# Patient Record
Sex: FEMALE | Race: WHITE | NOT HISPANIC OR LATINO | Employment: OTHER | URBAN - METROPOLITAN AREA
[De-identification: names, ages, dates, MRNs, and addresses within clinical notes are randomized per-mention and may not be internally consistent; named-entity substitution may affect disease eponyms.]

---

## 2018-05-23 ENCOUNTER — OFFICE VISIT (OUTPATIENT)
Dept: FAMILY MEDICINE | Facility: CLINIC | Age: 78
End: 2018-05-23
Payer: MEDICARE

## 2018-05-23 VITALS
WEIGHT: 237 LBS | DIASTOLIC BLOOD PRESSURE: 68 MMHG | SYSTOLIC BLOOD PRESSURE: 132 MMHG | HEIGHT: 69 IN | BODY MASS INDEX: 35.1 KG/M2 | HEART RATE: 63 BPM

## 2018-05-23 DIAGNOSIS — I10 ESSENTIAL HYPERTENSION: Primary | ICD-10-CM

## 2018-05-23 DIAGNOSIS — E78.2 MIXED DYSLIPIDEMIA: ICD-10-CM

## 2018-05-23 DIAGNOSIS — Z95.810 CARDIOMYOPATHY WITH IMPLANTABLE CARDIOVERTER-DEFIBRILLATOR: ICD-10-CM

## 2018-05-23 DIAGNOSIS — I42.9 CARDIOMYOPATHY WITH IMPLANTABLE CARDIOVERTER-DEFIBRILLATOR: ICD-10-CM

## 2018-05-23 DIAGNOSIS — E11.9 TYPE 2 DIABETES MELLITUS WITHOUT COMPLICATION, WITHOUT LONG-TERM CURRENT USE OF INSULIN: ICD-10-CM

## 2018-05-23 DIAGNOSIS — I50.22 CHRONIC SYSTOLIC HEART FAILURE: ICD-10-CM

## 2018-05-23 PROCEDURE — 3078F DIAST BP <80 MM HG: CPT | Mod: ,,, | Performed by: INTERNAL MEDICINE

## 2018-05-23 PROCEDURE — 3075F SYST BP GE 130 - 139MM HG: CPT | Mod: ,,, | Performed by: INTERNAL MEDICINE

## 2018-05-23 PROCEDURE — 99204 OFFICE O/P NEW MOD 45 MIN: CPT | Mod: ,,, | Performed by: INTERNAL MEDICINE

## 2018-05-23 RX ORDER — FUROSEMIDE 20 MG/1
20 TABLET ORAL 2 TIMES DAILY PRN
Status: ON HOLD | COMMUNITY
End: 2020-04-02 | Stop reason: CLARIF

## 2018-05-23 RX ORDER — ATORVASTATIN CALCIUM 40 MG/1
40 TABLET, FILM COATED ORAL DAILY
COMMUNITY
End: 2018-11-05

## 2018-05-23 RX ORDER — ASPIRIN 81 MG/1
81 TABLET ORAL DAILY
COMMUNITY
End: 2019-12-10

## 2018-05-23 RX ORDER — CARVEDILOL 25 MG/1
25 TABLET ORAL 2 TIMES DAILY WITH MEALS
COMMUNITY
End: 2019-08-15 | Stop reason: SDUPTHER

## 2018-05-23 RX ORDER — GLIMEPIRIDE 1 MG/1
1 TABLET ORAL
COMMUNITY
End: 2019-05-06 | Stop reason: ALTCHOICE

## 2018-05-23 RX ORDER — WARFARIN SODIUM 5 MG/1
5 TABLET ORAL DAILY
COMMUNITY
End: 2021-11-01 | Stop reason: ALTCHOICE

## 2018-05-23 RX ORDER — GABAPENTIN 100 MG/1
100 CAPSULE ORAL 3 TIMES DAILY
COMMUNITY
End: 2018-07-05 | Stop reason: SDUPTHER

## 2018-05-23 RX ORDER — AMIODARONE HYDROCHLORIDE 200 MG/1
TABLET ORAL DAILY
COMMUNITY
End: 2018-09-20

## 2018-05-23 NOTE — PROGRESS NOTES
Subjective:       Patient ID: Jo Alegre is a 77 y.o. female.    Chief Complaint: Hypertension; Hyperlipidemia; Atrial Fibrillation; Congestive Heart Failure; and Diabetes    Hypertension   This is a chronic problem. The current episode started more than 1 year ago. The problem is controlled. Associated symptoms include shortness of breath. Pertinent negatives include no chest pain, headaches or palpitations. Risk factors for coronary artery disease include sedentary lifestyle, obesity, dyslipidemia and diabetes mellitus. Past treatments include beta blockers and diuretics. There is no history of PVD. There is no history of coarctation of the aorta, a hypertension causing med or pheochromocytoma.   Hyperlipidemia   This is a chronic problem. The current episode started more than 1 year ago. Exacerbating diseases include obesity. She has no history of nephrotic syndrome. Associated symptoms include shortness of breath. Pertinent negatives include no chest pain. The current treatment provides moderate improvement of lipids. Risk factors for coronary artery disease include diabetes mellitus, dyslipidemia and hypertension.   Atrial Fibrillation   Presents for follow-up visit. Symptoms include shortness of breath. Symptoms are negative for chest pain, dizziness, pacemaker problem (defibrillator), palpitations and weakness. Past medical history includes atrial fibrillation, CHF and hyperlipidemia.   Congestive Heart Failure   Presents for follow-up visit. Associated symptoms include fatigue and shortness of breath. Pertinent negatives include no chest pain, palpitations or unexpected weight change. The symptoms have been stable.   Diabetes   She presents for her follow-up diabetic visit. She has type 2 diabetes mellitus. Her disease course has been stable. Pertinent negatives for hypoglycemia include no confusion, dizziness, headaches, nervousness/anxiousness, pallor, seizures or tremors. Associated symptoms  include fatigue. Pertinent negatives for diabetes include no chest pain, no foot ulcerations, no weakness and no weight loss. Diabetic complications include nephropathy. Pertinent negatives for diabetic complications include no PVD. Current diabetic treatment includes oral agent (monotherapy). An ACE inhibitor/angiotensin II receptor blocker is being taken. She does not see a podiatrist.      Past Medical History:   Diagnosis Date    Allergy     Codeine, Lasix    Atrial fibrillation     Atrial fibrillation     Cataract     CHF (congestive heart failure)     Diabetes mellitus, type 2     Hyperlipidemia     Osteoporosis      Social History     Social History    Marital status:      Spouse name: N/A    Number of children: 4    Years of education: N/A     Occupational History          Social History Main Topics    Smoking status: Former Smoker    Smokeless tobacco: Never Used    Alcohol use No    Drug use: No    Sexual activity: No     Other Topics Concern    Not on file     Social History Narrative    - Drives and lives alone.     Past Surgical History:   Procedure Laterality Date    COLONOSCOPY      EYE SURGERY      FRACTURE SURGERY      TONSILLECTOMY       Family History   Problem Relation Age of Onset    Heart disease Mother     Cancer Father         Lung Cancer ??? Asbestos       Review of Systems   Constitutional: Positive for fatigue. Negative for activity change, chills, fever, unexpected weight change and weight loss.   HENT: Negative for congestion, postnasal drip and sinus pressure.    Eyes: Negative for pain, discharge and visual disturbance.   Respiratory: Positive for shortness of breath. Negative for cough and chest tightness.    Cardiovascular: Negative for chest pain, palpitations and leg swelling.        History of defibrillator, congestive cardiomyopathy hypertension and dyslipidemia   Gastrointestinal: Negative for abdominal distention, anal  "bleeding, constipation and diarrhea.   Genitourinary: Negative for difficulty urinating, dysuria, flank pain, frequency, menstrual problem, pelvic pain, vaginal bleeding and vaginal pain.   Musculoskeletal: Negative for arthralgias and joint swelling.   Skin: Negative for color change, pallor and rash.   Allergic/Immunologic: Negative for environmental allergies, food allergies and immunocompromised state.   Neurological: Negative for dizziness, tremors, seizures, syncope, weakness, light-headedness and headaches.   Hematological: Negative for adenopathy. Does not bruise/bleed easily.   Psychiatric/Behavioral: Negative for agitation, confusion and dysphoric mood. The patient is not nervous/anxious.        Objective:       Vitals:    05/23/18 0900 05/23/18 0947   BP: (!) 147/82 132/68   Pulse: 63    Weight: 107.5 kg (237 lb)    Height: 5' 9" (1.753 m)      Physical Exam   Constitutional: She appears well-developed. She is cooperative. No distress.   Simple obesity with a BMI of 35   HENT:   Head: Normocephalic and atraumatic.   Eyes: Conjunctivae, EOM and lids are normal. Lids are everted and swept, no foreign bodies found. Right pupil is round and reactive. Left pupil is round and reactive.   Neck: Trachea normal and normal range of motion. Neck supple.   Cardiovascular: Normal rate, regular rhythm, S1 normal, S2 normal and normal heart sounds.    Pulmonary/Chest: Breath sounds normal.   Abdominal: Soft. Bowel sounds are normal. There is no rigidity and no guarding.   Lymphadenopathy:     She has no cervical adenopathy.     She has no axillary adenopathy.   Neurological: She is alert.   Skin: Skin is warm and dry.   Psychiatric: She has a normal mood and affect.   Nursing note and vitals reviewed.      Assessment:       1. Essential hypertension    2. Type 2 diabetes mellitus without complication, without long-term current use of insulin    3. Mixed dyslipidemia    4. Chronic systolic heart failure    5. " Cardiomyopathy with implantable cardioverter-defibrillator         Plan:           Essential hypertension    Type 2 diabetes mellitus without complication, without long-term current use of insulin    Mixed dyslipidemia    Chronic systolic heart failure    Cardiomyopathy with implantable cardioverter-defibrillator    Advised Ms. Alegre to monitor Blood sugars at home and record them.  Exercise, watch diet and loose weight.  keep a close eye on feet and keep them clean. Annual eye examination. Annual influenza vaccine.  Monitor HgbA1c every 3 to 6 months. Monitor urine microalbumin every year.keep LDL less than 100. Monitor blood pressure and target blood pressure 120/70.      Patient has been advised to watch diet and exercise. Avoid fried and fatty food. Compliance to medications and follow up urged.      Watch salt and fluid intake. Check body weight periodically to address any possible worsening     Appropriate seasonal immunizations    Like to review old records.

## 2018-05-23 NOTE — PATIENT INSTRUCTIONS
Using a Blood Sugar Log    You have diabetes. This means your body has trouble regulating a sugar called glucose. To help manage your diabetes, youll need to check your blood sugar level as directed by your healthcare provider. Keeping a log of your blood sugar levels will help you track your blood sugar readings. Its a simple and easy way to see how well you are controlling your diabetes.  Checking your blood sugar level  You can check your blood sugar level with a blood glucose meter. Youll first prick the side of your finger with a tiny lancet to draw a tiny drop of blood onto the test strip. Some glucose meters let you use another place on your body to test. But these other places should not be used in some cases as they may be inaccurate. Follow the instructions for your glucose meter. And talk with your healthcare provider before doing the test on other places.  The strip goes into the meter first, then a drop of blood is placed on the tip of the strip. The meter then shows a reading that tells you the level of your blood sugar. Your readings should be in your target range as often as possible. This means not too high or too low. Staying in this range helps lower your risk for complications. Your healthcare provider will help you figure out the target range that is best for you.  Tracking your readings  Every time you check your blood sugar, use your log to keep track of your readings. Your meter will also probably have a memory feature that your healthcare provider can check at your next visit. You may be advised by your healthcare provider to check your blood sugar in the morning, at bedtime, and before and after meals. Be sure to write down all of your numbers. Also use your log to record things that might have affected your blood sugar. Some examples include being sick, certain medicines, being physically active, feeling stressed, or skipping meals.   Lessons learned from your readings  Tracking your  blood sugar readings helps you see patterns. These patterns tell you how your actions affect your blood sugar. For instance, you may have higher numbers after eating certain foods or lower numbers after exercise. They just help you understand how to stay in your target range more often, so that your diabetes remains in good control.  Sharing your log with your healthcare team  Bring your blood sugar log and glucose meter with you to all of your healthcare appointments. This can help your healthcare team make changes to your treatment plan, if needed. This may involve making changes in what you eat, what medicines you take, or how much you exercise.  To learn more  The resources below can help you learn more:  · American Diabetes Association 975-096-7493 www.diabetes.org  · [employer] 183-403-2220 www.lighthouse.org  · [employer] [place] 374-462-6076 www.nei.nih.gov  · [employer] 392-387-0716 www.hormone.org  Date Last Reviewed: 5/1/2016  © 1516-4889 [employer]. [address] [place] [address]. All rights reserved. This information is not intended as a substitute for professional medical care. Always follow your healthcare professional's instructions.        Diet: Diabetes  Food is an important tool that you can use to control diabetes and stay healthy. Eating well-balanced meals in the correct amounts will help you control your blood glucose levels and prevent low blood sugar reactions. It will also help you reduce the health risks of diabetes. There is no one specific diet that is right for everyone with diabetes. But there are general guidelines to follow. A registered dietitian (RD) will create a tailored diet approach thats just right for you. He or she will also help you plan healthy meals and snacks. If you have any questions, call your dietitian for advice.     Guidelines for success  Talk with your healthcare provider before starting a diabetes diet or  weight loss program. If you haven't talked with a dietitian yet, ask your provider for a referral. The following guidelines can help you succeed:  · Select foods from the 6 food groups below. Your dietitian will help you find food choices within each group. He or she will also show you serving sizes and how many servings you can have at each meal.  ¨ Grains, beans, and starchy vegetables  ¨ Vegetables  ¨ Fruit  ¨ Milk or yogurt  ¨ Meat, poultry, fish, or tofu  ¨ Healthy fats  · Check your blood sugar levels as directed by your provider. Take any medicine as prescribed by your provider.  · Learn to read food labels and pick the right portion sizes.  · Eat only the amount of food in your meal plan. Eat about the same amount of food at regular times each day. Dont skip meals. Eat meals 4 to 5 hours apart, with snacks in between.  · Limit alcohol. It raises blood sugar levels. Drink water or calorie-free diet drinks that use safe sweeteners.  · Eat less fat to help lower your risk of heart disease. Use nonfat or low-fat dairy products and lean meats. Avoid fried foods. Use cooking oils that are unsaturated, such as olive, canola, or peanut oil.  · Talk with your dietitian about safe sugar substitutes.  · Avoid added salt. It can contribute to high blood pressure, which can cause heart disease. People with diabetes already have a risk of high blood pressure and heart disease.  · Stay at a healthy weight. If you need to lose weight, cut down on your portion sizes. But dont skip meals. Exercise is an important part of any weight management program. Talk with your provider about an exercise program thats right for you.  · For more information about the best diet plan for you, talk with a registered dietitian (RD). To find an RD in your area, contact:  ¨ Academy of Nutrition and Dietetics www.eatright.org  ¨ The American Diabetes Association 002-617-3535 www.diabetes.org  Date Last Reviewed: 8/1/2016  © 9805-8369 [employer]. [address] [place] [address], [place] [address]. All rights reserved. This information is not intended as a substitute for professional medical care. Always follow your healthcare professional's instructions.

## 2018-05-30 PROBLEM — R94.30 EJECTION FRACTION < 50%: Status: ACTIVE | Noted: 2017-10-18

## 2018-05-30 PROBLEM — Z13.29 THYROID DISORDER SCREENING: Status: ACTIVE | Noted: 2017-10-17

## 2018-07-05 ENCOUNTER — OFFICE VISIT (OUTPATIENT)
Dept: FAMILY MEDICINE | Facility: CLINIC | Age: 78
End: 2018-07-05
Payer: MEDICARE

## 2018-07-05 VITALS
BODY MASS INDEX: 34.96 KG/M2 | DIASTOLIC BLOOD PRESSURE: 77 MMHG | SYSTOLIC BLOOD PRESSURE: 125 MMHG | HEIGHT: 69 IN | RESPIRATION RATE: 16 BRPM | HEART RATE: 66 BPM | WEIGHT: 236 LBS

## 2018-07-05 DIAGNOSIS — Z95.810 CARDIOMYOPATHY WITH IMPLANTABLE CARDIOVERTER-DEFIBRILLATOR: ICD-10-CM

## 2018-07-05 DIAGNOSIS — Z12.31 ENCOUNTER FOR SCREENING MAMMOGRAM FOR BREAST CANCER: ICD-10-CM

## 2018-07-05 DIAGNOSIS — I10 ESSENTIAL HYPERTENSION: Primary | ICD-10-CM

## 2018-07-05 DIAGNOSIS — Z78.0 ASYMPTOMATIC MENOPAUSE: ICD-10-CM

## 2018-07-05 DIAGNOSIS — E78.2 MIXED DYSLIPIDEMIA: ICD-10-CM

## 2018-07-05 DIAGNOSIS — G57.93 NEUROPATHY OF BOTH FEET: ICD-10-CM

## 2018-07-05 DIAGNOSIS — I50.22 CHRONIC SYSTOLIC HEART FAILURE: ICD-10-CM

## 2018-07-05 DIAGNOSIS — I42.9 CARDIOMYOPATHY WITH IMPLANTABLE CARDIOVERTER-DEFIBRILLATOR: ICD-10-CM

## 2018-07-05 PROCEDURE — 3074F SYST BP LT 130 MM HG: CPT | Mod: ,,, | Performed by: INTERNAL MEDICINE

## 2018-07-05 PROCEDURE — 3078F DIAST BP <80 MM HG: CPT | Mod: ,,, | Performed by: INTERNAL MEDICINE

## 2018-07-05 PROCEDURE — 99214 OFFICE O/P EST MOD 30 MIN: CPT | Mod: ,,, | Performed by: INTERNAL MEDICINE

## 2018-07-05 RX ORDER — GABAPENTIN 100 MG/1
100 CAPSULE ORAL 3 TIMES DAILY
Qty: 270 CAPSULE | Refills: 1 | Status: SHIPPED | OUTPATIENT
Start: 2018-07-05 | End: 2018-08-03

## 2018-07-05 NOTE — PROGRESS NOTES
Subjective:       Patient ID: Jo Alegre is a 77 y.o. female.    Chief Complaint: Hypertension; Hyperlipidemia; Numbness/neuropathy; and Cardiomyopathy    Ms. Jo Alegre is a 77-year-old  female  Who comes for follow-up.Her underlying medical issues include  Congestive cardiomyopathy with a defibrillator placement. She also has hypertension, hyperlipidemia and sugar diabetes. She follows Dr. Jones  Co-manages although co-morbidities Including Diabetes.    She continues to have numbness and paresthesias  In the lower extremities. This may be due to diabetes  Or other Reasons. She is running short of gabapentin. This was initiated by her pain management  Dr. Rucker.However, she chose not to follow-up with him.    Heart ambulatory status is fair. No significant shortness of breath on ordinary ambulation. No active chest pains. No cough or expectoration.      Hypertension   This is a chronic problem. The current episode started more than 1 year ago. The problem is controlled. Associated symptoms include malaise/fatigue, peripheral edema and shortness of breath. Pertinent negatives include no chest pain, headaches or palpitations. Risk factors for coronary artery disease include sedentary lifestyle, obesity and dyslipidemia. Past treatments include beta blockers, diuretics and angiotensin blockers. There is no history of pheochromocytoma.   Hyperlipidemia   This is a chronic problem. The current episode started more than 1 year ago. The problem is controlled. Exacerbating diseases include obesity. Associated symptoms include shortness of breath. Pertinent negatives include no chest pain. Current antihyperlipidemic treatment includes statins. The current treatment provides moderate improvement of lipids. Risk factors for coronary artery disease include a sedentary lifestyle, dyslipidemia and hypertension.       Past Medical History:   Diagnosis Date    Allergy     Codeine, Lasix    Atrial  fibrillation     Atrial fibrillation     Cataract     CHF (congestive heart failure)     Diabetes mellitus, type 2     Ejection fraction < 50% 10/18/2017    Approximately 35%  Based on prior  Echocardiogram.    Hyperlipidemia     Osteoporosis     Thyroid disorder screening 10/17/2017    TSH of 1.12 ordered by Dr. dee Jones     Social History     Social History    Marital status:      Spouse name: N/A    Number of children: 4    Years of education: N/A     Occupational History          Social History Main Topics    Smoking status: Former Smoker    Smokeless tobacco: Never Used    Alcohol use No    Drug use: No    Sexual activity: No     Other Topics Concern    Not on file     Social History Narrative    - Drives and lives alone.     Past Surgical History:   Procedure Laterality Date    COLONOSCOPY      EYE SURGERY      FRACTURE SURGERY      TONSILLECTOMY       Family History   Problem Relation Age of Onset    Heart disease Mother     Cancer Father         Lung Cancer ??? Asbestos       Review of Systems   Constitutional: Positive for fatigue and malaise/fatigue. Negative for activity change, chills, fever and unexpected weight change.   HENT: Negative for congestion, postnasal drip and sinus pressure.    Eyes: Negative for pain, discharge and visual disturbance.   Respiratory: Positive for shortness of breath. Negative for cough and chest tightness.    Cardiovascular: Negative for chest pain, palpitations and leg swelling.        History of defibrillator, congestive cardiomyopathy hypertension and dyslipidemia   Gastrointestinal: Negative for abdominal distention, anal bleeding, constipation and diarrhea.   Genitourinary: Negative for difficulty urinating, dysuria, flank pain, frequency, menstrual problem, pelvic pain, vaginal bleeding and vaginal pain.   Musculoskeletal: Negative for arthralgias and joint swelling.   Skin: Negative for color change, pallor and  "rash.   Allergic/Immunologic: Negative for environmental allergies, food allergies and immunocompromised state.   Neurological: Negative for dizziness, tremors, seizures, syncope, weakness, light-headedness and headaches.   Hematological: Negative for adenopathy. Does not bruise/bleed easily.   Psychiatric/Behavioral: Negative for agitation, confusion and dysphoric mood. The patient is not nervous/anxious.        Objective:       Vitals:    07/05/18 1202   BP: 125/77   Pulse: 66   Resp: 16   Weight: 107 kg (236 lb)   Height: 5' 9" (1.753 m)     Physical Exam   Constitutional: She appears well-developed. She is cooperative. No distress.   Simple obesity with a BMI of 34   HENT:   Head: Normocephalic and atraumatic.   Eyes: Conjunctivae, EOM and lids are normal. Lids are everted and swept, no foreign bodies found. Right pupil is round and reactive. Left pupil is round and reactive.   Neck: Trachea normal and normal range of motion. Neck supple.   Cardiovascular: Normal rate, regular rhythm, S1 normal and S2 normal.  Exam reveals distant heart sounds. Exam reveals no gallop and no friction rub.        Pulmonary/Chest: Breath sounds normal.   Abdominal: Soft. Bowel sounds are normal. There is no rigidity and no guarding.   Lymphadenopathy:     She has no cervical adenopathy.   Neurological: She is alert.   Skin: Skin is warm and dry.   Dusky skin both legs   Psychiatric: She has a normal mood and affect.   Nursing note and vitals reviewed.      Assessment:       1. Essential hypertension    2. Mixed dyslipidemia    3. Cardiomyopathy with implantable cardioverter-defibrillator    4. Neuropathy of both feet    5. Encounter for screening mammogram for breast cancer    6. Asymptomatic menopause    7. Chronic systolic heart failure         Plan:           Essential hypertension    Mixed dyslipidemia    Cardiomyopathy with implantable cardioverter-defibrillator    Neuropathy of both feet  -     gabapentin (NEURONTIN) 100 MG " capsule; Take 1 capsule (100 mg total) by mouth 3 (three) times daily.  Dispense: 270 capsule; Refill: 1    Encounter for screening mammogram for breast cancer  -     Mammo Digital Screening Bilat with CAD; Future; Expected date: 07/05/2018    Asymptomatic menopause  -     DXA Bone Density Spine And Hip    Chronic systolic heart failure    Patient has been advised to watch diet and exercise. Avoid fried and fatty food. Compliance to medications and follow up urged.    The patient is asked to make an attempt to improve diet and exercise patterns to aid in medical management of this problem.    Use of salt and fluid intake restriction has also been discussed.    She is also due for mammogram and bone density checkup for which orders will be given.    She is at a low dose of gabapentin and any escalated dose may cause further edema.    I've advised her to forward me any recent labs from her cardiologist Dr. Jones.    I'll see her back in about 4 months time to review her general medical conditions.    Seasonal immunizations have been discussed.    Falling injury precautions have been discussed.

## 2018-07-05 NOTE — PATIENT INSTRUCTIONS
Medications for Cardiomyopathy  Medicines can help you to both feel better and stay as healthy as you can. Take your medicines exactly as instructed. Never stop taking them or change dosage unless told to by your doctor, even if you feel better. Be honest with your healthcare provider if you are not taking medicines as prescribed. Often, a medicine plan can be tailored to your preferences. If you don't share this information with your doctor, you may end up being admitted to the hospital for a serious condition.     Use a pillbox to make keeping track of medications easier.     Common medicines  Your healthcare provider may prescribe one or more of the following:  · ACE inhibitors help blood flow more easily by relaxing blood vessels and lowering blood pressure. This lets the heart pump more blood without doing more work. This is especially useful if you also have diabetes because it protects the kidneys from further damage.  · Anticoagulants help prevent blood clots, which can occur when blood pools in the heart from impaired pumping. You may need routine blood tests while taking these medicines to see how well they are working, have dose adjustments, and to see if they may be interacting with foods or medicines. Your healthcare team will give you full instructions based on what medicine you are prescribed.  · Antiarrhythmics may be used to control a fast or irregular heartbeat.  · Beta-blockers slow the heart rate and lower blood pressure, which lessens the work the heart has to do. They may also help keep the heartbeat regular and enhance the pumping function of the heart.  · Calcium channel blockers dilate blood vessels, lowering blood pressure and slowing the heart rate. This can also decrease the work the heart has to perform.  · Diuretics help rid the body of excess fluid. Having less fluid to pump makes a hearts job easier. Getting rid of extra water can also help reduce swelling, bloating, and shortness  of breath. These drugs are among the most important in treatment of heart failure as they help control the balance of fluid with the heart's changing ability to pump. Never skip or miss these medicines unless your doctor says it's OK.  · Digitalis and digoxin help the heart pump with more strength. This helps the heart pump more blood with each beat. Digitalis may also keep the heartbeat regular.  Tips for taking your medicines  · Make taking your medicines part of your daily routine.  · Take your medicine at the same time or times each day. Make it a habit.  · Read and follow the directions on the prescription label.  · Dont run out of medicine. Order more medicine when you have a 1 to 2 week supply of pills left. Let your healthcare provider know if you are having trouble filling your prescriptions.  · Make sure you understand any interaction your medicines may have with foods, supplements, or new prescriptions. Talk with your pharmacist and other providers for information.  · Check with your healthcare provider on over-the-counter medicines. Some may have a lot of sodium, which can cause fluid retention and worsening symptoms. Common cough medicines can increase blood pressure, heart rate, and strain on your heart.  Coping with side effects  Some of the medications you take may cause side effects. Side effects may include nausea, dry cough, dizziness, muscle cramps, or changes in your heartbeat. If you have any of these or other symptoms that bother you after starting a medication, tell your doctor right away. Your doctor may be able to adjust your dosage or give you a different medication. Never stop taking your medication or change your dose on your own.  Date Last Reviewed: 1/1/2017  © 7130-7262 [employer]. [address] [place] [address], [place] [address]. All rights reserved. This information is not intended as a substitute for professional medical care. Always follow your healthcare professional's  instructions.        Living with Cardiomyopathy  Your doctor will outline a treatment plan to help you live better with cardiomyopathy. This will stop your condition from getting worse and possibly causing serious problems for your heart and lungs. Be sure to follow your healthcare provider's instructions. You can also make some lifestyle changes that will help your heart.     Weigh yourself daily and write down your results.   Follow your treatment plan  Be sure to visit your healthcare provider regularly. Mention any problems you are having with your treatment plan. Be honest if you are not doing something your provider has suggested. He or she may be able to make some changes to help your plan work better for you. Not following your provider's advice could result in a serious or life-threatening complication. You would need to stay in the hospital.  Balance activity and rest  Having cardiomyopathy may mean you get tired more quickly because your heart doesn't work as well as it should. But this shouldnt keep you from being active. In fact, being active may help you feel better. Talk with your healthcare provider about how much activity is right for you.  Take steps to help your heart  · Stop smoking. Smoking damages your heart muscle and blood vessels. It t also causes changes to your lungs that can make it more difficult to breathe and for your lungs to work. Smoking reduces the oxygen in your blood. Having less oxygen in your blood will cause your heart to work harder and beat faster. This can cause a heart attack if your heart can't handle this extra work. This kind of heart attack is known as an acute myocardial infarction (AMI).  · Lose any excess weight. The more extra pounds you have, the harder your heart has to work to pump blood through your body. Extra weight can also raise your risk for high blood pressure and diabetes. These diseases can further damage your blood vessels and heart.  · Don't drink  alcohol. Drinking alcohol may make your cardiomyopathy worse. Alcohol breaks down the heart tissue. This affects how well your heart pumps. This can be very serious in people with alcoholism.  · Eat less salt. Salt is the main source of sodium in our diet. Too much sodium can make the symptoms of cardiomyopathy worse. Salt causes your body to retain water. This extra fluid makes your heart work harder. Your healthcare provider may tell you to limit how much sodium you have to to less than 1,500 mg a day. Thats about half a teaspoon of salt.  Keep track of your weight  Rapid weight gain may mean that you are retaining fluid. This is one of the signs of heart failure. Keeping track of your weight helps you notice this weight gain early and prevent further damage to your heart. To keep track of your weight:  · Weigh yourself at the same time each day, after you urinate. Wear the same thing each time. Write down your weight each day.  · Dont stop weighing yourself. If you forget one day, weigh again the next morning.  · Call your healthcare provider if you gain more than 2 pounds in 1 day, more than 5 pounds in 1 week, or whatever weight gain you were told to report by your provider.  Call your healthcare provider  Contact your healthcare provider if you:   · Faint or have dizzy spells  · Notice new symptoms from your medicine  · Have a new onset of coughing. This is especially true if the sputum is frothy or foamy.  · Have trouble breathing, especially if it occurs while at rest or lying down. Or if you have trouble breathing during exercise or even while walking short distances.  · Get tired faster  · Begin urinating less often  · Find that your feet or ankles swell more than usual. Or if you have swelling in your legs or abdomen, or if the veins in your neck stick out more than usual. You may notice it is harder to get your shoes or pants because of swelling and bloating.  · Have tightness or pain in your chest,  arm, jaw, or back  Date Last Reviewed: 2/1/2017  © 2767-6244 [employer]. [address] [place] [address], [place] [address]. All rights reserved. This information is not intended as a substitute for professional medical care. Always follow your healthcare professional's instructions.

## 2018-08-03 ENCOUNTER — OFFICE VISIT (OUTPATIENT)
Dept: FAMILY MEDICINE | Facility: CLINIC | Age: 78
End: 2018-08-03
Payer: MEDICARE

## 2018-08-03 VITALS
HEIGHT: 69 IN | DIASTOLIC BLOOD PRESSURE: 74 MMHG | HEART RATE: 60 BPM | SYSTOLIC BLOOD PRESSURE: 138 MMHG | WEIGHT: 235 LBS | OXYGEN SATURATION: 98 % | BODY MASS INDEX: 34.8 KG/M2

## 2018-08-03 DIAGNOSIS — Z95.810 CARDIOMYOPATHY WITH IMPLANTABLE CARDIOVERTER-DEFIBRILLATOR: ICD-10-CM

## 2018-08-03 DIAGNOSIS — M54.2 NECK PAIN: Primary | ICD-10-CM

## 2018-08-03 DIAGNOSIS — R51.9 ACUTE INTRACTABLE HEADACHE, UNSPECIFIED HEADACHE TYPE: ICD-10-CM

## 2018-08-03 DIAGNOSIS — I42.9 CARDIOMYOPATHY WITH IMPLANTABLE CARDIOVERTER-DEFIBRILLATOR: ICD-10-CM

## 2018-08-03 DIAGNOSIS — I10 ESSENTIAL HYPERTENSION: ICD-10-CM

## 2018-08-03 PROCEDURE — 3075F SYST BP GE 130 - 139MM HG: CPT | Mod: ,,, | Performed by: NURSE PRACTITIONER

## 2018-08-03 PROCEDURE — 3078F DIAST BP <80 MM HG: CPT | Mod: ,,, | Performed by: NURSE PRACTITIONER

## 2018-08-03 PROCEDURE — 99213 OFFICE O/P EST LOW 20 MIN: CPT | Mod: ,,, | Performed by: NURSE PRACTITIONER

## 2018-08-03 RX ORDER — PREGABALIN 50 MG/1
50 CAPSULE ORAL 3 TIMES DAILY
COMMUNITY
End: 2018-09-20 | Stop reason: ALTCHOICE

## 2018-08-03 NOTE — PROGRESS NOTES
Subjective:       Patient ID: Jo Alegre is a 77 y.o. female.    Chief Complaint: Neck Pain    Patient presents with new onset headache and neck pain for 2 weeks.  Patient states headache is located at the right base of the skull that waxes and wanes.  Patient describes pain as a sharp burning pain when it comes.  Pain lasts 10-15 minutes and comes and goes throughout the day.  Patient has tried cold and warm compresses for relief which have been ineffective.  Patient was recently admitted to the hospital for low back pain.  Patient states she has no low back pain today.  Patient denies ear pain or sinus issues.  No fever, dizziness or vision changes.  No chest pain or shortness of breath.  Pain is a 7-8 on a scale of 0-10.      Review of Systems   Constitutional: Negative for activity change, appetite change and fever.   HENT: Negative for congestion, ear discharge, ear pain, sinus pain, sinus pressure, sore throat, trouble swallowing and voice change.    Eyes: Negative for photophobia, pain, discharge and visual disturbance.   Respiratory: Negative for cough, chest tightness and shortness of breath.    Cardiovascular: Negative for chest pain.        Hypertension, defibrillator    Genitourinary: Negative for difficulty urinating and dysuria.   Musculoskeletal: Positive for neck pain. Negative for arthralgias and gait problem.   Skin: Negative for rash.   Allergic/Immunologic: Negative for immunocompromised state.   Neurological: Positive for headaches. Negative for dizziness, facial asymmetry, speech difficulty, weakness and numbness.   Psychiatric/Behavioral: Negative for confusion, self-injury and suicidal ideas.       Past Medical History:   Diagnosis Date    Allergy     Codeine, Lasix    Atrial fibrillation     Atrial fibrillation     Cataract     CHF (congestive heart failure)     Diabetes mellitus, type 2     Ejection fraction < 50% 10/18/2017    Approximately 35%  Based on prior   "Echocardiogram.    Hyperlipidemia     Osteoporosis     Thyroid disorder screening 10/17/2017    TSH of 1.12 ordered by Dr. dee Jones      Past Surgical History:   Procedure Laterality Date    COLONOSCOPY      EYE SURGERY      FRACTURE SURGERY      TONSILLECTOMY         Family History   Problem Relation Age of Onset    Heart disease Mother     Cancer Father         Lung Cancer ??? Asbestos       Social History     Social History    Marital status:      Spouse name: N/A    Number of children: 4    Years of education: N/A     Occupational History          Social History Main Topics    Smoking status: Former Smoker    Smokeless tobacco: Never Used    Alcohol use No    Drug use: No    Sexual activity: No     Other Topics Concern    None     Social History Narrative    - Drives and lives alone.       Current Outpatient Prescriptions   Medication Sig Dispense Refill    amiodarone (PACERONE) 200 MG Tab Take by mouth once daily.      aspirin (ECOTRIN) 81 MG EC tablet Take 81 mg by mouth once daily.      atorvastatin (LIPITOR) 40 MG tablet Take 40 mg by mouth once daily.      carvedilol (COREG) 25 MG tablet Take 25 mg by mouth 2 (two) times daily with meals.      furosemide (LASIX) 20 MG tablet Take 20 mg by mouth 2 (two) times daily.      glimepiride (AMARYL) 1 MG tablet Take 1 mg by mouth before breakfast.      pregabalin (LYRICA) 50 MG capsule Take 50 mg by mouth 3 (three) times daily.      sacubitril-valsartan (ENTRESTO) 24-26 mg per tablet Take 1 tablet by mouth 2 (two) times daily.      warfarin (COUMADIN) 5 MG tablet Take 5 mg by mouth once daily.       No current facility-administered medications for this visit.        Review of patient's allergies indicates:   Allergen Reactions    Codeine      Objective:      Blood pressure 138/74, pulse 60, height 5' 9" (1.753 m), weight 106.6 kg (235 lb), SpO2 98 %. Body mass index is 34.7 kg/m².   Physical Exam "   Constitutional: She is oriented to person, place, and time. She appears well-developed. She is cooperative. No distress.   obese   HENT:   Head: Normocephalic and atraumatic.       Right Ear: Tympanic membrane and external ear normal.   Left Ear: Tympanic membrane and external ear normal.   Nose: Nose normal.   Mouth/Throat: Oropharynx is clear and moist.   Eyes: Conjunctivae, EOM and lids are normal. Pupils are equal, round, and reactive to light. Lids are everted and swept, no foreign bodies found. Right pupil is round and reactive. Left pupil is round and reactive.   Neck: Trachea normal and normal range of motion. Neck supple.   Cardiovascular: Normal rate, S1 normal and S2 normal.    Pulmonary/Chest: Effort normal and breath sounds normal. No respiratory distress.   Abdominal: There is no rigidity.   Musculoskeletal: Normal range of motion.   Lymphadenopathy:     She has no cervical adenopathy.        Right cervical: No superficial cervical adenopathy present.       Left cervical: No superficial cervical adenopathy present.     She has no axillary adenopathy.   Neurological: She is alert and oriented to person, place, and time. She displays abnormal reflex.   Reflex Scores:       Tricep reflexes are 0 on the right side and 0 on the left side.       Bicep reflexes are 1+ on the right side and 1+ on the left side.       Brachioradialis reflexes are 0 on the right side and 0 on the left side.       Patellar reflexes are 0 on the right side and 0 on the left side.  Skin: Skin is warm and dry. Capillary refill takes less than 2 seconds.   Psychiatric: She has a normal mood and affect. Her behavior is normal. Judgment and thought content normal.   Nursing note and vitals reviewed.          Assessment:       1. Neck pain    2. Acute intractable headache, unspecified headache type    3. Essential hypertension    4. Cardiomyopathy with implantable cardioverter-defibrillator        Plan:       Jo Palm was seen today  for neck pain.    Diagnoses and all orders for this visit:    Neck pain  -     X-Ray Cervical Spine AP And Lateral; Future  -     X-Ray Cervical Spine AP And Lateral    Acute intractable headache, unspecified headache type  -Tylenol as needed.    Essential hypertension  -Continue current medications.    Cardiomyopathy with implantable cardioverter-defibrillator  -Continue current medications.    Obtain x-ray today.  Gentle passive ROM exercises for stretching.  Emergency room visit needed if worsening severely at any point with headache severity increase, vision changes, shortness of breath, syncope, or other new symptoms.  Plan of care reviewed with Dr. Alberto.  Patient informed we will call to notify of x-ray result when received.

## 2018-08-03 NOTE — PATIENT INSTRUCTIONS
Nonsurgical Treatment of Cervical Spine Problems  Cervical spine problems can often be treated without surgery. Choices include rest, medicines, or injections. Your healthcare provider may also suggest physical therapy or certain exercises. These may all help to relieve your symptoms. These treatments are often successful. But if your symptoms dont subside, talk to your healthcare provider. He or she may tell you that surgery is your best choice.  Relieving your symptoms  Your healthcare provider may recommend:  · Medicines. These help to reduce pain and inflammation.  · Epidural steroid injections. These are injections into the spinal canal near the spinal cord. They may relieve severe pain and reduce inflammation.  · Restricting aggravating activities. This can help to give your cervical spine time to heal.  · A soft cervical collar. This can help to support your head while keeping your cervical spine aligned.  · Traction. This may help relieve pressure on the irritated nerves.  Restoring mobility and strength  Your healthcare provider may recommend that you work with a physical therapist. This can help you regain mobility and strength in your neck. Physical therapy may last for 4 to 8 weeks. It may include:  · Exercises to improve your necks range of motion and strength.  · Evaluation and correction of posture and body movements. This can correct problems that affect your cervical spine.  · Heat, massage, and traction to help relieve your symptoms.  Self-care  Youll take an active role in your own therapy. To protect your neck from further injury:  · Follow any exercise program given to you by your healthcare provider or physical therapist.  · Practice good posture while sitting, standing, or moving.  · Have your workspace evaluated. Rearrange it if needed.  · When lying down, support your neck. You can use a special cervical pillow or rolled-up towel.   Date Last Reviewed: 10/5/2015  © 9293-4635 [employer]. [address] [place] [address]. All rights reserved. This information is not intended as a substitute for professional medical care. Always follow your healthcare professional's instructions.        Neck Exercises: Passive Neck Rotation    To start, lie on your back, knees bent and feet flat on the floor. Keep your ears, shoulders, and hips aligned, but dont press your lower back to the floor. Rest your hands on your pelvis. Breathe deeply and relax.  · With your neck relaxed, place the palm of one hand on your forehead. Use your hand to turn your head to one side until you feel a stretch in the neck muscles. Do not push through pain.  · Hold for 5 seconds. Then turn to the other side.  · Repeat 5 times on each side.   Note: Keep your shoulders on the floor. Dont lift your chin as you turn your head.  Date Last Reviewed: 8/16/2015  © 8551-7810 [employer]. [address] [place] [address]. All rights reserved. This information is not intended as a substitute for professional medical care. Always follow your healthcare professional's instructions.

## 2018-08-06 ENCOUNTER — TELEPHONE (OUTPATIENT)
Dept: FAMILY MEDICINE | Facility: CLINIC | Age: 78
End: 2018-08-06

## 2018-08-06 NOTE — TELEPHONE ENCOUNTER
----- Message from KYRA Burns sent at 8/6/2018  1:10 PM CDT -----  X-ray of neck is normal for age.  No misalignment of spine.

## 2018-09-20 ENCOUNTER — OFFICE VISIT (OUTPATIENT)
Dept: FAMILY MEDICINE | Facility: CLINIC | Age: 78
End: 2018-09-20
Payer: MEDICARE

## 2018-09-20 VITALS
BODY MASS INDEX: 34.6 KG/M2 | OXYGEN SATURATION: 97 % | DIASTOLIC BLOOD PRESSURE: 88 MMHG | SYSTOLIC BLOOD PRESSURE: 132 MMHG | HEIGHT: 69 IN | WEIGHT: 233.63 LBS | HEART RATE: 70 BPM

## 2018-09-20 DIAGNOSIS — M81.0 AGE-RELATED OSTEOPOROSIS WITHOUT CURRENT PATHOLOGICAL FRACTURE: ICD-10-CM

## 2018-09-20 DIAGNOSIS — G57.93 NEUROPATHY OF BOTH FEET: Primary | ICD-10-CM

## 2018-09-20 DIAGNOSIS — M54.2 NECK PAIN: ICD-10-CM

## 2018-09-20 PROCEDURE — 3079F DIAST BP 80-89 MM HG: CPT | Mod: ,,, | Performed by: NURSE PRACTITIONER

## 2018-09-20 PROCEDURE — 1101F PT FALLS ASSESS-DOCD LE1/YR: CPT | Mod: ,,, | Performed by: NURSE PRACTITIONER

## 2018-09-20 PROCEDURE — 99213 OFFICE O/P EST LOW 20 MIN: CPT | Mod: ,,, | Performed by: NURSE PRACTITIONER

## 2018-09-20 PROCEDURE — 3075F SYST BP GE 130 - 139MM HG: CPT | Mod: ,,, | Performed by: NURSE PRACTITIONER

## 2018-09-20 RX ORDER — GABAPENTIN 100 MG/1
100 CAPSULE ORAL 3 TIMES DAILY
COMMUNITY
End: 2019-06-24 | Stop reason: SDUPTHER

## 2018-09-20 NOTE — PROGRESS NOTES
Subjective:       Patient ID: Jo Alegre is a 78 y.o. female.    Chief Complaint: Neck Pain (2 week f/u)    Patient presents today following up for neck pain and wants to discuss neuropathy in feet.  Patient's neck pain has resolved since her visit last month.  Patient voices concern about her neuropathy in her feet.  Patient has been taking lyrica 50 mg daily for her neuropathy.  She has been obtaining this in sample form from her daughter.  Patient has gabapentin that was prescribed to her for her neuropathy that she is not taking.  Patient is now out of lyrica and would like to know if she should start taking gabapentin now.      Neck Pain    This is a recurrent problem. The current episode started 1 to 4 weeks ago. The problem occurs daily. The problem has been resolved. The pain is associated with an unknown factor. The pain is present in the midline. The quality of the pain is described as aching. The pain is at a severity of 0/10. The patient is experiencing no pain. The symptoms are aggravated by position. Pertinent negatives include no chest pain, fever, headaches, photophobia, syncope, trouble swallowing or visual change. She has tried bed rest, neck support and heat for the symptoms. The treatment provided significant relief.     Review of Systems   Constitutional: Negative for activity change, appetite change and fever.        Obesity   HENT: Negative for congestion, sore throat, trouble swallowing and voice change.    Eyes: Negative for photophobia, pain, discharge and visual disturbance.   Respiratory: Negative for cough, chest tightness and shortness of breath.    Cardiovascular: Negative for chest pain, palpitations and syncope.        Hypertension, hyperlipidemia   Gastrointestinal: Negative for abdominal pain, nausea and vomiting.   Endocrine: Negative for cold intolerance and heat intolerance.        Type 2 diabetes   Genitourinary: Negative for difficulty urinating and dysuria.    Musculoskeletal: Positive for neck pain. Negative for arthralgias and gait problem.   Skin: Negative for rash.   Allergic/Immunologic: Negative for immunocompromised state.   Neurological: Negative for speech difficulty and headaches.        Neuropathy of both feet   Hematological:        Anticoagulant therapy   Psychiatric/Behavioral: Negative for confusion, self-injury and suicidal ideas.       Past Medical History:   Diagnosis Date    Allergy     Codeine, Lasix    Atrial fibrillation     Atrial fibrillation     Cataract     CHF (congestive heart failure)     Diabetes mellitus, type 2     Ejection fraction < 50% 10/18/2017    Approximately 35%  Based on prior  Echocardiogram.    Hyperlipidemia     Osteoporosis     Thyroid disorder screening 10/17/2017    TSH of 1.12 ordered by Dr. dee Jones      Past Surgical History:   Procedure Laterality Date    COLONOSCOPY      EYE SURGERY      FRACTURE SURGERY      TONSILLECTOMY         Family History   Problem Relation Age of Onset    Heart disease Mother     Cancer Father         Lung Cancer ??? Asbestos       Social History     Socioeconomic History    Marital status:      Spouse name: None    Number of children: 4    Years of education: None    Highest education level: None   Social Needs    Financial resource strain: None    Food insecurity - worry: None    Food insecurity - inability: None    Transportation needs - medical: None    Transportation needs - non-medical: None   Occupational History    Occupation:    Tobacco Use    Smoking status: Former Smoker    Smokeless tobacco: Never Used   Substance and Sexual Activity    Alcohol use: No    Drug use: No    Sexual activity: No   Other Topics Concern    None   Social History Narrative    - Drives and lives alone.       Current Outpatient Medications   Medication Sig Dispense Refill    aspirin (ECOTRIN) 81 MG EC tablet Take 81 mg by mouth once daily.       "atorvastatin (LIPITOR) 40 MG tablet Take 40 mg by mouth once daily.      carvedilol (COREG) 25 MG tablet Take 25 mg by mouth 2 (two) times daily with meals.      furosemide (LASIX) 20 MG tablet Take 20 mg by mouth 2 (two) times daily.      gabapentin (NEURONTIN) 100 MG capsule Take 100 mg by mouth 3 (three) times daily.      glimepiride (AMARYL) 1 MG tablet Take 1 mg by mouth before breakfast.      sacubitril-valsartan (ENTRESTO) 24-26 mg per tablet Take 1 tablet by mouth 2 (two) times daily.      warfarin (COUMADIN) 5 MG tablet Take 5 mg by mouth once daily.       No current facility-administered medications for this visit.        Review of patient's allergies indicates:   Allergen Reactions    Codeine      Objective:    HPI     Neck Pain      Additional comments: 2 week f/u          Last edited by Lali Clayton MA on 9/20/2018 10:09 AM. (History)      Blood pressure 132/88, pulse 70, height 5' 9" (1.753 m), weight 106 kg (233 lb 9.6 oz), SpO2 97 %. Body mass index is 34.5 kg/m².   Physical Exam   Constitutional: She is oriented to person, place, and time. She appears well-developed. She is cooperative. No distress.   obesity   HENT:   Head: Normocephalic and atraumatic.   Right Ear: Tympanic membrane normal.   Left Ear: Tympanic membrane normal.   Eyes: Conjunctivae, EOM and lids are normal. Pupils are equal, round, and reactive to light. Lids are everted and swept, no foreign bodies found. Right pupil is round and reactive. Left pupil is round and reactive.   Neck: Trachea normal and normal range of motion. Neck supple.   Cardiovascular: Normal rate, regular rhythm, S1 normal and S2 normal.   Pulses:       Dorsalis pedis pulses are 0 on the right side, and 0 on the left side.        Posterior tibial pulses are 0 on the right side, and 0 on the left side.   Pedal pulses not palpable upon exam   Pulmonary/Chest: Effort normal and breath sounds normal. She has no wheezes.   Abdominal: Soft. Bowel sounds are " normal. There is no rigidity and no guarding.   Musculoskeletal: Normal range of motion.        Right foot: There is normal range of motion and no deformity.        Left foot: There is normal range of motion and no deformity.   Feet:   Right Foot:   Protective Sensation: 10 sites tested. 6 sites sensed.   Skin Integrity: Positive for callus and dry skin. Negative for ulcer, blister, skin breakdown, erythema or warmth.   Left Foot:   Protective Sensation: 10 sites tested. 6 sites sensed.   Skin Integrity: Positive for callus and dry skin. Negative for ulcer, blister, skin breakdown, erythema or warmth.   Lymphadenopathy:     She has no cervical adenopathy.     She has no axillary adenopathy.   Neurological: She is alert and oriented to person, place, and time.   Skin: Skin is warm and dry. Capillary refill takes less than 2 seconds.   Psychiatric: She has a normal mood and affect. Her behavior is normal. Judgment and thought content normal.   Nursing note and vitals reviewed.          Assessment:       1. Neuropathy of both feet    2. Neck pain    3. Age-related osteoporosis without current pathological fracture        Plan:       Jo Palm was seen today for neck pain.    Diagnoses and all orders for this visit:    Neuropathy of both feet  -Gabapentin 100 mg TID.  Patient has prescription at home.  Follow up in 2 months with Dr. Alberto as scheduled to evaluate effectiveness.    Neck pain  -Resolved    Age-related osteoporosis without current pathological fracture  -Weight bearing exercises.  Discuss treatment options and prevention of progression via dietary changes discussed.

## 2018-09-20 NOTE — PATIENT INSTRUCTIONS
Your Diabetes Foot Care Program    Every day you depend on your feet to keep you moving. But when you have diabetes, your feet need special care. Even a small foot problem can become very serious. So dont take your feet for granted. By working with your diabetes healthcare team, you can learn how to protect your feet and keep them healthy.  Evaluating your feet  An evaluation helps your healthcare provider check the condition of your feet. The evaluation includes a review of your diabetes history and overall health. It may also include a foot exam, X-rays, or other tests. These can help show problems beneath the skin that you cant see or feel.  Medical history  You will be asked about your overall health and any history of foot problems. Youll also discuss your diabetes history, such as whether your blood sugar level has changed over time. It also includes questions about sensations of pain, tingling, pins and needles, or numbness. Your healthcare provider will also want to know if you have high blood pressure and heart disease, or if you smoke. Be sure to mention any medicines (including over-the-counter), supplements, or herbal remedies you take.  Foot exam  A foot exam checks the condition of different parts of your foot. First, your skin and nails are examined for any signs of infection. Blood flow is checked by feeling for the pulses in each foot. You may also have tests to study the nerves in the foot. These include using a small filament (wire) to see how sensitive your feet are. In certain cases, you will be asked to walk a short distance to check for bone, joint, and muscle problems.  Diagnostic tests  If needed, your healthcare provider will suggest certain tests to learn more about your feet. These include:  · Doppler tests to measure blood flow in the feet and lower leg.  · X-rays, which can show bone or joint problems.  · Other imaging tests, such as an MRI (magnetic resonance imaging), bone scan,  and CT (computed tomography) scan. These can help show bone infections.  · Other tests, such as vascular tests, which study the blood flow in your feet and legs. You may also have nerve studies to learn how sensitive your feet are.  Creating a foot care program  Based on the evaluation, your healthcare provider will create a foot care program for you. Your program may be as simple as starting a daily self-care routine and changing the types of shoes your wear. It may also involve treating minor foot problems, such as a corn or blister. In some cases, surgery will be needed to treat an infection or mechanical problems, such as hammer toes.  Preventing problems  When you have diabetes, its easier to prevent problems than to treat them later on. So see your healthcare team for regular checkups and foot care. Your healthcare team can also help you learn more about caring for your feet at home. For example, you may be told to avoid walking barefoot. Or you may be told that special footwear is needed to protect your feet.  Have regular checkups  Foot problems can develop quickly. So be sure to follow your healthcare teams schedule for regular checkups. During office visits, take off your shoes and socks as soon as you get in the exam room. Ask your healthcare provider to examine your feet for problems. This will make it easier to find and treat small skin irritations before they get worse. Regular checkups can also help keep track of the blood flow and feeling in your feet. If you have neuropathy (lack of feeling in your feet), you will need to have checkups more often.  Learn about self-care  The more you know about diabetes and your feet, the easier it will be to prevent problems. Members of your healthcare team can teach you how to inspect your feet and teach you to look for warning signs. They can also give you other foot care tips. During office visits, be sure to ask any questions you have.  Date Last Reviewed:  7/1/2016 © 2000-2017 [employer]. [address] [place] [address], [place] [address]. All rights reserved. This information is not intended as a substitute for professional medical care. Always follow your healthcare professional's instructions.        Diabetic Foot Care  Diabetes can lead to a number of foot complications. Fortunately, you can prevent most of these with a little extra foot care. If diabetes is not well controlled, it can cause damage to blood vessels and result in poor circulation to the foot. When the skin does not get enough blood flow, it becomes prone to pressure sores and ulcers, which heal slowly.  Diabetes can also damage nerves, interfering with the ability to feel pain and pressure. When you cant feel your foot normally, it is easy to injure your skin, bones, and joints without knowing it. For these reasons diabetes increases the risk of fungal infections, bunions, and ulcers. An ulcer is a sore or break in the skin. With ulcers, often the skin seems to have worn away. Deep ulcers can lead to bone infection.  Gangrene is the most serious foot complication of diabetes. It usually occurs on the tips of the toes as blackened areas of skin. The black area is dead tissue. In severe cases, gangrene spreads to involve the entire toe, other toes, and the entire foot. Foot or toe amputation may be required. Good foot care and blood sugar control can prevent this.  Home care  · Wear comfortable, well-fitting shoes.  · Wash your feet daily with warm water and mild soap.  · After drying, apply a moisturizing cream or lotion to the top and bottom of your feet. Don't put lotion between toes.  · Check your feet daily for skin breaks, blisters, swelling, or redness. Look between your toes as well. If you cannot see the bottoms of your feet, ask someone to look or use a mirror.  · Wear cotton socks and change them every day.  · Trim toenails carefully, and do not cut your cuticles.  · Strive to keep  your blood sugar under control with a combination of medicines, diet, and activity.  · If you smoke and have diabetes, it is very important that you stop. Smoking reduces blood flow to your foot.  · Schedule foot exams at least every year, or more often if you have foot problems.  · Put your feet up when sitting, wiggle toes, and move ankles to help improve blood flow.  Avoid activities that increase your risk of foot injury:  · Do not walk barefoot.  · Do not use heating pads or hot water bottles on your feet.  · Do not put your foot in a hot tub without first checking the temperature with your hand.  Follow-up care  Follow up with your healthcare provider, or as advised. Be sure to take off your shoes and socks before your appointment starts so your healthcare provider will be sure to check your feet. Report any cut, puncture, scrape, blister, or other injury to your foot. Also report if you have a bunion, hammertoes, ingrown toenail, or ulcer on your foot.  When to seek medical advice  Call your healthcare provider right away if any of these occur:  · Black skin color anywhere on the foot  · Open ulcer with pus draining from the wound  · Increasing foot or leg pain  · New areas of redness or swelling or tender areas of the foot  · Fever of 100.4°F (38°C) or greater  Date Last Reviewed: 5/25/2016  © 2003-6885 [employer]. [address] [place] [address], [place] [address]. All rights reserved. This information is not intended as a substitute for professional medical care. Always follow your healthcare professional's instructions.

## 2018-09-24 ENCOUNTER — OUTSIDE PLACE OF SERVICE (OUTPATIENT)
Dept: FAMILY MEDICINE | Facility: CLINIC | Age: 78
End: 2018-09-24
Payer: MEDICARE

## 2018-09-24 PROCEDURE — G0179 MD RECERTIFICATION HHA PT: HCPCS | Mod: ,,, | Performed by: INTERNAL MEDICINE

## 2018-11-05 ENCOUNTER — OFFICE VISIT (OUTPATIENT)
Dept: FAMILY MEDICINE | Facility: CLINIC | Age: 78
End: 2018-11-05
Payer: MEDICARE

## 2018-11-05 VITALS
HEART RATE: 69 BPM | HEIGHT: 69 IN | SYSTOLIC BLOOD PRESSURE: 162 MMHG | WEIGHT: 235 LBS | BODY MASS INDEX: 34.8 KG/M2 | DIASTOLIC BLOOD PRESSURE: 77 MMHG

## 2018-11-05 DIAGNOSIS — E78.2 MIXED DYSLIPIDEMIA: ICD-10-CM

## 2018-11-05 DIAGNOSIS — Z79.01 CHRONIC ANTICOAGULATION: ICD-10-CM

## 2018-11-05 DIAGNOSIS — N18.30 STAGE 3 CHRONIC KIDNEY DISEASE: ICD-10-CM

## 2018-11-05 DIAGNOSIS — I10 ESSENTIAL HYPERTENSION: Primary | ICD-10-CM

## 2018-11-05 DIAGNOSIS — E11.21 TYPE 2 DIABETES MELLITUS WITH DIABETIC NEPHROPATHY, WITHOUT LONG-TERM CURRENT USE OF INSULIN: ICD-10-CM

## 2018-11-05 DIAGNOSIS — I50.22 CHRONIC SYSTOLIC HEART FAILURE: ICD-10-CM

## 2018-11-05 DIAGNOSIS — Z95.810 CARDIOMYOPATHY WITH IMPLANTABLE CARDIOVERTER-DEFIBRILLATOR: ICD-10-CM

## 2018-11-05 DIAGNOSIS — I42.9 CARDIOMYOPATHY WITH IMPLANTABLE CARDIOVERTER-DEFIBRILLATOR: ICD-10-CM

## 2018-11-05 PROCEDURE — 99214 OFFICE O/P EST MOD 30 MIN: CPT | Mod: ,,, | Performed by: INTERNAL MEDICINE

## 2018-11-05 PROCEDURE — 3078F DIAST BP <80 MM HG: CPT | Mod: ,,, | Performed by: INTERNAL MEDICINE

## 2018-11-05 PROCEDURE — 3077F SYST BP >= 140 MM HG: CPT | Mod: ,,, | Performed by: INTERNAL MEDICINE

## 2018-11-05 PROCEDURE — 1101F PT FALLS ASSESS-DOCD LE1/YR: CPT | Mod: ,,, | Performed by: INTERNAL MEDICINE

## 2018-11-05 NOTE — PATIENT INSTRUCTIONS

## 2018-11-05 NOTE — LETTER
November 5, 2018        Mary Merlos, NP  [address] [facility] [address]             [address] [address]  Phone: [phone]  Fax: [phone]   Patient: Jo Alegre   MR Number: [id number]   YOB: 1940   Date of Visit: 11/5/2018     Dear Ms. Merlos,     I had the opportunity to see Ms. Micheline Alegre today.    I would request you to do a hemoglobin A1c and urine microalbumin together with any other labs that you may order when she follows up with you.. I would also like to get a copy of labs done by you in the last 6 months.    Kind regards,      Kraig Alberto MD            CC  No Recipients    Enclosure

## 2018-11-05 NOTE — PROGRESS NOTES
Subjective:       Patient ID: Jo Alegre is a 78 y.o. female.    Chief Complaint: Hyperlipidemia; Hypertension; Congestive Heart Failure; Diabetes; and Chronic Kidney Disease    Hyperlipidemia   This is a chronic problem. The current episode started more than 1 year ago. The problem is controlled. Exacerbating diseases include obesity. Associated symptoms include shortness of breath. Pertinent negatives include no chest pain. The current treatment provides moderate improvement of lipids. Risk factors for coronary artery disease include dyslipidemia, hypertension, post-menopausal, a sedentary lifestyle and obesity.   Hypertension   This is a chronic problem. The current episode started more than 1 year ago. The problem has been waxing and waning since onset. Associated symptoms include shortness of breath. Pertinent negatives include no chest pain, headaches or palpitations. Risk factors for coronary artery disease include sedentary lifestyle, obesity and dyslipidemia. Past treatments include beta blockers and angiotensin blockers (Entresto). There is no history of pheochromocytoma.   Diabetes   She presents for her follow-up diabetic visit. She has type 2 diabetes mellitus. Her disease course has been stable. Pertinent negatives for hypoglycemia include no confusion, dizziness, headaches, nervousness/anxiousness, pallor, seizures or tremors. Associated symptoms include fatigue. Pertinent negatives for diabetes include no chest pain, no foot ulcerations, no polydipsia, no polyphagia and no weakness. Symptoms are stable. Risk factors for coronary artery disease include hypertension, obesity, dyslipidemia, diabetes mellitus, sedentary lifestyle and post-menopausal. Current diabetic treatment includes oral agent (monotherapy).   Congestive Heart Failure   Presents for follow-up visit. Associated symptoms include fatigue and shortness of breath. Pertinent negatives include no abdominal pain, chest pain, chest  pressure, near-syncope, palpitations or unexpected weight change. The symptoms have been stable. Compliance with total regimen is %. Compliance with diet is %. Compliance with medications is %.   Patient also has chronic kidney disease stage III based upon labs reviewed in past. She is following Dr. Ortiz for the same. She had a slightly more than average normal potassium in past.    She follows Dr. Jones and his nurse practitioner Ms. Hernandez for heart failure and she seems to be stable at this point.    Past Medical History:   Diagnosis Date    Allergy     Codeine, Lasix    Atrial fibrillation     Atrial fibrillation     Cataract     CHF (congestive heart failure)     Diabetes mellitus, type 2     Ejection fraction < 50% 10/18/2017    Approximately 35%  Based on prior  Echocardiogram.    Hyperlipidemia     Osteoporosis     Thyroid disorder screening 10/17/2017    TSH of 1.12 ordered by Dr. dee Jones     Social History     Socioeconomic History    Marital status:      Spouse name: Not on file    Number of children: 4    Years of education: Not on file    Highest education level: Not on file   Social Needs    Financial resource strain: Not on file    Food insecurity - worry: Not on file    Food insecurity - inability: Not on file    Transportation needs - medical: Not on file    Transportation needs - non-medical: Not on file   Occupational History    Occupation:    Tobacco Use    Smoking status: Former Smoker    Smokeless tobacco: Never Used   Substance and Sexual Activity    Alcohol use: No    Drug use: No    Sexual activity: No   Other Topics Concern    Not on file   Social History Narrative    - Drives and lives alone.     Past Surgical History:   Procedure Laterality Date    [place]OMY  1997    [place]     COLONOSCOPY      EYE SURGERY      FRACTURE SURGERY      TONSILLECTOMY       Family History   Problem Relation Age of Onset  "   Heart disease Mother     Cancer Father         Lung Cancer ??? Asbestos       Review of Systems   Constitutional: Positive for fatigue. Negative for activity change, chills, fever and unexpected weight change.   HENT: Negative for congestion, postnasal drip and sinus pressure.    Eyes: Negative for pain, discharge and visual disturbance.   Respiratory: Positive for shortness of breath. Negative for cough and chest tightness.    Cardiovascular: Negative for chest pain, palpitations, leg swelling and near-syncope.        History of defibrillator, congestive cardiomyopathy hypertension and dyslipidemia   Gastrointestinal: Negative for abdominal distention, abdominal pain, anal bleeding, constipation and diarrhea.   Endocrine: Negative for polydipsia and polyphagia.   Genitourinary: Negative for difficulty urinating, dysuria, flank pain, frequency, menstrual problem, pelvic pain, vaginal bleeding and vaginal pain.   Musculoskeletal: Negative for arthralgias and joint swelling.   Skin: Negative for color change, pallor and rash.   Allergic/Immunologic: Negative for environmental allergies, food allergies and immunocompromised state.   Neurological: Negative for dizziness, tremors, seizures, syncope, weakness, light-headedness and headaches.   Hematological: Negative for adenopathy. Does not bruise/bleed easily.   Psychiatric/Behavioral: Negative for agitation, confusion and dysphoric mood. The patient is not nervous/anxious.          Objective:      Blood pressure (!) 162/77, pulse 69, height 5' 9" (1.753 m), weight 106.6 kg (235 lb). Body mass index is 34.7 kg/m².  Physical Exam   Constitutional: She appears well-developed. She is cooperative. No distress.   Simple obesity with a BMI of 34   HENT:   Head: Normocephalic and atraumatic.   Eyes: Conjunctivae, EOM and lids are normal. Lids are everted and swept, no foreign bodies found. Right pupil is round and reactive. Left pupil is round and reactive.   Neck: " Trachea normal and normal range of motion. Neck supple.   Cardiovascular: Normal rate, regular rhythm, S1 normal and S2 normal. Exam reveals distant heart sounds. Exam reveals no gallop and no friction rub.       Pulmonary/Chest: Breath sounds normal.   Abdominal: Soft. Bowel sounds are normal. There is no rigidity and no guarding.   Lymphadenopathy:     She has no cervical adenopathy.   Neurological: She is alert.   Skin: Skin is warm and dry.   Dusky skin both legs   Psychiatric: She has a normal mood and affect.   Nursing note and vitals reviewed.        Assessment:       1. Essential hypertension    2. Mixed dyslipidemia    3. Cardiomyopathy with implantable cardioverter-defibrillator    4. Chronic systolic heart failure    5. Type 2 diabetes mellitus with diabetic nephropathy, without long-term current use of insulin    6. Chronic anticoagulation    7. Stage 3 chronic kidney disease           No visits with results within 3 Month(s) from this visit.   Latest known visit with results is:   No results found for any previous visit.         Plan:           Essential hypertension    Mixed dyslipidemia    Cardiomyopathy with implantable cardioverter-defibrillator    Chronic systolic heart failure    Type 2 diabetes mellitus with diabetic nephropathy, without long-term current use of insulin  -     Hemoglobin A1c; Future; Expected date: 11/05/2018  -     Microalbumin/creatinine urine ratio; Future; Expected date: 11/05/2018    Chronic anticoagulation    Stage 3 chronic kidney disease      Patient has been advised to watch diet and exercise. Avoid fried and fatty food. Compliance to medications and follow up urged.    The patient is asked to make an attempt to improve diet and exercise patterns to aid in medical management of this problem.    Patient has been updated on immunizations.    Since she is on warfarin she showed try to prevent falling down or sleeping and clipping.    Check hemoglobin A1c and microalbumin  sometime in the next 6 months.    I will like to request labs from her cardiologist and nephrologist also.    Follow-up in 6 months.      Current Outpatient Medications:     aspirin (ECOTRIN) 81 MG EC tablet, Take 81 mg by mouth once daily., Disp: , Rfl:     carvedilol (COREG) 25 MG tablet, Take 25 mg by mouth 2 (two) times daily with meals., Disp: , Rfl:     furosemide (LASIX) 20 MG tablet, Take 20 mg by mouth 2 (two) times daily., Disp: , Rfl:     gabapentin (NEURONTIN) 100 MG capsule, Take 100 mg by mouth 3 (three) times daily., Disp: , Rfl:     glimepiride (AMARYL) 1 MG tablet, Take 1 mg by mouth before breakfast., Disp: , Rfl:     sacubitril-valsartan (ENTRESTO) 24-26 mg per tablet, Take 1 tablet by mouth once. , Disp: , Rfl:     warfarin (COUMADIN) 5 MG tablet, Take 5 mg by mouth once daily., Disp: , Rfl:

## 2018-12-12 ENCOUNTER — OFFICE VISIT (OUTPATIENT)
Dept: FAMILY MEDICINE | Facility: CLINIC | Age: 78
End: 2018-12-12
Payer: MEDICARE

## 2018-12-12 VITALS
WEIGHT: 235.81 LBS | HEIGHT: 69 IN | TEMPERATURE: 99 F | OXYGEN SATURATION: 97 % | HEART RATE: 66 BPM | DIASTOLIC BLOOD PRESSURE: 64 MMHG | SYSTOLIC BLOOD PRESSURE: 120 MMHG | BODY MASS INDEX: 34.93 KG/M2

## 2018-12-12 DIAGNOSIS — H65.03 BILATERAL ACUTE SEROUS OTITIS MEDIA, RECURRENCE NOT SPECIFIED: ICD-10-CM

## 2018-12-12 DIAGNOSIS — R05.9 COUGH: ICD-10-CM

## 2018-12-12 DIAGNOSIS — J01.00 ACUTE MAXILLARY SINUSITIS, RECURRENCE NOT SPECIFIED: Primary | ICD-10-CM

## 2018-12-12 PROCEDURE — 3074F SYST BP LT 130 MM HG: CPT | Mod: ,,, | Performed by: NURSE PRACTITIONER

## 2018-12-12 PROCEDURE — 3078F DIAST BP <80 MM HG: CPT | Mod: ,,, | Performed by: NURSE PRACTITIONER

## 2018-12-12 PROCEDURE — 99213 OFFICE O/P EST LOW 20 MIN: CPT | Mod: ,,, | Performed by: NURSE PRACTITIONER

## 2018-12-12 PROCEDURE — 1101F PT FALLS ASSESS-DOCD LE1/YR: CPT | Mod: ,,, | Performed by: NURSE PRACTITIONER

## 2018-12-12 RX ORDER — FLUTICASONE PROPIONATE 50 MCG
1 SPRAY, SUSPENSION (ML) NASAL 2 TIMES DAILY
Qty: 16 G | Refills: 1 | Status: SHIPPED | OUTPATIENT
Start: 2018-12-12 | End: 2019-01-09

## 2018-12-12 RX ORDER — BENZONATATE 100 MG/1
200 CAPSULE ORAL 3 TIMES DAILY PRN
Qty: 60 CAPSULE | Refills: 0 | Status: SHIPPED | OUTPATIENT
Start: 2018-12-12 | End: 2018-12-22

## 2018-12-12 RX ORDER — DOXYCYCLINE HYCLATE 100 MG
100 TABLET ORAL 2 TIMES DAILY
Qty: 20 TABLET | Refills: 0 | Status: SHIPPED | OUTPATIENT
Start: 2018-12-12 | End: 2019-01-09

## 2018-12-12 NOTE — PROGRESS NOTES
Subjective:       Patient ID: Jo Alegre is a 78 y.o. female.    Chief Complaint: Sinus Problem    Sinus Problem   This is a new problem. The current episode started in the past 7 days. The problem has been waxing and waning since onset. There has been no fever. Her pain is at a severity of 5/10. The pain is moderate. Associated symptoms include congestion, coughing, ear pain, headaches, sinus pressure, sneezing and a sore throat. Pertinent negatives include no hoarse voice, shortness of breath or swollen glands. Past treatments include acetaminophen and saline sprays. The treatment provided mild relief.   Sore Throat    This is a new problem. The current episode started in the past 7 days. The problem has been gradually worsening. Neither side of throat is experiencing more pain than the other. The pain is at a severity of 5/10. The pain is moderate. Associated symptoms include congestion, coughing, ear pain, headaches and a plugged ear sensation. Pertinent negatives include no abdominal pain, diarrhea, drooling, ear discharge, hoarse voice, shortness of breath, stridor, swollen glands, trouble swallowing or vomiting. She has had no exposure to strep or mono. She has tried NSAIDs and gargles for the symptoms. The treatment provided mild relief.     Review of Systems   Constitutional: Negative for activity change, appetite change and fever.   HENT: Positive for congestion, ear pain, postnasal drip, rhinorrhea, sinus pressure, sinus pain, sneezing and sore throat. Negative for drooling, ear discharge, hoarse voice, trouble swallowing and voice change.    Eyes: Negative for photophobia, pain, discharge and visual disturbance.   Respiratory: Positive for cough. Negative for chest tightness, shortness of breath and stridor.    Cardiovascular: Negative for chest pain and palpitations.   Gastrointestinal: Negative for abdominal pain, diarrhea, nausea and vomiting.   Endocrine: Negative for cold intolerance and heat  intolerance.   Genitourinary: Negative for difficulty urinating and dysuria.   Musculoskeletal: Negative for arthralgias and gait problem.   Skin: Negative for rash.   Allergic/Immunologic: Negative for immunocompromised state.   Neurological: Positive for headaches. Negative for speech difficulty.   Psychiatric/Behavioral: Negative for confusion, self-injury and suicidal ideas.       Past Medical History:   Diagnosis Date    Allergy     Codeine, Lasix    Atrial fibrillation     Atrial fibrillation     Cataract     CHF (congestive heart failure)     Diabetes mellitus, type 2     Ejection fraction < 50% 10/18/2017    Approximately 35%  Based on prior  Echocardiogram.    Hyperlipidemia     Osteoporosis     Thyroid disorder screening 10/17/2017    TSH of 1.12 ordered by Dr. dee Jones      Past Surgical History:   Procedure Laterality Date    [place]ECTOMY  1997    [place]     COLONOSCOPY      EYE SURGERY      FRACTURE SURGERY      TONSILLECTOMY         Family History   Problem Relation Age of Onset    Heart disease Mother     Cancer Father         Lung Cancer ??? Asbestos       Social History     Socioeconomic History    Marital status:      Spouse name: None    Number of children: 4    Years of education: None    Highest education level: None   Social Needs    Financial resource strain: None    Food insecurity - worry: None    Food insecurity - inability: None    Transportation needs - medical: None    Transportation needs - non-medical: None   Occupational History    Occupation:    Tobacco Use    Smoking status: Former Smoker    Smokeless tobacco: Never Used   Substance and Sexual Activity    Alcohol use: No    Drug use: No    Sexual activity: No   Other Topics Concern    None   Social History Narrative    - Drives and lives alone.       Current Outpatient Medications   Medication Sig Dispense Refill    aspirin (ECOTRIN) 81 MG EC tablet Take 81 mg by  "mouth once daily.      carvedilol (COREG) 25 MG tablet Take 25 mg by mouth 2 (two) times daily with meals.      furosemide (LASIX) 20 MG tablet Take 20 mg by mouth 2 (two) times daily as needed.       gabapentin (NEURONTIN) 100 MG capsule Take 100 mg by mouth 3 (three) times daily.      glimepiride (AMARYL) 1 MG tablet Take 1 mg by mouth before breakfast.      sacubitril-valsartan (ENTRESTO) 24-26 mg per tablet Take 1 tablet by mouth once.       warfarin (COUMADIN) 5 MG tablet Take 5 mg by mouth once daily.      benzonatate (TESSALON) 100 MG capsule Take 2 capsules (200 mg total) by mouth 3 (three) times daily as needed. 60 capsule 0    doxycycline (VIBRA-TABS) 100 MG tablet Take 1 tablet (100 mg total) by mouth 2 (two) times daily. 20 tablet 0    fluticasone (FLONASE) 50 mcg/actuation nasal spray 1 spray (50 mcg total) by Each Nare route 2 (two) times daily. 16 g 1     No current facility-administered medications for this visit.        Review of patient's allergies indicates:   Allergen Reactions    Codeine     Lasix [furosemide]      Objective:      Blood pressure 120/64, pulse 66, temperature 98.7 °F (37.1 °C), temperature source Oral, height 5' 9" (1.753 m), weight 107 kg (235 lb 12.8 oz), SpO2 97 %. Body mass index is 34.82 kg/m².   Physical Exam   Constitutional: She is oriented to person, place, and time. She appears well-developed and well-nourished. She is cooperative. No distress.   HENT:   Head: Normocephalic and atraumatic.   Right Ear: Ear canal normal. Tympanic membrane is bulging. A middle ear effusion is present.   Left Ear: Ear canal normal. Tympanic membrane is bulging. A middle ear effusion is present.   Nose: Mucosal edema present. Right sinus exhibits maxillary sinus tenderness. Left sinus exhibits maxillary sinus tenderness.   Mouth/Throat: Uvula is midline and mucous membranes are normal. Oropharyngeal exudate and posterior oropharyngeal erythema present.   Eyes: Conjunctivae, EOM " and lids are normal. Pupils are equal, round, and reactive to light. Lids are everted and swept, no foreign bodies found. Right pupil is round and reactive. Left pupil is round and reactive.   Neck: Trachea normal and normal range of motion. Neck supple.   Cardiovascular: Normal rate, regular rhythm, S1 normal and S2 normal.   Pulmonary/Chest: Effort normal and breath sounds normal. No respiratory distress. She has no wheezes.   Abdominal: Soft. Bowel sounds are normal. There is no rigidity and no guarding.   Musculoskeletal: Normal range of motion.   Lymphadenopathy:     She has cervical adenopathy.        Right cervical: Superficial cervical adenopathy present.        Left cervical: Superficial cervical adenopathy present.     She has no axillary adenopathy.   Neurological: She is alert and oriented to person, place, and time.   Skin: Skin is warm and dry. Capillary refill takes less than 2 seconds.   Psychiatric: She has a normal mood and affect. Her behavior is normal. Judgment and thought content normal.   Nursing note and vitals reviewed.          Assessment:       1. Acute maxillary sinusitis, recurrence not specified    2. Cough    3. Bilateral acute serous otitis media, recurrence not specified        Plan:       Jo Palm was seen today for sinus problem.    Diagnoses and all orders for this visit:    Acute maxillary sinusitis, recurrence not specified  -     fluticasone (FLONASE) 50 mcg/actuation nasal spray; 1 spray (50 mcg total) by Each Nare route 2 (two) times daily.  -     doxycycline (VIBRA-TABS) 100 MG tablet; Take 1 tablet (100 mg total) by mouth 2 (two) times daily.    Cough  -     benzonatate (TESSALON) 100 MG capsule; Take 2 capsules (200 mg total) by mouth 3 (three) times daily as needed.    Bilateral acute serous otitis media, recurrence not specified  -     fluticasone (FLONASE) 50 mcg/actuation nasal spray; 1 spray (50 mcg total) by Each Nare route 2 (two) times daily.

## 2018-12-12 NOTE — PATIENT INSTRUCTIONS
When to Use Antibiotics   Antibiotics are medicines used to treat infections caused by bacteria. They dont work for illnesses caused by viruses or an allergic reaction. In fact, taking antibiotics for reasons other than a bacterial infection can cause problems. For example, you may have side effects from the medicine. And if you really need an antibiotic, it may not work well.                                                                                                                                              When antibiotics wont help  Your healthcare provider wont usually prescribe antibiotics for the following conditions. You can help by not asking for them if you have:   · A cold. This type of illness is caused by a virus. It can cause a runny nose, stuffed-up nose, sneezing, coughing, headache, mild body aches, and low fever. A cold gets better on its own in a few days to a week.  · The flu (influenza). This is a respiratory illness caused by a virus. The flu usually goes away on its own in a week or so. It can cause fever, body aches, sore throat, and fatigue.  · Bronchitis. This is an infection in the lungs most often caused by a virus. You may have coughing, phlegm, body aches, and a low fever. A common type of bronchitis is known as a chest cold (acute bronchitis). This often happens after you have a respiratory infection like a common cold. Bronchitis can take weeks to go away, but antibiotics usually dont help.  · Most sore throats. Sore throats are most often caused by viruses. Your throat may feel scratchy or achy, and it may hurt to swallow. You may also have a low fever and body aches. A sore throat usually gets better in a few days.  · Most ear infections. An ear infection may be caused by a virus or bacteria. It causes pain in the ear. Antibiotics usually dont help, and the infection goes away on its own.  · Most sinus infections (sinusitis). This kind of infection causes sinus pain and  swelling, and a runny nose. In most cases, sinusitis goes away on its own, and antibiotics dont make recovery quicker.  · Allergic rhinitis. This is a set of symptoms caused by an allergic reaction. You may have sneezing, a runny nose, itchy or watery eyes, or a sore throat. Allergies are not treated with antibiotics.  · Low fever. A mild fever thats less than 100.4°F (38°C) most likely doesnt need treatment with antibiotics.   When antibiotics can help   Antibiotics can be used to treat:                                                     · Strep throat. This is a throat infectioncaused by a certain type of bacteria. Symptoms of strep throat include a sore throat, white patches on the tonsils, red spots on the roof of the mouth, fever, body aches, and nausea and vomiting.  · Urinary tract infection (UTI). This is a bacterial infection of the bladder and the tube that takes urine out of the body. It can cause burning pain and urine thats cloudy or tinted with blood. UTIs are very common. Antibiotics usually help treat these infections.  · Some ear infections. In some cases, a healthcare provider may prescribe antibiotics for an ear infection. You may need a test to show whats causing the ear infection.  · Some sinus infections. In some cases, yourhealthcare provider may give you antibiotics. He or she may first need to make sure your symptoms arent caused by a virus, fungus, allergies, or air pollutants such as smoke.   Your doctor may also recommend antibiotics if you have a condition that can affect your immune system, such as diabetes or cancer.   Self-care at home   If your infection cant be treated with antibiotics, you can take other steps to feel better. Try the remedies below. In general:   · Rest and sleep as much as needed.  · Drink water and other clear fluids.  · Dont smoke, and avoid smoke from other people.  · Use over-the-counter medicine such as acetaminophen to ease pain or fever, as  directed by your healthcare provider.   To treat sinus pain or nasal congestion:   · Put a warm, moist washcloth on your face where you feel sinus pain or pressure.  · Use a nasal spray with medicine or saline, as directed by your healthcare provider.  · Breathe in steam from a hot shower.  · Use a humidifier or cool mist vaporizer.   To quiet a cough:   · Use a humidifier or cool mist vaporizer.  · Breathe in steam from a hot shower.  · Use cough lozenges.   To sooth a sore throat:   · Suck on ice chips, popsicles, or lozenges.  · Use a sore throat spray.  · Use a humidifier or cool mist vaporizer.  · Gargle with saltwater.  · Drink warm liquids.   To ease ear pain:   · Hold a warm, moist washcloth on the ear for 10 minutes at a time.  Date Last Reviewed: 9/1/2016  © 9760-3256 [employer]. [address] [place] [address]. All rights reserved. This information is not intended as a substitute for professional medical care. Always follow your healthcare professional's instructions.        Anatomy of the Ear    The ear is a complex and delicate organ. It collects sound waves so you can hear the world around you. The ear also has a second function--it helps you keep your balance. Your ear can be divided into 3 parts. The outer ear and middle ear help collect and amplify sound. The inner ear converts sound waves to messages that are sent to the brain. The inner ear also senses the movement and position of your head and body so you can maintain your balance and see clearly, even when you change positions.  The mastoid bone surrounds the middle ear. The external ear collects sound waves. The ear canal carries sound waves to the eardrum. The eardrum vibrates from sound waves, setting the middle ear bones in motion. The middle ear bones (ossicles) vibrate, transmitting sound waves to the inner ear. When the ear is healthy, air pressure remains balanced in the middle ear. The eustachian tube helps  control air pressure in the middle ear. The semicircular canals help maintain balance. The vestibular nerve carries balance signals to the brain. The auditory nerve carries sound signals to the brain. The cochlea picks up sound waves and makes nerve signals.     Date Last Reviewed: 10/1/2016  © 5671-0553 [email or web address]. [address] [place] [address], [place] [address]. All rights reserved. This information is not intended as a substitute for professional medical care. Always follow your healthcare professional's instructions.

## 2019-01-03 ENCOUNTER — TELEPHONE (OUTPATIENT)
Dept: FAMILY MEDICINE | Facility: CLINIC | Age: 79
End: 2019-01-03

## 2019-01-08 ENCOUNTER — TELEPHONE (OUTPATIENT)
Dept: FAMILY MEDICINE | Facility: CLINIC | Age: 79
End: 2019-01-08

## 2019-01-08 ENCOUNTER — TELEPHONE (OUTPATIENT)
Dept: PAIN MEDICINE | Facility: CLINIC | Age: 79
End: 2019-01-08

## 2019-01-08 DIAGNOSIS — M54.12 CERVICAL RADICULOPATHY: Primary | ICD-10-CM

## 2019-01-08 NOTE — TELEPHONE ENCOUNTER
This pt is very upset she was not called back since 1/3/2019     She has seen Nery already  But wants other options  Not surg or drugs  Please advise ASAP

## 2019-01-08 NOTE — Clinical Note
Please get records from Dr. Gabriel also. See if any MRI was done. I have sent a   request to Dr. Navarro. Yohan

## 2019-01-08 NOTE — TELEPHONE ENCOUNTER
----- Message from Kraig Alberto MD sent at 1/8/2019  3:05 PM CST -----  Please get records from Dr. Gabriel also. See if any MRI was done. I have sent a   request to Dr. Navarro. Yohan

## 2019-01-08 NOTE — TELEPHONE ENCOUNTER
----- Message from Genny Reilly sent at 1/8/2019  3:41 PM CST -----  Contact: patient  Type: Needs Medical Advice    Who Called:  patient  Best Call Back Number: [phone]  Additional Information: patient states that she was referred to Dr. Almanzar by Dr. Cornell. She would like for someone from the office to call her because she has questions.

## 2019-01-08 NOTE — TELEPHONE ENCOUNTER
Pt has not had a mri yet but waiting to get it schudeled from [facility] notes not ready yet could take up to a week or more

## 2019-01-08 NOTE — PROGRESS NOTES
Patient left a message seeking a referral to chronic pain management for her neck pain. She has seen Dr. Jacques Gabriel and I do not have a copy of the referral.    Ref to Dr Almanzar

## 2019-01-08 NOTE — TELEPHONE ENCOUNTER
Returned patient's call, appt has been scheduled for 1/15/19.  Patient states she is having LBP not cervical.  Advised to call Dr Torres's office to change the dx.  Patient verbalized understanding.

## 2019-01-09 ENCOUNTER — OFFICE VISIT (OUTPATIENT)
Dept: FAMILY MEDICINE | Facility: CLINIC | Age: 79
End: 2019-01-09
Payer: MEDICARE

## 2019-01-09 VITALS
RESPIRATION RATE: 18 BRPM | WEIGHT: 233 LBS | DIASTOLIC BLOOD PRESSURE: 43 MMHG | HEART RATE: 75 BPM | BODY MASS INDEX: 34.51 KG/M2 | HEIGHT: 69 IN | SYSTOLIC BLOOD PRESSURE: 118 MMHG

## 2019-01-09 DIAGNOSIS — M54.41 RIGHT-SIDED LOW BACK PAIN WITH RIGHT-SIDED SCIATICA, UNSPECIFIED CHRONICITY: ICD-10-CM

## 2019-01-09 DIAGNOSIS — Z79.01 CHRONIC ANTICOAGULATION: ICD-10-CM

## 2019-01-09 DIAGNOSIS — M25.551 RIGHT HIP PAIN: ICD-10-CM

## 2019-01-09 DIAGNOSIS — R26.2 DIFFICULTY WALKING: ICD-10-CM

## 2019-01-09 DIAGNOSIS — M54.41 ACUTE RIGHT-SIDED LOW BACK PAIN WITH RIGHT-SIDED SCIATICA: Primary | ICD-10-CM

## 2019-01-09 DIAGNOSIS — M79.651 RIGHT THIGH PAIN: ICD-10-CM

## 2019-01-09 PROCEDURE — 1101F PR PT FALLS ASSESS DOC 0-1 FALLS W/OUT INJ PAST YR: ICD-10-PCS | Mod: ,,, | Performed by: INTERNAL MEDICINE

## 2019-01-09 PROCEDURE — 99215 OFFICE O/P EST HI 40 MIN: CPT | Mod: ,,, | Performed by: INTERNAL MEDICINE

## 2019-01-09 PROCEDURE — 1101F PT FALLS ASSESS-DOCD LE1/YR: CPT | Mod: ,,, | Performed by: INTERNAL MEDICINE

## 2019-01-09 PROCEDURE — 3074F PR MOST RECENT SYSTOLIC BLOOD PRESSURE < 130 MM HG: ICD-10-PCS | Mod: ,,, | Performed by: INTERNAL MEDICINE

## 2019-01-09 PROCEDURE — 3074F SYST BP LT 130 MM HG: CPT | Mod: ,,, | Performed by: INTERNAL MEDICINE

## 2019-01-09 PROCEDURE — 99215 PR OFFICE/OUTPT VISIT, EST, LEVL V, 40-54 MIN: ICD-10-PCS | Mod: ,,, | Performed by: INTERNAL MEDICINE

## 2019-01-09 PROCEDURE — 3078F DIAST BP <80 MM HG: CPT | Mod: ,,, | Performed by: INTERNAL MEDICINE

## 2019-01-09 PROCEDURE — 3078F PR MOST RECENT DIASTOLIC BLOOD PRESSURE < 80 MM HG: ICD-10-PCS | Mod: ,,, | Performed by: INTERNAL MEDICINE

## 2019-01-09 RX ORDER — TIZANIDINE HYDROCHLORIDE 2 MG/1
1 CAPSULE, GELATIN COATED ORAL NIGHTLY PRN
Qty: 20 CAPSULE | Refills: 1 | Status: SHIPPED | OUTPATIENT
Start: 2019-01-09 | End: 2019-01-19

## 2019-01-09 NOTE — LETTER
January 9, 2019        Sampson Jones MD  [address] [facility] [place] [address]             [facility] / Internal Medicine  [address] [place] [address] [place] [address]  Phone: [phone]  Fax: [phone]   Patient: Jo Alegre   MR Number: [id number]   YOB: 1940   Date of Visit: 1/9/2019     Dear Dr. Jones,     Please update me on this mutual patient who has developed a right low back pain suggestive of sciatica and lumbar radiculopathy. She has seen Dr. Jacques Gabriel office for neurosurgical consultation and has been recommended CT myelogram. She is supposed to have a pacemaker/cardiac device and not sure if it is MRI safe. She is also on anticoagulation and she may need to be bridged with Lovenox prior to the procedure. Apparently she also had a right hip/femur surgery with a hardware placement in past which complicates exact localization of her pain. I do not have prior records at this point.    I would also like to be updated on her cardiac issues.    Kind regards    Sincerely,      Kraig Alberto MD            CC  Jacques Mathew MD    [facility]

## 2019-01-09 NOTE — PATIENT INSTRUCTIONS
Back Care Tips    Caring for your back  These are things you can do to prevent a recurrence of acute back pain and to reduce symptoms from chronic back pain:  · Maintain a healthy weight. If you are overweight, losing weight will help most types of back pain.  · Exercise is an important part of recovery from most types of back pain. The muscles behind and in front of the spine support the back. This means strengthening both the back muscles and the abdominal muscles will provide better support for your spine.   · Swimming and brisk walking are good overall exercises to improve your fitness level.  · Practice safe lifting methods (below).  · Practice good posture when sitting, standing and walking. Avoid prolonged sitting. This puts more stress on the lower back than standing or walking.  · Wear quality shoes with sufficient arch support. Foot and ankle alignment can affect back symptoms. Women should avoid wearing high heels.  · Therapeutic massage can help relax the back muscles without stretching them.  · During the first 24 to 72 hours after an acute injury or flare-up of chronic back pain, apply an ice pack to the painful area for 20 minutes and then remove it for 20 minutes, over a period of 60 to 90 minutes, or several times a day. As a safety precaution, do not use a heating pad at bedtime. Sleeping on a heating pad can lead to skin burns or tissue damage.  · You can alternate ice and heat therapies.  Medications  Talk to your healthcare provider before using medicines, especially if you have other medical problems or are taking other medicines.  · You may use acetaminophen or ibuprofen to control pain, unless your healthcare provider prescribed other pain medicine. If you have chronic conditions like diabetes, liver or kidney disease, stomach ulcers, or gastrointestinal bleeding, or are taking blood thinners, talk with your healthcare provider before taking any medicines.  · Be careful if you are given  prescription pain medicines, narcotics, or medicine for muscle spasm. They can cause drowsiness, affect your coordination, reflexes, and judgment. Do not drive or operate heavy machinery while taking these types of medicines. Take prescription pain medicine only as prescribed by your healthcare provider.  Lumbar stretch  Here is a simple stretching exercise that will help relax muscle spasm and keep your back more limber. If exercise makes your back pain worse, dont do it.  · Lie on your back with your knees bent and both feet on the ground.  · Slowly raise your left knee to your chest as you flatten your lower back against the floor. Hold for 5 seconds.  · Relax and repeat the exercise with your right knee.  · Do 10 of these exercises for each leg.  Safe lifting method  · Dont bend over at the waist to lift an object off the floor.  Instead, bend your knees and hips in a squat.   · Keep your back and head upright  · Hold the object close to your body, directly in front of you.  · Straighten your legs to lift the object.   · Lower the object to the floor in the reverse fashion.  · If you must slide something across the floor, push it.  Posture tips  Sitting  Sit in chairs with straight backs or low-back support. Keep your knees lower than your hips, with your feet flat on the floor.  When driving, sit up straight. Adjust the seat forward so you are not leaning toward the steering wheel.  A small pillow or rolled towel behind your lower back may help if you are driving long distances.   Standing  When standing for long periods, shift most of your weight to one leg at a time. Alternate legs every few minutes.   Sleeping  The best way to sleep is on your side with your knees bent. Put a low pillow under your head to support your neck in a neutral spine position. Avoid thick pillows that bend your neck to one side. Put a pillow between your legs to further relax your lower back. If you sleep on your back, put pillows  under your knees to support your legs in a slightly flexed position. Use a firm mattress. If your mattress sags, replace it, or use a 1/2-inch plywood board under the mattress to add support.  Follow-up care  Follow up with your healthcare provider, or as advised.  If X-rays, a CT scan or an MRI scan were taken, they will be reviewed by a radiologist. You will be notified of any new findings that may affect your care.  Call 911  Seek emergency medical care if any of the following occur:  · Trouble breathing  · Confusion  · Very drowsy  · Fainting or loss of consciousness  · Rapid or very slow heart rate  · Loss of  bowel or bladder control  When to seek medical care  Call your healthcare provider if any of the following occur:  · Pain becomes worse or spreads to your arms or legs  · Weakness or numbness in one or both arms or legs  · Numbness in the groin area  Date Last Reviewed: 6/1/2016  © 6974-6979 [employer]. [address] [place] [address]. All rights reserved. This information is not intended as a substitute for professional medical care. Always follow your healthcare professional's instructions.        Back Basics: A Healthy Spine  A healthy spine supports the body while letting it move freely. It does this with the help of three natural curves. Strong, flexible muscles help, too. They support the spine by keeping its curves properly aligned. The disks that cushion the bones of your spine also play a role in back fitness.    Three natural curves  The spine is made of bones (vertebrae) and pads of soft tissue (disks). These parts are arranged in three curves: cervical, thoracic, and lumbar. When properly aligned, these curves keep your body balanced. They also support your body when you move. By distributing your weight throughout your spine, the curves make back injuries less likely.  Strong, flexible muscles  Strong, flexible back muscles help support the three curves of the spine.  They do so by holding the vertebrae and disks in proper alignment. Strong, flexible abdominal, hip, and leg muscles also reduce strain on the back.  The lumbar curve  The lumbar curve is the hardest-working part of the spine. It carries more weight and moves the most. Aligning this curve helps prevent damage to vertebrae, disks, and other parts of the spine.  Cushioning disks  Disks are the soft pads of tissue between the vertebrae. The disks absorb shock caused by movement. Each disk has a spongy center (nucleus) and a tougher outer ring (annulus). Movement within the nucleus allows the vertebrae to rock back and forth on the disks. This provides the flexibility needed to bend and move.       Date Last Reviewed: 10/18/2015  © 5759-5176 [employer]. [address] [place] [address], [place] [address]. All rights reserved. This information is not intended as a substitute for professional medical care. Always follow your healthcare professional's instructions.

## 2019-01-09 NOTE — PROGRESS NOTES
Subjective:       Patient ID: Jo Alegre is a 78 y.o. female.    Chief Complaint: Back Pain and Leg/Hip/thigh pain    Ms. Jo Alegre is a 78-year-old  female who comes for an urgent follow-up today and is accompanied with her daughter.    About a few weeks ago when she was bending to tie her shoes, she felt a new onset of back pain which seems to be radiating down the right thigh. Thereafter she has been having difficulty walking, bending and finding relief from pain.    She did manage to get an early appointment with neurosurgery office of Dr. Jacques Sandoval-she was seen by his assistant Ms. isaiah irving. Patient was ordered a myelogram for the back given the fact that she has a cardiac device implanted.    Patient has not yet set up an appointment for this investigation because of the logistics of being off anticoagulation for a period of time and consult back with her cardiologist.    She did send a communication from my office about having back pain and I assumed that she had chronic neck pain for which she was referred for chronic pain management. However she states that she has low back pain for which I have never seen her in past and I called her back to the office for reevaluation. I do not have any records from Dr. Gabriel office at this point.    Thus far she has not been diagnosed with any cancer. No fevers. No history of fall or injury.    History of cardiac issues including possiblity of heart failure have been noted. I do not have any records at this point. This will be requested. As to the type of pacemaker/cardiac device that she has,we are not sure. I'm not sure if it is MRI safe.    She also complains of right lateral thigh pain. This occurs regardless of her movements. She has a history of hardware placement secondary to fracture of either hip or femur in past. This was done several years back. In the interim she did not have any pain except when she started  bending.    Diabetes seems to be under control.        Past Medical History:   Diagnosis Date    Allergy     Codeine, Lasix    Atrial fibrillation     Atrial fibrillation     Cataract     CHF (congestive heart failure)     Diabetes mellitus, type 2     Ejection fraction < 50% 10/18/2017    Approximately 35%  Based on prior  Echocardiogram.    Hyperlipidemia     Osteoporosis     Thyroid disorder screening 10/17/2017    TSH of 1.12 ordered by Dr. dee Jones     Social History     Socioeconomic History    Marital status:      Spouse name: Not on file    Number of children: 4    Years of education: Not on file    Highest education level: Not on file   Social Needs    Financial resource strain: Not on file    Food insecurity - worry: Not on file    Food insecurity - inability: Not on file    Transportation needs - medical: Not on file    Transportation needs - non-medical: Not on file   Occupational History    Occupation:    Tobacco Use    Smoking status: Former Smoker    Smokeless tobacco: Never Used   Substance and Sexual Activity    Alcohol use: No    Drug use: No    Sexual activity: No   Other Topics Concern    Not on file   Social History Narrative    - Drives and lives alone.     Past Surgical History:   Procedure Laterality Date    CHO[place]  1997    [place]     COLONOSCOPY      EYE SURGERY      FRACTURE SURGERY      TONSILLECTOMY       Family History   Problem Relation Age of Onset    Heart disease Mother     Cancer Father         Lung Cancer ??? Asbestos       Review of Systems   Constitutional: Positive for fatigue. Negative for activity change, chills, fever and unexpected weight change.   HENT: Negative for congestion, postnasal drip and sinus pressure.    Eyes: Negative for pain, discharge and visual disturbance.   Respiratory: Positive for shortness of breath. Negative for cough and chest tightness.    Cardiovascular: Negative for chest  "pain, palpitations and leg swelling.        History of defibrillator, congestive cardiomyopathy hypertension and dyslipidemia   Gastrointestinal: Negative for abdominal distention, abdominal pain, anal bleeding, constipation and diarrhea.   Endocrine: Negative for polydipsia and polyphagia.        Diabetes mellitus on glimepiride.   Genitourinary: Negative for difficulty urinating, dysuria, flank pain, frequency and pelvic pain.   Musculoskeletal: Positive for back pain and gait problem. Negative for arthralgias and joint swelling.        Difficulty walking with low back pain radiating to the right thigh and also right lateral thigh pain.   Skin: Negative for color change, pallor and rash.   Allergic/Immunologic: Negative for environmental allergies, food allergies and immunocompromised state.   Neurological: Negative for dizziness, tremors, seizures, syncope, weakness, light-headedness and headaches.   Hematological: Negative for adenopathy. Does not bruise/bleed easily.        Patient is on chronic anticoagulation with warfarin.   Psychiatric/Behavioral: Negative for agitation, confusion and dysphoric mood. The patient is not nervous/anxious.          Objective:      Blood pressure (!) 118/43, pulse 75, resp. rate 18, height 5' 9" (1.753 m), weight 105.7 kg (233 lb). Body mass index is 34.41 kg/m².  Physical Exam   Constitutional: She appears well-developed. She appears distressed.   Patient is obese with a BMI of 34. She appears to be slightly distressed   HENT:   Mouth/Throat: No oropharyngeal exudate.   Eyes: EOM are normal. Right eye exhibits no discharge. Left eye exhibits no discharge. No scleral icterus.   Neck: No JVD present. No tracheal deviation present. No thyromegaly present.   Cardiovascular: Normal rate and regular rhythm.   Pulmonary/Chest: Effort normal and breath sounds normal. No stridor. No respiratory distress. She has no wheezes.   Abdominal: Soft. She exhibits no distension. There is no " tenderness.   Musculoskeletal: She exhibits tenderness. She exhibits no deformity.        Right hip: She exhibits decreased strength.        Lumbar back: She exhibits tenderness, pain and spasm. She exhibits no laceration.        Back:         Legs:  Lymphadenopathy:     She has no cervical adenopathy.   Neurological: She is alert.   Reflex Scores:       Patellar reflexes are 1+ on the right side and 1+ on the left side.       Achilles reflexes are 1+ on the right side and 1+ on the left side.  SLR +ve rt > Left   Skin: Skin is warm and dry.   Psychiatric: Her mood appears anxious.   Somewhat anhedonic and antalgic         Assessment:       1. Acute right-sided low back pain with right-sided sciatica    2. Right thigh pain    3. Right-sided low back pain with right-sided sciatica, unspecified chronicity    4. Right hip pain    5. Difficulty walking    6. Chronic anticoagulation           No visits with results within 3 Month(s) from this visit.   Latest known visit with results is:   No results found for any previous visit.     Patient seems to have a new onset of right low back pain radiating to the right thigh. History of hip/femur surgery and hardware placement complicates the issue.    She does have a pacemaker placed which complicates the performance of a straightforward MRI. She is scheduled for a CT myelogram..    She will have to be off anticoagulation for the same. I'm not sure about the type of cardiac device she has at this point and if it is MRI safe.    Jignesh to communicate with her cardiologist Dr. Jones as well as neurosurgeon Dr. Jacques Gabriel.      I've recommended her physical therapy but she is somewhat skeptical about making outpatient visits and requests a home health for the same. This will be agreed upon. She is using a Quad walker at this point.      Plan:           Acute right-sided low back pain with right-sided sciatica  -     X-Ray Lumbar Spine Ap And Lateral; Future; Expected date:  "01/09/2019  -     Ambulatory referral to Home Health  -     tiZANidine 2 mg Cap; Take 1 capsule (2 mg total) by mouth nightly as needed.  Dispense: 20 capsule; Refill: 1    Right thigh pain  -     X-Ray Lumbar Spine Ap And Lateral; Future; Expected date: 01/09/2019  -     Ambulatory referral to Home Health    Right-sided low back pain with right-sided sciatica, unspecified chronicity  -     X-Ray Lumbar Spine Ap And Lateral; Future; Expected date: 01/09/2019    Right hip pain  -     X-Ray Hip 2 or 3 views Right; Future; Expected date: 01/09/2019    Difficulty walking  -     Ambulatory referral to Home Health    Chronic anticoagulation      Check x-ray of lumbar spine and x-ray of hip region to rule out the possibility of any fracture or significant abnormality.    Home health for evaluation/physical therapy/occupational therapy/range of motion exercises/risk for fall evaluation. I'll check with Dr. Jones concerning her anticoagulation and the type of pacemaker and cardiac issues.    I'll check with Dr. Jacques Gabriel also.    I've expressed my limitations about managing her entire back pain and also "quickly curing her."    Follow-up arrangements will be based upon results of initial testing and interim interventions.            Current Outpatient Medications:     aspirin (ECOTRIN) 81 MG EC tablet, Take 81 mg by mouth once daily., Disp: , Rfl:     carvedilol (COREG) 25 MG tablet, Take 25 mg by mouth 2 (two) times daily with meals., Disp: , Rfl:     furosemide (LASIX) 20 MG tablet, Take 20 mg by mouth 2 (two) times daily as needed. , Disp: , Rfl:     gabapentin (NEURONTIN) 100 MG capsule, Take 100 mg by mouth 3 (three) times daily., Disp: , Rfl:     glimepiride (AMARYL) 1 MG tablet, Take 1 mg by mouth before breakfast., Disp: , Rfl:     sacubitril-valsartan (ENTRESTO) 24-26 mg per tablet, Take 1 tablet by mouth once. , Disp: , Rfl:     warfarin (COUMADIN) 5 MG tablet, Take 5 mg by mouth once daily., Disp: , Rfl: "     tiZANidine 2 mg Cap, Take 1 capsule (2 mg total) by mouth nightly as needed., Disp: 20 capsule, Rfl: 1

## 2019-01-10 ENCOUNTER — OUTSIDE PLACE OF SERVICE (OUTPATIENT)
Dept: FAMILY MEDICINE | Facility: CLINIC | Age: 79
End: 2019-01-10
Payer: MEDICARE

## 2019-01-10 PROCEDURE — G0180 PR HOME HEALTH MD CERTIFICATION: ICD-10-PCS | Mod: ,,, | Performed by: INTERNAL MEDICINE

## 2019-01-10 PROCEDURE — G0180 MD CERTIFICATION HHA PATIENT: HCPCS | Mod: ,,, | Performed by: INTERNAL MEDICINE

## 2019-03-11 ENCOUNTER — OUTSIDE PLACE OF SERVICE (OUTPATIENT)
Dept: FAMILY MEDICINE | Facility: CLINIC | Age: 79
End: 2019-03-11
Payer: MEDICARE

## 2019-03-11 PROCEDURE — G0179 PR HOME HEALTH MD RECERTIFICATION: ICD-10-PCS | Mod: ,,, | Performed by: INTERNAL MEDICINE

## 2019-03-11 PROCEDURE — G0179 MD RECERTIFICATION HHA PT: HCPCS | Mod: ,,, | Performed by: INTERNAL MEDICINE

## 2019-04-09 ENCOUNTER — TELEPHONE (OUTPATIENT)
Dept: FAMILY MEDICINE | Facility: CLINIC | Age: 79
End: 2019-04-09

## 2019-04-09 NOTE — TELEPHONE ENCOUNTER
Received call from [place] NP Steffanie Morgan. NP stated that patient O2 was 90%-92% at rest and after walking.  Placed call to patient to place an appt Pt stated she feel fine and she was making in appt to see Dr Kennedy and if she feel SOB she will call to make an appt with Dr. Alberto.

## 2019-05-06 ENCOUNTER — OFFICE VISIT (OUTPATIENT)
Dept: FAMILY MEDICINE | Facility: CLINIC | Age: 79
End: 2019-05-06
Payer: MEDICARE

## 2019-05-06 VITALS
HEART RATE: 73 BPM | WEIGHT: 235 LBS | SYSTOLIC BLOOD PRESSURE: 141 MMHG | DIASTOLIC BLOOD PRESSURE: 74 MMHG | BODY MASS INDEX: 34.8 KG/M2 | HEIGHT: 69 IN

## 2019-05-06 DIAGNOSIS — R73.9 ELEVATED BLOOD SUGAR: ICD-10-CM

## 2019-05-06 DIAGNOSIS — I48.0 PAROXYSMAL ATRIAL FIBRILLATION: ICD-10-CM

## 2019-05-06 DIAGNOSIS — Z79.01 CHRONIC ANTICOAGULATION: ICD-10-CM

## 2019-05-06 DIAGNOSIS — M80.80XS OTHER OSTEOPOROSIS WITH CURRENT PATHOLOGICAL FRACTURE, SEQUELA: Primary | ICD-10-CM

## 2019-05-06 DIAGNOSIS — I10 ESSENTIAL HYPERTENSION: ICD-10-CM

## 2019-05-06 PROBLEM — M80.00XA OSTEOPOROSIS WITH CURRENT PATHOLOGICAL FRACTURE: Status: ACTIVE | Noted: 2019-05-06

## 2019-05-06 PROCEDURE — 3078F PR MOST RECENT DIASTOLIC BLOOD PRESSURE < 80 MM HG: ICD-10-PCS | Mod: ,,, | Performed by: INTERNAL MEDICINE

## 2019-05-06 PROCEDURE — 99214 PR OFFICE/OUTPT VISIT, EST, LEVL IV, 30-39 MIN: ICD-10-PCS | Mod: ,,, | Performed by: INTERNAL MEDICINE

## 2019-05-06 PROCEDURE — 3078F DIAST BP <80 MM HG: CPT | Mod: ,,, | Performed by: INTERNAL MEDICINE

## 2019-05-06 PROCEDURE — 1101F PR PT FALLS ASSESS DOC 0-1 FALLS W/OUT INJ PAST YR: ICD-10-PCS | Mod: ,,, | Performed by: INTERNAL MEDICINE

## 2019-05-06 PROCEDURE — 3077F PR MOST RECENT SYSTOLIC BLOOD PRESSURE >= 140 MM HG: ICD-10-PCS | Mod: ,,, | Performed by: INTERNAL MEDICINE

## 2019-05-06 PROCEDURE — 3077F SYST BP >= 140 MM HG: CPT | Mod: ,,, | Performed by: INTERNAL MEDICINE

## 2019-05-06 PROCEDURE — 1101F PT FALLS ASSESS-DOCD LE1/YR: CPT | Mod: ,,, | Performed by: INTERNAL MEDICINE

## 2019-05-06 PROCEDURE — 99214 OFFICE O/P EST MOD 30 MIN: CPT | Mod: ,,, | Performed by: INTERNAL MEDICINE

## 2019-05-06 NOTE — PROGRESS NOTES
Subjective:       Patient ID: Jo Alegre is a 78 y.o. female.    Chief Complaint: Diabetes; Hypertension; Hyperlipidemia; and Atrial Fibrillation    Diabetes   She presents for her follow-up diabetic visit. She has type 2 diabetes mellitus. Her disease course has been stable. Pertinent negatives for hypoglycemia include no confusion, dizziness, headaches, mood changes, nervousness/anxiousness, pallor, seizures or tremors. Associated symptoms include fatigue. Pertinent negatives for diabetes include no chest pain, no polydipsia, no polyphagia, no polyuria, no visual change, no weakness and no weight loss. Symptoms are stable. Risk factors for coronary artery disease include sedentary lifestyle, post-menopausal, hypertension, obesity, dyslipidemia and diabetes mellitus. An ACE inhibitor/angiotensin II receptor blocker is being taken.   Hypertension   This is a chronic problem. The current episode started more than 1 year ago. The problem is controlled. Associated symptoms include malaise/fatigue. Pertinent negatives include no chest pain, headaches, palpitations or shortness of breath. Risk factors for coronary artery disease include sedentary lifestyle, obesity, dyslipidemia, diabetes mellitus and post-menopausal state. Past treatments include beta blockers, diuretics and angiotensin blockers (ENtresto).   Atrial Fibrillation   Presents for follow-up visit. Symptoms include hypertension. Symptoms are negative for bradycardia, chest pain, dizziness, palpitations, shortness of breath, syncope and weakness. The symptoms have been stable. Past medical history includes atrial fibrillation and hyperlipidemia. There are no medication compliance problems.   Hyperlipidemia   This is a chronic problem. The current episode started more than 1 year ago. The problem is controlled. Exacerbating diseases include diabetes and obesity. She has no history of liver disease. Pertinent negatives include no chest pain or shortness  of breath. Current antihyperlipidemic treatment includes statins. The current treatment provides moderate improvement of lipids. Risk factors for coronary artery disease include a sedentary lifestyle, post-menopausal, hypertension, obesity, dyslipidemia and diabetes mellitus.       Past Medical History:   Diagnosis Date    Allergy     Codeine, Lasix    Atrial fibrillation     Atrial fibrillation     Cataract     CHF (congestive heart failure)     Diabetes mellitus, type 2     Ejection fraction < 50% 10/18/2017    Approximately 35%  Based on prior  Echocardiogram.    Hyperlipidemia     Osteoporosis     Thyroid disorder screening 10/17/2017    TSH of 1.12 ordered by Dr. dee Jones     Social History     Socioeconomic History    Marital status:      Spouse name: Not on file    Number of children: 4    Years of education: Not on file    Highest education level: Not on file   Occupational History    Occupation:    Social Needs    Financial resource strain: Not on file    Food insecurity:     Worry: Not on file     Inability: Not on file    Transportation needs:     Medical: Not on file     Non-medical: Not on file   Tobacco Use    Smoking status: Former Smoker    Smokeless tobacco: Never Used   Substance and Sexual Activity    Alcohol use: No    Drug use: No    Sexual activity: Not Currently   Lifestyle    Physical activity:     Days per week: Not on file     Minutes per session: Not on file    Stress: Not on file   Relationships    Social connections:     Talks on phone: Not on file     Gets together: Not on file     Attends Religion service: Not on file     Active member of club or organization: Not on file     Attends meetings of clubs or organizations: Not on file     Relationship status: Not on file   Other Topics Concern    Not on file   Social History Narrative    - Drives and lives alone.     Past Surgical History:   Procedure Laterality Date     "CHOLE[place]ECTOMY  1997    [place]     COLONOSCOPY      EYE SURGERY      FRACTURE SURGERY      TONSILLECTOMY       Family History   Problem Relation Age of Onset    Heart disease Mother     Cancer Father         Lung Cancer ??? Asbestos       Review of Systems   Constitutional: Positive for fatigue and malaise/fatigue. Negative for activity change, chills, fever, unexpected weight change and weight loss.   HENT: Negative for congestion, postnasal drip and sinus pressure.    Eyes: Negative for pain, discharge and visual disturbance.   Respiratory: Negative for cough, chest tightness and shortness of breath.    Cardiovascular: Negative for chest pain, palpitations, leg swelling and syncope.        History of defibrillator, congestive cardiomyopathy hypertension and dyslipidemia   Gastrointestinal: Negative for abdominal distention, abdominal pain, anal bleeding, constipation and diarrhea.   Endocrine: Negative for polydipsia, polyphagia and polyuria.        Diabetes mellitus on glimepiride.   Genitourinary: Negative for difficulty urinating, dysuria, flank pain, frequency and pelvic pain.   Musculoskeletal: Positive for back pain and gait problem. Negative for arthralgias and joint swelling.        Difficulty walking with low back pain radiating to the right thigh and also right lateral thigh pain.   Skin: Negative for color change, pallor and rash.   Allergic/Immunologic: Negative for environmental allergies, food allergies and immunocompromised state.   Neurological: Negative for dizziness, tremors, seizures, syncope, weakness, light-headedness and headaches.   Hematological: Negative for adenopathy. Does not bruise/bleed easily.        Patient is on chronic anticoagulation with warfarin.   Psychiatric/Behavioral: Negative for agitation, confusion and dysphoric mood. The patient is not nervous/anxious.          Objective:      Blood pressure (!) 141/74, pulse 73, height 5' 9" (1.753 m), weight 106.6 kg (235 " lb). Body mass index is 34.7 kg/m².  Physical Exam   Constitutional: She appears well-developed. She appears distressed.   Patient is obese with a BMI of 34. She appears to be slightly distressed   HENT:   Mouth/Throat: No oropharyngeal exudate.   Eyes: EOM are normal. Right eye exhibits no discharge. Left eye exhibits no discharge. No scleral icterus.   Neck: No JVD present. No tracheal deviation present. No thyromegaly present.   Cardiovascular: Normal rate and regular rhythm.   Pulmonary/Chest: Effort normal and breath sounds normal. No stridor. No respiratory distress. She has no wheezes.   Abdominal: Soft. She exhibits no distension. There is no tenderness.   Musculoskeletal: She exhibits tenderness. She exhibits no deformity.        Lumbar back: She exhibits no laceration.   Lymphadenopathy:     She has no cervical adenopathy.   Neurological: She is alert.   Skin: Skin is warm and dry.   Psychiatric: Her mood appears anxious.   Somewhat anhedonic and antalgic         Assessment:       1. Other osteoporosis with current pathological fracture, sequela    2. Essential hypertension    3. Paroxysmal atrial fibrillation    4. Chronic anticoagulation    5. Elevated blood sugar           No visits with results within 3 Month(s) from this visit.   Latest known visit with results is:   No results found for any previous visit.   I have reviewed outside labs. Total cholesterol is 147, HDL cholesterol 50, triglycerides 120, LDL cholesterol 76 and cholesterol/HDL ratio is 2.9. Labs done on 02/08/2019. Vitamin D is 48. Hemoglobin A1c is 5.9. Blood glucose is 68. Creatinine is 1.16. EGFR is 45. Alkaline phosphatase is 139. TSH is 0.91. Magnesium level is 1.9. CBC is completely normal.      Plan:           Other osteoporosis with current pathological fracture, sequela  Comments:  P has osteoporosis on BMD. In past she had multiple fractures and she was given inj prolia at Dr. Thompson office. Evidently she had taken fosamax  in past no r  Orders:  -     denosumab (PROLIA) 60 mg/mL Syrg; Inject 1 mL (60 mg total) into the skin every 6 (six) months. inj will be given in office  Dispense: 2 mL; Refill: 1    Essential hypertension  Comments:  Patient's blood pressures are stable.  Orders:  -     Microalbumin/creatinine urine ratio; Future; Expected date: 05/06/2019    Paroxysmal atrial fibrillation  Comments:  Patient is currently off amiodarone. She is only on anticoagulation and beta blockers.    Chronic anticoagulation  Comments:  Patient is taking warfarin and this is being monitored by the cardiology.    Elevated blood sugar  -     Hemoglobin A1c; Future; Expected date: 05/06/2019      Issues blood pressures are doing okay. Her blood sugars are not significantly elevated to consider continuing with glimepiride at this point. She'll continue to watch her diet.    Patient did get injection prolia in past for osteoporosis with history of fractures. Evidently she had tried Fosamax in past but it did not seem to help prevent fractures. I requested patient's daughter who works for a doctor's office that she got the injections to forward be that information. I've given her a prescription for injection prolia but it is likely to encounter resistance from the formulary.    Continues on chronic anticoagulation with warfarin and this is being monitored by the cardiology. She has not had any unusual bleeding or fall or injuries.    She'll be cautioned against injuries.    Patient's underlying history of hypertensive heart disease and currently she is on carvedilol, aspirin and Entresto.          Current Outpatient Medications:     aspirin (ECOTRIN) 81 MG EC tablet, Take 81 mg by mouth once daily., Disp: , Rfl:     carvedilol (COREG) 25 MG tablet, Take 25 mg by mouth 2 (two) times daily with meals., Disp: , Rfl:     furosemide (LASIX) 20 MG tablet, Take 20 mg by mouth 2 (two) times daily as needed. , Disp: , Rfl:     gabapentin (NEURONTIN) 100  MG capsule, Take 100 mg by mouth 3 (three) times daily., Disp: , Rfl:     sacubitril-valsartan (ENTRESTO) 24-26 mg per tablet, Take 1 tablet by mouth once. , Disp: , Rfl:     warfarin (COUMADIN) 5 MG tablet, Take 5 mg by mouth once daily., Disp: , Rfl:     denosumab (PROLIA) 60 mg/mL Syrg, Inject 1 mL (60 mg total) into the skin every 6 (six) months. inj will be given in office, Disp: 2 mL, Rfl: 1

## 2019-05-06 NOTE — PATIENT INSTRUCTIONS
Taking Your Blood Pressure  Blood pressure is the force of blood against the artery wall as it moves from the heart through the blood vessels. You can take your own blood pressure reading using a digital monitor. Take your readings the same each time, using the same arm. Take readings as often as your healthcare provider instructs.  About blood pressure monitors  Blood pressure monitors are designed for certain ages and cases. You can find monitors for older adults, for pregnant women, and for children. Make sure the one you choose is the right one for your age and situation.  The American Heart Association recommends an automatic cuff monitor that fits on your upper arm (bicep). The cuff should fit your arm size. A cuff thats too large or too small will not give an accurate reading. Measure around your upper arm to find your size.  Monitors that attach to your finger or wrist are not as accurate as monitors for your upper arm.  Ask your healthcare provider for help in choosing a monitor. Bring your monitor to your next provider visit if you need help in using it the correct way.  The steps below are general instructions for using an automatic digital monitor.  Step 1. Relax    · Take your blood pressure at the same time every day, such as in the morning or evening, or at the time your healthcare provider recommends.  · Wait at least a half-hour after smoking, eating, or exercising. Don't drink coffee, tea, soda, or other caffeinated beverages before checking your blood pressure.  · Sit comfortably at a table with both feet on the floor. Do not cross your legs or feet. Place the monitor near you.  · Rest for a few minutes before you begin.  Step 2. Wrap the cuff    · Place your arm on the table, palm up. Your arm should be at the level of your heart. Wrap the cuff around your upper arm, just above your elbow. Its best done on bare skin, not over clothing. Most cuffs will indicate where the brachial artery (the  blood vessel in the middle of the arm at the inner side of the elbow) should line up with the cuff. Look in your monitor's instruction booklet for an illustration. You can also bring your cuff to your healthcare provider and have them show you how to correctly place the cuff.  Step 3. Inflate the cuff    · Push the button that starts the pump.  · The cuff will tighten, then loosen.  · The numbers will change. When they stop changing, your blood pressure reading will appear.  · Take 2 or 3 readings one minute apart.  Step 4. Write down the results of each reading    · Write down your blood pressure numbers for each reading. Note the date and time. Keep your results in one place, such as a notebook. Even if your monitor has a built-in memory, keep a hard copy of the readings.  · Remove the cuff from your arm. Turn off the machine.  · Bring your blood pressure records with your healthcare providers at each visit.  · If you start a new blood pressure medicine, note the day you started the new medicine. Also note the day if you change the dose of your medicine. This information goes on your blood pressure recording sheet. This will help your healthcare provider monitor how well the medicine changes are working.  · Ask your healthcare provider what numbers should prompt you to call him or her. Also ask what numbers should prompt you to get help right away.  Date Last Reviewed: 11/1/2016  © 2586-1123 [employer]. [address] [place] [address], [place] [address]. All rights reserved. This information is not intended as a substitute for professional medical care. Always follow your healthcare professional's instructions.

## 2019-05-29 ENCOUNTER — TELEPHONE (OUTPATIENT)
Dept: FAMILY MEDICINE | Facility: CLINIC | Age: 79
End: 2019-05-29

## 2019-05-29 NOTE — TELEPHONE ENCOUNTER
The following medication needs a prior authorization:     Medication Name: prolia 60mg     Dosage: 60mg     Frequency:every 6 months   Directions for use: 1 injection every 6 months    Diagnosis: osteoporsis with pathological fracture    Is the request for a reauthorization? yes    Is the patient currently stable on therapy? yes    Please list all therapeutic alternatives previously used with start/end dates and outcome:

## 2019-05-30 DIAGNOSIS — I10 ESSENTIAL HYPERTENSION: ICD-10-CM

## 2019-05-30 DIAGNOSIS — M80.80XS OTHER OSTEOPOROSIS WITH CURRENT PATHOLOGICAL FRACTURE, SEQUELA: ICD-10-CM

## 2019-05-30 DIAGNOSIS — M81.0 OSTEOPOROSIS, POSTMENOPAUSAL: Primary | ICD-10-CM

## 2019-06-06 LAB
BUN SERPL-MCNC: 19 MG/DL (ref 7–25)
BUN/CREAT SERPL: 17 (CALC) (ref 6–22)
CALCIUM SERPL-MCNC: 9.7 MG/DL (ref 8.6–10.4)
CHLORIDE SERPL-SCNC: 105 MMOL/L (ref 98–110)
CO2 SERPL-SCNC: 29 MMOL/L (ref 20–32)
CREAT SERPL-MCNC: 1.1 MG/DL (ref 0.6–0.93)
GFRSERPLBLD MDRD-ARVRAT: 48 ML/MIN/1.73M2
GLUCOSE SERPL-MCNC: 100 MG/DL (ref 65–99)
POTASSIUM SERPL-SCNC: 5.3 MMOL/L (ref 3.5–5.3)
SODIUM SERPL-SCNC: 143 MMOL/L (ref 135–146)

## 2019-06-10 ENCOUNTER — TELEPHONE (OUTPATIENT)
Dept: FAMILY MEDICINE | Facility: CLINIC | Age: 79
End: 2019-06-10

## 2019-06-10 NOTE — TELEPHONE ENCOUNTER
----- Message from Kraig Alberto MD sent at 6/8/2019  6:15 PM CDT -----  Results are somewhat abnormal. Please keep regular follow up.

## 2019-06-24 DIAGNOSIS — G57.93 NEUROPATHY OF BOTH FEET: ICD-10-CM

## 2019-06-24 RX ORDER — GABAPENTIN 100 MG/1
CAPSULE ORAL
Qty: 270 CAPSULE | Refills: 0 | Status: SHIPPED | OUTPATIENT
Start: 2019-06-24 | End: 2019-11-16 | Stop reason: SDUPTHER

## 2019-07-02 ENCOUNTER — TELEPHONE (OUTPATIENT)
Dept: FAMILY MEDICINE | Facility: CLINIC | Age: 79
End: 2019-07-02

## 2019-07-02 DIAGNOSIS — R73.9 ELEVATED BLOOD SUGAR: Primary | ICD-10-CM

## 2019-07-02 NOTE — TELEPHONE ENCOUNTER
Pt called and stated that since she stopped taking diabetic meds her BS has been 230,180,160,189 and she normally 118,123,109. Please advise.      I have called her prescription for Glimpiride 1 mg per day to [facility] pharmacy in [facility]. I'm a little disappointed that her blood sugars have gone up. I was hoping that she would watch her diet.

## 2019-07-04 RX ORDER — GLIMEPIRIDE 1 MG/1
1 TABLET ORAL
Qty: 90 TABLET | Refills: 1 | Status: SHIPPED | OUTPATIENT
Start: 2019-07-04

## 2019-08-08 LAB
ALBUMIN/CREAT UR: 45 MCG/MG CREAT
CREAT UR-MCNC: 62 MG/DL (ref 20–275)
HBA1C MFR BLD: 6.2 % OF TOTAL HGB
MICROALBUMIN UR-MCNC: 2.8 MG/DL

## 2019-08-12 ENCOUNTER — TELEPHONE (OUTPATIENT)
Dept: FAMILY MEDICINE | Facility: CLINIC | Age: 79
End: 2019-08-12

## 2019-08-12 NOTE — TELEPHONE ENCOUNTER
----- Message from Kraig Alberto MD sent at 8/9/2019  7:14 PM CDT -----  Results are somewhat abnormal. Please keep regular follow up.

## 2019-08-15 ENCOUNTER — OFFICE VISIT (OUTPATIENT)
Dept: FAMILY MEDICINE | Facility: CLINIC | Age: 79
End: 2019-08-15
Payer: MEDICARE

## 2019-08-15 VITALS
DIASTOLIC BLOOD PRESSURE: 55 MMHG | BODY MASS INDEX: 34.66 KG/M2 | SYSTOLIC BLOOD PRESSURE: 101 MMHG | HEIGHT: 69 IN | WEIGHT: 234 LBS | HEART RATE: 68 BPM

## 2019-08-15 DIAGNOSIS — M54.50 LOW BACK PAIN, NON-SPECIFIC: Primary | ICD-10-CM

## 2019-08-15 DIAGNOSIS — M81.0 OSTEOPOROSIS, POSTMENOPAUSAL: Chronic | ICD-10-CM

## 2019-08-15 DIAGNOSIS — I48.0 PAROXYSMAL ATRIAL FIBRILLATION: ICD-10-CM

## 2019-08-15 DIAGNOSIS — Z79.01 CHRONIC ANTICOAGULATION: ICD-10-CM

## 2019-08-15 DIAGNOSIS — R73.9 ELEVATED BLOOD SUGAR: ICD-10-CM

## 2019-08-15 DIAGNOSIS — Z12.39 SCREENING FOR BREAST CANCER: ICD-10-CM

## 2019-08-15 PROCEDURE — 99999 PR PBB SHADOW E&M-EST. PATIENT-LVL IV: CPT | Mod: PBBFAC,,, | Performed by: INTERNAL MEDICINE

## 2019-08-15 PROCEDURE — 99214 OFFICE O/P EST MOD 30 MIN: CPT | Mod: S$GLB,,, | Performed by: INTERNAL MEDICINE

## 2019-08-15 PROCEDURE — 99214 PR OFFICE/OUTPT VISIT, EST, LEVL IV, 30-39 MIN: ICD-10-PCS | Mod: S$GLB,,, | Performed by: INTERNAL MEDICINE

## 2019-08-15 PROCEDURE — 3074F SYST BP LT 130 MM HG: CPT | Mod: S$GLB,,, | Performed by: INTERNAL MEDICINE

## 2019-08-15 PROCEDURE — 3074F PR MOST RECENT SYSTOLIC BLOOD PRESSURE < 130 MM HG: ICD-10-PCS | Mod: S$GLB,,, | Performed by: INTERNAL MEDICINE

## 2019-08-15 PROCEDURE — 1101F PR PT FALLS ASSESS DOC 0-1 FALLS W/OUT INJ PAST YR: ICD-10-PCS | Mod: S$GLB,,, | Performed by: INTERNAL MEDICINE

## 2019-08-15 PROCEDURE — 1101F PT FALLS ASSESS-DOCD LE1/YR: CPT | Mod: S$GLB,,, | Performed by: INTERNAL MEDICINE

## 2019-08-15 PROCEDURE — 3078F DIAST BP <80 MM HG: CPT | Mod: S$GLB,,, | Performed by: INTERNAL MEDICINE

## 2019-08-15 PROCEDURE — 99999 PR PBB SHADOW E&M-EST. PATIENT-LVL IV: ICD-10-PCS | Mod: PBBFAC,,, | Performed by: INTERNAL MEDICINE

## 2019-08-15 PROCEDURE — 3078F PR MOST RECENT DIASTOLIC BLOOD PRESSURE < 80 MM HG: ICD-10-PCS | Mod: S$GLB,,, | Performed by: INTERNAL MEDICINE

## 2019-08-15 RX ORDER — CARVEDILOL 25 MG/1
12.5 TABLET ORAL 2 TIMES DAILY WITH MEALS
Qty: 1 TABLET | Refills: 1
Start: 2019-08-15 | End: 2020-09-08

## 2019-08-15 NOTE — PATIENT INSTRUCTIONS
Back Care Tips    Caring for your back  These are things you can do to prevent a recurrence of acute back pain and to reduce symptoms from chronic back pain:  · Maintain a healthy weight. If you are overweight, losing weight will help most types of back pain.  · Exercise is an important part of recovery from most types of back pain. The muscles behind and in front of the spine support the back. This means strengthening both the back muscles and the abdominal muscles will provide better support for your spine.   · Swimming and brisk walking are good overall exercises to improve your fitness level.  · Practice safe lifting methods (below).  · Practice good posture when sitting, standing and walking. Avoid prolonged sitting. This puts more stress on the lower back than standing or walking.  · Wear quality shoes with sufficient arch support. Foot and ankle alignment can affect back symptoms. Women should avoid wearing high heels.  · Therapeutic massage can help relax the back muscles without stretching them.  · During the first 24 to 72 hours after an acute injury or flare-up of chronic back pain, apply an ice pack to the painful area for 20 minutes and then remove it for 20 minutes, over a period of 60 to 90 minutes, or several times a day. As a safety precaution, do not use a heating pad at bedtime. Sleeping on a heating pad can lead to skin burns or tissue damage.  · You can alternate ice and heat therapies.  Medications  Talk to your healthcare provider before using medicines, especially if you have other medical problems or are taking other medicines.  · You may use acetaminophen or ibuprofen to control pain, unless your healthcare provider prescribed other pain medicine. If you have chronic conditions like diabetes, liver or kidney disease, stomach ulcers, or gastrointestinal bleeding, or are taking blood thinners, talk with your healthcare provider before taking any medicines.  · Be careful if you are given  prescription pain medicines, narcotics, or medicine for muscle spasm. They can cause drowsiness, affect your coordination, reflexes, and judgment. Do not drive or operate heavy machinery while taking these types of medicines. Take prescription pain medicine only as prescribed by your healthcare provider.  Lumbar stretch  Here is a simple stretching exercise that will help relax muscle spasm and keep your back more limber. If exercise makes your back pain worse, dont do it.  · Lie on your back with your knees bent and both feet on the ground.  · Slowly raise your left knee to your chest as you flatten your lower back against the floor. Hold for 5 seconds.  · Relax and repeat the exercise with your right knee.  · Do 10 of these exercises for each leg.  Safe lifting method  · Dont bend over at the waist to lift an object off the floor.  Instead, bend your knees and hips in a squat.   · Keep your back and head upright  · Hold the object close to your body, directly in front of you.  · Straighten your legs to lift the object.   · Lower the object to the floor in the reverse fashion.  · If you must slide something across the floor, push it.  Posture tips  Sitting  Sit in chairs with straight backs or low-back support. Keep your knees lower than your hips, with your feet flat on the floor.  When driving, sit up straight. Adjust the seat forward so you are not leaning toward the steering wheel.  A small pillow or rolled towel behind your lower back may help if you are driving long distances.   Standing  When standing for long periods, shift most of your weight to one leg at a time. Alternate legs every few minutes.   Sleeping  The best way to sleep is on your side with your knees bent. Put a low pillow under your head to support your neck in a neutral spine position. Avoid thick pillows that bend your neck to one side. Put a pillow between your legs to further relax your lower back. If you sleep on your back, put pillows  under your knees to support your legs in a slightly flexed position. Use a firm mattress. If your mattress sags, replace it, or use a 1/2-inch plywood board under the mattress to add support.  Follow-up care  Follow up with your healthcare provider, or as advised.  If X-rays, a CT scan or an MRI scan were taken, they will be reviewed by a radiologist. You will be notified of any new findings that may affect your care.  Call 911  Seek emergency medical care if any of the following occur:  · Trouble breathing  · Confusion  · Very drowsy  · Fainting or loss of consciousness  · Rapid or very slow heart rate  · Loss of  bowel or bladder control  When to seek medical care  Call your healthcare provider if any of the following occur:  · Pain becomes worse or spreads to your arms or legs  · Weakness or numbness in one or both arms or legs  · Numbness in the groin area  Date Last Reviewed: 6/1/2016  © 8499-1070 [employer]. [address] [place] [address]. All rights reserved. This information is not intended as a substitute for professional medical care. Always follow your healthcare professional's instructions.        Back Contusion     You have a contusion to your back. A contusion is also called a bruise. There is swelling and some bleeding under the skin. The skin may be purplish. You may have muscle aching and stiffness in the area of the bruise. There are no broken bones.  Contusions heal on their own, without further treatment. However, pain and skin discoloration may take weeks to months to go away.   Home care  · Rest. Avoid heavy lifting, strenuous exertion, or any activity that causes pain.  · Ice the area to reduce pain and swelling. Put ice cubes in a plastic bag or use a cold pack. (Wrap the cold source in a thin towel. Do not place it directly on your skin.) Ice the injured area for 20 minutes every 1-2 hours the first day. Continue with ice packs 3-4 times a day for the next two days,  then as needed for the relief of pain and swelling.  · Take any prescribed pain medication. If none was prescribed, take acetaminophen, ibuprofen, or naproxen to control pain.  Follow-up care  Follow up with your healthcare provider, or as directed. Call if you are not better in 1-2 weeks.  When to seek medical advice  Call your healthcare provider for any of the following:  · New or worsening pain  · Increased swelling around the bruise  · Pain spreads to one or both legs  · Weakness or numbness in one or both legs   · Loss of bowel or bladder control  · Numbness in the groin or genital area  · Fever of 100.4°F (38ºC) or higher, or as directed by your healthcare provider  Date Last Reviewed: 6/26/2015  © 7452-6727 [employer]. [address] [place] [address], [place] [address]. All rights reserved. This information is not intended as a substitute for professional medical care. Always follow your healthcare professional's instructions.        Exercises to Strengthen Your Lower Back  Strong lower back and abdominal muscles work together to support your spine. The exercises below will help strengthen the lower back. It is important that you begin exercising slowly and increase levels gradually.  Always begin any exercise program with stretching. If you feel pain while doing any of these exercises, stop and talk to your doctor about a more specific exercise program that better suits your condition.   Low back stretch  The point of stretching is to make you more flexible and increase your range of motion. Stretch only as much as you are able. Stretch slowly. Do not push your stretch to the limit. If at any point you feel pain while stretching, this is your (temporary) limit.  · Lie on your back with your knees bent and both feet on the ground.  · Slowly raise your left knee to your chest as you flatten your lower back against the floor. Hold for 5 seconds.  · Relax and repeat the exercise with your right  knee.  · Do 10 of these exercises for each leg.  · Repeat hugging both knees to your chest at the same time.  Building lower back strength  Start your exercise routine with 10 to 30 minutes a day, 1 to 3 times a day.  Initial exercises  Lying on your back:  1. Ankle pumps: Move your foot up and down, towards your head, and then away. Repeat 10 times with each foot.  2. Heel slides: Slowly bend your knee, drawing the heel of your foot towards you. Then slide your heel/foot from you, straightening your knee. Do not lift your foot off the floor (this is not a leg lift).  3. Abdominal contraction: Bend your knees and put your hands on your stomach. Tighten your stomach muscles. Hold for 5 seconds, then relax. Repeat 10 times.  4. Straight leg raise: Bend one leg at the knee and keep the other leg straight. Tighten your stomach muscles. Slowly lift your straight leg 6 to 12 inches off the floor and hold for up to 5 seconds. Repeat 10 times on each side.  Standin. Wall squats: Stand with your back against the wall. Move your feet about 12 inches away from the wall. Tighten your stomach muscles, and slowly bend your knees until they are at about a 45 degree angle. Do not go down too far. Hold about 5 seconds. Then slowly return to your starting position. Repeat 10 times.  2. Heel raises: Stand facing the wall. Slowly raise the heels of your feet up and down, while keeping your toes on the floor. If you have trouble balancing, you can touch the wall with your hands. Repeat 10 times.  More advanced exercises  When you feel comfortable enough, try these exercises.  1. Kneeling lumbar extension: Begin on your hands and knees. At the same time, raise and straighten your right arm and left leg until they are parallel to the ground. Hold for 2 seconds and come back slowly to a starting position. Repeat with left arm and right leg, alternating 10 times.  2. Prone lumbar extension: Lie face down, arms extended overhead, palms  on the floor. At the same time, raise your right arm and left leg as high as comfortably possible. Hold for 10 seconds and slowly return to start. Repeat with left arm and right leg, alternating 10 times. Gradually build up to 20 times. (Advanced: Repeat this exercise raising both arms and both legs a few inches off the floor at the same time. Hold for 5 seconds and release.)  3. Pelvic tilt: Lie on the floor on your back with your knees bent at 90 degrees. Your feet should be flat on the floor. Inhale, exhale, then slowly contract your abdominal muscles bringing your navel toward your spine. Let your pelvis rock back until your lower back is flat on the floor. Hold for 10 seconds while breathing smoothly.  4. Abdominal crunch: Perform a pelvic tilt (above) flattening your lower back against the floor. Holding the tension in your abdominal muscles, take another breath and raise your shoulder blades off the ground (this is not a full sit-up). Keep your head in line with your body (dont bend your neck forward). Hold for 2 seconds, then slowly lower.  Date Last Reviewed: 6/1/2016  © 6106-9205 [employer]. [address] [place] [address], [place] [address]. All rights reserved. This information is not intended as a substitute for professional medical care. Always follow your healthcare professional's instructions.

## 2019-08-15 NOTE — PROGRESS NOTES
Subjective:       Patient ID: Jo Alegre is a 79 y.o. female.    Chief Complaint: Hypertension (lab review ); Osteoporosis; Back Pain; and Diabetes    Hypertension   This is a chronic problem. The current episode started more than 1 year ago. The problem has been rapidly improving since onset. The problem is controlled. Associated symptoms include malaise/fatigue. Risk factors for coronary artery disease include sedentary lifestyle and obesity. Past treatments include diuretics. The current treatment provides significant improvement. Compliance problems include psychosocial issues.    Back Pain   This is a recurrent problem. The current episode started in the past 7 days. The problem occurs constantly. The problem is unchanged. The quality of the pain is described as aching and shooting. The pain is at a severity of 8/10. The pain is severe. Associated symptoms include bladder incontinence and bowel incontinence. Pertinent negatives include no perianal numbness. The treatment provided moderate relief.   Diabetes   She presents for her follow-up diabetic visit. She has type 2 diabetes mellitus. Her disease course has been fluctuating. Pertinent negatives for diabetes include no foot ulcerations, no polydipsia and no polyphagia.       Past Medical History:   Diagnosis Date    Allergy     Codeine, Lasix    Atrial fibrillation     Atrial fibrillation     Cataract     CHF (congestive heart failure)     Diabetes mellitus, type 2     Ejection fraction < 50% 10/18/2017    Approximately 35%  Based on prior  Echocardiogram.    Hyperlipidemia     Osteoporosis     Thyroid disorder screening 10/17/2017    TSH of 1.12 ordered by Dr. dee Jones     Social History     Socioeconomic History    Marital status:      Spouse name: Not on file    Number of children: 4    Years of education: Not on file    Highest education level: Not on file   Occupational History    Occupation:    Social Needs  "   Financial resource strain: Not on file    Food insecurity:     Worry: Not on file     Inability: Not on file    Transportation needs:     Medical: Not on file     Non-medical: Not on file   Tobacco Use    Smoking status: Former Smoker    Smokeless tobacco: Never Used   Substance and Sexual Activity    Alcohol use: No    Drug use: No    Sexual activity: Not Currently   Lifestyle    Physical activity:     Days per week: Not on file     Minutes per session: Not on file    Stress: Only a little   Relationships    Social connections:     Talks on phone: Not on file     Gets together: Not on file     Attends Mosque service: Not on file     Active member of club or organization: Not on file     Attends meetings of clubs or organizations: Not on file     Relationship status: Not on file   Other Topics Concern    Not on file   Social History Narrative    - Drives and lives alone.     Past Surgical History:   Procedure Laterality Date    CHOLECYSTECTOMY  1997    [place]     COLONOSCOPY      EYE SURGERY      FRACTURE SURGERY      TONSILLECTOMY       Family History   Problem Relation Age of Onset    Heart disease Mother     Cancer Father         Lung Cancer ??? Asbestos       Review of Systems   Constitutional: Positive for malaise/fatigue.   Gastrointestinal: Positive for bowel incontinence.   Endocrine: Negative for polydipsia and polyphagia.   Genitourinary: Positive for bladder incontinence.   Musculoskeletal: Positive for back pain.         Objective:      Blood pressure (!) 101/55, pulse 68, height 5' 9" (1.753 m), weight 106.1 kg (234 lb). Body mass index is 34.56 kg/m².  Physical Exam      Assessment:       1. Low back pain, non-specific    2. Paroxysmal atrial fibrillation    3. Chronic anticoagulation    4. Osteoporosis, postmenopausal    5. Elevated blood sugar    6. Screening for breast cancer           Orders Only on 08/07/2019   Component Date Value Ref Range Status    " Creatinine, Random Ur 08/07/2019 62  20 - 275 mg/dL Final    Microalb, Ur 08/07/2019 2.8  See Note: mg/dL Final    Microalb Creat Ratio 08/07/2019 45* <30 mcg/mg creat Final    Hemoglobin A1C 08/07/2019 6.2* <5.7 % of total Hgb Final   Orders Only on 06/05/2019   Component Date Value Ref Range Status    Glucose 06/05/2019 100* 65 - 99 mg/dL Final    BUN, Bld 06/05/2019 19  7 - 25 mg/dL Final    Creatinine 06/05/2019 1.10* 0.60 - 0.93 mg/dL Final    eGFR if non African American 06/05/2019 48* > OR = 60 mL/min/1.73m2 Final    eGFR if  06/05/2019 56* > OR = 60 mL/min/1.73m2 Final    BUN/Creatinine Ratio 06/05/2019 17  6 - 22 (calc) Final    Sodium 06/05/2019 143  135 - 146 mmol/L Final    Potassium 06/05/2019 5.3  3.5 - 5.3 mmol/L Final    Chloride 06/05/2019 105  98 - 110 mmol/L Final    CO2 06/05/2019 29  20 - 32 mmol/L Final    Calcium 06/05/2019 9.7  8.6 - 10.4 mg/dL Final         Plan:           Low back pain, non-specific  Comments:  Low back pain with underlying osteoporosis.  Check lumbar spine x-ray.  Take Tylenol for p.m..  Orders:  -     X-Ray Lumbar Spine Ap And Lateral; Future; Expected date: 08/15/2019    Paroxysmal atrial fibrillation  -     carvedilol (COREG) 25 MG tablet; Take 0.5 tablets (12.5 mg total) by mouth 2 (two) times daily with meals.  Dispense: 1 tablet; Refill: 1    Chronic anticoagulation  Comments:  Fall precautions in view of anticoagulation.    Osteoporosis, postmenopausal  Comments:  Patient is taking prolia    Elevated blood sugar    Screening for breast cancer  -     Mammo Digital Screening Bilat; Future; Expected date: 08/15/2019      Patient has acute pain for last weeks which is not getting better.  She does have an underlying osteoporosis.  She does have some or bladder disturbance.  I will get a lumbar spine x-ray to be sure that no dealing with osteoporotic compression fracture.    Patient's sugars are somewhat on the low side.  She is taking  glimepiride once a day and I have advised her to be sure that she takes no more than 1 tablets a day.  Continue to monitor her sugars.  In case of sugars continue to go low then I will discontinue this medication and consider metformin.  For some reason the patient is not on metformin.    Her blood pressure is slightly on the low side.  I will reduce carvedilol to half pill twice a day and she will discuss this with Dr. Jones also.    Five pieces of advised that I have given today to the patient are    1) take glimepiride 1 mg once a day and continue to monitor blood sugars.  2) take Tylenol for back pain and watch out for any falls or injuries.  3) cut down carvedilol into half pill twice a day because blood pressures are somewhat on the low side.  Discuss this with Dr. Jones also.  4) get mammogram done.  5) get lumbar spine x-ray whenever time and opportunity permits.    Happy birthday.    I would like to see her probably in 2 or 3 months time flu season will start from next month.  If her symptoms do not get better in 2 weeks or lumbar spine shows something not too good then we need to do something earlier.    Spent more than 25 minutes with patient which involved review of his medical conditions, labs, medications and with 50% of time face-to-face discussion about medical problems, management and any applicable changes.            Current Outpatient Medications:     aspirin (ECOTRIN) 81 MG EC tablet, Take 81 mg by mouth once daily., Disp: , Rfl:     carvedilol (COREG) 25 MG tablet, Take 0.5 tablets (12.5 mg total) by mouth 2 (two) times daily with meals., Disp: 1 tablet, Rfl: 1    denosumab (PROLIA) 60 mg/mL Syrg, Inject 1 mL (60 mg total) into the skin every 6 (six) months., Disp: 2 mL, Rfl: 1    furosemide (LASIX) 20 MG tablet, Take 20 mg by mouth 2 (two) times daily as needed. , Disp: , Rfl:     gabapentin (NEURONTIN) 100 MG capsule, TAKE ONE CAPSULE BY MOUTH THREE TIMES DAILY, Disp: 270 capsule, Rfl:  0    glimepiride (AMARYL) 1 MG tablet, Take 1 tablet (1 mg total) by mouth before breakfast., Disp: 90 tablet, Rfl: 1    sacubitril-valsartan (ENTRESTO) 24-26 mg per tablet, Take 1 tablet by mouth once. , Disp: , Rfl:     warfarin (COUMADIN) 5 MG tablet, Take 5 mg by mouth once daily., Disp: , Rfl:

## 2019-08-23 ENCOUNTER — OFFICE VISIT (OUTPATIENT)
Dept: FAMILY MEDICINE | Facility: CLINIC | Age: 79
End: 2019-08-23
Payer: MEDICARE

## 2019-08-23 ENCOUNTER — HOSPITAL ENCOUNTER (OUTPATIENT)
Dept: RADIOLOGY | Facility: HOSPITAL | Age: 79
Discharge: HOME | End: 2019-08-23
Attending: INTERNAL MEDICINE
Payer: MEDICARE

## 2019-08-23 VITALS
TEMPERATURE: 98 F | SYSTOLIC BLOOD PRESSURE: 128 MMHG | DIASTOLIC BLOOD PRESSURE: 68 MMHG | OXYGEN SATURATION: 96 % | HEIGHT: 69 IN | BODY MASS INDEX: 34.41 KG/M2 | HEART RATE: 72 BPM | WEIGHT: 232.31 LBS

## 2019-08-23 VITALS — HEIGHT: 69 IN | WEIGHT: 234 LBS | BODY MASS INDEX: 34.66 KG/M2

## 2019-08-23 DIAGNOSIS — M54.50 LOW BACK PAIN, NON-SPECIFIC: ICD-10-CM

## 2019-08-23 DIAGNOSIS — R06.2 WHEEZING: ICD-10-CM

## 2019-08-23 DIAGNOSIS — Z12.39 SCREENING FOR BREAST CANCER: ICD-10-CM

## 2019-08-23 DIAGNOSIS — J20.9 ACUTE BRONCHITIS, UNSPECIFIED ORGANISM: Primary | ICD-10-CM

## 2019-08-23 PROCEDURE — 99999 PR PBB SHADOW E&M-EST. PATIENT-LVL IV: ICD-10-PCS | Mod: PBBFAC,,, | Performed by: NURSE PRACTITIONER

## 2019-08-23 PROCEDURE — 77067 SCR MAMMO BI INCL CAD: CPT | Mod: TC,PO

## 2019-08-23 PROCEDURE — 3078F DIAST BP <80 MM HG: CPT | Mod: S$GLB,,, | Performed by: NURSE PRACTITIONER

## 2019-08-23 PROCEDURE — 3078F PR MOST RECENT DIASTOLIC BLOOD PRESSURE < 80 MM HG: ICD-10-PCS | Mod: S$GLB,,, | Performed by: NURSE PRACTITIONER

## 2019-08-23 PROCEDURE — 99213 OFFICE O/P EST LOW 20 MIN: CPT | Mod: 25,S$GLB,, | Performed by: NURSE PRACTITIONER

## 2019-08-23 PROCEDURE — 3074F PR MOST RECENT SYSTOLIC BLOOD PRESSURE < 130 MM HG: ICD-10-PCS | Mod: S$GLB,,, | Performed by: NURSE PRACTITIONER

## 2019-08-23 PROCEDURE — 94640 AIRWAY INHALATION TREATMENT: CPT | Mod: S$GLB,,, | Performed by: NURSE PRACTITIONER

## 2019-08-23 PROCEDURE — 99213 PR OFFICE/OUTPT VISIT, EST, LEVL III, 20-29 MIN: ICD-10-PCS | Mod: 25,S$GLB,, | Performed by: NURSE PRACTITIONER

## 2019-08-23 PROCEDURE — 1101F PR PT FALLS ASSESS DOC 0-1 FALLS W/OUT INJ PAST YR: ICD-10-PCS | Mod: S$GLB,,, | Performed by: NURSE PRACTITIONER

## 2019-08-23 PROCEDURE — 94640 PR INHAL RX, AIRWAY OBST/DX SPUTUM INDUCT: ICD-10-PCS | Mod: S$GLB,,, | Performed by: NURSE PRACTITIONER

## 2019-08-23 PROCEDURE — 72100 X-RAY EXAM L-S SPINE 2/3 VWS: CPT | Mod: TC,PO

## 2019-08-23 PROCEDURE — 99999 PR PBB SHADOW E&M-EST. PATIENT-LVL IV: CPT | Mod: PBBFAC,,, | Performed by: NURSE PRACTITIONER

## 2019-08-23 PROCEDURE — 1101F PT FALLS ASSESS-DOCD LE1/YR: CPT | Mod: S$GLB,,, | Performed by: NURSE PRACTITIONER

## 2019-08-23 PROCEDURE — 3074F SYST BP LT 130 MM HG: CPT | Mod: S$GLB,,, | Performed by: NURSE PRACTITIONER

## 2019-08-23 RX ORDER — DOXYCYCLINE 100 MG/1
100 CAPSULE ORAL EVERY 12 HOURS
Qty: 14 CAPSULE | Refills: 0 | Status: SHIPPED | OUTPATIENT
Start: 2019-08-23 | End: 2019-08-30

## 2019-08-23 RX ORDER — BENZONATATE 100 MG/1
100 CAPSULE ORAL 3 TIMES DAILY PRN
Qty: 30 CAPSULE | Refills: 0 | Status: SHIPPED | OUTPATIENT
Start: 2019-08-23 | End: 2019-09-02

## 2019-08-23 RX ORDER — ALBUTEROL SULFATE 90 UG/1
2 AEROSOL, METERED RESPIRATORY (INHALATION) EVERY 6 HOURS PRN
Qty: 18 G | Refills: 1 | Status: SHIPPED | OUTPATIENT
Start: 2019-08-23 | End: 2019-12-10

## 2019-08-23 RX ORDER — ALBUTEROL SULFATE 0.83 MG/ML
2.5 SOLUTION RESPIRATORY (INHALATION)
Status: COMPLETED | OUTPATIENT
Start: 2019-08-23 | End: 2019-08-23

## 2019-08-23 RX ADMIN — ALBUTEROL SULFATE 2.5 MG: 0.83 SOLUTION RESPIRATORY (INHALATION) at 02:08

## 2019-08-23 NOTE — PATIENT INSTRUCTIONS
What Is Acute Bronchitis?  Acute bronchitis is when the airways in your lungs (bronchial tubes) become red and swollen (inflamed). It is usually caused by a viral infection. But it can also occur because of a bacteria or allergen. Symptoms include a cough that produces yellow or greenish mucus and can last for days or sometimes weeks.  Inside healthy lungs    Air travels in and out of the lungs through the airways. The linings of these airways produce sticky mucus. This mucus traps particles that enter the lungs. Tiny structures called cilia then sweep the particles out of the airways.     Healthy airway: Airways are normally open. Air moves in and out easily.      Healthy cilia: Tiny, hairlike cilia sweep mucus and particles up and out of the airways.   Lungs with bronchitis  Bronchitis often occurs with a cold or the flu virus. The airways become inflamed (red and swollen). There is a deep hacking cough from the extra mucus. Other symptoms may include:  · Wheezing  · Chest discomfort  · Shortness of breath  · Mild fever  A second infection, this time due to bacteria, may then occur. And airways irritated by allergens or smoke are more likely to get infected.        Inflamed airway: Inflammation and extra mucus narrow the airway, causing shortness of breath.      Impaired cilia: Extra mucus impairs cilia, causing congestion and wheezing. Smoking makes the problem worse.   Making a diagnosis  A physical exam, health history, and certain tests help your healthcare provider make the diagnosis.  Health history  Your healthcare provider will ask you about your symptoms.  The exam  Your provider listens to your chest for signs of congestion. He or she may also check your ears, nose, and throat.  Possible tests  · A sputum test for bacteria. This requires a sample of mucus from your lungs.  · A nasal or throat swab. This tests to see if you have a bacterial infection.  · A chest X-ray. This is done if your healthcare  provider thinks you have pneumonia.  · Tests to check for an underlying condition. Other tests may be done to check for things such as allergies, asthma, or COPD (chronic obstructive pulmonary disease). You may need to see a specialist for more lung function testing.  Treating a cough  The main treatment for bronchitis is easing symptoms. Avoiding smoke, allergens, and other things that trigger coughing can often help. If the infection is bacterial, you may be given antibiotics. During the illness, it's important to get plenty of sleep. To ease symptoms:  · Dont smoke. Also avoid secondhand smoke.  · Use a humidifier. Or try breathing in steam from a hot shower. This may help loosen mucus.  · Drink a lot of water and juice. They can soothe the throat and may help thin mucus.  · Sit up or use extra pillows when in bed. This helps to lessen coughing and congestion.  · Ask your provider about using medicine. Ask about using cough medicine, pain and fever medicine, or a decongestant.  Antibiotics  Most cases of bronchitis are caused by cold or flu viruses. They dont need antibiotics to treat them, even if your mucus is thick and green or yellow. Antibiotics dont treat viral illness and antibiotics have not been shown to have any benefit in cases of acute bronchitis. Taking antibiotics when they are not needed increases your risk of getting an infection later that is antibiotic-resistant. Antibiotics can also cause severe cases of diarrhea that require other antibiotics to treat.  It is important that you accept your healthcare provider's opinion to not use antibiotics. Your provider will prescribe antibiotics if the infection is caused by bacteria. If they are prescribed:  · Take all of the medicine. Take the medicine until it is used up, even if symptoms have improved. If you dont, the bronchitis may come back.  · Take the medicines as directed. For instance, some medicines should be taken with food.  · Ask about  side effects. Ask your provider or pharmacist what side effects are common, and what to do about them.  Follow-up care  You should see your provider again in 2 to 3 weeks. By this time, symptoms should have improved. An infection that lasts longer may mean you have a more serious problem.  Prevention  · Avoid tobacco smoke. If you smoke, quit. Stay away from smoky places. Ask friends and family not to smoke around you, or in your home or car.  · Get checked for allergies.  · Ask your provider about getting a yearly flu shot. Also ask about pneumococcal or pneumonia shots.  · Wash your hands often. This helps reduce the chance of picking up viruses that cause colds and flu.  Call your healthcare provider if:  · Symptoms worsen, or you have new symptoms  · Breathing problems worsen or  become severe  · Symptoms dont get better within a week, or within 3 days of taking antibiotics   Date Last Reviewed: 2/1/2017  © 6690-8724 [employer]. [address] [place] [address]. All rights reserved. This information is not intended as a substitute for professional medical care. Always follow your healthcare professional's instructions.        Acute Bronchitis  Your healthcare provider has told you that you have acute bronchitis. Bronchitis is infection or inflammation of the bronchial tubes (airways in the lungs). Normally, air moves easily in and out of the airways. Bronchitis narrows the airways, making it harder for air to flow in and out of the lungs. This causes symptoms such as shortness of breath, coughing up yellow or green mucus, and wheezing. Bronchitis can be acute or chronic. Acute means the condition comes on quickly and goes away in a short time, usually within 3 to 10 days. Chronic means a condition lasts a long time and often comes back.    What causes acute bronchitis?  Acute bronchitis almost always starts as a viral respiratory infection, such as a cold or the flu. Certain factors make  it more likely for a cold or flu to turn into bronchitis. These include being very young, being elderly, having a heart or lung problem, or having a weak immune system. Cigarette smoking also makes bronchitis more likely.  When bronchitis develops, the airways become swollen. The airways may also become infected with bacteria. This is known as a secondary infection.  Diagnosing acute bronchitis  Your healthcare provider will examine you and ask about your symptoms and health history. You may also have a sputum culture to test the fluid in your lungs. Chest X-rays may be done to look for infection in the lungs.  Treating acute bronchitis  Bronchitis usually clears up as the cold or flu goes away. You can help feel better faster by doing the following:  · Take medicine as directed. You may be told to take ibuprofen or other over-the-counter medicines. These help relieve inflammation in your bronchial tubes. Your healthcare provider may prescribe an inhaler to help open up the bronchial tubes. Most of the time, acute bronchitis is caused by a viral infection. Antibiotics are usually not prescribed for viral infections.  · Drink plenty of fluids, such as water, juice, or warm soup. Fluids loosen mucus so that you can cough it up. This helps you breathe more easily. Fluids also prevent dehydration.  · Make sure you get plenty of rest.  · Do not smoke. Do not allow anyone else to smoke in your home.  Recovery and follow-up  Follow up with your doctor as you are told. You will likely feel better in a week or two. But a dry cough can linger beyond that time. Let your doctor know if you still have symptoms (other than a dry cough) after 2 weeks, or if youre prone to getting bronchial infections. Take steps to protect yourself from future infections. These steps include stopping smoking and avoiding tobacco smoke, washing your hands often, and getting a yearly flu shot.  When to call your healthcare provider  Call the  healthcare provider if you have any of the following:  · Fever of 100.4°F (38.0°C) or higher, or as advised  · Symptoms that get worse, or new symptoms  · Trouble breathing  · Symptoms that dont start to improve within a week, or within 3 days of taking antibiotics   Date Last Reviewed: 12/1/2016  © 5218-4051 [employer]. [address] [place] [address], [place] [address]. All rights reserved. This information is not intended as a substitute for professional medical care. Always follow your healthcare professional's instructions.      **Start mucinex chest congestion 1-2 tabs in the morning only to help thin and loosen mucous; drink water to help thin and loosen mucous in the chest

## 2019-08-23 NOTE — PROGRESS NOTES
SUBJECTIVE:      Patient ID: Jo Alegre is a 79 y.o. female.    Chief Complaint: Chest Congestion (x 3days with a productive cough afebrile )    Established patient of Dr. Alberto here for c/o productive cough with pale green sputum and feeling SOB x 3 days. She denies any wheezing, fever, chills, n/v/d or upper respiratory symptoms. She denies any hx of COPD or asthma- she does have CHF and is taking all of her medications as prescribed daily. She denies any edema or chest pain/tightness.     Cough   This is a new problem. The current episode started in the past 7 days (x 3 days). The problem has been rapidly worsening. The cough is productive of purulent sputum. Associated symptoms include a sore throat and shortness of breath. Pertinent negatives include no chest pain, chills, ear congestion, ear pain, fever, headaches, heartburn, hemoptysis, myalgias, nasal congestion, postnasal drip, rash, rhinorrhea, sweats, weight loss or wheezing. The symptoms are aggravated by lying down. She has tried OTC cough suppressant for the symptoms. The treatment provided no relief. There is no history of asthma, bronchiectasis, bronchitis, COPD, emphysema, environmental allergies or pneumonia. +CHF       Family History   Problem Relation Age of Onset    Heart disease Mother     Cancer Father         Lung Cancer ??? Asbestos    Breast cancer Paternal Aunt       Social History     Socioeconomic History    Marital status:      Spouse name: Not on file    Number of children: 4    Years of education: Not on file    Highest education level: Not on file   Occupational History    Occupation:    Social Needs    Financial resource strain: Not on file    Food insecurity:     Worry: Not on file     Inability: Not on file    Transportation needs:     Medical: Not on file     Non-medical: Not on file   Tobacco Use    Smoking status: Former Smoker    Smokeless tobacco: Never Used   Substance and Sexual  Activity    Alcohol use: No    Drug use: No    Sexual activity: Not Currently   Lifestyle    Physical activity:     Days per week: Not on file     Minutes per session: Not on file    Stress: Not at all   Relationships    Social connections:     Talks on phone: Not on file     Gets together: Not on file     Attends Muslim service: Not on file     Active member of club or organization: Not on file     Attends meetings of clubs or organizations: Not on file     Relationship status: Not on file   Other Topics Concern    Not on file   Social History Narrative    - Drives and lives alone.     Current Outpatient Medications   Medication Sig Dispense Refill    aspirin (ECOTRIN) 81 MG EC tablet Take 81 mg by mouth once daily.      carvedilol (COREG) 25 MG tablet Take 0.5 tablets (12.5 mg total) by mouth 2 (two) times daily with meals. 1 tablet 1    denosumab (PROLIA) 60 mg/mL Syrg Inject 1 mL (60 mg total) into the skin every 6 (six) months. 2 mL 1    furosemide (LASIX) 20 MG tablet Take 20 mg by mouth 2 (two) times daily as needed.       gabapentin (NEURONTIN) 100 MG capsule TAKE ONE CAPSULE BY MOUTH THREE TIMES DAILY 270 capsule 0    glimepiride (AMARYL) 1 MG tablet Take 1 tablet (1 mg total) by mouth before breakfast. 90 tablet 1    sacubitril-valsartan (ENTRESTO) 24-26 mg per tablet Take 1 tablet by mouth once.       warfarin (COUMADIN) 5 MG tablet Take 5 mg by mouth once daily.      albuterol (VENTOLIN HFA) 90 mcg/actuation inhaler Inhale 2 puffs into the lungs every 6 (six) hours as needed for Wheezing. Rescue 18 g 1    benzonatate (TESSALON) 100 MG capsule Take 1 capsule (100 mg total) by mouth 3 (three) times daily as needed for Cough. 30 capsule 0    doxycycline (VIBRAMYCIN) 100 MG Cap Take 1 capsule (100 mg total) by mouth every 12 (twelve) hours. for 7 days 14 capsule 0     No current facility-administered medications for this visit.      Review of patient's allergies indicates:  "  Allergen Reactions    Codeine     Lasix [furosemide]       Past Medical History:   Diagnosis Date    Allergy     Codeine, Lasix    Atrial fibrillation     Atrial fibrillation     Cataract     CHF (congestive heart failure)     Diabetes mellitus, type 2     Ejection fraction < 50% 10/18/2017    Approximately 35%  Based on prior  Echocardiogram.    Hyperlipidemia     Osteoporosis     Thyroid disorder screening 10/17/2017    TSH of 1.12 ordered by Dr. dee Jones     Past Surgical History:   Procedure Laterality Date    CHO[place]ECTOMY  1997    [facility]     COLONOSCOPY      EYE SURGERY      FRACTURE SURGERY      HYSTERECTOMY      TONSILLECTOMY         Review of Systems   Constitutional: Positive for fatigue. Negative for chills, diaphoresis, fever and weight loss.   HENT: Positive for sore throat. Negative for congestion, ear discharge, ear pain, postnasal drip, rhinorrhea, sinus pressure, sinus pain, sneezing, trouble swallowing and voice change.    Eyes: Negative for pain, discharge and visual disturbance.   Respiratory: Positive for cough and shortness of breath. Negative for hemoptysis and wheezing.    Cardiovascular: Negative for chest pain, palpitations and leg swelling.   Gastrointestinal: Negative for abdominal pain, diarrhea, heartburn, nausea and vomiting.   Genitourinary: Negative for dysuria and frequency.   Musculoskeletal: Negative for myalgias.   Skin: Negative for rash.   Allergic/Immunologic: Negative for environmental allergies.   Neurological: Negative for dizziness, weakness and headaches.   Hematological: Negative for adenopathy.      OBJECTIVE:      Vitals:    08/23/19 1430   BP: 128/68   BP Location: Left arm   Patient Position: Sitting   BP Method: X-Large (Manual)   Pulse: 72   Temp: 97.9 °F (36.6 °C)   TempSrc: Oral   SpO2: 96%   Weight: 105.4 kg (232 lb 4.8 oz)   Height: 5' 9" (1.753 m)     Physical Exam   Constitutional: She is oriented to person, place, and time. She " appears well-developed and well-nourished. No distress.   HENT:   Head: Normocephalic and atraumatic.   Right Ear: Tympanic membrane and ear canal normal.   Left Ear: Tympanic membrane and ear canal normal.   Nose: Nose normal. Right sinus exhibits no maxillary sinus tenderness and no frontal sinus tenderness. Left sinus exhibits no maxillary sinus tenderness and no frontal sinus tenderness.   Mouth/Throat: Uvula is midline, oropharynx is clear and moist and mucous membranes are normal. No oropharyngeal exudate or posterior oropharyngeal erythema.   Eyes: Pupils are equal, round, and reactive to light. Conjunctivae are normal.   Neck: Neck supple.   Cardiovascular: Normal rate, regular rhythm and normal heart sounds.   Pulmonary/Chest: Effort normal. No accessory muscle usage. No tachypnea. No respiratory distress. She has wheezes (inspiratory- faint ) in the right upper field and the left upper field. She has no rhonchi. She has no rales.   Rate 20 per min   Abdominal: Soft. Bowel sounds are normal. There is no tenderness.   Musculoskeletal: She exhibits no edema.   Lymphadenopathy:     She has no cervical adenopathy.        Right: No supraclavicular adenopathy present.        Left: No supraclavicular adenopathy present.   Neurological: She is alert and oriented to person, place, and time.   Skin: Skin is warm and dry. No rash noted. She is not diaphoretic.   Psychiatric: She has a normal mood and affect. Her behavior is normal.   Nursing note and vitals reviewed.     Assessment:       1. Acute bronchitis, unspecified organism    2. Wheezing        Plan:       Acute bronchitis, unspecified organism  -     albuterol nebulizer solution 2.5 mg  -     doxycycline (VIBRAMYCIN) 100 MG Cap; Take 1 capsule (100 mg total) by mouth every 12 (twelve) hours. for 7 days  Dispense: 14 capsule; Refill: 0  -     albuterol (VENTOLIN HFA) 90 mcg/actuation inhaler; Inhale 2 puffs into the lungs every 6 (six) hours as needed for  Wheezing. Rescue  Dispense: 18 g; Refill: 1  -     benzonatate (TESSALON) 100 MG capsule; Take 1 capsule (100 mg total) by mouth 3 (three) times daily as needed for Cough.  Dispense: 30 capsule; Refill: 0    Wheezing  -     albuterol nebulizer solution 2.5 mg  -     doxycycline (VIBRAMYCIN) 100 MG Cap; Take 1 capsule (100 mg total) by mouth every 12 (twelve) hours. for 7 days  Dispense: 14 capsule; Refill: 0  -     albuterol (VENTOLIN HFA) 90 mcg/actuation inhaler; Inhale 2 puffs into the lungs every 6 (six) hours as needed for Wheezing. Rescue  Dispense: 18 g; Refill: 1     *neb treatment given in clinic today; O2 sats 97%, wheezes resolved; advised medications as prescribed, rest and take mucinex otc; advised proceed to ER if new or worsening complaints over the weekend     Follow up if symptoms worsen or fail to improve.      8/23/2019 Alina Moreno, JOSE ARMANDO, FNP

## 2019-08-26 ENCOUNTER — TELEPHONE (OUTPATIENT)
Dept: FAMILY MEDICINE | Facility: CLINIC | Age: 79
End: 2019-08-26

## 2019-08-27 ENCOUNTER — TELEPHONE (OUTPATIENT)
Dept: FAMILY MEDICINE | Facility: CLINIC | Age: 79
End: 2019-08-27

## 2019-08-29 ENCOUNTER — TELEPHONE (OUTPATIENT)
Dept: FAMILY MEDICINE | Facility: CLINIC | Age: 79
End: 2019-08-29

## 2019-08-29 NOTE — PROGRESS NOTES
Notify patient that her lumbar spine x-ray does not show any new fractures.  Old arthritic changes are noted. There is some scoliosis in the lumbar spine also.  Let us know how she does with low back pain.    Did change in some medications which had recommended make her feel better.  Has she seen Dr. Sampson Jones?

## 2019-08-29 NOTE — TELEPHONE ENCOUNTER
----- Message from Kraig Alberto MD sent at 8/29/2019  2:48 PM CDT -----  Notify patient that her lumbar spine x-ray does not show any new fractures.  Old arthritic changes are noted. There is some scoliosis in the lumbar spine also.  Let us know how she does with low back pain.    Did change in some medications which had recommended make her feel better.  Has she seen Dr. Sampson Jones?

## 2019-10-14 ENCOUNTER — OFFICE VISIT (OUTPATIENT)
Dept: FAMILY MEDICINE | Facility: CLINIC | Age: 79
End: 2019-10-14
Payer: MEDICARE

## 2019-10-14 VITALS
WEIGHT: 237 LBS | HEART RATE: 68 BPM | DIASTOLIC BLOOD PRESSURE: 89 MMHG | HEIGHT: 69 IN | SYSTOLIC BLOOD PRESSURE: 139 MMHG | BODY MASS INDEX: 35.1 KG/M2

## 2019-10-14 DIAGNOSIS — S50.01XA CONTUSION OF RIGHT ELBOW, INITIAL ENCOUNTER: ICD-10-CM

## 2019-10-14 DIAGNOSIS — G44.309 POST-CONCUSSION HEADACHE: ICD-10-CM

## 2019-10-14 DIAGNOSIS — Z23 NEEDS FLU SHOT: ICD-10-CM

## 2019-10-14 DIAGNOSIS — R26.81 GAIT INSTABILITY: ICD-10-CM
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
## 2019-10-14 PROCEDURE — 99214 PR OFFICE/OUTPT VISIT, EST, LEVL IV, 30-39 MIN: ICD-10-PCS | Mod: 25,S$GLB,, | Performed by: INTERNAL MEDICINE

## 2019-10-14 PROCEDURE — 1101F PT FALLS ASSESS-DOCD LE1/YR: CPT | Mod: S$GLB,,, | Performed by: INTERNAL MEDICINE

## 2019-10-14 PROCEDURE — 3075F PR MOST RECENT SYSTOLIC BLOOD PRESS GE 130-139MM HG: ICD-10-PCS | Mod: S$GLB,,, | Performed by: INTERNAL MEDICINE

## 2019-10-14 PROCEDURE — G0008 FLU VACCINE - HIGH DOSE (65+) PRESERVATIVE FREE IM: ICD-10-PCS | Mod: S$GLB,,, | Performed by: INTERNAL MEDICINE

## 2019-10-14 PROCEDURE — 3075F SYST BP GE 130 - 139MM HG: CPT | Mod: S$GLB,,, | Performed by: INTERNAL MEDICINE

## 2019-10-14 PROCEDURE — 90662 FLU VACCINE - HIGH DOSE (65+) PRESERVATIVE FREE IM: ICD-10-PCS | Mod: S$GLB,,, | Performed by: INTERNAL MEDICINE

## 2019-10-14 PROCEDURE — 1101F PR PT FALLS ASSESS DOC 0-1 FALLS W/OUT INJ PAST YR: ICD-10-PCS | Mod: S$GLB,,, | Performed by: INTERNAL MEDICINE

## 2019-10-14 PROCEDURE — G0008 ADMIN INFLUENZA VIRUS VAC: HCPCS | Mod: S$GLB,,, | Performed by: INTERNAL MEDICINE

## 2019-10-14 PROCEDURE — 3079F DIAST BP 80-89 MM HG: CPT | Mod: S$GLB,,, | Performed by: INTERNAL MEDICINE

## 2019-10-14 PROCEDURE — 3079F PR MOST RECENT DIASTOLIC BLOOD PRESSURE 80-89 MM HG: ICD-10-PCS | Mod: S$GLB,,, | Performed by: INTERNAL MEDICINE

## 2019-10-14 PROCEDURE — 99214 OFFICE O/P EST MOD 30 MIN: CPT | Mod: 25,S$GLB,, | Performed by: INTERNAL MEDICINE

## 2019-10-14 PROCEDURE — 90662 IIV NO PRSV INCREASED AG IM: CPT | Mod: S$GLB,,, | Performed by: INTERNAL MEDICINE

## 2019-10-14 RX ORDER — ELECTROLYTES/DEXTROSE
1 SOLUTION, ORAL ORAL 2 TIMES DAILY
COMMUNITY

## 2019-10-14 RX ORDER — OMEPRAZOLE 40 MG/1
40 CAPSULE, DELAYED RELEASE ORAL DAILY
COMMUNITY
End: 2020-09-08

## 2019-10-14 RX ORDER — ACETAMINOPHEN 500 MG
5000 TABLET ORAL 2 TIMES DAILY
COMMUNITY

## 2019-10-14 NOTE — PROGRESS NOTES
Subjective:       Patient ID: Jo Alegre is a 79 y.o. female.    Chief Complaint: Fall (head pain  last wed); Headache; Elbow bruise; and Gait Problem    Miss Jo alegre is a 79-year-old  female who comes for same-day appointment and is accompanied with daughter.  Last Wednesday when she was trying to do ago she ate a flight of steps, she lost her balance and fell on the back, on her head and right elbow.    She did not have any loss of consciousness.  She denies any syncopal episode significant dizziness.  She denies any palpitations.  She was able to get up and walk around.    Thereafter she has started experiencing bruise the right elbow region.  She also has a bruise on or occiput.  She complains of a vertex headache since last Saturday or so.  This is more or less stable without any worsening.  She takes a couple of Tylenol at bedtime which seems to help.    Her gait is chronically antalgic but she chooses not to use a walker or a cane.  As per patient and also agreed by the daughter, she does not have any slurred speech or blurred vision.  She does not have any significant bowel or bladder disturbance more than the previous 1.    Medical issues of chronic anticoagulation have been noted.  She is also on gabapentin.  She also has diabetes but no hypoglycemic episodes thus far.  She has moderate simple obesity with a BMI of 35.  She has chronic osteoarthritis in multiple joints.    Fall   The accident occurred 3 to 5 days ago. The fall occurred while standing. She fell from a height of 1 to 2 ft. Impact surface: step wooden. The point of impact was the head and right elbow. The pain is present in the head. The pain is at a severity of 6/10. The pain is moderate. Associated symptoms include headaches. Pertinent negatives include no abdominal pain, fever, nausea or tingling.   Headache    This is a new problem. The current episode started in the past 7 days. The problem occurs constantly. The  problem has been unchanged. The pain is located in the vertex region. The pain does not radiate. The pain quality is not similar to prior headaches. Pertinent negatives include no abdominal pain, back pain, coughing, dizziness, eye pain, fever, muscle aches, nausea, seizures, sinus pressure, swollen glands, tingling or weakness.   Elbow Injury   This is a new problem. The current episode started in the past 7 days. The problem occurs constantly. The problem has been gradually improving. Associated symptoms include fatigue and headaches. Pertinent negatives include no abdominal pain, arthralgias, change in bowel habit, chest pain, chills, congestion, coughing, fever, joint swelling, nausea, rash, swollen glands or weakness.       Past Medical History:   Diagnosis Date    Allergy     Codeine, Lasix    Atrial fibrillation     Atrial fibrillation     Cataract     CHF (congestive heart failure)     Diabetes mellitus, type 2     Ejection fraction < 50% 10/18/2017    Approximately 35%  Based on prior  Echocardiogram.    Hyperlipidemia     Osteoporosis     Thyroid disorder screening 10/17/2017    TSH of 1.12 ordered by Dr. dee Jones     Social History     Socioeconomic History    Marital status:      Spouse name: Not on file    Number of children: 4    Years of education: Not on file    Highest education level: Not on file   Occupational History    Occupation:    Social Needs    Financial resource strain: Not on file    Food insecurity:     Worry: Not on file     Inability: Not on file    Transportation needs:     Medical: Not on file     Non-medical: Not on file   Tobacco Use    Smoking status: Former Smoker    Smokeless tobacco: Never Used   Substance and Sexual Activity    Alcohol use: No    Drug use: No    Sexual activity: Not Currently   Lifestyle    Physical activity:     Days per week: Not on file     Minutes per session: Not on file    Stress: Not at all   Relationships     Social connections:     Talks on phone: Not on file     Gets together: Not on file     Attends Hindu service: Not on file     Active member of club or organization: Not on file     Attends meetings of clubs or organizations: Not on file     Relationship status: Not on file   Other Topics Concern    Not on file   Social History Narrative    - Drives and lives alone.     Past Surgical History:   Procedure Laterality Date    CHOLECYSTECTOMY  1997    [facility]     COLONOSCOPY      EYE SURGERY      FRACTURE SURGERY      HYSTERECTOMY      TONSILLECTOMY       Family History   Problem Relation Age of Onset    Heart disease Mother     Cancer Father         Lung Cancer ??? Asbestos    Breast cancer Paternal Aunt        Review of Systems   Constitutional: Positive for activity change (Somewhat more cautious while walking) and fatigue. Negative for chills, fever and unexpected weight change.   HENT: Negative for congestion, postnasal drip and sinus pressure.    Eyes: Negative for pain, discharge and visual disturbance.   Respiratory: Negative for cough, chest tightness and shortness of breath.    Cardiovascular: Negative for chest pain, palpitations and leg swelling.        History of defibrillator, congestive cardiomyopathy hypertension and dyslipidemia   Gastrointestinal: Negative for abdominal distention, abdominal pain, anal bleeding, change in bowel habit, constipation, diarrhea and nausea.   Endocrine: Negative for polydipsia, polyphagia and polyuria.        Diabetes mellitus on glimepiride.   Genitourinary: Negative for difficulty urinating, dysuria, flank pain, frequency and pelvic pain.   Musculoskeletal: Positive for gait problem. Negative for arthralgias, back pain and joint swelling.        Difficulty walking with low back pain radiating to the right thigh and also right lateral thigh pain.   Skin: Negative for color change, pallor, rash and wound.   Allergic/Immunologic: Negative for  "environmental allergies, food allergies and immunocompromised state.   Neurological: Positive for headaches. Negative for dizziness, tingling, tremors, seizures, syncope, weakness and light-headedness.   Hematological: Negative for adenopathy. Bruises/bleeds easily.        Patient is on chronic anticoagulation with warfarin.  Recent bruises on the occiput as well as right elbow region following a fall.   Psychiatric/Behavioral: Negative for agitation, confusion and dysphoric mood. The patient is not nervous/anxious.          Objective:      Blood pressure 139/89, pulse 68, height 5' 9" (1.753 m), weight 107.5 kg (237 lb). Body mass index is 35 kg/m².  Physical Exam   Constitutional: She appears well-developed. No distress.   Patient is obese with a BMI of 35.    HENT:   Head: Normocephalic and atraumatic.       Mouth/Throat: No oropharyngeal exudate.   Eyes: EOM are normal. Right eye exhibits no discharge. Left eye exhibits no discharge. No scleral icterus.   Neck: Normal range of motion. Neck supple. No JVD present. No tracheal deviation present. No thyromegaly present.   Cardiovascular: Normal rate and regular rhythm.   Pulmonary/Chest: Effort normal and breath sounds normal. No stridor. No respiratory distress. She has no wheezes.   Abdominal: Soft. She exhibits no distension. There is no tenderness.   Musculoskeletal: She exhibits tenderness. She exhibits no deformity.        Lumbar back: She exhibits no laceration.        Arms:  Lymphadenopathy:     She has no cervical adenopathy.   Neurological: She is alert. She is not disoriented. She displays no atrophy and no tremor. No cranial nerve deficit or sensory deficit. She exhibits normal muscle tone.   Reflex Scores:       Tricep reflexes are 1+ on the right side and 0 on the left side.       Bicep reflexes are 0 on the right side and 0 on the left side.       Brachioradialis reflexes are 0 on the right side and 0 on the left side.       Patellar reflexes are 0 " "on the right side and 0 on the left side.       Achilles reflexes are 0 on the right side and 0 on the left side.  Except for left triceps no other reflexes were elicitable    Speech is fluent and goal oriented.  She could state [place] fluently.   Skin: Skin is warm and dry.   Psychiatric: Her mood appears anxious.   Somewhat anhedonic and antalgic         Assessment:       1. Fall, initial encounter    2. Post-concussion headache    3. Contusion of right elbow, initial encounter    4. Gait instability    5. Needs flu shot           Orders Only on 08/07/2019   Component Date Value Ref Range Status    Creatinine, Random Ur 08/07/2019 62  20 - 275 mg/dL Final    Microalb, Ur 08/07/2019 2.8  See Note: mg/dL Final    Microalb Creat Ratio 08/07/2019 45* <30 mcg/mg creat Final    Hemoglobin A1C 08/07/2019 6.2* <5.7 % of total Hgb Final         Plan:           Fall, initial encounter  Comments:  Patient's gait is slightly unsteady because of arthritis.  I have advised her to use a walker or a cane.    Post-concussion headache    Contusion of right elbow, initial encounter  Comments:  This occurred secondary to fall 5 days back.  Superficial bruises on the elbow.  No skin abrasion    Gait instability    Needs flu shot  -     Influenza - High Dose (65+) (PF) (IM)      Patient's neurological examination is essentially unremarkable except for difficult to elicit reflexes which are probably chronic.  Her speech fluency, orientation and gait is essentially unchanged she will be kept under close observation.    Fall precautions have been discussed again.  Especially given the fact that she is on warfarin which increase the risk of injuries.    Currently she is taking Tylenol for the headaches.  This does not seem to help her too much.  She does not want anything stronger.    I will see her back in about 1-2 weeks time again for follow-up and see how she is doing.  At this point I will avoid " the CT scan of head.  Should her headaches increased significantly in intensity with some slurred speech or episodes of nausea vomiting, then she will  notify us accordingly.      Go to the emergency room.  If headaches increase of persistent nausea and vomiting.    Follow-up in couple of weeks time.    Spent aleks 25 minutes with patient which involved review of pts medical conditions, labs, medications and with 50% of time face-to-face discussion about medical problems, management and any applicable changes.    Current Outpatient Medications:     albuterol (VENTOLIN HFA) 90 mcg/actuation inhaler, Inhale 2 puffs into the lungs every 6 (six) hours as needed for Wheezing. Rescue, Disp: 18 g, Rfl: 1    aspirin (ECOTRIN) 81 MG EC tablet, Take 81 mg by mouth once daily., Disp: , Rfl:     Ca-D3-mag ox-zinc--thom-bor (CALCIUM 600-D3 PLUS) 600 mg calcium- 800 unit-50 mg Tab, Take 1 tablet by mouth 2 (two) times daily., Disp: , Rfl:     carvedilol (COREG) 25 MG tablet, Take 0.5 tablets (12.5 mg total) by mouth 2 (two) times daily with meals., Disp: 1 tablet, Rfl: 1    cholecalciferol, vitamin D3, (VITAMIN D3) 5,000 unit Tab, Take 5,000 Units by mouth 2 (two) times daily., Disp: , Rfl:     denosumab (PROLIA) 60 mg/mL Syrg, Inject 1 mL (60 mg total) into the skin every 6 (six) months., Disp: 2 mL, Rfl: 1    furosemide (LASIX) 20 MG tablet, Take 20 mg by mouth 2 (two) times daily as needed. , Disp: , Rfl:     gabapentin (NEURONTIN) 100 MG capsule, TAKE ONE CAPSULE BY MOUTH THREE TIMES DAILY, Disp: 270 capsule, Rfl: 0    glimepiride (AMARYL) 1 MG tablet, Take 1 tablet (1 mg total) by mouth before breakfast., Disp: 90 tablet, Rfl: 1    omeprazole (PRILOSEC) 40 MG capsule, Take 40 mg by mouth once daily., Disp: , Rfl:     sacubitril-valsartan (ENTRESTO) 24-26 mg per tablet, Take 1 tablet by mouth once. , Disp: , Rfl:     warfarin (COUMADIN) 5 MG tablet, Take 5 mg by mouth once daily., Disp: , Rfl:

## 2019-10-14 NOTE — PATIENT INSTRUCTIONS
Preventing Falls: Staying Active    Staying active is one of the best things you can do to prevent falls. Keep in mind that doing too little can be as risky as doing too much. That's because not being active can make you weaker and more likely to fall. But how much can you do safely? Start easy. Slowly work up to doing more. Talk to your health care provider about safe ways for you to stay active.  Stay active, stay connected  Staying connected with other people can help lower your risk of falls. One way it does this is by helping to keep you from feeling isolated and depressed. Find a social activity you enjoy. Make it part of your weekly or daily routine:  · Join a club or visit a senior center.  · Go to Anabaptist services.  · Organize a potBrandBackerk or game of cards.  · Garden with your neighbor.  · Have a friend join you to go walking outdoors or in the mall.  How exercise helps  The list of benefits from exercise just keeps getting longer. And, as you age, you can keep reaping those rewards. Balance, flexibility, strength, and endurance all come from exercise. They all play a role in preventing falls. It's never too late to start exercising. Try organized activities. You can find these at senior centers, health clubs, or even at a Baptism, temple, or Yazdanism. This approach may work best if you've never exercised in the past or if you need company to get motivated. But you can exercise on your own if you prefer. Try an exercise video or walk in the park.  Date Last Reviewed: 6/13/2015  © 9178-1809 [employer]. [address] [place] [address], [place] [address]. All rights reserved. This information is not intended as a substitute for professional medical care. Always follow your healthcare professional's instructions.        Preventing Falls: Moving Safely Outside     When stepping off a curb with a walker, lower the walker onto the street first, then step off the curb.     Moving safely outside your home can be  a challenge. Take care when walking up and down stairs and curbs. And be sure to wear sturdy, comfortable shoes and pay attention to where you step. Here are more tips to keep you from falling.  Using curbs and stairs  Curbs, steps, or uneven pavement can trip you. Take care when near them:  · Check the height of a curb before stepping up or down. Be careful with uneven and cut-out sections of curbs.  · Don't rush when crossing the street. Watch for changes in pavement height.  · On stairs, grasp the handrail and take one step at a time. If you ever feel dizzy on stairs, sit down until you feel better.  Wearing shoes that keep you safe  When you shop for shoes, keep these things in mind:  · Choose shoes with rubber or nonskid soles. Athletic shoes are a good choice.  · Choose flats or shoes with low heels. Avoid high heels or platforms.  · All footwear should be sturdy and well-fitting. Don't wear flip-flops or backless shoes or slippers.  · Don't walk around in stocking feet. Shoes are your safest bet, even when indoors. If you like, keep 1 pair of shoes just for indoors.  Moving objects from place to place  Carrying objects can be hard, especially if you use a cane or walker. These tips can make it easier:  · Use a rolling cart to carry things like groceries.  · Wear clothes with large pockets for carrying small objects or wear a chelle pack.  · Divide large loads into smaller loads. That way, you can always keep 1 hand free for grasping railings.  · Don't carry objects that block your view. That's a sure way to trip.   Date Last Reviewed: 6/13/2015  © 8371-6515 [employer]. [address] [place] [address], [place] [address]. All rights reserved. This information is not intended as a substitute for professional medical care. Always follow your healthcare professional's instructions.        After a Concussion     Awaken to check alertness as often as the health care provider suggests.     If you or someone  close to you has had a mild concussion (a head injury), watch closely for signs of problems during the first 48 hours after the injury. Follow the doctors advice about recovering at home. Use the tips on this handout as a guide.  Call 911 or your emergency number if the person with the concussion will not fully wake up or has seizures or convulsions.   The first 48 hours  Dont take medicine unless approved by your healthcare provider. Try placing a cold, damp cloth on the head to help relieve a headache.  · Ask the doctor before using any medicines.  · Don't drink alcohol or take sedatives or medicines that make you sleepy.  · Don't return to sports or any activity that could cause you to hit your head until all symptoms are gone and you have been cleared by your doctor. A second head injury before fully recovering from the first one can lead to serious brain injury.  · Avoid doing activities that require a lot of concentration or a lot of attention. This will allow your brain to rest and heal more quickly.  · Return to regular physical and mental activity as directed and approved by your healthcare provider.  Tips about sleeping  For the first day or two, it may be best not to sleep for long periods of time without being checked for alertness. Follow the doctors instructions.  ? Wake every ____ hours for the next ____ hours. Ask questions to check for alertness.  ? OK to sleep through the night.  Note: A person should not be left alone after a concussion. If no adult can stay with the injured person, let the doctor know.  When to call the doctor  If you notice any of the following, call the doctor or healthcare provider:  · Vomiting (some vomiting is common, but tell the doctor about any vomiting)  · Clear or bloody drainage from the nose or ear  · Constant drowsiness or difficulty in waking up  · Confusion or memory loss  · Blurred vision or any vision changes  · Inability to walk or talk normally  · Increased  "weakness or problems with coordination  · Constant, unrelieved headache that becomes more severe  · Changes in behavior or personality  · High-pitched crying in infants   Date Last Reviewed: 8/17/2015  © 9130-3519 [employer]. [address] [place] [address], [place] [address]. All rights reserved. This information is not intended as a substitute for professional medical care. Always follow your healthcare professional's instructions.        Concussion    A concussion can be caused by a direct blow to the head, neck, face, or somewhere else on the body with the force being transmitted to the head. This may cause you to lose consciousness - be "knocked out" - but not always. Depending on the severity of the blow, it will take from a few hours up to a few days to get better. Sometimes symptoms may last a few months or longer. This is called post-concussion syndrome.  At first, you may have a headache, nausea, vomiting, or dizziness. You may also have problems concentrating or remembering things. This is normal.  Symptoms should get better as the hours and days go by. Symptoms that get worse could be a sign of a more serious injury. This might be a bruise or bleeding in the brain. Thats why its important to watch for the warning signs listed below.  Home care  If your injury is mild and there are no serious signs or symptoms, your healthcare provider may recommend that you be monitored at home. If there is evidence that the injury is more serious, you will be monitored in the hospital. Follow these tips to help care for yourself at home:  · After a concussion, your healthcare provider may recommend that a family member or friend monitor you for 12 to 24 hours. They may be told to wake you every few hours during sleep to check for the signs below.  · If your face or scalp swells, apply an ice pack for 20 minutes every 1 to 2 hours. Do this until the swelling starts to go down. You can make an ice pack by putting " ice cubes in a plastic bag and wrapping the bag in a towel.  · You may use acetaminophen to control pain, unless another pain medicine was prescribed. Do not use aspirin or ibuprofen after a head injury. If you have chronic liver or kidney disease, talk with your doctor before using these medicines. Also talk with your doctor if you ever had a stomach ulcer or gastrointestinal bleeding.  · For the next 24 hours:  ¨ Dont drink alcohol or take sedatives or medicines that make you sleepy.  ¨ Dont drive or operate machinery.  ¨ Avoid doing anything strenuous. Dont lift or strain.  · Dont return to sports or any activity that could cause you to hit your head until all symptoms are gone and you have been cleared by your doctor. A second head injury before fully recovering from the first one can lead to serious brain injury.  · Avoid doing activities that require a lot of concentration or a lot of attention. This will allow your brain to rest and heal quicker.  Follow-up care  Follow up with your doctor in 1 week, or as directed.  Note: A radiologist will review any X-rays or CT scans that were taken. You will be told of any new findings that may affect your care.  When to seek medical advice  Call your healthcare provider right away if any of these occur:  · Repeated vomiting  · Headache or dizziness that is severe or gets worse  · Loss of consciousness  · Unusual drowsiness, or unable to wake up as usual  · Weakness or decreased ability to walk or move any limb  · Confusion, agitation, or change in behavior or speech, or memory loss  · Blurred vision  · Convulsion (seizure)  · Swelling on the scalp or face that gets worse  · Changes in pupil size (the black part of the eye)  · Redness, warmth, or pus from the swollen area  · Fluid draining from or bleeding from the nose or ears     Date Last Reviewed: 8/14/2015  © 1298-8741 [employer]. [address] [place] [address], [place] [address]. All rights reserved.  This information is not intended as a substitute for professional medical care. Always follow your healthcare professional's instructions.

## 2019-10-28 ENCOUNTER — OFFICE VISIT (OUTPATIENT)
Dept: FAMILY MEDICINE | Facility: CLINIC | Age: 79
End: 2019-10-28
Payer: MEDICARE

## 2019-10-28 VITALS
DIASTOLIC BLOOD PRESSURE: 68 MMHG | BODY MASS INDEX: 34.8 KG/M2 | TEMPERATURE: 98 F | HEART RATE: 70 BPM | HEIGHT: 69 IN | SYSTOLIC BLOOD PRESSURE: 125 MMHG | WEIGHT: 235 LBS

## 2019-10-28 DIAGNOSIS — R26.81 GAIT INSTABILITY: ICD-10-CM

## 2019-10-28 DIAGNOSIS — S80.11XD CONTUSION OF RIGHT LOWER EXTREMITY, SUBSEQUENT ENCOUNTER: ICD-10-CM
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
## 2019-10-28 PROBLEM — S80.11XA CONTUSION OF RIGHT LEG: Status: ACTIVE | Noted: 2019-10-28

## 2019-10-28 PROCEDURE — 99213 PR OFFICE/OUTPT VISIT, EST, LEVL III, 20-29 MIN: ICD-10-PCS | Mod: S$GLB,,, | Performed by: INTERNAL MEDICINE

## 2019-10-28 PROCEDURE — 3078F DIAST BP <80 MM HG: CPT | Mod: S$GLB,,, | Performed by: INTERNAL MEDICINE

## 2019-10-28 PROCEDURE — 3078F PR MOST RECENT DIASTOLIC BLOOD PRESSURE < 80 MM HG: ICD-10-PCS | Mod: S$GLB,,, | Performed by: INTERNAL MEDICINE

## 2019-10-28 PROCEDURE — 3074F SYST BP LT 130 MM HG: CPT | Mod: S$GLB,,, | Performed by: INTERNAL MEDICINE

## 2019-10-28 PROCEDURE — 1101F PR PT FALLS ASSESS DOC 0-1 FALLS W/OUT INJ PAST YR: ICD-10-PCS | Mod: S$GLB,,, | Performed by: INTERNAL MEDICINE

## 2019-10-28 PROCEDURE — 1101F PT FALLS ASSESS-DOCD LE1/YR: CPT | Mod: S$GLB,,, | Performed by: INTERNAL MEDICINE

## 2019-10-28 PROCEDURE — 3074F PR MOST RECENT SYSTOLIC BLOOD PRESSURE < 130 MM HG: ICD-10-PCS | Mod: S$GLB,,, | Performed by: INTERNAL MEDICINE

## 2019-10-28 PROCEDURE — 99213 OFFICE O/P EST LOW 20 MIN: CPT | Mod: S$GLB,,, | Performed by: INTERNAL MEDICINE

## 2019-10-28 NOTE — PROGRESS NOTES
Subjective:       Patient ID: Jo Alegre is a 79 y.o. female.    Chief Complaint: Concussion (fup ) and Bruise rt leg    Miss Jo alegre is a 79-year-old  female who comes for follow-up.  Few weeks when I had seen her, she had fallen when climbing down the flight of steps.  She had hurt her back of the head and elbow.  She is doing better from that.  However she has started noticing a bruise on the right lower extremity which was not noticed on the initial follow-up when she came on the same day.    Please note that she is on chronic anticoagulation with warfarin as well as aspirin.  His    She does use a cane for ambulation and balance but feels uncomfortable views using the cane.  She agrees to go through a round of outpatient physical therapy for balance and strength to see if she would get off using the cane.  In the meantime I have told her to continue using the cane for balance and this could be life saving also.      Past Medical History:   Diagnosis Date    Allergy     Codeine, Lasix    Atrial fibrillation     Atrial fibrillation     Cataract     CHF (congestive heart failure)     Diabetes mellitus, type 2     Ejection fraction < 50% 10/18/2017    Approximately 35%  Based on prior  Echocardiogram.    Hyperlipidemia     Osteoporosis     Thyroid disorder screening 10/17/2017    TSH of 1.12 ordered by Dr. dee Jones     Social History     Socioeconomic History    Marital status:      Spouse name: Not on file    Number of children: 4    Years of education: Not on file    Highest education level: Not on file   Occupational History    Occupation:    Social Needs    Financial resource strain: Not on file    Food insecurity:     Worry: Not on file     Inability: Not on file    Transportation needs:     Medical: Not on file     Non-medical: Not on file   Tobacco Use    Smoking status: Former Smoker    Smokeless tobacco: Never Used   Substance and Sexual  Activity    Alcohol use: No    Drug use: No    Sexual activity: Not Currently   Lifestyle    Physical activity:     Days per week: Not on file     Minutes per session: Not on file    Stress: Not at all   Relationships    Social connections:     Talks on phone: Not on file     Gets together: Not on file     Attends [employer] service: Not on file     Active member of club or organization: Not on file     Attends meetings of clubs or organizations: Not on file     Relationship status: Not on file   Other Topics Concern    Not on file   Social History Narrative    - Drives and lives alone.     Past Surgical History:   Procedure Laterality Date    [place]     COLONOSCOPY      EYE SURGERY      FRACTURE SURGERY      HYSTERECTOMY      TONSILLECTOMY       Family History   Problem Relation Age of Onset    Heart disease Mother     Cancer Father         Lung Cancer ??? Asbestos    Breast cancer Paternal Aunt        Review of Systems   Constitutional: Positive for activity change (Somewhat more cautious while walking) and fatigue. Negative for chills, fever and unexpected weight change.   HENT: Negative for congestion, postnasal drip and sinus pressure.    Eyes: Negative for pain, discharge and visual disturbance.   Respiratory: Negative for cough, chest tightness and shortness of breath.    Cardiovascular: Negative for chest pain, palpitations and leg swelling.        History of defibrillator, congestive cardiomyopathy hypertension and dyslipidemia   Gastrointestinal: Negative for abdominal distention, abdominal pain, anal bleeding, constipation, diarrhea and nausea.   Endocrine: Negative for polydipsia, polyphagia and polyuria.        Diabetes mellitus on glimepiride.   Genitourinary: Negative for difficulty urinating, dysuria, flank pain, frequency and pelvic pain.   Musculoskeletal: Positive for gait problem. Negative for arthralgias, back pain and joint swelling.         "Difficulty walking with low back pain radiating to the right thigh and also right lateral thigh pain.   Skin: Negative for color change, pallor, rash and wound.   Allergic/Immunologic: Negative for environmental allergies, food allergies and immunocompromised state.   Neurological: Negative for dizziness, tremors, seizures, syncope, weakness, light-headedness and headaches.   Hematological: Negative for adenopathy. Bruises/bleeds easily.        Patient is on chronic anticoagulation with warfarin.  Recent bruises on the occiput as well as right elbow region following a fall.   Psychiatric/Behavioral: Negative for agitation, confusion and dysphoric mood. The patient is not nervous/anxious.          Objective:      Blood pressure 125/68, pulse 70, temperature 98 °F (36.7 °C), height 5' 9" (1.753 m), weight 106.6 kg (235 lb). Body mass index is 34.7 kg/m².  Physical Exam   Constitutional: She appears well-developed. No distress.   Patient is obese with a BMI of 34.    HENT:   Head: Normocephalic and atraumatic.   Mouth/Throat: No oropharyngeal exudate.   Eyes: EOM are normal. Right eye exhibits no discharge. Left eye exhibits no discharge. No scleral icterus.   Neck: Normal range of motion. Neck supple. No JVD present. No tracheal deviation present. No thyromegaly present.   Cardiovascular: Normal rate and regular rhythm.   Pulmonary/Chest: Effort normal and breath sounds normal. No stridor. No respiratory distress. She has no wheezes.   Abdominal: Soft. She exhibits no distension. There is no tenderness.   Musculoskeletal: She exhibits no tenderness or deformity.        Right knee: She exhibits ecchymosis. She exhibits normal range of motion.        Lumbar back: She exhibits no laceration.        Arms:       Legs:  Bruise in the right elbow region persist though it is slowly and steadily fading away.    Patient also has bruising in the right lower extremity.  This seems to be a subdermal  hematoma.   Lymphadenopathy: "     She has no cervical adenopathy.   Neurological: She is alert. She is not disoriented.   Reflex Scores:       Tricep reflexes are 0 on the left side.  Skin: Skin is warm and dry. No pallor.   Psychiatric: Her mood appears anxious.   Somewhat anhedonic and antalgic                 Assessment:       1. Fall, subsequent encounter    2. Contusion of right lower extremity, subsequent encounter    3. Gait instability           Orders Only on 08/07/2019   Component Date Value Ref Range Status    Creatinine, Random Ur 08/07/2019 62  20 - 275 mg/dL Final    Microalb, Ur 08/07/2019 2.8  See Note: mg/dL Final    Microalb Creat Ratio 08/07/2019 45* <30 mcg/mg creat Final    Hemoglobin A1C 08/07/2019 6.2* <5.7 % of total Hgb Final         Plan:           Fall, subsequent encounter    Contusion of right lower extremity, subsequent encounter    Gait instability     Patient has been advised to keep the right lower extremity elevated at warm compresses with massaging.  The bruise and hematoma will clear away.    She has also been recommended to use the cane as a  most of the time.  This will help her with the balance and prevent falling which could be very detrimental if it happens again.  She is on warfarin.    She has been offered outpatient physical therapy and she will think about it and get back to me.  Home based physical therapy may not be possible because technically she is not homebound.    Keep Nov appt      Current Outpatient Medications:     albuterol (VENTOLIN HFA) 90 mcg/actuation inhaler, Inhale 2 puffs into the lungs every 6 (six) hours as needed for Wheezing. Rescue, Disp: 18 g, Rfl: 1    aspirin (ECOTRIN) 81 MG EC tablet, Take 81 mg by mouth once daily., Disp: , Rfl:     Ca-D3-mag ox-zinc--thom-bor (CALCIUM 600-D3 PLUS) 600 mg calcium- 800 unit-50 mg Tab, Take 1 tablet by mouth 2 (two) times daily., Disp: , Rfl:     carvedilol (COREG) 25 MG tablet, Take 0.5 tablets (12.5 mg total) by mouth 2  (two) times daily with meals., Disp: 1 tablet, Rfl: 1    cholecalciferol, vitamin D3, (VITAMIN D3) 5,000 unit Tab, Take 5,000 Units by mouth 2 (two) times daily., Disp: , Rfl:     denosumab (PROLIA) 60 mg/mL Syrg, Inject 1 mL (60 mg total) into the skin every 6 (six) months., Disp: 2 mL, Rfl: 1    furosemide (LASIX) 20 MG tablet, Take 20 mg by mouth 2 (two) times daily as needed. , Disp: , Rfl:     gabapentin (NEURONTIN) 100 MG capsule, TAKE ONE CAPSULE BY MOUTH THREE TIMES DAILY, Disp: 270 capsule, Rfl: 0    glimepiride (AMARYL) 1 MG tablet, Take 1 tablet (1 mg total) by mouth before breakfast., Disp: 90 tablet, Rfl: 1    omeprazole (PRILOSEC) 40 MG capsule, Take 40 mg by mouth once daily., Disp: , Rfl:     sacubitril-valsartan (ENTRESTO) 24-26 mg per tablet, Take 1 tablet by mouth once. , Disp: , Rfl:     warfarin (COUMADIN) 5 MG tablet, Take 5 mg by mouth once daily., Disp: , Rfl:

## 2019-10-28 NOTE — PATIENT INSTRUCTIONS
Bruises (Contusions)    A contusion is a bruise. A bruise happens when a blow to your body doesn't break the skin but does break blood vessels beneath the skin. Blood leaking from the broken vessels causes redness and swelling. As it heals, your bruise is likely to turn colors like purple, green, and yellow. This is normal. The bruise should fade in 2 or 3 weeks.  Factors that make you more likely to bruise  Almost everyone bruises now and then. Certain people do bruise more easily than others. You're more prone to bruising as you get older. That's because blood vessels become more fragile with age. You're also more likely to bruise if you have a clotting disorder such as hemophilia or take medications that reduce clotting, including aspirin, coumadin, newer agents.  When to go to the emergency room (ER)  Bruises almost always heal on their own without special treatment. But for some people, a bad bruise can be serious. Seek medical care if you:  · Have a clotting disorder such as hemophilia.  · Have cirrhosis or other serious liver disease.  · Take blood-thinning medications such as warfarin (Coumadin).  What to expect in the ER  A doctor will examine your bruise and ask about any health conditions you have. In some cases, you may have a test to check how well your blood clots. Other treatment will depend on your needs.  Follow-up care  Sometimes a bruise gets worse instead of better. It may become larger and more swollen. This can occur when your body walls off a small pool of blood under the skin (hematoma). In very rare cases, your doctor may need to drain excess blood from the area.  Tip:  Apply an ice pack or bag of frozen peas to a bruise (keep a thin cloth between the cold source and your skin). This can help reduce redness and swelling.   Date Last Reviewed: 12/1/2016  © 7587-6019 [employer]. [address] [place] [address], [place] [address]. All rights reserved. This information is not intended as  a substitute for professional medical care. Always follow your healthcare professional's instructions.

## 2019-11-16 DIAGNOSIS — G57.93 NEUROPATHY OF BOTH FEET: ICD-10-CM

## 2019-11-17 RX ORDER — GABAPENTIN 100 MG/1
CAPSULE ORAL
Qty: 270 CAPSULE | Refills: 1 | Status: SHIPPED | OUTPATIENT
Start: 2019-11-17 | End: 2020-08-11 | Stop reason: SDUPTHER

## 2019-11-19 ENCOUNTER — OFFICE VISIT (OUTPATIENT)
Dept: FAMILY MEDICINE | Facility: CLINIC | Age: 79
End: 2019-11-19
Payer: MEDICARE

## 2019-11-19 VITALS
WEIGHT: 233 LBS | HEIGHT: 69 IN | SYSTOLIC BLOOD PRESSURE: 130 MMHG | BODY MASS INDEX: 34.51 KG/M2 | HEART RATE: 86 BPM | DIASTOLIC BLOOD PRESSURE: 80 MMHG

## 2019-11-19 DIAGNOSIS — I10 ESSENTIAL HYPERTENSION: ICD-10-CM

## 2019-11-19 DIAGNOSIS — I48.0 PAROXYSMAL ATRIAL FIBRILLATION: Primary | ICD-10-CM

## 2019-11-19 DIAGNOSIS — Z79.01 CHRONIC ANTICOAGULATION: ICD-10-CM

## 2019-11-19 DIAGNOSIS — E11.21 TYPE 2 DIABETES MELLITUS WITH DIABETIC NEPHROPATHY, WITHOUT LONG-TERM CURRENT USE OF INSULIN: ICD-10-CM

## 2019-11-19 PROBLEM — M25.551 RIGHT HIP PAIN: Status: RESOLVED | Noted: 2019-01-09 | Resolved: 2019-11-19

## 2019-11-19 PROBLEM — M79.651 RIGHT THIGH PAIN: Status: RESOLVED | Noted: 2019-01-09 | Resolved: 2019-11-19
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
## 2019-11-19 PROBLEM — S80.11XA CONTUSION OF RIGHT LEG: Status: RESOLVED | Noted: 2019-10-28 | Resolved: 2019-11-19

## 2019-11-19 PROBLEM — M54.41 ACUTE RIGHT-SIDED LOW BACK PAIN WITH RIGHT-SIDED SCIATICA: Status: RESOLVED | Noted: 2019-01-09 | Resolved: 2019-11-19

## 2019-11-19 PROBLEM — Z23 NEEDS FLU SHOT: Status: RESOLVED | Noted: 2019-10-14 | Resolved: 2019-11-19

## 2019-11-19 PROBLEM — G44.309 POST-CONCUSSION HEADACHE: Status: RESOLVED | Noted: 2019-10-14 | Resolved: 2019-11-19

## 2019-11-19 PROBLEM — S50.01XA CONTUSION OF RIGHT ELBOW: Status: RESOLVED | Noted: 2019-10-14 | Resolved: 2019-11-19

## 2019-11-19 PROCEDURE — 3075F PR MOST RECENT SYSTOLIC BLOOD PRESS GE 130-139MM HG: ICD-10-PCS | Mod: S$GLB,,, | Performed by: INTERNAL MEDICINE

## 2019-11-19 PROCEDURE — 1101F PT FALLS ASSESS-DOCD LE1/YR: CPT | Mod: S$GLB,,, | Performed by: INTERNAL MEDICINE

## 2019-11-19 PROCEDURE — 3079F PR MOST RECENT DIASTOLIC BLOOD PRESSURE 80-89 MM HG: ICD-10-PCS | Mod: S$GLB,,, | Performed by: INTERNAL MEDICINE

## 2019-11-19 PROCEDURE — 3079F DIAST BP 80-89 MM HG: CPT | Mod: S$GLB,,, | Performed by: INTERNAL MEDICINE

## 2019-11-19 PROCEDURE — 99214 OFFICE O/P EST MOD 30 MIN: CPT | Mod: S$GLB,,, | Performed by: INTERNAL MEDICINE

## 2019-11-19 PROCEDURE — 1101F PR PT FALLS ASSESS DOC 0-1 FALLS W/OUT INJ PAST YR: ICD-10-PCS | Mod: S$GLB,,, | Performed by: INTERNAL MEDICINE

## 2019-11-19 PROCEDURE — 99214 PR OFFICE/OUTPT VISIT, EST, LEVL IV, 30-39 MIN: ICD-10-PCS | Mod: S$GLB,,, | Performed by: INTERNAL MEDICINE

## 2019-11-19 PROCEDURE — 1126F PR PAIN SEVERITY QUANTIFIED, NO PAIN PRESENT: ICD-10-PCS | Mod: S$GLB,,, | Performed by: INTERNAL MEDICINE

## 2019-11-19 PROCEDURE — 1159F PR MEDICATION LIST DOCUMENTED IN MEDICAL RECORD: ICD-10-PCS | Mod: S$GLB,,, | Performed by: INTERNAL MEDICINE

## 2019-11-19 PROCEDURE — 1126F AMNT PAIN NOTED NONE PRSNT: CPT | Mod: S$GLB,,, | Performed by: INTERNAL MEDICINE

## 2019-11-19 PROCEDURE — 1159F MED LIST DOCD IN RCRD: CPT | Mod: S$GLB,,, | Performed by: INTERNAL MEDICINE

## 2019-11-19 PROCEDURE — 3075F SYST BP GE 130 - 139MM HG: CPT | Mod: S$GLB,,, | Performed by: INTERNAL MEDICINE

## 2019-11-19 NOTE — PROGRESS NOTES
Subjective:       Patient ID: Jo Alegre is a 79 y.o. female.    Chief Complaint: Hypertension (labs); Diabetes; and Congestive Heart Failure    Miss Jo alegre is a 79-year-old female who comes for follow-up.  Underlying medical issues of congestive heart failure, hypertension, type 2 diabetes mellitus have been noted. She is doing fairly well with about mention medical conditions.    Recently she had a fall and she had back pain and sciatica like symptoms.  The seem to be getting gradually better.  She states the symptoms are good as long as she watches her back.    Her blood sugars are okay.  At home her blood pressures are generally good.  In the office the tend to get a little higher.  Tomorrow she has an appointment with Dr. Jones for Cardiology.    Hypertension   This is a chronic problem. The current episode started more than 1 year ago. The problem has been rapidly improving since onset. The problem is controlled. Associated symptoms include malaise/fatigue. Pertinent negatives include no chest pain, headaches, palpitations or shortness of breath. Risk factors for coronary artery disease include sedentary lifestyle and obesity. Past treatments include diuretics. The current treatment provides significant improvement. Compliance problems include psychosocial issues.    Diabetes   She presents for her follow-up diabetic visit. She has type 2 diabetes mellitus. Her disease course has been fluctuating. Pertinent negatives for hypoglycemia include no confusion, dizziness, headaches, nervousness/anxiousness, pallor, seizures or tremors. Associated symptoms include fatigue. Pertinent negatives for diabetes include no chest pain, no foot ulcerations, no polydipsia, no polyphagia, no polyuria and no weakness.   Congestive Heart Failure   Presents for follow-up (Echocardiogram done in April 2019 and read by Dr. Jones had showed an ejection fraction of 35-40%.) visit. Associated symptoms include fatigue.  Pertinent negatives include no abdominal pain, chest pain, palpitations, shortness of breath or unexpected weight change. The symptoms have been stable. Compliance with total regimen is %. Compliance with diet is %. Compliance with exercise is %. Compliance with medications is %.       Past Medical History:   Diagnosis Date    Allergy     Codeine, Lasix    Atrial fibrillation     Atrial fibrillation     Cataract     CHF (congestive heart failure)     Diabetes mellitus, type 2     Ejection fraction < 50% 10/18/2017    Approximately 35%  Based on prior  Echocardiogram.    Hyperlipidemia     Osteoporosis     Thyroid disorder screening 10/17/2017    TSH of 1.12 ordered by Dr. dee Jones     Social History     Socioeconomic History    Marital status:      Spouse name: Not on file    Number of children: 4    Years of education: Not on file    Highest education level: Not on file   Occupational History    Occupation:    Social Needs    Financial resource strain: Not on file    Food insecurity:     Worry: Not on file     Inability: Not on file    Transportation needs:     Medical: Not on file     Non-medical: Not on file   Tobacco Use    Smoking status: Former Smoker    Smokeless tobacco: Never Used   Substance and Sexual Activity    Alcohol use: No    Drug use: No    Sexual activity: Not Currently   Lifestyle    Physical activity:     Days per week: Not on file     Minutes per session: Not on file    Stress: Not at all   Relationships    Social connections:     Talks on phone: Not on file     Gets together: Not on file     Attends [employer] service: Not on file     Active member of club or organization: Not on file     Attends meetings of clubs or organizations: Not on file     Relationship status: Not on file   Other Topics Concern    Not on file   Social History Narrative    - Drives and lives alone.     Past Surgical History:   Procedure  Laterality Date    [place]Y  1997    [place]     COLONOSCOPY      EYE SURGERY      FRACTURE SURGERY      HYSTERECTOMY      TONSILLECTOMY       Family History   Problem Relation Age of Onset    Heart disease Mother     Cancer Father         Lung Cancer ??? Asbestos    Breast cancer Paternal Aunt        Review of Systems   Constitutional: Positive for activity change (Somewhat more cautious while walking), fatigue and malaise/fatigue. Negative for chills, fever and unexpected weight change.   HENT: Negative for congestion, postnasal drip and sinus pressure.    Eyes: Negative for pain, discharge and visual disturbance.   Respiratory: Negative for cough, chest tightness and shortness of breath.    Cardiovascular: Negative for chest pain, palpitations and leg swelling.        History of defibrillator, congestive cardiomyopathy hypertension and dyslipidemia   Gastrointestinal: Negative for abdominal distention, abdominal pain, anal bleeding, constipation, diarrhea and nausea.   Endocrine: Negative for polydipsia, polyphagia and polyuria.        Diabetes mellitus on glimepiride.   Genitourinary: Negative for difficulty urinating, dysuria, flank pain, frequency and pelvic pain.   Musculoskeletal: Positive for gait problem. Negative for arthralgias, back pain and joint swelling.        Difficulty walking with low back pain radiating to the right thigh and also right lateral thigh pain.   Skin: Negative for color change, pallor, rash and wound.   Allergic/Immunologic: Negative for environmental allergies, food allergies and immunocompromised state.   Neurological: Negative for dizziness, tremors, seizures, syncope, weakness, light-headedness and headaches.   Hematological: Negative for adenopathy. Bruises/bleeds easily.        Patient is on chronic anticoagulation with warfarin.  Recent bruises on the occiput as well as right elbow region following a fall.   Psychiatric/Behavioral: Negative for agitation,  "confusion and dysphoric mood. The patient is not nervous/anxious.          Objective:      Blood pressure 130/80, pulse 86, height 5' 9" (1.753 m), weight 105.7 kg (233 lb). Body mass index is 34.41 kg/m².     In the office blood pressures tended to be high with a large cough and tender to be better with the thigh size cough.  Home blood pressure recording has been taken as final reading.  Physical Exam   Constitutional: She appears well-developed. No distress.   Patient is obese with a BMI of 34.    HENT:   Head: Normocephalic and atraumatic.   Mouth/Throat: No oropharyngeal exudate.   Eyes: EOM are normal. Right eye exhibits no discharge. Left eye exhibits no discharge. No scleral icterus.   Neck: Normal range of motion. Neck supple. No JVD present. No tracheal deviation present. No thyromegaly present.   Cardiovascular: Normal rate and regular rhythm.   Pulmonary/Chest: Effort normal and breath sounds normal. No stridor. No respiratory distress. She has no wheezes.   Abdominal: Soft. She exhibits no distension. There is no tenderness.   Musculoskeletal: She exhibits no tenderness or deformity.        Lumbar back: She exhibits no laceration.   Lymphadenopathy:     She has no cervical adenopathy.   Neurological: She is alert. She is not disoriented.   Reflex Scores:       Tricep reflexes are 0 on the left side.  Skin: Skin is warm and dry. No pallor.   Psychiatric: Her mood appears anxious.   Somewhat anhedonic and antalgic         Assessment:       1. Paroxysmal atrial fibrillation    2. Chronic anticoagulation    3. Essential hypertension    4. Type 2 diabetes mellitus with diabetic nephropathy, without long-term current use of insulin           No visits with results within 3 Month(s) from this visit.   Latest known visit with results is:   Orders Only on 08/07/2019   Component Date Value Ref Range Status    Creatinine, Random Ur 08/07/2019 62  20 - 275 mg/dL Final    Microalb, Ur 08/07/2019 2.8  See Note: " mg/dL Final    Microalb Creat Ratio 08/07/2019 45* <30 mcg/mg creat Final    Hemoglobin A1C 08/07/2019 6.2* <5.7 % of total Hgb Final         Plan:           Paroxysmal atrial fibrillation    Chronic anticoagulation    Essential hypertension    Type 2 diabetes mellitus with diabetic nephropathy, without long-term current use of insulin  -     Hemoglobin A1c; Future; Expected date: 02/18/2020      Patient's blood sugars are acceptable at this point.  She is taking half a dose of glimepiride.    Blood pressures in the office tend to be slightly on the higher side.  At home she states they are excellent.    Echocardiogram results have been reviewed.  Her ejection fraction was 35 40%.    Her back pain is doing better.  She tries to be careful whenever she is walking.    Patient is also on chronic anticoagulation.  Fall precautions and injury precautions need to continue.  Use cane whenever walking.  Use bathroom traverse and support whenever negotiating turns or pivoting    Follow-up in 3 months and check hemoglobin A1c at that point.  For would be any labs done by Dr. Jones.      Spent more than 25 minutes with patient which involved review of his medical conditions, labs, medications and with 50% of time face-to-face discussion about medical problems, management and any applicable changes.            Current Outpatient Medications:     albuterol (VENTOLIN HFA) 90 mcg/actuation inhaler, Inhale 2 puffs into the lungs every 6 (six) hours as needed for Wheezing. Rescue, Disp: 18 g, Rfl: 1    aspirin (ECOTRIN) 81 MG EC tablet, Take 81 mg by mouth once daily., Disp: , Rfl:     Ca-D3-mag ox-zinc--thom-bor (CALCIUM 600-D3 PLUS) 600 mg calcium- 800 unit-50 mg Tab, Take 1 tablet by mouth 2 (two) times daily., Disp: , Rfl:     carvedilol (COREG) 25 MG tablet, Take 0.5 tablets (12.5 mg total) by mouth 2 (two) times daily with meals., Disp: 1 tablet, Rfl: 1    cholecalciferol, vitamin D3, (VITAMIN D3) 5,000 unit Tab,  Take 5,000 Units by mouth 2 (two) times daily., Disp: , Rfl:     denosumab (PROLIA) 60 mg/mL Syrg, Inject 1 mL (60 mg total) into the skin every 6 (six) months., Disp: 2 mL, Rfl: 1    furosemide (LASIX) 20 MG tablet, Take 20 mg by mouth 2 (two) times daily as needed. , Disp: , Rfl:     gabapentin (NEURONTIN) 100 MG capsule, TAKE ONE CAPSULE BY MOUTH THREE TIMES DAILY, Disp: 270 capsule, Rfl: 1    glimepiride (AMARYL) 1 MG tablet, Take 1 tablet (1 mg total) by mouth before breakfast., Disp: 90 tablet, Rfl: 1    omeprazole (PRILOSEC) 40 MG capsule, Take 40 mg by mouth once daily., Disp: , Rfl:     sacubitril-valsartan (ENTRESTO) 24-26 mg per tablet, Take 1 tablet by mouth once. , Disp: , Rfl:     warfarin (COUMADIN) 5 MG tablet, Take 5 mg by mouth once daily., Disp: , Rfl:

## 2019-11-19 NOTE — PATIENT INSTRUCTIONS
"  Exercise to Manage Your Blood Sugar    Being physically active every day can help you manage your blood sugar. Thats because an active lifestyle can improve your bodys ability to use insulin. Daily activity can also help delay or prevent complications of diabetes. And its a great way to relieve stress. If you arent normally active, be sure to consult your healthcare provider before getting started.  How much activity do you need?  If daily activity is new to you, start slow and steady. Begin with 10 minutes of activity each day. Then work up to at least 150 minutes a week of physical activity. Don't let more than 2 days go by without being active. When sitting for long periods of time, get up for short sessions of light activity every 30 minutes  Just move!  You dont have to join a gym or own pricey sports equipment. Just get out and walk. Walking is an aerobic exercise that makes your heart and lungs work hard. It helps your heart and blood vessels. Walking needs only a sturdy pair of sneakers and your own two feet. The more you walk, the easier it gets:  · Schedule time every day to move your feet.  · Make it part of your daily routine.  · Walk with a friend or a group to keep it interesting and fun.  · Try taking several short walks during the day to meet your daily activity goal.  A pedometer makes every step count  A pedometer is a small device that keeps track of how many steps you take. You can clip it to your belt (or a strap on your arm or leg) and go about your daily routine. "Smartphones" now also have apps to record your walking. At the end of the day, the pedometer shows the total number of steps you took. Use a pedometer to set daily goals for yourself. For instance, if you walk 4,000 steps a day, try adding 200 more steps each day. Aim for a goal of 7,500. With every step, youre doing a little more to help your body use insulin.   Adding resistance exercise  Resistance exercise (also called " strength training), makes muscles stronger. It also helps muscles use insulin better. Ask your healthcare provider whether this type of exercise is right for you. If it is, your healthcare provider can help you work it in to your activity plan.  Staying safe  Being active may cause blood sugar to drop faster than usual. This is especially true if you take medicine to manage your blood sugar. But there are things you can do to help reduce the risk of accidental lows. Keep these tips in mind:  · Always carry identification when you exercise outside your home. Carry a cell phone to use in case of emergency.  · If you can, include friends and family in your activities.  · Wear a medical ID bracelet that says you have diabetes.  · Use the right safety equipment for the activity you do (such as a bicycle helmet when you ride a bicycle outdoors). Wear closed-toed shoes that fit your feet well.  · Drink plenty of water before and during activity.  · Keep a fast-acting sugar (such as glucose tablets) on hand in case of low blood sugar.  · Dress properly for the weather. Wear a hat if its aidan, or wait until evening if its too hot.  · Avoid being active for long periods in very hot or very cold weather.  · Skip activity if youre sick.     Notice how activity affects blood sugar  Physical activity is important when you have diabetes. But you need to keep an eye on your blood sugar level. Check often if you have been active for longer than usual, or if the activity was unplanned. Make it a habit to check your blood sugar before being active. And check again a few hours later. Use your log book to write down how activity affects your numbers. If you take insulin, you may be able to adjust your dose before a planned activity. This can help prevent lows. You may also need to take a small carbohydrate snack before the exercise. Talk to your healthcare provider to learn more.    Date Last Reviewed: 6/1/2016  © 5151-3613 [employer]. [address] [place] [address], [place] [address]. All rights reserved. This information is not intended as a substitute for professional medical care. Always follow your healthcare professional's instructions.

## 2019-11-27 ENCOUNTER — HOSPITAL ENCOUNTER (OUTPATIENT)
Dept: RADIOLOGY | Facility: HOSPITAL | Age: 79
Discharge: HOME | End: 2019-11-27
Attending: INTERNAL MEDICINE
Payer: MEDICARE

## 2019-11-27 DIAGNOSIS — R42 DIZZINESS AND GIDDINESS: ICD-10-CM

## 2019-11-27 DIAGNOSIS — R06.02 SHORTNESS OF BREATH: ICD-10-CM

## 2019-11-27 DIAGNOSIS — Z01.811 PRE-OPERATIVE RESPIRATORY EXAMINATION: ICD-10-CM

## 2019-11-27 DIAGNOSIS — Z01.810 PRE-OPERATIVE CARDIOVASCULAR EXAMINATION: Primary | ICD-10-CM

## 2019-11-27 DIAGNOSIS — Z01.810 PRE-OPERATIVE CARDIOVASCULAR EXAMINATION: ICD-10-CM

## 2019-11-27 PROCEDURE — 71046 X-RAY EXAM CHEST 2 VIEWS: CPT | Mod: TC

## 2019-12-13 PROBLEM — T82.111A COMPLICATIONS, MECHANICAL, PACEMAKER, CARDIAC: Status: ACTIVE | Noted: 2019-12-13

## 2019-12-19 ENCOUNTER — TELEPHONE (OUTPATIENT)
Dept: FAMILY MEDICINE | Facility: CLINIC | Age: 79
End: 2019-12-19

## 2019-12-19 DIAGNOSIS — R51.9 PERSISTENT HEADACHES: Primary | ICD-10-CM

## 2019-12-19 NOTE — TELEPHONE ENCOUNTER
Pt still having headaches 3 weeks now  Wants imaging orders    [facility]     Order for CT scan head sent to Radiology.  If not approved or authorized will need to see Neurology.  If urgent, she can go to the emergency room for evaluation.

## 2019-12-20 ENCOUNTER — HOSPITAL ENCOUNTER (OUTPATIENT)
Dept: RADIOLOGY | Facility: HOSPITAL | Age: 79
Discharge: HOME | End: 2019-12-20
Attending: INTERNAL MEDICINE
Payer: MEDICARE

## 2019-12-20 DIAGNOSIS — R51.9 PERSISTENT HEADACHES: ICD-10-CM

## 2019-12-20 PROCEDURE — 70450 CT HEAD/BRAIN W/O DYE: CPT | Mod: TC,PO

## 2019-12-20 NOTE — PROGRESS NOTES
Notify patient that her CT scan of head did not show any significant abnormalities.  The findings are consistent with her age.  Minor circulation problems. No tumor or stroke.  I tried to call the patient on Friday but nobody picked the phone.

## 2019-12-23 ENCOUNTER — TELEPHONE (OUTPATIENT)
Dept: FAMILY MEDICINE | Facility: CLINIC | Age: 79
End: 2019-12-23

## 2019-12-23 NOTE — TELEPHONE ENCOUNTER
----- Message from Kraig Alberto MD sent at 12/20/2019  1:22 PM CST -----  Notify patient that her CT scan of head did not show any significant abnormalities.  The findings are consistent with her age.  Minor circulation problems. No tumor or stroke.  I tried to call the patient on Friday but nobody picked the phone.

## 2020-01-16 ENCOUNTER — OFFICE VISIT (OUTPATIENT)
Dept: FAMILY MEDICINE | Facility: CLINIC | Age: 80
End: 2020-01-16
Payer: MEDICARE

## 2020-01-16 VITALS
SYSTOLIC BLOOD PRESSURE: 103 MMHG | HEIGHT: 69 IN | DIASTOLIC BLOOD PRESSURE: 59 MMHG | RESPIRATION RATE: 24 BRPM | WEIGHT: 233 LBS | OXYGEN SATURATION: 92 % | BODY MASS INDEX: 34.51 KG/M2 | HEART RATE: 61 BPM

## 2020-01-16 DIAGNOSIS — Z95.810 PRESENCE OF AUTOMATIC (IMPLANTABLE) CARDIAC DEFIBRILLATOR: ICD-10-CM

## 2020-01-16 DIAGNOSIS — I10 ESSENTIAL HYPERTENSION: ICD-10-CM

## 2020-01-16 DIAGNOSIS — I50.42 CHRONIC COMBINED SYSTOLIC AND DIASTOLIC CONGESTIVE HEART FAILURE: ICD-10-CM

## 2020-01-16 DIAGNOSIS — Z79.01 CHRONIC ANTICOAGULATION: ICD-10-CM

## 2020-01-16 DIAGNOSIS — R06.02 SHORTNESS OF BREATH: Primary | ICD-10-CM

## 2020-01-16 PROCEDURE — 3288F PR FALLS RISK ASSESSMENT DOCUMENTED: ICD-10-PCS | Mod: S$GLB,,, | Performed by: INTERNAL MEDICINE

## 2020-01-16 PROCEDURE — 1100F PR PT FALLS ASSESS DOC 2+ FALLS/FALL W/INJURY/YR: ICD-10-PCS | Mod: S$GLB,,, | Performed by: INTERNAL MEDICINE

## 2020-01-16 PROCEDURE — 3078F PR MOST RECENT DIASTOLIC BLOOD PRESSURE < 80 MM HG: ICD-10-PCS | Mod: S$GLB,,, | Performed by: INTERNAL MEDICINE

## 2020-01-16 PROCEDURE — 3074F SYST BP LT 130 MM HG: CPT | Mod: S$GLB,,, | Performed by: INTERNAL MEDICINE

## 2020-01-16 PROCEDURE — 1159F MED LIST DOCD IN RCRD: CPT | Mod: S$GLB,,, | Performed by: INTERNAL MEDICINE

## 2020-01-16 PROCEDURE — 3288F FALL RISK ASSESSMENT DOCD: CPT | Mod: S$GLB,,, | Performed by: INTERNAL MEDICINE

## 2020-01-16 PROCEDURE — 1159F PR MEDICATION LIST DOCUMENTED IN MEDICAL RECORD: ICD-10-PCS | Mod: S$GLB,,, | Performed by: INTERNAL MEDICINE

## 2020-01-16 PROCEDURE — 99214 PR OFFICE/OUTPT VISIT, EST, LEVL IV, 30-39 MIN: ICD-10-PCS | Mod: S$GLB,,, | Performed by: INTERNAL MEDICINE

## 2020-01-16 PROCEDURE — 3078F DIAST BP <80 MM HG: CPT | Mod: S$GLB,,, | Performed by: INTERNAL MEDICINE

## 2020-01-16 PROCEDURE — 99214 OFFICE O/P EST MOD 30 MIN: CPT | Mod: S$GLB,,, | Performed by: INTERNAL MEDICINE

## 2020-01-16 PROCEDURE — 1170F PR FUNCTIONAL STATUS ASSESSED: ICD-10-PCS | Mod: S$GLB,,, | Performed by: INTERNAL MEDICINE

## 2020-01-16 PROCEDURE — 1126F PR PAIN SEVERITY QUANTIFIED, NO PAIN PRESENT: ICD-10-PCS | Mod: S$GLB,,, | Performed by: INTERNAL MEDICINE

## 2020-01-16 PROCEDURE — 1170F FXNL STATUS ASSESSED: CPT | Mod: S$GLB,,, | Performed by: INTERNAL MEDICINE

## 2020-01-16 PROCEDURE — 3074F PR MOST RECENT SYSTOLIC BLOOD PRESSURE < 130 MM HG: ICD-10-PCS | Mod: S$GLB,,, | Performed by: INTERNAL MEDICINE

## 2020-01-16 PROCEDURE — 1126F AMNT PAIN NOTED NONE PRSNT: CPT | Mod: S$GLB,,, | Performed by: INTERNAL MEDICINE

## 2020-01-16 PROCEDURE — 1100F PTFALLS ASSESS-DOCD GE2>/YR: CPT | Mod: S$GLB,,, | Performed by: INTERNAL MEDICINE

## 2020-01-16 NOTE — LETTER
January 16, 2020        Sampson Jones MD  [address] [facility] [address] [facility] [place] [address]             [facility] Medicine  [address] [place] [address]  [place] [address]  Phone: [phone]  Fax: [phone]   Patient: Jo Alegre   MR Number: [id number]   YOB: 1940   Date of Visit: 1/16/2020     Dear Dr. Jones,     I had privilege of seeing Ms.Jo Alegre today and she had complained of new onset of shortness of breath for the last 4 5 days after she dismantled her [place] tree.    No chest pains.  No significant gain in weight or edema.  Pulmonary auscultation and cardiac auscultation were unremarkable.  I have ordered some basic labs including chest x-ray, CBC/CMP/BNP and D-dimer.  I have increased her dose of Lasix.  I have encouraged her to set up an appointment to early if possible.        Sincerely,      Kraig Alberto MD            CC  No Recipients    Enclosure

## 2020-01-16 NOTE — PROGRESS NOTES
Subjective:       Patient ID: Jo Alegre is a 79 y.o. female.    Chief Complaint: Shortness of Breath (x 4days ) and Congestive Heart Failure    Patient has been experiencing some shortness of breath for last 4 days.  Before that she was doing more or less okay and had a follow-up with the cardiologist and had unremarkable labs.  When she was trying to bring her [place] ornaments down, she proceed further.  She denies any expectoration or fever.  She denies any chest pains.  She denies any palpitations.    She is known to have coronary artery disease, chronic atrial fibrillation currently on stable rhythm.  She is also chronic anticoagulation.  She is also on amiodarone, carvedilol and entresto.    As per cardiology notes her echocardiogram had shown an ejection fraction of 35-40%.    Patient's medical history as follows    1.-cardiomyopathy unspecified  2.  Paroxysmal tachycardia/supraventricular tachycardia  3.  Heart failure chronic combined systolic and diastolic.  4.  Presence of cardiac and vascular implants and grafts and automatic implantable cardiac defibrillator.  5.  Long-term drug therapy and other therapies.  6.  Chronic edema of the legs fluctuating    Shortness of Breath   This is a new problem. The current episode started in the past 7 days. The problem occurs constantly. The problem has been unchanged. Pertinent negatives include no abdominal pain, chest pain, claudication, coryza, fever, headaches, hemoptysis, leg pain, leg swelling, rash, sore throat or sputum production. She has tried rest for the symptoms. The treatment provided no relief. Her past medical history is significant for allergies (Codeine and Lasix) and a heart failure. There is no history of asthma, pneumonia or a recent surgery.   Congestive Heart Failure   Presents for follow-up visit. Associated symptoms include edema, fatigue and shortness of breath. Pertinent negatives include no abdominal pain, chest pain, chest  pressure, claudication, muscle weakness, near-syncope, nocturia, palpitations, paroxysmal nocturnal dyspnea or unexpected weight change. The symptoms have been worsening.       Past Medical History:   Diagnosis Date    Allergy     Codeine, Lasix    Atrial fibrillation     Atrial fibrillation     Cataract     CHF (congestive heart failure)     Diabetes mellitus, type 2     Ejection fraction < 50% 10/18/2017    Approximately 35%  Based on prior  Echocardiogram.    Encounter for blood transfusion     Hyperlipidemia     Osteoporosis     Thyroid disorder screening 10/17/2017    TSH of 1.12 ordered by Dr. dee Jones     Social History     Socioeconomic History    Marital status:      Spouse name: Not on file    Number of children: 4    Years of education: Not on file    Highest education level: Not on file   Occupational History    Occupation:    Social Needs    Financial resource strain: Not on file    Food insecurity:     Worry: Not on file     Inability: Not on file    Transportation needs:     Medical: Not on file     Non-medical: Not on file   Tobacco Use    Smoking status: Former Smoker    Smokeless tobacco: Never Used    Tobacco comment: quit 2013   Substance and Sexual Activity    Alcohol use: No    Drug use: No    Sexual activity: Not Currently   Lifestyle    Physical activity:     Days per week: Not on file     Minutes per session: Not on file    Stress: Not at all   Relationships    Social connections:     Talks on phone: Not on file     Gets together: Not on file     Attends Congregational service: Not on file     Active member of club or organization: Not on file     Attends meetings of clubs or organizations: Not on file     Relationship status: Not on file   Other Topics Concern    Not on file   Social History Narrative    - Drives and lives alone.     Past Surgical History:   Procedure Laterality Date    CHO[place]      COLONOSCOPY      EYE SURGERY      bilateral cataracts    FRACTURE SURGERY  2014    right femur with neville    HEMORRHOID SURGERY      48 yrs ago    HYSTERECTOMY      REPLACEMENT OF IMPLANTABLE CARDIOVERTER-DEFIBRILLATOR (ICD) GENERATOR N/A 12/13/2019    Procedure: REPLACEMENT, PULSE GENERATOR, ICD-MEDTRONIC;  Surgeon: Sebastian Nowak III, MD;  Location[facility];  Service: Cardiology;  Laterality: N/A;    TONSILLECTOMY       Family History   Problem Relation Age of Onset    Heart disease Mother     Cancer Father         Lung Cancer ??? Asbestos    Breast cancer Paternal Aunt        Review of Systems   Constitutional: Positive for activity change (Somewhat more cautious while walking) and fatigue. Negative for chills, fever and unexpected weight change.   HENT: Negative for congestion, postnasal drip, sinus pressure and sore throat.    Eyes: Negative for pain, discharge and visual disturbance.   Respiratory: Positive for shortness of breath. Negative for cough, hemoptysis, sputum production and chest tightness.    Cardiovascular: Negative for chest pain, palpitations, claudication, leg swelling and near-syncope.        History of defibrillator, congestive cardiomyopathy hypertension and dyslipidemia   Gastrointestinal: Negative for abdominal distention, abdominal pain, anal bleeding, constipation, diarrhea and nausea.   Endocrine: Negative for polydipsia, polyphagia and polyuria.        Diabetes mellitus on glimepiride.   Genitourinary: Negative for difficulty urinating, dysuria, flank pain, frequency, nocturia and pelvic pain.   Musculoskeletal: Positive for gait problem. Negative for arthralgias, back pain, joint swelling and muscle weakness.        Difficulty walking with low back pain radiating to the right thigh and also right lateral thigh pain.   Skin: Negative for color change, pallor, rash and wound.   Allergic/Immunologic: Negative for environmental allergies, food allergies and immunocompromised  "state.   Neurological: Negative for tremors, seizures, syncope, light-headedness and headaches.   Hematological: Negative for adenopathy. Bruises/bleeds easily.        Patient is on chronic anticoagulation with warfarin.  Recent bruises on the occiput as well as right elbow region following a fall.   Psychiatric/Behavioral: Negative for agitation, confusion and dysphoric mood. The patient is not nervous/anxious.          Objective:      Blood pressure (!) 103/59, pulse 61, resp. rate (!) 24, height 5' 9" (1.753 m), weight 105.7 kg (233 lb), SpO2 (!) 92 %. Body mass index is 34.41 kg/m².  Physical Exam   Constitutional: She appears well-developed. No distress.   Patient is obese with a BMI of 34.    HENT:   Head: Normocephalic and atraumatic.   Mouth/Throat: No oropharyngeal exudate.   Eyes: EOM are normal. Right eye exhibits no discharge. Left eye exhibits no discharge. No scleral icterus.   Neck: Normal range of motion. Neck supple. No JVD present. No tracheal deviation present. No thyromegaly present.   Cardiovascular: Normal rate and regular rhythm. Exam reveals no gallop.   Pulmonary/Chest: Effort normal and breath sounds normal. No stridor. No respiratory distress. She has no wheezes.   Abdominal: Soft. She exhibits no distension. There is no tenderness.   Musculoskeletal: She exhibits edema. She exhibits no tenderness or deformity.        Lumbar back: She exhibits no laceration.   Lymphadenopathy:     She has no cervical adenopathy.   Neurological: She is alert. She is not disoriented.   Reflex Scores:       Tricep reflexes are 0 on the left side.  Skin: Skin is warm and dry. No pallor.   Psychiatric: Her behavior is normal. Thought content normal. Her mood appears anxious.   Somewhat anhedonic and antalgic         Assessment:       1. Shortness of breath    2. Chronic anticoagulation    3. Essential hypertension    4. Chronic combined systolic and diastolic congestive heart failure    5. Presence of automatic " (implantable) cardiac defibrillator           Admission on 12/13/2019, Discharged on 12/13/2019   Component Date Value Ref Range Status    PT 12/13/2019 12.7  11.8 - 14.7 sec Final    INR 12/13/2019 1.0   Final   Lab Visit on 11/27/2019   Component Date Value Ref Range Status    WBC 11/27/2019 7.19  3.90 - 12.70 K/uL Final    RBC 11/27/2019 4.58  4.00 - 5.40 M/uL Final    Hemoglobin 11/27/2019 14.5  12.0 - 16.0 g/dL Final    Hematocrit 11/27/2019 45.2  37.0 - 48.5 % Final    Mean Corpuscular Volume 11/27/2019 99* 82 - 98 fL Final    Mean Corpuscular Hemoglobin 11/27/2019 31.7* 27.0 - 31.0 pg Final    Mean Corpuscular Hemoglobin Conc 11/27/2019 32.1  32.0 - 36.0 g/dL Final    RDW 11/27/2019 13.2  11.5 - 14.5 % Final    Platelets 11/27/2019 226  150 - 350 K/uL Final    MPV 11/27/2019 9.9  9.2 - 12.9 fL Final    Immature Granulocytes 11/27/2019 0.3  0.0 - 0.5 % Final    Gran # (ANC) 11/27/2019 5.0  1.8 - 7.7 K/uL Final    Immature Grans (Abs) 11/27/2019 0.02  0.00 - 0.04 K/uL Final    Lymph # 11/27/2019 1.2  1.0 - 4.8 K/uL Final    Mono # 11/27/2019 0.7  0.3 - 1.0 K/uL Final    Eos # 11/27/2019 0.2  0.0 - 0.5 K/uL Final    Baso # 11/27/2019 0.05  0.00 - 0.20 K/uL Final    nRBC 11/27/2019 0  0 /100 WBC Final    Gran% 11/27/2019 70.1  38.0 - 73.0 % Final    Lymph% 11/27/2019 16.0* 18.0 - 48.0 % Final    Mono% 11/27/2019 9.6  4.0 - 15.0 % Final    Eosinophil% 11/27/2019 3.3  0.0 - 8.0 % Final    Basophil% 11/27/2019 0.7  0.0 - 1.9 % Final    Differential Method 11/27/2019 Automated   Final    Sodium 11/27/2019 140  136 - 145 mmol/L Final    Potassium 11/27/2019 4.1  3.5 - 5.1 mmol/L Final    Chloride 11/27/2019 105  95 - 110 mmol/L Final    CO2 11/27/2019 25  23 - 29 mmol/L Final    Glucose 11/27/2019 97  70 - 110 mg/dL Final    BUN, Bld 11/27/2019 16  8 - 23 mg/dL Final    Creatinine 11/27/2019 1.2  0.5 - 1.4 mg/dL Final    Calcium 11/27/2019 9.3  8.7 - 10.5 mg/dL Final    Total Protein  11/27/2019 7.0  6.0 - 8.4 g/dL Final    Albumin 11/27/2019 4.0  3.5 - 5.2 g/dL Final    Total Bilirubin 11/27/2019 0.9  0.1 - 1.0 mg/dL Final    Alkaline Phosphatase 11/27/2019 68  55 - 135 U/L Final    AST 11/27/2019 19  10 - 40 U/L Final    ALT 11/27/2019 19  10 - 44 U/L Final    Anion Gap 11/27/2019 10  8 - 16 mmol/L Final    eGFR if  11/27/2019 50.0* >60 mL/min/1.73 m^2 Final    eGFR if non African American 11/27/2019 43.4* >60 mL/min/1.73 m^2 Final    PT 11/27/2019 29.3* 10.6 - 14.8 sec Final    INR 11/27/2019 2.9   Final         Plan:           Shortness of breath  -     CBC auto differential; Future; Expected date: 01/16/2020  -     Comprehensive metabolic panel; Future; Expected date: 01/16/2020  -     D dimer, quantitative; Future; Expected date: 01/16/2020  -     Brain natriuretic peptide; Future; Expected date: 01/16/2020  -     X-Ray Chest PA And Lateral; Future; Expected date: 01/16/2020    Chronic anticoagulation    Essential hypertension    Chronic combined systolic and diastolic congestive heart failure    Presence of automatic (implantable) cardiac defibrillator     Patient has new onset of shortness of breath recently with superimposed known systolic and diastolic congestive heart failure has been reviewed.    No associated chest pains or clinical findings suggestive of significant fluid overload at this point.    She continues on anticoagulation with warfarin and patient informs me that her anticoagulation was in therapeutic range.    She is also on midodrine and reason needs to be acertained.    Initial labs including BMP, CBC have been ordered.  Rule out the possibility of some blood loss anemia.    Check D-dimer also.  Check chest x-ray.  Follow-up with Cardiology soon.  (Dr. Jones) I offered to put her in the hospital but she declines at this point.  Go to the emergency department if no better in the next 48 hr.    Increase Lasix 2 times a day.  Increase potassium  supplementation also.    Follow-up in 4-5 weeks and definitely earlier as needed.    Spent aleks 25 minutes with patient which involved review of pts medical conditions, labs, medications and with 50% of time face-to-face discussion about medical problems, management and any applicable changes.  CC Dr Sampson Jones MD [facility]      Current Outpatient Medications:     amiodarone (PACERONE) 200 MG Tab, Take by mouth once daily., Disp: , Rfl:     Ca-D3-mag ox-zinc--thom-bor (CALCIUM 600-D3 PLUS) 600 mg calcium- 800 unit-50 mg Tab, Take 1 tablet by mouth 2 (two) times daily., Disp: , Rfl:     carvedilol (COREG) 25 MG tablet, Take 0.5 tablets (12.5 mg total) by mouth 2 (two) times daily with meals., Disp: 1 tablet, Rfl: 1    cholecalciferol, vitamin D3, (VITAMIN D3) 5,000 unit Tab, Take 5,000 Units by mouth 2 (two) times daily., Disp: , Rfl:     denosumab (PROLIA) 60 mg/mL Syrg, Inject 1 mL (60 mg total) into the skin every 6 (six) months., Disp: 2 mL, Rfl: 1    furosemide (LASIX) 20 MG tablet, Take 20 mg by mouth 2 (two) times daily as needed. , Disp: , Rfl:     gabapentin (NEURONTIN) 100 MG capsule, TAKE ONE CAPSULE BY MOUTH THREE TIMES DAILY, Disp: 270 capsule, Rfl: 1    glimepiride (AMARYL) 1 MG tablet, Take 1 tablet (1 mg total) by mouth before breakfast., Disp: 90 tablet, Rfl: 1    midodrine (PROAMATINE) 2.5 MG Tab, Take 2.5 mg by mouth as needed., Disp: , Rfl:     omeprazole (PRILOSEC) 40 MG capsule, Take 40 mg by mouth once daily., Disp: , Rfl:     sacubitril-valsartan (ENTRESTO) 24-26 mg per tablet, Take 1 tablet by mouth once daily. , Disp: , Rfl:     warfarin (COUMADIN) 5 MG tablet, Take 5 mg by mouth once daily., Disp: , Rfl:   No current facility-administered medications for this visit.     Facility-Administered Medications Ordered in Other Visits:     0.9%  NaCl infusion, , Intravenous, Continuous, Sebastian Nowak III, MD, Last Rate: 75 mL/hr at 12/13/19 0728    diphenhydrAMINE injection 25 mg, 25  mg, Intravenous, Once, Sebastian Nowak III, MD    lorazepam injection 1 mg, 1 mg, Intravenous, Once, Sebastian Nowak III, MD

## 2020-01-17 ENCOUNTER — TELEPHONE (OUTPATIENT)
Dept: FAMILY MEDICINE | Facility: CLINIC | Age: 80
End: 2020-01-17

## 2020-01-17 ENCOUNTER — HOSPITAL ENCOUNTER (OUTPATIENT)
Dept: RADIOLOGY | Facility: HOSPITAL | Age: 80
Discharge: HOME | End: 2020-01-17
Attending: INTERNAL MEDICINE
Payer: MEDICARE

## 2020-01-17 DIAGNOSIS — R06.02 SHORTNESS OF BREATH: ICD-10-CM

## 2020-01-17 PROCEDURE — 71046 X-RAY EXAM CHEST 2 VIEWS: CPT | Mod: TC

## 2020-01-17 NOTE — TELEPHONE ENCOUNTER
Labs reviewed including D-dimer, CBC and CMP.  BNP slightly elevated.  D-dimer is normal.  Chest x-ray is unremarkable and does not show any evidence of heart failure.  Patient notified about these.  She has increased p.o. Lasix to twice a day and also potassium supplements.  I have encouraged her to see Dr. Jones also sometime next week if possible.    Forwarded results of labs and chest x-ray to Dr. Jones also.

## 2020-01-30 DIAGNOSIS — R42 DIZZINESS AND GIDDINESS: ICD-10-CM

## 2020-01-30 DIAGNOSIS — I42.9 CARDIOMYOPATHY, UNSPECIFIED TYPE: ICD-10-CM

## 2020-01-30 DIAGNOSIS — Z79.899 OTHER LONG TERM (CURRENT) DRUG THERAPY: ICD-10-CM

## 2020-01-30 DIAGNOSIS — I47.10 SUPRAVENTRICULAR TACHYCARDIA, PAROXYSMAL: ICD-10-CM

## 2020-01-30 DIAGNOSIS — R53.83 OTHER FATIGUE: ICD-10-CM

## 2020-01-30 DIAGNOSIS — I50.42 CHRONIC COMBINED SYSTOLIC AND DIASTOLIC CONGESTIVE HEART FAILURE: Primary | ICD-10-CM

## 2020-02-13 ENCOUNTER — HOSPITAL ENCOUNTER (OUTPATIENT)
Dept: PULMONOLOGY | Facility: HOSPITAL | Age: 80
Discharge: HOME | End: 2020-02-13
Attending: INTERNAL MEDICINE
Payer: MEDICARE

## 2020-02-13 DIAGNOSIS — R42 DIZZINESS AND GIDDINESS: ICD-10-CM

## 2020-02-13 DIAGNOSIS — I42.9 CARDIOMYOPATHY, UNSPECIFIED TYPE: ICD-10-CM

## 2020-02-13 DIAGNOSIS — I50.42 CHRONIC COMBINED SYSTOLIC AND DIASTOLIC CONGESTIVE HEART FAILURE: ICD-10-CM

## 2020-02-13 DIAGNOSIS — R53.83 OTHER FATIGUE: ICD-10-CM

## 2020-02-13 DIAGNOSIS — I47.10 SUPRAVENTRICULAR TACHYCARDIA, PAROXYSMAL: ICD-10-CM

## 2020-02-13 DIAGNOSIS — Z79.899 OTHER LONG TERM (CURRENT) DRUG THERAPY: ICD-10-CM

## 2020-02-13 PROCEDURE — 94727 GAS DIL/WSHOT DETER LNG VOL: CPT

## 2020-02-13 PROCEDURE — 94010 BREATHING CAPACITY TEST: CPT

## 2020-02-13 PROCEDURE — 94729 DIFFUSING CAPACITY: CPT

## 2020-02-20 ENCOUNTER — OFFICE VISIT (OUTPATIENT)
Dept: FAMILY MEDICINE | Facility: CLINIC | Age: 80
End: 2020-02-20
Payer: MEDICARE

## 2020-02-20 VITALS
WEIGHT: 232 LBS | HEART RATE: 60 BPM | BODY MASS INDEX: 34.36 KG/M2 | HEIGHT: 69 IN | DIASTOLIC BLOOD PRESSURE: 84 MMHG | SYSTOLIC BLOOD PRESSURE: 116 MMHG

## 2020-02-20 DIAGNOSIS — I50.42 CHRONIC COMBINED SYSTOLIC AND DIASTOLIC CONGESTIVE HEART FAILURE: ICD-10-CM

## 2020-02-20 DIAGNOSIS — Z95.810 PRESENCE OF AUTOMATIC (IMPLANTABLE) CARDIAC DEFIBRILLATOR: ICD-10-CM

## 2020-02-20 DIAGNOSIS — E78.2 MIXED DYSLIPIDEMIA: ICD-10-CM

## 2020-02-20 DIAGNOSIS — R26.9 GAIT DIFFICULTY: ICD-10-CM

## 2020-02-20 DIAGNOSIS — Z79.01 CHRONIC ANTICOAGULATION: ICD-10-CM

## 2020-02-20 DIAGNOSIS — E11.21 TYPE 2 DIABETES MELLITUS WITH DIABETIC NEPHROPATHY, WITHOUT LONG-TERM CURRENT USE OF INSULIN: ICD-10-CM

## 2020-02-20 DIAGNOSIS — I48.0 PAROXYSMAL ATRIAL FIBRILLATION: ICD-10-CM

## 2020-02-20 DIAGNOSIS — I10 ESSENTIAL HYPERTENSION: Primary | ICD-10-CM

## 2020-02-20 PROCEDURE — 3079F PR MOST RECENT DIASTOLIC BLOOD PRESSURE 80-89 MM HG: ICD-10-PCS | Mod: S$GLB,,, | Performed by: INTERNAL MEDICINE

## 2020-02-20 PROCEDURE — 1101F PR PT FALLS ASSESS DOC 0-1 FALLS W/OUT INJ PAST YR: ICD-10-PCS | Mod: S$GLB,,, | Performed by: INTERNAL MEDICINE

## 2020-02-20 PROCEDURE — 3079F DIAST BP 80-89 MM HG: CPT | Mod: S$GLB,,, | Performed by: INTERNAL MEDICINE

## 2020-02-20 PROCEDURE — 99214 PR OFFICE/OUTPT VISIT, EST, LEVL IV, 30-39 MIN: ICD-10-PCS | Mod: S$GLB,,, | Performed by: INTERNAL MEDICINE

## 2020-02-20 PROCEDURE — 3074F SYST BP LT 130 MM HG: CPT | Mod: S$GLB,,, | Performed by: INTERNAL MEDICINE

## 2020-02-20 PROCEDURE — 1170F FXNL STATUS ASSESSED: CPT | Mod: S$GLB,,, | Performed by: INTERNAL MEDICINE

## 2020-02-20 PROCEDURE — 3074F PR MOST RECENT SYSTOLIC BLOOD PRESSURE < 130 MM HG: ICD-10-PCS | Mod: S$GLB,,, | Performed by: INTERNAL MEDICINE

## 2020-02-20 PROCEDURE — 1126F PR PAIN SEVERITY QUANTIFIED, NO PAIN PRESENT: ICD-10-PCS | Mod: S$GLB,,, | Performed by: INTERNAL MEDICINE

## 2020-02-20 PROCEDURE — 1101F PT FALLS ASSESS-DOCD LE1/YR: CPT | Mod: S$GLB,,, | Performed by: INTERNAL MEDICINE

## 2020-02-20 PROCEDURE — 1170F PR FUNCTIONAL STATUS ASSESSED: ICD-10-PCS | Mod: S$GLB,,, | Performed by: INTERNAL MEDICINE

## 2020-02-20 PROCEDURE — 1159F MED LIST DOCD IN RCRD: CPT | Mod: S$GLB,,, | Performed by: INTERNAL MEDICINE

## 2020-02-20 PROCEDURE — 1159F PR MEDICATION LIST DOCUMENTED IN MEDICAL RECORD: ICD-10-PCS | Mod: S$GLB,,, | Performed by: INTERNAL MEDICINE

## 2020-02-20 PROCEDURE — 1126F AMNT PAIN NOTED NONE PRSNT: CPT | Mod: S$GLB,,, | Performed by: INTERNAL MEDICINE

## 2020-02-20 PROCEDURE — 99214 OFFICE O/P EST MOD 30 MIN: CPT | Mod: S$GLB,,, | Performed by: INTERNAL MEDICINE

## 2020-02-20 NOTE — PROGRESS NOTES
Subjective:       Patient ID: Jo Alegre is a 79 y.o. female.    Chief Complaint: Diabetes; Hypertension; Hyperlipidemia; Congestive Heart Failure; and Atrial Fibrillation    Miss Jo Palm is a pleasant 79-year-old  female who comes for follow-up.  Underlying medical issues of paroxysmal supraventricular tachycardia, chronic systolic and diastolic heart failure and underlying implantable cardiac defibrillator I have been noted. She has been doing fairly okay with her issues thus far.  Last time she had complained of shortness of breath.    She is known to have coronary artery disease, chronic atrial fibrillation currently on stable rhythm.  She is also chronic anticoagulation.  She is also on amiodarone, carvedilol and entresto.    As per cardiology notes her echocardiogram had shown an ejection fraction of 35-40%.    Patient's medical history as follows    1.-cardiomyopathy unspecified  2.  Paroxysmal tachycardia/supraventricular tachycardia  3.  Heart failure chronic combined systolic and diastolic.  4.  Presence of cardiac and vascular implants and grafts and automatic implantable cardiac defibrillator.  5.  Long-term drug therapy and other therapies.  6.  Chronic edema of the legs fluctuating    Diabetes   She presents for her follow-up diabetic visit. She has type 2 diabetes mellitus. Her disease course has been stable. Pertinent negatives for hypoglycemia include no confusion, dizziness, headaches, nervousness/anxiousness, pallor, seizures or tremors. Associated symptoms include fatigue. Pertinent negatives for diabetes include no chest pain, no foot ulcerations, no polydipsia, no polyphagia and no polyuria. Symptoms are stable. Risk factors for coronary artery disease include sedentary lifestyle, hypertension, dyslipidemia, diabetes mellitus and obesity. She is compliant with treatment most of the time. An ACE inhibitor/angiotensin II receptor blocker is being taken. She does not see a  podiatrist.Eye exam is current.   Hypertension   This is a chronic problem. The current episode started more than 1 year ago. The problem is controlled. Associated symptoms include malaise/fatigue, peripheral edema and shortness of breath. Pertinent negatives include no chest pain, headaches or palpitations. Risk factors for coronary artery disease include sedentary lifestyle, obesity, dyslipidemia and diabetes mellitus. Past treatments include angiotensin blockers and diuretics. The current treatment provides moderate improvement. Compliance problems include psychosocial issues.  Hypertensive end-organ damage includes heart failure. There is no history of pheochromocytoma or renovascular disease.   Hyperlipidemia   This is a chronic problem. The current episode started more than 1 year ago. The problem is controlled. Exacerbating diseases include obesity. She has no history of hypothyroidism. Associated symptoms include shortness of breath. Pertinent negatives include no chest pain or leg pain. The current treatment provides moderate improvement of lipids. Risk factors for coronary artery disease include post-menopausal, hypertension, obesity, dyslipidemia and diabetes mellitus.   Shortness of Breath   This is a new problem. The current episode started more than 1 month ago. The problem occurs constantly. The problem has been gradually improving. Pertinent negatives include no abdominal pain, chest pain, claudication, coryza, fever, headaches, hemoptysis, leg pain, leg swelling, rash, sore throat, sputum production or syncope. She has tried rest for the symptoms. The treatment provided no relief. Her past medical history is significant for allergies (Codeine and Lasix) and a heart failure. There is no history of asthma, pneumonia or a recent surgery.   Congestive Heart Failure   Presents for follow-up visit. Associated symptoms include edema, fatigue and shortness of breath. Pertinent negatives include no abdominal  pain, chest pain, chest pressure, claudication, muscle weakness, near-syncope, nocturia, palpitations, paroxysmal nocturnal dyspnea or unexpected weight change. The symptoms have been worsening.   Atrial Fibrillation   Presents for follow-up visit. Symptoms include hypertension and shortness of breath. Symptoms are negative for an AICD problem, bradycardia, chest pain, dizziness, hemodynamic instability, hypotension, palpitations, syncope and tachycardia. The symptoms have been stable. Past medical history includes atrial fibrillation, CHF and hyperlipidemia. Medication compliance problems include psychosocial issues.       Past Medical History:   Diagnosis Date    Allergy     Codeine, Lasix    Atrial fibrillation     Atrial fibrillation     Cataract     CHF (congestive heart failure)     Diabetes mellitus, type 2     Ejection fraction < 50% 10/18/2017    Approximately 35%  Based on prior  Echocardiogram.    Encounter for blood transfusion     Hyperlipidemia     Osteoporosis     Thyroid disorder screening 10/17/2017    TSH of 1.12 ordered by Dr. ede Jones     Social History     Socioeconomic History    Marital status:      Spouse name: Not on file    Number of children: 4    Years of education: Not on file    Highest education level: Not on file   Occupational History    Occupation:    Social Needs    Financial resource strain: Not on file    Food insecurity:     Worry: Not on file     Inability: Not on file    Transportation needs:     Medical: Not on file     Non-medical: Not on file   Tobacco Use    Smoking status: Former Smoker    Smokeless tobacco: Never Used    Tobacco comment: quit 2013   Substance and Sexual Activity    Alcohol use: No    Drug use: No    Sexual activity: Not Currently   Lifestyle    Physical activity:     Days per week: Not on file     Minutes per session: Not on file    Stress: Not at all   Relationships    Social connections:     Talks on  phone: Not on file     Gets together: Not on file     Attends Evangelical service: Not on file     Active member of club or organization: Not on file     Attends meetings of clubs or organizations: Not on file     Relationship status: Not on file   Other Topics Concern    Not on file   Social History Narrative    - Drives and lives alone.     Past Surgical History:   Procedure Laterality Date    CHOLECYSTECTOMY  1997    [place]     COLONOSCOPY      EYE SURGERY      bilateral cataracts    FRACTURE SURGERY  2014    right femur with neville    HEMORRHOID SURGERY      48 yrs ago    HYSTERECTOMY      REPLACEMENT OF IMPLANTABLE CARDIOVERTER-DEFIBRILLATOR (ICD) GENERATOR N/A 12/13/2019    Procedure: REPLACEMENT, PULSE GENERATOR, ICD-MEDTRONIC;  Surgeon: Sebastian Nowak III, MD;  Location: [facility];  Service: Cardiology;  Laterality: N/A;    TONSILLECTOMY       Family History   Problem Relation Age of Onset    Heart disease Mother     Cancer Father         Lung Cancer ??? Asbestos    Breast cancer Paternal Aunt        Review of Systems   Constitutional: Positive for activity change (Somewhat more cautious while walking), fatigue and malaise/fatigue. Negative for chills, fever and unexpected weight change.   HENT: Negative for congestion, postnasal drip, sinus pressure and sore throat.    Eyes: Negative for pain, discharge and visual disturbance.   Respiratory: Positive for shortness of breath. Negative for cough, hemoptysis, sputum production and chest tightness.    Cardiovascular: Negative for chest pain, palpitations, claudication, leg swelling, syncope and near-syncope.        History of defibrillator, congestive cardiomyopathy hypertension and dyslipidemia   Gastrointestinal: Negative for abdominal distention, abdominal pain, anal bleeding, constipation, diarrhea and nausea.   Endocrine: Negative for polydipsia, polyphagia and polyuria.        Diabetes mellitus on glimepiride.   Genitourinary: Negative  "for difficulty urinating, dysuria, flank pain, frequency, nocturia and pelvic pain.   Musculoskeletal: Positive for gait problem. Negative for arthralgias, back pain, joint swelling and muscle weakness.        Difficulty walking with low back pain radiating to the right thigh and also right lateral thigh pain.   Skin: Negative for color change, pallor, rash and wound.   Allergic/Immunologic: Negative for environmental allergies, food allergies and immunocompromised state.   Neurological: Negative for dizziness, tremors, seizures, syncope, light-headedness and headaches.   Hematological: Negative for adenopathy. Bruises/bleeds easily.        Patient is on chronic anticoagulation with warfarin.  Recent bruises on the occiput as well as right elbow region following a fall.   Psychiatric/Behavioral: Negative for agitation, confusion and dysphoric mood. The patient is not nervous/anxious.          Objective:      Blood pressure 116/84, pulse 60, height 5' 9" (1.753 m), weight 105.2 kg (232 lb). Body mass index is 34.26 kg/m².  Physical Exam   Constitutional: She appears well-developed. No distress.   Patient is obese with a BMI of 34.    HENT:   Head: Normocephalic and atraumatic.   Mouth/Throat: No oropharyngeal exudate.   Eyes: EOM are normal. Right eye exhibits no discharge. Left eye exhibits no discharge. No scleral icterus.   Neck: Normal range of motion. Neck supple. No JVD present. No tracheal deviation present. No thyromegaly present.   Cardiovascular: Normal rate and regular rhythm. Exam reveals no gallop.   Pulmonary/Chest: Effort normal and breath sounds normal. No stridor. No respiratory distress. She has no wheezes.   Abdominal: Soft. She exhibits no distension. There is no tenderness.   Musculoskeletal: She exhibits edema. She exhibits no tenderness or deformity.        Lumbar back: She exhibits no laceration.   Lymphadenopathy:     She has no cervical adenopathy.   Neurological: She is alert. She is not " disoriented.   Reflex Scores:       Tricep reflexes are 0 on the left side.  Skin: Skin is warm and dry. No pallor.   Psychiatric: Her behavior is normal. Thought content normal. Her mood appears anxious.   Somewhat anhedonic and antalgic         Assessment:       1. Essential hypertension    2. Chronic combined systolic and diastolic congestive heart failure    3. Presence of automatic (implantable) cardiac defibrillator    4. Paroxysmal atrial fibrillation    5. Chronic anticoagulation    6. Type 2 diabetes mellitus with diabetic nephropathy, without long-term current use of insulin    7. Mixed dyslipidemia    8. Gait difficulty           Lab Visit on 01/17/2020   Component Date Value Ref Range Status    WBC 01/17/2020 4.63  3.90 - 12.70 K/uL Final    RBC 01/17/2020 4.24  4.00 - 5.40 M/uL Final    Hemoglobin 01/17/2020 13.2  12.0 - 16.0 g/dL Final    Hematocrit 01/17/2020 42.3  37.0 - 48.5 % Final    Mean Corpuscular Volume 01/17/2020 100* 82 - 98 fL Final    Mean Corpuscular Hemoglobin 01/17/2020 31.1* 27.0 - 31.0 pg Final    Mean Corpuscular Hemoglobin Conc 01/17/2020 31.2* 32.0 - 36.0 g/dL Final    RDW 01/17/2020 13.2  11.5 - 14.5 % Final    Platelets 01/17/2020 212  150 - 350 K/uL Final    MPV 01/17/2020 10.4  9.2 - 12.9 fL Final    Immature Granulocytes 01/17/2020 0.2  0.0 - 0.5 % Final    Gran # (ANC) 01/17/2020 2.8  1.8 - 7.7 K/uL Final    Immature Grans (Abs) 01/17/2020 0.01  0.00 - 0.04 K/uL Final    Lymph # 01/17/2020 1.1  1.0 - 4.8 K/uL Final    Mono # 01/17/2020 0.5  0.3 - 1.0 K/uL Final    Eos # 01/17/2020 0.2  0.0 - 0.5 K/uL Final    Baso # 01/17/2020 0.04  0.00 - 0.20 K/uL Final    nRBC 01/17/2020 0  0 /100 WBC Final    Gran% 01/17/2020 59.3  38.0 - 73.0 % Final    Lymph% 01/17/2020 24.2  18.0 - 48.0 % Final    Mono% 01/17/2020 11.7  4.0 - 15.0 % Final    Eosinophil% 01/17/2020 3.7  0.0 - 8.0 % Final    Basophil% 01/17/2020 0.9  0.0 - 1.9 % Final    Differential Method  01/17/2020 Automated   Final    Sodium 01/17/2020 139  136 - 145 mmol/L Final    Potassium 01/17/2020 4.6  3.5 - 5.1 mmol/L Final    Chloride 01/17/2020 100  95 - 110 mmol/L Final    CO2 01/17/2020 31* 23 - 29 mmol/L Final    Glucose 01/17/2020 125* 70 - 110 mg/dL Final    BUN, Bld 01/17/2020 21  8 - 23 mg/dL Final    Creatinine 01/17/2020 1.4  0.5 - 1.4 mg/dL Final    Calcium 01/17/2020 9.2  8.7 - 10.5 mg/dL Final    Total Protein 01/17/2020 6.4  6.0 - 8.4 g/dL Final    Albumin 01/17/2020 3.4* 3.5 - 5.2 g/dL Final    Total Bilirubin 01/17/2020 0.7  0.1 - 1.0 mg/dL Final    Alkaline Phosphatase 01/17/2020 56  55 - 135 U/L Final    AST 01/17/2020 15  10 - 40 U/L Final    ALT 01/17/2020 14  10 - 44 U/L Final    Anion Gap 01/17/2020 8  8 - 16 mmol/L Final    eGFR if  01/17/2020 41.5* >60 mL/min/1.73 m^2 Final    eGFR if non African American 01/17/2020 36.0* >60 mL/min/1.73 m^2 Final    D-Dimer 01/17/2020 <0.27  <0.50 ug/mL FEU Final    BNP 01/17/2020 341* 0 - 99 pg/mL Final   Admission on 12/13/2019, Discharged on 12/13/2019   Component Date Value Ref Range Status    PT 12/13/2019 12.7  11.8 - 14.7 sec Final    INR 12/13/2019 1.0   Final   Lab Visit on 11/27/2019   Component Date Value Ref Range Status    WBC 11/27/2019 7.19  3.90 - 12.70 K/uL Final    RBC 11/27/2019 4.58  4.00 - 5.40 M/uL Final    Hemoglobin 11/27/2019 14.5  12.0 - 16.0 g/dL Final    Hematocrit 11/27/2019 45.2  37.0 - 48.5 % Final    Mean Corpuscular Volume 11/27/2019 99* 82 - 98 fL Final    Mean Corpuscular Hemoglobin 11/27/2019 31.7* 27.0 - 31.0 pg Final    Mean Corpuscular Hemoglobin Conc 11/27/2019 32.1  32.0 - 36.0 g/dL Final    RDW 11/27/2019 13.2  11.5 - 14.5 % Final    Platelets 11/27/2019 226  150 - 350 K/uL Final    MPV 11/27/2019 9.9  9.2 - 12.9 fL Final    Immature Granulocytes 11/27/2019 0.3  0.0 - 0.5 % Final    Gran # (ANC) 11/27/2019 5.0  1.8 - 7.7 K/uL Final    Immature Grans (Abs)  11/27/2019 0.02  0.00 - 0.04 K/uL Final    Lymph # 11/27/2019 1.2  1.0 - 4.8 K/uL Final    Mono # 11/27/2019 0.7  0.3 - 1.0 K/uL Final    Eos # 11/27/2019 0.2  0.0 - 0.5 K/uL Final    Baso # 11/27/2019 0.05  0.00 - 0.20 K/uL Final    nRBC 11/27/2019 0  0 /100 WBC Final    Gran% 11/27/2019 70.1  38.0 - 73.0 % Final    Lymph% 11/27/2019 16.0* 18.0 - 48.0 % Final    Mono% 11/27/2019 9.6  4.0 - 15.0 % Final    Eosinophil% 11/27/2019 3.3  0.0 - 8.0 % Final    Basophil% 11/27/2019 0.7  0.0 - 1.9 % Final    Differential Method 11/27/2019 Automated   Final    Sodium 11/27/2019 140  136 - 145 mmol/L Final    Potassium 11/27/2019 4.1  3.5 - 5.1 mmol/L Final    Chloride 11/27/2019 105  95 - 110 mmol/L Final    CO2 11/27/2019 25  23 - 29 mmol/L Final    Glucose 11/27/2019 97  70 - 110 mg/dL Final    BUN, Bld 11/27/2019 16  8 - 23 mg/dL Final    Creatinine 11/27/2019 1.2  0.5 - 1.4 mg/dL Final    Calcium 11/27/2019 9.3  8.7 - 10.5 mg/dL Final    Total Protein 11/27/2019 7.0  6.0 - 8.4 g/dL Final    Albumin 11/27/2019 4.0  3.5 - 5.2 g/dL Final    Total Bilirubin 11/27/2019 0.9  0.1 - 1.0 mg/dL Final    Alkaline Phosphatase 11/27/2019 68  55 - 135 U/L Final    AST 11/27/2019 19  10 - 40 U/L Final    ALT 11/27/2019 19  10 - 44 U/L Final    Anion Gap 11/27/2019 10  8 - 16 mmol/L Final    eGFR if  11/27/2019 50.0* >60 mL/min/1.73 m^2 Final    eGFR if non African American 11/27/2019 43.4* >60 mL/min/1.73 m^2 Final    PT 11/27/2019 29.3* 10.6 - 14.8 sec Final    INR 11/27/2019 2.9   Final         Plan:           Essential hypertension    Chronic combined systolic and diastolic congestive heart failure    Presence of automatic (implantable) cardiac defibrillator    Paroxysmal atrial fibrillation    Chronic anticoagulation    Type 2 diabetes mellitus with diabetic nephropathy, without long-term current use of insulin  -     Hemoglobin A1c; Future; Expected date: 02/20/2020    Mixed  dyslipidemia  -     Lipid panel; Future; Expected date: 02/20/2020    Gait difficulty  -     Ambulatory referral/consult to Neurology; Future; Expected date: 02/27/2020     Patient's multiple medical issues have been noted. Blood pressures are on the low end good normal side.  When we checked through the blood pressure machine her blood pressures recorded low but manual check confirmed that they are indeed normal.  In fact in past she used to have somewhat low blood pressures and Dr. Jones has kept a backup of midodrine to be taken as needed.    Last time she had seen been seen for shortness of breath.  She has had some test done a results of which are pending at this point.      Blood sugars are acceptable and her general condition is acceptable.  She is able to ambulate.  No increase in shortness of breath or no active chest pains.    I will check a hemoglobin A1c and lipid panel whenever she is due for the next test.    Otherwise follow-up in 3 months time.  Next visit will focus more on the diabetes.    Patient also complains of a new symptom of falling backward when she gets up in the morning.  I am not sure if it is related to orthostasis or is it a sign of early Parkinson's but I will get a neurology evaluation for the same.    Spent aleks 25 minutes with patient which involved review of pts medical conditions, labs, medications and with 50% of time face-to-face discussion about medical problems, management and any applicable changes.        Current Outpatient Medications:     amiodarone (PACERONE) 200 MG Tab, Take by mouth once daily., Disp: , Rfl:     Ca-D3-mag ox-zinc--thom-bor (CALCIUM 600-D3 PLUS) 600 mg calcium- 800 unit-50 mg Tab, Take 1 tablet by mouth 2 (two) times daily., Disp: , Rfl:     carvedilol (COREG) 25 MG tablet, Take 0.5 tablets (12.5 mg total) by mouth 2 (two) times daily with meals., Disp: 1 tablet, Rfl: 1    cholecalciferol, vitamin D3, (VITAMIN D3) 5,000 unit Tab, Take 5,000 Units  by mouth 2 (two) times daily., Disp: , Rfl:     denosumab (PROLIA) 60 mg/mL Syrg, Inject 1 mL (60 mg total) into the skin every 6 (six) months., Disp: 2 mL, Rfl: 1    furosemide (LASIX) 20 MG tablet, Take 20 mg by mouth 2 (two) times daily as needed. , Disp: , Rfl:     gabapentin (NEURONTIN) 100 MG capsule, TAKE ONE CAPSULE BY MOUTH THREE TIMES DAILY, Disp: 270 capsule, Rfl: 1    glimepiride (AMARYL) 1 MG tablet, Take 1 tablet (1 mg total) by mouth before breakfast., Disp: 90 tablet, Rfl: 1    midodrine (PROAMATINE) 2.5 MG Tab, Take 2.5 mg by mouth as needed., Disp: , Rfl:     omeprazole (PRILOSEC) 40 MG capsule, Take 40 mg by mouth once daily., Disp: , Rfl:     sacubitril-valsartan (ENTRESTO) 24-26 mg per tablet, Take 1 tablet by mouth once daily. , Disp: , Rfl:     warfarin (COUMADIN) 5 MG tablet, Take 5 mg by mouth once daily., Disp: , Rfl:   No current facility-administered medications for this visit.     Facility-Administered Medications Ordered in Other Visits:     0.9%  NaCl infusion, , Intravenous, Continuous, Sebastian Nowak III, MD, Last Rate: 75 mL/hr at 12/13/19 0728    diphenhydrAMINE injection 25 mg, 25 mg, Intravenous, Once, Sebastian Nowak III, MD    lorazepam injection 1 mg, 1 mg, Intravenous, Once, Sebastian Nowak III, MD

## 2020-02-20 NOTE — PATIENT INSTRUCTIONS
Diabetes: Activity Tips    Being more active can help you manage your diabetes. The tips on this sheet can help you get the most from your exercise. They can also help you stay safe.  Staying Active  Its important for adults to spend less time sitting and being inactive. This is especially true if you have type 2 diabetes. When you are sitting for long periods of time, get up for short sessions of light activity every 30 minutes.  You should aim for at least 150 minutes a week of exercise or physical activity. Dont let more than 2 days go by without being active.  Benefit from briskness  Brisk activity gets your heart beating faster. This can help you increase your fitness, lose extra weight, and manage your blood sugar level. Try brisk walking. Or, if you have foot or leg problems, you can try swimming or bike riding. You can break up your exercise into chunks throughout the day. Work up to at least 30 minutes of steady, brisk exercise on most days.  Warm up and cool down  Warming up and cooling down reduce your risk of injury. They also help limit muscle soreness. Do a mild version of your activity for 5 minutes before and after your routine. You can also learn stretches that will help keep your muscles loose. Your healthcare provider may show you good ways to warm up and stretch.  Do the talk-sing test  The talk-sing test is a simple way to tell how hard youre exercising. If you can talk while exercising, youre in a safe range. If youre out of breath, slow down. If you can carry a tune, its time to  the pace. Walk up a hill. Increase the resistance on your stationary bike. Or swim faster.  What about eating?  You may be told to plan your exercise for 1 to 2 hours after a meal. In most cases, you dont need to eat while being active. If you take insulin or medicine that can cause low blood sugar, test your blood sugar before exercising. And carry a fast-acting sugar that will raise your blood sugar  level quickly. This includes glucose tablets or hard candy. Use it if you feel low blood sugar symptoms.  Safety tips  These tips can help you stay safe as you become fit:  · Exercise with a friend or carry a cell phone if you have one.  · Carry or wear identification, such as a necklace or bracelet, that says you have diabetes.  · Use the proper footwear and safety equipment for your activity.  · Drink water before, during, and after exercise.  · Dress properly for the weather.  · Dont exercise in very hot or very cold weather.  · Dont exercise if you are sick.  · If you are instructed to do so, test your blood sugar before and after you exercise. Have a small carbohydrate snack if your blood sugar is low before you start exercising.   When to stop exercising and call your healthcare provider  Stop exercising and call your healthcare provider right away if you notice any of the following:  · Pain, pressure, tightness, or heaviness in the chest  · Pain or heaviness in the neck, shoulders, back, arms, legs, or feet  · Unusual shortness of breath  · Dizziness or lightheadedness  · Unusually rapid or slow pulse  · Increased joint or muscle pain  · Nausea or vomiting  Date Last Reviewed: 5/1/2016  © 2840-6912 [employer]. [address] [place] [address], [place] [address]. All rights reserved. This information is not intended as a substitute for professional medical care. Always follow your healthcare professional's instructions.        Using a Blood Sugar Log    You have diabetes. This means your body has trouble regulating a sugar called glucose. To help manage your diabetes, youll need to check your blood sugar level as directed by your healthcare provider. Keeping a log of your blood sugar levels will help you track your blood sugar readings. Its a simple and easy way to see how well you are controlling your diabetes.  Checking your blood sugar level  You can check your blood sugar level with a blood  glucose meter. Youll first prick the side of your finger with a tiny lancet to draw a tiny drop of blood onto the test strip. Some glucose meters let you use another place on your body to test. But these other places should not be used in some cases as they may be inaccurate. Follow the instructions for your glucose meter. And talk with your healthcare provider before doing the test on other places.  The strip goes into the meter first, then a drop of blood is placed on the tip of the strip. The meter then shows a reading that tells you the level of your blood sugar. Your readings should be in your target range as often as possible. This means not too high or too low. Staying in this range helps lower your risk for complications. Your healthcare provider will help you figure out the target range that is best for you.  Tracking your readings  Every time you check your blood sugar, use your log to keep track of your readings. Your meter will also probably have a memory feature that your healthcare provider can check at your next visit. You may be advised by your healthcare provider to check your blood sugar in the morning, at bedtime, and before and after meals. Be sure to write down all of your numbers. Also use your log to record things that might have affected your blood sugar. Some examples include being sick, certain medicines, being physically active, feeling stressed, or skipping meals.   Lessons learned from your readings  Tracking your blood sugar readings helps you see patterns. These patterns tell you how your actions affect your blood sugar. For instance, you may have higher numbers after eating certain foods or lower numbers after exercise. They just help you understand how to stay in your target range more often, so that your diabetes remains in good control.  Sharing your log with your healthcare team  Bring your blood sugar log and glucose meter with you to all of your healthcare appointments. This  can help your healthcare team make changes to your treatment plan, if needed. This may involve making changes in what you eat, what medicines you take, or how much you exercise.  To learn more  The resources below can help you learn more:  · American Diabetes Association 349-939-6126 www.diabetes.org  · [employer] 440-062-1282 www.lighthouse.org  · [employer] 821-720-5273 www.nei.nih.gov  · [employer] 235-868-0208 www.hormone.org  Date Last Reviewed: 5/1/2016  © 6446-7490 [employer]. [address] [place] [address], [place] [address]. All rights reserved. This information is not intended as a substitute for professional medical care. Always follow your healthcare professional's instructions.

## 2020-03-02 ENCOUNTER — LAB VISIT (OUTPATIENT)
Dept: LAB | Facility: HOSPITAL | Age: 80
End: 2020-03-02
Attending: NURSE PRACTITIONER
Payer: MEDICARE

## 2020-03-02 ENCOUNTER — OFFICE VISIT (OUTPATIENT)
Dept: FAMILY MEDICINE | Facility: CLINIC | Age: 80
End: 2020-03-02
Payer: MEDICARE

## 2020-03-02 VITALS
OXYGEN SATURATION: 97 % | DIASTOLIC BLOOD PRESSURE: 70 MMHG | HEIGHT: 69 IN | WEIGHT: 234 LBS | HEART RATE: 57 BPM | SYSTOLIC BLOOD PRESSURE: 126 MMHG | TEMPERATURE: 98 F | BODY MASS INDEX: 34.66 KG/M2

## 2020-03-02 DIAGNOSIS — I50.42 CHRONIC COMBINED SYSTOLIC AND DIASTOLIC CONGESTIVE HEART FAILURE: ICD-10-CM

## 2020-03-02 DIAGNOSIS — E78.2 MIXED DYSLIPIDEMIA: ICD-10-CM

## 2020-03-02 DIAGNOSIS — R05.9 COUGH: Primary | ICD-10-CM

## 2020-03-02 DIAGNOSIS — R05.9 COUGH: ICD-10-CM

## 2020-03-02 DIAGNOSIS — I10 ESSENTIAL HYPERTENSION: ICD-10-CM

## 2020-03-02 DIAGNOSIS — J06.9 UPPER RESPIRATORY INFECTION, VIRAL: ICD-10-CM

## 2020-03-02 LAB
BASOPHILS # BLD AUTO: 0.04 K/UL (ref 0–0.2)
BASOPHILS NFR BLD: 0.7 % (ref 0–1.9)
BNP SERPL-MCNC: 611 PG/ML (ref 0–99)
DIFFERENTIAL METHOD: ABNORMAL
EOSINOPHIL # BLD AUTO: 0.2 K/UL (ref 0–0.5)
EOSINOPHIL NFR BLD: 4.1 % (ref 0–8)
ERYTHROCYTE [DISTWIDTH] IN BLOOD BY AUTOMATED COUNT: 13.9 % (ref 11.5–14.5)
HCT VFR BLD AUTO: 40.8 % (ref 37–48.5)
HGB BLD-MCNC: 12.8 G/DL (ref 12–16)
IMM GRANULOCYTES # BLD AUTO: 0.01 K/UL (ref 0–0.04)
IMM GRANULOCYTES NFR BLD AUTO: 0.2 % (ref 0–0.5)
LYMPHOCYTES # BLD AUTO: 1.3 K/UL (ref 1–4.8)
LYMPHOCYTES NFR BLD: 22.7 % (ref 18–48)
MCH RBC QN AUTO: 31.6 PG (ref 27–31)
MCHC RBC AUTO-ENTMCNC: 31.4 G/DL (ref 32–36)
MCV RBC AUTO: 101 FL (ref 82–98)
MONOCYTES # BLD AUTO: 0.6 K/UL (ref 0.3–1)
MONOCYTES NFR BLD: 10.7 % (ref 4–15)
NEUTROPHILS # BLD AUTO: 3.6 K/UL (ref 1.8–7.7)
NEUTROPHILS NFR BLD: 61.6 % (ref 38–73)
NRBC BLD-RTO: 0 /100 WBC
PLATELET # BLD AUTO: 200 K/UL (ref 150–350)
PMV BLD AUTO: 10.4 FL (ref 9.2–12.9)
RBC # BLD AUTO: 4.05 M/UL (ref 4–5.4)
WBC # BLD AUTO: 5.9 K/UL (ref 3.9–12.7)

## 2020-03-02 PROCEDURE — 1101F PR PT FALLS ASSESS DOC 0-1 FALLS W/OUT INJ PAST YR: ICD-10-PCS | Mod: S$GLB,,, | Performed by: NURSE PRACTITIONER

## 2020-03-02 PROCEDURE — 1126F PR PAIN SEVERITY QUANTIFIED, NO PAIN PRESENT: ICD-10-PCS | Mod: S$GLB,,, | Performed by: NURSE PRACTITIONER

## 2020-03-02 PROCEDURE — 3074F PR MOST RECENT SYSTOLIC BLOOD PRESSURE < 130 MM HG: ICD-10-PCS | Mod: S$GLB,,, | Performed by: NURSE PRACTITIONER

## 2020-03-02 PROCEDURE — 99214 OFFICE O/P EST MOD 30 MIN: CPT | Mod: S$GLB,,, | Performed by: NURSE PRACTITIONER

## 2020-03-02 PROCEDURE — 36415 COLL VENOUS BLD VENIPUNCTURE: CPT

## 2020-03-02 PROCEDURE — 1159F MED LIST DOCD IN RCRD: CPT | Mod: S$GLB,,, | Performed by: NURSE PRACTITIONER

## 2020-03-02 PROCEDURE — 3078F DIAST BP <80 MM HG: CPT | Mod: S$GLB,,, | Performed by: NURSE PRACTITIONER

## 2020-03-02 PROCEDURE — 1159F PR MEDICATION LIST DOCUMENTED IN MEDICAL RECORD: ICD-10-PCS | Mod: S$GLB,,, | Performed by: NURSE PRACTITIONER

## 2020-03-02 PROCEDURE — 1101F PT FALLS ASSESS-DOCD LE1/YR: CPT | Mod: S$GLB,,, | Performed by: NURSE PRACTITIONER

## 2020-03-02 PROCEDURE — 83880 ASSAY OF NATRIURETIC PEPTIDE: CPT

## 2020-03-02 PROCEDURE — 85025 COMPLETE CBC W/AUTO DIFF WBC: CPT

## 2020-03-02 PROCEDURE — 3078F PR MOST RECENT DIASTOLIC BLOOD PRESSURE < 80 MM HG: ICD-10-PCS | Mod: S$GLB,,, | Performed by: NURSE PRACTITIONER

## 2020-03-02 PROCEDURE — 1126F AMNT PAIN NOTED NONE PRSNT: CPT | Mod: S$GLB,,, | Performed by: NURSE PRACTITIONER

## 2020-03-02 PROCEDURE — 99214 PR OFFICE/OUTPT VISIT, EST, LEVL IV, 30-39 MIN: ICD-10-PCS | Mod: S$GLB,,, | Performed by: NURSE PRACTITIONER

## 2020-03-02 PROCEDURE — 3074F SYST BP LT 130 MM HG: CPT | Mod: S$GLB,,, | Performed by: NURSE PRACTITIONER

## 2020-03-02 RX ORDER — FLUTICASONE PROPIONATE 50 MCG
1 SPRAY, SUSPENSION (ML) NASAL DAILY
Qty: 13 G | Refills: 2 | Status: ON HOLD | OUTPATIENT
Start: 2020-03-02 | End: 2020-04-03

## 2020-03-02 RX ORDER — BENZONATATE 200 MG/1
200 CAPSULE ORAL 3 TIMES DAILY PRN
Qty: 30 CAPSULE | Refills: 1 | Status: SHIPPED | OUTPATIENT
Start: 2020-03-02 | End: 2020-03-12

## 2020-03-02 NOTE — PATIENT INSTRUCTIONS
**Mucinex over the counter as directed to help thin secretions, must keep well-hydrated, increase water/fluid intake or it cannot work. Nasal saline spray in addition will also help. Continue other prescriptions.      Adult Self-Care for Colds  Colds are caused by viruses. They can't be cured with antibiotics. However, you can ease symptoms and support your body's efforts to heal itself.  No matter which symptoms you have, be sure to:  · Drink plenty of fluids (water or clear soup)  · Stop smoking and drinking alcohol  · Get plenty of rest    Understand a fever  · Take your temperature several times a day. If your fever is 100.4°F (38.0°C) for more than a day, call your healthcare provider.  · Relax, lie down. Go to bed if you want. Just get off your feet and rest. Also, drink plenty of fluids to avoid dehydration.  · Take acetaminophen or a nonsteroidal anti-inflammatory agent (NSAID), such as ibuprofen.  Treat a troubled nose kindly  · Breathe steam or heated humidified air to open blocked nasal passages.  a hot shower or use a vaporizer. Be careful not to get burned by the steam.  · Saline nasal sprays and decongestant tablets help open a stuffy nose. Antihistamines can also help, but they can cause side effects such as drowsiness and drying of the eyes, nose, and mouth.  Soothe a sore throat and cough  · Gargle every 2 hours with 1/4 teaspoon of salt dissolved in 1/2 cup of warm water. Suck on throat lozenges and cough drops to moisten your throat.  · Cough medicines are available but it is unclear how well they actually work.  · Take acetaminophen or an NSAID, such as ibuprofen, to ease throat pain  Ease digestive problems  · Put fluids back into your body. Take frequent sips of clear liquids such as water or broth. Avoid drinks that have a lot of sugar in them, such as juices and sodas. These can make diarrhea worse. Older children and adults can drink sports drinks.  · As your appetite returns, you  can resume your normal diet. Ask your healthcare provider if there are any foods you should avoid.  When to seek medical care  When you first notice symptoms, ask your healthcare provider if antiviral medicines are appropriate. Antibiotics should not be taken for colds or flu. Also, call your healthcare provider if you have any of the following symptoms or if you aren't feeling better after 7 days:  · Shortness of breath  · Pain or pressure in the chest or belly (abdomen)  · Worsening symptoms, especially after a period of improvement  · Fever of 100.4°F  (38.0°C) or higher, or fever that doesn't go down with medicine  · Sudden dizziness or confusion  · Severe or continued vomiting  · Signs of dehydration, including extreme thirst, dark urine, infrequent urination, dry mouth  · Spotted, red, or very sore throat   Date Last Reviewed: 12/1/2016  © 8883-9209 [employer]. [address] [place] [address], [place] [address]. All rights reserved. This information is not intended as a substitute for professional medical care. Always follow your healthcare professional's instructions.

## 2020-03-02 NOTE — PROGRESS NOTES
SUBJECTIVE:    Patient ID: Jo Alegre is a 79 y.o. female.    Chief Complaint: URI (3 days)    Ms. Alegre is a 79-year-old lady here with her daughter with complaints of upper respiratory symptoms.  Afebrile.  Reports clear rhinorrhea and PND with sore throat and hoarseness, productive cough for small amount clear sputum.  Cough is worse as the day progresses and is disruptive to her sleep.  Appetite is diminished.    Denies chest pain, palpitations or dyspnea.  Denies dyspepsia, hemoptysis or melena.    I do note she has a history of CHF and is on Entresto, she takes Lasix only on a p.r.n. basis.  On further review she does note increased leg swelling and also some swelling in her fingers.      Past Medical History:   Diagnosis Date    Allergy     Codeine, Lasix    Atrial fibrillation     Atrial fibrillation     Cataract     CHF (congestive heart failure)     Diabetes mellitus, type 2     Ejection fraction < 50% 10/18/2017    Approximately 35%  Based on prior  Echocardiogram.    Encounter for blood transfusion     Hyperlipidemia     Osteoporosis     Thyroid disorder screening 10/17/2017    TSH of 1.12 ordered by Dr. dee Jones     Past Surgical History:   Procedure Laterality Date    CHO[place]OMY  1997    [place]     COLONOSCOPY      EYE SURGERY      bilateral cataracts    FRACTURE SURGERY  2014    right femur with neville    HEMORRHOID SURGERY      48 yrs ago    HYSTERECTOMY      REPLACEMENT OF IMPLANTABLE CARDIOVERTER-DEFIBRILLATOR (ICD) GENERATOR N/A 12/13/2019    Procedure: REPLACEMENT, PULSE GENERATOR, ICD-MEDTRONIC;  Surgeon: Sebastian Nowak III, MD;  Location: [facility];  Service: Cardiology;  Laterality: N/A;    TONSILLECTOMY       Family History   Problem Relation Age of Onset    Heart disease Mother     Cancer Father         Lung Cancer ??? Asbestos    Breast cancer Paternal Aunt        Marital Status:   Alcohol History:  reports that she does not drink  alcohol.  Tobacco History:  reports that she has quit smoking. She has never used smokeless tobacco.  Drug History:  reports that she does not use drugs.    Review of patient's allergies indicates:   Allergen Reactions    Codeine     Lasix [furosemide]        Current Outpatient Medications:     amiodarone (PACERONE) 200 MG Tab, Take by mouth once daily., Disp: , Rfl:     Ca-D3-mag ox-zinc--thom-bor (CALCIUM 600-D3 PLUS) 600 mg calcium- 800 unit-50 mg Tab, Take 1 tablet by mouth 2 (two) times daily., Disp: , Rfl:     carvedilol (COREG) 25 MG tablet, Take 0.5 tablets (12.5 mg total) by mouth 2 (two) times daily with meals., Disp: 1 tablet, Rfl: 1    cholecalciferol, vitamin D3, (VITAMIN D3) 5,000 unit Tab, Take 5,000 Units by mouth 2 (two) times daily., Disp: , Rfl:     denosumab (PROLIA) 60 mg/mL Syrg, Inject 1 mL (60 mg total) into the skin every 6 (six) months., Disp: 2 mL, Rfl: 1    furosemide (LASIX) 20 MG tablet, Take 20 mg by mouth 2 (two) times daily as needed. , Disp: , Rfl:     gabapentin (NEURONTIN) 100 MG capsule, TAKE ONE CAPSULE BY MOUTH THREE TIMES DAILY, Disp: 270 capsule, Rfl: 1    glimepiride (AMARYL) 1 MG tablet, Take 1 tablet (1 mg total) by mouth before breakfast., Disp: 90 tablet, Rfl: 1    midodrine (PROAMATINE) 2.5 MG Tab, Take 2.5 mg by mouth as needed., Disp: , Rfl:     omeprazole (PRILOSEC) 40 MG capsule, Take 40 mg by mouth once daily., Disp: , Rfl:     sacubitril-valsartan (ENTRESTO) 24-26 mg per tablet, Take 1 tablet by mouth once daily. , Disp: , Rfl:     warfarin (COUMADIN) 5 MG tablet, Take 5 mg by mouth once daily., Disp: , Rfl:     benzonatate (TESSALON) 200 MG capsule, Take 1 capsule (200 mg total) by mouth 3 (three) times daily as needed for Cough., Disp: 30 capsule, Rfl: 1    fluticasone propionate (FLONASE) 50 mcg/actuation nasal spray, 1 spray (50 mcg total) by Each Nostril route once daily., Disp: 13 g, Rfl: 2  No current facility-administered medications for  "this visit.     Facility-Administered Medications Ordered in Other Visits:     0.9%  NaCl infusion, , Intravenous, Continuous, Sebastian Nowak III, MD, Last Rate: 75 mL/hr at 12/13/19 0728    diphenhydrAMINE injection 25 mg, 25 mg, Intravenous, Once, Sebastian Nowak III, MD    lorazepam injection 1 mg, 1 mg, Intravenous, Once, Sebastian Nowak III, MD    Review of Systems   Constitutional: Negative for fatigue and fever.   HENT: Positive for congestion, postnasal drip and sore throat.    Respiratory: Positive for cough (productive small amt clear sputum).    All other systems reviewed and are negative.         Objective:      Vitals:    03/02/20 1303   BP: 126/70   Pulse: (!) 57   Temp: 98.3 °F (36.8 °C)   SpO2: 97%   Weight: 106.1 kg (234 lb)   Height: 5' 9" (1.753 m)     Body mass index is 34.56 kg/m².  Physical Exam   Constitutional: She is oriented to person, place, and time. She appears well-developed and well-nourished.   HENT:   Head: Normocephalic.   Eyes: Pupils are equal, round, and reactive to light. Conjunctivae and EOM are normal.   Neck: Normal range of motion. Neck supple. No thyromegaly present.   Cardiovascular: Normal rate, regular rhythm and normal heart sounds.   Pulmonary/Chest: Effort normal and breath sounds normal.   Abdominal: Soft.   Musculoskeletal: Normal range of motion.   Neurological: She is alert and oriented to person, place, and time.   Skin: Skin is warm and dry. Capillary refill takes less than 2 seconds.   Psychiatric: She has a normal mood and affect.   Nursing note and vitals reviewed.        Assessment:       1. Cough    2. Upper respiratory infection, viral    3. Essential hypertension    4. Mixed dyslipidemia    5. Chronic combined systolic and diastolic congestive heart failure         Plan:          Cough  -     CBC auto differential; Future; Expected date: 03/02/2020  -     Brain natriuretic peptide; Future; Expected date: 03/02/2020 - 611, ^ FROM 341 one month ago, advised " to take Lasix bid x 3 days.   -     benzonatate (TESSALON) 200 MG capsule; Take 1 capsule (200 mg total) by mouth 3 (three) times daily as needed for Cough.  Dispense: 30 capsule; Refill: 1    Upper respiratory infection, viral  -     fluticasone propionate (FLONASE) 50 mcg/actuation nasal spray; 1 spray (50 mcg total) by Each Nostril route once daily.  Dispense: 13 g; Refill: 2    Essential hypertension  Continue current regimen.    Mixed dyslipidemia  Continue current regimen.    Chronic combined systolic and diastolic congestive heart failure  Continue current regimen.  Check BNP.  She will take a Lasix today when she returns to her home.  BNP - 611, ^ FROM 341 one month ago, advised to take Lasix bid x 3 days.     Follow up if symptoms worsen or fail to improve.

## 2020-03-05 ENCOUNTER — TELEPHONE (OUTPATIENT)
Dept: FAMILY MEDICINE | Facility: CLINIC | Age: 80
End: 2020-03-05

## 2020-03-05 DIAGNOSIS — J20.9 ACUTE BRONCHITIS, UNSPECIFIED ORGANISM: Primary | ICD-10-CM

## 2020-03-05 RX ORDER — PROMETHAZINE HYDROCHLORIDE AND DEXTROMETHORPHAN HYDROBROMIDE 6.25; 15 MG/5ML; MG/5ML
5 SYRUP ORAL EVERY 4 HOURS PRN
Qty: 120 ML | Refills: 0 | Status: SHIPPED | OUTPATIENT
Start: 2020-03-05 | End: 2020-03-10 | Stop reason: SDUPTHER

## 2020-03-05 RX ORDER — CEFUROXIME AXETIL 500 MG/1
500 TABLET ORAL 2 TIMES DAILY
Qty: 14 TABLET | Refills: 0 | Status: ON HOLD | OUTPATIENT
Start: 2020-03-05 | End: 2020-04-03 | Stop reason: HOSPADM

## 2020-03-05 NOTE — TELEPHONE ENCOUNTER
Pt saw jane Monday  And is worse    Wants antibotics   And something else  for cough sent in     Patient called and wants antibiotics called in because her upper respiratory symptoms are not better.  Does not except viral infection theory    Sent ceftin and Prometh DM

## 2020-03-06 ENCOUNTER — TELEPHONE (OUTPATIENT)
Dept: FAMILY MEDICINE | Facility: CLINIC | Age: 80
End: 2020-03-06

## 2020-03-06 NOTE — TELEPHONE ENCOUNTER
Pt. saw Christine Martinez on Mon. 3/2 b/c she was sick. During the wk. she got worse. She called yesterday 3/5 at around 11 am to see if Dr. Alberto would rx an ABX. Mariam sent him a message but Dr. Alberto did not get to it until after clinic. In his note he stated he told the pharmacy to call the pt. when her ABX & cough syrup were ready b/c all the clerical staff was gone. They did not. Pt.'s daughter, Mary, called & said she was mad. She said it was unacceptable that her mother called at 11 am & her message was not taken care of until 6 pm. I tried to explain that Dr. Alberto doesn't answer his messages until the end of the day b/c he is in clinic but she didn't want to hear that. I offered for her to talk to Veronica, our office mgr. I had to take a meesage & she said she wanted a call today. Not tomorrow or next wk. Message was sent to Veronica about this.

## 2020-03-10 ENCOUNTER — TELEPHONE (OUTPATIENT)
Dept: FAMILY MEDICINE | Facility: CLINIC | Age: 80
End: 2020-03-10

## 2020-03-10 DIAGNOSIS — J20.9 ACUTE BRONCHITIS, UNSPECIFIED ORGANISM: ICD-10-CM

## 2020-03-10 RX ORDER — PROMETHAZINE HYDROCHLORIDE AND DEXTROMETHORPHAN HYDROBROMIDE 6.25; 15 MG/5ML; MG/5ML
5 SYRUP ORAL EVERY 4 HOURS PRN
Qty: 120 ML | Refills: 0 | Status: SHIPPED | OUTPATIENT
Start: 2020-03-10 | End: 2020-03-20

## 2020-03-10 NOTE — TELEPHONE ENCOUNTER
Pt. would not state what she wanted. She just said she had a few questions for you.    I had a detailed discussion with the patient.  She had complains about not being notified that her prescription had been sent to the pharmacy for another round of antibiotics.    I expressed my limitation to notify all the patient's for limit of time.  I had advised the pharmacy to call her with the new prescription.  I usually take messages at evening time.    I have encouraged the patient to join the patient portal also to have a reasonably good communication.    Patient sounded appropriate and reasonable.

## 2020-03-12 ENCOUNTER — TELEPHONE (OUTPATIENT)
Dept: FAMILY MEDICINE | Facility: CLINIC | Age: 80
End: 2020-03-12

## 2020-03-12 NOTE — TELEPHONE ENCOUNTER
Pt. called to let you know that she is feeling a little better. She still has some congestion but she is coughing up stuff.

## 2020-04-01 ENCOUNTER — HOSPITAL ENCOUNTER (INPATIENT)
Facility: HOSPITAL | Age: 80
LOS: 1 days | Discharge: HOME HEALTH | DRG: 291 | End: 2020-04-03
Attending: EMERGENCY MEDICINE | Admitting: INTERNAL MEDICINE
Payer: MEDICARE

## 2020-04-01 DIAGNOSIS — I50.43 ACUTE ON CHRONIC HEART FAILURE WITH REDUCED EJECTION FRACTION AND DIASTOLIC DYSFUNCTION: ICD-10-CM

## 2020-04-01 DIAGNOSIS — R06.02 SHORTNESS OF BREATH: ICD-10-CM

## 2020-04-01 DIAGNOSIS — I42.9 CARDIOMYOPATHY WITH IMPLANTABLE CARDIOVERTER-DEFIBRILLATOR: ICD-10-CM

## 2020-04-01 DIAGNOSIS — I50.23 ACUTE ON CHRONIC SYSTOLIC (CONGESTIVE) HEART FAILURE: ICD-10-CM

## 2020-04-01 DIAGNOSIS — J06.9 UPPER RESPIRATORY INFECTION, VIRAL: ICD-10-CM

## 2020-04-01 DIAGNOSIS — R26.81 GAIT INSTABILITY: ICD-10-CM

## 2020-04-01 DIAGNOSIS — I50.33 ACUTE ON CHRONIC DIASTOLIC HEART FAILURE: ICD-10-CM

## 2020-04-01 DIAGNOSIS — N18.30 STAGE 3 CHRONIC KIDNEY DISEASE: ICD-10-CM

## 2020-04-01 DIAGNOSIS — E11.21 TYPE 2 DIABETES MELLITUS WITH DIABETIC NEPHROPATHY, WITHOUT LONG-TERM CURRENT USE OF INSULIN: ICD-10-CM

## 2020-04-01 DIAGNOSIS — I50.43 ACUTE ON CHRONIC COMBINED SYSTOLIC AND DIASTOLIC HEART FAILURE: ICD-10-CM

## 2020-04-01 DIAGNOSIS — I10 ESSENTIAL HYPERTENSION: ICD-10-CM

## 2020-04-01 DIAGNOSIS — Z95.810 CARDIOMYOPATHY WITH IMPLANTABLE CARDIOVERTER-DEFIBRILLATOR: ICD-10-CM

## 2020-04-01 DIAGNOSIS — J96.11 CHRONIC RESPIRATORY FAILURE WITH HYPOXIA: Primary | ICD-10-CM

## 2020-04-01 DIAGNOSIS — I48.0 PAROXYSMAL ATRIAL FIBRILLATION: ICD-10-CM

## 2020-04-01 LAB
ALBUMIN SERPL BCP-MCNC: 2.8 G/DL (ref 3.5–5.2)
ALP SERPL-CCNC: 57 U/L (ref 55–135)
ALT SERPL W/O P-5'-P-CCNC: 18 U/L (ref 10–44)
ANION GAP SERPL CALC-SCNC: 10 MMOL/L (ref 8–16)
AST SERPL-CCNC: 17 U/L (ref 10–40)
BASOPHILS # BLD AUTO: 0.03 K/UL (ref 0–0.2)
BASOPHILS NFR BLD: 0.5 % (ref 0–1.9)
BILIRUB SERPL-MCNC: 0.4 MG/DL (ref 0.1–1)
BNP SERPL-MCNC: 1387 PG/ML (ref 0–99)
BUN SERPL-MCNC: 32 MG/DL (ref 8–23)
CALCIUM SERPL-MCNC: 9 MG/DL (ref 8.7–10.5)
CHLORIDE SERPL-SCNC: 97 MMOL/L (ref 95–110)
CO2 SERPL-SCNC: 29 MMOL/L (ref 23–29)
CREAT SERPL-MCNC: 1.9 MG/DL (ref 0.5–1.4)
DIFFERENTIAL METHOD: ABNORMAL
EOSINOPHIL # BLD AUTO: 0.1 K/UL (ref 0–0.5)
EOSINOPHIL NFR BLD: 2.1 % (ref 0–8)
ERYTHROCYTE [DISTWIDTH] IN BLOOD BY AUTOMATED COUNT: 13.7 % (ref 11.5–14.5)
EST. GFR  (AFRICAN AMERICAN): 28.7 ML/MIN/1.73 M^2
EST. GFR  (NON AFRICAN AMERICAN): 24.9 ML/MIN/1.73 M^2
GLUCOSE SERPL-MCNC: 188 MG/DL (ref 70–110)
HCT VFR BLD AUTO: 38.6 % (ref 37–48.5)
HGB BLD-MCNC: 12.2 G/DL (ref 12–16)
IMM GRANULOCYTES # BLD AUTO: 0.02 K/UL (ref 0–0.04)
IMM GRANULOCYTES NFR BLD AUTO: 0.3 % (ref 0–0.5)
INFLUENZA A, MOLECULAR: NEGATIVE
INFLUENZA B, MOLECULAR: NEGATIVE
INR PPP: 3.2
LYMPHOCYTES # BLD AUTO: 1 K/UL (ref 1–4.8)
LYMPHOCYTES NFR BLD: 16.8 % (ref 18–48)
MCH RBC QN AUTO: 31 PG (ref 27–31)
MCHC RBC AUTO-ENTMCNC: 31.6 G/DL (ref 32–36)
MCV RBC AUTO: 98 FL (ref 82–98)
MONOCYTES # BLD AUTO: 0.9 K/UL (ref 0.3–1)
MONOCYTES NFR BLD: 15.3 % (ref 4–15)
NEUTROPHILS # BLD AUTO: 3.8 K/UL (ref 1.8–7.7)
NEUTROPHILS NFR BLD: 65 % (ref 38–73)
NRBC BLD-RTO: 0 /100 WBC
PLATELET # BLD AUTO: 208 K/UL (ref 150–350)
PMV BLD AUTO: 10.5 FL (ref 9.2–12.9)
POTASSIUM SERPL-SCNC: 4.1 MMOL/L (ref 3.5–5.1)
PROCALCITONIN SERPL IA-MCNC: 0.24 NG/ML (ref 0–0.5)
PROT SERPL-MCNC: 6.1 G/DL (ref 6–8.4)
PROTHROMBIN TIME: 31.2 SEC (ref 10.6–14.8)
RBC # BLD AUTO: 3.94 M/UL (ref 4–5.4)
SODIUM SERPL-SCNC: 136 MMOL/L (ref 136–145)
SPECIMEN SOURCE: NORMAL
TROPONIN I SERPL DL<=0.01 NG/ML-MCNC: <0.03 NG/ML
WBC # BLD AUTO: 5.83 K/UL (ref 3.9–12.7)

## 2020-04-01 PROCEDURE — 93005 ELECTROCARDIOGRAM TRACING: CPT | Performed by: INTERNAL MEDICINE

## 2020-04-01 PROCEDURE — 87502 INFLUENZA DNA AMP PROBE: CPT

## 2020-04-01 PROCEDURE — U0002 COVID-19 LAB TEST NON-CDC: HCPCS

## 2020-04-01 PROCEDURE — 84484 ASSAY OF TROPONIN QUANT: CPT

## 2020-04-01 PROCEDURE — 85025 COMPLETE CBC W/AUTO DIFF WBC: CPT

## 2020-04-01 PROCEDURE — 80053 COMPREHEN METABOLIC PANEL: CPT

## 2020-04-01 PROCEDURE — 25000003 PHARM REV CODE 250: Performed by: NURSE PRACTITIONER

## 2020-04-01 PROCEDURE — 84145 PROCALCITONIN (PCT): CPT

## 2020-04-01 PROCEDURE — 85610 PROTHROMBIN TIME: CPT

## 2020-04-01 PROCEDURE — 99285 EMERGENCY DEPT VISIT HI MDM: CPT | Mod: 25

## 2020-04-01 PROCEDURE — 83880 ASSAY OF NATRIURETIC PEPTIDE: CPT

## 2020-04-01 PROCEDURE — 21400001 HC TELEMETRY ROOM

## 2020-04-01 PROCEDURE — 36415 COLL VENOUS BLD VENIPUNCTURE: CPT

## 2020-04-01 RX ORDER — GLUCAGON 1 MG
1 KIT INJECTION
Status: DISCONTINUED | OUTPATIENT
Start: 2020-04-01 | End: 2020-04-03 | Stop reason: HOSPADM

## 2020-04-01 RX ORDER — SODIUM CHLORIDE 0.9 % (FLUSH) 0.9 %
10 SYRINGE (ML) INJECTION
Status: DISCONTINUED | OUTPATIENT
Start: 2020-04-01 | End: 2020-04-03 | Stop reason: HOSPADM

## 2020-04-01 RX ORDER — ONDANSETRON 2 MG/ML
4 INJECTION INTRAMUSCULAR; INTRAVENOUS EVERY 8 HOURS PRN
Status: DISCONTINUED | OUTPATIENT
Start: 2020-04-01 | End: 2020-04-03 | Stop reason: HOSPADM

## 2020-04-01 RX ORDER — CHOLECALCIFEROL (VITAMIN D3) 25 MCG
5000 TABLET ORAL 2 TIMES DAILY
Status: DISCONTINUED | OUTPATIENT
Start: 2020-04-02 | End: 2020-04-03 | Stop reason: HOSPADM

## 2020-04-01 RX ORDER — PANTOPRAZOLE SODIUM 40 MG/1
40 TABLET, DELAYED RELEASE ORAL DAILY
Status: DISCONTINUED | OUTPATIENT
Start: 2020-04-01 | End: 2020-04-03 | Stop reason: HOSPADM

## 2020-04-01 RX ORDER — IBUPROFEN 200 MG
24 TABLET ORAL
Status: DISCONTINUED | OUTPATIENT
Start: 2020-04-01 | End: 2020-04-03 | Stop reason: HOSPADM

## 2020-04-01 RX ORDER — CARVEDILOL 12.5 MG/1
12.5 TABLET ORAL 2 TIMES DAILY WITH MEALS
Status: DISCONTINUED | OUTPATIENT
Start: 2020-04-01 | End: 2020-04-03 | Stop reason: HOSPADM

## 2020-04-01 RX ORDER — IBUPROFEN 200 MG
16 TABLET ORAL
Status: DISCONTINUED | OUTPATIENT
Start: 2020-04-01 | End: 2020-04-03 | Stop reason: HOSPADM

## 2020-04-01 RX ORDER — ATORVASTATIN CALCIUM 10 MG/1
10 TABLET, FILM COATED ORAL DAILY
Status: DISCONTINUED | OUTPATIENT
Start: 2020-04-01 | End: 2020-04-03 | Stop reason: HOSPADM

## 2020-04-01 RX ORDER — INSULIN ASPART 100 [IU]/ML
0-5 INJECTION, SOLUTION INTRAVENOUS; SUBCUTANEOUS
Status: DISCONTINUED | OUTPATIENT
Start: 2020-04-01 | End: 2020-04-03 | Stop reason: HOSPADM

## 2020-04-01 RX ORDER — TORSEMIDE 10 MG/1
TABLET ORAL DAILY
Status: ON HOLD | COMMUNITY
End: 2020-04-03 | Stop reason: HOSPADM

## 2020-04-01 RX ORDER — MIDODRINE HYDROCHLORIDE 2.5 MG/1
2.5 TABLET ORAL 3 TIMES DAILY
COMMUNITY
End: 2022-09-08 | Stop reason: SDUPTHER

## 2020-04-01 RX ORDER — WARFARIN SODIUM 5 MG/1
5 TABLET ORAL DAILY
Status: DISCONTINUED | OUTPATIENT
Start: 2020-04-02 | End: 2020-04-03 | Stop reason: HOSPADM

## 2020-04-01 RX ADMIN — PANTOPRAZOLE SODIUM 40 MG: 40 TABLET, DELAYED RELEASE ORAL at 11:04

## 2020-04-01 RX ADMIN — ATORVASTATIN CALCIUM 10 MG: 10 TABLET, FILM COATED ORAL at 11:04

## 2020-04-01 RX ADMIN — CARVEDILOL 12.5 MG: 12.5 TABLET, FILM COATED ORAL at 11:04

## 2020-04-01 NOTE — ED PROVIDER NOTES
Encounter Date: 4/1/2020       History     Chief Complaint   Patient presents with    Shortness of Breath     denies cough or chest pain    Fever     79-year-old female presents complaining of shortness of breath, fever, patient reports history of CHF diabetes atrial fibrillation, patient complains of exertional shortness of breath for the past week, patient also reports COPD she reports she is not on oxygen at home, patient has no other acute complaints at this time.  Patient reports she has not been around any sick contacts.        Review of patient's allergies indicates:   Allergen Reactions    Codeine     Lasix [furosemide]      Past Medical History:   Diagnosis Date    Allergy     Codeine, Lasix    Atrial fibrillation     Atrial fibrillation     Cataract     CHF (congestive heart failure)     Diabetes mellitus, type 2     Ejection fraction < 50% 10/18/2017    Approximately 35%  Based on prior  Echocardiogram.    Encounter for blood transfusion     Hyperlipidemia     Osteoporosis     Thyroid disorder screening 10/17/2017    TSH of 1.12 ordered by Dr. dee Jones     Past Surgical History:   Procedure Laterality Date    CHO[place]ECTOMY  1997    [place]     COLONOSCOPY      EYE SURGERY      bilateral cataracts    FRACTURE SURGERY  2014    right femur with neville    HEMORRHOID SURGERY      48 yrs ago    HYSTERECTOMY      REPLACEMENT OF IMPLANTABLE CARDIOVERTER-DEFIBRILLATOR (ICD) GENERATOR N/A 12/13/2019    Procedure: REPLACEMENT, PULSE GENERATOR, ICD-MEDTRONIC;  Surgeon: Sebastian Nowak III, MD;  Location: [facility];  Service: Cardiology;  Laterality: N/A;    TONSILLECTOMY       Family History   Problem Relation Age of Onset    Heart disease Mother     Cancer Father         Lung Cancer ??? Asbestos    Breast cancer Paternal Aunt      Social History     Tobacco Use    Smoking status: Former Smoker    Smokeless tobacco: Never Used    Tobacco comment: quit 2013   Substance Use Topics     Alcohol use: No    Drug use: No     Review of Systems   Constitutional: Positive for fever.   HENT: Negative for congestion, rhinorrhea, sore throat and trouble swallowing.    Eyes: Negative for visual disturbance.   Respiratory: Positive for shortness of breath. Negative for cough, chest tightness and wheezing.    Cardiovascular: Negative for chest pain, palpitations and leg swelling.   Gastrointestinal: Negative for abdominal distention, abdominal pain, constipation, diarrhea, nausea and vomiting.   Genitourinary: Negative for difficulty urinating, dysuria, flank pain and frequency.   Musculoskeletal: Negative for arthralgias, back pain, joint swelling and neck pain.   Skin: Negative for color change and rash.   Neurological: Negative for dizziness, syncope, speech difficulty, weakness, numbness and headaches.   All other systems reviewed and are negative.      Physical Exam     Initial Vitals [04/01/20 1612]   BP Pulse Resp Temp SpO2   (!) 117/54 (!) 52 (!) 22 98 °F (36.7 °C) (!) 94 %      MAP       --         Physical Exam    Nursing note and vitals reviewed.  Constitutional: She appears well-developed and well-nourished. She is not diaphoretic. No distress.   HENT:   Head: Normocephalic and atraumatic.   Right Ear: External ear normal.   Left Ear: External ear normal.   Nose: Nose normal.   Mouth/Throat: Oropharynx is clear and moist. No oropharyngeal exudate.   Eyes: Conjunctivae and EOM are normal. Pupils are equal, round, and reactive to light. Right eye exhibits no discharge. Left eye exhibits no discharge. No scleral icterus.   Neck: Normal range of motion. Neck supple. No thyromegaly present. No tracheal deviation present. No JVD present.   Cardiovascular: Normal rate, regular rhythm, normal heart sounds and intact distal pulses. Exam reveals no gallop and no friction rub.    No murmur heard.  Pulmonary/Chest: Breath sounds normal. No stridor. No respiratory distress. She has no wheezes. She has no  rhonchi. She has no rales. She exhibits no tenderness.   Abdominal: Soft. Bowel sounds are normal. She exhibits no distension and no mass. There is no tenderness. There is no rebound and no guarding.   Musculoskeletal: Normal range of motion. She exhibits no edema or tenderness.   Lymphadenopathy:     She has no cervical adenopathy.   Neurological: She is alert and oriented to person, place, and time. She has normal strength. She displays normal reflexes. No cranial nerve deficit or sensory deficit.   Skin: Skin is warm and dry. No rash and no abscess noted. No erythema. No pallor.         ED Course   Procedures  Labs Reviewed   CBC W/ AUTO DIFFERENTIAL - Abnormal; Notable for the following components:       Result Value    RBC 3.94 (*)     Mean Corpuscular Hemoglobin Conc 31.6 (*)     Lymph% 16.8 (*)     Mono% 15.3 (*)     All other components within normal limits   COMPREHENSIVE METABOLIC PANEL - Abnormal; Notable for the following components:    Glucose 188 (*)     BUN, Bld 32 (*)     Creatinine 1.9 (*)     Albumin 2.8 (*)     eGFR if  28.7 (*)     eGFR if non  24.9 (*)     All other components within normal limits   B-TYPE NATRIURETIC PEPTIDE - Abnormal; Notable for the following components:    BNP 1,387 (*)     All other components within normal limits   PROTIME-INR - Abnormal; Notable for the following components:    PT 31.2 (*)     All other components within normal limits   TROPONIN I   INFLUENZA A AND B ANTIGEN    Narrative:     Specimen Source->Nasopharyngeal Swab   PROCALCITONIN   PROCALCITONIN   SARS-COV-2 (COVID-19) QUALITATIVE PCR   HEMOGLOBIN A1C   CBC W/ AUTO DIFFERENTIAL   POCT GLUCOSE MONITORING CONTINUOUS        ECG Results          EKG 12-lead (Final result)  Result time 04/01/20 17:26:19    Final result by Interface, Lab In [facility] (04/01/20 17:26:19)                 Narrative:    Test Reason : R06.02,    Vent. Rate : 050 BPM     Atrial Rate : 050 BPM     P-R Int  : 000 ms          QRS Dur : 154 ms      QT Int : 524 ms       P-R-T Axes : 000 -77 102 degrees     QTc Int : 477 ms    Ventricular-paced rhythm  Abnormal ECG  No previous ECGs available  Confirmed by Dean Riley MD ([id number]) on 4/1/2020 5:26:10 PM    Referred By: TRINITY ACHARYA           Confirmed By:Dean Riley MD                            Imaging Results          X-Ray Chest PA And Lateral (Final result)  Result time 04/01/20 16:57:44    Final result by Monica Aquino MD (04/01/20 16:57:44)                 Impression:      No acute pulmonary process      Electronically signed by: Monica Aquino MD  Date:    04/01/2020  Time:    16:57             Narrative:    EXAMINATION:  XR CHEST PA AND LATERAL    CLINICAL HISTORY:  shortness of breath    FINDINGS:  PA and lateral chest is compared to 01/17/2020 shows normal cardiomediastinal silhouette.    Lungs are clear. Pulmonary vasculature is normal. No acute osseous abnormality.                                 Medical Decision Making:   History:   Old Medical Records: I decided to obtain old medical records.  Initial Assessment:   Emergent evaluation of a 79-year-old female presenting with shortness of breath differential diagnosis includes CHF, COPD, novel Coronavirus, infection NOS              Attending Attestation:             Attending ED Notes:   Patient noted to be satting 85% on room air, patient also noted to have findings consistent with fluid overload patient started on oxygen patient will be consulted to Internal Medicine for further evaluation and management.                        Clinical Impression:       ICD-10-CM ICD-9-CM   1. Shortness of breath R06.02 786.05   2. Acute on chronic heart failure with reduced ejection fraction and diastolic dysfunction I50.43 428.43   3. Acute on chronic diastolic heart failure I50.33 428.33             ED Disposition Condition    Admit                           Terence Cox MD  04/01/20 [id number]

## 2020-04-01 NOTE — ED NOTES
Patient's daughter (Mary Ricci) called to say that her MD believes she is in congestive heart failure. He stopped amiodarone yestereday afternoon and instructed to give extra dose of torosamide. At lunch today, she ate chicken noodle soup and daughter checked her sats and they were in the 80's.  She was diagnosed with orthostatic hypotension and a UTI on Saturday at [place].

## 2020-04-02 ENCOUNTER — CLINICAL SUPPORT (OUTPATIENT)
Dept: CARDIOLOGY | Facility: HOSPITAL | Age: 80
DRG: 291 | End: 2020-04-02
Attending: EMERGENCY MEDICINE
Payer: MEDICARE

## 2020-04-02 VITALS — WEIGHT: 233.25 LBS | HEIGHT: 69 IN | BODY MASS INDEX: 34.55 KG/M2

## 2020-04-02 PROBLEM — I50.23 ACUTE ON CHRONIC SYSTOLIC (CONGESTIVE) HEART FAILURE: Status: ACTIVE | Noted: 2020-04-02

## 2020-04-02 PROBLEM — J44.9 COPD (CHRONIC OBSTRUCTIVE PULMONARY DISEASE): Status: ACTIVE | Noted: 2020-04-02

## 2020-04-02 LAB
ALBUMIN SERPL BCP-MCNC: 2.6 G/DL (ref 3.5–5.2)
ALP SERPL-CCNC: 59 U/L (ref 55–135)
ALT SERPL W/O P-5'-P-CCNC: 19 U/L (ref 10–44)
ANION GAP SERPL CALC-SCNC: 10 MMOL/L (ref 8–16)
AST SERPL-CCNC: 14 U/L (ref 10–40)
BACTERIA #/AREA URNS HPF: NEGATIVE /HPF
BASOPHILS # BLD AUTO: 0.02 K/UL (ref 0–0.2)
BASOPHILS NFR BLD: 0.3 % (ref 0–1.9)
BILIRUB SERPL-MCNC: 0.6 MG/DL (ref 0.1–1)
BILIRUB UR QL STRIP: NEGATIVE
BUN SERPL-MCNC: 33 MG/DL (ref 8–23)
CALCIUM SERPL-MCNC: 9.1 MG/DL (ref 8.7–10.5)
CHLORIDE SERPL-SCNC: 98 MMOL/L (ref 95–110)
CLARITY UR: ABNORMAL
CO2 SERPL-SCNC: 31 MMOL/L (ref 23–29)
COLOR UR: YELLOW
CREAT SERPL-MCNC: 1.8 MG/DL (ref 0.5–1.4)
DIFFERENTIAL METHOD: ABNORMAL
EOSINOPHIL # BLD AUTO: 0.2 K/UL (ref 0–0.5)
EOSINOPHIL NFR BLD: 3.6 % (ref 0–8)
ERYTHROCYTE [DISTWIDTH] IN BLOOD BY AUTOMATED COUNT: 14 % (ref 11.5–14.5)
EST. GFR  (AFRICAN AMERICAN): 30.6 ML/MIN/1.73 M^2
EST. GFR  (NON AFRICAN AMERICAN): 26.6 ML/MIN/1.73 M^2
ESTIMATED AVG GLUCOSE: 126 MG/DL (ref 68–131)
GLUCOSE SERPL-MCNC: 102 MG/DL (ref 70–110)
GLUCOSE SERPL-MCNC: 103 MG/DL (ref 70–110)
GLUCOSE SERPL-MCNC: 161 MG/DL (ref 70–110)
GLUCOSE SERPL-MCNC: 69 MG/DL (ref 70–110)
GLUCOSE SERPL-MCNC: 93 MG/DL (ref 70–110)
GLUCOSE SERPL-MCNC: 96 MG/DL (ref 70–110)
GLUCOSE UR QL STRIP: NEGATIVE
HBA1C MFR BLD HPLC: 6 % (ref 4.5–6.2)
HCT VFR BLD AUTO: 36.6 % (ref 37–48.5)
HGB BLD-MCNC: 11.5 G/DL (ref 12–16)
HGB UR QL STRIP: ABNORMAL
HYALINE CASTS #/AREA URNS LPF: 8 /LPF
IMM GRANULOCYTES # BLD AUTO: 0.01 K/UL (ref 0–0.04)
IMM GRANULOCYTES NFR BLD AUTO: 0.2 % (ref 0–0.5)
INR PPP: 2.5
KETONES UR QL STRIP: NEGATIVE
LEUKOCYTE ESTERASE UR QL STRIP: ABNORMAL
LYMPHOCYTES # BLD AUTO: 1 K/UL (ref 1–4.8)
LYMPHOCYTES NFR BLD: 17.9 % (ref 18–48)
MAGNESIUM SERPL-MCNC: 2.1 MG/DL (ref 1.6–2.6)
MCH RBC QN AUTO: 31.5 PG (ref 27–31)
MCHC RBC AUTO-ENTMCNC: 31.4 G/DL (ref 32–36)
MCV RBC AUTO: 100 FL (ref 82–98)
MICROSCOPIC COMMENT: ABNORMAL
MONOCYTES # BLD AUTO: 0.8 K/UL (ref 0.3–1)
MONOCYTES NFR BLD: 13.6 % (ref 4–15)
NEUTROPHILS # BLD AUTO: 3.8 K/UL (ref 1.8–7.7)
NEUTROPHILS NFR BLD: 64.4 % (ref 38–73)
NITRITE UR QL STRIP: NEGATIVE
NRBC BLD-RTO: 0 /100 WBC
PH UR STRIP: 5 [PH] (ref 5–8)
PHOSPHATE SERPL-MCNC: 4.1 MG/DL (ref 2.7–4.5)
PLATELET # BLD AUTO: 208 K/UL (ref 150–350)
PMV BLD AUTO: 10.6 FL (ref 9.2–12.9)
POTASSIUM SERPL-SCNC: 4.2 MMOL/L (ref 3.5–5.1)
PROT SERPL-MCNC: 5.9 G/DL (ref 6–8.4)
PROT UR QL STRIP: NEGATIVE
PROTHROMBIN TIME: 25.7 SEC (ref 10.6–14.8)
RBC # BLD AUTO: 3.65 M/UL (ref 4–5.4)
RBC #/AREA URNS HPF: 1 /HPF (ref 0–4)
SODIUM SERPL-SCNC: 139 MMOL/L (ref 136–145)
SP GR UR STRIP: 1.01 (ref 1–1.03)
SQUAMOUS #/AREA URNS HPF: 0 /HPF
URN SPEC COLLECT METH UR: ABNORMAL
UROBILINOGEN UR STRIP-ACNC: NEGATIVE EU/DL
WBC # BLD AUTO: 5.82 K/UL (ref 3.9–12.7)
WBC #/AREA URNS HPF: >100 /HPF (ref 0–5)

## 2020-04-02 PROCEDURE — G0378 HOSPITAL OBSERVATION PER HR: HCPCS

## 2020-04-02 PROCEDURE — 83735 ASSAY OF MAGNESIUM: CPT

## 2020-04-02 PROCEDURE — 84100 ASSAY OF PHOSPHORUS: CPT

## 2020-04-02 PROCEDURE — 81001 URINALYSIS AUTO W/SCOPE: CPT

## 2020-04-02 PROCEDURE — 25000003 PHARM REV CODE 250: Performed by: NURSE PRACTITIONER

## 2020-04-02 PROCEDURE — 83036 HEMOGLOBIN GLYCOSYLATED A1C: CPT

## 2020-04-02 PROCEDURE — 85025 COMPLETE CBC W/AUTO DIFF WBC: CPT

## 2020-04-02 PROCEDURE — 93306 TTE W/DOPPLER COMPLETE: CPT

## 2020-04-02 PROCEDURE — 97165 OT EVAL LOW COMPLEX 30 MIN: CPT

## 2020-04-02 PROCEDURE — 27000221 HC OXYGEN, UP TO 24 HOURS

## 2020-04-02 PROCEDURE — 94761 N-INVAS EAR/PLS OXIMETRY MLT: CPT

## 2020-04-02 PROCEDURE — 97535 SELF CARE MNGMENT TRAINING: CPT

## 2020-04-02 PROCEDURE — 93005 ELECTROCARDIOGRAM TRACING: CPT | Performed by: INTERNAL MEDICINE

## 2020-04-02 PROCEDURE — 36415 COLL VENOUS BLD VENIPUNCTURE: CPT

## 2020-04-02 PROCEDURE — 80053 COMPREHEN METABOLIC PANEL: CPT

## 2020-04-02 PROCEDURE — 25000003 PHARM REV CODE 250: Performed by: INTERNAL MEDICINE

## 2020-04-02 PROCEDURE — 85610 PROTHROMBIN TIME: CPT

## 2020-04-02 PROCEDURE — 97161 PT EVAL LOW COMPLEX 20 MIN: CPT

## 2020-04-02 RX ORDER — TORSEMIDE 20 MG/1
20 TABLET ORAL DAILY
Status: DISCONTINUED | OUTPATIENT
Start: 2020-04-02 | End: 2020-04-03 | Stop reason: HOSPADM

## 2020-04-02 RX ORDER — GABAPENTIN 100 MG/1
100 CAPSULE ORAL 3 TIMES DAILY
Status: DISCONTINUED | OUTPATIENT
Start: 2020-04-02 | End: 2020-04-03 | Stop reason: HOSPADM

## 2020-04-02 RX ORDER — GABAPENTIN 100 MG/1
200 CAPSULE ORAL 3 TIMES DAILY
Status: DISCONTINUED | OUTPATIENT
Start: 2020-04-02 | End: 2020-04-02

## 2020-04-02 RX ORDER — FUROSEMIDE 10 MG/ML
20 INJECTION INTRAMUSCULAR; INTRAVENOUS DAILY
Status: DISCONTINUED | OUTPATIENT
Start: 2020-04-02 | End: 2020-04-02

## 2020-04-02 RX ADMIN — ATORVASTATIN CALCIUM 10 MG: 10 TABLET, FILM COATED ORAL at 09:04

## 2020-04-02 RX ADMIN — PANTOPRAZOLE SODIUM 40 MG: 40 TABLET, DELAYED RELEASE ORAL at 08:04

## 2020-04-02 RX ADMIN — TORSEMIDE 20 MG: 20 TABLET ORAL at 12:04

## 2020-04-02 RX ADMIN — GABAPENTIN 100 MG: 100 CAPSULE ORAL at 09:04

## 2020-04-02 RX ADMIN — Medication 5000 UNITS: at 09:04

## 2020-04-02 RX ADMIN — WARFARIN SODIUM 5 MG: 5 TABLET ORAL at 05:04

## 2020-04-02 RX ADMIN — SACUBITRIL AND VALSARTAN 1 TABLET: 24; 26 TABLET, FILM COATED ORAL at 09:04

## 2020-04-02 RX ADMIN — Medication 5000 UNITS: at 08:04

## 2020-04-02 NOTE — PLAN OF CARE
Pt remains free from injury this shift. Pt is rule out covid-19 and on isolation precautions. Pt currently on 2 liters nasal cannula, requiring oxygen to the bathroom. Pt requires one person assistance.   Problem: Adult Inpatient Plan of Care  Goal: Plan of Care Review  Outcome: Ongoing, Progressing  Goal: Patient-Specific Goal (Individualization)  Outcome: Ongoing, Progressing  Goal: Absence of Hospital-Acquired Illness or Injury  Outcome: Ongoing, Progressing  Goal: Optimal Comfort and Wellbeing  Outcome: Ongoing, Progressing  Goal: Readiness for Transition of Care  Outcome: Ongoing, Progressing  Goal: Rounds/Family Conference  Outcome: Ongoing, Progressing     Problem: Diabetes Comorbidity  Goal: Blood Glucose Level Within Desired Range  Outcome: Ongoing, Progressing     Problem: Fall Injury Risk  Goal: Absence of Fall and Fall-Related Injury  Outcome: Ongoing, Progressing

## 2020-04-02 NOTE — PLAN OF CARE
04/02/20 1420   SANDERS Message   Medicare Outpatient and Observation Notification regarding financial responsibility Given to patient/caregiver;Explained to patient/caregiver;Signed/date by patient/caregiver   Date SANDERS was signed 04/02/20   Time SANDERS was signed 1338     KINA reviewed form with pt by phone due to r/o COVID-19. Pt gave verbal consent and SW provided a copy to pt's nurse to leave at bedside. This SW's contact # is on the form in case pt has further questions.

## 2020-04-02 NOTE — PLAN OF CARE
Problem: Occupational Therapy Goal  Goal: Occupational Therapy Goal  Description  Goals to be met by: d/c     Patient will increase functional independence with ADLs by performing:    LE Dressing with Supervision.  Grooming while standing at sink with Supervision.  Toileting from toilet with Supervision for hygiene and clothing management.   Toilet transfer to toilet with Supervision.  Bathing seated on shower bench with Stand-by assistance.      Outcome: Ongoing, Progressing

## 2020-04-02 NOTE — PT/OT/SLP EVAL
Physical Therapy Evaluation    Patient Name:  Jo Alegre   MRN:  [id number]    Recommendations:     Discharge Recommendations:  home health PT   Discharge Equipment Recommendations: none   Barriers to discharge: None    Assessment:     Jo Alegre is a 79 y.o. female admitted with a medical diagnosis of Acute on chronic combined systolic and diastolic heart failure.  She presents with the following impairments/functional limitations:  weakness, impaired endurance, gait instability, impaired functional mobilty, impaired cardiopulmonary response to activity, impaired balance, impaired self care skills, decreased lower extremity function.  Pt with fatigue and SOB. Difficult to obtain pulse ox reading    Rehab Prognosis: Good; patient would benefit from acute skilled PT services to address these deficits and reach maximum level of function.    Recent Surgery: * No surgery found *      Plan:     During this hospitalization, patient to be seen 6 x/week to address the identified rehab impairments via gait training, therapeutic activities, therapeutic exercises and progress toward the following goals:    · Plan of Care Expires:  04/03/20    Subjective     Chief Complaint: SOB and fatigue  Patient/Family Comments/goals: go home  Pain/Comfort:  · Pain Rating 1: 0/10    Patients cultural, spiritual, Christianity conflicts given the current situation: no    Living Environment:  Lives with grandson in single story home.  Prior to admission, patients level of function was mod I.  Equipment used at home: walker, rolling.  DME owned (not currently used): none.  Upon discharge, patient will have assistance from grandson.    Objective:     Communicated with OT and nurse prior to session.  Patient found supine with bed alarm, blood pressure cuff, peripheral IV, oxygen, telemetry  upon PT entry to room.    General Precautions: Standard, airborne, droplet, special contact, contact, fall   Orthopedic Precautions:N/A   Braces:  N/A     Exams:  · Cognitive Exam:  Patient is oriented to Person, Place, Time and Situation  · Sensation:    · -       Intact  · RLE Strength: 4+/5  · LLE Strength: 4+/5    Functional Mobility:  · Bed Mobility:     · Supine to Sit: minimum assistance  · Transfers:     · Sit to Stand:  minimum assistance and of 1 persons with rolling walker  · Bed to Chair: minimum assistance and of 1 persons with  rolling walker  using  Stand Pivot  · Toilet Transfer: minimum assistance with  grab bars  using  Stand Pivot  · Gait: 10 ft with rw. small step length, wide base of support and min VC to stay inside rolling walker       Therapeutic Activities and Exercises:   co evaled with OT. Performed toileting but to fatigued to perform standing hygiene at the sink    AM-PAC 6 CLICK MOBILITY  Total Score:18     Patient left up in chair with all lines intact and call button in reach.    GOALS:   Multidisciplinary Problems     Physical Therapy Goals        Problem: Physical Therapy Goal    Goal Priority Disciplines Outcome Goal Variances Interventions   Physical Therapy Goal     PT, PT/OT      Description:  Goals to be met by: 20     Patient will increase functional independence with mobility by performin. Supine to sit with Stand-by Assistance  2. Sit to stand transfer with Supervision  3. Bed to chair transfer with Stand-by Assistance using Rolling Walker  4. Gait  x 50 feet with Supervision using Rolling Walker.                       History:     Past Medical History:   Diagnosis Date    Allergy     Codeine, Lasix    Atrial fibrillation     Atrial fibrillation     Cataract     CHF (congestive heart failure)     Diabetes mellitus, type 2     Ejection fraction < 50% 10/18/2017    Approximately 35%  Based on prior  Echocardiogram.    Encounter for blood transfusion     Hyperlipidemia     Osteoporosis     Thyroid disorder screening 10/17/2017    TSH of 1.12 ordered by Dr. dee Jones       Past Surgical History:    Procedure Laterality Date    CHOLE[place]STECTOMY  1997    [place]     COLONOSCOPY      EYE SURGERY      bilateral cataracts    FRACTURE SURGERY  2014    right femur with neville    HEMORRHOID SURGERY      48 yrs ago    HYSTERECTOMY      REPLACEMENT OF IMPLANTABLE CARDIOVERTER-DEFIBRILLATOR (ICD) GENERATOR N/A 12/13/2019    Procedure: REPLACEMENT, PULSE GENERATOR, ICD-MEDTRONIC;  Surgeon: Sebastian Nowak III, MD;  Location: [facility];  Service: Cardiology;  Laterality: N/A;    TONSILLECTOMY         Time Tracking:     PT Received On: 04/02/20  PT Start Time: 1108     PT Stop Time: 1132  PT Total Time (min): 24 min     Billable Minutes: Evaluation 24 min      Tabby Rebolledo, PT  04/02/2020

## 2020-04-02 NOTE — CONSULTS
[facility]  Cardiology  Consult Note    Patient Name: Jo Alegre  MRN: [id number]  Admission Date: 4/1/2020  Hospital Length of Stay: 1 days  Code Status: Full Code   Attending Provider: Pop Musa MD   Consulting Provider: Nabeel Le MD  Primary Care Physician: Kraig Alberto MD  Principal Problem:Acute on chronic combined systolic and diastolic heart failure    Patient information was obtained from patient, past medical records and ER records.     Consults  Subjective:     REASON FOR CONSULT:  CHF     HPI: 79 y.o. old  female who  has a past medical history of Allergy, Atrial fibrillation, CHF (congestive heart failure) presented to emergency room with complaints of shortness of breath x2 weeks.  The patient states she has been having worsening shortness of breath and dyspnea with minimal exertion.  The patient states she also fell 2 days ago but denied head trauma at the time of the fall.  She denies fever, chills, hematemesis hemoptysis lightheadedness dizziness or syncope.  She denies any chest pain. She does not report any shocks from her ICD. She reportedly called her primary Cardiologist and was advised to go the hospital for further evaluation and management. She admits to drinking plenty of fluids. She admits to being compliant with salt restriction and medications.     Past Medical History:   Diagnosis Date    Allergy     Codeine, Lasix    Atrial fibrillation     Atrial fibrillation     Cataract     CHF (congestive heart failure)     Diabetes mellitus, type 2     Ejection fraction < 50% 10/18/2017    Approximately 35%  Based on prior  Echocardiogram.    Encounter for blood transfusion     Hyperlipidemia     Osteoporosis     Thyroid disorder screening 10/17/2017    TSH of 1.12 ordered by Dr. dee Jones       Past Surgical History:   Procedure Laterality Date    [place]STECTOMY  1997    [place]     COLONOSCOPY      EYE SURGERY      bilateral cataracts     FRACTURE SURGERY  2014    right femur with neville    HEMORRHOID SURGERY      48 yrs ago    HYSTERECTOMY      REPLACEMENT OF IMPLANTABLE CARDIOVERTER-DEFIBRILLATOR (ICD) GENERATOR N/A 2019    Procedure: REPLACEMENT, PULSE GENERATOR, ICD-MEDTRONIC;  Surgeon: Sebastian Nowak III, MD;  Location: [facility];  Service: Cardiology;  Laterality: N/A;    TONSILLECTOMY         Review of patient's allergies indicates:   Allergen Reactions    Codeine     Lasix [furosemide]        Current Facility-Administered Medications on File Prior to Encounter   Medication    0.9%  NaCl infusion    diphenhydrAMINE injection 25 mg    lorazepam injection 1 mg     Current Outpatient Medications on File Prior to Encounter   Medication Sig    Ca-D3-mag ox-zinc--thom-bor (CALCIUM 600-D3 PLUS) 600 mg calcium- 800 unit-50 mg Tab Take 1 tablet by mouth 2 (two) times daily.    carvedilol (COREG) 25 MG tablet Take 0.5 tablets (12.5 mg total) by mouth 2 (two) times daily with meals.    cholecalciferol, vitamin D3, (VITAMIN D3) 5,000 unit Tab Take 5,000 Units by mouth 2 (two) times daily.    gabapentin (NEURONTIN) 100 MG capsule TAKE ONE CAPSULE BY MOUTH THREE TIMES DAILY (Patient taking differently: Take 100 mg by mouth 3 (three) times daily. )    glimepiride (AMARYL) 1 MG tablet Take 1 tablet (1 mg total) by mouth before breakfast.    midodrine (PROAMATINE) 2.5 MG Tab Take 2.5 mg by mouth 3 (three) times daily.    omeprazole (PRILOSEC) 40 MG capsule Take 40 mg by mouth once daily.    torsemide (DEMADEX) 10 MG Tab Take by mouth once daily. Pt states takes Torsemide and not Lasix, but is unsure of dose or frequency    warfarin (COUMADIN) 5 MG tablet Take 5 mg by mouth once daily.    cefUROXime (CEFTIN) 500 MG tablet Take 1 tablet (500 mg total) by mouth 2 (two) times daily.    denosumab (PROLIA) 60 mg/mL Syrg Inject 1 mL (60 mg total) into the skin every 6 (six) months.    [] fluticasone propionate (FLONASE) 50  mcg/actuation nasal spray 1 spray (50 mcg total) by Each Nostril route once daily.    sacubitril-valsartan (ENTRESTO) 24-26 mg per tablet Take 1 tablet by mouth once daily.     [DISCONTINUED] furosemide (LASIX) 20 MG tablet Take 20 mg by mouth 2 (two) times daily as needed.        Scheduled Meds:   atorvastatin  10 mg Oral Daily    carvediloL  12.5 mg Oral BID WM    pantoprazole  40 mg Oral Daily    sacubitriL-valsartan  1 tablet Oral Daily    torsemide  20 mg Oral Daily    vitamin D  5,000 Units Oral BID    warfarin  5 mg Oral Daily     Continuous Infusions:  PRN Meds:.dextrose 50%, dextrose 50%, glucagon (human recombinant), glucose, glucose, insulin aspart U-100, ondansetron, sodium chloride 0.9%    Family History     Problem Relation (Age of Onset)    Breast cancer Paternal Aunt    Cancer Father    Heart disease Mother          Tobacco Use    Smoking status: Former Smoker    Smokeless tobacco: Never Used    Tobacco comment: quit 2013   Substance and Sexual Activity    Alcohol use: No    Drug use: No    Sexual activity: Not Currently       ROS   No significant headaches or sore throat or runny nose.   No recent changes in vision.   No recent changes in hearing.  No dysphagia or odynophagia.  Reports shortness of breath.   Denies any cough or hemoptysis.   Denies any abdominal pain, nausea, vomiting, diarrhea or constipation.   Denies any dysuria or polyuria.   Denies any fevers or chills.   Denies any recent significant weight changes.   Denies bleeding diathesis    Objective:     Vital Signs (Most Recent):  Temp: 98 °F (36.7 °C) (04/02/20 1159)  Pulse: (!) 58 (04/02/20 1159)  Resp: 19 (04/02/20 1159)  BP: (!) 99/49 (04/02/20 1159)  SpO2: 95 % (04/02/20 1159) Vital Signs (24h Range):  Temp:  [96.6 °F (35.9 °C)-98.2 °F (36.8 °C)] 98 °F (36.7 °C)  Pulse:  [50-62] 58  Resp:  [17-22] 19  SpO2:  [85 %-100 %] 95 %  BP: ()/(49-75) 99/49     Weight: 105.8 kg (233 lb 4 oz)  Body mass index is 34.44  kg/m².    SpO2: 95 %  O2 Device (Oxygen Therapy): nasal cannula    No intake or output data in the 24 hours ending 04/02/20 1452    Lines/Drains/Airways     Peripheral Intravenous Line                 Peripheral IV - Single Lumen 04/01/20 2004 22 G Left Hand less than 1 day                Physical Exam  HEENT: Normocephalic, atraumatic, PERRL, Conjunctiva pink, no scleral icterus.   CVS: S1S2+, RRR, no murmurs, rubs or gallops, JVP: Normal.  LUNGS: Clear  ABDOMEN: Soft, NT, BS+  EXTREMITIES: No cyanosis, clubbing.  Trace edema  NEURO: AAO X 3.     Significant Labs:   BMP:   Recent Labs   Lab 04/01/20  1632 04/02/20  1054   * 93    139   K 4.1 4.2   CL 97 98   CO2 29 31*   BUN 32* 33*   CREATININE 1.9* 1.8*   CALCIUM 9.0 9.1   MG  --  2.1   , CMP   Recent Labs   Lab 04/01/20  1632 04/02/20  1054    139   K 4.1 4.2   CL 97 98   CO2 29 31*   * 93   BUN 32* 33*   CREATININE 1.9* 1.8*   CALCIUM 9.0 9.1   PROT 6.1 5.9*   ALBUMIN 2.8* 2.6*   BILITOT 0.4 0.6   ALKPHOS 57 59   AST 17 14   ALT 18 19   ANIONGAP 10 10   ESTGFRAFRICA 28.7* 30.6*   EGFRNONAA 24.9* 26.6*   , CBC   Recent Labs   Lab 04/01/20  1632 04/02/20  1051   WBC 5.83 5.82   HGB 12.2 11.5*   HCT 38.6 36.6*    208   , INR   Recent Labs   Lab 04/01/20  1632 04/02/20  1050   INR 3.2 2.5   , Lipid Panel No results for input(s): CHOL, HDL, LDLCALC, TRIG, CHOLHDL in the last 48 hours. and Troponin   Recent Labs   Lab 04/01/20  1632   TROPONINI <0.030       Significant Imaging: Reviewed  Assessment and Plan:     IMPRESSION:  CHF. Acute on chronic. Reportedly 35% in the past. Negative cardiac biomarkers.   Acute on CKD.   Covid 19 suspect.   Hypertension.   Dyslipidemia.       RECOMMENDATIONS:  1. Continue IV diuresis.   2. Follow up on echocardiogram.   3. Continue her home medical regimen otherwise.   4. Fluid restrictions to <1.5 liters/day.   5. Salt restriction.  6. Further decisions to follow based upon the hospital course.      Thank you for your consult. I will follow-up with patient. Please contact us if you have any additional questions.    Nabeel Le MD  Cardiology   [facility]

## 2020-04-02 NOTE — H&P
[facility] Medicine History & Physical Examination   Patient Name: Jo Alegre  MRN: [id number]  Patient Class: Emergency   Admission Date: 4/1/2020  5:44 PM  Length of Stay: 0  Attending Physician:   Primary Care Provider: Kraig Alberto MD  Face-to-Face encounter date: 04/01/2020  Code Status:Full Code  MPOA:  Chief Complaint: Shortness of Breath (denies cough or chest pain) and Fever        Patient information was obtained from patient, past medical records and ER records.   HISTORY OF PRESENT ILLNESS:   Jo Alegre is a 79 y.o. old  female who  has a past medical history of Allergy, Atrial fibrillation, Atrial fibrillation, Cataract, CHF (congestive heart failure), Diabetes mellitus, type 2, Ejection fraction < 50% (10/18/2017), Encounter for blood transfusion, Hyperlipidemia, Osteoporosis, and Thyroid disorder screening (10/17/2017).. The patient presented to [facility] on 4/1/2020 with a primary complaint of Shortness of Breath (denies cough or chest pain) and Fever  .   79-year-old  female presents emergency room with complaints of shortness of breath x2 weeks.  The patient states she has been having worsening shortness of breath and dyspnea with minimal exertion.  The patient states she also fell 2 days ago but denied head trauma at the time of the fall.  She denies fever, chills, hematemesis hemoptysis lightheadedness dizziness or syncope.  She denies chest pain but she did endorse palpitations and fatigue.  She describes her symptoms as moderate to severe severity with no exacerbating or alleviating factors.  This patient states that she is not on home oxygen but does have a remote history of COPD, atrial fibrillation and AICD.    REVIEW OF SYSTEMS:   10 Point Review of System was performed and was found to be negative except for that mentioned already in the HPI and   Review of Systems (Negative unless checked off)  Review of Systems    Constitutional: Positive for malaise/fatigue.   HENT: Negative.    Eyes: Negative.    Respiratory: Positive for shortness of breath.    Cardiovascular: Positive for palpitations.   Genitourinary: Negative.    Musculoskeletal: Positive for falls.   Skin: Negative.    Neurological: Positive for weakness.   Endo/Heme/Allergies: Negative.    Psychiatric/Behavioral: Negative.            PAST MEDICAL HISTORY:     Past Medical History:   Diagnosis Date    Allergy     Codeine, Lasix    Atrial fibrillation     Atrial fibrillation     Cataract     CHF (congestive heart failure)     Diabetes mellitus, type 2     Ejection fraction < 50% 10/18/2017    Approximately 35%  Based on prior  Echocardiogram.    Encounter for blood transfusion     Hyperlipidemia     Osteoporosis     Thyroid disorder screening 10/17/2017    TSH of 1.12 ordered by Dr. dee Jones       PAST SURGICAL HISTORY:     Past Surgical History:   Procedure Laterality Date    CHOLECYSTECTOMY  1997    Mack     COLONOSCOPY      EYE SURGERY      bilateral cataracts    FRACTURE SURGERY  2014    right femur with neville    HEMORRHOID SURGERY      48 yrs ago    HYSTERECTOMY      REPLACEMENT OF IMPLANTABLE CARDIOVERTER-DEFIBRILLATOR (ICD) GENERATOR N/A 12/13/2019    Procedure: REPLACEMENT, PULSE GENERATOR, ICD-MEDTRONIC;  Surgeon: Sebastian Nowak III, MD;  Location: [facility];  Service: Cardiology;  Laterality: N/A;    TONSILLECTOMY         ALLERGIES:   Codeine and Lasix [furosemide]    FAMILY HISTORY:     Family History   Problem Relation Age of Onset    Heart disease Mother     Cancer Father         Lung Cancer ??? Asbestos    Breast cancer Paternal Aunt        SOCIAL HISTORY:     Social History     Tobacco Use    Smoking status: Former Smoker    Smokeless tobacco: Never Used    Tobacco comment: quit 2013   Substance Use Topics    Alcohol use: No        Social History     Substance and Sexual Activity   Sexual Activity Not Currently        HOME  MEDICATIONS:     Prior to Admission medications    Medication Sig Start Date End Date Taking? Authorizing Provider   Ca-D3-mag ox-zinc--thom-bor (CALCIUM 600-D3 PLUS) 600 mg calcium- 800 unit-50 mg Tab Take 1 tablet by mouth 2 (two) times daily.   Yes Historical Provider, MD   carvedilol (COREG) 25 MG tablet Take 0.5 tablets (12.5 mg total) by mouth 2 (two) times daily with meals. 8/15/19  Yes Kraig Alberto MD   cholecalciferol, vitamin D3, (VITAMIN D3) 5,000 unit Tab Take 5,000 Units by mouth 2 (two) times daily.   Yes Historical Provider, MD   gabapentin (NEURONTIN) 100 MG capsule TAKE ONE CAPSULE BY MOUTH THREE TIMES DAILY  Patient taking differently: Take 100 mg by mouth 3 (three) times daily.  11/17/19  Yes Kraig Alberto MD   glimepiride (AMARYL) 1 MG tablet Take 1 tablet (1 mg total) by mouth before breakfast. 7/4/19  Yes Kraig Alberto MD   midodrine (PROAMATINE) 2.5 MG Tab Take 2.5 mg by mouth 3 (three) times daily.   Yes Historical Provider, MD   omeprazole (PRILOSEC) 40 MG capsule Take 40 mg by mouth once daily.   Yes Historical Provider, MD   warfarin (COUMADIN) 5 MG tablet Take 5 mg by mouth once daily.   Yes Historical Provider, MD   midodrine (PROAMATINE) 2.5 MG Tab Take 2.5 mg by mouth as needed.  4/1/20 Yes Historical Provider, MD   cefUROXime (CEFTIN) 500 MG tablet Take 1 tablet (500 mg total) by mouth 2 (two) times daily. 3/5/20   Kraig Alberto MD   denosumab (PROLIA) 60 mg/mL Syrg Inject 1 mL (60 mg total) into the skin every 6 (six) months. 5/30/19 5/29/20  Kraig Alberto MD   fluticasone propionate (FLONASE) 50 mcg/actuation nasal spray 1 spray (50 mcg total) by Each Nostril route once daily. 3/2/20 4/1/20  Christine Martínez NP   furosemide (LASIX) 20 MG tablet Take 20 mg by mouth 2 (two) times daily as needed.     Historical Provider, MD   sacubitril-valsartan (ENTRESTO) 24-26 mg per tablet Take 1 tablet by mouth once daily.     Historical Provider, MD   amiodarone (PACERONE) 200 MG Tab Take by  "mouth once daily.  4/1/20  Historical Provider, MD         PHYSICAL EXAM:   BP (!) 90/55   Pulse (!) 50   Temp 98 °F (36.7 °C) (Oral)   Resp (!) 22   Ht 5' 9" (1.753 m)   Wt 101.2 kg (223 lb)   SpO2 100%   BMI 32.93 kg/m²   Vitals Reviewed  General appearance: Well-developed, well-nourished female in no apparent distress.  Skin: No Rash.   Neuro: Motor and sensory exams grossly intact. Good tone. Power in all 4 extremities 5/5.   HENT: Atraumatic head. Moist mucous membranes of oral cavity.  Eyes: Normal extraocular movements.   Neck: Supple. No evidence of lymphadenopathy. No thyroidomegaly.  Lungs:  Moderate tachypnea coarse rhonchi to bilateral anterior posterior upper and lower feels   Heart:  Irregularly irregular + murmurs/gallop/rub. No pedal edema. No JVD present.   Abdomen: Soft, non-distended, non-tender. No rebound tenderness/guarding. No masses or organomegaly. Bowel sounds are normal. Bladder is not palpable.   Extremities: No cyanosis, clubbing, or edema.  Psych/mental status: Alert and oriented. Cooperative. Responds appropriately to questions.   EMERGENCY DEPARTMENT LABS AND IMAGING:   Following labs were Reviewed   Recent Labs   Lab 04/01/20  1632   WBC 5.83   HGB 12.2   HCT 38.6      CALCIUM 9.0   ALBUMIN 2.8*   PROT 6.1      K 4.1   CO2 29   CL 97   BUN 32*   CREATININE 1.9*   ALKPHOS 57   ALT 18   AST 17   BILITOT 0.4         BMP:   Recent Labs   Lab 04/01/20  1632   *      K 4.1   CL 97   CO2 29   BUN 32*   CREATININE 1.9*   CALCIUM 9.0   , CMP   Recent Labs   Lab 04/01/20  1632      K 4.1   CL 97   CO2 29   *   BUN 32*   CREATININE 1.9*   CALCIUM 9.0   PROT 6.1   ALBUMIN 2.8*   BILITOT 0.4   ALKPHOS 57   AST 17   ALT 18   ANIONGAP 10   ESTGFRAFRICA 28.7*   EGFRNONAA 24.9*   , CBC   Recent Labs   Lab 04/01/20  1632   WBC 5.83   HGB 12.2   HCT 38.6      , INR   Lab Results   Component Value Date    INR 3.2 04/01/2020    INR 1.0 12/13/2019    " INR 2.9 11/27/2019   , Lipid Panel No results found for: CHOL, HDL, LDLCALC, TRIG, CHOLHDL, Troponin   Recent Labs   Lab 04/01/20  1632   TROPONINI <0.030   , A1C: No results for input(s): HGBA1C in the last 4320 hours. and All labs within the past 24 hours have been reviewed  Microbiology Results (last 7 days)     ** No results found for the last 168 hours. **        X-Ray Chest PA And Lateral   Final Result      No acute pulmonary process         Electronically signed by: Monica Aquino MD   Date:    04/01/2020   Time:    16:57          ASSESSMENT & PLAN:   Jo Alegre is a 79 y.o. female admitted for    1. 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 Non-Insulin Dependent Diabetic  -diabetic cardiac coumadin diet  - Low dose Novolog S/S insulin    3. A- Fib with slow VR  - on Coumadin    4. Obesity    5. HTN  - continue current home regimen    6. Hyperlipidemia   - continue statin    7. Weakness with falling  -PT/OT eval and treat    8. Acute on chronic Renal failure with baseline CKD stage III - chronic and stable    DVT Prophylaxis: will be placed on Coumadin for DVT prophylaxis and will be advised to be as mobile as possible and sit in a chair as tolerated.   ________________________________________________________________________________      Face-to-Face encounter date: 04/01/2020  Encounter included review of the medical records, interviewing and examining the patient face-to-face, discussion with family and other health care providers including emergency medicine physician, admission orders, interpreting lab/test results and formulating a plan of care.   Medical Decision Making during this encounter was  [_] Low Complexity  [_] Moderate Complexity  [x] High Complexity  _________________________________________________________________________________    INPATIENT LIST OF MEDICATIONS   No current  facility-administered medications for this encounter.     Current Outpatient Medications:     Ca-D3-mag ox-zinc--thom-bor (CALCIUM 600-D3 PLUS) 600 mg calcium- 800 unit-50 mg Tab, Take 1 tablet by mouth 2 (two) times daily., Disp: , Rfl:     carvedilol (COREG) 25 MG tablet, Take 0.5 tablets (12.5 mg total) by mouth 2 (two) times daily with meals., Disp: 1 tablet, Rfl: 1    cholecalciferol, vitamin D3, (VITAMIN D3) 5,000 unit Tab, Take 5,000 Units by mouth 2 (two) times daily., Disp: , Rfl:     gabapentin (NEURONTIN) 100 MG capsule, TAKE ONE CAPSULE BY MOUTH THREE TIMES DAILY (Patient taking differently: Take 100 mg by mouth 3 (three) times daily. ), Disp: 270 capsule, Rfl: 1    glimepiride (AMARYL) 1 MG tablet, Take 1 tablet (1 mg total) by mouth before breakfast., Disp: 90 tablet, Rfl: 1    midodrine (PROAMATINE) 2.5 MG Tab, Take 2.5 mg by mouth 3 (three) times daily., Disp: , Rfl:     omeprazole (PRILOSEC) 40 MG capsule, Take 40 mg by mouth once daily., Disp: , Rfl:     warfarin (COUMADIN) 5 MG tablet, Take 5 mg by mouth once daily., Disp: , Rfl:     cefUROXime (CEFTIN) 500 MG tablet, Take 1 tablet (500 mg total) by mouth 2 (two) times daily., Disp: 14 tablet, Rfl: 0    denosumab (PROLIA) 60 mg/mL Syrg, Inject 1 mL (60 mg total) into the skin every 6 (six) months., Disp: 2 mL, Rfl: 1    fluticasone propionate (FLONASE) 50 mcg/actuation nasal spray, 1 spray (50 mcg total) by Each Nostril route once daily., Disp: 13 g, Rfl: 2    furosemide (LASIX) 20 MG tablet, Take 20 mg by mouth 2 (two) times daily as needed. , Disp: , Rfl:     sacubitril-valsartan (ENTRESTO) 24-26 mg per tablet, Take 1 tablet by mouth once daily. , Disp: , Rfl:     Facility-Administered Medications Ordered in Other Encounters:     0.9%  NaCl infusion, , Intravenous, Continuous, Sebastian Nowak III, MD, Last Rate: 75 mL/hr at 12/13/19 0728    diphenhydrAMINE injection 25 mg, 25 mg, Intravenous, Once, Sebastian Nowak III, MD     lorazepam injection 1 mg, 1 mg, Intravenous, Once, Sebastian Nowak III, MD      Scheduled Meds:  Continuous Infusions:  PRN Meds:.      Dalia Rosales  [facility] Hospitalist NP  04/01/2020

## 2020-04-02 NOTE — PROGRESS NOTES
[facility] Medicine    Progress Note    Patient Name: Jo Alegre  MRN: [id number]  Patient Class: IP- Inpatient   Admission Date: 4/1/2020  5:44 PM  Length of Stay: 1  Attending Physician: TALIA DE LA TORRE MD  Primary Care Provider: Kraig Alberto MD  Face-to-Face encounter date: 04/02/2020  Chief Complaint: Shortness of Breath (denies cough or chest pain) and Fever         Patient ID: Jo Alegre is a 79 y.o. female admitted for   Active Hospital Problems    Diagnosis  POA    *Acute on chronic combined systolic and diastolic heart failure [I50.43]  Yes    Shortness of breath [R06.02]  Yes    Fall [W19.XXXA]  Yes    Paroxysmal atrial fibrillation [I48.0]  Yes    Stage 3 chronic kidney disease [N18.3]  Yes    Type 2 diabetes mellitus with diabetic nephropathy, without long-term current use of insulin [E11.21]  Yes    Essential hypertension [I10]  Yes    Cardiomyopathy with implantable cardioverter-defibrillator [I42.9, Z95.810]  Yes      Resolved Hospital Problems   No resolved problems to display.      HPI by Evie Rosales 04/01/2020:   Jo Alegre is a 79 y.o. old  female who  has a past medical history of Allergy, Atrial fibrillation, Atrial fibrillation, Cataract, CHF (congestive heart failure), Diabetes mellitus, type 2, Ejection fraction < 50% (10/18/2017), Encounter for blood transfusion, Hyperlipidemia, Osteoporosis, and Thyroid disorder screening (10/17/2017).. The patient presented to [facility] on 4/1/2020 with a primary complaint of Shortness of Breath (denies cough or chest pain) and Fever  .   79-year-old  female presents emergency room with complaints of shortness of breath x2 weeks.  The patient states she has been having worsening shortness of breath and dyspnea with minimal exertion.  The patient states she also fell 2 days ago but denied head trauma at the time of the fall.  She denies fever, chills, hematemesis hemoptysis  lightheadedness dizziness or syncope.  She denies chest pain but she did endorse palpitations and fatigue.  She describes her symptoms as moderate to severe severity with no exacerbating or alleviating factors.  This patient states that she is not on home oxygen but does have a remote history of COPD, atrial fibrillation and AICD.      Subjective:    Interval History: Patient seen and examined this morning.  Laying in bed comfortably.  Reports she feels much better as compared to yesterday.  Shortness of breath much improved.  Patient report yesterday the shortness of breath was different than the previous that she had with COPD and CHF.  Patient reports Dr. Alberto is her primary care physician and she sees cardiologist Dr. Jones in [place] but does not see a pulmonologist.  Patient reports she has thought about home oxygen as she get easily short of breath at home.  Discussed will evaluate her for home oxygen once she is cardiology wise better.  Verbalized understanding.  Patient did express that she does not want to be in the hospital for too long due to the current COVID situation  No Family present at bedside.  No concerns/issues overnight reported by the patient or the nursing staff.    Review of Systems All other Review of Systems were found to be negative expect for that mentioned already in HPI.     Objective:     Physical Exam  Vitals:    04/02/20 0318   BP: (!) 115/55   Pulse: 61   Resp: 18   Temp: 98.2 °F (36.8 °C)     Wt Readings from Last 1 Encounters:   04/01/20 105.8 kg (233 lb 4 oz)      Body mass index is 34.44 kg/m².    Vitals reviewed.  Constitutional: No distress.  Laying in bed comfortably  HENT:  Supplemental oxygen through nasal cannula 2 liters/minute  Head: Atraumatic.   Cardiovascular: Normal rate, regular rhythm and normal heart sounds.   Pulmonary/Chest: Effort normal. No wheezes.   Abdominal: Soft. Bowel sounds are normal. No distension and no mass. No tenderness  Neurological: Alert.    Skin: Skin is warm and dry.     Following labs were Reviewed   CBC:  Recent Labs   Lab 04/01/20  1632   WBC 5.83   HGB 12.2   HCT 38.6        CMP:  Recent Labs   Lab 04/01/20  1632   CALCIUM 9.0   ALBUMIN 2.8*   PROT 6.1      K 4.1   CO2 29   CL 97   BUN 32*   CREATININE 1.9*   ALKPHOS 57   ALT 18   AST 17   BILITOT 0.4     Last 72 hour POCT GLUCOSE  No results found for: POCTGLUCOSE     Microbiology Results (last 7 days)     ** No results found for the last 168 hours. **            X-Ray Chest PA And Lateral   Final Result      No acute pulmonary process         Electronically signed by: Monica Aquino MD   Date:    04/01/2020   Time:    16:57            Scheduled Meds:   atorvastatin  10 mg Oral Daily    carvediloL  12.5 mg Oral BID WM    furosemide (LASIX) IV  20 mg Intravenous Daily    pantoprazole  40 mg Oral Daily    sacubitriL-valsartan  1 tablet Oral Daily    vitamin D  5,000 Units Oral BID    warfarin  5 mg Oral Daily     Continuous Infusions:  PRN Meds:.dextrose 50%, dextrose 50%, glucagon (human recombinant), glucose, glucose, insulin aspart U-100, ondansetron, sodium chloride 0.9%    04/02/2020: QTc: 361     Assessment & Plan:     Acute on chronic combination HF s/p AICD  BNP 1387 on admission  Troponin negative on admit, denies chest pain  2 D echo complete- report   Cards Dr Le consulted, awaiting Horace  Received Lasix 40 mg IV in thew ER  On Furosamide 20 mg daily since admission  Pt sees Cardiologist Dr Jones in [place] who does not come to our facility    Reported hx of COPD  Reports she has not seen a Pulmonologist and is not on Oxygen at home  Will re evaluate for Home oxygen once covid results are in     Non-Insulin Dependent Diabetic  Diabetic cardiac coumadin diet  Low dose Novolog S/S insulin     A- Fib with slow VR  On Coumadin  Continue     Obesity  BMI 34.44    HTN  Continue current home regimen     Hyperlipidemia   Continue statin     Weakness with  falling  PT/OT eval and treat- recommended home with home health     Acute on chronic Renal failure with baseline CKD stage III -   Chronic and stable  BUN/Cr 32/1.9 with GFR 24.9 on admission     DVT Prophylaxis:  Continue Coumadin for DVT prophylaxis and will be advised to be as mobile as possible and sit in a chair as tolerated.     Discussed case with Dr. Le.  Informed me that preliminary echo shows normal left ventricular systolic and diastolic function.  EF is normal.  Recommended to continue Coreg b.i.d. as a EF is normal.  Continue home dose of torsemide 20 mg daily.  Possible discharge tomorrow breathing improves and volume status improved    Discharge Planning:   Home with home health    Above encounter included review of the medical records, interviewing and examining the patient face-to-face, discussion with family and other health care providers, ordering and interpreting lab/test results and formulating a plan of care.     Medical Decision Making:      [_] Low Complexity  [_] Moderate Complexity  [x] High Complexity      Pop Musa MD  Department of Hospital Medicine   [facility]

## 2020-04-02 NOTE — PT/OT/SLP EVAL
Occupational Therapy   Evaluation    Name: Jo Alegre  MRN: [id number]  Admitting Diagnosis:  Acute on chronic combined systolic and diastolic heart failure      Recommendations:     Discharge Recommendations: home health OT  Discharge Equipment Recommendations:  none  Barriers to discharge:  None    Assessment:     Jo Alegre is a 79 y.o. female with a medical diagnosis of Acute on chronic combined systolic and diastolic heart failure.  She presents with generalized weakness and requiring supplemental 02 with activity which is not her baseline. Performance deficits affecting function: weakness, impaired endurance, impaired self care skills, impaired functional mobilty, impaired balance, gait instability, impaired cardiopulmonary response to activity.      Rehab Prognosis: Good; patient would benefit from acute skilled OT services to address these deficits and reach maximum level of function.       Plan:     Patient to be seen 5 x/week to address the above listed problems via self-care/home management, therapeutic activities, therapeutic exercises  · Plan of Care Expires: 05/02/20  · Plan of Care Reviewed with: patient    Subjective     Chief Complaint: generalized weakness  Patient/Family Comments/goals: to get back to her PLOF    Occupational Profile:  Living Environment: Lives with juan antonio in [address] 0[facility]; BSC over toilet; walk-in shower with bench and wall bar inside  Previous level of function: Mod (I)  Roles and Routines: caretaker for self  Equipment Used at Home:  bedside commode, walker, rolling, shower chair  Assistance upon Discharge: juan antonio    Pain/Comfort:  · Pain Rating 1: 0/10  · Pain Rating Post-Intervention 1: 0/10    Objective:     Communicated with: RN prior to session.  Patient found supine with bed alarm, blood pressure cuff, peripheral IV, telemetry, oxygen upon OT entry to room.    General Precautions: Standard, airborne, contact, droplet, fall, special contact   Orthopedic  Precautions:N/A   Braces: N/A     Occupational Performance:    Bed Mobility:    · Patient completed Supine to Sit with minimum assistance    Functional Mobility/Transfers:  · Patient completed Sit <> Stand Transfer with CGA with  rolling walker   · Patient completed Toilet Transfer Stand Pivot technique with stand by assistance with  rolling walker and grab bars  · Functional Mobility: CGA with RW and verbal cues for safe technique    Activities of Daily Living:  · Grooming: supervision completed in sitting 2/2 pt fatigue after toileting  · Lower Body Dressing: total assistance to don socks at EOB  · Toileting: stand by assistance on toilet in bathroom    Cognitive/Visual Perceptual:  Cognitive/Psychosocial Skills:     -       Oriented to: Person, Place, Time and Situation   -       Follows Commands/attention:Follows multistep  commands  -       Communication: clear/fluent  -       Memory: No Deficits noted  -       Safety awareness/insight to disability: intact   -       Mood/Affect/Coping skills/emotional control: Appropriate to situation    Physical Exam:  Upper Extremity Range of Motion:     -       Right Upper Extremity: WNL  -       Left Upper Extremity: WNL  Upper Extremity Strength:    -       Right Upper Extremity: 5/5  -       Left Upper Extremity: 5/5   Strength:    -       Right Upper Extremity: WNL  -       Left Upper Extremity: WNL  Fine Motor Coordination:    -       Intact    AMPAC 6 Click ADL:  AMPAC Total Score: 19    Treatment & Education:  OT role/POC  Education:    Patient left up in chair with all lines intact and call button in reach    GOALS:   Multidisciplinary Problems     Occupational Therapy Goals        Problem: Occupational Therapy Goal    Goal Priority Disciplines Outcome Interventions   Occupational Therapy Goal     OT, PT/OT Ongoing, Progressing    Description:  Goals to be met by: d/c     Patient will increase functional independence with ADLs by performing:    LE Dressing with  Supervision.  Grooming while standing at sink with Supervision.  Toileting from toilet with Supervision for hygiene and clothing management.   Toilet transfer to toilet with Supervision.  Bathing seated on shower bench with Stand-by assistance.                       History:     Past Medical History:   Diagnosis Date    Allergy     Codeine, Lasix    Atrial fibrillation     Atrial fibrillation     Cataract     CHF (congestive heart failure)     Diabetes mellitus, type 2     Ejection fraction < 50% 10/18/2017    Approximately 35%  Based on prior  Echocardiogram.    Encounter for blood transfusion     Hyperlipidemia     Osteoporosis     Thyroid disorder screening 10/17/2017    TSH of 1.12 ordered by Dr. dee Jones       Past Surgical History:   Procedure Laterality Date    [place]  [place]     COLONOSCOPY      EYE SURGERY      bilateral cataracts    FRACTURE SURGERY  2014    right femur with neville    HEMORRHOID SURGERY      48 yrs ago    HYSTERECTOMY      REPLACEMENT OF IMPLANTABLE CARDIOVERTER-DEFIBRILLATOR (ICD) GENERATOR N/A 12/13/2019    Procedure: REPLACEMENT, PULSE GENERATOR, ICD-MEDTRONIC;  Surgeon: Sebastian Nowak III, MD;  Location: [facility];  Service: Cardiology;  Laterality: N/A;    TONSILLECTOMY         Time Tracking:     OT Date of Treatment: 04/02/20  OT Start Time: 1108  OT Stop Time: 1134  OT Total Time (min): 26 min   Co-eval with PT    Billable Minutes:Evaluation 15  Self Care/Home Management 11    Brent Serrano, OT  4/2/2020

## 2020-04-03 VITALS
DIASTOLIC BLOOD PRESSURE: 67 MMHG | WEIGHT: 229.75 LBS | OXYGEN SATURATION: 96 % | BODY MASS INDEX: 34.03 KG/M2 | SYSTOLIC BLOOD PRESSURE: 162 MMHG | TEMPERATURE: 99 F | HEIGHT: 69 IN | RESPIRATION RATE: 19 BRPM | HEART RATE: 59 BPM

## 2020-04-03 PROBLEM — J96.10 CHRONIC RESPIRATORY FAILURE: Status: ACTIVE | Noted: 2020-04-03

## 2020-04-03 PROBLEM — I50.23 ACUTE ON CHRONIC SYSTOLIC (CONGESTIVE) HEART FAILURE: Status: RESOLVED | Noted: 2020-04-02 | Resolved: 2020-04-03

## 2020-04-03 PROBLEM — I50.43 ACUTE ON CHRONIC COMBINED SYSTOLIC AND DIASTOLIC HEART FAILURE: Status: RESOLVED | Noted: 2020-04-01 | Resolved: 2020-04-03
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
## 2020-04-03 PROBLEM — R06.02 SHORTNESS OF BREATH: Status: RESOLVED | Noted: 2020-01-16 | Resolved: 2020-04-03

## 2020-04-03 LAB
ANION GAP SERPL CALC-SCNC: 10 MMOL/L (ref 8–16)
BNP SERPL-MCNC: 430 PG/ML (ref 0–99)
BUN SERPL-MCNC: 33 MG/DL (ref 8–23)
CALCIUM SERPL-MCNC: 8.6 MG/DL (ref 8.7–10.5)
CHLORIDE SERPL-SCNC: 98 MMOL/L (ref 95–110)
CO2 SERPL-SCNC: 31 MMOL/L (ref 23–29)
CREAT SERPL-MCNC: 1.8 MG/DL (ref 0.5–1.4)
EST. GFR  (AFRICAN AMERICAN): 30.6 ML/MIN/1.73 M^2
EST. GFR  (NON AFRICAN AMERICAN): 26.6 ML/MIN/1.73 M^2
GLUCOSE SERPL-MCNC: 91 MG/DL (ref 70–110)
GLUCOSE SERPL-MCNC: 93 MG/DL (ref 70–110)
INR PPP: 2.2
POTASSIUM SERPL-SCNC: 4.2 MMOL/L (ref 3.5–5.1)
PROTHROMBIN TIME: 23.8 SEC (ref 10.6–14.8)
SARS-COV-2 RNA RESP QL NAA+PROBE: NOT DETECTED
SODIUM SERPL-SCNC: 139 MMOL/L (ref 136–145)

## 2020-04-03 PROCEDURE — G0378 HOSPITAL OBSERVATION PER HR: HCPCS

## 2020-04-03 PROCEDURE — 80048 BASIC METABOLIC PNL TOTAL CA: CPT

## 2020-04-03 PROCEDURE — 85610 PROTHROMBIN TIME: CPT

## 2020-04-03 PROCEDURE — 83880 ASSAY OF NATRIURETIC PEPTIDE: CPT

## 2020-04-03 PROCEDURE — 99900035 HC TECH TIME PER 15 MIN (STAT)

## 2020-04-03 PROCEDURE — 25000003 PHARM REV CODE 250: Performed by: INTERNAL MEDICINE

## 2020-04-03 PROCEDURE — 25000003 PHARM REV CODE 250: Performed by: NURSE PRACTITIONER

## 2020-04-03 PROCEDURE — 36415 COLL VENOUS BLD VENIPUNCTURE: CPT

## 2020-04-03 PROCEDURE — 97535 SELF CARE MNGMENT TRAINING: CPT

## 2020-04-03 RX ORDER — TORSEMIDE 20 MG/1
20 TABLET ORAL DAILY
Qty: 30 TABLET | Refills: 0 | Status: SHIPPED | OUTPATIENT
Start: 2020-04-04 | End: 2020-05-21

## 2020-04-03 RX ORDER — ATORVASTATIN CALCIUM 10 MG/1
10 TABLET, FILM COATED ORAL DAILY
Qty: 90 TABLET | Refills: 3 | Status: SHIPPED | OUTPATIENT
Start: 2020-04-03 | End: 2020-05-21

## 2020-04-03 RX ORDER — FLUTICASONE PROPIONATE 50 MCG
1 SPRAY, SUSPENSION (ML) NASAL DAILY
Qty: 13 G | Refills: 2 | Status: SHIPPED | OUTPATIENT
Start: 2020-04-03 | End: 2020-05-03

## 2020-04-03 RX ADMIN — SACUBITRIL AND VALSARTAN 1 TABLET: 24; 26 TABLET, FILM COATED ORAL at 09:04

## 2020-04-03 RX ADMIN — PANTOPRAZOLE SODIUM 40 MG: 40 TABLET, DELAYED RELEASE ORAL at 09:04

## 2020-04-03 RX ADMIN — CARVEDILOL 12.5 MG: 12.5 TABLET, FILM COATED ORAL at 09:04

## 2020-04-03 RX ADMIN — Medication 5000 UNITS: at 09:04

## 2020-04-03 RX ADMIN — GABAPENTIN 100 MG: 100 CAPSULE ORAL at 09:04

## 2020-04-03 RX ADMIN — TORSEMIDE 20 MG: 20 TABLET ORAL at 09:04

## 2020-04-03 NOTE — PT/OT/SLP PROGRESS
Occupational Therapy   Treatment    Name: Jo Alegre  MRN: [id number]  Admitting Diagnosis:  Acute on chronic combined systolic and diastolic heart failure       Recommendations:     Discharge Recommendations: home health OT  Discharge Equipment Recommendations:  none  Barriers to discharge:  None    Assessment:     Jo Alegre is a 79 y.o. female with a medical diagnosis of Acute on chronic combined systolic and diastolic heart failure.  She presents with fatigue today requiring CGA for mobility and Min A for toilet t/f's. Performance deficits affecting function are weakness, impaired endurance, impaired functional mobilty, impaired self care skills, gait instability, impaired balance, impaired cardiopulmonary response to activity.     Rehab Prognosis:  Good; patient would benefit from acute skilled OT services to address these deficits and reach maximum level of function.       Plan:     Patient to be seen 5 x/week to address the above listed problems via self-care/home management, therapeutic activities, therapeutic exercises  · Plan of Care Expires: 05/02/20  · Plan of Care Reviewed with: patient    Subjective     Pain/Comfort:  · Pain Rating 1: 0/10  · Pain Rating Post-Intervention 1: 0/10    Objective:     Communicated with: RN prior to session.  Patient found walking to toilet with RN with oxygen, peripheral IV, telemetry upon OT entry to room.    General Precautions: Standard, airborne, contact, droplet, fall, special contact   Orthopedic Precautions:N/A   Braces: N/A     Occupational Performance:     Functional Mobility/Transfers:  · Patient completed Toilet Transfer Stand Pivot technique with minimum assistance with  rolling walker and grab bars  · Functional Mobility: CGA with RW in hospital room     Activities of Daily Living:  · Grooming: contact guard assistance standing at sink to wash hands  · Toileting: contact guard assistance on toilet    Patient left up in chair with all lines intact  and call button in reachEducation:      GOALS:   Multidisciplinary Problems     Occupational Therapy Goals        Problem: Occupational Therapy Goal    Goal Priority Disciplines Outcome Interventions   Occupational Therapy Goal     OT, PT/OT Ongoing, Progressing    Description:  Goals to be met by: d/c     Patient will increase functional independence with ADLs by performing:    LE Dressing with Supervision.  Grooming while standing at sink with Supervision.  Toileting from toilet with Supervision for hygiene and clothing management.   Toilet transfer to toilet with Supervision.  Bathing seated on shower bench with Stand-by assistance.                       Time Tracking:     OT Date of Treatment: 04/03/20  OT Start Time: 1248  OT Stop Time: 1300  OT Total Time (min): 12 min    Billable Minutes:Self Care/Home Management 12    Brent Serrano OT  4/3/2020

## 2020-04-03 NOTE — NURSING
Pt discharged to home with home health. Pt iv removed and telemetry discontinued. Pt given follow up information.

## 2020-04-03 NOTE — CARE UPDATE
04/03/20 1152   Home Oxygen Qualification   Room Air SpO2 At Rest (!) 89 %   Room Air SpO2 on Exertion (!) 87 %   SpO2 During Exertion on O2 94 %   Heart Rate on O2 87 bpm   Exertion O2 LPM 2 LPM   SpO2 on Recovery (!) 80 %   Home O2 Eval Comments Patient requires 2lp of oxygen with exertion .

## 2020-04-03 NOTE — HOSPITAL COURSE
79-year-old white female with known past medical history of atrial fibrillation on Coumadin, cataracts, congestive heart failure, diabetes type 2, EF less than 50%, AICD implant, history of blood transfusion, hypertension, hyperlipidemia, osteoporosis, thyroid disorder admitted 04/01/2020 with diagnosis of acute on chronic congestive heart failure  Patient was admitted to Cardio B.  Due to her age and comorbidities, COVID was also tested on her.  Patient received Lasix 40 mg IV in the ER.  Next morning patient informed me that Dr. Jones in [place] is her primary cardiologist and Dr. Alberto is her primary care physician.  Discussed with patient that Dr. Jones does not come to our facility so we will consult our Cardiology team.  We consulted Dr. Le.  Troponins were negative.  BNP was 1387 on admission.  2D echo was done.  Patient COVID test was negative  Due to her weakness and debility, we consulted PT and OT for evaluation.  Who recommended home health with PT and OT.  Also noted that patient would desaturate without oxygen.  We consulted respiratory therapist to get a 6 min walk test to evaluate.  Patient O2 sats dropped to 87%.  Patient qualified for home oxygen.  Patient improved with 2 L per minute oxygen through nasal cannula.  Ordered home oxygen for 2 liter/minute on exertion only.  Patient was continued on torsemide 20 mg daily.  Discussed in depth low-salt diet and fluid restriction to 1.5 L per day.  Discussed with patient that she need to make an appointment with her primary cardiologist Dr. Jones in 1 week and would need a repeat of her BMP to evaluate her renal functions as her creatinine was slightly elevated from her baseline on this admission.  Patient verbalized understanding  Advised patient to make an appointment with her primary care physician in 2 weeks post hospital discharge.    Patient was seen and examined on the day of discharge  Vitals reviewed.  Constitutional: No distress.   Laying in bed comfortably  HENT:  Supplemental oxygen through nasal cannula 2 liters/minute  Head: Atraumatic.   Cardiovascular: Normal rate, regular rhythm and normal heart sounds.   Pulmonary/Chest: Effort normal. No wheezes.   Abdominal: Soft. Bowel sounds are normal. No distension and no mass. No tenderness  Neurological: Alert.   Skin: Skin is warm and dry.

## 2020-04-03 NOTE — DISCHARGE SUMMARY
[facility] Medicine  Discharge Summary      Patient Name: Jo Alegre  MRN: [id number]  Admission Date: 4/1/2020  Hospital Length of Stay: 1 days  Discharge Date and Time:  04/03/2020 5:16 PM  Attending Physician: Pop Musa MD   Discharging Provider: Pop Musa MD  Primary Care Provider: Kraig Alberto MD      HPI:   No notes on file    * No surgery found *      Hospital Course:   79-year-old white female with known past medical history of atrial fibrillation on Coumadin, cataracts, congestive heart failure, diabetes type 2, EF less than 50%, AICD implant, history of blood transfusion, hypertension, hyperlipidemia, osteoporosis, thyroid disorder admitted 04/01/2020 with diagnosis of acute on chronic congestive heart failure  Patient was admitted to Cardio B.  Due to her age and comorbidities, COVID was also tested on her.  Patient received Lasix 40 mg IV in the ER.  Next morning patient informed me that Dr. Jones in [place] is her primary cardiologist and Dr. Alberto is her primary care physician.  Discussed with patient that Dr. Jones does not come to our facility so we will consult our Cardiology team.  We consulted Dr. Le.  Troponins were negative.  BNP was 1387 on admission.  2D echo was done.  Patient COVID test was negative  Due to her weakness and debility, we consulted PT and OT for evaluation.  Who recommended home health with PT and OT.  Also noted that patient would desaturate without oxygen.  We consulted respiratory therapist to get a 6 min walk test to evaluate.  Patient O2 sats dropped to 87%.  Patient qualified for home oxygen.  Patient improved with 2 L per minute oxygen through nasal cannula.  Ordered home oxygen for 2 liter/minute on exertion only.  Patient was continued on torsemide 20 mg daily.  Discussed in depth low-salt diet and fluid restriction to 1.5 L per day.  Discussed with patient that she need to make an appointment with her primary cardiologist  Dr. Jones in 1 week and would need a repeat of her BMP to evaluate her renal functions as her creatinine was slightly elevated from her baseline on this admission.  Patient verbalized understanding  Advised patient to make an appointment with her primary care physician in 2 weeks post hospital discharge.    Patient was seen and examined on the day of discharge  Vitals reviewed.  Constitutional: No distress.  Laying in bed comfortably  HENT:  Supplemental oxygen through nasal cannula 2 liters/minute  Head: Atraumatic.   Cardiovascular: Normal rate, regular rhythm and normal heart sounds.   Pulmonary/Chest: Effort normal. No wheezes.   Abdominal: Soft. Bowel sounds are normal. No distension and no mass. No tenderness  Neurological: Alert.   Skin: Skin is warm and dry.         Consults:   Consults (From admission, onward)        Status Ordering Provider     Inpatient consult to Cardiology  Once     Provider:  Jerri Biggs MD    Acknowledged STUART GALICIA     Inpatient consult to Internal Medicine  Once     Provider:  Primo Monsivais MD    Acknowledged NIRANJAN NEWBERRY     Nutrition Services Referral  Once     Provider:  (Not yet assigned)    PRIMO Levy new Assessment & Plan notes have been filed under this hospital service since the last note was generated.  Service: Hospital Medicine    Final Active Diagnoses:    Diagnosis Date Noted POA    Chronic respiratory failure requiring 21 LPM supplemtal oxygen through nasal canula [J96.10] 04/03/2020 Yes    COPD (chronic obstructive pulmonary disease) per patient [J44.9] 04/02/2020 Yes    Paroxysmal atrial fibrillation [I48.0] 05/06/2019 Yes    Stage 3 chronic kidney disease [N18.3] 11/05/2018 Yes    Type 2 diabetes mellitus with diabetic nephropathy, without long-term current use of insulin [E11.21] 05/23/2018 Yes    Essential hypertension [I10] 05/23/2018 Yes    Cardiomyopathy with implantable cardioverter-defibrillator [I42.9,  "Z95.810] 05/23/2018 Yes      Problems Resolved During this Admission:    Diagnosis Date Noted Date Resolved POA    PRINCIPAL PROBLEM:  Acute on chronic combined systolic and diastolic heart failure [I50.43] 04/01/2020 04/03/2020 Yes    Acute on chronic systolic (congestive) heart failure [I50.23] 04/02/2020 04/03/2020 Yes    Shortness of breath [R06.02] 01/16/2020 04/03/2020 Yes    Fall [W19.XXXA] 10/14/2019 04/03/2020 Yes       Discharged Condition: good    Disposition: Home-Health Care [facility]    Follow Up:  Follow-up Information     Kraig Alberto MD. Schedule an appointment as soon as possible for a visit in 2 weeks.    Specialty:  Internal Medicine  Why:  Post hospital discharge follow-up.  Patient will need referral to pulmonologist for her reported COPD and now on home oxygen at 2 liters/minute on ambulation  Contact information:  [address]  [place] [address]  [phone]             Sampson Jones MD. Schedule an appointment as soon as possible for a visit in 1 week.    Specialties:  Cardiovascular Disease, Cardiology  Why:  Post hospital discharge follow-up for acute on chronic congestive heart failure  Contact information:  [address] [address]  [phone]                 Patient Instructions:      OXYGEN FOR HOME USE     Order Specific Question Answer Comments   Liter Flow 2    Duration With activity    Qualifying SpO2: 87%    Testing done at: Exercise/Activity    Route nasal cannula    Portable mode: continuous    Device home concentrator with portable unit    Length of need (in months): 99 mos    Patient condition with qualifying saturation CHF    Height: 5' 9" (1.753 m)    Weight: 104.2 kg (229 lb 11.5 oz)    Alternative treatment measures have been tried or considered and deemed clinically ineffective. Yes      Ambulatory referral/consult to Home Health   Standing Status: Future   Referral Priority: Routine Referral Type: Home Health "   Referral Reason: Specialty Services Required   Requested Specialty: Home Health Services   Number of Visits Requested: 1     Diet Cardiac     Notify your health care provider if you experience any of the following:  difficulty breathing or increased cough     Activity as tolerated       Significant Diagnostic Studies: Labs:   BMP:   Recent Labs   Lab 04/02/20  1054 04/03/20  0536   GLU 93 93    139   K 4.2 4.2   CL 98 98   CO2 31* 31*   BUN 33* 33*   CREATININE 1.8* 1.8*   CALCIUM 9.1 8.6*   MG 2.1  --    , CBC   Recent Labs   Lab 04/02/20  1051   WBC 5.82   HGB 11.5*   HCT 36.6*      , INR   Lab Results   Component Value Date    INR 2.2 04/03/2020    INR 2.5 04/02/2020    INR 3.2 04/01/2020   , Lipid Panel No results found for: CHOL, HDL, LDLCALC, TRIG, CHOLHDL, Troponin   Recent Labs   Lab 04/01/20  1632   TROPONINI <0.030    and A1C:   Recent Labs   Lab 04/02/20  1051   HGBA1C 6.0       Pending Diagnostic Studies:     Procedure Component Value Units Date/Time    Echo Color Flow Doppler? Yes [[id number]]  (Abnormal) Resulted:  04/02/20 1030    Order Status:  Sent Lab Status:  In process Updated:  04/02/20 1030     BSA 2.27 m2      Right Atrial Pressure (from IVC) 8 mmHg      TDI SEPTAL 0.05 m/s      LV LATERAL E/E' RATIO 10.67 m/s      LV SEPTAL E/E' RATIO 12.80 m/s      AORTIC VALVE CUSP SEPERATION 2.00 cm      TDI LATERAL 0.06 m/s      PV PEAK VELOCITY 86.78 cm/s      LVIDD 5.86 cm      IVS 1.19 cm      PW 1.20 cm      Ao root annulus 3.12 cm      LVIDS 4.95 cm      FS 16 %      LV mass 300.36 g      LA size 5.32 cm      RVDD 212.00 cm      Left Ventricle Relative Wall Thickness 0.41 cm      AV mean gradient 4 mmHg      AV valve area 2.37 cm2      AV Velocity Ratio 68.30     AV index (prosthetic) 0.63     E/A ratio 0.81     Mean e' 0.06 m/s      E wave decelartion time 335.65 msec      IVRT 91.17 msec      LVOT diameter 2.18 cm      LVOT area 3.7 cm2      LVOT peak ella 92.89 m/s      LVOT peak VTI  20.29 cm      Ao peak nathan 1.36 m/s      Ao VTI 32.00 cm      LVOT stroke volume 75.69 cm3      AV peak gradient 7 mmHg      TV rest pulmonary artery pressure 37 mmHg      E/E' ratio 11.64 m/s      MV Peak E Nathan 0.64 m/s      TR Max Nathan 2.69 m/s      MV Peak A Nathan 0.79 m/s      LV Mass Index 136 g/m2      Triscuspid Valve Regurgitation Peak Gradient 29 mmHg     Narrative:       · The estimated PA systolic pressure is 37 mmHg.       Impression:                Medications:  Reconciled Home Medications:      Medication List      START taking these medications    atorvastatin 10 MG tablet  Commonly known as:  LIPITOR  Take 1 tablet (10 mg total) by mouth once daily.        CHANGE how you take these medications    torsemide 20 MG Tab  Commonly known as:  DEMADEX  Take 1 tablet (20 mg total) by mouth once daily.  Start taking on:  April 4, 2020  What changed:    · medication strength  · how much to take  · additional instructions        CONTINUE taking these medications    CALCIUM 600-D3 PLUS (MAG-ZINC) 600 mg calcium- 800 unit-50 mg Tab  Generic drug:  Ca-D3-mag ox-zinc--thom-bor  Take 1 tablet by mouth 2 (two) times daily.     carvediloL 25 MG tablet  Commonly known as:  COREG  Take 0.5 tablets (12.5 mg total) by mouth 2 (two) times daily with meals.     denosumab 60 mg/mL Syrg  Commonly known as:  PROLIA  Inject 1 mL (60 mg total) into the skin every 6 (six) months.     ENTRESTO 24-26 mg per tablet  Generic drug:  sacubitriL-valsartan  Take 1 tablet by mouth once daily.     fluticasone propionate 50 mcg/actuation nasal spray  Commonly known as:  FLONASE  1 spray (50 mcg total) by Each Nostril route once daily.     gabapentin 100 MG capsule  Commonly known as:  NEURONTIN  TAKE ONE CAPSULE BY MOUTH THREE TIMES DAILY     glimepiride 1 MG tablet  Commonly known as:  AMARYL  Take 1 tablet (1 mg total) by mouth before breakfast.     midodrine 2.5 MG Tab  Commonly known as:  PROAMATINE  Take 2.5 mg by mouth 3 (three) times  daily.     omeprazole 40 MG capsule  Commonly known as:  PRILOSEC  Take 40 mg by mouth once daily.     VITAMIN D3 125 mcg (5,000 unit) Tab  Generic drug:  cholecalciferol (vitamin D3)  Take 5,000 Units by mouth 2 (two) times daily.     warfarin 5 MG tablet  Commonly known as:  COUMADIN  Take 5 mg by mouth once daily.        STOP taking these medications    cefUROXime 500 MG tablet  Commonly known as:  CEFTIN            Indwelling Lines/Drains at time of discharge:   Lines/Drains/Airways     None                 Time spent on the discharge of patient: 42 minutes  Patient was seen and examined on the date of discharge and determined to be suitable for discharge.         Pop Musa MD  Department of Hospital Medicine  [facility]

## 2020-04-03 NOTE — PT/OT/SLP PROGRESS
"Physical Therapy      Patient Name:  Jo Alegre   MRN:  [id number]    Patient not seen today secondary to Patient unwilling to participate('I walked with the nurse earlier."). Will follow-up 4/4/2020.    Jodi Deleon PT    "

## 2020-04-03 NOTE — DISCHARGE INSTRUCTIONS
Home oxygen at 2 LPM on ambulation    Please schedule an appt with Dr Jones in [place] in q wk post hospital discharge F/U

## 2020-04-03 NOTE — PLAN OF CARE
04/03/20 1254   Discharge Reassessment   Assessment Type Final Discharge Note   Anticipated Discharge Disposition Home   Discharge Plan A Home   Discharge Plan B Home   Post-Acute Status   Discharge Delays None known at this time     SW obtained orders for home O2 on this pt. SW gathered appropriate documentation and faxed it to [facility] via [employer]. SW notified [facility] of incoming order. Once O2 is obtained, there are no further d/c needs noted at this time.    Update 1410: SW spoke with pt regarding setting up home health. Pt stated that she was agreeable and would like to use [facility], as she has used their company before. SW faxed referral to [facility] and d/c orders will be sent once obtained. KINA obtained a patient choice form at 1405 and it will be scanned into the pt's chart.    Update 1451: SW obtained pt's O2 from [facility] and delivered it to her bedside. Attending has been notified.     [id number]: KINA notified by [facility] that they would not be able to take this pt at this time. KINA notified pt who stated that she does not wish to go with another company and will f/u with her PCP if needed.

## 2020-04-03 NOTE — PROGRESS NOTES
[facility]  Cardiology  Progress Note    Patient Name: Jo Alegre  MRN: [id number]  Admission Date: 4/1/2020  Hospital Length of Stay: 1 days  Code Status: Full Code   Attending Physician: Pop Musa MD   Primary Care Physician: Kraig Alberto MD  Expected Discharge Date:   Principal Problem:Acute on chronic combined systolic and diastolic heart failure    Subjective:       Interval History:  Patient feels better.  She denies any chest pain.  No significant arrhythmias on telemetry overnight.    ROS   Denies any abdominal pain.  Denies any focal weakness or numbness.  Objective:     Vital Signs (Most Recent):  Temp: 98.8 °F (37.1 °C) (04/03/20 0715)  Pulse: (!) 59 (04/03/20 0715)  Resp: 19 (04/03/20 0715)  BP: (!) 162/67 (04/03/20 0715)  SpO2: 96 % (04/03/20 0715) Vital Signs (24h Range):  Temp:  [98 °F (36.7 °C)-98.8 °F (37.1 °C)] 98.8 °F (37.1 °C)  Pulse:  [58-85] 59  Resp:  [15-19] 19  SpO2:  [94 %-100 %] 96 %  BP: ()/(49-68) 162/67     Weight: 104.2 kg (229 lb 11.5 oz)  Body mass index is 33.92 kg/m².    SpO2: 96 %  O2 Device (Oxygen Therapy): nasal cannula      Intake/Output Summary (Last 24 hours) at 4/3/2020 0820  Last data filed at 4/2/2020 1747  Gross per 24 hour   Intake --   Output 400 ml   Net -400 ml       Lines/Drains/Airways     Peripheral Intravenous Line                 Peripheral IV - Single Lumen 04/01/20 2004 22 G Left Hand 1 day                Scheduled Meds:   atorvastatin  10 mg Oral Daily    carvediloL  12.5 mg Oral BID WM    gabapentin  100 mg Oral TID    pantoprazole  40 mg Oral Daily    sacubitriL-valsartan  1 tablet Oral Daily    torsemide  20 mg Oral Daily    vitamin D  5,000 Units Oral BID    warfarin  5 mg Oral Daily     Continuous Infusions:  PRN Meds:.dextrose 50%, dextrose 50%, glucagon (human recombinant), glucose, glucose, insulin aspart U-100, ondansetron, sodium chloride 0.9%     Physical Exam  HEENT: Normocephalic, atraumatic, PERRL, Conjunctiva  pink, no scleral icterus.   CVS: S1S2+, RRR, no murmurs, rubs or gallops, JVP: Normal.  LUNGS: Clear  ABDOMEN: Soft, NT, BS+  EXTREMITIES: No cyanosis, clubbing.  Trace edema  NEURO: AAO X 3.       Significant Labs:   BMP:   Recent Labs   Lab 04/01/20  1632 04/02/20  1054 04/03/20  0536   * 93 93    139 139   K 4.1 4.2 4.2   CL 97 98 98   CO2 29 31* 31*   BUN 32* 33* 33*   CREATININE 1.9* 1.8* 1.8*   CALCIUM 9.0 9.1 8.6*   MG  --  2.1  --    , CMP   Recent Labs   Lab 04/01/20  1632 04/02/20  1054 04/03/20  0536    139 139   K 4.1 4.2 4.2   CL 97 98 98   CO2 29 31* 31*   * 93 93   BUN 32* 33* 33*   CREATININE 1.9* 1.8* 1.8*   CALCIUM 9.0 9.1 8.6*   PROT 6.1 5.9*  --    ALBUMIN 2.8* 2.6*  --    BILITOT 0.4 0.6  --    ALKPHOS 57 59  --    AST 17 14  --    ALT 18 19  --    ANIONGAP 10 10 10   ESTGFRAFRICA 28.7* 30.6* 30.6*   EGFRNONAA 24.9* 26.6* 26.6*   , CBC   Recent Labs   Lab 04/01/20  1632 04/02/20  1051   WBC 5.83 5.82   HGB 12.2 11.5*   HCT 38.6 36.6*    208   , INR   Recent Labs   Lab 04/01/20  1632 04/02/20  1050 04/03/20  0536   INR 3.2 2.5 2.2   , Lipid Panel No results for input(s): CHOL, HDL, LDLCALC, TRIG, CHOLHDL in the last 48 hours. and Troponin   Recent Labs   Lab 04/01/20  1632   TROPONINI <0.030       Significant Imaging: Reviewed  Assessment and Plan:     IMPRESSION:  CHF. Acute on chronic. Preserved LVEF currently. Reportedly 35% in the past. Negative cardiac biomarkers.   Acute on CKD.   Covid 19 suspect. Not detected on Swab.   Hypertension.   Dyslipidemia.         PLAN:  1.  Okay to discharge home from Cardiology standpoint.  She can resume her home medications.  2.  Fluid restriction was discussed.  3.  Follow-up with Dr. Jones in about 1-2 weeks or sooner if needed.  4. Discussed with Dr Musa.     Nabeel Le MD  Cardiology  [facility]

## 2020-04-06 LAB
AORTIC ROOT ANNULUS: 3.12 CM
AORTIC VALVE CUSP SEPERATION: 2 CM
AV INDEX (PROSTH): 0.63
AV MEAN GRADIENT: 4 MMHG
AV PEAK GRADIENT: 7 MMHG
AV VALVE AREA: 2.37 CM2
AV VELOCITY RATIO: 68.3
BSA FOR ECHO PROCEDURE: 2.27 M2
CV ECHO LV RWT: 0.41 CM
DOP CALC AO PEAK VEL: 1.36 M/S
DOP CALC AO VTI: 32 CM
DOP CALC LVOT AREA: 3.7 CM2
DOP CALC LVOT DIAMETER: 2.18 CM
DOP CALC LVOT PEAK VEL: 92.89 M/S
DOP CALC LVOT STROKE VOLUME: 75.69 CM3
DOP CALCLVOT PEAK VEL VTI: 20.29 CM
E WAVE DECELERATION TIME: 335.65 MSEC
E/A RATIO: 0.81
E/E' RATIO: 11.64 M/S
ECHO LV POSTERIOR WALL: 1.2 CM (ref 0.6–1.1)
FRACTIONAL SHORTENING: 16 % (ref 28–44)
INTERVENTRICULAR SEPTUM: 1.19 CM (ref 0.6–1.1)
IVRT: 91.17 MSEC
LEFT ATRIUM SIZE: 5.32 CM
LEFT INTERNAL DIMENSION IN SYSTOLE: 4.95 CM (ref 2.1–4)
LEFT VENTRICLE MASS INDEX: 136 G/M2
LEFT VENTRICULAR INTERNAL DIMENSION IN DIASTOLE: 5.86 CM (ref 3.5–6)
LEFT VENTRICULAR MASS: 300.36 G
LV LATERAL E/E' RATIO: 10.67 M/S
LV SEPTAL E/E' RATIO: 12.8 M/S
MV PEAK A VEL: 0.79 M/S
MV PEAK E VEL: 0.64 M/S
PISA TR MAX VEL: 2.69 M/S
PV PEAK VELOCITY: 86.78 CM/S
RA PRESSURE: 8 MMHG
RIGHT VENTRICULAR END-DIASTOLIC DIMENSION: 212 CM
TDI LATERAL: 0.06 M/S
TDI SEPTAL: 0.05 M/S
TDI: 0.06 M/S
TR MAX PG: 29 MMHG
TV REST PULMONARY ARTERY PRESSURE: 37 MMHG

## 2020-04-08 ENCOUNTER — TELEPHONE (OUTPATIENT)
Dept: FAMILY MEDICINE | Facility: CLINIC | Age: 80
End: 2020-04-08

## 2020-04-08 NOTE — PT/OT/SLP DISCHARGE
Occupational Therapy Discharge Summary    Jo Alegre  MRN: [id number]   Principal Problem: Acute on chronic combined systolic and diastolic heart failure      Patient Discharged from acute Occupational Therapy on 4/3/2020.  Please refer to prior OT note dated 4/3/2020 for functional status.    Assessment:      Patient has not met goals.    Objective:     GOALS:   Multidisciplinary Problems     Occupational Therapy Goals     Not on file          Multidisciplinary Problems (Resolved)        Problem: Occupational Therapy Goal    Goal Priority Disciplines Outcome Interventions   Occupational Therapy Goal   (Resolved)     OT, PT/OT Met    Description:  Goals to be met by: d/c     Patient will increase functional independence with ADLs by performing:    LE Dressing with Supervision.  Grooming while standing at sink with Supervision.  Toileting from toilet with Supervision for hygiene and clothing management.   Toilet transfer to toilet with Supervision.  Bathing seated on shower bench with Stand-by assistance.                       Reasons for Discontinuation of Therapy Services  Patient d/c home.      Plan:     Patient Discharged to: Home with Home Health Service    Janes Gotti, OT  4/8/2020

## 2020-04-09 ENCOUNTER — OFFICE VISIT (OUTPATIENT)
Dept: FAMILY MEDICINE | Facility: CLINIC | Age: 80
End: 2020-04-09
Payer: MEDICARE

## 2020-04-09 ENCOUNTER — TELEPHONE (OUTPATIENT)
Dept: FAMILY MEDICINE | Facility: CLINIC | Age: 80
End: 2020-04-09

## 2020-04-09 VITALS
DIASTOLIC BLOOD PRESSURE: 67 MMHG | OXYGEN SATURATION: 96 % | HEART RATE: 55 BPM | BODY MASS INDEX: 35 KG/M2 | SYSTOLIC BLOOD PRESSURE: 118 MMHG | WEIGHT: 223 LBS | HEIGHT: 67 IN

## 2020-04-09 DIAGNOSIS — R09.02 HYPOXIA: ICD-10-CM

## 2020-04-09 DIAGNOSIS — Z79.01 CHRONIC ANTICOAGULATION: ICD-10-CM

## 2020-04-09 DIAGNOSIS — I10 ESSENTIAL HYPERTENSION: Primary | ICD-10-CM

## 2020-04-09 DIAGNOSIS — I48.0 PAROXYSMAL ATRIAL FIBRILLATION: ICD-10-CM

## 2020-04-09 DIAGNOSIS — E78.2 MIXED DYSLIPIDEMIA: ICD-10-CM

## 2020-04-09 DIAGNOSIS — I50.42 CHRONIC COMBINED SYSTOLIC AND DIASTOLIC CONGESTIVE HEART FAILURE: ICD-10-CM

## 2020-04-09 PROCEDURE — 1159F PR MEDICATION LIST DOCUMENTED IN MEDICAL RECORD: ICD-10-PCS | Mod: 95,,, | Performed by: INTERNAL MEDICINE

## 2020-04-09 PROCEDURE — 99443 PR PHYSICIAN TELEPHONE EVALUATION 21-30 MIN: CPT | Mod: 95,,, | Performed by: INTERNAL MEDICINE

## 2020-04-09 PROCEDURE — 1101F PT FALLS ASSESS-DOCD LE1/YR: CPT | Mod: 95,,, | Performed by: INTERNAL MEDICINE

## 2020-04-09 PROCEDURE — 1101F PR PT FALLS ASSESS DOC 0-1 FALLS W/OUT INJ PAST YR: ICD-10-PCS | Mod: 95,,, | Performed by: INTERNAL MEDICINE

## 2020-04-09 PROCEDURE — 99443 PR PHYSICIAN TELEPHONE EVALUATION 21-30 MIN: ICD-10-PCS | Mod: 95,,, | Performed by: INTERNAL MEDICINE

## 2020-04-09 PROCEDURE — 1159F MED LIST DOCD IN RCRD: CPT | Mod: 95,,, | Performed by: INTERNAL MEDICINE

## 2020-04-09 PROCEDURE — 1111F DSCHRG MED/CURRENT MED MERGE: CPT | Mod: 95,,, | Performed by: INTERNAL MEDICINE

## 2020-04-09 PROCEDURE — 3078F DIAST BP <80 MM HG: CPT | Mod: 95,,, | Performed by: INTERNAL MEDICINE

## 2020-04-09 PROCEDURE — 3078F PR MOST RECENT DIASTOLIC BLOOD PRESSURE < 80 MM HG: ICD-10-PCS | Mod: 95,,, | Performed by: INTERNAL MEDICINE

## 2020-04-09 PROCEDURE — 3074F PR MOST RECENT SYSTOLIC BLOOD PRESSURE < 130 MM HG: ICD-10-PCS | Mod: 95,,, | Performed by: INTERNAL MEDICINE

## 2020-04-09 PROCEDURE — 1111F PR DISCHARGE MEDS RECONCILED W/ CURRENT OUTPATIENT MED LIST: ICD-10-PCS | Mod: 95,,, | Performed by: INTERNAL MEDICINE

## 2020-04-09 PROCEDURE — 3074F SYST BP LT 130 MM HG: CPT | Mod: 95,,, | Performed by: INTERNAL MEDICINE

## 2020-04-09 NOTE — Clinical Note
I audio consulted the patient who was recently discharged after hospitalization for acute on chronic heart failure.  She is also on oxygen and anticoagulation.Dr Alberto

## 2020-04-09 NOTE — PATIENT INSTRUCTIONS
Left-Sided Congestive Heart Failure (CHF)    The heart is a large muscle that pumps blood throughout the body. Blood carries oxygen to all the organs (including the brain), muscles, and skin of your body. After the body takes the oxygen out of the blood, the blood returns to the heart. The right side of the heart collects that blood and pumps it to the lungs to receive fresh oxygen. This oxygen-rich blood from the lungs then returns to the left side of the heart, where it is pumped back out to the brain and rest of the body, starting the process all over.   Heart failure occurs when the heart muscle is weakened. This can occur after a heart attack or with significant coronary artery disease. This affects the pumping action of the heart.   When the left side of the heart is weakened, it cant handle the blood it is getting from the lungs. Pressure then builds up in the veins of the lungs, causing fluid to leak into the lung tissues. This may be referred to as congestive heart failure. This causes you to feel short of breath, weak, or dizzy. These symptoms are often worse with exertion, such as climbing stairs or walking up hills. Lying flat is uncomfortable and can make your breathing worse. This may make sleeping difficult and force you to use extra pillows to elevate your upper body to help you sleep well.  Causes of heart failure include:  · Coronary artery disease  · Heart attack in the past (also known as acute myocardial infarction, or AMI)  · High blood pressure  · Damaged heart valve  · Diabetes  · Obesity  · Cigarette smoking  · Alcohol abuse  Treatment  Heart failure is a chronic condition. There is no cure. The purpose of medical treatment is to improve the pumping action of the heart, and remove excess water and fluids from the body. A number of medicines can help reach this goal, improve symptoms and keep the heart from becoming weaker. In some cases of severe heart failure, a mechanical device will be  placed in the heart to help the heart pump. Another major goal is to better treat the causes of heart failure, such as diabetes, high blood pressure, and your lifestyle.  Home care  · Check your weight every day. A sudden increase in weight gain could mean worsening heart failure.  ¨ Use the same scale every day.  ¨ Weigh yourself at the same time every day.  ¨ Make sure the scale is on the floor, not on a rug.  ¨ Keep a record of your weight every day, so your healthcare provider can see it. If you are not given a log sheet for this, keep a separate journal for this purpose.   · Cut back on how much salt (sodium) you eat:  ¨ Your provider will tell you how much salt to have daily, usually 2,000 mg or less.  ¨ Avoid high-salt foods. These include olives, pickles, smoked meats, processed foods, and salted potato chips.  ¨ Don't add salt to your food at the table. Use only small amounts of salt when cooking.  ¨ Avoid binging on salt-heavy meals.  · Follow your healthcare provider's recommendations about how much fluid you should have.  · Stop smoking.  · Cut back on the amount of alcohol you drink.  · Lose weight if you are overweight. The excess weight adds a lot of stress on the workload of the heart.  · Stay active. Talk to your provider about an exercise program that is safe for your heart.  · Keep your feet elevated to reduce swelling. Ask your provider about support hose as a preventive treatment for daytime leg swelling.  · Follow your healthcare provider's instructions closely.  Besides taking your medicine as instructed, an important part of treatment includes lifestyle changes. These include diet, physical activity, stopping smoking, and weight control.  Improve your diet. Often in the hospital, people are given a heart healthy diet. This includes more fresh foods, lower saturated fat, less processed foods, and lower salt.  Follow-up care  Follow up with your healthcare provider, or as advised. Make sure to  keep any appointments that were made for you. This can help better control heart failure.  If an X-ray was done, you will be told of any new findings that may affect your care.  Call 911  Call 911 if you:  · Become severely short of breath  · Feel lightheaded, or feel like you might pass out or faint  · Have chest pain or discomfort that is different than usual, the medicines your provider told you to use for this don't help, or the pain lasts longer than 10 to 15 minutes  · Develop a rapid heart rate suddenly  When to seek medical advice  Call your healthcare provider right away if you have any of these signs of worsening heart failure:  · Sudden weight gain --  more than 2 pounds in 1 day, or 5 pounds in 1 week, or whatever weight gain you were told to report by your provider  · Trouble breathing not related to being active  · New or increased swelling of your legs or ankles  · Swelling or pain in your abdomen  · Breathing trouble at night, waking up short of breath or needing more pillows to elevate your upper body to help you breathe  · Frequent coughing that doesnt go away  · Feeling much more tired than usual  Date Last Reviewed: 1/4/2016  © 5219-6329 [employer]. [address] [place] [address], [place] [address]. All rights reserved. This information is not intended as a substitute for professional medical care. Always follow your healthcare professional's instructions.        Right-Sided Congestive Heart Failure (CHF)    The heart is a large muscle that pumps blood throughout the body. Blood carries oxygen to all the organs (including the brain), muscles, and skin. After the body takes the oxygen out of the blood, the blood returns to the heart. The right side of the heart collects that blood and pumps it to the lungs to get fresh oxygen. This oxygen-rich blood from the lungs then returns to the left side of the heart, where it is pumped back out to the rest of the body and the brain, starting the  process all over.  Heart failure (HF) occurs when the heart muscle is weakened. This can occur after a heart attack or with disease of the coronary arteries that affects the heart over time, and in turn affects the pumping action of the heart.  When the right side of the heart is weakened, it cant handle the blood it is getting from the rest of the body. This blood returns to the heart through veins. When too much pressure builds up in the veins, fluid leaks out into the tissues. Gravity then causes that fluid to spread to those parts of the body that are the lowest. So one of the first symptoms of heart failure is swelling in the feet and ankles. If the condition worsens, the swelling can even go up past the knees. In more severe cases, the liver may also become congested with extra fluid.  Causes of right-sided heart failure  · Coronary artery disease  · Heart attack in the past (heart attack is also known as acute myocardial infarction, or AMI)  · High blood pressure, particularly in the pulmonary circulation  · Damaged heart valve  · Diabetes  · Obesity  · Cigarette smoking  · Alcohol abuse  · Increased pressure of the lungs weakening the right side of the heart  Treatment  Heart failure is a chronic condition. There is no cure. The purpose of medical treatment is to improve the pumping action of the heart, and remove excess water and fluid from the body. A number of medicines can help reach this goal, improve symptoms, and prevent the heart from becoming weaker. In some cases of severe heart failure, mechanical devices can be implanted to help with the heart's pumping function. Another major goal is to better treat the causes of heart failure, such as diabetes, high blood pressure, and your lifestyle.  Home care  · Check your weight every day. A sudden increase in weight gain could mean worsening heart failure.  ¨ Use the same scale every day.  ¨ Weigh yourself at the same time every day.  ¨ Make sure the  scale is on the floor, not on a rug.  ¨ Keep a record of your weight every day so your healthcare provider can see it. If you are not given a log sheet for this, keep a separate journal for this purpose.   · Cut back on how much salt (sodium) you eat:  ¨ Your healthcare provider will tell you  what level of salt you can have daily, usually 2,000 mg or less.  ¨ Avoid high-salt foods. These include olives, pickles, smoked meats, processed foods, and salted potato chips.  ¨ Don't add salt to your food at the table. Use only small amounts of salt when cooking.  · Follow your healthcare provider's recommendations about how much fluid you should have.  · Stop smoking.  · Cut back on the amount of alcohol you drink.  · Lose weight if you are overweight. The excess weight adds a lot of stress on the workload of the heart.  · Stay active. Talk with your provider about an exercise program that is safe for your heart.  · Keep your feet elevated to reduce swelling. Ask your provider about support hose as a preventive treatment for daytime leg swelling.  · Follow your healthcare provider's instructions closely.  Besides taking your medicine as instructed, an important part of treatment is lifestyle changes. These include diet, physical activity, stopping smoking, and weight control.  Improve your diet. Often in the hospital, people are given a heart healthy diet. This includes more fresh foods, lower fat, less processed foods, and lower salt.  Follow-up care  Follow up with your healthcare provider, or as advised. Make sure to keep any appointments that were made for you. This can help better control heart failure.  If an X-ray was done, you will be told of any new findings that may affect your care.  Call 911  Call 911 if you:  · Become severely short of breath  · Feel lightheaded, or feel like you might pass out or faint  · Have chest pain or discomfort that's different than usual, the medicines your provider told you to use  for this don't help, or the pain lasts longer than 10 to 15 minutes  · You suddenly develop a rapid heart rate  When to seek medical advice  Call your healthcare provider right away if you have any of these signs. They may mean your heart failure is getting worse:  · Sudden weight gain. This means more than 2 or more pounds in 1 day or 5 pounds in 1 week, or whatever weight gain you were told to report by your provider.  · Trouble breathing not related to being active  · New or increased swelling of your legs or ankles  · Swelling or pain in your abdomen  · Breathing trouble at night. This means waking up short of breath or needing more pillows to elevate your upper body to breathe.  · Frequent coughing that doesnt go away  · Feeling much more tired than usual  Date Last Reviewed: 1/4/2016  © 2207-1818 [employer]. [address] [place] [address], [place] [address]. All rights reserved. This information is not intended as a substitute for professional medical care. Always follow your healthcare professional's instructions.

## 2020-04-09 NOTE — TELEPHONE ENCOUNTER
Pt. needs a letter from you for her electric company stating that she is on O2. She said something about if they lose power then she'll be put on a priority list to get her electric turned back on b/c she is on O2.

## 2020-04-09 NOTE — PROGRESS NOTES
Subjective:      Chief Complaint   Patient presents with    Congestive Heart Failure    Hypertension    Hyperlipidemia    Atrial Fibrillation    Hypoxia       The chief complaint leading to consultation is:  Congestive heart failure/hypoxia  The patient location is:  Home  Visit type: Virtual visit with synchronous audio/video or audio only  This was a phone conversation in lieu of in-person visit due to the coronavirus emergency. Patient acknowledged and agreed to the telephone encounter.     This is Audio consultation for patient Jo Palm.  The video part of the consultation could not be established.    The reason for virtual consultation is currently prevailing COVID-19 pandemic.    Miss Jo Palm was recently hospitalized with history of fall, progressive shortness of breath and low blood pressures.  She was diagnosed to have acute on chronic systolic and diastolic heart failure.  She was treated with intravenous last 6.    One of her family members (daughter miss Forbes) had been diagnosed with COVID-19 infection.  Patient might have had a transient contact with her few weeks back.  Patient herself was tested for COVID-19 in the hospital and the testing was negative.  Patient did not have any fever.  Chest x-ray in the hospital was reported to be unremarkable.    Congestive Heart Failure   Presents for follow-up visit. Associated symptoms include edema, fatigue, muscle weakness and shortness of breath (better). Pertinent negatives include no abdominal pain, chest pain, chest pressure, claudication, near-syncope, palpitations or unexpected weight change. The symptoms have been improving. Compliance with total regimen is %. Compliance problems include adherence to exercise.  Compliance with diet is %. Compliance with exercise is 0-25%. Compliance with medications is %.   Atrial Fibrillation   Presents for follow-up visit. Symptoms include hypertension, hypotension and shortness of  breath (better). Symptoms are negative for chest pain, dizziness and palpitations. The symptoms have been stable. Past medical history includes atrial fibrillation and CHF.   Hypertension   This is a chronic problem. Associated symptoms include malaise/fatigue and shortness of breath (better). Pertinent negatives include no chest pain, headaches or palpitations. Past treatments include beta blockers and diuretics (Entresto). The current treatment provides moderate improvement.       Past Surgical History:   Procedure Laterality Date    CHOLECYSTECTOMY  1997    Register     COLONOSCOPY      EYE SURGERY      bilateral cataracts    FRACTURE SURGERY  2014    right femur with neville    HEMORRHOID SURGERY      48 yrs ago    HYSTERECTOMY      REPLACEMENT OF IMPLANTABLE CARDIOVERTER-DEFIBRILLATOR (ICD) GENERATOR N/A 12/13/2019    Procedure: REPLACEMENT, PULSE GENERATOR, ICD-MEDTRONIC;  Surgeon: Sebastian Nowak III, MD;  Location: [facility];  Service: Cardiology;  Laterality: N/A;    TONSILLECTOMY       Past Medical History:   Diagnosis Date    Allergy     Codeine, Lasix    Atrial fibrillation     Atrial fibrillation     Cataract     CHF (congestive heart failure)     Diabetes mellitus, type 2     Ejection fraction < 50% 10/18/2017    Approximately 35%  Based on prior  Echocardiogram.    Encounter for blood transfusion     Hyperlipidemia     Osteoporosis     Thyroid disorder screening 10/17/2017    TSH of 1.12 ordered by Dr. dee Jones     Family History   Problem Relation Age of Onset    Heart disease Mother     Cancer Father         Lung Cancer ??? Asbestos    Breast cancer Paternal Aunt         Social History:   Marital Status:   Alcohol History:  reports that she does not drink alcohol.  Tobacco History:  reports that she has quit smoking. She has never used smokeless tobacco.  Drug History:  reports that she does not use drugs.    Review of patient's allergies indicates:   Allergen Reactions     Codeine     Lasix [furosemide]        Current Outpatient Medications   Medication Sig Dispense Refill    atorvastatin (LIPITOR) 10 MG tablet Take 1 tablet (10 mg total) by mouth once daily. 90 tablet 3    Ca-D3-mag ox-zinc--thom-bor (CALCIUM 600-D3 PLUS) 600 mg calcium- 800 unit-50 mg Tab Take 1 tablet by mouth 2 (two) times daily.      carvedilol (COREG) 25 MG tablet Take 0.5 tablets (12.5 mg total) by mouth 2 (two) times daily with meals. 1 tablet 1    cholecalciferol, vitamin D3, (VITAMIN D3) 5,000 unit Tab Take 5,000 Units by mouth 2 (two) times daily.      denosumab (PROLIA) 60 mg/mL Syrg Inject 1 mL (60 mg total) into the skin every 6 (six) months. 2 mL 1    fluticasone propionate (FLONASE) 50 mcg/actuation nasal spray 1 spray (50 mcg total) by Each Nostril route once daily. 13 g 2    gabapentin (NEURONTIN) 100 MG capsule TAKE ONE CAPSULE BY MOUTH THREE TIMES DAILY (Patient taking differently: Take 100 mg by mouth 3 (three) times daily. ) 270 capsule 1    glimepiride (AMARYL) 1 MG tablet Take 1 tablet (1 mg total) by mouth before breakfast. 90 tablet 1    midodrine (PROAMATINE) 2.5 MG Tab Take 2.5 mg by mouth 3 (three) times daily.      omeprazole (PRILOSEC) 40 MG capsule Take 40 mg by mouth once daily.      sacubitril-valsartan (ENTRESTO) 24-26 mg per tablet Take 1 tablet by mouth once daily.       torsemide (DEMADEX) 20 MG Tab Take 1 tablet (20 mg total) by mouth once daily. 30 tablet 0    warfarin (COUMADIN) 5 MG tablet Take 5 mg by mouth once daily.       No current facility-administered medications for this visit.      Facility-Administered Medications Ordered in Other Visits   Medication Dose Route Frequency Provider Last Rate Last Dose    0.9%  NaCl infusion   Intravenous Continuous Sebastian Nowak III, MD 75 mL/hr at 12/13/19 0780      diphenhydrAMINE injection 25 mg  25 mg Intravenous Once Sebastian Nowak III, MD        lorazepam injection 1 mg  1 mg Intravenous Once Sebastian Nowak  III, MD           Review of Systems   Constitutional: Positive for activity change (decreased), appetite change, fatigue and malaise/fatigue. Negative for chills, fever and unexpected weight change.   HENT: Negative for congestion, postnasal drip, sinus pressure and sore throat.    Eyes: Negative for pain, discharge and visual disturbance.   Respiratory: Positive for shortness of breath (better). Negative for cough and chest tightness.         After recent hospitalization patient has been given home oxygen.   Cardiovascular: Negative for chest pain, palpitations, claudication, leg swelling and near-syncope.        History of defibrillator, congestive cardiomyopathy hypertension and dyslipidemia   Gastrointestinal: Negative for abdominal distention, abdominal pain, anal bleeding, constipation, diarrhea and nausea.   Endocrine: Negative for polydipsia, polyphagia and polyuria.        Diabetes mellitus on glimepiride.  Blood sugars are in acceptable range.   Genitourinary: Negative for flank pain and frequency.   Musculoskeletal: Positive for gait problem and muscle weakness. Negative for arthralgias, back pain and joint swelling.        Difficulty walking with low back pain radiating to the right thigh and also right lateral thigh pain.  Recent history of falls before hospitalization.   Skin: Negative for color change, pallor, rash and wound.   Allergic/Immunologic: Negative for environmental allergies, food allergies and immunocompromised state.   Neurological: Negative for dizziness, tremors, seizures, syncope, light-headedness and headaches.        Recent history of difficulty walking and falls.   Hematological: Negative for adenopathy.        Patient is on chronic anticoagulation with warfarin.     Psychiatric/Behavioral: Negative for agitation, confusion and dysphoric mood. The patient is not nervous/anxious.          Objective:      Vitals:    04/09/20 0917   BP: 118/67   Pulse: (!) 55   SpO2: 96%   Weight:  "101.2 kg (223 lb)   Height: 5' 7" (1.702 m)     Physical Exam:   Physical Exam   Constitutional: No distress.   Cardiovascular: Normal rate.   Musculoskeletal: Edema: trace.   Neurological: She is alert.   Psychiatric: Her affect is not blunt, not labile and not inappropriate.     Vital signs were provided by the patient and patient's family.  Pulse was checked by patient's daughter and found to be regular.  She also checked for any swelling in legs and found only perhaps a trace edema.  Patient's speech, coherence was appropriate       Assessment:       1. Essential hypertension    2. Paroxysmal atrial fibrillation    3. Chronic anticoagulation    4. Chronic combined systolic and diastolic congestive heart failure    5. Mixed dyslipidemia    6. Hypoxia      Plan:   Essential hypertension    Paroxysmal atrial fibrillation    Chronic anticoagulation    Chronic combined systolic and diastolic congestive heart failure    Mixed dyslipidemia    Hypoxia     Multiple medical issues have been noted as above.  Patient's recent discharge from the hospital also has been noted.  She is currently on home oxygen.  Status of chronic anticoagulation also has been noted and warfarin levels are being monitored by Dr. Jones.    Chronic heart failure precautions have been discussed.    COVID 19 prevention protocols and cautions have discussed.    Medications have been reviewed and updated.    Reduce vitamin-D 3 supplements to 5000 units per day instead of twice a day.  She has additional supplementation from her calcium which has in built vitamin D3 in it.    Encouraged to keep up with Dr. Jones also.    Follow-up with me in 4-6 weeks time either virtual consultation or in office based upon current prevalence of COVID-19 pandemic and removal of emergency.        .Follow up in about 6 weeks (around 5/21/2020) for CHF/HTN/LIPIDS.    Total time spent with patient: 25 mts    Each patient to whom he or she provides medical services by " telemedicine is:  (1) informed of the relationship between the physician and patient and the respective role of any other health care provider with respect to management of the patient; and (2) notified that he or she may decline to receive medical services by telemedicine and may withdraw from such care at any time.    This note was created using [employer] voice recognition software that occasionally misinterprets phrases or words.

## 2020-04-28 ENCOUNTER — TELEPHONE (OUTPATIENT)
Dept: FAMILY MEDICINE | Facility: CLINIC | Age: 80
End: 2020-04-28

## 2020-04-28 DIAGNOSIS — N30.00 ACUTE CYSTITIS WITHOUT HEMATURIA: Primary | ICD-10-CM

## 2020-04-28 RX ORDER — CIPROFLOXACIN 250 MG/1
250 TABLET, FILM COATED ORAL 2 TIMES DAILY
Qty: 6 TABLET | Refills: 0 | Status: SHIPPED | OUTPATIENT
Start: 2020-04-28 | End: 2020-05-21

## 2020-04-28 NOTE — TELEPHONE ENCOUNTER
Pt has burning when urinates  Wants something called in     I have sent in antibiotic Cipro 250 mg 1 pill twice a day for 3 days.  Notify the patient please.  Increase fluids and cranberry tablets.

## 2020-05-21 ENCOUNTER — OFFICE VISIT (OUTPATIENT)
Dept: FAMILY MEDICINE | Facility: CLINIC | Age: 80
End: 2020-05-21
Payer: MEDICARE

## 2020-05-21 VITALS
DIASTOLIC BLOOD PRESSURE: 47 MMHG | HEART RATE: 58 BPM | WEIGHT: 228 LBS | TEMPERATURE: 98 F | BODY MASS INDEX: 35.79 KG/M2 | HEIGHT: 67 IN | SYSTOLIC BLOOD PRESSURE: 107 MMHG

## 2020-05-21 DIAGNOSIS — I10 ESSENTIAL HYPERTENSION: Primary | ICD-10-CM

## 2020-05-21 DIAGNOSIS — Z79.01 CHRONIC ANTICOAGULATION: ICD-10-CM

## 2020-05-21 DIAGNOSIS — E78.2 MIXED DYSLIPIDEMIA: ICD-10-CM

## 2020-05-21 DIAGNOSIS — I50.42 CHRONIC COMBINED SYSTOLIC AND DIASTOLIC CONGESTIVE HEART FAILURE: ICD-10-CM

## 2020-05-21 DIAGNOSIS — I48.0 PAROXYSMAL ATRIAL FIBRILLATION: ICD-10-CM

## 2020-05-21 DIAGNOSIS — R09.02 HYPOXIA: ICD-10-CM

## 2020-05-21 PROCEDURE — 1101F PT FALLS ASSESS-DOCD LE1/YR: CPT | Mod: S$GLB,,, | Performed by: INTERNAL MEDICINE

## 2020-05-21 PROCEDURE — 1159F PR MEDICATION LIST DOCUMENTED IN MEDICAL RECORD: ICD-10-PCS | Mod: S$GLB,,, | Performed by: INTERNAL MEDICINE

## 2020-05-21 PROCEDURE — 1101F PR PT FALLS ASSESS DOC 0-1 FALLS W/OUT INJ PAST YR: ICD-10-PCS | Mod: S$GLB,,, | Performed by: INTERNAL MEDICINE

## 2020-05-21 PROCEDURE — 99214 OFFICE O/P EST MOD 30 MIN: CPT | Mod: S$GLB,,, | Performed by: INTERNAL MEDICINE

## 2020-05-21 PROCEDURE — 3074F SYST BP LT 130 MM HG: CPT | Mod: S$GLB,,, | Performed by: INTERNAL MEDICINE

## 2020-05-21 PROCEDURE — 1159F MED LIST DOCD IN RCRD: CPT | Mod: S$GLB,,, | Performed by: INTERNAL MEDICINE

## 2020-05-21 PROCEDURE — 3074F PR MOST RECENT SYSTOLIC BLOOD PRESSURE < 130 MM HG: ICD-10-PCS | Mod: S$GLB,,, | Performed by: INTERNAL MEDICINE

## 2020-05-21 PROCEDURE — 1126F PR PAIN SEVERITY QUANTIFIED, NO PAIN PRESENT: ICD-10-PCS | Mod: S$GLB,,, | Performed by: INTERNAL MEDICINE

## 2020-05-21 PROCEDURE — 1126F AMNT PAIN NOTED NONE PRSNT: CPT | Mod: S$GLB,,, | Performed by: INTERNAL MEDICINE

## 2020-05-21 PROCEDURE — 3078F DIAST BP <80 MM HG: CPT | Mod: S$GLB,,, | Performed by: INTERNAL MEDICINE

## 2020-05-21 PROCEDURE — 99214 PR OFFICE/OUTPT VISIT, EST, LEVL IV, 30-39 MIN: ICD-10-PCS | Mod: S$GLB,,, | Performed by: INTERNAL MEDICINE

## 2020-05-21 PROCEDURE — 3078F PR MOST RECENT DIASTOLIC BLOOD PRESSURE < 80 MM HG: ICD-10-PCS | Mod: S$GLB,,, | Performed by: INTERNAL MEDICINE

## 2020-05-21 RX ORDER — FUROSEMIDE 20 MG/1
20 TABLET ORAL 2 TIMES DAILY
COMMUNITY
End: 2021-02-04

## 2020-05-21 RX ORDER — AMIODARONE HYDROCHLORIDE 200 MG/1
TABLET ORAL DAILY
COMMUNITY
End: 2021-02-03

## 2020-05-21 NOTE — PROGRESS NOTES
Subjective:       Patient ID: Jo Alegre is a 79 y.o. female.    Chief Complaint: Diabetes; Hypertension; and Hyperlipidemia    Patient is a 79-year-old  female who comes for follow-up.  Underlying issues of type 2 diabetes mellitus, hypertension, hyperlipidemia, congestive heart failure and atrial fibrillation have been noted.    He continues to feel fatigued with some good days and not so good days.  No fever.    She had seen Dr. Jones recently through a virtual consult.  No major changes.  PFT with DLCO was ordered but I do not have the results.  She has been diagnosed with biventricular congestive heart failure and I do not have the results of her recent echocardiogram.    She is also on midodrine for raising her blood pressure and I suspect some degree of dysautonomia or autonomic insufficiency.  Diabetes seems to be well controlled.    Diabetes   She presents for her follow-up diabetic visit. She has type 2 diabetes mellitus. No MedicAlert identification noted. Her disease course has been stable. Pertinent negatives for hypoglycemia include no headaches, nervousness/anxiousness or speech difficulty. Associated symptoms include fatigue and weakness. Pertinent negatives for diabetes include no blurred vision, no chest pain, no foot paresthesias, no foot ulcerations, no polydipsia, no polyphagia, no polyuria, no visual change and no weight loss. Symptoms are stable. Risk factors for coronary artery disease include sedentary lifestyle, post-menopausal, hypertension and dyslipidemia. Current diabetic treatment includes oral agent (monotherapy). She is compliant with treatment some of the time. Meal planning includes avoidance of concentrated sweets. She has not had a previous visit with a dietitian. She rarely participates in exercise. Her home blood glucose trend is fluctuating minimally. An ACE inhibitor/angiotensin II receptor blocker is being taken. Eye exam is current.   Hypertension   This is  a chronic problem. The current episode started more than 1 year ago. The problem is controlled. Associated symptoms include shortness of breath. Pertinent negatives include no blurred vision, chest pain or headaches. Past treatments include beta blockers, diuretics and angiotensin blockers. The current treatment provides moderate improvement. Compliance problems include psychosocial issues.    Hyperlipidemia   This is a chronic problem. The current episode started more than 1 year ago. The problem is controlled. Exacerbating diseases include obesity. Associated symptoms include shortness of breath. Pertinent negatives include no chest pain. She is currently on no antihyperlipidemic treatment. Risk factors for coronary artery disease include a sedentary lifestyle, post-menopausal, hypertension, diabetes mellitus and dyslipidemia.   Atrial Fibrillation   Presents for follow-up visit. Symptoms include hypertension, hypotension, shortness of breath and weakness. Symptoms are negative for bradycardia, chest pain and syncope. The symptoms have been stable. Past medical history includes atrial fibrillation, CHF and hyperlipidemia. Medication compliance problems include psychosocial issues.   Congestive Heart Failure   Presents for follow-up visit. Associated symptoms include edema, fatigue, muscle weakness and shortness of breath. Pertinent negatives include no abdominal pain, chest pain, claudication or near-syncope. The symptoms have been stable. Compliance with total regimen is 51-75%.       Past Medical History:   Diagnosis Date    Allergy     Codeine, Lasix    Atrial fibrillation     Atrial fibrillation     Cataract     CHF (congestive heart failure)     Diabetes mellitus, type 2     Ejection fraction < 50% 10/18/2017    Approximately 35%  Based on prior  Echocardiogram.    Encounter for blood transfusion     Hyperlipidemia     Osteoporosis     Thyroid disorder screening 10/17/2017    TSH of 1.12 ordered by  Dr. dee Jones     Social History     Socioeconomic History    Marital status:      Spouse name: Not on file    Number of children: 4    Years of education: Not on file    Highest education level: Not on file   Occupational History    Occupation:    Social Needs    Financial resource strain: Not on file    Food insecurity:     Worry: Not on file     Inability: Not on file    Transportation needs:     Medical: Not on file     Non-medical: Not on file   Tobacco Use    Smoking status: Former Smoker    Smokeless tobacco: Never Used    Tobacco comment: quit 2013   Substance and Sexual Activity    Alcohol use: No    Drug use: No    Sexual activity: Not Currently   Lifestyle    Physical activity:     Days per week: Not on file     Minutes per session: Not on file    Stress: Not at all   Relationships    Social connections:     Talks on phone: Not on file     Gets together: Not on file     Attends Sikhism service: Not on file     Active member of club or organization: Not on file     Attends meetings of clubs or organizations: Not on file     Relationship status: Not on file   Other Topics Concern    Not on file   Social History Narrative    - Drives and lives alone.     Past Surgical History:   Procedure Laterality Date    CHOLECYSTECTOMY  1997    [place]     COLONOSCOPY      EYE SURGERY      bilateral cataracts    FRACTURE SURGERY  2014    right femur with neville    HEMORRHOID SURGERY      48 yrs ago    HYSTERECTOMY      REPLACEMENT OF IMPLANTABLE CARDIOVERTER-DEFIBRILLATOR (ICD) GENERATOR N/A 12/13/2019    Procedure: REPLACEMENT, PULSE GENERATOR, ICD-MEDTRONIC;  Surgeon: Sebastian Nowak III, MD;  Location: [facility];  Service: Cardiology;  Laterality: N/A;    TONSILLECTOMY       Family History   Problem Relation Age of Onset    Heart disease Mother     Cancer Father         Lung Cancer ??? Asbestos    Breast cancer Paternal Aunt        Review of Systems  "  Constitutional: Positive for fatigue. Negative for weight loss.   Eyes: Negative for blurred vision.   Respiratory: Positive for shortness of breath.    Cardiovascular: Negative for chest pain, claudication, syncope and near-syncope.   Gastrointestinal: Negative for abdominal pain.   Endocrine: Negative for polydipsia, polyphagia and polyuria.   Musculoskeletal: Positive for muscle weakness.   Neurological: Positive for weakness. Negative for speech difficulty and headaches.   Psychiatric/Behavioral: The patient is not nervous/anxious.          Objective:      Blood pressure (!) 107/47, pulse (!) 58, temperature 97.9 °F (36.6 °C), height 5' 7" (1.702 m), weight 103.4 kg (228 lb). Body mass index is 35.71 kg/m².  Physical Exam      Assessment:       1. Essential hypertension    2. Paroxysmal atrial fibrillation    3. Chronic anticoagulation    4. Chronic combined systolic and diastolic congestive heart failure    5. Mixed dyslipidemia    6. Hypoxia           Admission on 04/01/2020, Discharged on 04/03/2020   Component Date Value Ref Range Status    WBC 04/01/2020 5.83  3.90 - 12.70 K/uL Final    RBC 04/01/2020 3.94* 4.00 - 5.40 M/uL Final    Hemoglobin 04/01/2020 12.2  12.0 - 16.0 g/dL Final    Hematocrit 04/01/2020 38.6  37.0 - 48.5 % Final    Mean Corpuscular Volume 04/01/2020 98  82 - 98 fL Final    Mean Corpuscular Hemoglobin 04/01/2020 31.0  27.0 - 31.0 pg Final    Mean Corpuscular Hemoglobin Conc 04/01/2020 31.6* 32.0 - 36.0 g/dL Final    RDW 04/01/2020 13.7  11.5 - 14.5 % Final    Platelets 04/01/2020 208  150 - 350 K/uL Final    MPV 04/01/2020 10.5  9.2 - 12.9 fL Final    Immature Granulocytes 04/01/2020 0.3  0.0 - 0.5 % Final    Gran # (ANC) 04/01/2020 3.8  1.8 - 7.7 K/uL Final    Immature Grans (Abs) 04/01/2020 0.02  0.00 - 0.04 K/uL Final    Lymph # 04/01/2020 1.0  1.0 - 4.8 K/uL Final    Mono # 04/01/2020 0.9  0.3 - 1.0 K/uL Final    Eos # 04/01/2020 0.1  0.0 - 0.5 K/uL Final    Baso # " 04/01/2020 0.03  0.00 - 0.20 K/uL Final    nRBC 04/01/2020 0  0 /100 WBC Final    Gran% 04/01/2020 65.0  38.0 - 73.0 % Final    Lymph% 04/01/2020 16.8* 18.0 - 48.0 % Final    Mono% 04/01/2020 15.3* 4.0 - 15.0 % Final    Eosinophil% 04/01/2020 2.1  0.0 - 8.0 % Final    Basophil% 04/01/2020 0.5  0.0 - 1.9 % Final    Differential Method 04/01/2020 Automated   Final    Sodium 04/01/2020 136  136 - 145 mmol/L Final    Potassium 04/01/2020 4.1  3.5 - 5.1 mmol/L Final    Chloride 04/01/2020 97  95 - 110 mmol/L Final    CO2 04/01/2020 29  23 - 29 mmol/L Final    Glucose 04/01/2020 188* 70 - 110 mg/dL Final    BUN, Bld 04/01/2020 32* 8 - 23 mg/dL Final    Creatinine 04/01/2020 1.9* 0.5 - 1.4 mg/dL Final    Calcium 04/01/2020 9.0  8.7 - 10.5 mg/dL Final    Total Protein 04/01/2020 6.1  6.0 - 8.4 g/dL Final    Albumin 04/01/2020 2.8* 3.5 - 5.2 g/dL Final    Total Bilirubin 04/01/2020 0.4  0.1 - 1.0 mg/dL Final    Alkaline Phosphatase 04/01/2020 57  55 - 135 U/L Final    AST 04/01/2020 17  10 - 40 U/L Final    ALT 04/01/2020 18  10 - 44 U/L Final    Anion Gap 04/01/2020 10  8 - 16 mmol/L Final    eGFR if  04/01/2020 28.7* >60 mL/min/1.73 m^2 Final    eGFR if non African American 04/01/2020 24.9* >60 mL/min/1.73 m^2 Final    Troponin I 04/01/2020 <0.030  <=0.040 ng/mL Final    BNP 04/01/2020 1,387* 0 - 99 pg/mL Final    PT 04/01/2020 31.2* 10.6 - 14.8 sec Final    INR 04/01/2020 3.2   Final    SARS-CoV2 (COVID-19) Qualitative P* 04/01/2020 Not Detected  Not Detected Final    Influenza A, Molecular 04/01/2020 Negative  Negative Final    Influenza B, Molecular 04/01/2020 Negative  Negative Final    Flu A & B Source 04/01/2020 Nasal swab   Final    Procalcitonin 04/01/2020 0.24  0.00 - 0.50 ng/mL Final    Hemoglobin A1C 04/02/2020 6.0  4.5 - 6.2 % Final    Estimated Avg Glucose 04/02/2020 126  68 - 131 mg/dL Final    WBC 04/02/2020 5.82  3.90 - 12.70 K/uL Final    RBC 04/02/2020  3.65* 4.00 - 5.40 M/uL Final    Hemoglobin 04/02/2020 11.5* 12.0 - 16.0 g/dL Final    Hematocrit 04/02/2020 36.6* 37.0 - 48.5 % Final    Mean Corpuscular Volume 04/02/2020 100* 82 - 98 fL Final    Mean Corpuscular Hemoglobin 04/02/2020 31.5* 27.0 - 31.0 pg Final    Mean Corpuscular Hemoglobin Conc 04/02/2020 31.4* 32.0 - 36.0 g/dL Final    RDW 04/02/2020 14.0  11.5 - 14.5 % Final    Platelets 04/02/2020 208  150 - 350 K/uL Final    MPV 04/02/2020 10.6  9.2 - 12.9 fL Final    Immature Granulocytes 04/02/2020 0.2  0.0 - 0.5 % Final    Gran # (ANC) 04/02/2020 3.8  1.8 - 7.7 K/uL Final    Immature Grans (Abs) 04/02/2020 0.01  0.00 - 0.04 K/uL Final    Lymph # 04/02/2020 1.0  1.0 - 4.8 K/uL Final    Mono # 04/02/2020 0.8  0.3 - 1.0 K/uL Final    Eos # 04/02/2020 0.2  0.0 - 0.5 K/uL Final    Baso # 04/02/2020 0.02  0.00 - 0.20 K/uL Final    nRBC 04/02/2020 0  0 /100 WBC Final    Gran% 04/02/2020 64.4  38.0 - 73.0 % Final    Lymph% 04/02/2020 17.9* 18.0 - 48.0 % Final    Mono% 04/02/2020 13.6  4.0 - 15.0 % Final    Eosinophil% 04/02/2020 3.6  0.0 - 8.0 % Final    Basophil% 04/02/2020 0.3  0.0 - 1.9 % Final    Differential Method 04/02/2020 Automated   Final    BSA 04/02/2020 2.27  m2 Final    Right Atrial Pressure (from IVC) 04/02/2020 8  mmHg Final    TDI SEPTAL 04/02/2020 0.05  m/s Final    LV LATERAL E/E' RATIO 04/02/2020 10.67  m/s Final    LV SEPTAL E/E' RATIO 04/02/2020 12.80  m/s Final    AORTIC VALVE CUSP SEPERATION 04/02/2020 2.00  cm Final    TDI LATERAL 04/02/2020 0.06  m/s Final    PV PEAK VELOCITY 04/02/2020 86.78  cm/s Final    LVIDD 04/02/2020 5.86  3.5 - 6.0 cm Final    IVS 04/02/2020 1.19* 0.6 - 1.1 cm Final    PW 04/02/2020 1.20* 0.6 - 1.1 cm Final    Ao root annulus 04/02/2020 3.12  cm Final    LVIDS 04/02/2020 4.95* 2.1 - 4.0 cm Final    FS 04/02/2020 16  28 - 44 % Final    LV mass 04/02/2020 300.36  g Final    LA size 04/02/2020 5.32  cm Final    RVDD 04/02/2020  212.00  cm Final    Left Ventricle Relative Wall Thick* 04/02/2020 0.41  cm Final    AV mean gradient 04/02/2020 4  mmHg Final    AV valve area 04/02/2020 2.37  cm2 Final    AV Velocity Ratio 04/02/2020 68.30   Final    AV index (prosthetic) 04/02/2020 0.63   Final    E/A ratio 04/02/2020 0.81   Final    Mean e' 04/02/2020 0.06  m/s Final    E wave decelartion time 04/02/2020 335.65  msec Final    IVRT 04/02/2020 91.17  msec Final    LVOT diameter 04/02/2020 2.18  cm Final    LVOT area 04/02/2020 3.7  cm2 Final    LVOT peak nathan 04/02/2020 92.89  m/s Final    LVOT peak VTI 04/02/2020 20.29  cm Final    Ao peak nathan 04/02/2020 1.36  m/s Final    Ao VTI 04/02/2020 32.00  cm Final    LVOT stroke volume 04/02/2020 75.69  cm3 Final    AV peak gradient 04/02/2020 7  mmHg Final    TV rest pulmonary artery pressure 04/02/2020 37  mmHg Final    E/E' ratio 04/02/2020 11.64  m/s Final    MV Peak E Nathan 04/02/2020 0.64  m/s Final    TR Max Nathan 04/02/2020 2.69  m/s Final    MV Peak A Nathan 04/02/2020 0.79  m/s Final    LV Mass Index 04/02/2020 136  g/m2 Final    Triscuspid Valve Regurgitation Pea* 04/02/2020 29  mmHg Final    POC Glucose 04/02/2020 69* 70 - 110 Final    Magnesium 04/02/2020 2.1  1.6 - 2.6 mg/dL Final    Phosphorus 04/02/2020 4.1  2.7 - 4.5 mg/dL Final    PT 04/02/2020 25.7* 10.6 - 14.8 sec Final    INR 04/02/2020 2.5   Final    Sodium 04/02/2020 139  136 - 145 mmol/L Final    Potassium 04/02/2020 4.2  3.5 - 5.1 mmol/L Final    Chloride 04/02/2020 98  95 - 110 mmol/L Final    CO2 04/02/2020 31* 23 - 29 mmol/L Final    Glucose 04/02/2020 93  70 - 110 mg/dL Final    BUN, Bld 04/02/2020 33* 8 - 23 mg/dL Final    Creatinine 04/02/2020 1.8* 0.5 - 1.4 mg/dL Final    Calcium 04/02/2020 9.1  8.7 - 10.5 mg/dL Final    Total Protein 04/02/2020 5.9* 6.0 - 8.4 g/dL Final    Albumin 04/02/2020 2.6* 3.5 - 5.2 g/dL Final    Total Bilirubin 04/02/2020 0.6  0.1 - 1.0 mg/dL Final    Alkaline  Phosphatase 04/02/2020 59  55 - 135 U/L Final    AST 04/02/2020 14  10 - 40 U/L Final    ALT 04/02/2020 19  10 - 44 U/L Final    Anion Gap 04/02/2020 10  8 - 16 mmol/L Final    eGFR if  04/02/2020 30.6* >60 mL/min/1.73 m^2 Final    eGFR if non African American 04/02/2020 26.6* >60 mL/min/1.73 m^2 Final    POC Glucose 04/02/2020 102  70 - 110 Final    POC Glucose 04/02/2020 96  70 - 110 Final    Specimen UA 04/02/2020 Urine, Clean Catch   Final    Color, UA 04/02/2020 Yellow  Yellow, Straw, Steffanie Final    Appearance, UA 04/02/2020 Hazy* Clear Final    pH, UA 04/02/2020 5.0  5.0 - 8.0 Final    Specific Gravity, UA 04/02/2020 1.010  1.005 - 1.030 Final    Protein, UA 04/02/2020 Negative  Negative Final    Glucose, UA 04/02/2020 Negative  Negative Final    Ketones, UA 04/02/2020 Negative  Negative Final    Bilirubin (UA) 04/02/2020 Negative  Negative Final    Occult Blood UA 04/02/2020 1+* Negative Final    Nitrite, UA 04/02/2020 Negative  Negative Final    Urobilinogen, UA 04/02/2020 Negative  Negative EU/dL Final    Leukocytes, UA 04/02/2020 3+* Negative Final    RBC, UA 04/02/2020 1  0 - 4 /hpf Final    WBC, UA 04/02/2020 >100* 0 - 5 /hpf Final    Bacteria 04/02/2020 Negative  None-Occ /hpf Final    Squam Epithel, UA 04/02/2020 0  /hpf Final    Hyaline Casts, UA 04/02/2020 8* 0-1/lpf /lpf Final    Microscopic Comment 04/02/2020 SEE COMMENT   Final    POC Glucose 04/02/2020 103  70 - 110 Final    POC Glucose 04/02/2020 161* 70 - 110 Final    PT 04/03/2020 23.8* 10.6 - 14.8 sec Final    INR 04/03/2020 2.2   Final    BNP 04/03/2020 430* 0 - 99 pg/mL Final    Sodium 04/03/2020 139  136 - 145 mmol/L Final    Potassium 04/03/2020 4.2  3.5 - 5.1 mmol/L Final    Chloride 04/03/2020 98  95 - 110 mmol/L Final    CO2 04/03/2020 31* 23 - 29 mmol/L Final    Glucose 04/03/2020 93  70 - 110 mg/dL Final    BUN, Bld 04/03/2020 33* 8 - 23 mg/dL Final    Creatinine 04/03/2020 1.8*  0.5 - 1.4 mg/dL Final    Calcium 04/03/2020 8.6* 8.7 - 10.5 mg/dL Final    Anion Gap 04/03/2020 10  8 - 16 mmol/L Final    eGFR if African American 04/03/2020 30.6* >60 mL/min/1.73 m^2 Final    eGFR if non African American 04/03/2020 26.6* >60 mL/min/1.73 m^2 Final    POC Glucose 04/03/2020 91  70 - 110 Final   Lab Visit on 03/02/2020   Component Date Value Ref Range Status    WBC 03/02/2020 5.90  3.90 - 12.70 K/uL Final    RBC 03/02/2020 4.05  4.00 - 5.40 M/uL Final    Hemoglobin 03/02/2020 12.8  12.0 - 16.0 g/dL Final    Hematocrit 03/02/2020 40.8  37.0 - 48.5 % Final    Mean Corpuscular Volume 03/02/2020 101* 82 - 98 fL Final    Mean Corpuscular Hemoglobin 03/02/2020 31.6* 27.0 - 31.0 pg Final    Mean Corpuscular Hemoglobin Conc 03/02/2020 31.4* 32.0 - 36.0 g/dL Final    RDW 03/02/2020 13.9  11.5 - 14.5 % Final    Platelets 03/02/2020 200  150 - 350 K/uL Final    MPV 03/02/2020 10.4  9.2 - 12.9 fL Final    Immature Granulocytes 03/02/2020 0.2  0.0 - 0.5 % Final    Gran # (ANC) 03/02/2020 3.6  1.8 - 7.7 K/uL Final    Immature Grans (Abs) 03/02/2020 0.01  0.00 - 0.04 K/uL Final    Lymph # 03/02/2020 1.3  1.0 - 4.8 K/uL Final    Mono # 03/02/2020 0.6  0.3 - 1.0 K/uL Final    Eos # 03/02/2020 0.2  0.0 - 0.5 K/uL Final    Baso # 03/02/2020 0.04  0.00 - 0.20 K/uL Final    nRBC 03/02/2020 0  0 /100 WBC Final    Gran% 03/02/2020 61.6  38.0 - 73.0 % Final    Lymph% 03/02/2020 22.7  18.0 - 48.0 % Final    Mono% 03/02/2020 10.7  4.0 - 15.0 % Final    Eosinophil% 03/02/2020 4.1  0.0 - 8.0 % Final    Basophil% 03/02/2020 0.7  0.0 - 1.9 % Final    Differential Method 03/02/2020 Automated   Final    BNP 03/02/2020 611* 0 - 99 pg/mL Final         Plan:           Essential hypertension    Paroxysmal atrial fibrillation    Chronic anticoagulation    Chronic combined systolic and diastolic congestive heart failure    Mixed dyslipidemia    Hypoxia      Patient's medical issues have been reviewed.  She  was going through some physical therapy at home which is on hold because of currently prevailing COVID-19 pandemic.    She feels fatigued which is fluctuant.  Some days are good and some days are not that great.  Appetite is so-so.  Bowel movements are good.  She does not like to take Lasix every day because of going to the bathroom.    Sometimes her blood pressures go greater than 150 and I have told her not to be too worried about this.  Unless until the blood pressures are sustained above 190 or 200 systolic, this should come down with regular use of medications.  Of course continue to watch salt intake and check weights.  If she gains weight by about 4 of 5 lb, it is time to take a water pill.    Continue to do what walking and exercise as much as is possible to keep her endurance on.    Fall and injury precautions because of being on warfarin.    If all goes well I will see her back in about 3 months time for follow-up.  Spent aleks 25 minutes with patient which involved review of pts medical conditions, labs, medications and with 50% of time face-to-face discussion about medical problems, management and any applicable changes.    Current Outpatient Medications:     amiodarone (PACERONE) 200 MG Tab, Take by mouth once daily., Disp: , Rfl:     Ca-D3-mag ox-zinc--thom-bor (CALCIUM 600-D3 PLUS) 600 mg calcium- 800 unit-50 mg Tab, Take 1 tablet by mouth 2 (two) times daily., Disp: , Rfl:     carvedilol (COREG) 25 MG tablet, Take 0.5 tablets (12.5 mg total) by mouth 2 (two) times daily with meals., Disp: 1 tablet, Rfl: 1    cholecalciferol, vitamin D3, (VITAMIN D3) 5,000 unit Tab, Take 5,000 Units by mouth 2 (two) times daily., Disp: , Rfl:     denosumab (PROLIA) 60 mg/mL Syrg, Inject 1 mL (60 mg total) into the skin every 6 (six) months., Disp: 2 mL, Rfl: 1    furosemide (LASIX) 20 MG tablet, Take 20 mg by mouth 2 (two) times daily., Disp: , Rfl:     gabapentin (NEURONTIN) 100 MG capsule, TAKE ONE CAPSULE  BY MOUTH THREE TIMES DAILY (Patient taking differently: Take 100 mg by mouth 3 (three) times daily. ), Disp: 270 capsule, Rfl: 1    glimepiride (AMARYL) 1 MG tablet, Take 1 tablet (1 mg total) by mouth before breakfast., Disp: 90 tablet, Rfl: 1    midodrine (PROAMATINE) 2.5 MG Tab, Take 2.5 mg by mouth 3 (three) times daily., Disp: , Rfl:     omeprazole (PRILOSEC) 40 MG capsule, Take 40 mg by mouth once daily., Disp: , Rfl:     sacubitril-valsartan (ENTRESTO) 24-26 mg per tablet, Take 1 tablet by mouth once daily. , Disp: , Rfl:     warfarin (COUMADIN) 5 MG tablet, Take 5 mg by mouth once daily., Disp: , Rfl:   No current facility-administered medications for this visit.     Facility-Administered Medications Ordered in Other Visits:     0.9%  NaCl infusion, , Intravenous, Continuous, Sebastian Nowak III, MD, Last Rate: 75 mL/hr at 12/13/19 0728    diphenhydrAMINE injection 25 mg, 25 mg, Intravenous, Once, Sebastian Nowak III, MD    lorazepam injection 1 mg, 1 mg, Intravenous, Once, Sebastian Nowak III, MD

## 2020-06-02 ENCOUNTER — TELEPHONE (OUTPATIENT)
Dept: PULMONOLOGY | Facility: CLINIC | Age: 80
End: 2020-06-02

## 2020-06-02 NOTE — TELEPHONE ENCOUNTER
----- Message from Dian Roland MA sent at 4/21/2020  7:35 AM CDT -----      ----- Message -----  From: Donna Dwyer  Sent: 4/20/2020   2:22 PM CDT  To: Dian Roland MA    Referral from Dr Jones 4/20/20

## 2020-06-24 ENCOUNTER — OFFICE VISIT (OUTPATIENT)
Dept: PULMONOLOGY | Facility: CLINIC | Age: 80
End: 2020-06-24
Payer: MEDICARE

## 2020-06-24 VITALS — OXYGEN SATURATION: 99 % | HEART RATE: 75 BPM

## 2020-06-24 DIAGNOSIS — I48.0 PAROXYSMAL ATRIAL FIBRILLATION: ICD-10-CM

## 2020-06-24 DIAGNOSIS — E66.9 OBESITY, UNSPECIFIED CLASSIFICATION, UNSPECIFIED OBESITY TYPE, UNSPECIFIED WHETHER SERIOUS COMORBIDITY PRESENT: ICD-10-CM

## 2020-06-24 DIAGNOSIS — Z95.810 PRESENCE OF AUTOMATIC (IMPLANTABLE) CARDIAC DEFIBRILLATOR: ICD-10-CM

## 2020-06-24 DIAGNOSIS — Z95.810 CARDIOMYOPATHY WITH IMPLANTABLE CARDIOVERTER-DEFIBRILLATOR: ICD-10-CM

## 2020-06-24 DIAGNOSIS — J44.9 CHRONIC OBSTRUCTIVE PULMONARY DISEASE, UNSPECIFIED COPD TYPE: ICD-10-CM

## 2020-06-24 DIAGNOSIS — I42.9 CARDIOMYOPATHY WITH IMPLANTABLE CARDIOVERTER-DEFIBRILLATOR: ICD-10-CM

## 2020-06-24 DIAGNOSIS — G47.33 OBSTRUCTIVE SLEEP APNEA SYNDROME: ICD-10-CM

## 2020-06-24 DIAGNOSIS — J96.11 CHRONIC RESPIRATORY FAILURE WITH HYPOXIA: ICD-10-CM

## 2020-06-24 DIAGNOSIS — Z79.01 CHRONIC ANTICOAGULATION: ICD-10-CM

## 2020-06-24 PROCEDURE — 99204 OFFICE O/P NEW MOD 45 MIN: CPT | Mod: S$GLB,,, | Performed by: INTERNAL MEDICINE

## 2020-06-24 PROCEDURE — 1101F PR PT FALLS ASSESS DOC 0-1 FALLS W/OUT INJ PAST YR: ICD-10-PCS | Mod: S$GLB,,, | Performed by: INTERNAL MEDICINE

## 2020-06-24 PROCEDURE — 1159F PR MEDICATION LIST DOCUMENTED IN MEDICAL RECORD: ICD-10-PCS | Mod: S$GLB,,, | Performed by: INTERNAL MEDICINE

## 2020-06-24 PROCEDURE — 3078F PR MOST RECENT DIASTOLIC BLOOD PRESSURE < 80 MM HG: ICD-10-PCS | Mod: S$GLB,,, | Performed by: INTERNAL MEDICINE

## 2020-06-24 PROCEDURE — 1101F PT FALLS ASSESS-DOCD LE1/YR: CPT | Mod: S$GLB,,, | Performed by: INTERNAL MEDICINE

## 2020-06-24 PROCEDURE — 99204 PR OFFICE/OUTPT VISIT, NEW, LEVL IV, 45-59 MIN: ICD-10-PCS | Mod: S$GLB,,, | Performed by: INTERNAL MEDICINE

## 2020-06-24 PROCEDURE — 3074F PR MOST RECENT SYSTOLIC BLOOD PRESSURE < 130 MM HG: ICD-10-PCS | Mod: S$GLB,,, | Performed by: INTERNAL MEDICINE

## 2020-06-24 PROCEDURE — 3074F SYST BP LT 130 MM HG: CPT | Mod: S$GLB,,, | Performed by: INTERNAL MEDICINE

## 2020-06-24 PROCEDURE — 3078F DIAST BP <80 MM HG: CPT | Mod: S$GLB,,, | Performed by: INTERNAL MEDICINE

## 2020-06-24 PROCEDURE — 1159F MED LIST DOCD IN RCRD: CPT | Mod: S$GLB,,, | Performed by: INTERNAL MEDICINE

## 2020-06-24 NOTE — ASSESSMENT & PLAN NOTE
· Has not been formally diagnosed and has a fairly small smoking history  · Order PFT and 6 minute walk test  · Will decide on meds depending on test results

## 2020-06-24 NOTE — ASSESSMENT & PLAN NOTE
· On O2 at 2 LPM but reports sats in high 90's consistently at home  · Will reassess with 6 minute walk test

## 2020-06-24 NOTE — PROGRESS NOTES
New Office Visit/Consultation Note *    Patient Name: Jo Alegre  MRN: [id number]  : 1940      Reason for visit: COPD, hypoxemia    HPI:     2020 - Referred here for evaluation for possible COPD.  She was hospitalized in 2020 and found to need O2  (sat 87% with exertion) - has home O2 (2 LPM) and she wears all day.  She has a h/o smoking about 1/2 PPD for about 30 years quit about .  She has never been tested for COPD.  She has sleep study in the past and was diagnosed with sleep apnea but couldn't tolerate CPAP at that time.  Has h/o CHF (though most recent ECHO looks OK), AICD, atrial fibrillation.    Past Medical History    Past Medical History:   Diagnosis Date    Allergy     Codeine, Lasix    Atrial fibrillation     Atrial fibrillation     Cataract     CHF (congestive heart failure)     Diabetes mellitus, type 2     Ejection fraction < 50% 10/18/2017    Approximately 35%  Based on prior  Echocardiogram.    Encounter for blood transfusion     Hyperlipidemia     Osteoporosis     Thyroid disorder screening 10/17/2017    TSH of 1.12 ordered by Dr. dee Jones       Past Surgical History    Past Surgical History:   Procedure Laterality Date    CHOLECYSTECTOMY      [place]     COLONOSCOPY      EYE SURGERY      bilateral cataracts    FRACTURE SURGERY      right femur with neville    HEMORRHOID SURGERY      48 yrs ago    HYSTERECTOMY      REPLACEMENT OF IMPLANTABLE CARDIOVERTER-DEFIBRILLATOR (ICD) GENERATOR N/A 2019    Procedure: REPLACEMENT, PULSE GENERATOR, ICD-MEDTRONIC;  Surgeon: Sebastian Nowak III, MD;  Location: [facility];  Service: Cardiology;  Laterality: N/A;    TONSILLECTOMY         Medications      Current Outpatient Medications:     amiodarone (PACERONE) 200 MG Tab, Take by mouth once daily., Disp: , Rfl:     Ca-D3-mag ox-zinc--thom-bor (CALCIUM 600-D3 PLUS) 600 mg calcium- 800 unit-50 mg Tab, Take 1 tablet by mouth 2 (two) times daily., Disp: , Rfl:      carvedilol (COREG) 25 MG tablet, Take 0.5 tablets (12.5 mg total) by mouth 2 (two) times daily with meals., Disp: 1 tablet, Rfl: 1    cholecalciferol, vitamin D3, (VITAMIN D3) 5,000 unit Tab, Take 5,000 Units by mouth 2 (two) times daily., Disp: , Rfl:     furosemide (LASIX) 20 MG tablet, Take 20 mg by mouth 2 (two) times daily., Disp: , Rfl:     gabapentin (NEURONTIN) 100 MG capsule, TAKE ONE CAPSULE BY MOUTH THREE TIMES DAILY (Patient taking differently: Take 100 mg by mouth 3 (three) times daily. ), Disp: 270 capsule, Rfl: 1    glimepiride (AMARYL) 1 MG tablet, Take 1 tablet (1 mg total) by mouth before breakfast., Disp: 90 tablet, Rfl: 1    midodrine (PROAMATINE) 2.5 MG Tab, Take 2.5 mg by mouth 3 (three) times daily., Disp: , Rfl:     omeprazole (PRILOSEC) 40 MG capsule, Take 40 mg by mouth once daily., Disp: , Rfl:     sacubitril-valsartan (ENTRESTO) 24-26 mg per tablet, Take 1 tablet by mouth once daily. , Disp: , Rfl:     warfarin (COUMADIN) 5 MG tablet, Take 5 mg by mouth once daily., Disp: , Rfl:     denosumab (PROLIA) 60 mg/mL Syrg, Inject 1 mL (60 mg total) into the skin every 6 (six) months., Disp: 2 mL, Rfl: 1  No current facility-administered medications for this visit.     Facility-Administered Medications Ordered in Other Visits:     0.9%  NaCl infusion, , Intravenous, Continuous, Sebastian Nowak III, MD, Last Rate: 75 mL/hr at 12/13/19 0728    diphenhydrAMINE injection 25 mg, 25 mg, Intravenous, Once, Sebastian Nowak III, MD    lorazepam injection 1 mg, 1 mg, Intravenous, Once, Sebastian Nowak III, MD    Allergies    Review of patient's allergies indicates:   Allergen Reactions    Codeine Other (See Comments)    Lasix [furosemide]        SocHx    Social History     Tobacco Use   Smoking Status Former Smoker   Smokeless Tobacco Never Used   Tobacco Comment    quit 2013       Social History     Substance and Sexual Activity   Alcohol Use No       Drug Use - no  Occupation - retired,  housewife  Asbestos exposure - no  Pets - no    FMHx    Family History   Problem Relation Age of Onset    Heart disease Mother     Cancer Father         Lung Cancer ??? Asbestos    Breast cancer Paternal Aunt          Review of Systems  Review of Systems   Constitutional: Negative for chills, diaphoresis, fever, malaise/fatigue and weight loss.   HENT: Negative for congestion.    Eyes: Negative for pain.   Respiratory: Positive for shortness of breath. Negative for cough, hemoptysis, sputum production, wheezing and stridor.    Cardiovascular: Positive for leg swelling. Negative for chest pain, palpitations, orthopnea, claudication and PND.        Decreased circulation   Gastrointestinal: Negative for abdominal pain, blood in stool, constipation, diarrhea, heartburn, nausea and vomiting.   Genitourinary: Negative for dysuria, frequency, hematuria and urgency.   Musculoskeletal: Negative for back pain, falls, myalgias and neck pain.        Some aches and pains   Skin: Negative for itching and rash.   Neurological: Negative for dizziness, tingling, tremors, sensory change, speech change, focal weakness, seizures, loss of consciousness, weakness and headaches.   Psychiatric/Behavioral: Negative for depression, substance abuse and suicidal ideas. The patient is not nervous/anxious.        Physical Exam    Vitals:    06/24/20 0913 06/24/20 0941   BP: (P) 122/76    Pulse: (!) (P) 51 75   Temp: (P) 97.5 °F (36.4 °C)    SpO2: (!) (P) 81% 99%   Weight: (P) 105.2 kg (232 lb)        Physical Exam  Vitals signs and nursing note reviewed.   Constitutional:       General: She is not in acute distress.     Appearance: She is well-developed. She is obese. She is not ill-appearing, toxic-appearing or diaphoretic.      Comments: No distress   HENT:      Head: Normocephalic and atraumatic.      Right Ear: External ear normal.      Left Ear: External ear normal.      Nose: Nose normal.   Eyes:      General: No scleral icterus.         Right eye: No discharge.         Left eye: No discharge.      Extraocular Movements: Extraocular movements intact.      Conjunctiva/sclera: Conjunctivae normal.      Pupils: Pupils are equal, round, and reactive to light.   Neck:      Musculoskeletal: Normal range of motion and neck supple. No neck rigidity or muscular tenderness.      Thyroid: No thyromegaly.      Vascular: No JVD.      Trachea: No tracheal deviation.   Cardiovascular:      Rate and Rhythm: Normal rate and regular rhythm.      Pulses: Normal pulses.      Heart sounds: Normal heart sounds. No murmur. No friction rub. No gallop.       Comments: Defibrillator in place  Pulmonary:      Effort: Pulmonary effort is normal. No respiratory distress.      Breath sounds: Normal breath sounds. No stridor. No wheezing or rales.   Chest:      Chest wall: No tenderness.   Abdominal:      General: Bowel sounds are normal. There is no distension.      Palpations: Abdomen is soft.      Tenderness: There is no abdominal tenderness. There is no guarding.      Comments: obese   Musculoskeletal: Normal range of motion.         General: Swelling present. No tenderness or deformity.      Right lower leg: Edema present.      Left lower leg: Edema present.   Lymphadenopathy:      Cervical: No cervical adenopathy.   Skin:     General: Skin is warm and dry.      Coloration: Skin is not jaundiced.   Neurological:      General: No focal deficit present.      Mental Status: She is alert and oriented to person, place, and time.      Cranial Nerves: No cranial nerve deficit.   Psychiatric:         Mood and Affect: Mood normal.         Behavior: Behavior normal.         Thought Content: Thought content normal.         Judgment: Judgment normal.         Labs    Lab Results   Component Value Date    WBC 5.82 04/02/2020    HGB 11.5 (L) 04/02/2020    HCT 36.6 (L) 04/02/2020     04/02/2020       Sodium   Date Value Ref Range Status   04/03/2020 139 136 - 145 mmol/L Final      Potassium   Date Value Ref Range Status   04/03/2020 4.2 3.5 - 5.1 mmol/L Final     Chloride   Date Value Ref Range Status   04/03/2020 98 95 - 110 mmol/L Final     CO2   Date Value Ref Range Status   04/03/2020 31 (H) 23 - 29 mmol/L Final     Glucose   Date Value Ref Range Status   04/03/2020 93 70 - 110 mg/dL Final     BUN, Bld   Date Value Ref Range Status   04/03/2020 33 (H) 8 - 23 mg/dL Final     Creatinine   Date Value Ref Range Status   04/03/2020 1.8 (H) 0.5 - 1.4 mg/dL Final     Calcium   Date Value Ref Range Status   04/03/2020 8.6 (L) 8.7 - 10.5 mg/dL Final     Total Protein   Date Value Ref Range Status   04/02/2020 5.9 (L) 6.0 - 8.4 g/dL Final     Albumin   Date Value Ref Range Status   04/02/2020 2.6 (L) 3.5 - 5.2 g/dL Final     Total Bilirubin   Date Value Ref Range Status   04/02/2020 0.6 0.1 - 1.0 mg/dL Final     Comment:     For infants and newborns, interpretation of results should be based  on gestational age, weight and in agreement with clinical  observations.  Premature Infant recommended reference ranges:  Up to 24 hours.............<8.0 mg/dL  Up to 48 hours............<12.0 mg/dL  3-5 days..................<15.0 mg/dL  6-29 days.................<15.0 mg/dL       Alkaline Phosphatase   Date Value Ref Range Status   04/02/2020 59 55 - 135 U/L Final     AST   Date Value Ref Range Status   04/02/2020 14 10 - 40 U/L Final     ALT   Date Value Ref Range Status   04/02/2020 19 10 - 44 U/L Final     Anion Gap   Date Value Ref Range Status   04/03/2020 10 8 - 16 mmol/L Final       Xrays    EXAMINATION:  XR CHEST PA AND LATERAL     CLINICAL HISTORY:  shortness of breath     FINDINGS:  PA and lateral chest is compared to 01/17/2020 shows normal cardiomediastinal silhouette.     Lungs are clear. Pulmonary vasculature is normal. No acute osseous abnormality.     Impression:     No acute pulmonary process        Electronically signed by: Monica Aquino MD  Date:                                             04/01/2020  Time:                                           16:57      ECHO (4/1)  · Mild eccentric left ventricular hypertrophy.  · Mild left ventricular enlargement.  · Normal left ventricular systolic function. The estimated ejection fraction is 60%.  · Grade I (mild) left ventricular diastolic dysfunction consistent with impaired relaxation.  · No wall motion abnormalities.  · Normal right ventricular systolic function.  · Severe left atrial enlargement.  · Mild mitral sclerosis.  · There is mild leaflet calcification of the Mitral Valve.  · Mild mitral regurgitation.  · Intermediate central venous pressure (8 mmHg).  · The estimated PA systolic pressure is 37 mmHg    Impression/Plan    Problem List Items Addressed This Visit        Pulmonary    COPD (chronic obstructive pulmonary disease) per patient     · Has not been formally diagnosed and has a fairly small smoking history  · Order PFT and 6 minute walk test  · Will decide on meds depending on test results         Relevant Orders    Complete PFT with bronchodilator    Six Minute Walk Test to qualify for Home Oxygen    Chronic respiratory failure     · On O2 at 2 LPM but reports sats in high 90's consistently at home  · Will reassess with 6 minute walk test            Cardiac/Vascular    Cardiomyopathy with implantable cardioverter-defibrillator     · Last EF better         Paroxysmal atrial fibrillation     · In NSR by exam         Presence of automatic (implantable) cardiac defibrillator     · Aware             Hematology    Chronic anticoagulation     · Aware             Endocrine    Obesity     · Could benefit from weight loss and exercise            Other    Obstructive sleep apnea syndrome     · May need to be re-evaluated               I have spent about 45 minutes with the patient taking the history and examining the patient.  We have discussed the diagnoses and current plan and all questions have been answered.  We have discussed the follow up  plan.  The patient and family (if present) know to contact the office with any questions they may have.        Alverto Pascal MD

## 2020-07-20 ENCOUNTER — HOSPITAL ENCOUNTER (OUTPATIENT)
Dept: PULMONOLOGY | Facility: HOSPITAL | Age: 80
Discharge: HOME | End: 2020-07-20
Attending: INTERNAL MEDICINE
Payer: MEDICARE

## 2020-07-20 VITALS — BODY MASS INDEX: 36.41 KG/M2 | WEIGHT: 232 LBS | HEIGHT: 67 IN

## 2020-07-20 DIAGNOSIS — J44.9 CHRONIC OBSTRUCTIVE PULMONARY DISEASE, UNSPECIFIED COPD TYPE: ICD-10-CM

## 2020-07-20 PROCEDURE — 99900035 HC TECH TIME PER 15 MIN (STAT)

## 2020-07-20 PROCEDURE — 94727 GAS DIL/WSHOT DETER LNG VOL: CPT

## 2020-07-20 PROCEDURE — 94375 RESPIRATORY FLOW VOLUME LOOP: CPT

## 2020-07-20 PROCEDURE — 94618 PULMONARY STRESS TESTING: CPT

## 2020-07-20 PROCEDURE — 94010 BREATHING CAPACITY TEST: CPT

## 2020-07-20 NOTE — CARE UPDATE
"   07/20/20 1340   6MW Test   Ordering Provider Dr Alverto Pascal   Diagnosis Qualify for Oxygen;Shortness of Breath   Height 5' 7" (1.702 m)   Weight 105.2 kg (232 lb)   BMI (Calculated) 36.3   Predicted Distance 196.75   Patient Race    6MWT Status completed without stopping   Patient Reported Dyspnea;Leg pain   Was O2 used? No   6MW Distance walked (feet) 1100 feet   Distance walked (meters) 335.28 meters   Did patient stop? No   Type of assistive device(s) used? a walker   Is extra documentation required for this patient? Yes   Pre-Exercise   Oxygen Saturation 95 %   Supplemental Oxygen Room Air   Heart Rate 89 bpm   Jung Dyspnea Rating  light   Exercise 1 Minute   Oxygen Saturation 96 %   Supplemental Oxygen Room Air   Heart Rate 90 bpm   Exercise 2 Minutes   Oxygen Saturation 96 %   Supplemental Oxygen Room Air   Heart Rate 93 bpm   Exercise 3 Minutes   Oxygen Saturation 92 %   Supplemental Oxygen Room Air   Heart Rate 96 bpm   Exercise 4 Minutes   Oxygen Saturation 97 %   Supplemental Oxygen Room Air   Heart Rate 99 bpm   Exercise 5 Minutes   Oxygen Saturation 96 %   Supplemental Oxygen Room Air   Heart Rate 99 bpm   Post Exercise   Oxygen Saturation 95 %   Heart Rate 97 bpm   Jung Dyspnea Rating  somewhat heavy   Recovery   Oxygen Saturation 96 %   Supplemental Oxygen Room Air   Heart Rate 88 bpm   Jung Dyspnea Rating  moderate   Interpretation   Is procedure ready for interpretation? Yes   Did the patient stop or pause? No   Total Time Walked (Calculated) 360 seconds   Total Laps Walked 5.5   Final Partial Lap Distance (feet) 0 feet   Total Distance Feet (Calculated) 1100 feet   Total Distance Meters (Calculated) 335.28 meters   Predicted Distance Meters (Calculated) 334.29 meters   Percentage of Predicted (Calculated) 100.3   Peak VO2 (Calculated) 14.04   Mets 4.01   Comments This is a Non-Hypoxemic 6 min. walk.   Oxygen Qualification   Oxygen Qualification? No     "

## 2020-07-21 ENCOUNTER — TELEPHONE (OUTPATIENT)
Dept: PULMONOLOGY | Facility: HOSPITAL | Age: 80
End: 2020-07-21

## 2020-07-27 ENCOUNTER — OFFICE VISIT (OUTPATIENT)
Dept: PULMONOLOGY | Facility: CLINIC | Age: 80
End: 2020-07-27
Payer: MEDICARE

## 2020-07-27 DIAGNOSIS — I27.20 PULMONARY HYPERTENSION: ICD-10-CM

## 2020-07-27 DIAGNOSIS — Z79.01 CHRONIC ANTICOAGULATION: ICD-10-CM

## 2020-07-27 DIAGNOSIS — J44.9 CHRONIC OBSTRUCTIVE PULMONARY DISEASE, UNSPECIFIED COPD TYPE: ICD-10-CM

## 2020-07-27 DIAGNOSIS — J96.11 CHRONIC RESPIRATORY FAILURE WITH HYPOXIA: ICD-10-CM

## 2020-07-27 PROCEDURE — 1101F PR PT FALLS ASSESS DOC 0-1 FALLS W/OUT INJ PAST YR: ICD-10-PCS | Mod: S$GLB,,, | Performed by: INTERNAL MEDICINE

## 2020-07-27 PROCEDURE — 3074F PR MOST RECENT SYSTOLIC BLOOD PRESSURE < 130 MM HG: ICD-10-PCS | Mod: S$GLB,,, | Performed by: INTERNAL MEDICINE

## 2020-07-27 PROCEDURE — 3078F DIAST BP <80 MM HG: CPT | Mod: S$GLB,,, | Performed by: INTERNAL MEDICINE

## 2020-07-27 PROCEDURE — 99214 OFFICE O/P EST MOD 30 MIN: CPT | Mod: S$GLB,,, | Performed by: INTERNAL MEDICINE

## 2020-07-27 PROCEDURE — 3078F PR MOST RECENT DIASTOLIC BLOOD PRESSURE < 80 MM HG: ICD-10-PCS | Mod: S$GLB,,, | Performed by: INTERNAL MEDICINE

## 2020-07-27 PROCEDURE — 1159F MED LIST DOCD IN RCRD: CPT | Mod: S$GLB,,, | Performed by: INTERNAL MEDICINE

## 2020-07-27 PROCEDURE — 1101F PT FALLS ASSESS-DOCD LE1/YR: CPT | Mod: S$GLB,,, | Performed by: INTERNAL MEDICINE

## 2020-07-27 PROCEDURE — 99214 PR OFFICE/OUTPT VISIT, EST, LEVL IV, 30-39 MIN: ICD-10-PCS | Mod: S$GLB,,, | Performed by: INTERNAL MEDICINE

## 2020-07-27 PROCEDURE — 3074F SYST BP LT 130 MM HG: CPT | Mod: S$GLB,,, | Performed by: INTERNAL MEDICINE

## 2020-07-27 PROCEDURE — 1159F PR MEDICATION LIST DOCUMENTED IN MEDICAL RECORD: ICD-10-PCS | Mod: S$GLB,,, | Performed by: INTERNAL MEDICINE

## 2020-07-27 NOTE — PROGRESS NOTES
Office Visit *    Patient Name: Jo Alegre  MRN: [id number]  : 1940      Reason for visit: COPD, hypoxemia    HPI:     2020 - Referred here for evaluation for possible COPD.  She was hospitalized in 2020 and found to need O2  (sat 87% with exertion) - has home O2 (2 LPM) and she wears all day.  She has a h/o smoking about 1/2 PPD for about 30 years quit about .  She has never been tested for COPD.  She has sleep study in the past and was diagnosed with sleep apnea but couldn't tolerate CPAP at that time.  Has h/o CHF (though most recent ECHO looks OK), AICD, atrial fibrillation.    2020 - Here for follow up, doing well with no new complaints.  Had PFT - no obstruction, TLC at lower level of normal and mild/mod reduction in DLCO.  ^ minute walk test was good she met her predicted distance and had no desaturation noted.  She remains at risk for sleep apnea - d/w her and family - will do overnight oximetry to check on nightly O2 needs and as a screen for possible JENNIFER ( necessary will do HST).  No corona virus exposures and has been practicing social distancing.  Discussed the need to work on weight loss and regular exercise.    Past Medical History    Past Medical History:   Diagnosis Date    Allergy     Codeine, Lasix    Atrial fibrillation     Atrial fibrillation     Cataract     CHF (congestive heart failure)     Diabetes mellitus, type 2     Ejection fraction < 50% 10/18/2017    Approximately 35%  Based on prior  Echocardiogram.    Encounter for blood transfusion     Hyperlipidemia     Osteoporosis     Thyroid disorder screening 10/17/2017    TSH of 1.12 ordered by Dr. dee Jones       Past Surgical History    Past Surgical History:   Procedure Laterality Date    CHOLECYSTECTOMY      [place]     COLONOSCOPY      EYE SURGERY      bilateral cataracts    FRACTURE SURGERY      right femur with neville    HEMORRHOID SURGERY      48 yrs ago    HYSTERECTOMY       REPLACEMENT OF IMPLANTABLE CARDIOVERTER-DEFIBRILLATOR (ICD) GENERATOR N/A 12/13/2019    Procedure: REPLACEMENT, PULSE GENERATOR, ICD-MEDTRONIC;  Surgeon: Sbeastian Nowak III, MD;  [facility];  Service: Cardiology;  Laterality: N/A;    TONSILLECTOMY         Medications      Current Outpatient Medications:     amiodarone (PACERONE) 200 MG Tab, Take by mouth once daily., Disp: , Rfl:     Ca-D3-mag ox-zinc--thom-bor (CALCIUM 600-D3 PLUS) 600 mg calcium- 800 unit-50 mg Tab, Take 1 tablet by mouth 2 (two) times daily., Disp: , Rfl:     carvedilol (COREG) 25 MG tablet, Take 0.5 tablets (12.5 mg total) by mouth 2 (two) times daily with meals., Disp: 1 tablet, Rfl: 1    cholecalciferol, vitamin D3, (VITAMIN D3) 5,000 unit Tab, Take 5,000 Units by mouth 2 (two) times daily., Disp: , Rfl:     furosemide (LASIX) 20 MG tablet, Take 20 mg by mouth 2 (two) times daily., Disp: , Rfl:     gabapentin (NEURONTIN) 100 MG capsule, TAKE ONE CAPSULE BY MOUTH THREE TIMES DAILY (Patient taking differently: Take 100 mg by mouth 3 (three) times daily. ), Disp: 270 capsule, Rfl: 1    glimepiride (AMARYL) 1 MG tablet, Take 1 tablet (1 mg total) by mouth before breakfast., Disp: 90 tablet, Rfl: 1    midodrine (PROAMATINE) 2.5 MG Tab, Take 2.5 mg by mouth 3 (three) times daily., Disp: , Rfl:     omeprazole (PRILOSEC) 40 MG capsule, Take 40 mg by mouth once daily., Disp: , Rfl:     sacubitril-valsartan (ENTRESTO) 24-26 mg per tablet, Take 1 tablet by mouth once daily. , Disp: , Rfl:     warfarin (COUMADIN) 5 MG tablet, Take 5 mg by mouth once daily., Disp: , Rfl:     denosumab (PROLIA) 60 mg/mL Syrg, Inject 1 mL (60 mg total) into the skin every 6 (six) months., Disp: 2 mL, Rfl: 1  No current facility-administered medications for this visit.     Facility-Administered Medications Ordered in Other Visits:     0.9%  NaCl infusion, , Intravenous, Continuous, Sebastian Nowak III, MD, Last Rate: 75 mL/hr at 12/13/19 9873     diphenhydrAMINE injection 25 mg, 25 mg, Intravenous, Once, Sebastian Nowak III, MD    lorazepam injection 1 mg, 1 mg, Intravenous, Once, Sebastian Nowak III, MD    Allergies    Review of patient's allergies indicates:   Allergen Reactions    Codeine Other (See Comments)    Lasix [furosemide]        SocHx    Social History     Tobacco Use   Smoking Status Former Smoker   Smokeless Tobacco Never Used   Tobacco Comment    quit 2013       Social History     Substance and Sexual Activity   Alcohol Use No       Drug Use - no  Occupation - retired, housewife  Asbestos exposure - no  Pets - no    FMHx    Family History   Problem Relation Age of Onset    Heart disease Mother     Cancer Father         Lung Cancer ??? Asbestos    Breast cancer Paternal Aunt          Review of Systems  Review of Systems   Constitutional: Negative for chills, diaphoresis, fever, malaise/fatigue and weight loss.   HENT: Negative for congestion.    Eyes: Negative for pain.   Respiratory: Positive for shortness of breath. Negative for cough, hemoptysis, sputum production, wheezing and stridor.    Cardiovascular: Positive for leg swelling. Negative for chest pain, palpitations, orthopnea, claudication and PND.        Decreased circulation   Gastrointestinal: Negative for abdominal pain, blood in stool, constipation, diarrhea, heartburn, nausea and vomiting.   Genitourinary: Negative for dysuria, frequency, hematuria and urgency.   Musculoskeletal: Negative for back pain, falls, myalgias and neck pain.        Some aches and pains   Skin: Negative for itching and rash.   Neurological: Negative for dizziness, tingling, tremors, sensory change, speech change, focal weakness, seizures, loss of consciousness, weakness and headaches.   Psychiatric/Behavioral: Negative for depression, substance abuse and suicidal ideas. The patient is not nervous/anxious.        Physical Exam    Vitals:    07/27/20 1005   BP: (P) 132/72   Pulse: (P) 76   Temp: (P) 97.2 °F  (36.2 °C)   SpO2: (P) 99%   Weight: (P) 104.8 kg (231 lb)       Physical Exam  Vitals signs and nursing note reviewed.   Constitutional:       General: She is not in acute distress.     Appearance: She is well-developed. She is obese. She is not ill-appearing, toxic-appearing or diaphoretic.      Comments: No distress   HENT:      Head: Normocephalic and atraumatic.      Right Ear: External ear normal.      Left Ear: External ear normal.      Nose: Nose normal.   Eyes:      General: No scleral icterus.        Right eye: No discharge.         Left eye: No discharge.      Extraocular Movements: Extraocular movements intact.      Conjunctiva/sclera: Conjunctivae normal.      Pupils: Pupils are equal, round, and reactive to light.   Neck:      Musculoskeletal: Normal range of motion and neck supple. No neck rigidity or muscular tenderness.      Thyroid: No thyromegaly.      Vascular: No JVD.      Trachea: No tracheal deviation.   Cardiovascular:      Rate and Rhythm: Normal rate and regular rhythm.      Pulses: Normal pulses.      Heart sounds: Normal heart sounds. No murmur. No friction rub. No gallop.       Comments: Defibrillator in place  Pulmonary:      Effort: Pulmonary effort is normal. No respiratory distress.      Breath sounds: Normal breath sounds. No stridor. No wheezing or rales.   Chest:      Chest wall: No tenderness.   Abdominal:      General: Bowel sounds are normal. There is no distension.      Palpations: Abdomen is soft.      Tenderness: There is no abdominal tenderness. There is no guarding.      Comments: obese   Musculoskeletal: Normal range of motion.         General: Swelling present. No tenderness or deformity.      Right lower leg: Edema present.      Left lower leg: Edema present.   Lymphadenopathy:      Cervical: No cervical adenopathy.   Skin:     General: Skin is warm and dry.      Coloration: Skin is not jaundiced.   Neurological:      General: No focal deficit present.      Mental  Status: She is alert and oriented to person, place, and time.      Cranial Nerves: No cranial nerve deficit.   Psychiatric:         Mood and Affect: Mood normal.         Behavior: Behavior normal.         Thought Content: Thought content normal.         Judgment: Judgment normal.         Labs    Lab Results   Component Value Date    WBC 5.82 04/02/2020    HGB 11.5 (L) 04/02/2020    HCT 36.6 (L) 04/02/2020     04/02/2020       Sodium   Date Value Ref Range Status   04/03/2020 139 136 - 145 mmol/L Final     Potassium   Date Value Ref Range Status   04/03/2020 4.2 3.5 - 5.1 mmol/L Final     Chloride   Date Value Ref Range Status   04/03/2020 98 95 - 110 mmol/L Final     CO2   Date Value Ref Range Status   04/03/2020 31 (H) 23 - 29 mmol/L Final     Glucose   Date Value Ref Range Status   04/03/2020 93 70 - 110 mg/dL Final     BUN, Bld   Date Value Ref Range Status   04/03/2020 33 (H) 8 - 23 mg/dL Final     Creatinine   Date Value Ref Range Status   04/03/2020 1.8 (H) 0.5 - 1.4 mg/dL Final     Calcium   Date Value Ref Range Status   04/03/2020 8.6 (L) 8.7 - 10.5 mg/dL Final     Total Protein   Date Value Ref Range Status   04/02/2020 5.9 (L) 6.0 - 8.4 g/dL Final     Albumin   Date Value Ref Range Status   04/02/2020 2.6 (L) 3.5 - 5.2 g/dL Final     Total Bilirubin   Date Value Ref Range Status   04/02/2020 0.6 0.1 - 1.0 mg/dL Final     Comment:     For infants and newborns, interpretation of results should be based  on gestational age, weight and in agreement with clinical  observations.  Premature Infant recommended reference ranges:  Up to 24 hours.............<8.0 mg/dL  Up to 48 hours............<12.0 mg/dL  3-5 days..................<15.0 mg/dL  6-29 days.................<15.0 mg/dL       Alkaline Phosphatase   Date Value Ref Range Status   04/02/2020 59 55 - 135 U/L Final     AST   Date Value Ref Range Status   04/02/2020 14 10 - 40 U/L Final     ALT   Date Value Ref Range Status   04/02/2020 19 10 - 44 U/L  Final     Anion Gap   Date Value Ref Range Status   04/03/2020 10 8 - 16 mmol/L Final       Xrays    EXAMINATION:  XR CHEST PA AND LATERAL     CLINICAL HISTORY:  shortness of breath     FINDINGS:  PA and lateral chest is compared to 01/17/2020 shows normal cardiomediastinal silhouette.     Lungs are clear. Pulmonary vasculature is normal. No acute osseous abnormality.     Impression:     No acute pulmonary process        Electronically signed by: Monica Aquino MD  Date:                                            04/01/2020  Time:                                           16:57      ECHO (4/1)  · Mild eccentric left ventricular hypertrophy.  · Mild left ventricular enlargement.  · Normal left ventricular systolic function. The estimated ejection fraction is 60%.  · Grade I (mild) left ventricular diastolic dysfunction consistent with impaired relaxation.  · No wall motion abnormalities.  · Normal right ventricular systolic function.  · Severe left atrial enlargement.  · Mild mitral sclerosis.  · There is mild leaflet calcification of the Mitral Valve.  · Mild mitral regurgitation.  · Intermediate central venous pressure (8 mmHg).  · The estimated PA systolic pressure is 37 mmHg    Impression/Plan    Problem List Items Addressed This Visit        Pulmonary    COPD (chronic obstructive pulmonary disease) per patient     · NO evidence for COPD by her PFT         Chronic respiratory failure     · Hypoxemic on home O2  · 6 minute walk test shows no need for O2  · Need to check night time needs         Pulmonary hypertension     · Aware  · Will assess for O2 needs and JENNIFER as above  · follow            Hematology    Chronic anticoagulation     · Aware                      Alverto Pascal MD

## 2020-07-27 NOTE — ASSESSMENT & PLAN NOTE
· Hypoxemic on home O2  · 6 minute walk test shows no need for O2  · Need to check night time needs

## 2020-08-03 ENCOUNTER — OFFICE VISIT (OUTPATIENT)
Dept: FAMILY MEDICINE | Facility: CLINIC | Age: 80
End: 2020-08-03
Payer: MEDICARE

## 2020-08-03 VITALS
HEART RATE: 75 BPM | DIASTOLIC BLOOD PRESSURE: 71 MMHG | BODY MASS INDEX: 35.94 KG/M2 | SYSTOLIC BLOOD PRESSURE: 135 MMHG | HEIGHT: 67 IN | WEIGHT: 229 LBS

## 2020-08-03 DIAGNOSIS — R07.89 RIGHT-SIDED CHEST WALL PAIN: ICD-10-CM

## 2020-08-03 DIAGNOSIS — M54.6 ACUTE RIGHT-SIDED THORACIC BACK PAIN: Primary | ICD-10-CM

## 2020-08-03 PROCEDURE — 1101F PT FALLS ASSESS-DOCD LE1/YR: CPT | Mod: S$GLB,,, | Performed by: INTERNAL MEDICINE

## 2020-08-03 PROCEDURE — 3078F PR MOST RECENT DIASTOLIC BLOOD PRESSURE < 80 MM HG: ICD-10-PCS | Mod: S$GLB,,, | Performed by: INTERNAL MEDICINE

## 2020-08-03 PROCEDURE — 99214 OFFICE O/P EST MOD 30 MIN: CPT | Mod: S$GLB,,, | Performed by: INTERNAL MEDICINE

## 2020-08-03 PROCEDURE — 3075F PR MOST RECENT SYSTOLIC BLOOD PRESS GE 130-139MM HG: ICD-10-PCS | Mod: S$GLB,,, | Performed by: INTERNAL MEDICINE

## 2020-08-03 PROCEDURE — 1125F AMNT PAIN NOTED PAIN PRSNT: CPT | Mod: S$GLB,,, | Performed by: INTERNAL MEDICINE

## 2020-08-03 PROCEDURE — 1101F PR PT FALLS ASSESS DOC 0-1 FALLS W/OUT INJ PAST YR: ICD-10-PCS | Mod: S$GLB,,, | Performed by: INTERNAL MEDICINE

## 2020-08-03 PROCEDURE — 1159F MED LIST DOCD IN RCRD: CPT | Mod: S$GLB,,, | Performed by: INTERNAL MEDICINE

## 2020-08-03 PROCEDURE — 1125F PR PAIN SEVERITY QUANTIFIED, PAIN PRESENT: ICD-10-PCS | Mod: S$GLB,,, | Performed by: INTERNAL MEDICINE

## 2020-08-03 PROCEDURE — 3075F SYST BP GE 130 - 139MM HG: CPT | Mod: S$GLB,,, | Performed by: INTERNAL MEDICINE

## 2020-08-03 PROCEDURE — 3078F DIAST BP <80 MM HG: CPT | Mod: S$GLB,,, | Performed by: INTERNAL MEDICINE

## 2020-08-03 PROCEDURE — 1159F PR MEDICATION LIST DOCUMENTED IN MEDICAL RECORD: ICD-10-PCS | Mod: S$GLB,,, | Performed by: INTERNAL MEDICINE

## 2020-08-03 PROCEDURE — 99214 PR OFFICE/OUTPT VISIT, EST, LEVL IV, 30-39 MIN: ICD-10-PCS | Mod: S$GLB,,, | Performed by: INTERNAL MEDICINE

## 2020-08-03 RX ORDER — BACLOFEN 10 MG/1
10 TABLET ORAL 2 TIMES DAILY
Qty: 20 TABLET | Refills: 1 | Status: SHIPPED | OUTPATIENT
Start: 2020-08-03 | End: 2020-09-08

## 2020-08-03 NOTE — PATIENT INSTRUCTIONS
Back Care Tips    Caring for your back  These are things you can do to prevent a recurrence of acute back pain and to reduce symptoms from chronic back pain:  · Maintain a healthy weight. If you are overweight, losing weight will help most types of back pain.  · Exercise is an important part of recovery from most types of back pain. The muscles behind and in front of the spine support the back. This means strengthening both the back muscles and the abdominal muscles will provide better support for your spine.   · Swimming and brisk walking are good overall exercises to improve your fitness level.  · Practice safe lifting methods (below).  · Practice good posture when sitting, standing and walking. Avoid prolonged sitting. This puts more stress on the lower back than standing or walking.  · Wear quality shoes with sufficient arch support. Foot and ankle alignment can affect back symptoms. Women should avoid wearing high heels.  · Therapeutic massage can help relax the back muscles without stretching them.  · During the first 24 to 72 hours after an acute injury or flare-up of chronic back pain, apply an ice pack to the painful area for 20 minutes and then remove it for 20 minutes, over a period of 60 to 90 minutes, or several times a day. As a safety precaution, do not use a heating pad at bedtime. Sleeping on a heating pad can lead to skin burns or tissue damage.  · You can alternate ice and heat therapies.  Medications  Talk to your healthcare provider before using medicines, especially if you have other medical problems or are taking other medicines.  · You may use acetaminophen or ibuprofen to control pain, unless your healthcare provider prescribed other pain medicine. If you have chronic conditions like diabetes, liver or kidney disease, stomach ulcers, or gastrointestinal bleeding, or are taking blood thinners, talk with your healthcare provider before taking any medicines.  · Be careful if you are given  prescription pain medicines, narcotics, or medicine for muscle spasm. They can cause drowsiness, affect your coordination, reflexes, and judgment. Do not drive or operate heavy machinery while taking these types of medicines. Take prescription pain medicine only as prescribed by your healthcare provider.  Lumbar stretch  Here is a simple stretching exercise that will help relax muscle spasm and keep your back more limber. If exercise makes your back pain worse, dont do it.  · Lie on your back with your knees bent and both feet on the ground.  · Slowly raise your left knee to your chest as you flatten your lower back against the floor. Hold for 5 seconds.  · Relax and repeat the exercise with your right knee.  · Do 10 of these exercises for each leg.  Safe lifting method  · Dont bend over at the waist to lift an object off the floor.  Instead, bend your knees and hips in a squat.   · Keep your back and head upright  · Hold the object close to your body, directly in front of you.  · Straighten your legs to lift the object.   · Lower the object to the floor in the reverse fashion.  · If you must slide something across the floor, push it.  Posture tips  Sitting  Sit in chairs with straight backs or low-back support. Keep your knees lower than your hips, with your feet flat on the floor.  When driving, sit up straight. Adjust the seat forward so you are not leaning toward the steering wheel.  A small pillow or rolled towel behind your lower back may help if you are driving long distances.   Standing  When standing for long periods, shift most of your weight to one leg at a time. Alternate legs every few minutes.   Sleeping  The best way to sleep is on your side with your knees bent. Put a low pillow under your head to support your neck in a neutral spine position. Avoid thick pillows that bend your neck to one side. Put a pillow between your legs to further relax your lower back. If you sleep on your back, put pillows  under your knees to support your legs in a slightly flexed position. Use a firm mattress. If your mattress sags, replace it, or use a 1/2-inch plywood board under the mattress to add support.  Follow-up care  Follow up with your healthcare provider, or as advised.  If X-rays, a CT scan or an MRI scan were taken, they will be reviewed by a radiologist. You will be notified of any new findings that may affect your care.  Call 911  Seek emergency medical care if any of the following occur:  · Trouble breathing  · Confusion  · Very drowsy  · Fainting or loss of consciousness  · Rapid or very slow heart rate  · Loss of  bowel or bladder control  When to seek medical care  Call your healthcare provider if any of the following occur:  · Pain becomes worse or spreads to your arms or legs  · Weakness or numbness in one or both arms or legs  · Numbness in the groin area  Date Last Reviewed: 6/1/2016  © 8827-0168 [employer]. [address] [place] [address], [place] [address]. All rights reserved. This information is not intended as a substitute for professional medical care. Always follow your healthcare professional's instructions.

## 2020-08-03 NOTE — PROGRESS NOTES
Subjective:       Patient ID: Jo Alegre is a 79 y.o. female.    Chief Complaint: Back Pain (upper )    Miss Jo Palm is a 79-year-old female who started experiencing left-sided mid back pain since last Friday.  She might have tried to pull up something and she started experiencing back pain thereafter.  There is no radiation to the side.  There is no low back pain.  She is desperately trying to find a comfortable position to help her with back pain.    She is on chronic anticoagulation.  She is also diabetic with hypertension and history of atrial fibrillation.    Back Pain  This is a new problem. The current episode started in the past 7 days (3 days). The problem occurs constantly. The problem is unchanged. The pain is present in the thoracic spine. The quality of the pain is described as shooting. The pain does not radiate. The pain is severe. The symptoms are aggravated by bending. Stiffness is present all day. Pertinent negatives include no abdominal pain, bladder incontinence, bowel incontinence, chest pain, fever, headaches, leg pain, numbness, paresis or perianal numbness. She has tried analgesics (Tylenol) for the symptoms. The treatment provided no relief.       Past Medical History:   Diagnosis Date    Allergy     Codeine, Lasix    Atrial fibrillation     Atrial fibrillation     Cataract     CHF (congestive heart failure)     Diabetes mellitus, type 2     Ejection fraction < 50% 10/18/2017    Approximately 35%  Based on prior  Echocardiogram.    Encounter for blood transfusion     Hyperlipidemia     Osteoporosis     Thyroid disorder screening 10/17/2017    TSH of 1.12 ordered by Dr. dee Jones     Social History     Socioeconomic History    Marital status:      Spouse name: Not on file    Number of children: 4    Years of education: Not on file    Highest education level: Not on file   Occupational History    Occupation:    Social Needs    Financial resource  strain: Not on file    Food insecurity     Worry: Not on file     Inability: Not on file    Transportation needs     Medical: Not on file     Non-medical: Not on file   Tobacco Use    Smoking status: Former Smoker    Smokeless tobacco: Never Used    Tobacco comment: quit 2013   Substance and Sexual Activity    Alcohol use: No    Drug use: No    Sexual activity: Not Currently   Lifestyle    Physical activity     Days per week: Not on file     Minutes per session: Not on file    Stress: Not at all   Relationships    Social connections     Talks on phone: Not on file     Gets together: Not on file     Attends Buddhism service: Not on file     Active member of club or organization: Not on file     Attends meetings of clubs or organizations: Not on file     Relationship status: Not on file   Other Topics Concern    Not on file   Social History Narrative    - Drives and lives alone.     Past Surgical History:   Procedure Laterality Date    [place]Y  1997    [place]     COLONOSCOPY      EYE SURGERY      bilateral cataracts    FRACTURE SURGERY  2014    right femur with neville    HEMORRHOID SURGERY      48 yrs ago    HYSTERECTOMY      REPLACEMENT OF IMPLANTABLE CARDIOVERTER-DEFIBRILLATOR (ICD) GENERATOR N/A 12/13/2019    Procedure: REPLACEMENT, PULSE GENERATOR, ICD-MEDTRONIC;  Surgeon: Sebastian Nowak III, MD;  Location: [facility];  Service: Cardiology;  Laterality: N/A;    TONSILLECTOMY       Family History   Problem Relation Age of Onset    Heart disease Mother     Cancer Father         Lung Cancer ??? Asbestos    Breast cancer Paternal Aunt        Review of Systems   Constitutional: Positive for activity change and fatigue. Negative for fever.   Respiratory: Positive for shortness of breath. Negative for chest tightness.    Cardiovascular: Negative for chest pain.   Gastrointestinal: Negative for abdominal distention, abdominal pain, bowel incontinence, constipation and diarrhea.  "  Endocrine: Negative for polydipsia, polyphagia and polyuria.   Genitourinary: Negative for bladder incontinence, difficulty urinating, flank pain, frequency and hematuria.   Musculoskeletal: Positive for back pain and gait problem.   Neurological: Negative for dizziness, seizures, numbness and headaches.   Psychiatric/Behavioral: Positive for sleep disturbance. Negative for agitation, dysphoric mood and self-injury.         Objective:      Blood pressure 135/71, pulse 75, height 5' 7" (1.702 m), weight 103.9 kg (229 lb). Body mass index is 35.87 kg/m².  Physical Exam  Constitutional:       General: She is in acute distress (Mild chronic distress.).      Appearance: She is well-developed. She is obese. She is ill-appearing. She is not diaphoretic.      Comments: Patient is obese with a BMI of 35.87   HENT:      Head: Normocephalic and atraumatic.      Mouth/Throat:      Pharynx: No oropharyngeal exudate.   Eyes:      General: No scleral icterus.  Neck:      Musculoskeletal: Normal range of motion and neck supple.      Thyroid: No thyromegaly.      Vascular: No JVD.      Trachea: No tracheal deviation.   Cardiovascular:      Rate and Rhythm: Normal rate and regular rhythm.      Heart sounds: No gallop.    Pulmonary:      Effort: Pulmonary effort is normal. No respiratory distress.      Breath sounds: Normal breath sounds. No stridor. No wheezing.   Abdominal:      General: There is no distension.      Palpations: Abdomen is soft.      Tenderness: There is no abdominal tenderness.   Musculoskeletal:         General: No tenderness or deformity.      Lumbar back: She exhibits no laceration.        Back:    Lymphadenopathy:      Cervical: No cervical adenopathy.   Skin:     General: Skin is warm and dry.      Coloration: Skin is not pale.   Neurological:      Mental Status: She is alert. She is not disoriented.      Sensory: No sensory deficit.      Motor: No weakness, tremor, atrophy or seizure activity.      Deep " Tendon Reflexes: Reflexes normal.   Psychiatric:         Mood and Affect: Mood is anxious.         Behavior: Behavior normal.         Thought Content: Thought content normal.      Comments: Somewhat anhedonic and antalgic           Assessment:       1. Acute right-sided thoracic back pain    2. Right-sided chest wall pain           No visits with results within 3 Month(s) from this visit.   Latest known visit with results is:   Admission on 04/01/2020, Discharged on 04/03/2020   Component Date Value Ref Range Status    WBC 04/01/2020 5.83  3.90 - 12.70 K/uL Final    RBC 04/01/2020 3.94* 4.00 - 5.40 M/uL Final    Hemoglobin 04/01/2020 12.2  12.0 - 16.0 g/dL Final    Hematocrit 04/01/2020 38.6  37.0 - 48.5 % Final    Mean Corpuscular Volume 04/01/2020 98  82 - 98 fL Final    Mean Corpuscular Hemoglobin 04/01/2020 31.0  27.0 - 31.0 pg Final    Mean Corpuscular Hemoglobin Conc 04/01/2020 31.6* 32.0 - 36.0 g/dL Final    RDW 04/01/2020 13.7  11.5 - 14.5 % Final    Platelets 04/01/2020 208  150 - 350 K/uL Final    MPV 04/01/2020 10.5  9.2 - 12.9 fL Final    Immature Granulocytes 04/01/2020 0.3  0.0 - 0.5 % Final    Gran # (ANC) 04/01/2020 3.8  1.8 - 7.7 K/uL Final    Immature Grans (Abs) 04/01/2020 0.02  0.00 - 0.04 K/uL Final    Lymph # 04/01/2020 1.0  1.0 - 4.8 K/uL Final    Mono # 04/01/2020 0.9  0.3 - 1.0 K/uL Final    Eos # 04/01/2020 0.1  0.0 - 0.5 K/uL Final    Baso # 04/01/2020 0.03  0.00 - 0.20 K/uL Final    nRBC 04/01/2020 0  0 /100 WBC Final    Gran% 04/01/2020 65.0  38.0 - 73.0 % Final    Lymph% 04/01/2020 16.8* 18.0 - 48.0 % Final    Mono% 04/01/2020 15.3* 4.0 - 15.0 % Final    Eosinophil% 04/01/2020 2.1  0.0 - 8.0 % Final    Basophil% 04/01/2020 0.5  0.0 - 1.9 % Final    Differential Method 04/01/2020 Automated   Final    Sodium 04/01/2020 136  136 - 145 mmol/L Final    Potassium 04/01/2020 4.1  3.5 - 5.1 mmol/L Final    Chloride 04/01/2020 97  95 - 110 mmol/L Final    CO2  04/01/2020 29  23 - 29 mmol/L Final    Glucose 04/01/2020 188* 70 - 110 mg/dL Final    BUN, Bld 04/01/2020 32* 8 - 23 mg/dL Final    Creatinine 04/01/2020 1.9* 0.5 - 1.4 mg/dL Final    Calcium 04/01/2020 9.0  8.7 - 10.5 mg/dL Final    Total Protein 04/01/2020 6.1  6.0 - 8.4 g/dL Final    Albumin 04/01/2020 2.8* 3.5 - 5.2 g/dL Final    Total Bilirubin 04/01/2020 0.4  0.1 - 1.0 mg/dL Final    Alkaline Phosphatase 04/01/2020 57  55 - 135 U/L Final    AST 04/01/2020 17  10 - 40 U/L Final    ALT 04/01/2020 18  10 - 44 U/L Final    Anion Gap 04/01/2020 10  8 - 16 mmol/L Final    eGFR if  04/01/2020 28.7* >60 mL/min/1.73 m^2 Final    eGFR if non African American 04/01/2020 24.9* >60 mL/min/1.73 m^2 Final    Troponin I 04/01/2020 <0.030  <=0.040 ng/mL Final    BNP 04/01/2020 1,387* 0 - 99 pg/mL Final    PT 04/01/2020 31.2* 10.6 - 14.8 sec Final    INR 04/01/2020 3.2   Final    SARS-CoV2 (COVID-19) Qualitative P* 04/01/2020 Not Detected  Not Detected Final    Influenza A, Molecular 04/01/2020 Negative  Negative Final    Influenza B, Molecular 04/01/2020 Negative  Negative Final    Flu A & B Source 04/01/2020 Nasal swab   Final    Procalcitonin 04/01/2020 0.24  0.00 - 0.50 ng/mL Final    Hemoglobin A1C 04/02/2020 6.0  4.5 - 6.2 % Final    Estimated Avg Glucose 04/02/2020 126  68 - 131 mg/dL Final    WBC 04/02/2020 5.82  3.90 - 12.70 K/uL Final    RBC 04/02/2020 3.65* 4.00 - 5.40 M/uL Final    Hemoglobin 04/02/2020 11.5* 12.0 - 16.0 g/dL Final    Hematocrit 04/02/2020 36.6* 37.0 - 48.5 % Final    Mean Corpuscular Volume 04/02/2020 100* 82 - 98 fL Final    Mean Corpuscular Hemoglobin 04/02/2020 31.5* 27.0 - 31.0 pg Final    Mean Corpuscular Hemoglobin Conc 04/02/2020 31.4* 32.0 - 36.0 g/dL Final    RDW 04/02/2020 14.0  11.5 - 14.5 % Final    Platelets 04/02/2020 208  150 - 350 K/uL Final    MPV 04/02/2020 10.6  9.2 - 12.9 fL Final    Immature Granulocytes 04/02/2020 0.2  0.0 -  0.5 % Final    Gran # (ANC) 04/02/2020 3.8  1.8 - 7.7 K/uL Final    Immature Grans (Abs) 04/02/2020 0.01  0.00 - 0.04 K/uL Final    Lymph # 04/02/2020 1.0  1.0 - 4.8 K/uL Final    Mono # 04/02/2020 0.8  0.3 - 1.0 K/uL Final    Eos # 04/02/2020 0.2  0.0 - 0.5 K/uL Final    Baso # 04/02/2020 0.02  0.00 - 0.20 K/uL Final    nRBC 04/02/2020 0  0 /100 WBC Final    Gran% 04/02/2020 64.4  38.0 - 73.0 % Final    Lymph% 04/02/2020 17.9* 18.0 - 48.0 % Final    Mono% 04/02/2020 13.6  4.0 - 15.0 % Final    Eosinophil% 04/02/2020 3.6  0.0 - 8.0 % Final    Basophil% 04/02/2020 0.3  0.0 - 1.9 % Final    Differential Method 04/02/2020 Automated   Final    BSA 04/02/2020 2.27  m2 Final    Right Atrial Pressure (from IVC) 04/02/2020 8  mmHg Final    TDI SEPTAL 04/02/2020 0.05  m/s Final    LV LATERAL E/E' RATIO 04/02/2020 10.67  m/s Final    LV SEPTAL E/E' RATIO 04/02/2020 12.80  m/s Final    AORTIC VALVE CUSP SEPERATION 04/02/2020 2.00  cm Final    TDI LATERAL 04/02/2020 0.06  m/s Final    PV PEAK VELOCITY 04/02/2020 86.78  cm/s Final    LVIDD 04/02/2020 5.86  3.5 - 6.0 cm Final    IVS 04/02/2020 1.19* 0.6 - 1.1 cm Final    PW 04/02/2020 1.20* 0.6 - 1.1 cm Final    Ao root annulus 04/02/2020 3.12  cm Final    LVIDS 04/02/2020 4.95* 2.1 - 4.0 cm Final    FS 04/02/2020 16  28 - 44 % Final    LV mass 04/02/2020 300.36  g Final    LA size 04/02/2020 5.32  cm Final    RVDD 04/02/2020 212.00  cm Final    Left Ventricle Relative Wall Thick* 04/02/2020 0.41  cm Final    AV mean gradient 04/02/2020 4  mmHg Final    AV valve area 04/02/2020 2.37  cm2 Final    AV Velocity Ratio 04/02/2020 68.30   Final    AV index (prosthetic) 04/02/2020 0.63   Final    E/A ratio 04/02/2020 0.81   Final    Mean e' 04/02/2020 0.06  m/s Final    E wave decelartion time 04/02/2020 335.65  msec Final    IVRT 04/02/2020 91.17  msec Final    LVOT diameter 04/02/2020 2.18  cm Final    LVOT area 04/02/2020 3.7  cm2 Final     LVOT peak nathan 04/02/2020 92.89  m/s Final    LVOT peak VTI 04/02/2020 20.29  cm Final    Ao peak nathan 04/02/2020 1.36  m/s Final    Ao VTI 04/02/2020 32.00  cm Final    LVOT stroke volume 04/02/2020 75.69  cm3 Final    AV peak gradient 04/02/2020 7  mmHg Final    TV rest pulmonary artery pressure 04/02/2020 37  mmHg Final    E/E' ratio 04/02/2020 11.64  m/s Final    MV Peak E Nathan 04/02/2020 0.64  m/s Final    TR Max Nathan 04/02/2020 2.69  m/s Final    MV Peak A Nathan 04/02/2020 0.79  m/s Final    LV Mass Index 04/02/2020 136  g/m2 Final    Triscuspid Valve Regurgitation Pea* 04/02/2020 29  mmHg Final    POC Glucose 04/02/2020 69* 70 - 110 Final    Magnesium 04/02/2020 2.1  1.6 - 2.6 mg/dL Final    Phosphorus 04/02/2020 4.1  2.7 - 4.5 mg/dL Final    PT 04/02/2020 25.7* 10.6 - 14.8 sec Final    INR 04/02/2020 2.5   Final    Sodium 04/02/2020 139  136 - 145 mmol/L Final    Potassium 04/02/2020 4.2  3.5 - 5.1 mmol/L Final    Chloride 04/02/2020 98  95 - 110 mmol/L Final    CO2 04/02/2020 31* 23 - 29 mmol/L Final    Glucose 04/02/2020 93  70 - 110 mg/dL Final    BUN, Bld 04/02/2020 33* 8 - 23 mg/dL Final    Creatinine 04/02/2020 1.8* 0.5 - 1.4 mg/dL Final    Calcium 04/02/2020 9.1  8.7 - 10.5 mg/dL Final    Total Protein 04/02/2020 5.9* 6.0 - 8.4 g/dL Final    Albumin 04/02/2020 2.6* 3.5 - 5.2 g/dL Final    Total Bilirubin 04/02/2020 0.6  0.1 - 1.0 mg/dL Final    Alkaline Phosphatase 04/02/2020 59  55 - 135 U/L Final    AST 04/02/2020 14  10 - 40 U/L Final    ALT 04/02/2020 19  10 - 44 U/L Final    Anion Gap 04/02/2020 10  8 - 16 mmol/L Final    eGFR if  04/02/2020 30.6* >60 mL/min/1.73 m^2 Final    eGFR if non African American 04/02/2020 26.6* >60 mL/min/1.73 m^2 Final    POC Glucose 04/02/2020 102  70 - 110 Final    POC Glucose 04/02/2020 96  70 - 110 Final    Specimen UA 04/02/2020 Urine, Clean Catch   Final    Color, UA 04/02/2020 Yellow  Yellow, Straw, Steffanie Final     Appearance, UA 04/02/2020 Hazy* Clear Final    pH, UA 04/02/2020 5.0  5.0 - 8.0 Final    Specific Gravity, UA 04/02/2020 1.010  1.005 - 1.030 Final    Protein, UA 04/02/2020 Negative  Negative Final    Glucose, UA 04/02/2020 Negative  Negative Final    Ketones, UA 04/02/2020 Negative  Negative Final    Bilirubin (UA) 04/02/2020 Negative  Negative Final    Occult Blood UA 04/02/2020 1+* Negative Final    Nitrite, UA 04/02/2020 Negative  Negative Final    Urobilinogen, UA 04/02/2020 Negative  Negative EU/dL Final    Leukocytes, UA 04/02/2020 3+* Negative Final    RBC, UA 04/02/2020 1  0 - 4 /hpf Final    WBC, UA 04/02/2020 >100* 0 - 5 /hpf Final    Bacteria 04/02/2020 Negative  None-Occ /hpf Final    Squam Epithel, UA 04/02/2020 0  /hpf Final    Hyaline Casts, UA 04/02/2020 8* 0-1/lpf /lpf Final    Microscopic Comment 04/02/2020 SEE COMMENT   Final    POC Glucose 04/02/2020 103  70 - 110 Final    POC Glucose 04/02/2020 161* 70 - 110 Final    PT 04/03/2020 23.8* 10.6 - 14.8 sec Final    INR 04/03/2020 2.2   Final    BNP 04/03/2020 430* 0 - 99 pg/mL Final    Sodium 04/03/2020 139  136 - 145 mmol/L Final    Potassium 04/03/2020 4.2  3.5 - 5.1 mmol/L Final    Chloride 04/03/2020 98  95 - 110 mmol/L Final    CO2 04/03/2020 31* 23 - 29 mmol/L Final    Glucose 04/03/2020 93  70 - 110 mg/dL Final    BUN, Bld 04/03/2020 33* 8 - 23 mg/dL Final    Creatinine 04/03/2020 1.8* 0.5 - 1.4 mg/dL Final    Calcium 04/03/2020 8.6* 8.7 - 10.5 mg/dL Final    Anion Gap 04/03/2020 10  8 - 16 mmol/L Final    eGFR if African American 04/03/2020 30.6* >60 mL/min/1.73 m^2 Final    eGFR if non African American 04/03/2020 26.6* >60 mL/min/1.73 m^2 Final    POC Glucose 04/03/2020 91  70 - 110 Final         Plan:           Acute right-sided thoracic back pain  -     X-Ray Thoracic Spine 4 or more views; Future; Expected date: 08/03/2020  -     baclofen (LIORESAL) 10 MG tablet; Take 1 tablet (10 mg total) by mouth 2  (two) times daily.  Dispense: 20 tablet; Refill: 1    Right-sided chest wall pain  -     X-Ray Chest PA And Lateral; Future; Expected date: 08/03/2020  -     baclofen (LIORESAL) 10 MG tablet; Take 1 tablet (10 mg total) by mouth 2 (two) times daily.  Dispense: 20 tablet; Refill: 1      Patient presents with relatively new onset of right posterior chest wall pain.  Cause of this is unknown.  She had a mild strain but the pain seems to be out of proportion to the degree of discomfort that she has.  Could be have a compression fracture.  This degree of moderate pain with chronic anticoagulation and multiple comorbidities necessitates an x-ray rather than waiting for 6 weeks.    We cannot prescribe as NSAIDs because of underlying coagulation.  She may want to drop some Kevon-Pichardo or Aspercreme locally.  She can apply Salon Spas    She will keep her scheduled appointment on 08/27/2020.    Continue with fall precautions and do little bit of stretch exercises.    Continue with COVID precautions.    I did review Dr. karimi labs and they were in acceptable range.    Spent aleks 25 minutes with patient which involved review of pts medical conditions, labs, medications and with 50% of time face-to-face discussion about medical problems, management and any applicable changes.      Current Outpatient Medications:     amiodarone (PACERONE) 200 MG Tab, Take by mouth once daily., Disp: , Rfl:     Ca-D3-mag ox-zinc--thom-bor (CALCIUM 600-D3 PLUS) 600 mg calcium- 800 unit-50 mg Tab, Take 1 tablet by mouth 2 (two) times daily., Disp: , Rfl:     carvedilol (COREG) 25 MG tablet, Take 0.5 tablets (12.5 mg total) by mouth 2 (two) times daily with meals., Disp: 1 tablet, Rfl: 1    cholecalciferol, vitamin D3, (VITAMIN D3) 5,000 unit Tab, Take 5,000 Units by mouth 2 (two) times daily., Disp: , Rfl:     furosemide (LASIX) 20 MG tablet, Take 20 mg by mouth 2 (two) times daily., Disp: , Rfl:     gabapentin (NEURONTIN) 100 MG capsule, TAKE  ONE CAPSULE BY MOUTH THREE TIMES DAILY (Patient taking differently: Take 100 mg by mouth 3 (three) times daily. ), Disp: 270 capsule, Rfl: 1    glimepiride (AMARYL) 1 MG tablet, Take 1 tablet (1 mg total) by mouth before breakfast., Disp: 90 tablet, Rfl: 1    midodrine (PROAMATINE) 2.5 MG Tab, Take 2.5 mg by mouth 3 (three) times daily., Disp: , Rfl:     omeprazole (PRILOSEC) 40 MG capsule, Take 40 mg by mouth once daily., Disp: , Rfl:     sacubitril-valsartan (ENTRESTO) 24-26 mg per tablet, Take 1 tablet by mouth once daily. , Disp: , Rfl:     warfarin (COUMADIN) 5 MG tablet, Take 5 mg by mouth once daily., Disp: , Rfl:     baclofen (LIORESAL) 10 MG tablet, Take 1 tablet (10 mg total) by mouth 2 (two) times daily., Disp: 20 tablet, Rfl: 1    denosumab (PROLIA) 60 mg/mL Syrg, Inject 1 mL (60 mg total) into the skin every 6 (six) months., Disp: 2 mL, Rfl: 1  No current facility-administered medications for this visit.     Facility-Administered Medications Ordered in Other Visits:     0.9%  NaCl infusion, , Intravenous, Continuous, Sebastian Nowak III, MD, Last Rate: 75 mL/hr at 12/13/19 0793    diphenhydrAMINE injection 25 mg, 25 mg, Intravenous, Once, Sebastian Nowak III, MD    lorazepam injection 1 mg, 1 mg, Intravenous, Once, Sebastian Nowak III, MD

## 2020-08-07 ENCOUNTER — TELEPHONE (OUTPATIENT)
Dept: FAMILY MEDICINE | Facility: CLINIC | Age: 80
End: 2020-08-07

## 2020-08-07 DIAGNOSIS — G57.93 NEUROPATHY OF BOTH FEET: ICD-10-CM

## 2020-08-07 DIAGNOSIS — M54.6 ACUTE RIGHT-SIDED THORACIC BACK PAIN: Primary | ICD-10-CM

## 2020-08-07 NOTE — TELEPHONE ENCOUNTER
Pt. called & said the Baclofen you rx'd is doing nothing for her pain. Can you rx something else?    I will increase the dose of gabapentin to 200 mg 3 times a day to help her mitigate the effects of pain.    I may want to send her some hydrocodone for pain but it is shows that she is allergic to codeine but check with her.    I have also sent a referral to Dr. lennox watters for her spinal compression.  He has a physical medicine and rehab doctor.    Down the road we may consider giving her injection Prolia also.

## 2020-08-11 ENCOUNTER — TELEPHONE (OUTPATIENT)
Dept: PHYSICAL MEDICINE AND REHAB | Facility: CLINIC | Age: 80
End: 2020-08-11

## 2020-08-11 RX ORDER — GABAPENTIN 100 MG/1
200 CAPSULE ORAL 3 TIMES DAILY
Qty: 180 CAPSULE | Refills: 1 | Status: SHIPPED | OUTPATIENT
Start: 2020-08-11 | End: 2020-12-03 | Stop reason: SDUPTHER

## 2020-08-26 ENCOUNTER — TELEPHONE (OUTPATIENT)
Dept: PULMONOLOGY | Facility: CLINIC | Age: 80
End: 2020-08-26

## 2020-08-26 DIAGNOSIS — G47.33 OBSTRUCTIVE SLEEP APNEA SYNDROME: Primary | ICD-10-CM

## 2020-09-03 ENCOUNTER — PROCEDURE VISIT (OUTPATIENT)
Dept: SLEEP MEDICINE | Facility: HOSPITAL | Age: 80
End: 2020-09-03
Attending: INTERNAL MEDICINE
Payer: MEDICARE

## 2020-09-03 DIAGNOSIS — G47.33 OBSTRUCTIVE SLEEP APNEA SYNDROME: ICD-10-CM

## 2020-09-03 PROCEDURE — 95806 SLEEP STUDY UNATT&RESP EFFT: CPT

## 2020-09-08 ENCOUNTER — OFFICE VISIT (OUTPATIENT)
Dept: FAMILY MEDICINE | Facility: CLINIC | Age: 80
End: 2020-09-08
Payer: MEDICARE

## 2020-09-08 VITALS
TEMPERATURE: 97 F | SYSTOLIC BLOOD PRESSURE: 131 MMHG | BODY MASS INDEX: 35.63 KG/M2 | DIASTOLIC BLOOD PRESSURE: 72 MMHG | RESPIRATION RATE: 21 BRPM | HEART RATE: 69 BPM | HEIGHT: 67 IN | WEIGHT: 227 LBS

## 2020-09-08 DIAGNOSIS — Z79.01 CHRONIC ANTICOAGULATION: ICD-10-CM

## 2020-09-08 DIAGNOSIS — I10 ESSENTIAL HYPERTENSION: ICD-10-CM

## 2020-09-08 DIAGNOSIS — R09.02 HYPOXIA: ICD-10-CM

## 2020-09-08 DIAGNOSIS — R05.9 COUGH: ICD-10-CM

## 2020-09-08 DIAGNOSIS — E78.2 MIXED DYSLIPIDEMIA: ICD-10-CM

## 2020-09-08 DIAGNOSIS — Z11.59 NEED FOR HEPATITIS C SCREENING TEST: ICD-10-CM

## 2020-09-08 DIAGNOSIS — Z12.31 ENCOUNTER FOR SCREENING MAMMOGRAM FOR BREAST CANCER: ICD-10-CM

## 2020-09-08 DIAGNOSIS — S22.000A COMPRESSION FRACTURE OF BODY OF THORACIC VERTEBRA: ICD-10-CM

## 2020-09-08 DIAGNOSIS — R07.9 LEFT-SIDED CHEST PAIN: Primary | ICD-10-CM

## 2020-09-08 DIAGNOSIS — N39.41 URGE INCONTINENCE: ICD-10-CM

## 2020-09-08 DIAGNOSIS — I48.0 PAROXYSMAL ATRIAL FIBRILLATION: ICD-10-CM

## 2020-09-08 DIAGNOSIS — I50.42 CHRONIC COMBINED SYSTOLIC AND DIASTOLIC CONGESTIVE HEART FAILURE: Chronic | ICD-10-CM

## 2020-09-08 PROCEDURE — 1159F PR MEDICATION LIST DOCUMENTED IN MEDICAL RECORD: ICD-10-PCS | Mod: S$GLB,,, | Performed by: INTERNAL MEDICINE

## 2020-09-08 PROCEDURE — 1125F PR PAIN SEVERITY QUANTIFIED, PAIN PRESENT: ICD-10-PCS | Mod: S$GLB,,, | Performed by: INTERNAL MEDICINE

## 2020-09-08 PROCEDURE — 3075F SYST BP GE 130 - 139MM HG: CPT | Mod: S$GLB,,, | Performed by: INTERNAL MEDICINE

## 2020-09-08 PROCEDURE — 99215 OFFICE O/P EST HI 40 MIN: CPT | Mod: S$GLB,,, | Performed by: INTERNAL MEDICINE

## 2020-09-08 PROCEDURE — 1125F AMNT PAIN NOTED PAIN PRSNT: CPT | Mod: S$GLB,,, | Performed by: INTERNAL MEDICINE

## 2020-09-08 PROCEDURE — 3078F PR MOST RECENT DIASTOLIC BLOOD PRESSURE < 80 MM HG: ICD-10-PCS | Mod: S$GLB,,, | Performed by: INTERNAL MEDICINE

## 2020-09-08 PROCEDURE — 1101F PR PT FALLS ASSESS DOC 0-1 FALLS W/OUT INJ PAST YR: ICD-10-PCS | Mod: S$GLB,,, | Performed by: INTERNAL MEDICINE

## 2020-09-08 PROCEDURE — 1101F PT FALLS ASSESS-DOCD LE1/YR: CPT | Mod: S$GLB,,, | Performed by: INTERNAL MEDICINE

## 2020-09-08 PROCEDURE — 1159F MED LIST DOCD IN RCRD: CPT | Mod: S$GLB,,, | Performed by: INTERNAL MEDICINE

## 2020-09-08 PROCEDURE — 99215 PR OFFICE/OUTPT VISIT, EST, LEVL V, 40-54 MIN: ICD-10-PCS | Mod: S$GLB,,, | Performed by: INTERNAL MEDICINE

## 2020-09-08 PROCEDURE — 3078F DIAST BP <80 MM HG: CPT | Mod: S$GLB,,, | Performed by: INTERNAL MEDICINE

## 2020-09-08 PROCEDURE — 3075F PR MOST RECENT SYSTOLIC BLOOD PRESS GE 130-139MM HG: ICD-10-PCS | Mod: S$GLB,,, | Performed by: INTERNAL MEDICINE

## 2020-09-08 RX ORDER — ATORVASTATIN CALCIUM 40 MG/1
40 TABLET, FILM COATED ORAL DAILY
Status: ON HOLD | COMMUNITY
End: 2022-08-25

## 2020-09-08 RX ORDER — BENZONATATE 200 MG/1
200 CAPSULE ORAL 3 TIMES DAILY PRN
Qty: 20 CAPSULE | Refills: 1 | Status: SHIPPED | OUTPATIENT
Start: 2020-09-08 | End: 2020-09-18

## 2020-09-08 RX ORDER — DIGOXIN 125 MCG
125 TABLET ORAL DAILY
COMMUNITY
End: 2021-02-04

## 2020-09-08 RX ORDER — TORSEMIDE 20 MG/1
20 TABLET ORAL DAILY
COMMUNITY
End: 2021-02-04 | Stop reason: SDUPTHER

## 2020-09-08 NOTE — PATIENT INSTRUCTIONS
About Arrhythmias    Electrical impulses cause the normal heart to beat 60 to 100 times a minute while at rest. These impulses come from a natural pacemaker deep inside the heart muscle. Each impulse causes the heart muscle to contract. This causes the blood to flow through the heart and out to the tissues and organs of your body.  An arrhythmia is a change from the normal speed or pattern of these electrical impulses. This can cause the heart to beat too fast (tachycardia); or too slow (bradycardia); or in an unsteady pattern (irregular rhythm).  Symptoms of arrhythmias  Different people experience arrhythmias differently. Sometimes they may not have symptoms, but just notice a change in their pulse. Symptoms can include:  · Fluttering feeling in the chest  · Shortness of breath  · Chest pain or pressure  · Neck fullness  · Lightheadedness or dizziness  · Fainting or almost fainting  · Palpitations (the sense that your heart is fluttering or beating fast or hard or irregularly)  · Tiredness, fatigue, or weakness  · Cardiac arrest  Causes of arrhythmias  Arrhythmias are most often due to heart disease such as:  · Coronary artery disease  · Heart valve disease  · Enlarged heart  · High blood pressure  · Heart failure  Other causes of  arrhythmia include:  · Certain medicines (such as asthma inhalers and decongestants)  · Some herbal supplements  · Cardiac stimulant drugs (such as cocaine, amphetamine, diet pills, certain decongestant cold medicines, caffeine, and nicotine)  · Excessive alcohol use  · Anxiety and panic disorder  · Thyroid disease  · Anemia  · Diabetes  · Sleep apnea  · Obesity  · Congenital heart disease  · Cardiac genetic diseases  Arrhythmias can often be prevented. The cause and type of arrhythmia determines the best treatment. Sometimes your doctor may want to monitor your heart rate over a 24-hour period or longer. This can help identify the cause of your arrhythmia and find the best treatment.  This can be done with a Holter monitor, a portable EKG recording device attached by wires to your chest. Or you may get an event monitor, which you can place over the skin in front of your heart to record heart rhythms. You can carry this with you as you go about your routine activities during the monitoring period. Implantable loop recorders may also be used to monitor the heart rhythm for up to 2 years. This miniature device is placed underneath the skin overlying the heart.  Home care  The following guidelines will help you care for yourself at home:  · Avoid cardiac stimulants (such as cocaine, amphetamine, diet pills, certain decongestant cold medicines, caffeine, and nicotine).  · If you smoke, stop smoking. Contact your doctor or a local stop-smoking program for help.  · Tell your doctor about any prescription, over-the-counter, or herbal medicines you take. These may be affecting your heart rhythm.  Follow-up care  Follow up with your healthcare provider, or as advised. If a Holter monitor has been recommended, contact the cardiologist you have been referred to as soon as you can  the device. Other outpatient tests may also be arranged for you at that time.  Call 911  This is the fastest and safest way to get to the emergency department. The paramedics can also start treatment on the way to the hospital, if needed.  Don't wait until your symptoms are severe to call 911. Other reasons to call 911 besides chest pain include:  · Chest, shoulder, arm, neck, or back pain  · Shortness of breath  · Feeling lightheaded, faint, or dizzy  · Unexplained fainting  · Rapid heart beat  · Slower than usual heart rate compared to your normal  · Very irregular heartbeat  · Chest pain (angina) with weakness, dizziness, heavy sweating, nausea, or vomiting  · Extreme drowsiness, or confusion  · Weakness of an arm or leg or one side of the face  · Difficulty with speech or vision  When to seek medical advice  Remember,  "things are not always like they are on TV. Sometimes it is not so obvious. You may only feel weak or just "not right." If it is not clear or if you have any doubt, call for advice.  · Seek help for chest pain, or it feels different from usual, even if your symptoms are mild.  · Don't drive yourself. Have someone else drive. If no one can drive you, call 911.  · If your doctor has given you medicines to take when you have symptoms, take them, but do not delay getting help while trying to find them.  Date Last Reviewed: 4/25/2016 © 2000-2017 [employer]. [address] [place] [address], [place] [address]. All rights reserved. This information is not intended as a substitute for professional medical care. Always follow your healthcare professional's instructions.        About Arrhythmias    Electrical impulses cause the normal heart to beat 60 to 100 times a minute while at rest. These impulses come from a natural pacemaker deep inside the heart muscle. Each impulse causes the heart muscle to contract. This causes the blood to flow through the heart and out to the tissues and organs of your body.  An arrhythmia is a change from the normal speed or pattern of these electrical impulses. This can cause the heart to beat too fast (tachycardia); or too slow (bradycardia); or in an unsteady pattern (irregular rhythm).  Symptoms of arrhythmias  Different people experience arrhythmias differently. Sometimes they may not have symptoms, but just notice a change in their pulse. Symptoms can include:  · Fluttering feeling in the chest  · Shortness of breath  · Chest pain or pressure  · Neck fullness  · Lightheadedness or dizziness  · Fainting or almost fainting  · Palpitations (the sense that your heart is fluttering or beating fast or hard or irregularly)  · Tiredness, fatigue, or weakness  · Cardiac arrest  Causes of arrhythmias  Arrhythmias are most often due to heart disease such as:  · Coronary artery disease  · Heart " valve disease  · Enlarged heart  · High blood pressure  · Heart failure  Other causes of  arrhythmia include:  · Certain medicines (such as asthma inhalers and decongestants)  · Some herbal supplements  · Cardiac stimulant drugs (such as cocaine, amphetamine, diet pills, certain decongestant cold medicines, caffeine, and nicotine)  · Excessive alcohol use  · Anxiety and panic disorder  · Thyroid disease  · Anemia  · Diabetes  · Sleep apnea  · Obesity  · Congenital heart disease  · Cardiac genetic diseases  Arrhythmias can often be prevented. The cause and type of arrhythmia determines the best treatment. Sometimes your doctor may want to monitor your heart rate over a 24-hour period or longer. This can help identify the cause of your arrhythmia and find the best treatment. This can be done with a Holter monitor, a portable EKG recording device attached by wires to your chest. Or you may get an event monitor, which you can place over the skin in front of your heart to record heart rhythms. You can carry this with you as you go about your routine activities during the monitoring period. Implantable loop recorders may also be used to monitor the heart rhythm for up to 2 years. This miniature device is placed underneath the skin overlying the heart.  Home care  The following guidelines will help you care for yourself at home:  · Avoid cardiac stimulants (such as cocaine, amphetamine, diet pills, certain decongestant cold medicines, caffeine, and nicotine).  · If you smoke, stop smoking. Contact your doctor or a local stop-smoking program for help.  · Tell your doctor about any prescription, over-the-counter, or herbal medicines you take. These may be affecting your heart rhythm.  Follow-up care  Follow up with your healthcare provider, or as advised. If a Holter monitor has been recommended, contact the cardiologist you have been referred to as soon as you can  the device. Other outpatient tests may also be  "arranged for you at that time.  Call 911  This is the fastest and safest way to get to the emergency department. The paramedics can also start treatment on the way to the hospital, if needed.  Don't wait until your symptoms are severe to call 911. Other reasons to call 911 besides chest pain include:  · Chest, shoulder, arm, neck, or back pain  · Shortness of breath  · Feeling lightheaded, faint, or dizzy  · Unexplained fainting  · Rapid heart beat  · Slower than usual heart rate compared to your normal  · Very irregular heartbeat  · Chest pain (angina) with weakness, dizziness, heavy sweating, nausea, or vomiting  · Extreme drowsiness, or confusion  · Weakness of an arm or leg or one side of the face  · Difficulty with speech or vision  When to seek medical advice  Remember, things are not always like they are on TV. Sometimes it is not so obvious. You may only feel weak or just "not right." If it is not clear or if you have any doubt, call for advice.  · Seek help for chest pain, or it feels different from usual, even if your symptoms are mild.  · Don't drive yourself. Have someone else drive. If no one can drive you, call 911.  · If your doctor has given you medicines to take when you have symptoms, take them, but do not delay getting help while trying to find them.  Date Last Reviewed: 4/25/2016  © 9680-9090 [employer]. [address] [place] [address], [place] [address]. All rights reserved. This information is not intended as a substitute for professional medical care. Always follow your healthcare professional's instructions.        "

## 2020-09-08 NOTE — PROGRESS NOTES
Subjective:       Patient ID: Jo Alegre is a 80 y.o. female.    Chief Complaint: Hyperlipidemia, Hypertension, Chest Wall Pain, Shortness of Breath, Anticoagulation, Atrial Fibrillation, Cough, Bladder incontinence, and Congestive Heart Failure    Miss Jo Palm is a  80-year-old  female who comes for follow-up.  Underlying medical issues of paroxysmal supraventricular tachycardia, chronic systolic and diastolic heart failure and underlying implantable cardiac defibrillator I have been noted.     She continues to have multiple medical issues with 1 replacing the previous ones.  Till last few andreina she was struggling with low back pain which was excruciating and finally I was able to set up an appointment with rehab and physical medication.  And lo behold remarkably, her pain vanished 2 days before the appointment.    Certainly now she has another pain which just appeared last 1 week under the left chest wall under the bra line.  There is no history of fall or injury.  There is a thought that she might have twisted herself out bent down.  No rash suggestive of shingles.  She is known to have coronary artery disease, chronic atrial fibrillation currently on stable rhythm.  She is also chronic anticoagulation.  She is also on amiodarone, carvedilol and entresto.    As per cardiology notes her echocardiogram had shown an ejection fraction of 35-40%.    Patient's medical history as follows    1.-cardiomyopathy unspecified  2.  Paroxysmal tachycardia/supraventricular tachycardia  3.  Heart failure chronic combined systolic and diastolic.  4.  Presence of cardiac and vascular implants and grafts and automatic implantable cardiac       defibrillator.  5.  Long-term drug therapy and other therapies.  6.  Chronic edema of the legs fluctuating      Further new complains include persistent incontinence symptoms this point.  This is urge type of incontinence.  No burning sensation.    She continues on  anticoagulation with warfarin.  Levels are being monitored by Dr. Jones.    Patient also complains of a cough.  She is unable to sleep with this cough.  There is no expectoration of fever.  No other family member is sick.  No known exposure to COVID-19.    Under the brand new complaint today is tremors on the right side which has noticed or perhaps several weeks to months.  This is not on the left side.  No family history Parkinson's.  She is able to hold her of coffee but not fork and spoon.    Hyperlipidemia  This is a chronic problem. The current episode started more than 1 year ago. The problem is controlled. Exacerbating diseases include obesity. She has no history of hypothyroidism. Associated symptoms include chest pain (Left-sided chest pain radiating to the front.) and shortness of breath. Pertinent negatives include no leg pain. The current treatment provides moderate improvement of lipids. Risk factors for coronary artery disease include post-menopausal, hypertension, obesity, dyslipidemia and diabetes mellitus.   Hypertension  This is a chronic problem. The current episode started more than 1 year ago. The problem is controlled. Associated symptoms include chest pain (Left-sided chest pain radiating to the front.), malaise/fatigue, peripheral edema and shortness of breath. Pertinent negatives include no headaches or palpitations. Risk factors for coronary artery disease include sedentary lifestyle, obesity, dyslipidemia and diabetes mellitus. Past treatments include angiotensin blockers and diuretics. The current treatment provides moderate improvement. Compliance problems include psychosocial issues.  Hypertensive end-organ damage includes heart failure. There is no history of pheochromocytoma or renovascular disease.   Diabetes  She presents for her follow-up diabetic visit. She has type 2 diabetes mellitus. Her disease course has been stable. Hypoglycemia symptoms include tremors. Pertinent negatives  for hypoglycemia include no confusion, dizziness, headaches, nervousness/anxiousness, pallor or seizures. Associated symptoms include chest pain (Left-sided chest pain radiating to the front.) and fatigue. Pertinent negatives for diabetes include no foot ulcerations, no polydipsia, no polyphagia and no polyuria. Symptoms are stable. Risk factors for coronary artery disease include sedentary lifestyle, hypertension, dyslipidemia, diabetes mellitus and obesity. She is compliant with treatment most of the time. An ACE inhibitor/angiotensin II receptor blocker is being taken. She does not see a podiatrist.Eye exam is current.   Shortness of Breath  This is a chronic problem. The current episode started more than 1 month ago. The problem occurs constantly. The problem has been gradually improving. Associated symptoms include chest pain (Left-sided chest pain radiating to the front.). Pertinent negatives include no abdominal pain, claudication, coryza, fever, headaches, hemoptysis, leg pain, leg swelling, rash, sore throat, sputum production or syncope. The symptoms are aggravated by any activity. She has tried rest for the symptoms. The treatment provided no relief. Her past medical history is significant for allergies (Codeine and Lasix) and a heart failure. There is no history of asthma, pneumonia or a recent surgery.   Congestive Heart Failure  Presents for follow-up visit. Associated symptoms include chest pain (Left-sided chest pain radiating to the front.), edema, fatigue and shortness of breath. Pertinent negatives include no abdominal pain, chest pressure, claudication, muscle weakness, near-syncope, nocturia, palpitations, paroxysmal nocturnal dyspnea or unexpected weight change (lost 2 lbs). The symptoms have been worsening.   Atrial Fibrillation  Presents for follow-up visit. Symptoms include chest pain (Left-sided chest pain radiating to the front.), hypertension and shortness of breath. Symptoms are negative  for an AICD problem, bradycardia, dizziness, hemodynamic instability, hypotension, palpitations, syncope and tachycardia. The symptoms have been stable. Past medical history includes atrial fibrillation, CHF and hyperlipidemia. Medication compliance problems include psychosocial issues.       Past Medical History:   Diagnosis Date    Allergy     Codeine, Lasix    Atrial fibrillation     Atrial fibrillation     Cataract     CHF (congestive heart failure)     Diabetes mellitus, type 2     Ejection fraction < 50% 10/18/2017    Approximately 35%  Based on prior  Echocardiogram.    Encounter for blood transfusion     Hyperlipidemia     Osteoporosis     Thyroid disorder screening 10/17/2017    TSH of 1.12 ordered by Dr. dee Jones     Social History     Socioeconomic History    Marital status:      Spouse name: Not on file    Number of children: 4    Years of education: Not on file    Highest education level: Not on file   Occupational History    Occupation:    Social Needs    Financial resource strain: Not on file    Food insecurity     Worry: Not on file     Inability: Not on file    Transportation needs     Medical: Not on file     Non-medical: Not on file   Tobacco Use    Smoking status: Former Smoker    Smokeless tobacco: Never Used    Tobacco comment: quit 2013   Substance and Sexual Activity    Alcohol use: No    Drug use: No    Sexual activity: Not Currently   Lifestyle    Physical activity     Days per week: Not on file     Minutes per session: Not on file    Stress: Not at all   Relationships    Social connections     Talks on phone: Not on file     Gets together: Not on file     Attends Hinduism service: Not on file     Active member of club or organization: Not on file     Attends meetings of clubs or organizations: Not on file     Relationship status: Not on file   Other Topics Concern    Not on file   Social History Narrative    - Drives and lives  alone.     Past Surgical History:   Procedure Laterality Date    CHOLE[place]STECTOMY  1997    [place]     COLONOSCOPY      EYE SURGERY      bilateral cataracts    FRACTURE SURGERY  2014    right femur with neville    HEMORRHOID SURGERY      48 yrs ago    HYSTERECTOMY      REPLACEMENT OF IMPLANTABLE CARDIOVERTER-DEFIBRILLATOR (ICD) GENERATOR N/A 12/13/2019    Procedure: REPLACEMENT, PULSE GENERATOR, ICD-MEDTRONIC;  Surgeon: Sebastian Nowak III, MD;  Location: [facility];  Service: Cardiology;  Laterality: N/A;    TONSILLECTOMY       Family History   Problem Relation Age of Onset    Heart disease Mother     Cancer Father         Lung Cancer ??? Asbestos    Breast cancer Paternal Aunt        Review of Systems   Constitutional: Positive for activity change (Somewhat more cautious while walking), fatigue and malaise/fatigue. Negative for appetite change, chills, diaphoresis, fever and unexpected weight change (lost 2 lbs).   HENT: Negative for congestion, postnasal drip, sinus pressure and sore throat.    Eyes: Negative for pain, discharge and visual disturbance.   Respiratory: Positive for shortness of breath. Negative for cough, hemoptysis, sputum production and chest tightness.    Cardiovascular: Positive for chest pain (Left-sided chest pain radiating to the front.). Negative for palpitations, claudication, leg swelling, syncope and near-syncope.        History of defibrillator, congestive cardiomyopathy hypertension and dyslipidemia   Gastrointestinal: Negative for abdominal distention, abdominal pain, anal bleeding, constipation, diarrhea and nausea.   Endocrine: Negative for polydipsia, polyphagia and polyuria.        Diabetes mellitus on glimepiride.   Genitourinary: Negative for difficulty urinating, dysuria, flank pain, frequency, nocturia and pelvic pain.   Musculoskeletal: Positive for gait problem. Negative for arthralgias, back pain, joint swelling and muscle weakness.        Difficulty walking with low  "back pain radiating to the right thigh and also right lateral thigh pain.   Skin: Negative for color change, pallor, rash and wound.   Allergic/Immunologic: Negative for environmental allergies, food allergies and immunocompromised state.   Neurological: Positive for tremors. Negative for dizziness, seizures, syncope, light-headedness and headaches.        Complains of tremors in the right hand.  Sometimes she has difficulty holding her plates and food.  She is able to drink her coffee which is in a large cup.    Also complains of excessive sleepiness now during the daytime.   Hematological: Negative for adenopathy. Bruises/bleeds easily.        Patient is on chronic anticoagulation with warfarin.  Recent bruises on the occiput as well as right elbow region following a fall.   Psychiatric/Behavioral: Negative for agitation, confusion and dysphoric mood. The patient is not nervous/anxious.          Objective:      Blood pressure 131/72, pulse 69, temperature 97.3 °F (36.3 °C), resp. rate (!) 21, height 5' 7" (1.702 m), weight 103 kg (227 lb). Body mass index is 35.55 kg/m².  Physical Exam  Constitutional:       General: She is not in acute distress.     Appearance: She is well-developed. She is obese. She is ill-appearing. She is not toxic-appearing or diaphoretic.      Comments: Patient is obese with a BMI of 35.55   HENT:      Head: Normocephalic and atraumatic.      Mouth/Throat:      Pharynx: No oropharyngeal exudate.   Eyes:      General: No scleral icterus.        Right eye: No discharge.         Left eye: No discharge.   Neck:      Musculoskeletal: Normal range of motion and neck supple.      Thyroid: No thyromegaly.      Vascular: No JVD.      Trachea: No tracheal deviation.   Cardiovascular:      Rate and Rhythm: Normal rate and regular rhythm.      Heart sounds: No friction rub. No gallop.    Pulmonary:      Effort: Pulmonary effort is normal. No respiratory distress.      Breath sounds: Normal breath " sounds. No stridor. No wheezing or rhonchi.   Chest:      Chest wall: Tenderness present. No deformity or crepitus.       Abdominal:      General: There is no distension.      Palpations: Abdomen is soft.      Tenderness: There is no abdominal tenderness.   Musculoskeletal:         General: No tenderness or deformity.      Lumbar back: She exhibits no laceration.        Back:    Lymphadenopathy:      Cervical: No cervical adenopathy.   Skin:     General: Skin is warm and dry.      Coloration: Skin is not pale.   Neurological:      Mental Status: She is alert. She is not disoriented.      Motor: Tremor present.      Comments: Slightly coarse times on the right hand and fingers outstretched.  Not at rest.   Psychiatric:         Mood and Affect: Mood is anxious.         Behavior: Behavior normal.         Thought Content: Thought content normal.      Comments: Somewhat anhedonic and antalgic           Assessment:       1. Left-sided chest pain    2. Cough    3. Chronic combined systolic and diastolic congestive heart failure    4. Paroxysmal atrial fibrillation    5. Essential hypertension    6. Chronic anticoagulation    7. Hypoxia    8. Urge incontinence    9. Mixed dyslipidemia    10. Need for hepatitis C screening test    11. Encounter for screening mammogram for breast cancer    12. Compression fracture of body of thoracic vertebra           No visits with results within 3 Month(s) from this visit.   Latest known visit with results is:   Admission on 04/01/2020, Discharged on 04/03/2020   Component Date Value Ref Range Status    WBC 04/01/2020 5.83  3.90 - 12.70 K/uL Final    RBC 04/01/2020 3.94* 4.00 - 5.40 M/uL Final    Hemoglobin 04/01/2020 12.2  12.0 - 16.0 g/dL Final    Hematocrit 04/01/2020 38.6  37.0 - 48.5 % Final    Mean Corpuscular Volume 04/01/2020 98  82 - 98 fL Final    Mean Corpuscular Hemoglobin 04/01/2020 31.0  27.0 - 31.0 pg Final    Mean Corpuscular Hemoglobin Conc 04/01/2020 31.6* 32.0 -  36.0 g/dL Final    RDW 04/01/2020 13.7  11.5 - 14.5 % Final    Platelets 04/01/2020 208  150 - 350 K/uL Final    MPV 04/01/2020 10.5  9.2 - 12.9 fL Final    Immature Granulocytes 04/01/2020 0.3  0.0 - 0.5 % Final    Gran # (ANC) 04/01/2020 3.8  1.8 - 7.7 K/uL Final    Immature Grans (Abs) 04/01/2020 0.02  0.00 - 0.04 K/uL Final    Lymph # 04/01/2020 1.0  1.0 - 4.8 K/uL Final    Mono # 04/01/2020 0.9  0.3 - 1.0 K/uL Final    Eos # 04/01/2020 0.1  0.0 - 0.5 K/uL Final    Baso # 04/01/2020 0.03  0.00 - 0.20 K/uL Final    nRBC 04/01/2020 0  0 /100 WBC Final    Gran% 04/01/2020 65.0  38.0 - 73.0 % Final    Lymph% 04/01/2020 16.8* 18.0 - 48.0 % Final    Mono% 04/01/2020 15.3* 4.0 - 15.0 % Final    Eosinophil% 04/01/2020 2.1  0.0 - 8.0 % Final    Basophil% 04/01/2020 0.5  0.0 - 1.9 % Final    Differential Method 04/01/2020 Automated   Final    Sodium 04/01/2020 136  136 - 145 mmol/L Final    Potassium 04/01/2020 4.1  3.5 - 5.1 mmol/L Final    Chloride 04/01/2020 97  95 - 110 mmol/L Final    CO2 04/01/2020 29  23 - 29 mmol/L Final    Glucose 04/01/2020 188* 70 - 110 mg/dL Final    BUN, Bld 04/01/2020 32* 8 - 23 mg/dL Final    Creatinine 04/01/2020 1.9* 0.5 - 1.4 mg/dL Final    Calcium 04/01/2020 9.0  8.7 - 10.5 mg/dL Final    Total Protein 04/01/2020 6.1  6.0 - 8.4 g/dL Final    Albumin 04/01/2020 2.8* 3.5 - 5.2 g/dL Final    Total Bilirubin 04/01/2020 0.4  0.1 - 1.0 mg/dL Final    Alkaline Phosphatase 04/01/2020 57  55 - 135 U/L Final    AST 04/01/2020 17  10 - 40 U/L Final    ALT 04/01/2020 18  10 - 44 U/L Final    Anion Gap 04/01/2020 10  8 - 16 mmol/L Final    eGFR if  04/01/2020 28.7* >60 mL/min/1.73 m^2 Final    eGFR if non African American 04/01/2020 24.9* >60 mL/min/1.73 m^2 Final    Troponin I 04/01/2020 <0.030  <=0.040 ng/mL Final    BNP 04/01/2020 1,387* 0 - 99 pg/mL Final    PT 04/01/2020 31.2* 10.6 - 14.8 sec Final    INR 04/01/2020 3.2   Final    SARS-CoV2  (COVID-19) Qualitative P* 04/01/2020 Not Detected  Not Detected Final    Influenza A, Molecular 04/01/2020 Negative  Negative Final    Influenza B, Molecular 04/01/2020 Negative  Negative Final    Flu A & B Source 04/01/2020 Nasal swab   Final    Procalcitonin 04/01/2020 0.24  0.00 - 0.50 ng/mL Final    Hemoglobin A1C 04/02/2020 6.0  4.5 - 6.2 % Final    Estimated Avg Glucose 04/02/2020 126  68 - 131 mg/dL Final    WBC 04/02/2020 5.82  3.90 - 12.70 K/uL Final    RBC 04/02/2020 3.65* 4.00 - 5.40 M/uL Final    Hemoglobin 04/02/2020 11.5* 12.0 - 16.0 g/dL Final    Hematocrit 04/02/2020 36.6* 37.0 - 48.5 % Final    Mean Corpuscular Volume 04/02/2020 100* 82 - 98 fL Final    Mean Corpuscular Hemoglobin 04/02/2020 31.5* 27.0 - 31.0 pg Final    Mean Corpuscular Hemoglobin Conc 04/02/2020 31.4* 32.0 - 36.0 g/dL Final    RDW 04/02/2020 14.0  11.5 - 14.5 % Final    Platelets 04/02/2020 208  150 - 350 K/uL Final    MPV 04/02/2020 10.6  9.2 - 12.9 fL Final    Immature Granulocytes 04/02/2020 0.2  0.0 - 0.5 % Final    Gran # (ANC) 04/02/2020 3.8  1.8 - 7.7 K/uL Final    Immature Grans (Abs) 04/02/2020 0.01  0.00 - 0.04 K/uL Final    Lymph # 04/02/2020 1.0  1.0 - 4.8 K/uL Final    Mono # 04/02/2020 0.8  0.3 - 1.0 K/uL Final    Eos # 04/02/2020 0.2  0.0 - 0.5 K/uL Final    Baso # 04/02/2020 0.02  0.00 - 0.20 K/uL Final    nRBC 04/02/2020 0  0 /100 WBC Final    Gran% 04/02/2020 64.4  38.0 - 73.0 % Final    Lymph% 04/02/2020 17.9* 18.0 - 48.0 % Final    Mono% 04/02/2020 13.6  4.0 - 15.0 % Final    Eosinophil% 04/02/2020 3.6  0.0 - 8.0 % Final    Basophil% 04/02/2020 0.3  0.0 - 1.9 % Final    Differential Method 04/02/2020 Automated   Final    BSA 04/02/2020 2.27  m2 Final    Right Atrial Pressure (from IVC) 04/02/2020 8  mmHg Final    TDI SEPTAL 04/02/2020 0.05  m/s Final    LV LATERAL E/E' RATIO 04/02/2020 10.67  m/s Final    LV SEPTAL E/E' RATIO 04/02/2020 12.80  m/s Final    AORTIC VALVE CUSP  SEPERATION 04/02/2020 2.00  cm Final    TDI LATERAL 04/02/2020 0.06  m/s Final    PV PEAK VELOCITY 04/02/2020 86.78  cm/s Final    LVIDD 04/02/2020 5.86  3.5 - 6.0 cm Final    IVS 04/02/2020 1.19* 0.6 - 1.1 cm Final    PW 04/02/2020 1.20* 0.6 - 1.1 cm Final    Ao root annulus 04/02/2020 3.12  cm Final    LVIDS 04/02/2020 4.95* 2.1 - 4.0 cm Final    FS 04/02/2020 16  28 - 44 % Final    LV mass 04/02/2020 300.36  g Final    LA size 04/02/2020 5.32  cm Final    RVDD 04/02/2020 212.00  cm Final    Left Ventricle Relative Wall Thick* 04/02/2020 0.41  cm Final    AV mean gradient 04/02/2020 4  mmHg Final    AV valve area 04/02/2020 2.37  cm2 Final    AV Velocity Ratio 04/02/2020 68.30   Final    AV index (prosthetic) 04/02/2020 0.63   Final    E/A ratio 04/02/2020 0.81   Final    Mean e' 04/02/2020 0.06  m/s Final    E wave decelartion time 04/02/2020 335.65  msec Final    IVRT 04/02/2020 91.17  msec Final    LVOT diameter 04/02/2020 2.18  cm Final    LVOT area 04/02/2020 3.7  cm2 Final    LVOT peak nathan 04/02/2020 92.89  m/s Final    LVOT peak VTI 04/02/2020 20.29  cm Final    Ao peak nathan 04/02/2020 1.36  m/s Final    Ao VTI 04/02/2020 32.00  cm Final    LVOT stroke volume 04/02/2020 75.69  cm3 Final    AV peak gradient 04/02/2020 7  mmHg Final    TV rest pulmonary artery pressure 04/02/2020 37  mmHg Final    E/E' ratio 04/02/2020 11.64  m/s Final    MV Peak E Nathan 04/02/2020 0.64  m/s Final    TR Max Nathan 04/02/2020 2.69  m/s Final    MV Peak A Nathan 04/02/2020 0.79  m/s Final    LV Mass Index 04/02/2020 136  g/m2 Final    Triscuspid Valve Regurgitation Pea* 04/02/2020 29  mmHg Final    POC Glucose 04/02/2020 69* 70 - 110 Final    Magnesium 04/02/2020 2.1  1.6 - 2.6 mg/dL Final    Phosphorus 04/02/2020 4.1  2.7 - 4.5 mg/dL Final    PT 04/02/2020 25.7* 10.6 - 14.8 sec Final    INR 04/02/2020 2.5   Final    Sodium 04/02/2020 139  136 - 145 mmol/L Final    Potassium 04/02/2020 4.2  3.5  - 5.1 mmol/L Final    Chloride 04/02/2020 98  95 - 110 mmol/L Final    CO2 04/02/2020 31* 23 - 29 mmol/L Final    Glucose 04/02/2020 93  70 - 110 mg/dL Final    BUN, Bld 04/02/2020 33* 8 - 23 mg/dL Final    Creatinine 04/02/2020 1.8* 0.5 - 1.4 mg/dL Final    Calcium 04/02/2020 9.1  8.7 - 10.5 mg/dL Final    Total Protein 04/02/2020 5.9* 6.0 - 8.4 g/dL Final    Albumin 04/02/2020 2.6* 3.5 - 5.2 g/dL Final    Total Bilirubin 04/02/2020 0.6  0.1 - 1.0 mg/dL Final    Alkaline Phosphatase 04/02/2020 59  55 - 135 U/L Final    AST 04/02/2020 14  10 - 40 U/L Final    ALT 04/02/2020 19  10 - 44 U/L Final    Anion Gap 04/02/2020 10  8 - 16 mmol/L Final    eGFR if  04/02/2020 30.6* >60 mL/min/1.73 m^2 Final    eGFR if non African American 04/02/2020 26.6* >60 mL/min/1.73 m^2 Final    POC Glucose 04/02/2020 102  70 - 110 Final    POC Glucose 04/02/2020 96  70 - 110 Final    Specimen UA 04/02/2020 Urine, Clean Catch   Final    Color, UA 04/02/2020 Yellow  Yellow, Straw, Steffanie Final    Appearance, UA 04/02/2020 Hazy* Clear Final    pH, UA 04/02/2020 5.0  5.0 - 8.0 Final    Specific Gravity, UA 04/02/2020 1.010  1.005 - 1.030 Final    Protein, UA 04/02/2020 Negative  Negative Final    Glucose, UA 04/02/2020 Negative  Negative Final    Ketones, UA 04/02/2020 Negative  Negative Final    Bilirubin (UA) 04/02/2020 Negative  Negative Final    Occult Blood UA 04/02/2020 1+* Negative Final    Nitrite, UA 04/02/2020 Negative  Negative Final    Urobilinogen, UA 04/02/2020 Negative  Negative EU/dL Final    Leukocytes, UA 04/02/2020 3+* Negative Final    RBC, UA 04/02/2020 1  0 - 4 /hpf Final    WBC, UA 04/02/2020 >100* 0 - 5 /hpf Final    Bacteria 04/02/2020 Negative  None-Occ /hpf Final    Squam Epithel, UA 04/02/2020 0  /hpf Final    Hyaline Casts, UA 04/02/2020 8* 0-1/lpf /lpf Final    Microscopic Comment 04/02/2020 SEE COMMENT   Final    POC Glucose 04/02/2020 103  70 - 110 Final     POC Glucose 04/02/2020 161* 70 - 110 Final    PT 04/03/2020 23.8* 10.6 - 14.8 sec Final    INR 04/03/2020 2.2   Final    BNP 04/03/2020 430* 0 - 99 pg/mL Final    Sodium 04/03/2020 139  136 - 145 mmol/L Final    Potassium 04/03/2020 4.2  3.5 - 5.1 mmol/L Final    Chloride 04/03/2020 98  95 - 110 mmol/L Final    CO2 04/03/2020 31* 23 - 29 mmol/L Final    Glucose 04/03/2020 93  70 - 110 mg/dL Final    BUN, Bld 04/03/2020 33* 8 - 23 mg/dL Final    Creatinine 04/03/2020 1.8* 0.5 - 1.4 mg/dL Final    Calcium 04/03/2020 8.6* 8.7 - 10.5 mg/dL Final    Anion Gap 04/03/2020 10  8 - 16 mmol/L Final    eGFR if African American 04/03/2020 30.6* >60 mL/min/1.73 m^2 Final    eGFR if non African American 04/03/2020 26.6* >60 mL/min/1.73 m^2 Final    POC Glucose 04/03/2020 91  70 - 110 Final         Plan:           Left-sided chest pain  Comments:  Possible musculoskeletal.  Cannot rule or pleurisy.  No evidence of shingles at this point.  Check chest x-ray.  Orders:  -     X-Ray Chest PA And Lateral; Future; Expected date: 09/08/2020    Cough  Comments:  Uncertain duration.  No expectation.  Plan is to check x-ray  Orders:  -     benzonatate (TESSALON) 200 MG capsule; Take 1 capsule (200 mg total) by mouth 3 (three) times daily as needed for Cough.  Dispense: 20 capsule; Refill: 1    Chronic combined systolic and diastolic congestive heart failure  Comments:  Probably contributing to her shortness of breath.  Follows with Dr. colorado    Paroxysmal atrial fibrillation  Comments:  Patient is on chronic anticoagulation.    Essential hypertension    Chronic anticoagulation    Hypoxia  Comments:  Combination of heart failure    Urge incontinence  Comments:  Plan is to observe.  Use diapers.  At this point patient is not keen on new medications.    Mixed dyslipidemia    Need for hepatitis C screening test  -     Hepatitis C antibody; Future; Expected date: 09/09/2020    Encounter for screening mammogram for breast cancer  -      Mammo Digital Screening Bilat; Future; Expected date: 09/09/2020    Compression fracture of body of thoracic vertebra     New onset of left-sided posterior chest pain radiating to the left front has been noted.  This has been going on for the last 1 week or so.    Prior to that I had a chest x-ray which was unremarkable except for slight blunting of the cardiophrenic angles.  The thoracic spine x-ray had shown multiple compression fractures at T6,T7, T8,  and T12 level.      She does have midback pain which is better at this point.  She had multiple compressions in this thoracic spine.      Atrial fibrillation seems to be stable.  She is on chronic anticoagulation.  She sees Dr. Jones for the same.    Shortness of breath on exertion persists but it is stable.    Tremors in the right hand have been noted but no specific intervention decided at this point.  I have advised her to see if she can cut down on the coffee and she agrees to cut down by 1 cup instead of 2 cups and see if it makes any difference.    She will take her flu shot in the month of October.      Blood sugars are acceptable and her general condition is acceptable.  She is able to ambulate.  No increase in shortness of breath or no active chest pains.    I will check a hemoglobin A1c and lipid panel whenever she is due for the next test.    Otherwise follow-up in 3 months time.  Next visit will focus more on the diabetes.    Patient also complains of a new symptom of falling backward when she gets up in the morning.  I am not sure if it is related to orthostasis or is it a sign of early Parkinson's but I will get a neurology evaluation for the same.    Spent aleks 35-40 minutes with patient which involved review of pts medical conditions, labs, medications and with 50% of time face-to-face discussion about medical problems, management and any applicable changes.        Current Outpatient Medications:     amiodarone (PACERONE) 200 MG Tab, Take by  mouth once daily., Disp: , Rfl:     atorvastatin (LIPITOR) 40 MG tablet, Take 40 mg by mouth once daily., Disp: , Rfl:     Ca-D3-mag ox-zinc--thom-bor (CALCIUM 600-D3 PLUS) 600 mg calcium- 800 unit-50 mg Tab, Take 1 tablet by mouth 2 (two) times daily., Disp: , Rfl:     cholecalciferol, vitamin D3, (VITAMIN D3) 5,000 unit Tab, Take 5,000 Units by mouth 2 (two) times daily., Disp: , Rfl:     digoxin (LANOXIN) 125 mcg tablet, Take 125 mcg by mouth once daily., Disp: , Rfl:     furosemide (LASIX) 20 MG tablet, Take 20 mg by mouth 2 (two) times daily., Disp: , Rfl:     gabapentin (NEURONTIN) 100 MG capsule, Take 2 capsules (200 mg total) by mouth 3 (three) times daily., Disp: 180 capsule, Rfl: 1    glimepiride (AMARYL) 1 MG tablet, Take 1 tablet (1 mg total) by mouth before breakfast., Disp: 90 tablet, Rfl: 1    midodrine (PROAMATINE) 2.5 MG Tab, Take 2.5 mg by mouth 3 (three) times daily., Disp: , Rfl:     sacubitril-valsartan (ENTRESTO) 24-26 mg per tablet, Take 1 tablet by mouth once daily. , Disp: , Rfl:     torsemide (DEMADEX) 20 MG Tab, Take 20 mg by mouth once daily., Disp: , Rfl:     warfarin (COUMADIN) 5 MG tablet, Take 5 mg by mouth once daily., Disp: , Rfl:     benzonatate (TESSALON) 200 MG capsule, Take 1 capsule (200 mg total) by mouth 3 (three) times daily as needed for Cough., Disp: 20 capsule, Rfl: 1    denosumab (PROLIA) 60 mg/mL Syrg, Inject 1 mL (60 mg total) into the skin every 6 (six) months., Disp: 2 mL, Rfl: 1  No current facility-administered medications for this visit.     Facility-Administered Medications Ordered in Other Visits:     0.9%  NaCl infusion, , Intravenous, Continuous, Sebastian Nowak III, MD, Last Rate: 75 mL/hr at 12/13/19 0728    diphenhydrAMINE injection 25 mg, 25 mg, Intravenous, Once, Sebastian Nowak III, MD    lorazepam injection 1 mg, 1 mg, Intravenous, Once, Sebastian Nowak III, MD

## 2020-09-10 ENCOUNTER — HOSPITAL ENCOUNTER (OUTPATIENT)
Dept: RADIOLOGY | Facility: HOSPITAL | Age: 80
Discharge: HOME | End: 2020-09-10
Attending: INTERNAL MEDICINE
Payer: MEDICARE

## 2020-09-10 ENCOUNTER — TELEPHONE (OUTPATIENT)
Dept: FAMILY MEDICINE | Facility: CLINIC | Age: 80
End: 2020-09-10

## 2020-09-10 DIAGNOSIS — G47.33 OBSTRUCTIVE SLEEP APNEA SYNDROME: Primary | ICD-10-CM

## 2020-09-10 DIAGNOSIS — R05.9 COUGH: ICD-10-CM

## 2020-09-10 PROCEDURE — 71046 X-RAY EXAM CHEST 2 VIEWS: CPT | Mod: TC

## 2020-09-10 NOTE — TELEPHONE ENCOUNTER
----- Message from Kraig Alberto MD sent at 9/10/2020  1:53 PM CDT -----  Notify patient that her chest x-ray was okay.  No clear explanation for her shortness of breath.  No clear explanation for her left-sided chest pain at this point.

## 2020-09-10 NOTE — PROGRESS NOTES
Notify patient that her chest x-ray was okay.  No clear explanation for her shortness of breath.  No clear explanation for her left-sided chest pain at this point.

## 2020-09-14 ENCOUNTER — OFFICE VISIT (OUTPATIENT)
Dept: PHYSICAL MEDICINE AND REHAB | Facility: CLINIC | Age: 80
End: 2020-09-14
Payer: MEDICARE

## 2020-09-14 VITALS
BODY MASS INDEX: 35.63 KG/M2 | DIASTOLIC BLOOD PRESSURE: 69 MMHG | SYSTOLIC BLOOD PRESSURE: 151 MMHG | WEIGHT: 227 LBS | HEART RATE: 72 BPM | HEIGHT: 67 IN

## 2020-09-14 DIAGNOSIS — M81.0 OSTEOPOROSIS, UNSPECIFIED OSTEOPOROSIS TYPE, UNSPECIFIED PATHOLOGICAL FRACTURE PRESENCE: ICD-10-CM

## 2020-09-14 DIAGNOSIS — Z74.09 IMPAIRED FUNCTIONAL MOBILITY AND ACTIVITY TOLERANCE: ICD-10-CM

## 2020-09-14 DIAGNOSIS — S22.42XA CLOSED FRACTURE OF MULTIPLE RIBS OF LEFT SIDE, INITIAL ENCOUNTER: Primary | ICD-10-CM

## 2020-09-14 DIAGNOSIS — Z79.01 CHRONIC ANTICOAGULATION: ICD-10-CM

## 2020-09-14 DIAGNOSIS — R07.81 RIB PAIN ON LEFT SIDE: ICD-10-CM

## 2020-09-14 DIAGNOSIS — E11.69 TYPE 2 DIABETES MELLITUS WITH OTHER SPECIFIED COMPLICATION, UNSPECIFIED WHETHER LONG TERM INSULIN USE: ICD-10-CM

## 2020-09-14 DIAGNOSIS — M54.14 THORACIC RADICULOPATHY: ICD-10-CM

## 2020-09-14 PROCEDURE — 3077F PR MOST RECENT SYSTOLIC BLOOD PRESSURE >= 140 MM HG: ICD-10-PCS | Mod: CPTII,S$GLB,, | Performed by: PHYSICAL MEDICINE & REHABILITATION

## 2020-09-14 PROCEDURE — 1101F PT FALLS ASSESS-DOCD LE1/YR: CPT | Mod: CPTII,S$GLB,, | Performed by: PHYSICAL MEDICINE & REHABILITATION

## 2020-09-14 PROCEDURE — 3077F SYST BP >= 140 MM HG: CPT | Mod: CPTII,S$GLB,, | Performed by: PHYSICAL MEDICINE & REHABILITATION

## 2020-09-14 PROCEDURE — 1125F AMNT PAIN NOTED PAIN PRSNT: CPT | Mod: S$GLB,,, | Performed by: PHYSICAL MEDICINE & REHABILITATION

## 2020-09-14 PROCEDURE — 99204 OFFICE O/P NEW MOD 45 MIN: CPT | Mod: S$GLB,,, | Performed by: PHYSICAL MEDICINE & REHABILITATION

## 2020-09-14 PROCEDURE — 99999 PR PBB SHADOW E&M-EST. PATIENT-LVL III: ICD-10-PCS | Mod: PBBFAC,,, | Performed by: PHYSICAL MEDICINE & REHABILITATION

## 2020-09-14 PROCEDURE — 1101F PR PT FALLS ASSESS DOC 0-1 FALLS W/OUT INJ PAST YR: ICD-10-PCS | Mod: CPTII,S$GLB,, | Performed by: PHYSICAL MEDICINE & REHABILITATION

## 2020-09-14 PROCEDURE — 3078F PR MOST RECENT DIASTOLIC BLOOD PRESSURE < 80 MM HG: ICD-10-PCS | Mod: CPTII,S$GLB,, | Performed by: PHYSICAL MEDICINE & REHABILITATION

## 2020-09-14 PROCEDURE — 3078F DIAST BP <80 MM HG: CPT | Mod: CPTII,S$GLB,, | Performed by: PHYSICAL MEDICINE & REHABILITATION

## 2020-09-14 PROCEDURE — 1159F PR MEDICATION LIST DOCUMENTED IN MEDICAL RECORD: ICD-10-PCS | Mod: S$GLB,,, | Performed by: PHYSICAL MEDICINE & REHABILITATION

## 2020-09-14 PROCEDURE — 1125F PR PAIN SEVERITY QUANTIFIED, PAIN PRESENT: ICD-10-PCS | Mod: S$GLB,,, | Performed by: PHYSICAL MEDICINE & REHABILITATION

## 2020-09-14 PROCEDURE — 1159F MED LIST DOCD IN RCRD: CPT | Mod: S$GLB,,, | Performed by: PHYSICAL MEDICINE & REHABILITATION

## 2020-09-14 PROCEDURE — 99204 PR OFFICE/OUTPT VISIT, NEW, LEVL IV, 45-59 MIN: ICD-10-PCS | Mod: S$GLB,,, | Performed by: PHYSICAL MEDICINE & REHABILITATION

## 2020-09-14 PROCEDURE — 99999 PR PBB SHADOW E&M-EST. PATIENT-LVL III: CPT | Mod: PBBFAC,,, | Performed by: PHYSICAL MEDICINE & REHABILITATION

## 2020-09-14 NOTE — PROGRESS NOTES
Today's Date: 09/14/2020     Referring Provider: Aaareferral Self     Chief Complaint:   Chief Complaint   Patient presents with    Shoulder Pain       HPI: Jo Alegre is a 80 y.o. female  who presents today for evaluation and treatment of left-sided rib pain as a referral from Dr. Alberto.  She states that her symptoms started a few months ago without inciting event.  Pain is in the left posterior chest wall just inferior to the inferior border of the scapula.  She describes the pain as a sharp stabbing pain which can radiate from this region around the mid axillary line to the anterolateral chest wall.  She describes the pain as a sharp 6/10 pain.  Pain is worse with coughing, sneezing, or deep breath.  Pain is better with Tylenol.  She denies any rashes or lesions in the chest wall.  She denies any numbness, tingling, or burning of the chest wall.  She states that she does have a history of pathologic fractures.  She has a history of osteoporosis for which she is currently taking Prolia.    Review of Systems   Constitutional: Negative for chills and fever.   HENT: Negative for congestion and tinnitus.    Eyes: Negative for blurred vision and photophobia.   Respiratory: Negative for shortness of breath and wheezing.    Cardiovascular: Negative for chest pain and palpitations.   Gastrointestinal: Negative for nausea and vomiting.   Genitourinary: Negative for dysuria and frequency.   Musculoskeletal: Positive for back pain. Negative for joint pain and myalgias.   Skin: Negative for itching and rash.   Neurological: Negative for speech change and focal weakness.   Endo/Heme/Allergies: Negative for environmental allergies. Does not bruise/bleed easily.   Psychiatric/Behavioral: Negative for depression. The patient is not nervous/anxious.         Social History     Socioeconomic History    Marital status:      Spouse name: Not on file    Number of children: 4    Years of education: Not on file     Highest education level: Not on file   Occupational History    Occupation:    Social Needs    Financial resource strain: Not on file    Food insecurity     Worry: Not on file     Inability: Not on file    Transportation needs     Medical: Not on file     Non-medical: Not on file   Tobacco Use    Smoking status: Former Smoker    Smokeless tobacco: Never Used    Tobacco comment: quit 2013   Substance and Sexual Activity    Alcohol use: No    Drug use: No    Sexual activity: Not Currently   Lifestyle    Physical activity     Days per week: Not on file     Minutes per session: Not on file    Stress: Not at all   Relationships    Social connections     Talks on phone: Not on file     Gets together: Not on file     Attends Latter day service: Not on file     Active member of club or organization: Not on file     Attends meetings of clubs or organizations: Not on file     Relationship status: Not on file   Other Topics Concern    Not on file   Social History Narrative    - Drives and lives alone.       Family History   Problem Relation Age of Onset    Heart disease Mother     Cancer Father         Lung Cancer ??? Asbestos    Breast cancer Paternal Aunt        Current Outpatient Medications on File Prior to Visit   Medication Sig Dispense Refill    amiodarone (PACERONE) 200 MG Tab Take by mouth once daily.      atorvastatin (LIPITOR) 40 MG tablet Take 40 mg by mouth once daily.      benzonatate (TESSALON) 200 MG capsule Take 1 capsule (200 mg total) by mouth 3 (three) times daily as needed for Cough. 20 capsule 1    Ca-D3-mag ox-zinc--thom-bor (CALCIUM 600-D3 PLUS) 600 mg calcium- 800 unit-50 mg Tab Take 1 tablet by mouth 2 (two) times daily.      cholecalciferol, vitamin D3, (VITAMIN D3) 5,000 unit Tab Take 5,000 Units by mouth 2 (two) times daily.      denosumab (PROLIA) 60 mg/mL Syrg Inject 1 mL (60 mg total) into the skin every 6 (six) months. 2 mL 1    digoxin (LANOXIN) 125  mcg tablet Take 125 mcg by mouth once daily.      furosemide (LASIX) 20 MG tablet Take 20 mg by mouth 2 (two) times daily.      gabapentin (NEURONTIN) 100 MG capsule Take 2 capsules (200 mg total) by mouth 3 (three) times daily. 180 capsule 1    glimepiride (AMARYL) 1 MG tablet Take 1 tablet (1 mg total) by mouth before breakfast. 90 tablet 1    midodrine (PROAMATINE) 2.5 MG Tab Take 2.5 mg by mouth 3 (three) times daily.      sacubitril-valsartan (ENTRESTO) 24-26 mg per tablet Take 1 tablet by mouth once daily.       torsemide (DEMADEX) 20 MG Tab Take 20 mg by mouth once daily.      warfarin (COUMADIN) 5 MG tablet Take 5 mg by mouth once daily.       Current Facility-Administered Medications on File Prior to Visit   Medication Dose Route Frequency Provider Last Rate Last Dose    0.9%  NaCl infusion   Intravenous Continuous Sebastian Nowak III, MD 75 mL/hr at 12/13/19 0728      diphenhydrAMINE injection 25 mg  25 mg Intravenous Once Sebastian Nowak III, MD        lorazepam injection 1 mg  1 mg Intravenous Once Sebastian Nowak III, MD            Review of patient's allergies indicates:   Allergen Reactions    Codeine Other (See Comments)    Lasix [furosemide]        Past Surgical History:   Procedure Laterality Date    [place]  1997    [place]     COLONOSCOPY      EYE SURGERY      bilateral cataracts    FRACTURE SURGERY  2014    right femur with neville    HEMORRHOID SURGERY      48 yrs ago    HYSTERECTOMY      REPLACEMENT OF IMPLANTABLE CARDIOVERTER-DEFIBRILLATOR (ICD) GENERATOR N/A 12/13/2019    Procedure: REPLACEMENT, PULSE GENERATOR, ICD-MEDTRONIC;  Surgeon: Sebastian Nowak III, MD;  Location: [facility];  Service: Cardiology;  Laterality: N/A;    TONSILLECTOMY         Past Medical History:   Diagnosis Date    Allergy     Codeine, Lasix    Atrial fibrillation     Atrial fibrillation     Cataract     CHF (congestive heart failure)     Diabetes mellitus, type 2     Ejection fraction < 50%  10/18/2017    Approximately 35%  Based on prior  Echocardiogram.    Encounter for blood transfusion     Hyperlipidemia     Osteoporosis     Thyroid disorder screening 10/17/2017    TSH of 1.12 ordered by Dr. dee Jones       Vitals:    09/14/20 1355   BP: (!) 151/69   Pulse: 72       Physical Exam  Constitutional:       Appearance: Normal appearance.   HENT:      Head: Normocephalic and atraumatic.      Nose: Nose normal. No congestion.      Mouth/Throat:      Mouth: Mucous membranes are moist.      Pharynx: Oropharynx is clear.   Eyes:      Extraocular Movements: Extraocular movements intact.      Pupils: Pupils are equal, round, and reactive to light.   Pulmonary:      Effort: Pulmonary effort is normal. No respiratory distress.   Abdominal:      General: Abdomen is flat. There is no distension.   Musculoskeletal:      Thoracic back: She exhibits tenderness (Tenderness about the angle of the left posterior ribs to palpation.  No tenderness palpation about the thoracic spine or paraspinals) and pain.        Back:         Arms:    Skin:     General: Skin is warm and dry.      Nails: There is no clubbing.     Neurological:      General: No focal deficit present.      Mental Status: She is alert and oriented to person, place, and time.      Cranial Nerves: Cranial nerves are intact.      Sensory: Sensation is intact.      Gait: Gait abnormal (Walks with a Rollator).   Psychiatric:         Mood and Affect: Mood and affect normal.         Behavior: Behavior normal.        Ortho Exam     Closed fracture of multiple ribs of left side, initial encounter  -     X-Ray Ribs 2 View Left; Future; Expected date: 09/14/2020    Thoracic radiculopathy    Rib pain on left side    Osteoporosis, unspecified osteoporosis type, unspecified pathological fracture presence    Chronic anticoagulation    Type 2 diabetes mellitus with other specified complication, unspecified whether long term insulin use    Impaired functional mobility  and activity tolerance           PLAN:   Jo Alegre is a 80 y.o. female with rib pain on the left side and referred pain around the mid axillary line.  History and physical examination is consistent with likely rib pathology.  She does have a history of osteoporosis.  She may have a pathologic rib fracture.  I will order x-ray of the left ribs to assess for potential rib fracture.  If this is negative, I have a low threshold to order a CT scan of the thoracic spine as well as possibly the chest to assess for rib fracture/thoracic spondylosis/thoracic radiculopathy.  There is no history of rashes or none observed on physical exam so the likelihood of shingles is not likely.  I advised her to trial topical diclofenac 1% as well as topical lidocaine 4% as needed for pain.  I will review the x-rays once resulted in discuss treatment options.        Junior Sexton D.O.  Physical Medicine and Rehabilitation          CC: Patients PCP: Kraig Alberto MD  CC: Referring Provider: Padminirefjose Loco

## 2020-09-17 ENCOUNTER — HOSPITAL ENCOUNTER (OUTPATIENT)
Dept: PREADMISSION TESTING | Facility: HOSPITAL | Age: 80
Discharge: HOME | End: 2020-09-17
Attending: INTERNAL MEDICINE
Payer: MEDICARE

## 2020-09-17 DIAGNOSIS — R05.9 COUGH: ICD-10-CM

## 2020-09-17 LAB — SARS-COV-2 RNA RESP QL NAA+PROBE: NOT DETECTED

## 2020-09-17 PROCEDURE — U0003 INFECTIOUS AGENT DETECTION BY NUCLEIC ACID (DNA OR RNA); SEVERE ACUTE RESPIRATORY SYNDROME CORONAVIRUS 2 (SARS-COV-2) (CORONAVIRUS DISEASE [COVID-19]), AMPLIFIED PROBE TECHNIQUE, MAKING USE OF HIGH THROUGHPUT TECHNOLOGIES AS DESCRIBED BY CMS-2020-01-R: HCPCS

## 2020-09-20 ENCOUNTER — PROCEDURE VISIT (OUTPATIENT)
Dept: SLEEP MEDICINE | Facility: HOSPITAL | Age: 80
End: 2020-09-20
Attending: INTERNAL MEDICINE
Payer: MEDICARE

## 2020-09-20 DIAGNOSIS — G47.33 OBSTRUCTIVE SLEEP APNEA SYNDROME: ICD-10-CM

## 2020-09-20 PROCEDURE — 95811 POLYSOM 6/>YRS CPAP 4/> PARM: CPT

## 2020-09-28 ENCOUNTER — HOSPITAL ENCOUNTER (OUTPATIENT)
Dept: RADIOLOGY | Facility: HOSPITAL | Age: 80
Discharge: HOME | End: 2020-09-28
Attending: INTERNAL MEDICINE
Payer: MEDICARE

## 2020-09-28 VITALS — WEIGHT: 227.06 LBS | HEIGHT: 67 IN | BODY MASS INDEX: 35.64 KG/M2

## 2020-09-28 DIAGNOSIS — Z12.31 ENCOUNTER FOR SCREENING MAMMOGRAM FOR BREAST CANCER: ICD-10-CM

## 2020-09-28 PROCEDURE — 77067 SCR MAMMO BI INCL CAD: CPT | Mod: TC,PO

## 2020-09-30 DIAGNOSIS — G47.33 OBSTRUCTIVE SLEEP APNEA SYNDROME: Primary | ICD-10-CM

## 2020-10-20 ENCOUNTER — OFFICE VISIT (OUTPATIENT)
Dept: FAMILY MEDICINE | Facility: CLINIC | Age: 80
End: 2020-10-20
Payer: MEDICARE

## 2020-10-20 VITALS
WEIGHT: 231 LBS | BODY MASS INDEX: 36.26 KG/M2 | TEMPERATURE: 99 F | HEART RATE: 63 BPM | SYSTOLIC BLOOD PRESSURE: 125 MMHG | DIASTOLIC BLOOD PRESSURE: 63 MMHG | HEIGHT: 67 IN

## 2020-10-20 DIAGNOSIS — G44.52 NEW DAILY PERSISTENT HEADACHE: Primary | Chronic | ICD-10-CM

## 2020-10-20 DIAGNOSIS — H53.9 VISUAL DISTURBANCE: ICD-10-CM

## 2020-10-20 DIAGNOSIS — Z23 NEEDS FLU SHOT: ICD-10-CM

## 2020-10-20 DIAGNOSIS — G44.1 OTHER VASCULAR HEADACHE: ICD-10-CM

## 2020-10-20 DIAGNOSIS — M54.50 MIDLINE LOW BACK PAIN WITHOUT SCIATICA, UNSPECIFIED CHRONICITY: ICD-10-CM

## 2020-10-20 PROCEDURE — G0008 FLU VACCINE - QUADRIVALENT - HIGH DOSE (65+) PRESERVATIVE FREE IM: ICD-10-PCS | Mod: S$GLB,,, | Performed by: INTERNAL MEDICINE

## 2020-10-20 PROCEDURE — 3074F SYST BP LT 130 MM HG: CPT | Mod: S$GLB,,, | Performed by: INTERNAL MEDICINE

## 2020-10-20 PROCEDURE — 1159F PR MEDICATION LIST DOCUMENTED IN MEDICAL RECORD: ICD-10-PCS | Mod: S$GLB,,, | Performed by: INTERNAL MEDICINE

## 2020-10-20 PROCEDURE — 90662 IIV NO PRSV INCREASED AG IM: CPT | Mod: S$GLB,,, | Performed by: INTERNAL MEDICINE

## 2020-10-20 PROCEDURE — 99214 PR OFFICE/OUTPT VISIT, EST, LEVL IV, 30-39 MIN: ICD-10-PCS | Mod: 25,S$GLB,, | Performed by: INTERNAL MEDICINE

## 2020-10-20 PROCEDURE — 99214 OFFICE O/P EST MOD 30 MIN: CPT | Mod: 25,S$GLB,, | Performed by: INTERNAL MEDICINE

## 2020-10-20 PROCEDURE — 1159F MED LIST DOCD IN RCRD: CPT | Mod: S$GLB,,, | Performed by: INTERNAL MEDICINE

## 2020-10-20 PROCEDURE — G0008 ADMIN INFLUENZA VIRUS VAC: HCPCS | Mod: S$GLB,,, | Performed by: INTERNAL MEDICINE

## 2020-10-20 PROCEDURE — 1125F PR PAIN SEVERITY QUANTIFIED, PAIN PRESENT: ICD-10-PCS | Mod: S$GLB,,, | Performed by: INTERNAL MEDICINE

## 2020-10-20 PROCEDURE — 1101F PR PT FALLS ASSESS DOC 0-1 FALLS W/OUT INJ PAST YR: ICD-10-PCS | Mod: S$GLB,,, | Performed by: INTERNAL MEDICINE

## 2020-10-20 PROCEDURE — 3078F PR MOST RECENT DIASTOLIC BLOOD PRESSURE < 80 MM HG: ICD-10-PCS | Mod: S$GLB,,, | Performed by: INTERNAL MEDICINE

## 2020-10-20 PROCEDURE — 3074F PR MOST RECENT SYSTOLIC BLOOD PRESSURE < 130 MM HG: ICD-10-PCS | Mod: S$GLB,,, | Performed by: INTERNAL MEDICINE

## 2020-10-20 PROCEDURE — 1125F AMNT PAIN NOTED PAIN PRSNT: CPT | Mod: S$GLB,,, | Performed by: INTERNAL MEDICINE

## 2020-10-20 PROCEDURE — 1101F PT FALLS ASSESS-DOCD LE1/YR: CPT | Mod: S$GLB,,, | Performed by: INTERNAL MEDICINE

## 2020-10-20 PROCEDURE — 90662 FLU VACCINE - QUADRIVALENT - HIGH DOSE (65+) PRESERVATIVE FREE IM: ICD-10-PCS | Mod: S$GLB,,, | Performed by: INTERNAL MEDICINE

## 2020-10-20 PROCEDURE — 3078F DIAST BP <80 MM HG: CPT | Mod: S$GLB,,, | Performed by: INTERNAL MEDICINE

## 2020-10-20 NOTE — PROGRESS NOTES
Subjective:       Patient ID: Jo Alegre is a 80 y.o. female.    Chief Complaint: Headache (head pain top x 4 days ), Back Pain, and Visual disturbance    Jo Alegre is a 80-year-old  female who comes for evaluation and follow-up.  She complains that for the last 4 days or so she is having a new onset of brand new headache.  No history of fall recently.  No history of trauma.  This seems to be bothering her and keeping her on the edge.  She does complain of some visual disturbance on the left eye.    Last year there was some complain either related to fall or some discomfort and she had headache for approximately several weeks.  A CT scan done at that point was unremarkable except for chronic microvascular and ischemic changes consistent with aging.  No space-occupying lesion or midline shift.    Patient at this point does not recall any past headaches and considers this headache to be something brand new and totally different from previous headaches.  She does not recall any significant headaches in past as mentioned above.  She might have had some trouble remembering that she even had a CT scan of the head.  ( I did give her a copy of the CT scan done from last year.)    Recently she went to the bathroom and probably strained her low back.  She complains of a moderate degree of pain in her low back.  Has tried ice and heat but without much relief.    She requests physical therapy for home.  She is not homebound.  While physical therapy might be an option, at this point because of COVID-19 it might be difficult to get a complete home health just for the sake of physical therapy.    Headache   This is a new problem. The current episode started in the past 7 days (4 days ago). The problem occurs constantly. The problem has been unchanged. The pain is located in the vertex region. The pain does not radiate. The pain quality is not similar to prior headaches. The quality of the pain is described  as aching. The pain is moderate. Associated symptoms include back pain, insomnia and scalp tenderness. Pertinent negatives include no abdominal pain, abnormal behavior, coughing, dizziness, eye pain, facial sweating, fever, nausea, seizures, sinus pressure or sore throat. Nothing aggravates the symptoms. She has tried nothing for the symptoms. The treatment provided no relief. Her past medical history is significant for hypertension and obesity. There is no history of cancer, cluster headaches, immunosuppression, migraine headaches, migraines in the family, pseudotumor cerebri, recent head traumas or sinus disease.   Back Pain  This is a new problem. The current episode started 1 to 4 weeks ago. The problem occurs constantly. The problem is unchanged. The pain is present in the lumbar spine. The pain is at a severity of 4/10. The pain is moderate. Associated symptoms include headaches. Pertinent negatives include no abdominal pain, chest pain, dysuria, fever or pelvic pain. Risk factors include obesity, menopause and sedentary lifestyle. She has tried bed rest for the symptoms. The treatment provided no relief.       Past Medical History:   Diagnosis Date    Allergy     Codeine, Lasix    Atrial fibrillation     Atrial fibrillation     Cataract     CHF (congestive heart failure)     Diabetes mellitus, type 2     Ejection fraction < 50% 10/18/2017    Approximately 35%  Based on prior  Echocardiogram.    Encounter for blood transfusion     Hyperlipidemia     Osteoporosis     Thyroid disorder screening 10/17/2017    TSH of 1.12 ordered by Dr. dee Jones     Social History     Socioeconomic History    Marital status:      Spouse name: Not on file    Number of children: 4    Years of education: Not on file    Highest education level: Not on file   Occupational History    Occupation:    Social Needs    Financial resource strain: Not on file    Food insecurity     Worry: Not on file      Inability: Not on file    Transportation needs     Medical: Not on file     Non-medical: Not on file   Tobacco Use    Smoking status: Former Smoker    Smokeless tobacco: Never Used    Tobacco comment: quit 2013   Substance and Sexual Activity    Alcohol use: No    Drug use: No    Sexual activity: Not Currently   Lifestyle    Physical activity     Days per week: Not on file     Minutes per session: Not on file    Stress: Not at all   Relationships    Social connections     Talks on phone: Not on file     Gets together: Not on file     Attends [employer] service: Not on file     Active member of club or organization: Not on file     Attends meetings of clubs or organizations: Not on file     Relationship status: Not on file   Other Topics Concern    Not on file   Social History Narrative    - Drives and lives alone.     Past Surgical History:   Procedure Laterality Date    CHOLECYSTECTOMY  1997    Schroeder     COLONOSCOPY      EYE SURGERY      bilateral cataracts    FRACTURE SURGERY  2014    right femur with neville    HEMORRHOID SURGERY      48 yrs ago    HYSTERECTOMY      REPLACEMENT OF IMPLANTABLE CARDIOVERTER-DEFIBRILLATOR (ICD) GENERATOR N/A 12/13/2019    Procedure: REPLACEMENT, PULSE GENERATOR, ICD-MEDTRONIC;  Surgeon: Sebastian Nowak III, MD;  Location: [facility];  Service: Cardiology;  Laterality: N/A;    TONSILLECTOMY       Family History   Problem Relation Age of Onset    Heart disease Mother     Cancer Father         Lung Cancer ??? Asbestos    Breast cancer Paternal Aunt     Breast cancer Maternal Grandmother        Review of Systems   Constitutional: Positive for activity change (Somewhat more cautious while walking) and fatigue. Negative for appetite change, chills, diaphoresis, fever and unexpected weight change (lost 2 lbs).   HENT: Negative for congestion, postnasal drip, sinus pressure and sore throat.    Eyes: Positive for visual disturbance. Negative for pain and  "discharge.   Respiratory: Negative for cough, chest tightness and shortness of breath.    Cardiovascular: Negative for chest pain, palpitations and leg swelling.        History of defibrillator, congestive cardiomyopathy hypertension and dyslipidemia   Gastrointestinal: Negative for abdominal distention, abdominal pain, anal bleeding, constipation, diarrhea and nausea.   Endocrine: Negative for polydipsia, polyphagia and polyuria.        Diabetes mellitus on glimepiride.   Genitourinary: Negative for difficulty urinating, dysuria, flank pain, frequency and pelvic pain.   Musculoskeletal: Positive for back pain and gait problem. Negative for arthralgias and joint swelling.        Difficulty walking with low back pain radiating to the right thigh and also right lateral thigh pain.   Skin: Negative for color change, pallor, rash and wound.   Allergic/Immunologic: Negative for environmental allergies, food allergies and immunocompromised state.   Neurological: Positive for tremors and headaches. Negative for dizziness, seizures, syncope and light-headedness.        As per patient brand new onset of headaches on the vertex for the last 4 days.     Hematological: Negative for adenopathy. Bruises/bleeds easily.        Patient is on chronic anticoagulation with warfarin.  Recent bruises on the occiput as well as right elbow region following a fall.   Psychiatric/Behavioral: Negative for agitation, confusion and dysphoric mood. The patient has insomnia. The patient is not nervous/anxious.         Patient denies any cognitive issues.         Objective:      Blood pressure 125/63, pulse 63, temperature 99.1 °F (37.3 °C), height 5' 7" (1.702 m), weight 104.8 kg (231 lb). Body mass index is 36.18 kg/m².  Physical Exam  Constitutional:       General: She is not in acute distress.     Appearance: She is well-developed. She is obese. She is ill-appearing. She is not toxic-appearing or diaphoretic.      Comments: Patient is obese " with a BMI of 36.18   HENT:      Head: Normocephalic and atraumatic. No raccoon eyes, right periorbital erythema or left periorbital erythema.      Jaw: No trismus or tenderness.        Comments: Nonspecific discomfort on palpating the vertex but no discomfort on the temporal region .  Eyes:      General: No visual field deficit or scleral icterus.        Right eye: No discharge.         Left eye: No discharge.   Neck:      Musculoskeletal: Normal range of motion and neck supple.      Thyroid: No thyromegaly.      Vascular: No JVD.      Trachea: No tracheal deviation.   Cardiovascular:      Rate and Rhythm: Normal rate and regular rhythm.      Heart sounds: No friction rub. No gallop.    Pulmonary:      Effort: Pulmonary effort is normal. No respiratory distress.      Breath sounds: Normal breath sounds. No stridor. No wheezing or rhonchi.   Abdominal:      General: There is no distension.      Palpations: Abdomen is soft.      Tenderness: There is no abdominal tenderness.   Musculoskeletal:         General: No tenderness or deformity.      Lumbar back: She exhibits no laceration.   Lymphadenopathy:      Cervical: No cervical adenopathy.   Skin:     General: Skin is warm and dry.      Coloration: Skin is not pale.   Neurological:      Mental Status: She is alert. She is not disoriented.      Cranial Nerves: No cranial nerve deficit, dysarthria or facial asymmetry.      Motor: Tremor present. No weakness.      Deep Tendon Reflexes: Reflexes normal.      Comments: Slightly coarse times on the right hand and fingers outstretched.  Not at rest.   Psychiatric:         Mood and Affect: Mood is anxious.         Behavior: Behavior normal.         Thought Content: Thought content normal.      Comments: Somewhat anhedonic and antalgic           Assessment:       1. New daily persistent headache    2. Other vascular headache    3. Visual disturbance    4. Needs flu shot    5. Midline low back pain without sciatica, unspecified  chronicity           Hospital Outpatient Visit on 09/17/2020   Component Date Value Ref Range Status    SARS-CoV2 (COVID-19) Qualitative P* 09/17/2020 Not Detected  Not Detected Final   Lab Visit on 09/10/2020   Component Date Value Ref Range Status    Hepatitis C Ab 09/10/2020 <0.1  0.0 - 0.9 s/co ratio Final         Plan:           New daily persistent headache  Comments:  New onset of headache on the vertex.  Difficult to make out As to the potential seriousness of this headache.  Orders:  -     CT Head Without Contrast; Future; Expected date: 10/20/2020  -     CBC auto differential; Future; Expected date: 10/20/2020  -     Sedimentation rate; Future; Expected date: 10/20/2020  -     Comprehensive Metabolic Panel; Future; Expected date: 10/20/2020    Other vascular headache  Comments:  Some blurring of vision on the left side.  Orders:  -     CT Head Without Contrast; Future; Expected date: 10/20/2020    Visual disturbance  Comments:  Patient complains of some visual disturbance on the left side.  History of cataract surgery.  No recent injury.  Headaches positive.    Needs flu shot  -     Influenza - Quadrivalent - High Dose (65+) (PF) (IM)    Midline low back pain without sciatica, unspecified chronicity      This is a new onset of headache on the vertex.  She has some scalp tenderness and visual disturbance on the left side.  She should see her ophthalmologist.  I will get a CT scan of head.  Difficult to make out if this is a significant or series headache or symptom or something which will pass away as has been multiple of her complains in past.    Visual loss with this headache is somewhat bothersome now especially for conditions like giant cell arteritis or temporal arteritis.    I am not sure if hypoxia contributes to certain degree of headaches hopefully she is pursuing COVID-19 precautions.    She did not recall last year CT scan or persistent headaches but did recall that she fell down which might  have caused the headaches.  Is unclear if she recently fell down or not again cognitive issues are not very astute.    She also has a subacute onset of low back pain possibly secondary to strain.  She 1st will deal with the headache and then decide about the back pain.  I think physical therapy might be helpful for her.  She will keep her regular follow-up.  Today she got the flu shot.    I will also run some labs for sed rate, CMP and CBC to be sure that we are not missing anything else.      Keep regular appointment as scheduled.    Spent aleks 25 minutes with patient which involved review of pts medical conditions, labs, medications and with 50% of time face-to-face discussion about medical problems, management and any applicable changes.      Current Outpatient Medications:     amiodarone (PACERONE) 200 MG Tab, Take by mouth once daily., Disp: , Rfl:     atorvastatin (LIPITOR) 40 MG tablet, Take 40 mg by mouth once daily., Disp: , Rfl:     Ca-D3-mag ox-zinc--thom-bor (CALCIUM 600-D3 PLUS) 600 mg calcium- 800 unit-50 mg Tab, Take 1 tablet by mouth 2 (two) times daily., Disp: , Rfl:     cholecalciferol, vitamin D3, (VITAMIN D3) 5,000 unit Tab, Take 5,000 Units by mouth 2 (two) times daily., Disp: , Rfl:     digoxin (LANOXIN) 125 mcg tablet, Take 125 mcg by mouth once daily., Disp: , Rfl:     furosemide (LASIX) 20 MG tablet, Take 20 mg by mouth 2 (two) times daily., Disp: , Rfl:     gabapentin (NEURONTIN) 100 MG capsule, Take 2 capsules (200 mg total) by mouth 3 (three) times daily., Disp: 180 capsule, Rfl: 1    glimepiride (AMARYL) 1 MG tablet, Take 1 tablet (1 mg total) by mouth before breakfast., Disp: 90 tablet, Rfl: 1    midodrine (PROAMATINE) 2.5 MG Tab, Take 2.5 mg by mouth 3 (three) times daily., Disp: , Rfl:     sacubitril-valsartan (ENTRESTO) 24-26 mg per tablet, Take 1 tablet by mouth once daily. , Disp: , Rfl:     torsemide (DEMADEX) 20 MG Tab, Take 20 mg by mouth once daily., Disp: , Rfl:      warfarin (COUMADIN) 5 MG tablet, Take 5 mg by mouth once daily., Disp: , Rfl:     denosumab (PROLIA) 60 mg/mL Syrg, Inject 1 mL (60 mg total) into the skin every 6 (six) months., Disp: 2 mL, Rfl: 1  No current facility-administered medications for this visit.     Facility-Administered Medications Ordered in Other Visits:     0.9%  NaCl infusion, , Intravenous, Continuous, Sebastian Nowak III, MD, Last Rate: 75 mL/hr at 12/13/19 0728    diphenhydrAMINE injection 25 mg, 25 mg, Intravenous, Once, Sebastian Nowak III, MD    lorazepam injection 1 mg, 1 mg, Intravenous, Once, Sebastian Nowak III, MD

## 2020-10-20 NOTE — PATIENT INSTRUCTIONS
Relieving Back Pain  Back pain is a common problem. You can strain back muscles by lifting too much weight or just by moving the wrong way. Back strain can be uncomfortable, even painful. And it can take weeks or months to improve. To help yourself feel better and prevent future back strains, try these tips.  Important Note: Do not give aspirin to children or teens without first discussing it with your healthcare provider.      ? Ice    Ice reduces muscle pain and swelling. It helps most during the first 24 to 48 hours after an injury.  · Wrap an ice pack or a bag of frozen peas in a thin towel. (Never place ice directly on your skin.)  · Place the ice where your back hurts the most.  · Dont ice for more than 20 minutes at a time.  · You can use ice several times a day.  ? Medicines  Over-the-counter pain relievers can include acetaminophen and anti-inflammatory medicines, which includes aspirin or ibuprofen. They can help ease discomfort. Some also reduce swelling.  · Tell your healthcare provider about any medicines you are already taking.  · Take medicines only as directed.  ? Heat  After the first 48 hours, heat can relax sore muscles and improve blood flow.  · Try a warm bath or shower. Or use a heating pad set on low. To prevent a burn, keep a cloth between you and the heating pad.  · Dont use a heating pad for more than 15 minutes at a time. Never sleep on a heating pad.  Date Last Reviewed: 9/1/2015  © 4185-8786 "[employer]. [address] [place] [address], [place] [address]. All rights reserved. This information is not intended as a substitute for professional medical care. Always follow your healthcare professional's instructions.        Relieving Back Pain  Back pain is a common problem. You can strain back muscles by lifting too much weight or just by moving the wrong way. Back strain can be uncomfortable, even painful. And it can take weeks or months to improve. To help yourself feel better and  prevent future back strains, try these tips.  Important Note: Do not give aspirin to children or teens without first discussing it with your healthcare provider.      ? Ice    Ice reduces muscle pain and swelling. It helps most during the first 24 to 48 hours after an injury.  · Wrap an ice pack or a bag of frozen peas in a thin towel. (Never place ice directly on your skin.)  · Place the ice where your back hurts the most.  · Dont ice for more than 20 minutes at a time.  · You can use ice several times a day.  ? Medicines  Over-the-counter pain relievers can include acetaminophen and anti-inflammatory medicines, which includes aspirin or ibuprofen. They can help ease discomfort. Some also reduce swelling.  · Tell your healthcare provider about any medicines you are already taking.  · Take medicines only as directed.  ? Heat  After the first 48 hours, heat can relax sore muscles and improve blood flow.  · Try a warm bath or shower. Or use a heating pad set on low. To prevent a burn, keep a cloth between you and the heating pad.  · Dont use a heating pad for more than 15 minutes at a time. Never sleep on a heating pad.  Date Last Reviewed: 9/1/2015  © 5285-6995 [employer]. [address] [place] [address], [place] [address]. All rights reserved. This information is not intended as a substitute for professional medical care. Always follow your healthcare professional's instructions.

## 2020-10-20 NOTE — LETTER
October 20, 2020        Radha Mcgee MD  [address]  [address]  [facility]  [place] [address]             [facility]  [address] [place], [place] [address] [address]  Phone: [phone]  Fax: [phone]   Patient: Jo Alegre   MR Number: [id number]   YOB: 1940   Date of Visit: 10/20/2020     Dear Dr. Mcgee,     Kindly given early appointment to the patient Ms Alegre for complains of new visual disturbance in the left eye.  She also complains of headache and I am pursuing by doing a CT scan and labs including CBC, sedimentation rate and CMP.    Sincerely,      Kraig Alberto MD            CC  No Recipients    Enclosure

## 2020-10-22 ENCOUNTER — HOSPITAL ENCOUNTER (OUTPATIENT)
Dept: RADIOLOGY | Facility: HOSPITAL | Age: 80
Discharge: HOME | End: 2020-10-22
Attending: INTERNAL MEDICINE
Payer: MEDICARE

## 2020-10-22 DIAGNOSIS — G44.52 NEW DAILY PERSISTENT HEADACHE: Chronic | ICD-10-CM

## 2020-10-22 DIAGNOSIS — G44.1 OTHER VASCULAR HEADACHE: ICD-10-CM

## 2020-10-22 PROCEDURE — 70450 CT HEAD/BRAIN W/O DYE: CPT | Mod: TC,PO

## 2020-10-23 ENCOUNTER — TELEPHONE (OUTPATIENT)
Dept: FAMILY MEDICINE | Facility: CLINIC | Age: 80
End: 2020-10-23

## 2020-10-23 NOTE — TELEPHONE ENCOUNTER
----- Message from Kraig Alberto MD sent at 10/23/2020  5:28 AM CDT -----  Notify patient that her CT scan was again normal.  No easy explanation for her new headache.  She had a similar headache last year also.  If headaches persist probably I will get a neurology evaluation.  However I am also waiting for the blood test before we make a referral.  The blood test were sent to [facility] and they have not been done.  They are also important.  They include chemistry, blood count and sed rate.

## 2020-10-23 NOTE — PROGRESS NOTES
Notify patient that her CT scan was again normal.  No easy explanation for her new headache.  She had a similar headache last year also.  If headaches persist probably I will get a neurology evaluation.  However I am also waiting for the blood test before we make a referral.  The blood test were sent to [facility] and they have not been done.  They are also important.  They include chemistry, blood count and sed rate.

## 2020-10-26 ENCOUNTER — TELEPHONE (OUTPATIENT)
Dept: FAMILY MEDICINE | Facility: CLINIC | Age: 80
End: 2020-10-26

## 2020-10-26 NOTE — TELEPHONE ENCOUNTER
----- Message from Kraig Alberto MD sent at 10/23/2020  5:28 AM CDT -----  Notify patient that her CT scan was again normal.  No easy explanation for her new headache.  She had a similar headache last year also.  If headaches persist probably I will get a neurology evaluation.  However I am also waiting for the blood test before we make a referral.  The blood test were sent to [facility] and they have not been done.  They are also important.  They include chemistry, blood count and sed rate.

## 2020-10-28 LAB
ALBUMIN SERPL-MCNC: 3.5 G/DL (ref 3.6–5.1)
ALBUMIN/GLOB SERPL: 1.3 (CALC) (ref 1–2.5)
ALP SERPL-CCNC: 133 U/L (ref 37–153)
ALT SERPL-CCNC: 16 U/L (ref 6–29)
AST SERPL-CCNC: 17 U/L (ref 10–35)
BASOPHILS # BLD AUTO: 28 CELLS/UL (ref 0–200)
BASOPHILS NFR BLD AUTO: 0.7 %
BILIRUB SERPL-MCNC: 0.5 MG/DL (ref 0.2–1.2)
BUN SERPL-MCNC: 27 MG/DL (ref 7–25)
BUN/CREAT SERPL: 18 (CALC) (ref 6–22)
CALCIUM SERPL-MCNC: 9.5 MG/DL (ref 8.6–10.4)
CHLORIDE SERPL-SCNC: 103 MMOL/L (ref 98–110)
CO2 SERPL-SCNC: 33 MMOL/L (ref 20–32)
CREAT SERPL-MCNC: 1.47 MG/DL (ref 0.6–0.93)
EOSINOPHIL # BLD AUTO: 100 CELLS/UL (ref 15–500)
EOSINOPHIL NFR BLD AUTO: 2.5 %
ERYTHROCYTE [DISTWIDTH] IN BLOOD BY AUTOMATED COUNT: 13 % (ref 11–15)
ERYTHROCYTE [SEDIMENTATION RATE] IN BLOOD BY WESTERGREN METHOD: 17 MM/H
GFRSERPLBLD MDRD-ARVRAT: 34 ML/MIN/1.73M2
GLOBULIN SER CALC-MCNC: 2.6 G/DL (CALC) (ref 1.9–3.7)
GLUCOSE SERPL-MCNC: 216 MG/DL (ref 65–99)
HCT VFR BLD AUTO: 39.4 % (ref 35–45)
HGB BLD-MCNC: 12.6 G/DL (ref 11.7–15.5)
LYMPHOCYTES # BLD AUTO: 852 CELLS/UL (ref 850–3900)
LYMPHOCYTES NFR BLD AUTO: 21.3 %
MCH RBC QN AUTO: 31.2 PG (ref 27–33)
MCHC RBC AUTO-ENTMCNC: 32 G/DL (ref 32–36)
MCV RBC AUTO: 97.5 FL (ref 80–100)
MONOCYTES # BLD AUTO: 356 CELLS/UL (ref 200–950)
MONOCYTES NFR BLD AUTO: 8.9 %
NEUTROPHILS # BLD AUTO: 2664 CELLS/UL (ref 1500–7800)
NEUTROPHILS NFR BLD AUTO: 66.6 %
PLATELET # BLD AUTO: 236 THOUSAND/UL (ref 140–400)
PMV BLD REES-ECKER: 11.1 FL (ref 7.5–12.5)
POTASSIUM SERPL-SCNC: 4.9 MMOL/L (ref 3.5–5.3)
PROT SERPL-MCNC: 6.1 G/DL (ref 6.1–8.1)
RBC # BLD AUTO: 4.04 MILLION/UL (ref 3.8–5.1)
SODIUM SERPL-SCNC: 143 MMOL/L (ref 135–146)
WBC # BLD AUTO: 4 THOUSAND/UL (ref 3.8–10.8)

## 2020-12-03 DIAGNOSIS — M54.6 ACUTE RIGHT-SIDED THORACIC BACK PAIN: ICD-10-CM

## 2020-12-03 DIAGNOSIS — G57.93 NEUROPATHY OF BOTH FEET: ICD-10-CM

## 2020-12-03 RX ORDER — GABAPENTIN 100 MG/1
200 CAPSULE ORAL 3 TIMES DAILY
Qty: 180 CAPSULE | Refills: 1 | Status: SHIPPED | OUTPATIENT
Start: 2020-12-03 | End: 2021-10-04 | Stop reason: SDUPTHER

## 2020-12-07 ENCOUNTER — TELEPHONE (OUTPATIENT)
Dept: FAMILY MEDICINE | Facility: CLINIC | Age: 80
End: 2020-12-07

## 2020-12-07 NOTE — TELEPHONE ENCOUNTER
Pt. called & said she is still having back pain. She said she did an x-ray but wanted to know if it showed anything.

## 2020-12-08 ENCOUNTER — OFFICE VISIT (OUTPATIENT)
Dept: FAMILY MEDICINE | Facility: CLINIC | Age: 80
End: 2020-12-08
Payer: MEDICARE

## 2020-12-08 VITALS
HEART RATE: 59 BPM | TEMPERATURE: 97 F | DIASTOLIC BLOOD PRESSURE: 73 MMHG | HEIGHT: 67 IN | BODY MASS INDEX: 35.47 KG/M2 | SYSTOLIC BLOOD PRESSURE: 136 MMHG | WEIGHT: 226 LBS

## 2020-12-08 DIAGNOSIS — M54.50 ACUTE MIDLINE LOW BACK PAIN WITHOUT SCIATICA: Primary | ICD-10-CM

## 2020-12-08 DIAGNOSIS — I48.0 PAROXYSMAL ATRIAL FIBRILLATION: Chronic | ICD-10-CM

## 2020-12-08 DIAGNOSIS — E11.21 TYPE 2 DIABETES MELLITUS WITH DIABETIC NEPHROPATHY, WITHOUT LONG-TERM CURRENT USE OF INSULIN: ICD-10-CM

## 2020-12-08 DIAGNOSIS — Z79.01 CHRONIC ANTICOAGULATION: Chronic | ICD-10-CM

## 2020-12-08 DIAGNOSIS — I50.42 CHRONIC COMBINED SYSTOLIC AND DIASTOLIC CONGESTIVE HEART FAILURE: Chronic | ICD-10-CM

## 2020-12-08 DIAGNOSIS — I10 ESSENTIAL HYPERTENSION: ICD-10-CM

## 2020-12-08 PROCEDURE — 3288F PR FALLS RISK ASSESSMENT DOCUMENTED: ICD-10-PCS | Mod: S$GLB,,, | Performed by: INTERNAL MEDICINE

## 2020-12-08 PROCEDURE — 99214 OFFICE O/P EST MOD 30 MIN: CPT | Mod: S$GLB,,, | Performed by: INTERNAL MEDICINE

## 2020-12-08 PROCEDURE — 3075F SYST BP GE 130 - 139MM HG: CPT | Mod: S$GLB,,, | Performed by: INTERNAL MEDICINE

## 2020-12-08 PROCEDURE — 3075F PR MOST RECENT SYSTOLIC BLOOD PRESS GE 130-139MM HG: ICD-10-PCS | Mod: S$GLB,,, | Performed by: INTERNAL MEDICINE

## 2020-12-08 PROCEDURE — 99214 PR OFFICE/OUTPT VISIT, EST, LEVL IV, 30-39 MIN: ICD-10-PCS | Mod: S$GLB,,, | Performed by: INTERNAL MEDICINE

## 2020-12-08 PROCEDURE — 3078F DIAST BP <80 MM HG: CPT | Mod: S$GLB,,, | Performed by: INTERNAL MEDICINE

## 2020-12-08 PROCEDURE — 3078F PR MOST RECENT DIASTOLIC BLOOD PRESSURE < 80 MM HG: ICD-10-PCS | Mod: S$GLB,,, | Performed by: INTERNAL MEDICINE

## 2020-12-08 PROCEDURE — 1101F PR PT FALLS ASSESS DOC 0-1 FALLS W/OUT INJ PAST YR: ICD-10-PCS | Mod: S$GLB,,, | Performed by: INTERNAL MEDICINE

## 2020-12-08 PROCEDURE — 1125F AMNT PAIN NOTED PAIN PRSNT: CPT | Mod: S$GLB,,, | Performed by: INTERNAL MEDICINE

## 2020-12-08 PROCEDURE — 3288F FALL RISK ASSESSMENT DOCD: CPT | Mod: S$GLB,,, | Performed by: INTERNAL MEDICINE

## 2020-12-08 PROCEDURE — 1101F PT FALLS ASSESS-DOCD LE1/YR: CPT | Mod: S$GLB,,, | Performed by: INTERNAL MEDICINE

## 2020-12-08 PROCEDURE — 1125F PR PAIN SEVERITY QUANTIFIED, PAIN PRESENT: ICD-10-PCS | Mod: S$GLB,,, | Performed by: INTERNAL MEDICINE

## 2020-12-08 PROCEDURE — 1159F MED LIST DOCD IN RCRD: CPT | Mod: S$GLB,,, | Performed by: INTERNAL MEDICINE

## 2020-12-08 PROCEDURE — 1159F PR MEDICATION LIST DOCUMENTED IN MEDICAL RECORD: ICD-10-PCS | Mod: S$GLB,,, | Performed by: INTERNAL MEDICINE

## 2020-12-08 NOTE — PATIENT INSTRUCTIONS
Diabetes: Activity Tips    Being more active can help you manage your diabetes. The tips on this sheet can help you get the most from your exercise. They can also help you stay safe.  Staying Active  Its important for adults to spend less time sitting and being inactive. This is especially true if you have type 2 diabetes. When you are sitting for long periods of time, get up for short sessions of light activity every 30 minutes.  You should aim for at least 150 minutes a week of exercise or physical activity. Dont let more than 2 days go by without being active.  Benefit from briskness  Brisk activity gets your heart beating faster. This can help you increase your fitness, lose extra weight, and manage your blood sugar level. Try brisk walking. Or, if you have foot or leg problems, you can try swimming or bike riding. You can break up your exercise into chunks throughout the day. Work up to at least 30 minutes of steady, brisk exercise on most days.  Warm up and cool down  Warming up and cooling down reduce your risk of injury. They also help limit muscle soreness. Do a mild version of your activity for 5 minutes before and after your routine. You can also learn stretches that will help keep your muscles loose. Your healthcare provider may show you good ways to warm up and stretch.  Do the talk-sing test  The talk-sing test is a simple way to tell how hard youre exercising. If you can talk while exercising, youre in a safe range. If youre out of breath, slow down. If you can carry a tune, its time to  the pace. Walk up a hill. Increase the resistance on your stationary bike. Or swim faster.  What about eating?  You may be told to plan your exercise for 1 to 2 hours after a meal. In most cases, you dont need to eat while being active. If you take insulin or medicine that can cause low blood sugar, test your blood sugar before exercising. And carry a fast-acting sugar that will raise your blood sugar  level quickly. This includes glucose tablets or hard candy. Use it if you feel low blood sugar symptoms.  Safety tips  These tips can help you stay safe as you become fit:  · Exercise with a friend or carry a cell phone if you have one.  · Carry or wear identification, such as a necklace or bracelet, that says you have diabetes.  · Use the proper footwear and safety equipment for your activity.  · Drink water before, during, and after exercise.  · Dress properly for the weather.  · Dont exercise in very hot or very cold weather.  · Dont exercise if you are sick.  · If you are instructed to do so, test your blood sugar before and after you exercise. Have a small carbohydrate snack if your blood sugar is low before you start exercising.   When to stop exercising and call your healthcare provider  Stop exercising and call your healthcare provider right away if you notice any of the following:  · Pain, pressure, tightness, or heaviness in the chest  · Pain or heaviness in the neck, shoulders, back, arms, legs, or feet  · Unusual shortness of breath  · Dizziness or lightheadedness  · Unusually rapid or slow pulse  · Increased joint or muscle pain  · Nausea or vomiting  Date Last Reviewed: 5/1/2016  © 7592-3623 [employer]. [address] [place] [address], [place] [address]. All rights reserved. This information is not intended as a substitute for professional medical care. Always follow your healthcare professional's instructions.        Exercises to Strengthen Your Lower Back  Strong lower back and abdominal muscles work together to support your spine. The exercises below will help strengthen the lower back. It is important that you begin exercising slowly and increase levels gradually.  Always begin any exercise program with stretching. If you feel pain while doing any of these exercises, stop and talk to your doctor about a more specific exercise program that better suits your condition.   Low back stretch  The  point of stretching is to make you more flexible and increase your range of motion. Stretch only as much as you are able. Stretch slowly. Do not push your stretch to the limit. If at any point you feel pain while stretching, this is your (temporary) limit.  · Lie on your back with your knees bent and both feet on the ground.  · Slowly raise your left knee to your chest as you flatten your lower back against the floor. Hold for 5 seconds.  · Relax and repeat the exercise with your right knee.  · Do 10 of these exercises for each leg.  · Repeat hugging both knees to your chest at the same time.  Building lower back strength  Start your exercise routine with 10 to 30 minutes a day, 1 to 3 times a day.  Initial exercises  Lying on your back:  1. Ankle pumps: Move your foot up and down, towards your head, and then away. Repeat 10 times with each foot.  2. Heel slides: Slowly bend your knee, drawing the heel of your foot towards you. Then slide your heel/foot from you, straightening your knee. Do not lift your foot off the floor (this is not a leg lift).  3. Abdominal contraction: Bend your knees and put your hands on your stomach. Tighten your stomach muscles. Hold for 5 seconds, then relax. Repeat 10 times.  4. Straight leg raise: Bend one leg at the knee and keep the other leg straight. Tighten your stomach muscles. Slowly lift your straight leg 6 to 12 inches off the floor and hold for up to 5 seconds. Repeat 10 times on each side.  Standin. Wall squats: Stand with your back against the wall. Move your feet about 12 inches away from the wall. Tighten your stomach muscles, and slowly bend your knees until they are at about a 45 degree angle. Do not go down too far. Hold about 5 seconds. Then slowly return to your starting position. Repeat 10 times.  2. Heel raises: Stand facing the wall. Slowly raise the heels of your feet up and down, while keeping your toes on the floor. If you have trouble balancing, you can  touch the wall with your hands. Repeat 10 times.  More advanced exercises  When you feel comfortable enough, try these exercises.  1. Kneeling lumbar extension: Begin on your hands and knees. At the same time, raise and straighten your right arm and left leg until they are parallel to the ground. Hold for 2 seconds and come back slowly to a starting position. Repeat with left arm and right leg, alternating 10 times.  2. Prone lumbar extension: Lie face down, arms extended overhead, palms on the floor. At the same time, raise your right arm and left leg as high as comfortably possible. Hold for 10 seconds and slowly return to start. Repeat with left arm and right leg, alternating 10 times. Gradually build up to 20 times. (Advanced: Repeat this exercise raising both arms and both legs a few inches off the floor at the same time. Hold for 5 seconds and release.)  3. Pelvic tilt: Lie on the floor on your back with your knees bent at 90 degrees. Your feet should be flat on the floor. Inhale, exhale, then slowly contract your abdominal muscles bringing your navel toward your spine. Let your pelvis rock back until your lower back is flat on the floor. Hold for 10 seconds while breathing smoothly.  4. Abdominal crunch: Perform a pelvic tilt (above) flattening your lower back against the floor. Holding the tension in your abdominal muscles, take another breath and raise your shoulder blades off the ground (this is not a full sit-up). Keep your head in line with your body (dont bend your neck forward). Hold for 2 seconds, then slowly lower.  Date Last Reviewed: 6/1/2016  © 9939-3292 [employer]. [address] [place] [address], [place] [address]. All rights reserved. This information is not intended as a substitute for professional medical care. Always follow your healthcare professional's instructions.        Back Safety: Poor Posture Hurts  An unhealthy spine often starts with bad habits. Poor movement patterns and  posture problems are common causes of back pain. Disk, bone, nerve, and soft tissue problems can all be affected by poor posture. They can lead to pain, stiffness, and other symptoms.    Poor posture backfires  Poor posture can cause pain. Too much slouching puts increased pressure on the disks. An excessive lumbar curve can overload and inflame the vertebrae. As a result, the back muscles may tighten or spasm to splint and protect the spine. This adds to the pain you feel.    Proper posture: The key to safe movement  Your spine bears your weight throughout the day. This is true whether youre sleeping, standing, or bending. Certain positions strain your spine more than others. But by maintaining proper posture in all positions, you can reduce the stress on your spine.       To improve your standing posture, follow these steps:  · Breathe deeply.  · Relax your shoulders, hips, and ankles. · Think of the ears, shoulders, hips, and ankles as a series of dots. Now, adjust your body to connect the dots in a straight line.  · Tuck your buttocks in just a bit if you need to.      Date Last Reviewed: 10/28/2015  © 0501-6027 [employer]. [address] [place] [address], [place] [address]. All rights reserved. This information is not intended as a substitute for professional medical care. Always follow your healthcare professional's instructions.

## 2020-12-08 NOTE — PROGRESS NOTES
Subjective:       Patient ID: Jo Alegre is a 80 y.o. female.    Chief Complaint: Hypertension, Hyperlipidemia, Back Pain (xray spine review ), Diabetes, and Atrial Fibrillation    Jo Alegre is a 80-year-old  female who comes for evaluation and follow-up.      Today she complains of a relatively new onset of low back pain for 1-1/2 month.      Prior to that she had thoracic spine pain and we had done a spinal x-ray which had shown multiple  compression fractures at T6, T7, T8 and T12 level.  That pain seems to have miraculously resolved.    Last time she had complained of a severe headache which was very bothersome.  That headache also has resolved.    She does get constipated intermittently and wonders whether this strain of moving her bowel movement could cause any back pain.  Otherwise short of that, she does not recall suddenly falling on her back.  The pain does not shoot to the back.  She does have osteoporosis and used to be on Prolia in past.    Underlying medical issues of hypertension, dyslipidemia, heart failure have been noted.  She is currently on atorvastatin, amiodarone, digoxin, furosemide, midodrine, sacubitril/valsartan and anticoagulation with warfarin.            Hypertension  This is a chronic problem. The current episode started more than 1 year ago. The problem is controlled. Associated symptoms include anxiety and malaise/fatigue. Pertinent negatives include no chest pain, headaches, palpitations or shortness of breath. Risk factors for coronary artery disease include post-menopausal state, obesity, sedentary lifestyle and dyslipidemia. Past treatments include diuretics and angiotensin blockers (Entresto). The current treatment provides moderate improvement. Compliance problems include psychosocial issues.    Hyperlipidemia  This is a chronic problem. The current episode started more than 1 year ago. The problem is controlled. Pertinent negatives include no chest pain  or shortness of breath. Current antihyperlipidemic treatment includes statins. The current treatment provides moderate improvement of lipids. Compliance problems include psychosocial issues.  Risk factors for coronary artery disease include a sedentary lifestyle, hypertension, dyslipidemia and obesity.   Back Pain  This is a new problem. The current episode started more than 1 month ago. The problem occurs constantly. The problem is unchanged. The pain is present in the lumbar spine. The pain is at a severity of 7/10. The pain is moderate. Pertinent negatives include no chest pain, dysuria, headaches or pelvic pain. Risk factors include obesity, menopause and sedentary lifestyle. She has tried bed rest for the symptoms. The treatment provided no relief.       Past Medical History:   Diagnosis Date    Allergy     Codeine, Lasix    Atrial fibrillation     Atrial fibrillation     Cataract     CHF (congestive heart failure)     Diabetes mellitus, type 2     Ejection fraction < 50% 10/18/2017    Approximately 35%  Based on prior  Echocardiogram.    Encounter for blood transfusion     Hyperlipidemia     Osteoporosis     Thyroid disorder screening 10/17/2017    TSH of 1.12 ordered by Dr. dee Jones     Social History     Socioeconomic History    Marital status:      Spouse name: Not on file    Number of children: 4    Years of education: Not on file    Highest education level: Not on file   Occupational History    Occupation:    Social Needs    Financial resource strain: Not on file    Food insecurity     Worry: Not on file     Inability: Not on file    Transportation needs     Medical: Not on file     Non-medical: Not on file   Tobacco Use    Smoking status: Former Smoker    Smokeless tobacco: Never Used    Tobacco comment: quit 2013   Substance and Sexual Activity    Alcohol use: No    Drug use: No    Sexual activity: Not Currently   Lifestyle    Physical activity     Days per  week: Not on file     Minutes per session: Not on file    Stress: Not at all   Relationships    Social connections     Talks on phone: Not on file     Gets together: Not on file     Attends [employer] service: Not on file     Active member of club or organization: Not on file     Attends meetings of clubs or organizations: Not on file     Relationship status: Not on file   Other Topics Concern    Not on file   Social History Narrative    - Drives and lives alone.     Past Surgical History:   Procedure Laterality Date    CHOLE[place]STECTOMY  1997    [place]     COLONOSCOPY      EYE SURGERY      bilateral cataracts    FRACTURE SURGERY  2014    right femur with neville    HEMORRHOID SURGERY      48 yrs ago    HYSTERECTOMY      REPLACEMENT OF IMPLANTABLE CARDIOVERTER-DEFIBRILLATOR (ICD) GENERATOR N/A 12/13/2019    Procedure: REPLACEMENT, PULSE GENERATOR, ICD-MEDTRONIC;  Surgeon: Sebastian Nowak III, MD;  Location: [facility];  Service: Cardiology;  Laterality: N/A;    TONSILLECTOMY       Family History   Problem Relation Age of Onset    Heart disease Mother     Cancer Father         Lung Cancer ??? Asbestos    Breast cancer Paternal Aunt     Breast cancer Maternal Grandmother        Review of Systems   Constitutional: Positive for activity change (Somewhat more cautious while walking), fatigue and malaise/fatigue. Negative for appetite change, chills, diaphoresis and unexpected weight change (Patient has lost 5 lb weight.).   HENT: Negative for congestion and postnasal drip.    Eyes: Positive for visual disturbance. Negative for discharge.   Respiratory: Negative for chest tightness and shortness of breath.    Cardiovascular: Negative for chest pain, palpitations and leg swelling.        History of defibrillator, congestive cardiomyopathy hypertension and dyslipidemia   Gastrointestinal: Negative for abdominal distention, anal bleeding, constipation and diarrhea.   Endocrine: Negative for polydipsia,  "polyphagia and polyuria.        Diabetes mellitus on glimepiride.   Genitourinary: Negative for difficulty urinating, dysuria, flank pain, frequency and pelvic pain.   Musculoskeletal: Positive for back pain and gait problem (Slow and antalgic gait). Negative for arthralgias and joint swelling.        New onset of low back pain.  (Not sure if this is really new onset of back pain or she had had similar back pains in past) no radiation this time.   Skin: Negative for color change, pallor, rash and wound.   Allergic/Immunologic: Negative for environmental allergies, food allergies and immunocompromised state.   Neurological: Positive for tremors. Negative for seizures, syncope, facial asymmetry, speech difficulty, light-headedness and headaches.        Last presentation of headache is certainly dissipated away.   Hematological: Negative for adenopathy. Bruises/bleeds easily.        Patient is on chronic anticoagulation with warfarin.     Psychiatric/Behavioral: Negative for agitation, confusion and dysphoric mood. The patient is not nervous/anxious.         Patient denies any cognitive issues.         Objective:      Blood pressure 136/73, pulse (!) 59, temperature 96.8 °F (36 °C), height 5' 7" (1.702 m), weight 102.5 kg (226 lb). Body mass index is 35.4 kg/m².  Physical Exam  Constitutional:       General: She is not in acute distress.     Appearance: She is well-developed. She is obese. She is ill-appearing. She is not toxic-appearing or diaphoretic.      Comments: Patient is obese with a BMI of 35.40   HENT:      Head: Normocephalic and atraumatic.      Comments: .  Eyes:      General: No visual field deficit or scleral icterus.        Right eye: No discharge.         Left eye: No discharge.   Neck:      Musculoskeletal: Normal range of motion and neck supple.      Thyroid: No thyromegaly.      Vascular: No JVD.      Trachea: No tracheal deviation.   Cardiovascular:      Rate and Rhythm: Normal rate and regular " rhythm.      Heart sounds: No friction rub. No gallop.    Pulmonary:      Effort: Pulmonary effort is normal. No respiratory distress.      Breath sounds: Normal breath sounds. No stridor. No wheezing or rhonchi.   Abdominal:      General: There is no distension.      Palpations: Abdomen is soft.      Tenderness: There is no abdominal tenderness.   Musculoskeletal:         General: No tenderness or deformity.        Back:    Lymphadenopathy:      Cervical: No cervical adenopathy.   Skin:     General: Skin is warm and dry.      Coloration: Skin is not pale.   Neurological:      Mental Status: She is alert. She is not disoriented.      Cranial Nerves: No cranial nerve deficit, dysarthria or facial asymmetry.      Motor: Tremor present. No weakness.      Deep Tendon Reflexes: Reflexes normal.      Comments: Slightly coarse times on the right hand and fingers outstretched.  Not at rest.   Psychiatric:         Mood and Affect: Mood is anxious.         Behavior: Behavior normal.         Thought Content: Thought content normal.      Comments: Somewhat anhedonic and antalgic           Assessment:       1. Acute midline low back pain without sciatica    2. Chronic combined systolic and diastolic congestive heart failure    3. Paroxysmal atrial fibrillation    4. Essential hypertension    5. Chronic anticoagulation    6. Type 2 diabetes mellitus with diabetic nephropathy, without long-term current use of insulin           Office Visit on 10/20/2020   Component Date Value Ref Range Status    WBC 10/27/2020 4.0  3.8 - 10.8 Thousand/uL Final    RBC 10/27/2020 4.04  3.80 - 5.10 Million/uL Final    Hemoglobin 10/27/2020 12.6  11.7 - 15.5 g/dL Final    Hematocrit 10/27/2020 39.4  35.0 - 45.0 % Final    MCV 10/27/2020 97.5  80.0 - 100.0 fL Final    MCH 10/27/2020 31.2  27.0 - 33.0 pg Final    MCHC 10/27/2020 32.0  32.0 - 36.0 g/dL Final    RDW 10/27/2020 13.0  11.0 - 15.0 % Final    Platelets 10/27/2020 236  140 - 400  Thousand/uL Final    MPV 10/27/2020 11.1  7.5 - 12.5 fL Final    Neutrophils Absolute 10/27/2020 2,664  1,500 - 7,800 cells/uL Final    Lymph # 10/27/2020 852  850 - 3,900 cells/uL Final    Mono # 10/27/2020 356  200 - 950 cells/uL Final    Eos # 10/27/2020 100  15 - 500 cells/uL Final    Baso # 10/27/2020 28  0 - 200 cells/uL Final    Neutrophils Relative 10/27/2020 66.6  % Final    Lymph % 10/27/2020 21.3  % Final    Mono % 10/27/2020 8.9  % Final    Eosinophil % 10/27/2020 2.5  % Final    Basophil % 10/27/2020 0.7  % Final    Glucose 10/27/2020 216* 65 - 99 mg/dL Final    BUN 10/27/2020 27* 7 - 25 mg/dL Final    Creatinine 10/27/2020 1.47* 0.60 - 0.93 mg/dL Final    eGFR if non African American 10/27/2020 34* > OR = 60 mL/min/1.73m2 Final    eGFR if  10/27/2020 39* > OR = 60 mL/min/1.73m2 Final    BUN/Creatinine Ratio 10/27/2020 18  6 - 22 (calc) Final    Sodium 10/27/2020 143  135 - 146 mmol/L Final    Potassium 10/27/2020 4.9  3.5 - 5.3 mmol/L Final    Chloride 10/27/2020 103  98 - 110 mmol/L Final    CO2 10/27/2020 33* 20 - 32 mmol/L Final    Calcium 10/27/2020 9.5  8.6 - 10.4 mg/dL Final    Total Protein 10/27/2020 6.1  6.1 - 8.1 g/dL Final    Albumin 10/27/2020 3.5* 3.6 - 5.1 g/dL Final    Globulin, Total 10/27/2020 2.6  1.9 - 3.7 g/dL (calc) Final    Albumin/Globulin Ratio 10/27/2020 1.3  1.0 - 2.5 (calc) Final    Total Bilirubin 10/27/2020 0.5  0.2 - 1.2 mg/dL Final    Alkaline Phosphatase 10/27/2020 133  37 - 153 U/L Final    AST 10/27/2020 17  10 - 35 U/L Final    ALT 10/27/2020 16  6 - 29 U/L Final    Sed Rate 10/27/2020 17  < OR = 30 mm/h Final   Hospital Outpatient Visit on 09/17/2020   Component Date Value Ref Range Status    SARS-CoV2 (COVID-19) Qualitative P* 09/17/2020 Not Detected  Not Detected Final   Lab Visit on 09/10/2020   Component Date Value Ref Range Status    Hepatitis C Ab 09/10/2020 <0.1  0.0 - 0.9 s/co ratio Final         Plan:            Acute midline low back pain without sciatica  Comments:  New onset of low back pain.  Check lumbar spine x-ray.  Suspect compression fracture as was seen in thoracic spine.  Orders:  -     X-Ray Lumbar Spine Complete 5 View; Future; Expected date: 12/08/2020    Chronic combined systolic and diastolic congestive heart failure  Comments:  Stable congestive heart failure currently on Entresto patient continues with cautions of salt and fluid restriction.    Paroxysmal atrial fibrillation  Comments:  Patient continues on digoxin and anticoagulation with warfarin.    Essential hypertension    Chronic anticoagulation  Comments:  Fall and injury precautions discussed.    Type 2 diabetes mellitus with diabetic nephropathy, without long-term current use of insulin  -     Hemoglobin A1C; Future; Expected date: 12/09/2020     Today we have a new onset of low back pain.  Receptors include being on anticoagulation, poor body posterior and mechanics.  Could have a compression fracture.    It is difficult to  as to what needs to merit as a serious problem with the patient verses what could be a fleeting medical problem which heals spontaneously with tincture of time.    Last time she came with worst headache of life which has resolved spontaneously after doing a CT scan.    Before that she came with the mid midthoracic spine pain and we did find compression fracture but they all have esolved.      I will also run some labs for sed rate, CMP and CBC to be sure that we are not missing anything else.    Issues of heart failure, blood pressure and atrial fibrillation also have been reviewed.    Fall and injury precautions have been discussed.    COVID-19 precautions also have been discussed.    Keep regular appointment as scheduled.  Follow-up in 3 months    Spent aleks 25 minutes with patient which involved review of pts medical conditions, labs, medications and with 50% of time face-to-face discussion about medical problems,  management and any applicable changes.      Current Outpatient Medications:     amiodarone (PACERONE) 200 MG Tab, Take by mouth once daily., Disp: , Rfl:     atorvastatin (LIPITOR) 40 MG tablet, Take 40 mg by mouth once daily., Disp: , Rfl:     Ca-D3-mag ox-zinc--thom-bor (CALCIUM 600-D3 PLUS) 600 mg calcium- 800 unit-50 mg Tab, Take 1 tablet by mouth 2 (two) times daily., Disp: , Rfl:     cholecalciferol, vitamin D3, (VITAMIN D3) 5,000 unit Tab, Take 5,000 Units by mouth 2 (two) times daily., Disp: , Rfl:     digoxin (LANOXIN) 125 mcg tablet, Take 125 mcg by mouth once daily., Disp: , Rfl:     furosemide (LASIX) 20 MG tablet, Take 20 mg by mouth 2 (two) times daily., Disp: , Rfl:     gabapentin (NEURONTIN) 100 MG capsule, Take 2 capsules (200 mg total) by mouth 3 (three) times daily., Disp: 180 capsule, Rfl: 1    glimepiride (AMARYL) 1 MG tablet, Take 1 tablet (1 mg total) by mouth before breakfast., Disp: 90 tablet, Rfl: 1    midodrine (PROAMATINE) 2.5 MG Tab, Take 2.5 mg by mouth 3 (three) times daily., Disp: , Rfl:     sacubitril-valsartan (ENTRESTO) 24-26 mg per tablet, Take 1 tablet by mouth once daily. , Disp: , Rfl:     torsemide (DEMADEX) 20 MG Tab, Take 20 mg by mouth once daily., Disp: , Rfl:     warfarin (COUMADIN) 5 MG tablet, Take 5 mg by mouth once daily., Disp: , Rfl:     denosumab (PROLIA) 60 mg/mL Syrg, Inject 1 mL (60 mg total) into the skin every 6 (six) months., Disp: 2 mL, Rfl: 1  No current facility-administered medications for this visit.     Facility-Administered Medications Ordered in Other Visits:     0.9%  NaCl infusion, , Intravenous, Continuous, Sebastian Nowak III, MD, Last Rate: 75 mL/hr at 12/13/19 0728    diphenhydrAMINE injection 25 mg, 25 mg, Intravenous, Once, Sebastian Nowak III, MD    lorazepam injection 1 mg, 1 mg, Intravenous, Once, Sebastian Nowak III, MD

## 2020-12-10 ENCOUNTER — LAB VISIT (OUTPATIENT)
Dept: LAB | Facility: HOSPITAL | Age: 80
End: 2020-12-10
Attending: INTERNAL MEDICINE
Payer: MEDICARE

## 2020-12-10 DIAGNOSIS — I42.9 FAMILIAL CARDIOMYOPATHY: Primary | ICD-10-CM

## 2020-12-10 DIAGNOSIS — Z79.01 LONG TERM (CURRENT) USE OF ANTICOAGULANTS: ICD-10-CM

## 2020-12-10 DIAGNOSIS — E11.21 TYPE 2 DIABETES MELLITUS WITH DIABETIC NEPHROPATHY, WITHOUT LONG-TERM CURRENT USE OF INSULIN: ICD-10-CM

## 2020-12-10 LAB
ESTIMATED AVG GLUCOSE: 114 MG/DL (ref 68–131)
HBA1C MFR BLD HPLC: 5.6 % (ref 4.5–6.2)
INR PPP: 8.9
PROTHROMBIN TIME: 70 SEC (ref 10.6–14.8)

## 2020-12-10 PROCEDURE — 85610 PROTHROMBIN TIME: CPT

## 2020-12-10 PROCEDURE — 36415 COLL VENOUS BLD VENIPUNCTURE: CPT

## 2020-12-10 PROCEDURE — 83036 HEMOGLOBIN GLYCOSYLATED A1C: CPT

## 2020-12-11 ENCOUNTER — TELEPHONE (OUTPATIENT)
Dept: FAMILY MEDICINE | Facility: CLINIC | Age: 80
End: 2020-12-11

## 2020-12-11 NOTE — TELEPHONE ENCOUNTER
----- Message from Kraig Alberto MD sent at 12/11/2020  8:24 AM CST -----  Please notify patient that her INR is super high and greater than 8.  Warfarin needs to be held.  Please verify who is monitoring her INR .

## 2020-12-11 NOTE — PROGRESS NOTES
Please notify patient that her INR is super high and greater than 8.  Warfarin needs to be held.  Please verify who is monitoring her INR .

## 2020-12-14 ENCOUNTER — HOSPITAL ENCOUNTER (OUTPATIENT)
Dept: RADIOLOGY | Facility: HOSPITAL | Age: 80
Discharge: HOME | End: 2020-12-14
Attending: INTERNAL MEDICINE
Payer: MEDICARE

## 2020-12-14 ENCOUNTER — TELEPHONE (OUTPATIENT)
Dept: FAMILY MEDICINE | Facility: CLINIC | Age: 80
End: 2020-12-14

## 2020-12-14 DIAGNOSIS — M54.50 ACUTE MIDLINE LOW BACK PAIN WITHOUT SCIATICA: ICD-10-CM

## 2020-12-14 PROCEDURE — 72110 X-RAY EXAM L-2 SPINE 4/>VWS: CPT | Mod: TC

## 2021-02-03 ENCOUNTER — OFFICE VISIT (OUTPATIENT)
Dept: PULMONOLOGY | Facility: CLINIC | Age: 81
End: 2021-02-03
Payer: MEDICARE

## 2021-02-03 ENCOUNTER — HOSPITAL ENCOUNTER (INPATIENT)
Facility: HOSPITAL | Age: 81
LOS: 1 days | Discharge: HOME | DRG: 308 | End: 2021-02-04
Attending: EMERGENCY MEDICINE | Admitting: INTERNAL MEDICINE
Payer: MEDICARE

## 2021-02-03 DIAGNOSIS — R06.02 SHORTNESS OF BREATH: ICD-10-CM

## 2021-02-03 DIAGNOSIS — I50.9 ACUTE ON CHRONIC CONGESTIVE HEART FAILURE, UNSPECIFIED HEART FAILURE TYPE: ICD-10-CM

## 2021-02-03 DIAGNOSIS — R06.00 DYSPNEA: ICD-10-CM

## 2021-02-03 DIAGNOSIS — R05.9 COUGH: ICD-10-CM

## 2021-02-03 DIAGNOSIS — J96.11 CHRONIC RESPIRATORY FAILURE WITH HYPOXIA: ICD-10-CM

## 2021-02-03 DIAGNOSIS — I50.42 CHRONIC COMBINED SYSTOLIC AND DIASTOLIC CONGESTIVE HEART FAILURE: ICD-10-CM

## 2021-02-03 DIAGNOSIS — Z95.810 CARDIOMYOPATHY WITH IMPLANTABLE CARDIOVERTER-DEFIBRILLATOR: ICD-10-CM

## 2021-02-03 DIAGNOSIS — I27.20 PULMONARY HYPERTENSION: ICD-10-CM

## 2021-02-03 DIAGNOSIS — G47.33 OBSTRUCTIVE SLEEP APNEA SYNDROME: ICD-10-CM

## 2021-02-03 DIAGNOSIS — I42.9 CARDIOMYOPATHY WITH IMPLANTABLE CARDIOVERTER-DEFIBRILLATOR: ICD-10-CM

## 2021-02-03 DIAGNOSIS — R00.1 BRADYCARDIA: ICD-10-CM

## 2021-02-03 DIAGNOSIS — R00.1 SYMPTOMATIC BRADYCARDIA: Primary | ICD-10-CM

## 2021-02-03 PROBLEM — R06.09 DYSPNEA ON EXERTION: Status: ACTIVE | Noted: 2020-01-16

## 2021-02-03 LAB
ALBUMIN SERPL BCP-MCNC: 3.6 G/DL (ref 3.5–5.2)
ALP SERPL-CCNC: 100 U/L (ref 55–135)
ALT SERPL W/O P-5'-P-CCNC: 17 U/L (ref 10–44)
ANION GAP SERPL CALC-SCNC: 9 MMOL/L (ref 8–16)
AST SERPL-CCNC: 16 U/L (ref 10–40)
BASOPHILS # BLD AUTO: 0.03 K/UL (ref 0–0.2)
BASOPHILS NFR BLD: 0.7 % (ref 0–1.9)
BILIRUB SERPL-MCNC: 0.6 MG/DL (ref 0.1–1)
BNP SERPL-MCNC: 2188 PG/ML (ref 0–99)
BUN SERPL-MCNC: 25 MG/DL (ref 8–23)
CALCIUM SERPL-MCNC: 9.3 MG/DL (ref 8.7–10.5)
CHLORIDE SERPL-SCNC: 107 MMOL/L (ref 95–110)
CO2 SERPL-SCNC: 28 MMOL/L (ref 23–29)
CREAT SERPL-MCNC: 1.7 MG/DL (ref 0.5–1.4)
DIFFERENTIAL METHOD: ABNORMAL
DIGOXIN SERPL-MCNC: <0.2 NG/ML (ref 0.8–2)
EOSINOPHIL # BLD AUTO: 0.1 K/UL (ref 0–0.5)
EOSINOPHIL NFR BLD: 3.5 % (ref 0–8)
ERYTHROCYTE [DISTWIDTH] IN BLOOD BY AUTOMATED COUNT: 13.6 % (ref 11.5–14.5)
EST. GFR  (AFRICAN AMERICAN): 32.6 ML/MIN/1.73 M^2
EST. GFR  (NON AFRICAN AMERICAN): 28.3 ML/MIN/1.73 M^2
GLUCOSE SERPL-MCNC: 94 MG/DL (ref 70–110)
HCT VFR BLD AUTO: 43.6 % (ref 37–48.5)
HGB BLD-MCNC: 13.4 G/DL (ref 12–16)
IMM GRANULOCYTES # BLD AUTO: 0.01 K/UL (ref 0–0.04)
IMM GRANULOCYTES NFR BLD AUTO: 0.2 % (ref 0–0.5)
INR PPP: 3.7
LYMPHOCYTES # BLD AUTO: 1.3 K/UL (ref 1–4.8)
LYMPHOCYTES NFR BLD: 31.2 % (ref 18–48)
MAGNESIUM SERPL-MCNC: 2 MG/DL (ref 1.6–2.6)
MCH RBC QN AUTO: 31.4 PG (ref 27–31)
MCHC RBC AUTO-ENTMCNC: 30.7 G/DL (ref 32–36)
MCV RBC AUTO: 102 FL (ref 82–98)
MONOCYTES # BLD AUTO: 0.4 K/UL (ref 0.3–1)
MONOCYTES NFR BLD: 9.2 % (ref 4–15)
NEUTROPHILS # BLD AUTO: 2.2 K/UL (ref 1.8–7.7)
NEUTROPHILS NFR BLD: 55.2 % (ref 38–73)
NRBC BLD-RTO: 0 /100 WBC
PLATELET # BLD AUTO: 197 K/UL (ref 150–350)
PMV BLD AUTO: 10.7 FL (ref 9.2–12.9)
POTASSIUM SERPL-SCNC: 4 MMOL/L (ref 3.5–5.1)
PROT SERPL-MCNC: 7.2 G/DL (ref 6–8.4)
PROTHROMBIN TIME: 35.4 SEC (ref 10.6–14.8)
RBC # BLD AUTO: 4.27 M/UL (ref 4–5.4)
SARS-COV-2 RDRP RESP QL NAA+PROBE: NEGATIVE
SODIUM SERPL-SCNC: 144 MMOL/L (ref 136–145)
TROPONIN I SERPL DL<=0.01 NG/ML-MCNC: <0.03 NG/ML
TSH SERPL DL<=0.005 MIU/L-ACNC: 1.64 UIU/ML (ref 0.34–5.6)
WBC # BLD AUTO: 4.01 K/UL (ref 3.9–12.7)

## 2021-02-03 PROCEDURE — 85610 PROTHROMBIN TIME: CPT

## 2021-02-03 PROCEDURE — 80162 ASSAY OF DIGOXIN TOTAL: CPT

## 2021-02-03 PROCEDURE — 1159F PR MEDICATION LIST DOCUMENTED IN MEDICAL RECORD: ICD-10-PCS | Mod: S$GLB,,, | Performed by: INTERNAL MEDICINE

## 2021-02-03 PROCEDURE — 1159F MED LIST DOCD IN RCRD: CPT | Mod: S$GLB,,, | Performed by: INTERNAL MEDICINE

## 2021-02-03 PROCEDURE — 80162 ASSAY OF DIGOXIN TOTAL: CPT | Mod: 91

## 2021-02-03 PROCEDURE — 99214 OFFICE O/P EST MOD 30 MIN: CPT | Mod: S$GLB,,, | Performed by: INTERNAL MEDICINE

## 2021-02-03 PROCEDURE — 93010 EKG 12-LEAD: ICD-10-PCS | Mod: ,,, | Performed by: INTERNAL MEDICINE

## 2021-02-03 PROCEDURE — G0378 HOSPITAL OBSERVATION PER HR: HCPCS

## 2021-02-03 PROCEDURE — 3074F PR MOST RECENT SYSTOLIC BLOOD PRESSURE < 130 MM HG: ICD-10-PCS | Mod: S$GLB,,, | Performed by: INTERNAL MEDICINE

## 2021-02-03 PROCEDURE — 84443 ASSAY THYROID STIM HORMONE: CPT

## 2021-02-03 PROCEDURE — 83880 ASSAY OF NATRIURETIC PEPTIDE: CPT

## 2021-02-03 PROCEDURE — 85025 COMPLETE CBC W/AUTO DIFF WBC: CPT

## 2021-02-03 PROCEDURE — 93005 ELECTROCARDIOGRAM TRACING: CPT | Performed by: INTERNAL MEDICINE

## 2021-02-03 PROCEDURE — 63600175 PHARM REV CODE 636 W HCPCS: Performed by: EMERGENCY MEDICINE

## 2021-02-03 PROCEDURE — 84484 ASSAY OF TROPONIN QUANT: CPT

## 2021-02-03 PROCEDURE — 93010 ELECTROCARDIOGRAM REPORT: CPT | Mod: ,,, | Performed by: INTERNAL MEDICINE

## 2021-02-03 PROCEDURE — 99285 EMERGENCY DEPT VISIT HI MDM: CPT | Mod: 25

## 2021-02-03 PROCEDURE — 83735 ASSAY OF MAGNESIUM: CPT

## 2021-02-03 PROCEDURE — 3078F PR MOST RECENT DIASTOLIC BLOOD PRESSURE < 80 MM HG: ICD-10-PCS | Mod: S$GLB,,, | Performed by: INTERNAL MEDICINE

## 2021-02-03 PROCEDURE — U0002 COVID-19 LAB TEST NON-CDC: HCPCS

## 2021-02-03 PROCEDURE — 3078F DIAST BP <80 MM HG: CPT | Mod: S$GLB,,, | Performed by: INTERNAL MEDICINE

## 2021-02-03 PROCEDURE — 3074F SYST BP LT 130 MM HG: CPT | Mod: S$GLB,,, | Performed by: INTERNAL MEDICINE

## 2021-02-03 PROCEDURE — 99214 PR OFFICE/OUTPT VISIT, EST, LEVL IV, 30-39 MIN: ICD-10-PCS | Mod: S$GLB,,, | Performed by: INTERNAL MEDICINE

## 2021-02-03 PROCEDURE — 80053 COMPREHEN METABOLIC PANEL: CPT

## 2021-02-03 RX ORDER — GABAPENTIN 100 MG/1
200 CAPSULE ORAL 3 TIMES DAILY
Status: DISCONTINUED | OUTPATIENT
Start: 2021-02-04 | End: 2021-02-04 | Stop reason: HOSPADM

## 2021-02-03 RX ORDER — TORSEMIDE 20 MG/1
20 TABLET ORAL DAILY
Status: DISCONTINUED | OUTPATIENT
Start: 2021-02-04 | End: 2021-02-04 | Stop reason: HOSPADM

## 2021-02-03 RX ORDER — ATORVASTATIN CALCIUM 40 MG/1
40 TABLET, FILM COATED ORAL DAILY
Status: DISCONTINUED | OUTPATIENT
Start: 2021-02-04 | End: 2021-02-04 | Stop reason: HOSPADM

## 2021-02-03 RX ORDER — FUROSEMIDE 10 MG/ML
40 INJECTION INTRAMUSCULAR; INTRAVENOUS
Status: COMPLETED | OUTPATIENT
Start: 2021-02-03 | End: 2021-02-03

## 2021-02-03 RX ORDER — MIDODRINE HYDROCHLORIDE 2.5 MG/1
2.5 TABLET ORAL 3 TIMES DAILY
Status: DISCONTINUED | OUTPATIENT
Start: 2021-02-04 | End: 2021-02-04 | Stop reason: HOSPADM

## 2021-02-03 RX ORDER — AMOXICILLIN 250 MG
1 CAPSULE ORAL 2 TIMES DAILY
Status: DISCONTINUED | OUTPATIENT
Start: 2021-02-04 | End: 2021-02-04 | Stop reason: HOSPADM

## 2021-02-03 RX ORDER — SODIUM CHLORIDE 0.9 % (FLUSH) 0.9 %
10 SYRINGE (ML) INJECTION
Status: DISCONTINUED | OUTPATIENT
Start: 2021-02-04 | End: 2021-02-04 | Stop reason: HOSPADM

## 2021-02-03 RX ADMIN — FUROSEMIDE 40 MG: 10 INJECTION, SOLUTION INTRAVENOUS at 11:02

## 2021-02-04 ENCOUNTER — CLINICAL SUPPORT (OUTPATIENT)
Dept: CARDIOLOGY | Facility: HOSPITAL | Age: 81
DRG: 308 | End: 2021-02-04
Attending: INTERNAL MEDICINE
Payer: MEDICARE

## 2021-02-04 VITALS
BODY MASS INDEX: 36.26 KG/M2 | TEMPERATURE: 98 F | SYSTOLIC BLOOD PRESSURE: 170 MMHG | OXYGEN SATURATION: 99 % | DIASTOLIC BLOOD PRESSURE: 70 MMHG | HEIGHT: 67 IN | RESPIRATION RATE: 18 BRPM | HEART RATE: 59 BPM | WEIGHT: 231 LBS

## 2021-02-04 PROBLEM — R00.1 SYMPTOMATIC BRADYCARDIA: Status: RESOLVED | Noted: 2021-02-03 | Resolved: 2021-02-04

## 2021-02-04 PROBLEM — R00.1 BRADYCARDIA: Status: RESOLVED | Noted: 2021-02-03 | Resolved: 2021-02-04

## 2021-02-04 PROBLEM — R06.09 DYSPNEA ON EXERTION: Status: RESOLVED | Noted: 2020-01-16 | Resolved: 2021-02-04

## 2021-02-04 LAB
ANION GAP SERPL CALC-SCNC: 7 MMOL/L (ref 8–16)
BASOPHILS # BLD AUTO: 0.02 K/UL (ref 0–0.2)
BASOPHILS NFR BLD: 0.4 % (ref 0–1.9)
BUN SERPL-MCNC: 26 MG/DL (ref 8–23)
CALCIUM SERPL-MCNC: 8.5 MG/DL (ref 8.7–10.5)
CHLORIDE SERPL-SCNC: 105 MMOL/L (ref 95–110)
CO2 SERPL-SCNC: 31 MMOL/L (ref 23–29)
CREAT SERPL-MCNC: 1.6 MG/DL (ref 0.5–1.4)
DIFFERENTIAL METHOD: ABNORMAL
DIGOXIN SERPL-MCNC: <0.2 NG/ML (ref 0.8–2)
EOSINOPHIL # BLD AUTO: 0.1 K/UL (ref 0–0.5)
EOSINOPHIL NFR BLD: 2.2 % (ref 0–8)
ERYTHROCYTE [DISTWIDTH] IN BLOOD BY AUTOMATED COUNT: 13.6 % (ref 11.5–14.5)
EST. GFR  (AFRICAN AMERICAN): 35.1 ML/MIN/1.73 M^2
EST. GFR  (NON AFRICAN AMERICAN): 30.4 ML/MIN/1.73 M^2
GLUCOSE SERPL-MCNC: 107 MG/DL (ref 70–110)
GLUCOSE SERPL-MCNC: 130 MG/DL (ref 70–110)
HCT VFR BLD AUTO: 38.2 % (ref 37–48.5)
HGB BLD-MCNC: 12.1 G/DL (ref 12–16)
IMM GRANULOCYTES # BLD AUTO: 0.01 K/UL (ref 0–0.04)
IMM GRANULOCYTES NFR BLD AUTO: 0.2 % (ref 0–0.5)
INR PPP: 3.3
LYMPHOCYTES # BLD AUTO: 1.2 K/UL (ref 1–4.8)
LYMPHOCYTES NFR BLD: 25.2 % (ref 18–48)
MAGNESIUM SERPL-MCNC: 2 MG/DL (ref 1.6–2.6)
MCH RBC QN AUTO: 32.3 PG (ref 27–31)
MCHC RBC AUTO-ENTMCNC: 31.7 G/DL (ref 32–36)
MCV RBC AUTO: 102 FL (ref 82–98)
MONOCYTES # BLD AUTO: 0.5 K/UL (ref 0.3–1)
MONOCYTES NFR BLD: 9.9 % (ref 4–15)
NEUTROPHILS # BLD AUTO: 2.9 K/UL (ref 1.8–7.7)
NEUTROPHILS NFR BLD: 62.1 % (ref 38–73)
NRBC BLD-RTO: 0 /100 WBC
PHOSPHATE SERPL-MCNC: 4.8 MG/DL (ref 2.7–4.5)
PLATELET # BLD AUTO: 187 K/UL (ref 150–350)
PMV BLD AUTO: 10.5 FL (ref 9.2–12.9)
POTASSIUM SERPL-SCNC: 4 MMOL/L (ref 3.5–5.1)
PROTHROMBIN TIME: 32.6 SEC (ref 10.6–14.8)
RBC # BLD AUTO: 3.75 M/UL (ref 4–5.4)
SODIUM SERPL-SCNC: 143 MMOL/L (ref 136–145)
TROPONIN I SERPL DL<=0.01 NG/ML-MCNC: <0.03 NG/ML
TROPONIN I SERPL DL<=0.01 NG/ML-MCNC: <0.03 NG/ML
WBC # BLD AUTO: 4.64 K/UL (ref 3.9–12.7)

## 2021-02-04 PROCEDURE — 83735 ASSAY OF MAGNESIUM: CPT

## 2021-02-04 PROCEDURE — 84484 ASSAY OF TROPONIN QUANT: CPT

## 2021-02-04 PROCEDURE — 25000003 PHARM REV CODE 250: Performed by: INTERNAL MEDICINE

## 2021-02-04 PROCEDURE — 85025 COMPLETE CBC W/AUTO DIFF WBC: CPT

## 2021-02-04 PROCEDURE — 80048 BASIC METABOLIC PNL TOTAL CA: CPT

## 2021-02-04 PROCEDURE — S0171 BUMETANIDE 0.5 MG: HCPCS | Performed by: INTERNAL MEDICINE

## 2021-02-04 PROCEDURE — 84100 ASSAY OF PHOSPHORUS: CPT

## 2021-02-04 PROCEDURE — 82962 GLUCOSE BLOOD TEST: CPT

## 2021-02-04 PROCEDURE — 36415 COLL VENOUS BLD VENIPUNCTURE: CPT

## 2021-02-04 PROCEDURE — 21400001 HC TELEMETRY ROOM

## 2021-02-04 PROCEDURE — 85610 PROTHROMBIN TIME: CPT

## 2021-02-04 RX ORDER — BUMETANIDE 0.25 MG/ML
1 INJECTION INTRAMUSCULAR; INTRAVENOUS ONCE
Status: COMPLETED | OUTPATIENT
Start: 2021-02-04 | End: 2021-02-04

## 2021-02-04 RX ORDER — TORSEMIDE 20 MG/1
20 TABLET ORAL DAILY
Qty: 14 TABLET | Refills: 0 | Status: ON HOLD | OUTPATIENT
Start: 2021-02-04 | End: 2022-10-29 | Stop reason: HOSPADM

## 2021-02-04 RX ORDER — HYDROCODONE BITARTRATE AND ACETAMINOPHEN 5; 325 MG/1; MG/1
1 TABLET ORAL
COMMUNITY
Start: 2020-12-23 | End: 2021-03-31

## 2021-02-04 RX ADMIN — TORSEMIDE 20 MG: 20 TABLET ORAL at 08:02

## 2021-02-04 RX ADMIN — SACUBITRIL AND VALSARTAN 1 TABLET: 24; 26 TABLET, FILM COATED ORAL at 08:02

## 2021-02-04 RX ADMIN — MIDODRINE HYDROCHLORIDE 2.5 MG: 2.5 TABLET ORAL at 08:02

## 2021-02-04 RX ADMIN — BUMETANIDE 1 MG: 0.25 INJECTION INTRAMUSCULAR; INTRAVENOUS at 12:02

## 2021-02-04 RX ADMIN — MIDODRINE HYDROCHLORIDE 2.5 MG: 2.5 TABLET ORAL at 11:02

## 2021-02-04 RX ADMIN — GABAPENTIN 200 MG: 100 CAPSULE ORAL at 08:02

## 2021-02-18 ENCOUNTER — TELEPHONE (OUTPATIENT)
Dept: PULMONOLOGY | Facility: CLINIC | Age: 81
End: 2021-02-18

## 2021-02-18 DIAGNOSIS — G47.30 SLEEP APNEA, UNSPECIFIED TYPE: Primary | ICD-10-CM

## 2021-02-22 ENCOUNTER — TELEPHONE (OUTPATIENT)
Dept: PULMONOLOGY | Facility: CLINIC | Age: 81
End: 2021-02-22

## 2021-02-22 DIAGNOSIS — Z01.818 PREOPERATIVE EXAMINATION, UNSPECIFIED: ICD-10-CM

## 2021-02-26 ENCOUNTER — HOSPITAL ENCOUNTER (OUTPATIENT)
Dept: PREADMISSION TESTING | Facility: HOSPITAL | Age: 81
Discharge: HOME | End: 2021-02-26
Attending: INTERNAL MEDICINE
Payer: MEDICARE

## 2021-02-26 DIAGNOSIS — Z01.818 PREOPERATIVE EXAMINATION, UNSPECIFIED: ICD-10-CM

## 2021-02-26 PROCEDURE — U0003 INFECTIOUS AGENT DETECTION BY NUCLEIC ACID (DNA OR RNA); SEVERE ACUTE RESPIRATORY SYNDROME CORONAVIRUS 2 (SARS-COV-2) (CORONAVIRUS DISEASE [COVID-19]), AMPLIFIED PROBE TECHNIQUE, MAKING USE OF HIGH THROUGHPUT TECHNOLOGIES AS DESCRIBED BY CMS-2020-01-R: HCPCS

## 2021-02-26 PROCEDURE — U0005 INFEC AGEN DETEC AMPLI PROBE: HCPCS

## 2021-02-27 LAB — SARS-COV-2 RNA RESP QL NAA+PROBE: NOT DETECTED

## 2021-03-01 ENCOUNTER — PROCEDURE VISIT (OUTPATIENT)
Dept: SLEEP MEDICINE | Facility: HOSPITAL | Age: 81
End: 2021-03-01
Attending: INTERNAL MEDICINE
Payer: MEDICARE

## 2021-03-01 DIAGNOSIS — G47.30 SLEEP APNEA, UNSPECIFIED TYPE: ICD-10-CM

## 2021-03-01 PROCEDURE — 95811 POLYSOM 6/>YRS CPAP 4/> PARM: CPT

## 2021-03-08 DIAGNOSIS — G47.33 OBSTRUCTIVE SLEEP APNEA SYNDROME: Primary | ICD-10-CM

## 2021-03-08 DIAGNOSIS — I50.20 SYSTOLIC CONGESTIVE HEART FAILURE, UNSPECIFIED HF CHRONICITY: ICD-10-CM

## 2021-03-12 ENCOUNTER — OFFICE VISIT (OUTPATIENT)
Dept: FAMILY MEDICINE | Facility: CLINIC | Age: 81
End: 2021-03-12
Payer: MEDICARE

## 2021-03-12 VITALS
DIASTOLIC BLOOD PRESSURE: 74 MMHG | HEIGHT: 67 IN | SYSTOLIC BLOOD PRESSURE: 112 MMHG | HEART RATE: 80 BPM | WEIGHT: 229 LBS | TEMPERATURE: 98 F | BODY MASS INDEX: 35.94 KG/M2

## 2021-03-12 DIAGNOSIS — R39.15 URINARY URGENCY: Primary | ICD-10-CM

## 2021-03-12 DIAGNOSIS — R30.0 DYSURIA: ICD-10-CM

## 2021-03-12 DIAGNOSIS — N30.01 ACUTE CYSTITIS WITH HEMATURIA: ICD-10-CM

## 2021-03-12 LAB
BILIRUB UR QL STRIP: POSITIVE
CLARITY UR: ABNORMAL
COLOR UR: YELLOW
GLUCOSE UR QL STRIP: NEGATIVE
KETONES UR QL STRIP: NEGATIVE
LEUKOCYTE ESTERASE UR QL STRIP: POSITIVE
PH, POC UA: 5.5 (ref 5–8.5)
POC BLOOD, URINE: POSITIVE
POC NITRATES, URINE: NEGATIVE
PROT UR QL STRIP: POSITIVE
SP GR UR STRIP: 1.02 (ref 1–1.03)
UROBILINOGEN UR STRIP-ACNC: ABNORMAL (ref 0.2–8)

## 2021-03-12 PROCEDURE — 1126F AMNT PAIN NOTED NONE PRSNT: CPT | Mod: S$GLB,,, | Performed by: NURSE PRACTITIONER

## 2021-03-12 PROCEDURE — 3288F FALL RISK ASSESSMENT DOCD: CPT | Mod: S$GLB,,, | Performed by: NURSE PRACTITIONER

## 2021-03-12 PROCEDURE — 1126F PR PAIN SEVERITY QUANTIFIED, NO PAIN PRESENT: ICD-10-PCS | Mod: S$GLB,,, | Performed by: NURSE PRACTITIONER

## 2021-03-12 PROCEDURE — 3074F PR MOST RECENT SYSTOLIC BLOOD PRESSURE < 130 MM HG: ICD-10-PCS | Mod: S$GLB,,, | Performed by: NURSE PRACTITIONER

## 2021-03-12 PROCEDURE — 1159F PR MEDICATION LIST DOCUMENTED IN MEDICAL RECORD: ICD-10-PCS | Mod: S$GLB,,, | Performed by: NURSE PRACTITIONER

## 2021-03-12 PROCEDURE — 87086 URINE CULTURE/COLONY COUNT: CPT | Performed by: NURSE PRACTITIONER

## 2021-03-12 PROCEDURE — 3078F DIAST BP <80 MM HG: CPT | Mod: S$GLB,,, | Performed by: NURSE PRACTITIONER

## 2021-03-12 PROCEDURE — 99213 PR OFFICE/OUTPT VISIT, EST, LEVL III, 20-29 MIN: ICD-10-PCS | Mod: 25,S$GLB,, | Performed by: NURSE PRACTITIONER

## 2021-03-12 PROCEDURE — 99213 OFFICE O/P EST LOW 20 MIN: CPT | Mod: 25,S$GLB,, | Performed by: NURSE PRACTITIONER

## 2021-03-12 PROCEDURE — 81003 POCT URINALYSIS, DIPSTICK, AUTOMATED, W/O SCOPE: ICD-10-PCS | Mod: QW,S$GLB,, | Performed by: NURSE PRACTITIONER

## 2021-03-12 PROCEDURE — 1101F PT FALLS ASSESS-DOCD LE1/YR: CPT | Mod: S$GLB,,, | Performed by: NURSE PRACTITIONER

## 2021-03-12 PROCEDURE — 3078F PR MOST RECENT DIASTOLIC BLOOD PRESSURE < 80 MM HG: ICD-10-PCS | Mod: S$GLB,,, | Performed by: NURSE PRACTITIONER

## 2021-03-12 PROCEDURE — 1101F PR PT FALLS ASSESS DOC 0-1 FALLS W/OUT INJ PAST YR: ICD-10-PCS | Mod: S$GLB,,, | Performed by: NURSE PRACTITIONER

## 2021-03-12 PROCEDURE — 81003 URINALYSIS AUTO W/O SCOPE: CPT | Mod: QW,S$GLB,, | Performed by: NURSE PRACTITIONER

## 2021-03-12 PROCEDURE — 3288F PR FALLS RISK ASSESSMENT DOCUMENTED: ICD-10-PCS | Mod: S$GLB,,, | Performed by: NURSE PRACTITIONER

## 2021-03-12 PROCEDURE — 1159F MED LIST DOCD IN RCRD: CPT | Mod: S$GLB,,, | Performed by: NURSE PRACTITIONER

## 2021-03-12 PROCEDURE — 3074F SYST BP LT 130 MM HG: CPT | Mod: S$GLB,,, | Performed by: NURSE PRACTITIONER

## 2021-03-12 RX ORDER — CIPROFLOXACIN 500 MG/1
500 TABLET ORAL 2 TIMES DAILY
Qty: 14 TABLET | Refills: 0 | Status: SHIPPED | OUTPATIENT
Start: 2021-03-12 | End: 2021-03-19

## 2021-03-13 LAB
BACTERIA UR CULT: NORMAL
BACTERIA UR CULT: NORMAL

## 2021-03-17 ENCOUNTER — TELEPHONE (OUTPATIENT)
Dept: FAMILY MEDICINE | Facility: CLINIC | Age: 81
End: 2021-03-17

## 2021-03-31 ENCOUNTER — PATIENT MESSAGE (OUTPATIENT)
Dept: FAMILY MEDICINE | Facility: CLINIC | Age: 81
End: 2021-03-31

## 2021-03-31 ENCOUNTER — OFFICE VISIT (OUTPATIENT)
Dept: FAMILY MEDICINE | Facility: CLINIC | Age: 81
End: 2021-03-31
Payer: MEDICARE

## 2021-03-31 VITALS
BODY MASS INDEX: 35.79 KG/M2 | SYSTOLIC BLOOD PRESSURE: 125 MMHG | HEIGHT: 67 IN | DIASTOLIC BLOOD PRESSURE: 70 MMHG | HEART RATE: 67 BPM | WEIGHT: 228 LBS

## 2021-03-31 DIAGNOSIS — I48.0 PAROXYSMAL ATRIAL FIBRILLATION: ICD-10-CM

## 2021-03-31 DIAGNOSIS — I50.42 CHRONIC COMBINED SYSTOLIC AND DIASTOLIC CONGESTIVE HEART FAILURE: ICD-10-CM

## 2021-03-31 DIAGNOSIS — R25.1 TREMOR: ICD-10-CM

## 2021-03-31 DIAGNOSIS — I10 ESSENTIAL HYPERTENSION: Primary | ICD-10-CM

## 2021-03-31 DIAGNOSIS — M54.50 MIDLINE LOW BACK PAIN WITHOUT SCIATICA, UNSPECIFIED CHRONICITY: ICD-10-CM

## 2021-03-31 DIAGNOSIS — R14.3 FLATULENCE SYMPTOM: ICD-10-CM

## 2021-03-31 DIAGNOSIS — R19.5 ABNORMAL STOOL CALIBER: ICD-10-CM

## 2021-03-31 DIAGNOSIS — Z79.01 CHRONIC ANTICOAGULATION: ICD-10-CM

## 2021-03-31 PROCEDURE — 3288F PR FALLS RISK ASSESSMENT DOCUMENTED: ICD-10-PCS | Mod: S$GLB,,, | Performed by: INTERNAL MEDICINE

## 2021-03-31 PROCEDURE — 1170F FXNL STATUS ASSESSED: CPT | Mod: S$GLB,,, | Performed by: INTERNAL MEDICINE

## 2021-03-31 PROCEDURE — 1101F PT FALLS ASSESS-DOCD LE1/YR: CPT | Mod: S$GLB,,, | Performed by: INTERNAL MEDICINE

## 2021-03-31 PROCEDURE — 1170F PR FUNCTIONAL STATUS ASSESSED: ICD-10-PCS | Mod: S$GLB,,, | Performed by: INTERNAL MEDICINE

## 2021-03-31 PROCEDURE — 1159F PR MEDICATION LIST DOCUMENTED IN MEDICAL RECORD: ICD-10-PCS | Mod: S$GLB,,, | Performed by: INTERNAL MEDICINE

## 2021-03-31 PROCEDURE — 99215 OFFICE O/P EST HI 40 MIN: CPT | Mod: S$GLB,,, | Performed by: INTERNAL MEDICINE

## 2021-03-31 PROCEDURE — 3074F SYST BP LT 130 MM HG: CPT | Mod: S$GLB,,, | Performed by: INTERNAL MEDICINE

## 2021-03-31 PROCEDURE — 3078F PR MOST RECENT DIASTOLIC BLOOD PRESSURE < 80 MM HG: ICD-10-PCS | Mod: S$GLB,,, | Performed by: INTERNAL MEDICINE

## 2021-03-31 PROCEDURE — 3078F DIAST BP <80 MM HG: CPT | Mod: S$GLB,,, | Performed by: INTERNAL MEDICINE

## 2021-03-31 PROCEDURE — 1159F MED LIST DOCD IN RCRD: CPT | Mod: S$GLB,,, | Performed by: INTERNAL MEDICINE

## 2021-03-31 PROCEDURE — 99215 PR OFFICE/OUTPT VISIT, EST, LEVL V, 40-54 MIN: ICD-10-PCS | Mod: S$GLB,,, | Performed by: INTERNAL MEDICINE

## 2021-03-31 PROCEDURE — 3288F FALL RISK ASSESSMENT DOCD: CPT | Mod: S$GLB,,, | Performed by: INTERNAL MEDICINE

## 2021-03-31 PROCEDURE — 1126F AMNT PAIN NOTED NONE PRSNT: CPT | Mod: S$GLB,,, | Performed by: INTERNAL MEDICINE

## 2021-03-31 PROCEDURE — 3074F PR MOST RECENT SYSTOLIC BLOOD PRESSURE < 130 MM HG: ICD-10-PCS | Mod: S$GLB,,, | Performed by: INTERNAL MEDICINE

## 2021-03-31 PROCEDURE — 1101F PR PT FALLS ASSESS DOC 0-1 FALLS W/OUT INJ PAST YR: ICD-10-PCS | Mod: S$GLB,,, | Performed by: INTERNAL MEDICINE

## 2021-03-31 PROCEDURE — 1126F PR PAIN SEVERITY QUANTIFIED, NO PAIN PRESENT: ICD-10-PCS | Mod: S$GLB,,, | Performed by: INTERNAL MEDICINE

## 2021-03-31 RX ORDER — PROPRANOLOL HYDROCHLORIDE 20 MG/1
20 TABLET ORAL 3 TIMES DAILY
Qty: 90 TABLET | Refills: 5 | Status: SHIPPED | OUTPATIENT
Start: 2021-03-31 | End: 2022-03-02

## 2021-04-05 ENCOUNTER — OFFICE VISIT (OUTPATIENT)
Dept: PULMONOLOGY | Facility: CLINIC | Age: 81
End: 2021-04-05
Payer: MEDICARE

## 2021-04-05 VITALS
HEART RATE: 61 BPM | OXYGEN SATURATION: 99 % | SYSTOLIC BLOOD PRESSURE: 130 MMHG | WEIGHT: 234.81 LBS | DIASTOLIC BLOOD PRESSURE: 82 MMHG | BODY MASS INDEX: 36.77 KG/M2

## 2021-04-05 DIAGNOSIS — J44.9 CHRONIC OBSTRUCTIVE PULMONARY DISEASE, UNSPECIFIED COPD TYPE: ICD-10-CM

## 2021-04-05 DIAGNOSIS — G47.33 OBSTRUCTIVE SLEEP APNEA SYNDROME: ICD-10-CM

## 2021-04-05 DIAGNOSIS — I27.20 PULMONARY HYPERTENSION: ICD-10-CM

## 2021-04-05 PROCEDURE — 99214 OFFICE O/P EST MOD 30 MIN: CPT | Mod: S$GLB,,, | Performed by: INTERNAL MEDICINE

## 2021-04-05 PROCEDURE — 99214 PR OFFICE/OUTPT VISIT, EST, LEVL IV, 30-39 MIN: ICD-10-PCS | Mod: S$GLB,,, | Performed by: INTERNAL MEDICINE

## 2021-04-05 PROCEDURE — 3075F SYST BP GE 130 - 139MM HG: CPT | Mod: S$GLB,,, | Performed by: INTERNAL MEDICINE

## 2021-04-05 PROCEDURE — 3079F DIAST BP 80-89 MM HG: CPT | Mod: S$GLB,,, | Performed by: INTERNAL MEDICINE

## 2021-04-05 PROCEDURE — 1159F MED LIST DOCD IN RCRD: CPT | Mod: S$GLB,,, | Performed by: INTERNAL MEDICINE

## 2021-04-05 PROCEDURE — 3288F FALL RISK ASSESSMENT DOCD: CPT | Mod: S$GLB,,, | Performed by: INTERNAL MEDICINE

## 2021-04-05 PROCEDURE — 1126F PR PAIN SEVERITY QUANTIFIED, NO PAIN PRESENT: ICD-10-PCS | Mod: S$GLB,,, | Performed by: INTERNAL MEDICINE

## 2021-04-05 PROCEDURE — 3075F PR MOST RECENT SYSTOLIC BLOOD PRESS GE 130-139MM HG: ICD-10-PCS | Mod: S$GLB,,, | Performed by: INTERNAL MEDICINE

## 2021-04-05 PROCEDURE — 1126F AMNT PAIN NOTED NONE PRSNT: CPT | Mod: S$GLB,,, | Performed by: INTERNAL MEDICINE

## 2021-04-05 PROCEDURE — 3288F PR FALLS RISK ASSESSMENT DOCUMENTED: ICD-10-PCS | Mod: S$GLB,,, | Performed by: INTERNAL MEDICINE

## 2021-04-05 PROCEDURE — 3079F PR MOST RECENT DIASTOLIC BLOOD PRESSURE 80-89 MM HG: ICD-10-PCS | Mod: S$GLB,,, | Performed by: INTERNAL MEDICINE

## 2021-04-05 PROCEDURE — 1159F PR MEDICATION LIST DOCUMENTED IN MEDICAL RECORD: ICD-10-PCS | Mod: S$GLB,,, | Performed by: INTERNAL MEDICINE

## 2021-04-05 PROCEDURE — 1101F PR PT FALLS ASSESS DOC 0-1 FALLS W/OUT INJ PAST YR: ICD-10-PCS | Mod: S$GLB,,, | Performed by: INTERNAL MEDICINE

## 2021-04-05 PROCEDURE — 1101F PT FALLS ASSESS-DOCD LE1/YR: CPT | Mod: S$GLB,,, | Performed by: INTERNAL MEDICINE

## 2021-04-11 ENCOUNTER — HOSPITAL ENCOUNTER (EMERGENCY)
Facility: HOSPITAL | Age: 81
Discharge: HOME | End: 2021-04-11
Attending: EMERGENCY MEDICINE
Payer: MEDICARE

## 2021-04-11 VITALS
TEMPERATURE: 98 F | HEART RATE: 60 BPM | OXYGEN SATURATION: 100 % | SYSTOLIC BLOOD PRESSURE: 148 MMHG | HEIGHT: 69 IN | RESPIRATION RATE: 18 BRPM | WEIGHT: 222 LBS | BODY MASS INDEX: 32.88 KG/M2 | DIASTOLIC BLOOD PRESSURE: 74 MMHG

## 2021-04-11 DIAGNOSIS — S62.666A CLOSED NONDISPLACED FRACTURE OF DISTAL PHALANX OF RIGHT LITTLE FINGER, INITIAL ENCOUNTER: ICD-10-CM

## 2021-04-11 DIAGNOSIS — S09.93XA FACIAL INJURY, INITIAL ENCOUNTER: Primary | ICD-10-CM

## 2021-04-11 DIAGNOSIS — S20.211A RIB CONTUSION, RIGHT, INITIAL ENCOUNTER: ICD-10-CM

## 2021-04-11 PROCEDURE — 99284 EMERGENCY DEPT VISIT MOD MDM: CPT | Mod: 25

## 2021-04-11 RX ORDER — HYDROCODONE BITARTRATE AND ACETAMINOPHEN 5; 325 MG/1; MG/1
1 TABLET ORAL EVERY 8 HOURS PRN
Qty: 12 TABLET | Refills: 0 | Status: SHIPPED | OUTPATIENT
Start: 2021-04-11 | End: 2021-11-01

## 2021-04-12 ENCOUNTER — TELEPHONE (OUTPATIENT)
Dept: FAMILY MEDICINE | Facility: CLINIC | Age: 81
End: 2021-04-12

## 2021-04-12 DIAGNOSIS — I50.42 CHRONIC COMBINED SYSTOLIC AND DIASTOLIC CONGESTIVE HEART FAILURE: ICD-10-CM
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
## 2021-04-12 DIAGNOSIS — I48.0 PAROXYSMAL ATRIAL FIBRILLATION: Primary | ICD-10-CM

## 2021-04-16 ENCOUNTER — TELEPHONE (OUTPATIENT)
Dept: FAMILY MEDICINE | Facility: CLINIC | Age: 81
End: 2021-04-16

## 2021-04-18 ENCOUNTER — PATIENT MESSAGE (OUTPATIENT)
Dept: FAMILY MEDICINE | Facility: CLINIC | Age: 81
End: 2021-04-18

## 2021-05-13 ENCOUNTER — TELEPHONE (OUTPATIENT)
Dept: FAMILY MEDICINE | Facility: CLINIC | Age: 81
End: 2021-05-13

## 2021-05-19 ENCOUNTER — EXTERNAL HOME HEALTH (OUTPATIENT)
Dept: HOME HEALTH SERVICES | Facility: HOSPITAL | Age: 81
End: 2021-05-19

## 2021-06-21 ENCOUNTER — EXTERNAL HOME HEALTH (OUTPATIENT)
Dept: HOME HEALTH SERVICES | Facility: HOSPITAL | Age: 81
End: 2021-06-21

## 2021-07-07 ENCOUNTER — DOCUMENT SCAN (OUTPATIENT)
Dept: HOME HEALTH SERVICES | Facility: HOSPITAL | Age: 81
End: 2021-07-07

## 2021-07-16 ENCOUNTER — DOCUMENT SCAN (OUTPATIENT)
Dept: HOME HEALTH SERVICES | Facility: HOSPITAL | Age: 81
End: 2021-07-16

## 2021-07-23 ENCOUNTER — DOCUMENT SCAN (OUTPATIENT)
Dept: HOME HEALTH SERVICES | Facility: HOSPITAL | Age: 81
End: 2021-07-23

## 2021-10-04 DIAGNOSIS — M54.6 ACUTE RIGHT-SIDED THORACIC BACK PAIN: ICD-10-CM

## 2021-10-04 DIAGNOSIS — G57.93 NEUROPATHY OF BOTH FEET: ICD-10-CM

## 2021-10-04 RX ORDER — GABAPENTIN 100 MG/1
200 CAPSULE ORAL 3 TIMES DAILY
Qty: 180 CAPSULE | Refills: 1 | Status: SHIPPED | OUTPATIENT
Start: 2021-10-04 | End: 2021-12-07

## 2021-10-05 ENCOUNTER — OFFICE VISIT (OUTPATIENT)
Dept: PULMONOLOGY | Facility: CLINIC | Age: 81
End: 2021-10-05
Payer: MEDICARE

## 2021-10-05 VITALS
HEART RATE: 81 BPM | BODY MASS INDEX: 32.78 KG/M2 | OXYGEN SATURATION: 96 % | WEIGHT: 222 LBS | DIASTOLIC BLOOD PRESSURE: 72 MMHG | SYSTOLIC BLOOD PRESSURE: 126 MMHG

## 2021-10-05 DIAGNOSIS — J44.9 CHRONIC OBSTRUCTIVE PULMONARY DISEASE, UNSPECIFIED COPD TYPE: ICD-10-CM

## 2021-10-05 DIAGNOSIS — G47.33 OBSTRUCTIVE SLEEP APNEA SYNDROME: ICD-10-CM

## 2021-10-05 DIAGNOSIS — I27.20 PULMONARY HYPERTENSION: ICD-10-CM

## 2021-10-05 PROCEDURE — 3288F PR FALLS RISK ASSESSMENT DOCUMENTED: ICD-10-PCS | Mod: S$GLB,,, | Performed by: INTERNAL MEDICINE

## 2021-10-05 PROCEDURE — 1101F PT FALLS ASSESS-DOCD LE1/YR: CPT | Mod: S$GLB,,, | Performed by: INTERNAL MEDICINE

## 2021-10-05 PROCEDURE — 3078F PR MOST RECENT DIASTOLIC BLOOD PRESSURE < 80 MM HG: ICD-10-PCS | Mod: S$GLB,,, | Performed by: INTERNAL MEDICINE

## 2021-10-05 PROCEDURE — 1159F MED LIST DOCD IN RCRD: CPT | Mod: S$GLB,,, | Performed by: INTERNAL MEDICINE

## 2021-10-05 PROCEDURE — 99214 OFFICE O/P EST MOD 30 MIN: CPT | Mod: S$GLB,,, | Performed by: INTERNAL MEDICINE

## 2021-10-05 PROCEDURE — 99214 PR OFFICE/OUTPT VISIT, EST, LEVL IV, 30-39 MIN: ICD-10-PCS | Mod: S$GLB,,, | Performed by: INTERNAL MEDICINE

## 2021-10-05 PROCEDURE — 1160F PR REVIEW ALL MEDS BY PRESCRIBER/CLIN PHARMACIST DOCUMENTED: ICD-10-PCS | Mod: S$GLB,,, | Performed by: INTERNAL MEDICINE

## 2021-10-05 PROCEDURE — 3078F DIAST BP <80 MM HG: CPT | Mod: S$GLB,,, | Performed by: INTERNAL MEDICINE

## 2021-10-05 PROCEDURE — 1101F PR PT FALLS ASSESS DOC 0-1 FALLS W/OUT INJ PAST YR: ICD-10-PCS | Mod: S$GLB,,, | Performed by: INTERNAL MEDICINE

## 2021-10-05 PROCEDURE — 1159F PR MEDICATION LIST DOCUMENTED IN MEDICAL RECORD: ICD-10-PCS | Mod: S$GLB,,, | Performed by: INTERNAL MEDICINE

## 2021-10-05 PROCEDURE — 1160F RVW MEDS BY RX/DR IN RCRD: CPT | Mod: S$GLB,,, | Performed by: INTERNAL MEDICINE

## 2021-10-05 PROCEDURE — 3074F SYST BP LT 130 MM HG: CPT | Mod: S$GLB,,, | Performed by: INTERNAL MEDICINE

## 2021-10-05 PROCEDURE — 3288F FALL RISK ASSESSMENT DOCD: CPT | Mod: S$GLB,,, | Performed by: INTERNAL MEDICINE

## 2021-10-05 PROCEDURE — 3074F PR MOST RECENT SYSTOLIC BLOOD PRESSURE < 130 MM HG: ICD-10-PCS | Mod: S$GLB,,, | Performed by: INTERNAL MEDICINE

## 2021-11-01 ENCOUNTER — OFFICE VISIT (OUTPATIENT)
Dept: FAMILY MEDICINE | Facility: CLINIC | Age: 81
End: 2021-11-01
Payer: MEDICARE

## 2021-11-01 VITALS
WEIGHT: 224.19 LBS | SYSTOLIC BLOOD PRESSURE: 123 MMHG | HEIGHT: 69 IN | DIASTOLIC BLOOD PRESSURE: 53 MMHG | BODY MASS INDEX: 33.2 KG/M2 | OXYGEN SATURATION: 98 % | TEMPERATURE: 97 F | HEART RATE: 76 BPM

## 2021-11-01 DIAGNOSIS — M54.50 MIDLINE LOW BACK PAIN WITHOUT SCIATICA, UNSPECIFIED CHRONICITY: ICD-10-CM

## 2021-11-01 DIAGNOSIS — I50.42 CHRONIC COMBINED SYSTOLIC AND DIASTOLIC CONGESTIVE HEART FAILURE: Primary | Chronic | ICD-10-CM

## 2021-11-01 DIAGNOSIS — Z12.31 ENCOUNTER FOR SCREENING MAMMOGRAM FOR BREAST CANCER: ICD-10-CM

## 2021-11-01 DIAGNOSIS — I10 ESSENTIAL HYPERTENSION: ICD-10-CM

## 2021-11-01 DIAGNOSIS — I48.0 PAROXYSMAL ATRIAL FIBRILLATION: ICD-10-CM

## 2021-11-01 DIAGNOSIS — Z23 NEEDS FLU SHOT: ICD-10-CM

## 2021-11-01 DIAGNOSIS — Z79.01 CHRONIC ANTICOAGULATION: ICD-10-CM

## 2021-11-01 DIAGNOSIS — E11.21 TYPE 2 DIABETES MELLITUS WITH DIABETIC NEPHROPATHY, WITHOUT LONG-TERM CURRENT USE OF INSULIN: ICD-10-CM

## 2021-11-01 DIAGNOSIS — Z91.81 AT RISK FOR FALLING: ICD-10-CM

## 2021-11-01 DIAGNOSIS — Z12.31 SCREENING MAMMOGRAM FOR BREAST CANCER: ICD-10-CM

## 2021-11-01 PROCEDURE — 99214 OFFICE O/P EST MOD 30 MIN: CPT | Mod: 25,S$GLB,, | Performed by: INTERNAL MEDICINE

## 2021-11-01 PROCEDURE — 3078F DIAST BP <80 MM HG: CPT | Mod: S$GLB,,, | Performed by: INTERNAL MEDICINE

## 2021-11-01 PROCEDURE — 3074F SYST BP LT 130 MM HG: CPT | Mod: S$GLB,,, | Performed by: INTERNAL MEDICINE

## 2021-11-01 PROCEDURE — 1160F PR REVIEW ALL MEDS BY PRESCRIBER/CLIN PHARMACIST DOCUMENTED: ICD-10-PCS | Mod: S$GLB,,, | Performed by: INTERNAL MEDICINE

## 2021-11-01 PROCEDURE — G0008 FLU VACCINE - QUADRIVALENT - HIGH DOSE (65+) PRESERVATIVE FREE IM: ICD-10-PCS | Mod: S$GLB,,, | Performed by: INTERNAL MEDICINE

## 2021-11-01 PROCEDURE — 1159F MED LIST DOCD IN RCRD: CPT | Mod: S$GLB,,, | Performed by: INTERNAL MEDICINE

## 2021-11-01 PROCEDURE — 3078F PR MOST RECENT DIASTOLIC BLOOD PRESSURE < 80 MM HG: ICD-10-PCS | Mod: S$GLB,,, | Performed by: INTERNAL MEDICINE

## 2021-11-01 PROCEDURE — 3074F PR MOST RECENT SYSTOLIC BLOOD PRESSURE < 130 MM HG: ICD-10-PCS | Mod: S$GLB,,, | Performed by: INTERNAL MEDICINE

## 2021-11-01 PROCEDURE — 1100F PTFALLS ASSESS-DOCD GE2>/YR: CPT | Mod: S$GLB,,, | Performed by: INTERNAL MEDICINE

## 2021-11-01 PROCEDURE — 90662 FLU VACCINE - QUADRIVALENT - HIGH DOSE (65+) PRESERVATIVE FREE IM: ICD-10-PCS | Mod: S$GLB,,, | Performed by: INTERNAL MEDICINE

## 2021-11-01 PROCEDURE — G0008 ADMIN INFLUENZA VIRUS VAC: HCPCS | Mod: S$GLB,,, | Performed by: INTERNAL MEDICINE

## 2021-11-01 PROCEDURE — 1160F RVW MEDS BY RX/DR IN RCRD: CPT | Mod: S$GLB,,, | Performed by: INTERNAL MEDICINE

## 2021-11-01 PROCEDURE — 1100F PR PT FALLS ASSESS DOC 2+ FALLS/FALL W/INJURY/YR: ICD-10-PCS | Mod: S$GLB,,, | Performed by: INTERNAL MEDICINE

## 2021-11-01 PROCEDURE — 90662 IIV NO PRSV INCREASED AG IM: CPT | Mod: S$GLB,,, | Performed by: INTERNAL MEDICINE

## 2021-11-01 PROCEDURE — 1159F PR MEDICATION LIST DOCUMENTED IN MEDICAL RECORD: ICD-10-PCS | Mod: S$GLB,,, | Performed by: INTERNAL MEDICINE

## 2021-11-01 PROCEDURE — 3288F PR FALLS RISK ASSESSMENT DOCUMENTED: ICD-10-PCS | Mod: S$GLB,,, | Performed by: INTERNAL MEDICINE

## 2021-11-01 PROCEDURE — 99214 PR OFFICE/OUTPT VISIT, EST, LEVL IV, 30-39 MIN: ICD-10-PCS | Mod: 25,S$GLB,, | Performed by: INTERNAL MEDICINE

## 2021-11-01 PROCEDURE — 1126F PR PAIN SEVERITY QUANTIFIED, NO PAIN PRESENT: ICD-10-PCS | Mod: S$GLB,,, | Performed by: INTERNAL MEDICINE

## 2021-11-01 PROCEDURE — 3288F FALL RISK ASSESSMENT DOCD: CPT | Mod: S$GLB,,, | Performed by: INTERNAL MEDICINE

## 2021-11-01 PROCEDURE — 1126F AMNT PAIN NOTED NONE PRSNT: CPT | Mod: S$GLB,,, | Performed by: INTERNAL MEDICINE

## 2021-11-11 ENCOUNTER — HOSPITAL ENCOUNTER (OUTPATIENT)
Dept: RADIOLOGY | Facility: HOSPITAL | Age: 81
Discharge: HOME | End: 2021-11-11
Attending: INTERNAL MEDICINE
Payer: MEDICARE

## 2021-11-11 ENCOUNTER — TELEPHONE (OUTPATIENT)
Dept: CARDIOLOGY | Facility: CLINIC | Age: 81
End: 2021-11-11
Payer: MEDICARE

## 2021-11-11 DIAGNOSIS — Z12.31 SCREENING MAMMOGRAM FOR BREAST CANCER: ICD-10-CM

## 2021-11-11 PROCEDURE — 77067 SCR MAMMO BI INCL CAD: CPT | Mod: TC,PO

## 2021-11-19 ENCOUNTER — OFFICE VISIT (OUTPATIENT)
Dept: URGENT CARE | Facility: CLINIC | Age: 81
End: 2021-11-19
Payer: MEDICARE

## 2021-11-19 VITALS
BODY MASS INDEX: 32.93 KG/M2 | SYSTOLIC BLOOD PRESSURE: 154 MMHG | OXYGEN SATURATION: 93 % | WEIGHT: 223 LBS | HEART RATE: 69 BPM | TEMPERATURE: 97 F | DIASTOLIC BLOOD PRESSURE: 82 MMHG

## 2021-11-19 DIAGNOSIS — S92.354A CLOSED NONDISPLACED FRACTURE OF FIFTH METATARSAL BONE OF RIGHT FOOT, INITIAL ENCOUNTER: Primary | ICD-10-CM

## 2021-11-19 DIAGNOSIS — S99.921A FOOT INJURY, RIGHT, INITIAL ENCOUNTER: ICD-10-CM

## 2021-11-19 PROCEDURE — 1159F PR MEDICATION LIST DOCUMENTED IN MEDICAL RECORD: ICD-10-PCS | Mod: CPTII,S$GLB,, | Performed by: STUDENT IN AN ORGANIZED HEALTH CARE EDUCATION/TRAINING PROGRAM

## 2021-11-19 PROCEDURE — 3079F PR MOST RECENT DIASTOLIC BLOOD PRESSURE 80-89 MM HG: ICD-10-PCS | Mod: CPTII,S$GLB,, | Performed by: STUDENT IN AN ORGANIZED HEALTH CARE EDUCATION/TRAINING PROGRAM

## 2021-11-19 PROCEDURE — 3077F SYST BP >= 140 MM HG: CPT | Mod: CPTII,S$GLB,, | Performed by: STUDENT IN AN ORGANIZED HEALTH CARE EDUCATION/TRAINING PROGRAM

## 2021-11-19 PROCEDURE — 3077F PR MOST RECENT SYSTOLIC BLOOD PRESSURE >= 140 MM HG: ICD-10-PCS | Mod: CPTII,S$GLB,, | Performed by: STUDENT IN AN ORGANIZED HEALTH CARE EDUCATION/TRAINING PROGRAM

## 2021-11-19 PROCEDURE — 99204 PR OFFICE/OUTPT VISIT, NEW, LEVL IV, 45-59 MIN: ICD-10-PCS | Mod: 25,S$GLB,, | Performed by: STUDENT IN AN ORGANIZED HEALTH CARE EDUCATION/TRAINING PROGRAM

## 2021-11-19 PROCEDURE — 29515 PR APPLY LOWER LEG SPLINT: ICD-10-PCS | Mod: RT,S$GLB,, | Performed by: STUDENT IN AN ORGANIZED HEALTH CARE EDUCATION/TRAINING PROGRAM

## 2021-11-19 PROCEDURE — 1160F RVW MEDS BY RX/DR IN RCRD: CPT | Mod: CPTII,S$GLB,, | Performed by: STUDENT IN AN ORGANIZED HEALTH CARE EDUCATION/TRAINING PROGRAM

## 2021-11-19 PROCEDURE — 1125F PR PAIN SEVERITY QUANTIFIED, PAIN PRESENT: ICD-10-PCS | Mod: CPTII,S$GLB,, | Performed by: STUDENT IN AN ORGANIZED HEALTH CARE EDUCATION/TRAINING PROGRAM

## 2021-11-19 PROCEDURE — 99204 OFFICE O/P NEW MOD 45 MIN: CPT | Mod: 25,S$GLB,, | Performed by: STUDENT IN AN ORGANIZED HEALTH CARE EDUCATION/TRAINING PROGRAM

## 2021-11-19 PROCEDURE — 29515 APPLICATION SHORT LEG SPLINT: CPT | Mod: RT,S$GLB,, | Performed by: STUDENT IN AN ORGANIZED HEALTH CARE EDUCATION/TRAINING PROGRAM

## 2021-11-19 PROCEDURE — 1125F AMNT PAIN NOTED PAIN PRSNT: CPT | Mod: CPTII,S$GLB,, | Performed by: STUDENT IN AN ORGANIZED HEALTH CARE EDUCATION/TRAINING PROGRAM

## 2021-11-19 PROCEDURE — 3079F DIAST BP 80-89 MM HG: CPT | Mod: CPTII,S$GLB,, | Performed by: STUDENT IN AN ORGANIZED HEALTH CARE EDUCATION/TRAINING PROGRAM

## 2021-11-19 PROCEDURE — 1160F PR REVIEW ALL MEDS BY PRESCRIBER/CLIN PHARMACIST DOCUMENTED: ICD-10-PCS | Mod: CPTII,S$GLB,, | Performed by: STUDENT IN AN ORGANIZED HEALTH CARE EDUCATION/TRAINING PROGRAM

## 2021-11-19 PROCEDURE — 1159F MED LIST DOCD IN RCRD: CPT | Mod: CPTII,S$GLB,, | Performed by: STUDENT IN AN ORGANIZED HEALTH CARE EDUCATION/TRAINING PROGRAM

## 2021-11-19 RX ORDER — NAPROXEN 500 MG/1
500 TABLET ORAL 2 TIMES DAILY
Qty: 14 TABLET | Refills: 0 | Status: SHIPPED | OUTPATIENT
Start: 2021-11-19 | End: 2021-11-24

## 2022-03-02 ENCOUNTER — OFFICE VISIT (OUTPATIENT)
Dept: FAMILY MEDICINE | Facility: CLINIC | Age: 82
End: 2022-03-02
Payer: MEDICARE

## 2022-03-02 VITALS
DIASTOLIC BLOOD PRESSURE: 59 MMHG | HEART RATE: 80 BPM | BODY MASS INDEX: 33.47 KG/M2 | WEIGHT: 226 LBS | SYSTOLIC BLOOD PRESSURE: 114 MMHG | HEIGHT: 69 IN

## 2022-03-02 DIAGNOSIS — M80.80XS OTHER OSTEOPOROSIS WITH CURRENT PATHOLOGICAL FRACTURE, SEQUELA: Chronic | ICD-10-CM

## 2022-03-02 DIAGNOSIS — E78.2 MIXED DYSLIPIDEMIA: Chronic | ICD-10-CM

## 2022-03-02 DIAGNOSIS — Z95.810 CARDIOMYOPATHY WITH IMPLANTABLE CARDIOVERTER-DEFIBRILLATOR: ICD-10-CM

## 2022-03-02 DIAGNOSIS — I50.42 CHRONIC COMBINED SYSTOLIC AND DIASTOLIC CONGESTIVE HEART FAILURE: Primary | ICD-10-CM

## 2022-03-02 DIAGNOSIS — R26.2 DIFFICULTY WALKING: Chronic | ICD-10-CM

## 2022-03-02 DIAGNOSIS — Z79.01 CHRONIC ANTICOAGULATION: Chronic | ICD-10-CM

## 2022-03-02 DIAGNOSIS — I48.0 PAROXYSMAL ATRIAL FIBRILLATION: Chronic | ICD-10-CM

## 2022-03-02 DIAGNOSIS — L75.0 URINARY BODY ODOR: ICD-10-CM

## 2022-03-02 DIAGNOSIS — E11.21 TYPE 2 DIABETES MELLITUS WITH DIABETIC NEPHROPATHY, WITHOUT LONG-TERM CURRENT USE OF INSULIN: ICD-10-CM

## 2022-03-02 DIAGNOSIS — R94.30 EJECTION FRACTION < 50%: ICD-10-CM

## 2022-03-02 DIAGNOSIS — I10 ESSENTIAL HYPERTENSION: ICD-10-CM

## 2022-03-02 DIAGNOSIS — M54.50 MIDLINE LOW BACK PAIN WITHOUT SCIATICA, UNSPECIFIED CHRONICITY: ICD-10-CM

## 2022-03-02 DIAGNOSIS — R30.0 DYSURIA: ICD-10-CM

## 2022-03-02 DIAGNOSIS — N31.9 NEUROGENIC BLADDER: Chronic | ICD-10-CM

## 2022-03-02 DIAGNOSIS — N39.490 OVERFLOW INCONTINENCE OF URINE: ICD-10-CM

## 2022-03-02 DIAGNOSIS — I42.9 CARDIOMYOPATHY WITH IMPLANTABLE CARDIOVERTER-DEFIBRILLATOR: ICD-10-CM

## 2022-03-02 PROCEDURE — 1126F AMNT PAIN NOTED NONE PRSNT: CPT | Mod: S$GLB,,, | Performed by: INTERNAL MEDICINE

## 2022-03-02 PROCEDURE — 99214 PR OFFICE/OUTPT VISIT, EST, LEVL IV, 30-39 MIN: ICD-10-PCS | Mod: S$GLB,,, | Performed by: INTERNAL MEDICINE

## 2022-03-02 PROCEDURE — 1101F PR PT FALLS ASSESS DOC 0-1 FALLS W/OUT INJ PAST YR: ICD-10-PCS | Mod: S$GLB,,, | Performed by: INTERNAL MEDICINE

## 2022-03-02 PROCEDURE — 3078F DIAST BP <80 MM HG: CPT | Mod: S$GLB,,, | Performed by: INTERNAL MEDICINE

## 2022-03-02 PROCEDURE — 99214 OFFICE O/P EST MOD 30 MIN: CPT | Mod: S$GLB,,, | Performed by: INTERNAL MEDICINE

## 2022-03-02 PROCEDURE — 3288F PR FALLS RISK ASSESSMENT DOCUMENTED: ICD-10-PCS | Mod: S$GLB,,, | Performed by: INTERNAL MEDICINE

## 2022-03-02 PROCEDURE — 3074F SYST BP LT 130 MM HG: CPT | Mod: S$GLB,,, | Performed by: INTERNAL MEDICINE

## 2022-03-02 PROCEDURE — 1126F PR PAIN SEVERITY QUANTIFIED, NO PAIN PRESENT: ICD-10-PCS | Mod: S$GLB,,, | Performed by: INTERNAL MEDICINE

## 2022-03-02 PROCEDURE — 3074F PR MOST RECENT SYSTOLIC BLOOD PRESSURE < 130 MM HG: ICD-10-PCS | Mod: S$GLB,,, | Performed by: INTERNAL MEDICINE

## 2022-03-02 PROCEDURE — 3078F PR MOST RECENT DIASTOLIC BLOOD PRESSURE < 80 MM HG: ICD-10-PCS | Mod: S$GLB,,, | Performed by: INTERNAL MEDICINE

## 2022-03-02 PROCEDURE — 1101F PT FALLS ASSESS-DOCD LE1/YR: CPT | Mod: S$GLB,,, | Performed by: INTERNAL MEDICINE

## 2022-03-02 PROCEDURE — 1159F PR MEDICATION LIST DOCUMENTED IN MEDICAL RECORD: ICD-10-PCS | Mod: S$GLB,,, | Performed by: INTERNAL MEDICINE

## 2022-03-02 PROCEDURE — 1159F MED LIST DOCD IN RCRD: CPT | Mod: S$GLB,,, | Performed by: INTERNAL MEDICINE

## 2022-03-02 PROCEDURE — 1160F PR REVIEW ALL MEDS BY PRESCRIBER/CLIN PHARMACIST DOCUMENTED: ICD-10-PCS | Mod: S$GLB,,, | Performed by: INTERNAL MEDICINE

## 2022-03-02 PROCEDURE — 3288F FALL RISK ASSESSMENT DOCD: CPT | Mod: S$GLB,,, | Performed by: INTERNAL MEDICINE

## 2022-03-02 PROCEDURE — 1160F RVW MEDS BY RX/DR IN RCRD: CPT | Mod: S$GLB,,, | Performed by: INTERNAL MEDICINE

## 2022-03-02 RX ORDER — OXYBUTYNIN CHLORIDE 5 MG/1
5 TABLET ORAL 2 TIMES DAILY
Qty: 60 TABLET | Refills: 2 | Status: SHIPPED | OUTPATIENT
Start: 2022-03-02 | End: 2022-05-30 | Stop reason: SDUPTHER

## 2022-03-02 RX ORDER — DIGOXIN 125 MCG
62.5 TABLET ORAL
Status: ON HOLD | COMMUNITY
End: 2023-05-18 | Stop reason: SDUPTHER

## 2022-03-02 NOTE — PROGRESS NOTES
Subjective:       Patient ID: Jo Alegre is a 81 y.o. female.    Chief Complaint: Hypertension, Congestive Heart Failure, Diabetes, Atrial Fibrillation, Back Pain, and Urinary Incontinence    Patient is 81-year-old  female who comes for follow-up.  Underlying medical issues are as below:-.    1. Chronic combined systolic and diastolic congestive heart failure   2. Paroxysmal atrial fibrillation   3. Essential hypertension   4. Chronic anticoagulation   5. Type 2 diabetes mellitus with diabetic nephropathy, without long-term current use of insulin   6. Midline low back pain without sciatica, unspecified chronicity   7.         Simple obesity with BMI of 33.  8.         Difficulty walking and use of a quad walker with seat.  9.         Osteoporosis.  Currently on injection Prolia.      Last set of labs were done sometimes last year.  She does have a appointment with Dr. Jones in future for labs.    Generally she is fairly stable with medical conditions including heart failure, blood pressure and blood sugars.    Off late perhaps for the last several months or so, she has been experiencing episodes of incontinence or inability to hold her bladder.  Previously it used to be that she would get adequate time to reach the bathroom so that she dis not soil or leak.  Now it happens without warning and if she does not immediately or quickly make to the bathroom she will have already leaked and frankly urinated in her under wears and even pants or dress. She has to frequently change them.    Sometimes earlier this year she might have been diagnosed with bladder infection.  At this point she is not sure if she has any burning sensation in the urine.    Diabetes  She presents for her follow-up diabetic visit. She has type 2 diabetes mellitus. No MedicAlert identification noted. Her disease course has been stable. Hypoglycemia symptoms include tremors. Pertinent negatives for hypoglycemia include no confusion,  dizziness, headaches, nervousness/anxiousness, pallor, seizures or speech difficulty. Associated symptoms include fatigue. Pertinent negatives for diabetes include no chest pain, no polydipsia, no polyphagia, no polyuria and no weakness. Risk factors for coronary artery disease include post-menopausal, obesity, sedentary lifestyle and dyslipidemia. She is compliant with treatment some of the time. An ACE inhibitor/angiotensin II receptor blocker is being taken (Entresto).   Hypertension  This is a chronic problem. The current episode started more than 1 year ago. The problem is controlled. Associated symptoms include anxiety and malaise/fatigue. Pertinent negatives include no chest pain, headaches, palpitations or shortness of breath. Risk factors for coronary artery disease include post-menopausal state, obesity, sedentary lifestyle and dyslipidemia. Past treatments include diuretics and angiotensin blockers (Entresto). The current treatment provides moderate improvement. Compliance problems include psychosocial issues.    Hyperlipidemia  This is a chronic problem. The current episode started more than 1 year ago. The problem is controlled. Exacerbating diseases include obesity. Pertinent negatives include no chest pain or shortness of breath. Current antihyperlipidemic treatment includes statins. The current treatment provides moderate improvement of lipids. Compliance problems include psychosocial issues.  Risk factors for coronary artery disease include a sedentary lifestyle, hypertension, dyslipidemia and obesity.   Back Pain  This is a new problem. The current episode started more than 1 month ago. The problem occurs constantly. The problem is unchanged. The pain is present in the lumbar spine. The pain is at a severity of 7/10. The pain is moderate. Associated symptoms include numbness (Numbness in the left 3 digits of the left foot.). Pertinent negatives include no chest pain, dysuria, headaches, pelvic pain  or weakness. Risk factors include obesity, menopause and sedentary lifestyle. She has tried bed rest for the symptoms. The treatment provided no relief.   Atrial Fibrillation  Presents for follow-up visit. Symptoms include hypertension. Symptoms are negative for bradycardia, chest pain, dizziness, hypotension, palpitations, shortness of breath, syncope, tachycardia and weakness. The symptoms have been stable. Past medical history includes atrial fibrillation and hyperlipidemia. Medication compliance problems include psychosocial issues.       Past Medical History:   Diagnosis Date    Allergy     Codeine, Lasix    Atrial fibrillation     Atrial fibrillation     Cataract     CHF (congestive heart failure)     Diabetes mellitus, type 2     Ejection fraction < 50% 10/18/2017    Approximately 35%  Based on prior  Echocardiogram.    Encounter for blood transfusion     Hyperlipidemia     Osteoporosis     Thyroid disorder screening 10/17/2017    TSH of 1.12 ordered by Dr. dee Jones     Social History     Socioeconomic History    Marital status:     Number of children: 4   Occupational History    Occupation:    Tobacco Use    Smoking status: Former Smoker    Smokeless tobacco: Never Used    Tobacco comment: quit 2013   Substance and Sexual Activity    Alcohol use: No    Drug use: No    Sexual activity: Not Currently   Social History Narrative    - Drives and lives alone.     Social Determinants of Health     Financial Resource Strain: Low Risk     Difficulty of Paying Living Expenses: Not very hard   Food Insecurity: No Food Insecurity    Worried About Running Out of Food in the Last Year: Never true    Ran Out of Food in the Last Year: Never true   Transportation Needs: Unmet Transportation Needs    Lack of Transportation (Medical): Yes    Lack of Transportation (Non-Medical): Yes   Physical Activity: Inactive    Days of Exercise per Week: 0 days    Minutes of  Exercise per Session: 0 min   Stress: Stress Concern Present    Feeling of Stress : To some extent   Social Connections: Moderately Isolated    Frequency of Communication with Friends and Family: More than three times a week    Frequency of Social Gatherings with Friends and Family: More than three times a week    Attends Buddhism Services: Never    Active Member of Clubs or Organizations: No    Attends Club or Organization Meetings: 1 to 4 times per year    Marital Status:    Housing Stability: Low Risk     Unable to Pay for Housing in the Last Year: No    Number of Places Lived in the Last Year: 1    Unstable Housing in the Last Year: No     Past Surgical History:   Procedure Laterality Date    [place]     COLONOSCOPY      EYE SURGERY      bilateral cataracts    FINGER SURGERY Right 2021    right pinky finger    FRACTURE SURGERY  2014    right femur with neville    HEMORRHOID SURGERY      48 yrs ago    HYSTERECTOMY      REPLACEMENT OF IMPLANTABLE CARDIOVERTER-DEFIBRILLATOR (ICD) GENERATOR N/A 12/13/2019    Procedure: REPLACEMENT, PULSE GENERATOR, ICD-MEDTRONIC;  Surgeon: Sebastian Nowak III, MD;  [facility]: [facility];  Service: Cardiology;  Laterality: N/A;    TONSILLECTOMY       Family History   Problem Relation Age of Onset    Heart disease Mother     Cancer Father         Lung Cancer ??? Asbestos    Breast cancer Paternal Aunt     Breast cancer Maternal Grandmother     Breast cancer Maternal Aunt        Review of Systems   Constitutional: Positive for activity change (Somewhat more cautious while walking), fatigue and malaise/fatigue. Negative for appetite change, chills, diaphoresis and unexpected weight change (Patient has lost 5 lb weight.).   HENT: Negative for congestion, ear discharge and postnasal drip.    Eyes: Negative for discharge, redness and visual disturbance.   Respiratory: Negative for chest tightness and shortness of breath.   "  Cardiovascular: Negative for chest pain, palpitations, leg swelling and syncope.        History of defibrillator, congestive cardiomyopathy hypertension and dyslipidemia   Gastrointestinal: Negative for abdominal distention, anal bleeding, constipation and diarrhea.        Pellet-like stools and lot of gas.   Endocrine: Negative for cold intolerance, polydipsia, polyphagia and polyuria.        Diabetes mellitus on glimepiride.  Osteoporosis on injection Prolia   Genitourinary: Positive for enuresis. Negative for difficulty urinating, dysuria, flank pain, frequency and pelvic pain.        Symptoms of neurogenic bladder with lack control and sudden expulsion of urine.   Musculoskeletal: Positive for back pain and gait problem (Slow and antalgic gait). Negative for arthralgias and joint swelling.        New onset of low back pain.  (Not sure if this is really new onset of back pain or she had had similar back pains in past) no radiation this time.   Skin: Positive for color change. Negative for pallor, rash and wound.        Feet turns purplish   Allergic/Immunologic: Negative for environmental allergies, food allergies and immunocompromised state.   Neurological: Positive for tremors and numbness (Numbness in the left 3 digits of the left foot.). Negative for dizziness, seizures, syncope, facial asymmetry, speech difficulty, weakness, light-headedness and headaches.        Last presentation of headache is certainly dissipated away.  Shakes and tremors in hands which are getting more bothersome.   Hematological: Negative for adenopathy. Bruises/bleeds easily.        Patient is on chronic anticoagulation with warfarin.     Psychiatric/Behavioral: Negative for agitation, confusion and dysphoric mood. The patient is not nervous/anxious.         Patient denies any cognitive issues.         Objective:      Blood pressure (!) 114/59, pulse 80, height 5' 9" (1.753 m), weight 102.5 kg (226 lb). Body mass index is 33.37 " kg/m².  Physical Exam  Constitutional:       General: She is not in acute distress.     Appearance: She is well-developed. She is obese. She is ill-appearing. She is not toxic-appearing or diaphoretic.      Comments: Patient is obese with a BMI of 33.37   HENT:      Head: Normocephalic and atraumatic.      Comments: .  Eyes:      General: No visual field deficit or scleral icterus.        Right eye: No discharge.         Left eye: No discharge.   Neck:      Thyroid: No thyromegaly.      Vascular: No JVD.      Trachea: No tracheal deviation.   Cardiovascular:      Rate and Rhythm: Normal rate and regular rhythm.      Pulses:           Dorsalis pedis pulses are 1+ on the right side and 1+ on the left side.      Heart sounds:     No friction rub. No gallop.   Pulmonary:      Effort: Pulmonary effort is normal. No respiratory distress.      Breath sounds: Normal breath sounds. No stridor. No wheezing or rhonchi.   Abdominal:      General: There is no distension.      Palpations: Abdomen is soft.      Tenderness: There is no abdominal tenderness.   Musculoskeletal:         General: No tenderness or deformity.      Cervical back: Neck supple.      Right foot: Decreased range of motion.      Left foot: Decreased range of motion.   Feet:      Right foot:      Protective Sensation: 5 sites tested. 3 sites sensed.      Skin integrity: No ulcer or blister.      Toenail Condition: Right toenails are abnormally thick.      Left foot:      Protective Sensation: 5 sites tested. 3 sites sensed.      Skin integrity: No ulcer or blister.      Toenail Condition: Left toenails are abnormally thick.      Comments: Somewhat diminished monofilament sensations.  Diminished dorsalis pedis pulsations.  Dystrophic nails bilaterally.  Needs training.  Lymphadenopathy:      Cervical: No cervical adenopathy.   Skin:     General: Skin is warm and dry.      Coloration: Skin is not pale.      Findings: No lesion or rash.   Neurological:      Mental  Status: She is alert. She is not disoriented.      Cranial Nerves: No cranial nerve deficit, dysarthria or facial asymmetry.      Motor: Tremor present. No weakness, abnormal muscle tone or seizure activity.      Deep Tendon Reflexes: Reflexes normal.      Comments: Slightly coarse times on both the hands.  No tremors at rest.  No hypertonia.  Gait is slow and antalgic but not festinant.   Psychiatric:         Mood and Affect: Mood is anxious.         Behavior: Behavior normal.         Thought Content: Thought content normal.      Comments: Somewhat anhedonic and antalgic           Assessment:       1. Chronic combined systolic and diastolic congestive heart failure    2. Paroxysmal atrial fibrillation    3. Essential hypertension    4. Chronic anticoagulation    5. Type 2 diabetes mellitus with diabetic nephropathy, without long-term current use of insulin    6. Midline low back pain without sciatica, unspecified chronicity    7. Neurogenic bladder    8. Urinary body odor    9. Mixed dyslipidemia    10. Cardiomyopathy with implantable cardioverter-defibrillator    11. Ejection fraction < 50%    12. Difficulty walking    13. Other osteoporosis with current pathological fracture, sequela    14. Dysuria    15. Overflow incontinence of urine           No visits with results within 3 Month(s) from this visit.   Latest known visit with results is:   Office Visit on 03/12/2021   Component Date Value Ref Range Status    POC Blood, Urine 03/12/2021 Positive (A) Negative Final    POC Bilirubin, Urine 03/12/2021 Positive (A) Negative Final    POC Urobilinogen, Urine 03/12/2021 norm  0.2 - 8 Final    POC Ketones, Urine 03/12/2021 Negative  Negative Final    POC Protein, Urine 03/12/2021 Positive (A) Negative Final    POC Nitrates, Urine 03/12/2021 Negative  Negative Final    POC Glucose, Urine 03/12/2021 Negative  Negative Final    pH, UA 03/12/2021 5.5  5.0 - 8.5 Final    POC Specific Gravity, Urine 03/12/2021 1.020   1.000 - 1.030 Final    POC Leukocytes, Urine 03/12/2021 Positive (A) Negative Final    Color, UA 03/12/2021 Yellow  Light Yellow, Yellow Final    Clarity, UA 03/12/2021 Cloudy (A) Clear Final    Urine Culture, Routine 03/12/2021 Multiple organisms isolated. None in predominance.  Repeat if   Final    Urine Culture, Routine 03/12/2021 clinically necessary.   Final         Plan:           Chronic combined systolic and diastolic congestive heart failure  -     Comprehensive Metabolic Panel; Future; Expected date: 06/02/2022  -     Magnesium; Future; Expected date: 06/02/2022    Paroxysmal atrial fibrillation  Comments:  Currently on Xarelto.  Orders:  -     Comprehensive Metabolic Panel; Future; Expected date: 06/02/2022  -     Magnesium; Future; Expected date: 06/02/2022    Essential hypertension  -     Comprehensive Metabolic Panel; Future; Expected date: 06/02/2022    Chronic anticoagulation  Comments:  Patient continues on Xarelto.  Fall and injury precautions discussed.    Type 2 diabetes mellitus with diabetic nephropathy, without long-term current use of insulin  -     Microalbumin/Creatinine Ratio, Urine; Future; Expected date: 06/02/2022  -     Hemoglobin A1C; Future; Expected date: 06/02/2022    Midline low back pain without sciatica, unspecified chronicity    Neurogenic bladder  Comments:  New onset of sudden leakage of urine.  Check urine for infection.  Trial of Ditropan.  Orders:  -     Urinalysis; Future; Expected date: 03/03/2022  -     Urine culture; Future; Expected date: 03/03/2022  -     oxybutynin (DITROPAN) 5 MG Tab; Take 1 tablet (5 mg total) by mouth 2 (two) times daily.  Dispense: 60 tablet; Refill: 2    Urinary body odor  -     Urinalysis; Future; Expected date: 03/03/2022  -     Urine culture; Future; Expected date: 03/03/2022    Mixed dyslipidemia  Comments:  Currently on atorvastatin 40 mg.  Orders:  -     Lipid Panel; Future; Expected date: 06/02/2022    Cardiomyopathy with  implantable cardioverter-defibrillator    Ejection fraction < 50%    Difficulty walking  Comments:  Continue with fall and injury precautions.  Patient uses a quad walker with seat.    Other osteoporosis with current pathological fracture, sequela  Comments:  Currently on injection Prolia every 6 months.    Dysuria  -     Urine culture; Future; Expected date: 03/03/2022    Overflow incontinence of urine  -     oxybutynin (DITROPAN) 5 MG Tab; Take 1 tablet (5 mg total) by mouth 2 (two) times daily.  Dispense: 60 tablet; Refill: 2    Patient is generally doing okay.    New complain of bladder incontinence have been noted.    Previously she used to have enough time to reach the bathroom but at this point she just simply passes urine out.  She did have a bladder infection and she still has some symptoms of dysuria and urinary odor.  I will check a urine culture and urinalysis to be sure that we are not dealing with a infection.    Heart failure symptoms are stable at this point.  She is on treatment with Entresto, Lasix.    Intermittently she had hypotension and was put on a small dose of midodrine.  Suspicion of orthostasis.    She continues with her quad walker which has a seat.  Fall precautions to continue.  She is updated on COVID vaccines.    My record indicates that we do not have any information on her pneumonia vaccine or shingles vaccine or tetanus vaccination.  I will request the daughter or patient to see if they can get her vaccination records from [facility] and forwarded to me.    Advised Ms. Alegre about age and season appropriate immunizations/ cancer screenings.  Also seasonal influenza vaccine, update on tetanus diphtheria vaccination every 10 years.        Patient has been advised to watch diet and exercise. Avoid fried and fatty food. Compliance to medications and follow up urged.    Please utilize precautions for current COVID-19 pandemic.  Try to avoid crowds or close contact with multiple  people.  Minimize outside interaction.  Wash hands with soap for  frequently upon contact.Use face mask or cover.  Advised Ms. Alegre to monitor Blood sugars at home and record them.  Exercise, watch diet and loose weight.  keep a close eye on feet and keep them clean. Annual eye examination. Annual influenza vaccine.  Monitor HgbA1c every 3 to 6 months. Monitor urine microalbumin every year.keep LDL less than 100. Monitor blood pressure and target blood pressure 120/70.      Follow-up in 4 months time.    Spent aleks 30 minutes with patient which involved review of pts medical conditions, labs, medications and with 50% of time face-to-face discussion about medical problems, management and any applicable changes.        Current Outpatient Medications:     atorvastatin (LIPITOR) 40 MG tablet, Take 40 mg by mouth once daily., Disp: , Rfl:     Ca-D3-mag ox-zinc--thom-bor 600 mg calcium- 20 mcg-50 mg Tab, Take 1 tablet by mouth 2 (two) times daily., Disp: , Rfl:     cholecalciferol, vitamin D3, 125 mcg (5,000 unit) Tab, Take 5,000 Units by mouth 2 (two) times daily., Disp: , Rfl:     digoxin (LANOXIN) 125 mcg tablet, Take 125 mcg by mouth every Mon, Wed, Fri., Disp: , Rfl:     gabapentin (NEURONTIN) 100 MG capsule, TAKE 2 CAPSULES(200 MG) BY MOUTH THREE TIMES DAILY, Disp: 180 capsule, Rfl: 1    glimepiride (AMARYL) 1 MG tablet, Take 1 tablet (1 mg total) by mouth before breakfast., Disp: 90 tablet, Rfl: 1    Lactobacillus rhamnosus GG (CULTURELLE) 10 billion cell capsule, Take 1 capsule by mouth once daily., Disp: , Rfl:     midodrine (PROAMATINE) 2.5 MG Tab, Take 2.5 mg by mouth 3 (three) times daily., Disp: , Rfl:     rivaroxaban (XARELTO) 15 mg Tab, Take 15 mg by mouth daily with dinner or evening meal., Disp: , Rfl:     sacubitriL-valsartan (ENTRESTO) 24-26 mg per tablet, Take 1 tablet by mouth once daily. , Disp: , Rfl:     torsemide (DEMADEX) 20 MG Tab, Take 1 tablet (20 mg total) by mouth  once daily. for 14 days, Disp: 14 tablet, Rfl: 0    denosumab (PROLIA) 60 mg/mL Syrg, Inject 1 mL (60 mg total) into the skin every 6 (six) months. (Patient not taking: Reported on 3/31/2021), Disp: 2 mL, Rfl: 1    oxybutynin (DITROPAN) 5 MG Tab, Take 1 tablet (5 mg total) by mouth 2 (two) times daily., Disp: 60 tablet, Rfl: 2  No current facility-administered medications for this visit.    Facility-Administered Medications Ordered in Other Visits:     0.9%  NaCl infusion, , Intravenous, Continuous, Sebastian Nowak III, MD, Last Rate: 75 mL/hr at 12/13/19 0728, New Bag at 12/13/19 0728    diphenhydrAMINE injection 25 mg, 25 mg, Intravenous, Once, Sebastian Nowak III, MD    lorazepam injection 1 mg, 1 mg, Intravenous, Once, Sebastian Nowak III, MD

## 2022-03-06 LAB
ALBUMIN/CREAT UR: 169 MCG/MG CREAT
BACTERIA UR CULT: ABNORMAL
CREAT UR-MCNC: 188 MG/DL (ref 20–275)
MICROALBUMIN UR-MCNC: 31.8 MG/DL

## 2022-03-09 ENCOUNTER — PATIENT MESSAGE (OUTPATIENT)
Dept: FAMILY MEDICINE | Facility: CLINIC | Age: 82
End: 2022-03-09
Payer: MEDICARE

## 2022-03-09 DIAGNOSIS — A49.8 BACTERIAL INFECTION DUE TO PROTEUS MIRABILIS: Primary | ICD-10-CM

## 2022-03-09 DIAGNOSIS — N39.0 URINARY TRACT INFECTION WITHOUT HEMATURIA, SITE UNSPECIFIED: ICD-10-CM

## 2022-03-09 RX ORDER — AMOXICILLIN AND CLAVULANATE POTASSIUM 500; 125 MG/1; MG/1
1 TABLET, FILM COATED ORAL 3 TIMES DAILY
Qty: 15 TABLET | Refills: 0 | Status: SHIPPED | OUTPATIENT
Start: 2022-03-09 | End: 2022-07-07

## 2022-03-09 NOTE — TELEPHONE ENCOUNTER
Bacterial infection due to Proteus mirabilis  -     amoxicillin-clavulanate 500-125mg (AUGMENTIN) 500-125 mg Tab; Take 1 tablet (500 mg total) by mouth 3 (three) times daily.  Dispense: 15 tablet; Refill: 0    Urinary tract infection without hematuria, site unspecified  -     amoxicillin-clavulanate 500-125mg (AUGMENTIN) 500-125 mg Tab; Take 1 tablet (500 mg total) by mouth 3 (three) times daily.  Dispense: 15 tablet; Refill: 0    Urine culture has been reviewed and it shows Proteus mirabilis at 50K-100K counts.  Patient has no history of kidney stones.  Patient is feeling a little better.  I am not sure if she is retaining urine.  Discussed with patient.  Will treat with Augmentin.

## 2022-03-31 ENCOUNTER — TELEPHONE (OUTPATIENT)
Dept: FAMILY MEDICINE | Facility: CLINIC | Age: 82
End: 2022-03-31
Payer: MEDICARE

## 2022-03-31 NOTE — TELEPHONE ENCOUNTER
Patient called stating her oxybutynin was causing her cotton mouth. She wanted to know if she could cut back on the medication

## 2022-05-15 ENCOUNTER — PATIENT MESSAGE (OUTPATIENT)
Dept: FAMILY MEDICINE | Facility: CLINIC | Age: 82
End: 2022-05-15

## 2022-05-19 DIAGNOSIS — G57.93 NEUROPATHY OF BOTH FEET: ICD-10-CM

## 2022-05-19 DIAGNOSIS — M54.6 ACUTE RIGHT-SIDED THORACIC BACK PAIN: ICD-10-CM

## 2022-05-20 RX ORDER — GABAPENTIN 100 MG/1
CAPSULE ORAL
Qty: 180 CAPSULE | Refills: 5 | Status: SHIPPED | OUTPATIENT
Start: 2022-05-20 | End: 2023-03-21 | Stop reason: SDUPTHER

## 2022-07-05 LAB
ALBUMIN SERPL-MCNC: 4.1 G/DL (ref 3.6–5.1)
ALBUMIN/CREAT UR: 258 MCG/MG CREAT
ALBUMIN/GLOB SERPL: 1.4 (CALC) (ref 1–2.5)
ALP SERPL-CCNC: 87 U/L (ref 37–153)
ALT SERPL-CCNC: 12 U/L (ref 6–29)
APPEARANCE UR: ABNORMAL
AST SERPL-CCNC: 13 U/L (ref 10–35)
BACTERIA #/AREA URNS HPF: ABNORMAL /HPF
BACTERIA UR CULT: NORMAL
BILIRUB SERPL-MCNC: 0.7 MG/DL (ref 0.2–1.2)
BILIRUB UR QL STRIP: NEGATIVE
BUN SERPL-MCNC: 26 MG/DL (ref 7–25)
BUN/CREAT SERPL: 13 (CALC) (ref 6–22)
CALCIUM SERPL-MCNC: 11.1 MG/DL (ref 8.6–10.4)
CHLORIDE SERPL-SCNC: 104 MMOL/L (ref 98–110)
CHOLEST SERPL-MCNC: 255 MG/DL
CHOLEST/HDLC SERPL: 4.2 (CALC)
CO2 SERPL-SCNC: 27 MMOL/L (ref 20–32)
COLOR UR: YELLOW
CREAT SERPL-MCNC: 1.93 MG/DL (ref 0.6–0.88)
CREAT UR-MCNC: 101 MG/DL (ref 20–275)
GLOBULIN SER CALC-MCNC: 2.9 G/DL (CALC) (ref 1.9–3.7)
GLUCOSE SERPL-MCNC: 84 MG/DL (ref 65–99)
GLUCOSE UR QL STRIP: NEGATIVE
HBA1C MFR BLD: 5.7 % OF TOTAL HGB
HDLC SERPL-MCNC: 61 MG/DL
HGB UR QL STRIP: ABNORMAL
HYALINE CASTS #/AREA URNS LPF: ABNORMAL /LPF
KETONES UR QL STRIP: NEGATIVE
LDLC SERPL CALC-MCNC: 158 MG/DL (CALC)
LEUKOCYTE ESTERASE UR QL STRIP: ABNORMAL
MAGNESIUM SERPL-MCNC: 2.2 MG/DL (ref 1.5–2.5)
MICROALBUMIN UR-MCNC: 26.1 MG/DL
NITRITE UR QL STRIP: NEGATIVE
NONHDLC SERPL-MCNC: 194 MG/DL (CALC)
PH UR STRIP: 7.5 [PH] (ref 5–8)
POTASSIUM SERPL-SCNC: 4.6 MMOL/L (ref 3.5–5.3)
PROT SERPL-MCNC: 7 G/DL (ref 6.1–8.1)
PROT UR QL STRIP: ABNORMAL
RBC #/AREA URNS HPF: ABNORMAL /HPF
SERVICE CMNT-IMP: ABNORMAL
SODIUM SERPL-SCNC: 143 MMOL/L (ref 135–146)
SP GR UR STRIP: 1.01 (ref 1–1.03)
SQUAMOUS #/AREA URNS HPF: ABNORMAL /HPF
TRIGL SERPL-MCNC: 202 MG/DL
WBC #/AREA URNS HPF: ABNORMAL /HPF

## 2022-07-07 ENCOUNTER — PATIENT MESSAGE (OUTPATIENT)
Dept: FAMILY MEDICINE | Facility: CLINIC | Age: 82
End: 2022-07-07

## 2022-07-07 ENCOUNTER — OFFICE VISIT (OUTPATIENT)
Dept: FAMILY MEDICINE | Facility: CLINIC | Age: 82
End: 2022-07-07
Payer: MEDICARE

## 2022-07-07 DIAGNOSIS — N18.32 STAGE 3B CHRONIC KIDNEY DISEASE: ICD-10-CM

## 2022-07-07 DIAGNOSIS — I50.42 CHRONIC COMBINED SYSTOLIC AND DIASTOLIC CONGESTIVE HEART FAILURE: Primary | ICD-10-CM

## 2022-07-07 DIAGNOSIS — I10 ESSENTIAL HYPERTENSION: ICD-10-CM

## 2022-07-07 DIAGNOSIS — E11.21 TYPE 2 DIABETES MELLITUS WITH DIABETIC NEPHROPATHY, WITHOUT LONG-TERM CURRENT USE OF INSULIN: ICD-10-CM

## 2022-07-07 DIAGNOSIS — Z79.01 CHRONIC ANTICOAGULATION: ICD-10-CM

## 2022-07-07 DIAGNOSIS — N31.9 NEUROGENIC BLADDER: ICD-10-CM

## 2022-07-07 DIAGNOSIS — R21 RASH OF GROIN: ICD-10-CM

## 2022-07-07 DIAGNOSIS — I48.0 PAROXYSMAL ATRIAL FIBRILLATION: ICD-10-CM

## 2022-07-07 DIAGNOSIS — M54.50 MIDLINE LOW BACK PAIN WITHOUT SCIATICA, UNSPECIFIED CHRONICITY: ICD-10-CM

## 2022-07-07 PROCEDURE — 1159F MED LIST DOCD IN RCRD: CPT | Mod: CPTII,S$GLB,, | Performed by: INTERNAL MEDICINE

## 2022-07-07 PROCEDURE — 1101F PR PT FALLS ASSESS DOC 0-1 FALLS W/OUT INJ PAST YR: ICD-10-PCS | Mod: CPTII,S$GLB,, | Performed by: INTERNAL MEDICINE

## 2022-07-07 PROCEDURE — 1125F AMNT PAIN NOTED PAIN PRSNT: CPT | Mod: CPTII,S$GLB,, | Performed by: INTERNAL MEDICINE

## 2022-07-07 PROCEDURE — 3078F PR MOST RECENT DIASTOLIC BLOOD PRESSURE < 80 MM HG: ICD-10-PCS | Mod: CPTII,S$GLB,, | Performed by: INTERNAL MEDICINE

## 2022-07-07 PROCEDURE — 1101F PT FALLS ASSESS-DOCD LE1/YR: CPT | Mod: CPTII,S$GLB,, | Performed by: INTERNAL MEDICINE

## 2022-07-07 PROCEDURE — 3075F SYST BP GE 130 - 139MM HG: CPT | Mod: CPTII,S$GLB,, | Performed by: INTERNAL MEDICINE

## 2022-07-07 PROCEDURE — 3288F PR FALLS RISK ASSESSMENT DOCUMENTED: ICD-10-PCS | Mod: CPTII,S$GLB,, | Performed by: INTERNAL MEDICINE

## 2022-07-07 PROCEDURE — 3075F PR MOST RECENT SYSTOLIC BLOOD PRESS GE 130-139MM HG: ICD-10-PCS | Mod: CPTII,S$GLB,, | Performed by: INTERNAL MEDICINE

## 2022-07-07 PROCEDURE — 1160F PR REVIEW ALL MEDS BY PRESCRIBER/CLIN PHARMACIST DOCUMENTED: ICD-10-PCS | Mod: CPTII,S$GLB,, | Performed by: INTERNAL MEDICINE

## 2022-07-07 PROCEDURE — 1159F PR MEDICATION LIST DOCUMENTED IN MEDICAL RECORD: ICD-10-PCS | Mod: CPTII,S$GLB,, | Performed by: INTERNAL MEDICINE

## 2022-07-07 PROCEDURE — 99214 PR OFFICE/OUTPT VISIT, EST, LEVL IV, 30-39 MIN: ICD-10-PCS | Mod: S$GLB,,, | Performed by: INTERNAL MEDICINE

## 2022-07-07 PROCEDURE — 1160F RVW MEDS BY RX/DR IN RCRD: CPT | Mod: CPTII,S$GLB,, | Performed by: INTERNAL MEDICINE

## 2022-07-07 PROCEDURE — 1125F PR PAIN SEVERITY QUANTIFIED, PAIN PRESENT: ICD-10-PCS | Mod: CPTII,S$GLB,, | Performed by: INTERNAL MEDICINE

## 2022-07-07 PROCEDURE — 99214 OFFICE O/P EST MOD 30 MIN: CPT | Mod: S$GLB,,, | Performed by: INTERNAL MEDICINE

## 2022-07-07 PROCEDURE — 3078F DIAST BP <80 MM HG: CPT | Mod: CPTII,S$GLB,, | Performed by: INTERNAL MEDICINE

## 2022-07-07 PROCEDURE — 3288F FALL RISK ASSESSMENT DOCD: CPT | Mod: CPTII,S$GLB,, | Performed by: INTERNAL MEDICINE

## 2022-07-07 RX ORDER — AMIODARONE HYDROCHLORIDE 200 MG/1
200 TABLET ORAL DAILY
Status: ON HOLD | COMMUNITY
End: 2023-09-26 | Stop reason: HOSPADM

## 2022-07-07 RX ORDER — NYSTATIN 100000 U/G
CREAM TOPICAL 2 TIMES DAILY
Qty: 30 G | Refills: 1 | Status: ON HOLD | OUTPATIENT
Start: 2022-07-07 | End: 2022-07-18

## 2022-07-07 NOTE — PROGRESS NOTES
Subjective:       Patient ID: Jo Alegre is a 81 y.o. female.    Chief Complaint: Hyperlipidemia, Hypertension, Follow-up, Atrial Fibrillation, Diabetes, Neurogenic bladder, Back Pain, and Rash (Rash is in the left groin.)    1. Chronic combined systolic and diastolic congestive heart failure   2. Paroxysmal atrial fibrillation   3. Essential hypertension   4. Chronic anticoagulation   5. Type 2 diabetes mellitus with diabetic nephropathy, without long-term current use of insulin   6. Midline low back pain without sciatica, unspecified chronicity   7. Neurogenic bladder       Recent labs show an abnormal urinalysis.    Chemistry also shows somewhat elevated creatinine levels and also somewhat elevated calcium level.  It is not clear if she is taking any calcium supplements or not.    Hyperlipidemia  This is a chronic problem. The current episode started more than 1 year ago. The problem is controlled. Exacerbating diseases include obesity. Pertinent negatives include no chest pain or shortness of breath. Current antihyperlipidemic treatment includes statins. The current treatment provides moderate improvement of lipids. Compliance problems include psychosocial issues.  Risk factors for coronary artery disease include a sedentary lifestyle, hypertension, dyslipidemia and obesity.   Hypertension  This is a chronic problem. The current episode started more than 1 year ago. The problem is controlled. Associated symptoms include anxiety and malaise/fatigue. Pertinent negatives include no chest pain, headaches, palpitations or shortness of breath. Risk factors for coronary artery disease include post-menopausal state, obesity, sedentary lifestyle and dyslipidemia. Past treatments include diuretics and angiotensin blockers (Entresto). The current treatment provides moderate improvement. Compliance problems include psychosocial issues.    Follow-up  Associated symptoms include fatigue, numbness (Numbness in the left  3 digits of the left foot.) and a rash. Pertinent negatives include no arthralgias, chest pain, chills, congestion, diaphoresis, headaches, joint swelling or weakness.   Diabetes  She presents for her follow-up diabetic visit. She has type 2 diabetes mellitus. No MedicAlert identification noted. Her disease course has been stable. Hypoglycemia symptoms include tremors. Pertinent negatives for hypoglycemia include no confusion, dizziness, headaches, nervousness/anxiousness, pallor, seizures or speech difficulty. Associated symptoms include fatigue. Pertinent negatives for diabetes include no chest pain, no polydipsia, no polyphagia, no polyuria and no weakness. Risk factors for coronary artery disease include post-menopausal, obesity, sedentary lifestyle and dyslipidemia. She is compliant with treatment some of the time. An ACE inhibitor/angiotensin II receptor blocker is being taken (Entresto).   Back Pain  This is a new problem. The current episode started more than 1 month ago. The problem occurs constantly. The problem is unchanged. The pain is present in the lumbar spine. The pain is at a severity of 7/10. The pain is mild (Seems to be getting better.). Associated symptoms include numbness (Numbness in the left 3 digits of the left foot.). Pertinent negatives include no chest pain, dysuria, headaches, pelvic pain or weakness. Risk factors include obesity, menopause and sedentary lifestyle. She has tried bed rest for the symptoms. The treatment provided no relief.   Atrial Fibrillation  Presents for follow-up visit. Symptoms include hypertension. Symptoms are negative for bradycardia, chest pain, dizziness, hypotension, palpitations, shortness of breath, syncope, tachycardia and weakness. The symptoms have been stable. Past medical history includes atrial fibrillation and hyperlipidemia. Medication compliance problems include psychosocial issues.   Rash  This is a new (Rash of candidiasis/yeast in the left  groin.) problem. The current episode started 1 to 4 weeks ago. The problem has been gradually worsening since onset. The affected locations include the groin. The rash is characterized by blistering, itchiness, scaling and redness. She was exposed to nothing. Associated symptoms include fatigue. Pertinent negatives include no congestion, diarrhea or shortness of breath.       Past Medical History:   Diagnosis Date    Allergy     Codeine, Lasix    Atrial fibrillation     Atrial fibrillation     Cataract     CHF (congestive heart failure)     Diabetes mellitus, type 2     Ejection fraction < 50% 10/18/2017    Approximately 35%  Based on prior  Echocardiogram.    Encounter for blood transfusion     Hyperlipidemia     Osteoporosis     Thyroid disorder screening 10/17/2017    TSH of 1.12 ordered by Dr. dee Jones     Social History     Socioeconomic History    Marital status:     Number of children: 4   Occupational History    Occupation:    Tobacco Use    Smoking status: Former Smoker    Smokeless tobacco: Never Used    Tobacco comment: quit 2013   Substance and Sexual Activity    Alcohol use: No    Drug use: No    Sexual activity: Not Currently   Social History Narrative    - Drives and lives alone.     Social Determinants of Health     Financial Resource Strain: Low Risk     Difficulty of Paying Living Expenses: Not very hard   Food Insecurity: No Food Insecurity    Worried About Running Out of Food in the Last Year: Never true    Ran Out of Food in the Last Year: Never true   Transportation Needs: No Transportation Needs    Lack of Transportation (Medical): No    Lack of Transportation (Non-Medical): No   Physical Activity: Inactive    Days of Exercise per Week: 0 days    Minutes of Exercise per Session: 0 min   Stress: Stress Concern Present    Feeling of Stress : To some extent   Social Connections: Moderately Isolated    Frequency of Communication with  Friends and Family: Three times a week    Frequency of Social Gatherings with Friends and Family: Once a week    Attends Cheondo[employer] Services: 1 to 4 times per year    Active Member of Clubs or Organizations: No    Attends Club or Organization Meetings: Never    Marital Status:    Housing Stability: Low Risk     Unable to Pay for Housing in the Last Year: No    Number of Places Lived in the Last Year: 1    Unstable Housing in the Last Year: No     Past Surgical History:   Procedure Laterality Date    [place]     COLONOSCOPY      EYE SURGERY      bilateral cataracts    FINGER SURGERY Right 2021    right pinky finger    FRACTURE SURGERY  2014    right femur with neville    HEMORRHOID SURGERY      48 yrs ago    HYSTERECTOMY      REPLACEMENT OF IMPLANTABLE CARDIOVERTER-DEFIBRILLATOR (ICD) GENERATOR N/A 12/13/2019    Procedure: REPLACEMENT, PULSE GENERATOR, ICD-MEDTRONIC;  Surgeon: Sebastian Nowak III, MD;  Location: [facility];  Service: Cardiology;  Laterality: N/A;    TONSILLECTOMY       Family History   Problem Relation Age of Onset    Heart disease Mother     Cancer Father         Lung Cancer ??? Asbestos    Breast cancer Paternal Aunt     Breast cancer Maternal Grandmother     Breast cancer Maternal Aunt        Review of Systems   Constitutional: Positive for activity change (Somewhat more cautious while walking), fatigue and malaise/fatigue. Negative for appetite change, chills, diaphoresis and unexpected weight change (Patient has lost 5 lb weight.).   HENT: Negative for congestion, ear discharge and postnasal drip.    Eyes: Negative for discharge, redness and visual disturbance.   Respiratory: Negative for chest tightness and shortness of breath.    Cardiovascular: Negative for chest pain, palpitations, leg swelling and syncope.        History of defibrillator, congestive cardiomyopathy hypertension and dyslipidemia   Gastrointestinal: Negative for abdominal  "distention, anal bleeding, constipation and diarrhea.        Pellet-like stools and lot of gas.   Endocrine: Negative for cold intolerance, polydipsia, polyphagia and polyuria.        Diabetes mellitus on glimepiride.  Osteoporosis on injection Prolia   Genitourinary: Positive for enuresis. Negative for difficulty urinating, dysuria, flank pain, frequency and pelvic pain.        Symptoms of neurogenic bladder with lack control and sudden expulsion of urine.   Musculoskeletal: Positive for back pain and gait problem (Slow and antalgic gait). Negative for arthralgias and joint swelling.        New onset of low back pain.  (Not sure if this is really new onset of back pain or she had had similar back pains in past) no radiation this time.   Skin: Positive for color change and rash. Negative for pallor and wound.        Extensive rash in the left groin region.   Allergic/Immunologic: Negative for environmental allergies, food allergies and immunocompromised state.   Neurological: Positive for tremors and numbness (Numbness in the left 3 digits of the left foot.). Negative for dizziness, seizures, syncope, facial asymmetry, speech difficulty, weakness, light-headedness and headaches.        Last presentation of headache is certainly dissipated away.  Shakes and tremors in hands which are getting more bothersome.   Hematological: Negative for adenopathy. Bruises/bleeds easily.        Patient is on chronic anticoagulation with warfarin.     Psychiatric/Behavioral: Negative for agitation, confusion and dysphoric mood. The patient is not nervous/anxious.         Patient denies any cognitive issues.         Objective:      Blood pressure 130/69, pulse 60, resp. rate 18, height 5' 9" (1.753 m), weight 100.2 kg (221 lb). Body mass index is 32.64 kg/m².  Physical Exam  Constitutional:       General: She is not in acute distress.     Appearance: She is well-developed. She is obese. She is ill-appearing. She is not toxic-appearing " or diaphoretic.      Comments: Patient is obese with a BMI of 32.64   HENT:      Head: Normocephalic and atraumatic.      Comments: .  Eyes:      General: No visual field deficit or scleral icterus.        Right eye: No discharge.         Left eye: No discharge.   Neck:      Thyroid: No thyromegaly.      Vascular: No JVD.      Trachea: No tracheal deviation.   Cardiovascular:      Rate and Rhythm: Normal rate and regular rhythm.      Pulses:           Dorsalis pedis pulses are 1+ on the right side and 1+ on the left side.      Heart sounds:     No friction rub. No gallop.   Pulmonary:      Effort: Pulmonary effort is normal. No respiratory distress.      Breath sounds: Normal breath sounds. No stridor. No wheezing or rhonchi.   Abdominal:      General: There is no distension.      Palpations: Abdomen is soft.      Tenderness: There is no abdominal tenderness.   Musculoskeletal:         General: No tenderness or deformity.      Cervical back: Neck supple.      Right foot: Decreased range of motion.      Left foot: Decreased range of motion.   Feet:      Right foot:      Protective Sensation: 5 sites tested. 3 sites sensed.      Skin integrity: No ulcer or blister.      Toenail Condition: Right toenails are abnormally thick.      Left foot:      Protective Sensation: 5 sites tested. 3 sites sensed.      Skin integrity: No ulcer or blister.      Toenail Condition: Left toenails are abnormally thick.      Comments: Somewhat diminished monofilament sensations.  Diminished dorsalis pedis pulsations.  Dystrophic nails bilaterally.  Needs training.  Lymphadenopathy:      Cervical: No cervical adenopathy.   Skin:     General: Skin is warm and dry.      Coloration: Skin is not pale.      Findings: Rash present. No lesion. Rash is not scaling.             Comments: There is a discoloration in the left groin region.  Redness noted.  No definite scaling noted.  Almost appears like inverse psoriasis.   Neurological:      Mental  Status: She is alert. She is not disoriented.      Cranial Nerves: No cranial nerve deficit, dysarthria or facial asymmetry.      Motor: Tremor present. No weakness, abnormal muscle tone or seizure activity.      Deep Tendon Reflexes: Reflexes normal.      Comments: Slightly coarse times on both the hands.  No tremors at rest.  No hypertonia.  Gait is slow and antalgic but not festinant.   Psychiatric:         Mood and Affect: Mood is anxious.         Behavior: Behavior normal.         Thought Content: Thought content normal.      Comments: Somewhat anhedonic and antalgic           Assessment:       1. Chronic combined systolic and diastolic congestive heart failure    2. Paroxysmal atrial fibrillation    3. Essential hypertension    4. Chronic anticoagulation    5. Type 2 diabetes mellitus with diabetic nephropathy, without long-term current use of insulin    6. Midline low back pain without sciatica, unspecified chronicity    7. Neurogenic bladder    8. Rash of groin    9. Stage 3b chronic kidney disease           Orders Only on 07/02/2022   Component Date Value Ref Range Status    Cholesterol 07/02/2022 255 (A) <200 mg/dL Final    HDL 07/02/2022 61  > OR = 50 mg/dL Final    Triglycerides 07/02/2022 202 (A) <150 mg/dL Final    LDL Cholesterol 07/02/2022 158 (A) mg/dL (calc) Final    HDL/Cholesterol Ratio 07/02/2022 4.2  <5.0 (calc) Final    Non HDL Chol. (LDL+VLDL) 07/02/2022 194 (A) <130 mg/dL (calc) Final    Creatinine, Urine 07/02/2022 101  20 - 275 mg/dL Final    Microalb, Ur 07/02/2022 26.1  See Note: mg/dL Final    Microalb/Creat Ratio 07/02/2022 258 (A) <30 mcg/mg creat Final    Magnesium 07/02/2022 2.2  1.5 - 2.5 mg/dL Final    Glucose 07/02/2022 84  65 - 99 mg/dL Final    BUN 07/02/2022 26 (A) 7 - 25 mg/dL Final    Creatinine 07/02/2022 1.93 (A) 0.60 - 0.88 mg/dL Final    eGFR if non African American 07/02/2022 24 (A) > OR = 60 mL/min/1.73m2 Final    eGFR if  07/02/2022 28  (A) > OR = 60 mL/min/1.73m2 Final    BUN/Creatinine Ratio 07/02/2022 13  6 - 22 (calc) Final    Sodium 07/02/2022 143  135 - 146 mmol/L Final    Potassium 07/02/2022 4.6  3.5 - 5.3 mmol/L Final    Chloride 07/02/2022 104  98 - 110 mmol/L Final    CO2 07/02/2022 27  20 - 32 mmol/L Final    Calcium 07/02/2022 11.1 (A) 8.6 - 10.4 mg/dL Final    Total Protein 07/02/2022 7.0  6.1 - 8.1 g/dL Final    Albumin 07/02/2022 4.1  3.6 - 5.1 g/dL Final    Globulin, Total 07/02/2022 2.9  1.9 - 3.7 g/dL (calc) Final    Albumin/Globulin Ratio 07/02/2022 1.4  1.0 - 2.5 (calc) Final    Total Bilirubin 07/02/2022 0.7  0.2 - 1.2 mg/dL Final    Alkaline Phosphatase 07/02/2022 87  37 - 153 U/L Final    AST 07/02/2022 13  10 - 35 U/L Final    ALT 07/02/2022 12  6 - 29 U/L Final    Color, UA 07/02/2022 YELLOW  YELLOW Final    Appearance, UA 07/02/2022 CLOUDY (A) CLEAR Final    Specific Gravity, UA 07/02/2022 1.013  1.001 - 1.035 Final    pH, UA 07/02/2022 7.5  5.0 - 8.0 Final    Glucose, UA 07/02/2022 NEGATIVE  NEGATIVE Final    Bilirubin, UA 07/02/2022 NEGATIVE  NEGATIVE Final    Ketones, UA 07/02/2022 NEGATIVE  NEGATIVE Final    Occult Blood UA 07/02/2022 2+ (A) NEGATIVE Final    Protein, UA 07/02/2022 2+ (A) NEGATIVE Final    Nitrite, UA 07/02/2022 NEGATIVE  NEGATIVE Final    Leukocytes, UA 07/02/2022 3+ (A) NEGATIVE Final    WBC Casts, UA 07/02/2022 > OR = 60 (A) < OR = 5 /HPF Final    RBC Casts, UA 07/02/2022 20-40 (A) < OR = 2 /HPF Final    Squam Epithel, UA 07/02/2022 0-5  < OR = 5 /HPF Final    Bacteria, UA 07/02/2022 FEW (A) NONE SEEN /HPF Final    Hyaline Casts, UA 07/02/2022 NONE SEEN  NONE SEEN /LPF Final    Service Cmt: 07/02/2022    Final    Hemoglobin A1C 07/02/2022 5.7 (A) <5.7 % of total Hgb Final    Urine Culture, Routine 07/02/2022    Final         Plan:           Chronic combined systolic and diastolic congestive heart failure    Paroxysmal atrial fibrillation    Essential  hypertension    Chronic anticoagulation    Type 2 diabetes mellitus with diabetic nephropathy, without long-term current use of insulin    Midline low back pain without sciatica, unspecified chronicity    Neurogenic bladder    Rash of groin  -     nystatin (MYCOSTATIN) cream; Apply topically 2 (two) times daily.  Dispense: 30 g; Refill: 1    Stage 3b chronic kidney disease  -     Renal Function Panel; Future; Expected date: 07/21/2022     Patient's urine analysis has been noted.  It is positive for nitrites, blood and bacteria.  She seems to have having recurrent bladder infection.    Her chemistry also shows a somewhat more elevated creatinine and 1.93 which renders her GFR to be    Calcium level is also elevated and the cause of this elevation is unclear.  My records indicate that she is on a vitamin with calcium supplements.  Patient does not recall this and is not sure as to what supplements or what medication she is taking.  She totally goes by our print outs which might not point side with the actual medications that she has at home.    She also seems to have a yeast infection and candidiasis in the left groin and under the left pannus of the abdomen.  I have advised her to keep this area as much dry as possible and probably half an are 240 minutes of sunshine exposure will be helpful.  Will treat this with nystatin.    Though her urine test is abnormal, her urine cultures show a mixed cherelle which might be contamination.  At this point she is not very symptomatic and will reserve the antibiotics unless until she has symptoms.  Try to eliminate the bladder as much as possible.  Keep the private area screen.    Down the road will have to get a Urology evaluation as to why she seems to have recurrent bladder symptoms.    Use of diuretic torsemide also has been reviewed.  She states that she has not taken this prescription perhaps more than twice in the last several weeks.    She is on several medications in the  usage of this medication continues to be uncertain at this point.    Patient's daughter needs to make a complete list of her medications at home so that we know accurately what she is taking and she can send a message to me.  Use of several medications is uncertain and probably she is not taking at least 1/3 of the medications because of uncertainties. This needs to be corrected.    She is supposed to follow-up with the Nephrology also.Dr Ortiz    REPEAT BASIC METABOLIC PANEL TO CHECK ON CALCIUM AND CREATININE.      1.-I have sent a cream nystatin for the groin rash.  2.-I have sent a letter to patient at home to clarify on all her medications as to what she is taking  3.-she needs to repeat a basic metabolic panel or renal panel in about 2 weeks time to check on creatinine and calcium level  4.-she needs to set up an appointment with Dr. Ortiz also for follow-up on kidney issues.  5.-no need for antibiotics for bladder at this point unless until she has symptoms of burning discomfort.  Probably contamination.  6.-she should also keep an appointment with her cardiology also.  I will forward the labs to her cardiologist Dr. Jones also.    Follow-up in 3 months.    Spent aleks 30 minutes with patient which involved review of pts medical conditions, labs, medications and with 50% of time face-to-face discussion about medical problems, management and any applicable changes.    Current Outpatient Medications:     amiodarone (PACERONE) 200 MG Tab, Take by mouth once daily., Disp: , Rfl:     atorvastatin (LIPITOR) 40 MG tablet, Take 40 mg by mouth once daily., Disp: , Rfl:     Ca-D3-mag ox-zinc--thom-bor 600 mg calcium- 20 mcg-50 mg Tab, Take 1 tablet by mouth 2 (two) times daily., Disp: , Rfl:     cholecalciferol, vitamin D3, 125 mcg (5,000 unit) Tab, Take 5,000 Units by mouth 2 (two) times daily., Disp: , Rfl:     digoxin (LANOXIN) 125 mcg tablet, Take 125 mcg by mouth every Mon, Wed, Fri., Disp: , Rfl:      diltiaZEM (CARDIZEM) 60 MG tablet, Take 60 mg by mouth 2 (two) times daily., Disp: , Rfl:     gabapentin (NEURONTIN) 100 MG capsule, TAKE 2 CAPSULES(200 MG) BY MOUTH THREE TIMES DAILY, Disp: 180 capsule, Rfl: 5    glimepiride (AMARYL) 1 MG tablet, Take 1 tablet (1 mg total) by mouth before breakfast., Disp: 90 tablet, Rfl: 1    latanoprost 0.005 % ophthalmic solution, Place 1 drop into both eyes nightly., Disp: , Rfl:     midodrine (PROAMATINE) 2.5 MG Tab, Take 2.5 mg by mouth 3 (three) times daily., Disp: , Rfl:     rivaroxaban (XARELTO) 15 mg Tab, Take 15 mg by mouth daily with dinner or evening meal., Disp: , Rfl:     sacubitriL-valsartan (ENTRESTO) 24-26 mg per tablet, Take 1 tablet by mouth once daily. , Disp: , Rfl:     torsemide (DEMADEX) 20 MG Tab, Take 1 tablet (20 mg total) by mouth once daily. for 14 days, Disp: 14 tablet, Rfl: 0    denosumab (PROLIA) 60 mg/mL Syrg, Inject 1 mL (60 mg total) into the skin every 6 (six) months. (Patient not taking: Reported on 3/31/2021), Disp: 2 mL, Rfl: 1    nystatin (MYCOSTATIN) cream, Apply topically 2 (two) times daily., Disp: 30 g, Rfl: 1  No current facility-administered medications for this visit.    Facility-Administered Medications Ordered in Other Visits:     0.9%  NaCl infusion, , Intravenous, Continuous, Sebastian Nowak III, MD, Last Rate: 75 mL/hr at 12/13/19 0728, New Bag at 12/13/19 0728    diphenhydrAMINE injection 25 mg, 25 mg, Intravenous, Once, Sebastian Nowak III, MD    lorazepam injection 1 mg, 1 mg, Intravenous, Once, Sebastian Nowak III, MD

## 2022-07-08 VITALS
HEART RATE: 60 BPM | WEIGHT: 221 LBS | BODY MASS INDEX: 32.73 KG/M2 | HEIGHT: 69 IN | DIASTOLIC BLOOD PRESSURE: 69 MMHG | SYSTOLIC BLOOD PRESSURE: 130 MMHG | RESPIRATION RATE: 18 BRPM

## 2022-07-17 ENCOUNTER — ANESTHESIA EVENT (OUTPATIENT)
Dept: SURGERY | Facility: HOSPITAL | Age: 82
DRG: 660 | End: 2022-07-17
Payer: MEDICARE

## 2022-07-17 ENCOUNTER — ANESTHESIA (OUTPATIENT)
Dept: SURGERY | Facility: HOSPITAL | Age: 82
DRG: 660 | End: 2022-07-17
Payer: MEDICARE

## 2022-07-17 ENCOUNTER — HOSPITAL ENCOUNTER (INPATIENT)
Facility: HOSPITAL | Age: 82
LOS: 1 days | Discharge: HOME | DRG: 660 | End: 2022-07-18
Attending: EMERGENCY MEDICINE | Admitting: INTERNAL MEDICINE
Payer: MEDICARE

## 2022-07-17 DIAGNOSIS — N10 ACUTE PYELONEPHRITIS: ICD-10-CM

## 2022-07-17 DIAGNOSIS — N12 PYELONEPHRITIS OF LEFT KIDNEY: Primary | ICD-10-CM

## 2022-07-17 DIAGNOSIS — N20.0 STAGHORN CALCULUS: ICD-10-CM

## 2022-07-17 DIAGNOSIS — R50.9 FEVER, UNSPECIFIED FEVER CAUSE: ICD-10-CM

## 2022-07-17 DIAGNOSIS — R26.81 GAIT INSTABILITY: ICD-10-CM

## 2022-07-17 DIAGNOSIS — R10.9 ABDOMINAL PAIN: ICD-10-CM

## 2022-07-17 PROBLEM — N20.1 LEFT URETERAL STONE: Status: ACTIVE | Noted: 2022-07-17

## 2022-07-17 LAB
ALBUMIN SERPL BCP-MCNC: 3.4 G/DL (ref 3.5–5.2)
ALP SERPL-CCNC: 81 U/L (ref 55–135)
ALT SERPL W/O P-5'-P-CCNC: 18 U/L (ref 10–44)
ANION GAP SERPL CALC-SCNC: 10 MMOL/L (ref 8–16)
AST SERPL-CCNC: 17 U/L (ref 10–40)
BACTERIA #/AREA URNS HPF: ABNORMAL /HPF
BASOPHILS # BLD AUTO: 0.03 K/UL (ref 0–0.2)
BASOPHILS NFR BLD: 0.4 % (ref 0–1.9)
BILIRUB SERPL-MCNC: 1 MG/DL (ref 0.1–1)
BILIRUB UR QL STRIP: NEGATIVE
BUN SERPL-MCNC: 20 MG/DL (ref 8–23)
CALCIUM SERPL-MCNC: 9 MG/DL (ref 8.7–10.5)
CHLORIDE SERPL-SCNC: 100 MMOL/L (ref 95–110)
CLARITY UR: CLEAR
CO2 SERPL-SCNC: 28 MMOL/L (ref 23–29)
COLOR UR: YELLOW
CREAT SERPL-MCNC: 1.6 MG/DL (ref 0.5–1.4)
DIFFERENTIAL METHOD: ABNORMAL
EOSINOPHIL # BLD AUTO: 0.1 K/UL (ref 0–0.5)
EOSINOPHIL NFR BLD: 0.7 % (ref 0–8)
ERYTHROCYTE [DISTWIDTH] IN BLOOD BY AUTOMATED COUNT: 13.2 % (ref 11.5–14.5)
EST. GFR  (AFRICAN AMERICAN): 34.8 ML/MIN/1.73 M^2
EST. GFR  (NON AFRICAN AMERICAN): 30.2 ML/MIN/1.73 M^2
GLUCOSE SERPL-MCNC: 131 MG/DL (ref 70–110)
GLUCOSE SERPL-MCNC: 74 MG/DL (ref 70–110)
GLUCOSE SERPL-MCNC: 84 MG/DL (ref 70–110)
GLUCOSE UR QL STRIP: NEGATIVE
HCT VFR BLD AUTO: 41.4 % (ref 37–48.5)
HGB BLD-MCNC: 13.2 G/DL (ref 12–16)
HGB UR QL STRIP: ABNORMAL
HYALINE CASTS #/AREA URNS LPF: 0 /LPF
IMM GRANULOCYTES # BLD AUTO: 0.03 K/UL (ref 0–0.04)
IMM GRANULOCYTES NFR BLD AUTO: 0.4 % (ref 0–0.5)
KETONES UR QL STRIP: NEGATIVE
LACTATE SERPL-SCNC: 1.7 MMOL/L (ref 0.5–1.9)
LEUKOCYTE ESTERASE UR QL STRIP: ABNORMAL
LIPASE SERPL-CCNC: 25 U/L (ref 4–60)
LYMPHOCYTES # BLD AUTO: 0.7 K/UL (ref 1–4.8)
LYMPHOCYTES NFR BLD: 8.4 % (ref 18–48)
MCH RBC QN AUTO: 31.6 PG (ref 27–31)
MCHC RBC AUTO-ENTMCNC: 31.9 G/DL (ref 32–36)
MCV RBC AUTO: 99 FL (ref 82–98)
MICROSCOPIC COMMENT: ABNORMAL
MONOCYTES # BLD AUTO: 0.5 K/UL (ref 0.3–1)
MONOCYTES NFR BLD: 5.9 % (ref 4–15)
NEUTROPHILS # BLD AUTO: 7 K/UL (ref 1.8–7.7)
NEUTROPHILS NFR BLD: 84.2 % (ref 38–73)
NITRITE UR QL STRIP: NEGATIVE
NRBC BLD-RTO: 0 /100 WBC
PH UR STRIP: 6 [PH] (ref 5–8)
PLATELET # BLD AUTO: 263 K/UL (ref 150–450)
PMV BLD AUTO: 9.9 FL (ref 9.2–12.9)
POTASSIUM SERPL-SCNC: 4.2 MMOL/L (ref 3.5–5.1)
PROT SERPL-MCNC: 6.7 G/DL (ref 6–8.4)
PROT UR QL STRIP: ABNORMAL
RBC # BLD AUTO: 4.18 M/UL (ref 4–5.4)
RBC #/AREA URNS HPF: 17 /HPF (ref 0–4)
SARS-COV-2 RDRP RESP QL NAA+PROBE: NEGATIVE
SODIUM SERPL-SCNC: 138 MMOL/L (ref 136–145)
SP GR UR STRIP: 1.02 (ref 1–1.03)
TROPONIN I SERPL DL<=0.01 NG/ML-MCNC: <0.03 NG/ML
URN SPEC COLLECT METH UR: ABNORMAL
UROBILINOGEN UR STRIP-ACNC: 1 EU/DL
WBC # BLD AUTO: 8.29 K/UL (ref 3.9–12.7)
WBC #/AREA URNS HPF: 5 /HPF (ref 0–5)
WBC CLUMPS URNS QL MICRO: ABNORMAL

## 2022-07-17 PROCEDURE — 83690 ASSAY OF LIPASE: CPT | Performed by: EMERGENCY MEDICINE

## 2022-07-17 PROCEDURE — 84484 ASSAY OF TROPONIN QUANT: CPT | Performed by: EMERGENCY MEDICINE

## 2022-07-17 PROCEDURE — C2617 STENT, NON-COR, TEM W/O DEL: HCPCS | Performed by: STUDENT IN AN ORGANIZED HEALTH CARE EDUCATION/TRAINING PROGRAM

## 2022-07-17 PROCEDURE — 27201107 HC STYLET, STANDARD: Performed by: ANESTHESIOLOGY

## 2022-07-17 PROCEDURE — 85025 COMPLETE CBC W/AUTO DIFF WBC: CPT | Performed by: EMERGENCY MEDICINE

## 2022-07-17 PROCEDURE — 63600175 PHARM REV CODE 636 W HCPCS: Performed by: EMERGENCY MEDICINE

## 2022-07-17 PROCEDURE — 80053 COMPREHEN METABOLIC PANEL: CPT | Performed by: EMERGENCY MEDICINE

## 2022-07-17 PROCEDURE — 25000003 PHARM REV CODE 250: Performed by: INTERNAL MEDICINE

## 2022-07-17 PROCEDURE — 36000707: Performed by: STUDENT IN AN ORGANIZED HEALTH CARE EDUCATION/TRAINING PROGRAM

## 2022-07-17 PROCEDURE — 99221 PR INITIAL HOSPITAL CARE,LEVL I: ICD-10-PCS | Mod: 25,,, | Performed by: STUDENT IN AN ORGANIZED HEALTH CARE EDUCATION/TRAINING PROGRAM

## 2022-07-17 PROCEDURE — 52332 CYSTOSCOPY AND TREATMENT: CPT | Mod: LT,,, | Performed by: STUDENT IN AN ORGANIZED HEALTH CARE EDUCATION/TRAINING PROGRAM

## 2022-07-17 PROCEDURE — 25000003 PHARM REV CODE 250: Performed by: STUDENT IN AN ORGANIZED HEALTH CARE EDUCATION/TRAINING PROGRAM

## 2022-07-17 PROCEDURE — 71000039 HC RECOVERY, EACH ADD'L HOUR: Performed by: STUDENT IN AN ORGANIZED HEALTH CARE EDUCATION/TRAINING PROGRAM

## 2022-07-17 PROCEDURE — 99900035 HC TECH TIME PER 15 MIN (STAT)

## 2022-07-17 PROCEDURE — 27201423 OPTIME MED/SURG SUP & DEVICES STERILE SUPPLY: Performed by: STUDENT IN AN ORGANIZED HEALTH CARE EDUCATION/TRAINING PROGRAM

## 2022-07-17 PROCEDURE — 83605 ASSAY OF LACTIC ACID: CPT | Performed by: EMERGENCY MEDICINE

## 2022-07-17 PROCEDURE — 87040 BLOOD CULTURE FOR BACTERIA: CPT | Mod: 59 | Performed by: EMERGENCY MEDICINE

## 2022-07-17 PROCEDURE — U0002 COVID-19 LAB TEST NON-CDC: HCPCS | Performed by: EMERGENCY MEDICINE

## 2022-07-17 PROCEDURE — 27000284 HC CANNULA NASAL: Performed by: ANESTHESIOLOGY

## 2022-07-17 PROCEDURE — 93005 ELECTROCARDIOGRAM TRACING: CPT | Performed by: INTERNAL MEDICINE

## 2022-07-17 PROCEDURE — 27000673 HC TUBING BLOOD Y: Performed by: ANESTHESIOLOGY

## 2022-07-17 PROCEDURE — 63600175 PHARM REV CODE 636 W HCPCS: Performed by: ANESTHESIOLOGY

## 2022-07-17 PROCEDURE — 63600175 PHARM REV CODE 636 W HCPCS: Performed by: NURSE ANESTHETIST, CERTIFIED REGISTERED

## 2022-07-17 PROCEDURE — 37000008 HC ANESTHESIA 1ST 15 MINUTES: Performed by: STUDENT IN AN ORGANIZED HEALTH CARE EDUCATION/TRAINING PROGRAM

## 2022-07-17 PROCEDURE — 25000003 PHARM REV CODE 250: Performed by: NURSE ANESTHETIST, CERTIFIED REGISTERED

## 2022-07-17 PROCEDURE — 27202107 HC XP QUATRO SENSOR: Performed by: ANESTHESIOLOGY

## 2022-07-17 PROCEDURE — 63600175 PHARM REV CODE 636 W HCPCS: Performed by: INTERNAL MEDICINE

## 2022-07-17 PROCEDURE — 52332 PR CYSTOSCOPY,INSERT URETERAL STENT: ICD-10-PCS | Mod: LT,,, | Performed by: STUDENT IN AN ORGANIZED HEALTH CARE EDUCATION/TRAINING PROGRAM

## 2022-07-17 PROCEDURE — 71000033 HC RECOVERY, INTIAL HOUR: Performed by: STUDENT IN AN ORGANIZED HEALTH CARE EDUCATION/TRAINING PROGRAM

## 2022-07-17 PROCEDURE — 96375 TX/PRO/DX INJ NEW DRUG ADDON: CPT

## 2022-07-17 PROCEDURE — 37000009 HC ANESTHESIA EA ADD 15 MINS: Performed by: STUDENT IN AN ORGANIZED HEALTH CARE EDUCATION/TRAINING PROGRAM

## 2022-07-17 PROCEDURE — 82962 GLUCOSE BLOOD TEST: CPT | Performed by: STUDENT IN AN ORGANIZED HEALTH CARE EDUCATION/TRAINING PROGRAM

## 2022-07-17 PROCEDURE — 27000671 HC TUBING MICROBORE EXT: Performed by: ANESTHESIOLOGY

## 2022-07-17 PROCEDURE — 81001 URINALYSIS AUTO W/SCOPE: CPT | Performed by: EMERGENCY MEDICINE

## 2022-07-17 PROCEDURE — C1769 GUIDE WIRE: HCPCS | Performed by: STUDENT IN AN ORGANIZED HEALTH CARE EDUCATION/TRAINING PROGRAM

## 2022-07-17 PROCEDURE — 93010 ELECTROCARDIOGRAM REPORT: CPT | Mod: ,,, | Performed by: INTERNAL MEDICINE

## 2022-07-17 PROCEDURE — 99285 EMERGENCY DEPT VISIT HI MDM: CPT | Mod: 25

## 2022-07-17 PROCEDURE — 36000706: Performed by: STUDENT IN AN ORGANIZED HEALTH CARE EDUCATION/TRAINING PROGRAM

## 2022-07-17 PROCEDURE — 12000002 HC ACUTE/MED SURGE SEMI-PRIVATE ROOM

## 2022-07-17 PROCEDURE — 96365 THER/PROPH/DIAG IV INF INIT: CPT

## 2022-07-17 PROCEDURE — 93010 EKG 12-LEAD: ICD-10-PCS | Mod: ,,, | Performed by: INTERNAL MEDICINE

## 2022-07-17 PROCEDURE — 99221 1ST HOSP IP/OBS SF/LOW 40: CPT | Mod: 25,,, | Performed by: STUDENT IN AN ORGANIZED HEALTH CARE EDUCATION/TRAINING PROGRAM

## 2022-07-17 DEVICE — STENT URETERAL FIRM 6FX28CM ASCERTA: Type: IMPLANTABLE DEVICE | Site: URETER | Status: FUNCTIONAL

## 2022-07-17 RX ORDER — PHENYLEPHRINE HYDROCHLORIDE 10 MG/ML
INJECTION INTRAVENOUS
Status: DISCONTINUED | OUTPATIENT
Start: 2022-07-17 | End: 2022-07-17

## 2022-07-17 RX ORDER — LIDOCAINE HYDROCHLORIDE 20 MG/ML
JELLY TOPICAL
Status: DISCONTINUED | OUTPATIENT
Start: 2022-07-17 | End: 2022-07-17 | Stop reason: HOSPADM

## 2022-07-17 RX ORDER — ONDANSETRON 2 MG/ML
4 INJECTION INTRAMUSCULAR; INTRAVENOUS DAILY PRN
Status: DISCONTINUED | OUTPATIENT
Start: 2022-07-17 | End: 2022-07-17 | Stop reason: HOSPADM

## 2022-07-17 RX ORDER — ONDANSETRON 2 MG/ML
4 INJECTION INTRAMUSCULAR; INTRAVENOUS EVERY 6 HOURS PRN
Status: DISCONTINUED | OUTPATIENT
Start: 2022-07-17 | End: 2022-07-18 | Stop reason: HOSPADM

## 2022-07-17 RX ORDER — LANOLIN ALCOHOL/MO/W.PET/CERES
800 CREAM (GRAM) TOPICAL
Status: DISCONTINUED | OUTPATIENT
Start: 2022-07-17 | End: 2022-07-18 | Stop reason: HOSPADM

## 2022-07-17 RX ORDER — SODIUM CHLORIDE 0.9 % (FLUSH) 0.9 %
10 SYRINGE (ML) INJECTION
Status: DISCONTINUED | OUTPATIENT
Start: 2022-07-17 | End: 2022-07-18 | Stop reason: HOSPADM

## 2022-07-17 RX ORDER — SUCCINYLCHOLINE CHLORIDE 20 MG/ML
INJECTION INTRAMUSCULAR; INTRAVENOUS
Status: DISCONTINUED | OUTPATIENT
Start: 2022-07-17 | End: 2022-07-17

## 2022-07-17 RX ORDER — ROCURONIUM BROMIDE 10 MG/ML
INJECTION, SOLUTION INTRAVENOUS
Status: DISCONTINUED | OUTPATIENT
Start: 2022-07-17 | End: 2022-07-17

## 2022-07-17 RX ORDER — GLUCAGON 1 MG
1 KIT INJECTION
Status: DISCONTINUED | OUTPATIENT
Start: 2022-07-17 | End: 2022-07-18 | Stop reason: HOSPADM

## 2022-07-17 RX ORDER — FENTANYL CITRATE 50 UG/ML
INJECTION, SOLUTION INTRAMUSCULAR; INTRAVENOUS
Status: DISCONTINUED | OUTPATIENT
Start: 2022-07-17 | End: 2022-07-17

## 2022-07-17 RX ORDER — INSULIN ASPART 100 [IU]/ML
1-10 INJECTION, SOLUTION INTRAVENOUS; SUBCUTANEOUS
Status: DISCONTINUED | OUTPATIENT
Start: 2022-07-17 | End: 2022-07-18 | Stop reason: HOSPADM

## 2022-07-17 RX ORDER — POTASSIUM CHLORIDE 20 MEQ/1
20 TABLET, EXTENDED RELEASE ORAL
Status: DISCONTINUED | OUTPATIENT
Start: 2022-07-17 | End: 2022-07-18 | Stop reason: HOSPADM

## 2022-07-17 RX ORDER — AMIODARONE HYDROCHLORIDE 200 MG/1
200 TABLET ORAL DAILY
Status: DISCONTINUED | OUTPATIENT
Start: 2022-07-17 | End: 2022-07-18 | Stop reason: HOSPADM

## 2022-07-17 RX ORDER — FENTANYL CITRATE 50 UG/ML
25 INJECTION, SOLUTION INTRAMUSCULAR; INTRAVENOUS EVERY 5 MIN PRN
Status: DISCONTINUED | OUTPATIENT
Start: 2022-07-17 | End: 2022-07-17 | Stop reason: HOSPADM

## 2022-07-17 RX ORDER — MORPHINE SULFATE 2 MG/ML
2 INJECTION, SOLUTION INTRAMUSCULAR; INTRAVENOUS
Status: COMPLETED | OUTPATIENT
Start: 2022-07-17 | End: 2022-07-17

## 2022-07-17 RX ORDER — MAGNESIUM SULFATE 1 G/100ML
1 INJECTION INTRAVENOUS
Status: DISCONTINUED | OUTPATIENT
Start: 2022-07-17 | End: 2022-07-18 | Stop reason: HOSPADM

## 2022-07-17 RX ORDER — DIGOXIN 125 MCG
125 TABLET ORAL
Status: DISCONTINUED | OUTPATIENT
Start: 2022-07-18 | End: 2022-07-18 | Stop reason: HOSPADM

## 2022-07-17 RX ORDER — IBUPROFEN 200 MG
16 TABLET ORAL
Status: DISCONTINUED | OUTPATIENT
Start: 2022-07-17 | End: 2022-07-18 | Stop reason: HOSPADM

## 2022-07-17 RX ORDER — ETOMIDATE 2 MG/ML
INJECTION INTRAVENOUS
Status: DISCONTINUED | OUTPATIENT
Start: 2022-07-17 | End: 2022-07-17

## 2022-07-17 RX ORDER — ONDANSETRON 2 MG/ML
4 INJECTION INTRAMUSCULAR; INTRAVENOUS
Status: COMPLETED | OUTPATIENT
Start: 2022-07-17 | End: 2022-07-17

## 2022-07-17 RX ORDER — OXYCODONE HYDROCHLORIDE 5 MG/1
5 TABLET ORAL
Status: DISCONTINUED | OUTPATIENT
Start: 2022-07-17 | End: 2022-07-17 | Stop reason: HOSPADM

## 2022-07-17 RX ORDER — POTASSIUM CHLORIDE 20 MEQ/1
40 TABLET, EXTENDED RELEASE ORAL
Status: DISCONTINUED | OUTPATIENT
Start: 2022-07-17 | End: 2022-07-18 | Stop reason: HOSPADM

## 2022-07-17 RX ORDER — CEFEPIME HYDROCHLORIDE 1 G/50ML
1 INJECTION, SOLUTION INTRAVENOUS
Status: DISCONTINUED | OUTPATIENT
Start: 2022-07-18 | End: 2022-07-18 | Stop reason: HOSPADM

## 2022-07-17 RX ORDER — MAGNESIUM SULFATE HEPTAHYDRATE 40 MG/ML
2 INJECTION, SOLUTION INTRAVENOUS
Status: DISCONTINUED | OUTPATIENT
Start: 2022-07-17 | End: 2022-07-18 | Stop reason: HOSPADM

## 2022-07-17 RX ORDER — NALOXONE HCL 0.4 MG/ML
0.02 VIAL (ML) INJECTION ONCE
Status: DISCONTINUED | OUTPATIENT
Start: 2022-07-17 | End: 2022-07-17

## 2022-07-17 RX ORDER — MAGNESIUM SULFATE HEPTAHYDRATE 40 MG/ML
4 INJECTION, SOLUTION INTRAVENOUS
Status: DISCONTINUED | OUTPATIENT
Start: 2022-07-17 | End: 2022-07-18 | Stop reason: HOSPADM

## 2022-07-17 RX ORDER — IBUPROFEN 200 MG
24 TABLET ORAL
Status: DISCONTINUED | OUTPATIENT
Start: 2022-07-17 | End: 2022-07-18 | Stop reason: HOSPADM

## 2022-07-17 RX ORDER — FAMOTIDINE 10 MG/ML
INJECTION INTRAVENOUS
Status: DISCONTINUED | OUTPATIENT
Start: 2022-07-17 | End: 2022-07-17

## 2022-07-17 RX ORDER — DIPHENHYDRAMINE HYDROCHLORIDE 50 MG/ML
12.5 INJECTION INTRAMUSCULAR; INTRAVENOUS
Status: DISCONTINUED | OUTPATIENT
Start: 2022-07-17 | End: 2022-07-17 | Stop reason: HOSPADM

## 2022-07-17 RX ORDER — LIDOCAINE HYDROCHLORIDE 20 MG/ML
INJECTION, SOLUTION EPIDURAL; INFILTRATION; INTRACAUDAL; PERINEURAL
Status: DISCONTINUED | OUTPATIENT
Start: 2022-07-17 | End: 2022-07-17

## 2022-07-17 RX ORDER — DILTIAZEM HYDROCHLORIDE 60 MG/1
60 TABLET, FILM COATED ORAL 2 TIMES DAILY
Status: DISCONTINUED | OUTPATIENT
Start: 2022-07-17 | End: 2022-07-18 | Stop reason: HOSPADM

## 2022-07-17 RX ORDER — DEXAMETHASONE SODIUM PHOSPHATE 4 MG/ML
4 INJECTION, SOLUTION INTRA-ARTICULAR; INTRALESIONAL; INTRAMUSCULAR; INTRAVENOUS; SOFT TISSUE ONCE
Status: COMPLETED | OUTPATIENT
Start: 2022-07-17 | End: 2022-07-17

## 2022-07-17 RX ORDER — MEPERIDINE HYDROCHLORIDE 50 MG/ML
12.5 INJECTION INTRAMUSCULAR; INTRAVENOUS; SUBCUTANEOUS EVERY 10 MIN PRN
Status: DISCONTINUED | OUTPATIENT
Start: 2022-07-17 | End: 2022-07-17 | Stop reason: HOSPADM

## 2022-07-17 RX ORDER — OXYCODONE AND ACETAMINOPHEN 10; 325 MG/1; MG/1
1 TABLET ORAL EVERY 6 HOURS PRN
Status: ON HOLD | COMMUNITY
Start: 2022-05-16 | End: 2022-07-18 | Stop reason: HOSPADM

## 2022-07-17 RX ORDER — HYDROCODONE BITARTRATE AND ACETAMINOPHEN 5; 325 MG/1; MG/1
1 TABLET ORAL EVERY 6 HOURS PRN
Status: DISCONTINUED | OUTPATIENT
Start: 2022-07-17 | End: 2022-07-18 | Stop reason: HOSPADM

## 2022-07-17 RX ORDER — CEFEPIME HYDROCHLORIDE 1 G/50ML
2 INJECTION, SOLUTION INTRAVENOUS ONCE
Status: COMPLETED | OUTPATIENT
Start: 2022-07-17 | End: 2022-07-17

## 2022-07-17 RX ORDER — ONDANSETRON HYDROCHLORIDE 2 MG/ML
INJECTION, SOLUTION INTRAMUSCULAR; INTRAVENOUS
Status: DISCONTINUED | OUTPATIENT
Start: 2022-07-17 | End: 2022-07-17

## 2022-07-17 RX ORDER — CEFEPIME HYDROCHLORIDE 1 G/50ML
1 INJECTION, SOLUTION INTRAVENOUS
Status: DISCONTINUED | OUTPATIENT
Start: 2022-07-17 | End: 2022-07-17

## 2022-07-17 RX ADMIN — CEFEPIME HYDROCHLORIDE 1 G: 1 INJECTION, SOLUTION INTRAVENOUS at 03:07

## 2022-07-17 RX ADMIN — SUGAMMADEX 200 MG: 100 INJECTION, SOLUTION INTRAVENOUS at 01:07

## 2022-07-17 RX ADMIN — PHENYLEPHRINE HYDROCHLORIDE 200 MCG: 10 INJECTION INTRAVENOUS at 01:07

## 2022-07-17 RX ADMIN — AMIODARONE HYDROCHLORIDE 200 MG: 200 TABLET ORAL at 05:07

## 2022-07-17 RX ADMIN — ONDANSETRON 4 MG: 2 INJECTION INTRAMUSCULAR; INTRAVENOUS at 01:07

## 2022-07-17 RX ADMIN — DILTIAZEM HYDROCHLORIDE 60 MG: 60 TABLET, FILM COATED ORAL at 08:07

## 2022-07-17 RX ADMIN — CEFEPIME HYDROCHLORIDE 2 G: 2 INJECTION, SOLUTION INTRAVENOUS at 11:07

## 2022-07-17 RX ADMIN — LIDOCAINE HYDROCHLORIDE 100 MG: 20 INJECTION, SOLUTION EPIDURAL; INFILTRATION; INTRACAUDAL; PERINEURAL at 12:07

## 2022-07-17 RX ADMIN — ONDANSETRON 4 MG: 2 INJECTION, SOLUTION INTRAMUSCULAR; INTRAVENOUS at 12:07

## 2022-07-17 RX ADMIN — DEXAMETHASONE SODIUM PHOSPHATE 4 MG: 4 INJECTION, SOLUTION INTRA-ARTICULAR; INTRALESIONAL; INTRAMUSCULAR; INTRAVENOUS; SOFT TISSUE at 02:07

## 2022-07-17 RX ADMIN — FENTANYL CITRATE 50 MCG: 50 INJECTION, SOLUTION INTRAMUSCULAR; INTRAVENOUS at 12:07

## 2022-07-17 RX ADMIN — ETOMIDATE 20 MG: 2 INJECTION, SOLUTION INTRAVENOUS at 12:07

## 2022-07-17 RX ADMIN — SUCCINYLCHOLINE CHLORIDE 140 MG: 20 INJECTION, SOLUTION INTRAMUSCULAR; INTRAVENOUS at 12:07

## 2022-07-17 RX ADMIN — MORPHINE SULFATE 2 MG: 2 INJECTION, SOLUTION INTRAMUSCULAR; INTRAVENOUS at 10:07

## 2022-07-17 RX ADMIN — SODIUM CHLORIDE: 900 INJECTION, SOLUTION INTRAVENOUS at 12:07

## 2022-07-17 RX ADMIN — ONDANSETRON 4 MG: 2 INJECTION INTRAMUSCULAR; INTRAVENOUS at 10:07

## 2022-07-17 RX ADMIN — FAMOTIDINE 20 MG: 10 INJECTION, SOLUTION INTRAVENOUS at 12:07

## 2022-07-17 RX ADMIN — ROCURONIUM BROMIDE 10 MG: 10 INJECTION, SOLUTION INTRAVENOUS at 12:07

## 2022-07-17 NOTE — HPI
Jo Alegre is a 81 y.o. female with PMHx of atrial fibrillation on Eliquis, hyperlipidemia, CHF.    Presented to the ED today with complaints of left abdominal pain.     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 There is a large amount of perinephric stranding, mild hydro, and a large renal cysts some appearing complex.     Febrile mildly in the ED to 100.3.  WBC normal.   Cr 1.6 (baseline).  UA shows 17 RBC, 5 WBC, nitrite negative.     States at home see felt warm but did not take her temp. No n/v. No urinary complaints expect urgency with inability to void today.   No prior hx of stones.

## 2022-07-17 NOTE — ANESTHESIA PROCEDURE NOTES
Intubation    Date/Time: 7/17/2022 12:53 PM  Performed by: Rachid Thorne CRNA  Authorized by: Korey Charlton MD     Intubation:     Induction:  Intravenous    Intubated:  Postinduction    Mask Ventilation:  N/a    Attempts:  1    Attempted By:  CRNA    Method of Intubation:  Video laryngoscopy    Blade:  [place] 4    Laryngeal View Grade: Grade I - full view of cords      Difficult Airway Encountered?: No      Complications:  None    Airway Device:  Oral endotracheal tube    Airway Device Size:  7.5    Style/Cuff Inflation:  Cuffed    Tube secured:  21    Secured at:  The lips    Placement Verified By:  Capnometry    Complicating Factors:  None    Findings Post-Intubation:  BS equal bilateral and atraumatic/condition of teeth unchanged

## 2022-07-17 NOTE — OP NOTE
[facility]    Date: 07/17/2022    Pre-Op Diagnosis:   - Left partial staghorn stone  - Fevers     Patient Active Problem List   Diagnosis    Essential hypertension    Type 2 diabetes mellitus with diabetic nephropathy, without long-term current use of insulin    Mixed dyslipidemia    Cardiomyopathy with implantable cardioverter-defibrillator    Ejection fraction < 50%    Encounter for screening mammogram for breast cancer    Neuropathy of both feet    Asymptomatic menopause    Chronic anticoagulation    Stage 3 chronic kidney disease    Low back pain, non-specific    Difficulty walking    Paroxysmal atrial fibrillation    Osteoporosis with current pathological fracture    Osteoporosis, postmenopausal    Acute midline low back pain without sciatica    Elevated blood sugar    Screening for breast cancer    Gait instability    Complications, mechanical, pacemaker, cardiac    Presence of automatic (implantable) cardiac defibrillator    Chronic combined systolic and diastolic congestive heart failure    COPD (chronic obstructive pulmonary disease) per patient    Chronic respiratory failure    Obesity    Obstructive sleep apnea syndrome    Pulmonary hypertension    Left-sided chest pain    Cough    Hypoxia    Urge incontinence    Acute pyelonephritis         Op Diagnosis: same    Procedure(s) Performed:    1. Cystoscopy with left ureteral JJ stent placement  2. Fluoroscopy < 1 h    Specimen(s): none    Staff Surgeon:  Shelby Parra MD    Anesthesia: GETA    Indications: Jo Alegre is a 81 y.o. female with left partial staghorn stone, mild hydro and fevers.    Findings:    - stones visible on fluoro  - left ureteral stent placed in upper pole   - small stone fragments present through the bladder     Estimated Blood Loss: min    Drains:  6 Italian x 28 cm left JJ ureteral stent without strings    Procedure in Detail:  After risks, benefits and possible complications of  the procedure were explained, the patient elected to undergo the procedure and informed consent was obtained. All questions were answered in the patrick-operative area. The patient was transferred to the cystoscopy suite and placed on the fluoroscopy table in the supine position.  SCDs were applied and working. Time out was performed, patrick-procedural antibiotics were given. Anesthesia was administered.  After adequate anesthesia the patient was placed in dorsal lithotomy position and prepped and draped in the usual sterile fashion.     A rigid cystoscope in a 22 Fr sheath was introduced into the patients bladder per urethra. This passed easily.  The entire urethra was visualized and revealed no strictures or masses.  Cystoscopy was performed which showed the right and left ureteral orifices in the normal anatomic position.  There were no bladder tumors, no  trabeculations, and small agata stones.      Our attention was turned to the patient's left ureteral orifice.  A glide wire was advanced up the left ureteral orifice to the level of the expected renal pelvis.  This was confirmed using fluoroscopy. The wire was manipulated into the upper pole past the stone.     We then passed a 6 Fr x 28 cm JJ ureteral stent without strings over the wire to the level of the renal pelvis under direct vision as well as flouroscopy. The guide wire was removed.  A 180 degree coil was observed in the renal pelvis as well as the bladder using fluoroscopy.  A 180 degree coil was also seen using direct visualization in the bladder.     The patient tolerated the procedure well and was transferred to the recovery room in stable condition.      Disposition: The patient will be transferred back to the floor under the hospitalist service.  Continue empiric abx until culture finalizes. Will have her follow up in 2 weeks to discuss stone management.     Shelby Parra MD

## 2022-07-17 NOTE — ANESTHESIA POSTPROCEDURE EVALUATION
Anesthesia Post Evaluation    Patient: Jo Alegre    Procedure(s) Performed: Procedure(s) (LRB):  CYSTOSCOPY, WITH URETERAL STENT INSERTION (Left)    Final Anesthesia Type: general      Patient location during evaluation: PACU  Patient participation: Yes- Able to Participate  Level of consciousness: awake and alert  Post-procedure vital signs: reviewed and stable  Pain management: adequate  Airway patency: patent  JENNIFER mitigation strategies: Extubation while patient is awake, Multimodal analgesia and Extubation and recovery carried out in lateral, semiupright, or other nonsupine position  PONV status at discharge: No PONV  Anesthetic complications: no      Cardiovascular status: stable  Respiratory status: unassisted and spontaneous ventilation  Hydration status: euvolemic  Follow-up not needed.          Vitals Value Taken Time   /56 07/17/22 1456   Temp 36.3 °C (97.3 °F) 07/17/22 1445   Pulse 72 07/17/22 1500   Resp 24 07/17/22 1500   SpO2 97 % 07/17/22 1500   Vitals shown include unvalidated device data.      No case tracking events are documented in the log.      Pain/Rosalind Score: Pain Rating Prior to Med Admin: 8 (7/17/2022 10:23 AM)  Rosalind Score: 9 (7/17/2022  2:45 PM)

## 2022-07-17 NOTE — ASSESSMENT & PLAN NOTE
Jo Alegre is a 81 y.o. female with partial staghorn of left kideny, mild hydro and fever.     - To OR for left ureteral stent placement   - Will eventually need PCNL once infection has been treated. Will get MRI prior to evaluate renal cysts.   - Plan to follow up with her outpatient in 2 weeks   - Admit to medicine   - Continue empiric abx until culture finalizes

## 2022-07-17 NOTE — TRANSFER OF CARE
"Anesthesia Transfer of Care Note    Patient: Jo Alegre    Procedure(s) Performed: Procedure(s) (LRB):  CYSTOSCOPY, WITH URETERAL STENT INSERTION (Left)    Patient location: PACU    Anesthesia Type: general    Transport from OR: Transported from OR on room air with adequate spontaneous ventilation    Post pain: adequate analgesia    Post assessment: no apparent anesthetic complications    Post vital signs: stable    Level of consciousness: awake and alert    Nausea/Vomiting: no nausea/vomiting    Complications: none    Transfer of care protocol was followed      Last vitals:   Visit Vitals  BP (!) 141/64   Pulse 79   Temp 37.9 °C (100.3 °F) (Oral)   Resp 18   Ht 5' 9" (1.753 m)   Wt 100.2 kg (221 lb)   SpO2 (!) 94%   BMI 32.64 kg/m²     "

## 2022-07-17 NOTE — SUBJECTIVE & OBJECTIVE
Past Medical History:   Diagnosis Date    Allergy     Codeine, Lasix    Atrial fibrillation     Atrial fibrillation     Cataract     CHF (congestive heart failure)     Diabetes mellitus, type 2     Ejection fraction < 50% 10/18/2017    Approximately 35%  Based on prior  Echocardiogram.    Encounter for blood transfusion     Hyperlipidemia     Osteoporosis     Thyroid disorder screening 10/17/2017    TSH of 1.12 ordered by Dr. dee Jones       Past Surgical History:   Procedure Laterality Date    CHOLECYSTECTOMY  1997    [place]     COLONOSCOPY      EYE SURGERY      bilateral cataracts    FINGER SURGERY Right 2021    right pinky finger    FRACTURE SURGERY  2014    right femur with neville    HEMORRHOID SURGERY      48 yrs ago    HYSTERECTOMY      REPLACEMENT OF IMPLANTABLE CARDIOVERTER-DEFIBRILLATOR (ICD) GENERATOR N/A 12/13/2019    Procedure: REPLACEMENT, PULSE GENERATOR, ICD-MEDTRONIC;  Surgeon: Sebastian Nowak III, MD;  [facility]: [facility];  Service: Cardiology;  Laterality: N/A;    TONSILLECTOMY         Review of patient's allergies indicates:   Allergen Reactions    Codeine Other (See Comments)    Lasix [furosemide]     Latex Rash       Current Facility-Administered Medications on File Prior to Encounter   Medication    0.9%  NaCl infusion    diphenhydrAMINE injection 25 mg    lorazepam injection 1 mg     Current Outpatient Medications on File Prior to Encounter   Medication Sig    amiodarone (PACERONE) 200 MG Tab Take 200 mg by mouth once daily.    atorvastatin (LIPITOR) 40 MG tablet Take 40 mg by mouth once daily.    Ca-D3-mag ox-zinc--thom-bor 600 mg calcium- 20 mcg-50 mg Tab Take 1 tablet by mouth 2 (two) times daily.    cholecalciferol, vitamin D3, 125 mcg (5,000 unit) Tab Take 5,000 Units by mouth 2 (two) times daily.    denosumab (PROLIA) 60 mg/mL Syrg Inject 1 mL (60 mg total) into the skin every 6 (six) months.    digoxin (LANOXIN) 125 mcg tablet Take 125 mcg by mouth every Mon, Wed, Fri.    diltiaZEM  (CARDIZEM) 60 MG tablet Take 60 mg by mouth 2 (two) times daily.    gabapentin (NEURONTIN) 100 MG capsule TAKE 2 CAPSULES(200 MG) BY MOUTH THREE TIMES DAILY (Patient taking differently: Take 200 mg by mouth 3 (three) times daily. TAKE 2 CAPSULES(200 MG) BY MOUTH THREE TIMES DAILY)    glimepiride (AMARYL) 1 MG tablet Take 1 tablet (1 mg total) by mouth before breakfast.    latanoprost 0.005 % ophthalmic solution Place 1 drop into both eyes nightly.    midodrine (PROAMATINE) 2.5 MG Tab Take 2.5 mg by mouth 3 (three) times daily.    nystatin (MYCOSTATIN) cream Apply topically 2 (two) times daily. (Patient taking differently: Apply 1 g topically 2 (two) times daily.)    oxyCODONE-acetaminophen (PERCOCET)  mg per tablet Take 1 tablet by mouth every 6 (six) hours as needed.    rivaroxaban (XARELTO) 15 mg Tab Take 15 mg by mouth daily with dinner or evening meal.    sacubitriL-valsartan (ENTRESTO) 24-26 mg per tablet Take 1 tablet by mouth once daily.     torsemide (DEMADEX) 20 MG Tab Take 1 tablet (20 mg total) by mouth once daily. for 14 days     Family History       Problem Relation (Age of Onset)    Breast cancer Paternal Aunt, Maternal Grandmother, Maternal Aunt    Cancer Father    Heart disease Mother          Tobacco Use    Smoking status: Former Smoker    Smokeless tobacco: Never Used    Tobacco comment: quit 2013   Substance and Sexual Activity    Alcohol use: No    Drug use: No    Sexual activity: Not Currently     Review of Systems   Constitutional:  Negative for activity change and appetite change.   HENT:  Negative for congestion and dental problem.    Eyes:  Negative for discharge and itching.   Respiratory:  Negative for shortness of breath.    Cardiovascular:  Negative for chest pain.   Gastrointestinal:  Positive for abdominal pain and nausea. Negative for abdominal distention.   Endocrine: Negative for cold intolerance.   Genitourinary:  Negative for difficulty urinating and dysuria.    Musculoskeletal:  Negative for arthralgias and back pain.   Skin:  Negative for color change.   Neurological:  Negative for dizziness and facial asymmetry.   Hematological:  Negative for adenopathy.   Psychiatric/Behavioral:  Negative for agitation and behavioral problems.    Objective:     Vital Signs (Most Recent):  Temp: 97.9 °F (36.6 °C) (07/17/22 1527)  Pulse: 79 (07/17/22 1527)  Resp: 20 (07/17/22 1527)  BP: 121/63 (07/17/22 1527)  SpO2: (!) 93 % (07/17/22 1527)   Vital Signs (24h Range):  Temp:  [97.1 °F (36.2 °C)-100.3 °F (37.9 °C)] 97.9 °F (36.6 °C)  Pulse:  [] 79  Resp:  [13-24] 20  SpO2:  [91 %-100 %] 93 %  BP: (103-167)/(55-89) 121/63     Weight: 100.2 kg (221 lb)  Body mass index is 32.64 kg/m².    Physical Exam  Vitals and nursing note reviewed.   Constitutional:       General: She is not in acute distress.  HENT:      Head: Atraumatic.      Right Ear: External ear normal.      Left Ear: External ear normal.      Nose: Nose normal.      Mouth/Throat:      Mouth: Mucous membranes are moist.   Eyes:      General: No scleral icterus.  Cardiovascular:      Rate and Rhythm: Normal rate.   Pulmonary:      Effort: Pulmonary effort is normal.   Abdominal:      General: There is no distension.   Musculoskeletal:         General: Normal range of motion.      Cervical back: Normal range of motion.   Skin:     General: Skin is warm.   Neurological:      Mental Status: She is alert and oriented to person, place, and time.   Psychiatric:         Behavior: Behavior normal.           Significant Labs: All pertinent labs within the past 24 hours have been reviewed.  CBC:   Recent Labs   Lab 07/17/22  0812   WBC 8.29   HGB 13.2   HCT 41.4        CMP:   Recent Labs   Lab 07/17/22  0812      K 4.2      CO2 28   *   BUN 20   CREATININE 1.6*   CALCIUM 9.0   PROT 6.7   ALBUMIN 3.4*   BILITOT 1.0   ALKPHOS 81   AST 17   ALT 18   ANIONGAP 10   EGFRNONAA 30.2*       Significant Imaging: I have  reviewed all pertinent imaging results/findings within the past 24 hours.

## 2022-07-17 NOTE — NURSING
Pt arrives to room via hosp. Bed. When sitting up, becomes nauseated again and vomits. Cool cloth provided. After few minutes, assisted x 2 to nearby bed. Pt. able to move up herself in bed using heels. HOB elevated. Cynthia in Recovery reports Fingerstick there 74 but then gave Decadron after. Upon checking Blood Sugar after arrival to room, results 84. Gave ayde crackers and milk.

## 2022-07-17 NOTE — HPI
81 year old patient getting admitted with acute Pyelonephritis/L ureteral stone and L side staghorn calculus  Pt awakened from sleep today AM with severe abdominal pain  Describes pain as severe/constant/lower part of abdomen with no aggravating and relieving factors   Pt came to ER and Got admitted  Imaging studies showed acute Pyelonephritis/L ureteral stone and pt had OR visit with insertion of Ureteral stent

## 2022-07-17 NOTE — ANESTHESIA PREPROCEDURE EVALUATION
07/17/2022  Jo Alegre is a 81 y.o., female.      Pre-op Assessment    I have reviewed the Patient Summary Reports.     I have reviewed the Nursing Notes. I have reviewed the NPO Status.   I have reviewed the Medications.     Review of Systems  Anesthesia Hx:  Denies Family Hx of Anesthesia complications.   Denies Personal Hx of Anesthesia complications.   Social:  Former Smoker    Hematology/Oncology:     Oncology Normal   Hematology Comments: Xarelto, last 07/16/2022   EENT/Dental:   Lower partial plate is out.   Cardiovascular:   Pacemaker ([employer] AICD, checked regularly) Hypertension Denies CAD. (Patient says recent angiogram shows no blockages)   Dysrhythmias atrial fibrillation  Denies Angina. CHF (35% EF)  Denies Orthopnea. hyperlipidemia AGUILAR    Pulmonary:   COPD Shortness of breath ( chronic with occasional oxygen use at home. No worse now than usual.) Sleep Apnea, CPAP    Education provided regarding risk of obstructive sleep apnea     Renal/:   Chronic Renal Disease ( diabetic nephropathy) renal calculi Current left pyelonephritis   Hepatic/GI:   GERD ( occasionally) No recent nausea or vomiting   Musculoskeletal:   Osteoporosis   Neurological:   Peripheral Neuropathy ( feet)    Endocrine:   Diabetes ( diet controlled), type 2  Obesity / BMI > 30      Physical Exam  General: Well nourished, Cooperative, Alert and Oriented    Airway:  Mallampati: III   Mouth Opening: Normal  TM Distance: > 6 cm  Tongue: Normal  Neck ROM: Normal ROM    Dental:  Intact    Chest/Lungs:  Clear to auscultation, Normal Respiratory Rate    Heart:  Rate: Normal  Rhythm: Regular Rhythm  Sounds: Normal        Anesthesia Plan  Type of Anesthesia, risks & benefits discussed:    Anesthesia Type: Gen ETT  Intra-op Monitoring Plan: Standard ASA Monitors  Post Op Pain Control Plan: multimodal analgesia  Induction:   IV  Airway Plan: , Post-Induction  Informed Consent: Informed consent signed with the Patient and all parties understand the risks and agree with anesthesia plan.  All questions answered.   ASA Score: 4 Emergent  Anesthesia Plan Notes: Etomidate, limit IV fluids  No Decadron  Magnet over AICD if electrocautery used  Sleep apnea precautions: No Versed, minimize opioids, extubate awake and sitting up, sugammadex if muscle relaxant used  Multimodal analgesia:  IV acetaminophen  Antiemetics:  Zofran, Pepcid        Ready For Surgery From Anesthesia Perspective.     .

## 2022-07-17 NOTE — H&P
[facility]  History & Physical    Patient Name: Jo Alegre  MRN: [id number]  Patient Class: IP- Inpatient  Admission Date: 7/17/2022  Attending Physician: Easton Coleman MD   Primary Care Provider: Kraig Alberto MD         Patient information was obtained from patient and ER records.     Subjective:     Principal Problem:Acute pyelonephritis    Chief Complaint:   Chief Complaint   Patient presents with    Abdominal Pain     Started an hour ago; L lower quad         HPI: 81 year old patient getting admitted with acute Pyelonephritis/L ureteral stone and L side staghorn calculus  Pt awakened from sleep today AM with severe abdominal pain  Describes pain as severe/constant/lower part of abdomen with no aggravating and relieving factors   Pt came to ER and Got admitted  Imaging studies showed acute Pyelonephritis/L ureteral stone and pt had OR visit with insertion of Ureteral stent       Past Medical History:   Diagnosis Date    Allergy     Codeine, Lasix    Atrial fibrillation     Atrial fibrillation     Cataract     CHF (congestive heart failure)     Diabetes mellitus, type 2     Ejection fraction < 50% 10/18/2017    Approximately 35%  Based on prior  Echocardiogram.    Encounter for blood transfusion     Hyperlipidemia     Osteoporosis     Thyroid disorder screening 10/17/2017    TSH of 1.12 ordered by Dr. dee Jones       Past Surgical History:   Procedure Laterality Date    CHOLE[place]STECTOMY  1997    [place]     COLONOSCOPY      EYE SURGERY      bilateral cataracts    FINGER SURGERY Right 2021    right pinky finger    FRACTURE SURGERY  2014    right femur with neville    HEMORRHOID SURGERY      48 yrs ago    HYSTERECTOMY      REPLACEMENT OF IMPLANTABLE CARDIOVERTER-DEFIBRILLATOR (ICD) GENERATOR N/A 12/13/2019    Procedure: REPLACEMENT, PULSE GENERATOR, ICD-MEDTRONIC;  Surgeon: Sebastian Nowak III, MD;  Location: [facility];  Service: Cardiology;  Laterality: N/A;     TONSILLECTOMY         Review of patient's allergies indicates:   Allergen Reactions    Codeine Other (See Comments)    Lasix [furosemide]     Latex Rash       Current Facility-Administered Medications on File Prior to Encounter   Medication    0.9%  NaCl infusion    diphenhydrAMINE injection 25 mg    lorazepam injection 1 mg     Current Outpatient Medications on File Prior to Encounter   Medication Sig    amiodarone (PACERONE) 200 MG Tab Take 200 mg by mouth once daily.    atorvastatin (LIPITOR) 40 MG tablet Take 40 mg by mouth once daily.    Ca-D3-mag ox-zinc--thom-bor 600 mg calcium- 20 mcg-50 mg Tab Take 1 tablet by mouth 2 (two) times daily.    cholecalciferol, vitamin D3, 125 mcg (5,000 unit) Tab Take 5,000 Units by mouth 2 (two) times daily.    denosumab (PROLIA) 60 mg/mL Syrg Inject 1 mL (60 mg total) into the skin every 6 (six) months.    digoxin (LANOXIN) 125 mcg tablet Take 125 mcg by mouth every Mon, Wed, Fri.    diltiaZEM (CARDIZEM) 60 MG tablet Take 60 mg by mouth 2 (two) times daily.    gabapentin (NEURONTIN) 100 MG capsule TAKE 2 CAPSULES(200 MG) BY MOUTH THREE TIMES DAILY (Patient taking differently: Take 200 mg by mouth 3 (three) times daily. TAKE 2 CAPSULES(200 MG) BY MOUTH THREE TIMES DAILY)    glimepiride (AMARYL) 1 MG tablet Take 1 tablet (1 mg total) by mouth before breakfast.    latanoprost 0.005 % ophthalmic solution Place 1 drop into both eyes nightly.    midodrine (PROAMATINE) 2.5 MG Tab Take 2.5 mg by mouth 3 (three) times daily.    nystatin (MYCOSTATIN) cream Apply topically 2 (two) times daily. (Patient taking differently: Apply 1 g topically 2 (two) times daily.)    oxyCODONE-acetaminophen (PERCOCET)  mg per tablet Take 1 tablet by mouth every 6 (six) hours as needed.    rivaroxaban (XARELTO) 15 mg Tab Take 15 mg by mouth daily with dinner or evening meal.    sacubitriL-valsartan (ENTRESTO) 24-26 mg per tablet Take 1 tablet by mouth once daily.      torsemide (DEMADEX) 20 MG Tab Take 1 tablet (20 mg total) by mouth once daily. for 14 days     Family History       Problem Relation (Age of Onset)    Breast cancer Paternal Aunt, Maternal Grandmother, Maternal Aunt    Cancer Father    Heart disease Mother          Tobacco Use    Smoking status: Former Smoker    Smokeless tobacco: Never Used    Tobacco comment: quit 2013   Substance and Sexual Activity    Alcohol use: No    Drug use: No    Sexual activity: Not Currently     Review of Systems   Constitutional:  Negative for activity change and appetite change.   HENT:  Negative for congestion and dental problem.    Eyes:  Negative for discharge and itching.   Respiratory:  Negative for shortness of breath.    Cardiovascular:  Negative for chest pain.   Gastrointestinal:  Positive for abdominal pain and nausea. Negative for abdominal distention.   Endocrine: Negative for cold intolerance.   Genitourinary:  Negative for difficulty urinating and dysuria.   Musculoskeletal:  Negative for arthralgias and back pain.   Skin:  Negative for color change.   Neurological:  Negative for dizziness and facial asymmetry.   Hematological:  Negative for adenopathy.   Psychiatric/Behavioral:  Negative for agitation and behavioral problems.    Objective:     Vital Signs (Most Recent):  Temp: 97.9 °F (36.6 °C) (07/17/22 1527)  Pulse: 79 (07/17/22 1527)  Resp: 20 (07/17/22 1527)  BP: 121/63 (07/17/22 1527)  SpO2: (!) 93 % (07/17/22 1527)   Vital Signs (24h Range):  Temp:  [97.1 °F (36.2 °C)-100.3 °F (37.9 °C)] 97.9 °F (36.6 °C)  Pulse:  [] 79  Resp:  [13-24] 20  SpO2:  [91 %-100 %] 93 %  BP: (103-167)/(55-89) 121/63     Weight: 100.2 kg (221 lb)  Body mass index is 32.64 kg/m².    Physical Exam  Vitals and nursing note reviewed.   Constitutional:       General: She is not in acute distress.  HENT:      Head: Atraumatic.      Right Ear: External ear normal.      Left Ear: External ear normal.      Nose: Nose normal.       Mouth/Throat:      Mouth: Mucous membranes are moist.   Eyes:      General: No scleral icterus.  Cardiovascular:      Rate and Rhythm: Normal rate.   Pulmonary:      Effort: Pulmonary effort is normal.   Abdominal:      General: There is no distension.   Musculoskeletal:         General: Normal range of motion.      Cervical back: Normal range of motion.   Skin:     General: Skin is warm.   Neurological:      Mental Status: She is alert and oriented to person, place, and time.   Psychiatric:         Behavior: Behavior normal.           Significant Labs: All pertinent labs within the past 24 hours have been reviewed.  CBC:   Recent Labs   Lab 07/17/22  0812   WBC 8.29   HGB 13.2   HCT 41.4        CMP:   Recent Labs   Lab 07/17/22  0812      K 4.2      CO2 28   *   BUN 20   CREATININE 1.6*   CALCIUM 9.0   PROT 6.7   ALBUMIN 3.4*   BILITOT 1.0   ALKPHOS 81   AST 17   ALT 18   ANIONGAP 10   EGFRNONAA 30.2*       Significant Imaging: I have reviewed all pertinent imaging results/findings within the past 24 hours.    Assessment/Plan:     * Acute pyelonephritis  Continue iv abx  Follow up cultures      Staghorn calculus  Cystoscopy with left ureteral JJ stent placement  Staghorn calculus management on  OP basis      Obesity  Need therapeutic lifestyle measures      Paroxysmal atrial fibrillation  Maintain present regime      Stage 3 chronic kidney disease  MARCELINO ON CKD3 POA  Monitor renal panels      Chronic anticoagulation  On NOAC at home for A fib      Type 2 diabetes mellitus with diabetic nephropathy, without long-term current use of insulin  Maintain SSI      Essential hypertension  Stable         VTE Risk Mitigation (From admission, onward)    None             Easton Coleman MD  Department of Hospital Medicine   [facility]

## 2022-07-17 NOTE — CONSULTS
[facility] - Emergency Dept  Urology  Consult Note    Patient Name: Jo Alegre  MRN: [id number]  Admission Date: 7/17/2022  Hospital Length of Stay: 0   Code Status: Prior   Attending Provider: Easton Coleman MD   Consulting Provider: Shelby Parra MD  Primary Care Physician: Kraig Alberto MD  Principal Problem:Acute pyelonephritis    Inpatient consult to Urology  Consult performed by: Shelby Parra MD  Consult ordered by: Gonzalez Harmon DO          Subjective:     HPI:  Jo Alegre is a 81 y.o. female with PMHx of atrial fibrillation on Eliquis, hyperlipidemia, CHF.    Presented to the ED today with complaints of left abdominal pain.     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 There is a large amount of perinephric stranding, mild hydro, and a large renal cysts some appearing complex.     Febrile mildly in the ED to 100.3.  WBC normal.   Cr 1.6 (baseline).  UA shows 17 RBC, 5 WBC, nitrite negative.     States at home see felt warm but did not take her temp. No n/v. No urinary complaints expect urgency with inability to void today.   No prior hx of stones.       Past Medical History:   Diagnosis Date    Allergy     Codeine, Lasix    Atrial fibrillation     Atrial fibrillation     Cataract     CHF (congestive heart failure)     Diabetes mellitus, type 2     Ejection fraction < 50% 10/18/2017    Approximately 35%  Based on prior  Echocardiogram.    Encounter for blood transfusion     Hyperlipidemia     Osteoporosis     Thyroid disorder screening 10/17/2017    TSH of 1.12 ordered by Dr. dee Jones       Past Surgical History:   Procedure Laterality Date    CHOLE[place]ECTOMY  1997    [place]     COLONOSCOPY      EYE SURGERY      bilateral cataracts    FINGER SURGERY Right 2021    right pinky finger    FRACTURE SURGERY  2014    right femur with neville    HEMORRHOID SURGERY      48 yrs ago     HYSTERECTOMY      REPLACEMENT OF IMPLANTABLE CARDIOVERTER-DEFIBRILLATOR (ICD) GENERATOR N/A 12/13/2019    Procedure: REPLACEMENT, PULSE GENERATOR, ICD-MEDTRONIC;  Surgeon: Sebastian Nowak III, MD;  Location: [facility];  Service: Cardiology;  Laterality: N/A;    TONSILLECTOMY         Review of patient's allergies indicates:   Allergen Reactions    Codeine Other (See Comments)    Lasix [furosemide]     Latex Rash       Family History       Problem Relation (Age of Onset)    Breast cancer Paternal Aunt, Maternal Grandmother, Maternal Aunt    Cancer Father    Heart disease Mother            Tobacco Use    Smoking status: Former Smoker    Smokeless tobacco: Never Used    Tobacco comment: quit 2013   Substance and Sexual Activity    Alcohol use: No    Drug use: No    Sexual activity: Not Currently       Review of Systems   Constitutional:  Positive for fever.   Gastrointestinal:  Positive for abdominal pain.   Genitourinary:  Positive for difficulty urinating. Negative for dysuria, flank pain and hematuria.   Neurological:  Negative for weakness.     Objective:     Temp:  [100.3 °F (37.9 °C)] 100.3 °F (37.9 °C)  Pulse:  [] 79  Resp:  [18] 18  SpO2:  [91 %-97 %] 94 %  BP: (109-167)/(64-80) 141/64     Body mass index is 32.64 kg/m².           Drains       None                   Physical Exam  Vitals reviewed.   Constitutional:       General: She is not in acute distress.     Appearance: Normal appearance. She is obese. She is not ill-appearing.   HENT:      Head: Normocephalic and atraumatic.   Eyes:      General: No scleral icterus.  Cardiovascular:      Rate and Rhythm: Normal rate and regular rhythm.   Pulmonary:      Effort: Pulmonary effort is normal. No respiratory distress.      Comments: On NC  Abdominal:      Palpations: Abdomen is soft.   Skin:     Coloration: Skin is not jaundiced.   Neurological:      General: No focal deficit present.      Mental Status: She is alert and oriented to person,  place, and time.   Psychiatric:         Mood and Affect: Mood normal.         Behavior: Behavior normal.       Significant Labs:    BMP:  Recent Labs   Lab 07/17/22  0812      K 4.2      CO2 28   BUN 20   CREATININE 1.6*   CALCIUM 9.0       CBC:  Recent Labs   Lab 07/17/22  0812   WBC 8.29   HGB 13.2   HCT 41.4          Urine Studies:   Recent Labs   Lab 07/17/22  0852   COLORU Yellow   APPEARANCEUA Clear   PHUR 6.0   SPECGRAV 1.025   PROTEINUA 2+*   GLUCUA Negative   KETONESU Negative   BILIRUBINUA Negative   OCCULTUA 3+*   NITRITE Negative   UROBILINOGEN 1.0   LEUKOCYTESUR 2+*   RBCUA 17*   WBCUA 5   BACTERIA Rare   HYALINECASTS 0     All pertinent labs results from the past 24 hours have been reviewed.    Significant Imaging:  All pertinent imaging results/findings from the past 24 hours have been reviewed.        Assessment and Plan:     * Acute pyelonephritis  Jo Alegre is a 81 y.o. female with partial staghorn of left kideny, mild hydro and fever.     - To OR for left ureteral stent placement   - Will eventually need PCNL once infection has been treated. Will get MRI prior to evaluate renal cysts.   - Plan to follow up with her outpatient in 2 weeks   - Admit to medicine   - Continue empiric abx until culture finalizes         VTE Risk Mitigation (From admission, onward)    None          Thank you for your consult. I will follow-up with patient. Please contact us if you have any additional questions.    Shelby Parra MD  Urology  [facility] [place] - Emergency Dept

## 2022-07-17 NOTE — SUBJECTIVE & OBJECTIVE
Past Medical History:   Diagnosis Date    Allergy     Codeine, Lasix    Atrial fibrillation     Atrial fibrillation     Cataract     CHF (congestive heart failure)     Diabetes mellitus, type 2     Ejection fraction < 50% 10/18/2017    Approximately 35%  Based on prior  Echocardiogram.    Encounter for blood transfusion     Hyperlipidemia     Osteoporosis     Thyroid disorder screening 10/17/2017    TSH of 1.12 ordered by Dr. dee Jones       Past Surgical History:   Procedure Laterality Date    CHOLECYSTECTOMY  1997    [place]     COLONOSCOPY      EYE SURGERY      bilateral cataracts    FINGER SURGERY Right 2021    right pinky finger    FRACTURE SURGERY  2014    right femur with neville    HEMORRHOID SURGERY      48 yrs ago    HYSTERECTOMY      REPLACEMENT OF IMPLANTABLE CARDIOVERTER-DEFIBRILLATOR (ICD) GENERATOR N/A 12/13/2019    Procedure: REPLACEMENT, PULSE GENERATOR, ICD-MEDTRONIC;  Surgeon: Sebastian Nowak III, MD;  Location: [facility];  Service: Cardiology;  Laterality: N/A;    TONSILLECTOMY         Review of patient's allergies indicates:   Allergen Reactions    Codeine Other (See Comments)    Lasix [furosemide]     Latex Rash       Family History       Problem Relation (Age of Onset)    Breast cancer Paternal Aunt, Maternal Grandmother, Maternal Aunt    Cancer Father    Heart disease Mother            Tobacco Use    Smoking status: Former Smoker    Smokeless tobacco: Never Used    Tobacco comment: quit 2013   Substance and Sexual Activity    Alcohol use: No    Drug use: No    Sexual activity: Not Currently       Review of Systems   Constitutional:  Positive for fever.   Gastrointestinal:  Positive for abdominal pain.   Genitourinary:  Positive for difficulty urinating. Negative for dysuria, flank pain and hematuria.   Neurological:  Negative for weakness.     Objective:     Temp:  [100.3 °F (37.9 °C)] 100.3 °F (37.9 °C)  Pulse:  [] 79  Resp:  [18] 18  SpO2:  [91 %-97 %] 94 %  BP: (109-167)/(64-80)  141/64     Body mass index is 32.64 kg/m².           Drains       None                   Physical Exam  Vitals reviewed.   Constitutional:       General: She is not in acute distress.     Appearance: Normal appearance. She is obese. She is not ill-appearing.   HENT:      Head: Normocephalic and atraumatic.   Eyes:      General: No scleral icterus.  Cardiovascular:      Rate and Rhythm: Normal rate and regular rhythm.   Pulmonary:      Effort: Pulmonary effort is normal. No respiratory distress.      Comments: On NC  Abdominal:      Palpations: Abdomen is soft.   Skin:     Coloration: Skin is not jaundiced.   Neurological:      General: No focal deficit present.      Mental Status: She is alert and oriented to person, place, and time.   Psychiatric:         Mood and Affect: Mood normal.         Behavior: Behavior normal.       Significant Labs:    BMP:  Recent Labs   Lab 07/17/22  0812      K 4.2      CO2 28   BUN 20   CREATININE 1.6*   CALCIUM 9.0       CBC:  Recent Labs   Lab 07/17/22  0812   WBC 8.29   HGB 13.2   HCT 41.4          Urine Studies:   Recent Labs   Lab 07/17/22  0852   COLORU Yellow   APPEARANCEUA Clear   PHUR 6.0   SPECGRAV 1.025   PROTEINUA 2+*   GLUCUA Negative   KETONESU Negative   BILIRUBINUA Negative   OCCULTUA 3+*   NITRITE Negative   UROBILINOGEN 1.0   LEUKOCYTESUR 2+*   RBCUA 17*   WBCUA 5   BACTERIA Rare   HYALINECASTS 0     All pertinent labs results from the past 24 hours have been reviewed.    Significant Imaging:  All pertinent imaging results/findings from the past 24 hours have been reviewed.

## 2022-07-17 NOTE — ED PROVIDER NOTES
Encounter Date: 7/17/2022       History     Chief Complaint   Patient presents with    Abdominal Pain     Started an hour ago; L lower quad      81-year-old female history atrial fibrillation on Eliquis, hyperlipidemia, CHF, presents emergency department with left lower quadrant abdominal pain.  She says it started about an hour and half ago.  She says that it does not radiate.  She does feel something in her left upper quadrant region though however.  She says that it is been constant.  Not coming and going.  She does state that she has some mild burning with urination.  No frequency, no hematuria.  No flank pain.  Patient has chronic shortness of breath, occasionally wears oxygen, no worse than usual.  She denies any chest pain today.  She says she has never had any abdominal surgeries in the past.        Review of patient's allergies indicates:   Allergen Reactions    Codeine Other (See Comments)    Lasix [furosemide]     Latex Rash     Past Medical History:   Diagnosis Date    Allergy     Codeine, Lasix    Atrial fibrillation     Atrial fibrillation     Cataract     CHF (congestive heart failure)     Diabetes mellitus, type 2     Ejection fraction < 50% 10/18/2017    Approximately 35%  Based on prior  Echocardiogram.    Encounter for blood transfusion     Hyperlipidemia     Osteoporosis     Thyroid disorder screening 10/17/2017    TSH of 1.12 ordered by Dr. dee Jones     Past Surgical History:   Procedure Laterality Date    CHOLE[place]STECTOMY  1997    [place]     COLONOSCOPY      EYE SURGERY      bilateral cataracts    FINGER SURGERY Right 2021    right pinky finger    FRACTURE SURGERY  2014    right femur with neville    HEMORRHOID SURGERY      48 yrs ago    HYSTERECTOMY      REPLACEMENT OF IMPLANTABLE CARDIOVERTER-DEFIBRILLATOR (ICD) GENERATOR N/A 12/13/2019    Procedure: REPLACEMENT, PULSE GENERATOR, ICD-MEDTRONIC;  Surgeon: Sebastian Nowak III, MD;  [facility];  Service:  Cardiology;  Laterality: N/A;    TONSILLECTOMY       Family History   Problem Relation Age of Onset    Heart disease Mother     Cancer Father         Lung Cancer ??? Asbestos    Breast cancer Paternal Aunt     Breast cancer Maternal Grandmother     Breast cancer Maternal Aunt      Social History     Tobacco Use    Smoking status: Former Smoker    Smokeless tobacco: Never Used    Tobacco comment: quit 2013   Substance Use Topics    Alcohol use: No    Drug use: No     Review of Systems   Constitutional: Negative for chills and fever.   HENT: Negative for sore throat.    Respiratory: Negative for shortness of breath.    Cardiovascular: Negative for chest pain and palpitations.   Gastrointestinal: Positive for abdominal pain. Negative for diarrhea, nausea and vomiting.   Genitourinary: Positive for dysuria. Negative for difficulty urinating, flank pain and pelvic pain.   Musculoskeletal: Negative for back pain.   Skin: Negative for rash.   Neurological: Negative for weakness and headaches.   Hematological: Does not bruise/bleed easily.   All other systems reviewed and are negative.      Physical Exam     Initial Vitals [07/17/22 0800]   BP Pulse Resp Temp SpO2   109/80 100 18 100.3 °F (37.9 °C) (!) 92 %      MAP       --         Physical Exam    Nursing note and vitals reviewed.  Constitutional: She appears well-developed and well-nourished. No distress.   Elevated BMI   HENT:   Head: Normocephalic and atraumatic.   Mouth/Throat: No oropharyngeal exudate.   Eyes: Conjunctivae and EOM are normal. Pupils are equal, round, and reactive to light.   Neck: Neck supple. No tracheal deviation present.   Normal range of motion.  Cardiovascular: Normal rate, regular rhythm, normal heart sounds and intact distal pulses.   No murmur heard.  Pulmonary/Chest: Breath sounds normal. No stridor. No respiratory distress. She has no wheezes. She has no rhonchi. She has no rales.   Abdominal: Abdomen is soft. She exhibits no  distension. There is abdominal tenderness (Left lower quadrant left upper quadrant tenderness to palpation.). There is no rebound.   Musculoskeletal:         General: No tenderness or edema. Normal range of motion.      Cervical back: Normal range of motion and neck supple.     Neurological: She is alert and oriented to person, place, and time. She has normal strength. No cranial nerve deficit or sensory deficit.   Skin: Skin is warm and dry. Capillary refill takes less than 2 seconds. No rash noted. No erythema. No pallor.   Psychiatric: She has a normal mood and affect. Thought content normal.         ED Course   Procedures  Labs Reviewed   CBC W/ AUTO DIFFERENTIAL - Abnormal; Notable for the following components:       Result Value    MCV 99 (*)     MCH 31.6 (*)     MCHC 31.9 (*)     Lymph # 0.7 (*)     Gran % 84.2 (*)     Lymph % 8.4 (*)     All other components within normal limits   COMPREHENSIVE METABOLIC PANEL - Abnormal; Notable for the following components:    Glucose 131 (*)     Creatinine 1.6 (*)     Albumin 3.4 (*)     eGFR if  34.8 (*)     eGFR if non  30.2 (*)     All other components within normal limits   URINALYSIS, REFLEX TO URINE CULTURE - Abnormal; Notable for the following components:    Protein, UA 2+ (*)     Occult Blood UA 3+ (*)     Leukocytes, UA 2+ (*)     All other components within normal limits    Narrative:     In and Out Cath as needed it patient unable to void  Specimen Source->Urine   URINALYSIS MICROSCOPIC - Abnormal; Notable for the following components:    RBC, UA 17 (*)     All other components within normal limits    Narrative:     In and Out Cath as needed it patient unable to void  Specimen Source->Urine   CULTURE, BLOOD   CULTURE, BLOOD   LIPASE   TROPONIN I   SARS-COV-2 RNA AMPLIFICATION, QUAL   LACTIC ACID, PLASMA   LIPASE   TROPONIN I           Results for orders placed or performed during the hospital encounter of 07/17/22   CBC auto  differential   Result Value Ref Range    WBC 8.29 3.90 - 12.70 K/uL    RBC 4.18 4.00 - 5.40 M/uL    Hemoglobin 13.2 12.0 - 16.0 g/dL    Hematocrit 41.4 37.0 - 48.5 %    MCV 99 (H) 82 - 98 fL    MCH 31.6 (H) 27.0 - 31.0 pg    MCHC 31.9 (L) 32.0 - 36.0 g/dL    RDW 13.2 11.5 - 14.5 %    Platelets 263 150 - 450 K/uL    MPV 9.9 9.2 - 12.9 fL    Immature Granulocytes 0.4 0.0 - 0.5 %    Gran # (ANC) 7.0 1.8 - 7.7 K/uL    Immature Grans (Abs) 0.03 0.00 - 0.04 K/uL    Lymph # 0.7 (L) 1.0 - 4.8 K/uL    Mono # 0.5 0.3 - 1.0 K/uL    Eos # 0.1 0.0 - 0.5 K/uL    Baso # 0.03 0.00 - 0.20 K/uL    nRBC 0 0 /100 WBC    Gran % 84.2 (H) 38.0 - 73.0 %    Lymph % 8.4 (L) 18.0 - 48.0 %    Mono % 5.9 4.0 - 15.0 %    Eosinophil % 0.7 0.0 - 8.0 %    Basophil % 0.4 0.0 - 1.9 %    Differential Method Automated    Comprehensive metabolic panel   Result Value Ref Range    Sodium 138 136 - 145 mmol/L    Potassium 4.2 3.5 - 5.1 mmol/L    Chloride 100 95 - 110 mmol/L    CO2 28 23 - 29 mmol/L    Glucose 131 (H) 70 - 110 mg/dL    BUN 20 8 - 23 mg/dL    Creatinine 1.6 (H) 0.5 - 1.4 mg/dL    Calcium 9.0 8.7 - 10.5 mg/dL    Total Protein 6.7 6.0 - 8.4 g/dL    Albumin 3.4 (L) 3.5 - 5.2 g/dL    Total Bilirubin 1.0 0.1 - 1.0 mg/dL    Alkaline Phosphatase 81 55 - 135 U/L    AST 17 10 - 40 U/L    ALT 18 10 - 44 U/L    Anion Gap 10 8 - 16 mmol/L    eGFR if African American 34.8 (A) >60 mL/min/1.73 m^2    eGFR if non  30.2 (A) >60 mL/min/1.73 m^2   Urinalysis, Reflex to Urine Culture Urine, Clean Catch    Specimen: Urine, Catheterized   Result Value Ref Range    Specimen UA Urine, Clean Catch     Color, UA Yellow Yellow, Straw, Steffanie    Appearance, UA Clear Clear    pH, UA 6.0 5.0 - 8.0    Specific Gravity, UA 1.025 1.005 - 1.030    Protein, UA 2+ (A) Negative    Glucose, UA Negative Negative    Ketones, UA Negative Negative    Bilirubin (UA) Negative Negative    Occult Blood UA 3+ (A) Negative    Nitrite, UA Negative Negative    Urobilinogen,  UA 1.0 Negative EU/dL    Leukocytes, UA 2+ (A) Negative   COVID-19 Rapid Screening   Result Value Ref Range    SARS-CoV-2 RNA, Amplification, Qual Negative Negative   Lactic acid, plasma   Result Value Ref Range    Lactate (Lactic Acid) 1.7 0.5 - 1.9 mmol/L   Lipase   Result Value Ref Range    Lipase 25 4 - 60 U/L   Troponin I   Result Value Ref Range    Troponin I <0.030 <=0.040 ng/mL   Urinalysis Microscopic   Result Value Ref Range    RBC, UA 17 (H) 0 - 4 /hpf    WBC, UA 5 0 - 5 /hpf    WBC Clumps, UA Rare None-Rare    Bacteria Rare None-Occ /hpf    Hyaline Casts, UA 0 0-1/lpf /lpf    Microscopic Comment SEE COMMENT      CT Abdomen Pelvis  Without Contrast   Final Result          Imaging Results          CT Abdomen Pelvis  Without Contrast (Final result)  Result time 07/17/22 09:54:42    Final result by Monica Aquino MD (07/17/22 09:54:42)                 Narrative:    CMS MANDATED QUALITY DATA - CT RADIATION - 436    All CT scans at this facility utilize dose modulation, iterative reconstruction, and/or weight based dosing when appropriate to reduce radiation dose to as low as reasonably achievable.    HISTORY: Left lower quadrant pain  history of left staghorn calculus    COMPARISON: Prior report dated 12/23/2020 from [facility]    FINDINGS: Noncontrast axial images were obtained. Nonenhanced study is tailored for the detection of urolithiasis, and is insensitive for abnormalities of the solid organs, vasculature and hollow viscera.    CT ABDOMEN: The heart is enlarged. There is moderate coronary artery calcification. There is a pacemaker lead in the right ventricle.    The lung bases are clear.    The liver, spleen and pancreas have a normal noncontrast appearance.  Gallbladder is absent. There is intrahepatic and extrahepatic biliary duct dilatation secondary to prior surgery. This was described on the prior report.  The right adrenal gland is normal. There is an 18 mm left adrenal  adenoma.    Kidneys: There are bilateral renal cysts largest arising from the lower pole the left kidney measuring 12.6 cm.  There is a 5.1 cm mixed attenuation cysts arising from the upper pole of the left kidney compatible with hemorrhagic cysts. There are numerous bilateral hyperdense renal masses compatible with hemorrhagic cyst.    There is stranding within the left perinephric fat with staghorn calculus on the left. There is mild left hydronephrosis. Findings may be secondary to pyelonephritis. The ureters are normal in caliber. There are no ureteral stones.    There are no thick-walled or dilated bowel loops. The appendix is normal. There is no mesenteric or retroperitoneal adenopathy.    There is moderate vascular calcification of aorta and its branches.    There are degenerative changes of the spine. There are stable compression fractures of T9, T11 and L1.    CT PELVIS: There is colonic diverticulosis without diverticulitis. The uterus is absent. The bladder is normal.    There is intramedullary neville and compression pin within the right femur.    IMPRESSION: Staghorn calculus within the left kidney with mild left hydronephrosis and moderate perinephric stranding which may be secondary to pyelonephritis.    5.1 cm mixed attenuation cyst arising from the upper pole of the left kidney compatible with hemorrhagic cysts    Bilateral simple and hemorrhagic cysts    Prior cholecystectomy with stable dilatation of the common bile duct    Cardiomegaly with diffuse coronary artery calcification    Colonic diverticulosis    Moderate vascular calcification of aorta    Stable chronic compression fractures of T9, T11 and L1    Electronically signed by:  Monica Aquino MD  7/17/2022 9:54 AM CDT Workstation: [id number]                               Medications   cefepime in dextrose 5 % IVPB 2 g (has no administration in time range)   ondansetron injection 4 mg (4 mg Intravenous Given 7/17/22 1022)   morphine injection  2 mg (2 mg Intravenous Given 7/17/22 1023)     Medical Decision Making:   ED Management:  81-year-old female presents emergency department with left upper quadrant left lower quadrant abdominal pain.  Found have a large staghorn stone.  She has some pyelonephritis and fever.  Patient given cefepime in the emergency department.  Pain currently under control.  I Discussed case with Urology Dr. Parra, plans put stent in later on today.  She will be admitted to the hospitalistDr. Coleman.             ED Course as of 07/17/22 1118   Sun Jul 17, 2022   0840 EKG 8:34 a.m. normal sinus rhythm 92 no ST elevation depression, nonspecific interventricular conduction delay.  No STEMI.  EKG interpreted by me. [JR]      ED Course User Index  [JR] Gonzalez Harmon DO             Clinical Impression:   Final diagnoses:  [R10.9] Abdominal pain  [N12] Pyelonephritis of left kidney (Primary)  [R50.9] Fever, unspecified fever cause  [N20.0] Staghorn calculus          ED Disposition Condition    Observation               Gonzalez Hramon DO  07/17/22 1118

## 2022-07-18 VITALS
HEART RATE: 66 BPM | DIASTOLIC BLOOD PRESSURE: 76 MMHG | WEIGHT: 221 LBS | BODY MASS INDEX: 32.73 KG/M2 | SYSTOLIC BLOOD PRESSURE: 142 MMHG | TEMPERATURE: 98 F | RESPIRATION RATE: 18 BRPM | OXYGEN SATURATION: 94 % | HEIGHT: 69 IN

## 2022-07-18 PROBLEM — N20.0 STAGHORN CALCULUS: Status: RESOLVED | Noted: 2022-07-17 | Resolved: 2022-07-18

## 2022-07-18 LAB
ALBUMIN SERPL BCP-MCNC: 3.1 G/DL (ref 3.5–5.2)
ALP SERPL-CCNC: 82 U/L (ref 55–135)
ALT SERPL W/O P-5'-P-CCNC: 23 U/L (ref 10–44)
ANION GAP SERPL CALC-SCNC: 9 MMOL/L (ref 8–16)
AST SERPL-CCNC: 20 U/L (ref 10–40)
BILIRUB SERPL-MCNC: 0.8 MG/DL (ref 0.1–1)
BUN SERPL-MCNC: 30 MG/DL (ref 8–23)
CALCIUM SERPL-MCNC: 8.7 MG/DL (ref 8.7–10.5)
CHLORIDE SERPL-SCNC: 101 MMOL/L (ref 95–110)
CO2 SERPL-SCNC: 27 MMOL/L (ref 23–29)
CREAT SERPL-MCNC: 1.8 MG/DL (ref 0.5–1.4)
DIGOXIN SERPL-MCNC: 0.5 NG/ML (ref 0.8–2)
ERYTHROCYTE [DISTWIDTH] IN BLOOD BY AUTOMATED COUNT: 13.5 % (ref 11.5–14.5)
EST. GFR  (AFRICAN AMERICAN): 30.2 ML/MIN/1.73 M^2
EST. GFR  (NON AFRICAN AMERICAN): 26.2 ML/MIN/1.73 M^2
GLUCOSE SERPL-MCNC: 151 MG/DL (ref 70–110)
GLUCOSE SERPL-MCNC: 154 MG/DL (ref 70–110)
GLUCOSE SERPL-MCNC: 94 MG/DL (ref 70–110)
HCT VFR BLD AUTO: 41.1 % (ref 37–48.5)
HGB BLD-MCNC: 12.7 G/DL (ref 12–16)
MCH RBC QN AUTO: 31.2 PG (ref 27–31)
MCHC RBC AUTO-ENTMCNC: 30.9 G/DL (ref 32–36)
MCV RBC AUTO: 101 FL (ref 82–98)
PLATELET # BLD AUTO: 236 K/UL (ref 150–450)
PMV BLD AUTO: 10.1 FL (ref 9.2–12.9)
POTASSIUM SERPL-SCNC: 4.2 MMOL/L (ref 3.5–5.1)
PROT SERPL-MCNC: 6.4 G/DL (ref 6–8.4)
RBC # BLD AUTO: 4.07 M/UL (ref 4–5.4)
SODIUM SERPL-SCNC: 137 MMOL/L (ref 136–145)
WBC # BLD AUTO: 14.61 K/UL (ref 3.9–12.7)

## 2022-07-18 PROCEDURE — 36415 COLL VENOUS BLD VENIPUNCTURE: CPT | Performed by: INTERNAL MEDICINE

## 2022-07-18 PROCEDURE — 25000003 PHARM REV CODE 250: Performed by: INTERNAL MEDICINE

## 2022-07-18 PROCEDURE — 85027 COMPLETE CBC AUTOMATED: CPT | Performed by: INTERNAL MEDICINE

## 2022-07-18 PROCEDURE — 80162 ASSAY OF DIGOXIN TOTAL: CPT | Performed by: INTERNAL MEDICINE

## 2022-07-18 PROCEDURE — 63600175 PHARM REV CODE 636 W HCPCS: Performed by: INTERNAL MEDICINE

## 2022-07-18 PROCEDURE — 80053 COMPREHEN METABOLIC PANEL: CPT | Performed by: INTERNAL MEDICINE

## 2022-07-18 RX ORDER — ACETAMINOPHEN 325 MG/1
650 TABLET ORAL EVERY 6 HOURS PRN
Status: DISCONTINUED | OUTPATIENT
Start: 2022-07-18 | End: 2022-07-18 | Stop reason: HOSPADM

## 2022-07-18 RX ORDER — CEFUROXIME AXETIL 500 MG/1
500 TABLET ORAL 2 TIMES DAILY
Qty: 14 TABLET | Refills: 0 | Status: SHIPPED | OUTPATIENT
Start: 2022-07-18 | End: 2022-07-25

## 2022-07-18 RX ADMIN — CEFEPIME HYDROCHLORIDE 1 G: 1 INJECTION, SOLUTION INTRAVENOUS at 12:07

## 2022-07-18 RX ADMIN — AMIODARONE HYDROCHLORIDE 200 MG: 200 TABLET ORAL at 10:07

## 2022-07-18 RX ADMIN — DILTIAZEM HYDROCHLORIDE 60 MG: 60 TABLET, FILM COATED ORAL at 10:07

## 2022-07-18 RX ADMIN — ACETAMINOPHEN 650 MG: 325 TABLET ORAL at 11:07

## 2022-07-18 NOTE — PLAN OF CARE
DC orders. Pt is refusing home health, dtr at bedside and agreed that pt does not need home health.

## 2022-07-18 NOTE — PLAN OF CARE
met with Pt at bedside confirm discharge plans. Pt's adult children(Mary Myers (Daughter) [phone]; were also present in the room at time. Pt verbalized plan to discharge home via family transport. Pt verbalized decline of home health services.  contacted [facility] PCP group navigator to scheduled Pt PCP follow-up visit closer to hospital discharge. Pt has no other needs to be addressed by case management. Pt cleared to DC by case management.         07/18/22 1552   Final Note   Assessment Type Final Discharge Note   Anticipated Discharge Disposition Home   What phone number can be called within the next 1-3 days to see how you are doing after discharge? [phone]   Hospital Resources/Appts/Education Provided Provided patient/caregiver with written discharge plan information;Appointments scheduled by Navigator/Coordinator   Post-Acute Status   Post-Acute Authorization Home Health   Home Health Status Patient declined/refused   Discharge Delays None known at this time

## 2022-07-18 NOTE — PLAN OF CARE
[facility]  Initial Discharge Assessment       Primary Care Provider: Kraig Alberto MD    Admission Diagnosis: Pyelonephritis of left kidney [N12]    Admission Date: 7/17/2022  Expected Discharge Date:       met with Pt at bedside to complete discharge assessment. Pt's adult children (Mary Myers (Daughter) [phone]; Leidy Hoover (Daughter) [phone]) were also present in the room at time of assessment. Pt AAOx4s. Demographics, PCP, and insurance verified. Pt has home health with [facility] and home physician visits through [facility]. Pt verbalized desire to return to [facility] for home health needs. No dialysis. Pt reports ability to complete ADLs with the assistance of: a rolling walker, home oxygen, CPAP, a glucometer, grab bar, a quad cane, a rollator. Pt verbalized plan to discharge home via family transport. Pt has no other needs to be addressed at this time.      Discharge Barriers Identified: None    Payor: HUMANA MANAGED MEDICARE / Plan: [employer] MEDICARE PPO / Product Type: Medicare Advantage /     Extended Emergency Contact Information  Primary Emergency Contact: Mary Myersl  Address: [address] [place] [address]  Home Phone: [phone]  Mobile Phone: [phone]  Relation: Daughter  Preferred language: English   needed? No  Secondary Emergency Contact: Leidy Hoover  Address: [address] [place] [address] [place] [address] [facility]  Mobile Phone: [phone]  Relation: Daughter   needed? No    Discharge Plan A: Home  Discharge Plan B: Home      [facility] ([facility]) - [place], [place] - [address] [facility] [facility]  [address] [facility] [address]  Phone: [phone] Fax: [phone]    [id number] - [place], [place] - [address] [facility]  [address]  [address]  [place]  [place] [address]  Phone: [phone] Fax: [phone]      Initial Assessment (most recent)     Adult Discharge Assessment - 07/18/22 1213        Discharge Assessment    Assessment Type Discharge Planning Assessment     Confirmed/corrected address, phone number and insurance Yes     Confirmed Demographics Correct on Facesheet     Source of Information patient     Does patient/caregiver understand observation status Yes     Communicated JOANA with patient/caregiver Yes     Lives With grandchild(shawnee)     Do you expect to return to your current living situation? Yes     Do you have help at home or someone to help you manage your care at home? Yes     Who are your caregiver(s) and their phone number(s)? LouisMary (Daughter)   [phone] (Mobile)     Prior to hospitilization cognitive status: Alert/Oriented     Current cognitive status: Alert/Oriented     Walking or Climbing Stairs Difficulty ambulation difficulty, requires equipment     Mobility Management Rolling walker, oxygen, quad cane, rollator     Dressing/Bathing Difficulty bathing difficulty, requires equipment     Dressing/Bathing Management Grab bars     Home Accessibility wheelchair accessible     Equipment Currently Used at Home walker, rolling;respiratory supplies;oxygen;rollator;grab bar;cane, quad;CPAP;glucometer     Do you currently have service(s) that help you manage your care at home? Yes     [facility] [phone]/ [place]     Is the pt/caregiver preference to resume services with current agency Yes     Do you take prescription medications? Yes     Do you have prescription coverage? Yes     Coverage HUMANA MANAGED MEDICARE - HU[employer] MEDICARE PPO     Do you have any problems affording any of your prescribed medications? No     Is the patient taking medications as prescribed? yes     Who is going to help you get home at discharge? Mary Myers (Daughter)   [phone] (Mobile)     How do you get to  doctors appointments? family or friend will provide     Are you on dialysis? No     Do you take coumadin? No     Discharge Plan A Home     Discharge Plan B Home     DME Needed Upon Discharge  none     Discharge Plan discussed with: Patient;Adult children     Discharge Barriers Identified None        Relationship/Environment    Name(s) of Who Lives With Patient Grandchild, 40

## 2022-07-18 NOTE — PROGRESS NOTES
DC insttructions reviewed with pt and pts dtr, both verbalize understanding of dc instructions, copy given to pt. Pt escorted off unit in wheelchair with family.

## 2022-07-18 NOTE — PLAN OF CARE
met with Pt at bedside to complete IMM. Pt was explained IMM. Pt verbalized understanding of IMM and signed. IMM scanned to .          07/18/22 1223   Medicare Message   Important Message from Medicare regarding Discharge Appeal Rights Explained to patient/caregiver;Signed/date by patient/caregiver   Date IMM was signed 07/18/22   Time IMM was signed 9900

## 2022-07-18 NOTE — CARE UPDATE
07/17/22 2030   Patient Assessment/Suction   Level of Consciousness (AVPU)   (resting quietly with nadn)   Respiratory Effort Normal;Unlabored   Expansion/Accessory Muscles/Retractions no use of accessory muscles   PRE-TX-O2   O2 Device (Oxygen Therapy) nasal cannula   Flow (L/min) 2   Maintain adequate oxygenation

## 2022-07-19 ENCOUNTER — PATIENT OUTREACH (OUTPATIENT)
Dept: FAMILY MEDICINE | Facility: CLINIC | Age: 82
End: 2022-07-19

## 2022-07-19 NOTE — HOSPITAL COURSE
Patient got admitted with presumed early Acute pyelonephritis(per imaging studies)  Patient was started on iv abx and all cultures were normal   Found to have Staghorn calculus  Pt had Cystoscopy with left ureteral JJ stent placement  Pt will have Staghorn calculus management on  OP basis  Pts Condition and was later discharged to home

## 2022-07-19 NOTE — TELEPHONE ENCOUNTER
Discharge Information     Discharge Date:   07/18/22    Primary Discharge Diagnosis:  Kidney Stone with stent placement,  Pyelonephritis       Discharge Summary:  Reviewed      Medication & Order Review     Were medication changes made or new medications added?   Yes  Antibiotic added Cefuroxine     If so, has the patient filled the prescriptions?  Yes     Was Home Health ordered? No    If so, has Home Health contacted patient and/or initiated services? N/A    Name of Home Health Agency? N/A    Durable Medical Equipment ordered?  No     If so, has the DME provider contacted patient and delivered equipment?  N/A    Follow Up               Any problems since discharge? Yes  Pain from procedure Contacting urologist     How is the patient feeling since returning home?   Still weak and pain but improving      Have you set up recommended follow up appointments? Yes follow up appointment with  Shelby Parra at Urology clinic for 08/01/22     Schedule Hospital Follow-up appointment within 7-14 days (preferably 7).  Appointment made for 07/28/22    Notes:  Advised to keep follow up as scheduled and contact Urology if her symptoms worsen   Appointment for hospital follow up on 07/28/22 with Dr. Dolly DONIS

## 2022-07-19 NOTE — DISCHARGE SUMMARY
[facility] Medicine  Discharge Summary      Patient Name: Jo Alegre  MRN: [id number]  Patient Class: IP- Inpatient  Admission Date: 7/17/2022  Hospital Length of Stay: 1 days  Discharge Date and Time:  07/18/2022 7:25 PM  Attending Physician: No att. providers found   Discharging Provider: Easton Coleman MD  Primary Care Provider: Kraig Alberto MD      HPI:   81 year old patient getting admitted with acute Pyelonephritis/L ureteral stone and L side staghorn calculus  Pt awakened from sleep today AM with severe abdominal pain  Describes pain as severe/constant/lower part of abdomen with no aggravating and relieving factors   Pt came to ER and Got admitted  Imaging studies showed acute Pyelonephritis/L ureteral stone and pt had OR visit with insertion of Ureteral stent       Procedure(s) (LRB):  CYSTOSCOPY, WITH URETERAL STENT INSERTION (Left)      Hospital Course:   Patient got admitted with presumed early Acute pyelonephritis(per imaging studies)  Patient was started on iv abx and all cultures were normal   Found to have Staghorn calculus  Pt had Cystoscopy with left ureteral JJ stent placement  Pt will have Staghorn calculus management on  OP basis  Pts Condition and was later discharged to home              Goals of Care Treatment Preferences:  Code Status: Full Code      Consults:   Consults (From admission, onward)        Status Ordering Provider     Inpatient consult to Urology  Once        Provider:  Shelby Parra MD    Completed LITO BROWN          No new Assessment & Plan notes have been filed under this hospital service since the last note was generated.  Service: Hospital Medicine    Final Active Diagnoses:    Diagnosis Date Noted POA    PRINCIPAL PROBLEM:  Acute pyelonephritis [N10] 07/17/2022 Yes    Staghorn calculus [N20.0] 07/17/2022 Unknown    Obesity [E66.9] 06/24/2020 Yes    Paroxysmal atrial fibrillation [I48.0] 05/06/2019 Yes    Chronic anticoagulation  [Z79.01] 11/05/2018 Not Applicable    Stage 3 chronic kidney disease [N18.30] 11/05/2018 Yes    Essential hypertension [I10] 05/23/2018 Yes    Type 2 diabetes mellitus with diabetic nephropathy, without long-term current use of insulin [E11.21] 05/23/2018 Yes      Problems Resolved During this Admission:       Discharged Condition: good    Disposition: Home or Self Care    Follow Up:   Follow-up Information     Kraig Alberto MD Follow up.    Specialty: Internal Medicine  Contact information:  [facility]  [address] [place], [place] [id number] [phone]                       Patient Instructions:      Diet Cardiac       Significant Diagnostic Studies: Labs:   CMP   Recent Labs   Lab 07/17/22  0812 07/18/22  0416    137   K 4.2 4.2    101   CO2 28 27   * 154*   BUN 20 30*   CREATININE 1.6* 1.8*   CALCIUM 9.0 8.7   PROT 6.7 6.4   ALBUMIN 3.4* 3.1*   BILITOT 1.0 0.8   ALKPHOS 81 82   AST 17 20   ALT 18 23   ANIONGAP 10 9   ESTGFRAFRICA 34.8* 30.2*   EGFRNONAA 30.2* 26.2*    and CBC   Recent Labs   Lab 07/17/22  0812 07/18/22  0416   WBC 8.29 14.61*   HGB 13.2 12.7   HCT 41.4 41.1    236       Pending Diagnostic Studies:     None         Medications:  Reconciled Home Medications:      Medication List      START taking these medications    cefUROXime 500 MG tablet  Commonly known as: CEFTIN  Take 1 tablet (500 mg total) by mouth 2 (two) times daily. for 7 days        CHANGE how you take these medications    gabapentin 100 MG capsule  Commonly known as: NEURONTIN  TAKE 2 CAPSULES(200 MG) BY MOUTH THREE TIMES DAILY  What changed:   · how much to take  · how to take this  · when to take this        CONTINUE taking these medications    amiodarone 200 MG Tab  Commonly known as: PACERONE  Take 200 mg by mouth once daily.     atorvastatin 40 MG tablet  Commonly known as: LIPITOR  Take 40 mg by mouth once daily.     Ca-D3-mag ox-zinc--thom-bor 600 mg calcium- 20 mcg-50 mg Tab  Take 1 tablet by mouth 2  (two) times daily.     cholecalciferol (vitamin D3) 125 mcg (5,000 unit) Tab  Take 5,000 Units by mouth 2 (two) times daily.     denosumab 60 mg/mL Syrg  Commonly known as: PROLIA  Inject 1 mL (60 mg total) into the skin every 6 (six) months.     digoxin 125 mcg tablet  Commonly known as: LANOXIN  Take 125 mcg by mouth every Mon, Wed, Fri.     diltiaZEM 60 MG tablet  Commonly known as: CARDIZEM  Take 60 mg by mouth 2 (two) times daily.     glimepiride 1 MG tablet  Commonly known as: AMARYL  Take 1 tablet (1 mg total) by mouth before breakfast.     latanoprost 0.005 % ophthalmic solution  Place 1 drop into both eyes nightly.     midodrine 2.5 MG Tab  Commonly known as: PROAMATINE  Take 2.5 mg by mouth 3 (three) times daily.     rivaroxaban 15 mg Tab  Commonly known as: XARELTO  Take 15 mg by mouth daily with dinner or evening meal.     sacubitriL-valsartan 24-26 mg per tablet  Commonly known as: ENTRESTO  Take 1 tablet by mouth once daily.     torsemide 20 MG Tab  Commonly known as: DEMADEX  Take 1 tablet (20 mg total) by mouth once daily. for 14 days        STOP taking these medications    oxyCODONE-acetaminophen  mg per tablet  Commonly known as: PERCOCET            Indwelling Lines/Drains at time of discharge:   Lines/Drains/Airways     None               Physical Exam  Cardiovascular:      Rate and Rhythm: Normal rate.   Neurological:      Mental Status: She is alert and oriented to person, place, and time.       Time spent on the discharge of patient: 44  minutes         Easton Coleman MD  Department of Hospital Medicine  [facility]

## 2022-07-21 LAB
BACTERIA BLD CULT: ABNORMAL

## 2022-07-22 LAB — BACTERIA BLD CULT: NORMAL

## 2022-07-27 NOTE — H&P (VIEW-ONLY)
[facility]    PCP: Kraig Alberto MD    Chief Complaint: kidney stones    SUBJECTIVE:       History of Present Illness:  Jo Alegre is a 81 y.o. female who presents to clinic for kidney stones. She is Established  to our clinic.     Patient underwent stent placement on 7/17 for left staghorn stone. She had a fever to 100.3.  CT shows a large lower pole staghorn. Also has some complex cysts present.     This is her first stone episode.   No family hx of stones.   No hematuria prior to stent placement   Has a hx of recurrent UTIs.     Last urine culture: MO (7/2/22)    Lab Results   Component Value Date    CREATININE 1.8 (H) 07/18/2022     Hx of COPD, pulm HTN, a fib, CHF, HLD, CKD, DM, JENNIFER    Past medical, family, and social history reviewed as documented in chart with pertinent positive medical, family, and social history detailed in HPI.    Review of patient's allergies indicates:   Allergen Reactions    Codeine Other (See Comments)    Lasix [furosemide]     Latex Rash       Past Medical History:   Diagnosis Date    Allergy     Codeine, Lasix    Atrial fibrillation     Atrial fibrillation     Cataract     CHF (congestive heart failure)     Diabetes mellitus, type 2     Ejection fraction < 50% 10/18/2017    Approximately 35%  Based on prior  Echocardiogram.    Encounter for blood transfusion     Hyperlipidemia     Osteoporosis     Thyroid disorder screening 10/17/2017    TSH of 1.12 ordered by Dr. dee Jones     Past Surgical History:   Procedure Laterality Date    [place]     COLONOSCOPY      CYSTOSCOPY W/ URETERAL STENT PLACEMENT Left 7/17/2022    Procedure: CYSTOSCOPY, WITH URETERAL STENT INSERTION;  Surgeon: Shelby Parra MD;  Location: [facility];  Service: Urology;  Laterality: Left;    EYE SURGERY      bilateral cataracts    FINGER SURGERY Right 2021    right pinky finger    FRACTURE SURGERY  2014    right femur with neville     "HEMORRHOID SURGERY      48 yrs ago    HYSTERECTOMY      REPLACEMENT OF IMPLANTABLE CARDIOVERTER-DEFIBRILLATOR (ICD) GENERATOR N/A 12/13/2019    Procedure: REPLACEMENT, PULSE GENERATOR, ICD-MEDTRONIC;  Surgeon: Sebastian Nowak III, MD;  Location: [facility];  Service: Cardiology;  Laterality: N/A;    TONSILLECTOMY       Family History   Problem Relation Age of Onset    Heart disease Mother     Cancer Father         Lung Cancer ??? Asbestos    Breast cancer Paternal Aunt     Breast cancer Maternal Grandmother     Breast cancer Maternal Aunt      Social History     Tobacco Use    Smoking status: Former Smoker    Smokeless tobacco: Never Used    Tobacco comment: quit 2013   Substance Use Topics    Alcohol use: No    Drug use: No        Review of Systems   Constitutional: Negative for fever.   Gastrointestinal: Negative for abdominal pain.   Genitourinary: Positive for dysuria, flank pain and hematuria. Negative for difficulty urinating, nocturia, pelvic pain and urgency.       OBJECTIVE:     Anticoagulation:  xarelto 20 mg     Estimated body mass index is 32.95 kg/m² as calculated from the following:    Height as of this encounter: 5' 9" (1.753 m).    Weight as of this encounter: 101.2 kg (223 lb 1.7 oz).    Vital Signs (Most Recent)  Pulse: 71 (08/01/22 0934)  Resp: 18 (08/01/22 0934)  BP: (!) 114/45 (08/01/22 0934)    Physical Exam  Vitals reviewed.   Constitutional:       General: She is not in acute distress.     Appearance: Normal appearance. She is not ill-appearing.   HENT:      Head: Normocephalic and atraumatic.   Eyes:      General: No scleral icterus.  Cardiovascular:      Rate and Rhythm: Normal rate and regular rhythm.   Pulmonary:      Effort: Pulmonary effort is normal. No respiratory distress.   Abdominal:      General: There is no distension.      Palpations: Abdomen is soft.   Skin:     Coloration: Skin is not jaundiced.   Neurological:      General: No focal deficit present.      Mental " Status: She is alert and oriented to person, place, and time.   Psychiatric:         Mood and Affect: Mood normal.         Behavior: Behavior normal.         BMP  Lab Results   Component Value Date     07/18/2022    K 4.2 07/18/2022     07/18/2022    CO2 27 07/18/2022    BUN 30 (H) 07/18/2022    CREATININE 1.8 (H) 07/18/2022    CALCIUM 8.7 07/18/2022    ANIONGAP 9 07/18/2022    ESTGFRAFRICA 30.2 (A) 07/18/2022    EGFRNONAA 26.2 (A) 07/18/2022       Lab Results   Component Value Date    WBC 14.61 (H) 07/18/2022    HGB 12.7 07/18/2022    HCT 41.1 07/18/2022     (H) 07/18/2022     07/18/2022     Imaging:  Per HPI    ASSESSMENT     1. Staghorn calculus    2. Other specified disorders of kidney and ureter    3. Acute pyelonephritis    4. Gross hematuria    5. Chronic anticoagulation    6. Chronic obstructive pulmonary disease, unspecified COPD type    7. Pulmonary hypertension    8. Essential hypertension    9. Mixed dyslipidemia    10. Ejection fraction < 50%    11. Paroxysmal atrial fibrillation    12. Type 2 diabetes mellitus with diabetic nephropathy, without long-term current use of insulin    13. Obstructive sleep apnea syndrome        PLAN:     - We reviewed her CT scan in detail   - Needs MRI to further evaluate cysts   - Given size of stone options include serial stent exchanges vs PCNL  - She is high risk, however in order to clear as much stone as possible PCNL is the best option   - She wants to proceed with PCNL. I have explained the indication, risks, benefits, and alternatives of the procedure in detail.  The patient voices understanding and all questions have been answered.   Risks including but not limited to bleeding, infection, injury to the lung, liver, spleen, colon, need for additional procedures, incomplete removal of stone, arteriovenous malformation, pseudoaneurysm, hydrothorax or pneumothorax, urinoma, ureteral injury or perforation, DVT, PE, heart attack, stroke, and  death were discussed with the patient in depth.  The patient agrees to proceed as planned with left PCNL.  - CBC, BMP, PT/INR  - Pre op apt with urine culture   - Message to Dr. Alberto for clearance and to hold anti coagulation     Shelby Parra MD

## 2022-07-27 NOTE — PROGRESS NOTES
[facility] Urology Clinic Note    PCP: Kraig Alberto MD    Chief Complaint: kidney stones    SUBJECTIVE:       History of Present Illness:  Jo Alegre is a 81 y.o. female who presents to clinic for kidney stones. She is Established  to our clinic.     Patient underwent stent placement on 7/17 for left staghorn stone. She had a fever to 100.3.  CT shows a large lower pole staghorn. Also has some complex cysts present.     This is her first stone episode.   No family hx of stones.   No hematuria prior to stent placement   Has a hx of recurrent UTIs.     Last urine culture: MO (7/2/22)    Lab Results   Component Value Date    CREATININE 1.8 (H) 07/18/2022     Hx of COPD, pulm HTN, a fib, CHF, HLD, CKD, DM, JENNIFER    Past medical, family, and social history reviewed as documented in chart with pertinent positive medical, family, and social history detailed in HPI.    Review of patient's allergies indicates:   Allergen Reactions    Codeine Other (See Comments)    Lasix [furosemide]     Latex Rash       Past Medical History:   Diagnosis Date    Allergy     Codeine, Lasix    Atrial fibrillation     Atrial fibrillation     Cataract     CHF (congestive heart failure)     Diabetes mellitus, type 2     Ejection fraction < 50% 10/18/2017    Approximately 35%  Based on prior  Echocardiogram.    Encounter for blood transfusion     Hyperlipidemia     Osteoporosis     Thyroid disorder screening 10/17/2017    TSH of 1.12 ordered by Dr. dee Jones     Past Surgical History:   Procedure Laterality Date    [place]     COLONOSCOPY      CYSTOSCOPY W/ URETERAL STENT PLACEMENT Left 7/17/2022    Procedure: CYSTOSCOPY, WITH URETERAL STENT INSERTION;  Surgeon: Shelby Parra MD;  Location: [facility];  Service: Urology;  Laterality: Left;    EYE SURGERY      bilateral cataracts    FINGER SURGERY Right 2021    right pinky finger    FRACTURE SURGERY  2014    right femur with neville     "HEMORRHOID SURGERY      48 yrs ago    HYSTERECTOMY      REPLACEMENT OF IMPLANTABLE CARDIOVERTER-DEFIBRILLATOR (ICD) GENERATOR N/A 12/13/2019    Procedure: REPLACEMENT, PULSE GENERATOR, ICD-MEDTRONIC;  Surgeon: Sebastian Nowak III, MD;  Location: [facility];  Service: Cardiology;  Laterality: N/A;    TONSILLECTOMY       Family History   Problem Relation Age of Onset    Heart disease Mother     Cancer Father         Lung Cancer ??? Asbestos    Breast cancer Paternal Aunt     Breast cancer Maternal Grandmother     Breast cancer Maternal Aunt      Social History     Tobacco Use    Smoking status: Former Smoker    Smokeless tobacco: Never Used    Tobacco comment: quit 2013   Substance Use Topics    Alcohol use: No    Drug use: No        Review of Systems   Constitutional: Negative for fever.   Gastrointestinal: Negative for abdominal pain.   Genitourinary: Positive for dysuria, flank pain and hematuria. Negative for difficulty urinating, nocturia, pelvic pain and urgency.       OBJECTIVE:     Anticoagulation:  xarelto 20 mg     Estimated body mass index is 32.95 kg/m² as calculated from the following:    Height as of this encounter: 5' 9" (1.753 m).    Weight as of this encounter: 101.2 kg (223 lb 1.7 oz).    Vital Signs (Most Recent)  Pulse: 71 (08/01/22 0934)  Resp: 18 (08/01/22 0934)  BP: (!) 114/45 (08/01/22 0934)    Physical Exam  Vitals reviewed.   Constitutional:       General: She is not in acute distress.     Appearance: Normal appearance. She is not ill-appearing.   HENT:      Head: Normocephalic and atraumatic.   Eyes:      General: No scleral icterus.  Cardiovascular:      Rate and Rhythm: Normal rate and regular rhythm.   Pulmonary:      Effort: Pulmonary effort is normal. No respiratory distress.   Abdominal:      General: There is no distension.      Palpations: Abdomen is soft.   Skin:     Coloration: Skin is not jaundiced.   Neurological:      General: No focal deficit present.      Mental " Status: She is alert and oriented to person, place, and time.   Psychiatric:         Mood and Affect: Mood normal.         Behavior: Behavior normal.         BMP  Lab Results   Component Value Date     07/18/2022    K 4.2 07/18/2022     07/18/2022    CO2 27 07/18/2022    BUN 30 (H) 07/18/2022    CREATININE 1.8 (H) 07/18/2022    CALCIUM 8.7 07/18/2022    ANIONGAP 9 07/18/2022    ESTGFRAFRICA 30.2 (A) 07/18/2022    EGFRNONAA 26.2 (A) 07/18/2022       Lab Results   Component Value Date    WBC 14.61 (H) 07/18/2022    HGB 12.7 07/18/2022    HCT 41.1 07/18/2022     (H) 07/18/2022     07/18/2022     Imaging:  Per HPI    ASSESSMENT     1. Staghorn calculus    2. Other specified disorders of kidney and ureter    3. Acute pyelonephritis    4. Gross hematuria    5. Chronic anticoagulation    6. Chronic obstructive pulmonary disease, unspecified COPD type    7. Pulmonary hypertension    8. Essential hypertension    9. Mixed dyslipidemia    10. Ejection fraction < 50%    11. Paroxysmal atrial fibrillation    12. Type 2 diabetes mellitus with diabetic nephropathy, without long-term current use of insulin    13. Obstructive sleep apnea syndrome        PLAN:     - We reviewed her CT scan in detail   - Needs MRI to further evaluate cysts   - Given size of stone options include serial stent exchanges vs PCNL  - She is high risk, however in order to clear as much stone as possible PCNL is the best option   - She wants to proceed with PCNL. I have explained the indication, risks, benefits, and alternatives of the procedure in detail.  The patient voices understanding and all questions have been answered.   Risks including but not limited to bleeding, infection, injury to the lung, liver, spleen, colon, need for additional procedures, incomplete removal of stone, arteriovenous malformation, pseudoaneurysm, hydrothorax or pneumothorax, urinoma, ureteral injury or perforation, DVT, PE, heart attack, stroke, and  death were discussed with the patient in depth.  The patient agrees to proceed as planned with left PCNL.  - CBC, BMP, PT/INR  - Pre op apt with urine culture   - Message to Dr. Alberto for clearance and to hold anti coagulation     Shelby Parra MD

## 2022-07-28 ENCOUNTER — OFFICE VISIT (OUTPATIENT)
Dept: FAMILY MEDICINE | Facility: CLINIC | Age: 82
End: 2022-07-28
Payer: MEDICARE

## 2022-07-28 VITALS
DIASTOLIC BLOOD PRESSURE: 66 MMHG | HEART RATE: 66 BPM | RESPIRATION RATE: 20 BRPM | HEIGHT: 69 IN | WEIGHT: 223 LBS | BODY MASS INDEX: 33.03 KG/M2 | SYSTOLIC BLOOD PRESSURE: 144 MMHG

## 2022-07-28 DIAGNOSIS — I50.42 CHRONIC COMBINED SYSTOLIC AND DIASTOLIC CONGESTIVE HEART FAILURE: ICD-10-CM

## 2022-07-28 DIAGNOSIS — I10 ESSENTIAL HYPERTENSION: ICD-10-CM

## 2022-07-28 DIAGNOSIS — Z79.01 CHRONIC ANTICOAGULATION: ICD-10-CM

## 2022-07-28 DIAGNOSIS — E11.21 TYPE 2 DIABETES MELLITUS WITH DIABETIC NEPHROPATHY, WITHOUT LONG-TERM CURRENT USE OF INSULIN: ICD-10-CM

## 2022-07-28 DIAGNOSIS — N10 ACUTE PYELONEPHRITIS: Primary | ICD-10-CM

## 2022-07-28 DIAGNOSIS — I48.0 PAROXYSMAL ATRIAL FIBRILLATION: ICD-10-CM

## 2022-07-28 DIAGNOSIS — N20.0 STAGHORN RENAL CALCULUS: ICD-10-CM

## 2022-07-28 PROCEDURE — 1126F PR PAIN SEVERITY QUANTIFIED, NO PAIN PRESENT: ICD-10-PCS | Mod: CPTII,S$GLB,, | Performed by: INTERNAL MEDICINE

## 2022-07-28 PROCEDURE — 1101F PT FALLS ASSESS-DOCD LE1/YR: CPT | Mod: CPTII,S$GLB,, | Performed by: INTERNAL MEDICINE

## 2022-07-28 PROCEDURE — 3078F PR MOST RECENT DIASTOLIC BLOOD PRESSURE < 80 MM HG: ICD-10-PCS | Mod: CPTII,S$GLB,, | Performed by: INTERNAL MEDICINE

## 2022-07-28 PROCEDURE — 1111F DSCHRG MED/CURRENT MED MERGE: CPT | Mod: CPTII,S$GLB,, | Performed by: INTERNAL MEDICINE

## 2022-07-28 PROCEDURE — 1160F RVW MEDS BY RX/DR IN RCRD: CPT | Mod: CPTII,S$GLB,, | Performed by: INTERNAL MEDICINE

## 2022-07-28 PROCEDURE — 3288F FALL RISK ASSESSMENT DOCD: CPT | Mod: CPTII,S$GLB,, | Performed by: INTERNAL MEDICINE

## 2022-07-28 PROCEDURE — 1111F PR DISCHARGE MEDS RECONCILED W/ CURRENT OUTPATIENT MED LIST: ICD-10-PCS | Mod: CPTII,S$GLB,, | Performed by: INTERNAL MEDICINE

## 2022-07-28 PROCEDURE — 3077F PR MOST RECENT SYSTOLIC BLOOD PRESSURE >= 140 MM HG: ICD-10-PCS | Mod: CPTII,S$GLB,, | Performed by: INTERNAL MEDICINE

## 2022-07-28 PROCEDURE — 99495 TRANSJ CARE MGMT MOD F2F 14D: CPT | Mod: S$GLB,,, | Performed by: INTERNAL MEDICINE

## 2022-07-28 PROCEDURE — 1101F PR PT FALLS ASSESS DOC 0-1 FALLS W/OUT INJ PAST YR: ICD-10-PCS | Mod: CPTII,S$GLB,, | Performed by: INTERNAL MEDICINE

## 2022-07-28 PROCEDURE — 99495 TCM SERVICES (MODERATE COMPLEXITY): ICD-10-PCS | Mod: S$GLB,,, | Performed by: INTERNAL MEDICINE

## 2022-07-28 PROCEDURE — 1160F PR REVIEW ALL MEDS BY PRESCRIBER/CLIN PHARMACIST DOCUMENTED: ICD-10-PCS | Mod: CPTII,S$GLB,, | Performed by: INTERNAL MEDICINE

## 2022-07-28 PROCEDURE — 1159F MED LIST DOCD IN RCRD: CPT | Mod: CPTII,S$GLB,, | Performed by: INTERNAL MEDICINE

## 2022-07-28 PROCEDURE — 3078F DIAST BP <80 MM HG: CPT | Mod: CPTII,S$GLB,, | Performed by: INTERNAL MEDICINE

## 2022-07-28 PROCEDURE — 1159F PR MEDICATION LIST DOCUMENTED IN MEDICAL RECORD: ICD-10-PCS | Mod: CPTII,S$GLB,, | Performed by: INTERNAL MEDICINE

## 2022-07-28 PROCEDURE — 1126F AMNT PAIN NOTED NONE PRSNT: CPT | Mod: CPTII,S$GLB,, | Performed by: INTERNAL MEDICINE

## 2022-07-28 PROCEDURE — 3077F SYST BP >= 140 MM HG: CPT | Mod: CPTII,S$GLB,, | Performed by: INTERNAL MEDICINE

## 2022-07-28 PROCEDURE — 3288F PR FALLS RISK ASSESSMENT DOCUMENTED: ICD-10-PCS | Mod: CPTII,S$GLB,, | Performed by: INTERNAL MEDICINE

## 2022-07-28 NOTE — PROGRESS NOTES
Subjective:       Patient ID: Jo Alegre is a 81 y.o. female.    Chief Complaint: Hospital Follow Up, Hypertension, Hyperlipidemia, Atrial Fibrillation, Nephrolithiasis, and Pyelonephritis    Patient is 81-year-old pleasant female who comes for follow-up.  Recently she was hospitalized for abdominal pain and CT scan showed a left-sided staghorn calculus.  She was treated with antibiotics and discharged.  She had acute pyelonephritis.  She has been given antibiotics at this point which she continues.  Next week she will have follow-up with Urology and possible surgical evacuation of the stone.  Hospital discharge summary has been copied and pasted here for easy referral.  No editing done by me.      Hospital Course:   Patient got admitted with presumed early Acute pyelonephritis(per imaging studies)  Patient was started on iv abx and all cultures were normal   Found to have Staghorn calculus  Pt had Cystoscopy with left ureteral JJ stent placement  Pt will have Staghorn calculus management on  OP basis  Pts Condition and was later discharged to home     Her underlying medical issues include atrial fibrillation for which she is on amiodarone and anticoagulation.  She is also on Cardizem.    For blood sugars she takes glimepiride.    She also takes digoxin.       1. Chronic combined systolic and diastolic congestive heart failure   2. Paroxysmal atrial fibrillation   3. Essential hypertension   4. Chronic anticoagulation   5. Type 2 diabetes mellitus with diabetic nephropathy, without long-term current use of insulin   6. Midline low back pain without sciatica, unspecified chronicity   7. Neurogenic bladder       Hyperlipidemia  This is a chronic problem. The current episode started more than 1 year ago. The problem is controlled. Exacerbating diseases include obesity. Current antihyperlipidemic treatment includes statins. The current treatment provides moderate improvement of lipids. Compliance problems include  psychosocial issues.  Risk factors for coronary artery disease include a sedentary lifestyle, hypertension, dyslipidemia and obesity.   Hypertension  This is a chronic problem. The current episode started more than 1 year ago. The problem is controlled. Associated symptoms include anxiety and malaise/fatigue. Pertinent negatives include no palpitations. Risk factors for coronary artery disease include post-menopausal state, obesity, sedentary lifestyle and dyslipidemia. Past treatments include diuretics and angiotensin blockers (Entresto). The current treatment provides moderate improvement. Compliance problems include psychosocial issues.    Diabetes  She presents for her follow-up diabetic visit. She has type 2 diabetes mellitus. No MedicAlert identification noted. Her disease course has been stable. Hypoglycemia symptoms include tremors. Pertinent negatives for hypoglycemia include no confusion, dizziness, nervousness/anxiousness, pallor, seizures or speech difficulty. Associated symptoms include fatigue. Pertinent negatives for diabetes include no polydipsia, no polyphagia and no polyuria. Risk factors for coronary artery disease include post-menopausal, obesity, sedentary lifestyle and dyslipidemia. She is compliant with treatment some of the time. An ACE inhibitor/angiotensin II receptor blocker is being taken (Entresto).   Atrial Fibrillation  Presents for follow-up visit. Symptoms include hypertension. Symptoms are negative for bradycardia, dizziness, hypotension, palpitations, syncope and tachycardia. The symptoms have been stable. Past medical history includes atrial fibrillation and hyperlipidemia. Medication compliance problems include psychosocial issues.       Past Medical History:   Diagnosis Date    Allergy     Codeine, Lasix    Atrial fibrillation     Atrial fibrillation     Cataract     CHF (congestive heart failure)     Diabetes mellitus, type 2     Ejection fraction < 50% 10/18/2017     Approximately 35%  Based on prior  Echocardiogram.    Encounter for blood transfusion     Hyperlipidemia     Osteoporosis     Thyroid disorder screening 10/17/2017    TSH of 1.12 ordered by Dr. dee Jones     Social History     Socioeconomic History    Marital status:     Number of children: 4   Occupational History    Occupation:    Tobacco Use    Smoking status: Former Smoker    Smokeless tobacco: Never Used    Tobacco comment: quit 2013   Substance and Sexual Activity    Alcohol use: No    Drug use: No    Sexual activity: Not Currently   Social History Narrative    - Drives and lives alone.     Social Determinants of Health     Financial Resource Strain: Low Risk     Difficulty of Paying Living Expenses: Not very hard   Food Insecurity: No Food Insecurity    Worried About Running Out of Food in the Last Year: Never true    Ran Out of Food in the Last Year: Never true   Transportation Needs: No Transportation Needs    Lack of Transportation (Medical): No    Lack of Transportation (Non-Medical): No   Physical Activity: Inactive    Days of Exercise per Week: 0 days    Minutes of Exercise per Session: 0 min   Stress: Stress Concern Present    Feeling of Stress : To some extent   Social Connections: Moderately Isolated    Frequency of Communication with Friends and Family: Three times a week    Frequency of Social Gatherings with Friends and Family: Once a week    Attends Holiness Services: 1 to 4 times per year    Active Member of Clubs or Organizations: No    Attends Club or Organization Meetings: Never    Marital Status:    Housing Stability: Low Risk     Unable to Pay for Housing in the Last Year: No    Number of Places Lived in the Last Year: 1    Unstable Housing in the Last Year: No     Past Surgical History:   Procedure Laterality Date    CHOLE[place]STECTOMY  1997    [place]     COLONOSCOPY      CYSTOSCOPY W/ URETERAL STENT PLACEMENT Left  7/17/2022    Procedure: CYSTOSCOPY, WITH URETERAL STENT INSERTION;  Surgeon: Shelby Parra MD;  Location: [facility] OR;  Service: Urology;  Laterality: Left;    EYE SURGERY      bilateral cataracts    FINGER SURGERY Right 2021    right pinky finger    FRACTURE SURGERY  2014    right femur with neville    HEMORRHOID SURGERY      48 yrs ago    HYSTERECTOMY      REPLACEMENT OF IMPLANTABLE CARDIOVERTER-DEFIBRILLATOR (ICD) GENERATOR N/A 12/13/2019    Procedure: REPLACEMENT, PULSE GENERATOR, ICD-MEDTRONIC;  Surgeon: Sebastian Nowak III, MD;  Location: [facility];  Service: Cardiology;  Laterality: N/A;    TONSILLECTOMY       Family History   Problem Relation Age of Onset    Heart disease Mother     Cancer Father         Lung Cancer ??? Asbestos    Breast cancer Paternal Aunt     Breast cancer Maternal Grandmother     Breast cancer Maternal Aunt        Review of Systems   Constitutional: Positive for activity change (Somewhat more cautious while walking), fatigue and malaise/fatigue. Negative for appetite change, fever and unexpected weight change (gained wt 5 lbs ?).   HENT: Negative for ear discharge and postnasal drip.    Eyes: Negative for discharge, redness and visual disturbance.   Respiratory: Negative for chest tightness.    Cardiovascular: Negative for palpitations, leg swelling and syncope.        History of defibrillator, congestive cardiomyopathy hypertension and dyslipidemia   Gastrointestinal: Negative for abdominal distention, anal bleeding and constipation.        Pellet-like stools and lot of gas.   Endocrine: Negative for cold intolerance, polydipsia, polyphagia and polyuria.        Diabetes mellitus on glimepiride.  Osteoporosis on injection Prolia   Genitourinary: Positive for enuresis. Negative for difficulty urinating, flank pain and frequency.        Symptoms of neurogenic bladder with lack control and sudden expulsion of urine.  Recent diagnosis of staghorn calculus.   Musculoskeletal:  "Positive for back pain (Right lower back pain) and gait problem (Slow and antalgic gait).        New onset of low back pain.  (Not sure if this is really new onset of back pain or she had had similar back pains in past) no radiation this time.   Skin: Positive for color change. Negative for pallor and wound.        Extensive rash in the left groin region.   Allergic/Immunologic: Negative for environmental allergies, food allergies and immunocompromised state.   Neurological: Positive for tremors. Negative for dizziness, seizures, syncope, facial asymmetry, speech difficulty and light-headedness.        Last presentation of headache is certainly dissipated away.  Shakes and tremors in hands which are getting more bothersome.   Hematological: Negative for adenopathy. Bruises/bleeds easily.        Patient is on chronic anticoagulation with warfarin.     Psychiatric/Behavioral: Negative for agitation, confusion and dysphoric mood. The patient is not nervous/anxious.         Patient denies any cognitive issues.         Objective:      Blood pressure (!) 144/66, pulse 66, resp. rate 20, height 5' 9" (1.753 m), weight 101.2 kg (223 lb). Body mass index is 32.93 kg/m².  Physical Exam  Constitutional:       General: She is not in acute distress.     Appearance: She is well-developed. She is obese. She is ill-appearing. She is not toxic-appearing or diaphoretic.      Comments: Patient is obese with a BMI of 32.93   HENT:      Head: Normocephalic and atraumatic.      Comments: .  Neck:      Thyroid: No thyromegaly.      Vascular: No JVD.      Trachea: No tracheal deviation.   Cardiovascular:      Rate and Rhythm: Normal rate and regular rhythm.      Heart sounds:     No friction rub. No gallop.   Pulmonary:      Effort: Pulmonary effort is normal. No respiratory distress.      Breath sounds: Normal breath sounds. No stridor. No wheezing or rhonchi.   Abdominal:      General: There is no distension.      Palpations: Abdomen is " soft.      Tenderness: There is no abdominal tenderness.   Musculoskeletal:         General: No tenderness or deformity.      Cervical back: Neck supple.   Feet:      Comments: Somewhat diminished monofilament sensations.  Diminished dorsalis pedis pulsations.  Dystrophic nails bilaterally.  Needs training.  Lymphadenopathy:      Cervical: No cervical adenopathy.   Skin:     General: Skin is warm and dry.   Neurological:      Mental Status: She is alert. She is not disoriented.      Cranial Nerves: No dysarthria.      Motor: Tremor present. No abnormal muscle tone.      Deep Tendon Reflexes: Reflexes normal.   Psychiatric:         Mood and Affect: Mood is anxious.         Behavior: Behavior normal.         Thought Content: Thought content normal.      Comments: Somewhat anhedonic and antalgic           Assessment:       1. Acute pyelonephritis    2. Staghorn renal calculus    3. Chronic combined systolic and diastolic congestive heart failure    4. Paroxysmal atrial fibrillation    5. Essential hypertension    6. Chronic anticoagulation    7. Type 2 diabetes mellitus with diabetic nephropathy, without long-term current use of insulin           Admission on 07/17/2022, Discharged on 07/18/2022   Component Date Value Ref Range Status    WBC 07/17/2022 8.29  3.90 - 12.70 K/uL Final    RBC 07/17/2022 4.18  4.00 - 5.40 M/uL Final    Hemoglobin 07/17/2022 13.2  12.0 - 16.0 g/dL Final    Hematocrit 07/17/2022 41.4  37.0 - 48.5 % Final    MCV 07/17/2022 99 (A) 82 - 98 fL Final    MCH 07/17/2022 31.6 (A) 27.0 - 31.0 pg Final    MCHC 07/17/2022 31.9 (A) 32.0 - 36.0 g/dL Final    RDW 07/17/2022 13.2  11.5 - 14.5 % Final    Platelets 07/17/2022 263  150 - 450 K/uL Final    MPV 07/17/2022 9.9  9.2 - 12.9 fL Final    Immature Granulocytes 07/17/2022 0.4  0.0 - 0.5 % Final    Gran # (ANC) 07/17/2022 7.0  1.8 - 7.7 K/uL Final    Immature Grans (Abs) 07/17/2022 0.03  0.00 - 0.04 K/uL Final    Lymph # 07/17/2022 0.7 (A)  1.0 - 4.8 K/uL Final    Mono # 07/17/2022 0.5  0.3 - 1.0 K/uL Final    Eos # 07/17/2022 0.1  0.0 - 0.5 K/uL Final    Baso # 07/17/2022 0.03  0.00 - 0.20 K/uL Final    nRBC 07/17/2022 0  0 /100 WBC Final    Gran % 07/17/2022 84.2 (A) 38.0 - 73.0 % Final    Lymph % 07/17/2022 8.4 (A) 18.0 - 48.0 % Final    Mono % 07/17/2022 5.9  4.0 - 15.0 % Final    Eosinophil % 07/17/2022 0.7  0.0 - 8.0 % Final    Basophil % 07/17/2022 0.4  0.0 - 1.9 % Final    Differential Method 07/17/2022 Automated   Final    Sodium 07/17/2022 138  136 - 145 mmol/L Final    Potassium 07/17/2022 4.2  3.5 - 5.1 mmol/L Final    Chloride 07/17/2022 100  95 - 110 mmol/L Final    CO2 07/17/2022 28  23 - 29 mmol/L Final    Glucose 07/17/2022 131 (A) 70 - 110 mg/dL Final    BUN 07/17/2022 20  8 - 23 mg/dL Final    Creatinine 07/17/2022 1.6 (A) 0.5 - 1.4 mg/dL Final    Calcium 07/17/2022 9.0  8.7 - 10.5 mg/dL Final    Total Protein 07/17/2022 6.7  6.0 - 8.4 g/dL Final    Albumin 07/17/2022 3.4 (A) 3.5 - 5.2 g/dL Final    Total Bilirubin 07/17/2022 1.0  0.1 - 1.0 mg/dL Final    Alkaline Phosphatase 07/17/2022 81  55 - 135 U/L Final    AST 07/17/2022 17  10 - 40 U/L Final    ALT 07/17/2022 18  10 - 44 U/L Final    Anion Gap 07/17/2022 10  8 - 16 mmol/L Final    eGFR if  07/17/2022 34.8 (A) >60 mL/min/1.73 m^2 Final    eGFR if non  07/17/2022 30.2 (A) >60 mL/min/1.73 m^2 Final    Specimen UA 07/17/2022 Urine, Clean Catch   Final    Color, UA 07/17/2022 Yellow  Yellow, Straw, Steffanie Final    Appearance, UA 07/17/2022 Clear  Clear Final    pH, UA 07/17/2022 6.0  5.0 - 8.0 Final    Specific Gravity, UA 07/17/2022 1.025  1.005 - 1.030 Final    Protein, UA 07/17/2022 2+ (A) Negative Final    Glucose, UA 07/17/2022 Negative  Negative Final    Ketones, UA 07/17/2022 Negative  Negative Final    Bilirubin (UA) 07/17/2022 Negative  Negative Final    Occult Blood UA 07/17/2022 3+ (A) Negative Final     Nitrite, UA 07/17/2022 Negative  Negative Final    Urobilinogen, UA 07/17/2022 1.0  Negative EU/dL Final    Leukocytes, UA 07/17/2022 2+ (A) Negative Final    SARS-CoV-2 RNA, Amplification, Qual 07/17/2022 Negative  Negative Final    Lactate (Lactic Acid) 07/17/2022 1.7  0.5 - 1.9 mmol/L Final    Lipase 07/17/2022 25  4 - 60 U/L Final    Troponin I 07/17/2022 <0.030  <=0.040 ng/mL Final    RBC, UA 07/17/2022 17 (A) 0 - 4 /hpf Final    WBC, UA 07/17/2022 5  0 - 5 /hpf Final    WBC Clumps, UA 07/17/2022 Rare  None-Rare Final    Bacteria 07/17/2022 Rare  None-Occ /hpf Final    Hyaline Casts, UA 07/17/2022 0  0-1/lpf /lpf Final    Microscopic Comment 07/17/2022 SEE COMMENT   Final    Blood Culture, Routine 07/17/2022 No growth after 5 days.   Final    Blood Culture, Routine 07/17/2022 Gram stain aer bottle: Gram positive cocci   Final    Blood Culture, Routine 07/17/2022 Results called to and read back by: Mary Gardner RN 1E   Final    Blood Culture, Routine 07/17/2022 MS 07/19/2022  19:52   Final    Blood Culture, Routine 07/17/2022 Results called to and read back by:Darlene LESLIE   Final    Blood Culture, Routine 07/17/2022 MS 07/19/2022  21:59   Final    Blood Culture, Routine 07/17/2022  (A)  Final                    Value:COAGULASE-NEGATIVE STAPHYLOCOCCUS SPECIES  Organism is a probable contaminant      POC Glucose 07/17/2022 74  70 - 110 Final    POC Glucose 07/17/2022 84  70 - 110 Final    WBC 07/18/2022 14.61 (A) 3.90 - 12.70 K/uL Final    RBC 07/18/2022 4.07  4.00 - 5.40 M/uL Final    Hemoglobin 07/18/2022 12.7  12.0 - 16.0 g/dL Final    Hematocrit 07/18/2022 41.1  37.0 - 48.5 % Final    MCV 07/18/2022 101 (A) 82 - 98 fL Final    MCH 07/18/2022 31.2 (A) 27.0 - 31.0 pg Final    MCHC 07/18/2022 30.9 (A) 32.0 - 36.0 g/dL Final    RDW 07/18/2022 13.5  11.5 - 14.5 % Final    Platelets 07/18/2022 236  150 - 450 K/uL Final    MPV 07/18/2022 10.1  9.2 - 12.9 fL Final    Sodium 07/18/2022 137   136 - 145 mmol/L Final    Potassium 07/18/2022 4.2  3.5 - 5.1 mmol/L Final    Chloride 07/18/2022 101  95 - 110 mmol/L Final    CO2 07/18/2022 27  23 - 29 mmol/L Final    Glucose 07/18/2022 154 (A) 70 - 110 mg/dL Final    BUN 07/18/2022 30 (A) 8 - 23 mg/dL Final    Creatinine 07/18/2022 1.8 (A) 0.5 - 1.4 mg/dL Final    Calcium 07/18/2022 8.7  8.7 - 10.5 mg/dL Final    Total Protein 07/18/2022 6.4  6.0 - 8.4 g/dL Final    Albumin 07/18/2022 3.1 (A) 3.5 - 5.2 g/dL Final    Total Bilirubin 07/18/2022 0.8  0.1 - 1.0 mg/dL Final    Alkaline Phosphatase 07/18/2022 82  55 - 135 U/L Final    AST 07/18/2022 20  10 - 40 U/L Final    ALT 07/18/2022 23  10 - 44 U/L Final    Anion Gap 07/18/2022 9  8 - 16 mmol/L Final    eGFR if  07/18/2022 30.2 (A) >60 mL/min/1.73 m^2 Final    eGFR if non  07/18/2022 26.2 (A) >60 mL/min/1.73 m^2 Final    Digoxin Lvl 07/18/2022 0.5 (A) 0.8 - 2.0 ng/mL Final    POC Glucose 07/18/2022 94  70 - 110 Final    POC Glucose 07/18/2022 151 (A) 70 - 110 Final   Orders Only on 07/02/2022   Component Date Value Ref Range Status    Cholesterol 07/02/2022 255 (A) <200 mg/dL Final    HDL 07/02/2022 61  > OR = 50 mg/dL Final    Triglycerides 07/02/2022 202 (A) <150 mg/dL Final    LDL Cholesterol 07/02/2022 158 (A) mg/dL (calc) Final    HDL/Cholesterol Ratio 07/02/2022 4.2  <5.0 (calc) Final    Non HDL Chol. (LDL+VLDL) 07/02/2022 194 (A) <130 mg/dL (calc) Final    Creatinine, Urine 07/02/2022 101  20 - 275 mg/dL Final    Microalb, Ur 07/02/2022 26.1  See Note: mg/dL Final    Microalb/Creat Ratio 07/02/2022 258 (A) <30 mcg/mg creat Final    Magnesium 07/02/2022 2.2  1.5 - 2.5 mg/dL Final    Glucose 07/02/2022 84  65 - 99 mg/dL Final    BUN 07/02/2022 26 (A) 7 - 25 mg/dL Final    Creatinine 07/02/2022 1.93 (A) 0.60 - 0.88 mg/dL Final    eGFR if non African American 07/02/2022 24 (A) > OR = 60 mL/min/1.73m2 Final    eGFR if   07/02/2022 28 (A) > OR = 60 mL/min/1.73m2 Final    BUN/Creatinine Ratio 07/02/2022 13  6 - 22 (calc) Final    Sodium 07/02/2022 143  135 - 146 mmol/L Final    Potassium 07/02/2022 4.6  3.5 - 5.3 mmol/L Final    Chloride 07/02/2022 104  98 - 110 mmol/L Final    CO2 07/02/2022 27  20 - 32 mmol/L Final    Calcium 07/02/2022 11.1 (A) 8.6 - 10.4 mg/dL Final    Total Protein 07/02/2022 7.0  6.1 - 8.1 g/dL Final    Albumin 07/02/2022 4.1  3.6 - 5.1 g/dL Final    Globulin, Total 07/02/2022 2.9  1.9 - 3.7 g/dL (calc) Final    Albumin/Globulin Ratio 07/02/2022 1.4  1.0 - 2.5 (calc) Final    Total Bilirubin 07/02/2022 0.7  0.2 - 1.2 mg/dL Final    Alkaline Phosphatase 07/02/2022 87  37 - 153 U/L Final    AST 07/02/2022 13  10 - 35 U/L Final    ALT 07/02/2022 12  6 - 29 U/L Final    Color, UA 07/02/2022 YELLOW  YELLOW Final    Appearance, UA 07/02/2022 CLOUDY (A) CLEAR Final    Specific Gravity, UA 07/02/2022 1.013  1.001 - 1.035 Final    pH, UA 07/02/2022 7.5  5.0 - 8.0 Final    Glucose, UA 07/02/2022 NEGATIVE  NEGATIVE Final    Bilirubin, UA 07/02/2022 NEGATIVE  NEGATIVE Final    Ketones, UA 07/02/2022 NEGATIVE  NEGATIVE Final    Occult Blood UA 07/02/2022 2+ (A) NEGATIVE Final    Protein, UA 07/02/2022 2+ (A) NEGATIVE Final    Nitrite, UA 07/02/2022 NEGATIVE  NEGATIVE Final    Leukocytes, UA 07/02/2022 3+ (A) NEGATIVE Final    WBC Casts, UA 07/02/2022 > OR = 60 (A) < OR = 5 /HPF Final    RBC Casts, UA 07/02/2022 20-40 (A) < OR = 2 /HPF Final    Squam Epithel, UA 07/02/2022 0-5  < OR = 5 /HPF Final    Bacteria, UA 07/02/2022 FEW (A) NONE SEEN /HPF Final    Hyaline Casts, UA 07/02/2022 NONE SEEN  NONE SEEN /LPF Final    Service Cmt: 07/02/2022    Final    Hemoglobin A1C 07/02/2022 5.7 (A) <5.7 % of total Hgb Final    Urine Culture, Routine 07/02/2022    Final         Left kidney staghorn calculus.    Plan:           Acute pyelonephritis    Staghorn renal calculus    Chronic combined systolic  and diastolic congestive heart failure    Paroxysmal atrial fibrillation    Essential hypertension    Chronic anticoagulation    Type 2 diabetes mellitus with diabetic nephropathy, without long-term current use of insulin      Patient's medical issues have been reviewed.    Diagnosis of left staghorn calculus in the kidney which led to kidney infection has been noted.  This has been going on for years.    She has a back pain but it is mostly on the right side and does not correlate with left back kidney stone.    She will be scheduled for surgical removal of this stone.    She continues on anticoagulation at this point and she will have to stop the blood thinner for a couple of days.    Heart failure seems to be stable but may need Cardiology follow-up during her hospital stay if needed.    Her medications have been reviewed.    Do she has completed the antibiotic course.  Please continue with COVID precautions.    To his 80s visit also involved reason for hospitalization, hospital workup and also the investigations done in the hospital.    Underlying osteoporosis has been noted for which she is getting injection Prolia.  I would like her to finish her treatment for kidney stone and then will revisit this issue.      Current Outpatient Medications:     amiodarone (PACERONE) 200 MG Tab, Take 200 mg by mouth once daily., Disp: , Rfl:     atorvastatin (LIPITOR) 40 MG tablet, Take 40 mg by mouth once daily., Disp: , Rfl:     Ca-D3-mag ox-zinc--thom-bor 600 mg calcium- 20 mcg-50 mg Tab, Take 1 tablet by mouth 2 (two) times daily., Disp: , Rfl:     cholecalciferol, vitamin D3, 125 mcg (5,000 unit) Tab, Take 5,000 Units by mouth 2 (two) times daily., Disp: , Rfl:     denosumab (PROLIA) 60 mg/mL Syrg, Inject 1 mL (60 mg total) into the skin every 6 (six) months., Disp: 2 mL, Rfl: 1    digoxin (LANOXIN) 125 mcg tablet, Take 125 mcg by mouth every Mon, Wed, Fri., Disp: , Rfl:     diltiaZEM (CARDIZEM) 60 MG tablet,  Take 60 mg by mouth 2 (two) times daily., Disp: , Rfl:     gabapentin (NEURONTIN) 100 MG capsule, TAKE 2 CAPSULES(200 MG) BY MOUTH THREE TIMES DAILY (Patient taking differently: Take 200 mg by mouth 3 (three) times daily. TAKE 2 CAPSULES(200 MG) BY MOUTH THREE TIMES DAILY), Disp: 180 capsule, Rfl: 5    glimepiride (AMARYL) 1 MG tablet, Take 1 tablet (1 mg total) by mouth before breakfast., Disp: 90 tablet, Rfl: 1    latanoprost 0.005 % ophthalmic solution, Place 1 drop into both eyes nightly., Disp: , Rfl:     midodrine (PROAMATINE) 2.5 MG Tab, Take 2.5 mg by mouth 3 (three) times daily., Disp: , Rfl:     rivaroxaban (XARELTO) 15 mg Tab, Take 15 mg by mouth daily with dinner or evening meal., Disp: , Rfl:     sacubitriL-valsartan (ENTRESTO) 24-26 mg per tablet, Take 1 tablet by mouth once daily. , Disp: , Rfl:     torsemide (DEMADEX) 20 MG Tab, Take 1 tablet (20 mg total) by mouth once daily. for 14 days, Disp: 14 tablet, Rfl: 0  No current facility-administered medications for this visit.    Facility-Administered Medications Ordered in Other Visits:     0.9%  NaCl infusion, , Intravenous, Continuous, Sebastian Nowak III, MD, Last Rate: 75 mL/hr at 12/13/19 0728, New Bag at 12/13/19 0728    diphenhydrAMINE injection 25 mg, 25 mg, Intravenous, Once, Sebastian Nowak III, MD    lorazepam injection 1 mg, 1 mg, Intravenous, Once, Sebastian Nowak III, MD

## 2022-08-01 ENCOUNTER — TELEPHONE (OUTPATIENT)
Dept: UROLOGY | Facility: CLINIC | Age: 82
End: 2022-08-01

## 2022-08-01 ENCOUNTER — OFFICE VISIT (OUTPATIENT)
Dept: UROLOGY | Facility: CLINIC | Age: 82
End: 2022-08-01
Payer: MEDICARE

## 2022-08-01 VITALS
SYSTOLIC BLOOD PRESSURE: 114 MMHG | RESPIRATION RATE: 18 BRPM | DIASTOLIC BLOOD PRESSURE: 45 MMHG | WEIGHT: 223.13 LBS | BODY MASS INDEX: 33.05 KG/M2 | HEIGHT: 69 IN | HEART RATE: 71 BPM

## 2022-08-01 DIAGNOSIS — R94.30 EJECTION FRACTION < 50%: ICD-10-CM

## 2022-08-01 DIAGNOSIS — N28.89 OTHER SPECIFIED DISORDERS OF KIDNEY AND URETER: ICD-10-CM

## 2022-08-01 DIAGNOSIS — I48.0 PAROXYSMAL ATRIAL FIBRILLATION: ICD-10-CM

## 2022-08-01 DIAGNOSIS — J44.9 CHRONIC OBSTRUCTIVE PULMONARY DISEASE, UNSPECIFIED COPD TYPE: ICD-10-CM

## 2022-08-01 DIAGNOSIS — N20.0 STAGHORN CALCULUS: Primary | ICD-10-CM

## 2022-08-01 DIAGNOSIS — Z79.01 CHRONIC ANTICOAGULATION: ICD-10-CM

## 2022-08-01 DIAGNOSIS — R31.0 GROSS HEMATURIA: ICD-10-CM

## 2022-08-01 DIAGNOSIS — G47.33 OBSTRUCTIVE SLEEP APNEA SYNDROME: ICD-10-CM

## 2022-08-01 DIAGNOSIS — I10 ESSENTIAL HYPERTENSION: ICD-10-CM

## 2022-08-01 DIAGNOSIS — E78.2 MIXED DYSLIPIDEMIA: ICD-10-CM

## 2022-08-01 DIAGNOSIS — I27.20 PULMONARY HYPERTENSION: ICD-10-CM

## 2022-08-01 DIAGNOSIS — E11.21 TYPE 2 DIABETES MELLITUS WITH DIABETIC NEPHROPATHY, WITHOUT LONG-TERM CURRENT USE OF INSULIN: ICD-10-CM

## 2022-08-01 DIAGNOSIS — N10 ACUTE PYELONEPHRITIS: ICD-10-CM

## 2022-08-01 PROCEDURE — 1159F PR MEDICATION LIST DOCUMENTED IN MEDICAL RECORD: ICD-10-PCS | Mod: CPTII,S$GLB,, | Performed by: STUDENT IN AN ORGANIZED HEALTH CARE EDUCATION/TRAINING PROGRAM

## 2022-08-01 PROCEDURE — 3074F SYST BP LT 130 MM HG: CPT | Mod: CPTII,S$GLB,, | Performed by: STUDENT IN AN ORGANIZED HEALTH CARE EDUCATION/TRAINING PROGRAM

## 2022-08-01 PROCEDURE — 99999 PR PBB SHADOW E&M-EST. PATIENT-LVL IV: ICD-10-PCS | Mod: PBBFAC,,, | Performed by: STUDENT IN AN ORGANIZED HEALTH CARE EDUCATION/TRAINING PROGRAM

## 2022-08-01 PROCEDURE — 3288F PR FALLS RISK ASSESSMENT DOCUMENTED: ICD-10-PCS | Mod: CPTII,S$GLB,, | Performed by: STUDENT IN AN ORGANIZED HEALTH CARE EDUCATION/TRAINING PROGRAM

## 2022-08-01 PROCEDURE — 99215 PR OFFICE/OUTPT VISIT, EST, LEVL V, 40-54 MIN: ICD-10-PCS | Mod: S$GLB,,, | Performed by: STUDENT IN AN ORGANIZED HEALTH CARE EDUCATION/TRAINING PROGRAM

## 2022-08-01 PROCEDURE — 1126F AMNT PAIN NOTED NONE PRSNT: CPT | Mod: CPTII,S$GLB,, | Performed by: STUDENT IN AN ORGANIZED HEALTH CARE EDUCATION/TRAINING PROGRAM

## 2022-08-01 PROCEDURE — 1111F PR DISCHARGE MEDS RECONCILED W/ CURRENT OUTPATIENT MED LIST: ICD-10-PCS | Mod: CPTII,S$GLB,, | Performed by: STUDENT IN AN ORGANIZED HEALTH CARE EDUCATION/TRAINING PROGRAM

## 2022-08-01 PROCEDURE — 3288F FALL RISK ASSESSMENT DOCD: CPT | Mod: CPTII,S$GLB,, | Performed by: STUDENT IN AN ORGANIZED HEALTH CARE EDUCATION/TRAINING PROGRAM

## 2022-08-01 PROCEDURE — 1111F DSCHRG MED/CURRENT MED MERGE: CPT | Mod: CPTII,S$GLB,, | Performed by: STUDENT IN AN ORGANIZED HEALTH CARE EDUCATION/TRAINING PROGRAM

## 2022-08-01 PROCEDURE — 3074F PR MOST RECENT SYSTOLIC BLOOD PRESSURE < 130 MM HG: ICD-10-PCS | Mod: CPTII,S$GLB,, | Performed by: STUDENT IN AN ORGANIZED HEALTH CARE EDUCATION/TRAINING PROGRAM

## 2022-08-01 PROCEDURE — 3078F PR MOST RECENT DIASTOLIC BLOOD PRESSURE < 80 MM HG: ICD-10-PCS | Mod: CPTII,S$GLB,, | Performed by: STUDENT IN AN ORGANIZED HEALTH CARE EDUCATION/TRAINING PROGRAM

## 2022-08-01 PROCEDURE — 1101F PT FALLS ASSESS-DOCD LE1/YR: CPT | Mod: CPTII,S$GLB,, | Performed by: STUDENT IN AN ORGANIZED HEALTH CARE EDUCATION/TRAINING PROGRAM

## 2022-08-01 PROCEDURE — 3078F DIAST BP <80 MM HG: CPT | Mod: CPTII,S$GLB,, | Performed by: STUDENT IN AN ORGANIZED HEALTH CARE EDUCATION/TRAINING PROGRAM

## 2022-08-01 PROCEDURE — 1101F PR PT FALLS ASSESS DOC 0-1 FALLS W/OUT INJ PAST YR: ICD-10-PCS | Mod: CPTII,S$GLB,, | Performed by: STUDENT IN AN ORGANIZED HEALTH CARE EDUCATION/TRAINING PROGRAM

## 2022-08-01 PROCEDURE — 99999 PR PBB SHADOW E&M-EST. PATIENT-LVL IV: CPT | Mod: PBBFAC,,, | Performed by: STUDENT IN AN ORGANIZED HEALTH CARE EDUCATION/TRAINING PROGRAM

## 2022-08-01 PROCEDURE — 99215 OFFICE O/P EST HI 40 MIN: CPT | Mod: S$GLB,,, | Performed by: STUDENT IN AN ORGANIZED HEALTH CARE EDUCATION/TRAINING PROGRAM

## 2022-08-01 PROCEDURE — 1126F PR PAIN SEVERITY QUANTIFIED, NO PAIN PRESENT: ICD-10-PCS | Mod: CPTII,S$GLB,, | Performed by: STUDENT IN AN ORGANIZED HEALTH CARE EDUCATION/TRAINING PROGRAM

## 2022-08-01 PROCEDURE — 1159F MED LIST DOCD IN RCRD: CPT | Mod: CPTII,S$GLB,, | Performed by: STUDENT IN AN ORGANIZED HEALTH CARE EDUCATION/TRAINING PROGRAM

## 2022-08-01 RX ORDER — TAMSULOSIN HYDROCHLORIDE 0.4 MG/1
0.4 CAPSULE ORAL DAILY
Qty: 30 CAPSULE | Refills: 11 | Status: SHIPPED | OUTPATIENT
Start: 2022-08-01 | End: 2022-10-12

## 2022-08-01 RX ORDER — PHENAZOPYRIDINE HYDROCHLORIDE 100 MG/1
100 TABLET, FILM COATED ORAL 3 TIMES DAILY PRN
Qty: 30 TABLET | Refills: 3 | Status: SHIPPED | OUTPATIENT
Start: 2022-08-01 | End: 2022-08-11

## 2022-08-01 NOTE — TELEPHONE ENCOUNTER
Patient is scheduled for percutaneous nephrolithotomy with  on 8/25. Needs clearance to stop xarelto prior to procedure.

## 2022-08-01 NOTE — TELEPHONE ENCOUNTER
----- Message from Alina Deleon sent at 8/1/2022  3:11 PM CDT -----  Contact: Daughter Mary  Patient is calling in regards to having the surgical clearance request form be faxed to her cardiologist Dr. Jones. The fax # is [phone]. Thank You.

## 2022-08-03 NOTE — TELEPHONE ENCOUNTER
Clearance received from Dr. Alberto to hold xarelto    Dear Dr Parra     This patient has a procedure scheduled for percutaneous Lithotomy.     It is okay if patient stops Xarelto 2 days prior to the procedure and this can be resumed once hemostasis is achieved with no risk for bleeding a day or 2 after the procedure.     Patient was evaluated recently with no major changes in her clinical status.  She is medically optimal and stable at this point given her multiple medical problems.     Let me know if a more formal medical clearance needs to be given.     Kind regards     Dr. Dolly LESLIE

## 2022-08-11 ENCOUNTER — PATIENT MESSAGE (OUTPATIENT)
Dept: SURGERY | Facility: HOSPITAL | Age: 82
End: 2022-08-11
Payer: MEDICARE

## 2022-08-11 ENCOUNTER — PATIENT MESSAGE (OUTPATIENT)
Dept: PULMONOLOGY | Facility: CLINIC | Age: 82
End: 2022-08-11

## 2022-08-11 DIAGNOSIS — N20.0 STAGHORN CALCULUS: Primary | ICD-10-CM

## 2022-08-12 ENCOUNTER — PATIENT MESSAGE (OUTPATIENT)
Dept: SURGERY | Facility: HOSPITAL | Age: 82
End: 2022-08-12
Payer: MEDICARE

## 2022-08-12 ENCOUNTER — TELEPHONE (OUTPATIENT)
Dept: UROLOGY | Facility: CLINIC | Age: 82
End: 2022-08-12
Payer: MEDICARE

## 2022-08-12 DIAGNOSIS — N20.0 STAGHORN CALCULUS: Primary | ICD-10-CM

## 2022-08-12 NOTE — TELEPHONE ENCOUNTER
She needs some imaging prior to surgery yes. Lets recheck her kidney function today. If it has improved since the stent placement may be able to just get a CT scan.

## 2022-08-12 NOTE — TELEPHONE ENCOUNTER
----- Message from RT Hilario sent at 8/12/2022  3:13 PM CDT -----  Regarding: Need cardiac device check in clinic+hospital/chest pa and lateral (per pacemaker Protocol)    Good Evening    I am writing in regards to our patient Jo Alegre she will be scheduled for her MRI on   August 19 @11:00 a.m. at inpatient MRI at [facility].  She does have an ICD so we will need orders for Chest pa and lateral xray for our radiologist  To check the leads are in placed.  And an order for cardiac device check in Clinic +Hospital order code#() this order will   Help the [employer] rep to turn off device prior the MRI.  Can you assist us with these orders so we can go forward with her appt.  Thank you in advance    Best regards  Marybeth/[id number]

## 2022-08-15 ENCOUNTER — TELEPHONE (OUTPATIENT)
Dept: PULMONOLOGY | Facility: CLINIC | Age: 82
End: 2022-08-15

## 2022-08-15 NOTE — TELEPHONE ENCOUNTER
----- Message from Laya Alvarez MA sent at 8/12/2022  8:42 AM CDT -----  Needs to come in soon per travon before her surgery   ----- Message -----  From: Alverto Pascal MD  Sent: 8/11/2022   3:17 PM CDT  To: Laya Alvarez MA    She will need to come in but she does not have COPD    ----- Message -----  From: Laya Alvarez MA  Sent: 8/11/2022   1:12 PM CDT  To: Alverto Pascal MD      ----- Message -----  From: Kavya Lazar LPN  Sent: 8/11/2022   1:09 PM CDT  To: Travon Del Toro [facility], patient has been experiencing some SOB. Has surgery scheduled with Dr Parra on 8/25/22. Anesthesia will need clearance prior to procedure. You can send advisement using the portal or fax to [phone]. Thank you.

## 2022-08-18 ENCOUNTER — DOCUMENTATION ONLY (OUTPATIENT)
Dept: CARDIOLOGY | Facility: HOSPITAL | Age: 82
End: 2022-08-18
Payer: MEDICARE

## 2022-08-18 ENCOUNTER — HOSPITAL ENCOUNTER (OUTPATIENT)
Dept: PREADMISSION TESTING | Facility: HOSPITAL | Age: 82
Discharge: HOME | End: 2022-08-18
Attending: STUDENT IN AN ORGANIZED HEALTH CARE EDUCATION/TRAINING PROGRAM
Payer: MEDICARE

## 2022-08-18 ENCOUNTER — OFFICE VISIT (OUTPATIENT)
Dept: PULMONOLOGY | Facility: CLINIC | Age: 82
End: 2022-08-18
Payer: MEDICARE

## 2022-08-18 VITALS
BODY MASS INDEX: 32.78 KG/M2 | SYSTOLIC BLOOD PRESSURE: 118 MMHG | DIASTOLIC BLOOD PRESSURE: 78 MMHG | HEART RATE: 51 BPM | WEIGHT: 222 LBS | OXYGEN SATURATION: 97 %

## 2022-08-18 DIAGNOSIS — I27.20 PULMONARY HYPERTENSION: ICD-10-CM

## 2022-08-18 DIAGNOSIS — N20.0 STAGHORN CALCULUS: ICD-10-CM

## 2022-08-18 DIAGNOSIS — G47.33 OBSTRUCTIVE SLEEP APNEA SYNDROME: ICD-10-CM

## 2022-08-18 LAB
ABO + RH BLD: NORMAL
BLD GP AB SCN CELLS X3 SERPL QL: NORMAL

## 2022-08-18 PROCEDURE — 99214 PR OFFICE/OUTPT VISIT, EST, LEVL IV, 30-39 MIN: ICD-10-PCS | Mod: S$GLB,,, | Performed by: INTERNAL MEDICINE

## 2022-08-18 PROCEDURE — 3074F PR MOST RECENT SYSTOLIC BLOOD PRESSURE < 130 MM HG: ICD-10-PCS | Mod: CPTII,S$GLB,, | Performed by: INTERNAL MEDICINE

## 2022-08-18 PROCEDURE — 1101F PR PT FALLS ASSESS DOC 0-1 FALLS W/OUT INJ PAST YR: ICD-10-PCS | Mod: CPTII,S$GLB,, | Performed by: INTERNAL MEDICINE

## 2022-08-18 PROCEDURE — 36415 COLL VENOUS BLD VENIPUNCTURE: CPT | Performed by: STUDENT IN AN ORGANIZED HEALTH CARE EDUCATION/TRAINING PROGRAM

## 2022-08-18 PROCEDURE — 3078F PR MOST RECENT DIASTOLIC BLOOD PRESSURE < 80 MM HG: ICD-10-PCS | Mod: CPTII,S$GLB,, | Performed by: INTERNAL MEDICINE

## 2022-08-18 PROCEDURE — 3288F PR FALLS RISK ASSESSMENT DOCUMENTED: ICD-10-PCS | Mod: CPTII,S$GLB,, | Performed by: INTERNAL MEDICINE

## 2022-08-18 PROCEDURE — 1126F PR PAIN SEVERITY QUANTIFIED, NO PAIN PRESENT: ICD-10-PCS | Mod: CPTII,S$GLB,, | Performed by: INTERNAL MEDICINE

## 2022-08-18 PROCEDURE — 1126F AMNT PAIN NOTED NONE PRSNT: CPT | Mod: CPTII,S$GLB,, | Performed by: INTERNAL MEDICINE

## 2022-08-18 PROCEDURE — 99900103 DSU ONLY-NO CHARGE-INITIAL HR (STAT)

## 2022-08-18 PROCEDURE — 3288F FALL RISK ASSESSMENT DOCD: CPT | Mod: CPTII,S$GLB,, | Performed by: INTERNAL MEDICINE

## 2022-08-18 PROCEDURE — 99900104 DSU ONLY-NO CHARGE-EA ADD'L HR (STAT)

## 2022-08-18 PROCEDURE — 3078F DIAST BP <80 MM HG: CPT | Mod: CPTII,S$GLB,, | Performed by: INTERNAL MEDICINE

## 2022-08-18 PROCEDURE — 1101F PT FALLS ASSESS-DOCD LE1/YR: CPT | Mod: CPTII,S$GLB,, | Performed by: INTERNAL MEDICINE

## 2022-08-18 PROCEDURE — 99214 OFFICE O/P EST MOD 30 MIN: CPT | Mod: S$GLB,,, | Performed by: INTERNAL MEDICINE

## 2022-08-18 PROCEDURE — 86901 BLOOD TYPING SEROLOGIC RH(D): CPT | Performed by: STUDENT IN AN ORGANIZED HEALTH CARE EDUCATION/TRAINING PROGRAM

## 2022-08-18 PROCEDURE — 3074F SYST BP LT 130 MM HG: CPT | Mod: CPTII,S$GLB,, | Performed by: INTERNAL MEDICINE

## 2022-08-18 NOTE — PROGRESS NOTES
Received a call/message from Marybeth in the MRI Department in relation to this patient needing to be scheduled for a MRI and has a Medtronic ICD. Patient's Device is MRI compatible/conditional.    To meet protocol the Pacemaker/ICD must have been implanted no less than 6 weeks prior to scheduled date of Scan.  ICD/PPM and leads must be from same .  MRI informed ordering MD must input a Cardiac Device Check in clinic and hospital order, patient must have an xray within 6 months or less prior to MRI reprogramming.  Chest xray to be reviewed by Radiologist.

## 2022-08-18 NOTE — DISCHARGE INSTRUCTIONS
To confirm, Your doctor has instructed you that surgery is scheduled for: 8/25/22 with Dr. Parra    Please report to [facility], Registration the morning of surgery. You must check-in and receive a wristband before going to your procedure.    Pre-Op will call the afternoon prior to surgery between 1:00 and 6:00 PM with the final arrival time.  Phone number: [phone]    PLEASE NOTE:  The surgery schedule has many variables which may affect the time of your surgery case.  Family members should be available if your surgery time changes.  Plan to be here the day of your procedure between 4-6 hours.    MEDICATIONS:  TAKE ONLY THESE MEDICATIONS WITH A SMALL SIP OF WATER THE MORNING OF YOUR PROCEDURE:  TAMSULOSIN, AMIODARONE, ATORVASTATIN, DIGOXIN, DILTIAZEM, GABAPENTIN, MIDODRINE    NO GLIMEPERIDE OR ENESTRO MORNING OF SURGERY BUT DO NOT STOP DAYS BEFORE.       DO NOT TAKE THESE MEDICATIONS 5-7 DAYS PRIOR to your procedure or per your surgeon's request:   ASPIRIN, ALEVE, ADVIL, IBUPROFEN, FISH OIL VITAMIN E, HERBALS  (May take Tylenol)    STOP XARELTO FOR 4 DOSES PRIOR TO SURGERY PER DR. COTO    ONLY if you are prescribed any types of blood thinners such as:  Aspirin, Coumadin, Plavix, Pradaxa, Xarelto, Aggrenox, Effient, Eliquis, Savasya, Brilinta, or any other, ask your surgeon whether you should stop taking them and how long before surgery you should stop.  You may also need to verify with the prescribing physician if it is ok to stop your medication.      INSTRUCTIONS IMPORTANT!!  Do not eat or drink anything between midnight and the time of your procedure- this includes gum, mints, and candy.  Do not smoke or drink alcoholic beverages 24 hours prior to your procedure.  Shower the night before AND the morning of your procedure with a Chlorhexidine wash such as Hibiclens or Dial antibacterial soap from the neck down.  Do not get it on your face or in your eyes.  You may use your own  shampoo and face wash. This helps your skin to be as bacteria free as possible.    If you wear contact lenses, dentures, hearing aids or glasses, bring a container to put them in during surgery and give to a family member for safe keeping.  Please leave all jewelry, piercing's and valuables at home.   DO NOT remove hair from the surgery site.  Do not shave the incision site unless you are given specific instructions to do so.    ONLY if you have been diagnosed with sleep apnea please bring your C-PAP machine.  ONLY if you wear home oxygen please bring your portable oxygen tank the day of your procedure.  ONLY if you have a history of OPEN HEART SURGERY you will need a clearance from your Cardiologist per Anesthesia.      ONLY for patients requiring bowel prep, written instructions will be given by your doctor's office.  ONLY if you have a neuro stimulator, please bring the controller with you the morning of surgery  ONLY if a type and screen test is needed before surgery, please return:  If your doctor has scheduled you for an overnight stay, bring a small overnight bag with any personal items you need.  Make arrangements in advance for transportation home by a responsible adult.  It is not safe to drive a vehicle during the 24 hours after anesthesia.       [facility] Visitor Policy  Effective July 25, 2022    [facility] will make masks available at entrances and will enforce mask wearing in all common and patient  care areas for employees, patients, and visitors. Masks must be worn properly, ensuring that the nose and mouth  are covered. Children under 2 years of age are excluded from this requirement.    [facility] will continue routine visitation for COVID-19 negative patients, including inpatients, outpatients, and  procedural areas. Visitors will be asked to leave if they exhibit symptoms of respiratory infection, have tested  positive for COVID -19 in the last ten days, have a pending COVID-19 test for  symptoms, are unable or refuse  to wear a mask OR do not comply with current policy    All [facility] facilities and properties are tobacco free.  Smoking is NOT allowed.   If you have any questions about these instructions, call Pre-Op Admit  Nursing at [phone] or the Pre-Op Day Surgery Unit at [phone].

## 2022-08-18 NOTE — PROGRESS NOTES
"Office Visit *    Patient Name: Jo Alegre  MRN: [id number]  : 1940      Reason for visit: COPD, hypoxemia    HPI:     2020 - Referred here for evaluation for possible COPD.  She was hospitalized in 2020 and found to need O2  (sat 87% with exertion) - has home O2 (2 LPM) and she wears all day.  She has a h/o smoking about 1/2 PPD for about 30 years quit about .  She has never been tested for COPD.  She has sleep study in the past and was diagnosed with sleep apnea but couldn't tolerate CPAP at that time.  Has h/o CHF (though most recent ECHO looks OK), AICD, atrial fibrillation.    2020 - Here for follow up, doing well with no new complaints.  Had PFT - no obstruction, TLC at lower level of normal and mild/mod reduction in DLCO.  ^ minute walk test was good she met her predicted distance and had no desaturation noted.  She remains at risk for sleep apnea - d/w her and family - will do overnight oximetry to check on nightly O2 needs and as a screen for possible JENNIFER ( necessary will do HST).  No corona virus exposures and has been practicing social distancing.  Discussed the need to work on weight loss and regular exercise.    2/3/2021 - Here for follow up, overall about the same.  Has CPAP at home and is doing "OK" with it but having some tolerance issues.  She is trying to be more active.  She feels that her SOB is getting worse and does have episodes of SOB happening at rest which last 3-4 minutes and spontaneously resolve .  She does have exertional dyspnea but is not exercising at all.  Patient has no known corona virus exposures and has been practicing social distancing.  We have discussed the virus and precautions and all questions have been answered.    2021 - Here for follow up, has had adjustments with her CPAP and she is doing much better (8-10 hours per night), feels better overall and reports good oxygen levels during the day and energy levels.  No other new issues.  " Patient has no known corona virus exposures and has been practicing social distancing.  We have discussed the virus and precautions and all questions have been answered.    10/5/2021 - Here for follow up and hasd been doing well.  Patient is here for follow up visit for sleep apnea and therapy.  They report no issues with their machine.  They report good compliance with the therapy and feel that they are benefiting from it.  Discussed alternative therapies/options as appropriate.  Discussed diet, weight and exercise as appropriate.  All questions answered.  Has not been able to wear her CPAP for the last week or so due to recent dental work (we discussed this).  Patient has no known corona virus exposures and has been practicing social distancing.  We have discussed the virus and precautions and all questions have been answered.  She does NOT have COPD by PFT done 7/2020.    8/18/2022 - Here for preop clearance for renal surgery for staghorn calculus.  Breathing is about the same though she may have some more dyspnea since her recent admission.  No fever, chills, cough.  She does NOT have COPD.  She is doing well with her CPAP.  She is planned for surgery next week.    Preoperative Pulmonary Clearance    Pt was seen for preoperative clearance from a pulmonary standpoint for planned renal surgery.  The risks of the procedure have been discussed with the patient including the risk of prolonged ventilatory support/difficulty weaning from ventilator, post-procedure pneumonia, post-procedure respiratory failure and DVT/pulmonary embolism.  The pt is currently stable from their respiratory status and they are cleared for the planned procedure at increased risk.  The risk should not be considered prohibitive.  If you have any questions please contact me.  She should have CPAP available in postoperative period and be moonitored because of her h/o JENNIFER>        CPAP Therapy    CPAP therapy - 12    Date of sleep study -  9/20 RDI - 23    Pt reports good compliance and benefit with the therapy.    Face to face visit with pt concerning their CPAP therapy.  I have stressed continued compliance with the treatment and all questions were answered.  Supply refills will be taken care of as needed.      Flu and Pneumonia Vaccination    I have recommended that the patient get this years influenza vaccine.  We discussed the risks and benefits of this treatment.  Got 20/21    I reviewed patient's current pneumonia vaccine status.  Patient is current.  We have discussed the current guidelines and recommendations for pneumonia vaccination.    Got Covid vaccine (Moderna - 4/21)          Past Medical History    Past Medical History:   Diagnosis Date    Allergy     Codeine, Lasix    Atrial fibrillation     Atrial fibrillation     Cataract     CHF (congestive heart failure)     Diabetes mellitus, type 2     Ejection fraction < 50% 10/18/2017    Approximately 35%  Based on prior  Echocardiogram.    Encounter for blood transfusion     Hyperlipidemia     Osteoporosis     PONV (postoperative nausea and vomiting)     Thyroid disorder screening 10/17/2017    TSH of 1.12 ordered by Dr. dee Jones       Past Surgical History    Past Surgical History:   Procedure Laterality Date    [place]     COLONOSCOPY      CYSTOSCOPY W/ URETERAL STENT PLACEMENT Left 7/17/2022    Procedure: CYSTOSCOPY, WITH URETERAL STENT INSERTION;  Surgeon: Shelby Parra MD;  Location: [facility] OR;  Service: Urology;  Laterality: Left;    EYE SURGERY      bilateral cataracts    FINGER SURGERY Right 2021    right pinky finger    FRACTURE SURGERY  2014    right femur with neville    HEMORRHOID SURGERY      48 yrs ago    HYSTERECTOMY      REPLACEMENT OF IMPLANTABLE CARDIOVERTER-DEFIBRILLATOR (ICD) GENERATOR N/A 12/13/2019    Procedure: REPLACEMENT, PULSE GENERATOR, ICD-MEDTRONIC;  Surgeon: Sebastian Nowak III, MD;  Location: [facility];  Service:  Cardiology;  Laterality: N/A;    TONSILLECTOMY         Medications      Current Outpatient Medications:     amiodarone (PACERONE) 200 MG Tab, Take 200 mg by mouth. 1/2 tab once daily with meals May take an extra dose for palpatations  Max 4 100mg tabs, Disp: , Rfl:     atorvastatin (LIPITOR) 40 MG tablet, Take 40 mg by mouth once daily., Disp: , Rfl:     Ca-D3-mag ox-zinc--thom-bor 600 mg calcium- 20 mcg-50 mg Tab, Take 1 tablet by mouth 2 (two) times daily., Disp: , Rfl:     cholecalciferol, vitamin D3, 125 mcg (5,000 unit) Tab, Take 5,000 Units by mouth 2 (two) times daily., Disp: , Rfl:     digoxin (LANOXIN) 125 mcg tablet, Take 125 mcg by mouth every Mon, Wed, Fri., Disp: , Rfl:     diltiaZEM (CARDIZEM) 60 MG tablet, Take 60 mg by mouth 2 (two) times daily., Disp: , Rfl:     gabapentin (NEURONTIN) 100 MG capsule, TAKE 2 CAPSULES(200 MG) BY MOUTH THREE TIMES DAILY (Patient taking differently: Take 200 mg by mouth 3 (three) times daily. TAKE 2 CAPSULES(200 MG) BY MOUTH THREE TIMES DAILY), Disp: 180 capsule, Rfl: 5    glimepiride (AMARYL) 1 MG tablet, Take 1 tablet (1 mg total) by mouth before breakfast., Disp: 90 tablet, Rfl: 1    latanoprost 0.005 % ophthalmic solution, Place 1 drop into both eyes nightly., Disp: , Rfl:     midodrine (PROAMATINE) 2.5 MG Tab, Take 2.5 mg by mouth 3 (three) times daily., Disp: , Rfl:     rivaroxaban (XARELTO) 15 mg Tab, Take 15 mg by mouth daily with dinner or evening meal., Disp: , Rfl:     sacubitriL-valsartan (ENTRESTO) 24-26 mg per tablet, Take 1 tablet by mouth once daily. , Disp: , Rfl:     tamsulosin (FLOMAX) 0.4 mg Cap, Take 1 capsule (0.4 mg total) by mouth once daily., Disp: 30 capsule, Rfl: 11    denosumab (PROLIA) 60 mg/mL Syrg, Inject 1 mL (60 mg total) into the skin every 6 (six) months., Disp: 2 mL, Rfl: 1    torsemide (DEMADEX) 20 MG Tab, Take 1 tablet (20 mg total) by mouth once daily. for 14 days (Patient taking differently: Take 20 mg by mouth  3 (three) times a week.), Disp: 14 tablet, Rfl: 0  No current facility-administered medications for this visit.    Facility-Administered Medications Ordered in Other Visits:     0.9%  NaCl infusion, , Intravenous, Continuous, Sebastian Nowak III, MD, Last Rate: 75 mL/hr at 12/13/19 0728, New Bag at 12/13/19 0728    diphenhydrAMINE injection 25 mg, 25 mg, Intravenous, Once, Sebastian Nowak III, MD    lorazepam injection 1 mg, 1 mg, Intravenous, Once, Sebastian Nowak III, MD    Allergies    Review of patient's allergies indicates:   Allergen Reactions    Codeine Other (See Comments)     nausea    Lasix [furosemide]      rash    Latex Rash       SocHx    Social History     Tobacco Use   Smoking Status Former Smoker   Smokeless Tobacco Never Used   Tobacco Comment    quit 2013       Social History     Substance and Sexual Activity   Alcohol Use No       Drug Use - no  Occupation - retired, housewife  Asbestos exposure - no  Pets - no    FMHx    Family History   Problem Relation Age of Onset    Heart disease Mother     Cancer Father         Lung Cancer ??? Asbestos    Breast cancer Paternal Aunt     Breast cancer Maternal Grandmother     Breast cancer Maternal Aunt          Review of Systems  Review of Systems   Constitutional: Negative for chills, diaphoresis, fever, malaise/fatigue and weight loss.   HENT: Negative for congestion.    Eyes: Negative for pain.   Respiratory: Positive for shortness of breath. Negative for cough, hemoptysis, sputum production, wheezing and stridor.    Cardiovascular: Positive for leg swelling. Negative for chest pain, palpitations, orthopnea, claudication and PND.        Decreased circulation   Gastrointestinal: Negative for abdominal pain, blood in stool, constipation, diarrhea, heartburn, nausea and vomiting.   Genitourinary: Negative for dysuria, frequency, hematuria and urgency.   Musculoskeletal: Negative for back pain, falls, myalgias and neck pain.        Some aches and pains    Skin: Negative for itching and rash.   Neurological: Negative for dizziness, tingling, tremors, sensory change, speech change, focal weakness, seizures, loss of consciousness, weakness and headaches.   Psychiatric/Behavioral: Negative for depression, substance abuse and suicidal ideas. The patient is not nervous/anxious.        Physical Exam    Vitals:    08/18/22 1304   BP: 118/78   BP Location: Left arm   Patient Position: Sitting   Pulse: (!) 51   SpO2: 97%   Weight: 100.7 kg (222 lb)       Physical Exam  Vitals and nursing note reviewed.   Constitutional:       General: She is not in acute distress.     Appearance: She is well-developed. She is obese. She is not ill-appearing, toxic-appearing or diaphoretic.      Comments: No distress   HENT:      Head: Normocephalic and atraumatic.      Right Ear: External ear normal.      Left Ear: External ear normal.      Nose: Nose normal.   Eyes:      General: No scleral icterus.        Right eye: No discharge.         Left eye: No discharge.      Extraocular Movements: Extraocular movements intact.      Conjunctiva/sclera: Conjunctivae normal.      Pupils: Pupils are equal, round, and reactive to light.   Neck:      Thyroid: No thyromegaly.      Vascular: No JVD.      Trachea: No tracheal deviation.   Cardiovascular:      Rate and Rhythm: Normal rate and regular rhythm.      Pulses: Normal pulses.      Heart sounds: Normal heart sounds. No murmur heard.    No friction rub. No gallop.      Comments: Defibrillator in place  Pulmonary:      Effort: Pulmonary effort is normal. No respiratory distress.      Breath sounds: Normal breath sounds. No stridor. No wheezing or rales.   Chest:      Chest wall: No tenderness.   Abdominal:      General: Bowel sounds are normal. There is no distension.      Palpations: Abdomen is soft.      Tenderness: There is no abdominal tenderness. There is no guarding.      Comments: obese   Musculoskeletal:         General: Swelling present. No  tenderness or deformity. Normal range of motion.      Cervical back: Normal range of motion and neck supple. No rigidity. No muscular tenderness.      Right lower leg: Edema present.      Left lower leg: Edema present.   Lymphadenopathy:      Cervical: No cervical adenopathy.   Skin:     General: Skin is warm and dry.      Coloration: Skin is not jaundiced.   Neurological:      General: No focal deficit present.      Mental Status: She is alert and oriented to person, place, and time.      Cranial Nerves: No cranial nerve deficit.   Psychiatric:         Mood and Affect: Mood normal.         Behavior: Behavior normal.         Thought Content: Thought content normal.         Judgment: Judgment normal.         Labs    Lab Results   Component Value Date    WBC 8.97 08/18/2022    HGB 10.4 (L) 08/18/2022    HCT 32.9 (L) 08/18/2022     08/18/2022       Sodium   Date Value Ref Range Status   08/18/2022 139 136 - 145 mmol/L Final     Potassium   Date Value Ref Range Status   08/18/2022 3.9 3.5 - 5.1 mmol/L Final     Chloride   Date Value Ref Range Status   08/18/2022 102 95 - 110 mmol/L Final     CO2   Date Value Ref Range Status   08/18/2022 27 23 - 29 mmol/L Final     Glucose   Date Value Ref Range Status   08/18/2022 79 70 - 110 mg/dL Final     BUN   Date Value Ref Range Status   08/18/2022 15 8 - 23 mg/dL Final     Creatinine   Date Value Ref Range Status   08/18/2022 1.2 0.5 - 1.4 mg/dL Final     Calcium   Date Value Ref Range Status   08/18/2022 8.8 8.7 - 10.5 mg/dL Final     Total Protein   Date Value Ref Range Status   07/18/2022 6.4 6.0 - 8.4 g/dL Final     Albumin   Date Value Ref Range Status   07/18/2022 3.1 (L) 3.5 - 5.2 g/dL Final     Total Bilirubin   Date Value Ref Range Status   07/18/2022 0.8 0.1 - 1.0 mg/dL Final     Comment:     For infants and newborns, interpretation of results should be based  on gestational age, weight and in agreement with clinical  observations.    Premature Infant  recommended reference ranges:  Up to 24 hours.............<8.0 mg/dL  Up to 48 hours............<12.0 mg/dL  3-5 days..................<15.0 mg/dL  6-29 days.................<15.0 mg/dL       Alkaline Phosphatase   Date Value Ref Range Status   07/18/2022 82 55 - 135 U/L Final     AST   Date Value Ref Range Status   07/18/2022 20 10 - 40 U/L Final     ALT   Date Value Ref Range Status   07/18/2022 23 10 - 44 U/L Final     Anion Gap   Date Value Ref Range Status   08/18/2022 10 8 - 16 mmol/L Final       Xrays    EXAMINATION:  XR CHEST PA AND LATERAL     CLINICAL HISTORY:  shortness of breath     FINDINGS:  PA and lateral chest is compared to 01/17/2020 shows normal cardiomediastinal silhouette.     Lungs are clear. Pulmonary vasculature is normal. No acute osseous abnormality.     Impression:     No acute pulmonary process        Electronically signed by: Monica Aquino MD  Date:                                            04/01/2020  Time:                                           16:57      ECHO (4/1)  · Mild eccentric left ventricular hypertrophy.  · Mild left ventricular enlargement.  · Normal left ventricular systolic function. The estimated ejection fraction is 60%.  · Grade I (mild) left ventricular diastolic dysfunction consistent with impaired relaxation.  · No wall motion abnormalities.  · Normal right ventricular systolic function.  · Severe left atrial enlargement.  · Mild mitral sclerosis.  · There is mild leaflet calcification of the Mitral Valve.  · Mild mitral regurgitation.  · Intermediate central venous pressure (8 mmHg).  · The estimated PA systolic pressure is 37 mmHg    Impression/Plan    Problem List Items Addressed This Visit        Pulmonary    Pulmonary hypertension     · Continue to follow              Renal/    Staghorn calculus     · Aware, cleared for surgery from pulmonary standpoint as above              Other    Obstructive sleep apnea syndrome      Continue present PAP  therapy   Pt to call if they have any trouble with their machines   Discussed with pt about signs and symptoms to watch for   Will refill supplies as needed   Have encouraged pt to continue to work on diet, weight loss and exercise                         Alverto Pascal MD

## 2022-08-18 NOTE — ASSESSMENT & PLAN NOTE
 Continue present PAP therapy   Pt to call if they have any trouble with their machines   Discussed with pt about signs and symptoms to watch for   Will refill supplies as needed   Have encouraged pt to continue to work on diet, weight loss and exercise

## 2022-08-19 ENCOUNTER — DOCUMENTATION ONLY (OUTPATIENT)
Dept: ELECTROPHYSIOLOGY | Facility: CLINIC | Age: 82
End: 2022-08-19
Payer: MEDICARE

## 2022-08-19 ENCOUNTER — LAB VISIT (OUTPATIENT)
Dept: LAB | Facility: HOSPITAL | Age: 82
End: 2022-08-19
Attending: INTERNAL MEDICINE
Payer: MEDICARE

## 2022-08-19 ENCOUNTER — HOSPITAL ENCOUNTER (OUTPATIENT)
Dept: RADIOLOGY | Facility: HOSPITAL | Age: 82
Discharge: HOME | End: 2022-08-19
Attending: STUDENT IN AN ORGANIZED HEALTH CARE EDUCATION/TRAINING PROGRAM
Payer: MEDICARE

## 2022-08-19 VITALS — RESPIRATION RATE: 16 BRPM | HEART RATE: 95 BPM | OXYGEN SATURATION: 94 %

## 2022-08-19 DIAGNOSIS — R60.0 LOCALIZED EDEMA: ICD-10-CM

## 2022-08-19 DIAGNOSIS — N28.89 OTHER SPECIFIED DISORDERS OF KIDNEY AND URETER: ICD-10-CM

## 2022-08-19 DIAGNOSIS — N20.0 STAGHORN CALCULUS: ICD-10-CM

## 2022-08-19 DIAGNOSIS — I42.9 FAMILIAL CARDIOMYOPATHY: ICD-10-CM

## 2022-08-19 DIAGNOSIS — Z95.810 AUTOMATIC IMPLANTABLE CARDIAC DEFIBRILLATOR IN SITU: Primary | ICD-10-CM

## 2022-08-19 LAB — BNP SERPL-MCNC: 711 PG/ML (ref 0–99)

## 2022-08-19 PROCEDURE — 74183 MRI ABD W/O CNTR FLWD CNTR: CPT | Mod: TC

## 2022-08-19 PROCEDURE — 74183 MRI ABD W/O CNTR FLWD CNTR: CPT | Mod: 26,,, | Performed by: RADIOLOGY

## 2022-08-19 PROCEDURE — A9585 GADOBUTROL INJECTION: HCPCS | Performed by: STUDENT IN AN ORGANIZED HEALTH CARE EDUCATION/TRAINING PROGRAM

## 2022-08-19 PROCEDURE — 71046 X-RAY EXAM CHEST 2 VIEWS: CPT | Mod: TC,FY

## 2022-08-19 PROCEDURE — 71046 XR CHEST PA AND LATERAL: ICD-10-PCS | Mod: 26,,, | Performed by: RADIOLOGY

## 2022-08-19 PROCEDURE — 83880 ASSAY OF NATRIURETIC PEPTIDE: CPT | Performed by: INTERNAL MEDICINE

## 2022-08-19 PROCEDURE — 74183 MRI ABDOMEN W WO CONTRAST: ICD-10-PCS | Mod: 26,,, | Performed by: RADIOLOGY

## 2022-08-19 PROCEDURE — 25500020 PHARM REV CODE 255: Performed by: STUDENT IN AN ORGANIZED HEALTH CARE EDUCATION/TRAINING PROGRAM

## 2022-08-19 PROCEDURE — 71046 X-RAY EXAM CHEST 2 VIEWS: CPT | Mod: 26,,, | Performed by: RADIOLOGY

## 2022-08-19 PROCEDURE — 36415 COLL VENOUS BLD VENIPUNCTURE: CPT | Performed by: INTERNAL MEDICINE

## 2022-08-19 RX ORDER — GADOBUTROL 604.72 MG/ML
10 INJECTION INTRAVENOUS
Status: COMPLETED | OUTPATIENT
Start: 2022-08-19 | End: 2022-08-19

## 2022-08-19 RX ADMIN — GADOBUTROL 10 ML: 604.72 INJECTION INTRAVENOUS at 11:08

## 2022-08-19 NOTE — PROGRESS NOTES
Pre and Post reprogramming of pt's [employer] ICD for MRI was done by Lurdes DANIELSON with [employer].

## 2022-08-19 NOTE — NURSING
MRI scan complete.  Patient tolerated well.  PIV D/Arthur,jelco cath intact no bleeding or hematoma noted.  Patient D/Arthur via her personal wheel chair and two daughters.

## 2022-08-19 NOTE — NURSING
Patient arrived to MRI for scan.  Patient has a defib/pacemaker.  [employer] rep.  Lurdes at bedside programing device for scan.  Setting DOO 95 bpm.  Patient placed on cardiac monitor and pulse ox.

## 2022-08-22 RX ORDER — SULFAMETHOXAZOLE AND TRIMETHOPRIM 800; 160 MG/1; MG/1
1 TABLET ORAL 2 TIMES DAILY
Qty: 14 TABLET | Refills: 0 | Status: ON HOLD | OUTPATIENT
Start: 2022-08-22 | End: 2022-08-26 | Stop reason: SDUPTHER

## 2022-08-24 ENCOUNTER — ANESTHESIA EVENT (OUTPATIENT)
Dept: SURGERY | Facility: HOSPITAL | Age: 82
End: 2022-08-24
Payer: MEDICARE

## 2022-08-25 ENCOUNTER — ANESTHESIA (OUTPATIENT)
Dept: SURGERY | Facility: HOSPITAL | Age: 82
End: 2022-08-25
Payer: MEDICARE

## 2022-08-25 ENCOUNTER — HOSPITAL ENCOUNTER (OUTPATIENT)
Facility: HOSPITAL | Age: 82
Discharge: HOME | End: 2022-08-26
Attending: STUDENT IN AN ORGANIZED HEALTH CARE EDUCATION/TRAINING PROGRAM | Admitting: STUDENT IN AN ORGANIZED HEALTH CARE EDUCATION/TRAINING PROGRAM
Payer: MEDICARE

## 2022-08-25 DIAGNOSIS — N20.0 STAGHORN CALCULUS: Primary | ICD-10-CM

## 2022-08-25 LAB
ANION GAP SERPL CALC-SCNC: 11 MMOL/L (ref 8–16)
BASOPHILS # BLD AUTO: 0.04 K/UL (ref 0–0.2)
BASOPHILS NFR BLD: 0.7 % (ref 0–1.9)
BUN SERPL-MCNC: 15 MG/DL (ref 8–23)
CALCIUM SERPL-MCNC: 8.7 MG/DL (ref 8.7–10.5)
CHLORIDE SERPL-SCNC: 104 MMOL/L (ref 95–110)
CO2 SERPL-SCNC: 26 MMOL/L (ref 23–29)
CREAT SERPL-MCNC: 1.6 MG/DL (ref 0.5–1.4)
DIFFERENTIAL METHOD: ABNORMAL
EOSINOPHIL # BLD AUTO: 0 K/UL (ref 0–0.5)
EOSINOPHIL NFR BLD: 0.5 % (ref 0–8)
ERYTHROCYTE [DISTWIDTH] IN BLOOD BY AUTOMATED COUNT: 14.3 % (ref 11.5–14.5)
EST. GFR  (NO RACE VARIABLE): 32 ML/MIN/1.73 M^2
GLUCOSE SERPL-MCNC: 128 MG/DL (ref 70–110)
HCT VFR BLD AUTO: 34.3 % (ref 37–48.5)
HGB BLD-MCNC: 10.6 G/DL (ref 12–16)
IMM GRANULOCYTES # BLD AUTO: 0.02 K/UL (ref 0–0.04)
IMM GRANULOCYTES NFR BLD AUTO: 0.3 % (ref 0–0.5)
LYMPHOCYTES # BLD AUTO: 1.2 K/UL (ref 1–4.8)
LYMPHOCYTES NFR BLD: 18.9 % (ref 18–48)
MCH RBC QN AUTO: 30.4 PG (ref 27–31)
MCHC RBC AUTO-ENTMCNC: 30.9 G/DL (ref 32–36)
MCV RBC AUTO: 98 FL (ref 82–98)
MONOCYTES # BLD AUTO: 0.2 K/UL (ref 0.3–1)
MONOCYTES NFR BLD: 3.9 % (ref 4–15)
NEUTROPHILS # BLD AUTO: 4.6 K/UL (ref 1.8–7.7)
NEUTROPHILS NFR BLD: 75.7 % (ref 38–73)
NRBC BLD-RTO: 0 /100 WBC
PLATELET # BLD AUTO: 363 K/UL (ref 150–450)
PMV BLD AUTO: 9.2 FL (ref 9.2–12.9)
POCT GLUCOSE: 241 MG/DL (ref 70–110)
POTASSIUM SERPL-SCNC: 4.4 MMOL/L (ref 3.5–5.1)
RBC # BLD AUTO: 3.49 M/UL (ref 4–5.4)
SODIUM SERPL-SCNC: 141 MMOL/L (ref 136–145)
WBC # BLD AUTO: 6.13 K/UL (ref 3.9–12.7)

## 2022-08-25 PROCEDURE — 99900035 HC TECH TIME PER 15 MIN (STAT)

## 2022-08-25 PROCEDURE — 25000003 PHARM REV CODE 250: Performed by: ANESTHESIOLOGY

## 2022-08-25 PROCEDURE — 94761 N-INVAS EAR/PLS OXIMETRY MLT: CPT

## 2022-08-25 PROCEDURE — 63600175 PHARM REV CODE 636 W HCPCS: Performed by: STUDENT IN AN ORGANIZED HEALTH CARE EDUCATION/TRAINING PROGRAM

## 2022-08-25 PROCEDURE — C1769 GUIDE WIRE: HCPCS | Performed by: STUDENT IN AN ORGANIZED HEALTH CARE EDUCATION/TRAINING PROGRAM

## 2022-08-25 PROCEDURE — 71000033 HC RECOVERY, INTIAL HOUR: Performed by: STUDENT IN AN ORGANIZED HEALTH CARE EDUCATION/TRAINING PROGRAM

## 2022-08-25 PROCEDURE — 25000003 PHARM REV CODE 250: Performed by: STUDENT IN AN ORGANIZED HEALTH CARE EDUCATION/TRAINING PROGRAM

## 2022-08-25 PROCEDURE — 36000708 HC OR TIME LEV III 1ST 15 MIN: Performed by: STUDENT IN AN ORGANIZED HEALTH CARE EDUCATION/TRAINING PROGRAM

## 2022-08-25 PROCEDURE — D9220A PRA ANESTHESIA: ICD-10-PCS | Mod: CRNA,,, | Performed by: NURSE ANESTHETIST, CERTIFIED REGISTERED

## 2022-08-25 PROCEDURE — D9220A PRA ANESTHESIA: Mod: CRNA,,, | Performed by: NURSE ANESTHETIST, CERTIFIED REGISTERED

## 2022-08-25 PROCEDURE — 27000221 HC OXYGEN, UP TO 24 HOURS

## 2022-08-25 PROCEDURE — 37000008 HC ANESTHESIA 1ST 15 MINUTES: Performed by: STUDENT IN AN ORGANIZED HEALTH CARE EDUCATION/TRAINING PROGRAM

## 2022-08-25 PROCEDURE — 63600175 PHARM REV CODE 636 W HCPCS: Performed by: NURSE ANESTHETIST, CERTIFIED REGISTERED

## 2022-08-25 PROCEDURE — 50435 PR EXCHANGE NEPHROSTOMY CATH, INCL GUID, S&I: ICD-10-PCS | Mod: 51,LT,, | Performed by: STUDENT IN AN ORGANIZED HEALTH CARE EDUCATION/TRAINING PROGRAM

## 2022-08-25 PROCEDURE — 85025 COMPLETE CBC W/AUTO DIFF WBC: CPT | Performed by: STUDENT IN AN ORGANIZED HEALTH CARE EDUCATION/TRAINING PROGRAM

## 2022-08-25 PROCEDURE — 36415 COLL VENOUS BLD VENIPUNCTURE: CPT | Performed by: STUDENT IN AN ORGANIZED HEALTH CARE EDUCATION/TRAINING PROGRAM

## 2022-08-25 PROCEDURE — 99900104 DSU ONLY-NO CHARGE-EA ADD'L HR (STAT): Performed by: STUDENT IN AN ORGANIZED HEALTH CARE EDUCATION/TRAINING PROGRAM

## 2022-08-25 PROCEDURE — 63600175 PHARM REV CODE 636 W HCPCS: Performed by: ANESTHESIOLOGY

## 2022-08-25 PROCEDURE — 50435 EXCHANGE NEPHROSTOMY CATH: CPT | Mod: 51,LT,, | Performed by: STUDENT IN AN ORGANIZED HEALTH CARE EDUCATION/TRAINING PROGRAM

## 2022-08-25 PROCEDURE — D9220A PRA ANESTHESIA: ICD-10-PCS | Mod: ANES,,, | Performed by: ANESTHESIOLOGY

## 2022-08-25 PROCEDURE — D9220A PRA ANESTHESIA: Mod: ANES,,, | Performed by: ANESTHESIOLOGY

## 2022-08-25 PROCEDURE — 80048 BASIC METABOLIC PNL TOTAL CA: CPT | Performed by: STUDENT IN AN ORGANIZED HEALTH CARE EDUCATION/TRAINING PROGRAM

## 2022-08-25 PROCEDURE — 99900103 DSU ONLY-NO CHARGE-INITIAL HR (STAT): Performed by: STUDENT IN AN ORGANIZED HEALTH CARE EDUCATION/TRAINING PROGRAM

## 2022-08-25 PROCEDURE — C1758 CATHETER, URETERAL: HCPCS | Performed by: STUDENT IN AN ORGANIZED HEALTH CARE EDUCATION/TRAINING PROGRAM

## 2022-08-25 PROCEDURE — 50081 PERQ NL/PL LITHOTRP CPLX>2CM: CPT | Mod: LT,,, | Performed by: STUDENT IN AN ORGANIZED HEALTH CARE EDUCATION/TRAINING PROGRAM

## 2022-08-25 PROCEDURE — 94799 UNLISTED PULMONARY SVC/PX: CPT

## 2022-08-25 PROCEDURE — 27201423 OPTIME MED/SURG SUP & DEVICES STERILE SUPPLY: Performed by: STUDENT IN AN ORGANIZED HEALTH CARE EDUCATION/TRAINING PROGRAM

## 2022-08-25 PROCEDURE — 25500020 PHARM REV CODE 255: Performed by: STUDENT IN AN ORGANIZED HEALTH CARE EDUCATION/TRAINING PROGRAM

## 2022-08-25 PROCEDURE — 36000709 HC OR TIME LEV III EA ADD 15 MIN: Performed by: STUDENT IN AN ORGANIZED HEALTH CARE EDUCATION/TRAINING PROGRAM

## 2022-08-25 PROCEDURE — 50081 PR REMV KID STONE, 2+ CM, PERC: ICD-10-PCS | Mod: LT,,, | Performed by: STUDENT IN AN ORGANIZED HEALTH CARE EDUCATION/TRAINING PROGRAM

## 2022-08-25 PROCEDURE — C2617 STENT, NON-COR, TEM W/O DEL: HCPCS | Performed by: STUDENT IN AN ORGANIZED HEALTH CARE EDUCATION/TRAINING PROGRAM

## 2022-08-25 PROCEDURE — 82365 CALCULUS SPECTROSCOPY: CPT | Performed by: STUDENT IN AN ORGANIZED HEALTH CARE EDUCATION/TRAINING PROGRAM

## 2022-08-25 PROCEDURE — 71000039 HC RECOVERY, EACH ADD'L HOUR: Performed by: STUDENT IN AN ORGANIZED HEALTH CARE EDUCATION/TRAINING PROGRAM

## 2022-08-25 PROCEDURE — C1729 CATH, DRAINAGE: HCPCS | Performed by: STUDENT IN AN ORGANIZED HEALTH CARE EDUCATION/TRAINING PROGRAM

## 2022-08-25 PROCEDURE — 37000009 HC ANESTHESIA EA ADD 15 MINS: Performed by: STUDENT IN AN ORGANIZED HEALTH CARE EDUCATION/TRAINING PROGRAM

## 2022-08-25 PROCEDURE — 25000003 PHARM REV CODE 250: Performed by: NURSE ANESTHETIST, CERTIFIED REGISTERED

## 2022-08-25 PROCEDURE — C1726 CATH, BAL DIL, NON-VASCULAR: HCPCS | Performed by: STUDENT IN AN ORGANIZED HEALTH CARE EDUCATION/TRAINING PROGRAM

## 2022-08-25 DEVICE — STENT URETERAL UNIV 6FR 24CM: Type: IMPLANTABLE DEVICE | Site: KIDNEY | Status: FUNCTIONAL

## 2022-08-25 RX ORDER — EPHEDRINE SULFATE 50 MG/ML
INJECTION, SOLUTION INTRAVENOUS
Status: DISCONTINUED | OUTPATIENT
Start: 2022-08-25 | End: 2022-08-25

## 2022-08-25 RX ORDER — GABAPENTIN 100 MG/1
200 CAPSULE ORAL 3 TIMES DAILY
Status: DISCONTINUED | OUTPATIENT
Start: 2022-08-25 | End: 2022-08-26 | Stop reason: HOSPADM

## 2022-08-25 RX ORDER — DIPHENHYDRAMINE HYDROCHLORIDE 50 MG/ML
25 INJECTION INTRAMUSCULAR; INTRAVENOUS EVERY 6 HOURS PRN
Status: DISCONTINUED | OUTPATIENT
Start: 2022-08-25 | End: 2022-08-25 | Stop reason: HOSPADM

## 2022-08-25 RX ORDER — IBUPROFEN 200 MG
16 TABLET ORAL
Status: DISCONTINUED | OUTPATIENT
Start: 2022-08-25 | End: 2022-08-26 | Stop reason: HOSPADM

## 2022-08-25 RX ORDER — GLUCAGON 1 MG
1 KIT INJECTION
Status: DISCONTINUED | OUTPATIENT
Start: 2022-08-25 | End: 2022-08-26 | Stop reason: HOSPADM

## 2022-08-25 RX ORDER — PROPOFOL 10 MG/ML
VIAL (ML) INTRAVENOUS
Status: DISCONTINUED | OUTPATIENT
Start: 2022-08-25 | End: 2022-08-25

## 2022-08-25 RX ORDER — GENTAMICIN SULFATE 80 MG/100ML
160 INJECTION, SOLUTION INTRAVENOUS
Status: COMPLETED | OUTPATIENT
Start: 2022-08-25 | End: 2022-08-25

## 2022-08-25 RX ORDER — POLYETHYLENE GLYCOL 3350 17 G/17G
17 POWDER, FOR SOLUTION ORAL DAILY
Status: DISCONTINUED | OUTPATIENT
Start: 2022-08-25 | End: 2022-08-26 | Stop reason: HOSPADM

## 2022-08-25 RX ORDER — GLUCAGON 1 MG
1 KIT INJECTION
Status: DISCONTINUED | OUTPATIENT
Start: 2022-08-25 | End: 2022-08-25 | Stop reason: SDUPTHER

## 2022-08-25 RX ORDER — ONDANSETRON 2 MG/ML
4 INJECTION INTRAMUSCULAR; INTRAVENOUS ONCE
Status: DISCONTINUED | OUTPATIENT
Start: 2022-08-25 | End: 2022-08-25 | Stop reason: HOSPADM

## 2022-08-25 RX ORDER — VECURONIUM BROMIDE FOR INJECTION 1 MG/ML
INJECTION, POWDER, LYOPHILIZED, FOR SOLUTION INTRAVENOUS
Status: DISCONTINUED | OUTPATIENT
Start: 2022-08-25 | End: 2022-08-25

## 2022-08-25 RX ORDER — DEXAMETHASONE SODIUM PHOSPHATE 4 MG/ML
INJECTION, SOLUTION INTRA-ARTICULAR; INTRALESIONAL; INTRAMUSCULAR; INTRAVENOUS; SOFT TISSUE
Status: DISCONTINUED | OUTPATIENT
Start: 2022-08-25 | End: 2022-08-25

## 2022-08-25 RX ORDER — SCOLOPAMINE TRANSDERMAL SYSTEM 1 MG/1
1 PATCH, EXTENDED RELEASE TRANSDERMAL
Status: DISCONTINUED | OUTPATIENT
Start: 2022-08-25 | End: 2022-08-25 | Stop reason: HOSPADM

## 2022-08-25 RX ORDER — OXYCODONE HYDROCHLORIDE 10 MG/1
10 TABLET ORAL EVERY 4 HOURS PRN
Status: DISCONTINUED | OUTPATIENT
Start: 2022-08-25 | End: 2022-08-26 | Stop reason: HOSPADM

## 2022-08-25 RX ORDER — CEFAZOLIN SODIUM 2 G/50ML
2 SOLUTION INTRAVENOUS
Status: DISCONTINUED | OUTPATIENT
Start: 2022-08-25 | End: 2022-08-26 | Stop reason: HOSPADM

## 2022-08-25 RX ORDER — IBUPROFEN 200 MG
16 TABLET ORAL
Status: DISCONTINUED | OUTPATIENT
Start: 2022-08-25 | End: 2022-08-25 | Stop reason: SDUPTHER

## 2022-08-25 RX ORDER — SODIUM CHLORIDE 0.9 % (FLUSH) 0.9 %
3 SYRINGE (ML) INJECTION
Status: DISCONTINUED | OUTPATIENT
Start: 2022-08-25 | End: 2022-08-25 | Stop reason: HOSPADM

## 2022-08-25 RX ORDER — DILTIAZEM HYDROCHLORIDE 30 MG/1
60 TABLET, FILM COATED ORAL 2 TIMES DAILY
Status: DISCONTINUED | OUTPATIENT
Start: 2022-08-25 | End: 2022-08-26 | Stop reason: HOSPADM

## 2022-08-25 RX ORDER — SODIUM CHLORIDE 9 MG/ML
INJECTION, SOLUTION INTRAVENOUS CONTINUOUS
Status: DISCONTINUED | OUTPATIENT
Start: 2022-08-25 | End: 2022-08-26

## 2022-08-25 RX ORDER — PANTOPRAZOLE SODIUM 40 MG/1
40 TABLET, DELAYED RELEASE ORAL DAILY
Status: DISCONTINUED | OUTPATIENT
Start: 2022-08-25 | End: 2022-08-26 | Stop reason: HOSPADM

## 2022-08-25 RX ORDER — FENTANYL CITRATE 50 UG/ML
INJECTION, SOLUTION INTRAMUSCULAR; INTRAVENOUS
Status: DISCONTINUED | OUTPATIENT
Start: 2022-08-25 | End: 2022-08-25

## 2022-08-25 RX ORDER — MEPERIDINE HYDROCHLORIDE 50 MG/ML
12.5 INJECTION INTRAMUSCULAR; INTRAVENOUS; SUBCUTANEOUS ONCE AS NEEDED
Status: DISCONTINUED | OUTPATIENT
Start: 2022-08-25 | End: 2022-08-25 | Stop reason: HOSPADM

## 2022-08-25 RX ORDER — ACETAMINOPHEN 500 MG
1000 TABLET ORAL EVERY 8 HOURS
Status: DISCONTINUED | OUTPATIENT
Start: 2022-08-25 | End: 2022-08-26 | Stop reason: HOSPADM

## 2022-08-25 RX ORDER — ACETAMINOPHEN 10 MG/ML
INJECTION, SOLUTION INTRAVENOUS
Status: DISCONTINUED | OUTPATIENT
Start: 2022-08-25 | End: 2022-08-25

## 2022-08-25 RX ORDER — LIDOCAINE HYDROCHLORIDE 10 MG/ML
0.5 INJECTION, SOLUTION EPIDURAL; INFILTRATION; INTRACAUDAL; PERINEURAL ONCE
Status: COMPLETED | OUTPATIENT
Start: 2022-08-25 | End: 2022-08-25

## 2022-08-25 RX ORDER — IBUPROFEN 200 MG
24 TABLET ORAL
Status: DISCONTINUED | OUTPATIENT
Start: 2022-08-25 | End: 2022-08-26 | Stop reason: HOSPADM

## 2022-08-25 RX ORDER — HYDROMORPHONE HYDROCHLORIDE 2 MG/ML
0.2 INJECTION, SOLUTION INTRAMUSCULAR; INTRAVENOUS; SUBCUTANEOUS EVERY 5 MIN PRN
Status: DISCONTINUED | OUTPATIENT
Start: 2022-08-25 | End: 2022-08-25 | Stop reason: HOSPADM

## 2022-08-25 RX ORDER — DIGOXIN 125 MCG
125 TABLET ORAL
Status: DISCONTINUED | OUTPATIENT
Start: 2022-08-26 | End: 2022-08-26 | Stop reason: HOSPADM

## 2022-08-25 RX ORDER — ONDANSETRON 2 MG/ML
4 INJECTION INTRAMUSCULAR; INTRAVENOUS DAILY PRN
Status: DISCONTINUED | OUTPATIENT
Start: 2022-08-25 | End: 2022-08-25 | Stop reason: HOSPADM

## 2022-08-25 RX ORDER — SODIUM CHLORIDE 0.9 % (FLUSH) 0.9 %
10 SYRINGE (ML) INJECTION
Status: DISCONTINUED | OUTPATIENT
Start: 2022-08-25 | End: 2022-08-26 | Stop reason: HOSPADM

## 2022-08-25 RX ORDER — MIDODRINE HYDROCHLORIDE 2.5 MG/1
2.5 TABLET ORAL 3 TIMES DAILY
Status: DISCONTINUED | OUTPATIENT
Start: 2022-08-25 | End: 2022-08-26 | Stop reason: HOSPADM

## 2022-08-25 RX ORDER — OXYCODONE HYDROCHLORIDE 5 MG/1
5 TABLET ORAL EVERY 4 HOURS PRN
Status: DISCONTINUED | OUTPATIENT
Start: 2022-08-25 | End: 2022-08-26 | Stop reason: HOSPADM

## 2022-08-25 RX ORDER — OXYCODONE HYDROCHLORIDE 5 MG/1
5 TABLET ORAL
Status: DISCONTINUED | OUTPATIENT
Start: 2022-08-25 | End: 2022-08-25 | Stop reason: HOSPADM

## 2022-08-25 RX ORDER — LIDOCAINE HCL/PF 100 MG/5ML
SYRINGE (ML) INTRAVENOUS
Status: DISCONTINUED | OUTPATIENT
Start: 2022-08-25 | End: 2022-08-25

## 2022-08-25 RX ORDER — AMIODARONE HYDROCHLORIDE 100 MG/1
100 TABLET ORAL DAILY
Status: DISCONTINUED | OUTPATIENT
Start: 2022-08-26 | End: 2022-08-26 | Stop reason: HOSPADM

## 2022-08-25 RX ORDER — ONDANSETRON 8 MG/1
8 TABLET, ORALLY DISINTEGRATING ORAL EVERY 6 HOURS PRN
Status: DISCONTINUED | OUTPATIENT
Start: 2022-08-25 | End: 2022-08-26 | Stop reason: HOSPADM

## 2022-08-25 RX ORDER — SUCCINYLCHOLINE CHLORIDE 20 MG/ML
INJECTION INTRAMUSCULAR; INTRAVENOUS
Status: DISCONTINUED | OUTPATIENT
Start: 2022-08-25 | End: 2022-08-25

## 2022-08-25 RX ORDER — FENTANYL CITRATE 50 UG/ML
25 INJECTION, SOLUTION INTRAMUSCULAR; INTRAVENOUS EVERY 5 MIN PRN
Status: DISCONTINUED | OUTPATIENT
Start: 2022-08-25 | End: 2022-08-25 | Stop reason: HOSPADM

## 2022-08-25 RX ORDER — SODIUM CHLORIDE, SODIUM LACTATE, POTASSIUM CHLORIDE, CALCIUM CHLORIDE 600; 310; 30; 20 MG/100ML; MG/100ML; MG/100ML; MG/100ML
INJECTION, SOLUTION INTRAVENOUS CONTINUOUS
Status: DISCONTINUED | OUTPATIENT
Start: 2022-08-25 | End: 2022-08-25

## 2022-08-25 RX ORDER — ONDANSETRON 2 MG/ML
INJECTION INTRAMUSCULAR; INTRAVENOUS
Status: DISCONTINUED | OUTPATIENT
Start: 2022-08-25 | End: 2022-08-25

## 2022-08-25 RX ORDER — PHENYLEPHRINE HYDROCHLORIDE 10 MG/ML
INJECTION INTRAVENOUS
Status: DISCONTINUED | OUTPATIENT
Start: 2022-08-25 | End: 2022-08-25

## 2022-08-25 RX ORDER — INSULIN ASPART 100 [IU]/ML
1-10 INJECTION, SOLUTION INTRAVENOUS; SUBCUTANEOUS
Status: DISCONTINUED | OUTPATIENT
Start: 2022-08-25 | End: 2022-08-26 | Stop reason: HOSPADM

## 2022-08-25 RX ORDER — TAMSULOSIN HYDROCHLORIDE 0.4 MG/1
0.4 CAPSULE ORAL DAILY
Status: DISCONTINUED | OUTPATIENT
Start: 2022-08-25 | End: 2022-08-26 | Stop reason: HOSPADM

## 2022-08-25 RX ORDER — IBUPROFEN 200 MG
24 TABLET ORAL
Status: DISCONTINUED | OUTPATIENT
Start: 2022-08-25 | End: 2022-08-25 | Stop reason: SDUPTHER

## 2022-08-25 RX ORDER — SODIUM CHLORIDE 0.9 % (FLUSH) 0.9 %
3 SYRINGE (ML) INJECTION EVERY 8 HOURS
Status: DISCONTINUED | OUTPATIENT
Start: 2022-08-25 | End: 2022-08-25 | Stop reason: HOSPADM

## 2022-08-25 RX ADMIN — DILTIAZEM HYDROCHLORIDE 60 MG: 30 TABLET, FILM COATED ORAL at 08:08

## 2022-08-25 RX ADMIN — SODIUM CHLORIDE, SODIUM GLUCONATE, SODIUM ACETATE, POTASSIUM CHLORIDE, MAGNESIUM CHLORIDE, SODIUM PHOSPHATE, DIBASIC, AND POTASSIUM PHOSPHATE: .53; .5; .37; .037; .03; .012; .00082 INJECTION, SOLUTION INTRAVENOUS at 07:08

## 2022-08-25 RX ADMIN — HYDROMORPHONE HYDROCHLORIDE 0.2 MG: 2 INJECTION, SOLUTION INTRAMUSCULAR; INTRAVENOUS; SUBCUTANEOUS at 12:08

## 2022-08-25 RX ADMIN — EPHEDRINE SULFATE 10 MG: 50 INJECTION, SOLUTION INTRAMUSCULAR; INTRAVENOUS; SUBCUTANEOUS at 08:08

## 2022-08-25 RX ADMIN — GLYCOPYRROLATE 0.2 MG: 0.2 INJECTION, SOLUTION INTRAMUSCULAR; INTRAVITREAL at 07:08

## 2022-08-25 RX ADMIN — ACETAMINOPHEN 1000 MG: 500 TABLET ORAL at 07:08

## 2022-08-25 RX ADMIN — ACETAMINOPHEN 1000 MG: 10 INJECTION, SOLUTION INTRAVENOUS at 10:08

## 2022-08-25 RX ADMIN — LIDOCAINE HYDROCHLORIDE 5 MG: 10 INJECTION, SOLUTION EPIDURAL; INFILTRATION; INTRACAUDAL; PERINEURAL at 07:08

## 2022-08-25 RX ADMIN — EPHEDRINE SULFATE 5 MG: 50 INJECTION, SOLUTION INTRAMUSCULAR; INTRAVENOUS; SUBCUTANEOUS at 09:08

## 2022-08-25 RX ADMIN — OXYCODONE 5 MG: 5 TABLET ORAL at 12:08

## 2022-08-25 RX ADMIN — FENTANYL CITRATE 50 MCG: 50 INJECTION, SOLUTION INTRAMUSCULAR; INTRAVENOUS at 07:08

## 2022-08-25 RX ADMIN — ONDANSETRON 4 MG: 2 INJECTION INTRAMUSCULAR; INTRAVENOUS at 12:08

## 2022-08-25 RX ADMIN — SUCCINYLCHOLINE CHLORIDE 140 MG: 20 INJECTION, SOLUTION INTRAMUSCULAR; INTRAVENOUS; PARENTERAL at 08:08

## 2022-08-25 RX ADMIN — SCOPALAMINE 1 PATCH: 1 PATCH, EXTENDED RELEASE TRANSDERMAL at 07:08

## 2022-08-25 RX ADMIN — VECURONIUM BROMIDE 2 MG: 10 INJECTION, POWDER, LYOPHILIZED, FOR SOLUTION INTRAVENOUS at 08:08

## 2022-08-25 RX ADMIN — SODIUM CHLORIDE: 0.9 INJECTION, SOLUTION INTRAVENOUS at 03:08

## 2022-08-25 RX ADMIN — EPHEDRINE SULFATE 10 MG: 50 INJECTION, SOLUTION INTRAMUSCULAR; INTRAVENOUS; SUBCUTANEOUS at 09:08

## 2022-08-25 RX ADMIN — FENTANYL CITRATE 50 MCG: 50 INJECTION, SOLUTION INTRAMUSCULAR; INTRAVENOUS at 09:08

## 2022-08-25 RX ADMIN — LIDOCAINE HYDROCHLORIDE 75 MG: 20 INJECTION INTRAVENOUS at 08:08

## 2022-08-25 RX ADMIN — VECURONIUM BROMIDE 3 MG: 10 INJECTION, POWDER, LYOPHILIZED, FOR SOLUTION INTRAVENOUS at 08:08

## 2022-08-25 RX ADMIN — DEXAMETHASONE SODIUM PHOSPHATE 4 MG: 4 INJECTION, SOLUTION INTRA-ARTICULAR; INTRALESIONAL; INTRAMUSCULAR; INTRAVENOUS; SOFT TISSUE at 08:08

## 2022-08-25 RX ADMIN — SODIUM CHLORIDE: 0.9 INJECTION, SOLUTION INTRAVENOUS at 01:08

## 2022-08-25 RX ADMIN — ONDANSETRON 4 MG: 2 INJECTION INTRAMUSCULAR; INTRAVENOUS at 08:08

## 2022-08-25 RX ADMIN — VECURONIUM BROMIDE 2 MG: 10 INJECTION, POWDER, LYOPHILIZED, FOR SOLUTION INTRAVENOUS at 09:08

## 2022-08-25 RX ADMIN — INSULIN ASPART 2 UNITS: 100 INJECTION, SOLUTION INTRAVENOUS; SUBCUTANEOUS at 10:08

## 2022-08-25 RX ADMIN — GABAPENTIN 200 MG: 100 CAPSULE ORAL at 08:08

## 2022-08-25 RX ADMIN — PHENYLEPHRINE HYDROCHLORIDE 0.33 MCG/KG/MIN: 10 INJECTION INTRAVENOUS at 08:08

## 2022-08-25 RX ADMIN — SODIUM CHLORIDE, SODIUM GLUCONATE, SODIUM ACETATE, POTASSIUM CHLORIDE, MAGNESIUM CHLORIDE, SODIUM PHOSPHATE, DIBASIC, AND POTASSIUM PHOSPHATE: .53; .5; .37; .037; .03; .012; .00082 INJECTION, SOLUTION INTRAVENOUS at 09:08

## 2022-08-25 RX ADMIN — SUGAMMADEX 200 MG: 100 INJECTION, SOLUTION INTRAVENOUS at 11:08

## 2022-08-25 RX ADMIN — EPHEDRINE SULFATE 15 MG: 50 INJECTION, SOLUTION INTRAMUSCULAR; INTRAVENOUS; SUBCUTANEOUS at 08:08

## 2022-08-25 RX ADMIN — CEFAZOLIN SODIUM 2 G: 2 SOLUTION INTRAVENOUS at 05:08

## 2022-08-25 RX ADMIN — PROMETHAZINE HYDROCHLORIDE 6.25 MG: 25 INJECTION INTRAMUSCULAR; INTRAVENOUS at 01:08

## 2022-08-25 RX ADMIN — GENTAMICIN SULFATE 160 MG: 80 INJECTION, SOLUTION INTRAVENOUS at 08:08

## 2022-08-25 RX ADMIN — PROPOFOL 130 MG: 10 INJECTION, EMULSION INTRAVENOUS at 08:08

## 2022-08-25 RX ADMIN — PHENYLEPHRINE HYDROCHLORIDE 200 MCG: 10 INJECTION INTRAVENOUS at 08:08

## 2022-08-25 RX ADMIN — AMPICILLIN SODIUM 1 G: 1 INJECTION, POWDER, FOR SOLUTION INTRAMUSCULAR; INTRAVENOUS at 08:08

## 2022-08-25 NOTE — OP NOTE
[facility]  Operative Note    Date: 08/25/2022    Pre-Op Diagnosis:   - Left renal stone (>2 cm)    Post-Op Diagnosis: same    Procedure(s) Performed:   1.  Left PCNL (5 cm)  2.  Left antegrade JJ ureteral stent placement  3.  Left nephrostomy tube placement  4.  Left antegrade nephrostogram  5.  Laser lithotripsy   6.  Fluoro < 1 h    Specimen(s): Left renal stone    Staff Surgeon:  Shelby Parra MD    Anesthesia: General endotracheal anesthesia    Indications: Jo Alegre is a 82 y.o. female with a left renal stones presenting for definitive management.      Findings:  1.  Lower pole stone burden removed with shock pulse  2.  Small amount of residual stone fragments in lower pole, difficult to access secondary to angle, stones fragmented with laser and large pieces removed however unable to safely navigate back into calycx  3.  Unable to access upper pole     Estimated Blood Loss: 200 cc    Drains:   1.  22 Fr nephrostomy tube  2.  16 Fr urethral ureña catheter  3.  6 x 24 left JJ ureteral stent    Procedure in detail:  After the risks, benefits and possible complications of the procedure were explained, the patient elected to undergo the above procedures and informed consent was obtained. The patient was taken to the OR suite and placed under anesthesia. Pre-operative antibiotics were administered. Time out was performed. SCDs were applied and working prior to the procedure.      On the stretcher, flexible cystoscopy was performed. The patient was prepped and draped in normal sterile fashion. A 16 Lao flexible cystoscope was advanced into the bladder per urethra. The right and left ureteral orifices were identified. Our attention was turned to the patients left ureteral orifice. The previously placed stent was removed.  A motion wire was then advanced until resistance was felt.  The cystoscope was removed keepingt the wire in place.  A 5 Fr open ended catheter was passed over the  wire until mild resistance was met.  The wire was removed.  A 16 Fr ureña was placed and the catheter was secured to the ureña.      The patient was then placed in prone position upon the OR table and prepped and draped in the usual sterile fashion.  IR then obtained lower pole access, please see their note for full details.    A 10 mm incision was made next to the wire using an 11 blade. We then placed a dual lumen access sheath over the motion  wire left in place by the IR team. The access sheath was advanced into the renal pelvis. Antegrade nephrostogram was performed confirming correct position of the access sheath. A super stiff wire was placed into the access sheath and into the ureter down to the bladder. Fluoroscopy was used to confirm correct wire placement in the bladder. The access sheath was removed. A 30 fr dilator balloon was advanced over the wire and the tip of the balloon was placed into the renal pelvis, confirmed with fluoroscopy. The balloon was dilated and the sheath was advanced over the balloon. The balloon was deflated and removed, leaving both wires in place.     The nephroscope, which had been previously assembled, was advanced into the sheath. The UPJ was identified. The stone was identified. This was broken up using the shock pulse device.     Flexible pyeloscopy was then performed to evaluate all the calyces. Stone fragments that were visualized in the mid pole. These were fragmented using the 365 micron laser fiber. The largest fragment was removed, however I was unable to safely navigate back into the calyx on multiple attempts.      A 6 x 24 JJ ureteral stent was passed over the super stiff wire under direct vision.  When an adequate coil was seen in the bladder using fluoro, the super stiff was removed.  The proximal coil was visualized in the renal pelvis.      A 22 Fr [place] tipped ureña catheter was placed into the collecting system as a nephrostomy tube.  Antegrade nephrostogram  was performed showing minor extravasation of contrast and no filling defects.  3 mL of water was placed in the balloon.     The sheath was removed over the catheter leaving the nephrotomy tube in place.  The skin was closed with 3-0 vicryl in a vertical mattress fashion.  The nephrostomy tube was secured to the skin using a 2-0 nylon.      A sterile compressive dressing was placed. The patient was then transferred back to PACU in stable condition.    Disposition:  The patient will be admitted to the floor for postoperative monitoring.      Shelby Parra MD

## 2022-08-25 NOTE — ANESTHESIA PROCEDURE NOTES
Intubation    Date/Time: 8/25/2022 8:14 AM  Performed by: Brent Cardona CRNA  Authorized by: Manuel Bishop MD     Intubation:     Induction:  Intravenous    Intubated:  Postinduction    Mask Ventilation:  Easy mask    Attempts:  1    Attempted By:  CRNA    Method of Intubation:  Direct    Blade:  [place] 3    Laryngeal View Grade: Grade I - full view of cords      Difficult Airway Encountered?: No      Complications:  None    Airway Device:  Oral endotracheal tube    Airway Device Size:  7.5    Style/Cuff Inflation:  Cuffed (inflated to minimal occlusive pressure)    Inflation Amount (mL):  5    Tube secured:  21    Secured at:  The lips    Placement Verified By:  Capnometry    Complicating Factors:  None

## 2022-08-25 NOTE — PLAN OF CARE
Plan of care reviewed with patient, verbalized understanding. IV fluids infusing. Campbell cath in place, light pink to clear output noted. . Nephro tube in place, dressing clean dry and intact. Cardic monitor [id number] placed on patient. Bed in lowest position and call light within reach. Daughters at bedside.

## 2022-08-25 NOTE — PLAN OF CARE
Pre-op complete. Daughters x2 at bedside. Text notifications initiated. Pt denies need for safe. Belongings including glasses and partial with daughters.

## 2022-08-25 NOTE — INTERVAL H&P NOTE
The patient has been examined and the H&P has been reviewed:    I concur with the findings and no changes have occurred since H&P was written.    Surgery risks, benefits and alternative options discussed and understood by patient/family.    I have explained the indication, risks, benefits, and alternatives of the procedure in detail.  The patient voices understanding and all questions have been answered.   Risks including but not limited to bleeding, infection, injury to the lung, liver, spleen, colon, need for additional procedures, incomplete removal of stone, arteriovenous malformation, pseudoaneurysm, hydrothorax or pneumothorax, urinoma, ureteral injury or perforation, DVT, PE, heart attack, stroke, and death were discussed with the patient in depth.  The patient agrees to proceed as planned with left PCNL.    MRI shows Bosniak 3 upper pole renal cyst. Plan for repeat imaging in 3 months.     There are no hospital problems to display for this patient.

## 2022-08-25 NOTE — PLAN OF CARE
No more emesis  Feels ok pain 0/10   Dr Parra secure chat about ivf rate and if tele needed while in room ?

## 2022-08-25 NOTE — PLAN OF CARE
Released from Pacu when criteria met pain controlled skin w+d  dsg dry intact aaox4 encouraged deep breaths Pt has all belongings  With family   ureña draining 50 cc of pink tinged urine no clots noted  Nephrostomy tube on L side dsg intact with scant cherry drainage noted no clots   Nausea better no more emesis since iv phenergan  cxr clear for dr ARIZMENDI made aware  Labs pending

## 2022-08-25 NOTE — ANESTHESIA POSTPROCEDURE EVALUATION
Anesthesia Post Evaluation    Patient: Jo Alegre    Procedure(s) Performed: Procedure(s) (LRB):  NEPHROLITHOTOMY, PERCUTANEOUS (N/A)  CYSTOSCOPY, FLEXIBLE WITH STENT REMOVAL (Left)  CYSTOSCOPY, WITH RETROGRADE PYELOGRAM AND URETERAL STENT INSERTION (Left)  CREATION, NEPHROSTOMY (Left)    Final Anesthesia Type: general      Patient location during evaluation: PACU  Patient participation: Yes- Able to Participate  Level of consciousness: awake and alert  Post-procedure vital signs: reviewed and stable  Pain management: adequate  Airway patency: patent    PONV status at discharge: No PONV  Anesthetic complications: no      Cardiovascular status: hemodynamically stable  Respiratory status: unassisted and room air  Hydration status: euvolemic  Follow-up not needed.          Vitals Value Taken Time   /65 08/25/22 1316   Temp 36.6 °C (97.9 °F) 08/25/22 1316   Pulse 70 08/25/22 1316   Resp 16 08/25/22 1316   SpO2 99 % 08/25/22 1316         Event Time   Out of Recovery 13:05:00         Pain/Rosalind Score: Pain Rating Prior to Med Admin: 0 (8/25/2022 12:46 PM)  Pain Rating Post Med Admin: 0 (8/25/2022 12:46 PM)  Rosalind Score: 8 (8/25/2022  1:16 PM)

## 2022-08-25 NOTE — PLAN OF CARE
Dr Parra at bedside aware of drainage  from nephro tube lt cherry scant amt     ureña draining small amt urine lt cherry color     Pain controlled no n+v restful encouraged to take deep breathes and use IS only up to 250 poor effort reviewed

## 2022-08-25 NOTE — TRANSFER OF CARE
"Anesthesia Transfer of Care Note    Patient: Jo Alegre    Procedure(s) Performed: Procedure(s) (LRB):  NEPHROLITHOTOMY, PERCUTANEOUS (N/A)  CYSTOSCOPY, FLEXIBLE WITH STENT REMOVAL (Left)  CYSTOSCOPY, WITH RETROGRADE PYELOGRAM AND URETERAL STENT INSERTION (Left)  CREATION, NEPHROSTOMY (Left)    Patient location: PACU    Anesthesia Type: general    Transport from OR: Transported from OR on 2-3 L/min O2 by NC with adequate spontaneous ventilation    Post pain: adequate analgesia    Post assessment: no apparent anesthetic complications    Post vital signs: stable    Level of consciousness: awake    Nausea/Vomiting: no nausea/vomiting    Complications: none    Transfer of care protocol was followed      Last vitals:   Visit Vitals  BP (!) 166/69 (BP Location: Left arm, Patient Position: Lying)   Pulse 105   Temp 36.4 °C (97.5 °F) (Skin)   Resp (!) 22   Ht 5' 9" (1.753 m)   Wt 101.2 kg (223 lb)   SpO2 100%   Breastfeeding No   BMI 32.93 kg/m²     "

## 2022-08-26 LAB
ANION GAP SERPL CALC-SCNC: 8 MMOL/L (ref 8–16)
BASOPHILS # BLD AUTO: 0.01 K/UL (ref 0–0.2)
BASOPHILS NFR BLD: 0.1 % (ref 0–1.9)
BUN SERPL-MCNC: 18 MG/DL (ref 8–23)
CALCIUM SERPL-MCNC: 8.5 MG/DL (ref 8.7–10.5)
CHLORIDE SERPL-SCNC: 104 MMOL/L (ref 95–110)
CO2 SERPL-SCNC: 25 MMOL/L (ref 23–29)
CREAT SERPL-MCNC: 1.4 MG/DL (ref 0.5–1.4)
DIFFERENTIAL METHOD: ABNORMAL
EOSINOPHIL # BLD AUTO: 0 K/UL (ref 0–0.5)
EOSINOPHIL NFR BLD: 0 % (ref 0–8)
ERYTHROCYTE [DISTWIDTH] IN BLOOD BY AUTOMATED COUNT: 14.3 % (ref 11.5–14.5)
ERYTHROCYTE [DISTWIDTH] IN BLOOD BY AUTOMATED COUNT: 14.3 % (ref 11.5–14.5)
EST. GFR  (NO RACE VARIABLE): 38 ML/MIN/1.73 M^2
GLUCOSE SERPL-MCNC: 131 MG/DL (ref 70–110)
HCT VFR BLD AUTO: 28.9 % (ref 37–48.5)
HCT VFR BLD AUTO: 30.4 % (ref 37–48.5)
HGB BLD-MCNC: 9.1 G/DL (ref 12–16)
HGB BLD-MCNC: 9.4 G/DL (ref 12–16)
IMM GRANULOCYTES # BLD AUTO: 0.05 K/UL (ref 0–0.04)
IMM GRANULOCYTES NFR BLD AUTO: 0.4 % (ref 0–0.5)
LYMPHOCYTES # BLD AUTO: 0.7 K/UL (ref 1–4.8)
LYMPHOCYTES NFR BLD: 5.4 % (ref 18–48)
MCH RBC QN AUTO: 30.6 PG (ref 27–31)
MCH RBC QN AUTO: 30.7 PG (ref 27–31)
MCHC RBC AUTO-ENTMCNC: 30.9 G/DL (ref 32–36)
MCHC RBC AUTO-ENTMCNC: 31.5 G/DL (ref 32–36)
MCV RBC AUTO: 98 FL (ref 82–98)
MCV RBC AUTO: 99 FL (ref 82–98)
MONOCYTES # BLD AUTO: 0.9 K/UL (ref 0.3–1)
MONOCYTES NFR BLD: 7.2 % (ref 4–15)
NEUTROPHILS # BLD AUTO: 10.9 K/UL (ref 1.8–7.7)
NEUTROPHILS NFR BLD: 86.9 % (ref 38–73)
NRBC BLD-RTO: 0 /100 WBC
PLATELET # BLD AUTO: 350 K/UL (ref 150–450)
PLATELET # BLD AUTO: 352 K/UL (ref 150–450)
PMV BLD AUTO: 9.2 FL (ref 9.2–12.9)
PMV BLD AUTO: 9.3 FL (ref 9.2–12.9)
POCT GLUCOSE: 125 MG/DL (ref 70–110)
POCT GLUCOSE: 171 MG/DL (ref 70–110)
POTASSIUM SERPL-SCNC: 5.1 MMOL/L (ref 3.5–5.1)
RBC # BLD AUTO: 2.96 M/UL (ref 4–5.4)
RBC # BLD AUTO: 3.07 M/UL (ref 4–5.4)
SODIUM SERPL-SCNC: 137 MMOL/L (ref 136–145)
WBC # BLD AUTO: 12.58 K/UL (ref 3.9–12.7)
WBC # BLD AUTO: 13.06 K/UL (ref 3.9–12.7)

## 2022-08-26 PROCEDURE — 94799 UNLISTED PULMONARY SVC/PX: CPT

## 2022-08-26 PROCEDURE — 80048 BASIC METABOLIC PNL TOTAL CA: CPT | Performed by: STUDENT IN AN ORGANIZED HEALTH CARE EDUCATION/TRAINING PROGRAM

## 2022-08-26 PROCEDURE — 85025 COMPLETE CBC W/AUTO DIFF WBC: CPT | Performed by: STUDENT IN AN ORGANIZED HEALTH CARE EDUCATION/TRAINING PROGRAM

## 2022-08-26 PROCEDURE — 27000221 HC OXYGEN, UP TO 24 HOURS

## 2022-08-26 PROCEDURE — 94761 N-INVAS EAR/PLS OXIMETRY MLT: CPT

## 2022-08-26 PROCEDURE — 25000003 PHARM REV CODE 250: Performed by: STUDENT IN AN ORGANIZED HEALTH CARE EDUCATION/TRAINING PROGRAM

## 2022-08-26 PROCEDURE — 36415 COLL VENOUS BLD VENIPUNCTURE: CPT | Performed by: STUDENT IN AN ORGANIZED HEALTH CARE EDUCATION/TRAINING PROGRAM

## 2022-08-26 PROCEDURE — 85027 COMPLETE CBC AUTOMATED: CPT | Performed by: STUDENT IN AN ORGANIZED HEALTH CARE EDUCATION/TRAINING PROGRAM

## 2022-08-26 PROCEDURE — 94760 N-INVAS EAR/PLS OXIMETRY 1: CPT

## 2022-08-26 PROCEDURE — 63600175 PHARM REV CODE 636 W HCPCS: Performed by: STUDENT IN AN ORGANIZED HEALTH CARE EDUCATION/TRAINING PROGRAM

## 2022-08-26 RX ORDER — SULFAMETHOXAZOLE AND TRIMETHOPRIM 800; 160 MG/1; MG/1
1 TABLET ORAL 2 TIMES DAILY
Qty: 10 TABLET | Refills: 0 | Status: SHIPPED | OUTPATIENT
Start: 2022-08-26 | End: 2022-08-31

## 2022-08-26 RX ORDER — TRAMADOL HYDROCHLORIDE 50 MG/1
50 TABLET ORAL EVERY 6 HOURS PRN
Qty: 15 TABLET | Refills: 0 | Status: SHIPPED | OUTPATIENT
Start: 2022-08-26 | End: 2022-10-12

## 2022-08-26 RX ADMIN — SODIUM CHLORIDE: 0.9 INJECTION, SOLUTION INTRAVENOUS at 01:08

## 2022-08-26 RX ADMIN — TAMSULOSIN HYDROCHLORIDE 0.4 MG: 0.4 CAPSULE ORAL at 09:08

## 2022-08-26 RX ADMIN — AMIODARONE HYDROCHLORIDE 100 MG: 100 TABLET ORAL at 09:08

## 2022-08-26 RX ADMIN — PANTOPRAZOLE SODIUM 40 MG: 40 TABLET, DELAYED RELEASE ORAL at 09:08

## 2022-08-26 RX ADMIN — SACUBITRIL AND VALSARTAN 1 TABLET: 24; 26 TABLET, FILM COATED ORAL at 09:08

## 2022-08-26 RX ADMIN — ACETAMINOPHEN 1000 MG: 500 TABLET ORAL at 05:08

## 2022-08-26 RX ADMIN — CEFAZOLIN SODIUM 2 G: 2 SOLUTION INTRAVENOUS at 04:08

## 2022-08-26 RX ADMIN — DILTIAZEM HYDROCHLORIDE 60 MG: 30 TABLET, FILM COATED ORAL at 09:08

## 2022-08-26 RX ADMIN — GABAPENTIN 200 MG: 100 CAPSULE ORAL at 09:08

## 2022-08-26 NOTE — CARE UPDATE
08/25/22 1938   Patient Assessment/Suction   Level of Consciousness (AVPU) alert   Respiratory Effort Normal;Unlabored   Expansion/Accessory Muscles/Retractions no use of accessory muscles   All Lung Fields Breath Sounds clear   Rhythm/Pattern, Respiratory no shortness of breath reported   Cough Frequency no cough   PRE-TX-O2   O2 Device (Oxygen Therapy) nasal cannula   $ Is the patient on Low Flow Oxygen? Yes   Flow (L/min) 2   SpO2 96 %   Pulse Oximetry Type Intermittent   Incentive Spirometer   $ Incentive Spirometer Charges done with encouragement   Administration (IS) instruction provided, follow-up   Number of Repetitions (IS) 5   Level Incentive Spirometer (mL) 1000   Patient Tolerance (IS) good;no adverse signs/symptoms present

## 2022-08-26 NOTE — PLAN OF CARE
Plan of care reviewed with patient & daughter . IV fluids infusing as per orders. SR on telemetry. HS bg 241, covered with ss insulin. O2-2L per nc in use. L. Nephrostomy tube intact with bloody urine noted. Campbell catheter intact with dark yellow urine noted. Remains free from falls/injury,instructed to call for assistance as needed during night. Verbalized understanding. Call light in reach,bed alarm on .

## 2022-08-26 NOTE — NURSING
Discharge instructions given to patient and daughter, verbalized understanding. Peripheral IV removed. Left floor via wheelchair. Transportation provided by daughter. L flank dressing clean and intact.

## 2022-08-26 NOTE — PLAN OF CARE
[facility]  Initial Discharge Assessment       Primary Care Provider: Kraig Alberto MD    Admission Diagnosis: Staghorn calculus [N20.0]  Kidney stone [N20.0]    Admission Date: 8/25/2022  Expected Discharge Date: 8/26/2022     Discharge assessment completed with pt and pts daughterMary. All info on facesheet is correct. DARRYL is pt's daughterLeidy [phone]. Leidy provides rides to appts. PCP is Dr Alberto. Pt uses [facility] [address] and [facility] mail order. Pt has DME at home, see below. Pt was admitted at [facility] on 7/17/22. insurance correct. Mary to provide a ride home.         Payor: [employer] MANAGED MEDICARE / Plan: [employer] MEDICARE PPO / Product Type: Medicare Advantage /     Extended Emergency Contact Information  Primary Emergency Contact: Mary Myers Brian  Address: [address] [place] [address]  Home Phone: [phone]  Mobile Phone: [phone]  Relation: Daughter  Preferred language: English   needed? No  Secondary Emergency Contact: Leidy Rodriguez  Address: [address] [place] [address] [place] [address] [facility]  Mobile Phone: [phone]  Relation: Daughter   needed? No    Discharge Plan A: Home  Discharge Plan B: Home with family      [facility] ([facility]) - [facility] [address]  [address] [facility] [address]  Phone: [phone] Fax: [phone]    [id number] - [place], [place] - [address] [facility]  [address]  [address]  [place] [address]  Phone: [phone] Fax: [phone]      Initial Assessment (most recent)     Adult Discharge Assessment - 08/26/22 1225        Discharge Assessment    Assessment Type Discharge Planning Assessment     Confirmed/corrected address, phone number and insurance Yes     Confirmed Demographics Correct on Facesheet      Source of Information patient;family     Lives With grandchild(shawnee)     Do you expect to return to your current living situation? Yes     Do you have help at home or someone to help you manage your care at home? Yes     Prior to hospitilization cognitive status: Alert/Oriented     Current cognitive status: Alert/Oriented     Walking or Climbing Stairs Difficulty ambulation difficulty, requires equipment     Equipment Currently Used at Home bedside commode;cane, straight;rollator;shower chair;oxygen;CPAP;walker, rolling     Readmission within 30 days? Yes     Patient currently being followed by outpatient case management? No     Do you currently have service(s) that help you manage your care at home? No     Do you take prescription medications? Yes     Do you have prescription coverage? Yes     Do you have any problems affording any of your prescribed medications? No     Is the patient taking medications as prescribed? yes     How do you get to doctors appointments? family or friend will provide     Are you on dialysis? No     Do you take coumadin? No     Discharge Plan A Home     Discharge Plan B Home with family     DME Needed Upon Discharge  none     Discharge Plan discussed with: Patient;Adult children

## 2022-08-26 NOTE — PROGRESS NOTES
Urology Progress Note      Jo Alegre is a 82 y.o. female s/p left PCNL on 8/25/22.    No acute events overnight  Tolerating PO intake  Pain well controlled  Has not ambulated yet    Neph tube with minimal drainage and dark clots in tubing. Dressing clean and dry.   Ureña draining pink tinged yellow urine    UOP:800/250/425  Neph tube: NR/NR/250      Lab Results   Component Value Date    WBC 12.58 08/26/2022    HGB 9.1 (L) 08/26/2022    HCT 28.9 (L) 08/26/2022    MCV 98 08/26/2022     08/26/2022          BMP  Lab Results   Component Value Date     08/26/2022    K 5.1 08/26/2022     08/26/2022    CO2 25 08/26/2022    BUN 18 08/26/2022    CREATININE 1.4 08/26/2022    CALCIUM 8.5 (L) 08/26/2022    ANIONGAP 8 08/26/2022    ESTGFRAFRICA 30.2 (A) 07/18/2022    EGFRNONAA 26.2 (A) 07/18/2022         Plan:  - Regular diet  - HLIV   - neph tube clamped  - ureña removed, voiding trial   - slight drop in H/H - recheck at 11 am   - ambulate/oob   - PO pain control   - Will remove neph tube after repeat CBC and plan for discharge home this pm  - Follow up in 2 weeks for stent removal       Shelby Parra MD  Urology Department

## 2022-08-26 NOTE — CARE UPDATE
08/26/22 0732   PRE-TX-O2   O2 Device (Oxygen Therapy) nasal cannula   $ Is the patient on Low Flow Oxygen? Yes   Flow (L/min) 2   Oxygen Concentration (%) 28   SpO2 (!) 93 %   Pulse Oximetry Type Intermittent   $ Pulse Oximetry - Multiple Charge Pulse Oximetry - Multiple   Probe Placed On (Pulse Ox) finger   Pulse 72   Resp 15   Positioning HOB elevated 45 degrees   Incentive Spirometer   $ Incentive Spirometer Charges done with encouragement   Incentive Spirometer Predicted Level (mL) 1500   Administration (IS) proper technique demonstrated   Number of Repetitions (IS) 6   Level Incentive Spirometer (mL) 1000   Patient Tolerance (IS) good

## 2022-08-26 NOTE — PROGRESS NOTES
Discussed discharge medications and instructions with patient including: Bactrim and tramadol. Patient demonstrated understanding and had no questions or concerns.

## 2022-08-26 NOTE — PLAN OF CARE
Pt clear for discharge from CM standpoint.       08/26/22 1231   Final Note   Assessment Type Final Discharge Note   Anticipated Discharge Disposition Home

## 2022-08-29 VITALS
HEART RATE: 65 BPM | OXYGEN SATURATION: 98 % | SYSTOLIC BLOOD PRESSURE: 98 MMHG | WEIGHT: 223 LBS | TEMPERATURE: 97 F | DIASTOLIC BLOOD PRESSURE: 52 MMHG | RESPIRATION RATE: 16 BRPM | BODY MASS INDEX: 33.03 KG/M2 | HEIGHT: 69 IN

## 2022-08-29 LAB
COMPN STONE: NORMAL
SPECIMEN SOURCE: NORMAL
STONE ANALYSIS IR-IMP: NORMAL

## 2022-08-29 NOTE — DISCHARGE SUMMARY
"Subjective:       Patient ID: Harinder Elliott is a 28 y.o. male.    Chief Complaint: Right Scrotal Pain     Harinder Elliott is a 28 year old with medical history significant for anxiety and migraines who presents to the clinic this afternoon after 2 day history of acute right scrotal pain. Patient states he first noticed the pain yesterday morning stating that it was deep and sharp pain to his right scrotum that got progressively worse during the day. He states he wears briefs and states that he actually feels some alleviation of the pain when his scrotum is elevated. He has not tried any medication to relieve the pain. He denies redness, swelling in the area, recent trauma (although he occasionally lifts weights when he works out), denies fevers, chills, nausea, or vomiting. He states that this morning, the pain was actually better but it got progressively worse during the day.       Review of Systems   Constitutional: Negative for activity change, appetite change, chills, diaphoresis, fatigue, fever and unexpected weight change.   Respiratory: Negative for chest tightness and shortness of breath.    Cardiovascular: Negative for chest pain, palpitations and leg swelling.   Genitourinary: Negative for scrotal swelling (Scrotal pain).   Musculoskeletal: Negative for arthralgias.   Skin: Negative for rash.       Objective:       /62 (BP Location: Right arm, Patient Position: Sitting)   Pulse 82   Ht 5' 11" (1.803 m)   Wt 73.1 kg (161 lb 2.5 oz)   SpO2 97%   BMI 22.48 kg/m²     Physical Exam   Constitutional: He is oriented to person, place, and time. He appears well-developed and well-nourished. No distress.   HENT:   Head: Normocephalic and atraumatic.   Mouth/Throat: Oropharynx is clear and moist. No oropharyngeal exudate.   Eyes: Conjunctivae are normal. Pupils are equal, round, and reactive to light.   Neck: Normal range of motion. No JVD present.   Cardiovascular: Normal rate, regular rhythm and " [facility]  Urology  Discharge Summary      Patient Name: Jo Alegre  MRN: [id number]  Admission Date: 8/25/2022  Hospital Length of Stay: 0 days  Discharge Date and Time: 8/26/2022  2:00 PM  Attending Physician: Shelby Parra MD  Discharging Provider: Shelby Parra MD  Primary Care Physician: Kraig Alberto MD    HPI: Jo Alegre is a 82 y.o. female admitted following left PCNL.     Procedure(s) (LRB):  NEPHROLITHOTOMY, PERCUTANEOUS (N/A)  CYSTOSCOPY, FLEXIBLE WITH STENT REMOVAL (Left)  CYSTOSCOPY, WITH RETROGRADE PYELOGRAM AND URETERAL STENT INSERTION (Left)  CREATION, NEPHROSTOMY (Left)  NEPHROSTOGRAM, ANTEGRADE (Left)     Indwelling Lines/Drains at time of discharge:   Lines/Drains/Airways       Drain  Duration                  Ureteral Drain/Stent 08/25/22 Left ureter 6 Fr. 4 days                    Hospital Course (synopsis of major diagnoses, care, treatment, and services provided during the course of the hospital stay): Patient admitted following left PCNL. She tolerated the procedure well with no immediate complications. On POD 1 her pain was well controlled, she was tolerating a regular diet and ambulating. Campbell and neph tube were removed. She was discharged home.     Consults:     Significant Diagnostic Studies: see chart review     Pending Diagnostic Studies:       None            Final Active Diagnoses:    Diagnosis Date Noted POA    PRINCIPAL PROBLEM:  Staghorn calculus [N20.0] 07/17/2022 Yes    Pulmonary hypertension [I27.20] 07/27/2020 Yes    Obstructive sleep apnea syndrome [G47.33] 06/24/2020 Yes    COPD (chronic obstructive pulmonary disease) per patient [J44.9] 04/02/2020 Yes    Paroxysmal atrial fibrillation [I48.0] 05/06/2019 Yes    Chronic anticoagulation [Z79.01] 11/05/2018 Not Applicable    Stage 3 chronic kidney disease [N18.30] 11/05/2018 Yes    Essential hypertension [I10] 05/23/2018 Yes    Type 2 diabetes mellitus with diabetic nephropathy, without  long-term current use of insulin [E11.21] 05/23/2018 Yes    Mixed dyslipidemia [E78.2] 05/23/2018 Yes      Problems Resolved During this Admission:       Discharged Condition: good    Disposition: Home or Self Care    Follow Up:   Follow-up Information       Shelby Parra MD Follow up on 9/12/2022.    Specialty: Urology  Why: post op  Contact information:  [address] [place] [address]  [address]  [facility] [address]  [phone]                           Patient Instructions:      IR Nephrostomy Tube Placement   Standing Status: Future Number of Occurrences: 1 Standing Exp. Date: 08/03/23     Order Specific Question Answer Comments   Reason for Exam: left PCNL with Dr Parra in OR    Has the patient had a prior contrast or iodine reaction? No    Is the patient currently on any blood thinners like Aspirin, Coumadin, or Plavix? Yes    Is the patient on any Metformin drug, such as Glucophage or Glucovance? No    May the Radiologist modify the order per protocol to meet the clinical needs of the patient? Yes    Release to patient Immediate      X-Ray KUB   Standing Status: Future Standing Exp. Date: 08/26/23     Order Specific Question Answer Comments   May the Radiologist modify the order per protocol to meet the clinical needs of the patient? Yes    Release to patient Immediate      Notify your health care provider if you experience any of the following:  temperature >100.4     Notify your health care provider if you experience any of the following:  persistent nausea and vomiting or diarrhea     Notify your health care provider if you experience any of the following:  severe uncontrolled pain     Notify your health care provider if you experience any of the following:  redness, tenderness, or signs of infection (pain, swelling, redness, odor or green/yellow discharge around incision site)     Remove dressing in 48 hours     Activity as tolerated     Medications:  Reconciled Home Medications:      Medication List     normal heart sounds.    No murmur heard.  Pulmonary/Chest: Effort normal and breath sounds normal. No respiratory distress. He has no wheezes. He has no rales.   Abdominal: Soft. Bowel sounds are normal. He exhibits no distension. There is no tenderness.   Genitourinary: Penis normal. No penile tenderness.   Genitourinary Comments: No scrotal swelling or edema, non-erythematous, some mild palpation to right scrotum    Musculoskeletal: Normal range of motion. He exhibits no edema.   Neurological: He is alert and oriented to person, place, and time.   Skin: Skin is warm and dry. He is not diaphoretic. No erythema.   Vitals reviewed.      Assessment:       1. Scrotal pain        Plan:        1. Scrotal pain  - Differential includes epididymitis vs. musculoskeletal sprain. Prehn's sign is positive.    - POCT URINE DIPSTICK WITHOUT MICROSCOPE negative for infection    - C. trachomatis/N. gonorrhoeae by AMP DNA Urine ordered and pending; patient is in monogamous marriage and unlikely to be positive    - Will call if positive results - will call patient for antibiotics  - No appreciable swelling or edema to area and without significant pain to palpation; cannot elucidate an adequate cremasteric reflex. Testicular torsion unlikely but will attain ultrasound to rule out   - US Scrotum And Testicles; Future  - Recommended high dose ibuprofen for pain.     Mariano Muñoz MD  PGY-2 Internal Medicine  [phone]             START taking these medications      traMADoL 50 mg tablet  Commonly known as: ULTRAM  Take 1 tablet (50 mg total) by mouth every 6 (six) hours as needed for Pain.            CHANGE how you take these medications      gabapentin 100 MG capsule  Commonly known as: NEURONTIN  TAKE 2 CAPSULES(200 MG) BY MOUTH THREE TIMES DAILY  What changed:   how much to take  how to take this  when to take this     torsemide 20 MG Tab  Commonly known as: DEMADEX  Take 1 tablet (20 mg total) by mouth once daily. for 14 days  What changed: when to take this            CONTINUE taking these medications      amiodarone 200 MG Tab  Commonly known as: PACERONE  Take 200 mg by mouth. 1/2 tab once daily with meals  May take an extra dose for palpatations   Max 4 100mg tabs     Ca-D3-mag ox-zinc--thom-bor 600 mg calcium- 20 mcg-50 mg Tab  Take 1 tablet by mouth 2 (two) times daily.     cholecalciferol (vitamin D3) 125 mcg (5,000 unit) Tab  Take 5,000 Units by mouth 2 (two) times daily.     denosumab 60 mg/mL Syrg  Commonly known as: PROLIA  Inject 1 mL (60 mg total) into the skin every 6 (six) months.     digoxin 125 mcg tablet  Commonly known as: LANOXIN  Take 125 mcg by mouth every Mon, Wed, Fri.     diltiaZEM 60 MG tablet  Commonly known as: CARDIZEM  Take 60 mg by mouth 2 (two) times daily.     glimepiride 1 MG tablet  Commonly known as: AMARYL  Take 1 tablet (1 mg total) by mouth before breakfast.     latanoprost 0.005 % ophthalmic solution  Place 1 drop into both eyes nightly.     midodrine 2.5 MG Tab  Commonly known as: PROAMATINE  Take 2.5 mg by mouth 3 (three) times daily.     rivaroxaban 15 mg Tab  Commonly known as: XARELTO  Take 15 mg by mouth daily with dinner or evening meal.     sacubitriL-valsartan 24-26 mg per tablet  Commonly known as: ENTRESTO  Take 1 tablet by mouth once daily.     sulfamethoxazole-trimethoprim 800-160mg 800-160 mg Tab  Commonly known as: BACTRIM DS  Take 1 tablet by mouth 2 (two) times daily. for 5  days     tamsulosin 0.4 mg Cap  Commonly known as: FLOMAX  Take 1 capsule (0.4 mg total) by mouth once daily.              Time spent on the discharge of patient: 20 minutes    Shelby Parra MD  Urology  [facility]

## 2022-09-06 ENCOUNTER — TELEPHONE (OUTPATIENT)
Dept: FAMILY MEDICINE | Facility: CLINIC | Age: 82
End: 2022-09-06

## 2022-09-06 DIAGNOSIS — R29.898 WEAKNESS OF BOTH LEGS: Primary | ICD-10-CM

## 2022-09-06 DIAGNOSIS — I50.42 CHRONIC COMBINED SYSTOLIC AND DIASTOLIC CONGESTIVE HEART FAILURE: ICD-10-CM

## 2022-09-06 DIAGNOSIS — Z91.81 RISK FOR FALLS: ICD-10-CM

## 2022-09-06 NOTE — TELEPHONE ENCOUNTER
Patient daughter called requesting home health for her mother. She would like pt and ot as well. States mother has been progressively weakening since procedure.     Weakness of both legs  -     Ambulatory referral/consult to Home Health; Future; Expected date: 09/10/2022    Risk for falls  -     Ambulatory referral/consult to Home Health; Future; Expected date: 09/10/2022    Chronic combined systolic and diastolic congestive heart failure  -     Ambulatory referral/consult to Home Health; Future; Expected date: 09/10/2022

## 2022-09-07 ENCOUNTER — TELEPHONE (OUTPATIENT)
Dept: UROLOGY | Facility: CLINIC | Age: 82
End: 2022-09-07
Payer: MEDICARE

## 2022-09-07 NOTE — TELEPHONE ENCOUNTER
----- Message from Angelica Molina sent at 9/7/2022  1:35 PM CDT -----  Contact: daughter  Type:  Patient Call          Who Called:daughter         Does the patient know what this is regarding?: requesting a call back ;since PT had kidney stone removed on 8/25 Pt has been having a low blood pressure for the last two weeks ;please advise           Would the patient rather a call back or a response via [facility]? Call           Best Call Back Number:[phone] or [phone]             Additional Information:

## 2022-09-08 ENCOUNTER — OFFICE VISIT (OUTPATIENT)
Dept: FAMILY MEDICINE | Facility: CLINIC | Age: 82
End: 2022-09-08
Payer: MEDICARE

## 2022-09-08 VITALS
SYSTOLIC BLOOD PRESSURE: 118 MMHG | DIASTOLIC BLOOD PRESSURE: 58 MMHG | WEIGHT: 214.13 LBS | OXYGEN SATURATION: 98 % | BODY MASS INDEX: 31.72 KG/M2 | RESPIRATION RATE: 17 BRPM | HEART RATE: 67 BPM | HEIGHT: 69 IN | TEMPERATURE: 98 F

## 2022-09-08 DIAGNOSIS — R29.898 WEAKNESS OF BOTH LEGS: ICD-10-CM

## 2022-09-08 DIAGNOSIS — I95.9 HYPOTENSION, UNSPECIFIED HYPOTENSION TYPE: Primary | ICD-10-CM

## 2022-09-08 PROCEDURE — 3074F PR MOST RECENT SYSTOLIC BLOOD PRESSURE < 130 MM HG: ICD-10-PCS | Mod: CPTII,S$GLB,, | Performed by: NURSE PRACTITIONER

## 2022-09-08 PROCEDURE — 1101F PR PT FALLS ASSESS DOC 0-1 FALLS W/OUT INJ PAST YR: ICD-10-PCS | Mod: CPTII,S$GLB,, | Performed by: NURSE PRACTITIONER

## 2022-09-08 PROCEDURE — 1126F AMNT PAIN NOTED NONE PRSNT: CPT | Mod: CPTII,S$GLB,, | Performed by: NURSE PRACTITIONER

## 2022-09-08 PROCEDURE — 3288F PR FALLS RISK ASSESSMENT DOCUMENTED: ICD-10-PCS | Mod: CPTII,S$GLB,, | Performed by: NURSE PRACTITIONER

## 2022-09-08 PROCEDURE — 3078F PR MOST RECENT DIASTOLIC BLOOD PRESSURE < 80 MM HG: ICD-10-PCS | Mod: CPTII,S$GLB,, | Performed by: NURSE PRACTITIONER

## 2022-09-08 PROCEDURE — 3288F FALL RISK ASSESSMENT DOCD: CPT | Mod: CPTII,S$GLB,, | Performed by: NURSE PRACTITIONER

## 2022-09-08 PROCEDURE — 1126F PR PAIN SEVERITY QUANTIFIED, NO PAIN PRESENT: ICD-10-PCS | Mod: CPTII,S$GLB,, | Performed by: NURSE PRACTITIONER

## 2022-09-08 PROCEDURE — 1159F PR MEDICATION LIST DOCUMENTED IN MEDICAL RECORD: ICD-10-PCS | Mod: CPTII,S$GLB,, | Performed by: NURSE PRACTITIONER

## 2022-09-08 PROCEDURE — 1101F PT FALLS ASSESS-DOCD LE1/YR: CPT | Mod: CPTII,S$GLB,, | Performed by: NURSE PRACTITIONER

## 2022-09-08 PROCEDURE — 99214 PR OFFICE/OUTPT VISIT, EST, LEVL IV, 30-39 MIN: ICD-10-PCS | Mod: S$GLB,,, | Performed by: NURSE PRACTITIONER

## 2022-09-08 PROCEDURE — 99214 OFFICE O/P EST MOD 30 MIN: CPT | Mod: S$GLB,,, | Performed by: NURSE PRACTITIONER

## 2022-09-08 PROCEDURE — 3074F SYST BP LT 130 MM HG: CPT | Mod: CPTII,S$GLB,, | Performed by: NURSE PRACTITIONER

## 2022-09-08 PROCEDURE — 1159F MED LIST DOCD IN RCRD: CPT | Mod: CPTII,S$GLB,, | Performed by: NURSE PRACTITIONER

## 2022-09-08 PROCEDURE — 3078F DIAST BP <80 MM HG: CPT | Mod: CPTII,S$GLB,, | Performed by: NURSE PRACTITIONER

## 2022-09-08 RX ORDER — MIDODRINE HYDROCHLORIDE 2.5 MG/1
5 TABLET ORAL 3 TIMES DAILY
Qty: 30 TABLET | Refills: 0 | Status: ON HOLD | OUTPATIENT
Start: 2022-09-08 | End: 2022-10-29 | Stop reason: HOSPADM

## 2022-09-08 RX ORDER — PHENOL 1.4 %
1 AEROSOL, SPRAY (ML) MUCOUS MEMBRANE DAILY
COMMUNITY
Start: 2022-08-19 | End: 2022-10-12

## 2022-09-08 RX ORDER — RIVAROXABAN 10 MG/1
10 TABLET, FILM COATED ORAL DAILY
Status: ON HOLD | COMMUNITY
Start: 2022-08-20 | End: 2023-05-18 | Stop reason: HOSPADM

## 2022-09-08 NOTE — PROGRESS NOTES
[facility]    PCP: Kraig Alberto MD    Chief Complaint: kidney stones    SUBJECTIVE:       History of Present Illness:  Jo Alegre is a 82 y.o. female who presents to clinic for kidney stones. She is Established  to our clinic.     S/P left PCNL on 8/25/22.  Stone analysis: 80% Magnesium ammonium phosphate (struvite), 20% Calcium phosphate (apatite)   KUB: possible small residual stone in lower pole    Post op course uncomplicated.   No further hematuria. No flank pain.     8/1/22  Patient underwent stent placement on 7/17 for left staghorn stone. She had a fever to 100.3.  CT shows a large lower pole staghorn. Also has some complex cysts present.     This is her first stone episode.   No family hx of stones.   No hematuria prior to stent placement   Has a hx of recurrent UTIs.     Last urine culture: MO (7/2/22)    Lab Results   Component Value Date    CREATININE 1.4 08/26/2022     Hx of COPD, pulm HTN, a fib, CHF, HLD, CKD, DM, JENNIFER    Past medical, family, and social history reviewed as documented in chart with pertinent positive medical, family, and social history detailed in HPI.    Review of patient's allergies indicates:   Allergen Reactions    Codeine Other (See Comments)     nausea    Hydrocodone Nausea And Vomiting    Lasix [furosemide]      rash       Past Medical History:   Diagnosis Date    Allergy     Codeine, Lasix    Atrial fibrillation     Atrial fibrillation     Cataract     CHF (congestive heart failure)     Diabetes mellitus, type 2     Ejection fraction < 50% 10/18/2017    Approximately 35%  Based on prior  Echocardiogram.    Encounter for blood transfusion     Hyperlipidemia     Osteoporosis     PONV (postoperative nausea and vomiting)     Thyroid disorder screening 10/17/2017    TSH of 1.12 ordered by Dr. dee Jones     Past Surgical History:   Procedure Laterality Date    ANTEGRADE NEPHROSTOGRAPHY Left 8/25/2022    Procedure: NEPHROSTOGRAM, ANTEGRADE;   Surgeon: Shelby Parra MD;  Location: [facility] OR;  Service: Urology;  Laterality: Left;    CHOLECYSTECTOMY  1997    [place]     COLONOSCOPY      CYSTOSCOPY W/ URETERAL STENT PLACEMENT Left 7/17/2022    Procedure: CYSTOSCOPY, WITH URETERAL STENT INSERTION;  Surgeon: Shelby Parra MD;  Location: [facility] OR;  Service: Urology;  Laterality: Left;    CYSTOSCOPY WITH URETEROSCOPY, RETROGRADE PYELOGRAPHY, AND INSERTION OF STENT Left 8/25/2022    Procedure: CYSTOSCOPY, WITH RETROGRADE PYELOGRAM AND URETERAL STENT INSERTION;  Surgeon: Shelby Parra MD;  Location: [facility] OR;  Service: Urology;  Laterality: Left;    EYE SURGERY      bilateral cataracts    FINGER SURGERY Right 2021    right pinky finger    FLEXIBLE CYSTOSCOPY Left 8/25/2022    Procedure: CYSTOSCOPY, FLEXIBLE WITH STENT REMOVAL;  Surgeon: Shelby Parra MD;  Location: [facility] OR;  Service: Urology;  Laterality: Left;    FRACTURE SURGERY  2014    right femur with neville    HEMORRHOID SURGERY      48 yrs ago    HYSTERECTOMY      NEPHROSTOMY Left 8/25/2022    Procedure: CREATION, NEPHROSTOMY;  Surgeon: Shelby Parra MD;  Location: [facility] OR;  Service: Urology;  Laterality: Left;    PERCUTANEOUS NEPHROLITHOTOMY N/A 8/25/2022    Procedure: NEPHROLITHOTOMY, PERCUTANEOUS;  Surgeon: Shelby Parra MD;  Location: [facility] OR;  Service: Urology;  Laterality: N/A;    REPLACEMENT OF IMPLANTABLE CARDIOVERTER-DEFIBRILLATOR (ICD) GENERATOR N/A 12/13/2019    Procedure: REPLACEMENT, PULSE GENERATOR, ICD-MEDTRONIC;  Surgeon: Sebastian Nowak III, MD;  Location: [facility];  Service: Cardiology;  Laterality: N/A;    TONSILLECTOMY       Family History   Problem Relation Age of Onset    Heart disease Mother     Cancer Father         Lung Cancer ??? Asbestos    Breast cancer Paternal Aunt     Breast cancer Maternal Grandmother     Breast cancer Maternal Aunt      Social History     Tobacco Use    Smoking status: Former     Passive exposure: Past    Smokeless tobacco:  "Never    Tobacco comments:     quit 2013   Substance Use Topics    Alcohol use: No    Drug use: No        Review of Systems    OBJECTIVE:     Anticoagulation:  xarelto 20 mg     Estimated body mass index is 31.6 kg/m² as calculated from the following:    Height as of this encounter: 5' 9" (1.753 m).    Weight as of this encounter: 97.1 kg (214 lb).    Vital Signs (Most Recent)  Pulse: 70 (09/12/22 1511)  Resp: 18 (09/12/22 1511)  BP: (!) 131/52 (09/12/22 1511)    Physical Exam  Vitals reviewed.   Constitutional:       General: She is not in acute distress.     Appearance: Normal appearance. She is not ill-appearing.   HENT:      Head: Normocephalic and atraumatic.   Eyes:      General: No scleral icterus.  Cardiovascular:      Rate and Rhythm: Normal rate and regular rhythm.   Pulmonary:      Effort: Pulmonary effort is normal. No respiratory distress.   Abdominal:      General: There is no distension.      Palpations: Abdomen is soft.   Skin:     Coloration: Skin is not jaundiced.   Neurological:      General: No focal deficit present.      Mental Status: She is alert and oriented to person, place, and time.   Psychiatric:         Mood and Affect: Mood normal.         Behavior: Behavior normal.       BMP  Lab Results   Component Value Date     08/26/2022    K 5.1 08/26/2022     08/26/2022    CO2 25 08/26/2022    BUN 18 08/26/2022    CREATININE 1.4 08/26/2022    CALCIUM 8.5 (L) 08/26/2022    ANIONGAP 8 08/26/2022    ESTGFRAFRICA 30.2 (A) 07/18/2022    EGFRNONAA 26.2 (A) 07/18/2022       Lab Results   Component Value Date    WBC 13.06 (H) 08/26/2022    HGB 9.4 (L) 08/26/2022    HCT 30.4 (L) 08/26/2022    MCV 99 (H) 08/26/2022     08/26/2022     Imaging:  Per HPI    ASSESSMENT     1. Staghorn calculus    2. Other specified disorders of kidney and ureter      PLAN:     - Plan for sent removal 9/21  - Recheck BMP  - CT abd w/wo contrast in 2 months to evaluate residual stone burden and re-evaluate " renal mass     Shelby Parra MD

## 2022-09-08 NOTE — H&P (VIEW-ONLY)
[facility]    PCP: Kraig Alberto MD    Chief Complaint: kidney stones    SUBJECTIVE:       History of Present Illness:  Jo Alegre is a 82 y.o. female who presents to clinic for kidney stones. She is Established  to our clinic.     S/P left PCNL on 8/25/22.  Stone analysis: 80% Magnesium ammonium phosphate (struvite), 20% Calcium phosphate (apatite)   KUB: possible small residual stone in lower pole    Post op course uncomplicated.   No further hematuria. No flank pain.     8/1/22  Patient underwent stent placement on 7/17 for left staghorn stone. She had a fever to 100.3.  CT shows a large lower pole staghorn. Also has some complex cysts present.     This is her first stone episode.   No family hx of stones.   No hematuria prior to stent placement   Has a hx of recurrent UTIs.     Last urine culture: MO (7/2/22)    Lab Results   Component Value Date    CREATININE 1.4 08/26/2022     Hx of COPD, pulm HTN, a fib, CHF, HLD, CKD, DM, JENNIFER    Past medical, family, and social history reviewed as documented in chart with pertinent positive medical, family, and social history detailed in HPI.    Review of patient's allergies indicates:   Allergen Reactions    Codeine Other (See Comments)     nausea    Hydrocodone Nausea And Vomiting    Lasix [furosemide]      rash       Past Medical History:   Diagnosis Date    Allergy     Codeine, Lasix    Atrial fibrillation     Atrial fibrillation     Cataract     CHF (congestive heart failure)     Diabetes mellitus, type 2     Ejection fraction < 50% 10/18/2017    Approximately 35%  Based on prior  Echocardiogram.    Encounter for blood transfusion     Hyperlipidemia     Osteoporosis     PONV (postoperative nausea and vomiting)     Thyroid disorder screening 10/17/2017    TSH of 1.12 ordered by Dr. dee Jones     Past Surgical History:   Procedure Laterality Date    ANTEGRADE NEPHROSTOGRAPHY Left 8/25/2022    Procedure: NEPHROSTOGRAM, ANTEGRADE;   Surgeon: Shelby Parra MD;  Location: [facility] OR;  Service: Urology;  Laterality: Left;    CHOLECYSTECTOMY  1997    [place]     COLONOSCOPY      CYSTOSCOPY W/ URETERAL STENT PLACEMENT Left 7/17/2022    Procedure: CYSTOSCOPY, WITH URETERAL STENT INSERTION;  Surgeon: Shelby Parra MD;  Location: [facility] OR;  Service: Urology;  Laterality: Left;    CYSTOSCOPY WITH URETEROSCOPY, RETROGRADE PYELOGRAPHY, AND INSERTION OF STENT Left 8/25/2022    Procedure: CYSTOSCOPY, WITH RETROGRADE PYELOGRAM AND URETERAL STENT INSERTION;  Surgeon: Shelby Parra MD;  Location: [facility] OR;  Service: Urology;  Laterality: Left;    EYE SURGERY      bilateral cataracts    FINGER SURGERY Right 2021    right pinky finger    FLEXIBLE CYSTOSCOPY Left 8/25/2022    Procedure: CYSTOSCOPY, FLEXIBLE WITH STENT REMOVAL;  Surgeon: Shelby Parra MD;  Location: [facility] OR;  Service: Urology;  Laterality: Left;    FRACTURE SURGERY  2014    right femur with neville    HEMORRHOID SURGERY      48 yrs ago    HYSTERECTOMY      NEPHROSTOMY Left 8/25/2022    Procedure: CREATION, NEPHROSTOMY;  Surgeon: Shelby Parra MD;  Location: [facility] OR;  Service: Urology;  Laterality: Left;    PERCUTANEOUS NEPHROLITHOTOMY N/A 8/25/2022    Procedure: NEPHROLITHOTOMY, PERCUTANEOUS;  Surgeon: Shelby Parra MD;  Location: [facility] OR;  Service: Urology;  Laterality: N/A;    REPLACEMENT OF IMPLANTABLE CARDIOVERTER-DEFIBRILLATOR (ICD) GENERATOR N/A 12/13/2019    Procedure: REPLACEMENT, PULSE GENERATOR, ICD-MEDTRONIC;  Surgeon: Sebastian Nowak III, MD;  Location: [facility];  Service: Cardiology;  Laterality: N/A;    TONSILLECTOMY       Family History   Problem Relation Age of Onset    Heart disease Mother     Cancer Father         Lung Cancer ??? Asbestos    Breast cancer Paternal Aunt     Breast cancer Maternal Grandmother     Breast cancer Maternal Aunt      Social History     Tobacco Use    Smoking status: Former     Passive exposure: Past    Smokeless tobacco:  "Never    Tobacco comments:     quit 2013   Substance Use Topics    Alcohol use: No    Drug use: No        Review of Systems    OBJECTIVE:     Anticoagulation:  xarelto 20 mg     Estimated body mass index is 31.6 kg/m² as calculated from the following:    Height as of this encounter: 5' 9" (1.753 m).    Weight as of this encounter: 97.1 kg (214 lb).    Vital Signs (Most Recent)  Pulse: 70 (09/12/22 1511)  Resp: 18 (09/12/22 1511)  BP: (!) 131/52 (09/12/22 1511)    Physical Exam  Vitals reviewed.   Constitutional:       General: She is not in acute distress.     Appearance: Normal appearance. She is not ill-appearing.   HENT:      Head: Normocephalic and atraumatic.   Eyes:      General: No scleral icterus.  Cardiovascular:      Rate and Rhythm: Normal rate and regular rhythm.   Pulmonary:      Effort: Pulmonary effort is normal. No respiratory distress.   Abdominal:      General: There is no distension.      Palpations: Abdomen is soft.   Skin:     Coloration: Skin is not jaundiced.   Neurological:      General: No focal deficit present.      Mental Status: She is alert and oriented to person, place, and time.   Psychiatric:         Mood and Affect: Mood normal.         Behavior: Behavior normal.       BMP  Lab Results   Component Value Date     08/26/2022    K 5.1 08/26/2022     08/26/2022    CO2 25 08/26/2022    BUN 18 08/26/2022    CREATININE 1.4 08/26/2022    CALCIUM 8.5 (L) 08/26/2022    ANIONGAP 8 08/26/2022    ESTGFRAFRICA 30.2 (A) 07/18/2022    EGFRNONAA 26.2 (A) 07/18/2022       Lab Results   Component Value Date    WBC 13.06 (H) 08/26/2022    HGB 9.4 (L) 08/26/2022    HCT 30.4 (L) 08/26/2022    MCV 99 (H) 08/26/2022     08/26/2022     Imaging:  Per HPI    ASSESSMENT     1. Staghorn calculus    2. Other specified disorders of kidney and ureter      PLAN:     - Plan for sent removal 9/21  - Recheck BMP  - CT abd w/wo contrast in 2 months to evaluate residual stone burden and re-evaluate " renal mass     Shelby Parra MD

## 2022-09-09 ENCOUNTER — HOSPITAL ENCOUNTER (OUTPATIENT)
Dept: RADIOLOGY | Facility: HOSPITAL | Age: 82
Discharge: HOME | End: 2022-09-09
Attending: STUDENT IN AN ORGANIZED HEALTH CARE EDUCATION/TRAINING PROGRAM
Payer: MEDICARE

## 2022-09-09 DIAGNOSIS — N20.0 STAGHORN CALCULUS: ICD-10-CM

## 2022-09-09 PROCEDURE — 74018 XR KUB: ICD-10-PCS | Mod: 26,,, | Performed by: RADIOLOGY

## 2022-09-09 PROCEDURE — 74018 RADEX ABDOMEN 1 VIEW: CPT | Mod: 26,,, | Performed by: RADIOLOGY

## 2022-09-09 PROCEDURE — 74018 RADEX ABDOMEN 1 VIEW: CPT | Mod: TC,FY

## 2022-09-09 NOTE — PROGRESS NOTES
Patient ID: Jo Alegre is a 82 y.o. female.    Chief Complaint: Hypotension and Fatigue (Low blood pressure and extreme weekness. )    Ms. Alegre is a very pleasant 82 y.o patient of Dr. Alberto who presents today with her daughters for an urgent visit to address low blood pressure. She had a renal stone removed by Dr. Parra a few weeks ago and since that time she states her blood pressure has been lower than normal. She called Dr. Alberto 2 days ago and and she was started on Midodrine and her blood pressure began improving but it still remains lower than normal. She states it ranges from 70-80s/50-60s. In clinic today she is very close to her baseline. She states she has not been eating and drinking as she normally would and also she restricts sodium intake due to CHF. She denies fever or chills or any signs of infection. She states she has been weaker than normal and Dr. Alberto has placed an order home health and PT. She states she will have renal stent removed on Monday. She denies any other issues or complaints. Her daughters asked to have an order placed for home health as well.     Fatigue  This is a chronic problem. The current episode started more than 1 year ago. The problem occurs constantly. The problem has been unchanged. Associated symptoms include fatigue. Pertinent negatives include no abdominal pain, arthralgias, chest pain, congestion, fever, headaches, myalgias, nausea, rash, sore throat or vomiting. Nothing aggravates the symptoms. She has tried sleep and rest for the symptoms. The treatment provided no relief.         Past Medical History:   Diagnosis Date    Allergy     Codeine, Lasix    Atrial fibrillation     Atrial fibrillation     Cataract     CHF (congestive heart failure)     Diabetes mellitus, type 2     Ejection fraction < 50% 10/18/2017    Approximately 35%  Based on prior  Echocardiogram.    Encounter for blood transfusion     Hyperlipidemia     Osteoporosis     PONV  (postoperative nausea and vomiting)     Thyroid disorder screening 10/17/2017    TSH of 1.12 ordered by Dr. dee Jones     Past Surgical History:   Procedure Laterality Date    ANTEGRADE NEPHROSTOGRAPHY Left 8/25/2022    Procedure: NEPHROSTOGRAM, ANTEGRADE;  Surgeon: Shelby Parra MD;  Location: [facility];  Service: Urology;  Laterality: Left;    CHOLECYSTECTOMY  1997    [place]     COLONOSCOPY      CYSTOSCOPY W/ URETERAL STENT PLACEMENT Left 7/17/2022    Procedure: CYSTOSCOPY, WITH URETERAL STENT INSERTION;  Surgeon: Shelby Parra MD;  Location: [facility] OR;  Service: Urology;  Laterality: Left;    CYSTOSCOPY WITH URETEROSCOPY, RETROGRADE PYELOGRAPHY, AND INSERTION OF STENT Left 8/25/2022    Procedure: CYSTOSCOPY, WITH RETROGRADE PYELOGRAM AND URETERAL STENT INSERTION;  Surgeon: Shelby Parra MD;  Location: [facility];  Service: Urology;  Laterality: Left;    EYE SURGERY      bilateral cataracts    FINGER SURGERY Right 2021    right pinky finger    FLEXIBLE CYSTOSCOPY Left 8/25/2022    Procedure: CYSTOSCOPY, FLEXIBLE WITH STENT REMOVAL;  Surgeon: Shelby Parra MD;  Location: [facility];  Service: Urology;  Laterality: Left;    FRACTURE SURGERY  2014    right femur with neville    HEMORRHOID SURGERY      48 yrs ago    HYSTERECTOMY      NEPHROSTOMY Left 8/25/2022    Procedure: CREATION, NEPHROSTOMY;  Surgeon: Shelby Parra MD;  Location: [facility];  Service: Urology;  Laterality: Left;    PERCUTANEOUS NEPHROLITHOTOMY N/A 8/25/2022    Procedure: NEPHROLITHOTOMY, PERCUTANEOUS;  Surgeon: Shelby Parra MD;  Location: [facility];  Service: Urology;  Laterality: N/A;    REPLACEMENT OF IMPLANTABLE CARDIOVERTER-DEFIBRILLATOR (ICD) GENERATOR N/A 12/13/2019    Procedure: REPLACEMENT, PULSE GENERATOR, ICD-MEDTRONIC;  Surgeon: Sebastian Nowak III, MD;  Location: [facility];  Service: Cardiology;  Laterality: N/A;    TONSILLECTOMY           Tobacco History:  reports that she has quit smoking. She has been exposed  to tobacco smoke. She has never used smokeless tobacco.      Review of patient's allergies indicates:   Allergen Reactions    Codeine Other (See Comments)     nausea    Hydrocodone Nausea And Vomiting    Lasix [furosemide]      rash       Current Outpatient Medications:     cholecalciferol, vitamin D3, 125 mcg (5,000 unit) Tab, Take 5,000 Units by mouth 2 (two) times daily., Disp: , Rfl:     digoxin (LANOXIN) 125 mcg tablet, Take 125 mcg by mouth every Mon, Wed, Fri., Disp: , Rfl:     diltiaZEM (CARDIZEM) 60 MG tablet, Take 60 mg by mouth 2 (two) times daily., Disp: , Rfl:     gabapentin (NEURONTIN) 100 MG capsule, TAKE 2 CAPSULES(200 MG) BY MOUTH THREE TIMES DAILY (Patient taking differently: Take 200 mg by mouth 3 (three) times daily. TAKE 2 CAPSULES(200 MG) BY MOUTH THREE TIMES DAILY), Disp: 180 capsule, Rfl: 5    glimepiride (AMARYL) 1 MG tablet, Take 1 tablet (1 mg total) by mouth before breakfast., Disp: 90 tablet, Rfl: 1    latanoprost 0.005 % ophthalmic solution, Place 1 drop into both eyes nightly., Disp: , Rfl:     sacubitriL-valsartan (ENTRESTO) 24-26 mg per tablet, Take 1 tablet by mouth once daily. , Disp: , Rfl:     tamsulosin (FLOMAX) 0.4 mg Cap, Take 1 capsule (0.4 mg total) by mouth once daily., Disp: 30 capsule, Rfl: 11    traMADoL (ULTRAM) 50 mg tablet, Take 1 tablet (50 mg total) by mouth every 6 (six) hours as needed for Pain., Disp: 15 tablet, Rfl: 0    XARELTO 10 mg Tab, Take 10 mg by mouth once daily., Disp: , Rfl:     amiodarone (PACERONE) 200 MG Tab, Take 200 mg by mouth. 1/2 tab once daily with meals May take an extra dose for palpatations  Max 4 100mg tabs, Disp: , Rfl:     Ca-D3-mag ox-zinc--thom-bor 600 mg calcium- 20 mcg-50 mg Tab, Take 1 tablet by mouth 2 (two) times daily., Disp: , Rfl:     denosumab (PROLIA) 60 mg/mL Syrg, Inject 1 mL (60 mg total) into the skin every 6 (six) months., Disp: 2 mL, Rfl: 1    midodrine (PROAMATINE) 2.5 MG Tab, Take 2 tablets (5 mg total) by mouth 3  (three) times daily., Disp: 30 tablet, Rfl: 0    rivaroxaban (XARELTO) 15 mg Tab, Take 15 mg by mouth daily with dinner or evening meal., Disp: , Rfl:     SLOW RELEASE IRON 144 mg (45 mg iron) TbSR, Take 1 tablet by mouth once daily., Disp: , Rfl:     torsemide (DEMADEX) 20 MG Tab, Take 1 tablet (20 mg total) by mouth once daily. for 14 days (Patient taking differently: Take 20 mg by mouth 3 (three) times a week.), Disp: 14 tablet, Rfl: 0  No current facility-administered medications for this visit.    Facility-Administered Medications Ordered in Other Visits:     0.9%  NaCl infusion, , Intravenous, Continuous, Sebastian Nowak III, MD, Last Rate: 75 mL/hr at 12/13/19 0728, New Bag at 12/13/19 0728    diphenhydrAMINE injection 25 mg, 25 mg, Intravenous, Once, Sebastian Nowak III, MD    lorazepam injection 1 mg, 1 mg, Intravenous, Once, Sebastian Nowak III, MD    Review of Systems   Constitutional:  Positive for fatigue. Negative for activity change, appetite change, fever and unexpected weight change.   HENT:  Negative for congestion, mouth sores, nosebleeds, postnasal drip, rhinorrhea, sinus pressure, sinus pain, sneezing, sore throat and trouble swallowing.    Eyes:  Negative for pain, redness and itching.   Respiratory:  Negative for chest tightness and shortness of breath.    Cardiovascular:  Negative for chest pain and palpitations.   Gastrointestinal:  Negative for abdominal pain, blood in stool, constipation, diarrhea, nausea and vomiting.   Endocrine: Negative for cold intolerance, heat intolerance, polydipsia, polyphagia and polyuria.   Genitourinary:  Negative for difficulty urinating, dysuria, flank pain, frequency, genital sores, menstrual problem, urgency, vaginal bleeding and vaginal discharge.   Musculoskeletal:  Negative for arthralgias and myalgias.   Skin:  Negative for rash and wound.   Allergic/Immunologic: Negative for environmental allergies and food allergies.   Neurological:  Negative for dizziness,  "light-headedness and headaches.   Hematological:  Negative for adenopathy. Does not bruise/bleed easily.   Psychiatric/Behavioral:  Negative for agitation, confusion, hallucinations, self-injury and sleep disturbance. The patient is not nervous/anxious.         Objective:      Vitals:    09/08/22 1440   BP: (!) 118/58   Pulse: 67   Resp: 17   Temp: 97.5 °F (36.4 °C)   TempSrc: Oral   SpO2: 98%   Weight: 97.1 kg (214 lb 1.6 oz)   Height: 5' 9" (1.753 m)     Physical Exam  Vitals reviewed.   Constitutional:       General: She is not in acute distress.     Appearance: Normal appearance. She is obese. She is ill-appearing. She is not toxic-appearing or diaphoretic.   HENT:      Head: Normocephalic and atraumatic.      Right Ear: Tympanic membrane and external ear normal. There is no impacted cerumen.      Left Ear: Tympanic membrane and external ear normal. There is no impacted cerumen.      Nose: Nose normal. No congestion or rhinorrhea.      Mouth/Throat:      Mouth: Mucous membranes are moist.      Pharynx: Oropharynx is clear. No oropharyngeal exudate or posterior oropharyngeal erythema.   Eyes:      General: No scleral icterus.        Right eye: No discharge.         Left eye: No discharge.      Extraocular Movements: Extraocular movements intact.      Conjunctiva/sclera: Conjunctivae normal.      Pupils: Pupils are equal, round, and reactive to light.   Neck:      Vascular: No carotid bruit.   Cardiovascular:      Rate and Rhythm: Normal rate and regular rhythm.      Pulses: Normal pulses.      Heart sounds: Normal heart sounds. No murmur heard.    No friction rub. No gallop.   Pulmonary:      Effort: Pulmonary effort is normal. No respiratory distress.      Breath sounds: Normal breath sounds. No stridor. No rales.   Chest:      Chest wall: No tenderness.   Abdominal:      General: Abdomen is flat and protuberant. Bowel sounds are normal.      Palpations: Abdomen is soft. There is no mass.      Tenderness: There " is no abdominal tenderness. There is no guarding.   Musculoskeletal:         General: No swelling or signs of injury. Normal range of motion.      Cervical back: Normal range of motion and neck supple. No rigidity or tenderness.      Right lower le+ Edema present.      Left lower le+ Edema present.   Skin:     General: Skin is warm and dry.      Capillary Refill: Capillary refill takes less than 2 seconds.      Findings: No bruising, lesion or rash.   Neurological:      General: No focal deficit present.      Mental Status: She is alert and oriented to person, place, and time. Mental status is at baseline.      Motor: No weakness.      Coordination: Coordination normal.   Psychiatric:         Mood and Affect: Mood normal.         Behavior: Behavior normal.         Thought Content: Thought content normal.         Judgment: Judgment normal.         Assessment:       1. Hypotension, unspecified hypotension type    2. Weakness of both legs           Plan:       Hypotension, unspecified hypotension type  -     midodrine (PROAMATINE) 2.5 MG Tab; Take 2 tablets (5 mg total) by mouth 3 (three) times daily.  Dispense: 30 tablet; Refill: 0    Weakness of both legs  -     Cancel: Ambulatory referral/consult to Home Health; Future; Expected date: 2022  -     midodrine (PROAMATINE) 2.5 MG Tab; Take 2 tablets (5 mg total) by mouth 3 (three) times daily.  Dispense: 30 tablet; Refill: 0    Please keep scheduled follow up and notify clinic if blood pressure worsens or if patient acutely becomes ill. I have asked patient and family to submit blood pressure logs to monitor improvement over time.         2022 JOSE ARMANDO Macias, CHRYSTALC

## 2022-09-12 ENCOUNTER — OFFICE VISIT (OUTPATIENT)
Dept: UROLOGY | Facility: CLINIC | Age: 82
End: 2022-09-12
Payer: MEDICARE

## 2022-09-12 VITALS
RESPIRATION RATE: 18 BRPM | BODY MASS INDEX: 31.7 KG/M2 | SYSTOLIC BLOOD PRESSURE: 131 MMHG | HEIGHT: 69 IN | WEIGHT: 214 LBS | DIASTOLIC BLOOD PRESSURE: 52 MMHG | HEART RATE: 70 BPM

## 2022-09-12 DIAGNOSIS — N28.89 OTHER SPECIFIED DISORDERS OF KIDNEY AND URETER: ICD-10-CM

## 2022-09-12 DIAGNOSIS — N20.0 STAGHORN CALCULUS: Primary | ICD-10-CM

## 2022-09-12 PROCEDURE — 3075F SYST BP GE 130 - 139MM HG: CPT | Mod: CPTII,S$GLB,, | Performed by: STUDENT IN AN ORGANIZED HEALTH CARE EDUCATION/TRAINING PROGRAM

## 2022-09-12 PROCEDURE — 3288F PR FALLS RISK ASSESSMENT DOCUMENTED: ICD-10-PCS | Mod: CPTII,S$GLB,, | Performed by: STUDENT IN AN ORGANIZED HEALTH CARE EDUCATION/TRAINING PROGRAM

## 2022-09-12 PROCEDURE — 99999 PR PBB SHADOW E&M-EST. PATIENT-LVL V: CPT | Mod: PBBFAC,,, | Performed by: STUDENT IN AN ORGANIZED HEALTH CARE EDUCATION/TRAINING PROGRAM

## 2022-09-12 PROCEDURE — 3075F PR MOST RECENT SYSTOLIC BLOOD PRESS GE 130-139MM HG: ICD-10-PCS | Mod: CPTII,S$GLB,, | Performed by: STUDENT IN AN ORGANIZED HEALTH CARE EDUCATION/TRAINING PROGRAM

## 2022-09-12 PROCEDURE — 99024 PR POST-OP FOLLOW-UP VISIT: ICD-10-PCS | Mod: S$GLB,,, | Performed by: STUDENT IN AN ORGANIZED HEALTH CARE EDUCATION/TRAINING PROGRAM

## 2022-09-12 PROCEDURE — 99999 PR PBB SHADOW E&M-EST. PATIENT-LVL V: ICD-10-PCS | Mod: PBBFAC,,, | Performed by: STUDENT IN AN ORGANIZED HEALTH CARE EDUCATION/TRAINING PROGRAM

## 2022-09-12 PROCEDURE — 1159F PR MEDICATION LIST DOCUMENTED IN MEDICAL RECORD: ICD-10-PCS | Mod: CPTII,S$GLB,, | Performed by: STUDENT IN AN ORGANIZED HEALTH CARE EDUCATION/TRAINING PROGRAM

## 2022-09-12 PROCEDURE — 3078F PR MOST RECENT DIASTOLIC BLOOD PRESSURE < 80 MM HG: ICD-10-PCS | Mod: CPTII,S$GLB,, | Performed by: STUDENT IN AN ORGANIZED HEALTH CARE EDUCATION/TRAINING PROGRAM

## 2022-09-12 PROCEDURE — 3078F DIAST BP <80 MM HG: CPT | Mod: CPTII,S$GLB,, | Performed by: STUDENT IN AN ORGANIZED HEALTH CARE EDUCATION/TRAINING PROGRAM

## 2022-09-12 PROCEDURE — 3288F FALL RISK ASSESSMENT DOCD: CPT | Mod: CPTII,S$GLB,, | Performed by: STUDENT IN AN ORGANIZED HEALTH CARE EDUCATION/TRAINING PROGRAM

## 2022-09-12 PROCEDURE — 1126F PR PAIN SEVERITY QUANTIFIED, NO PAIN PRESENT: ICD-10-PCS | Mod: CPTII,S$GLB,, | Performed by: STUDENT IN AN ORGANIZED HEALTH CARE EDUCATION/TRAINING PROGRAM

## 2022-09-12 PROCEDURE — 1126F AMNT PAIN NOTED NONE PRSNT: CPT | Mod: CPTII,S$GLB,, | Performed by: STUDENT IN AN ORGANIZED HEALTH CARE EDUCATION/TRAINING PROGRAM

## 2022-09-12 PROCEDURE — 1101F PR PT FALLS ASSESS DOC 0-1 FALLS W/OUT INJ PAST YR: ICD-10-PCS | Mod: CPTII,S$GLB,, | Performed by: STUDENT IN AN ORGANIZED HEALTH CARE EDUCATION/TRAINING PROGRAM

## 2022-09-12 PROCEDURE — 1159F MED LIST DOCD IN RCRD: CPT | Mod: CPTII,S$GLB,, | Performed by: STUDENT IN AN ORGANIZED HEALTH CARE EDUCATION/TRAINING PROGRAM

## 2022-09-12 PROCEDURE — 1101F PT FALLS ASSESS-DOCD LE1/YR: CPT | Mod: CPTII,S$GLB,, | Performed by: STUDENT IN AN ORGANIZED HEALTH CARE EDUCATION/TRAINING PROGRAM

## 2022-09-12 PROCEDURE — 99024 POSTOP FOLLOW-UP VISIT: CPT | Mod: S$GLB,,, | Performed by: STUDENT IN AN ORGANIZED HEALTH CARE EDUCATION/TRAINING PROGRAM

## 2022-09-14 ENCOUNTER — LAB VISIT (OUTPATIENT)
Dept: LAB | Facility: HOSPITAL | Age: 82
End: 2022-09-14
Attending: STUDENT IN AN ORGANIZED HEALTH CARE EDUCATION/TRAINING PROGRAM
Payer: MEDICARE

## 2022-09-14 ENCOUNTER — PATIENT MESSAGE (OUTPATIENT)
Dept: FAMILY MEDICINE | Facility: CLINIC | Age: 82
End: 2022-09-14

## 2022-09-14 DIAGNOSIS — N28.89 OTHER SPECIFIED DISORDERS OF KIDNEY AND URETER: ICD-10-CM

## 2022-09-14 DIAGNOSIS — N18.32 CHRONIC KIDNEY DISEASE (CKD) STAGE G3B/A1, MODERATELY DECREASED GLOMERULAR FILTRATION RATE (GFR) BETWEEN 30-44 ML/MIN/1.73 SQUARE METER AND ALBUMINURIA CREATININE RATIO LESS THAN 30 MG/G: Primary | ICD-10-CM

## 2022-09-14 LAB
ANION GAP SERPL CALC-SCNC: 8 MMOL/L (ref 8–16)
BUN SERPL-MCNC: 23 MG/DL (ref 8–23)
CALCIUM SERPL-MCNC: 9.5 MG/DL (ref 8.7–10.5)
CHLORIDE SERPL-SCNC: 103 MMOL/L (ref 95–110)
CO2 SERPL-SCNC: 27 MMOL/L (ref 23–29)
CREAT SERPL-MCNC: 1.9 MG/DL (ref 0.5–1.4)
EST. GFR  (NO RACE VARIABLE): 26 ML/MIN/1.73 M^2
GLUCOSE SERPL-MCNC: 143 MG/DL (ref 70–110)
POTASSIUM SERPL-SCNC: 4.7 MMOL/L (ref 3.5–5.1)
SODIUM SERPL-SCNC: 138 MMOL/L (ref 136–145)

## 2022-09-14 PROCEDURE — 80048 BASIC METABOLIC PNL TOTAL CA: CPT | Performed by: STUDENT IN AN ORGANIZED HEALTH CARE EDUCATION/TRAINING PROGRAM

## 2022-09-14 PROCEDURE — 36415 COLL VENOUS BLD VENIPUNCTURE: CPT | Performed by: STUDENT IN AN ORGANIZED HEALTH CARE EDUCATION/TRAINING PROGRAM

## 2022-09-21 ENCOUNTER — HOSPITAL ENCOUNTER (OUTPATIENT)
Facility: AMBULARY SURGERY CENTER | Age: 82
Discharge: HOME | End: 2022-09-21
Attending: STUDENT IN AN ORGANIZED HEALTH CARE EDUCATION/TRAINING PROGRAM | Admitting: STUDENT IN AN ORGANIZED HEALTH CARE EDUCATION/TRAINING PROGRAM
Payer: MEDICARE

## 2022-09-21 DIAGNOSIS — N20.0 KIDNEY STONE: ICD-10-CM

## 2022-09-21 DIAGNOSIS — N20.0 STAGHORN CALCULUS: Primary | ICD-10-CM

## 2022-09-21 PROCEDURE — G0180 PR HOME HEALTH MD CERTIFICATION: ICD-10-PCS | Mod: ,,, | Performed by: NURSE PRACTITIONER

## 2022-09-21 PROCEDURE — G0180 MD CERTIFICATION HHA PATIENT: HCPCS | Mod: ,,, | Performed by: NURSE PRACTITIONER

## 2022-09-21 PROCEDURE — 52310 CYSTOSCOPY AND TREATMENT: CPT | Performed by: STUDENT IN AN ORGANIZED HEALTH CARE EDUCATION/TRAINING PROGRAM

## 2022-09-21 PROCEDURE — 52310 CYSTOSCOPY AND TREATMENT: CPT | Mod: 58,,, | Performed by: STUDENT IN AN ORGANIZED HEALTH CARE EDUCATION/TRAINING PROGRAM

## 2022-09-21 PROCEDURE — 52310 PR CYSTOSCOPY,REMV CALCULUS,SIMPLE: ICD-10-PCS | Mod: 58,,, | Performed by: STUDENT IN AN ORGANIZED HEALTH CARE EDUCATION/TRAINING PROGRAM

## 2022-09-21 RX ORDER — WATER 1 ML/ML
IRRIGANT IRRIGATION
Status: DISCONTINUED | OUTPATIENT
Start: 2022-09-21 | End: 2022-09-21 | Stop reason: HOSPADM

## 2022-09-21 RX ORDER — LIDOCAINE HYDROCHLORIDE 20 MG/ML
JELLY TOPICAL
Status: DISCONTINUED | OUTPATIENT
Start: 2022-09-21 | End: 2022-09-21 | Stop reason: HOSPADM

## 2022-09-21 RX ORDER — SULFAMETHOXAZOLE AND TRIMETHOPRIM 800; 160 MG/1; MG/1
1 TABLET ORAL 2 TIMES DAILY
Qty: 4 TABLET | Refills: 0 | Status: SHIPPED | OUTPATIENT
Start: 2022-09-21 | End: 2022-09-23

## 2022-09-21 RX ORDER — LIDOCAINE HYDROCHLORIDE 20 MG/ML
JELLY TOPICAL
Status: DISCONTINUED
Start: 2022-09-21 | End: 2022-09-21 | Stop reason: HOSPADM

## 2022-09-21 NOTE — OP NOTE
[facility]  Operative Note    Date: 09/21/2022    Pre-Op Diagnosis:   - Staghorn calculus s/p PCNL    Patient Active Problem List   Diagnosis    Essential hypertension    Type 2 diabetes mellitus with diabetic nephropathy, without long-term current use of insulin    Mixed dyslipidemia    Cardiomyopathy with implantable cardioverter-defibrillator    Ejection fraction < 50%    Encounter for screening mammogram for breast cancer    Neuropathy of both feet    Asymptomatic menopause    Chronic anticoagulation    Stage 3 chronic kidney disease    Low back pain, non-specific    Difficulty walking    Paroxysmal atrial fibrillation    Osteoporosis with current pathological fracture    Osteoporosis, postmenopausal    Acute midline low back pain without sciatica    Elevated blood sugar    Screening for breast cancer    Gait instability    Complications, mechanical, pacemaker, cardiac    Presence of automatic (implantable) cardiac defibrillator    Chronic combined systolic and diastolic congestive heart failure    COPD (chronic obstructive pulmonary disease) per patient    Chronic respiratory failure    Obesity    Obstructive sleep apnea syndrome    Pulmonary hypertension    Left-sided chest pain    Cough    Hypoxia    Urge incontinence    Acute pyelonephritis    Staghorn calculus       Post-Op Diagnosis: same    Procedure(s) Performed:   1.  Cystoscopy with left stent removal    Specimen(s): none    Staff Surgeon: Shelby Parra MD    Anesthesia: Local anesthesia topical 2% lidocaine gel    Indications: Jo Alegre is a 82 y.o. female with an indwelling stent following left PNCL.     Findings:   Uncomplicated left stent removal     Estimated Blood Loss: min    Procedure in Detail:  After risks, benefits and possible complications of cystoscopy were explained, the patient elected to undergo the procedure and informed consent was obtained. All questions were answered in the patrick-operative area. The  patient was transferred to the cystoscopy suite and placed in the dorsal lithotomy position and prepped and draped in the usual sterile fashion.  Time out was performed.     A flexible cystoscope was introduced into the bladder per urethra. This passed easily.  The entire urethra was visualized which showed no masses or strictures.  The stent was grasped with graspers and removed in its entirety.     The patient tolerated the procedure well and was transferred to recovery in stable condition.    Disposition:  The patient will follow up in 2 months with a CT.      Shelby Parra MD

## 2022-09-21 NOTE — DISCHARGE SUMMARY
[facility]  Discharge Note  Short Stay    Admit Date: 9/21/2022    Discharge Date and Time: 09/21/2022 10:28 AM      Attending Physician: Shelby Parra MD     Discharge Provider: Shelby Parra    Diagnoses:  Active Hospital Problems    Diagnosis  POA    Pulmonary hypertension [I27.20]  Yes     PA pressure 37 on ECHO (4/2020)  Severe LA atrial enlargement suggesting group 2  Obesity likely playing a role  JENNIFER      Obstructive sleep apnea syndrome [G47.33]  Yes     Previously diagnosed but was not able to wear CPAP      COPD (chronic obstructive pulmonary disease) per patient [J44.9]  Yes    Paroxysmal atrial fibrillation [I48.0]  Yes    Stage 3 chronic kidney disease [N18.30]  Yes    Essential hypertension [I10]  Yes    Type 2 diabetes mellitus with diabetic nephropathy, without long-term current use of insulin [E11.21]  Yes    Mixed dyslipidemia [E78.2]  Yes      Resolved Hospital Problems   No resolved problems to display.       Discharged Condition: good    Hospital Course: Patient was admitted for cysto stent removal and tolerated the procedure well with no complications. The patient was discharged home in good condition on the same day.       Final Diagnoses: Same as principal problem.    Disposition: Home or Self Care    Follow up/Patient Instructions:    Medications:  Reconciled Home Medications:   Current Discharge Medication List        START taking these medications    Details   sulfamethoxazole-trimethoprim 800-160mg (BACTRIM DS) 800-160 mg Tab Take 1 tablet by mouth 2 (two) times daily. for 2 days  Qty: 4 tablet, Refills: 0           CONTINUE these medications which have NOT CHANGED    Details   amiodarone (PACERONE) 200 MG Tab Take 200 mg by mouth. 1/2 tab once daily with meals  May take an extra dose for palpatations   Max 4 100mg tabs      Ca-D3-mag ox-zinc--thom-bor 600 mg calcium- 20 mcg-50 mg Tab Take 1 tablet by mouth 2 (two) times daily.      cholecalciferol, vitamin D3,  125 mcg (5,000 unit) Tab Take 5,000 Units by mouth 2 (two) times daily.      denosumab (PROLIA) 60 mg/mL Syrg Inject 1 mL (60 mg total) into the skin every 6 (six) months.  Qty: 2 mL, Refills: 1    Associated Diagnoses: Osteoporosis, postmenopausal      digoxin (LANOXIN) 125 mcg tablet Take 125 mcg by mouth every Mon, Wed, Fri.      diltiaZEM (CARDIZEM) 60 MG tablet Take 60 mg by mouth 2 (two) times daily.      gabapentin (NEURONTIN) 100 MG capsule TAKE 2 CAPSULES(200 MG) BY MOUTH THREE TIMES DAILY  Qty: 180 capsule, Refills: 5    Associated Diagnoses: Neuropathy of both feet; Acute right-sided thoracic back pain      glimepiride (AMARYL) 1 MG tablet Take 1 tablet (1 mg total) by mouth before breakfast.  Qty: 90 tablet, Refills: 1    Associated Diagnoses: Elevated blood sugar      latanoprost 0.005 % ophthalmic solution Place 1 drop into both eyes nightly.      midodrine (PROAMATINE) 2.5 MG Tab Take 2 tablets (5 mg total) by mouth 3 (three) times daily.  Qty: 30 tablet, Refills: 0    Associated Diagnoses: Weakness of both legs; Hypotension, unspecified hypotension type      !! rivaroxaban (XARELTO) 15 mg Tab Take 15 mg by mouth daily with dinner or evening meal.      sacubitriL-valsartan (ENTRESTO) 24-26 mg per tablet Take 1 tablet by mouth once daily.       SLOW RELEASE IRON 144 mg (45 mg iron) TbSR Take 1 tablet by mouth once daily.      tamsulosin (FLOMAX) 0.4 mg Cap Take 1 capsule (0.4 mg total) by mouth once daily.  Qty: 30 capsule, Refills: 11      torsemide (DEMADEX) 20 MG Tab Take 1 tablet (20 mg total) by mouth once daily. for 14 days  Qty: 14 tablet, Refills: 0    Comments: .      traMADoL (ULTRAM) 50 mg tablet Take 1 tablet (50 mg total) by mouth every 6 (six) hours as needed for Pain.  Qty: 15 tablet, Refills: 0    Comments: n/a       !! XARELTO 10 mg Tab Take 10 mg by mouth once daily.       !! - Potential duplicate medications found. Please discuss with provider.        Discharge Procedure Orders    Call MD for:  temperature >100.4     Activity as tolerated      Follow-up Information       Shelby Parra MD Follow up in 2 month(s).    Specialty: Urology  Why: with CT  Contact information:  [address] [place] [address] [address]IVE  [address]  [facility] [address] [phone]                               Shelby Parra MD  Urology Department

## 2022-09-22 VITALS
BODY MASS INDEX: 31.7 KG/M2 | WEIGHT: 214 LBS | HEART RATE: 72 BPM | HEIGHT: 69 IN | TEMPERATURE: 98 F | DIASTOLIC BLOOD PRESSURE: 57 MMHG | RESPIRATION RATE: 18 BRPM | SYSTOLIC BLOOD PRESSURE: 152 MMHG | OXYGEN SATURATION: 100 %

## 2022-10-12 ENCOUNTER — OFFICE VISIT (OUTPATIENT)
Dept: FAMILY MEDICINE | Facility: CLINIC | Age: 82
End: 2022-10-12
Payer: MEDICARE

## 2022-10-12 ENCOUNTER — PATIENT MESSAGE (OUTPATIENT)
Dept: FAMILY MEDICINE | Facility: CLINIC | Age: 82
End: 2022-10-12

## 2022-10-12 VITALS
RESPIRATION RATE: 18 BRPM | DIASTOLIC BLOOD PRESSURE: 62 MMHG | SYSTOLIC BLOOD PRESSURE: 114 MMHG | HEART RATE: 86 BPM | WEIGHT: 217 LBS | HEIGHT: 69 IN | BODY MASS INDEX: 32.14 KG/M2

## 2022-10-12 DIAGNOSIS — I48.0 PAROXYSMAL ATRIAL FIBRILLATION: Chronic | ICD-10-CM

## 2022-10-12 DIAGNOSIS — Z23 NEED FOR INFLUENZA VACCINATION: Chronic | ICD-10-CM

## 2022-10-12 DIAGNOSIS — R73.9 ELEVATED BLOOD SUGAR: ICD-10-CM

## 2022-10-12 DIAGNOSIS — D64.9 NORMOCHROMIC NORMOCYTIC ANEMIA: ICD-10-CM

## 2022-10-12 DIAGNOSIS — I10 ESSENTIAL HYPERTENSION: ICD-10-CM

## 2022-10-12 DIAGNOSIS — I42.9 CARDIOMYOPATHY WITH IMPLANTABLE CARDIOVERTER-DEFIBRILLATOR: ICD-10-CM

## 2022-10-12 DIAGNOSIS — I50.42 CHRONIC COMBINED SYSTOLIC AND DIASTOLIC CONGESTIVE HEART FAILURE: Primary | Chronic | ICD-10-CM

## 2022-10-12 DIAGNOSIS — N20.0 STAGHORN RENAL CALCULUS: ICD-10-CM

## 2022-10-12 DIAGNOSIS — N18.32 STAGE 3B CHRONIC KIDNEY DISEASE: ICD-10-CM

## 2022-10-12 DIAGNOSIS — Z95.810 CARDIOMYOPATHY WITH IMPLANTABLE CARDIOVERTER-DEFIBRILLATOR: ICD-10-CM

## 2022-10-12 DIAGNOSIS — Z79.01 CHRONIC ANTICOAGULATION: Chronic | ICD-10-CM

## 2022-10-12 PROCEDURE — 1101F PT FALLS ASSESS-DOCD LE1/YR: CPT | Mod: CPTII,S$GLB,, | Performed by: INTERNAL MEDICINE

## 2022-10-12 PROCEDURE — 99214 PR OFFICE/OUTPT VISIT, EST, LEVL IV, 30-39 MIN: ICD-10-PCS | Mod: 25,S$GLB,, | Performed by: INTERNAL MEDICINE

## 2022-10-12 PROCEDURE — 1101F PR PT FALLS ASSESS DOC 0-1 FALLS W/OUT INJ PAST YR: ICD-10-PCS | Mod: CPTII,S$GLB,, | Performed by: INTERNAL MEDICINE

## 2022-10-12 PROCEDURE — 3288F FALL RISK ASSESSMENT DOCD: CPT | Mod: CPTII,S$GLB,, | Performed by: INTERNAL MEDICINE

## 2022-10-12 PROCEDURE — 1126F AMNT PAIN NOTED NONE PRSNT: CPT | Mod: CPTII,S$GLB,, | Performed by: INTERNAL MEDICINE

## 2022-10-12 PROCEDURE — 90662 IIV NO PRSV INCREASED AG IM: CPT | Mod: S$GLB,,, | Performed by: INTERNAL MEDICINE

## 2022-10-12 PROCEDURE — 3288F PR FALLS RISK ASSESSMENT DOCUMENTED: ICD-10-PCS | Mod: CPTII,S$GLB,, | Performed by: INTERNAL MEDICINE

## 2022-10-12 PROCEDURE — 3078F DIAST BP <80 MM HG: CPT | Mod: CPTII,S$GLB,, | Performed by: INTERNAL MEDICINE

## 2022-10-12 PROCEDURE — 1160F RVW MEDS BY RX/DR IN RCRD: CPT | Mod: CPTII,S$GLB,, | Performed by: INTERNAL MEDICINE

## 2022-10-12 PROCEDURE — 1126F PR PAIN SEVERITY QUANTIFIED, NO PAIN PRESENT: ICD-10-PCS | Mod: CPTII,S$GLB,, | Performed by: INTERNAL MEDICINE

## 2022-10-12 PROCEDURE — 1159F PR MEDICATION LIST DOCUMENTED IN MEDICAL RECORD: ICD-10-PCS | Mod: CPTII,S$GLB,, | Performed by: INTERNAL MEDICINE

## 2022-10-12 PROCEDURE — 90662 FLU VACCINE - QUADRIVALENT - HIGH DOSE (65+) PRESERVATIVE FREE IM: ICD-10-PCS | Mod: S$GLB,,, | Performed by: INTERNAL MEDICINE

## 2022-10-12 PROCEDURE — G0008 FLU VACCINE - QUADRIVALENT - HIGH DOSE (65+) PRESERVATIVE FREE IM: ICD-10-PCS | Mod: S$GLB,,, | Performed by: INTERNAL MEDICINE

## 2022-10-12 PROCEDURE — 1159F MED LIST DOCD IN RCRD: CPT | Mod: CPTII,S$GLB,, | Performed by: INTERNAL MEDICINE

## 2022-10-12 PROCEDURE — 3078F PR MOST RECENT DIASTOLIC BLOOD PRESSURE < 80 MM HG: ICD-10-PCS | Mod: CPTII,S$GLB,, | Performed by: INTERNAL MEDICINE

## 2022-10-12 PROCEDURE — 3074F SYST BP LT 130 MM HG: CPT | Mod: CPTII,S$GLB,, | Performed by: INTERNAL MEDICINE

## 2022-10-12 PROCEDURE — 3074F PR MOST RECENT SYSTOLIC BLOOD PRESSURE < 130 MM HG: ICD-10-PCS | Mod: CPTII,S$GLB,, | Performed by: INTERNAL MEDICINE

## 2022-10-12 PROCEDURE — 99214 OFFICE O/P EST MOD 30 MIN: CPT | Mod: 25,S$GLB,, | Performed by: INTERNAL MEDICINE

## 2022-10-12 PROCEDURE — G0008 ADMIN INFLUENZA VIRUS VAC: HCPCS | Mod: S$GLB,,, | Performed by: INTERNAL MEDICINE

## 2022-10-12 PROCEDURE — 1160F PR REVIEW ALL MEDS BY PRESCRIBER/CLIN PHARMACIST DOCUMENTED: ICD-10-PCS | Mod: CPTII,S$GLB,, | Performed by: INTERNAL MEDICINE

## 2022-10-12 NOTE — PROGRESS NOTES
Subjective:       Patient ID: Jo Alegre is a 82 y.o. female.    Chief Complaint: Congestive Heart Failure, Diabetes, Atrial Fibrillation, Sleep Apnea, Memory Loss, and Nephrolithiasis    Miss Jo Palm is 82-year-old  female who comes for follow-up.  She is accompanied with her daughter.  Medical issues are as below:-    Recently she was diagnosed with staghorn calculus which was ultimately removed.  She never had any symptoms and past at least on the left side.  She always had chronic back pain on the right side.  Urine stone analysis had shown approximately 80% magnesium ammonium phosphate and 20% calcium phosphate.    She is also concerned about memory issues.  This has been going on for the last few months or so.    As far as heart failure is concerned, her last BNP level was in 500s as ordered by Dr. Jones.  Currently she is following up with Dr. Lopez.  Her levels have been greater than 1000 in past.  She is currently on Entresto.  24-26 mg    Her last creatinine was 1.9 indicating probably over diuresis.  This needs to be checked again.    She continues on digoxin.  She also continues on Xarelto.    She is on a low-dose midodrine for raising her blood pressures.    As far as blood sugars are concerned, she continues on glimepiride.  Last hemoglobin A1c is 5.7.  For the last couple of years there is no hemoglobin A1c which is greater than 6.5 and has for the diagnosis of diabetes mellitus type 2 will be deleted.    As far as memory is concerned, as per daughter she seems to have been some gaps and difficulty recalling as to what is going on.  No history of fall or trauma.  Previously her memory was considered to be good.  She does admit to some degree of stress because of multiple medical and psychosocial issues.    Congestive Heart Failure  Presents for follow-up visit. Associated symptoms include edema. Pertinent negatives include no chest pressure, claudication, near-syncope, palpitations  or unexpected weight change (gained wt 5 lbs ?). The symptoms have been stable. Compliance with diet is 51-75%. Compliance with exercise is 26-50%. Compliance with medications is %.   Atrial Fibrillation  Symptoms are negative for palpitations. Past medical history includes atrial fibrillation and CHF.     Past Medical History:   Diagnosis Date    Allergy     Codeine, Lasix    Atrial fibrillation     Atrial fibrillation     Cataract     CHF (congestive heart failure)     Diabetes mellitus, type 2     Ejection fraction < 50% 10/18/2017    Approximately 35%  Based on prior  Echocardiogram.    Encounter for blood transfusion     Hyperlipidemia     Osteoporosis     PONV (postoperative nausea and vomiting)     Thyroid disorder screening 10/17/2017    TSH of 1.12 ordered by Dr. dee Jones     Social History     Socioeconomic History    Marital status:     Number of children: 4   Occupational History    Occupation:    Tobacco Use    Smoking status: Former     Passive exposure: Past    Smokeless tobacco: Never    Tobacco comments:     quit 2013   Substance and Sexual Activity    Alcohol use: No    Drug use: No    Sexual activity: Not Currently   Social History Narrative    - Drives and lives alone.     Social Determinants of Health     Financial Resource Strain: Low Risk     Difficulty of Paying Living Expenses: Not very hard   Food Insecurity: No Food Insecurity    Worried About Running Out of Food in the Last Year: Never true    Ran Out of Food in the Last Year: Never true   Transportation Needs: No Transportation Needs    Lack of Transportation (Medical): No    Lack of Transportation (Non-Medical): No   Physical Activity: Inactive    Days of Exercise per Week: 0 days    Minutes of Exercise per Session: 0 min   Stress: Stress Concern Present    Feeling of Stress : To some extent   Social Connections: Moderately Isolated    Frequency of Communication with Friends and Family: Three times a  week    Frequency of Social Gatherings with Friends and Family: Once a week    Attends [employer] Services: 1 to 4 times per year    Active Member of Clubs or Organizations: No    Attends Club or Organization Meetings: Never    Marital Status:    Housing Stability: Low Risk     Unable to Pay for Housing in the Last Year: No    Number of Places Lived in the Last Year: 1    Unstable Housing in the Last Year: No     Past Surgical History:   Procedure Laterality Date    ANTEGRADE NEPHROSTOGRAPHY Left 8/25/2022    Procedure: NEPHROSTOGRAM, ANTEGRADE;  Surgeon: Shelby Parra MD;  Location: [facility];  Service: Urology;  Laterality: Left;    CHOLECYSTECTOMY  1997    [place]     COLONOSCOPY      CYSTOSCOPY W/ URETERAL STENT PLACEMENT Left 7/17/2022    Procedure: CYSTOSCOPY, WITH URETERAL STENT INSERTION;  Surgeon: Shelby Parra MD;  Location: [facility];  Service: Urology;  Laterality: Left;    CYSTOSCOPY W/ URETERAL STENT REMOVAL Left 9/21/2022    Procedure: CYSTOSCOPY, WITH URETERAL STENT REMOVAL;  Surgeon: Shelby Parra MD;  Location: [facility];  Service: Urology;  Laterality: Left;    CYSTOSCOPY WITH URETEROSCOPY, RETROGRADE PYELOGRAPHY, AND INSERTION OF STENT Left 8/25/2022    Procedure: CYSTOSCOPY, WITH RETROGRADE PYELOGRAM AND URETERAL STENT INSERTION;  Surgeon: Shelby Parra MD;  Location: [facility];  Service: Urology;  Laterality: Left;    EYE SURGERY      bilateral cataracts    FINGER SURGERY Right 2021    right pinky finger    FLEXIBLE CYSTOSCOPY Left 8/25/2022    Procedure: CYSTOSCOPY, FLEXIBLE WITH STENT REMOVAL;  Surgeon: Shelby Parra MD;  Location: [facility];  Service: Urology;  Laterality: Left;    FRACTURE SURGERY  2014    right femur with neville    HEMORRHOID SURGERY      48 yrs ago    HYSTERECTOMY      NEPHROSTOMY Left 8/25/2022    Procedure: CREATION, NEPHROSTOMY;  Surgeon: Shelby Parra MD;  Location: [facility];  Service: Urology;  Laterality: Left;    PERCUTANEOUS  NEPHROLITHOTOMY N/A 8/25/2022    Procedure: NEPHROLITHOTOMY, PERCUTANEOUS;  Surgeon: Shelby Parra MD;  Location: [facility];  Service: Urology;  Laterality: N/A;    REPLACEMENT OF IMPLANTABLE CARDIOVERTER-DEFIBRILLATOR (ICD) GENERATOR N/A 12/13/2019    Procedure: REPLACEMENT, PULSE GENERATOR, ICD-MEDTRONIC;  Surgeon: Sebastina Nowak III, MD;  Location: [facility];  Service: Cardiology;  Laterality: N/A;    TONSILLECTOMY       Family History   Problem Relation Age of Onset    Heart disease Mother     Cancer Father         Lung Cancer ??? Asbestos    Breast cancer Paternal Aunt     Breast cancer Maternal Grandmother     Breast cancer Maternal Aunt        Review of Systems   Constitutional:  Positive for activity change (Somewhat more cautious while walking). Negative for appetite change, fever and unexpected weight change (gained wt 5 lbs ?).   HENT:  Negative for ear discharge and postnasal drip.    Eyes:  Negative for discharge, redness and visual disturbance.   Respiratory:  Negative for chest tightness.    Cardiovascular:  Negative for palpitations, claudication, leg swelling and near-syncope.        History of defibrillator, congestive cardiomyopathy hypertension and dyslipidemia   Gastrointestinal:  Negative for abdominal distention, anal bleeding and constipation.        Pellet-like stools and lot of gas.   Endocrine: Negative for cold intolerance.        Diabetes mellitus on glimepiride.  Osteoporosis on injection Prolia   Genitourinary:  Positive for enuresis. Negative for difficulty urinating, flank pain and frequency.        Symptoms of neurogenic bladder with lack control and sudden expulsion of urine.  Recent diagnosis of staghorn calculus.   Musculoskeletal:  Positive for back pain (Right lower back pain) and gait problem (Slow and antalgic gait).        New onset of low back pain.  (Not sure if this is really new onset of back pain or she had had similar back pains in past) no radiation this time.  "  Skin:  Positive for color change and rash. Negative for wound.        Extensive rash in the left groin region.   Allergic/Immunologic: Negative for environmental allergies, food allergies and immunocompromised state.   Neurological:  Negative for syncope, facial asymmetry and light-headedness.        Last presentation of headache is certainly dissipated away.  Shakes and tremors in hands which are getting more bothersome.   Hematological:  Negative for adenopathy. Bruises/bleeds easily.        Patient is on chronic anticoagulation with warfarin.     Psychiatric/Behavioral:  Negative for agitation and dysphoric mood.           Beginningof memory issues.       Objective:      Blood pressure 114/62, pulse 86, resp. rate 18, height 5' 9" (1.753 m), weight 98.4 kg (217 lb). Body mass index is 32.05 kg/m².  Physical Exam  Constitutional:       General: She is not in acute distress.     Appearance: She is well-developed. She is obese. She is ill-appearing. She is not toxic-appearing or diaphoretic.      Comments: Patient is obese with a BMI of 32.  0 5   HENT:      Head: Normocephalic and atraumatic.      Comments: .  Eyes:      General: No visual field deficit or scleral icterus.        Right eye: No discharge.         Left eye: No discharge.   Neck:      Thyroid: No thyromegaly.      Vascular: No JVD.      Trachea: No tracheal deviation.   Cardiovascular:      Rate and Rhythm: Normal rate and regular rhythm.      Pulses:           Dorsalis pedis pulses are 1+ on the right side and 1+ on the left side.      Heart sounds:     No friction rub. No gallop.   Pulmonary:      Effort: Pulmonary effort is normal. No respiratory distress.      Breath sounds: Normal breath sounds. No stridor. No wheezing or rhonchi.   Abdominal:      General: There is no distension.      Palpations: Abdomen is soft.      Tenderness: There is no abdominal tenderness.   Musculoskeletal:         General: No tenderness or deformity.      Cervical " back: Neck supple.      Right foot: Decreased range of motion.      Left foot: Decreased range of motion.   Feet:      Right foot:      Protective Sensation: 5 sites tested.  3 sites sensed.      Skin integrity: No ulcer or blister.      Toenail Condition: Right toenails are abnormally thick.      Left foot:      Protective Sensation: 5 sites tested.  3 sites sensed.      Skin integrity: No ulcer or blister.      Toenail Condition: Left toenails are abnormally thick.      Comments: Somewhat diminished monofilament sensations.  Diminished dorsalis pedis pulsations.  Dystrophic nails bilaterally.  Needs training.  Lymphadenopathy:      Cervical: No cervical adenopathy.   Skin:     General: Skin is warm and dry.      Coloration: Skin is not pale.      Findings: No lesion. Rash is not scaling.   Neurological:      Mental Status: She is alert. She is not disoriented.      Cranial Nerves: No cranial nerve deficit, dysarthria or facial asymmetry.      Motor: Tremor present. No weakness, abnormal muscle tone or seizure activity.      Deep Tendon Reflexes: Reflexes normal.      Comments: Slightly coarse times on both the hands.  No tremors at rest.  No hypertonia.  Gait is slow and antalgic but not festinant.   Psychiatric:         Mood and Affect: Mood is anxious.         Behavior: Behavior normal.         Thought Content: Thought content normal.         Cognition and Memory: Cognition is not impaired. Memory is impaired.      Comments: Patient could recognize my name and my speciality.  She could recall current president of [place] and the last 5 presidents with some prompting and cues.  She could name the capitals of neighboring states.  In fact her daughter had more difficulty than her.  Formal testing is deferred to next visit         Assessment:       1. Chronic combined systolic and diastolic congestive heart failure    2. Staghorn renal calculus    3. Paroxysmal atrial fibrillation    4. Essential hypertension     5. Chronic anticoagulation    6. Elevated blood sugar    7. Stage 3b chronic kidney disease    8. Need for influenza vaccination    9. Normochromic normocytic anemia             Lab Visit on 09/14/2022   Component Date Value Ref Range Status    Sodium 09/14/2022 138  136 - 145 mmol/L Final    Potassium 09/14/2022 4.7  3.5 - 5.1 mmol/L Final    Chloride 09/14/2022 103  95 - 110 mmol/L Final    CO2 09/14/2022 27  23 - 29 mmol/L Final    Glucose 09/14/2022 143 (H)  70 - 110 mg/dL Final    BUN 09/14/2022 23  8 - 23 mg/dL Final    Creatinine 09/14/2022 1.9 (H)  0.5 - 1.4 mg/dL Final    Calcium 09/14/2022 9.5  8.7 - 10.5 mg/dL Final    Anion Gap 09/14/2022 8  8 - 16 mmol/L Final    eGFR 09/14/2022 26 (A)  >60 mL/min/1.73 m^2 Final   Admission on 08/25/2022, Discharged on 08/26/2022   Component Date Value Ref Range Status    Stone Source 08/25/2022 Left Kidney   Corrected    Stone Analysis 08/25/2022 Test Not Performed   Final    Stone Analysis Comment 08/25/2022 SEE BELOW   Final    WBC 08/25/2022 6.13  3.90 - 12.70 K/uL Final    RBC 08/25/2022 3.49 (L)  4.00 - 5.40 M/uL Final    Hemoglobin 08/25/2022 10.6 (L)  12.0 - 16.0 g/dL Final    Hematocrit 08/25/2022 34.3 (L)  37.0 - 48.5 % Final    MCV 08/25/2022 98  82 - 98 fL Final    MCH 08/25/2022 30.4  27.0 - 31.0 pg Final    MCHC 08/25/2022 30.9 (L)  32.0 - 36.0 g/dL Final    RDW 08/25/2022 14.3  11.5 - 14.5 % Final    Platelets 08/25/2022 363  150 - 450 K/uL Final    MPV 08/25/2022 9.2  9.2 - 12.9 fL Final    Immature Granulocytes 08/25/2022 0.3  0.0 - 0.5 % Final    Gran # (ANC) 08/25/2022 4.6  1.8 - 7.7 K/uL Final    Immature Grans (Abs) 08/25/2022 0.02  0.00 - 0.04 K/uL Final    Lymph # 08/25/2022 1.2  1.0 - 4.8 K/uL Final    Mono # 08/25/2022 0.2 (L)  0.3 - 1.0 K/uL Final    Eos # 08/25/2022 0.0  0.0 - 0.5 K/uL Final    Baso # 08/25/2022 0.04  0.00 - 0.20 K/uL Final    nRBC 08/25/2022 0  0 /100 WBC Final    Gran % 08/25/2022 75.7 (H)  38.0 - 73.0 % Final    Lymph %  08/25/2022 18.9  18.0 - 48.0 % Final    Mono % 08/25/2022 3.9 (L)  4.0 - 15.0 % Final    Eosinophil % 08/25/2022 0.5  0.0 - 8.0 % Final    Basophil % 08/25/2022 0.7  0.0 - 1.9 % Final    Differential Method 08/25/2022 Automated   Final    Sodium 08/25/2022 141  136 - 145 mmol/L Final    Potassium 08/25/2022 4.4  3.5 - 5.1 mmol/L Final    Chloride 08/25/2022 104  95 - 110 mmol/L Final    CO2 08/25/2022 26  23 - 29 mmol/L Final    Glucose 08/25/2022 128 (H)  70 - 110 mg/dL Final    BUN 08/25/2022 15  8 - 23 mg/dL Final    Creatinine 08/25/2022 1.6 (H)  0.5 - 1.4 mg/dL Final    Calcium 08/25/2022 8.7  8.7 - 10.5 mg/dL Final    Anion Gap 08/25/2022 11  8 - 16 mmol/L Final    eGFR 08/25/2022 32 (A)  >60 mL/min/1.73 m^2 Final    POCT Glucose 08/25/2022 241 (H)  70 - 110 mg/dL Final    WBC 08/26/2022 12.58  3.90 - 12.70 K/uL Final    RBC 08/26/2022 2.96 (L)  4.00 - 5.40 M/uL Final    Hemoglobin 08/26/2022 9.1 (L)  12.0 - 16.0 g/dL Final    Hematocrit 08/26/2022 28.9 (L)  37.0 - 48.5 % Final    MCV 08/26/2022 98  82 - 98 fL Final    MCH 08/26/2022 30.7  27.0 - 31.0 pg Final    MCHC 08/26/2022 31.5 (L)  32.0 - 36.0 g/dL Final    RDW 08/26/2022 14.3  11.5 - 14.5 % Final    Platelets 08/26/2022 350  150 - 450 K/uL Final    MPV 08/26/2022 9.3  9.2 - 12.9 fL Final    Immature Granulocytes 08/26/2022 0.4  0.0 - 0.5 % Final    Gran # (ANC) 08/26/2022 10.9 (H)  1.8 - 7.7 K/uL Final    Immature Grans (Abs) 08/26/2022 0.05 (H)  0.00 - 0.04 K/uL Final    Lymph # 08/26/2022 0.7 (L)  1.0 - 4.8 K/uL Final    Mono # 08/26/2022 0.9  0.3 - 1.0 K/uL Final    Eos # 08/26/2022 0.0  0.0 - 0.5 K/uL Final    Baso # 08/26/2022 0.01  0.00 - 0.20 K/uL Final    nRBC 08/26/2022 0  0 /100 WBC Final    Gran % 08/26/2022 86.9 (H)  38.0 - 73.0 % Final    Lymph % 08/26/2022 5.4 (L)  18.0 - 48.0 % Final    Mono % 08/26/2022 7.2  4.0 - 15.0 % Final    Eosinophil % 08/26/2022 0.0  0.0 - 8.0 % Final    Basophil % 08/26/2022 0.1  0.0 - 1.9 % Final     Differential Method 08/26/2022 Automated   Final    Sodium 08/26/2022 137  136 - 145 mmol/L Final    Potassium 08/26/2022 5.1  3.5 - 5.1 mmol/L Final    Chloride 08/26/2022 104  95 - 110 mmol/L Final    CO2 08/26/2022 25  23 - 29 mmol/L Final    Glucose 08/26/2022 131 (H)  70 - 110 mg/dL Final    BUN 08/26/2022 18  8 - 23 mg/dL Final    Creatinine 08/26/2022 1.4  0.5 - 1.4 mg/dL Final    Calcium 08/26/2022 8.5 (L)  8.7 - 10.5 mg/dL Final    Anion Gap 08/26/2022 8  8 - 16 mmol/L Final    eGFR 08/26/2022 38 (A)  >60 mL/min/1.73 m^2 Final    POCT Glucose 08/26/2022 125 (H)  70 - 110 mg/dL Final    WBC 08/26/2022 13.06 (H)  3.90 - 12.70 K/uL Final    RBC 08/26/2022 3.07 (L)  4.00 - 5.40 M/uL Final    Hemoglobin 08/26/2022 9.4 (L)  12.0 - 16.0 g/dL Final    Hematocrit 08/26/2022 30.4 (L)  37.0 - 48.5 % Final    MCV 08/26/2022 99 (H)  82 - 98 fL Final    MCH 08/26/2022 30.6  27.0 - 31.0 pg Final    MCHC 08/26/2022 30.9 (L)  32.0 - 36.0 g/dL Final    RDW 08/26/2022 14.3  11.5 - 14.5 % Final    Platelets 08/26/2022 352  150 - 450 K/uL Final    MPV 08/26/2022 9.2  9.2 - 12.9 fL Final    POCT Glucose 08/26/2022 171 (H)  70 - 110 mg/dL Final   Lab Visit on 08/25/2022   Component Date Value Ref Range Status    BNP 08/25/2022 518 (H)  0 - 99 pg/mL Final    Amiodarone Lvl 08/25/2022 0.7 (L)  1.0 - 3.0 ug/mL Final    N-Desethyl-Amiodarone 08/25/2022 0.6  SeeBelow ug/mL Final   Lab Visit on 08/19/2022   Component Date Value Ref Range Status    BNP 08/19/2022 711 (H)  0 - 99 pg/mL Final   Hospital Outpatient Visit on 08/18/2022   Component Date Value Ref Range Status    Group & Rh 08/18/2022 O POS   Final    Indirect Alejandra 08/18/2022 NEG   Final   Lab Visit on 08/18/2022   Component Date Value Ref Range Status    Urine Culture, Routine 08/18/2022  (A)   Final                    Value:PROTEUS MIRABILIS  > 100,000 cfu/ml     Lab Visit on 08/18/2022   Component Date Value Ref Range Status    WBC 08/18/2022 8.97  3.90 - 12.70 K/uL  Final    RBC 08/18/2022 3.42 (L)  4.00 - 5.40 M/uL Final    Hemoglobin 08/18/2022 10.4 (L)  12.0 - 16.0 g/dL Final    Hematocrit 08/18/2022 32.9 (L)  37.0 - 48.5 % Final    MCV 08/18/2022 96  82 - 98 fL Final    MCH 08/18/2022 30.4  27.0 - 31.0 pg Final    MCHC 08/18/2022 31.6 (L)  32.0 - 36.0 g/dL Final    RDW 08/18/2022 13.2  11.5 - 14.5 % Final    Platelets 08/18/2022 448  150 - 450 K/uL Final    MPV 08/18/2022 9.1 (L)  9.2 - 12.9 fL Final    Immature Granulocytes 08/18/2022 0.4  0.0 - 0.5 % Final    Gran # (ANC) 08/18/2022 6.9  1.8 - 7.7 K/uL Final    Immature Grans (Abs) 08/18/2022 0.04  0.00 - 0.04 K/uL Final    Lymph # 08/18/2022 1.2  1.0 - 4.8 K/uL Final    Mono # 08/18/2022 0.8  0.3 - 1.0 K/uL Final    Eos # 08/18/2022 0.1  0.0 - 0.5 K/uL Final    Baso # 08/18/2022 0.03  0.00 - 0.20 K/uL Final    nRBC 08/18/2022 0  0 /100 WBC Final    Gran % 08/18/2022 76.8 (H)  38.0 - 73.0 % Final    Lymph % 08/18/2022 13.0 (L)  18.0 - 48.0 % Final    Mono % 08/18/2022 8.6  4.0 - 15.0 % Final    Eosinophil % 08/18/2022 0.9  0.0 - 8.0 % Final    Basophil % 08/18/2022 0.3  0.0 - 1.9 % Final    Differential Method 08/18/2022 Automated   Final    Sodium 08/18/2022 139  136 - 145 mmol/L Final    Potassium 08/18/2022 3.9  3.5 - 5.1 mmol/L Final    Chloride 08/18/2022 102  95 - 110 mmol/L Final    CO2 08/18/2022 27  23 - 29 mmol/L Final    Glucose 08/18/2022 79  70 - 110 mg/dL Final    BUN 08/18/2022 15  8 - 23 mg/dL Final    Creatinine 08/18/2022 1.2  0.5 - 1.4 mg/dL Final    Calcium 08/18/2022 8.8  8.7 - 10.5 mg/dL Final    Anion Gap 08/18/2022 10  8 - 16 mmol/L Final    eGFR 08/18/2022 45 (A)  >60 mL/min/1.73 m^2 Final    Prothrombin Time 08/18/2022 11.9  9.0 - 12.5 sec Final    INR 08/18/2022 1.1  0.8 - 1.2 Final   Admission on 07/17/2022, Discharged on 07/18/2022   Component Date Value Ref Range Status    WBC 07/17/2022 8.29  3.90 - 12.70 K/uL Final    RBC 07/17/2022 4.18  4.00 - 5.40 M/uL Final    Hemoglobin 07/17/2022 13.2   12.0 - 16.0 g/dL Final    Hematocrit 07/17/2022 41.4  37.0 - 48.5 % Final    MCV 07/17/2022 99 (H)  82 - 98 fL Final    MCH 07/17/2022 31.6 (H)  27.0 - 31.0 pg Final    MCHC 07/17/2022 31.9 (L)  32.0 - 36.0 g/dL Final    RDW 07/17/2022 13.2  11.5 - 14.5 % Final    Platelets 07/17/2022 263  150 - 450 K/uL Final    MPV 07/17/2022 9.9  9.2 - 12.9 fL Final    Immature Granulocytes 07/17/2022 0.4  0.0 - 0.5 % Final    Gran # (ANC) 07/17/2022 7.0  1.8 - 7.7 K/uL Final    Immature Grans (Abs) 07/17/2022 0.03  0.00 - 0.04 K/uL Final    Lymph # 07/17/2022 0.7 (L)  1.0 - 4.8 K/uL Final    Mono # 07/17/2022 0.5  0.3 - 1.0 K/uL Final    Eos # 07/17/2022 0.1  0.0 - 0.5 K/uL Final    Baso # 07/17/2022 0.03  0.00 - 0.20 K/uL Final    nRBC 07/17/2022 0  0 /100 WBC Final    Gran % 07/17/2022 84.2 (H)  38.0 - 73.0 % Final    Lymph % 07/17/2022 8.4 (L)  18.0 - 48.0 % Final    Mono % 07/17/2022 5.9  4.0 - 15.0 % Final    Eosinophil % 07/17/2022 0.7  0.0 - 8.0 % Final    Basophil % 07/17/2022 0.4  0.0 - 1.9 % Final    Differential Method 07/17/2022 Automated   Final    Sodium 07/17/2022 138  136 - 145 mmol/L Final    Potassium 07/17/2022 4.2  3.5 - 5.1 mmol/L Final    Chloride 07/17/2022 100  95 - 110 mmol/L Final    CO2 07/17/2022 28  23 - 29 mmol/L Final    Glucose 07/17/2022 131 (H)  70 - 110 mg/dL Final    BUN 07/17/2022 20  8 - 23 mg/dL Final    Creatinine 07/17/2022 1.6 (H)  0.5 - 1.4 mg/dL Final    Calcium 07/17/2022 9.0  8.7 - 10.5 mg/dL Final    Total Protein 07/17/2022 6.7  6.0 - 8.4 g/dL Final    Albumin 07/17/2022 3.4 (L)  3.5 - 5.2 g/dL Final    Total Bilirubin 07/17/2022 1.0  0.1 - 1.0 mg/dL Final    Alkaline Phosphatase 07/17/2022 81  55 - 135 U/L Final    AST 07/17/2022 17  10 - 40 U/L Final    ALT 07/17/2022 18  10 - 44 U/L Final    Anion Gap 07/17/2022 10  8 - 16 mmol/L Final    eGFR if  07/17/2022 34.8 (A)  >60 mL/min/1.73 m^2 Final    eGFR if non  07/17/2022 30.2 (A)  >60 mL/min/1.73  m^2 Final    Specimen UA 07/17/2022 Urine, Clean Catch   Final    Color, UA 07/17/2022 Yellow  Yellow, Straw, Steffanie Final    Appearance, UA 07/17/2022 Clear  Clear Final    pH, UA 07/17/2022 6.0  5.0 - 8.0 Final    Specific Gravity, UA 07/17/2022 1.025  1.005 - 1.030 Final    Protein, UA 07/17/2022 2+ (A)  Negative Final    Glucose, UA 07/17/2022 Negative  Negative Final    Ketones, UA 07/17/2022 Negative  Negative Final    Bilirubin (UA) 07/17/2022 Negative  Negative Final    Occult Blood UA 07/17/2022 3+ (A)  Negative Final    Nitrite, UA 07/17/2022 Negative  Negative Final    Urobilinogen, UA 07/17/2022 1.0  Negative EU/dL Final    Leukocytes, UA 07/17/2022 2+ (A)  Negative Final    SARS-CoV-2 RNA, Amplification, Qual 07/17/2022 Negative  Negative Final    Lactate (Lactic Acid) 07/17/2022 1.7  0.5 - 1.9 mmol/L Final    Lipase 07/17/2022 25  4 - 60 U/L Final    Troponin I 07/17/2022 <0.030  <=0.040 ng/mL Final    RBC, UA 07/17/2022 17 (H)  0 - 4 /hpf Final    WBC, UA 07/17/2022 5  0 - 5 /hpf Final    WBC Clumps, UA 07/17/2022 Rare  None-Rare Final    Bacteria 07/17/2022 Rare  None-Occ /hpf Final    Hyaline Casts, UA 07/17/2022 0  0-1/lpf /lpf Final    Microscopic Comment 07/17/2022 SEE COMMENT   Final    Blood Culture, Routine 07/17/2022 No growth after 5 days.   Final    Blood Culture, Routine 07/17/2022 Gram stain aer bottle: Gram positive cocci   Final    Blood Culture, Routine 07/17/2022 Results called to and read back by: Mary Gardner RN 1E   Final    Blood Culture, Routine 07/17/2022 MS 07/19/2022  19:52   Final    Blood Culture, Routine 07/17/2022 Results called to and read back by:Darlene LESLIE   Final    Blood Culture, Routine 07/17/2022 MS 07/19/2022  21:59   Final    Blood Culture, Routine 07/17/2022  (A)   Final                    Value:COAGULASE-NEGATIVE STAPHYLOCOCCUS SPECIES  Organism is a probable contaminant      POC Glucose 07/17/2022 74  70 - 110 Final    POC Glucose 07/17/2022 84  70 - 110 Final     WBC 07/18/2022 14.61 (H)  3.90 - 12.70 K/uL Final    RBC 07/18/2022 4.07  4.00 - 5.40 M/uL Final    Hemoglobin 07/18/2022 12.7  12.0 - 16.0 g/dL Final    Hematocrit 07/18/2022 41.1  37.0 - 48.5 % Final    MCV 07/18/2022 101 (H)  82 - 98 fL Final    MCH 07/18/2022 31.2 (H)  27.0 - 31.0 pg Final    MCHC 07/18/2022 30.9 (L)  32.0 - 36.0 g/dL Final    RDW 07/18/2022 13.5  11.5 - 14.5 % Final    Platelets 07/18/2022 236  150 - 450 K/uL Final    MPV 07/18/2022 10.1  9.2 - 12.9 fL Final    Sodium 07/18/2022 137  136 - 145 mmol/L Final    Potassium 07/18/2022 4.2  3.5 - 5.1 mmol/L Final    Chloride 07/18/2022 101  95 - 110 mmol/L Final    CO2 07/18/2022 27  23 - 29 mmol/L Final    Glucose 07/18/2022 154 (H)  70 - 110 mg/dL Final    BUN 07/18/2022 30 (H)  8 - 23 mg/dL Final    Creatinine 07/18/2022 1.8 (H)  0.5 - 1.4 mg/dL Final    Calcium 07/18/2022 8.7  8.7 - 10.5 mg/dL Final    Total Protein 07/18/2022 6.4  6.0 - 8.4 g/dL Final    Albumin 07/18/2022 3.1 (L)  3.5 - 5.2 g/dL Final    Total Bilirubin 07/18/2022 0.8  0.1 - 1.0 mg/dL Final    Alkaline Phosphatase 07/18/2022 82  55 - 135 U/L Final    AST 07/18/2022 20  10 - 40 U/L Final    ALT 07/18/2022 23  10 - 44 U/L Final    Anion Gap 07/18/2022 9  8 - 16 mmol/L Final    eGFR if  07/18/2022 30.2 (A)  >60 mL/min/1.73 m^2 Final    eGFR if non  07/18/2022 26.2 (A)  >60 mL/min/1.73 m^2 Final    Digoxin Lvl 07/18/2022 0.5 (L)  0.8 - 2.0 ng/mL Final    POC Glucose 07/18/2022 94  70 - 110 Final    POC Glucose 07/18/2022 151 (H)  70 - 110 Final     Summary-echocardiogram done on 04/02/2020.    Mild eccentric left ventricular hypertrophy.  Mild left ventricular enlargement.  Normal left ventricular systolic function. The estimated ejection fraction is 60%.  Grade I (mild) left ventricular diastolic dysfunction consistent with impaired relaxation.  No wall motion abnormalities.  Normal right ventricular systolic function.  Severe left atrial  enlargement.  Mild mitral sclerosis.  There is mild leaflet calcification of the Mitral Valve.  Mild mitral regurgitation.  Intermediate central venous pressure (8 mmHg).  The estimated PA systolic pressure is 37 mmHg.  Cardiology impression on 04/10/2020 at [facility]-Dr. Le:-   IMPRESSION:  CHF. Acute on chronic. Reportedly 35% in the past. Negative cardiac biomarkers.   Acute on CKD.   Covid 19 suspect.   Hypertension.   Dyslipidemia.     Plan:           Chronic combined systolic and diastolic congestive heart failure  Comments:  Last BNP level is 500.   Orders:  -     Renal Function Panel; Future; Expected date: 10/26/2022  -     BNP; Future; Expected date: 10/26/2022    Staghorn renal calculus  Comments:  Recent diagnosis of staghorn calculus status post removal.  First time diagnosis.  80% calcium and 20% phosphates.    Paroxysmal atrial fibrillation  Comments:  Currently on diltiazem digoxin, amiodarone and Xarelto.    Essential hypertension  -     Renal Function Panel; Future; Expected date: 10/26/2022    Chronic anticoagulation  Comments:  Currently on Xarelto.  Continue with anti- coagulation precautions.    Elevated blood sugar  Comments:  Elevated blood sugar.  Most with a hemoglobin A1c levels have been less than 6.5.    Stage 3b chronic kidney disease  -     Renal Function Panel; Future; Expected date: 10/26/2022    Need for influenza vaccination  Comments:  Patient is updated on influenza vaccination today.  Orders:  -     Influenza - Quadrivalent - High Dose (65+) (PF) (IM)    Normochromic normocytic anemia  -     CBC Auto Differential; Future; Expected date: 10/26/2022  -     Iron and TIBC; Future; Expected date: 10/26/2022    Patient's medical issues have been reviewed.      As far as memory is concerned,:-  I assured the patient that her memory is fairly good given her age and station of life and the stressors that she is going through.  She could recall the last 5 presidents  with little bit of prompting and cues. In fact her daughter did not do as well as she did.        Next visit will do a formal mini-mental status examination and check for any potential reversible causes like vitamin-B12 deficiency, thyroid tests.  Low risk for neurosyphilis.    She had recent staghorn calculus.  Incidentally she did not have any side effects.  I have advised her to remain hydrated with perhaps 1 or 2 cups of water extra every day.  Do not hold the urine.  If she has to go to the bathroom, she should go to the bathroom.    Reviewed her medications.  She needs to check her blood sugars.    She got the flu shot today.    /////////////////////////    Longstanding diagnosis congestive heart failure has been noted with elevated BNP levels.  Her last echocardiogram was done in 2020 which showed a ejection fraction of 60% and mild diastolic dysfunction which begs the question whether she really has congestive heart failure to that degree.  She does have a [employer] pacemaker in defibrillator.  Details are somewhat patchy from multiple cardiologists in past.    /////////////////////////////////////////////////////    Her creatinine level was elevated and I would like to repeat the BMP again in about a week or so.  She is not on any diuresis at this point.    I will see her back in 3-4 months time for follow-up or earlier if needed.      Spent aleks 30 minutes with patient which involved review of pts medical conditions, labs, medications and with 50% of time face-to-face discussion about medical problems, management and any applicable changes.    Current Outpatient Medications:     amiodarone (PACERONE) 200 MG Tab, Take 200 mg by mouth. 1/2 tab once daily with meals May take an extra dose for palpatations  Max 4 100mg tabs, Disp: , Rfl:     Ca-D3-mag ox-zinc--thom-bor 600 mg calcium- 20 mcg-50 mg Tab, Take 1 tablet by mouth 2 (two) times daily., Disp: , Rfl:     cholecalciferol, vitamin D3, 125 mcg (5,000  unit) Tab, Take 5,000 Units by mouth 2 (two) times daily., Disp: , Rfl:     digoxin (LANOXIN) 125 mcg tablet, Take 125 mcg by mouth every Mon, Wed, Fri., Disp: , Rfl:     gabapentin (NEURONTIN) 100 MG capsule, TAKE 2 CAPSULES(200 MG) BY MOUTH THREE TIMES DAILY (Patient taking differently: Take 200 mg by mouth 3 (three) times daily. TAKE 2 CAPSULES(200 MG) BY MOUTH THREE TIMES DAILY), Disp: 180 capsule, Rfl: 5    glimepiride (AMARYL) 1 MG tablet, Take 1 tablet (1 mg total) by mouth before breakfast., Disp: 90 tablet, Rfl: 1    latanoprost 0.005 % ophthalmic solution, Place 1 drop into both eyes nightly., Disp: , Rfl:     midodrine (PROAMATINE) 2.5 MG Tab, Take 2 tablets (5 mg total) by mouth 3 (three) times daily., Disp: 30 tablet, Rfl: 0    rivaroxaban (XARELTO) 15 mg Tab, Take 15 mg by mouth daily with dinner or evening meal., Disp: , Rfl:     sacubitriL-valsartan (ENTRESTO) 24-26 mg per tablet, Take 1 tablet by mouth once daily. , Disp: , Rfl:     torsemide (DEMADEX) 20 MG Tab, Take 1 tablet (20 mg total) by mouth once daily. for 14 days (Patient taking differently: Take 20 mg by mouth 3 (three) times a week.), Disp: 14 tablet, Rfl: 0    XARELTO 10 mg Tab, Take 10 mg by mouth once daily., Disp: , Rfl:     diltiaZEM (CARDIZEM) 60 MG tablet, Take 60 mg by mouth 2 (two) times daily., Disp: , Rfl:   No current facility-administered medications for this visit.    Facility-Administered Medications Ordered in Other Visits:     0.9%  NaCl infusion, , Intravenous, Continuous, Sebastian Nowak III, MD, Last Rate: 75 mL/hr at 12/13/19 0728, New Bag at 12/13/19 0728    diphenhydrAMINE injection 25 mg, 25 mg, Intravenous, Once, Sebastian Nowak III, MD    lorazepam injection 1 mg, 1 mg, Intravenous, Once, MD Florida Hoang III miss Jo Palm    Zara seeing you in the office today.      Upon review of her labs I had also seen that your kidney tests have gone up when you had the kidney stone.  Your blood counts also had shown  moderate degree of anemia.  Feels can make you feel fatigued and tired also.      I would like to repeat these labs in about 2 weeks time and I have sent a request to [facility] in the computer system.  You may do the labs about in 2 weeks time nonfasting is okay.      Let me know if any questions and please confirm this message.      Dr. Dolly LESLIE

## 2022-10-27 ENCOUNTER — HOSPITAL ENCOUNTER (OUTPATIENT)
Facility: HOSPITAL | Age: 82
Discharge: HOME HEALTH | End: 2022-10-29
Attending: EMERGENCY MEDICINE | Admitting: INTERNAL MEDICINE
Payer: MEDICARE

## 2022-10-27 DIAGNOSIS — I48.91 ATRIAL FIBRILLATION, UNSPECIFIED TYPE: ICD-10-CM

## 2022-10-27 DIAGNOSIS — I50.9 CONGESTIVE HEART FAILURE, UNSPECIFIED HF CHRONICITY, UNSPECIFIED HEART FAILURE TYPE: ICD-10-CM

## 2022-10-27 DIAGNOSIS — I16.0 HYPERTENSIVE URGENCY: Primary | ICD-10-CM

## 2022-10-27 DIAGNOSIS — I27.20 PULMONARY HYPERTENSION: ICD-10-CM

## 2022-10-27 DIAGNOSIS — I50.43 ACUTE ON CHRONIC COMBINED SYSTOLIC AND DIASTOLIC HEART FAILURE: ICD-10-CM

## 2022-10-27 LAB
ALBUMIN SERPL BCP-MCNC: 3.2 G/DL (ref 3.5–5.2)
ALP SERPL-CCNC: 90 U/L (ref 55–135)
ALT SERPL W/O P-5'-P-CCNC: 13 U/L (ref 10–44)
ANION GAP SERPL CALC-SCNC: 10 MMOL/L (ref 8–16)
AST SERPL-CCNC: 16 U/L (ref 10–40)
BASOPHILS # BLD AUTO: 0.04 K/UL (ref 0–0.2)
BASOPHILS NFR BLD: 0.6 % (ref 0–1.9)
BILIRUB SERPL-MCNC: 0.6 MG/DL (ref 0.1–1)
BNP SERPL-MCNC: 2874 PG/ML (ref 0–99)
BUN SERPL-MCNC: 15 MG/DL (ref 8–23)
CALCIUM SERPL-MCNC: 8.8 MG/DL (ref 8.7–10.5)
CHLORIDE SERPL-SCNC: 98 MMOL/L (ref 95–110)
CO2 SERPL-SCNC: 25 MMOL/L (ref 23–29)
CREAT SERPL-MCNC: 1.4 MG/DL (ref 0.5–1.4)
DIFFERENTIAL METHOD: ABNORMAL
DIGOXIN SERPL-MCNC: 0.6 NG/ML (ref 0.8–2)
EOSINOPHIL # BLD AUTO: 0.1 K/UL (ref 0–0.5)
EOSINOPHIL NFR BLD: 1 % (ref 0–8)
ERYTHROCYTE [DISTWIDTH] IN BLOOD BY AUTOMATED COUNT: 13.8 % (ref 11.5–14.5)
EST. GFR  (NO RACE VARIABLE): 37.8 ML/MIN/1.73 M^2
GLUCOSE SERPL-MCNC: 70 MG/DL (ref 70–110)
HCT VFR BLD AUTO: 33.5 % (ref 37–48.5)
HGB BLD-MCNC: 10.2 G/DL (ref 12–16)
IMM GRANULOCYTES # BLD AUTO: 0.03 K/UL (ref 0–0.04)
IMM GRANULOCYTES NFR BLD AUTO: 0.5 % (ref 0–0.5)
LYMPHOCYTES # BLD AUTO: 1.7 K/UL (ref 1–4.8)
LYMPHOCYTES NFR BLD: 27.6 % (ref 18–48)
MCH RBC QN AUTO: 27.9 PG (ref 27–31)
MCHC RBC AUTO-ENTMCNC: 30.4 G/DL (ref 32–36)
MCV RBC AUTO: 92 FL (ref 82–98)
MONOCYTES # BLD AUTO: 0.6 K/UL (ref 0.3–1)
MONOCYTES NFR BLD: 10.4 % (ref 4–15)
NEUTROPHILS # BLD AUTO: 3.7 K/UL (ref 1.8–7.7)
NEUTROPHILS NFR BLD: 59.9 % (ref 38–73)
NRBC BLD-RTO: 0 /100 WBC
PLATELET # BLD AUTO: 330 K/UL (ref 150–450)
PMV BLD AUTO: 9.7 FL (ref 9.2–12.9)
POTASSIUM SERPL-SCNC: 3.9 MMOL/L (ref 3.5–5.1)
PROT SERPL-MCNC: 6.9 G/DL (ref 6–8.4)
RBC # BLD AUTO: 3.65 M/UL (ref 4–5.4)
SARS-COV-2 RDRP RESP QL NAA+PROBE: NEGATIVE
SODIUM SERPL-SCNC: 133 MMOL/L (ref 136–145)
TROPONIN I SERPL DL<=0.01 NG/ML-MCNC: 0.03 NG/ML
TROPONIN I SERPL DL<=0.01 NG/ML-MCNC: <0.03 NG/ML
WBC # BLD AUTO: 6.16 K/UL (ref 3.9–12.7)

## 2022-10-27 PROCEDURE — 93010 ELECTROCARDIOGRAM REPORT: CPT | Mod: ,,, | Performed by: SPECIALIST

## 2022-10-27 PROCEDURE — 63600175 PHARM REV CODE 636 W HCPCS: Performed by: EMERGENCY MEDICINE

## 2022-10-27 PROCEDURE — 85025 COMPLETE CBC W/AUTO DIFF WBC: CPT | Performed by: EMERGENCY MEDICINE

## 2022-10-27 PROCEDURE — G0378 HOSPITAL OBSERVATION PER HR: HCPCS

## 2022-10-27 PROCEDURE — 84484 ASSAY OF TROPONIN QUANT: CPT | Performed by: EMERGENCY MEDICINE

## 2022-10-27 PROCEDURE — 93005 ELECTROCARDIOGRAM TRACING: CPT | Performed by: SPECIALIST

## 2022-10-27 PROCEDURE — 83880 ASSAY OF NATRIURETIC PEPTIDE: CPT | Performed by: EMERGENCY MEDICINE

## 2022-10-27 PROCEDURE — 80053 COMPREHEN METABOLIC PANEL: CPT | Performed by: EMERGENCY MEDICINE

## 2022-10-27 PROCEDURE — 96374 THER/PROPH/DIAG INJ IV PUSH: CPT

## 2022-10-27 PROCEDURE — 25000003 PHARM REV CODE 250: Performed by: EMERGENCY MEDICINE

## 2022-10-27 PROCEDURE — 80162 ASSAY OF DIGOXIN TOTAL: CPT | Performed by: EMERGENCY MEDICINE

## 2022-10-27 PROCEDURE — 99285 EMERGENCY DEPT VISIT HI MDM: CPT | Mod: 25

## 2022-10-27 PROCEDURE — U0002 COVID-19 LAB TEST NON-CDC: HCPCS | Performed by: EMERGENCY MEDICINE

## 2022-10-27 PROCEDURE — 36415 COLL VENOUS BLD VENIPUNCTURE: CPT | Performed by: EMERGENCY MEDICINE

## 2022-10-27 PROCEDURE — G0378 HOSPITAL OBSERVATION PER HR: HCPCS | Mod: CS

## 2022-10-27 PROCEDURE — 93010 EKG 12-LEAD: ICD-10-PCS | Mod: ,,, | Performed by: SPECIALIST

## 2022-10-27 RX ORDER — MIDODRINE HYDROCHLORIDE 2.5 MG/1
5 TABLET ORAL
Status: DISCONTINUED | OUTPATIENT
Start: 2022-10-28 | End: 2022-10-29 | Stop reason: HOSPADM

## 2022-10-27 RX ORDER — IBUPROFEN 200 MG
16 TABLET ORAL
Status: DISCONTINUED | OUTPATIENT
Start: 2022-10-27 | End: 2022-10-29 | Stop reason: HOSPADM

## 2022-10-27 RX ORDER — TALC
6 POWDER (GRAM) TOPICAL NIGHTLY PRN
Status: DISCONTINUED | OUTPATIENT
Start: 2022-10-28 | End: 2022-10-29 | Stop reason: HOSPADM

## 2022-10-27 RX ORDER — PROPRANOLOL HYDROCHLORIDE 10 MG/1
10 TABLET ORAL 2 TIMES DAILY
Status: ON HOLD | COMMUNITY
Start: 2022-10-04 | End: 2023-05-18 | Stop reason: HOSPADM

## 2022-10-27 RX ORDER — TAMSULOSIN HYDROCHLORIDE 0.4 MG/1
1 CAPSULE ORAL DAILY
COMMUNITY
Start: 2022-10-27 | End: 2023-02-01

## 2022-10-27 RX ORDER — GLUCAGON 1 MG
1 KIT INJECTION
Status: DISCONTINUED | OUTPATIENT
Start: 2022-10-27 | End: 2022-10-29 | Stop reason: HOSPADM

## 2022-10-27 RX ORDER — AMIODARONE HYDROCHLORIDE 100 MG/1
100 TABLET ORAL
Status: DISCONTINUED | OUTPATIENT
Start: 2022-10-28 | End: 2022-10-29 | Stop reason: HOSPADM

## 2022-10-27 RX ORDER — FUROSEMIDE 10 MG/ML
20 INJECTION INTRAMUSCULAR; INTRAVENOUS
Status: COMPLETED | OUTPATIENT
Start: 2022-10-27 | End: 2022-10-27

## 2022-10-27 RX ORDER — INSULIN ASPART 100 [IU]/ML
0-5 INJECTION, SOLUTION INTRAVENOUS; SUBCUTANEOUS
Status: DISCONTINUED | OUTPATIENT
Start: 2022-10-27 | End: 2022-10-29 | Stop reason: HOSPADM

## 2022-10-27 RX ORDER — LATANOPROST 50 UG/ML
1 SOLUTION/ DROPS OPHTHALMIC NIGHTLY
Status: DISCONTINUED | OUTPATIENT
Start: 2022-10-27 | End: 2022-10-29 | Stop reason: HOSPADM

## 2022-10-27 RX ORDER — SACUBITRIL AND VALSARTAN 24; 26 MG/1; MG/1
1 TABLET, FILM COATED ORAL 2 TIMES DAILY
COMMUNITY
End: 2023-02-01

## 2022-10-27 RX ORDER — PROPRANOLOL HYDROCHLORIDE 10 MG/1
10 TABLET ORAL 2 TIMES DAILY
Status: DISCONTINUED | OUTPATIENT
Start: 2022-10-28 | End: 2022-10-29 | Stop reason: HOSPADM

## 2022-10-27 RX ORDER — IBUPROFEN 200 MG
24 TABLET ORAL
Status: DISCONTINUED | OUTPATIENT
Start: 2022-10-27 | End: 2022-10-29 | Stop reason: HOSPADM

## 2022-10-27 RX ORDER — GLIMEPIRIDE 1 MG/1
1 TABLET ORAL 2 TIMES DAILY
COMMUNITY
End: 2023-02-01

## 2022-10-27 RX ORDER — SODIUM CHLORIDE 0.9 % (FLUSH) 0.9 %
10 SYRINGE (ML) INJECTION
Status: DISCONTINUED | OUTPATIENT
Start: 2022-10-27 | End: 2022-10-29 | Stop reason: HOSPADM

## 2022-10-27 RX ORDER — ONDANSETRON 2 MG/ML
4 INJECTION INTRAMUSCULAR; INTRAVENOUS EVERY 8 HOURS PRN
Status: DISCONTINUED | OUTPATIENT
Start: 2022-10-28 | End: 2022-10-29 | Stop reason: HOSPADM

## 2022-10-27 RX ADMIN — FUROSEMIDE 20 MG: 10 INJECTION, SOLUTION INTRAMUSCULAR; INTRAVENOUS at 09:10

## 2022-10-27 RX ADMIN — NITROGLYCERIN 0.5 INCH: 20 OINTMENT TOPICAL at 09:10

## 2022-10-28 LAB
ALBUMIN SERPL BCP-MCNC: 2.9 G/DL (ref 3.5–5.2)
ALP SERPL-CCNC: 90 U/L (ref 55–135)
ALT SERPL W/O P-5'-P-CCNC: 10 U/L (ref 10–44)
ANION GAP SERPL CALC-SCNC: 6 MMOL/L (ref 8–16)
AST SERPL-CCNC: 13 U/L (ref 10–40)
BACTERIA #/AREA URNS HPF: ABNORMAL /HPF
BASOPHILS # BLD AUTO: 0.04 K/UL (ref 0–0.2)
BASOPHILS NFR BLD: 0.6 % (ref 0–1.9)
BILIRUB SERPL-MCNC: 0.7 MG/DL (ref 0.1–1)
BILIRUB UR QL STRIP: NEGATIVE
BUN SERPL-MCNC: 16 MG/DL (ref 8–23)
CALCIUM SERPL-MCNC: 8.7 MG/DL (ref 8.7–10.5)
CHLORIDE SERPL-SCNC: 103 MMOL/L (ref 95–110)
CLARITY UR: CLEAR
CO2 SERPL-SCNC: 31 MMOL/L (ref 23–29)
COLOR UR: COLORLESS
CREAT SERPL-MCNC: 1.5 MG/DL (ref 0.5–1.4)
DIFFERENTIAL METHOD: ABNORMAL
EOSINOPHIL # BLD AUTO: 0.1 K/UL (ref 0–0.5)
EOSINOPHIL NFR BLD: 1.2 % (ref 0–8)
ERYTHROCYTE [DISTWIDTH] IN BLOOD BY AUTOMATED COUNT: 14.1 % (ref 11.5–14.5)
EST. GFR  (NO RACE VARIABLE): 34.8 ML/MIN/1.73 M^2
ESTIMATED AVG GLUCOSE: 111 MG/DL (ref 68–131)
GLUCOSE SERPL-MCNC: 125 MG/DL (ref 70–110)
GLUCOSE SERPL-MCNC: 132 MG/DL (ref 70–110)
GLUCOSE SERPL-MCNC: 133 MG/DL (ref 70–110)
GLUCOSE SERPL-MCNC: 141 MG/DL (ref 70–110)
GLUCOSE SERPL-MCNC: 145 MG/DL (ref 70–110)
GLUCOSE SERPL-MCNC: 82 MG/DL (ref 70–110)
GLUCOSE UR QL STRIP: NEGATIVE
HBA1C MFR BLD: 5.5 % (ref 4.5–6.2)
HCT VFR BLD AUTO: 35.1 % (ref 37–48.5)
HGB BLD-MCNC: 10.7 G/DL (ref 12–16)
HGB UR QL STRIP: NEGATIVE
HYALINE CASTS #/AREA URNS LPF: 0 /LPF
IMM GRANULOCYTES # BLD AUTO: 0.02 K/UL (ref 0–0.04)
IMM GRANULOCYTES NFR BLD AUTO: 0.3 % (ref 0–0.5)
KETONES UR QL STRIP: NEGATIVE
LEUKOCYTE ESTERASE UR QL STRIP: ABNORMAL
LYMPHOCYTES # BLD AUTO: 2.1 K/UL (ref 1–4.8)
LYMPHOCYTES NFR BLD: 32.8 % (ref 18–48)
MAGNESIUM SERPL-MCNC: 2.1 MG/DL (ref 1.6–2.6)
MCH RBC QN AUTO: 27.9 PG (ref 27–31)
MCHC RBC AUTO-ENTMCNC: 30.5 G/DL (ref 32–36)
MCV RBC AUTO: 92 FL (ref 82–98)
MICROSCOPIC COMMENT: ABNORMAL
MONOCYTES # BLD AUTO: 0.7 K/UL (ref 0.3–1)
MONOCYTES NFR BLD: 11.5 % (ref 4–15)
NEUTROPHILS # BLD AUTO: 3.4 K/UL (ref 1.8–7.7)
NEUTROPHILS NFR BLD: 53.6 % (ref 38–73)
NITRITE UR QL STRIP: NEGATIVE
NRBC BLD-RTO: 0 /100 WBC
PH UR STRIP: 7 [PH] (ref 5–8)
PHOSPHATE SERPL-MCNC: 3.6 MG/DL (ref 2.7–4.5)
PLATELET # BLD AUTO: 367 K/UL (ref 150–450)
PMV BLD AUTO: 9.6 FL (ref 9.2–12.9)
POTASSIUM SERPL-SCNC: 3.8 MMOL/L (ref 3.5–5.1)
PROT SERPL-MCNC: 6.2 G/DL (ref 6–8.4)
PROT UR QL STRIP: NEGATIVE
RBC # BLD AUTO: 3.83 M/UL (ref 4–5.4)
RBC #/AREA URNS HPF: 4 /HPF (ref 0–4)
SODIUM SERPL-SCNC: 140 MMOL/L (ref 136–145)
SP GR UR STRIP: 1.01 (ref 1–1.03)
SQUAMOUS #/AREA URNS HPF: 1 /HPF
TROPONIN I SERPL DL<=0.01 NG/ML-MCNC: 0.04 NG/ML
URN SPEC COLLECT METH UR: ABNORMAL
UROBILINOGEN UR STRIP-ACNC: NEGATIVE EU/DL
WBC # BLD AUTO: 6.41 K/UL (ref 3.9–12.7)
WBC #/AREA URNS HPF: 3 /HPF (ref 0–5)

## 2022-10-28 PROCEDURE — 80053 COMPREHEN METABOLIC PANEL: CPT | Performed by: NURSE PRACTITIONER

## 2022-10-28 PROCEDURE — 83036 HEMOGLOBIN GLYCOSYLATED A1C: CPT | Performed by: NURSE PRACTITIONER

## 2022-10-28 PROCEDURE — 85025 COMPLETE CBC W/AUTO DIFF WBC: CPT | Performed by: NURSE PRACTITIONER

## 2022-10-28 PROCEDURE — 36415 COLL VENOUS BLD VENIPUNCTURE: CPT | Performed by: NURSE PRACTITIONER

## 2022-10-28 PROCEDURE — 25000003 PHARM REV CODE 250: Performed by: NURSE PRACTITIONER

## 2022-10-28 PROCEDURE — 25000003 PHARM REV CODE 250: Performed by: INTERNAL MEDICINE

## 2022-10-28 PROCEDURE — 81001 URINALYSIS AUTO W/SCOPE: CPT | Performed by: EMERGENCY MEDICINE

## 2022-10-28 PROCEDURE — G0378 HOSPITAL OBSERVATION PER HR: HCPCS

## 2022-10-28 PROCEDURE — 84484 ASSAY OF TROPONIN QUANT: CPT | Performed by: NURSE PRACTITIONER

## 2022-10-28 PROCEDURE — G0378 HOSPITAL OBSERVATION PER HR: HCPCS | Mod: CS

## 2022-10-28 PROCEDURE — 83735 ASSAY OF MAGNESIUM: CPT | Performed by: NURSE PRACTITIONER

## 2022-10-28 PROCEDURE — 84100 ASSAY OF PHOSPHORUS: CPT | Performed by: NURSE PRACTITIONER

## 2022-10-28 RX ORDER — TORSEMIDE 20 MG/1
20 TABLET ORAL 2 TIMES DAILY
Status: DISCONTINUED | OUTPATIENT
Start: 2022-10-28 | End: 2022-10-29 | Stop reason: HOSPADM

## 2022-10-28 RX ORDER — ACETAMINOPHEN 325 MG/1
650 TABLET ORAL EVERY 6 HOURS PRN
Status: DISCONTINUED | OUTPATIENT
Start: 2022-10-28 | End: 2022-10-29 | Stop reason: HOSPADM

## 2022-10-28 RX ADMIN — RIVAROXABAN 15 MG: 15 TABLET, FILM COATED ORAL at 05:10

## 2022-10-28 RX ADMIN — ACETAMINOPHEN 650 MG: 325 TABLET ORAL at 11:10

## 2022-10-28 RX ADMIN — SACUBITRIL AND VALSARTAN 1 TABLET: 24; 26 TABLET, FILM COATED ORAL at 08:10

## 2022-10-28 RX ADMIN — PROPRANOLOL HYDROCHLORIDE 10 MG: 10 TABLET ORAL at 08:10

## 2022-10-28 RX ADMIN — AMIODARONE HYDROCHLORIDE 100 MG: 100 TABLET ORAL at 05:10

## 2022-10-28 RX ADMIN — LATANOPROST 1 DROP: 50 SOLUTION OPHTHALMIC at 08:10

## 2022-10-28 RX ADMIN — TORSEMIDE 20 MG: 20 TABLET ORAL at 08:10

## 2022-10-28 RX ADMIN — AMIODARONE HYDROCHLORIDE 100 MG: 100 TABLET ORAL at 08:10

## 2022-10-28 RX ADMIN — TORSEMIDE 20 MG: 20 TABLET ORAL at 05:10

## 2022-10-28 RX ADMIN — AMIODARONE HYDROCHLORIDE 100 MG: 100 TABLET ORAL at 11:10

## 2022-10-28 NOTE — CONSULTS
Left message for patient to call back.      Met w/ pt and family at bedside. Educated on carb choices and serving sizes of 1 choice. Pairing carbohydrates with a fat, fiber, or protein to prevent increase in blood glucose. Educated pt on MyPlate and what a plate should consist of. Educated pt on consistent carbohydrate intake throughout the day. Educated pt on heart healthy diet. Education focused on including healthy fats- gave examples and lowering LDL levels by excluding saturated/trans fat in the diet. Went over types of foods that have saturated/trans fat. Encouraged cooking with olive oil instead of butter. Choosing whole grains over refined grains, choosing canned foods with no salt added and plan frozen veggies instead of veggies cooked in butter and cream sauces. Encouraged patient to choose leaner cuts of meat and decreasing high fat meats such as chu, sausage, hot dogs, etc. Educated pt on decreasing sodium in diet. To try not to cook with salt and use herbs and spices to make food more flavorful. To limit eating out-if patient chooses to eat out, informed patient to discuss with  to cook meats and veggies with no salt and olive oil instead of butter. Provided handouts on all of these topics for pt to use in the future. Pt receptive + handout provided.

## 2022-10-28 NOTE — ED PROVIDER NOTES
Encounter Date: 10/27/2022       History     Chief Complaint   Patient presents with    Hypertension     STATES BP WAS HIGH FOR HER, WHEN HOME HEALTH CAME TODAY    Headache     X 3 DAYS     82-year-old female with history of hypertension, atrial fibrillation, CHF, non-insulin-dependent diabetes, hyperlipidemia.  Patient presents emergency department with complaint of midsternal chest tightness which was noted earlier today.  This was happened in the setting of elevated blood pressure as well.  Patient states blood pressure been running in the 160s to 180s.  Patient states has been compliant with her medications.  Decided come to the emergency department for further evaluation.  She denies headache, no abdominal pain, no other constitutional symptoms.    Review of patient's allergies indicates:   Allergen Reactions    Codeine Other (See Comments)     nausea    Hydrocodone Nausea And Vomiting    Lasix [furosemide]      rash     Past Medical History:   Diagnosis Date    Allergy     Codeine, Lasix    Atrial fibrillation     Atrial fibrillation     Cataract     CHF (congestive heart failure)     Diabetes mellitus, type 2     Ejection fraction < 50% 10/18/2017    Approximately 35%  Based on prior  Echocardiogram.    Encounter for blood transfusion     Hyperlipidemia     Osteoporosis     PONV (postoperative nausea and vomiting)     Thyroid disorder screening 10/17/2017    TSH of 1.12 ordered by Dr. dee Jones     Past Surgical History:   Procedure Laterality Date    ANTEGRADE NEPHROSTOGRAPHY Left 8/25/2022    Procedure: NEPHROSTOGRAM, ANTEGRADE;  Surgeon: Shelby Parra MD;  Location: [facility] OR;  Service: Urology;  Laterality: Left;    CHOLECYSTECTOMY  1997    [place]     COLONOSCOPY      CYSTOSCOPY W/ URETERAL STENT PLACEMENT Left 7/17/2022    Procedure: CYSTOSCOPY, WITH URETERAL STENT INSERTION;  Surgeon: Shelby Parra MD;  Location: [facility] OR;  Service: Urology;  Laterality: Left;    CYSTOSCOPY W/ URETERAL  STENT REMOVAL Left 9/21/2022    Procedure: CYSTOSCOPY, WITH URETERAL STENT REMOVAL;  Surgeon: Shelby Parra MD;  Location: [facility] OR;  Service: Urology;  Laterality: Left;    CYSTOSCOPY WITH URETEROSCOPY, RETROGRADE PYELOGRAPHY, AND INSERTION OF STENT Left 8/25/2022    Procedure: CYSTOSCOPY, WITH RETROGRADE PYELOGRAM AND URETERAL STENT INSERTION;  Surgeon: Shelby Parra MD;  Location: [facility] OR;  Service: Urology;  Laterality: Left;    EYE SURGERY      bilateral cataracts    FINGER SURGERY Right 2021    right pinky finger    FLEXIBLE CYSTOSCOPY Left 8/25/2022    Procedure: CYSTOSCOPY, FLEXIBLE WITH STENT REMOVAL;  Surgeon: Shelby Parra MD;  Location: [facility] OR;  Service: Urology;  Laterality: Left;    FRACTURE SURGERY  2014    right femur with [place]    HEMORRHOID SURGERY      48 yrs ago    HYSTERECTOMY      NEPHROSTOMY Left 8/25/2022    Procedure: CREATION, NEPHROSTOMY;  Surgeon: Shelby Parra MD;  Location: [facility] OR;  Service: Urology;  Laterality: Left;    PERCUTANEOUS NEPHROLITHOTOMY N/A 8/25/2022    Procedure: NEPHROLITHOTOMY, PERCUTANEOUS;  Surgeon: Shelby Parra MD;  Location: [facility] OR;  Service: Urology;  Laterality: N/A;    REPLACEMENT OF IMPLANTABLE CARDIOVERTER-DEFIBRILLATOR (ICD) GENERATOR N/A 12/13/2019    Procedure: REPLACEMENT, PULSE GENERATOR, ICD-MEDTRONIC;  Surgeon: Sebastian Nowak III, MD;  Location: [facility];  Service: Cardiology;  Laterality: N/A;    TONSILLECTOMY       Family History   Problem Relation Age of Onset    Heart disease Mother     Cancer Father         Lung Cancer ??? Asbestos    Breast cancer Paternal Aunt     Breast cancer Maternal Grandmother     Breast cancer Maternal Aunt      Social History     Tobacco Use    Smoking status: Former     Passive exposure: Past    Smokeless tobacco: Never    Tobacco comments:     quit 2013   Substance Use Topics    Alcohol use: No    Drug use: No     Review of Systems   Constitutional:  Negative for fever.   HENT:  Negative for  sore throat.    Respiratory:  Positive for shortness of breath.    Cardiovascular:  Positive for chest pain.   Gastrointestinal:  Negative for nausea and vomiting.   Genitourinary:  Negative for dysuria.   Musculoskeletal:  Negative for arthralgias, back pain and myalgias.   Skin:  Negative for rash.   Neurological:  Negative for weakness.   Hematological:  Does not bruise/bleed easily.     Physical Exam     Initial Vitals [10/27/22 1641]   BP Pulse Resp Temp SpO2   135/82 98 18 97.9 °F (36.6 °C) 96 %      MAP       --         Physical Exam    Nursing note and vitals reviewed.  Constitutional: She appears well-developed and well-nourished.   HENT:   Head: Normocephalic and atraumatic.   Nose: Nose normal.   Mouth/Throat: Oropharynx is clear and moist.   Eyes: Conjunctivae and EOM are normal. Pupils are equal, round, and reactive to light.   Neck: Neck supple. No thyromegaly present. No tracheal deviation present.   Normal range of motion.  Cardiovascular:  Normal rate, regular rhythm, normal heart sounds and intact distal pulses.     Exam reveals no gallop and no friction rub.       No murmur heard.  Pulmonary/Chest: No stridor. No respiratory distress.   Course bilateral breath sounds no adventitious sounds   Abdominal: Abdomen is soft. Bowel sounds are normal. She exhibits no distension and no mass. There is no abdominal tenderness. There is no rebound and no guarding.   Musculoskeletal:         General: No tenderness or edema. Normal range of motion.      Cervical back: Normal range of motion and neck supple.     Lymphadenopathy:     She has no cervical adenopathy.   Neurological: She is alert and oriented to person, place, and time. She has normal strength and normal reflexes. She displays normal reflexes. No cranial nerve deficit or sensory deficit. GCS score is 15. GCS eye subscore is 4. GCS verbal subscore is 5. GCS motor subscore is 6.   Skin: Skin is warm and dry. Capillary refill takes less than 2 seconds.    Psychiatric: She has a normal mood and affect.       ED Course   Procedures  Labs Reviewed   CBC W/ AUTO DIFFERENTIAL - Abnormal; Notable for the following components:       Result Value    RBC 3.65 (*)     Hemoglobin 10.2 (*)     Hematocrit 33.5 (*)     MCHC 30.4 (*)     All other components within normal limits   COMPREHENSIVE METABOLIC PANEL - Abnormal; Notable for the following components:    Sodium 133 (*)     Albumin 3.2 (*)     eGFR 37.8 (*)     All other components within normal limits   B-TYPE NATRIURETIC PEPTIDE - Abnormal; Notable for the following components:    BNP 2,874 (*)     All other components within normal limits   DIGOXIN LEVEL - Abnormal; Notable for the following components:    Digoxin Lvl 0.6 (*)     All other components within normal limits   TROPONIN I   TROPONIN I   DIGOXIN LEVEL   URINALYSIS, REFLEX TO URINE CULTURE   SARS-COV-2 RNA AMPLIFICATION, QUAL   HEMOGLOBIN A1C   CBC W/ AUTO DIFFERENTIAL   TROPONIN I     EKG Readings: (Independently Interpreted)   Initial Reading: No STEMI. Rhythm: Atrial Fibrillation. Heart Rate: 99. Axis: Left Axis Deviation.   Atrial fibrillation, with nonspecific ST segment changes.  Rate 99, left axis     Imaging Results              X-Ray Chest PA And Lateral (Final result)  Result time 10/27/22 17:23:15   Procedure changed from X-Ray Chest AP Portable     Final result by Jayant Castellanos MD (10/27/22 17:23:15)                   Narrative:    2 view chest    CLINICAL DATA: Hypertension    FINDINGS: PA and lateral views are compared April 2021. Heart size is normal. The aortic arch is calcified. Implantable cardiac device is unchanged in position.    No infiltrates are identified. There is suggestion of a trace amount of pleural fluid on the left. Osseous structures are unremarkable with the exception of chronic bilateral rotator cuff tears..    IMPRESSION:  1. Probable trace amount of pleural fluid on the left.  2. Additional findings as  above.    Electronically signed by:  Jayant Castellanos MD  10/27/2022 5:23 PM CDT Workstation: [id number]                                     Medications   amiodarone tablet 100 mg (has no administration in time range)   latanoprost 0.005 % ophthalmic solution 1 drop (has no administration in time range)   midodrine tablet 5 mg (has no administration in time range)   propranoloL tablet 10 mg (has no administration in time range)   rivaroxaban tablet 15 mg (has no administration in time range)   sacubitriL-valsartan 24-26 mg per tablet 1 tablet (has no administration in time range)   glucose chewable tablet 16 g (has no administration in time range)   glucose chewable tablet 24 g (has no administration in time range)   glucagon (human recombinant) injection 1 mg (has no administration in time range)   insulin aspart U-100 pen 0-5 Units (has no administration in time range)   sodium chloride 0.9% flush 10 mL (has no administration in time range)   ondansetron injection 4 mg (has no administration in time range)   melatonin tablet 6 mg (has no administration in time range)   dextrose 10% bolus 125 mL (has no administration in time range)   dextrose 10% bolus 250 mL (has no administration in time range)   furosemide injection 20 mg (20 mg Intravenous Given 10/27/22 2106)   nitroGLYCERIN 2% TD oint ointment 0.5 inch (0.5 inches Transdermal Given 10/27/22 2140)     Medical Decision Making:   Initial Assessment:   82-year-old female with history of hypertension, atrial fibrillation, CHF, non-insulin-dependent diabetes, hyperlipidemia.  Patient presents emergency department with complaint of midsternal chest tightness which was noted earlier today.  This was happened in the setting of elevated blood pressure as well.  Patient states blood pressure been running in the 160s to 180s.  Patient states has been compliant with her medications.  Decided come to the emergency department for further evaluation.  She denies headache,  no abdominal pain, no other constitutional symptoms.    Differential Diagnosis:   Congestive heart failure, erythema, hypertensive urgency, hypertensive emergency, cardiac arrhythmia  Clinical Tests:   Lab Tests: Ordered and Reviewed  Radiological Study: Ordered and Reviewed  Medical Tests: Ordered and Reviewed           ED Course as of 10/27/22 2313   u Oct 27, 2022   2133 Patient seen evaluated emergency department.  Currently at this time patient found with elevated BNP of 2874, chest x-ray showed left trace pleural effusion.  Patient will be admitted for CHF exacerbation and hypertensive urgency.  Patient remained hemodynamically adequate case transferred to Hospital Medicine patient is in stable condition at time of admission. [RM]      ED Course User Index  [RM] Herber Looney MD                 Clinical Impression:   Final diagnoses:  [I16.0] Hypertensive urgency (Primary)  [I50.9] Congestive heart failure, unspecified HF chronicity, unspecified heart failure type  [I48.91] Atrial fibrillation, unspecified type  [I50.43] Acute on chronic combined systolic and diastolic heart failure        ED Disposition Condition    Observation Stable                Herber Looney MD  10/27/22 2133       Herber Looney MD  10/27/22 2313

## 2022-10-28 NOTE — H&P
[facility] Medicine History & Physical Examination   Patient Name: Jo Alegre  MRN: [id number]  Patient Class: Emergency   Admission Date: 10/27/2022  4:35 PM  Length of Stay: 0  Attending Physician:   Primary Care Provider: Kraig Alberto MD  Face-to-Face encounter date: 10/27/2022  Code Status: Full Code  MPOA:  Chief Complaint: Hypertension (STATES BP WAS HIGH FOR HER, WHEN HOME HEALTH CAME TODAY) and Headache (X 3 DAYS)        Patient information was obtained from patient, past medical records and ER records.   HISTORY OF PRESENT ILLNESS:   Jo Alegre is a 82 y.o. old female who  has a past medical history of Allergy, Atrial fibrillation, Atrial fibrillation, Cataract, CHF (congestive heart failure), Diabetes mellitus, type 2, Ejection fraction < 50% (10/18/2017), Encounter for blood transfusion, Hyperlipidemia, Osteoporosis, PONV (postoperative nausea and vomiting), and Thyroid disorder screening (10/17/2017).. The patient presented to [facility] on 10/27/2022 with a primary complaint of Hypertension (STATES BP WAS HIGH FOR HER, WHEN HOME HEALTH CAME TODAY) and Headache (X 3 DAYS)  .     82-year-old female past medical history significant for atrial fibrillation, CHF, hypertension, type 2 diabetes, COPD and hyperlipidemia presents to the emergency room with complaints of elevated blood pressure and headache for 3 days.  The patient states her blood pressures been running in the 180s.  She is taking her medication as prescribed but she has been having persistent hypertension.  She denied any ventral visual changes, unilateral weakness she did endorse some dizziness with getting up from a sitting position this been no nausea vomiting syncope or trauma to her head she describes her symptoms as moderate in severity with no alleviating or exacerbating factors in the emergency room her blood pressure was 148/78.  She will be admitted for observation        REVIEW OF  SYSTEMS:   10 Point Review of System was performed and was found to be negative except for that mentioned already in the HPI and   Review of Systems (Negative unless checked off)  Review of Systems   Constitutional: Negative.    HENT: Negative.     Eyes: Negative.    Respiratory:  Positive for shortness of breath.    Cardiovascular: Negative.    Gastrointestinal: Negative.    Genitourinary: Negative.    Musculoskeletal: Negative.    Skin: Negative.    Neurological:  Positive for dizziness and headaches.   Endo/Heme/Allergies: Negative.    Psychiatric/Behavioral: Negative.           PAST MEDICAL HISTORY:     Past Medical History:   Diagnosis Date    Allergy     Codeine, Lasix    Atrial fibrillation     Atrial fibrillation     Cataract     CHF (congestive heart failure)     Diabetes mellitus, type 2     Ejection fraction < 50% 10/18/2017    Approximately 35%  Based on prior  Echocardiogram.    Encounter for blood transfusion     Hyperlipidemia     Osteoporosis     PONV (postoperative nausea and vomiting)     Thyroid disorder screening 10/17/2017    TSH of 1.12 ordered by Dr. dee Jones       PAST SURGICAL HISTORY:     Past Surgical History:   Procedure Laterality Date    ANTEGRADE NEPHROSTOGRAPHY Left 8/25/2022    Procedure: NEPHROSTOGRAM, ANTEGRADE;  Surgeon: Shelby Parra MD;  Location: [facility] OR;  Service: Urology;  Laterality: Left;    CHOLECYSTECTOMY  1997    [place]     COLONOSCOPY      CYSTOSCOPY W/ URETERAL STENT PLACEMENT Left 7/17/2022    Procedure: CYSTOSCOPY, WITH URETERAL STENT INSERTION;  Surgeon: Shelby Parra MD;  Location: [facility] OR;  Service: Urology;  Laterality: Left;    CYSTOSCOPY W/ URETERAL STENT REMOVAL Left 9/21/2022    Procedure: CYSTOSCOPY, WITH URETERAL STENT REMOVAL;  Surgeon: Shelby Parra MD;  Location: [facility] OR;  Service: Urology;  Laterality: Left;    CYSTOSCOPY WITH URETEROSCOPY, RETROGRADE PYELOGRAPHY, AND INSERTION OF STENT Left 8/25/2022    Procedure: CYSTOSCOPY,  WITH RETROGRADE PYELOGRAM AND URETERAL STENT INSERTION;  Surgeon: Shelby Parra MD;  Location: [facility] OR;  Service: Urology;  Laterality: Left;    EYE SURGERY      bilateral cataracts    FINGER SURGERY Right 2021    right pinky finger    FLEXIBLE CYSTOSCOPY Left 8/25/2022    Procedure: CYSTOSCOPY, FLEXIBLE WITH STENT REMOVAL;  Surgeon: Shelby Parra MD;  Location: [facility] OR;  Service: Urology;  Laterality: Left;    FRACTURE SURGERY  2014    right femur with [place]    HEMORRHOID SURGERY      48 yrs ago    HYSTERECTOMY      NEPHROSTOMY Left 8/25/2022    Procedure: CREATION, NEPHROSTOMY;  Surgeon: Shelby Parra MD;  Location: [facility] OR;  Service: Urology;  Laterality: Left;    PERCUTANEOUS NEPHROLITHOTOMY N/A 8/25/2022    Procedure: NEPHROLITHOTOMY, PERCUTANEOUS;  Surgeon: Shelby Parra MD;  Location: [facility] OR;  Service: Urology;  Laterality: N/A;    REPLACEMENT OF IMPLANTABLE CARDIOVERTER-DEFIBRILLATOR (ICD) GENERATOR N/A 12/13/2019    Procedure: REPLACEMENT, PULSE GENERATOR, ICD-MEDTRONIC;  Surgeon: Sebastian Nowak III, MD;  Location: [facility] CATH;  Service: Cardiology;  Laterality: N/A;    TONSILLECTOMY         ALLERGIES:   Codeine, Hydrocodone, and Lasix [furosemide]    FAMILY HISTORY:     Family History   Problem Relation Age of Onset    Heart disease Mother     Cancer Father         Lung Cancer ??? Asbestos    Breast cancer Paternal Aunt     Breast cancer Maternal Grandmother     Breast cancer Maternal Aunt        SOCIAL HISTORY:     Social History     Tobacco Use    Smoking status: Former     Passive exposure: Past    Smokeless tobacco: Never    Tobacco comments:     quit 2013   Substance Use Topics    Alcohol use: No        Social History     Substance and Sexual Activity   Sexual Activity Not Currently        HOME MEDICATIONS:     Prior to Admission medications    Medication Sig Start Date End Date Taking? Authorizing Provider   amiodarone (PACERONE) 200 MG Tab Take 100 mg by mouth 3 (three) times  daily with meals. 1/2 tab once daily with meals  May take an extra dose for palpatations   Max 4 100mg tabs   Yes Historical Provider   digoxin (LANOXIN) 125 mcg tablet Take 125 mcg by mouth every Mon, Wed, Fri.   Yes Historical Provider   gabapentin (NEURONTIN) 100 MG capsule TAKE 2 CAPSULES(200 MG) BY MOUTH THREE TIMES DAILY  Patient taking differently: Take 200 mg by mouth 3 (three) times daily. TAKE 2 CAPSULES(200 MG) BY MOUTH THREE TIMES DAILY 5/20/22  Yes Kraig Alberto MD   glimepiride (AMARYL) 1 MG tablet Take 1 mg by mouth 2 (two) times a day.   Yes Historical Provider   latanoprost 0.005 % ophthalmic solution Place 1 drop into both eyes nightly. 1/26/22  Yes Historical Provider   propranoloL (INDERAL) 10 MG tablet Take 10 mg by mouth 2 (two) times daily. 10/4/22  Yes Historical Provider   rivaroxaban (XARELTO) 15 mg Tab Take 15 mg by mouth daily with dinner or evening meal.   Yes Historical Provider   sacubitriL-valsartan (ENTRESTO) 24-26 mg per tablet Take 1 tablet by mouth 2 (two) times daily.   Yes Historical Provider   Ca-D3-mag ox-zinc--thom-bor 600 mg calcium- 20 mcg-50 mg Tab Take 1 tablet by mouth 2 (two) times daily.    Historical Provider   cholecalciferol, vitamin D3, 125 mcg (5,000 unit) Tab Take 5,000 Units by mouth 2 (two) times daily.    Historical Provider   diltiaZEM (CARDIZEM) 60 MG tablet Take 60 mg by mouth 2 (two) times daily. 3/2/22   Historical Provider   glimepiride (AMARYL) 1 MG tablet Take 1 tablet (1 mg total) by mouth before breakfast. 7/4/19   Kraig Alberto MD   midodrine (PROAMATINE) 2.5 MG Tab Take 2 tablets (5 mg total) by mouth 3 (three) times daily. 9/8/22   KYRA Sun   rivaroxaban (XARELTO) 10 mg Tab Take 10 mg by mouth daily with dinner or evening meal.    Historical Provider   sacubitriL-valsartan (ENTRESTO)  mg per tablet Take 1 tablet by mouth once daily. 9/16/22   Historical Provider   tamsulosin (FLOMAX) 0.4 mg Cap Take 1 capsule by mouth  once daily. 10/27/22   Historical Provider   torsemide (DEMADEX) 20 MG Tab Take 1 tablet (20 mg total) by mouth once daily. for 14 days  Patient taking differently: Take 20 mg by mouth 3 (three) times a week. 2/4/21 10/12/22  Easton Coleman MD   XARELTO 10 mg Tab Take 10 mg by mouth once daily. 8/20/22   Historical Provider   sacubitriL-valsartan (ENTRESTO) 24-26 mg per tablet Take 1 tablet by mouth once daily.   10/27/22  Historical Provider         PHYSICAL EXAM:   /82 (BP Location: Left arm, Patient Position: Sitting)   Pulse 98   Temp 97.9 °F (36.6 °C) (Oral)   Resp 18   Wt 95.7 kg (211 lb)   SpO2 96%   BMI 31.16 kg/m²   Vitals Reviewed  General appearance: Well-developed, well-nourished female in no apparent distress.  Skin: No Rash.   Neuro: Motor and sensory exams grossly intact. Good tone. Power in all 4 extremities 5/5.   HENT: Atraumatic head. Moist mucous membranes of oral cavity.  Eyes: Normal extraocular movements.   Neck: Supple. No evidence of lymphadenopathy. No thyroidomegaly.  Lungs: Clear to auscultation bilaterally. No wheezing present.   Heart: Regular rate and rhythm. S1 and S2 present with no murmurs/gallop/rub. No pedal edema. No JVD present.   Abdomen: Soft, non-distended, non-tender. No rebound tenderness/guarding. No masses or organomegaly. Bowel sounds are normal. Bladder is not palpable.   Extremities: No cyanosis, clubbing, or edema.  Psych/mental status: Alert and oriented. Cooperative. Responds appropriately to questions.   EMERGENCY DEPARTMENT LABS AND IMAGING:   Following labs were Reviewed   Recent Labs   Lab 10/27/22  1748   WBC 6.16   HGB 10.2*   HCT 33.5*      CALCIUM 8.8   ALBUMIN 3.2*   PROT 6.9   *   K 3.9   CO2 25   CL 98   BUN 15   CREATININE 1.4   ALKPHOS 90   ALT 13   AST 16   BILITOT 0.6         BMP:   Recent Labs   Lab 10/27/22  1748   GLU 70   *   K 3.9   CL 98   CO2 25   BUN 15   CREATININE 1.4   CALCIUM 8.8   , CMP   Recent Labs   Lab  10/27/22  1748   *   K 3.9   CL 98   CO2 25   GLU 70   BUN 15   CREATININE 1.4   CALCIUM 8.8   PROT 6.9   ALBUMIN 3.2*   BILITOT 0.6   ALKPHOS 90   AST 16   ALT 13   ANIONGAP 10   , CBC   Recent Labs   Lab 10/27/22  1748   WBC 6.16   HGB 10.2*   HCT 33.5*      , INR   Lab Results   Component Value Date    INR 1.1 08/18/2022    INR 3.3 02/04/2021    INR 3.7 02/03/2021   , Lipid Panel   Lab Results   Component Value Date    CHOL 255 (H) 07/02/2022    HDL 61 07/02/2022    LDLCALC 158 (H) 07/02/2022    TRIG 202 (H) 07/02/2022    CHOLHDL 4.2 07/02/2022   , Troponin   Recent Labs   Lab 10/27/22  1748   TROPONINI <0.030   , A1C:   Recent Labs   Lab 07/02/22  1001   HGBA1C 5.7*   , and All labs within the past 24 hours have been reviewed  Microbiology Results (last 7 days)       ** No results found for the last 168 hours. **          X-Ray Chest PA And Lateral   Final Result        X-Ray Chest PA And Lateral    Result Date: 10/27/2022  2 view chest CLINICAL DATA: Hypertension FINDINGS: PA and lateral views are compared April 2021. Heart size is normal. The aortic arch is calcified. Implantable cardiac device is unchanged in position. No infiltrates are identified. There is suggestion of a trace amount of pleural fluid on the left. Osseous structures are unremarkable with the exception of chronic bilateral rotator cuff tears.. IMPRESSION: 1. Probable trace amount of pleural fluid on the left. 2. Additional findings as above. Electronically signed by:  Jayant Castellanos MD  10/27/2022 5:23 PM CDT Workstation: [id number]    ASSESSMENT & PLAN:   Jo Alegre is a 82 y.o. female admitted for       Acute on Chronic systolic diastolic heart failure   -increase Demadex to b.i.d. allergic to Lasix   -cardiology consult  -daily weight      2. Paroxysmal atrial fibrillation  -continue amiodarone and digoxin  -on Xarelto    3. COPD  -metered-dose inhalers p.r.n.    4. Type 2 diabetes  -last hemoglobin A1c 5.7  -low  dose NovoLog insulin sliding scale    5. Accelerated hypertension  -continue home medications to manage    6. Obesity:  BMI 32.07  -follow-up with PCP with discharge for weight reduction weight modification    7. Chronic kidney disease stage 3  -renal dose all medications  -avoid nephrotoxic medications          DVT Prophylaxis: will be placed on Xarelto for DVT prophylaxis and will be advised to be as mobile as possible and sit in a chair as tolerated.   ________________________________________________________________  Face-to-Face encounter date: 10/27/2022  Encounter included review of the medical records, interviewing and examining the patient face-to-face, discussion with family and other health care providers including emergency medicine physician, admission orders, interpreting lab/test results and formulating a plan of care.   Medical Decision Making during this encounter was  [_] Low Complexity  [_] Moderate Complexity  [x] High Complexity  _________________________________________________________________________________    INPATIENT LIST OF MEDICATIONS   No current facility-administered medications for this encounter.    Current Outpatient Medications:     amiodarone (PACERONE) 200 MG Tab, Take 100 mg by mouth 3 (three) times daily with meals. 1/2 tab once daily with meals May take an extra dose for palpatations  Max 4 100mg tabs, Disp: , Rfl:     digoxin (LANOXIN) 125 mcg tablet, Take 125 mcg by mouth every Mon, Wed, Fri., Disp: , Rfl:     gabapentin (NEURONTIN) 100 MG capsule, TAKE 2 CAPSULES(200 MG) BY MOUTH THREE TIMES DAILY (Patient taking differently: Take 200 mg by mouth 3 (three) times daily. TAKE 2 CAPSULES(200 MG) BY MOUTH THREE TIMES DAILY), Disp: 180 capsule, Rfl: 5    glimepiride (AMARYL) 1 MG tablet, Take 1 mg by mouth 2 (two) times a day., Disp: , Rfl:     latanoprost 0.005 % ophthalmic solution, Place 1 drop into both eyes nightly., Disp: , Rfl:     propranoloL (INDERAL) 10 MG tablet, Take  10 mg by mouth 2 (two) times daily., Disp: , Rfl:     rivaroxaban (XARELTO) 15 mg Tab, Take 15 mg by mouth daily with dinner or evening meal., Disp: , Rfl:     sacubitriL-valsartan (ENTRESTO) 24-26 mg per tablet, Take 1 tablet by mouth 2 (two) times daily., Disp: , Rfl:     Ca-D3-mag ox-zinc--thom-bor 600 mg calcium- 20 mcg-50 mg Tab, Take 1 tablet by mouth 2 (two) times daily., Disp: , Rfl:     cholecalciferol, vitamin D3, 125 mcg (5,000 unit) Tab, Take 5,000 Units by mouth 2 (two) times daily., Disp: , Rfl:     diltiaZEM (CARDIZEM) 60 MG tablet, Take 60 mg by mouth 2 (two) times daily., Disp: , Rfl:     glimepiride (AMARYL) 1 MG tablet, Take 1 tablet (1 mg total) by mouth before breakfast., Disp: 90 tablet, Rfl: 1    midodrine (PROAMATINE) 2.5 MG Tab, Take 2 tablets (5 mg total) by mouth 3 (three) times daily., Disp: 30 tablet, Rfl: 0    rivaroxaban (XARELTO) 10 mg Tab, Take 10 mg by mouth daily with dinner or evening meal., Disp: , Rfl:     sacubitriL-valsartan (ENTRESTO)  mg per tablet, Take 1 tablet by mouth once daily., Disp: , Rfl:     tamsulosin (FLOMAX) 0.4 mg Cap, Take 1 capsule by mouth once daily., Disp: , Rfl:     torsemide (DEMADEX) 20 MG Tab, Take 1 tablet (20 mg total) by mouth once daily. for 14 days (Patient taking differently: Take 20 mg by mouth 3 (three) times a week.), Disp: 14 tablet, Rfl: 0    XARELTO 10 mg Tab, Take 10 mg by mouth once daily., Disp: , Rfl:     Facility-Administered Medications Ordered in Other Encounters:     0.9%  NaCl infusion, , Intravenous, Continuous, Sebastian Nowak III, MD, Last Rate: 75 mL/hr at 12/13/19 0728, New Bag at 12/13/19 0728    diphenhydrAMINE injection 25 mg, 25 mg, Intravenous, Once, Sebastian Nowak III, MD    lorazepam injection 1 mg, 1 mg, Intravenous, Once, Sebastian Nowak III, MD      Scheduled Meds:  Continuous Infusions:  PRN Meds:.      Dalia Rosales  [facility] Hospitalist NP  10/27/2022

## 2022-10-28 NOTE — PLAN OF CARE
10/28/22 1052   SANDERS Message   Medicare Outpatient and Observation Notification regarding financial responsibility Given to patient/caregiver;Explained to patient/caregiver;Signed/date by patient/caregiver   Date SANDERS was signed 10/28/22   Time SANDERS was signed 1058

## 2022-10-28 NOTE — PLAN OF CARE
[facility]  Initial Discharge Assessment       Primary Care Provider: Kraig Alberto MD    Admission Diagnosis: Acute on chronic combined systolic and diastolic heart failure [I50.43]    Admission Date: 10/27/2022  Expected Discharge Date:     Discharge Barriers Identified: None    Assessment completed at bedside,  patient states she has advanced directives, cm encouraged pt to provide to hospital.  Patient intends to discharge home where her grandson lives with here and helps when needed.  Daughters help with appointments and transportation.  Will need QUINN with [facility] [place] (HUMAIRA Jarrell)    Payor: [employer] MEDICARE / Plan: [employer] MEDICARE PPO / Product Type: Medicare Advantage /     Extended Emergency Contact Information  Primary Emergency Contact: LouisMary  Address: [address] [address]  Home Phone: [phone]  Mobile Phone: [phone]  Relation: Daughter  Preferred language: English   needed? No  Secondary Emergency Contact: Leidy Rodriguez  Address: [address] [place] [address] [place] [address] [facility]  Mobile Phone: [phone]  Relation: Daughter   needed? No    Discharge Plan A: Home with family, Home Health  Discharge Plan B: Home with family, Home Health      [facility] - [facility] [address] [facility]  [address] [facility] [address]  Phone: [phone] Fax: [phone]    [id number] - [place] [place] - [address] AT [facility] [address]  [address]  [place] [address]  Phone: [phone] Fax: [phone]      Initial Assessment (most recent)       Adult Discharge Assessment - 10/28/22 1054          Discharge Assessment    Assessment Type Discharge Planning Assessment     Confirmed/corrected address, phone number and insurance Yes     Confirmed Demographics Correct on Facesheet      Source of Information patient     When was your last doctors appointment? --   2 weeks ago    Communicated JOANA with patient/caregiver Date not available/Unable to determine     Reason For Admission chf     Lives With grandchild([place]nee)     Facility Arrived From: home     Do you expect to return to your current living situation? Yes     Do you have help at home or someone to help you manage your care at home? Yes     Who are your caregiver(s) and their phone number(s)? Mary [phone]     Prior to hospitilization cognitive status: Alert/Oriented     Current cognitive status: Alert/Oriented     Walking or Climbing Stairs Difficulty ambulation difficulty, requires equipment     Mobility Management RW     Home Accessibility wheelchair accessible     Equipment Currently Used at Home walker, rolling     Readmission within 30 days? No     Patient currently being followed by outpatient case management? No     Do you currently have service(s) that help you manage your care at home? Yes     Name and Contact number of agency Richard Ruiz ([facility] [phone])     Is the pt/caregiver preference to resume services with current agency Yes     Do you take prescription medications? Yes     Do you have prescription coverage? Yes     Coverage humana     Do you have any problems affording any of your prescribed medications? No     Is the patient taking medications as prescribed? yes     Who is going to help you get home at discharge? one of my daughters will     How do you get to doctors appointments? family or friend will provide     Are you on dialysis? No     Do you take coumadin? No     Discharge Plan A Home with family;Home Health     Discharge Plan B Home with family;Home Health     DME Needed Upon Discharge  none     Discharge Plan discussed with: Patient     Discharge Barriers Identified None        Physical Activity    On average, how many days per week do you engage in moderate to strenuous exercise (like a brisk  walk)? 4 days     On average, how many minutes do you engage in exercise at this level? 30 min        Financial Resource Strain    How hard is it for you to pay for the very basics like food, housing, medical care, and heating? Not very hard        Housing Stability    In the last 12 months, was there a time when you were not able to pay the mortgage or rent on time? No     In the last 12 months, how many places have you lived? 1     In the last 12 months, was there a time when you did not have a steady place to sleep or slept in a shelter (including now)? No        Transportation Needs    In the past 12 months, has lack of transportation kept you from medical appointments or from getting medications? No     In the past 12 months, has lack of transportation kept you from meetings, work, or from getting things needed for daily living? No        Food Insecurity    Within the past 12 months, you worried that your food would run out before you got the money to buy more. Never true     Within the past 12 months, the food you bought just didn't last and you didn't have money to get more. Never true        Stress    Do you feel stress - tense, restless, nervous, or anxious, or unable to sleep at night because your mind is troubled all the time - these days? Only a little        Social Connections    In a typical week, how many times do you talk on the phone with family, friends, or neighbors? More than three times a week     How often do you get together with friends or relatives? Three times a week     How often do you attend Zoroastrian or Samaritan services? Never     Do you belong to any clubs or organizations such as Zoroastrian groups, unions, fraternal or athletic groups, or school groups? No     How often do you attend meetings of the clubs or organizations you belong to? Never     Are you , , , , never , or living with a partner?         Alcohol Use    Q1: How often do you have  a drink containing alcohol? Monthly or less     Q2: How many drinks containing alcohol do you have on a typical day when you are drinking? 1 or 2     Q3: How often do you have six or more drinks on one occasion? Never

## 2022-10-28 NOTE — PROGRESS NOTES
Subjective:  Patient seen and examined today.  Her only complaint is that she is having headache in that she deny get Tylenol she asked for.  She says she does not feel short of breath or have chest pain.  She denies nausea, abdominal pain, fever, chills and vomiting    General appearance: Well-developed, well-nourished female in no apparent distress.  Skin: No Rash.   Neuro: Motor and sensory exams grossly intact. Good tone. Power in all 4 extremities 5/5.   HENT: Atraumatic head. Moist mucous membranes of oral cavity.  Eyes: Normal extraocular movements.   Neck: Supple. No evidence of lymphadenopathy. No thyroidomegaly.  Lungs: Clear to auscultation bilaterally. No wheezing present.   Heart: Regular rate and rhythm. S1 and S2 present with no murmurs/gallop/rub. No pedal edema. No JVD present.   Abdomen: Soft, non-distended, non-tender. No rebound tenderness/guarding. No masses or organomegaly. Bowel sounds are normal. Bladder is not palpable.   Extremities: No cyanosis, clubbing, or edema.  Psych/mental status: Alert and oriented. Cooperative. Responds appropriately to questions.           Acute on Chronic systolic diastolic heart failure   -continue torsemide.        2. Paroxysmal atrial fibrillation  -continue amiodarone and digoxin  -on Xarelto     3. COPD  -metered-dose inhalers p.r.n.     4. Type 2 diabetes  -last hemoglobin A1c 5.7  -low dose NovoLog insulin sliding scale     5. Accelerated hypertension  -pressure is better controlled today although not completely at goal.  Will continue to monitor and adjust medications as necessary.     6. Obesity:  BMI 32.07  -follow-up with PCP with discharge for weight reduction weight modification     7. Chronic kidney disease stage 3  -renal dose all medications  -avoid nephrotoxic medications

## 2022-10-29 VITALS
RESPIRATION RATE: 18 BRPM | SYSTOLIC BLOOD PRESSURE: 126 MMHG | WEIGHT: 216.88 LBS | TEMPERATURE: 98 F | OXYGEN SATURATION: 95 % | DIASTOLIC BLOOD PRESSURE: 85 MMHG | HEART RATE: 71 BPM | BODY MASS INDEX: 32.12 KG/M2 | HEIGHT: 69 IN

## 2022-10-29 LAB
GLUCOSE SERPL-MCNC: 151 MG/DL (ref 70–110)
GLUCOSE SERPL-MCNC: 154 MG/DL (ref 70–110)

## 2022-10-29 PROCEDURE — 94761 N-INVAS EAR/PLS OXIMETRY MLT: CPT

## 2022-10-29 PROCEDURE — 94760 N-INVAS EAR/PLS OXIMETRY 1: CPT

## 2022-10-29 PROCEDURE — 25000003 PHARM REV CODE 250: Performed by: NURSE PRACTITIONER

## 2022-10-29 PROCEDURE — 99900035 HC TECH TIME PER 15 MIN (STAT)

## 2022-10-29 PROCEDURE — 94640 AIRWAY INHALATION TREATMENT: CPT

## 2022-10-29 PROCEDURE — 25000242 PHARM REV CODE 250 ALT 637 W/ HCPCS: Performed by: INTERNAL MEDICINE

## 2022-10-29 PROCEDURE — 27000221 HC OXYGEN, UP TO 24 HOURS

## 2022-10-29 PROCEDURE — G0378 HOSPITAL OBSERVATION PER HR: HCPCS | Mod: CS

## 2022-10-29 RX ORDER — IPRATROPIUM BROMIDE AND ALBUTEROL SULFATE 2.5; .5 MG/3ML; MG/3ML
3 SOLUTION RESPIRATORY (INHALATION) EVERY 6 HOURS PRN
Status: DISCONTINUED | OUTPATIENT
Start: 2022-10-29 | End: 2022-10-29 | Stop reason: HOSPADM

## 2022-10-29 RX ORDER — TORSEMIDE 20 MG/1
20 TABLET ORAL DAILY
Qty: 30 TABLET | Refills: 0 | Status: ON HOLD | OUTPATIENT
Start: 2022-10-29 | End: 2023-05-18

## 2022-10-29 RX ADMIN — SACUBITRIL AND VALSARTAN 1 TABLET: 24; 26 TABLET, FILM COATED ORAL at 08:10

## 2022-10-29 RX ADMIN — PROPRANOLOL HYDROCHLORIDE 10 MG: 10 TABLET ORAL at 08:10

## 2022-10-29 RX ADMIN — AMIODARONE HYDROCHLORIDE 100 MG: 100 TABLET ORAL at 12:10

## 2022-10-29 RX ADMIN — IPRATROPIUM BROMIDE AND ALBUTEROL SULFATE 3 ML: .5; 3 SOLUTION RESPIRATORY (INHALATION) at 02:10

## 2022-10-29 RX ADMIN — TORSEMIDE 20 MG: 20 TABLET ORAL at 08:10

## 2022-10-29 RX ADMIN — AMIODARONE HYDROCHLORIDE 100 MG: 100 TABLET ORAL at 08:10

## 2022-10-29 NOTE — CARE UPDATE
10/29/22 0823   PRE-TX-O2   O2 Device (Oxygen Therapy) nasal cannula  (Pt uses oxygen at home PRN)   $ Is the patient on Low Flow Oxygen? Yes   Flow (L/min) 4   SpO2 96 %   Pulse Oximetry Type Intermittent   $ Pulse Oximetry - Multiple Charge Pulse Oximetry - Multiple   Pulse 67   Aerosol Therapy   $ Aerosol Therapy Charges PRN treatment not required   Respiratory Evaluation   $ Care Plan Tech Time 15 min

## 2022-10-29 NOTE — DISCHARGE SUMMARY
[facility]  Discharge Summary  Patient Name: Jo Alegre MRN: [id number]   Patient Class: OP- Observation  Length of Stay: 0   Admission Date: 10/27/2022  4:35 PM Attending Physician: Edilson Mendoza MD   Primary Care Provider: Kraig Alberto MD Face-to-Face encounter date: 10/29/2022   Chief Complaint: Hypertension (STATES BP WAS HIGH FOR HER, WHEN HOME HEALTH CAME TODAY) and Headache (X 3 DAYS)    Date of Discharge: 10/29/2022  Discharge Disposition:  Home or Self Care  Condition: Stable       Reason for Hospitalization   Uncontrolled hypertension      Patient Active Problem List   Diagnosis    Essential hypertension    Type 2 diabetes mellitus with diabetic nephropathy, without long-term current use of insulin    Mixed dyslipidemia    Cardiomyopathy with implantable cardioverter-defibrillator    Ejection fraction < 50%    Encounter for screening mammogram for breast cancer    Neuropathy of both feet    Asymptomatic menopause    Chronic anticoagulation    Stage 3 chronic kidney disease    Low back pain, non-specific    Difficulty walking    Paroxysmal atrial fibrillation    Osteoporosis with current pathological fracture    Osteoporosis, postmenopausal    Acute midline low back pain without sciatica    Elevated blood sugar    Screening for breast cancer    Gait instability    Complications, mechanical, pacemaker, cardiac    Presence of automatic (implantable) cardiac defibrillator    Chronic combined systolic and diastolic congestive heart failure    COPD (chronic obstructive pulmonary disease) per patient    Chronic respiratory failure    Obesity    Obstructive sleep apnea syndrome    Pulmonary hypertension    Left-sided chest pain    Cough    Hypoxia    Urge incontinence    Acute pyelonephritis    Staghorn calculus    Acute on chronic combined systolic and diastolic heart failure       Brief History of Present Illness     .      82-year-old female past medical history significant for atrial  "fibrillation, CHF, hypertension, type 2 diabetes, COPD and hyperlipidemia presents to the emergency room with complaints of elevated blood pressure and headache for 3 days.  The patient states her blood pressures been running in the 180s.  She is taking her medication as prescribed but she has been having persistent hypertension.  She denied any ventral visual changes, unilateral weakness she did endorse some dizziness with getting up from a sitting position this been no nausea vomiting syncope or trauma to her head she describes her symptoms as moderate in severity with no alleviating or exacerbating factors in the emergency room her blood pressure was 148/78.  She will be admitted for observation    Hospital Course By Problem with Pertinent Findings     Patient was admitted for observation.  She was started on her home regimen her pressures have been well controlled since.  Furthermore, she is on midodrine for an unclear indication so will DC this on discharge.  Patient also found to be slightly decompensated with leg swelling so I have increased her torsemide daily.  The patient is stable for discharge will follow up with her PCP as outpatient.      Patient was seen and examined on the date of discharge and determined to be suitable for discharge.    Physical Exam  /78 (BP Location: Left arm, Patient Position: Lying)   Pulse 66   Temp 98.7 °F (37.1 °C) (Oral)   Resp 18   Ht 5' 9" (1.753 m)   Wt 98.4 kg (216 lb 14.4 oz)   SpO2 96%   BMI 32.03 kg/m²   Vitals reviewed.    Constitutional: No distress.   HENT: Atraumatic.   Cardiovascular: Normal rate, regular rhythm and normal heart sounds.   Pulmonary/Chest: Effort normal. Clear to auscultation bilaterally. No wheezes.   Abdominal: Soft. Bowel sounds are normal. Exhibits no distension and no mass. No tenderness  Neurological: Alert.   Skin: Skin is warm and dry.     Following labs were Reviewed   No results for input(s): WBC, HGB, HCT, PLT, GLUCOSE, " CALCIUM, ALBUMIN, PROT, NA, K, CO2, CL, BUN, CREATININE, ALKPHOS, ALT, AST, BILITOT in the last 24 hours.  No results found for: POCTGLUCOSE         Microbiology Results (last 7 days)       ** No results found for the last 168 hours. **          X-Ray Chest PA And Lateral   Final Result          No results found for this or any previous visit.      Consultants and Procedures   Consultants:  Consults (From admission, onward)          Status Ordering Provider     Inpatient consult to Registered Dietitian/Nutritionist  Once        Provider:  (Not yet assigned)    Acknowledged TRENT ZUNIGA     IP consult to case management  Once        Provider:  (Not yet assigned)    Completed KIM GOMEZ     Inpatient consult to Registered Dietitian/Nutritionist  Once        Provider:  (Not yet assigned)    Completed DALIA GALICIA     Inpatient consult to Hospitalist  Once        Provider:  Dalia Galicia NP    Acknowledged PRIMO BOYCE            Procedures:       Discharge Information:   Diet:  Cardiac diet.  2 L fluid restriction    Physical Activity:  Activity as tolerated    Instructions:  1. Take all medications as prescribed  2. Keep all follow-up appointments  3. Return to the hospital or call your primary care physicians if any worsening symptoms such as chest pain shortness a breath    Follow-Up Appointments:  Please call your primary care physician to schedule an appointment in 1 week time.       Contact information for after-discharge care       Home Medical Care       [facility] .    Service: Home Health Services  Contact information:  [address] [place] [address] B  [facility] [address]  [phone]                                     Pending laboratory work/Tests to be performed/followed by the Primary care Physician:    The patient was discharged in the care of her parents//wife/family/caregiver, with discharge instructions were reviewed in written and verbal form. All  pertinent questions were discussed and prescriptions were provided. The importance of making follow up appointments and compliance of medications has been stressed repeatedly. The patient will follow up in 1 week or sooner as needed with the PCP, and the patient is on board with the plan. Upon discharge, patient needs to be on following medications.    Discharge Medications:     Medication List        CHANGE how you take these medications      gabapentin 100 MG capsule  Commonly known as: NEURONTIN  TAKE 2 CAPSULES(200 MG) BY MOUTH THREE TIMES DAILY  What changed:   how much to take  how to take this  when to take this     torsemide 20 MG Tab  Commonly known as: DEMADEX  Take 1 tablet (20 mg total) by mouth once daily.  What changed: when to take this            CONTINUE taking these medications      amiodarone 200 MG Tab  Commonly known as: PACERONE     Ca-D3-mag ox-zinc--thom-bor 600 mg calcium- 20 mcg-50 mg Tab     cholecalciferol (vitamin D3) 125 mcg (5,000 unit) Tab     digoxin 125 mcg tablet  Commonly known as: LANOXIN     diltiaZEM 60 MG tablet  Commonly known as: CARDIZEM     * glimepiride 1 MG tablet  Commonly known as: AMARYL  Take 1 tablet (1 mg total) by mouth before breakfast.     * glimepiride 1 MG tablet  Commonly known as: AMARYL     latanoprost 0.005 % ophthalmic solution     midodrine 2.5 MG Tab  Commonly known as: PROAMATINE  Take 2 tablets (5 mg total) by mouth 3 (three) times daily.     propranoloL 10 MG tablet  Commonly known as: INDERAL     * rivaroxaban 15 mg Tab  Commonly known as: XARELTO     * XARELTO 10 mg Tab  Generic drug: rivaroxaban     * rivaroxaban 10 mg Tab  Commonly known as: XARELTO     * sacubitriL-valsartan  mg per tablet  Commonly known as: ENTRESTO     * ENTRESTO 24-26 mg per tablet  Generic drug: sacubitriL-valsartan     tamsulosin 0.4 mg Cap  Commonly known as: FLOMAX           * This list has 7 medication(s) that are the same as other medications prescribed for  you. Read the directions carefully, and ask your doctor or other care provider to review them with you.                   Where to Get Your Medications        These medications were sent to [facility] - [place], [place] - [address]  [address], [facility] [address]      Phone: [phone]   torsemide 20 MG Tab           I spent 45 minutes preparing the discharge including reviewing records from previous encounters, preparation of discharge summary, assessing and final examination of the patient, discharge medicine reconciliation, discussing plan of care, follow up and education and prescriptions.       Edilson Conte  [facility] Hospitalist  10/29/2022

## 2022-10-29 NOTE — PLAN OF CARE
Patient accepted by [facility].  KINA contact [facility] [phone] to inform of patient discharge today.         10/29/22 8866   Post-Acute Status   Post-Acute Authorization Home Health   Home Health Status Set-up Complete/Auth obtained

## 2022-10-29 NOTE — NURSING
Discharge instructions reviewed with pt. Questions answered. Copy of discharge instructions given to pt. Discharge home via wheelchair to vehicle. Accompanied by daughter.

## 2022-10-29 NOTE — PLAN OF CARE
SW sent patient information to [facility] for resumption of care via [employer] system.       10/29/22 2255   Post-Acute Status   Post-Acute Authorization Home [facility]   Home Health Status Referrals Sent   Patient choice form signed by patient/caregiver List with quality metrics by geographic area provided

## 2022-10-29 NOTE — PLAN OF CARE
Patient cleared for discharge from case management standpoint.       10/29/22 5044   Final Note   Assessment Type Final Discharge Note   Anticipated Discharge Disposition Home-Health   Hospital Resources/Appts/Education Provided Post-Acute resouces added to AVS;Provided education on problems/symptoms using teachback;Provided patient/caregiver with written discharge plan information

## 2022-11-14 ENCOUNTER — HOSPITAL ENCOUNTER (OUTPATIENT)
Dept: RADIOLOGY | Facility: HOSPITAL | Age: 82
Discharge: HOME | End: 2022-11-14
Attending: STUDENT IN AN ORGANIZED HEALTH CARE EDUCATION/TRAINING PROGRAM
Payer: MEDICARE

## 2022-11-14 DIAGNOSIS — N28.89 OTHER SPECIFIED DISORDERS OF KIDNEY AND URETER: ICD-10-CM

## 2022-11-14 LAB
CREAT SERPL-MCNC: 1.9 MG/DL (ref 0.5–1.4)
SAMPLE: ABNORMAL

## 2022-11-14 PROCEDURE — 74150 CT ABDOMEN W/O CONTRAST: CPT | Mod: TC

## 2022-11-14 PROCEDURE — 74150 CT ABDOMEN WITHOUT CONTRAST: ICD-10-PCS | Mod: 26,,, | Performed by: RADIOLOGY

## 2022-11-14 PROCEDURE — 74150 CT ABDOMEN W/O CONTRAST: CPT | Mod: 26,,, | Performed by: RADIOLOGY

## 2022-11-16 ENCOUNTER — OFFICE VISIT (OUTPATIENT)
Dept: UROLOGY | Facility: CLINIC | Age: 82
End: 2022-11-16
Payer: MEDICARE

## 2022-11-16 VITALS
HEART RATE: 68 BPM | HEIGHT: 69 IN | RESPIRATION RATE: 18 BRPM | WEIGHT: 216.94 LBS | DIASTOLIC BLOOD PRESSURE: 58 MMHG | BODY MASS INDEX: 32.13 KG/M2 | SYSTOLIC BLOOD PRESSURE: 123 MMHG

## 2022-11-16 DIAGNOSIS — I27.20 PULMONARY HYPERTENSION: ICD-10-CM

## 2022-11-16 DIAGNOSIS — I48.0 PAROXYSMAL ATRIAL FIBRILLATION: ICD-10-CM

## 2022-11-16 DIAGNOSIS — I10 ESSENTIAL HYPERTENSION: ICD-10-CM

## 2022-11-16 DIAGNOSIS — G47.33 OBSTRUCTIVE SLEEP APNEA SYNDROME: ICD-10-CM

## 2022-11-16 DIAGNOSIS — R94.30 EJECTION FRACTION < 50%: ICD-10-CM

## 2022-11-16 DIAGNOSIS — E11.21 TYPE 2 DIABETES MELLITUS WITH DIABETIC NEPHROPATHY, WITHOUT LONG-TERM CURRENT USE OF INSULIN: ICD-10-CM

## 2022-11-16 DIAGNOSIS — E66.9 OBESITY, UNSPECIFIED CLASSIFICATION, UNSPECIFIED OBESITY TYPE, UNSPECIFIED WHETHER SERIOUS COMORBIDITY PRESENT: ICD-10-CM

## 2022-11-16 DIAGNOSIS — N20.0 STAGHORN CALCULUS: Primary | ICD-10-CM

## 2022-11-16 DIAGNOSIS — J44.9 CHRONIC OBSTRUCTIVE PULMONARY DISEASE, UNSPECIFIED COPD TYPE: ICD-10-CM

## 2022-11-16 DIAGNOSIS — E78.2 MIXED DYSLIPIDEMIA: ICD-10-CM

## 2022-11-16 PROCEDURE — 99999 PR PBB SHADOW E&M-EST. PATIENT-LVL III: ICD-10-PCS | Mod: PBBFAC,,, | Performed by: STUDENT IN AN ORGANIZED HEALTH CARE EDUCATION/TRAINING PROGRAM

## 2022-11-16 PROCEDURE — 3074F PR MOST RECENT SYSTOLIC BLOOD PRESSURE < 130 MM HG: ICD-10-PCS | Mod: CPTII,S$GLB,, | Performed by: STUDENT IN AN ORGANIZED HEALTH CARE EDUCATION/TRAINING PROGRAM

## 2022-11-16 PROCEDURE — 3288F FALL RISK ASSESSMENT DOCD: CPT | Mod: CPTII,S$GLB,, | Performed by: STUDENT IN AN ORGANIZED HEALTH CARE EDUCATION/TRAINING PROGRAM

## 2022-11-16 PROCEDURE — 1101F PR PT FALLS ASSESS DOC 0-1 FALLS W/OUT INJ PAST YR: ICD-10-PCS | Mod: CPTII,S$GLB,, | Performed by: STUDENT IN AN ORGANIZED HEALTH CARE EDUCATION/TRAINING PROGRAM

## 2022-11-16 PROCEDURE — 99024 PR POST-OP FOLLOW-UP VISIT: ICD-10-PCS | Mod: S$GLB,,, | Performed by: STUDENT IN AN ORGANIZED HEALTH CARE EDUCATION/TRAINING PROGRAM

## 2022-11-16 PROCEDURE — 1126F PR PAIN SEVERITY QUANTIFIED, NO PAIN PRESENT: ICD-10-PCS | Mod: CPTII,S$GLB,, | Performed by: STUDENT IN AN ORGANIZED HEALTH CARE EDUCATION/TRAINING PROGRAM

## 2022-11-16 PROCEDURE — 99024 POSTOP FOLLOW-UP VISIT: CPT | Mod: S$GLB,,, | Performed by: STUDENT IN AN ORGANIZED HEALTH CARE EDUCATION/TRAINING PROGRAM

## 2022-11-16 PROCEDURE — 3074F SYST BP LT 130 MM HG: CPT | Mod: CPTII,S$GLB,, | Performed by: STUDENT IN AN ORGANIZED HEALTH CARE EDUCATION/TRAINING PROGRAM

## 2022-11-16 PROCEDURE — 3288F PR FALLS RISK ASSESSMENT DOCUMENTED: ICD-10-PCS | Mod: CPTII,S$GLB,, | Performed by: STUDENT IN AN ORGANIZED HEALTH CARE EDUCATION/TRAINING PROGRAM

## 2022-11-16 PROCEDURE — 3078F DIAST BP <80 MM HG: CPT | Mod: CPTII,S$GLB,, | Performed by: STUDENT IN AN ORGANIZED HEALTH CARE EDUCATION/TRAINING PROGRAM

## 2022-11-16 PROCEDURE — 99999 PR PBB SHADOW E&M-EST. PATIENT-LVL III: CPT | Mod: PBBFAC,,, | Performed by: STUDENT IN AN ORGANIZED HEALTH CARE EDUCATION/TRAINING PROGRAM

## 2022-11-16 PROCEDURE — 1101F PT FALLS ASSESS-DOCD LE1/YR: CPT | Mod: CPTII,S$GLB,, | Performed by: STUDENT IN AN ORGANIZED HEALTH CARE EDUCATION/TRAINING PROGRAM

## 2022-11-16 PROCEDURE — 3078F PR MOST RECENT DIASTOLIC BLOOD PRESSURE < 80 MM HG: ICD-10-PCS | Mod: CPTII,S$GLB,, | Performed by: STUDENT IN AN ORGANIZED HEALTH CARE EDUCATION/TRAINING PROGRAM

## 2022-11-16 PROCEDURE — 1126F AMNT PAIN NOTED NONE PRSNT: CPT | Mod: CPTII,S$GLB,, | Performed by: STUDENT IN AN ORGANIZED HEALTH CARE EDUCATION/TRAINING PROGRAM

## 2022-11-16 RX ORDER — AMLODIPINE BESYLATE 5 MG/1
5 TABLET ORAL 2 TIMES DAILY
Status: ON HOLD | COMMUNITY
Start: 2022-10-27 | End: 2023-05-18 | Stop reason: HOSPADM

## 2022-11-16 NOTE — PROGRESS NOTES
Hematology/Oncology Office Note    Reason for referral:  Normocytic anemia    CC:  Anemia    Referred by:  No ref. provider found    Diagnosis:  Normocytic anemia  CKD    Treatment:  Procrit 10,000 units biweekly      History of present illness:  70-year-old female with a history of diabetes mellitus, CKD, hypertension, and dyslipidemia who was been referred for progressive normocytic anemia.  Patient has significant CK D with baseline creatinine approximately 3.0.  Hemoglobin noted to be 9.3 on 6/14/16 and is trended down since 2012 when he was noted to be  11.3.  She reports fatigue, malaise, and cold intolerance, however, denies other significant symptoms.      I have reviewed and updated the HPI, ROS, PMHx, Social Hx, Family Hx and treatment history.      Today's visit:  Patient is without complaints today.  She specifically denies fever, chills, dizziness, palpitations, or chest pain.    Past Medical History:   Diagnosis Date    Anemia     Arthritis     back, hand, knees    Back pain     Cataract     CKD stage G4/A3, GFR 15 - 29 and albumin creatinine ratio >300 mg/g     GFR 40% Jan 2014 and 33% ub 3/2014 ([facility])    Diabetic retinopathy     DM (diabetes mellitus) type II controlled, neurological manifestation     GERD (gastroesophageal reflux disease)     Glaucoma     Hyperlipidemia     Hypertension     Peripheral neuropathy     Polyneuropathy     Proteinuria     >6 mo     Seizures     [facility] 1/2014 -dick CT NRI showed small vessel    Stroke 2012,2014    Tobacco dependence     Type 2 diabetes with peripheral circulatory disorder, controlled          Social History:  No tobacco, alcohol, or illicit drugs    Family History: family history includes Cancer in her maternal grandmother; Diabetes in her mother; Heart disease in her mother; Hyperlipidemia in her mother; Hypertension in her mother; Muscular dystrophy in her son.      HPI    Review of Systems   Constitutional: Negative for activity change,  [facility] Note    PCP: Kraig Alberto MD    Chief Complaint: kidney stones    SUBJECTIVE:       History of Present Illness:  Jo Alegre is a 82 y.o. female who presents to clinic for kidney stones. She is Established  to our clinic.     Unable to get contrast with CT secondary to renal function.   She has a 10 mm mid pole stone and a 10 mm and 6 mm lower pole stone. No hydro.   Suggestive of hemorrhagic cysts.     She is without complaints today.   No pain.   No hematuria.   No UTI symptoms.       9/12/22  S/P left PCNL on 8/25/22.  Stone analysis: 80% Magnesium ammonium phosphate (struvite), 20% Calcium phosphate (apatite)   KUB: possible small residual stone in lower pole    Post op course uncomplicated.   No further hematuria. No flank pain.     8/1/22  Patient underwent stent placement on 7/17 for left staghorn stone. She had a fever to 100.3.  CT shows a large lower pole staghorn. Also has some complex cysts present.     This is her first stone episode.   No family hx of stones.   No hematuria prior to stent placement   Has a hx of recurrent UTIs.     Last urine culture: MO (7/2/22)    Lab Results   Component Value Date    CREATININE 1.5 (H) 10/28/2022     Hx of COPD, pulm HTN, a fib, CHF, HLD, CKD, DM, JENNIFER    Past medical, family, and social history reviewed as documented in chart with pertinent positive medical, family, and social history detailed in HPI.    Review of patient's allergies indicates:   Allergen Reactions    Codeine Other (See Comments)     nausea    Hydrocodone Nausea And Vomiting    Lasix [furosemide]      rash       Past Medical History:   Diagnosis Date    Allergy     Codeine, Lasix    Atrial fibrillation     Atrial fibrillation     Cataract     CHF (congestive heart failure)     Diabetes mellitus, type 2     Ejection fraction < 50% 10/18/2017    Approximately 35%  Based on prior  Echocardiogram.    Encounter for blood transfusion     Hyperlipidemia      "fatigue, fever and unexpected weight change.   HENT: Negative for congestion, dental problem, drooling, ear discharge, ear pain, facial swelling, mouth sores and nosebleeds.    Eyes: Negative.    Respiratory: Negative for apnea, cough, choking, chest tightness, shortness of breath and wheezing.    Cardiovascular: Negative for chest pain, palpitations and leg swelling.   Gastrointestinal: Negative for abdominal distention, abdominal pain, anal bleeding, blood in stool, diarrhea and nausea.   Endocrine: Negative.    Genitourinary: Negative for difficulty urinating, dyspareunia, dysuria, enuresis, flank pain, genital sores and hematuria.   Musculoskeletal: Negative for arthralgias, back pain and neck pain.   Skin: Negative for color change, pallor and rash.   Allergic/Immunologic: Negative.    Neurological: Negative.  Negative for dizziness, facial asymmetry, numbness and headaches.   Hematological: Negative for adenopathy. Does not bruise/bleed easily.   Psychiatric/Behavioral: Negative for agitation, behavioral problems, confusion, decreased concentration and dysphoric mood. The patient is not nervous/anxious and is not hyperactive.        Objective:       Vitals:    07/19/18 0901   BP: (!) 184/70   Pulse: 71   Resp: 18   Temp: 98 °F (36.7 °C)   TempSrc: Oral   SpO2: 99%   Weight: 71.3 kg (157 lb 3 oz)   Height: 5' 5" (1.651 m)     Physical Exam   Constitutional: She is oriented to person, place, and time. She appears well-developed and well-nourished. No distress.   Well groomed   HENT:   Head: Normocephalic and atraumatic.   Right Ear: External ear normal.   Left Ear: External ear normal.   Nose: Nose normal. Right sinus exhibits no maxillary sinus tenderness and no frontal sinus tenderness. Left sinus exhibits no maxillary sinus tenderness and no frontal sinus tenderness.   Mouth/Throat: Oropharynx is clear and moist and mucous membranes are normal. Mucous membranes are not pale and not dry. No oral lesions. " Osteoporosis     PONV (postoperative nausea and vomiting)     Thyroid disorder screening 10/17/2017    TSH of 1.12 ordered by Dr. dee Jones     Past Surgical History:   Procedure Laterality Date    ANTEGRADE NEPHROSTOGRAPHY Left 8/25/2022    Procedure: NEPHROSTOGRAM, ANTEGRADE;  Surgeon: Shelby Parra MD;  Location: [facility];  Service: Urology;  Laterality: Left;    CHOLECYSTECTOMY  1997    [place]     COLONOSCOPY      CYSTOSCOPY W/ URETERAL STENT PLACEMENT Left 7/17/2022    Procedure: CYSTOSCOPY, WITH URETERAL STENT INSERTION;  Surgeon: Shelby Parra MD;  Location: [facility] OR;  Service: Urology;  Laterality: Left;    CYSTOSCOPY W/ URETERAL STENT REMOVAL Left 9/21/2022    Procedure: CYSTOSCOPY, WITH URETERAL STENT REMOVAL;  Surgeon: Shelby Parra MD;  Location: [facility];  Service: Urology;  Laterality: Left;    CYSTOSCOPY WITH URETEROSCOPY, RETROGRADE PYELOGRAPHY, AND INSERTION OF STENT Left 8/25/2022    Procedure: CYSTOSCOPY, WITH RETROGRADE PYELOGRAM AND URETERAL STENT INSERTION;  Surgeon: Shelby Parra MD;  Location: [facility];  Service: Urology;  Laterality: Left;    EYE SURGERY      bilateral cataracts    FINGER SURGERY Right 2021    right pinky finger    FLEXIBLE CYSTOSCOPY Left 8/25/2022    Procedure: CYSTOSCOPY, FLEXIBLE WITH STENT REMOVAL;  Surgeon: Shelby Parra MD;  Location: [facility];  Service: Urology;  Laterality: Left;    FRACTURE SURGERY  2014    right femur with neville    HEMORRHOID SURGERY      48 yrs ago    HYSTERECTOMY      NEPHROSTOMY Left 8/25/2022    Procedure: CREATION, NEPHROSTOMY;  Surgeon: Shelby Parra MD;  Location: [facility];  Service: Urology;  Laterality: Left;    PERCUTANEOUS NEPHROLITHOTOMY N/A 8/25/2022    Procedure: NEPHROLITHOTOMY, PERCUTANEOUS;  Surgeon: Shelby Parra MD;  Location: [facility];  Service: Urology;  Laterality: N/A;    REPLACEMENT OF IMPLANTABLE CARDIOVERTER-DEFIBRILLATOR (ICD) GENERATOR N/A 12/13/2019    Procedure: REPLACEMENT, PULSE  "GENERATOR, ICD-MEDTRONIC;  Surgeon: Sebastian Nowak III, MD;  [facility];  Service: Cardiology;  Laterality: N/A;    TONSILLECTOMY       Family History   Problem Relation Age of Onset    Heart disease Mother     Cancer Father         Lung Cancer ??? Asbestos    Breast cancer Paternal Aunt     Breast cancer Maternal Grandmother     Breast cancer Maternal Aunt      Social History     Tobacco Use    Smoking status: Former     Passive exposure: Past    Smokeless tobacco: Never    Tobacco comments:     quit 2013   Substance Use Topics    Alcohol use: No    Drug use: No        Review of Systems   Genitourinary:  Negative for difficulty urinating, dysuria, flank pain, frequency, hematuria, nocturia and urgency.     OBJECTIVE:     Anticoagulation:  xarelto 20 mg     Estimated body mass index is 32.04 kg/m² as calculated from the following:    Height as of this encounter: 5' 9" (1.753 m).    Weight as of this encounter: 98.4 kg (216 lb 14.9 oz).    Vital Signs (Most Recent)  Pulse: 68 (11/16/22 1330)  Resp: 18 (11/16/22 1330)  BP: (!) 123/58 (11/16/22 1330)    Physical Exam  Vitals reviewed.   Constitutional:       General: She is not in acute distress.     Appearance: Normal appearance. She is not ill-appearing.   HENT:      Head: Normocephalic and atraumatic.   Eyes:      General: No scleral icterus.  Cardiovascular:      Rate and Rhythm: Normal rate and regular rhythm.   Pulmonary:      Effort: Pulmonary effort is normal. No respiratory distress.   Abdominal:      General: There is no distension.      Palpations: Abdomen is soft.   Skin:     Coloration: Skin is not jaundiced.   Neurological:      General: No focal deficit present.      Mental Status: She is alert and oriented to person, place, and time.   Psychiatric:         Mood and Affect: Mood normal.         Behavior: Behavior normal.       BMP  Lab Results   Component Value Date     10/28/2022    K 3.8 10/28/2022     10/28/2022    CO2 31 (H) " Normal dentition. No oropharyngeal exudate.   Eyes: Conjunctivae, EOM and lids are normal. Pupils are equal, round, and reactive to light. Lids are everted and swept, no foreign bodies found. Right eye exhibits no discharge.   Neck: Trachea normal and normal range of motion. Neck supple. No tracheal deviation present. No thyroid mass and no thyromegaly present.   No crepitus   Cardiovascular: Normal rate, regular rhythm, intact distal pulses and normal pulses.  Exam reveals no gallop and no friction rub.    Murmur heard.  Pulmonary/Chest: Effort normal and breath sounds normal. No respiratory distress. She has no wheezes. She has no rales. She exhibits no tenderness.   Abdominal: Soft. Normal appearance and bowel sounds are normal. She exhibits no distension. There is no hepatosplenomegaly. There is no tenderness. There is no guarding.   Musculoskeletal: Normal range of motion. She exhibits no tenderness.   Lymphadenopathy:        Head (right side): No submental and no submandibular adenopathy present.        Head (left side): No submental and no submandibular adenopathy present.     She has no cervical adenopathy.     She has no axillary adenopathy.   Neurological: She is alert and oriented to person, place, and time. No cranial nerve deficit. Coordination normal.   Skin: Skin is warm, dry and intact. Capillary refill takes less than 2 seconds. No abrasion, no bruising and no rash noted. She is not diaphoretic.   Psychiatric: She has a normal mood and affect. Her behavior is normal.       Lab Results   Component Value Date    WBC 5.57 07/10/2018    HGB 10.5 (L) 07/10/2018    HCT 31.8 (L) 07/10/2018    MCV 91 07/10/2018     07/10/2018     Lab Results   Component Value Date    CREATININE 2.8 (H) 07/10/2018    BUN 49 (H) 07/10/2018     07/10/2018    K 4.1 07/10/2018     07/10/2018    CO2 29 07/10/2018     Lab Results   Component Value Date    ALT 20 07/10/2018    AST 17 07/10/2018    ALKPHOS 110  07/10/2018    BILITOT 0.5 07/10/2018         Assessment:       70-year-old female with progressive normocytic anemia likely secondary to CKD.  Workup has also revealed a relative iron deficiency with a ferritin of 20 and 12% iron saturation.  Therefore, she received Feraheme 510 mg ×2 doses given 7/2016.  Procrit 10,000 units every 2 weeks (started 5/2017).  She has responded appropriately to Procrit.    Unfortunately she discontinued Procrit in 10/2017 due to social circumstances with an ill .   Her hemoglobin remains greater than 10 (10.5) while she has not had Procrit since 11/2017.   Iron deficiency has been treated with Injectafer 750 mg IV ×2 doses given in 3/2018.    Hemoglobin is 10.5 today and there is no need to reinitiate Procrit at this time.  She will follow up in 4 months with repeat CBC/iron studies.       iron deficiency:  -- status post Injectafer in 3/2018  -- refer to GI clinic to evaluate for endoscopies  -- repeat CBC iron studies in 4 months    Normocytic anemia: Secondary to CKD:  --Continue to monitor CBC and plan to initiate Procrit when hemoglobin falls below 9.5  --the patient has not had Procrit since 11/2017   10/28/2022    BUN 16 10/28/2022    CREATININE 1.5 (H) 10/28/2022    CALCIUM 8.7 10/28/2022    ANIONGAP 6 (L) 10/28/2022    ESTGFRAFRICA 30.2 (A) 07/18/2022    EGFRNONAA 26.2 (A) 07/18/2022       Lab Results   Component Value Date    WBC 6.41 10/28/2022    HGB 10.7 (L) 10/28/2022    HCT 35.1 (L) 10/28/2022    MCV 92 10/28/2022     10/28/2022     Imaging:  Per HPI    ASSESSMENT     1. Staghorn calculus    2. Chronic obstructive pulmonary disease, unspecified COPD type    3. Pulmonary hypertension    4. Essential hypertension    5. Mixed dyslipidemia    6. Ejection fraction < 50%    7. Paroxysmal atrial fibrillation    8. Type 2 diabetes mellitus with diabetic nephropathy, without long-term current use of insulin    9. Obesity, unspecified classification, unspecified obesity type, unspecified whether serious comorbidity present    10. Obstructive sleep apnea syndrome        PLAN:     - Imaging reviewed and compared to prior  - Significant decrease in stone burden   - Discussed ureteroscopy vs observation for residual stones  - She would like to observe for now    - Given stone type will repeat US and KUB in 3 months   - Encouraged her to drink 3 L of fluids daily     Shelby Parra MD

## 2022-11-20 PROCEDURE — G0179 MD RECERTIFICATION HHA PT: HCPCS | Mod: ,,, | Performed by: INTERNAL MEDICINE

## 2022-11-20 PROCEDURE — G0179 PR HOME HEALTH MD RECERTIFICATION: ICD-10-PCS | Mod: ,,, | Performed by: INTERNAL MEDICINE

## 2022-11-21 ENCOUNTER — DOCUMENT SCAN (OUTPATIENT)
Dept: HOME HEALTH SERVICES | Facility: HOSPITAL | Age: 82
End: 2022-11-21
Payer: MEDICARE

## 2022-11-30 ENCOUNTER — EXTERNAL HOME HEALTH (OUTPATIENT)
Dept: HOME HEALTH SERVICES | Facility: HOSPITAL | Age: 82
End: 2022-11-30
Payer: MEDICARE

## 2023-01-13 ENCOUNTER — EXTERNAL HOME HEALTH (OUTPATIENT)
Dept: HOME HEALTH SERVICES | Facility: HOSPITAL | Age: 83
End: 2023-01-13
Payer: MEDICARE

## 2023-02-01 ENCOUNTER — OFFICE VISIT (OUTPATIENT)
Dept: FAMILY MEDICINE | Facility: CLINIC | Age: 83
End: 2023-02-01
Payer: MEDICARE

## 2023-02-01 VITALS
TEMPERATURE: 98 F | DIASTOLIC BLOOD PRESSURE: 66 MMHG | WEIGHT: 213 LBS | BODY MASS INDEX: 31.55 KG/M2 | HEIGHT: 69 IN | SYSTOLIC BLOOD PRESSURE: 103 MMHG | HEART RATE: 68 BPM

## 2023-02-01 DIAGNOSIS — R06.2 WHEEZING: ICD-10-CM

## 2023-02-01 DIAGNOSIS — R05.9 COUGH, UNSPECIFIED TYPE: Primary | ICD-10-CM

## 2023-02-01 PROCEDURE — 1126F PR PAIN SEVERITY QUANTIFIED, NO PAIN PRESENT: ICD-10-PCS | Mod: CPTII,S$GLB,, | Performed by: INTERNAL MEDICINE

## 2023-02-01 PROCEDURE — 99213 OFFICE O/P EST LOW 20 MIN: CPT | Mod: S$GLB,,, | Performed by: INTERNAL MEDICINE

## 2023-02-01 PROCEDURE — 1126F AMNT PAIN NOTED NONE PRSNT: CPT | Mod: CPTII,S$GLB,, | Performed by: INTERNAL MEDICINE

## 2023-02-01 PROCEDURE — 99213 PR OFFICE/OUTPT VISIT, EST, LEVL III, 20-29 MIN: ICD-10-PCS | Mod: S$GLB,,, | Performed by: INTERNAL MEDICINE

## 2023-02-01 PROCEDURE — 3074F PR MOST RECENT SYSTOLIC BLOOD PRESSURE < 130 MM HG: ICD-10-PCS | Mod: CPTII,S$GLB,, | Performed by: INTERNAL MEDICINE

## 2023-02-01 PROCEDURE — 3074F SYST BP LT 130 MM HG: CPT | Mod: CPTII,S$GLB,, | Performed by: INTERNAL MEDICINE

## 2023-02-01 PROCEDURE — 3078F DIAST BP <80 MM HG: CPT | Mod: CPTII,S$GLB,, | Performed by: INTERNAL MEDICINE

## 2023-02-01 PROCEDURE — 3078F PR MOST RECENT DIASTOLIC BLOOD PRESSURE < 80 MM HG: ICD-10-PCS | Mod: CPTII,S$GLB,, | Performed by: INTERNAL MEDICINE

## 2023-02-01 PROCEDURE — 1160F PR REVIEW ALL MEDS BY PRESCRIBER/CLIN PHARMACIST DOCUMENTED: ICD-10-PCS | Mod: CPTII,S$GLB,, | Performed by: INTERNAL MEDICINE

## 2023-02-01 PROCEDURE — 1160F RVW MEDS BY RX/DR IN RCRD: CPT | Mod: CPTII,S$GLB,, | Performed by: INTERNAL MEDICINE

## 2023-02-01 PROCEDURE — 1159F PR MEDICATION LIST DOCUMENTED IN MEDICAL RECORD: ICD-10-PCS | Mod: CPTII,S$GLB,, | Performed by: INTERNAL MEDICINE

## 2023-02-01 PROCEDURE — 1159F MED LIST DOCD IN RCRD: CPT | Mod: CPTII,S$GLB,, | Performed by: INTERNAL MEDICINE

## 2023-02-01 RX ORDER — AMILORIDE HYDROCHLORIDE 5 MG/1
5 TABLET ORAL DAILY
COMMUNITY
End: 2023-03-20 | Stop reason: CLARIF

## 2023-02-01 RX ORDER — ATORVASTATIN CALCIUM 20 MG/1
20 TABLET, FILM COATED ORAL NIGHTLY
COMMUNITY

## 2023-02-01 RX ORDER — ALBUTEROL SULFATE 90 UG/1
2 AEROSOL, METERED RESPIRATORY (INHALATION) EVERY 6 HOURS PRN
Qty: 6.7 G | Refills: 2 | Status: SHIPPED | OUTPATIENT
Start: 2023-02-01 | End: 2023-04-17

## 2023-02-01 NOTE — PROGRESS NOTES
Subjective:       Patient ID: Jo Alegre is a 82 y.o. female.    Chief Complaint: URI    Miss Jo Palm is 82-year-old  female who comes for a same-day appointment.  She complains of feeling chest congestion.  This has been going on for the last 5 days.  She was tested on Friday for negative for COVID and flu.  She does not have any fever.  No contacts that she can recall.    Her grandchild today woke up with some congestion.  No diagnosis on him as yet.      Her underlying medical issues of congestive heart failure, atrial fibrillation, diabetes, chronic anticoagulation and hypertension have been noted.  Blood sugar control is uncertain at this point.    She has been prescribed Z-Stephan and Medrol Dosepak.  She has finished the Z-Stephan and is on the last couple of days for Medrol Dosepak.    URI   This is a new problem. The current episode started in the past 7 days. The problem has been unchanged. There has been no fever. Associated symptoms include congestion, coughing and wheezing. Pertinent negatives include no abdominal pain or chest pain. The treatment provided no relief.     Past Medical History:   Diagnosis Date    Allergy     Codeine, Lasix    Atrial fibrillation     Atrial fibrillation     Cataract     CHF (congestive heart failure)     Diabetes mellitus, type 2     Ejection fraction < 50% 10/18/2017    Approximately 35%  Based on prior  Echocardiogram.    Encounter for blood transfusion     Hyperlipidemia     Osteoporosis     PONV (postoperative nausea and vomiting)     Thyroid disorder screening 10/17/2017    TSH of 1.12 ordered by Dr. dee Jones     Social History     Socioeconomic History    Marital status:     Number of children: 4   Occupational History    Occupation:    Tobacco Use    Smoking status: Former     Passive exposure: Past    Smokeless tobacco: Never    Tobacco comments:     quit 2013   Substance and Sexual Activity    Alcohol use: No    Drug use: No     Sexual activity: Not Currently   Social History Narrative    - Drives and lives alone.     Social Determinants of Health     Financial Resource Strain: Low Risk     Difficulty of Paying Living Expenses: Not very hard   Food Insecurity: No Food Insecurity    Worried About Running Out of Food in the Last Year: Never true    Ran Out of Food in the Last Year: Never true   Transportation Needs: No Transportation Needs    Lack of Transportation (Medical): No    Lack of Transportation (Non-Medical): No   Physical Activity: Insufficiently Active    Days of Exercise per Week: 4 days    Minutes of Exercise per Session: 30 min   Stress: No Stress Concern Present    Feeling of Stress : Only a little   Social Connections: Socially Isolated    Frequency of Communication with Friends and Family: More than three times a week    Frequency of Social Gatherings with Friends and Family: Three times a week    Attends Hindu Services: Never    Active Member of Clubs or Organizations: No    Attends Club or Organization Meetings: Never    Marital Status:    Housing Stability: Low Risk     Unable to Pay for Housing in the Last Year: No    Number of Places Lived in the Last Year: 1    Unstable Housing in the Last Year: No     Past Surgical History:   Procedure Laterality Date    ANTEGRADE NEPHROSTOGRAPHY Left 8/25/2022    Procedure: NEPHROSTOGRAM, ANTEGRADE;  Surgeon: Shelby Parra MD;  Location: [facility] OR;  Service: Urology;  Laterality: Left;    CHOLECYSTECTOMY  1997    [place]     COLONOSCOPY      CYSTOSCOPY W/ URETERAL STENT PLACEMENT Left 7/17/2022    Procedure: CYSTOSCOPY, WITH URETERAL STENT INSERTION;  Surgeon: Shelby Parra MD;  Location: [facility] OR;  Service: Urology;  Laterality: Left;    CYSTOSCOPY W/ URETERAL STENT REMOVAL Left 9/21/2022    Procedure: CYSTOSCOPY, WITH URETERAL STENT REMOVAL;  Surgeon: Shelby Parra MD;  Location: [facility] OR;  Service: Urology;  Laterality: Left;    CYSTOSCOPY  WITH URETEROSCOPY, RETROGRADE PYELOGRAPHY, AND INSERTION OF STENT Left 8/25/2022    Procedure: CYSTOSCOPY, WITH RETROGRADE PYELOGRAM AND URETERAL STENT INSERTION;  Surgeon: Shelby Parra MD;  Location: [facility] OR;  Service: Urology;  Laterality: Left;    EYE SURGERY      bilateral cataracts    FINGER SURGERY Right 2021    right pinky finger    FLEXIBLE CYSTOSCOPY Left 8/25/2022    Procedure: CYSTOSCOPY, FLEXIBLE WITH STENT REMOVAL;  Surgeon: Shelby Parra MD;  Location: [facility] OR;  Service: Urology;  Laterality: Left;    FRACTURE SURGERY  2014    right femur with neville    HEMORRHOID SURGERY      48 yrs ago    HYSTERECTOMY      NEPHROSTOMY Left 8/25/2022    Procedure: CREATION, NEPHROSTOMY;  Surgeon: Shelby Parra MD;  Location: [facility] OR;  Service: Urology;  Laterality: Left;    PERCUTANEOUS NEPHROLITHOTOMY N/A 8/25/2022    Procedure: NEPHROLITHOTOMY, PERCUTANEOUS;  Surgeon: Shelby Parra MD;  Location: [facility] OR;  Service: Urology;  Laterality: N/A;    REPLACEMENT OF IMPLANTABLE CARDIOVERTER-DEFIBRILLATOR (ICD) GENERATOR N/A 12/13/2019    Procedure: REPLACEMENT, PULSE GENERATOR, ICD-MEDTRONIC;  Surgeon: Sebastian Nowak III, MD;  [facility];  Service: Cardiology;  Laterality: N/A;    TONSILLECTOMY       Family History   Problem Relation Age of Onset    Heart disease Mother     Cancer Father         Lung Cancer ??? Asbestos    Breast cancer Paternal Aunt     Breast cancer Maternal Grandmother     Breast cancer Maternal Aunt        Review of Systems   Constitutional:  Positive for activity change (Somewhat more cautious while walking). Negative for appetite change, chills, fever and unexpected weight change (lost 3 lbs).   HENT:  Positive for congestion and postnasal drip. Negative for ear discharge.    Eyes:  Negative for discharge, redness and visual disturbance.   Respiratory:  Positive for cough and wheezing. Negative for chest tightness.    Cardiovascular:  Negative for chest pain,  "palpitations and leg swelling.        History of defibrillator, congestive cardiomyopathy hypertension and dyslipidemia   Gastrointestinal:  Negative for abdominal distention, abdominal pain and constipation.        Pellet-like stools and lot of gas.   Endocrine:        Diabetes mellitus on glimepiride.  Osteoporosis on injection Prolia   Neurological:         Last presentation of headache is certainly dissipated away.  Shakes and tremors in hands which are getting more bothersome.   Hematological:  Negative for adenopathy. Bruises/bleeds easily.        Patient is on chronic anticoagulation with warfarin.     Psychiatric/Behavioral:            Beginningof memory issues.       Objective:      Blood pressure 103/66, pulse 68, temperature 98.2 °F (36.8 °C), height 5' 9" (1.753 m), weight 96.6 kg (213 lb). Body mass index is 31.45 kg/m².  Physical Exam  Constitutional:       General: She is not in acute distress.     Appearance: She is well-developed. She is obese. She is ill-appearing. She is not toxic-appearing or diaphoretic.      Comments: Patient is obese with a BMI of 31.45   HENT:      Head: Normocephalic and atraumatic.      Comments: .     Mouth/Throat:      Pharynx: Oropharynx is clear. No posterior oropharyngeal erythema.   Eyes:      General: No scleral icterus.        Right eye: No discharge.         Left eye: No discharge.   Neck:      Thyroid: No thyromegaly.      Vascular: No JVD.      Trachea: No tracheal deviation.   Cardiovascular:      Rate and Rhythm: Normal rate and regular rhythm.      Heart sounds:     No friction rub. No gallop.   Pulmonary:      Effort: Pulmonary effort is normal. No respiratory distress.      Breath sounds: No stridor. Rhonchi present. No wheezing.   Abdominal:      General: There is no distension.      Palpations: Abdomen is soft.      Tenderness: There is no abdominal tenderness.   Musculoskeletal:      Cervical back: Neck supple.   Lymphadenopathy:      Cervical: No " cervical adenopathy.   Skin:     General: Skin is warm and dry.      Coloration: Skin is not pale.      Findings: No lesion. Rash is not scaling.   Neurological:      Mental Status: She is alert. She is not disoriented.      Motor: Tremor present.   Psychiatric:         Behavior: Behavior normal.         Assessment:       1. Cough, unspecified type    2. Wheezing           Hospital Outpatient Visit on 11/14/2022   Component Date Value Ref Range Status    POC Creatinine 11/14/2022 1.9 (H)  0.5 - 1.4 mg/dL Final    Sample 11/14/2022 unknown   Final         Plan:           Cough, unspecified type  -     X-Ray Chest PA And Lateral; Future; Expected date: 02/01/2023    Wheezing  -     X-Ray Chest PA And Lateral; Future; Expected date: 02/01/2023      Patient seems to have some persistent cough with wheezing and some more or less clear expectoration.  She has finished the course of antibiotics and she is on the 2nd or 3rd last day of Medrol Dosepak.  I will finish the Medrol Dosepak.      Please continue to monitor sugars.      I will give her a Ventolin inhaler 2 puffs every 8 hours.  Do some salt water gargles steam inhalation.      I will also get a chest x-ray to be sure that we are not dealing with a pneumonia.  She can get it done today or in a couple of days especially if she is not feeling better.  If she does not get better in the next few days she should let us know accordingly.      Patient's grandson also has similar symptoms.  If he does go and see a doctor and gets tested for flu or COVID and if it is positive, patient should let me know.  Please note that patient herself was tested negative for flu and COVID at urgent care center last Friday.    Current Outpatient Medications:     aMILoride (MIDAMOR) 5 MG Tab, Take 5 mg by mouth once daily., Disp: , Rfl:     amiodarone (PACERONE) 200 MG Tab, Take 100 mg by mouth 3 (three) times daily with meals. 1/2 tab once daily with meals May take an extra dose for  palpatations  Max 4 100mg tabs, Disp: , Rfl:     amLODIPine (NORVASC) 5 MG tablet, , Disp: , Rfl:     atorvastatin (LIPITOR) 20 MG tablet, Take 20 mg by mouth once daily., Disp: , Rfl:     Ca-D3-mag ox-zinc--thom-bor 600 mg calcium- 20 mcg-50 mg Tab, Take 1 tablet by mouth 2 (two) times daily., Disp: , Rfl:     cholecalciferol, vitamin D3, 125 mcg (5,000 unit) Tab, Take 5,000 Units by mouth 2 (two) times daily., Disp: , Rfl:     digoxin (LANOXIN) 125 mcg tablet, Take 125 mcg by mouth every Mon, Wed, Fri., Disp: , Rfl:     diltiaZEM (CARDIZEM) 60 MG tablet, Take 60 mg by mouth 2 (two) times daily., Disp: , Rfl:     gabapentin (NEURONTIN) 100 MG capsule, TAKE 2 CAPSULES(200 MG) BY MOUTH THREE TIMES DAILY (Patient taking differently: Take 200 mg by mouth 3 (three) times daily. TAKE 2 CAPSULES(200 MG) BY MOUTH THREE TIMES DAILY), Disp: 180 capsule, Rfl: 5    glimepiride (AMARYL) 1 MG tablet, Take 1 tablet (1 mg total) by mouth before breakfast., Disp: 90 tablet, Rfl: 1    latanoprost 0.005 % ophthalmic solution, Place 1 drop into both eyes nightly., Disp: , Rfl:     propranoloL (INDERAL) 10 MG tablet, Take 10 mg by mouth 2 (two) times daily., Disp: , Rfl:     sacubitriL-valsartan (ENTRESTO)  mg per tablet, Take 1 tablet by mouth once daily., Disp: , Rfl:     torsemide (DEMADEX) 20 MG Tab, Take 1 tablet (20 mg total) by mouth once daily., Disp: 30 tablet, Rfl: 0    XARELTO 10 mg Tab, Take 10 mg by mouth once daily., Disp: , Rfl:   No current facility-administered medications for this visit.    Facility-Administered Medications Ordered in Other Visits:     0.9%  NaCl infusion, , Intravenous, Continuous, Sebastian Nowak III, MD, Last Rate: 75 mL/hr at 12/13/19 0728, New Bag at 12/13/19 0728    diphenhydrAMINE injection 25 mg, 25 mg, Intravenous, Once, Sebastian Nowak III, MD    lorazepam injection 1 mg, 1 mg, Intravenous, Once, Sebastian Nowak III, MD

## 2023-02-03 ENCOUNTER — HOSPITAL ENCOUNTER (OUTPATIENT)
Dept: RADIOLOGY | Facility: HOSPITAL | Age: 83
Discharge: HOME | End: 2023-02-03
Attending: INTERNAL MEDICINE
Payer: MEDICARE

## 2023-02-03 DIAGNOSIS — R05.9 COUGH, UNSPECIFIED TYPE: ICD-10-CM

## 2023-02-03 DIAGNOSIS — R06.2 WHEEZING: ICD-10-CM

## 2023-02-03 PROCEDURE — 71046 X-RAY EXAM CHEST 2 VIEWS: CPT | Mod: TC,PO

## 2023-02-06 ENCOUNTER — TELEPHONE (OUTPATIENT)
Dept: FAMILY MEDICINE | Facility: CLINIC | Age: 83
End: 2023-02-06

## 2023-02-06 ENCOUNTER — PATIENT MESSAGE (OUTPATIENT)
Dept: FAMILY MEDICINE | Facility: CLINIC | Age: 83
End: 2023-02-06

## 2023-02-06 DIAGNOSIS — R05.9 COUGH, UNSPECIFIED TYPE: Primary | ICD-10-CM

## 2023-02-06 DIAGNOSIS — R06.2 WHEEZING: ICD-10-CM

## 2023-02-06 RX ORDER — PROMETHAZINE HYDROCHLORIDE AND DEXTROMETHORPHAN HYDROBROMIDE 6.25; 15 MG/5ML; MG/5ML
5 SYRUP ORAL EVERY 8 HOURS PRN
Qty: 120 ML | Refills: 1 | Status: SHIPPED | OUTPATIENT
Start: 2023-02-06 | End: 2023-02-16

## 2023-02-06 NOTE — TELEPHONE ENCOUNTER
Patient called to give update of current acute illness per your request. States she is feeling some better but continues with a cough.

## 2023-02-06 NOTE — TELEPHONE ENCOUNTER
----- Message from Kraig Alberto MD sent at 2/5/2023  4:22 PM CST -----  Please notify the patient that her chest x-ray was okay and did not show pneumonia.  How is she doing with cough and congestion?

## 2023-02-06 NOTE — TELEPHONE ENCOUNTER
Notified patient   of xray  still coughing a lot  wants cough meds sent in     Cough, unspecified type  -     promethazine-dextromethorphan (PROMETHAZINE-DM) 6.25-15 mg/5 mL Syrp; Take 5 mLs by mouth every 8 (eight) hours as needed (cough na dcongestion). This medication causes drowsiness and avoid this medication for activities which require alertness.  Dispense: 120 mL; Refill: 1    Wheezing  -     promethazine-dextromethorphan (PROMETHAZINE-DM) 6.25-15 mg/5 mL Syrp; Take 5 mLs by mouth every 8 (eight) hours as needed (cough na dcongestion). This medication causes drowsiness and avoid this medication for activities which require alertness.  Dispense: 120 mL; Refill: 1

## 2023-02-15 ENCOUNTER — TELEPHONE (OUTPATIENT)
Dept: UROLOGY | Facility: CLINIC | Age: 83
End: 2023-02-15
Payer: MEDICARE

## 2023-02-15 DIAGNOSIS — N20.0 STAGHORN CALCULUS: Primary | ICD-10-CM

## 2023-02-15 NOTE — TELEPHONE ENCOUNTER
----- Message from Ruth Martell sent at 2/15/2023 11:57 AM CST -----  Type: Needs Medical Advice  Who Called:  pt     Best Call Back Number: [phone] (home)     Additional Information: pt is requesting a call back regarding her appt on 2/20 and wants order sent over for the appt she missed 2/13 please advise thank you

## 2023-02-15 NOTE — TELEPHONE ENCOUNTER
Spoke with patient and rescheduled her appointment to a later date due to patient not feeling well/ patient needs an U/S prior. Please place U/S orders.

## 2023-02-27 ENCOUNTER — TELEPHONE (OUTPATIENT)
Dept: UROLOGY | Facility: CLINIC | Age: 83
End: 2023-02-27
Payer: MEDICARE

## 2023-02-27 NOTE — TELEPHONE ENCOUNTER
----- Message from Cassandra Lovell sent at 2/27/2023  3:34 PM CST -----  Contact: self  Pt has an operation on her Kidney. She is starting to have soreness in this are again and knows she has an apt coming up, but she wants to know if she should move the ultrasound and xray up earlier. Please call the patient back to advise at [phone]. Thanks!

## 2023-02-27 NOTE — TELEPHONE ENCOUNTER
Returned call and spoke with patient, states she is having pain on the left side and wants to know should she do her scheduled imaging done sooner than 3/23. Informed will send message to provider and contact her back with advisement, patient verbally understood.

## 2023-02-28 NOTE — TELEPHONE ENCOUNTER
Spoke with patient, informed it is fine to do the imaging sooner. KUB rescheduled in [place] and US rescheduled sooner in [place], patient verbally understood.

## 2023-03-02 ENCOUNTER — HOSPITAL ENCOUNTER (OUTPATIENT)
Dept: RADIOLOGY | Facility: CLINIC | Age: 83
Discharge: HOME | End: 2023-03-02
Attending: STUDENT IN AN ORGANIZED HEALTH CARE EDUCATION/TRAINING PROGRAM
Payer: MEDICARE

## 2023-03-02 ENCOUNTER — HOSPITAL ENCOUNTER (OUTPATIENT)
Dept: RADIOLOGY | Facility: HOSPITAL | Age: 83
Discharge: HOME | End: 2023-03-02
Attending: STUDENT IN AN ORGANIZED HEALTH CARE EDUCATION/TRAINING PROGRAM
Payer: MEDICARE

## 2023-03-02 DIAGNOSIS — N20.0 STAGHORN CALCULUS: ICD-10-CM

## 2023-03-02 PROCEDURE — 76770 US EXAM ABDO BACK WALL COMP: CPT | Mod: 26,,, | Performed by: RADIOLOGY

## 2023-03-02 PROCEDURE — 74018 RADEX ABDOMEN 1 VIEW: CPT | Mod: 26,,, | Performed by: RADIOLOGY

## 2023-03-02 PROCEDURE — 76770 US EXAM ABDO BACK WALL COMP: CPT | Mod: TC,PO

## 2023-03-02 PROCEDURE — 74018 XR KUB: ICD-10-PCS | Mod: TC,,, | Performed by: STUDENT IN AN ORGANIZED HEALTH CARE EDUCATION/TRAINING PROGRAM

## 2023-03-02 PROCEDURE — 74018 XR KUB: ICD-10-PCS | Mod: 26,,, | Performed by: RADIOLOGY

## 2023-03-02 PROCEDURE — 74018 RADEX ABDOMEN 1 VIEW: CPT | Mod: TC,,, | Performed by: STUDENT IN AN ORGANIZED HEALTH CARE EDUCATION/TRAINING PROGRAM

## 2023-03-02 PROCEDURE — 76770 US RETROPERITONEAL COMPLETE: ICD-10-PCS | Mod: 26,,, | Performed by: RADIOLOGY

## 2023-03-20 ENCOUNTER — OFFICE VISIT (OUTPATIENT)
Dept: FAMILY MEDICINE | Facility: CLINIC | Age: 83
End: 2023-03-20
Payer: MEDICARE

## 2023-03-20 VITALS
HEART RATE: 58 BPM | DIASTOLIC BLOOD PRESSURE: 55 MMHG | BODY MASS INDEX: 32.58 KG/M2 | HEIGHT: 69 IN | WEIGHT: 220 LBS | SYSTOLIC BLOOD PRESSURE: 92 MMHG

## 2023-03-20 DIAGNOSIS — I50.42 CHRONIC COMBINED SYSTOLIC AND DIASTOLIC CONGESTIVE HEART FAILURE: Primary | ICD-10-CM

## 2023-03-20 DIAGNOSIS — N18.32 STAGE 3B CHRONIC KIDNEY DISEASE: ICD-10-CM

## 2023-03-20 DIAGNOSIS — I48.0 PAROXYSMAL ATRIAL FIBRILLATION: ICD-10-CM

## 2023-03-20 DIAGNOSIS — Z79.01 CHRONIC ANTICOAGULATION: ICD-10-CM

## 2023-03-20 DIAGNOSIS — I10 ESSENTIAL HYPERTENSION: ICD-10-CM

## 2023-03-20 DIAGNOSIS — Z12.31 ENCOUNTER FOR SCREENING MAMMOGRAM FOR BREAST CANCER: ICD-10-CM

## 2023-03-20 DIAGNOSIS — J31.0 CHRONIC RHINITIS: ICD-10-CM

## 2023-03-20 DIAGNOSIS — I95.9 HYPOTENSION, UNSPECIFIED HYPOTENSION TYPE: ICD-10-CM

## 2023-03-20 PROCEDURE — 1126F AMNT PAIN NOTED NONE PRSNT: CPT | Mod: CPTII,S$GLB,, | Performed by: INTERNAL MEDICINE

## 2023-03-20 PROCEDURE — 1101F PR PT FALLS ASSESS DOC 0-1 FALLS W/OUT INJ PAST YR: ICD-10-PCS | Mod: CPTII,S$GLB,, | Performed by: INTERNAL MEDICINE

## 2023-03-20 PROCEDURE — 3078F DIAST BP <80 MM HG: CPT | Mod: CPTII,S$GLB,, | Performed by: INTERNAL MEDICINE

## 2023-03-20 PROCEDURE — 1160F PR REVIEW ALL MEDS BY PRESCRIBER/CLIN PHARMACIST DOCUMENTED: ICD-10-PCS | Mod: CPTII,S$GLB,, | Performed by: INTERNAL MEDICINE

## 2023-03-20 PROCEDURE — 1126F PR PAIN SEVERITY QUANTIFIED, NO PAIN PRESENT: ICD-10-PCS | Mod: CPTII,S$GLB,, | Performed by: INTERNAL MEDICINE

## 2023-03-20 PROCEDURE — 3078F PR MOST RECENT DIASTOLIC BLOOD PRESSURE < 80 MM HG: ICD-10-PCS | Mod: CPTII,S$GLB,, | Performed by: INTERNAL MEDICINE

## 2023-03-20 PROCEDURE — 3288F PR FALLS RISK ASSESSMENT DOCUMENTED: ICD-10-PCS | Mod: CPTII,S$GLB,, | Performed by: INTERNAL MEDICINE

## 2023-03-20 PROCEDURE — 1101F PT FALLS ASSESS-DOCD LE1/YR: CPT | Mod: CPTII,S$GLB,, | Performed by: INTERNAL MEDICINE

## 2023-03-20 PROCEDURE — 1159F PR MEDICATION LIST DOCUMENTED IN MEDICAL RECORD: ICD-10-PCS | Mod: CPTII,S$GLB,, | Performed by: INTERNAL MEDICINE

## 2023-03-20 PROCEDURE — 99214 PR OFFICE/OUTPT VISIT, EST, LEVL IV, 30-39 MIN: ICD-10-PCS | Mod: S$GLB,,, | Performed by: INTERNAL MEDICINE

## 2023-03-20 PROCEDURE — 1160F RVW MEDS BY RX/DR IN RCRD: CPT | Mod: CPTII,S$GLB,, | Performed by: INTERNAL MEDICINE

## 2023-03-20 PROCEDURE — 3074F PR MOST RECENT SYSTOLIC BLOOD PRESSURE < 130 MM HG: ICD-10-PCS | Mod: CPTII,S$GLB,, | Performed by: INTERNAL MEDICINE

## 2023-03-20 PROCEDURE — 3074F SYST BP LT 130 MM HG: CPT | Mod: CPTII,S$GLB,, | Performed by: INTERNAL MEDICINE

## 2023-03-20 PROCEDURE — 3288F FALL RISK ASSESSMENT DOCD: CPT | Mod: CPTII,S$GLB,, | Performed by: INTERNAL MEDICINE

## 2023-03-20 PROCEDURE — 99214 OFFICE O/P EST MOD 30 MIN: CPT | Mod: S$GLB,,, | Performed by: INTERNAL MEDICINE

## 2023-03-20 PROCEDURE — 1159F MED LIST DOCD IN RCRD: CPT | Mod: CPTII,S$GLB,, | Performed by: INTERNAL MEDICINE

## 2023-03-20 RX ORDER — FLUTICASONE PROPIONATE 50 MCG
2 SPRAY, SUSPENSION (ML) NASAL DAILY
Qty: 16 G | Refills: 5 | Status: SHIPPED | OUTPATIENT
Start: 2023-03-20

## 2023-03-20 RX ORDER — VERICIGUAT 2.5 MG/1
TABLET, FILM COATED ORAL
COMMUNITY
Start: 2023-01-11 | End: 2023-03-20

## 2023-03-20 NOTE — PROGRESS NOTES
Subjective:       Patient ID: Jo Alegre is a 82 y.o. female.    Chief Complaint: Hypertension, Cardiomyopathy, Hyperlipidemia, Stage 3 CKD, Atrial Fibrillation, Chronic anticoagulation, Gait instability, and Sinus drip    Miss Jo Palm is 82-year-old  female who comes for follow-up.  She is accompanied with her daughter.  Medical issues are as below:-    1. Chronic combined systolic and diastolic congestive heart failure   2. Paroxysmal atrial fibrillation   3. Essential hypertension   4. Chronic anticoagulation   5. Type 2 diabetes mellitus with diabetic nephropathy, without long-term current use of insulin   6. Midline low back pain without sciatica, unspecified chronicity   7. Neurogenic bladder         In past she was diagnosed with staghorn calculus which was ultimately removed.  She never had any symptoms in  past at least on the left side.  She always had chronic back pain on the right side.  Urine stone analysis had shown approximately 80% magnesium ammonium phosphate and 20% calcium phosphate.    She is also concerned about memory issues.  This has been going on for the last few months or so.    Dr. Jones had also added VERQUVO to her regimen.  However the cost is prohibitive.    She does continue to feel lightheaded.  Her last creatinine was 1.9 indicating probably over diuresis.  This needs to be checked again.    She continues on digoxin.  She also continues on Xarelto.    She is on a low-dose midodrine for raising her blood pressures.    As far as blood sugars are concerned, she continues on glimepiride.  Last hemoglobin A1c is 5.7.  For the last couple of years there is no hemoglobin A1c which is greater than 6.5 and has for the diagnosis of diabetes mellitus type 2 will be deleted.    As far as memory is concerned, as per daughter she seems to have been some gaps and difficulty recalling as to what is going on.  No history of fall or trauma.  Previously her memory was considered to be  good.  She does admit to some degree of stress because of multiple medical and psychosocial issues.    Hypertension  This is a chronic problem. The current episode started more than 1 year ago. The problem has been rapidly improving since onset. The problem is controlled. Associated symptoms include malaise/fatigue and peripheral edema. Pertinent negatives include no chest pain, headaches or palpitations. Past treatments include calcium channel blockers, diuretics, angiotensin blockers and beta blockers (On amlodipine and diltiazem.). The current treatment provides significant improvement. Compliance problems include psychosocial issues.    Hyperlipidemia  The current episode started more than 1 year ago. The problem is controlled. Exacerbating diseases include obesity. Pertinent negatives include no chest pain. The current treatment provides moderate improvement of lipids. Risk factors for coronary artery disease include post-menopausal, a sedentary lifestyle, dyslipidemia and hypertension.   Atrial Fibrillation  Presents for follow-up visit. Symptoms include hypotension and weakness. Symptoms are negative for chest pain, hemodynamic instability and palpitations. The symptoms have been stable. Past medical history includes atrial fibrillation, CHF and hyperlipidemia. Medication compliance problems include psychosocial issues.   Congestive Heart Failure  Presents for follow-up visit. Associated symptoms include edema. Pertinent negatives include no abdominal pain, chest pain, chest pressure, claudication, near-syncope, palpitations or unexpected weight change (gained wt 5 lbs ?). The symptoms have been stable. Compliance with diet is 51-75%. Compliance with exercise is 26-50%. Compliance with medications is %.     Past Medical History:   Diagnosis Date    Allergy     Codeine, Lasix    Atrial fibrillation     Atrial fibrillation     Cataract     CHF (congestive heart failure)     Diabetes mellitus, type 2      Ejection fraction < 50% 10/18/2017    Approximately 35%  Based on prior  Echocardiogram.    Encounter for blood transfusion     Hyperlipidemia     Osteoporosis     PONV (postoperative nausea and vomiting)     Thyroid disorder screening 10/17/2017    TSH of 1.12 ordered by Dr. dee Jones     Social History     Socioeconomic History    Marital status:     Number of children: 4   Occupational History    Occupation:    Tobacco Use    Smoking status: Former     Passive exposure: Past    Smokeless tobacco: Never    Tobacco comments:     quit 2013   Substance and Sexual Activity    Alcohol use: No    Drug use: No    Sexual activity: Not Currently   Social History Narrative    - Drives and lives alone.     Social Determinants of Health     Financial Resource Strain: Low Risk     Difficulty of Paying Living Expenses: Not very hard   Food Insecurity: No Food Insecurity    Worried About Running Out of Food in the Last Year: Never true    Ran Out of Food in the Last Year: Never true   Transportation Needs: No Transportation Needs    Lack of Transportation (Medical): No    Lack of Transportation (Non-Medical): No   Physical Activity: Insufficiently Active    Days of Exercise per Week: 4 days    Minutes of Exercise per Session: 30 min   Stress: No Stress Concern Present    Feeling of Stress : Only a little   Social Connections: Socially Isolated    Frequency of Communication with Friends and Family: More than three times a week    Frequency of Social Gatherings with Friends and Family: Three times a week    Attends [employer] Services: Never    Active Member of Clubs or Organizations: No    Attends Club or Organization Meetings: Never    Marital Status:    Housing Stability: Low Risk     Unable to Pay for Housing in the Last Year: No    Number of Places Lived in the Last Year: 1    Unstable Housing in the Last Year: No     Past Surgical History:   Procedure Laterality Date    ANTEGRADE  NEPHROSTOGRAPHY Left 8/25/2022    Procedure: NEPHROSTOGRAM, ANTEGRADE;  Surgeon: Shelby Parra MD;  Location: [facility] OR;  Service: Urology;  Laterality: Left;    CHOLECYSTECTOMY  1997    [place]     COLONOSCOPY      CYSTOSCOPY W/ URETERAL STENT PLACEMENT Left 7/17/2022    Procedure: CYSTOSCOPY, WITH URETERAL STENT INSERTION;  Surgeon: Shelby Parra MD;  Location: [facility] OR;  Service: Urology;  Laterality: Left;    CYSTOSCOPY W/ URETERAL STENT REMOVAL Left 9/21/2022    Procedure: CYSTOSCOPY, WITH URETERAL STENT REMOVAL;  Surgeon: Shelby Parra MD;  Location: [facility] OR;  Service: Urology;  Laterality: Left;    CYSTOSCOPY WITH URETEROSCOPY, RETROGRADE PYELOGRAPHY, AND INSERTION OF STENT Left 8/25/2022    Procedure: CYSTOSCOPY, WITH RETROGRADE PYELOGRAM AND URETERAL STENT INSERTION;  Surgeon: Shelby Parra MD;  Location: [facility] OR;  Service: Urology;  Laterality: Left;    EYE SURGERY      bilateral cataracts    FINGER SURGERY Right 2021    right pinky finger    FLEXIBLE CYSTOSCOPY Left 8/25/2022    Procedure: CYSTOSCOPY, FLEXIBLE WITH STENT REMOVAL;  Surgeon: Shelby Parra MD;  Location: [facility] OR;  Service: Urology;  Laterality: Left;    FRACTURE SURGERY  2014    right femur with neville    HEMORRHOID SURGERY      48 yrs ago    HYSTERECTOMY      NEPHROSTOMY Left 8/25/2022    Procedure: CREATION, NEPHROSTOMY;  Surgeon: Shelby Parra MD;  Location: [facility] OR;  Service: Urology;  Laterality: Left;    PERCUTANEOUS NEPHROLITHOTOMY N/A 8/25/2022    Procedure: NEPHROLITHOTOMY, PERCUTANEOUS;  Surgeon: Shelby Parra MD;  Location: [facility] OR;  Service: Urology;  Laterality: N/A;    REPLACEMENT OF IMPLANTABLE CARDIOVERTER-DEFIBRILLATOR (ICD) GENERATOR N/A 12/13/2019    Procedure: REPLACEMENT, PULSE GENERATOR, ICD-MEDTRONIC;  Surgeon: Sebastian Nowak III, MD;  Location: [facility];  Service: Cardiology;  Laterality: N/A;    TONSILLECTOMY       Family History   Problem Relation Age of Onset    Heart disease  "Mother     Cancer Father         Lung Cancer ??? Asbestos    Breast cancer Paternal Aunt     Breast cancer Maternal Grandmother     Breast cancer Maternal Aunt        Review of Systems   Constitutional:  Positive for activity change (Somewhat more cautious while walking) and malaise/fatigue. Negative for appetite change, chills, fever and unexpected weight change (gained wt 5 lbs ?).   HENT:  Positive for congestion and postnasal drip. Negative for ear discharge.    Eyes:  Negative for discharge, redness and visual disturbance.   Respiratory:  Negative for cough, chest tightness and wheezing.    Cardiovascular:  Negative for chest pain, palpitations, claudication, leg swelling and near-syncope.        History of defibrillator, congestive cardiomyopathy hypertension and dyslipidemia   Gastrointestinal:  Negative for abdominal distention, abdominal pain and constipation.        Pellet-like stools and lot of gas.   Endocrine:        Diabetes mellitus on glimepiride.  Osteoporosis on injection Prolia   Genitourinary:  Negative for difficulty urinating, flank pain and hematuria.   Musculoskeletal:  Positive for arthralgias and gait problem.   Skin:  Negative for color change, pallor and wound.   Neurological:  Positive for weakness. Negative for facial asymmetry and headaches.        Previous presentations of headache has settled down.  Tremors.   Hematological:  Negative for adenopathy. Bruises/bleeds easily.        Patient is on chronic anticoagulation with warfarin.     Psychiatric/Behavioral:            Beginningof memory issues.       Objective:      Blood pressure (!) 92/55, pulse (!) 58, height 5' 9" (1.753 m), weight 99.8 kg (220 lb). Body mass index is 32.49 kg/m².  Physical Exam  Constitutional:       General: She is not in acute distress.     Appearance: She is well-developed. She is obese. She is ill-appearing. She is not toxic-appearing or diaphoretic.      Comments: Patient is obese with a BMI of 32.49 "   HENT:      Head: Normocephalic and atraumatic.      Comments: .     Mouth/Throat:      Pharynx: Oropharynx is clear. No posterior oropharyngeal erythema.   Eyes:      General: No scleral icterus.        Right eye: No discharge.         Left eye: No discharge.   Neck:      Thyroid: No thyromegaly.      Vascular: No JVD.      Trachea: No tracheal deviation.   Cardiovascular:      Rate and Rhythm: Normal rate and regular rhythm.      Heart sounds:     No friction rub. No gallop.   Pulmonary:      Effort: Pulmonary effort is normal. No respiratory distress.      Breath sounds: No stridor. Rhonchi present. No wheezing.   Abdominal:      General: There is no distension.      Palpations: Abdomen is soft.      Tenderness: There is no abdominal tenderness.   Musculoskeletal:      Cervical back: Neck supple.   Lymphadenopathy:      Cervical: No cervical adenopathy.   Skin:     General: Skin is warm and dry.      Coloration: Skin is not pale.      Findings: No lesion. Rash is not scaling.   Neurological:      Mental Status: She is alert. She is not disoriented.      Motor: Tremor present.   Psychiatric:         Behavior: Behavior normal.         Assessment:       1. Chronic combined systolic and diastolic congestive heart failure    2. Hypotension, unspecified hypotension type    3. Paroxysmal atrial fibrillation    4. Essential hypertension    5. Stage 3b chronic kidney disease    6. Chronic anticoagulation    7. Encounter for screening mammogram for breast cancer    8. Chronic rhinitis             No visits with results within 3 Month(s) from this visit.   Latest known visit with results is:   Hospital Outpatient Visit on 11/14/2022   Component Date Value Ref Range Status    POC Creatinine 11/14/2022 1.9 (H)  0.5 - 1.4 mg/dL Final    Sample 11/14/2022 unknown   Final         Plan:           Chronic combined systolic and diastolic congestive heart failure  Comments:  Both systolic chronic heart failure with somewhat low  blood pressure.  On midodrine.    Hypotension, unspecified hypotension type  Comments:  Blood pressure reviewed.  On low side.  Probably overtreated with multiple classes of medications and duplication.  Our list and patient list do not match.    Paroxysmal atrial fibrillation    Essential hypertension    Stage 3b chronic kidney disease    Chronic anticoagulation    Encounter for screening mammogram for breast cancer  -     Mammo Digital Screening Bilat; Future; Expected date: 04/03/2023    Chronic rhinitis  -     fluticasone propionate (FLONASE) 50 mcg/actuation nasal spray; 2 sprays (100 mcg total) by Each Nostril route once daily.  Dispense: 16 g; Refill: 5    Patient complains of lightheadedness and dizziness.  Blood pressures are somewhat on the low side.      I have reviewed her medications and I see the following medications which would be for blood pressure and diuresis.      1.-amiloride-probably entered by error. Midamor mistaken for midodrine)  2.-amlodipine 5 mg   3.-diltiazem 60 mg twice a day.  4.-Entresto  5.-torsemide.  Took a significant time to reconcile medications between Dr. Jones's list and our epic list.      Amiloride has been listed in our medication list which is not in Dr. Jones's list. Probably error in documenting midodrine as Midamor.    Patient is on 2 calcium channel blockers including amlodipine and diltiazem.  I will discontinue the amlodipine at this point .  Patient states that she is not taking amlodipine    Patient is on daily diuretic and I will reduce it to alternate days and see how she does.  She is also on beta-blockers at this point.  The reason for taking beta-blocker is unclear to me at this point.      Evidently she got a pneumonia vaccine at [facility] including the fall.  I would like her to bring the records for the same.    I would like to see her back in 2 months to review her medications and dizziness.  She did see Dr. morgan in past who had reposition her  crystals and all her dizziness had gone away.    She is interested in Prolia the shot.  I have to look into it.  Patient's last DEXA scan was done in 2020.  Do not have the report    Spent aleks 38 minutes with patient which involved review of pts medical conditions, labs, medications and with 50% of time face-to-face discussion about medical problems, management and any applicable changes.    Current Outpatient Medications:     albuterol (VENTOLIN HFA) 90 mcg/actuation inhaler, Inhale 2 puffs into the lungs every 6 (six) hours as needed for Wheezing. Rescue, Disp: 6.7 g, Rfl: 2    amiodarone (PACERONE) 200 MG Tab, Take 100 mg by mouth 3 (three) times daily with meals. 1/2 tab once daily with meals May take an extra dose for palpatations  Max 4 100mg tabs, Disp: , Rfl:     amLODIPine (NORVASC) 5 MG tablet, , Disp: , Rfl:     atorvastatin (LIPITOR) 20 MG tablet, Take 20 mg by mouth once daily., Disp: , Rfl:     Ca-D3-mag ox-zinc--thom-bor 600 mg calcium- 20 mcg-50 mg Tab, Take 1 tablet by mouth 2 (two) times daily., Disp: , Rfl:     cholecalciferol, vitamin D3, 125 mcg (5,000 unit) Tab, Take 5,000 Units by mouth 2 (two) times daily., Disp: , Rfl:     digoxin (LANOXIN) 125 mcg tablet, Take 125 mcg by mouth every Mon, Wed, Fri., Disp: , Rfl:     diltiaZEM (CARDIZEM) 60 MG tablet, Take 60 mg by mouth 2 (two) times daily., Disp: , Rfl:     gabapentin (NEURONTIN) 100 MG capsule, TAKE 2 CAPSULES(200 MG) BY MOUTH THREE TIMES DAILY (Patient taking differently: Take 200 mg by mouth 3 (three) times daily. TAKE 2 CAPSULES(200 MG) BY MOUTH THREE TIMES DAILY), Disp: 180 capsule, Rfl: 5    glimepiride (AMARYL) 1 MG tablet, Take 1 tablet (1 mg total) by mouth before breakfast., Disp: 90 tablet, Rfl: 1    latanoprost 0.005 % ophthalmic solution, Place 1 drop into both eyes nightly., Disp: , Rfl:     propranoloL (INDERAL) 10 MG tablet, Take 10 mg by mouth 2 (two) times daily., Disp: , Rfl:     sacubitriL-valsartan (ENTRESTO)   mg per tablet, Take 1 tablet by mouth once daily., Disp: , Rfl:     torsemide (DEMADEX) 20 MG Tab, Take 1 tablet (20 mg total) by mouth once daily., Disp: 30 tablet, Rfl: 0    XARELTO 10 mg Tab, Take 10 mg by mouth once daily., Disp: , Rfl:     fluticasone propionate (FLONASE) 50 mcg/actuation nasal spray, 2 sprays (100 mcg total) by Each Nostril route once daily., Disp: 16 g, Rfl: 5  No current facility-administered medications for this visit.    Facility-Administered Medications Ordered in Other Visits:     0.9%  NaCl infusion, , Intravenous, Continuous, Sebastian Nowak III, MD, Last Rate: 75 mL/hr at 12/13/19 0728, New Bag at 12/13/19 0728    diphenhydrAMINE injection 25 mg, 25 mg, Intravenous, Once, Sebastian oNwak III, MD    lorazepam injection 1 mg, 1 mg, Intravenous, Once, Sebastian Nowak III, MD

## 2023-03-21 DIAGNOSIS — M54.6 ACUTE RIGHT-SIDED THORACIC BACK PAIN: ICD-10-CM

## 2023-03-21 DIAGNOSIS — G57.93 NEUROPATHY OF BOTH FEET: ICD-10-CM

## 2023-03-22 RX ORDER — GABAPENTIN 100 MG/1
CAPSULE ORAL
Qty: 180 CAPSULE | Refills: 5 | Status: SHIPPED | OUTPATIENT
Start: 2023-03-22 | End: 2024-01-02

## 2023-04-04 ENCOUNTER — HOSPITAL ENCOUNTER (OUTPATIENT)
Dept: RADIOLOGY | Facility: HOSPITAL | Age: 83
Discharge: HOME | End: 2023-04-04
Attending: INTERNAL MEDICINE
Payer: MEDICARE

## 2023-04-04 DIAGNOSIS — Z12.31 ENCOUNTER FOR SCREENING MAMMOGRAM FOR BREAST CANCER: ICD-10-CM

## 2023-04-04 PROCEDURE — 77067 SCR MAMMO BI INCL CAD: CPT | Mod: TC,PO

## 2023-04-19 NOTE — PROGRESS NOTES
[facility] Note    PCP: Kraig Alberto MD    Chief Complaint: kidney stones    SUBJECTIVE:       History of Present Illness:  Jo Alegre is a 82 y.o. female who presents to clinic for kidney stones. She is Established  to our clinic.     Renal US 3/2/23: There are bilateral renal cysts some of which show internal debris. There is no hydronephrosis.    KUB 3/2/23: There are left renal calculi the largest of which measures 7 mm.    No flank pain. No hematuria. No dysuria.   Drinking 3-4 bottles of water a day.       11/16/22  Unable to get contrast with CT secondary to renal function.   She has a 10 mm mid pole stone and a 10 mm and 6 mm lower pole stone. No hydro.   Suggestive of hemorrhagic cysts.     She is without complaints today.   No pain.   No hematuria.   No UTI symptoms.       9/12/22  S/P left PCNL on 8/25/22.  Stone analysis: 80% Magnesium ammonium phosphate (struvite), 20% Calcium phosphate (apatite)   KUB: possible small residual stone in lower pole    Post op course uncomplicated.   No further hematuria. No flank pain.     8/1/22  Patient underwent stent placement on 7/17 for left staghorn stone. She had a fever to 100.3.  CT shows a large lower pole staghorn. Also has some complex cysts present.     This is her first stone episode.   No family hx of stones.   No hematuria prior to stent placement   Has a hx of recurrent UTIs.     Last urine culture: MO (7/2/22)    Lab Results   Component Value Date    CREATININE 1.5 (H) 10/28/2022     Hx of COPD, pulm HTN, a fib, CHF, HLD, CKD, DM, JENNIFER    Past medical, family, and social history reviewed as documented in chart with pertinent positive medical, family, and social history detailed in HPI.    Review of patient's allergies indicates:   Allergen Reactions    Codeine Other (See Comments)     nausea    Hydrocodone Nausea And Vomiting    Lasix [furosemide]      rash       Past Medical History:   Diagnosis Date    Allergy      Codeine, Lasix    Atrial fibrillation     Atrial fibrillation     Cataract     CHF (congestive heart failure)     Diabetes mellitus, type 2     Ejection fraction < 50% 10/18/2017    Approximately 35%  Based on prior  Echocardiogram.    Encounter for blood transfusion     Hyperlipidemia     Osteoporosis     PONV (postoperative nausea and vomiting)     Thyroid disorder screening 10/17/2017    TSH of 1.12 ordered by Dr. dee Jones     Past Surgical History:   Procedure Laterality Date    ANTEGRADE NEPHROSTOGRAPHY Left 8/25/2022    Procedure: NEPHROSTOGRAM, ANTEGRADE;  Surgeon: Shelby Parra MD;  Location: [facility];  Service: Urology;  Laterality: Left;    CHOLECYSTECTOMY  1997    [place]     COLONOSCOPY      CYSTOSCOPY W/ URETERAL STENT PLACEMENT Left 7/17/2022    Procedure: CYSTOSCOPY, WITH URETERAL STENT INSERTION;  Surgeon: Shelby Parra MD;  Location: [facility] OR;  Service: Urology;  Laterality: Left;    CYSTOSCOPY W/ URETERAL STENT REMOVAL Left 9/21/2022    Procedure: CYSTOSCOPY, WITH URETERAL STENT REMOVAL;  Surgeon: Shelby Parra MD;  [facility]: [facility] OR;  Service: Urology;  Laterality: Left;    CYSTOSCOPY WITH URETEROSCOPY, RETROGRADE PYELOGRAPHY, AND INSERTION OF STENT Left 8/25/2022    Procedure: CYSTOSCOPY, WITH RETROGRADE PYELOGRAM AND URETERAL STENT INSERTION;  Surgeon: Shelby Parra MD;  Location: [facility] OR;  Service: Urology;  Laterality: Left;    EYE SURGERY      bilateral cataracts    FINGER SURGERY Right 2021    right pinky finger    FLEXIBLE CYSTOSCOPY Left 8/25/2022    Procedure: CYSTOSCOPY, FLEXIBLE WITH STENT REMOVAL;  Surgeon: Shelby Parra MD;  Location: [facility];  Service: Urology;  Laterality: Left;    FRACTURE SURGERY  2014    right femur with neville    HEMORRHOID SURGERY      48 yrs ago    HYSTERECTOMY      NEPHROSTOMY Left 8/25/2022    Procedure: CREATION, NEPHROSTOMY;  Surgeon: Shelby Parra MD;  Location: [facility] OR;  Service: Urology;  Laterality: Left;     "PERCUTANEOUS NEPHROLITHOTOMY N/A 8/25/2022    Procedure: NEPHROLITHOTOMY, PERCUTANEOUS;  Surgeon: Shelby Parra MD;  Location: [facility];  Service: Urology;  Laterality: N/A;    REPLACEMENT OF IMPLANTABLE CARDIOVERTER-DEFIBRILLATOR (ICD) GENERATOR N/A 12/13/2019    Procedure: REPLACEMENT, PULSE GENERATOR, ICD-MEDTRONIC;  Surgeon: Sebastian Nowak III, MD;  Location: [facility] CATH;  Service: Cardiology;  Laterality: N/A;    TONSILLECTOMY       Family History   Problem Relation Age of Onset    Heart disease Mother     Cancer Father         Lung Cancer ??? Asbestos    Breast cancer Paternal Aunt      Social History     Tobacco Use    Smoking status: Former     Passive exposure: Past    Smokeless tobacco: Never    Tobacco comments:     quit 2013   Substance Use Topics    Alcohol use: No    Drug use: No        Review of Systems   Genitourinary:  Negative for difficulty urinating, dysuria, flank pain, frequency, hematuria, nocturia and urgency.     OBJECTIVE:     Anticoagulation:  xarelto 20 mg     Estimated body mass index is 31.75 kg/m² as calculated from the following:    Height as of this encounter: 5' 9" (1.753 m).    Weight as of this encounter: 97.5 kg (215 lb).    Vital Signs (Most Recent)  BP: (!) 88/55 (04/24/23 0940)    Physical Exam  Vitals reviewed.   Constitutional:       General: She is not in acute distress.     Appearance: Normal appearance. She is not ill-appearing.   HENT:      Head: Normocephalic and atraumatic.   Eyes:      General: No scleral icterus.  Cardiovascular:      Rate and Rhythm: Normal rate and regular rhythm.   Pulmonary:      Effort: Pulmonary effort is normal. No respiratory distress.   Abdominal:      General: There is no distension.      Palpations: Abdomen is soft.   Skin:     Coloration: Skin is not jaundiced.   Neurological:      General: No focal deficit present.      Mental Status: She is alert and oriented to person, place, and time.   Psychiatric:         Mood and Affect: Mood " normal.         Behavior: Behavior normal.       BMP  Lab Results   Component Value Date     10/28/2022    K 3.8 10/28/2022     10/28/2022    CO2 31 (H) 10/28/2022    BUN 16 10/28/2022    CREATININE 1.5 (H) 10/28/2022    CALCIUM 8.7 10/28/2022    ANIONGAP 6 (L) 10/28/2022    ESTGFRAFRICA 30.2 (A) 07/18/2022    EGFRNONAA 26.2 (A) 07/18/2022       Lab Results   Component Value Date    WBC 6.41 10/28/2022    HGB 10.7 (L) 10/28/2022    HCT 35.1 (L) 10/28/2022    MCV 92 10/28/2022     10/28/2022     Imaging:  Per HPI    ASSESSMENT     1. Staghorn calculus    2. Calculus of urinary tract in diseases classified elsewhere    3. Chronic obstructive pulmonary disease, unspecified COPD type    4. Chronic respiratory failure with hypoxia    5. Pulmonary hypertension    6. Essential hypertension    7. Mixed dyslipidemia    8. Paroxysmal atrial fibrillation    9. Type 2 diabetes mellitus with diabetic nephropathy, without long-term current use of insulin    10. Obstructive sleep apnea syndrome          PLAN:     - We reviewed her imaging in detail.  - She does have residual stone in the left kidney, mainly within the lower pole   - Given her stone composition she is at higher risk for stone regrowth   - Discussed continued observation vs URS to clear remaining stone   - She wants to observe for now.   - Scheduled for CTRSS and follow up in September. Can decide if she would like URS at that point before she would require another PCNL.   - Encouraged 2 L of fluid intake a day and addition of lemon to her water   - Will let us know if any problems develop prior to follow up     Shelby Parra MD

## 2023-04-24 ENCOUNTER — OFFICE VISIT (OUTPATIENT)
Dept: UROLOGY | Facility: CLINIC | Age: 83
End: 2023-04-24
Payer: MEDICARE

## 2023-04-24 VITALS
WEIGHT: 215 LBS | SYSTOLIC BLOOD PRESSURE: 88 MMHG | HEIGHT: 69 IN | BODY MASS INDEX: 31.84 KG/M2 | DIASTOLIC BLOOD PRESSURE: 55 MMHG

## 2023-04-24 DIAGNOSIS — J96.11 CHRONIC RESPIRATORY FAILURE WITH HYPOXIA: ICD-10-CM

## 2023-04-24 DIAGNOSIS — N20.0 STAGHORN CALCULUS: Primary | ICD-10-CM

## 2023-04-24 DIAGNOSIS — I27.20 PULMONARY HYPERTENSION: ICD-10-CM

## 2023-04-24 DIAGNOSIS — I10 ESSENTIAL HYPERTENSION: ICD-10-CM

## 2023-04-24 DIAGNOSIS — E78.2 MIXED DYSLIPIDEMIA: ICD-10-CM

## 2023-04-24 DIAGNOSIS — G47.33 OBSTRUCTIVE SLEEP APNEA SYNDROME: ICD-10-CM

## 2023-04-24 DIAGNOSIS — I48.0 PAROXYSMAL ATRIAL FIBRILLATION: ICD-10-CM

## 2023-04-24 DIAGNOSIS — E11.21 TYPE 2 DIABETES MELLITUS WITH DIABETIC NEPHROPATHY, WITHOUT LONG-TERM CURRENT USE OF INSULIN: ICD-10-CM

## 2023-04-24 DIAGNOSIS — N22 CALCULUS OF URINARY TRACT IN DISEASES CLASSIFIED ELSEWHERE: ICD-10-CM

## 2023-04-24 DIAGNOSIS — J44.9 CHRONIC OBSTRUCTIVE PULMONARY DISEASE, UNSPECIFIED COPD TYPE: ICD-10-CM

## 2023-04-24 PROCEDURE — 3074F SYST BP LT 130 MM HG: CPT | Mod: CPTII,S$GLB,, | Performed by: STUDENT IN AN ORGANIZED HEALTH CARE EDUCATION/TRAINING PROGRAM

## 2023-04-24 PROCEDURE — 99214 PR OFFICE/OUTPT VISIT, EST, LEVL IV, 30-39 MIN: ICD-10-PCS | Mod: S$GLB,,, | Performed by: STUDENT IN AN ORGANIZED HEALTH CARE EDUCATION/TRAINING PROGRAM

## 2023-04-24 PROCEDURE — 1101F PT FALLS ASSESS-DOCD LE1/YR: CPT | Mod: CPTII,S$GLB,, | Performed by: STUDENT IN AN ORGANIZED HEALTH CARE EDUCATION/TRAINING PROGRAM

## 2023-04-24 PROCEDURE — 99214 OFFICE O/P EST MOD 30 MIN: CPT | Mod: S$GLB,,, | Performed by: STUDENT IN AN ORGANIZED HEALTH CARE EDUCATION/TRAINING PROGRAM

## 2023-04-24 PROCEDURE — 3074F PR MOST RECENT SYSTOLIC BLOOD PRESSURE < 130 MM HG: ICD-10-PCS | Mod: CPTII,S$GLB,, | Performed by: STUDENT IN AN ORGANIZED HEALTH CARE EDUCATION/TRAINING PROGRAM

## 2023-04-24 PROCEDURE — 3078F DIAST BP <80 MM HG: CPT | Mod: CPTII,S$GLB,, | Performed by: STUDENT IN AN ORGANIZED HEALTH CARE EDUCATION/TRAINING PROGRAM

## 2023-04-24 PROCEDURE — 1126F AMNT PAIN NOTED NONE PRSNT: CPT | Mod: CPTII,S$GLB,, | Performed by: STUDENT IN AN ORGANIZED HEALTH CARE EDUCATION/TRAINING PROGRAM

## 2023-04-24 PROCEDURE — 1101F PR PT FALLS ASSESS DOC 0-1 FALLS W/OUT INJ PAST YR: ICD-10-PCS | Mod: CPTII,S$GLB,, | Performed by: STUDENT IN AN ORGANIZED HEALTH CARE EDUCATION/TRAINING PROGRAM

## 2023-04-24 PROCEDURE — 3288F PR FALLS RISK ASSESSMENT DOCUMENTED: ICD-10-PCS | Mod: CPTII,S$GLB,, | Performed by: STUDENT IN AN ORGANIZED HEALTH CARE EDUCATION/TRAINING PROGRAM

## 2023-04-24 PROCEDURE — 99999 PR PBB SHADOW E&M-EST. PATIENT-LVL III: CPT | Mod: PBBFAC,,, | Performed by: STUDENT IN AN ORGANIZED HEALTH CARE EDUCATION/TRAINING PROGRAM

## 2023-04-24 PROCEDURE — 1159F PR MEDICATION LIST DOCUMENTED IN MEDICAL RECORD: ICD-10-PCS | Mod: CPTII,S$GLB,, | Performed by: STUDENT IN AN ORGANIZED HEALTH CARE EDUCATION/TRAINING PROGRAM

## 2023-04-24 PROCEDURE — 1126F PR PAIN SEVERITY QUANTIFIED, NO PAIN PRESENT: ICD-10-PCS | Mod: CPTII,S$GLB,, | Performed by: STUDENT IN AN ORGANIZED HEALTH CARE EDUCATION/TRAINING PROGRAM

## 2023-04-24 PROCEDURE — 1159F MED LIST DOCD IN RCRD: CPT | Mod: CPTII,S$GLB,, | Performed by: STUDENT IN AN ORGANIZED HEALTH CARE EDUCATION/TRAINING PROGRAM

## 2023-04-24 PROCEDURE — 3288F FALL RISK ASSESSMENT DOCD: CPT | Mod: CPTII,S$GLB,, | Performed by: STUDENT IN AN ORGANIZED HEALTH CARE EDUCATION/TRAINING PROGRAM

## 2023-04-24 PROCEDURE — 3078F PR MOST RECENT DIASTOLIC BLOOD PRESSURE < 80 MM HG: ICD-10-PCS | Mod: CPTII,S$GLB,, | Performed by: STUDENT IN AN ORGANIZED HEALTH CARE EDUCATION/TRAINING PROGRAM

## 2023-04-24 PROCEDURE — 99999 PR PBB SHADOW E&M-EST. PATIENT-LVL III: ICD-10-PCS | Mod: PBBFAC,,, | Performed by: STUDENT IN AN ORGANIZED HEALTH CARE EDUCATION/TRAINING PROGRAM

## 2023-05-15 ENCOUNTER — HOSPITAL ENCOUNTER (INPATIENT)
Facility: HOSPITAL | Age: 83
LOS: 3 days | Discharge: HOME HEALTH | DRG: 291 | End: 2023-05-18
Attending: EMERGENCY MEDICINE
Payer: MEDICARE

## 2023-05-15 DIAGNOSIS — J96.01 ACUTE RESPIRATORY FAILURE WITH HYPOXIA: ICD-10-CM

## 2023-05-15 DIAGNOSIS — R06.02 SOB (SHORTNESS OF BREATH): ICD-10-CM

## 2023-05-15 DIAGNOSIS — I50.43 ACUTE ON CHRONIC COMBINED SYSTOLIC AND DIASTOLIC HEART FAILURE: Primary | ICD-10-CM

## 2023-05-15 DIAGNOSIS — I50.43 ACUTE ON CHRONIC COMBINED SYSTOLIC AND DIASTOLIC CONGESTIVE HEART FAILURE: ICD-10-CM

## 2023-05-15 DIAGNOSIS — I50.9 ACUTE EXACERBATION OF CHF (CONGESTIVE HEART FAILURE): ICD-10-CM

## 2023-05-15 DIAGNOSIS — R06.02 SHORTNESS OF BREATH: ICD-10-CM

## 2023-05-15 LAB
ALBUMIN SERPL BCP-MCNC: 3.4 G/DL (ref 3.5–5.2)
ALLENS TEST: ABNORMAL
ALP SERPL-CCNC: 100 U/L (ref 55–135)
ALT SERPL W/O P-5'-P-CCNC: 23 U/L (ref 10–44)
ANION GAP SERPL CALC-SCNC: 13 MMOL/L (ref 8–16)
AST SERPL-CCNC: 25 U/L (ref 10–40)
BASOPHILS # BLD AUTO: 0.05 K/UL (ref 0–0.2)
BASOPHILS NFR BLD: 0.5 % (ref 0–1.9)
BILIRUB SERPL-MCNC: 0.7 MG/DL (ref 0.1–1)
BNP SERPL-MCNC: >4500 PG/ML (ref 0–99)
BUN SERPL-MCNC: 30 MG/DL (ref 8–23)
CALCIUM SERPL-MCNC: 9.5 MG/DL (ref 8.7–10.5)
CHLORIDE SERPL-SCNC: 100 MMOL/L (ref 95–110)
CO2 SERPL-SCNC: 25 MMOL/L (ref 23–29)
CREAT SERPL-MCNC: 1.9 MG/DL (ref 0.5–1.4)
DELSYS: ABNORMAL
DIFFERENTIAL METHOD: ABNORMAL
DIGOXIN SERPL-MCNC: <0.2 NG/ML (ref 0.8–2)
EOSINOPHIL # BLD AUTO: 0.1 K/UL (ref 0–0.5)
EOSINOPHIL NFR BLD: 0.9 % (ref 0–8)
EP: 6
ERYTHROCYTE [DISTWIDTH] IN BLOOD BY AUTOMATED COUNT: 15.6 % (ref 11.5–14.5)
ERYTHROCYTE [SEDIMENTATION RATE] IN BLOOD BY WESTERGREN METHOD: 16 MM/H
EST. GFR  (NO RACE VARIABLE): 26.2 ML/MIN/1.73 M^2
FIO2: 50
GLUCOSE SERPL-MCNC: 169 MG/DL (ref 70–110)
GLUCOSE SERPL-MCNC: 210 MG/DL (ref 70–110)
HCO3 UR-SCNC: 23.2 MMOL/L (ref 24–28)
HCT VFR BLD AUTO: 36.8 % (ref 37–48.5)
HCT VFR BLD CALC: 35 %PCV (ref 36–54)
HGB BLD-MCNC: 11.2 G/DL (ref 12–16)
IMM GRANULOCYTES # BLD AUTO: 0.04 K/UL (ref 0–0.04)
IMM GRANULOCYTES NFR BLD AUTO: 0.4 % (ref 0–0.5)
INR PPP: 1 (ref 0.8–1.2)
IP: 12
LYMPHOCYTES # BLD AUTO: 1.4 K/UL (ref 1–4.8)
LYMPHOCYTES NFR BLD: 14.7 % (ref 18–48)
MAGNESIUM SERPL-MCNC: 2 MG/DL (ref 1.6–2.6)
MCH RBC QN AUTO: 27.1 PG (ref 27–31)
MCHC RBC AUTO-ENTMCNC: 30.4 G/DL (ref 32–36)
MCV RBC AUTO: 89 FL (ref 82–98)
MIN VOL: 15
MODE: ABNORMAL
MONOCYTES # BLD AUTO: 0.9 K/UL (ref 0.3–1)
MONOCYTES NFR BLD: 9.6 % (ref 4–15)
NEUTROPHILS # BLD AUTO: 7.1 K/UL (ref 1.8–7.7)
NEUTROPHILS NFR BLD: 73.9 % (ref 38–73)
NRBC BLD-RTO: 0 /100 WBC
PCO2 BLDA: 37.3 MMHG (ref 35–45)
PH SMN: 7.4 [PH] (ref 7.35–7.45)
PLATELET # BLD AUTO: 383 K/UL (ref 150–450)
PMV BLD AUTO: 9.6 FL (ref 9.2–12.9)
PO2 BLDA: 70 MMHG (ref 80–100)
POC BE: -2 MMOL/L
POC IONIZED CALCIUM: 1.24 MMOL/L (ref 1.06–1.42)
POC SATURATED O2: 94 % (ref 95–100)
POC TCO2: 24 MMOL/L (ref 23–27)
POTASSIUM BLD-SCNC: 4.3 MMOL/L (ref 3.5–5.1)
POTASSIUM SERPL-SCNC: 4.2 MMOL/L (ref 3.5–5.1)
PROT SERPL-MCNC: 7.6 G/DL (ref 6–8.4)
PROTHROMBIN TIME: 11.1 SEC (ref 9–12.5)
RBC # BLD AUTO: 4.14 M/UL (ref 4–5.4)
SAMPLE: ABNORMAL
SITE: ABNORMAL
SODIUM BLD-SCNC: 138 MMOL/L (ref 136–145)
SODIUM SERPL-SCNC: 138 MMOL/L (ref 136–145)
SP02: 97
SPONT RATE: 26
TROPONIN I SERPL HS-MCNC: 24.6 PG/ML (ref 0–14.9)
WBC # BLD AUTO: 9.66 K/UL (ref 3.9–12.7)

## 2023-05-15 PROCEDURE — 21000000 HC CCU ICU ROOM CHARGE

## 2023-05-15 PROCEDURE — 84295 ASSAY OF SERUM SODIUM: CPT

## 2023-05-15 PROCEDURE — 83735 ASSAY OF MAGNESIUM: CPT | Performed by: EMERGENCY MEDICINE

## 2023-05-15 PROCEDURE — 25000003 PHARM REV CODE 250: Performed by: EMERGENCY MEDICINE

## 2023-05-15 PROCEDURE — 83880 ASSAY OF NATRIURETIC PEPTIDE: CPT | Performed by: EMERGENCY MEDICINE

## 2023-05-15 PROCEDURE — 99291 CRITICAL CARE FIRST HOUR: CPT

## 2023-05-15 PROCEDURE — 93005 ELECTROCARDIOGRAM TRACING: CPT | Performed by: INTERNAL MEDICINE

## 2023-05-15 PROCEDURE — 80053 COMPREHEN METABOLIC PANEL: CPT | Performed by: EMERGENCY MEDICINE

## 2023-05-15 PROCEDURE — 80162 ASSAY OF DIGOXIN TOTAL: CPT | Performed by: EMERGENCY MEDICINE

## 2023-05-15 PROCEDURE — 36600 WITHDRAWAL OF ARTERIAL BLOOD: CPT

## 2023-05-15 PROCEDURE — 84484 ASSAY OF TROPONIN QUANT: CPT | Performed by: EMERGENCY MEDICINE

## 2023-05-15 PROCEDURE — 93010 EKG 12-LEAD: ICD-10-PCS | Mod: ,,, | Performed by: INTERNAL MEDICINE

## 2023-05-15 PROCEDURE — 82803 BLOOD GASES ANY COMBINATION: CPT

## 2023-05-15 PROCEDURE — 85610 PROTHROMBIN TIME: CPT | Performed by: EMERGENCY MEDICINE

## 2023-05-15 PROCEDURE — 85014 HEMATOCRIT: CPT

## 2023-05-15 PROCEDURE — 96374 THER/PROPH/DIAG INJ IV PUSH: CPT

## 2023-05-15 PROCEDURE — 93010 ELECTROCARDIOGRAM REPORT: CPT | Mod: ,,, | Performed by: INTERNAL MEDICINE

## 2023-05-15 PROCEDURE — 94660 CPAP INITIATION&MGMT: CPT

## 2023-05-15 PROCEDURE — 84132 ASSAY OF SERUM POTASSIUM: CPT

## 2023-05-15 PROCEDURE — 99900035 HC TECH TIME PER 15 MIN (STAT)

## 2023-05-15 PROCEDURE — 85025 COMPLETE CBC W/AUTO DIFF WBC: CPT | Performed by: EMERGENCY MEDICINE

## 2023-05-15 PROCEDURE — 82330 ASSAY OF CALCIUM: CPT

## 2023-05-15 RX ORDER — TAMSULOSIN HYDROCHLORIDE 0.4 MG/1
1 CAPSULE ORAL DAILY
COMMUNITY
Start: 2023-04-26 | End: 2023-05-24

## 2023-05-15 RX ORDER — MIDODRINE HYDROCHLORIDE 5 MG/1
2.5 TABLET ORAL
Status: ON HOLD | COMMUNITY
End: 2023-09-26 | Stop reason: HOSPADM

## 2023-05-15 RX ORDER — INSULIN ASPART 100 [IU]/ML
0-5 INJECTION, SOLUTION INTRAVENOUS; SUBCUTANEOUS
Status: DISCONTINUED | OUTPATIENT
Start: 2023-05-16 | End: 2023-05-18 | Stop reason: HOSPADM

## 2023-05-15 RX ORDER — IBUPROFEN 200 MG
16 TABLET ORAL
Status: DISCONTINUED | OUTPATIENT
Start: 2023-05-16 | End: 2023-05-18 | Stop reason: HOSPADM

## 2023-05-15 RX ORDER — PHENOL 1.4 %
1 AEROSOL, SPRAY (ML) MUCOUS MEMBRANE DAILY
COMMUNITY
End: 2023-05-24

## 2023-05-15 RX ORDER — IBUPROFEN 200 MG
24 TABLET ORAL
Status: DISCONTINUED | OUTPATIENT
Start: 2023-05-16 | End: 2023-05-18 | Stop reason: HOSPADM

## 2023-05-15 RX ORDER — BUMETANIDE 0.25 MG/ML
1 INJECTION INTRAMUSCULAR; INTRAVENOUS
Status: COMPLETED | OUTPATIENT
Start: 2023-05-15 | End: 2023-05-15

## 2023-05-15 RX ORDER — GLUCAGON 1 MG
1 KIT INJECTION
Status: DISCONTINUED | OUTPATIENT
Start: 2023-05-16 | End: 2023-05-18 | Stop reason: HOSPADM

## 2023-05-15 RX ADMIN — BUMETANIDE 1 MG: 0.25 INJECTION INTRAMUSCULAR; INTRAVENOUS at 11:05

## 2023-05-15 NOTE — Clinical Note
Diagnosis: Acute exacerbation of CHF (congestive heart failure) [150629]   Admitting Provider:: GI GRAHAM [[id number]]   Future Attending Provider: GI GRAHAM [[id number]]   Reason for IP Medical Treatment  (Clinical interventions that can only be accomplished in the IP setting? ) :: chf exacerbation   I certify that Inpatient services for greater than or equal to 2 midnights are medically necessary:: Yes   Plans for Post-Acute care--if anticipated (pick the single best option):: A. No post acute care anticipated at this time

## 2023-05-16 ENCOUNTER — CLINICAL SUPPORT (OUTPATIENT)
Dept: CARDIOLOGY | Facility: HOSPITAL | Age: 83
DRG: 291 | End: 2023-05-16
Attending: STUDENT IN AN ORGANIZED HEALTH CARE EDUCATION/TRAINING PROGRAM
Payer: MEDICARE

## 2023-05-16 VITALS — WEIGHT: 216 LBS | HEIGHT: 69 IN | BODY MASS INDEX: 31.99 KG/M2

## 2023-05-16 PROBLEM — I50.33 ACUTE ON CHRONIC DIASTOLIC CONGESTIVE HEART FAILURE: Status: ACTIVE | Noted: 2023-05-15

## 2023-05-16 PROBLEM — N17.9 AKI (ACUTE KIDNEY INJURY): Status: ACTIVE | Noted: 2023-05-16

## 2023-05-16 LAB
ANION GAP SERPL CALC-SCNC: 5 MMOL/L (ref 8–16)
BUN SERPL-MCNC: 31 MG/DL (ref 8–23)
CALCIUM SERPL-MCNC: 8.3 MG/DL (ref 8.7–10.5)
CHLORIDE SERPL-SCNC: 106 MMOL/L (ref 95–110)
CO2 SERPL-SCNC: 25 MMOL/L (ref 23–29)
CREAT SERPL-MCNC: 1.9 MG/DL (ref 0.5–1.4)
ERYTHROCYTE [DISTWIDTH] IN BLOOD BY AUTOMATED COUNT: 15.4 % (ref 11.5–14.5)
EST. GFR  (NO RACE VARIABLE): 26.2 ML/MIN/1.73 M^2
ESTIMATED AVG GLUCOSE: 120 MG/DL (ref 68–131)
GLUCOSE SERPL-MCNC: 157 MG/DL (ref 70–110)
GLUCOSE SERPL-MCNC: 95 MG/DL (ref 70–110)
HBA1C MFR BLD: 5.8 % (ref 4.5–6.2)
HCT VFR BLD AUTO: 30 % (ref 37–48.5)
HGB BLD-MCNC: 9.4 G/DL (ref 12–16)
MAGNESIUM SERPL-MCNC: 2.1 MG/DL (ref 1.6–2.6)
MCH RBC QN AUTO: 27.2 PG (ref 27–31)
MCHC RBC AUTO-ENTMCNC: 31.3 G/DL (ref 32–36)
MCV RBC AUTO: 87 FL (ref 82–98)
PLATELET # BLD AUTO: 291 K/UL (ref 150–450)
PMV BLD AUTO: 9.8 FL (ref 9.2–12.9)
POTASSIUM SERPL-SCNC: 3.7 MMOL/L (ref 3.5–5.1)
RBC # BLD AUTO: 3.45 M/UL (ref 4–5.4)
SODIUM SERPL-SCNC: 136 MMOL/L (ref 136–145)
WBC # BLD AUTO: 6.58 K/UL (ref 3.9–12.7)

## 2023-05-16 PROCEDURE — 93306 TTE W/DOPPLER COMPLETE: CPT

## 2023-05-16 PROCEDURE — 36415 COLL VENOUS BLD VENIPUNCTURE: CPT | Performed by: STUDENT IN AN ORGANIZED HEALTH CARE EDUCATION/TRAINING PROGRAM

## 2023-05-16 PROCEDURE — 94660 CPAP INITIATION&MGMT: CPT

## 2023-05-16 PROCEDURE — 99900031 HC PATIENT EDUCATION (STAT)

## 2023-05-16 PROCEDURE — 25000003 PHARM REV CODE 250: Performed by: INTERNAL MEDICINE

## 2023-05-16 PROCEDURE — 93306 TTE W/DOPPLER COMPLETE: CPT | Mod: 26,,, | Performed by: INTERNAL MEDICINE

## 2023-05-16 PROCEDURE — 27000221 HC OXYGEN, UP TO 24 HOURS

## 2023-05-16 PROCEDURE — 94799 UNLISTED PULMONARY SVC/PX: CPT

## 2023-05-16 PROCEDURE — 83036 HEMOGLOBIN GLYCOSYLATED A1C: CPT | Performed by: STUDENT IN AN ORGANIZED HEALTH CARE EDUCATION/TRAINING PROGRAM

## 2023-05-16 PROCEDURE — 25000003 PHARM REV CODE 250: Performed by: STUDENT IN AN ORGANIZED HEALTH CARE EDUCATION/TRAINING PROGRAM

## 2023-05-16 PROCEDURE — 99900035 HC TECH TIME PER 15 MIN (STAT)

## 2023-05-16 PROCEDURE — 85027 COMPLETE CBC AUTOMATED: CPT | Performed by: STUDENT IN AN ORGANIZED HEALTH CARE EDUCATION/TRAINING PROGRAM

## 2023-05-16 PROCEDURE — 80048 BASIC METABOLIC PNL TOTAL CA: CPT | Performed by: STUDENT IN AN ORGANIZED HEALTH CARE EDUCATION/TRAINING PROGRAM

## 2023-05-16 PROCEDURE — 21000000 HC CCU ICU ROOM CHARGE

## 2023-05-16 PROCEDURE — 82962 GLUCOSE BLOOD TEST: CPT

## 2023-05-16 PROCEDURE — 83735 ASSAY OF MAGNESIUM: CPT | Performed by: STUDENT IN AN ORGANIZED HEALTH CARE EDUCATION/TRAINING PROGRAM

## 2023-05-16 PROCEDURE — 25000003 PHARM REV CODE 250: Performed by: HOSPITALIST

## 2023-05-16 PROCEDURE — 93306 ECHO (CUPID ONLY): ICD-10-PCS | Mod: 26,,, | Performed by: INTERNAL MEDICINE

## 2023-05-16 PROCEDURE — 25000003 PHARM REV CODE 250

## 2023-05-16 PROCEDURE — 94761 N-INVAS EAR/PLS OXIMETRY MLT: CPT

## 2023-05-16 RX ORDER — DIPHENHYDRAMINE HCL 25 MG
25 CAPSULE ORAL EVERY 6 HOURS PRN
Status: DISCONTINUED | OUTPATIENT
Start: 2023-05-16 | End: 2023-05-18 | Stop reason: HOSPADM

## 2023-05-16 RX ORDER — ONDANSETRON 2 MG/ML
4 INJECTION INTRAMUSCULAR; INTRAVENOUS EVERY 8 HOURS PRN
Status: DISCONTINUED | OUTPATIENT
Start: 2023-05-16 | End: 2023-05-18 | Stop reason: HOSPADM

## 2023-05-16 RX ORDER — CHLORHEXIDINE GLUCONATE ORAL RINSE 1.2 MG/ML
15 SOLUTION DENTAL 2 TIMES DAILY
Status: DISCONTINUED | OUTPATIENT
Start: 2023-05-16 | End: 2023-05-18 | Stop reason: HOSPADM

## 2023-05-16 RX ORDER — DIGOXIN 125 MCG
0.12 TABLET ORAL
Status: DISCONTINUED | OUTPATIENT
Start: 2023-05-17 | End: 2023-05-17

## 2023-05-16 RX ORDER — MUPIROCIN 20 MG/G
OINTMENT TOPICAL 2 TIMES DAILY
Status: DISCONTINUED | OUTPATIENT
Start: 2023-05-16 | End: 2023-05-18 | Stop reason: HOSPADM

## 2023-05-16 RX ORDER — DILTIAZEM HYDROCHLORIDE 30 MG/1
60 TABLET, FILM COATED ORAL 2 TIMES DAILY
Status: DISCONTINUED | OUTPATIENT
Start: 2023-05-16 | End: 2023-05-16

## 2023-05-16 RX ORDER — ATORVASTATIN CALCIUM 20 MG/1
20 TABLET, FILM COATED ORAL NIGHTLY
Status: DISCONTINUED | OUTPATIENT
Start: 2023-05-16 | End: 2023-05-18 | Stop reason: HOSPADM

## 2023-05-16 RX ORDER — SODIUM CHLORIDE 0.9 % (FLUSH) 0.9 %
10 SYRINGE (ML) INJECTION
Status: DISCONTINUED | OUTPATIENT
Start: 2023-05-16 | End: 2023-05-18 | Stop reason: HOSPADM

## 2023-05-16 RX ORDER — AMIODARONE HYDROCHLORIDE 200 MG/1
200 TABLET ORAL DAILY
Status: DISCONTINUED | OUTPATIENT
Start: 2023-05-16 | End: 2023-05-18 | Stop reason: HOSPADM

## 2023-05-16 RX ORDER — ACETAMINOPHEN 325 MG/1
650 TABLET ORAL EVERY 6 HOURS PRN
Status: DISCONTINUED | OUTPATIENT
Start: 2023-05-16 | End: 2023-05-18 | Stop reason: HOSPADM

## 2023-05-16 RX ORDER — BUMETANIDE 0.25 MG/ML
1 INJECTION INTRAMUSCULAR; INTRAVENOUS DAILY
Status: DISCONTINUED | OUTPATIENT
Start: 2023-05-16 | End: 2023-05-18 | Stop reason: HOSPADM

## 2023-05-16 RX ADMIN — ACETAMINOPHEN 650 MG: 325 TABLET ORAL at 01:05

## 2023-05-16 RX ADMIN — DIPHENHYDRAMINE HYDROCHLORIDE 25 MG: 25 CAPSULE ORAL at 04:05

## 2023-05-16 RX ADMIN — AMIODARONE HYDROCHLORIDE 200 MG: 200 TABLET ORAL at 08:05

## 2023-05-16 RX ADMIN — BUMETANIDE 1 MG: 0.25 INJECTION INTRAMUSCULAR; INTRAVENOUS at 08:05

## 2023-05-16 RX ADMIN — MUPIROCIN 1 G: 20 OINTMENT TOPICAL at 08:05

## 2023-05-16 RX ADMIN — CHLORHEXIDINE GLUCONATE 15 ML: 1.2 RINSE ORAL at 08:05

## 2023-05-16 RX ADMIN — ATORVASTATIN CALCIUM 20 MG: 20 TABLET, FILM COATED ORAL at 09:05

## 2023-05-16 RX ADMIN — SACUBITRIL AND VALSARTAN 2 TABLET: 49; 51 TABLET, FILM COATED ORAL at 08:05

## 2023-05-16 RX ADMIN — RIVAROXABAN 10 MG: 10 TABLET, FILM COATED ORAL at 04:05

## 2023-05-16 NOTE — ASSESSMENT & PLAN NOTE
Patient's FSGs are uncontrolled due to hyperglycemia on current medication regimen.  Last A1c reviewed-   Lab Results   Component Value Date    HGBA1C 5.5 10/28/2022     Most recent fingerstick glucose reviewed- No results for input(s): POCTGLUCOSE in the last 24 hours.  Current correctional scale  Low  Maintain anti-hyperglycemic dose as follows-   Antihyperglycemics (From admission, onward)    Start     Stop Route Frequency Ordered    05/16/23 0054  insulin aspart U-100 pen 0-5 Units         -- SubQ Before meals & nightly PRN 05/15/23 2354        Hold Oral hypoglycemics while patient is in the hospital.

## 2023-05-16 NOTE — PHARMACY MED REC
"              .      Admission Medication History     The home medication history was taken by Justine Srivastava.    You may go to "Admission" then "Reconcile Home Medications" tabs to review and/or act upon these items.     The home medication list has been updated by the Pharmacy department.   Please read ALL comments highlighted in yellow.   Please address this information as you see fit.    Feel free to contact us if you have any questions or require assistance.      Medications listed below were obtained from: Patient/family  Current Facility-Administered Medications on File Prior to Encounter   Medication Dose Route Frequency Provider Last Rate Last Admin    0.9%  NaCl infusion   Intravenous Continuous Sebastian Nowak III, MD 75 mL/hr at 12/13/19 0728 New Bag at 12/13/19 0728    diphenhydrAMINE injection 25 mg  25 mg Intravenous Once Sebastian Nowak III, MD        lorazepam injection 1 mg  1 mg Intravenous Once Sebastian Nowak III, MD         Current Outpatient Medications on File Prior to Encounter   Medication Sig Dispense Refill    albuterol (PROVENTIL/VENTOLIN HFA) 90 mcg/actuation inhaler INHALE 2 PUFFS BY MOUTH EVERY 6 HOURS AS NEEDED FOR WHEEZING (Patient taking differently: Inhale 2 puffs into the lungs every 6 (six) hours as needed.) 18 g 5    amiodarone (PACERONE) 200 MG Tab Take 200 mg by mouth once daily. 1/2 tab once daily with meals  May take an extra dose for palpatations   Max 4 100mg tabs      amLODIPine (NORVASC) 5 MG tablet Take 5 mg by mouth 2 (two) times daily. Take 1 tablet once daily may take extra dose to keep BP controlled, max 2 tabs/day      atorvastatin (LIPITOR) 20 MG tablet Take 20 mg by mouth every evening.      Ca-D3-mag ox-zinc--thom-bor 600 mg calcium- 20 mcg-50 mg Tab Take 1 tablet by mouth 2 (two) times daily.      cholecalciferol, vitamin D3, 125 mcg (5,000 unit) Tab Take 5,000 Units by mouth 2 (two) times daily.      digoxin (LANOXIN) 125 mcg tablet Take 62.5 mcg by mouth " every Mon, Wed, Fri.      ferrous sulfate, dried (SLOW RELEASE IRON) 144 mg (45 mg iron) TbSR Take 1 tablet by mouth once daily. With Vitamin C 250 mg daily      fluticasone propionate (FLONASE) 50 mcg/actuation nasal spray 2 sprays (100 mcg total) by Each Nostril route once daily. 16 g 5    gabapentin (NEURONTIN) 100 MG capsule TAKE 2 CAPSULES(200 MG) BY MOUTH THREE TIMES DAILY (Patient taking differently: Take 200 mg by mouth 3 (three) times daily. TAKE 2 CAPSULES(200 MG) BY MOUTH THREE TIMES DAILY) 180 capsule 5    glimepiride (AMARYL) 1 MG tablet Take 1 tablet (1 mg total) by mouth before breakfast. (Patient taking differently: Take 1 mg by mouth 2 (two) times a day.) 90 tablet 1    latanoprost 0.005 % ophthalmic solution Place 1 drop into both eyes nightly.      midodrine (PROAMATINE) 5 MG Tab Take 2.5 mg by mouth as needed. Take 1 tablet by mouth when feeling dizzy from low BP, avoid 3 hours prior to bedtime      propranoloL (INDERAL) 10 MG tablet Take 10 mg by mouth 2 (two) times daily.      sacubitriL-valsartan (ENTRESTO)  mg per tablet Take 1 tablet by mouth 2 (two) times daily.      tamsulosin (FLOMAX) 0.4 mg Cap Take 1 capsule by mouth once daily.      torsemide (DEMADEX) 20 MG Tab Take 1 tablet (20 mg total) by mouth once daily. (Patient taking differently: Take 20 mg by mouth 3 (three) times a week. Take extra for weight gain of 2 lbs over baseline or shortness of breathe or swelling, max 4/day) 30 tablet 0    XARELTO 10 mg Tab Take 10 mg by mouth once daily.      diltiaZEM (CARDIZEM) 60 MG tablet Take 60 mg by mouth 2 (two) times daily.         Potential issues to be addressed PRIOR TO DISCHARGE  Patient reported not taking the following medications: (Diltiazem). These medications remain on the home medication list. Please address accordingly.     Justine Srivastava  EXT 1924

## 2023-05-16 NOTE — ASSESSMENT & PLAN NOTE
Patient is on multiple blood pressure medications  Would resume Cardizem and Entresto  And introduce other blood pressure medications as tolerated

## 2023-05-16 NOTE — ASSESSMENT & PLAN NOTE
Patient is identified as having unknown heart failure that is Acute on chronic. CHF is currently uncontrolled due to Continued edema of extremities, Rales/crackles on pulmonary exam and Pulmonary edema/pleural effusion on CXR. Latest ECHO performed and demonstrates- Results for orders placed during the hospital encounter of 04/01/20    Echo Color Flow Doppler? Yes    Interpretation Summary  · Mild eccentric left ventricular hypertrophy.  · Mild left ventricular enlargement.  · Normal left ventricular systolic function. The estimated ejection fraction is 60%.  · Grade I (mild) left ventricular diastolic dysfunction consistent with impaired relaxation.  · No wall motion abnormalities.  · Normal right ventricular systolic function.  · Severe left atrial enlargement.  · Mild mitral sclerosis.  · There is mild leaflet calcification of the Mitral Valve.  · Mild mitral regurgitation.  · Intermediate central venous pressure (8 mmHg).  · The estimated PA systolic pressure is 37 mmHg.  . Continue Entresto and monitor clinical status closely. Monitor on telemetry. Patient is on CHF pathway.  Monitor strict Is&Os and daily weights.  Place on fluid restriction of 1.5 L. Continue to stress to patient importance of self efficacy and  on diet for CHF. Last BNP reviewed- and noted below   Recent Labs   Lab 05/15/23  2056   BNP >4,500*   . Patient has received IV Bumex x in the ED  Would continue on IV Bumex daily for continuous diuresis  Consult to Dr. PATEL  Resume home medication of Entresto  Daily weights, inputs and outputs  Repeat BNP after a decreased diuresis   Wean BiPAP as tolerated, repeat echo

## 2023-05-16 NOTE — PLAN OF CARE
[facility]  Initial Discharge Assessment       Primary Care Provider: Kraig Alberto MD    Admission Diagnosis: Acute exacerbation of CHF (congestive heart failure) [I50.9]    Admission Date: 5/15/2023  Expected Discharge Date:     Transition of Care Barriers: None    Assessment completed with pt at bedside.  Patient states she has advanced directives,  CM encouraged pt to provide to hospital.  Patient intends to discharge home where she her grandson lives with her.  No needs identified at this time.    Payor: [employer] MANAGED MEDICARE / Plan: [employer] MEDICARE PPO / Product Type: Medicare Advantage /     Extended Emergency Contact Information  Primary Emergency Contact: Mary Myers  Address: [id number] [place] [address]  Home Phone: [phone]  Mobile Phone: [phone]  Relation: Daughter  Preferred language: English   needed? No  Secondary Emergency Contact: Leidy Rodriguez  Address: [address] [place] [address] [place] [address] [facility]  Mobile Phone: [phone]  Relation: Daughter   needed? No    Discharge Plan A: Home  Discharge Plan B: Home with [facility] - [place], [place] - [address] [facility]  [address] [facility] [address]  Phone: [phone] Fax: [phone]    [id number] - [place], [place] - [address] [facility] [address] [facility]  [address]  [place] [address]  Phone: [phone] Fax: [phone]      Initial Assessment (most recent)       Adult Discharge Assessment - 05/16/23 1045          Discharge Assessment    Assessment Type Discharge Planning Assessment     Confirmed/corrected address, phone number and insurance Yes     Confirmed Demographics Correct on Facesheet     Source of Information patient     When was your last doctors appointment? --   month ago    Communicated JOANA with patient/caregiver  Date not available/Unable to determine     People in Home grandchild(shawnee)     Facility Arrived From: home     Do you expect to return to your current living situation? Yes     Do you have help at home or someone to help you manage your care at home? Yes     Who are your caregiver(s) and their phone number(s)? grandson     Prior to hospitilization cognitive status: Alert/Oriented     Current cognitive status: Alert/Oriented     Equipment Currently Used at Home rollator;oxygen   has an inogen    Readmission within 30 days? No     Patient currently being followed by outpatient case management? No     Do you currently have service(s) that help you manage your care at home? No     Do you take prescription medications? Yes     Do you have prescription coverage? Yes     Coverage [employer]     Do you have any problems affording any of your prescribed medications? No     Is the patient taking medications as prescribed? yes     Who is going to help you get home at discharge? family     How do you get to doctors appointments? car, drives self;family or friend will provide     Are you on dialysis? No     Do you take coumadin? No     Discharge Plan A Home     Discharge Plan B Home with family     DME Needed Upon Discharge  none     Discharge Plan discussed with: Patient     Transition of Care Barriers None

## 2023-05-16 NOTE — HPI
82-year-old  female with history of AFib on Xarelto, CHF s/p AICD (last EF 60% in 2020), HTN, DM, COPD, HLD, JENNIFER on CPAP nightly presents to ED by ambulance and respiratory distress.  Per patient, symptoms came on abruptly this evening where she felt as if she could not breathe.  She denies any prior history of similar severe symptoms.  She felt slightly short of breath this morning and took an extra torsemide 20 mg.  She normally takes them every other day, with last dose being yesterday prior to this.  She does admit that she ate some raw oysters over the weekend and perhaps had some other dietary indiscretions.  She denies any chest pain, fevers, changes in bowel or urinary habits.  She states that she follows up with her cardiologist Dr. Jones who is part of the [facility].  She does not smoke cigarettes, drink alcohol or use any illicit medication.  She states that she uses p.r.n. oxygen as needed.  On presentation to the ED, BNP greater than 4500, troponin 24.6.  Patient was given IV Bumex due to allergy with Lasix.

## 2023-05-16 NOTE — PLAN OF CARE
05/16/23 0900   Patient Assessment/Suction   Level of Consciousness (AVPU) alert   Respiratory Effort Unlabored;Normal   Expansion/Accessory Muscles/Retractions no use of accessory muscles   All Lung Fields Breath Sounds clear;equal bilaterally   Rhythm/Pattern, Respiratory pattern regular;unlabored   Cough Frequency infrequent   Cough Type nonproductive   PRE-TX-O2   Device (Oxygen Therapy) nasal cannula   $ Is the patient on Low Flow Oxygen? Yes   Flow (L/min) 4   SpO2 (!) 94 %   Pulse Oximetry Type Continuous   $ Pulse Oximetry - Multiple Charge Pulse Oximetry - Multiple   Pulse 60   Resp (!) 22   Preset CPAP/BiPAP Settings   Mode Of Delivery BiPAP S/T;Standby   $ CPAP/BiPAP Daily Charge BiPAP/CPAP Daily   $ Initial CPAP/BiPAP Setup? No   $ Is patient using? Yes   Size of Mask Medium/Large   Sized Appropriately? Yes   Equipment Type V60   Education   $ Education BiPAP;15 min   Respiratory Evaluation   $ Care Plan Tech Time 15 min   $ Eval/Re-eval Charges Evaluation   Evaluation For New Orders

## 2023-05-16 NOTE — NURSING
Nurses Note -- 4 Eyes      5/16/2023   12:40 AM      Skin assessed during: Admit      [x] No Altered Skin Integrity Present    []Prevention Measures Documented      [] Yes- Altered Skin Integrity Present or Discovered   [] LDA Added if Not in Epic (Describe Wound)   [] New Altered Skin Integrity was Present on Admit and Documented in LDA   [] Wound Image Taken    Wound Care Consulted? No    Attending Nurse:  Naresh Riojas RN     Second RN/Staff Member:  Marcos Coleman [id number]

## 2023-05-16 NOTE — NURSING
Pt c/o itching to her face. There is a red rash over her forehead, cheeks, and chest. MD notified. New order rec'd for Benedryl. Pt requests to take Benedryl after supper.

## 2023-05-16 NOTE — H&P
[facility] - Emergency Dept  Hospital Medicine  History & Physical    Patient Name: Jo Alegre  MRN: [id number]  Patient Class: IP- Inpatient  Admission Date: 5/15/2023  Attending Physician: Sasha Alcantara MD  Primary Care Provider: Kraig Alberto MD         Patient information was obtained from patient and ER records.     Subjective:     Principal Problem:CHF exacerbation    Chief Complaint:   Chief Complaint   Patient presents with    Shortness of Breath        HPI: 82-year-old  female with history of AFib on Xarelto, CHF s/p AICD (last EF 60% in 2020), HTN, DM, COPD, HLD, JENNIFER on CPAP nightly presents to ED by ambulance and respiratory distress.  Per patient, symptoms came on abruptly this evening where she felt as if she could not breathe.  She denies any prior history of similar severe symptoms.  She felt slightly short of breath this morning and took an extra torsemide 20 mg.  She normally takes them every other day, with last dose being yesterday prior to this.  She does admit that she ate some raw oysters over the weekend and perhaps had some other dietary indiscretions.  She denies any chest pain, fevers, changes in bowel or urinary habits.  She states that she follows up with her cardiologist Dr. Jones who is part of [facility].  She does not smoke cigarettes, drink alcohol or use any illicit medication.  She states that she uses p.r.n. oxygen as needed.  On presentation to the ED, BNP greater than 4500, troponin 24.6.  Patient was given IV Bumex due to allergy with Lasix.      Past Medical History:   Diagnosis Date    Allergy     Codeine, Lasix    Atrial fibrillation     Atrial fibrillation     Cataract     CHF (congestive heart failure)     Diabetes mellitus, type 2     Ejection fraction < 50% 10/18/2017    Approximately 35%  Based on prior  Echocardiogram.    Encounter for blood transfusion     Hyperlipidemia     Osteoporosis     PONV (postoperative nausea and  vomiting)     Thyroid disorder screening 10/17/2017    TSH of 1.12 ordered by Dr. dee Jones       Past Surgical History:   Procedure Laterality Date    ANTEGRADE NEPHROSTOGRAPHY Left 8/25/2022    Procedure: NEPHROSTOGRAM, ANTEGRADE;  Surgeon: Shelby Parra MD;  Location: [facility];  Service: Urology;  Laterality: Left;    CHOLECYSTECTOMY  1997    [place]     COLONOSCOPY      CYSTOSCOPY W/ URETERAL STENT PLACEMENT Left 7/17/2022    Procedure: CYSTOSCOPY, WITH URETERAL STENT INSERTION;  Surgeon: Shelby Parra MD;  Location: [facility] OR;  Service: Urology;  Laterality: Left;    CYSTOSCOPY W/ URETERAL STENT REMOVAL Left 9/21/2022    Procedure: CYSTOSCOPY, WITH URETERAL STENT REMOVAL;  Surgeon: Shelby Parra MD;  Location: [facility] OR;  Service: Urology;  Laterality: Left;    CYSTOSCOPY WITH URETEROSCOPY, RETROGRADE PYELOGRAPHY, AND INSERTION OF STENT Left 8/25/2022    Procedure: CYSTOSCOPY, WITH RETROGRADE PYELOGRAM AND URETERAL STENT INSERTION;  Surgeon: Shelby Parra MD;  Location: [facility];  Service: Urology;  Laterality: Left;    EYE SURGERY      bilateral cataracts    FINGER SURGERY Right 2021    right pinky finger    FLEXIBLE CYSTOSCOPY Left 8/25/2022    Procedure: CYSTOSCOPY, FLEXIBLE WITH STENT REMOVAL;  Surgeon: Shelby Parra MD;  Location: [facility];  Service: Urology;  Laterality: Left;    FRACTURE SURGERY  2014    right femur with neville    HEMORRHOID SURGERY      48 yrs ago    HYSTERECTOMY      NEPHROSTOMY Left 8/25/2022    Procedure: CREATION, NEPHROSTOMY;  Surgeon: Shelby Parra MD;  Location: [facility] OR;  Service: Urology;  Laterality: Left;    PERCUTANEOUS NEPHROLITHOTOMY N/A 8/25/2022    Procedure: NEPHROLITHOTOMY, PERCUTANEOUS;  Surgeon: Shelby Parra MD;  Location: [facility] OR;  Service: Urology;  Laterality: N/A;    REPLACEMENT OF IMPLANTABLE CARDIOVERTER-DEFIBRILLATOR (ICD) GENERATOR N/A 12/13/2019    Procedure: REPLACEMENT, PULSE GENERATOR, ICD-MEDTRONIC;  Surgeon: Sebastian  Eliu MONTGOMERY MD;  Location: [facility];  Service: Cardiology;  Laterality: N/A;    TONSILLECTOMY         Review of patient's allergies indicates:   Allergen Reactions    Codeine Other (See Comments)     nausea    Hydrocodone Nausea And Vomiting    Lasix [furosemide]      rash       Current Facility-Administered Medications on File Prior to Encounter   Medication    0.9%  NaCl infusion    diphenhydrAMINE injection 25 mg    lorazepam injection 1 mg     Current Outpatient Medications on File Prior to Encounter   Medication Sig    albuterol (PROVENTIL/VENTOLIN HFA) 90 mcg/actuation inhaler INHALE 2 PUFFS BY MOUTH EVERY 6 HOURS AS NEEDED FOR WHEEZING (Patient taking differently: Inhale 2 puffs into the lungs every 6 (six) hours as needed.)    amiodarone (PACERONE) 200 MG Tab Take 200 mg by mouth once daily. 1/2 tab once daily with meals  May take an extra dose for palpatations   Max 4 100mg tabs    amLODIPine (NORVASC) 5 MG tablet Take 5 mg by mouth 2 (two) times daily. Take 1 tablet once daily may take extra dose to keep BP controlled, max 2 tabs/day    atorvastatin (LIPITOR) 20 MG tablet Take 20 mg by mouth every evening.    Ca-D3-mag ox-zinc--thom-bor 600 mg calcium- 20 mcg-50 mg Tab Take 1 tablet by mouth 2 (two) times daily.    cholecalciferol, vitamin D3, 125 mcg (5,000 unit) Tab Take 5,000 Units by mouth 2 (two) times daily.    digoxin (LANOXIN) 125 mcg tablet Take 62.5 mcg by mouth every Mon, Wed, Fri.    ferrous sulfate, dried (SLOW RELEASE IRON) 144 mg (45 mg iron) TbSR Take 1 tablet by mouth once daily. With Vitamin C 250 mg daily    fluticasone propionate (FLONASE) 50 mcg/actuation nasal spray 2 sprays (100 mcg total) by Each Nostril route once daily.    gabapentin (NEURONTIN) 100 MG capsule TAKE 2 CAPSULES(200 MG) BY MOUTH THREE TIMES DAILY (Patient taking differently: Take 200 mg by mouth 3 (three) times daily. TAKE 2 CAPSULES(200 MG) BY MOUTH THREE TIMES DAILY)    glimepiride (AMARYL) 1 MG tablet Take 1  tablet (1 mg total) by mouth before breakfast. (Patient taking differently: Take 1 mg by mouth 2 (two) times a day.)    latanoprost 0.005 % ophthalmic solution Place 1 drop into both eyes nightly.    midodrine (PROAMATINE) 5 MG Tab Take 2.5 mg by mouth as needed. Take 1 tablet by mouth when feeling dizzy from low BP, avoid 3 hours prior to bedtime    propranoloL (INDERAL) 10 MG tablet Take 10 mg by mouth 2 (two) times daily.    sacubitriL-valsartan (ENTRESTO)  mg per tablet Take 1 tablet by mouth 2 (two) times daily.    tamsulosin (FLOMAX) 0.4 mg Cap Take 1 capsule by mouth once daily.    torsemide (DEMADEX) 20 MG Tab Take 1 tablet (20 mg total) by mouth once daily. (Patient taking differently: Take 20 mg by mouth 3 (three) times a week. Take extra for weight gain of 2 lbs over baseline or shortness of breathe or swelling, max 4/day)    XARELTO 10 mg Tab Take 10 mg by mouth once daily.    diltiaZEM (CARDIZEM) 60 MG tablet Take 60 mg by mouth 2 (two) times daily.     Family History       Problem Relation (Age of Onset)    Breast cancer Paternal Aunt    Cancer Father    Heart disease Mother          Tobacco Use    Smoking status: Former     Passive exposure: Past    Smokeless tobacco: Never    Tobacco comments:     quit 2013   Substance and Sexual Activity    Alcohol use: No    Drug use: No    Sexual activity: Not Currently     Review of Systems   Constitutional:  Negative for fever.   Respiratory:  Positive for shortness of breath.    Cardiovascular:  Negative for palpitations.   Gastrointestinal:  Negative for abdominal pain, nausea and vomiting.   Neurological:  Negative for weakness.   All other systems reviewed and are negative.  Objective:     Vital Signs (Most Recent):  Temp: 97.7 °F (36.5 °C) (05/15/23 2034)  Pulse: 60 (05/15/23 2337)  Resp: (!) 27 (05/15/23 2337)  BP: (!) 143/64 (05/15/23 2231)  SpO2: 98 % (05/15/23 2337) Vital Signs (24h Range):  Temp:  [97.7 °F (36.5 °C)] 97.7 °F (36.5 °C)  Pulse:   [60-66] 60  Resp:  [21-31] 27  SpO2:  [84 %-98 %] 98 %  BP: (143-169)/() 143/64     Weight: 98.4 kg (217 lb)  Body mass index is 32.05 kg/m².     Physical Exam  Vitals and nursing note reviewed.   HENT:      Head: Normocephalic.      Mouth/Throat:      Mouth: Mucous membranes are moist.   Cardiovascular:      Rate and Rhythm: Normal rate.   Pulmonary:      Effort: Respiratory distress present.      Breath sounds: Rales present.   Abdominal:      Palpations: Abdomen is soft.   Musculoskeletal:         General: Normal range of motion.      Cervical back: Normal range of motion.   Neurological:      General: No focal deficit present.      Mental Status: She is alert. Mental status is at baseline.              Significant Labs: All pertinent labs within the past 24 hours have been reviewed.  CBC:   Recent Labs   Lab 05/15/23  2056 05/15/23  2102   WBC 9.66  --    HGB 11.2*  --    HCT 36.8* 35*     --      CMP:   Recent Labs   Lab 05/15/23  2056      K 4.2      CO2 25   *   BUN 30*   CREATININE 1.9*   CALCIUM 9.5   PROT 7.6   ALBUMIN 3.4*   BILITOT 0.7   ALKPHOS 100   AST 25   ALT 23   ANIONGAP 13     Cardiac Markers:   Recent Labs   Lab 05/15/23  2056   BNP >4,500*     Magnesium:   Recent Labs   Lab 05/15/23  2056   MG 2.0     Troponin:   Recent Labs   Lab 05/15/23  2056   TROPONINIHS 24.6*     Urine Studies: No results for input(s): COLORU, APPEARANCEUA, PHUR, SPECGRAV, PROTEINUA, GLUCUA, KETONESU, BILIRUBINUA, OCCULTUA, NITRITE, UROBILINOGEN, LEUKOCYTESUR, RBCUA, WBCUA, BACTERIA, SQUAMEPITHEL, HYALINECASTS in the last 48 hours.    Invalid input(s): GRACY    Significant Imaging: I have reviewed all pertinent imaging results/findings within the past 24 hours.  Imaging Results              X-Ray Chest AP Portable (In process)                     Assessment/Plan:     * CHF exacerbation  Patient is identified as having unknown heart failure that is Acute on chronic. CHF is currently  uncontrolled due to Continued edema of extremities, Rales/crackles on pulmonary exam and Pulmonary edema/pleural effusion on CXR. Latest ECHO performed and demonstrates- Results for orders placed during the hospital encounter of 04/01/20    Echo Color Flow Doppler? Yes    Interpretation Summary  · Mild eccentric left ventricular hypertrophy.  · Mild left ventricular enlargement.  · Normal left ventricular systolic function. The estimated ejection fraction is 60%.  · Grade I (mild) left ventricular diastolic dysfunction consistent with impaired relaxation.  · No wall motion abnormalities.  · Normal right ventricular systolic function.  · Severe left atrial enlargement.  · Mild mitral sclerosis.  · There is mild leaflet calcification of the Mitral Valve.  · Mild mitral regurgitation.  · Intermediate central venous pressure (8 mmHg).  · The estimated PA systolic pressure is 37 mmHg.  . Continue Entresto and monitor clinical status closely. Monitor on telemetry. Patient is on CHF pathway.  Monitor strict Is&Os and daily weights.  Place on fluid restriction of 1.5 L. Continue to stress to patient importance of self efficacy and  on diet for CHF. Last BNP reviewed- and noted below   Recent Labs   Lab 05/15/23  2056   BNP >4,500*   . Patient has received IV Bumex x in the ED  Would continue on IV Bumex daily for continuous diuresis  Consult to Dr. PATEL  Resume home medication of Entresto  Daily weights, inputs and outputs  Repeat BNP after a decreased diuresis   Wean BiPAP as tolerated, repeat echo      Essential hypertension  Patient is on multiple blood pressure medications  Would resume Cardizem and Entresto  And introduce other blood pressure medications as tolerated      Type 2 diabetes mellitus with diabetic nephropathy, without long-term current use of insulin  Patient's FSGs are uncontrolled due to hyperglycemia on current medication regimen.  Last A1c reviewed-   Lab Results   Component Value Date     HGBA1C 5.5 10/28/2022     Most recent fingerstick glucose reviewed- No results for input(s): POCTGLUCOSE in the last 24 hours.  Current correctional scale  Low  Maintain anti-hyperglycemic dose as follows-   Antihyperglycemics (From admission, onward)      Start     Stop Route Frequency Ordered    05/16/23 0054  insulin aspart U-100 pen 0-5 Units         -- SubQ Before meals & nightly PRN 05/15/23 8274          Hold Oral hypoglycemics while patient is in the hospital.    Paroxysmal atrial fibrillation  Patient with Permanent atrial fibrillation which is controlled currently with Calcium Channel Blocker. Patient is currently in sinus rhythm.[id number] Score: 4. HASBLED Score: . Anticoagulation indicated. Anticoagulation done with xarelto.          VTE Risk Mitigation (From admission, onward)      None                       Sasha Alcantara MD  Department of Hospital Medicine  [facility] - Emergency Dept

## 2023-05-16 NOTE — PROGRESS NOTES
Critical access hospital Medicine  Progress Note    Patient name: Jo Alegre  MRN: [id number]  Admit Date: 5/15/2023   LOS: 1 day     SUBJECTIVE:     Principal problem: Acute on chronic combined systolic and diastolic congestive heart failure    Interval History:  Admitted for shortness of breath and being managed for CHF exacerbation.  Used BiPAP overnight and has been transitioned to nasal cannula earlier today.  Reports improvement in his symptoms.  Denies missing home diuretics.    Summary:  82-year-old  female with multiple medical comorbidities including but not limited to chronic combined CHF status post AICD, paroxysmal atrial fibrillation, type 2 DM in remission, essential hypertension and CKD stage 3b admitted for CHF exacerbation.    Found to be hypoxic to 84% on room air.  Treated with BiPAP for hypoxic respiratory failure from CHF exacerbation.    Scheduled Meds:   amiodarone  200 mg Oral Daily    atorvastatin  20 mg Oral QHS    bumetanide  1 mg Intravenous Daily    chlorhexidine  15 mL Mouth/Throat BID    [START ON 5/17/2023] digoxin  0.125 mg Oral Every Mon, Wed, Fri    mupirocin   Nasal BID    rivaroxaban  10 mg Oral Daily with dinner    sacubitriL-valsartan  2 tablet Oral BID     Continuous Infusions:  PRN Meds:acetaminophen, dextrose 50%, dextrose 50%, glucagon (human recombinant), glucose, glucose, insulin aspart U-100, ondansetron, sodium chloride 0.9%    Review of patient's allergies indicates:   Allergen Reactions    Codeine Other (See Comments)     nausea    Hydrocodone Nausea And Vomiting    Lasix [furosemide]      rash       Review of Systems: As per interval history    OBJECTIVE:     Vital Signs (Most Recent)  Temp: 97.9 °F (36.6 °C) (05/16/23 0856)  Pulse: 64 (05/16/23 1000)  Resp: (!) 27 (05/16/23 1000)  BP: (!) 157/70 (05/16/23 1000)  SpO2: (!) 94 % (05/16/23 1000)    Vital Signs Range (Last 24H):  Temp:  [97.7 °F (36.5 °C)-98.7 °F (37.1 °C)]   Pulse:  []    Resp:  [18-31]   BP: (133-169)/()   SpO2:  [84 %-99 %]     I & O (Last 24H):  Intake/Output Summary (Last 24 hours) at 5/16/2023 1225  Last data filed at 5/16/2023 1016  Gross per 24 hour   Intake 390 ml   Output 525 ml   Net -135 ml       Physical Exam:  General: Patient resting comfortably in no acute distress. Appears as stated age. Calm  Eyes: No conjunctival injection. No scleral icterus.  ENT: Hearing grossly intact. No discharge from ears. No nasal discharge.   Neck: Supple, trachea midline. JVD +  CVS: RRR. No LE edema BL  Lungs:  No tachypnea or accessory muscle use.  Diminished breath sounds at the bases  Abdomen:  Soft, nontender and nondistended.  No organomegaly  Neuro: AOx3. Moves all extremities. Follows commands. Responds appropriately   Skin:  No rash or erythema noted    Laboratory:  I have reviewed all pertinent lab results within the past 24 hours.  CBC:   Recent Labs   Lab 05/16/23  0313   WBC 6.58   RBC 3.45*   HGB 9.4*   HCT 30.0*      MCV 87   MCH 27.2   MCHC 31.3*     CMP:   Recent Labs   Lab 05/15/23  2056 05/16/23  0313   * 95   CALCIUM 9.5 8.3*   ALBUMIN 3.4*  --    PROT 7.6  --     136   K 4.2 3.7   CO2 25 25    106   BUN 30* 31*   CREATININE 1.9* 1.9*   ALKPHOS 100  --    ALT 23  --    AST 25  --    BILITOT 0.7  --        Diagnostic Results:  Labs: Reviewed    ASSESSMENT/PLAN:       Active Hospital Problems    Diagnosis  POA    *Acute on chronic combined systolic and diastolic congestive heart failure [I50.43]  Yes    MARCELINO (acute kidney injury) [N17.9]  Yes    Paroxysmal atrial fibrillation [I48.0]  Yes     Patient follows up with [facility].  Currently on a combination of diltiazem, digoxin, Xarelto and amiodarone.  Dr. Jones/Dr. Lopez        Essential hypertension [I10]  Yes    Type 2 diabetes mellitus with diabetic nephropathy, without long-term current use of insulin [E11.21]  Yes    Mixed dyslipidemia [E78.2]  Yes    Cardiomyopathy with  implantable cardioverter-defibrillator [I42.9, Z95.810]  Yes     Summary    Mild eccentric left ventricular hypertrophy.  Mild left ventricular enlargement.  Normal left ventricular systolic function. The estimated ejection fraction is 60%.  Grade I (mild) left ventricular diastolic dysfunction consistent with impaired relaxation.  No wall motion abnormalities.  Normal right ventricular systolic function.  Severe left atrial enlargement.  Mild mitral sclerosis.  There is mild leaflet calcification of the Mitral Valve.  Mild mitral regurgitation.  Intermediate central venous pressure (8 mmHg).  The estimated PA systolic pressure is 37 mmHg.     Echocardiogram done on 04/02/2020.        Resolved Hospital Problems   No resolved problems to display.         Plan:   Acute hypoxic respiratory failure   Acute on chronic combined CHF   Continue supplemental oxygen to target SpO2 greater than 92%   BiPAP q.h.s. and p.r.n.  Continue IV bumetanide (on torsemide 20 mg once daily at home)  Strict input output charting, daily weights and low-sodium diet   Appreciate cardiology input.  Follow echocardiogram  Continue sacubitril-valsartan.  Start low-dose beta-blocker    MARCELINO on CKD stage 3b - ?Cardiorenal   Monitor renal function with daily labs while on diuretics   Avoid nephrotoxic agents   Dose medications for GFR    Paroxysmal atrial fibrillation   Subtherapeutic digoxin on admission   Continue amiodarone and digoxin  Continue rivaroxaban for anticoagulation    Chronic medical conditions:   Type 2 DM in remission:  HGB A1c less than 6%.  Continue low-dose insulin sliding scale   Hyperlipidemia: Continue atorvastatin       VTE Risk Mitigation (From admission, onward)           Ordered     rivaroxaban tablet 10 mg  With dinner         05/16/23 0039     IP VTE HIGH RISK PATIENT  Once         05/16/23 0039     Place sequential compression device  Until discontinued         05/16/23 0039                        [facility]  Medicine  [facility]  Jourdan Brown MD  Date of service: 05/16/2023

## 2023-05-16 NOTE — ED PROVIDER NOTES
Encounter Date: 5/15/2023       History     Chief Complaint   Patient presents with    Shortness of Breath     Pt w/ PMHx atrial fib on Xarelto, CHF s/p AICD (last EF 60% in 2020), HTN, DM, COPD, HLD, JENNIFER on CPAP nightly presents to ED by ambulance and respiratory distress.  Per patient, symptoms came on abruptly this evening where she felt as if she could not breathe.  She denies any prior history of similar severe symptoms.  She felt slightly short of breath this morning and took an extra torsemide 20 mg.  She normally takes them every other day, will last dose being yesterday prior to this.  She does admit that she ate some raw oysters over the weekend and perhaps had some other dietary indiscretions.  She denies any chest pain.  Denies fever.  She has had a dry cough.  Denies any lower extremity swelling or weight gain.    Review of patient's allergies indicates:   Allergen Reactions    Codeine Other (See Comments)     nausea    Hydrocodone Nausea And Vomiting    Lasix [furosemide]      rash     Past Medical History:   Diagnosis Date    Allergy     Codeine, Lasix    Atrial fibrillation     Atrial fibrillation     Cataract     CHF (congestive heart failure)     Diabetes mellitus, type 2     Ejection fraction < 50% 10/18/2017    Approximately 35%  Based on prior  Echocardiogram.    Encounter for blood transfusion     Hyperlipidemia     Osteoporosis     PONV (postoperative nausea and vomiting)     Thyroid disorder screening 10/17/2017    TSH of 1.12 ordered by Dr. dee Jones     Past Surgical History:   Procedure Laterality Date    ANTEGRADE NEPHROSTOGRAPHY Left 8/25/2022    Procedure: NEPHROSTOGRAM, ANTEGRADE;  Surgeon: Shelby Parra MD;  Location: [facility];  Service: Urology;  Laterality: Left;    CHOLECYSTECTOMY  1997    [place]     COLONOSCOPY      CYSTOSCOPY W/ URETERAL STENT PLACEMENT Left 7/17/2022    Procedure: CYSTOSCOPY, WITH URETERAL STENT INSERTION;  Surgeon: Shelby Parra MD;  Location:  [facility] OR;  Service: Urology;  Laterality: Left;    CYSTOSCOPY W/ URETERAL STENT REMOVAL Left 9/21/2022    Procedure: CYSTOSCOPY, WITH URETERAL STENT REMOVAL;  Surgeon: Shelby Parra MD;  Location: [facility] OR;  Service: Urology;  Laterality: Left;    CYSTOSCOPY WITH URETEROSCOPY, RETROGRADE PYELOGRAPHY, AND INSERTION OF STENT Left 8/25/2022    Procedure: CYSTOSCOPY, WITH RETROGRADE PYELOGRAM AND URETERAL STENT INSERTION;  Surgeon: Shelby Parra MD;  Location: [facility] OR;  Service: Urology;  Laterality: Left;    EYE SURGERY      bilateral cataracts    FINGER SURGERY Right 2021    right pinky finger    FLEXIBLE CYSTOSCOPY Left 8/25/2022    Procedure: CYSTOSCOPY, FLEXIBLE WITH STENT REMOVAL;  Surgeon: Shelby Parra MD;  Location: [facility] OR;  Service: Urology;  Laterality: Left;    FRACTURE SURGERY  2014    right femur with neville    HEMORRHOID SURGERY      48 yrs ago    HYSTERECTOMY      NEPHROSTOMY Left 8/25/2022    Procedure: CREATION, NEPHROSTOMY;  Surgeon: Shelby Parra MD;  Location: [facility];  Service: Urology;  Laterality: Left;    PERCUTANEOUS NEPHROLITHOTOMY N/A 8/25/2022    Procedure: NEPHROLITHOTOMY, PERCUTANEOUS;  Surgeon: Shelby Parra MD;  Location: [facility] OR;  Service: Urology;  Laterality: N/A;    REPLACEMENT OF IMPLANTABLE CARDIOVERTER-DEFIBRILLATOR (ICD) GENERATOR N/A 12/13/2019    Procedure: REPLACEMENT, PULSE GENERATOR, ICD-MEDTRONIC;  Surgeon: Sebastian Nowak III, MD;  Location: [facility];  Service: Cardiology;  Laterality: N/A;    TONSILLECTOMY       Family History   Problem Relation Age of Onset    Heart disease Mother     Cancer Father         Lung Cancer ??? Asbestos    Breast cancer Paternal Aunt      Social History     Tobacco Use    Smoking status: Former     Passive exposure: Past    Smokeless tobacco: Never    Tobacco comments:     quit 2013   Substance Use Topics    Alcohol use: No    Drug use: No     Review of Systems   Constitutional:  Negative for fever.   Respiratory:   Positive for cough and shortness of breath.    Cardiovascular:  Negative for chest pain and leg swelling.   All other systems reviewed and are negative.    Physical Exam     Initial Vitals   BP Pulse Resp Temp SpO2   05/15/23 2034 05/15/23 2034 05/15/23 2034 05/15/23 2034 05/15/23 2033   (!) 169/72 66 (!) 21 97.7 °F (36.5 °C) (!) 84 %      MAP       --                Physical Exam    Nursing note and vitals reviewed.  Constitutional: She appears well-developed and well-nourished. She is not diaphoretic. She appears distressed.   HENT:   Head: Normocephalic and atraumatic.   Mouth/Throat: Oropharynx is clear and moist.   Eyes: EOM are normal. Pupils are equal, round, and reactive to light.   Neck: Neck supple.   Normal range of motion.  Cardiovascular:  Normal rate and regular rhythm.           Pulmonary/Chest: She is in respiratory distress. She has rales.   Abdominal: Abdomen is soft. There is no abdominal tenderness. There is no rebound and no guarding.   Musculoskeletal:         General: No tenderness or edema. Normal range of motion.      Cervical back: Normal range of motion and neck supple.     Neurological: She is alert and oriented to person, place, and time. She has normal strength. No cranial nerve deficit. GCS score is 15. GCS eye subscore is 4. GCS verbal subscore is 5. GCS motor subscore is 6.   Skin: Skin is warm and dry. Capillary refill takes less than 2 seconds. No rash noted.   Psychiatric: She has a normal mood and affect. Thought content normal.       ED Course   Procedures  Labs Reviewed   CBC W/ AUTO DIFFERENTIAL - Abnormal; Notable for the following components:       Result Value    Hemoglobin 11.2 (*)     Hematocrit 36.8 (*)     MCHC 30.4 (*)     RDW 15.6 (*)     Gran % 73.9 (*)     Lymph % 14.7 (*)     All other components within normal limits   COMPREHENSIVE METABOLIC PANEL - Abnormal; Notable for the following components:    Glucose 169 (*)     BUN 30 (*)     Creatinine 1.9 (*)     Albumin  3.4 (*)     eGFR 26.2 (*)     All other components within normal limits   TROPONIN I HIGH SENSITIVITY - Abnormal; Notable for the following components:    Troponin I High Sensitivity 24.6 (*)     All other components within normal limits   B-TYPE NATRIURETIC PEPTIDE - Abnormal; Notable for the following components:    BNP >4,500 (*)     All other components within normal limits   DIGOXIN LEVEL - Abnormal; Notable for the following components:    Digoxin Lvl <0.2 (*)     All other components within normal limits   ISTAT PROCEDURE - Abnormal; Notable for the following components:    POC PO2 70 (*)     POC HCO3 23.2 (*)     POC SATURATED O2 94 (*)     POC Glucose 210 (*)     POC Hematocrit 35 (*)     All other components within normal limits   PROTIME-INR   MAGNESIUM     EKG Readings: (Independently Interpreted)   Ventricular paced rhythm, HR 60     Imaging Results              X-Ray Chest AP Portable (In process)                   X-Rays:   Independently Interpreted Readings:   Chest X-Ray: Chest x-ray independently reviewed by me and appears consistent with pulmonary edema, possible superimposed infiltrate on the right   Medications   bumetanide injection 1 mg (has no administration in time range)     Medical Decision Making:   Initial Assessment:   Patient presents to ED in respiratory distress.  Placed on BiPAP with improvement.  Differential Diagnosis:   CHF exacerbation, PE, ACS, COPD, pneumonia  Independently Interpreted Test(s):   I have ordered and independently interpreted X-rays - see prior notes.  I have ordered and independently interpreted EKG Reading(s) - see prior notes  Clinical Tests:   Lab Tests: Ordered and Reviewed       <> Summary of Lab: ABG, CBC, CMP, high sensitivity troponin, BNP, digoxin level, PT/INR, mg  Radiological Study: Ordered and Reviewed  Medical Tests: Ordered and Reviewed  ED Management:  Patient is stabilizing on BiPAP.  All labs independently reviewed by me.  ABG 7.40/37/70/23.2.   EKG with paced rhythm.  Chest x-ray appears most consistent with pulmonary edema.  Patient reports allergy to Lasix.  It does appear that she is received Bumex IV in the past and was given Bumex 1 mg IV x1.  Remainder of labs significant for BNP elevated above baseline, high sensitivity troponin 24.6, normal WBC count, mildly elevated creatinine above baseline at 1.9.  Hospitalist consulted for admission.  Other:   I have discussed this case with another health care provider.          Attending Attestation:         Attending Critical Care:   Critical Care Times:   Direct Patient Care (initial evaluation, reassessments, and time considering the case)................................................................35 minutes.   Additional History from reviewing old medical records or taking additional history from the family, EMS, PCP, etc.......................10 minutes.   Ordering, Reviewing, and Interpreting Diagnostic Studies...............................................................................................................10 minutes.   Documentation..................................................................................................................................................................................5 minutes.   Consultation with other Physicians. .................................................................................................................................................5 minutes.   ==============================================================  Total Critical Care Time - exclusive of procedural time: 65 minutes.  ==============================================================  Critical care was necessary to treat or prevent imminent or life-threatening deterioration of the following conditions: congestive heart failure.   Critical care was time spent personally by me on the following activities: obtaining history from patient or relative, examination of  patient, review of x-rays / CT sent with the patient, review of old charts, ordering lab, x-rays, and/or EKG, ordering and performing treatments and interventions, evaluation of patient's response to treatment, discussion with consultants, interpretation of cardiac measurements and re-evaluation of patient's conition.   Critical Care Condition: potentially life-threatening                      Clinical Impression:   Final diagnoses:  [R06.02] SOB (shortness of breath)  [R06.02] Shortness of breath  [J96.01] Acute respiratory failure with hypoxia (Primary)  [I50.43] Acute on chronic combined systolic and diastolic congestive heart failure        ED Disposition Condition    Admit Stable                Tessa Rebolledo MD  05/15/23 9522

## 2023-05-16 NOTE — CONSULTS
[facility]   Cardiology Note    Consult Requested By: hospital medicine  Reason for Consult: CHF exacerbation    SUBJECTIVE:     History of Present Illness: Pt is a 81 yo female with PMHx of Afib on xarelto, HFrEF s/p AICD, HTN, DM, HLP, JENNIFER, and COPD who presented to the ED by EMS with respiratory distress. Reports symptoms came on acutely last night. She experiencing mildly worsening  SOB yesterday morning and took an extra torsemide at that time. She did eat seafood over the weekend. She does utilize home O2 PRN. ED workup showed patient was afebrile, /72. BNP >4500. High sensitivity troponin 24.6. H&H 11.2/36.8 now 9.4/30. Cr 1.9. CXR showed pulmonary edema. EKG showed Vpacing. Last echo from 11/2022 showed EF 20-25%.     Review of patient's allergies indicates:   Allergen Reactions    Codeine Other (See Comments)     nausea    Hydrocodone Nausea And Vomiting    Lasix [furosemide]      rash       Past Medical History:   Diagnosis Date    Allergy     Codeine, Lasix    Atrial fibrillation     Atrial fibrillation     Cataract     CHF (congestive heart failure)     Diabetes mellitus, type 2     Ejection fraction < 50% 10/18/2017    Approximately 35%  Based on prior  Echocardiogram.    Encounter for blood transfusion     Hyperlipidemia     Osteoporosis     PONV (postoperative nausea and vomiting)     Thyroid disorder screening 10/17/2017    TSH of 1.12 ordered by Dr. dee Jones     Past Surgical History:   Procedure Laterality Date    ANTEGRADE NEPHROSTOGRAPHY Left 8/25/2022    Procedure: NEPHROSTOGRAM, ANTEGRADE;  Surgeon: Shelby Parra MD;  Location: [facility] OR;  Service: Urology;  Laterality: Left;    CHOLECYSTECTOMY  1997    [place]     COLONOSCOPY      CYSTOSCOPY W/ URETERAL STENT PLACEMENT Left 7/17/2022    Procedure: CYSTOSCOPY, WITH URETERAL STENT INSERTION;  Surgeon: Shelby Parra MD;  Location: [facility] OR;  Service: Urology;  Laterality: Left;    CYSTOSCOPY W/ URETERAL STENT  REMOVAL Left 9/21/2022    Procedure: CYSTOSCOPY, WITH URETERAL STENT REMOVAL;  Surgeon: Shelby Parra MD;  Location: [facility] OR;  Service: Urology;  Laterality: Left;    CYSTOSCOPY WITH URETEROSCOPY, RETROGRADE PYELOGRAPHY, AND INSERTION OF STENT Left 8/25/2022    Procedure: CYSTOSCOPY, WITH RETROGRADE PYELOGRAM AND URETERAL STENT INSERTION;  Surgeon: Shelby Parra MD;  Location: [facility] OR;  Service: Urology;  Laterality: Left;    EYE SURGERY      bilateral cataracts    FINGER SURGERY Right 2021    right pinky finger    FLEXIBLE CYSTOSCOPY Left 8/25/2022    Procedure: CYSTOSCOPY, FLEXIBLE WITH STENT REMOVAL;  Surgeon: Shelyb Parra MD;  Location: [facility] OR;  Service: Urology;  Laterality: Left;    FRACTURE SURGERY  2014    right femur with neville    HEMORRHOID SURGERY      48 yrs ago    HYSTERECTOMY      NEPHROSTOMY Left 8/25/2022    Procedure: CREATION, NEPHROSTOMY;  Surgeon: Shelby Parra MD;  Location: [facility] OR;  Service: Urology;  Laterality: Left;    PERCUTANEOUS NEPHROLITHOTOMY N/A 8/25/2022    Procedure: NEPHROLITHOTOMY, PERCUTANEOUS;  Surgeon: Shelby Parra MD;  Location: [facility] OR;  Service: Urology;  Laterality: N/A;    REPLACEMENT OF IMPLANTABLE CARDIOVERTER-DEFIBRILLATOR (ICD) GENERATOR N/A 12/13/2019    Procedure: REPLACEMENT, PULSE GENERATOR, ICD-MEDTRONIC;  Surgeon: Sebastian Nowak III, MD;  Location: [facility];  Service: Cardiology;  Laterality: N/A;    TONSILLECTOMY       Family History   Problem Relation Age of Onset    Heart disease Mother     Cancer Father         Lung Cancer ??? Asbestos    Breast cancer Paternal Aunt      Social History     Tobacco Use    Smoking status: Former     Passive exposure: Past    Smokeless tobacco: Never    Tobacco comments:     quit 2013   Substance Use Topics    Alcohol use: No    Drug use: No       Review of Systems:  Review of Systems   Constitutional:  Negative for chills, diaphoresis, fever and malaise/fatigue.   Respiratory:  Positive for  shortness of breath. Negative for cough and sputum production.    Cardiovascular:  Negative for chest pain, palpitations, orthopnea and leg swelling.   Gastrointestinal:  Negative for nausea and vomiting.   Neurological:  Negative for dizziness.   All other systems reviewed and are negative.    OBJECTIVE:     Vital Signs (Most Recent)  Temp: 98.7 °F (37.1 °C) (05/16/23 0341)  Pulse: 110 (05/16/23 0030)  Resp: (!) 31 (05/16/23 0030)  BP: (!) 157/96 (05/16/23 0030)  SpO2: (!) 93 % (05/16/23 0030)    Vital Signs Range (Last 24H):  Temp:  [97.7 °F (36.5 °C)-98.7 °F (37.1 °C)]   Pulse:  []   Resp:  [21-31]   BP: (143-169)/()   SpO2:  [84 %-98 %]     I & O (Last 24H):    Intake/Output Summary (Last 24 hours) at 5/16/2023 0854  Last data filed at 5/16/2023 0342  Gross per 24 hour   Intake 150 ml   Output 525 ml   Net -375 ml       Current Diet:     Current Diet Order   Procedures    Diet diabetic [facility]; 1800 Calorie     Order Specific Question:   Indicate patient location for additional diet options:     Answer:   [facility]     Order Specific Question:   Total calories:     Answer:   1800 Calorie        Allergies:  Review of patient's allergies indicates:   Allergen Reactions    Codeine Other (See Comments)     nausea    Hydrocodone Nausea And Vomiting    Lasix [furosemide]      rash       Meds:  Scheduled Meds:   amiodarone  200 mg Oral Daily    atorvastatin  20 mg Oral QHS    bumetanide  1 mg Intravenous Daily    chlorhexidine  15 mL Mouth/Throat BID    [START ON 5/17/2023] digoxin  0.125 mg Oral Every Mon, Wed, Fri    mupirocin   Nasal BID    rivaroxaban  10 mg Oral Daily with dinner    sacubitriL-valsartan  2 tablet Oral BID     Continuous Infusions:  PRN Meds:acetaminophen, dextrose 50%, dextrose 50%, glucagon (human recombinant), glucose, glucose, insulin aspart U-100, ondansetron, sodium chloride 0.9%    Oxygen/Ventilator Data (Last 24H):  (if applicable)   Oxygen Concentration (%):  [40-50]  40        Hemodynamic Parameters (Last 24H):   (if applicable)        Laboratory and Radiology Data:  Recent Results (from the past 24 hour(s))   CBC auto differential    Collection Time: 05/15/23  8:56 PM   Result Value Ref Range    WBC 9.66 3.90 - 12.70 K/uL    RBC 4.14 4.00 - 5.40 M/uL    Hemoglobin 11.2 (L) 12.0 - 16.0 g/dL    Hematocrit 36.8 (L) 37.0 - 48.5 %    MCV 89 82 - 98 fL    MCH 27.1 27.0 - 31.0 pg    MCHC 30.4 (L) 32.0 - 36.0 g/dL    RDW 15.6 (H) 11.5 - 14.5 %    Platelets 383 150 - 450 K/uL    MPV 9.6 9.2 - 12.9 fL    Immature Granulocytes 0.4 0.0 - 0.5 %    Gran # (ANC) 7.1 1.8 - 7.7 K/uL    Immature Grans (Abs) 0.04 0.00 - 0.04 K/uL    Lymph # 1.4 1.0 - 4.8 K/uL    Mono # 0.9 0.3 - 1.0 K/uL    Eos # 0.1 0.0 - 0.5 K/uL    Baso # 0.05 0.00 - 0.20 K/uL    nRBC 0 0 /100 WBC    Gran % 73.9 (H) 38.0 - 73.0 %    Lymph % 14.7 (L) 18.0 - 48.0 %    Mono % 9.6 4.0 - 15.0 %    Eosinophil % 0.9 0.0 - 8.0 %    Basophil % 0.5 0.0 - 1.9 %    Differential Method Automated    Comprehensive metabolic panel    Collection Time: 05/15/23  8:56 PM   Result Value Ref Range    Sodium 138 136 - 145 mmol/L    Potassium 4.2 3.5 - 5.1 mmol/L    Chloride 100 95 - 110 mmol/L    CO2 25 23 - 29 mmol/L    Glucose 169 (H) 70 - 110 mg/dL    BUN 30 (H) 8 - 23 mg/dL    Creatinine 1.9 (H) 0.5 - 1.4 mg/dL    Calcium 9.5 8.7 - 10.5 mg/dL    Total Protein 7.6 6.0 - 8.4 g/dL    Albumin 3.4 (L) 3.5 - 5.2 g/dL    Total Bilirubin 0.7 0.1 - 1.0 mg/dL    Alkaline Phosphatase 100 55 - 135 U/L    AST 25 10 - 40 U/L    ALT 23 10 - 44 U/L    Anion Gap 13 8 - 16 mmol/L    eGFR 26.2 (A) >60 mL/min/1.73 m^2   Troponin I High Sensitivity    Collection Time: 05/15/23  8:56 PM   Result Value Ref Range    Troponin I High Sensitivity 24.6 (H) 0.0 - 14.9 pg/mL   Brain natriuretic peptide    Collection Time: 05/15/23  8:56 PM   Result Value Ref Range    BNP >4,500 (H) 0 - 99 pg/mL   Protime-INR    Collection Time: 05/15/23  8:56 PM   Result Value Ref Range     Prothrombin Time 11.1 9.0 - 12.5 sec    INR 1.0 0.8 - 1.2   Magnesium    Collection Time: 05/15/23  8:56 PM   Result Value Ref Range    Magnesium 2.0 1.6 - 2.6 mg/dL   Digoxin level    Collection Time: 05/15/23  8:56 PM   Result Value Ref Range    Digoxin Lvl <0.2 (L) 0.8 - 2.0 ng/mL   ISTAT PROCEDURE    Collection Time: 05/15/23  9:02 PM   Result Value Ref Range    POC PH 7.401 7.35 - 7.45    POC PCO2 37.3 35 - 45 mmHg    POC PO2 70 (L) 80 - 100 mmHg    POC HCO3 23.2 (L) 24 - 28 mmol/L    POC BE -2 -2 to 2 mmol/L    POC SATURATED O2 94 (L) 95 - 100 %    POC Glucose 210 (H) 70 - 110 mg/dL    POC Sodium 138 136 - 145 mmol/L    POC Potassium 4.3 3.5 - 5.1 mmol/L    POC TCO2 24 23 - 27 mmol/L    POC Ionized Calcium 1.24 1.06 - 1.42 mmol/L    POC Hematocrit 35 (L) 36 - 54 %PCV    Rate 16     Sample ARTERIAL     Site RR     Allens Test Pass     DelSys CPAP/BiPAP     Mode BiPAP     FiO2 50     Spont Rate 26     Min Vol 15     Sp02 97     IP 12     EP 6    Hemoglobin A1c if not done in past 3 months    Collection Time: 05/16/23  3:13 AM   Result Value Ref Range    Hemoglobin A1C 5.8 4.5 - 6.2 %    Estimated Avg Glucose 120 68 - 131 mg/dL   Basic metabolic panel     Collection Time: 05/16/23  3:13 AM   Result Value Ref Range    Sodium 136 136 - 145 mmol/L    Potassium 3.7 3.5 - 5.1 mmol/L    Chloride 106 95 - 110 mmol/L    CO2 25 23 - 29 mmol/L    Glucose 95 70 - 110 mg/dL    BUN 31 (H) 8 - 23 mg/dL    Creatinine 1.9 (H) 0.5 - 1.4 mg/dL    Calcium 8.3 (L) 8.7 - 10.5 mg/dL    Anion Gap 5 (L) 8 - 16 mmol/L    eGFR 26.2 (A) >60 mL/min/1.73 m^2   CBC Without Differential    Collection Time: 05/16/23  3:13 AM   Result Value Ref Range    WBC 6.58 3.90 - 12.70 K/uL    RBC 3.45 (L) 4.00 - 5.40 M/uL    Hemoglobin 9.4 (L) 12.0 - 16.0 g/dL    Hematocrit 30.0 (L) 37.0 - 48.5 %    MCV 87 82 - 98 fL    MCH 27.2 27.0 - 31.0 pg    MCHC 31.3 (L) 32.0 - 36.0 g/dL    RDW 15.4 (H) 11.5 - 14.5 %    Platelets 291 150 - 450 K/uL    MPV 9.8 9.2 -  12.9 fL   Magnesium    Collection Time: 05/16/23  3:13 AM   Result Value Ref Range    Magnesium 2.1 1.6 - 2.6 mg/dL     Imaging Results              X-Ray Chest AP Portable (Final result)  Result time 05/16/23 06:51:56      Final result by Foster Landis MD (05/16/23 06:51:56)                   Narrative:    CLINICAL HISTORY:  82 years (1940) Female CHF Shortness of Breath    TECHNIQUE:  Portable AP radiograph the chest.    COMPARISON:  Radiograph from February 3, 2023.    FINDINGS:  Moderate patchy consolidative airspace opacity in the right mid to lower lung zone and left hilum. There is blunting of the left costophrenic angle consistent with a trace pleural effusion. No pneumothorax is identified. The heart is mildly enlarged. Left-sided AICD is unchanged in configuration. Atheromatous calcifications are seen at the aortic arch. Osseous structures appear unchanged. The visualized upper abdomen is unremarkable.    IMPRESSION:  Moderate multifocal consolidative airspace opacity in the right greater than left lung suggestive of moderate pulmonary edema, or possibly multifocal pneumonia and less likely ARDS, aspiration or malignancy.                  .            Electronically signed by:  Foster Landis MD  5/16/2023 6:51 AM CDT Workstation: [id number]                                    12-lead EKG interpretation:  (if applicable)      Current Cardiac Rhythm:   (if applicable)    Physical Exam:   Physical Exam  Vitals and nursing note reviewed.   Constitutional:       General: She is not in acute distress.     Appearance: Normal appearance.   Cardiovascular:      Rate and Rhythm: Normal rate and regular rhythm.      Pulses: Normal pulses.      Heart sounds: No murmur heard.  Pulmonary:      Breath sounds: Rales present.   Musculoskeletal:      Right lower leg: Edema present.      Left lower leg: Edema present.   Skin:     General: Skin is warm and dry.      Findings: No rash.   Neurological:       Mental Status: She is alert and oriented to person, place, and time.       ASSESSMENT/PLAN:   Assessment:   Acute on Chronic Systolic Heart Failure - BNP >4500, CXR with pulmonary edema vs pneumonia. On entresto, verquvo,  and torsemide at home. Echo from 11/2022 showed EF 20-25%. HS troponin mildly elevated.   S/p AICD  Atrial Fibrillation - on xarelto, amiodarone, propranolol, and digoxin at home. EKG shows Vpacing.   Chronic Kidney Disease: Cr 1.9.   Hypertension - on entresto and amlodipine, Does have midodrine at home for episodes of hypotension .   Hyperlipidemia  Diabetes Mellitus Type 2  COPD  JENNIFER on CPAP    Plan:   Recommend CT scan chest w/o contrast, there is concern for aspiration.   Continue bumex IV.  Continue entresto.  Continue xarelto, amiodarone, and digoxin.   Daily weights.   Strict I/Os.  Low sodium diet.   Monitor renal function.   Consult Nephrology.   Discussed patient with Dr. Zhu.    Thank you for your consult.

## 2023-05-16 NOTE — ASSESSMENT & PLAN NOTE
Patient with Permanent atrial fibrillation which is controlled currently with Calcium Channel Blocker. Patient is currently in sinus rhythm.[id number] Score: 4. HASBLED Score: . Anticoagulation indicated. Anticoagulation done with xarelto.

## 2023-05-16 NOTE — SUBJECTIVE & OBJECTIVE
Past Medical History:   Diagnosis Date    Allergy     Codeine, Lasix    Atrial fibrillation     Atrial fibrillation     Cataract     CHF (congestive heart failure)     Diabetes mellitus, type 2     Ejection fraction < 50% 10/18/2017    Approximately 35%  Based on prior  Echocardiogram.    Encounter for blood transfusion     Hyperlipidemia     Osteoporosis     PONV (postoperative nausea and vomiting)     Thyroid disorder screening 10/17/2017    TSH of 1.12 ordered by Dr. dee Jones       Past Surgical History:   Procedure Laterality Date    ANTEGRADE NEPHROSTOGRAPHY Left 8/25/2022    Procedure: NEPHROSTOGRAM, ANTEGRADE;  Surgeon: Shelby Parra MD;  Location: [facility];  Service: Urology;  Laterality: Left;    CHOLECYSTECTOMY  1997    [place]     COLONOSCOPY      CYSTOSCOPY W/ URETERAL STENT PLACEMENT Left 7/17/2022    Procedure: CYSTOSCOPY, WITH URETERAL STENT INSERTION;  Surgeon: Shelby Parra MD;  Location: [facility] OR;  Service: Urology;  Laterality: Left;    CYSTOSCOPY W/ URETERAL STENT REMOVAL Left 9/21/2022    Procedure: CYSTOSCOPY, WITH URETERAL STENT REMOVAL;  Surgeon: Shelby Parra MD;  Location: [facility] OR;  Service: Urology;  Laterality: Left;    CYSTOSCOPY WITH URETEROSCOPY, RETROGRADE PYELOGRAPHY, AND INSERTION OF STENT Left 8/25/2022    Procedure: CYSTOSCOPY, WITH RETROGRADE PYELOGRAM AND URETERAL STENT INSERTION;  Surgeon: Shelby Parra MD;  Location: [facility];  Service: Urology;  Laterality: Left;    EYE SURGERY      bilateral cataracts    FINGER SURGERY Right 2021    right pinky finger    FLEXIBLE CYSTOSCOPY Left 8/25/2022    Procedure: CYSTOSCOPY, FLEXIBLE WITH STENT REMOVAL;  Surgeon: Shelby Parra MD;  Location: [facility] OR;  Service: Urology;  Laterality: Left;    FRACTURE SURGERY  2014    right femur with neville    HEMORRHOID SURGERY      48 yrs ago    HYSTERECTOMY      NEPHROSTOMY Left 8/25/2022    Procedure: CREATION, NEPHROSTOMY;  Surgeon: Shelby Parra MD;  Location:  [facility] OR;  Service: Urology;  Laterality: Left;    PERCUTANEOUS NEPHROLITHOTOMY N/A 8/25/2022    Procedure: NEPHROLITHOTOMY, PERCUTANEOUS;  Surgeon: Shelby Parra MD;  Location: [facility] OR;  Service: Urology;  Laterality: N/A;    REPLACEMENT OF IMPLANTABLE CARDIOVERTER-DEFIBRILLATOR (ICD) GENERATOR N/A 12/13/2019    Procedure: REPLACEMENT, PULSE GENERATOR, ICD-MEDTRONIC;  Surgeon: Sebastian Nowak III, MD;  Location: [facility];  Service: Cardiology;  Laterality: N/A;    TONSILLECTOMY         Review of patient's allergies indicates:   Allergen Reactions    Codeine Other (See Comments)     nausea    Hydrocodone Nausea And Vomiting    Lasix [furosemide]      rash       Current Facility-Administered Medications on File Prior to Encounter   Medication    0.9%  NaCl infusion    diphenhydrAMINE injection 25 mg    lorazepam injection 1 mg     Current Outpatient Medications on File Prior to Encounter   Medication Sig    albuterol (PROVENTIL/VENTOLIN HFA) 90 mcg/actuation inhaler INHALE 2 PUFFS BY MOUTH EVERY 6 HOURS AS NEEDED FOR WHEEZING (Patient taking differently: Inhale 2 puffs into the lungs every 6 (six) hours as needed.)    amiodarone (PACERONE) 200 MG Tab Take 200 mg by mouth once daily. 1/2 tab once daily with meals  May take an extra dose for palpatations   Max 4 100mg tabs    amLODIPine (NORVASC) 5 MG tablet Take 5 mg by mouth 2 (two) times daily. Take 1 tablet once daily may take extra dose to keep BP controlled, max 2 tabs/day    atorvastatin (LIPITOR) 20 MG tablet Take 20 mg by mouth every evening.    Ca-D3-mag ox-zinc--thom-bor 600 mg calcium- 20 mcg-50 mg Tab Take 1 tablet by mouth 2 (two) times daily.    cholecalciferol, vitamin D3, 125 mcg (5,000 unit) Tab Take 5,000 Units by mouth 2 (two) times daily.    digoxin (LANOXIN) 125 mcg tablet Take 62.5 mcg by mouth every Mon, Wed, Fri.    ferrous sulfate, dried (SLOW RELEASE IRON) 144 mg (45 mg iron) TbSR Take 1 tablet by mouth once daily. With Vitamin C 250  mg daily    fluticasone propionate (FLONASE) 50 mcg/actuation nasal spray 2 sprays (100 mcg total) by Each Nostril route once daily.    gabapentin (NEURONTIN) 100 MG capsule TAKE 2 CAPSULES(200 MG) BY MOUTH THREE TIMES DAILY (Patient taking differently: Take 200 mg by mouth 3 (three) times daily. TAKE 2 CAPSULES(200 MG) BY MOUTH THREE TIMES DAILY)    glimepiride (AMARYL) 1 MG tablet Take 1 tablet (1 mg total) by mouth before breakfast. (Patient taking differently: Take 1 mg by mouth 2 (two) times a day.)    latanoprost 0.005 % ophthalmic solution Place 1 drop into both eyes nightly.    midodrine (PROAMATINE) 5 MG Tab Take 2.5 mg by mouth as needed. Take 1 tablet by mouth when feeling dizzy from low BP, avoid 3 hours prior to bedtime    propranoloL (INDERAL) 10 MG tablet Take 10 mg by mouth 2 (two) times daily.    sacubitriL-valsartan (ENTRESTO)  mg per tablet Take 1 tablet by mouth 2 (two) times daily.    tamsulosin (FLOMAX) 0.4 mg Cap Take 1 capsule by mouth once daily.    torsemide (DEMADEX) 20 MG Tab Take 1 tablet (20 mg total) by mouth once daily. (Patient taking differently: Take 20 mg by mouth 3 (three) times a week. Take extra for weight gain of 2 lbs over baseline or shortness of breathe or swelling, max 4/day)    XARELTO 10 mg Tab Take 10 mg by mouth once daily.    diltiaZEM (CARDIZEM) 60 MG tablet Take 60 mg by mouth 2 (two) times daily.     Family History       Problem Relation (Age of Onset)    Breast cancer Paternal Aunt    Cancer Father    Heart disease Mother          Tobacco Use    Smoking status: Former     Passive exposure: Past    Smokeless tobacco: Never    Tobacco comments:     quit 2013   Substance and Sexual Activity    Alcohol use: No    Drug use: No    Sexual activity: Not Currently     Review of Systems   Constitutional:  Negative for fever.   Respiratory:  Positive for shortness of breath.    Cardiovascular:  Negative for palpitations.   Gastrointestinal:  Negative for abdominal  pain, nausea and vomiting.   Neurological:  Negative for weakness.   All other systems reviewed and are negative.  Objective:     Vital Signs (Most Recent):  Temp: 97.7 °F (36.5 °C) (05/15/23 2034)  Pulse: 60 (05/15/23 2337)  Resp: (!) 27 (05/15/23 2337)  BP: (!) 143/64 (05/15/23 2231)  SpO2: 98 % (05/15/23 2337) Vital Signs (24h Range):  Temp:  [97.7 °F (36.5 °C)] 97.7 °F (36.5 °C)  Pulse:  [60-66] 60  Resp:  [21-31] 27  SpO2:  [84 %-98 %] 98 %  BP: (143-169)/() 143/64     Weight: 98.4 kg (217 lb)  Body mass index is 32.05 kg/m².     Physical Exam  Vitals and nursing note reviewed.   HENT:      Head: Normocephalic.      Mouth/Throat:      Mouth: Mucous membranes are moist.   Cardiovascular:      Rate and Rhythm: Normal rate.   Pulmonary:      Effort: Respiratory distress present.      Breath sounds: Rales present.   Abdominal:      Palpations: Abdomen is soft.   Musculoskeletal:         General: Normal range of motion.      Cervical back: Normal range of motion.   Neurological:      General: No focal deficit present.      Mental Status: She is alert. Mental status is at baseline.              Significant Labs: All pertinent labs within the past 24 hours have been reviewed.  CBC:   Recent Labs   Lab 05/15/23  2056 05/15/23  2102   WBC 9.66  --    HGB 11.2*  --    HCT 36.8* 35*     --      CMP:   Recent Labs   Lab 05/15/23  2056      K 4.2      CO2 25   *   BUN 30*   CREATININE 1.9*   CALCIUM 9.5   PROT 7.6   ALBUMIN 3.4*   BILITOT 0.7   ALKPHOS 100   AST 25   ALT 23   ANIONGAP 13     Cardiac Markers:   Recent Labs   Lab 05/15/23  2056   BNP >4,500*     Magnesium:   Recent Labs   Lab 05/15/23  2056   MG 2.0     Troponin:   Recent Labs   Lab 05/15/23  2056   TROPONINIHS 24.6*     Urine Studies: No results for input(s): COLORU, APPEARANCEUA, PHUR, SPECGRAV, PROTEINUA, GLUCUA, KETONESU, BILIRUBINUA, OCCULTUA, NITRITE, UROBILINOGEN, LEUKOCYTESUR, RBCUA, WBCUA, BACTERIA, SQUAMEPITHEL,  HYALINECASTS in the last 48 hours.    Invalid input(s): GRACY    Significant Imaging: I have reviewed all pertinent imaging results/findings within the past 24 hours.  Imaging Results              X-Ray Chest AP Portable (In process)

## 2023-05-17 PROBLEM — J96.21 ACUTE ON CHRONIC RESPIRATORY FAILURE WITH HYPOXIA: Status: ACTIVE | Noted: 2020-04-03

## 2023-05-17 LAB
ANION GAP SERPL CALC-SCNC: 6 MMOL/L (ref 8–16)
BUN SERPL-MCNC: 28 MG/DL (ref 8–23)
CALCIUM SERPL-MCNC: 8.7 MG/DL (ref 8.7–10.5)
CHLORIDE SERPL-SCNC: 105 MMOL/L (ref 95–110)
CO2 SERPL-SCNC: 29 MMOL/L (ref 23–29)
CREAT SERPL-MCNC: 1.8 MG/DL (ref 0.5–1.4)
ERYTHROCYTE [DISTWIDTH] IN BLOOD BY AUTOMATED COUNT: 15.5 % (ref 11.5–14.5)
EST. GFR  (NO RACE VARIABLE): 28 ML/MIN/1.73 M^2
GLUCOSE SERPL-MCNC: 106 MG/DL (ref 70–110)
GLUCOSE SERPL-MCNC: 119 MG/DL (ref 70–110)
GLUCOSE SERPL-MCNC: 88 MG/DL (ref 70–110)
HCT VFR BLD AUTO: 35.2 % (ref 37–48.5)
HGB BLD-MCNC: 10.6 G/DL (ref 12–16)
MCH RBC QN AUTO: 26.4 PG (ref 27–31)
MCHC RBC AUTO-ENTMCNC: 30.1 G/DL (ref 32–36)
MCV RBC AUTO: 88 FL (ref 82–98)
PLATELET # BLD AUTO: 316 K/UL (ref 150–450)
PMV BLD AUTO: 10.3 FL (ref 9.2–12.9)
POTASSIUM SERPL-SCNC: 3.8 MMOL/L (ref 3.5–5.1)
RBC # BLD AUTO: 4.01 M/UL (ref 4–5.4)
SODIUM SERPL-SCNC: 140 MMOL/L (ref 136–145)
WBC # BLD AUTO: 7.51 K/UL (ref 3.9–12.7)

## 2023-05-17 PROCEDURE — 85027 COMPLETE CBC AUTOMATED: CPT | Performed by: STUDENT IN AN ORGANIZED HEALTH CARE EDUCATION/TRAINING PROGRAM

## 2023-05-17 PROCEDURE — 25000003 PHARM REV CODE 250

## 2023-05-17 PROCEDURE — 25000003 PHARM REV CODE 250: Performed by: INTERNAL MEDICINE

## 2023-05-17 PROCEDURE — 25000003 PHARM REV CODE 250: Performed by: HOSPITALIST

## 2023-05-17 PROCEDURE — 80048 BASIC METABOLIC PNL TOTAL CA: CPT | Performed by: STUDENT IN AN ORGANIZED HEALTH CARE EDUCATION/TRAINING PROGRAM

## 2023-05-17 PROCEDURE — 99900031 HC PATIENT EDUCATION (STAT)

## 2023-05-17 PROCEDURE — 21000000 HC CCU ICU ROOM CHARGE

## 2023-05-17 PROCEDURE — 94761 N-INVAS EAR/PLS OXIMETRY MLT: CPT

## 2023-05-17 PROCEDURE — 97116 GAIT TRAINING THERAPY: CPT

## 2023-05-17 PROCEDURE — 99900035 HC TECH TIME PER 15 MIN (STAT)

## 2023-05-17 PROCEDURE — 25000003 PHARM REV CODE 250: Performed by: STUDENT IN AN ORGANIZED HEALTH CARE EDUCATION/TRAINING PROGRAM

## 2023-05-17 PROCEDURE — 97161 PT EVAL LOW COMPLEX 20 MIN: CPT

## 2023-05-17 PROCEDURE — 27000221 HC OXYGEN, UP TO 24 HOURS

## 2023-05-17 PROCEDURE — 63600175 PHARM REV CODE 636 W HCPCS

## 2023-05-17 PROCEDURE — 94660 CPAP INITIATION&MGMT: CPT

## 2023-05-17 PROCEDURE — 36415 COLL VENOUS BLD VENIPUNCTURE: CPT | Performed by: STUDENT IN AN ORGANIZED HEALTH CARE EDUCATION/TRAINING PROGRAM

## 2023-05-17 RX ORDER — DOBUTAMINE HYDROCHLORIDE 400 MG/100ML
2.5 INJECTION INTRAVENOUS CONTINUOUS
Status: DISCONTINUED | OUTPATIENT
Start: 2023-05-17 | End: 2023-05-18

## 2023-05-17 RX ADMIN — SACUBITRIL AND VALSARTAN 2 TABLET: 49; 51 TABLET, FILM COATED ORAL at 08:05

## 2023-05-17 RX ADMIN — MUPIROCIN 1 G: 20 OINTMENT TOPICAL at 08:05

## 2023-05-17 RX ADMIN — RIVAROXABAN 10 MG: 10 TABLET, FILM COATED ORAL at 04:05

## 2023-05-17 RX ADMIN — AMIODARONE HYDROCHLORIDE 200 MG: 200 TABLET ORAL at 08:05

## 2023-05-17 RX ADMIN — ATORVASTATIN CALCIUM 20 MG: 20 TABLET, FILM COATED ORAL at 08:05

## 2023-05-17 RX ADMIN — BUMETANIDE 1 MG: 0.25 INJECTION INTRAMUSCULAR; INTRAVENOUS at 08:05

## 2023-05-17 RX ADMIN — CHLORHEXIDINE GLUCONATE 15 ML: 1.2 RINSE ORAL at 09:05

## 2023-05-17 RX ADMIN — CHLORHEXIDINE GLUCONATE 15 ML: 1.2 RINSE ORAL at 08:05

## 2023-05-17 RX ADMIN — METOPROLOL SUCCINATE 12.5 MG: 25 TABLET, EXTENDED RELEASE ORAL at 01:05

## 2023-05-17 RX ADMIN — DOBUTAMINE HYDROCHLORIDE 2.5 MCG/KG/MIN: 400 INJECTION INTRAVENOUS at 11:05

## 2023-05-17 RX ADMIN — MUPIROCIN 1 G: 20 OINTMENT TOPICAL at 09:05

## 2023-05-17 RX ADMIN — ACETAMINOPHEN 650 MG: 325 TABLET ORAL at 12:05

## 2023-05-17 NOTE — PROCEDURES
Procedure Location:room [facility]  Education/need:midline  Prep:chloraprep  Supplies:   Brand:[employer]   Gauge/ Length:20ga/8cm   Lot #:[id number]  Extremity:left upper   Vessel:basilic  Attempts:1  Inserted by:Mckinley Mitchell RN  Date/time placed:05/17/2023/1138  Tolerated:well  Dressing applied: CHG occlussive drsg applied

## 2023-05-17 NOTE — PROGRESS NOTES
[facility] Medicine  Progress Note    Patient name: Jo Alegre  MRN: [id number]  Admit Date: 5/15/2023   LOS: 2 days     SUBJECTIVE:     Principal problem: Acute on chronic combined systolic and diastolic congestive heart failure    Interval History:  No acute overnight events reported.  Shortness of breath is slightly improved.  She denies chest pain.  No lower extremity edema reported.  She is eager to go home.    Summary:  82-year-old  female with multiple medical comorbidities including but not limited to chronic combined CHF status post AICD, paroxysmal atrial fibrillation, chronic hypoxic respiratory failure on p.r.n. oxygen of 4 L, type 2 DM in remission, essential hypertension and CKD stage 3b admitted for CHF exacerbation.    Found to be hypoxic to 84% on room air.  Treated with BiPAP for hypoxic respiratory failure from CHF exacerbation.  Seen by Cardiology.  Received IV diuretics moderate response.  Initiated on dobutamine infusion.    Scheduled Meds:   amiodarone  200 mg Oral Daily    atorvastatin  20 mg Oral QHS    bumetanide  1 mg Intravenous Daily    chlorhexidine  15 mL Mouth/Throat BID    mupirocin   Nasal BID    rivaroxaban  10 mg Oral Daily with dinner     Continuous Infusions:   DOBUTamine IV infusion (non-titrating) 2.5 mcg/kg/min (05/17/23 1158)     PRN Meds:acetaminophen, dextrose 50%, dextrose 50%, diphenhydrAMINE, glucagon (human recombinant), glucose, glucose, insulin aspart U-100, ondansetron, sodium chloride 0.9%    Review of patient's allergies indicates:   Allergen Reactions    Codeine Other (See Comments)     nausea    Hydrocodone Nausea And Vomiting    Lasix [furosemide]      rash       Review of Systems: As per interval history    OBJECTIVE:     Vital Signs (Most Recent)  Temp: 97.9 °F (36.6 °C) (05/17/23 0745)  Pulse: 104 (05/17/23 1255)  Resp: 20 (05/17/23 1255)  BP: 121/75 (05/17/23 1255)  SpO2: 99 % (05/17/23 1255)    Vital Signs Range (Last  24H):  Temp:  [97.9 °F (36.6 °C)-99.5 °F (37.5 °C)]   Pulse:  [5-104]   Resp:  [18-35]   BP: (121-170)/(60-75)   SpO2:  [75 %-100 %]     I & O (Last 24H):  Intake/Output Summary (Last 24 hours) at 5/17/2023 1323  Last data filed at 5/17/2023 0515  Gross per 24 hour   Intake 440 ml   Output 1100 ml   Net -660 ml       Physical Exam:  General: Patient resting comfortably in no acute distress. Appears as stated age. Calm  Eyes: No conjunctival injection. No scleral icterus.  ENT: Hearing grossly intact. No discharge from ears. No nasal discharge.   Neck: Supple, trachea midline. JVD +  CVS: RRR. No LE edema BL  Lungs:  No tachypnea or accessory muscle use.  Diminished breath sounds at the bases  Abdomen:  Soft, nontender and nondistended.  No organomegaly  Neuro: AOx3. Moves all extremities. Follows commands. Responds appropriately   Skin:  No rash or erythema noted    Laboratory:  I have reviewed all pertinent lab results within the past 24 hours.  CBC:   Recent Labs   Lab 05/17/23 0315   WBC 7.51   RBC 4.01   HGB 10.6*   HCT 35.2*      MCV 88   MCH 26.4*   MCHC 30.1*     CMP:   Recent Labs   Lab 05/15/23  2056 05/16/23  0313 05/17/23  0315   *   < > 88   CALCIUM 9.5   < > 8.7   ALBUMIN 3.4*  --   --    PROT 7.6  --   --       < > 140   K 4.2   < > 3.8   CO2 25   < > 29      < > 105   BUN 30*   < > 28*   CREATININE 1.9*   < > 1.8*   ALKPHOS 100  --   --    ALT 23  --   --    AST 25  --   --    BILITOT 0.7  --   --     < > = values in this interval not displayed.       Diagnostic Results:  Labs: Reviewed    ASSESSMENT/PLAN:       Active Hospital Problems    Diagnosis  POA    *Acute on chronic combined systolic and diastolic congestive heart failure [I50.43]  Yes    MARCELINO (acute kidney injury) [N17.9]  Yes    Acute on chronic respiratory failure with hypoxia [J96.21]  Yes    Paroxysmal atrial fibrillation [I48.0]  Yes     Patient follows up with [facility].  Currently on a  combination of diltiazem, digoxin, Xarelto and amiodarone.  Dr. Jones/Dr. Lopez        Essential hypertension [I10]  Yes    Type 2 diabetes mellitus with diabetic nephropathy, without long-term current use of insulin [E11.21]  Yes    Mixed dyslipidemia [E78.2]  Yes    Cardiomyopathy with implantable cardioverter-defibrillator [I42.9, Z95.810]  Yes     Summary    Mild eccentric left ventricular hypertrophy.  Mild left ventricular enlargement.  Normal left ventricular systolic function. The estimated ejection fraction is 60%.  Grade I (mild) left ventricular diastolic dysfunction consistent with impaired relaxation.  No wall motion abnormalities.  Normal right ventricular systolic function.  Severe left atrial enlargement.  Mild mitral sclerosis.  There is mild leaflet calcification of the Mitral Valve.  Mild mitral regurgitation.  Intermediate central venous pressure (8 mmHg).  The estimated PA systolic pressure is 37 mmHg.     Echocardiogram done on 04/02/2020.        Resolved Hospital Problems   No resolved problems to display.         Plan:   Acute on chronic hypoxic respiratory failure   Acute on chronic combined CHF   Continue supplemental oxygen to target SpO2 greater than 92%   BiPAP q.h.s. and p.r.n.  Continue IV bumetanide (on torsemide 20 mg once daily at home)  Initiated on dobutamine infusion as per cardiology recommendations  Strict input output charting, daily weights and low-sodium diet   Appreciate cardiology input.  Follow echocardiogram  Continue sacubitril-valsartan.  Start low-dose beta-blocker    MARCELINO on CKD stage 3b - ?Cardiorenal   Monitor renal function with daily labs while on diuretics   Avoid nephrotoxic agents   Dose medications for GFR    Paroxysmal atrial fibrillation   Subtherapeutic digoxin on admission   Continue amiodarone and digoxin  Continue rivaroxaban for anticoagulation    Chronic medical conditions:   Type 2 DM in remission:  HGB A1c less than 6%.  Continue low-dose insulin  sliding scale   Hyperlipidemia: Continue atorvastatin       VTE Risk Mitigation (From admission, onward)           Ordered     rivaroxaban tablet 10 mg  With dinner         05/16/23 0039     IP VTE HIGH RISK PATIENT  Once         05/16/23 0039     Place sequential compression device  Until discontinued         05/16/23 0039                        Department Hospital Medicine  [facility]  Jourdan Brown MD  Date of service: 05/17/2023

## 2023-05-17 NOTE — RESPIRATORY THERAPY
05/16/23 2208   Patient Assessment/Suction   Level of Consciousness (AVPU) alert   Respiratory Effort Normal;Unlabored   Expansion/Accessory Muscles/Retractions no retractions;no use of accessory muscles   Rhythm/Pattern, Respiratory no shortness of breath reported;depth regular;pattern regular;unlabored   Skin Integrity   $ Wound Care Tech Time 15 min   Area Observed Bridge of nose;Upper lip;Lower lip;Forehead;Chin   Skin Appearance without discoloration   Barrier used? Gel Cushion   PRE-TX-O2   Device (Oxygen Therapy) BIPAP   $ Is the patient on Low Flow Oxygen? Yes   Oxygen Concentration (%) 40   SpO2 (!) 94 %   Pulse Oximetry Type Continuous   $ Pulse Oximetry - Multiple Charge Pulse Oximetry - Multiple   Pulse 61   Resp (!) 29   Ready to Wean/Extubation Screen   FIO2<=50 (chart decimal) 0.4   Preset CPAP/BiPAP Settings   Mode Of Delivery BiPAP S/T   $ CPAP/BiPAP Daily Charge BiPAP/CPAP Daily   $ Is patient using? Yes   Size of Mask Medium/Large   Sized Appropriately? Yes   Equipment Type V60   Airway Device Type medium full face mask   Humidifier not applicable   Ipap 16   EPAP (cm H2O) 6   Pressure Support (cm H2O) 10   Set Rate (Breaths/Min) 16   ITime (sec) 1   Rise Time (sec) 3   Patient CPAP/BiPAP Settings   CPAP/BIPAP ID [id number]  (sn)   RR Total (Breaths/Min) 26   Tidal Volume (mL) 592   VE Minute Ventilation (L/min) 15.1 L/min   Peak Inspiratory Pressure (cm H2O) 12   TiTOT (%) 34   Total Leak (L/Min) 4   Patient Trigger - ST Mode Only (%) 85   CPAP/BiPAP Alarms   High Pressure (cm H2O) 25   Low Pressure (cm H2O) 5   Minute Ventilation (L/Min) 3   High RR (breaths/min) 45   Low RR (breaths/min) 8   Apnea (Sec) 20

## 2023-05-17 NOTE — PROGRESS NOTES
[facility]   Cardiology Note    Consult Requested By: hospital medicine  Reason for Consult: CHF exacerbation    SUBJECTIVE:     History of Present Illness: Pt is a 83 yo female with PMHx of Afib on xarelto, HFrEF s/p AICD, HTN, DM, HLP, JENNIFER, and COPD who presented to the ED by EMS with respiratory distress. Reports symptoms came on acutely last night. She experiencing mildly worsening  SOB yesterday morning and took an extra torsemide at that time. She did eat seafood over the weekend. She does utilize home O2 PRN. ED workup showed patient was afebrile, /72. BNP >4500. High sensitivity troponin 24.6. H&H 11.2/36.8 now 9.4/30. Cr 1.9. CXR showed pulmonary edema. EKG showed Vpacing. Last echo from 11/2022 showed EF 20-25%.     The pt is having some improvement in SOB at rest but has not been out of bed very much. The pt  denies CP or palpitations. H/H 10.6/35.2 cr 1.8 u/o net -1180 cc. VSS --slightly elevated SBP this am. SR on tele, Vpaced 60's   Review of patient's allergies indicates:   Allergen Reactions    Codeine Other (See Comments)     nausea    Hydrocodone Nausea And Vomiting    Lasix [furosemide]      rash       Past Medical History:   Diagnosis Date    Allergy     Codeine, Lasix    Atrial fibrillation     Atrial fibrillation     Cataract     CHF (congestive heart failure)     Diabetes mellitus, type 2     Ejection fraction < 50% 10/18/2017    Approximately 35%  Based on prior  Echocardiogram.    Encounter for blood transfusion     Hyperlipidemia     Osteoporosis     PONV (postoperative nausea and vomiting)     Thyroid disorder screening 10/17/2017    TSH of 1.12 ordered by Dr. dee Jones     Past Surgical History:   Procedure Laterality Date    ANTEGRADE NEPHROSTOGRAPHY Left 8/25/2022    Procedure: NEPHROSTOGRAM, ANTEGRADE;  Surgeon: Shelby Parra MD;  Location: [facility];  Service: Urology;  Laterality: Left;    CHOLECYSTECTOMY  1997    [place]     COLONOSCOPY      CYSTOSCOPY W/  URETERAL STENT PLACEMENT Left 7/17/2022    Procedure: CYSTOSCOPY, WITH URETERAL STENT INSERTION;  Surgeon: Shelby Parra MD;  Location: [facility] OR;  Service: Urology;  Laterality: Left;    CYSTOSCOPY W/ URETERAL STENT REMOVAL Left 9/21/2022    Procedure: CYSTOSCOPY, WITH URETERAL STENT REMOVAL;  Surgeon: Shelby Parra MD;  [facility]: [facility];  Service: Urology;  Laterality: Left;    CYSTOSCOPY WITH URETEROSCOPY, RETROGRADE PYELOGRAPHY, AND INSERTION OF STENT Left 8/25/2022    Procedure: CYSTOSCOPY, WITH RETROGRADE PYELOGRAM AND URETERAL STENT INSERTION;  Surgeon: Shelby Parra MD;  Location: [facility] OR;  Service: Urology;  Laterality: Left;    EYE SURGERY      bilateral cataracts    FINGER SURGERY Right 2021    right pinky finger    FLEXIBLE CYSTOSCOPY Left 8/25/2022    Procedure: CYSTOSCOPY, FLEXIBLE WITH STENT REMOVAL;  Surgeon: Shelby Parra MD;  Location: [facility] OR;  Service: Urology;  Laterality: Left;    FRACTURE SURGERY  2014    right femur with neville    HEMORRHOID SURGERY      48 yrs ago    HYSTERECTOMY      NEPHROSTOMY Left 8/25/2022    Procedure: CREATION, NEPHROSTOMY;  Surgeon: Shelby Parra MD;  Location: [facility] OR;  Service: Urology;  Laterality: Left;    PERCUTANEOUS NEPHROLITHOTOMY N/A 8/25/2022    Procedure: NEPHROLITHOTOMY, PERCUTANEOUS;  Surgeon: Shelby Parra MD;  Location: [facility] OR;  Service: Urology;  Laterality: N/A;    REPLACEMENT OF IMPLANTABLE CARDIOVERTER-DEFIBRILLATOR (ICD) GENERATOR N/A 12/13/2019    Procedure: REPLACEMENT, PULSE GENERATOR, ICD-MEDTRONIC;  Surgeon: Sebastian Nowak III, MD;  Location: [facility] CATH;  Service: Cardiology;  Laterality: N/A;    TONSILLECTOMY       Family History   Problem Relation Age of Onset    Heart disease Mother     Cancer Father         Lung Cancer ??? Asbestos    Breast cancer Paternal Aunt      Social History     Tobacco Use    Smoking status: Former     Passive exposure: Past    Smokeless tobacco: Never    Tobacco comments:     quit  2013   Substance Use Topics    Alcohol use: No    Drug use: No       Review of Systems:  Review of Systems   Constitutional:  Negative for chills, diaphoresis, fever and malaise/fatigue.   Respiratory:  Positive for shortness of breath. Negative for cough and sputum production.    Cardiovascular:  Negative for chest pain, palpitations, orthopnea and leg swelling.   Gastrointestinal:  Negative for nausea and vomiting.   Neurological:  Negative for dizziness.   All other systems reviewed and are negative.    OBJECTIVE:     Vital Signs (Most Recent)  Temp: 97.9 °F (36.6 °C) (05/17/23 0745)  Pulse: 66 (05/17/23 0745)  Resp: (!) 31 (05/17/23 0745)  BP: (!) 170/73 (05/17/23 0745)  SpO2: (!) 75 % (05/17/23 0745)    Vital Signs Range (Last 24H):  Temp:  [97.9 °F (36.6 °C)-99.5 °F (37.5 °C)]   Pulse:  [5-66]   Resp:  [18-35]   BP: (133-170)/(60-80)   SpO2:  [75 %-100 %]     I & O (Last 24H):    Intake/Output Summary (Last 24 hours) at 5/17/2023 0848  Last data filed at 5/17/2023 0515  Gross per 24 hour   Intake 920 ml   Output 2100 ml   Net -1180 ml         Current Diet:     Current Diet Order   Procedures    Diet diabetic [facility]; 1800 Calorie     Order Specific Question:   Indicate patient location for additional diet options:     Answer:   [facility]     Order Specific Question:   Total calories:     Answer:   1800 Calorie        Allergies:  Review of patient's allergies indicates:   Allergen Reactions    Codeine Other (See Comments)     nausea    Hydrocodone Nausea And Vomiting    Lasix [furosemide]      rash       Meds:  Scheduled Meds:   amiodarone  200 mg Oral Daily    atorvastatin  20 mg Oral QHS    bumetanide  1 mg Intravenous Daily    chlorhexidine  15 mL Mouth/Throat BID    mupirocin   Nasal BID    rivaroxaban  10 mg Oral Daily with dinner     Continuous Infusions:   DOBUTamine IV infusion (non-titrating)       PRN Meds:acetaminophen, dextrose 50%, dextrose 50%, diphenhydrAMINE, glucagon (human recombinant), glucose,  glucose, insulin aspart U-100, ondansetron, sodium chloride 0.9%    Oxygen/Ventilator Data (Last 24H):  (if applicable)   Oxygen Concentration (%):  [36-40] 40        Hemodynamic Parameters (Last 24H):   (if applicable)        Laboratory and Radiology Data:  Recent Results (from the past 24 hour(s))   Echo    Collection Time: 05/16/23  9:17 AM   Result Value Ref Range    BSA 2.18 m2    TDI SEPTAL 0.05 m/s    LV LATERAL E/E' RATIO 16.80 m/s    LV SEPTAL E/E' RATIO 16.80 m/s    IVC diameter 3.00 cm    Left Ventricular Outflow Tract Mean Velocity 0.62 cm/s    Left Ventricular Outflow Tract Mean Gradient 2.00 mmHg    Pulmonary Valve Mean Velocity 0.58 m/s    AORTIC VALVE CUSP SEPERATION 2.00 cm    TDI LATERAL 0.05 m/s    PV PEAK VELOCITY 0.87 cm/s    LVIDd 6.05 (A) 3.5 - 6.0 cm    IVS 1.28 (A) 0.6 - 1.1 cm    Posterior Wall 1.06 0.6 - 1.1 cm    Ao root annulus 3.20 cm    LVIDs 4.81 (A) 2.1 - 4.0 cm    FS 20 28 - 44 %    LV mass 307.73 g    LA size 5.40 cm    TAPSE 1.07 cm    RV S' 0.01 cm/s    Left Ventricle Relative Wall Thickness 0.35 cm    AV mean gradient 3 mmHg    AV valve area 3.06 cm2    AV Velocity Ratio 0.95     AV index (prosthetic) 0.98     MV mean gradient 2 mmHg    MV valve area p 1/2 method 2.12 cm2    MV valve area by continuity eq 1.84 cm2    E/A ratio 1.42     Mean e' 0.05 m/s    E wave deceleration time 208.00 msec    LVOT diameter 2.00 cm    LVOT area 3.1 cm2    LVOT peak nathan 1.03 m/s    LVOT peak VTI 19.90 cm    Ao peak nathan 1.08 m/s    Ao VTI 20.4 cm    Vn Nyquist 0.31 m/s    Radius 0.50 cm    Mr max nathan 5.24 m/s    LVOT stroke volume 62.49 cm3    AV peak gradient 5 mmHg    MV peak gradient 4 mmHg    E/E' ratio 16.80 m/s    Vn Nyquist MS 0.31 m/s    MV Peak E Nathan 0.84 m/s    MR JENNIFER BROOKS 0.09 cm2    TR Max Nathan 2.74 m/s    MV VTI 34.0 cm    MV stenosis pressure 1/2 time 104.00 ms    MV Peak A Nathan 0.59 m/s    LV Systolic Volume 108.00 mL    LV Systolic Volume Index 50.7 mL/m2    LV Diastolic Volume  183.00 mL    LV Diastolic Volume Index 85.92 mL/m2    LV Mass Index 144 g/m2    Triscuspid Valve Regurgitation Peak Gradient 30 mmHg    King's Biplane MOD Ejection Fraction 4 %   POCT glucose    Collection Time: 05/16/23  9:12 PM   Result Value Ref Range    POC Glucose 157 (H) 70 - 110   Basic metabolic panel     Collection Time: 05/17/23  3:15 AM   Result Value Ref Range    Sodium 140 136 - 145 mmol/L    Potassium 3.8 3.5 - 5.1 mmol/L    Chloride 105 95 - 110 mmol/L    CO2 29 23 - 29 mmol/L    Glucose 88 70 - 110 mg/dL    BUN 28 (H) 8 - 23 mg/dL    Creatinine 1.8 (H) 0.5 - 1.4 mg/dL    Calcium 8.7 8.7 - 10.5 mg/dL    Anion Gap 6 (L) 8 - 16 mmol/L    eGFR 28.0 (A) >60 mL/min/1.73 m^2   CBC Without Differential    Collection Time: 05/17/23  3:15 AM   Result Value Ref Range    WBC 7.51 3.90 - 12.70 K/uL    RBC 4.01 4.00 - 5.40 M/uL    Hemoglobin 10.6 (L) 12.0 - 16.0 g/dL    Hematocrit 35.2 (L) 37.0 - 48.5 %    MCV 88 82 - 98 fL    MCH 26.4 (L) 27.0 - 31.0 pg    MCHC 30.1 (L) 32.0 - 36.0 g/dL    RDW 15.5 (H) 11.5 - 14.5 %    Platelets 316 150 - 450 K/uL    MPV 10.3 9.2 - 12.9 fL     Imaging Results              X-Ray Chest AP Portable (Final result)  Result time 05/16/23 06:51:56      Final result by Foster Landis MD (05/16/23 06:51:56)                   Narrative:    CLINICAL HISTORY:  82 years (1940) Female CHF Shortness of Breath    TECHNIQUE:  Portable AP radiograph the chest.    COMPARISON:  Radiograph from February 3, 2023.    FINDINGS:  Moderate patchy consolidative airspace opacity in the right mid to lower lung zone and left hilum. There is blunting of the left costophrenic angle consistent with a trace pleural effusion. No pneumothorax is identified. The heart is mildly enlarged. Left-sided AICD is unchanged in configuration. Atheromatous calcifications are seen at the aortic arch. Osseous structures appear unchanged. The visualized upper abdomen is unremarkable.    IMPRESSION:  Moderate  multifocal consolidative airspace opacity in the right greater than left lung suggestive of moderate pulmonary edema, or possibly multifocal pneumonia and less likely ARDS, aspiration or malignancy.                  .            Electronically signed by:  Foster Landis MD  5/16/2023 6:51 AM CDT Workstation: [id number]                                    12-lead EKG interpretation:  (if applicable)      Current Cardiac Rhythm:   (if applicable)    Physical Exam:   Physical Exam  Vitals and nursing note reviewed.   Constitutional:       General: She is not in acute distress.     Appearance: Normal appearance.   Cardiovascular:      Rate and Rhythm: Normal rate and regular rhythm.      Pulses: Normal pulses.      Heart sounds: No murmur heard.  Pulmonary:      Breath sounds: Rales present.   Musculoskeletal:      Right lower leg: Edema present.      Left lower leg: Edema present.   Skin:     General: Skin is warm and dry.      Findings: No rash.   Neurological:      Mental Status: She is alert and oriented to person, place, and time.       ASSESSMENT/PLAN:   Assessment:   Acute on Chronic Systolic Heart Failure - BNP >4500, CXR with pulmonary edema vs pneumonia. On entresto, verquvo,  and torsemide at home. Echo from 11/2022 showed EF 20-25%. HS troponin mildly elevated.   S/p AICD  Atrial Fibrillation - on xarelto, amiodarone, propranolol, and digoxin at home. EKG shows Vpacing.   Chronic Kidney Disease: Cr 1.9.   Hypertension - on entresto and amlodipine, Does have midodrine at home for episodes of hypotension .   Hyperlipidemia  Diabetes Mellitus Type 2  COPD  JENNIFER on CPAP    Plan:   Hold entresto for now due to cr 1.8  Pt slightly improved somewhat clinically but needs further diuresis  EF previously 20-25% repeat echo pending, will add dobutamine   Repeat BNP and CXR in am  -hold digoxin  continue bumex   Ambulate with PT --pt  not SOB at rest   Daily weights.   Strict I/Os.  Low sodium diet.   Monitor renal  function and electrolytes  ? Consult Nephrology.

## 2023-05-17 NOTE — PT/OT/SLP EVAL
Physical Therapy Evaluation    Patient Name:  Jo Alegre   MRN:  [id number]    Recommendations:     Discharge Recommendations: home health PT   Discharge Equipment Recommendations: none   Barriers to discharge:  medical status    Assessment:     Jo Alegre is a 82 y.o. female admitted with a medical diagnosis of Acute on chronic combined systolic and diastolic congestive heart failure.  She presents with the following impairments/functional limitations: weakness, impaired endurance, impaired self care skills, impaired functional mobility, gait instability, impaired balance, decreased lower extremity function, decreased safety awareness, pain, impaired cardiopulmonary response to activity.    Pt found in bed with HOB elevated on 4L O2 via NC. No shortness of breath throughout session. Difficult to obtain SAO2 RN notified.    Rehab Prognosis: Fair; patient would benefit from acute skilled PT services to address these deficits and reach maximum level of function.    Recent Surgery: * No surgery found *      Plan:     During this hospitalization, patient to be seen 5 x/week to address the identified rehab impairments via gait training, therapeutic activities, therapeutic exercises, neuromuscular re-education and progress toward the following goals:    Plan of Care Expires:  06/17/23    Subjective     Chief Complaint: frustrated that she needs to stay in the hospital long  Patient/Family Comments/goals: go home  Pain/Comfort:  Pain Rating 1: 0/10    Patients cultural, spiritual, Rastafarian conflicts given the current situation: no    Living Environment:  Pt lives with her grandson in a one story home with no TYRELL. 4L O2 via NC at home  Prior to admission, patients level of function was MI rollator.  Equipment used at home: rollator, oxygen.  DME owned (not currently used): none.  Upon discharge, patient will have assistance from grandson.    Objective:     Communicated with RN prior to session.  Patient found  HOB elevated with peripheral IV, telemetry, blood pressure cuff, pulse ox (continuous)  upon PT entry to room.    General Precautions: Standard, fall  Orthopedic Precautions:N/A   Braces: N/A  Respiratory Status: Nasal cannula, flow 4 L/min    Exams:  Cognitive Exam:  Patient is oriented to Person, Place, Time, and Situation  RLE ROM: WFL  RLE Strength: WFL  LLE ROM: WFL  LLE Strength: WFL    Functional Mobility:  Bed Mobility:     Supine to Sit: supervision  Sit to Supine: supervision  Transfers:     Sit to Stand:  contact guard assistance with rolling walker  Gait: 40 ft with  RW and CGA no shortness of breath      AM-PAC 6 CLICK MOBILITY  Total Score:18       Treatment & Education:  Pt educated on POC, discharge recommendation, importance of time OOB, pursed lip breathing, need for assist with mobility, use of call bell to seek assistance as needed and fall prevention      Patient left sitting edge of bed with all lines intact, call button in reach, RN notified, and family member present.    GOALS:   Multidisciplinary Problems       Physical Therapy Goals          Problem: Physical Therapy    Goal Priority Disciplines Outcome Goal Variances Interventions   Physical Therapy Goal     PT, PT/OT Ongoing, Progressing     Description: Goals to be met by: 23     Patient will increase functional independence with mobility by performin. Supine to sit with Supervision  2. Sit to stand transfer with Supervision  3. Bed to chair transfer with Supervision using Rolling Walker  4. Gait  x 150 feet with Supervision using Rolling Walker.                              History:     Past Medical History:   Diagnosis Date    Allergy     Codeine, Lasix    Atrial fibrillation     Atrial fibrillation     Cataract     CHF (congestive heart failure)     Diabetes mellitus, type 2     Ejection fraction < 50% 10/18/2017    Approximately 35%  Based on prior  Echocardiogram.    Encounter for blood transfusion     Hyperlipidemia      Osteoporosis     PONV (postoperative nausea and vomiting)     Thyroid disorder screening 10/17/2017    TSH of 1.12 ordered by Dr. dee Jones       Past Surgical History:   Procedure Laterality Date    ANTEGRADE NEPHROSTOGRAPHY Left 8/25/2022    Procedure: NEPHROSTOGRAM, ANTEGRADE;  Surgeon: Shelby Parra MD;  Location: [facility];  Service: Urology;  Laterality: Left;    CHOLECYSTECTOMY  1997    [place]     COLONOSCOPY      CYSTOSCOPY W/ URETERAL STENT PLACEMENT Left 7/17/2022    Procedure: CYSTOSCOPY, WITH URETERAL STENT INSERTION;  Surgeon: Shelby Parra MD;  Location: [facility] OR;  Service: Urology;  Laterality: Left;    CYSTOSCOPY W/ URETERAL STENT REMOVAL Left 9/21/2022    Procedure: CYSTOSCOPY, WITH URETERAL STENT REMOVAL;  Surgeon: Shelby Parra MD;  Location: [facility];  Service: Urology;  Laterality: Left;    CYSTOSCOPY WITH URETEROSCOPY, RETROGRADE PYELOGRAPHY, AND INSERTION OF STENT Left 8/25/2022    Procedure: CYSTOSCOPY, WITH RETROGRADE PYELOGRAM AND URETERAL STENT INSERTION;  Surgeon: Shelby Parra MD;  Location: [facility];  Service: Urology;  Laterality: Left;    EYE SURGERY      bilateral cataracts    FINGER SURGERY Right 2021    right pinky finger    FLEXIBLE CYSTOSCOPY Left 8/25/2022    Procedure: CYSTOSCOPY, FLEXIBLE WITH STENT REMOVAL;  Surgeon: Shelby Parra MD;  Location: [facility];  Service: Urology;  Laterality: Left;    FRACTURE SURGERY  2014    right femur with neville    HEMORRHOID SURGERY      48 yrs ago    HYSTERECTOMY      NEPHROSTOMY Left 8/25/2022    Procedure: CREATION, NEPHROSTOMY;  Surgeon: Shelby Parra MD;  Location: [facility];  Service: Urology;  Laterality: Left;    PERCUTANEOUS NEPHROLITHOTOMY N/A 8/25/2022    Procedure: NEPHROLITHOTOMY, PERCUTANEOUS;  Surgeon: Shelby Parra MD;  Location: [facility];  Service: Urology;  Laterality: N/A;    REPLACEMENT OF IMPLANTABLE CARDIOVERTER-DEFIBRILLATOR (ICD) GENERATOR N/A 12/13/2019    Procedure: REPLACEMENT,  PULSE GENERATOR, ICD-MEDTRONIC;  Surgeon: Sebastian Nowak III, MD;  Location: [facility];  Service: Cardiology;  Laterality: N/A;    TONSILLECTOMY         Time Tracking:     PT Received On: 05/17/23  PT Start Time: 1058     PT Stop Time: 1114  PT Total Time (min): 16 min     Billable Minutes: Evaluation 8 and Gait Training 8      05/17/2023

## 2023-05-17 NOTE — NURSING
Patient stable throughout shift; all invasive lines/drains patent and secure; comfort and safety maintained; patient updated frequently, questions and needs addressed; in NAD at this time; VSS; report to David MALAVE

## 2023-05-17 NOTE — PLAN OF CARE
05/17/23 0934   Patient Assessment/Suction   Level of Consciousness (AVPU) alert   Respiratory Effort Normal;Unlabored   All Lung Fields Breath Sounds clear   Rhythm/Pattern, Respiratory pattern regular;depth regular;unlabored   Skin Integrity   $ Wound Care Tech Time 15 min   Area Observed Bridge of nose   Skin Appearance without discoloration   PRE-TX-O2   Device (Oxygen Therapy) nasal cannula   $ Is the patient on Low Flow Oxygen? Yes   Flow (L/min) 4   SpO2 98 %   Pulse Oximetry Type Continuous   $ Pulse Oximetry - Multiple Charge Pulse Oximetry - Multiple   Pulse 60   Resp (!) 23   Preset CPAP/BiPAP Settings   Mode Of Delivery other (see comments);BiPAP   $ CPAP/BiPAP Daily Charge BiPAP/CPAP Daily   $ Initial CPAP/BiPAP Setup? No   $ Is patient using? Yes   Equipment Type V60   Ipap 16   EPAP (cm H2O) 6   Pressure Support (cm H2O) 10   Set Rate (Breaths/Min) 16   ITime (sec) 1   Rise Time (sec) 3   Education   $ Education BiPAP;15 min   Respiratory Evaluation   $ Care Plan Tech Time 15 min   Home Oxygen   Has Home Oxygen? Yes   Liter Flow 4   Route nasal cannula

## 2023-05-18 ENCOUNTER — TELEPHONE (OUTPATIENT)
Dept: FAMILY MEDICINE | Facility: CLINIC | Age: 83
End: 2023-05-18

## 2023-05-18 VITALS
WEIGHT: 212.31 LBS | BODY MASS INDEX: 31.44 KG/M2 | OXYGEN SATURATION: 97 % | DIASTOLIC BLOOD PRESSURE: 70 MMHG | TEMPERATURE: 97 F | HEIGHT: 69 IN | HEART RATE: 98 BPM | SYSTOLIC BLOOD PRESSURE: 118 MMHG | RESPIRATION RATE: 26 BRPM

## 2023-05-18 LAB
ANION GAP SERPL CALC-SCNC: 6 MMOL/L (ref 8–16)
AORTIC ROOT ANNULUS: 3.2 CM
AORTIC VALVE CUSP SEPERATION: 2 CM
AV INDEX (PROSTH): 0.98
AV MEAN GRADIENT: 3 MMHG
AV PEAK GRADIENT: 5 MMHG
AV VALVE AREA: 3.06 CM2
AV VELOCITY RATIO: 0.95
BNP SERPL-MCNC: 1399 PG/ML (ref 0–99)
BSA FOR ECHO PROCEDURE: 2.18 M2
BUN SERPL-MCNC: 23 MG/DL (ref 8–23)
CALCIUM SERPL-MCNC: 8.9 MG/DL (ref 8.7–10.5)
CHLORIDE SERPL-SCNC: 103 MMOL/L (ref 95–110)
CO2 SERPL-SCNC: 29 MMOL/L (ref 23–29)
CREAT SERPL-MCNC: 1.7 MG/DL (ref 0.5–1.4)
CV ECHO LV RWT: 0.35 CM
DOP CALC AO PEAK VEL: 1.08 M/S
DOP CALC AO VTI: 20.4 CM
DOP CALC LVOT AREA: 3.1 CM2
DOP CALC LVOT DIAMETER: 2 CM
DOP CALC LVOT PEAK VEL: 1.03 M/S
DOP CALC LVOT STROKE VOLUME: 62.49 CM3
DOP CALC MV VTI: 34 CM
DOP CALCLVOT PEAK VEL VTI: 19.9 CM
E WAVE DECELERATION TIME: 208 MSEC
E/A RATIO: 1.42
E/E' RATIO: 16.8 M/S
ECHO LV POSTERIOR WALL: 1.06 CM (ref 0.6–1.1)
EJECTION FRACTION: 40 %
ERYTHROCYTE [DISTWIDTH] IN BLOOD BY AUTOMATED COUNT: 15.6 % (ref 11.5–14.5)
EST. GFR  (NO RACE VARIABLE): 29.9 ML/MIN/1.73 M^2
FRACTIONAL SHORTENING: 20 % (ref 28–44)
GLUCOSE SERPL-MCNC: 134 MG/DL (ref 70–110)
GLUCOSE SERPL-MCNC: 96 MG/DL (ref 70–110)
GLUCOSE SERPL-MCNC: 97 MG/DL (ref 70–110)
HCT VFR BLD AUTO: 33.8 % (ref 37–48.5)
HGB BLD-MCNC: 10.3 G/DL (ref 12–16)
INTERVENTRICULAR SEPTUM: 1.28 CM (ref 0.6–1.1)
IVC DIAMETER: 3 CM
LEFT ATRIUM SIZE: 5.4 CM
LEFT INTERNAL DIMENSION IN SYSTOLE: 4.81 CM (ref 2.1–4)
LEFT VENTRICLE DIASTOLIC VOLUME INDEX: 85.92 ML/M2
LEFT VENTRICLE DIASTOLIC VOLUME: 183 ML
LEFT VENTRICLE MASS INDEX: 144 G/M2
LEFT VENTRICLE SYSTOLIC VOLUME INDEX: 50.7 ML/M2
LEFT VENTRICLE SYSTOLIC VOLUME: 108 ML
LEFT VENTRICULAR INTERNAL DIMENSION IN DIASTOLE: 6.05 CM (ref 3.5–6)
LEFT VENTRICULAR MASS: 307.73 G
LV LATERAL E/E' RATIO: 16.8 M/S
LV SEPTAL E/E' RATIO: 16.8 M/S
LVOT MG: 2 MMHG
LVOT MV: 0.62 CM/S
MCH RBC QN AUTO: 26.6 PG (ref 27–31)
MCHC RBC AUTO-ENTMCNC: 30.5 G/DL (ref 32–36)
MCV RBC AUTO: 87 FL (ref 82–98)
MR PISA EROA: 0.09 CM2
MV MEAN GRADIENT: 2 MMHG
MV PEAK A VEL: 0.59 M/S
MV PEAK E VEL: 0.84 M/S
MV PEAK GRADIENT: 4 MMHG
MV STENOSIS PRESSURE HALF TIME: 104 MS
MV VALVE AREA BY CONTINUITY EQUATION: 1.84 CM2
MV VALVE AREA P 1/2 METHOD: 2.12 CM2
OHS LV EJECTION FRACTION SIMPSONS BIPLANE MOD: 4 %
PISA MRMAX VEL: 5.24 M/S
PISA RADIUS: 0.5 CM
PISA TR MAX VEL: 2.74 M/S
PISA VN NYQUIST MS: 0.31 M/S
PISA VN NYQUIST: 0.31 M/S
PLATELET # BLD AUTO: 307 K/UL (ref 150–450)
PMV BLD AUTO: 9.9 FL (ref 9.2–12.9)
POTASSIUM SERPL-SCNC: 4.3 MMOL/L (ref 3.5–5.1)
PV MV: 0.58 M/S
PV PEAK VELOCITY: 0.87 CM/S
RA PRESSURE: 15 MMHG
RBC # BLD AUTO: 3.87 M/UL (ref 4–5.4)
RV TISSUE DOPPLER FREE WALL SYSTOLIC VELOCITY 1 (APICAL 4 CHAMBER VIEW): 0.01 CM/S
SODIUM SERPL-SCNC: 138 MMOL/L (ref 136–145)
TDI LATERAL: 0.05 M/S
TDI SEPTAL: 0.05 M/S
TDI: 0.05 M/S
TR MAX PG: 30 MMHG
TRICUSPID ANNULAR PLANE SYSTOLIC EXCURSION: 1.07 CM
TV REST PULMONARY ARTERY PRESSURE: 45 MMHG
WBC # BLD AUTO: 6.95 K/UL (ref 3.9–12.7)

## 2023-05-18 PROCEDURE — 80048 BASIC METABOLIC PNL TOTAL CA: CPT | Performed by: STUDENT IN AN ORGANIZED HEALTH CARE EDUCATION/TRAINING PROGRAM

## 2023-05-18 PROCEDURE — 83880 ASSAY OF NATRIURETIC PEPTIDE: CPT

## 2023-05-18 PROCEDURE — 25000003 PHARM REV CODE 250: Performed by: INTERNAL MEDICINE

## 2023-05-18 PROCEDURE — 99900035 HC TECH TIME PER 15 MIN (STAT)

## 2023-05-18 PROCEDURE — 36415 COLL VENOUS BLD VENIPUNCTURE: CPT | Performed by: STUDENT IN AN ORGANIZED HEALTH CARE EDUCATION/TRAINING PROGRAM

## 2023-05-18 PROCEDURE — 85027 COMPLETE CBC AUTOMATED: CPT | Performed by: STUDENT IN AN ORGANIZED HEALTH CARE EDUCATION/TRAINING PROGRAM

## 2023-05-18 PROCEDURE — 27000221 HC OXYGEN, UP TO 24 HOURS

## 2023-05-18 PROCEDURE — 25000003 PHARM REV CODE 250: Performed by: HOSPITALIST

## 2023-05-18 PROCEDURE — 94761 N-INVAS EAR/PLS OXIMETRY MLT: CPT

## 2023-05-18 PROCEDURE — 25000003 PHARM REV CODE 250

## 2023-05-18 PROCEDURE — 94660 CPAP INITIATION&MGMT: CPT

## 2023-05-18 PROCEDURE — 97116 GAIT TRAINING THERAPY: CPT

## 2023-05-18 PROCEDURE — 25000003 PHARM REV CODE 250: Performed by: STUDENT IN AN ORGANIZED HEALTH CARE EDUCATION/TRAINING PROGRAM

## 2023-05-18 RX ORDER — DIGOXIN 125 MCG
125 TABLET ORAL
Qty: 36 TABLET | Refills: 0 | Status: ON HOLD | OUTPATIENT
Start: 2023-05-19 | End: 2023-09-26 | Stop reason: HOSPADM

## 2023-05-18 RX ORDER — TORSEMIDE 20 MG/1
20 TABLET ORAL DAILY
Qty: 90 TABLET | Refills: 0 | Status: ON HOLD | OUTPATIENT
Start: 2023-05-18 | End: 2023-09-26 | Stop reason: HOSPADM

## 2023-05-18 RX ORDER — METOPROLOL SUCCINATE 25 MG/1
25 TABLET, EXTENDED RELEASE ORAL DAILY
Qty: 90 TABLET | Refills: 3 | Status: SHIPPED | OUTPATIENT
Start: 2023-05-18 | End: 2023-11-01 | Stop reason: ALTCHOICE

## 2023-05-18 RX ADMIN — DIPHENHYDRAMINE HYDROCHLORIDE 25 MG: 25 CAPSULE ORAL at 03:05

## 2023-05-18 RX ADMIN — ACETAMINOPHEN 650 MG: 325 TABLET ORAL at 03:05

## 2023-05-18 RX ADMIN — METOPROLOL SUCCINATE 12.5 MG: 25 TABLET, EXTENDED RELEASE ORAL at 09:05

## 2023-05-18 RX ADMIN — AMIODARONE HYDROCHLORIDE 200 MG: 200 TABLET ORAL at 09:05

## 2023-05-18 RX ADMIN — MUPIROCIN 1 G: 20 OINTMENT TOPICAL at 09:05

## 2023-05-18 RX ADMIN — BUMETANIDE 1 MG: 0.25 INJECTION INTRAMUSCULAR; INTRAVENOUS at 09:05

## 2023-05-18 RX ADMIN — CHLORHEXIDINE GLUCONATE 15 ML: 1.2 RINSE ORAL at 09:05

## 2023-05-18 NOTE — PROGRESS NOTES
[facility]   Cardiology Note    Consult Requested By: hospital medicine  Reason for Consult: CHF exacerbation    SUBJECTIVE:     History of Present Illness: Pt is a 81 yo female with PMHx of Afib on xarelto, HFrEF s/p AICD, HTN, DM, HLP, JENNIFER, and COPD who presented to the ED by EMS with respiratory distress. Reports symptoms came on acutely last night. She experiencing mildly worsening  SOB yesterday morning and took an extra torsemide at that time. She did eat seafood over the weekend. She does utilize home O2 PRN. ED workup showed patient was afebrile, /72. BNP >4500. High sensitivity troponin 24.6. H&H 11.2/36.8 now 9.4/30. Cr 1.9. CXR showed pulmonary edema. EKG showed Vpacing. Last echo from 11/2022 showed EF 20-25%.     The pt is having some improvement in SOB at rest but has not been out of bed very much. The pt  denies CP or palpitations. H/H 10.6/35.2 cr 1.8 u/o net -1180 cc. VSS --slightly elevated SBP this am. SR on tele, Vpaced 60's       5/18: Pt seen and examined this morning. She states the shortness of breath has improved. She feels she is back to baseline. She did work with PT yesterday and denies dyspnea with exertion. She denies chest pain or edema. VSS. BNP improved from >4500 to 1399. Cr 1.7. CXR showed improvement in aeration of the R greater than L. Echo pending. UOP 1400 cc.Tele reviewed, Afib rate controlled.       Review of patient's allergies indicates:   Allergen Reactions    Codeine Other (See Comments)     nausea    Hydrocodone Nausea And Vomiting    Lasix [furosemide]      rash       Past Medical History:   Diagnosis Date    Allergy     Codeine, Lasix    Atrial fibrillation     Atrial fibrillation     Cataract     CHF (congestive heart failure)     Diabetes mellitus, type 2     Ejection fraction < 50% 10/18/2017    Approximately 35%  Based on prior  Echocardiogram.    Encounter for blood transfusion     Hyperlipidemia     Osteoporosis     PONV (postoperative nausea  and vomiting)     Thyroid disorder screening 10/17/2017    TSH of 1.12 ordered by Dr. dee Jones     Past Surgical History:   Procedure Laterality Date    ANTEGRADE NEPHROSTOGRAPHY Left 8/25/2022    Procedure: NEPHROSTOGRAM, ANTEGRADE;  Surgeon: Shelby Parra MD;  Location: [facility];  Service: Urology;  Laterality: Left;    CHOLECYSTECTOMY  1997    [place]     COLONOSCOPY      CYSTOSCOPY W/ URETERAL STENT PLACEMENT Left 7/17/2022    Procedure: CYSTOSCOPY, WITH URETERAL STENT INSERTION;  Surgeon: Shelby Parra MD;  Location: [facility] OR;  Service: Urology;  Laterality: Left;    CYSTOSCOPY W/ URETERAL STENT REMOVAL Left 9/21/2022    Procedure: CYSTOSCOPY, WITH URETERAL STENT REMOVAL;  Surgeon: Shelby Parra MD;  Location: [facility] OR;  Service: Urology;  Laterality: Left;    CYSTOSCOPY WITH URETEROSCOPY, RETROGRADE PYELOGRAPHY, AND INSERTION OF STENT Left 8/25/2022    Procedure: CYSTOSCOPY, WITH RETROGRADE PYELOGRAM AND URETERAL STENT INSERTION;  Surgeon: Shelby Parra MD;  Location: [facility];  Service: Urology;  Laterality: Left;    EYE SURGERY      bilateral cataracts    FINGER SURGERY Right 2021    right pinky finger    FLEXIBLE CYSTOSCOPY Left 8/25/2022    Procedure: CYSTOSCOPY, FLEXIBLE WITH STENT REMOVAL;  Surgeon: Shelby Parra MD;  Location: [facility];  Service: Urology;  Laterality: Left;    FRACTURE SURGERY  2014    right femur with neville    HEMORRHOID SURGERY      48 yrs ago    HYSTERECTOMY      NEPHROSTOMY Left 8/25/2022    Procedure: CREATION, NEPHROSTOMY;  Surgeon: Shelby Parra MD;  Location: [facility];  Service: Urology;  Laterality: Left;    PERCUTANEOUS NEPHROLITHOTOMY N/A 8/25/2022    Procedure: NEPHROLITHOTOMY, PERCUTANEOUS;  Surgeon: Shelby Parra MD;  Location: [facility] OR;  Service: Urology;  Laterality: N/A;    REPLACEMENT OF IMPLANTABLE CARDIOVERTER-DEFIBRILLATOR (ICD) GENERATOR N/A 12/13/2019    Procedure: REPLACEMENT, PULSE GENERATOR, ICD-MEDTRONIC;  Surgeon: Sebastian  Eliu MONTGOMERY MD;  Location: [facility];  Service: Cardiology;  Laterality: N/A;    TONSILLECTOMY       Family History   Problem Relation Age of Onset    Heart disease Mother     Cancer Father         Lung Cancer ??? Asbestos    Breast cancer Paternal Aunt      Social History     Tobacco Use    Smoking status: Former     Passive exposure: Past    Smokeless tobacco: Never    Tobacco comments:     quit 2013   Substance Use Topics    Alcohol use: No    Drug use: No       Review of Systems:  Review of Systems   Constitutional:  Negative for chills, diaphoresis, fever and malaise/fatigue.   Respiratory:  Negative for cough, sputum production and shortness of breath.    Cardiovascular:  Negative for chest pain, palpitations, orthopnea and leg swelling.   Gastrointestinal:  Negative for nausea and vomiting.   Neurological:  Negative for dizziness.   All other systems reviewed and are negative.    OBJECTIVE:     Vital Signs (Most Recent)  Temp: 97.6 °F (36.4 °C) (05/18/23 0701)  Pulse: 89 (05/18/23 0726)  Resp: 18 (05/18/23 0726)  BP: (!) 115/95 (05/18/23 0726)  SpO2: 99 % (05/18/23 0726)    Vital Signs Range (Last 24H):  Temp:  [97.6 °F (36.4 °C)-98.6 °F (37 °C)]   Pulse:  []   Resp:  [18-39]   BP: (115-159)/(60-95)   SpO2:  [93 %-99 %]     I & O (Last 24H):    Intake/Output Summary (Last 24 hours) at 5/18/2023 0859  Last data filed at 5/18/2023 0623  Gross per 24 hour   Intake 766.3 ml   Output 1400 ml   Net -633.7 ml         Current Diet:     Current Diet Order   Procedures    Diet diabetic [facility]; 1800 Calorie     Order Specific Question:   Indicate patient location for additional diet options:     Answer:   [facility]     Order Specific Question:   Total calories:     Answer:   1800 Calorie        Allergies:  Review of patient's allergies indicates:   Allergen Reactions    Codeine Other (See Comments)     nausea    Hydrocodone Nausea And Vomiting    Lasix [furosemide]      rash       Meds:  Scheduled Meds:   amiodarone  200  mg Oral Daily    atorvastatin  20 mg Oral QHS    bumetanide  1 mg Intravenous Daily    chlorhexidine  15 mL Mouth/Throat BID    metoprolol succinate  12.5 mg Oral Daily    mupirocin   Nasal BID    rivaroxaban  10 mg Oral Daily with dinner     Continuous Infusions:   DOBUTamine IV infusion (non-titrating) 2.5 mcg/kg/min (05/17/23 1158)     PRN Meds:acetaminophen, dextrose 50%, dextrose 50%, diphenhydrAMINE, glucagon (human recombinant), glucose, glucose, insulin aspart U-100, ondansetron, sodium chloride 0.9%    Oxygen/Ventilator Data (Last 24H):  (if applicable)   Oxygen Concentration (%):  [40] 40        Hemodynamic Parameters (Last 24H):   (if applicable)        Laboratory and Radiology Data:  Recent Results (from the past 24 hour(s))   POCT glucose    Collection Time: 05/17/23 11:54 AM   Result Value Ref Range    POC Glucose 106 70 - 110   POCT glucose    Collection Time: 05/17/23  8:54 PM   Result Value Ref Range    POC Glucose 119 (H) 70 - 110   Basic metabolic panel     Collection Time: 05/18/23  4:41 AM   Result Value Ref Range    Sodium 138 136 - 145 mmol/L    Potassium 4.3 3.5 - 5.1 mmol/L    Chloride 103 95 - 110 mmol/L    CO2 29 23 - 29 mmol/L    Glucose 96 70 - 110 mg/dL    BUN 23 8 - 23 mg/dL    Creatinine 1.7 (H) 0.5 - 1.4 mg/dL    Calcium 8.9 8.7 - 10.5 mg/dL    Anion Gap 6 (L) 8 - 16 mmol/L    eGFR 29.9 (A) >60 mL/min/1.73 m^2   CBC Without Differential    Collection Time: 05/18/23  4:41 AM   Result Value Ref Range    WBC 6.95 3.90 - 12.70 K/uL    RBC 3.87 (L) 4.00 - 5.40 M/uL    Hemoglobin 10.3 (L) 12.0 - 16.0 g/dL    Hematocrit 33.8 (L) 37.0 - 48.5 %    MCV 87 82 - 98 fL    MCH 26.6 (L) 27.0 - 31.0 pg    MCHC 30.5 (L) 32.0 - 36.0 g/dL    RDW 15.6 (H) 11.5 - 14.5 %    Platelets 307 150 - 450 K/uL    MPV 9.9 9.2 - 12.9 fL   BNP    Collection Time: 05/18/23  4:41 AM   Result Value Ref Range    BNP 1,399 (H) 0 - 99 pg/mL   POCT glucose    Collection Time: 05/18/23  7:57 AM   Result Value Ref Range     POC Glucose 97 70 - 110     Imaging Results              X-Ray Chest AP Portable (Final result)  Result time 05/16/23 06:51:56      Final result by Foster Landis MD (05/16/23 06:51:56)                   Narrative:    CLINICAL HISTORY:  82 years (1940) Female CHF Shortness of Breath    TECHNIQUE:  Portable AP radiograph the chest.    COMPARISON:  Radiograph from February 3, 2023.    FINDINGS:  Moderate patchy consolidative airspace opacity in the right mid to lower lung zone and left hilum. There is blunting of the left costophrenic angle consistent with a trace pleural effusion. No pneumothorax is identified. The heart is mildly enlarged. Left-sided AICD is unchanged in configuration. Atheromatous calcifications are seen at the aortic arch. Osseous structures appear unchanged. The visualized upper abdomen is unremarkable.    IMPRESSION:  Moderate multifocal consolidative airspace opacity in the right greater than left lung suggestive of moderate pulmonary edema, or possibly multifocal pneumonia and less likely ARDS, aspiration or malignancy.                  .            Electronically signed by:  Foster Landis MD  5/16/2023 6:51 AM CDT Workstation: [id number]                                    12-lead EKG interpretation:  (if applicable)      Current Cardiac Rhythm:   (if applicable)    Physical Exam:   Physical Exam  Vitals and nursing note reviewed.   Constitutional:       General: She is not in acute distress.     Appearance: Normal appearance.   Cardiovascular:      Rate and Rhythm: Normal rate and regular rhythm.      Pulses: Normal pulses.      Heart sounds: No murmur heard.  Pulmonary:      Breath sounds: Rales present.   Musculoskeletal:      Right lower leg: Edema present.      Left lower leg: Edema present.   Skin:     General: Skin is warm and dry.      Findings: No rash.   Neurological:      Mental Status: She is alert and oriented to person, place, and time.       ASSESSMENT/PLAN:    Assessment:   Acute on Chronic Systolic Heart Failure - BNP >4500, CXR with pulmonary edema vs pneumonia. On entresto, verquvo,  and torsemide at home. Echo from 11/2022 showed EF 20-25%. HS troponin mildly elevated. 5/18: BNP improved to 1399, CXR showed improving aeration. Patient denies SOB today. She worked with PT yesterday, denies exertional symptoms.   S/p AICD  Atrial Fibrillation - on xarelto, amiodarone, propranolol, and digoxin at home. EKG shows Vpacing.   Chronic Kidney Disease: Cr 1.9.   Hypertension - on entresto and amlodipine, Does have midodrine at home for episodes of hypotension .   Hyperlipidemia  Diabetes Mellitus Type 2  COPD  JENNIFER on CPAP    Plan:   Patient's shortness of breath has improved. Denies dyspnea while working with PT yesterday.  BNP 1399.  EF previously 20-25% repeat echo pending.  Repeat echo showed EF 40%.    Discharge patient with torsemide 20 mg daily, toprol 12.5 mg, and restart entresto.  Continue amiodarone and xarelto. Increase xarelto to therapeutic dose of 15 mg due to CKD.    She will need to follow up with Dr. Jones in clinic.  Discussed pt with Dr. Zhu.

## 2023-05-18 NOTE — TELEPHONE ENCOUNTER
----- Message from Veronica Zuniga LPN sent at 5/17/2023 10:04 AM CDT -----  Regarding: hospital follow up  Please call patient - needs post-hospital phone call within 2 business days of discharge and hospital follow up visit scheduled within 7-14 days if not already scheduled.

## 2023-05-18 NOTE — PLAN OF CARE
05/18/23 1327   Final Note   Assessment Type Final Discharge Note   Anticipated Discharge Disposition Home-Health   Hospital Resources/Appts/Education Provided Appointments scheduled and added to AVS;Post-Acute resouces added to AVS   Post-Acute Status   Post-Acute Authorization Home Health   Home Health Status Set-up Complete/Auth obtained   Discharge Delays None known at this time     Patient to discharge with [facility] and will be seen 5/19/2023    Patient cleared for discharge from case management standpoint.    Follow up appointments scheduled and added to AVS.    Chart and discharge orders reviewed.  Patient discharged home with no further case management needs.

## 2023-05-18 NOTE — PLAN OF CARE
Treatment plan discussed with patient with time allowed for questions and answers     Problem: Adult Inpatient Plan of Care  Goal: Absence of Hospital-Acquired Illness or Injury  Outcome: Ongoing, Progressing-Safety maintained     Problem: Diabetes Comorbidity  Goal: Blood Glucose Level Within Targeted Range  Outcome: Ongoing, Progressing-Glucose monitored     Problem: Renal Function Impairment (Acute Kidney Injury/Impairment)  Goal: Effective Renal Function  Outcome: Ongoing, Progressing-Output monitored      Detail Level: Zone Add 74571 Cpt? (Important Note: In 2017 The Use Of 24347 Is Being Tracked By Cms To Determine Future Global Period Reimbursement For Global Periods): yes

## 2023-05-18 NOTE — PLAN OF CARE
05/18/23 1152   Post-Acute Status   Post-Acute Authorization Home Health   Home Health Status Referrals Sent     PT recs for home health noted and discussed with pt who would like ann , referral sent in [facility].

## 2023-05-18 NOTE — NURSING
D/c instructions given to patient and education provided. Patient and family both verbalized understanding and all questions were answered.     Trial with patient on RA for 20-30 mins prior to discharging patient; sats maintained >90% while in chair. Transported to car via wheelchair with O2 applied and to be reapplied upon arrival to home.     Instructed to follow up with Dr. Jones regarding new medications added. Also instructed to follow up on Xarelto dosing as patient is still in atrial fibrillation. Family has already called to schedule follow-up appointment and said clinic would reach out to them tomorrow.    Midline removed. Safety parameters WNL.

## 2023-05-18 NOTE — PT/OT/SLP PROGRESS
Physical Therapy Treatment    Patient Name:  Jo Alegre   MRN:  [id number]    Recommendations:     Discharge Recommendations: home health PT  Discharge Equipment Recommendations: none  Barriers to discharge:  medical status    Assessment:     Jo Alegre is a 82 y.o. female admitted with a medical diagnosis of Acute on chronic combined systolic and diastolic congestive heart failure.  She presents with the following impairments/functional limitations: weakness, impaired endurance, impaired self care skills, impaired functional mobility, gait instability, impaired balance, decreased lower extremity function, decreased safety awareness, pain, impaired cardiopulmonary response to activity.    Pt found in bed with HOB elevated. Pt agreeable to visit. 2 daughters presents and active participants in session. No shortness of breath or loss of balance during ambulation.     Rehab Prognosis: Fair; patient would benefit from acute skilled PT services to address these deficits and reach maximum level of function.    Recent Surgery: * No surgery found *      Plan:     During this hospitalization, patient to be seen 5 x/week to address the identified rehab impairments via gait training, therapeutic activities, therapeutic exercises, neuromuscular re-education and progress toward the following goals:    Plan of Care Expires:  06/18/23    Subjective     Chief Complaint: eager to discharge  Patient/Family Comments/goals: discharge  Pain/Comfort:  Pain Rating 1: 0/10      Objective:     Communicated with RN prior to session.  Patient found HOB elevated with peripheral IV, telemetry, blood pressure cuff, pulse ox (continuous) upon PT entry to room.     General Precautions: Standard, fall  Orthopedic Precautions: N/A  Braces: N/A  Respiratory Status: Nasal cannula, flow 4 L/min     Functional Mobility:  Bed Mobility:     Supine to Sit: supervision  Transfers:     Sit to Stand:  supervision with rolling walker  Gait: 25 ft  and 15 ft with RW and CGA      AM-PAC 6 CLICK MOBILITY          Treatment & Education:  Pt educated on POC, discharge recommendation, importance of time OOB, seated TE HEP, pacing/energy conservation, need for assist with mobility, use of call bell to seek assistance as needed and fall prevention    Pt tolerated seated TE including hip flexion, knee ext and ankle df 1 set of 10 each with min vi for proper form and pacing for optimal strengthening    Patient left up in chair with all lines intact and call button in reach..    GOALS:   Multidisciplinary Problems       Physical Therapy Goals          Problem: Physical Therapy    Goal Priority Disciplines Outcome Goal Variances Interventions   Physical Therapy Goal     PT, PT/OT Ongoing, Progressing     Description: Goals to be met by: 23     Patient will increase functional independence with mobility by performin. Supine to sit with Supervision  2. Sit to stand transfer with Supervision  3. Bed to chair transfer with Supervision using Rolling Walker  4. Gait  x 150 feet with Supervision using Rolling Walker.                              Time Tracking:     PT Received On: 23  PT Start Time: 928     PT Stop Time: 945  PT Total Time (min): 17 min     Billable Minutes: Gait Training 17    Treatment Type: Treatment  PT/PTA: PT     Number of PTA visits since last PT visit: 0     2023

## 2023-05-19 PROCEDURE — G0180 MD CERTIFICATION HHA PATIENT: HCPCS | Mod: ,,, | Performed by: INTERNAL MEDICINE

## 2023-05-19 PROCEDURE — G0180 PR HOME HEALTH MD CERTIFICATION: ICD-10-PCS | Mod: ,,, | Performed by: INTERNAL MEDICINE

## 2023-05-19 NOTE — HOSPITAL COURSE
82-year-old  female with multiple medical comorbidities including but not limited to chronic combined CHF status post AICD, paroxysmal atrial fibrillation, chronic hypoxic respiratory failure on p.r.n. oxygen of 4 L, type 2 DM in remission, essential hypertension and CKD stage 3b admitted for CHF exacerbation. Trigger unclear; reports compliance to meds including diuretics.      Found to be hypoxic to 84% on room air.  Treated with BiPAP for hypoxic respiratory failure from CHF exacerbation.  Seen by Cardiology.  Received IV diuretics with moderate response.  She was subsequently initiated on dobutamine infusion.  Her symptoms have improved and has been deemed medically stable to be discharged home.  Medications adjusted as outlined below.      Briefly digoxin dose has been increased from 62.5 to 125 mcg Monday Wednesday Friday   Discontinued propranolol and started low-dose metoprolol succinate.  Rivaroxaban dose has been adjusted per renal dosing.   Educated on warning signs of CHF and advised taking extra diuretics as needed.    Discharge plan discussed with patient and both daughters at bedside.  Advised follow up with Cardiology.

## 2023-05-19 NOTE — DISCHARGE SUMMARY
[facility]  Discharge Summary      Patient Name: Jo Alegre  MRN: [id number]  VIDHI: [id number]  Patient Class: IP- Inpatient  Admission Date: 5/15/2023  Hospital Length of Stay: 3 days  Discharge Date and Time: 5/18/2023  2:19 PM  Attending Physician: Mattie att. providers found   Discharging Provider: Jourdan Brown MD  Primary Care Provider: Kraig Alberto MD    Primary Care Team: Networked reference to record PCT     Hospital Course:   82-year-old  female with multiple medical comorbidities including but not limited to chronic combined CHF status post AICD, paroxysmal atrial fibrillation, chronic hypoxic respiratory failure on p.r.n. oxygen of 4 L, type 2 DM in remission, essential hypertension and CKD stage 3b admitted for CHF exacerbation. Trigger unclear; reports compliance to meds including diuretics.      Found to be hypoxic to 84% on room air.  Treated with BiPAP for hypoxic respiratory failure from CHF exacerbation.  Seen by Cardiology.  Received IV diuretics with moderate response.  She was subsequently initiated on dobutamine infusion.  Her symptoms have improved and has been deemed medically stable to be discharged home.  Medications adjusted as outlined below.      Briefly digoxin dose has been increased from 62.5 to 125 mcg Monday Wednesday Friday   Discontinued propranolol and started low-dose metoprolol succinate.  Rivaroxaban dose has been adjusted per renal dosing.   Educated on warning signs of CHF and advised taking extra diuretics as needed.    Discharge plan discussed with patient and both daughters at bedside.  Advised follow up with Cardiology.           Goals of Care Treatment Preferences:  Code Status: Full Code      Consults:   Consults (From admission, onward)        Status Ordering Provider     Inpatient consult to Cardiology  Once        Provider:  MD Armida Patricia OLADUNNI          Final Active Diagnoses:    Diagnosis  Date Noted POA    PRINCIPAL PROBLEM:  Acute on chronic combined systolic and diastolic congestive heart failure [I50.43] 04/01/2020 Yes    MARCELINO (acute kidney injury) [N17.9] 05/16/2023 Yes    Acute on chronic respiratory failure with hypoxia [J96.21] 04/03/2020 Yes    Paroxysmal atrial fibrillation [I48.0] 05/06/2019 Yes    Essential hypertension [I10] 05/23/2018 Yes    Type 2 diabetes mellitus with diabetic nephropathy, without long-term current use of insulin [E11.21] 05/23/2018 Yes    Mixed dyslipidemia [E78.2] 05/23/2018 Yes    Cardiomyopathy with implantable cardioverter-defibrillator [I42.9, Z95.810] 05/23/2018 Yes      Problems Resolved During this Admission:       Discharged Condition: stable    Disposition: Home-Health Care [facility]    Follow Up:   Contact information for follow-up providers     Kraig Alberto MD. Go on 5/24/2023.    Specialty: Internal Medicine  Why: 2:20pm  Contact information:  [address]  [place] [address]  [phone]             Sampson Jones MD. Schedule an appointment as soon as possible for a visit in 2 week(s).    Specialties: Cardiovascular Disease, Cardiology  Why: Cardiology follow-up  Contact information:  [address] [address]  [phone]                   Contact information for after-discharge care     Home Medical Care     [facility] .    Service: Home Health Services  Contact information:  [address]  [place] [address] [phone]                           Patient Instructions:      Ambulatory referral/consult to Home Health   Standing Status: Future   Referral Priority: Routine Referral Type: Home Health   Referral Reason: Specialty Services Required   Requested Specialty: Home Health Services   Number of Visits Requested: 1     Diet Cardiac     Notify your health care provider if you experience any of the following:  severe uncontrolled pain     Notify your  health care provider if you experience any of the following:  difficulty breathing or increased cough     Notify your health care provider if you experience any of the following:   Order Comments: Worsening or recurrent symptoms     Activity as tolerated       Significant Diagnostic Studies: Labs:   CMP   Recent Labs   Lab 05/17/23 0315 05/18/23 0441    138   K 3.8 4.3    103   CO2 29 29   GLU 88 96   BUN 28* 23   CREATININE 1.8* 1.7*   CALCIUM 8.7 8.9   ANIONGAP 6* 6*    and CBC   Recent Labs   Lab 05/17/23 0315 05/18/23  0441   WBC 7.51 6.95   HGB 10.6* 10.3*   HCT 35.2* 33.8*    307     Cardiac Graphics: Echocardiogram:   Transthoracic echo (TTE) complete (Cupid Only):   Results for orders placed or performed during the hospital encounter of 05/15/23   Echo   Result Value Ref Range    BSA 2.18 m2    TDI SEPTAL 0.05 m/s    LV LATERAL E/E' RATIO 16.80 m/s    LV SEPTAL E/E' RATIO 16.80 m/s    IVC diameter 3.00 cm    Left Ventricular Outflow Tract Mean Velocity 0.62 cm/s    Left Ventricular Outflow Tract Mean Gradient 2.00 mmHg    Pulmonary Valve Mean Velocity 0.58 m/s    AORTIC VALVE CUSP SEPERATION 2.00 cm    TDI LATERAL 0.05 m/s    PV PEAK VELOCITY 0.87 cm/s    LVIDd 6.05 (A) 3.5 - 6.0 cm    IVS 1.28 (A) 0.6 - 1.1 cm    Posterior Wall 1.06 0.6 - 1.1 cm    Ao root annulus 3.20 cm    LVIDs 4.81 (A) 2.1 - 4.0 cm    FS 20 28 - 44 %    LV mass 307.73 g    LA size 5.40 cm    TAPSE 1.07 cm    RV S' 0.01 cm/s    Left Ventricle Relative Wall Thickness 0.35 cm    AV mean gradient 3 mmHg    AV valve area 3.06 cm2    AV Velocity Ratio 0.95     AV index (prosthetic) 0.98     MV mean gradient 2 mmHg    MV valve area p 1/2 method 2.12 cm2    MV valve area by continuity eq 1.84 cm2    E/A ratio 1.42     Mean e' 0.05 m/s    E wave deceleration time 208.00 msec    LVOT diameter 2.00 cm    LVOT area 3.1 cm2    LVOT peak ella 1.03 m/s    LVOT peak VTI 19.90 cm    Ao peak ella 1.08 m/s    Ao VTI 20.4 cm    Vn  Nyquist 0.31 m/s    Radius 0.50 cm    Mr max nathan 5.24 m/s    LVOT stroke volume 62.49 cm3    AV peak gradient 5 mmHg    MV peak gradient 4 mmHg    E/E' ratio 16.80 m/s    Vn Nyquist MS 0.31 m/s    MV Peak E Nathan 0.84 m/s    MR PISA EROA 0.09 cm2    TR Max Nathan 2.74 m/s    MV VTI 34.0 cm    MV stenosis pressure 1/2 time 104.00 ms    MV Peak A Nathan 0.59 m/s    LV Systolic Volume 108.00 mL    LV Systolic Volume Index 50.7 mL/m2    LV Diastolic Volume 183.00 mL    LV Diastolic Volume Index 85.92 mL/m2    LV Mass Index 144 g/m2    Triscuspid Valve Regurgitation Peak Gradient 30 mmHg    King's Biplane MOD Ejection Fraction 4 %    Right Atrial Pressure (from IVC) 15 mmHg    EF 40 %    TV rest pulmonary artery pressure 45 mmHg    Narrative    · The left ventricle is mildly enlarged with mild concentric hypertrophy   and moderately decreased systolic function.  · The estimated ejection fraction is 40%.  · Grade I left ventricular diastolic dysfunction.  · There is left ventricular global hypokinesis.  · Normal right ventricular size with normal right ventricular systolic   function.  · Severe left atrial enlargement.  · Moderate-to-severe mitral regurgitation.  · Mild tricuspid regurgitation.  · Elevated central venous pressure (15 mmHg).  · The estimated PA systolic pressure is 45 mmHg.  · There is pulmonary hypertension.  · Trivial anterior pericardial effusion.          Medications:  Reconciled Home Medications:      Medication List      START taking these medications    metoprolol succinate 25 MG 24 hr tablet  Commonly known as: TOPROL-XL  Take 1 tablet (25 mg total) by mouth once daily.        CHANGE how you take these medications    digoxin 125 mcg tablet  Commonly known as: LANOXIN  Take 1 tablet (125 mcg total) by mouth every Mon, Wed, Fri.  Start taking on: May 19, 2023  What changed: how much to take     gabapentin 100 MG capsule  Commonly known as: NEURONTIN  TAKE 2 CAPSULES(200 MG) BY MOUTH THREE TIMES  DAILY  What changed:   · how much to take  · how to take this  · when to take this     glimepiride 1 MG tablet  Commonly known as: AMARYL  Take 1 tablet (1 mg total) by mouth before breakfast.  What changed: when to take this     rivaroxaban 15 mg Tab  Commonly known as: XARELTO  Take 1 tablet (15 mg total) by mouth daily with dinner or evening meal.  What changed:   · medication strength  · how much to take  · when to take this     torsemide 20 MG Tab  Commonly known as: DEMADEX  Take 1 tablet (20 mg total) by mouth once daily. Take an extra dose in the afternoon for leg swelling, shortness of breath or weight gain (3 lbs overnight or 5 lbs in a week)  What changed: additional instructions        CONTINUE taking these medications    albuterol 90 mcg/actuation inhaler  Commonly known as: PROVENTIL/VENTOLIN HFA  INHALE 2 PUFFS BY MOUTH EVERY 6 HOURS AS NEEDED FOR WHEEZING     amiodarone 200 MG Tab  Commonly known as: PACERONE  Take 200 mg by mouth once daily. 1/2 tab once daily with meals  May take an extra dose for palpatations   Max 4 100mg tabs     atorvastatin 20 MG tablet  Commonly known as: LIPITOR  Take 20 mg by mouth every evening.     Ca-D3-mag ox-zinc--thom-bor 600 mg calcium- 20 mcg-50 mg Tab  Take 1 tablet by mouth 2 (two) times daily.     cholecalciferol (vitamin D3) 125 mcg (5,000 unit) Tab  Take 5,000 Units by mouth 2 (two) times daily.     fluticasone propionate 50 mcg/actuation nasal spray  Commonly known as: FLONASE  2 sprays (100 mcg total) by Each Nostril route once daily.     latanoprost 0.005 % ophthalmic solution  Place 1 drop into both eyes nightly.     midodrine 5 MG Tab  Commonly known as: PROAMATINE  Take 2.5 mg by mouth as needed. Take 1 tablet by mouth when feeling dizzy from low BP, avoid 3 hours prior to bedtime     sacubitriL-valsartan  mg per tablet  Commonly known as: ENTRESTO  Take 1 tablet by mouth 2 (two) times daily.     SLOW RELEASE IRON 144 mg (45 mg iron)  Tbsr  Generic drug: ferrous sulfate, dried  Take 1 tablet by mouth once daily. With Vitamin C 250 mg daily     tamsulosin 0.4 mg Cap  Commonly known as: FLOMAX  Take 1 capsule by mouth once daily.        STOP taking these medications    amLODIPine 5 MG tablet  Commonly known as: NORVASC     diltiaZEM 60 MG tablet  Commonly known as: CARDIZEM     propranoloL 10 MG tablet  Commonly known as: INDERAL            Time spent on the discharge of patient: 35 minutes         Jourdan Brown MD  Department of Hospital Medicine  Formerly [facility], later [facility]

## 2023-05-21 NOTE — PROGRESS NOTES
Subjective:       Patient ID: Jo Alegre is a 82 y.o. female.    Chief Complaint: Hospital Follow Up, Congestive Heart Failure, Atrial Fibrillation, Hypertension, Recent falls/left-sided chest pain, and Chronic anticoagulation    Miss Jo Palm is 82-year-old somewhat chronically ill  female who comes for follow-up after recent hospital discharge on 05/15 [phone]. .  She is accompanied with her daughter and Ms Lamb.      She was hospitalized for acute on chronic biventricular heart failure and had hypoxia with oxygen saturation of 84%.  She was treated with BiPAP device, IV Bumex and also dobutamine till the point of improvement and was thereafter discharged to be followed up with primary care and Cardiology.  Generally it was felt that she has been compliant to her diet and medications.  Her ejection fraction is estimated to be 40% on echocardiogram done and also some degree of diastolic dysfunction.    Confesses that she cheated on diet and 4.  The other day she went to the restaurant and had a dozen raw oysters.  Her weight at the time of hospitalization was documented to be 217 lb and at the time of discharge 212 lb.    Given her ejection fraction of 40%, she is noted to be on digoxin which was subtherapeutic.  She also continues on Entresto.  She has underlying diabetes but thus far she has not been on SGLT 2.    Her EGFR is estimated to be approximately 29 which puts her at the edge of chronic kidney disease stage IIIB or stage IV.  Medical issues are as below:-    1. Chronic combined systolic and diastolic congestive heart failure   2. Paroxysmal atrial fibrillation   3. Essential hypertension   4. Chronic anticoagulation -currently on Xarelto  5. Type 2 diabetes mellitus with diabetic nephropathy, without long-term current use of insulin   6. Midline low back pain without sciatica, unspecified chronicity   7. Neurogenic bladder   8.         Chronic kidney disease borderline between stage  IIIB and 4.          In past she was diagnosed with staghorn calculus which was ultimately removed.  She never had any symptoms in  past at least on the left side.  She always had chronic back pain on the right side.  Urine stone analysis had shown approximately 80% magnesium ammonium phosphate and 20% calcium phosphate.    She is also concerned about memory issues.  This has been going on for the last few months or so.    Dr. Jones had also added VERQUVO to her regimen.  However the cost is prohibitive.    She does continue to feel lightheaded.  Her last creatinine was 1.9 indicating probably over diuresis.  This needs to be checked again.    She continues on digoxin.  She also continues on Xarelto.    She is on a low-dose midodrine for raising her blood pressures.    As far as blood sugars are concerned, she continues on glimepiride.  Last hemoglobin A1c is 5.7.  For the last couple of years there is no hemoglobin A1c which is greater than 6.5 and has for the diagnosis of diabetes mellitus type 2 will be deleted.    As far as memory is concerned, as per daughter she seems to have been some gaps and difficulty recalling as to what is going on.  No history of fall or trauma.  Previously her memory was considered to be good.  She does admit to some degree of stress because of multiple medical and psychosocial issues.    Hypertension  This is a chronic problem. The current episode started more than 1 year ago. The problem has been rapidly improving since onset. The problem is controlled. Associated symptoms include malaise/fatigue and peripheral edema. Pertinent negatives include no chest pain, headaches or palpitations. Past treatments include calcium channel blockers, diuretics, angiotensin blockers and beta blockers (On amlodipine and diltiazem.). The current treatment provides significant improvement. Compliance problems include psychosocial issues.    Hyperlipidemia  The current episode started more than 1 year  ago. The problem is controlled. Exacerbating diseases include obesity. Pertinent negatives include no chest pain. The current treatment provides moderate improvement of lipids. Risk factors for coronary artery disease include post-menopausal, a sedentary lifestyle, dyslipidemia and hypertension.   Atrial Fibrillation  Presents for follow-up visit. Symptoms include hypotension and weakness. Symptoms are negative for chest pain, hemodynamic instability and palpitations. The symptoms have been stable. Past medical history includes atrial fibrillation, CHF and hyperlipidemia. Medication compliance problems include psychosocial issues.   Congestive Heart Failure  Presents for follow-up visit. Associated symptoms include edema. Pertinent negatives include no abdominal pain, chest pain, chest pressure, claudication, near-syncope, palpitations or unexpected weight change (gained wt 5 lbs ?). The symptoms have been stable. Compliance with diet is 51-75%. Compliance with exercise is 26-50%. Compliance with medications is %.     Past Medical History:   Diagnosis Date    Allergy     Codeine, Lasix    Atrial fibrillation     Atrial fibrillation     Cataract     CHF (congestive heart failure)     Diabetes mellitus, type 2     Ejection fraction < 50% 10/18/2017    Approximately 35%  Based on prior  Echocardiogram.    Encounter for blood transfusion     Hyperlipidemia     Osteoporosis     PONV (postoperative nausea and vomiting)     Thyroid disorder screening 10/17/2017    TSH of 1.12 ordered by Dr. dee Jones     Social History     Socioeconomic History    Marital status:     Number of children: 4   Occupational History    Occupation:    Tobacco Use    Smoking status: Former     Passive exposure: Past    Smokeless tobacco: Never    Tobacco comments:     quit 2013   Substance and Sexual Activity    Alcohol use: No    Drug use: No    Sexual activity: Not Currently   Social History Narrative    -  Drives and lives alone.     Social Determinants of Health     Financial Resource Strain: Low Risk     Difficulty of Paying Living Expenses: Not very hard   Food Insecurity: No Food Insecurity    Worried About Running Out of Food in the Last Year: Never true    Ran Out of Food in the Last Year: Never true   Transportation Needs: No Transportation Needs    Lack of Transportation (Medical): No    Lack of Transportation (Non-Medical): No   Physical Activity: Insufficiently Active    Days of Exercise per Week: 4 days    Minutes of Exercise per Session: 30 min   Stress: No Stress Concern Present    Feeling of Stress : Only a little   Social Connections: Socially Isolated    Frequency of Communication with Friends and Family: More than three times a week    Frequency of Social Gatherings with Friends and Family: Three times a week    Attends Confucianist Services: Never    Active Member of Clubs or Organizations: No    Attends Club or Organization Meetings: Never    Marital Status:    Housing Stability: Low Risk     Unable to Pay for Housing in the Last Year: No    Number of Places Lived in the Last Year: 1    Unstable Housing in the Last Year: No     Past Surgical History:   Procedure Laterality Date    ANTEGRADE NEPHROSTOGRAPHY Left 8/25/2022    Procedure: NEPHROSTOGRAM, ANTEGRADE;  Surgeon: Shelby Parra MD;  Location: [facility] OR;  Service: Urology;  Laterality: Left;    CHOLECYSTECTOMY  1997    [place]     COLONOSCOPY      CYSTOSCOPY W/ URETERAL STENT PLACEMENT Left 7/17/2022    Procedure: CYSTOSCOPY, WITH URETERAL STENT INSERTION;  Surgeon: Shelby Parra MD;  Location: [facility] OR;  Service: Urology;  Laterality: Left;    CYSTOSCOPY W/ URETERAL STENT REMOVAL Left 9/21/2022    Procedure: CYSTOSCOPY, WITH URETERAL STENT REMOVAL;  Surgeon: Shelby Parra MD;  [facility];  Service: Urology;  Laterality: Left;    CYSTOSCOPY WITH URETEROSCOPY, RETROGRADE PYELOGRAPHY, AND INSERTION OF STENT Left  8/25/2022    Procedure: CYSTOSCOPY, WITH RETROGRADE PYELOGRAM AND URETERAL STENT INSERTION;  Surgeon: Shelyb Parra MD;  Location: [facility] OR;  Service: Urology;  Laterality: Left;    EYE SURGERY      bilateral cataracts    FINGER SURGERY Right 2021    right pinky finger    FLEXIBLE CYSTOSCOPY Left 8/25/2022    Procedure: CYSTOSCOPY, FLEXIBLE WITH STENT REMOVAL;  Surgeon: Shelby Parra MD;  Location: [facility] OR;  Service: Urology;  Laterality: Left;    FRACTURE SURGERY  2014    right femur with neville    HEMORRHOID SURGERY      48 yrs ago    HYSTERECTOMY      NEPHROSTOMY Left 8/25/2022    Procedure: CREATION, NEPHROSTOMY;  Surgeon: Shelby Parra MD;  Location: [facility] OR;  Service: Urology;  Laterality: Left;    PERCUTANEOUS NEPHROLITHOTOMY N/A 8/25/2022    Procedure: NEPHROLITHOTOMY, PERCUTANEOUS;  Surgeon: Shelby Parra MD;  Location: [facility] OR;  Service: Urology;  Laterality: N/A;    REPLACEMENT OF IMPLANTABLE CARDIOVERTER-DEFIBRILLATOR (ICD) GENERATOR N/A 12/13/2019    Procedure: REPLACEMENT, PULSE GENERATOR, ICD-MEDTRONIC;  Surgeon: Sebastian Nowak III, MD;  Location: [facility] CATH;  Service: Cardiology;  Laterality: N/A;    TONSILLECTOMY       Family History   Problem Relation Age of Onset    Heart disease Mother     Cancer Father         Lung Cancer ??? Asbestos    Breast cancer Paternal Aunt        Review of Systems   Constitutional:  Positive for activity change (Somewhat more cautious while walking) and malaise/fatigue. Negative for appetite change, chills, fever and unexpected weight change (gained wt 5 lbs ?).   HENT:  Negative for congestion, ear discharge and postnasal drip.    Eyes:  Negative for discharge, redness and visual disturbance.   Respiratory:  Negative for cough, chest tightness and wheezing.    Cardiovascular:  Negative for chest pain, palpitations, claudication, leg swelling and near-syncope.        History of defibrillator, congestive cardiomyopathy hypertension and dyslipidemia  "  Gastrointestinal:  Negative for abdominal distention, abdominal pain and constipation.        Pellet-like stools and lot of gas.   Endocrine: Negative for polydipsia and polyuria.        Diabetes mellitus on glimepiride.  Osteoporosis on injection Prolia   Genitourinary:  Negative for difficulty urinating, flank pain and hematuria.   Musculoskeletal:  Positive for arthralgias and gait problem.        Recent fall and hit the left side of chest.   Skin:  Negative for color change, pallor and wound.   Neurological:  Positive for weakness. Negative for facial asymmetry and headaches.        Previous presentations of headache has settled down.  Tremors.   Hematological:  Negative for adenopathy. Bruises/bleeds easily.        Patient is on chronic anticoagulation with warfarin.     Psychiatric/Behavioral:            Beginningof memory issues.       Objective:      Vitals:    05/24/23 1425   BP: 90/62   Pulse: 76   Weight: 95.3 kg (210 lb)   Height: 5' 9" (1.753 m)      Blood pressure 90/62, pulse 76, height 5' 9" (1.753 m), weight 95.3 kg (210 lb). Body mass index is 31.01 kg/m².  Physical Exam  Constitutional:       General: She is not in acute distress.     Appearance: She is well-developed. She is obese. She is ill-appearing. She is not toxic-appearing or diaphoretic.      Comments: Patient is obese with a BMI of 31.01   HENT:      Head: Normocephalic and atraumatic.      Comments: .     Mouth/Throat:      Pharynx: Oropharynx is clear. No posterior oropharyngeal erythema.   Eyes:      General: No scleral icterus.        Right eye: No discharge.         Left eye: No discharge.   Neck:      Thyroid: No thyromegaly.      Vascular: No JVD.      Trachea: No tracheal deviation.   Cardiovascular:      Rate and Rhythm: Normal rate and regular rhythm.      Heart sounds:     No friction rub. No gallop.   Pulmonary:      Effort: Pulmonary effort is normal. No respiratory distress.      Breath sounds: No stridor. No wheezing or " rhonchi.   Chest:      Chest wall: Tenderness present.       Abdominal:      General: There is no distension.      Palpations: Abdomen is soft.      Tenderness: There is no abdominal tenderness.   Musculoskeletal:      Cervical back: Neck supple.   Lymphadenopathy:      Cervical: No cervical adenopathy.   Skin:     General: Skin is warm and dry.      Coloration: Skin is not pale.      Findings: No lesion. Rash is not scaling.   Neurological:      Mental Status: She is alert. She is not disoriented.      Motor: Tremor present.   Psychiatric:         Behavior: Behavior normal.         Assessment:               Chronic combined systolic and diastolic congestive heart failure    Paroxysmal atrial fibrillation    Closed fracture of multiple ribs of left side, initial encounter  Comments:  This was discovered on the chest x-ray report and rib series after patient had left.  I notified the patient's daughter and conservative care at this point.    Essential hypertension    Stage 3b chronic kidney disease    Chronic anticoagulation    History of recent fall  -     X-Ray Ribs 2 View Left; Future; Expected date: 05/24/2023  -     X-Ray Chest PA And Lateral; Future; Expected date: 05/24/2023    Left-sided chest wall pain  -     X-Ray Ribs 2 View Left; Future; Expected date: 05/24/2023  -     X-Ray Chest PA And Lateral; Future; Expected date: 05/24/2023       No results displayed because visit has over 200 results.        Summary Echo 2D 5/16/23    The left ventricle is mildly enlarged with mild concentric hypertrophy and moderately decreased systolic function.  The estimated ejection fraction is 40%.  Grade I left ventricular diastolic dysfunction.  There is left ventricular global hypokinesis.  Normal right ventricular size with normal right ventricular systolic function.  Severe left atrial enlargement.  Moderate-to-severe mitral regurgitation.  Mild tricuspid regurgitation.  Elevated central venous pressure (15 mmHg).  The  estimated PA systolic pressure is 45 mmHg.  There is pulmonary hypertension.  Trivial anterior pericardial effusion.     Component Ref Range & Units 3 d ago  (5/18/23) 4 d ago  (5/17/23) 5 d ago  (5/16/23) 6 d ago  (5/15/23) 6 mo ago  (10/28/22) 6 mo ago  (10/27/22) 8 mo ago  (9/14/22   Is  Component Ref Range & Units 3 d ago 6 d ago 6 mo ago 8 mo ago 9 mo ago 2 yr ago 3 yr ago   BNP 0 - 99 pg/mL 1,399 High   >4,500 High  CM  2,874 High  CM  518 High  CM  711 High  CM  2,188 High  CM  430 High       eGFR >60 mL/min/1.73 m^2 29.9 Abnormal   28.0 Abnormal   26.2 Abnormal   26.2 Abnormal   34.8 Abnormal   37.8 Abnormal   26 Abnormal      Creatinine 0.5 - 1.4 mg/dL 1.7 High   1.8 High   1.9 High   1.9 High   1.5 High   1.4  1.9 High      Component Ref Range & Units 3 d ago  (5/18/23) 3 d ago  (5/18/23) 4 d ago  (5/17/23) 4 d ago  (5/17/23) 5 d ago  (5/16/23) 6 d ago  (5/15/23) 6 mo ago  (10/29/22)   POC Glucose 70 - 110 134 High   97  119 High   106  157 High   210 High  R  151 High      Component Ref Range & Units 6 d ago  (5/15/23) 6 mo ago  (10/27/22) 10 mo ago  (7/18/22) 2 yr ago  (2/3/21) 2 yr ago  (2/3/21)   Digoxin Lvl 0.8 - 2.0 ng/mL <0.2 Low   0.6 Low  CM  0.5 Low  CM  <0.2 Low  CM  <0.2 Low       Component Ref Range & Units 10 mo ago  (7/2/22) 2 yr ago  (12/10/20) 3 yr ago  (4/2/20) 3 yr ago  (8/7/19)   Hemoglobin A1C <5.7 % of total Hgb 5.7 High   5.6 R, CM  6.0 R, CM  6.2 High  CM    Comment: For someone without known diabetes, a hemoglobin      Clinical history is pain after fall     There is a left subclavian vein pacemaker with lead tip overlying the right ventricle. The heart is normal in size. The left lung is clear without pneumothorax. There are patchy alveolar opacities in the visualized portion of the right lung.     There are fractures of the anterior left anterior fourth and fifth rib. There are degenerative changes of the spine.     IMPRESSION: Fractures of the anterior left fourth and fifth  ribs     Degenerative changes of the spine     Patchy alveolar opacities within the right lung     Electronically signed by:  Monica Aquino MD  5/24/2023 4:28 PM CDT Workstation: [id number]           Specimen Collected: 05/24/23 16:07 Last Resulted: 05/24/23 16:28              Plan:           Chronic combined systolic and diastolic congestive heart failure    Paroxysmal atrial fibrillation    Closed fracture of multiple ribs of left side, initial encounter  Comments:  This was discovered on the chest x-ray report and rib series after patient had left.  I notified the patient's daughter and conservative care at this point.    Essential hypertension    Stage 3b chronic kidney disease    Chronic anticoagulation    History of recent fall  -     X-Ray Ribs 2 View Left; Future; Expected date: 05/24/2023  -     X-Ray Chest PA And Lateral; Future; Expected date: 05/24/2023    Left-sided chest wall pain  -     X-Ray Ribs 2 View Left; Future; Expected date: 05/24/2023  -     X-Ray Chest PA And Lateral; Future; Expected date: 05/24/2023        Patient's recent hospitalization has been noted for acute on chronic biventricular heart failure and was treated with BiPAP device, diuresis as well as dobutamine.      Heart failure medications include Entresto.  She was also prescribed VERQUVO (Vericiguat)l by Dr. Jones which she could not afford.  Consideration is also for SGLT 2 at this point but with borderline renal function will need to be careful.      I have not formally diagnosed her  with diabetes in the last 3 years or so her hemoglobin A1c has never been greater than 6.5 and the maximum was 6.2.  Ten months ago her hemoglobin A1c was 5.7.    Side of ribs also has been noted.  No bruising is noted.  Daughter requests a rib x-rays to be sure that nothing is fractured.  Continue with fall and injury precautions.  Immunization status for shingles vaccine also noted including pneumonia vaccine.  At this patient patient feels  unwell to consider them.  Follow-up in 4 months time.  Earlier as needed.  Overall prognosis guarded given age and multiple comorbidities.  Keep follow-up with Cardiology also.    /////////////////////////////////////////    At the time of closure of charts-x-ray reports were reviewed and there is a need a fracture of left 4th and 5th ribs and I notified the patient's daughter also.  Left message on the answering system.  Conservative care with cool compresses.  May need somewhat prolonged pain management.  Deep breathing and incentive spirometry.  Activities with caution.    Current Outpatient Medications:     albuterol (PROVENTIL/VENTOLIN HFA) 90 mcg/actuation inhaler, INHALE 2 PUFFS BY MOUTH EVERY 6 HOURS AS NEEDED FOR WHEEZING (Patient taking differently: Inhale 2 puffs into the lungs every 6 (six) hours as needed.), Disp: 18 g, Rfl: 5    amiodarone (PACERONE) 200 MG Tab, Take 200 mg by mouth once daily., Disp: , Rfl:     atorvastatin (LIPITOR) 20 MG tablet, Take 20 mg by mouth every evening., Disp: , Rfl:     Ca-D3-mag ox-zinc--thom-bor 600 mg calcium- 20 mcg-50 mg Tab, Take 1 tablet by mouth 2 (two) times daily., Disp: , Rfl:     cholecalciferol, vitamin D3, 125 mcg (5,000 unit) Tab, Take 5,000 Units by mouth 2 (two) times daily., Disp: , Rfl:     digoxin (LANOXIN) 125 mcg tablet, Take 1 tablet (125 mcg total) by mouth every Mon, Wed, Fri., Disp: 36 tablet, Rfl: 0    fluticasone propionate (FLONASE) 50 mcg/actuation nasal spray, 2 sprays (100 mcg total) by Each Nostril route once daily., Disp: 16 g, Rfl: 5    gabapentin (NEURONTIN) 100 MG capsule, TAKE 2 CAPSULES(200 MG) BY MOUTH THREE TIMES DAILY (Patient taking differently: Take 200 mg by mouth 3 (three) times daily. TAKE 2 CAPSULES(200 MG) BY MOUTH THREE TIMES DAILY), Disp: 180 capsule, Rfl: 5    glimepiride (AMARYL) 1 MG tablet, Take 1 tablet (1 mg total) by mouth before breakfast., Disp: 90 tablet, Rfl: 1    latanoprost 0.005 % ophthalmic solution,  Place 1 drop into both eyes nightly., Disp: , Rfl:     metoprolol succinate (TOPROL-XL) 25 MG 24 hr tablet, Take 1 tablet (25 mg total) by mouth once daily., Disp: 90 tablet, Rfl: 3    midodrine (PROAMATINE) 5 MG Tab, Take 2.5 mg by mouth as needed. Take 1 tablet by mouth when feeling dizzy from low BP, avoid 3 hours prior to bedtime, Disp: , Rfl:     rivaroxaban (XARELTO) 15 mg Tab, Take 1 tablet (15 mg total) by mouth daily with dinner or evening meal. (Patient taking differently: Take 10 mg by mouth daily with dinner or evening meal.), Disp: 90 tablet, Rfl: 0    sacubitriL-valsartan (ENTRESTO)  mg per tablet, Take 1 tablet by mouth 2 (two) times daily., Disp: , Rfl:     torsemide (DEMADEX) 20 MG Tab, Take 1 tablet (20 mg total) by mouth once daily. Take an extra dose in the afternoon for leg swelling, shortness of breath or weight gain (3 lbs overnight or 5 lbs in a week), Disp: 90 tablet, Rfl: 0  No current facility-administered medications for this visit.    Facility-Administered Medications Ordered in Other Visits:     0.9%  NaCl infusion, , Intravenous, Continuous, Sebastian Nowak III, MD, Last Rate: 75 mL/hr at 12/13/19 0728, New Bag at 12/13/19 0728    diphenhydrAMINE injection 25 mg, 25 mg, Intravenous, Once, Sebastian Nowak III, MD    lorazepam injection 1 mg, 1 mg, Intravenous, Once, Sebastian Nowak III, MD

## 2023-05-24 ENCOUNTER — OFFICE VISIT (OUTPATIENT)
Dept: FAMILY MEDICINE | Facility: CLINIC | Age: 83
End: 2023-05-24
Payer: MEDICARE

## 2023-05-24 ENCOUNTER — HOSPITAL ENCOUNTER (OUTPATIENT)
Dept: RADIOLOGY | Facility: HOSPITAL | Age: 83
Discharge: HOME | End: 2023-05-24
Attending: INTERNAL MEDICINE
Payer: MEDICARE

## 2023-05-24 VITALS
DIASTOLIC BLOOD PRESSURE: 62 MMHG | WEIGHT: 210 LBS | BODY MASS INDEX: 31.1 KG/M2 | HEART RATE: 76 BPM | HEIGHT: 69 IN | SYSTOLIC BLOOD PRESSURE: 90 MMHG

## 2023-05-24 DIAGNOSIS — S22.42XA CLOSED FRACTURE OF MULTIPLE RIBS OF LEFT SIDE, INITIAL ENCOUNTER: ICD-10-CM

## 2023-05-24 DIAGNOSIS — I48.0 PAROXYSMAL ATRIAL FIBRILLATION: ICD-10-CM

## 2023-05-24 DIAGNOSIS — Z91.81 HISTORY OF RECENT FALL: ICD-10-CM

## 2023-05-24 DIAGNOSIS — Z79.01 CHRONIC ANTICOAGULATION: ICD-10-CM

## 2023-05-24 DIAGNOSIS — N18.32 STAGE 3B CHRONIC KIDNEY DISEASE: ICD-10-CM

## 2023-05-24 DIAGNOSIS — R07.89 LEFT-SIDED CHEST WALL PAIN: ICD-10-CM

## 2023-05-24 DIAGNOSIS — I50.42 CHRONIC COMBINED SYSTOLIC AND DIASTOLIC CONGESTIVE HEART FAILURE: Primary | ICD-10-CM

## 2023-05-24 DIAGNOSIS — I10 ESSENTIAL HYPERTENSION: ICD-10-CM

## 2023-05-24 PROCEDURE — 3288F PR FALLS RISK ASSESSMENT DOCUMENTED: ICD-10-PCS | Mod: CPTII,S$GLB,, | Performed by: INTERNAL MEDICINE

## 2023-05-24 PROCEDURE — 3078F DIAST BP <80 MM HG: CPT | Mod: CPTII,S$GLB,, | Performed by: INTERNAL MEDICINE

## 2023-05-24 PROCEDURE — 3078F PR MOST RECENT DIASTOLIC BLOOD PRESSURE < 80 MM HG: ICD-10-PCS | Mod: CPTII,S$GLB,, | Performed by: INTERNAL MEDICINE

## 2023-05-24 PROCEDURE — 1160F PR REVIEW ALL MEDS BY PRESCRIBER/CLIN PHARMACIST DOCUMENTED: ICD-10-PCS | Mod: CPTII,S$GLB,, | Performed by: INTERNAL MEDICINE

## 2023-05-24 PROCEDURE — 1125F AMNT PAIN NOTED PAIN PRSNT: CPT | Mod: CPTII,S$GLB,, | Performed by: INTERNAL MEDICINE

## 2023-05-24 PROCEDURE — 3074F PR MOST RECENT SYSTOLIC BLOOD PRESSURE < 130 MM HG: ICD-10-PCS | Mod: CPTII,S$GLB,, | Performed by: INTERNAL MEDICINE

## 2023-05-24 PROCEDURE — 99214 PR OFFICE/OUTPT VISIT, EST, LEVL IV, 30-39 MIN: ICD-10-PCS | Mod: S$GLB,,, | Performed by: INTERNAL MEDICINE

## 2023-05-24 PROCEDURE — 1111F DSCHRG MED/CURRENT MED MERGE: CPT | Mod: CPTII,S$GLB,, | Performed by: INTERNAL MEDICINE

## 2023-05-24 PROCEDURE — 1160F RVW MEDS BY RX/DR IN RCRD: CPT | Mod: CPTII,S$GLB,, | Performed by: INTERNAL MEDICINE

## 2023-05-24 PROCEDURE — 99214 OFFICE O/P EST MOD 30 MIN: CPT | Mod: S$GLB,,, | Performed by: INTERNAL MEDICINE

## 2023-05-24 PROCEDURE — 3074F SYST BP LT 130 MM HG: CPT | Mod: CPTII,S$GLB,, | Performed by: INTERNAL MEDICINE

## 2023-05-24 PROCEDURE — 3288F FALL RISK ASSESSMENT DOCD: CPT | Mod: CPTII,S$GLB,, | Performed by: INTERNAL MEDICINE

## 2023-05-24 PROCEDURE — 1111F PR DISCHARGE MEDS RECONCILED W/ CURRENT OUTPATIENT MED LIST: ICD-10-PCS | Mod: CPTII,S$GLB,, | Performed by: INTERNAL MEDICINE

## 2023-05-24 PROCEDURE — 1100F PR PT FALLS ASSESS DOC 2+ FALLS/FALL W/INJURY/YR: ICD-10-PCS | Mod: CPTII,S$GLB,, | Performed by: INTERNAL MEDICINE

## 2023-05-24 PROCEDURE — 1159F MED LIST DOCD IN RCRD: CPT | Mod: CPTII,S$GLB,, | Performed by: INTERNAL MEDICINE

## 2023-05-24 PROCEDURE — 1125F PR PAIN SEVERITY QUANTIFIED, PAIN PRESENT: ICD-10-PCS | Mod: CPTII,S$GLB,, | Performed by: INTERNAL MEDICINE

## 2023-05-24 PROCEDURE — 1159F PR MEDICATION LIST DOCUMENTED IN MEDICAL RECORD: ICD-10-PCS | Mod: CPTII,S$GLB,, | Performed by: INTERNAL MEDICINE

## 2023-05-24 PROCEDURE — 71046 X-RAY EXAM CHEST 2 VIEWS: CPT | Mod: TC,PO

## 2023-05-24 PROCEDURE — 71100 X-RAY EXAM RIBS UNI 2 VIEWS: CPT | Mod: TC,PO,LT

## 2023-05-24 PROCEDURE — 1100F PTFALLS ASSESS-DOCD GE2>/YR: CPT | Mod: CPTII,S$GLB,, | Performed by: INTERNAL MEDICINE

## 2023-06-16 ENCOUNTER — EXTERNAL HOME HEALTH (OUTPATIENT)
Dept: HOME HEALTH SERVICES | Facility: HOSPITAL | Age: 83
End: 2023-06-16
Payer: MEDICARE

## 2023-06-21 ENCOUNTER — TELEPHONE (OUTPATIENT)
Dept: FAMILY MEDICINE | Facility: CLINIC | Age: 83
End: 2023-06-21

## 2023-06-21 NOTE — TELEPHONE ENCOUNTER
Cheryle with PT is wanting to renew pt HH/PT stated they feel pt would benefit from an extension. They will send over orders to be signed.

## 2023-06-30 ENCOUNTER — DOCUMENT SCAN (OUTPATIENT)
Dept: HOME HEALTH SERVICES | Facility: HOSPITAL | Age: 83
End: 2023-06-30
Payer: MEDICARE

## 2023-08-17 ENCOUNTER — OFFICE VISIT (OUTPATIENT)
Dept: PULMONOLOGY | Facility: CLINIC | Age: 83
End: 2023-08-17
Payer: MEDICARE

## 2023-08-17 VITALS
BODY MASS INDEX: 30.57 KG/M2 | SYSTOLIC BLOOD PRESSURE: 118 MMHG | WEIGHT: 207 LBS | HEART RATE: 67 BPM | DIASTOLIC BLOOD PRESSURE: 70 MMHG | OXYGEN SATURATION: 97 %

## 2023-08-17 DIAGNOSIS — G47.33 OBSTRUCTIVE SLEEP APNEA SYNDROME: ICD-10-CM

## 2023-08-17 DIAGNOSIS — R09.02 HYPOXIA: ICD-10-CM

## 2023-08-17 PROCEDURE — 3288F FALL RISK ASSESSMENT DOCD: CPT | Mod: CPTII,S$GLB,, | Performed by: INTERNAL MEDICINE

## 2023-08-17 PROCEDURE — 1126F PR PAIN SEVERITY QUANTIFIED, NO PAIN PRESENT: ICD-10-PCS | Mod: CPTII,S$GLB,, | Performed by: INTERNAL MEDICINE

## 2023-08-17 PROCEDURE — 3074F SYST BP LT 130 MM HG: CPT | Mod: CPTII,S$GLB,, | Performed by: INTERNAL MEDICINE

## 2023-08-17 PROCEDURE — 1160F RVW MEDS BY RX/DR IN RCRD: CPT | Mod: CPTII,S$GLB,, | Performed by: INTERNAL MEDICINE

## 2023-08-17 PROCEDURE — 3288F PR FALLS RISK ASSESSMENT DOCUMENTED: ICD-10-PCS | Mod: CPTII,S$GLB,, | Performed by: INTERNAL MEDICINE

## 2023-08-17 PROCEDURE — 1159F MED LIST DOCD IN RCRD: CPT | Mod: CPTII,S$GLB,, | Performed by: INTERNAL MEDICINE

## 2023-08-17 PROCEDURE — 1159F PR MEDICATION LIST DOCUMENTED IN MEDICAL RECORD: ICD-10-PCS | Mod: CPTII,S$GLB,, | Performed by: INTERNAL MEDICINE

## 2023-08-17 PROCEDURE — 1126F AMNT PAIN NOTED NONE PRSNT: CPT | Mod: CPTII,S$GLB,, | Performed by: INTERNAL MEDICINE

## 2023-08-17 PROCEDURE — 1101F PR PT FALLS ASSESS DOC 0-1 FALLS W/OUT INJ PAST YR: ICD-10-PCS | Mod: CPTII,S$GLB,, | Performed by: INTERNAL MEDICINE

## 2023-08-17 PROCEDURE — 3078F PR MOST RECENT DIASTOLIC BLOOD PRESSURE < 80 MM HG: ICD-10-PCS | Mod: CPTII,S$GLB,, | Performed by: INTERNAL MEDICINE

## 2023-08-17 PROCEDURE — 99214 PR OFFICE/OUTPT VISIT, EST, LEVL IV, 30-39 MIN: ICD-10-PCS | Mod: S$GLB,,, | Performed by: INTERNAL MEDICINE

## 2023-08-17 PROCEDURE — 1101F PT FALLS ASSESS-DOCD LE1/YR: CPT | Mod: CPTII,S$GLB,, | Performed by: INTERNAL MEDICINE

## 2023-08-17 PROCEDURE — 1160F PR REVIEW ALL MEDS BY PRESCRIBER/CLIN PHARMACIST DOCUMENTED: ICD-10-PCS | Mod: CPTII,S$GLB,, | Performed by: INTERNAL MEDICINE

## 2023-08-17 PROCEDURE — 3078F DIAST BP <80 MM HG: CPT | Mod: CPTII,S$GLB,, | Performed by: INTERNAL MEDICINE

## 2023-08-17 PROCEDURE — 3074F PR MOST RECENT SYSTOLIC BLOOD PRESSURE < 130 MM HG: ICD-10-PCS | Mod: CPTII,S$GLB,, | Performed by: INTERNAL MEDICINE

## 2023-08-17 PROCEDURE — 99214 OFFICE O/P EST MOD 30 MIN: CPT | Mod: S$GLB,,, | Performed by: INTERNAL MEDICINE

## 2023-08-17 RX ORDER — AMLODIPINE BESYLATE 2.5 MG/1
2.5 TABLET ORAL DAILY
Status: ON HOLD | COMMUNITY
Start: 2023-05-24 | End: 2023-09-26 | Stop reason: HOSPADM

## 2023-08-17 RX ORDER — VERICIGUAT 5 MG/1
1 TABLET, FILM COATED ORAL DAILY
Status: ON HOLD | COMMUNITY
Start: 2023-08-14 | End: 2023-10-13 | Stop reason: HOSPADM

## 2023-08-17 RX ORDER — DORZOLAMIDE HYDROCHLORIDE AND TIMOLOL MALEATE 20; 5 MG/ML; MG/ML
1 SOLUTION/ DROPS OPHTHALMIC 2 TIMES DAILY
COMMUNITY
Start: 2023-07-01

## 2023-08-17 RX ORDER — PROPRANOLOL HYDROCHLORIDE 10 MG/1
10 TABLET ORAL 2 TIMES DAILY PRN
Status: ON HOLD | COMMUNITY
Start: 2023-06-30 | End: 2023-09-26 | Stop reason: HOSPADM

## 2023-08-17 NOTE — PROGRESS NOTES
"Office Visit *    Patient Name: Jo Alegre  MRN: [id number]  : 1940      Reason for visit: COPD, hypoxemia    HPI:     2020 - Referred here for evaluation for possible COPD.  She was hospitalized in 2020 and found to need O2  (sat 87% with exertion) - has home O2 (2 LPM) and she wears all day.  She has a h/o smoking about 1/2 PPD for about 30 years quit about .  She has never been tested for COPD.  She has sleep study in the past and was diagnosed with sleep apnea but couldn't tolerate CPAP at that time.  Has h/o CHF (though most recent ECHO looks OK), AICD, atrial fibrillation.    2020 - Here for follow up, doing well with no new complaints.  Had PFT - no obstruction, TLC at lower level of normal and mild/mod reduction in DLCO.  ^ minute walk test was good she met her predicted distance and had no desaturation noted.  She remains at risk for sleep apnea - d/w her and family - will do overnight oximetry to check on nightly O2 needs and as a screen for possible JENNIFER ( necessary will do HST).  No corona virus exposures and has been practicing social distancing.  Discussed the need to work on weight loss and regular exercise.    2/3/2021 - Here for follow up, overall about the same.  Has CPAP at home and is doing "OK" with it but having some tolerance issues.  She is trying to be more active.  She feels that her SOB is getting worse and does have episodes of SOB happening at rest which last 3-4 minutes and spontaneously resolve .  She does have exertional dyspnea but is not exercising at all.  Patient has no known corona virus exposures and has been practicing social distancing.  We have discussed the virus and precautions and all questions have been answered.    2021 - Here for follow up, has had adjustments with her CPAP and she is doing much better (8-10 hours per night), feels better overall and reports good oxygen levels during the day and energy levels.  No other new issues.  " Patient has no known corona virus exposures and has been practicing social distancing.  We have discussed the virus and precautions and all questions have been answered.    10/5/2021 - Here for follow up and hasd been doing well.  Patient is here for follow up visit for sleep apnea and therapy.  They report no issues with their machine.  They report good compliance with the therapy and feel that they are benefiting from it.  Discussed alternative therapies/options as appropriate.  Discussed diet, weight and exercise as appropriate.  All questions answered.  Has not been able to wear her CPAP for the last week or so due to recent dental work (we discussed this).  Patient has no known corona virus exposures and has been practicing social distancing.  We have discussed the virus and precautions and all questions have been answered.  She does NOT have COPD by PFT done 7/2020.    8/18/2022 - Here for preop clearance for renal surgery for staghorn calculus.  Breathing is about the same though she may have some more dyspnea since her recent admission.  No fever, chills, cough.  She does NOT have COPD.  She is doing well with her CPAP.  She is planned for surgery next week.    Preoperative Pulmonary Clearance    Pt was seen for preoperative clearance from a pulmonary standpoint for planned renal surgery.  The risks of the procedure have been discussed with the patient including the risk of prolonged ventilatory support/difficulty weaning from ventilator, post-procedure pneumonia, post-procedure respiratory failure and DVT/pulmonary embolism.  The pt is currently stable from their respiratory status and they are cleared for the planned procedure at increased risk.  The risk should not be considered prohibitive.  If you have any questions please contact me.  She should have CPAP available in postoperative period and be moonitored because of her h/o JENNIFER>    8/17/2023 - Here for follow up, had her surgery and did well.   Breathing has been OK.  Was hospitalized in May for heart issues.  She is having some issues with her O2 concentrator and she will contact MELE ( we will get involved if needed).     CPAP Therapy    CPAP therapy - 12    Date of sleep study - 9/20 RDI - 23    Pt reports good compliance and benefit with the therapy.    Face to face visit with pt concerning their CPAP therapy.  I have stressed continued compliance with the treatment and all questions were answered.  Supply refills will be taken care of as needed.      Flu and Pneumonia Vaccination    I have recommended that the patient get this years influenza vaccine.  We discussed the risks and benefits of this treatment.  Got 20/21    I reviewed patient's current pneumonia vaccine status.  Patient is current.  We have discussed the current guidelines and recommendations for pneumonia vaccination.    Got Covid vaccine (Moderna - 4/21)          Past Medical History    Past Medical History:   Diagnosis Date    Allergy     Codeine, Lasix    Atrial fibrillation     Atrial fibrillation     Cataract     CHF (congestive heart failure)     Diabetes mellitus, type 2     Ejection fraction < 50% 10/18/2017    Approximately 35%  Based on prior  Echocardiogram.    Encounter for blood transfusion     Hyperlipidemia     Osteoporosis     PONV (postoperative nausea and vomiting)     Thyroid disorder screening 10/17/2017    TSH of 1.12 ordered by Dr. dee Jones       Past Surgical History    Past Surgical History:   Procedure Laterality Date    ANTEGRADE NEPHROSTOGRAPHY Left 8/25/2022    Procedure: NEPHROSTOGRAM, ANTEGRADE;  Surgeon: Shelby Parra MD;  Location: [facility] OR;  Service: Urology;  Laterality: Left;    CHOLECYSTECTOMY  1997    [place]     COLONOSCOPY      CYSTOSCOPY W/ URETERAL STENT PLACEMENT Left 7/17/2022    Procedure: CYSTOSCOPY, WITH URETERAL STENT INSERTION;  Surgeon: Shelby Parra MD;  Location: [facility] OR;  Service: Urology;  Laterality: Left;     CYSTOSCOPY W/ URETERAL STENT REMOVAL Left 9/21/2022    Procedure: CYSTOSCOPY, WITH URETERAL STENT REMOVAL;  Surgeon: Shelby Parra MD;  Location: [facility] OR;  Service: Urology;  Laterality: Left;    CYSTOSCOPY WITH URETEROSCOPY, RETROGRADE PYELOGRAPHY, AND INSERTION OF STENT Left 8/25/2022    Procedure: CYSTOSCOPY, WITH RETROGRADE PYELOGRAM AND URETERAL STENT INSERTION;  Surgeon: Shelby Parra MD;  Location: [facility] OR;  Service: Urology;  Laterality: Left;    EYE SURGERY      bilateral cataracts    FINGER SURGERY Right 2021    right pinky finger    FLEXIBLE CYSTOSCOPY Left 8/25/2022    Procedure: CYSTOSCOPY, FLEXIBLE WITH STENT REMOVAL;  Surgeon: Shelby Parra MD;  Location: [facility] OR;  Service: Urology;  Laterality: Left;    FRACTURE SURGERY  2014    right femur with neville    HEMORRHOID SURGERY      48 yrs ago    HYSTERECTOMY      NEPHROSTOMY Left 8/25/2022    Procedure: CREATION, NEPHROSTOMY;  Surgeon: Shelby Parra MD;  Location: [facility] OR;  Service: Urology;  Laterality: Left;    PERCUTANEOUS NEPHROLITHOTOMY N/A 8/25/2022    Procedure: NEPHROLITHOTOMY, PERCUTANEOUS;  Surgeon: Shelby Parra MD;  Location: [facility] OR;  Service: Urology;  Laterality: N/A;    REPLACEMENT OF IMPLANTABLE CARDIOVERTER-DEFIBRILLATOR (ICD) GENERATOR N/A 12/13/2019    Procedure: REPLACEMENT, PULSE GENERATOR, ICD-MEDTRONIC;  Surgeon: Sebastian Nowak III, MD;  Location: [facility] CATH;  Service: Cardiology;  Laterality: N/A;    TONSILLECTOMY         Medications      Current Outpatient Medications:     albuterol (PROVENTIL/VENTOLIN HFA) 90 mcg/actuation inhaler, INHALE 2 PUFFS BY MOUTH EVERY 6 HOURS AS NEEDED FOR WHEEZING (Patient taking differently: Inhale 2 puffs into the lungs every 6 (six) hours as needed.), Disp: 18 g, Rfl: 5    amiodarone (PACERONE) 200 MG Tab, Take 200 mg by mouth once daily., Disp: , Rfl:     amLODIPine (NORVASC) 2.5 MG tablet, Take 2.5 mg by mouth., Disp: , Rfl:     atorvastatin (LIPITOR) 20 MG tablet, Take  20 mg by mouth every evening., Disp: , Rfl:     Ca-D3-mag ox-zinc--thom-bor 600 mg calcium- 20 mcg-50 mg Tab, Take 1 tablet by mouth 2 (two) times daily., Disp: , Rfl:     cholecalciferol, vitamin D3, 125 mcg (5,000 unit) Tab, Take 5,000 Units by mouth 2 (two) times daily., Disp: , Rfl:     digoxin (LANOXIN) 125 mcg tablet, Take 1 tablet (125 mcg total) by mouth every Mon, Wed, Fri., Disp: 36 tablet, Rfl: 0    dorzolamide-timolol 2-0.5% (COSOPT) 22.3-6.8 mg/mL ophthalmic solution, Place into both eyes., Disp: , Rfl:     fluticasone propionate (FLONASE) 50 mcg/actuation nasal spray, 2 sprays (100 mcg total) by Each Nostril route once daily., Disp: 16 g, Rfl: 5    gabapentin (NEURONTIN) 100 MG capsule, TAKE 2 CAPSULES(200 MG) BY MOUTH THREE TIMES DAILY (Patient taking differently: Take 200 mg by mouth 3 (three) times daily. TAKE 2 CAPSULES(200 MG) BY MOUTH THREE TIMES DAILY), Disp: 180 capsule, Rfl: 5    glimepiride (AMARYL) 1 MG tablet, Take 1 tablet (1 mg total) by mouth before breakfast., Disp: 90 tablet, Rfl: 1    latanoprost 0.005 % ophthalmic solution, Place 1 drop into both eyes nightly., Disp: , Rfl:     metoprolol succinate (TOPROL-XL) 25 MG 24 hr tablet, Take 1 tablet (25 mg total) by mouth once daily., Disp: 90 tablet, Rfl: 3    midodrine (PROAMATINE) 5 MG Tab, Take 2.5 mg by mouth as needed. Take 1 tablet by mouth when feeling dizzy from low BP, avoid 3 hours prior to bedtime, Disp: , Rfl:     propranoloL (INDERAL) 10 MG tablet, Take 10 mg by mouth 2 (two) times daily., Disp: , Rfl:     sacubitriL-valsartan (ENTRESTO)  mg per tablet, Take 1 tablet by mouth 2 (two) times daily., Disp: , Rfl:     VERQUVO 5 mg Tab, Take by mouth., Disp: , Rfl:     rivaroxaban (XARELTO) 15 mg Tab, Take 1 tablet (15 mg total) by mouth daily with dinner or evening meal. (Patient taking differently: Take 10 mg by mouth daily with dinner or evening meal.), Disp: 90 tablet, Rfl: 0    torsemide (DEMADEX) 20 MG Tab, Take 1  tablet (20 mg total) by mouth once daily. Take an extra dose in the afternoon for leg swelling, shortness of breath or weight gain (3 lbs overnight or 5 lbs in a week), Disp: 90 tablet, Rfl: 0  No current facility-administered medications for this visit.    Facility-Administered Medications Ordered in Other Visits:     0.9%  NaCl infusion, , Intravenous, Continuous, Sebastian Nowak III, MD, Last Rate: 75 mL/hr at 12/13/19 0728, New Bag at 12/13/19 0728    diphenhydrAMINE injection 25 mg, 25 mg, Intravenous, Once, Sebastian Nowak III, MD    lorazepam injection 1 mg, 1 mg, Intravenous, Once, Sebastian Nowak III, MD    Allergies    Review of patient's allergies indicates:   Allergen Reactions    Codeine Other (See Comments)     nausea    Hydrocodone Nausea And Vomiting    Lasix [furosemide]      rash       SocHx    Social History     Tobacco Use   Smoking Status Former    Current packs/day: 0.00    Passive exposure: Past   Smokeless Tobacco Never   Tobacco Comments    quit 2013       Social History     Substance and Sexual Activity   Alcohol Use No       Drug Use - no  Occupation - retired, housewife  Asbestos exposure - no  Pets - no    FMHx    Family History   Problem Relation Age of Onset    Heart disease Mother     Cancer Father         Lung Cancer ??? Asbestos    Breast cancer Paternal Aunt          Review of Systems  Review of Systems   Constitutional:  Negative for chills, diaphoresis, fever, malaise/fatigue and weight loss.   HENT:  Negative for congestion.    Eyes:  Negative for pain.   Respiratory:  Positive for shortness of breath. Negative for cough, hemoptysis, sputum production, wheezing and stridor.    Cardiovascular:  Positive for leg swelling. Negative for chest pain, palpitations, orthopnea, claudication and PND.        Decreased circulation   Gastrointestinal:  Negative for abdominal pain, blood in stool, constipation, diarrhea, heartburn, nausea and vomiting.   Genitourinary:  Negative for dysuria,  frequency, hematuria and urgency.   Musculoskeletal:  Negative for back pain, falls, myalgias and neck pain.        Some aches and pains   Skin:  Negative for itching and rash.   Neurological:  Negative for dizziness, tingling, tremors, sensory change, speech change, focal weakness, seizures, loss of consciousness, weakness and headaches.   Psychiatric/Behavioral:  Negative for depression, substance abuse and suicidal ideas. The patient is not nervous/anxious.        Physical Exam    Vitals:    08/17/23 1122   BP: 118/70   BP Location: Left arm   Patient Position: Sitting   BP Method: Medium (Manual)   Pulse: 67   SpO2: 97%   Weight: 93.9 kg (207 lb)       Physical Exam  Vitals and nursing note reviewed.   Constitutional:       General: She is not in acute distress.     Appearance: She is well-developed. She is obese. She is not ill-appearing, toxic-appearing or diaphoretic.      Comments: No distress   HENT:      Head: Normocephalic and atraumatic.      Right Ear: External ear normal.      Left Ear: External ear normal.      Nose: Nose normal.   Eyes:      General: No scleral icterus.        Right eye: No discharge.         Left eye: No discharge.      Extraocular Movements: Extraocular movements intact.      Conjunctiva/sclera: Conjunctivae normal.      Pupils: Pupils are equal, round, and reactive to light.   Neck:      Thyroid: No thyromegaly.      Vascular: No JVD.      Trachea: No tracheal deviation.   Cardiovascular:      Rate and Rhythm: Normal rate and regular rhythm.      Pulses: Normal pulses.      Heart sounds: Normal heart sounds. No murmur heard.     No friction rub. No gallop.      Comments: Defibrillator in place  Pulmonary:      Effort: Pulmonary effort is normal. No respiratory distress.      Breath sounds: Normal breath sounds. No stridor. No wheezing or rales.   Chest:      Chest wall: No tenderness.   Abdominal:      General: Bowel sounds are normal. There is no distension.      Palpations:  Abdomen is soft.      Tenderness: There is no abdominal tenderness. There is no guarding.      Comments: obese   Musculoskeletal:         General: Swelling present. No tenderness or deformity. Normal range of motion.      Cervical back: Normal range of motion and neck supple. No rigidity. No muscular tenderness.      Right lower leg: Edema present.      Left lower leg: Edema present.   Lymphadenopathy:      Cervical: No cervical adenopathy.   Skin:     General: Skin is warm and dry.      Coloration: Skin is not jaundiced.   Neurological:      General: No focal deficit present.      Mental Status: She is alert and oriented to person, place, and time.      Cranial Nerves: No cranial nerve deficit.   Psychiatric:         Mood and Affect: Mood normal.         Behavior: Behavior normal.         Thought Content: Thought content normal.         Judgment: Judgment normal.         Labs    Lab Results   Component Value Date    WBC 6.95 05/18/2023    HGB 10.3 (L) 05/18/2023    HCT 33.8 (L) 05/18/2023     05/18/2023       Sodium   Date Value Ref Range Status   05/18/2023 138 136 - 145 mmol/L Final     Potassium   Date Value Ref Range Status   05/18/2023 4.3 3.5 - 5.1 mmol/L Final     Chloride   Date Value Ref Range Status   05/18/2023 103 95 - 110 mmol/L Final     CO2   Date Value Ref Range Status   05/18/2023 29 23 - 29 mmol/L Final     Glucose   Date Value Ref Range Status   05/18/2023 96 70 - 110 mg/dL Final     BUN   Date Value Ref Range Status   05/18/2023 23 8 - 23 mg/dL Final     Creatinine   Date Value Ref Range Status   05/18/2023 1.7 (H) 0.5 - 1.4 mg/dL Final     Calcium   Date Value Ref Range Status   05/18/2023 8.9 8.7 - 10.5 mg/dL Final     Total Protein   Date Value Ref Range Status   05/15/2023 7.6 6.0 - 8.4 g/dL Final     Albumin   Date Value Ref Range Status   05/15/2023 3.4 (L) 3.5 - 5.2 g/dL Final     Total Bilirubin   Date Value Ref Range Status   05/15/2023 0.7 0.1 - 1.0 mg/dL Final     Comment:      For infants and newborns, interpretation of results should be based  on gestational age, weight and in agreement with clinical  observations.    Premature Infant recommended reference ranges:  Up to 24 hours.............<8.0 mg/dL  Up to 48 hours............<12.0 mg/dL  3-5 days..................<15.0 mg/dL  6-29 days.................<15.0 mg/dL       Alkaline Phosphatase   Date Value Ref Range Status   05/15/2023 100 55 - 135 U/L Final     AST   Date Value Ref Range Status   05/15/2023 25 10 - 40 U/L Final     ALT   Date Value Ref Range Status   05/15/2023 23 10 - 44 U/L Final     Anion Gap   Date Value Ref Range Status   05/18/2023 6 (L) 8 - 16 mmol/L Final       Xrays    EXAMINATION:  XR CHEST PA AND LATERAL     CLINICAL HISTORY:  shortness of breath     FINDINGS:  PA and lateral chest is compared to 01/17/2020 shows normal cardiomediastinal silhouette.     Lungs are clear. Pulmonary vasculature is normal. No acute osseous abnormality.     Impression:     No acute pulmonary process        Electronically signed by: Monica Aquino MD  Date:                                            04/01/2020  Time:                                           16:57      ECHO (4/1)  Mild eccentric left ventricular hypertrophy.  Mild left ventricular enlargement.  Normal left ventricular systolic function. The estimated ejection fraction is 60%.  Grade I (mild) left ventricular diastolic dysfunction consistent with impaired relaxation.  No wall motion abnormalities.  Normal right ventricular systolic function.  Severe left atrial enlargement.  Mild mitral sclerosis.  There is mild leaflet calcification of the Mitral Valve.  Mild mitral regurgitation.  Intermediate central venous pressure (8 mmHg).  The estimated PA systolic pressure is 37 mmHg    Impression/Plan    Problem List Items Addressed This Visit          Pulmonary    Hypoxia     Uses O2 while sleeping            Other    Obstructive sleep apnea syndrome     Continue present  PAP therapy  Pt to call if they have any trouble with their machines  Discussed with pt about signs and symptoms to watch for  Will refill supplies as needed  Have encouraged pt to continue to work on diet, weight loss and exercise                      Alverto Pascal MD

## 2023-08-21 PROBLEM — J96.21 ACUTE ON CHRONIC RESPIRATORY FAILURE WITH HYPOXIA: Status: RESOLVED | Noted: 2020-04-03 | Resolved: 2023-08-21

## 2023-08-21 PROBLEM — N17.9 AKI (ACUTE KIDNEY INJURY): Status: RESOLVED | Noted: 2023-05-16 | Resolved: 2023-08-21

## 2023-09-18 ENCOUNTER — HOSPITAL ENCOUNTER (INPATIENT)
Facility: HOSPITAL | Age: 83
LOS: 7 days | Discharge: NURSING, REHAB OR LONG-TERM CARE | DRG: 193 | End: 2023-09-26
Attending: EMERGENCY MEDICINE | Admitting: STUDENT IN AN ORGANIZED HEALTH CARE EDUCATION/TRAINING PROGRAM
Payer: MEDICARE

## 2023-09-18 DIAGNOSIS — J96.01 ACUTE RESPIRATORY FAILURE WITH HYPOXIA: ICD-10-CM

## 2023-09-18 DIAGNOSIS — I49.9 ABNORMAL HEART RHYTHM: ICD-10-CM

## 2023-09-18 DIAGNOSIS — R07.9 CHEST PAIN: ICD-10-CM

## 2023-09-18 DIAGNOSIS — R73.9 ELEVATED BLOOD SUGAR: ICD-10-CM

## 2023-09-18 DIAGNOSIS — J90 RECURRENT LEFT PLEURAL EFFUSION: Primary | ICD-10-CM

## 2023-09-18 DIAGNOSIS — I50.9 CONGESTIVE HEART FAILURE, UNSPECIFIED HF CHRONICITY, UNSPECIFIED HEART FAILURE TYPE: ICD-10-CM

## 2023-09-18 DIAGNOSIS — J18.9 PARAPNEUMONIC EFFUSION: ICD-10-CM

## 2023-09-18 DIAGNOSIS — I50.9 CHF (CONGESTIVE HEART FAILURE): ICD-10-CM

## 2023-09-18 DIAGNOSIS — J91.8 PARAPNEUMONIC EFFUSION: ICD-10-CM

## 2023-09-18 DIAGNOSIS — R06.02 SOB (SHORTNESS OF BREATH): ICD-10-CM

## 2023-09-18 DIAGNOSIS — I50.43 ACUTE ON CHRONIC COMBINED SYSTOLIC AND DIASTOLIC CONGESTIVE HEART FAILURE: ICD-10-CM

## 2023-09-18 LAB
ALBUMIN SERPL BCP-MCNC: 2.6 G/DL (ref 3.5–5.2)
ALP SERPL-CCNC: 69 U/L (ref 55–135)
ALT SERPL W/O P-5'-P-CCNC: 10 U/L (ref 10–44)
ANION GAP SERPL CALC-SCNC: 8 MMOL/L (ref 8–16)
AST SERPL-CCNC: 14 U/L (ref 10–40)
BASOPHILS # BLD AUTO: 0.04 K/UL (ref 0–0.2)
BASOPHILS NFR BLD: 0.4 % (ref 0–1.9)
BILIRUB SERPL-MCNC: 0.4 MG/DL (ref 0.1–1)
BNP SERPL-MCNC: 523 PG/ML (ref 0–99)
BUN SERPL-MCNC: 26 MG/DL (ref 8–23)
CALCIUM SERPL-MCNC: 9.3 MG/DL (ref 8.7–10.5)
CHLORIDE SERPL-SCNC: 98 MMOL/L (ref 95–110)
CO2 SERPL-SCNC: 29 MMOL/L (ref 23–29)
CREAT SERPL-MCNC: 1.8 MG/DL (ref 0.5–1.4)
DIFFERENTIAL METHOD: ABNORMAL
EOSINOPHIL # BLD AUTO: 0.1 K/UL (ref 0–0.5)
EOSINOPHIL NFR BLD: 0.7 % (ref 0–8)
ERYTHROCYTE [DISTWIDTH] IN BLOOD BY AUTOMATED COUNT: 16 % (ref 11.5–14.5)
EST. GFR  (NO RACE VARIABLE): 27.8 ML/MIN/1.73 M^2
GLUCOSE SERPL-MCNC: 176 MG/DL (ref 70–110)
HCT VFR BLD AUTO: 34.3 % (ref 37–48.5)
HGB BLD-MCNC: 10.5 G/DL (ref 12–16)
IMM GRANULOCYTES # BLD AUTO: 0.03 K/UL (ref 0–0.04)
IMM GRANULOCYTES NFR BLD AUTO: 0.3 % (ref 0–0.5)
INR PPP: 1 (ref 0.8–1.2)
LYMPHOCYTES # BLD AUTO: 1 K/UL (ref 1–4.8)
LYMPHOCYTES NFR BLD: 9.7 % (ref 18–48)
MCH RBC QN AUTO: 26.2 PG (ref 27–31)
MCHC RBC AUTO-ENTMCNC: 30.6 G/DL (ref 32–36)
MCV RBC AUTO: 86 FL (ref 82–98)
MONOCYTES # BLD AUTO: 0.9 K/UL (ref 0.3–1)
MONOCYTES NFR BLD: 9.1 % (ref 4–15)
NEUTROPHILS # BLD AUTO: 8 K/UL (ref 1.8–7.7)
NEUTROPHILS NFR BLD: 79.8 % (ref 38–73)
NRBC BLD-RTO: 0 /100 WBC
PLATELET # BLD AUTO: 452 K/UL (ref 150–450)
PMV BLD AUTO: 9.1 FL (ref 9.2–12.9)
POTASSIUM SERPL-SCNC: 4.2 MMOL/L (ref 3.5–5.1)
PROT SERPL-MCNC: 6.7 G/DL (ref 6–8.4)
PROTHROMBIN TIME: 11.1 SEC (ref 9–12.5)
RBC # BLD AUTO: 4.01 M/UL (ref 4–5.4)
SODIUM SERPL-SCNC: 135 MMOL/L (ref 136–145)
WBC # BLD AUTO: 10.05 K/UL (ref 3.9–12.7)

## 2023-09-18 PROCEDURE — 99285 EMERGENCY DEPT VISIT HI MDM: CPT | Mod: 25

## 2023-09-18 PROCEDURE — 83880 ASSAY OF NATRIURETIC PEPTIDE: CPT | Performed by: EMERGENCY MEDICINE

## 2023-09-18 PROCEDURE — 85610 PROTHROMBIN TIME: CPT | Performed by: EMERGENCY MEDICINE

## 2023-09-18 PROCEDURE — 80053 COMPREHEN METABOLIC PANEL: CPT | Performed by: EMERGENCY MEDICINE

## 2023-09-18 PROCEDURE — 85025 COMPLETE CBC W/AUTO DIFF WBC: CPT | Performed by: EMERGENCY MEDICINE

## 2023-09-18 PROCEDURE — 93010 ELECTROCARDIOGRAM REPORT: CPT | Mod: ,,, | Performed by: INTERNAL MEDICINE

## 2023-09-18 PROCEDURE — 93010 EKG 12-LEAD: ICD-10-PCS | Mod: ,,, | Performed by: INTERNAL MEDICINE

## 2023-09-18 PROCEDURE — 93005 ELECTROCARDIOGRAM TRACING: CPT | Performed by: INTERNAL MEDICINE

## 2023-09-19 ENCOUNTER — CLINICAL SUPPORT (OUTPATIENT)
Dept: CARDIOLOGY | Facility: HOSPITAL | Age: 83
DRG: 193 | End: 2023-09-19
Attending: INTERNAL MEDICINE
Payer: MEDICARE

## 2023-09-19 VITALS — BODY MASS INDEX: 33.27 KG/M2 | HEIGHT: 67 IN | WEIGHT: 212 LBS

## 2023-09-19 PROBLEM — I50.9 CHF (CONGESTIVE HEART FAILURE): Status: ACTIVE | Noted: 2023-09-19

## 2023-09-19 PROBLEM — I50.9 CHF (CONGESTIVE HEART FAILURE): Status: RESOLVED | Noted: 2023-09-19 | Resolved: 2023-09-19

## 2023-09-19 PROBLEM — J90 RECURRENT LEFT PLEURAL EFFUSION: Status: ACTIVE | Noted: 2023-09-19

## 2023-09-19 PROBLEM — J96.01 ACUTE RESPIRATORY FAILURE WITH HYPOXIA: Status: ACTIVE | Noted: 2023-09-19

## 2023-09-19 LAB
ALLENS TEST: ABNORMAL
DELSYS: ABNORMAL
DIGOXIN SERPL-MCNC: 0.3 NG/ML (ref 0.8–2)
ESTIMATED AVG GLUCOSE: 126 MG/DL (ref 68–131)
FLOW: 6
HBA1C MFR BLD: 6 % (ref 4.5–6.2)
HCO3 UR-SCNC: 29.2 MMOL/L (ref 24–28)
MAGNESIUM SERPL-MCNC: 1.8 MG/DL (ref 1.6–2.6)
MODE: ABNORMAL
PCO2 BLDA: 35.9 MMHG (ref 35–45)
PH SMN: 7.52 [PH] (ref 7.35–7.45)
PO2 BLDA: 84 MMHG (ref 80–100)
POC BE: 6 MMOL/L
POC SATURATED O2: 97 % (ref 95–100)
POC TCO2: 30 MMOL/L (ref 23–27)
SAMPLE: ABNORMAL
SITE: ABNORMAL
TROPONIN I SERPL HS-MCNC: 16.2 PG/ML (ref 0–14.9)
TROPONIN I SERPL HS-MCNC: 18.6 PG/ML (ref 0–14.9)

## 2023-09-19 PROCEDURE — 25000003 PHARM REV CODE 250: Performed by: STUDENT IN AN ORGANIZED HEALTH CARE EDUCATION/TRAINING PROGRAM

## 2023-09-19 PROCEDURE — 99291 CRITICAL CARE FIRST HOUR: CPT | Mod: ,,, | Performed by: STUDENT IN AN ORGANIZED HEALTH CARE EDUCATION/TRAINING PROGRAM

## 2023-09-19 PROCEDURE — 36415 COLL VENOUS BLD VENIPUNCTURE: CPT | Performed by: INTERNAL MEDICINE

## 2023-09-19 PROCEDURE — 82803 BLOOD GASES ANY COMBINATION: CPT

## 2023-09-19 PROCEDURE — 80162 ASSAY OF DIGOXIN TOTAL: CPT | Performed by: STUDENT IN AN ORGANIZED HEALTH CARE EDUCATION/TRAINING PROGRAM

## 2023-09-19 PROCEDURE — 94761 N-INVAS EAR/PLS OXIMETRY MLT: CPT

## 2023-09-19 PROCEDURE — 93005 ELECTROCARDIOGRAM TRACING: CPT | Performed by: GENERAL PRACTICE

## 2023-09-19 PROCEDURE — 63600175 PHARM REV CODE 636 W HCPCS: Performed by: INTERNAL MEDICINE

## 2023-09-19 PROCEDURE — 94640 AIRWAY INHALATION TREATMENT: CPT

## 2023-09-19 PROCEDURE — 36600 WITHDRAWAL OF ARTERIAL BLOOD: CPT

## 2023-09-19 PROCEDURE — C1751 CATH, INF, PER/CENT/MIDLINE: HCPCS

## 2023-09-19 PROCEDURE — 99900035 HC TECH TIME PER 15 MIN (STAT)

## 2023-09-19 PROCEDURE — 36415 COLL VENOUS BLD VENIPUNCTURE: CPT | Performed by: STUDENT IN AN ORGANIZED HEALTH CARE EDUCATION/TRAINING PROGRAM

## 2023-09-19 PROCEDURE — 93010 EKG 12-LEAD: ICD-10-PCS | Mod: ,,, | Performed by: GENERAL PRACTICE

## 2023-09-19 PROCEDURE — 25000003 PHARM REV CODE 250: Performed by: INTERNAL MEDICINE

## 2023-09-19 PROCEDURE — 25000242 PHARM REV CODE 250 ALT 637 W/ HCPCS: Performed by: STUDENT IN AN ORGANIZED HEALTH CARE EDUCATION/TRAINING PROGRAM

## 2023-09-19 PROCEDURE — 83036 HEMOGLOBIN GLYCOSYLATED A1C: CPT | Performed by: STUDENT IN AN ORGANIZED HEALTH CARE EDUCATION/TRAINING PROGRAM

## 2023-09-19 PROCEDURE — 93306 TTE W/DOPPLER COMPLETE: CPT

## 2023-09-19 PROCEDURE — 94660 CPAP INITIATION&MGMT: CPT

## 2023-09-19 PROCEDURE — 84484 ASSAY OF TROPONIN QUANT: CPT | Mod: 91 | Performed by: STUDENT IN AN ORGANIZED HEALTH CARE EDUCATION/TRAINING PROGRAM

## 2023-09-19 PROCEDURE — 87040 BLOOD CULTURE FOR BACTERIA: CPT | Performed by: INTERNAL MEDICINE

## 2023-09-19 PROCEDURE — 83735 ASSAY OF MAGNESIUM: CPT | Performed by: INTERNAL MEDICINE

## 2023-09-19 PROCEDURE — 99291 PR CRITICAL CARE, E/M 30-74 MINUTES: ICD-10-PCS | Mod: ,,, | Performed by: STUDENT IN AN ORGANIZED HEALTH CARE EDUCATION/TRAINING PROGRAM

## 2023-09-19 PROCEDURE — 25000003 PHARM REV CODE 250: Performed by: EMERGENCY MEDICINE

## 2023-09-19 PROCEDURE — 20000000 HC ICU ROOM

## 2023-09-19 PROCEDURE — 27000221 HC OXYGEN, UP TO 24 HOURS

## 2023-09-19 PROCEDURE — 96374 THER/PROPH/DIAG INJ IV PUSH: CPT

## 2023-09-19 PROCEDURE — 93010 ELECTROCARDIOGRAM REPORT: CPT | Mod: ,,, | Performed by: GENERAL PRACTICE

## 2023-09-19 RX ORDER — AMOXICILLIN 250 MG
1 CAPSULE ORAL DAILY PRN
Status: DISCONTINUED | OUTPATIENT
Start: 2023-09-19 | End: 2023-09-26 | Stop reason: HOSPADM

## 2023-09-19 RX ORDER — NOREPINEPHRINE BITARTRATE/D5W 4MG/250ML
0-3 PLASTIC BAG, INJECTION (ML) INTRAVENOUS CONTINUOUS
Status: DISCONTINUED | OUTPATIENT
Start: 2023-09-19 | End: 2023-09-19

## 2023-09-19 RX ORDER — TALC
6 POWDER (GRAM) TOPICAL NIGHTLY PRN
Status: DISCONTINUED | OUTPATIENT
Start: 2023-09-19 | End: 2023-09-19

## 2023-09-19 RX ORDER — SODIUM,POTASSIUM PHOSPHATES 280-250MG
2 POWDER IN PACKET (EA) ORAL
Status: DISCONTINUED | OUTPATIENT
Start: 2023-09-19 | End: 2023-09-26 | Stop reason: HOSPADM

## 2023-09-19 RX ORDER — SODIUM CHLORIDE 0.9 % (FLUSH) 0.9 %
10 SYRINGE (ML) INJECTION EVERY 8 HOURS PRN
Status: DISCONTINUED | OUTPATIENT
Start: 2023-09-19 | End: 2023-09-26 | Stop reason: HOSPADM

## 2023-09-19 RX ORDER — TALC
6 POWDER (GRAM) TOPICAL NIGHTLY PRN
Status: DISCONTINUED | OUTPATIENT
Start: 2023-09-19 | End: 2023-09-26 | Stop reason: HOSPADM

## 2023-09-19 RX ORDER — MIDODRINE HYDROCHLORIDE 2.5 MG/1
5 TABLET ORAL
Status: DISCONTINUED | OUTPATIENT
Start: 2023-09-19 | End: 2023-09-26 | Stop reason: HOSPADM

## 2023-09-19 RX ORDER — SIMETHICONE 80 MG
1 TABLET,CHEWABLE ORAL 4 TIMES DAILY PRN
Status: DISCONTINUED | OUTPATIENT
Start: 2023-09-19 | End: 2023-09-26 | Stop reason: HOSPADM

## 2023-09-19 RX ORDER — SODIUM CHLORIDE 0.9 % (FLUSH) 0.9 %
10 SYRINGE (ML) INJECTION
Status: DISCONTINUED | OUTPATIENT
Start: 2023-09-19 | End: 2023-09-26 | Stop reason: HOSPADM

## 2023-09-19 RX ORDER — ATORVASTATIN CALCIUM 20 MG/1
20 TABLET, FILM COATED ORAL NIGHTLY
Status: DISCONTINUED | OUTPATIENT
Start: 2023-09-19 | End: 2023-09-26 | Stop reason: HOSPADM

## 2023-09-19 RX ORDER — NOREPINEPHRINE BITARTRATE/D5W 4MG/250ML
0-3 PLASTIC BAG, INJECTION (ML) INTRAVENOUS CONTINUOUS
Status: DISCONTINUED | OUTPATIENT
Start: 2023-09-19 | End: 2023-09-21

## 2023-09-19 RX ORDER — LANOLIN ALCOHOL/MO/W.PET/CERES
800 CREAM (GRAM) TOPICAL
Status: DISCONTINUED | OUTPATIENT
Start: 2023-09-19 | End: 2023-09-26 | Stop reason: HOSPADM

## 2023-09-19 RX ORDER — TORSEMIDE 20 MG/1
20 TABLET ORAL
Status: COMPLETED | OUTPATIENT
Start: 2023-09-19 | End: 2023-09-19

## 2023-09-19 RX ORDER — GLUCAGON 1 MG
1 KIT INJECTION
Status: DISCONTINUED | OUTPATIENT
Start: 2023-09-19 | End: 2023-09-26 | Stop reason: HOSPADM

## 2023-09-19 RX ORDER — DIGOXIN 125 MCG
125 TABLET ORAL
Status: DISCONTINUED | OUTPATIENT
Start: 2023-09-20 | End: 2023-09-19

## 2023-09-19 RX ORDER — ONDANSETRON 2 MG/ML
4 INJECTION INTRAMUSCULAR; INTRAVENOUS EVERY 8 HOURS PRN
Status: DISCONTINUED | OUTPATIENT
Start: 2023-09-19 | End: 2023-09-26 | Stop reason: HOSPADM

## 2023-09-19 RX ORDER — METOPROLOL SUCCINATE 25 MG/1
25 TABLET, EXTENDED RELEASE ORAL DAILY
Status: DISCONTINUED | OUTPATIENT
Start: 2023-09-19 | End: 2023-09-26 | Stop reason: HOSPADM

## 2023-09-19 RX ORDER — PROCHLORPERAZINE EDISYLATE 5 MG/ML
5 INJECTION INTRAMUSCULAR; INTRAVENOUS EVERY 6 HOURS PRN
Status: DISCONTINUED | OUTPATIENT
Start: 2023-09-19 | End: 2023-09-26 | Stop reason: HOSPADM

## 2023-09-19 RX ORDER — IBUPROFEN 200 MG
24 TABLET ORAL
Status: DISCONTINUED | OUTPATIENT
Start: 2023-09-19 | End: 2023-09-26 | Stop reason: HOSPADM

## 2023-09-19 RX ORDER — ALBUTEROL SULFATE 0.83 MG/ML
2.5 SOLUTION RESPIRATORY (INHALATION) EVERY 6 HOURS PRN
Status: DISCONTINUED | OUTPATIENT
Start: 2023-09-19 | End: 2023-09-26 | Stop reason: HOSPADM

## 2023-09-19 RX ORDER — MUPIROCIN 20 MG/G
OINTMENT TOPICAL 2 TIMES DAILY
Status: COMPLETED | OUTPATIENT
Start: 2023-09-19 | End: 2023-09-24

## 2023-09-19 RX ORDER — AMIODARONE HYDROCHLORIDE 200 MG/1
200 TABLET ORAL DAILY
Status: DISCONTINUED | OUTPATIENT
Start: 2023-09-19 | End: 2023-09-19

## 2023-09-19 RX ORDER — NALOXONE HCL 0.4 MG/ML
0.02 VIAL (ML) INJECTION
Status: DISCONTINUED | OUTPATIENT
Start: 2023-09-19 | End: 2023-09-26 | Stop reason: HOSPADM

## 2023-09-19 RX ORDER — DIGOXIN 125 MCG
125 TABLET ORAL
Status: DISCONTINUED | OUTPATIENT
Start: 2023-09-19 | End: 2023-09-19

## 2023-09-19 RX ORDER — ALBUTEROL SULFATE 90 UG/1
2 AEROSOL, METERED RESPIRATORY (INHALATION) EVERY 6 HOURS PRN
Status: DISCONTINUED | OUTPATIENT
Start: 2023-09-19 | End: 2023-09-19

## 2023-09-19 RX ORDER — ACETAMINOPHEN 325 MG/1
650 TABLET ORAL EVERY 8 HOURS PRN
Status: DISCONTINUED | OUTPATIENT
Start: 2023-09-19 | End: 2023-09-19

## 2023-09-19 RX ORDER — NOREPINEPHRINE BITARTRATE/D5W 4MG/250ML
PLASTIC BAG, INJECTION (ML) INTRAVENOUS
Status: DISPENSED
Start: 2023-09-19 | End: 2023-09-20

## 2023-09-19 RX ORDER — GABAPENTIN 100 MG/1
200 CAPSULE ORAL 3 TIMES DAILY
Status: DISCONTINUED | OUTPATIENT
Start: 2023-09-19 | End: 2023-09-26 | Stop reason: HOSPADM

## 2023-09-19 RX ORDER — ACETAMINOPHEN 325 MG/1
650 TABLET ORAL EVERY 4 HOURS PRN
Status: DISCONTINUED | OUTPATIENT
Start: 2023-09-19 | End: 2023-09-26 | Stop reason: HOSPADM

## 2023-09-19 RX ORDER — ENOXAPARIN SODIUM 100 MG/ML
1 INJECTION SUBCUTANEOUS EVERY 24 HOURS
Status: DISCONTINUED | OUTPATIENT
Start: 2023-09-19 | End: 2023-09-19

## 2023-09-19 RX ORDER — FLUTICASONE PROPIONATE 50 MCG
2 SPRAY, SUSPENSION (ML) NASAL DAILY
Status: DISCONTINUED | OUTPATIENT
Start: 2023-09-19 | End: 2023-09-26 | Stop reason: HOSPADM

## 2023-09-19 RX ORDER — MAG HYDROX/ALUMINUM HYD/SIMETH 200-200-20
30 SUSPENSION, ORAL (FINAL DOSE FORM) ORAL 4 TIMES DAILY PRN
Status: DISCONTINUED | OUTPATIENT
Start: 2023-09-19 | End: 2023-09-26 | Stop reason: HOSPADM

## 2023-09-19 RX ORDER — BUMETANIDE 0.25 MG/ML
1 INJECTION INTRAMUSCULAR; INTRAVENOUS DAILY
Status: DISCONTINUED | OUTPATIENT
Start: 2023-09-19 | End: 2023-09-25

## 2023-09-19 RX ORDER — IBUPROFEN 200 MG
16 TABLET ORAL
Status: DISCONTINUED | OUTPATIENT
Start: 2023-09-19 | End: 2023-09-26 | Stop reason: HOSPADM

## 2023-09-19 RX ADMIN — CEFTRIAXONE SODIUM 1 G: 1 INJECTION, POWDER, FOR SOLUTION INTRAMUSCULAR; INTRAVENOUS at 03:09

## 2023-09-19 RX ADMIN — METOPROLOL SUCCINATE 25 MG: 25 TABLET, FILM COATED, EXTENDED RELEASE ORAL at 08:09

## 2023-09-19 RX ADMIN — Medication 6 MG: at 08:09

## 2023-09-19 RX ADMIN — DIGOXIN 125 MCG: 125 TABLET ORAL at 08:09

## 2023-09-19 RX ADMIN — GABAPENTIN 200 MG: 100 CAPSULE ORAL at 02:09

## 2023-09-19 RX ADMIN — ALBUTEROL SULFATE 2.5 MG: 2.5 SOLUTION RESPIRATORY (INHALATION) at 07:09

## 2023-09-19 RX ADMIN — MIDODRINE HYDROCHLORIDE 5 MG: 2.5 TABLET ORAL at 03:09

## 2023-09-19 RX ADMIN — MIDODRINE HYDROCHLORIDE 5 MG: 2.5 TABLET ORAL at 06:09

## 2023-09-19 RX ADMIN — MUPIROCIN 1 G: 20 OINTMENT TOPICAL at 08:09

## 2023-09-19 RX ADMIN — NOREPINEPHRINE BITARTRATE 0 MCG/KG/MIN: 4 INJECTION, SOLUTION INTRAVENOUS at 01:09

## 2023-09-19 RX ADMIN — MIDODRINE HYDROCHLORIDE 5 MG: 2.5 TABLET ORAL at 11:09

## 2023-09-19 RX ADMIN — AMIODARONE HYDROCHLORIDE 1 MG/MIN: 1.8 INJECTION, SOLUTION INTRAVENOUS at 12:09

## 2023-09-19 RX ADMIN — BUMETANIDE 1 MG: 0.25 INJECTION INTRAMUSCULAR; INTRAVENOUS at 08:09

## 2023-09-19 RX ADMIN — ATORVASTATIN CALCIUM 20 MG: 20 TABLET, FILM COATED ORAL at 08:09

## 2023-09-19 RX ADMIN — GABAPENTIN 200 MG: 100 CAPSULE ORAL at 08:09

## 2023-09-19 RX ADMIN — TORSEMIDE 20 MG: 20 TABLET ORAL at 01:09

## 2023-09-19 RX ADMIN — ACETAMINOPHEN 650 MG: 325 TABLET ORAL at 01:09

## 2023-09-19 RX ADMIN — AMIODARONE HYDROCHLORIDE 0.5 MG/MIN: 1.8 INJECTION, SOLUTION INTRAVENOUS at 06:09

## 2023-09-19 NOTE — PROGRESS NOTES
[facility] Medicine  Progress Note    Patient Name: Jo Alegre  MRN: [id number]  Patient Class: IP- Inpatient   Admission Date: 9/18/2023  Length of Stay: 0 days  Attending Physician: Mia Allen MD  Primary Care Provider: Kraig Alberto MD        Subjective:     Principal Problem:CHF (congestive heart failure)        HPI:  Ms. Alegre is an 83 year old woman with PMHx of HTN, HDL, HFrEF s/p AICD, Afib- presented with one day hx of sob started yesterday - pt says she was fine day before yesterday but around morning time she started having sob not associated with any chest pain, palpitations, or fever, chills, cough or abdominal pain. She did not have any dietary discretion, has been adherent to her home meds. She also noted leg swelling bilateral yesterday. She was at cardio office for her device interrogation which as per patient is fine.     In ER she was found to have left pleural effusion and elevated BNP. Pt is being admitted for diuresis.        Overview/Hospital Course:  Patient monitor closely during hospitalization.  She was noted to have acute hypoxemic respiratory failure and AFib RVR.  Cardiology consulted.  Patient noted to have left-sided large pleural effusion on chest x-ray therefore Interventional Radiology was consulted for thoracentesis.  Oral anticoagulants held.      Interval History:  Patient with worsening respiratory status with tachypnea.  She is presently on 5 L nasal cannula high flow.  Patient with AFib RVR with heart rate in the 120s noted on telemetry with hypotension.  systolic BP 85  She reports generalized weakness and fatigue.    Stat ABGs obtained.  Reviewed- pH 7.5 pCO2 35.  Patient to receive IV Bumex and a dose of digoxin this morning.  Discussed with Dr. Allen and will transfer patient to step-down unit.    Review of Systems   Constitutional:  Positive for activity change and fatigue.   Respiratory:  Positive for shortness of breath.     Cardiovascular:  Positive for leg swelling.   Neurological:  Positive for weakness (generalized).     Objective:     Vital Signs (Most Recent):  Temp: 99.5 °F (37.5 °C) (09/19/23 0949)  Pulse: 108 (09/19/23 0949)  Resp: (!) 32 (09/19/23 0949)  BP: (!) 90/56 (09/19/23 0949)  SpO2: 98 % (09/19/23 0949) Vital Signs (24h Range):  Temp:  [98.6 °F (37 °C)-99.5 °F (37.5 °C)] 99.5 °F (37.5 °C)  Pulse:  [] 108  Resp:  [16-32] 32  SpO2:  [94 %-98 %] 98 %  BP: ()/(53-76) 90/56     Weight: 96.3 kg (212 lb 4.9 oz)  Body mass index is 33.25 kg/m².    Intake/Output Summary (Last 24 hours) at 9/19/2023 1036  Last data filed at 9/19/2023 0948  Gross per 24 hour   Intake 120 ml   Output 200 ml   Net -80 ml         Physical Exam  Constitutional:       General: She is in acute distress.      Appearance: She is ill-appearing.   Cardiovascular:      Rate and Rhythm: Rhythm irregular.      Heart sounds: Murmur heard.      Comments: Permanent pacemaker noted, +1-2 pitting edema bilaterally  Pulmonary:      Effort: Respiratory distress present.      Breath sounds: Wheezing and rales present.   Neurological:      Mental Status: She is alert.             Significant Labs: All pertinent labs within the past 24 hours have been reviewed.  BMP:   Recent Labs   Lab 09/18/23 2201   *   *   K 4.2   CL 98   CO2 29   BUN 26*   CREATININE 1.8*   CALCIUM 9.3     CBC:   Recent Labs   Lab 09/18/23 2201   WBC 10.05   HGB 10.5*   HCT 34.3*   *       Significant Imaging: I have reviewed all pertinent imaging results/findings within the past 24 hours.      Assessment/Plan:      Acute hypoxemic respiratory failure  Patient with Hypoxic Respiratory failure which is Acute.  she is not on home oxygen. Supplemental oxygen was provided and noted-      .   Signs/symptoms of respiratory failure include- tachypnea, increased work of breathing, respiratory distress and use of accessory muscles. Contributing diagnoses includes - CHF  Labs and images were reviewed. Patient Has recent ABG, which has been reviewed. Will treat underlying causes and adjust management of respiratory failure as follows- bronchodilators ordered.  Patient with worsening of hypoxia presently on 5 L nasal cannula high-flow oxygen, Bumex 1 mg IV x1 dose now.    Recurrent left pleural effusion  Patient with large left pleural effusion.  Will consult Interventional Radiology for thoracentesis with imaging.  Hold anticoagulant.  Last dose of Eliquis was 09/18/2023.      Obstructive sleep apnea syndrome  C/w CPAP      COPD (chronic obstructive pulmonary disease) per patient  C/w breathing rx      Acute on chronic combined systolic and diastolic congestive heart failure  Patient is identified as having Combined Systolic and Diastolic heart failure that is Acute on chronic. CHF is currently uncontrolled due to Continued edema of extremities, >3 pillow orthopnea, Rales/crackles on pulmonary exam and Pulmonary edema/pleural effusion on CXR. Latest ECHO performed and demonstrates- Results for orders placed during the hospital encounter of 05/15/23    Echo    Interpretation Summary  · The left ventricle is mildly enlarged with mild concentric hypertrophy and moderately decreased systolic function.  · The estimated ejection fraction is 40%.  · Grade I left ventricular diastolic dysfunction.  · There is left ventricular global hypokinesis.  · Normal right ventricular size with normal right ventricular systolic function.  · Severe left atrial enlargement.  · Moderate-to-severe mitral regurgitation.  · Mild tricuspid regurgitation.  · Elevated central venous pressure (15 mmHg).  · The estimated PA systolic pressure is 45 mmHg.  · There is pulmonary hypertension.  · Trivial anterior pericardial effusion.  . Continue bumex and digoxin and monitor clinical status closely. Monitor on telemetry. Patient is on CHF pathway.  Monitor strict Is&Os and daily weights.  Place on fluid restriction of  1.5 L. Cardiology has been consulted. Continue to stress to patient importance of self efficacy and  on diet for CHF. Last BNP reviewed- and noted below   Recent Labs   Lab 09/18/23  2201   *   .    Paroxysmal atrial fibrillation  Currently in Afib-   C/w Amio and BB  C/w Xarelto  Patient on digoxin Monday Wednesday Friday.  Will give dose of digoxin today.  Cardiology consulted.    Stage 3 chronic kidney disease  At baseline      Cardiomyopathy with implantable cardioverter-defibrillator  Not sure if ischemic or non-ischemic  Device interrogated yesterday  C/w Bumex 1 mg IV daily   C/w GDMT  I/Os, daily weight      Mixed dyslipidemia  C/w statin      Essential hypertension  C/w home meds and including her Midodrine   Patient with chronic hypotension.  Will hold ACE-inhibitor and ARB at this time.        VTE Risk Mitigation (From admission, onward)         Ordered     IP VTE HIGH RISK PATIENT  Once         09/19/23 1043     Place sequential compression device  Until discontinued         09/19/23 1043                Discharge Planning   JOANA: 9/20/2023     Code Status: Full Code   Is the patient medically ready for discharge?:     Reason for patient still in hospital (select all that apply): Patient unstable, Laboratory test, Treatment, Imaging and Consult recommendations                     Tessa Jacobson NP  Department of Hospital Medicine   [facility]

## 2023-09-19 NOTE — HPI
Ms. Alegre is an 83 year old woman with PMHx of HTN, HDL, HFrEF s/p AICD, Afib- presented with one day hx of sob started yesterday - pt says she was fine day before yesterday but around morning time she started having sob not associated with any chest pain, palpitations, or fever, chills, cough or abdominal pain. She did not have any dietary discretion, has been adherent to her home meds. She also noted leg swelling bilateral yesterday. She was at cardio office for her device interrogation which as per patient is fine.     In ER she was found to have left pleural effusion and elevated BNP. Pt is being admitted for diuresis.

## 2023-09-19 NOTE — ASSESSMENT & PLAN NOTE
Not sure if ischemic or non-ischemic  Device interrogated yesterday  C/w Bumex 1 mg IV daily   C/w GDMT  I/Os, daily weight     General

## 2023-09-19 NOTE — PROCEDURES
18 Gx 10cm PowerGlide Midline placed to pts basilic vein with the use of ultrasound guidance.    Ultrasound guidance: yes  Vessel Caliber: large and patent, compressibility normal  Needle advanced into vessel with real time Ultrasound guidance.  Guidewire confirmed in vessel.  Sterile sheath used.  Sterile dressing applied  Dressing dated   Education provided to patient re: proper maintenance of line- pt verbalized understanding

## 2023-09-19 NOTE — SUBJECTIVE & OBJECTIVE
Interval History:  Patient with worsening respiratory status with tachypnea.  She is presently on 5 L nasal cannula high flow.  Patient with AFib RVR with heart rate in the 120s noted on telemetry with hypotension.  systolic BP 85  She reports generalized weakness and fatigue.    Stat ABGs obtained.  Reviewed- pH 7.5 pCO2 35.  Patient to receive IV Bumex and a dose of digoxin this morning.  Discussed with Dr. Allen and will transfer patient to step-down unit.    Review of Systems   Constitutional:  Positive for activity change and fatigue.   Respiratory:  Positive for shortness of breath.    Cardiovascular:  Positive for leg swelling.   Neurological:  Positive for weakness (generalized).     Objective:     Vital Signs (Most Recent):  Temp: 99.5 °F (37.5 °C) (09/19/23 0949)  Pulse: 108 (09/19/23 0949)  Resp: (!) 32 (09/19/23 0949)  BP: (!) 90/56 (09/19/23 0949)  SpO2: 98 % (09/19/23 0949) Vital Signs (24h Range):  Temp:  [98.6 °F (37 °C)-99.5 °F (37.5 °C)] 99.5 °F (37.5 °C)  Pulse:  [] 108  Resp:  [16-32] 32  SpO2:  [94 %-98 %] 98 %  BP: ()/(53-76) 90/56     Weight: 96.3 kg (212 lb 4.9 oz)  Body mass index is 33.25 kg/m².    Intake/Output Summary (Last 24 hours) at 9/19/2023 1036  Last data filed at 9/19/2023 0948  Gross per 24 hour   Intake 120 ml   Output 200 ml   Net -80 ml         Physical Exam  Constitutional:       General: She is in acute distress.      Appearance: She is ill-appearing.   Cardiovascular:      Rate and Rhythm: Rhythm irregular.      Heart sounds: Murmur heard.      Comments: Permanent pacemaker noted, +1-2 pitting edema bilaterally  Pulmonary:      Effort: Respiratory distress present.      Breath sounds: Wheezing and rales present.   Neurological:      Mental Status: She is alert.             Significant Labs: All pertinent labs within the past 24 hours have been reviewed.  BMP:   Recent Labs   Lab 09/18/23  2201   *   *   K 4.2   CL 98   CO2 29   BUN 26*   CREATININE  1.8*   CALCIUM 9.3     CBC:   Recent Labs   Lab 09/18/23  2201   WBC 10.05   HGB 10.5*   HCT 34.3*   *       Significant Imaging: I have reviewed all pertinent imaging results/findings within the past 24 hours.

## 2023-09-19 NOTE — ASSESSMENT & PLAN NOTE
Patient with large left pleural effusion.  Will consult Interventional Radiology for thoracentesis with imaging.  Hold anticoagulant.  Last dose of Eliquis was 09/18/2023.

## 2023-09-19 NOTE — PROGRESS NOTES
Automatic Inhaler to Nebulizer Interchange    albuterol (Ventolin, ProAir, or Proventil)  mcg given multiple times per day changed to albuterol 2.5 mg Q6H PRN Wheezing, Shortness of Breath  per [facility] Automatic Therapeutic Substitutions Protocol.    Please contact pharmacy at extension [phone] with any questions.     Thank you,   Garry Kirk

## 2023-09-19 NOTE — ASSESSMENT & PLAN NOTE
C/w home meds and including her Midodrine   Patient with chronic hypotension.  Will hold ACE-inhibitor and ARB at this time.

## 2023-09-19 NOTE — CONSULTS
Pulmonary/Critical Care Consult      PATIENT NAME: Jo Alegre  MRN: [id number]  TODAY'S DATE: 2023  3:16 PM  ADMIT DATE: 2023  AGE: 83 y.o. : 1940    CONSULT REQUESTED BY: Mia Allen MD    REASON FOR CONSULT:   Pleural effusion, acute respiratory distress, shock     HPI:  Ms Alegre is an 83 year old woman former smoker with HFrEF 30%, AICD, hx of AFib who presented from home with low blood pressure and shortness of breath. Family reports she was doing fine previously. Has been able to walk, mobilize, and take care of ADLs on her own. She lives with 40 year old son. She denies any fevers, but reports fever while here in hospital. She had raw oysters recently. She also notes lower extremity swelling. No night sweats. No weight loss. No hemoptysis.     Review of Systems   Constitutional:  Positive for appetite change and fever. Negative for chills and unexpected weight change.   HENT:  Negative for nosebleeds, postnasal drip, trouble swallowing and voice change.    Eyes:  Negative for visual disturbance.   Respiratory:  Positive for shortness of breath. Negative for cough and wheezing.    Cardiovascular:  Positive for leg swelling. Negative for chest pain.   Gastrointestinal:  Negative for diarrhea, nausea and vomiting.   Endocrine: Negative for cold intolerance and heat intolerance.   Genitourinary:  Negative for dysuria, flank pain and frequency.   Musculoskeletal:  Negative for neck pain and neck stiffness.   Allergic/Immunologic: Negative for environmental allergies and immunocompromised state.   Neurological:  Negative for syncope, speech difficulty and weakness.   Hematological:  Negative for adenopathy.   Psychiatric/Behavioral:  Negative for confusion.            ALLERGIES  Review of patient's allergies indicates:   Allergen Reactions    Codeine Other (See Comments)     nausea    Hydrocodone Nausea And Vomiting    Lasix [furosemide]      rash       INPATIENT SCHEDULED MEDICATIONS    atorvastatin  20 mg Oral QHS    bumetanide  1 mg Intravenous Daily    cefTRIAXone (ROCEPHIN) IVPB  1 g Intravenous Q24H    fluticasone propionate  2 spray Each Nostril Daily    gabapentin  200 mg Oral TID    metoprolol succinate  25 mg Oral Daily    midodrine  5 mg Oral TID AC    NORepinephrine bitartrate-D5W          amiodarone in dextrose 5% 1 mg/min (09/19/23 1213)    amiodarone in dextrose 5%      NORepinephrine bitartrate-D5W 0.03 mcg/kg/min (09/19/23 1430)       MEDICAL AND SURGICAL HISTORY  Past Medical History:   Diagnosis Date    Allergy     Codeine, Lasix    Atrial fibrillation     Atrial fibrillation     Cataract     CHF (congestive heart failure)     Diabetes mellitus, type 2     Ejection fraction < 50% 10/18/2017    Approximately 35%  Based on prior  Echocardiogram.    Encounter for blood transfusion     Hyperlipidemia     Osteoporosis     PONV (postoperative nausea and vomiting)     Thyroid disorder screening 10/17/2017    TSH of 1.12 ordered by Dr. dee Jones     Past Surgical History:   Procedure Laterality Date    ANTEGRADE NEPHROSTOGRAPHY Left 8/25/2022    Procedure: NEPHROSTOGRAM, ANTEGRADE;  Surgeon: Shelby Parra MD;  Location: [facility] OR;  Service: Urology;  Laterality: Left;    CHOLECYSTECTOMY  1997    [place]     COLONOSCOPY      CYSTOSCOPY W/ URETERAL STENT PLACEMENT Left 7/17/2022    Procedure: CYSTOSCOPY, WITH URETERAL STENT INSERTION;  Surgeon: Shelby Parra MD;  Location: [facility] OR;  Service: Urology;  Laterality: Left;    CYSTOSCOPY W/ URETERAL STENT REMOVAL Left 9/21/2022    Procedure: CYSTOSCOPY, WITH URETERAL STENT REMOVAL;  Surgeon: Shelby Parra MD;  Location: [facility] OR;  Service: Urology;  Laterality: Left;    CYSTOSCOPY WITH URETEROSCOPY, RETROGRADE PYELOGRAPHY, AND INSERTION OF STENT Left 8/25/2022    Procedure: CYSTOSCOPY, WITH RETROGRADE PYELOGRAM AND URETERAL STENT INSERTION;  Surgeon: Shelby Parra MD;  Location: [facility] OR;  Service: Urology;  Laterality:  Left;    EYE SURGERY      bilateral cataracts    FINGER SURGERY Right 2021    right pinky finger    FLEXIBLE CYSTOSCOPY Left 8/25/2022    Procedure: CYSTOSCOPY, FLEXIBLE WITH STENT REMOVAL;  Surgeon: Shelby Parra MD;  Location: [facility] OR;  Service: Urology;  Laterality: Left;    FRACTURE SURGERY  2014    right femur with neville    HEMORRHOID SURGERY      48 yrs ago    HYSTERECTOMY      NEPHROSTOMY Left 8/25/2022    Procedure: CREATION, NEPHROSTOMY;  Surgeon: Shelby Parra MD;  Location: [facility] OR;  Service: Urology;  Laterality: Left;    PERCUTANEOUS NEPHROLITHOTOMY N/A 8/25/2022    Procedure: NEPHROLITHOTOMY, PERCUTANEOUS;  Surgeon: Shelby Parra MD;  Location: [facility] OR;  Service: Urology;  Laterality: N/A;    REPLACEMENT OF IMPLANTABLE CARDIOVERTER-DEFIBRILLATOR (ICD) GENERATOR N/A 12/13/2019    Procedure: REPLACEMENT, PULSE GENERATOR, ICD-MEDTRONIC;  Surgeon: Sebastian Nowak III, MD;  [facility];  Service: Cardiology;  Laterality: N/A;    TONSILLECTOMY         ALCOHOL, TOBACCO AND DRUG USE  Social History     Tobacco Use   Smoking Status Former    Passive exposure: Past   Smokeless Tobacco Never   Tobacco Comments    quit 2013     Social History     Substance and Sexual Activity   Alcohol Use No     Social History     Substance and Sexual Activity   Drug Use No       FAMILY HISTORY  Family History   Problem Relation Age of Onset    Heart disease Mother     Cancer Father         Lung Cancer ??? Asbestos    Breast cancer Paternal Aunt        VITAL SIGNS (MOST RECENT)  Temp: (!) 100.9 °F (38.3 °C) (09/19/23 1101)  Pulse: 89 (09/19/23 1500)  Resp: (!) 38 (09/19/23 1500)  BP: 108/69 (09/19/23 1500)  SpO2: (!) 94 % (09/19/23 1500)    INTAKE AND OUTPUT (LAST 24 HOURS):  Intake/Output Summary (Last 24 hours) at 9/19/2023 1516  Last data filed at 9/19/2023 0948  Gross per 24 hour   Intake 120 ml   Output 200 ml   Net -80 ml       WEIGHT  Wt Readings from Last 1 Encounters:   09/19/23 96.3 kg (212 lb 4.9  "oz)       Physical Exam  Vitals reviewed.   Constitutional:       General: She is in acute distress.      Appearance: She is well-developed. She is obese. She is ill-appearing. She is not diaphoretic.   HENT:      Head: Normocephalic and atraumatic.      Mouth/Throat:      Pharynx: No oropharyngeal exudate or posterior oropharyngeal erythema.   Eyes:      General: No scleral icterus.     Pupils: Pupils are equal, round, and reactive to light.   Neck:      Vascular: No JVD.   Cardiovascular:      Rate and Rhythm: Tachycardia present. Rhythm irregular.      Heart sounds: Normal heart sounds. No murmur heard.  Pulmonary:      Effort: Respiratory distress present.      Breath sounds: Rales present. No wheezing.   Abdominal:      General: Bowel sounds are normal. There is no distension.      Palpations: Abdomen is soft.      Tenderness: There is no abdominal tenderness.   Musculoskeletal:         General: No swelling.      Cervical back: Normal range of motion and neck supple. No rigidity.   Skin:     General: Skin is warm and dry.      Capillary Refill: Capillary refill takes less than 2 seconds.      Coloration: Skin is not pale.      Findings: No rash.   Neurological:      General: No focal deficit present.      Mental Status: She is alert and oriented to person, place, and time.      Cranial Nerves: No cranial nerve deficit.      Motor: No weakness or abnormal muscle tone.           ACUTE PHASE REACTANT (LAST 24 HOURS)  No results for input(s): "FERRITIN", "CRP", "LDH", "DDIMER" in the last 24 hours.    CBC LAST (LAST 24 HOURS)  Recent Labs   Lab 09/18/23 2201   WBC 10.05   RBC 4.01   HGB 10.5*   HCT 34.3*   MCV 86   MCH 26.2*   MCHC 30.6*   RDW 16.0*   *   MPV 9.1*   GRAN 79.8*  8.0*   LYMPH 9.7*  1.0   MONO 9.1  0.9   BASO 0.04   NRBC 0       CHEMISTRY LAST (LAST 24 HOURS)  Recent Labs   Lab 09/18/23  2201 09/19/23  0912 09/19/23  1124   *  --   --    K 4.2  --   --    CL 98  --   --    CO2 29  --  "  --    ANIONGAP 8  --   --    BUN 26*  --   --    CREATININE 1.8*  --   --    *  --   --    CALCIUM 9.3  --   --    PH  --  7.519*  --    MG  --   --  1.8   ALBUMIN 2.6*  --   --    PROT 6.7  --   --    ALKPHOS 69  --   --    ALT 10  --   --    AST 14  --   --    BILITOT 0.4  --   --        COAGULATION LAST (LAST 24 HOURS)  Recent Labs   Lab 09/18/23 2201   INR 1.0       CARDIAC PROFILE (LAST 24 HOURS)  Recent Labs   Lab 09/18/23  2201 09/19/23  0530 09/19/23  1124   *  --   --    TROPONINIHS  --    < > 18.6*    < > = values in this interval not displayed.       LAST 7 DAYS MICROBIOLOGY   Microbiology Results (last 7 days)       Procedure Component Value Units Date/Time    Blood culture [[id number]] Collected: 09/19/23 1338    Order Status: Sent Specimen: Blood Updated: 09/19/23 1345            MOST RECENT IMAGING  X-Ray Chest AP Portable  CLINICAL HISTORY:  83 years (1940) Female pleural effusion    TECHNIQUE:  Portable AP radiograph the chest. One view.    COMPARISON:  Radiograph from September 18, 2023    FINDINGS:  Mildly increased central hilar interstitial opacities are seen bilaterally suggestive of either mild edema, atypical infection/pneumonia or bronchitis in the appropriate clinical setting.  Interval worsening airspace opacity at the left lung base compatible with a moderate to large pleural effusion and adjacent atelectasis/consolidation. No pneumothorax is identified. The heart is mildly enlarged. Left-sided AICD is unchanged in configuration. Osseous structures appear unchanged. The visualized upper abdomen is unchanged.    IMPRESSION:  1. Interval worsening, moderate to large left pleural effusion and adjacent atelectasis/consolidation.  2. Unchanged mild scattered central hilar interstitial opacity.    Electronically signed by:  Foster Landis MD  9/19/2023 7:12 AM CDT Workstation: [id number]  X-Ray Chest AP Portable  CLINICAL HISTORY:  83 years (1940) Female sob  SOB    TECHNIQUE:  Portable AP radiograph the chest. One view.    COMPARISON:  Radiographs from May 24, 2023    FINDINGS:  There is a focal opacity at the left lung base, characteristic of a combination of a small left pleural effusion and associated atelectasis, noting superimposed infection/pneumonia and malignancy are not excluded. There is blunting of the right costophrenic angle consistent with trace pleural effusion. No pneumothorax is identified. The cardiac silhouette is moderately enlarged. Left-sided AICD pacemaker is unchanged in configuration. Atheromatous calcifications are seen at the aortic arch. Osseous structures appear unchanged. The visualized upper abdomen is unremarkable.    IMPRESSION:  1. Moderate left pleural effusion and adjacent atelectasis/consolidation.  2. Small interstitial opacity at the right lung base.    .    Electronically signed by:  Foster Landis MD  9/19/2023 5:54 AM CDT Workstation: [id number]      CURRENT VISIT EKG  Results for orders placed or performed during the hospital encounter of 09/18/23   EKG 12-lead    Narrative    Test Reason : I49.9,    Vent. Rate : 121 BPM     Atrial Rate : 326 BPM     P-R Int : 000 ms          QRS Dur : 088 ms      QT Int : 286 ms       P-R-T Axes : 000 -29 150 degrees     QTc Int : 406 ms    Atrial fibrillation with rapid ventricular response  Low voltage QRS  Nonspecific T wave abnormality  Abnormal ECG  When compared with ECG of 18-SEP-2023 21:33,  No significant change was found    Referred By: [email or web address]   SELF           Confirmed By:        ECHOCARDIOGRAM RESULTS  Results for orders placed during the hospital encounter of 05/15/23    Echo    Interpretation Summary  · The left ventricle is mildly enlarged with mild concentric hypertrophy and moderately decreased systolic function.  · The estimated ejection fraction is 40%.  · Grade I left ventricular diastolic dysfunction.  · There is left ventricular global hypokinesis.  · Normal right  ventricular size with normal right ventricular systolic function.  · Severe left atrial enlargement.  · Moderate-to-severe mitral regurgitation.  · Mild tricuspid regurgitation.  · Elevated central venous pressure (15 mmHg).  · The estimated PA systolic pressure is 45 mmHg.  · There is pulmonary hypertension.  · Trivial anterior pericardial effusion.        VENTILATOR INFORMATION              LAST ARTERIAL BLOOD GAS  ABG  Recent Labs   Lab 09/19/23  0912   PH 7.519*   PO2 84   PCO2 35.9   HCO3 29.2*   BE 6       ASSESSMENT:   Hypoxemic respiratory failure   Left pleural effusion  Hx of biventricular heart failure, PASP 45 , ICD  Atrial fibrillation     Ms Alegre is an 83 year old woman with biventricular heart failure who presents with acute shortness of breath, and a predominantly one sided pleural effusion. Bedside ultrasound suggests minimal to no fluid on the right. Left with mostly free flowing appearing fluid, no loculations were seen. She has been having fevers since today.     I discussed with family at length regarding the patients overall goals of care. They think that she would not want prolonged time on the ventilator. It is not totally clear yet how aggressive the patient wants to be about her care, she does not like getting stuck with blood draws and does not like being in hospital. The patient prior to the last few weeks, has had very good quality of life, and is typically able care for herself.     PLAN:   - Plan for pleural fluid drainage tomorrow morning by me at bedside. Please continue to hold therapeutic anticoagulation.   - Recommend palliative consultation   - Agree with upgrade to ICU for closer monitoring   - Continue rate control for afib- cardiology following   - We need accurate urine output- would be concerned that the patient is becoming aneuric in the setting of acute renal failure, in which case nephrology needs consultation.     Will continue to follow. Please call if any  questions.     Bud López MD  Date of Service: 09/19/2023  3:16 PM    Upon my evaluation, this patient had a high probability of imminent or life-threatening deterioration, which required my direct attention, intervention, and personal management.    I have personally provided at least 35 minutes of critical care time exclusive of time spent on separately billable procedures. Over 50% of the time of this encounter was spent in direct care at the bedside. Time includes review of laboratory data, radiology results, discussion with consultants, and monitoring for potential decompensation. Interventions were performed as documented above.

## 2023-09-19 NOTE — SUBJECTIVE & OBJECTIVE
Past Medical History:   Diagnosis Date    Allergy     Codeine, Lasix    Atrial fibrillation     Atrial fibrillation     Cataract     CHF (congestive heart failure)     Diabetes mellitus, type 2     Ejection fraction < 50% 10/18/2017    Approximately 35%  Based on prior  Echocardiogram.    Encounter for blood transfusion     Hyperlipidemia     Osteoporosis     PONV (postoperative nausea and vomiting)     Thyroid disorder screening 10/17/2017    TSH of 1.12 ordered by Dr. dee Jones       Past Surgical History:   Procedure Laterality Date    ANTEGRADE NEPHROSTOGRAPHY Left 8/25/2022    Procedure: NEPHROSTOGRAM, ANTEGRADE;  Surgeon: Shelby Parra MD;  Location: [facility];  Service: Urology;  Laterality: Left;    CHOLECYSTECTOMY  1997    [place]     COLONOSCOPY      CYSTOSCOPY W/ URETERAL STENT PLACEMENT Left 7/17/2022    Procedure: CYSTOSCOPY, WITH URETERAL STENT INSERTION;  Surgeon: Shelby Parra MD;  Location: [facility] OR;  Service: Urology;  Laterality: Left;    CYSTOSCOPY W/ URETERAL STENT REMOVAL Left 9/21/2022    Procedure: CYSTOSCOPY, WITH URETERAL STENT REMOVAL;  Surgeon: Shelby Parra MD;  Location: [facility] OR;  Service: Urology;  Laterality: Left;    CYSTOSCOPY WITH URETEROSCOPY, RETROGRADE PYELOGRAPHY, AND INSERTION OF STENT Left 8/25/2022    Procedure: CYSTOSCOPY, WITH RETROGRADE PYELOGRAM AND URETERAL STENT INSERTION;  Surgeon: Shleby Parra MD;  Location: [facility];  Service: Urology;  Laterality: Left;    EYE SURGERY      bilateral cataracts    FINGER SURGERY Right 2021    right pinky finger    FLEXIBLE CYSTOSCOPY Left 8/25/2022    Procedure: CYSTOSCOPY, FLEXIBLE WITH STENT REMOVAL;  Surgeon: Shelby Parra MD;  Location: [facility] OR;  Service: Urology;  Laterality: Left;    FRACTURE SURGERY  2014    right femur with neville    HEMORRHOID SURGERY      48 yrs ago    HYSTERECTOMY      NEPHROSTOMY Left 8/25/2022    Procedure: CREATION, NEPHROSTOMY;  Surgeon: Shelby Parra MD;  Location:  [facility] OR;  Service: Urology;  Laterality: Left;    PERCUTANEOUS NEPHROLITHOTOMY N/A 8/25/2022    Procedure: NEPHROLITHOTOMY, PERCUTANEOUS;  Surgeon: Shelby Parra MD;  Location: [facility] OR;  Service: Urology;  Laterality: N/A;    REPLACEMENT OF IMPLANTABLE CARDIOVERTER-DEFIBRILLATOR (ICD) GENERATOR N/A 12/13/2019    Procedure: REPLACEMENT, PULSE GENERATOR, ICD-MEDTRONIC;  Surgeon: Sebastian Nowak III, MD;  Location: [facility];  Service: Cardiology;  Laterality: N/A;    TONSILLECTOMY         Review of patient's allergies indicates:   Allergen Reactions    Codeine Other (See Comments)     nausea    Hydrocodone Nausea And Vomiting    Lasix [furosemide]      rash       Current Facility-Administered Medications on File Prior to Encounter   Medication    0.9%  NaCl infusion    diphenhydrAMINE injection 25 mg    lorazepam injection 1 mg     Current Outpatient Medications on File Prior to Encounter   Medication Sig    albuterol (PROVENTIL/VENTOLIN HFA) 90 mcg/actuation inhaler INHALE 2 PUFFS BY MOUTH EVERY 6 HOURS AS NEEDED FOR WHEEZING (Patient taking differently: Inhale 2 puffs into the lungs every 6 (six) hours as needed.)    amiodarone (PACERONE) 200 MG Tab Take 200 mg by mouth once daily.    amLODIPine (NORVASC) 2.5 MG tablet Take 2.5 mg by mouth.    atorvastatin (LIPITOR) 20 MG tablet Take 20 mg by mouth every evening.    Ca-D3-mag ox-zinc--thom-bor 600 mg calcium- 20 mcg-50 mg Tab Take 1 tablet by mouth 2 (two) times daily.    cholecalciferol, vitamin D3, 125 mcg (5,000 unit) Tab Take 5,000 Units by mouth 2 (two) times daily.    digoxin (LANOXIN) 125 mcg tablet Take 1 tablet (125 mcg total) by mouth every Mon, Wed, Fri.    dorzolamide-timolol 2-0.5% (COSOPT) 22.3-6.8 mg/mL ophthalmic solution Place into both eyes.    fluticasone propionate (FLONASE) 50 mcg/actuation nasal spray 2 sprays (100 mcg total) by Each Nostril route once daily.    gabapentin (NEURONTIN) 100 MG capsule TAKE 2 CAPSULES(200 MG) BY MOUTH THREE  TIMES DAILY (Patient taking differently: Take 200 mg by mouth 3 (three) times daily. TAKE 2 CAPSULES(200 MG) BY MOUTH THREE TIMES DAILY)    glimepiride (AMARYL) 1 MG tablet Take 1 tablet (1 mg total) by mouth before breakfast.    latanoprost 0.005 % ophthalmic solution Place 1 drop into both eyes nightly.    metoprolol succinate (TOPROL-XL) 25 MG 24 hr tablet Take 1 tablet (25 mg total) by mouth once daily.    midodrine (PROAMATINE) 5 MG Tab Take 2.5 mg by mouth as needed. Take 1 tablet by mouth when feeling dizzy from low BP, avoid 3 hours prior to bedtime    propranoloL (INDERAL) 10 MG tablet Take 10 mg by mouth 2 (two) times daily.    rivaroxaban (XARELTO) 15 mg Tab Take 1 tablet (15 mg total) by mouth daily with dinner or evening meal. (Patient taking differently: Take 10 mg by mouth daily with dinner or evening meal.)    sacubitriL-valsartan (ENTRESTO)  mg per tablet Take 1 tablet by mouth 2 (two) times daily.    torsemide (DEMADEX) 20 MG Tab Take 1 tablet (20 mg total) by mouth once daily. Take an extra dose in the afternoon for leg swelling, shortness of breath or weight gain (3 lbs overnight or 5 lbs in a week)    VERQUVO 5 mg Tab Take by mouth.     Family History       Problem Relation (Age of Onset)    Breast cancer Paternal Aunt    Cancer Father    Heart disease Mother          Tobacco Use    Smoking status: Former     Passive exposure: Past    Smokeless tobacco: Never    Tobacco comments:     quit 2013   Substance and Sexual Activity    Alcohol use: No    Drug use: No    Sexual activity: Not Currently     Review of Systems   Constitutional:  Negative for activity change, appetite change, chills and fever.   HENT:  Negative for congestion, ear pain and nosebleeds.    Eyes:  Negative for itching and visual disturbance.   Respiratory:  Positive for shortness of breath. Negative for apnea, cough, chest tightness and wheezing.    Cardiovascular:  Positive for leg swelling. Negative for chest pain and  palpitations.   Gastrointestinal:  Negative for abdominal distention, abdominal pain, constipation, diarrhea, nausea and vomiting.   Genitourinary:  Negative for dysuria and flank pain.   Musculoskeletal:  Negative for back pain.   Neurological:  Negative for dizziness and headaches.     Objective:     Vital Signs (Most Recent):  Temp: 98.6 °F (37 °C) (09/18/23 2119)  Pulse: 96 (09/19/23 0000)  Resp: 18 (09/19/23 0000)  BP: 113/71 (09/19/23 0121)  SpO2: 95 % (09/19/23 0002) Vital Signs (24h Range):  Temp:  [98.6 °F (37 °C)] 98.6 °F (37 °C)  Pulse:  [] 96  Resp:  [16-20] 18  SpO2:  [95 %-98 %] 95 %  BP: (106-142)/(58-76) 113/71     Weight: 91.2 kg (201 lb)  Body mass index is 29.68 kg/m².     Physical Exam  Vitals reviewed.   Constitutional:       General: She is not in acute distress.     Appearance: Normal appearance. She is not ill-appearing, toxic-appearing or diaphoretic.   HENT:      Head: Normocephalic and atraumatic.      Mouth/Throat:      Mouth: Mucous membranes are moist.      Pharynx: Oropharynx is clear.   Eyes:      General: No scleral icterus.     Conjunctiva/sclera: Conjunctivae normal.   Neck:      Vascular: No carotid bruit.   Cardiovascular:      Rate and Rhythm: Normal rate and regular rhythm.      Pulses: Normal pulses.      Heart sounds: Normal heart sounds.   Pulmonary:      Effort: Pulmonary effort is normal.      Breath sounds: Rales present.   Abdominal:      General: Abdomen is flat. Bowel sounds are normal.      Palpations: Abdomen is soft.   Musculoskeletal:         General: Normal range of motion.      Right lower leg: Edema present.      Left lower leg: Edema present.   Skin:     General: Skin is warm.   Neurological:      General: No focal deficit present.      Mental Status: She is alert and oriented to person, place, and time. Mental status is at baseline.   Psychiatric:         Mood and Affect: Mood normal.         Behavior: Behavior normal.                Significant Labs:  "All pertinent labs within the past 24 hours have been reviewed.  BMP:   Recent Labs   Lab 09/18/23 2201   *   *   K 4.2   CL 98   CO2 29   BUN 26*   CREATININE 1.8*   CALCIUM 9.3     CBC:   Recent Labs   Lab 09/18/23 2201   WBC 10.05   HGB 10.5*   HCT 34.3*   *     CMP:   Recent Labs   Lab 09/18/23 2201   *   K 4.2   CL 98   CO2 29   *   BUN 26*   CREATININE 1.8*   CALCIUM 9.3   PROT 6.7   ALBUMIN 2.6*   BILITOT 0.4   ALKPHOS 69   AST 14   ALT 10   ANIONGAP 8     Cardiac Markers:   Recent Labs   Lab 09/18/23 2201   *     Magnesium: No results for input(s): "MG" in the last 48 hours.    Significant Imaging: I have reviewed all pertinent imaging results/findings within the past 24 hours.  "

## 2023-09-19 NOTE — PT/OT/SLP PROGRESS
Physical Therapy      Patient Name:  Jo Alegre   MRN:  [id number]    Patient not seen today secondary to Nurse/ MELISA hold (Pt's BP low and HR elevated.). Will follow-up 9/20/23.

## 2023-09-19 NOTE — ED PROVIDER NOTES
Encounter Date: 9/18/2023       History     Chief Complaint   Patient presents with    Shortness of Breath     Started this morning. Has hx COPD and CHF. Pt states that she has at home oxygen. Pt denies nausea and vomiting.      Chief complaint is shortness of breath.  This patient has a history of respiratory troubles and respiratory failure.  She is on chronic home O2.  She is short of breath.        Review of patient's allergies indicates:   Allergen Reactions    Codeine Other (See Comments)     nausea    Hydrocodone Nausea And Vomiting    Lasix [furosemide]      rash     Past Medical History:   Diagnosis Date    Allergy     Codeine, Lasix    Atrial fibrillation     Atrial fibrillation     Cataract     CHF (congestive heart failure)     Diabetes mellitus, type 2     Ejection fraction < 50% 10/18/2017    Approximately 35%  Based on prior  Echocardiogram.    Encounter for blood transfusion     Hyperlipidemia     Osteoporosis     PONV (postoperative nausea and vomiting)     Thyroid disorder screening 10/17/2017    TSH of 1.12 ordered by Dr. dee Jones     Past Surgical History:   Procedure Laterality Date    ANTEGRADE NEPHROSTOGRAPHY Left 8/25/2022    Procedure: NEPHROSTOGRAM, ANTEGRADE;  Surgeon: Shelby Parra MD;  Location: [facility] OR;  Service: Urology;  Laterality: Left;    CHOLECYSTECTOMY  1997    [place]     COLONOSCOPY      CYSTOSCOPY W/ URETERAL STENT PLACEMENT Left 7/17/2022    Procedure: CYSTOSCOPY, WITH URETERAL STENT INSERTION;  Surgeon: Shelby Parra MD;  Location: [facility] OR;  Service: Urology;  Laterality: Left;    CYSTOSCOPY W/ URETERAL STENT REMOVAL Left 9/21/2022    Procedure: CYSTOSCOPY, WITH URETERAL STENT REMOVAL;  Surgeon: Shelby Parra MD;  Location: [facility] OR;  Service: Urology;  Laterality: Left;    CYSTOSCOPY WITH URETEROSCOPY, RETROGRADE PYELOGRAPHY, AND INSERTION OF STENT Left 8/25/2022    Procedure: CYSTOSCOPY, WITH RETROGRADE PYELOGRAM AND URETERAL STENT INSERTION;   Surgeon: Shelby Parra MD;  Location: [facility] OR;  Service: Urology;  Laterality: Left;    EYE SURGERY      bilateral cataracts    FINGER SURGERY Right 2021    right pinky finger    FLEXIBLE CYSTOSCOPY Left 8/25/2022    Procedure: CYSTOSCOPY, FLEXIBLE WITH STENT REMOVAL;  Surgeon: Shelby Parra MD;  Location: [facility] OR;  Service: Urology;  Laterality: Left;    FRACTURE SURGERY  2014    right femur with neville    HEMORRHOID SURGERY      48 yrs ago    HYSTERECTOMY      NEPHROSTOMY Left 8/25/2022    Procedure: CREATION, NEPHROSTOMY;  Surgeon: Shelby Parra MD;  Location: [facility] OR;  Service: Urology;  Laterality: Left;    PERCUTANEOUS NEPHROLITHOTOMY N/A 8/25/2022    Procedure: NEPHROLITHOTOMY, PERCUTANEOUS;  Surgeon: Shelby Parra MD;  Location: [facility] OR;  Service: Urology;  Laterality: N/A;    REPLACEMENT OF IMPLANTABLE CARDIOVERTER-DEFIBRILLATOR (ICD) GENERATOR N/A 12/13/2019    Procedure: REPLACEMENT, PULSE GENERATOR, ICD-MEDTRONIC;  Surgeon: Sebastian Nowak III, MD;  [facility];  Service: Cardiology;  Laterality: N/A;    TONSILLECTOMY       Family History   Problem Relation Age of Onset    Heart disease Mother     Cancer Father         Lung Cancer ??? Asbestos    Breast cancer Paternal Aunt      Social History     Tobacco Use    Smoking status: Former     Passive exposure: Past    Smokeless tobacco: Never    Tobacco comments:     quit 2013   Substance Use Topics    Alcohol use: No    Drug use: No     Review of Systems   Constitutional:  Negative for chills and fever.   HENT:  Negative for ear pain, rhinorrhea and sore throat.    Eyes:  Negative for pain and visual disturbance.   Respiratory:  Positive for shortness of breath. Negative for cough.    Cardiovascular:  Negative for chest pain and palpitations.   Gastrointestinal:  Negative for abdominal pain, constipation, diarrhea, nausea and vomiting.   Genitourinary:  Negative for dysuria, frequency, hematuria and urgency.   Musculoskeletal:   Negative for back pain, joint swelling and myalgias.   Skin:  Negative for rash.   Neurological:  Negative for dizziness, seizures, weakness and headaches.   Psychiatric/Behavioral:  Negative for dysphoric mood. The patient is not nervous/anxious.        Physical Exam     Initial Vitals [09/18/23 2119]   BP Pulse Resp Temp SpO2   117/71 104 20 98.6 °F (37 °C) 98 %      MAP       --         Physical Exam    Nursing note and vitals reviewed.  Constitutional: She appears well-developed and well-nourished.   HENT:   Head: Normocephalic and atraumatic.   Eyes: Conjunctivae, EOM and lids are normal. Pupils are equal, round, and reactive to light.   Neck: Trachea normal. Neck supple. No thyroid mass and no thyromegaly present.   Normal range of motion.  Cardiovascular:  Normal rate, regular rhythm and normal heart sounds.           Pulmonary/Chest: Effort normal.   Rales in the bases   Abdominal: Abdomen is soft. There is no abdominal tenderness.   Musculoskeletal:         General: Normal range of motion.      Cervical back: Normal range of motion and neck supple.     Neurological: She is alert and oriented to person, place, and time. She has normal strength and normal reflexes. No cranial nerve deficit or sensory deficit.   Skin: Skin is warm and dry.   Psychiatric: She has a normal mood and affect. Her speech is normal and behavior is normal. Judgment and thought content normal.         ED Course   Procedures  Labs Reviewed   CBC W/ AUTO DIFFERENTIAL - Abnormal; Notable for the following components:       Result Value    Hemoglobin 10.5 (*)     Hematocrit 34.3 (*)     MCH 26.2 (*)     MCHC 30.6 (*)     RDW 16.0 (*)     Platelets 452 (*)     MPV 9.1 (*)     Gran # (ANC) 8.0 (*)     Gran % 79.8 (*)     Lymph % 9.7 (*)     All other components within normal limits   COMPREHENSIVE METABOLIC PANEL - Abnormal; Notable for the following components:    Sodium 135 (*)     Glucose 176 (*)     BUN 26 (*)     Creatinine 1.8 (*)      Albumin 2.6 (*)     eGFR 27.8 (*)     All other components within normal limits   B-TYPE NATRIURETIC PEPTIDE - Abnormal; Notable for the following components:     (*)     All other components within normal limits   PROTIME-INR        ECG Results              EKG 12-lead (In process)  Result time 09/19/23 05:03:18      In process by Interface, Lab In [facility] (09/19/23 05:03:18)                   Narrative:    Test Reason : R06.02,    Vent. Rate : 102 BPM     Atrial Rate : 138 BPM     P-R Int : 000 ms          QRS Dur : 088 ms      QT Int : 304 ms       P-R-T Axes : 000 -31 -25 degrees     QTc Int : 396 ms    Atrial fibrillation with rapid ventricular response  Left axis deviation  Low voltage QRS  Nonspecific ST and T wave abnormality  Abnormal ECG  When compared with ECG of 15-MAY-2023 21:13,  Atrial fibrillation has replaced Electronic ventricular pacemaker  Vent. rate has increased BY  42 BPM    Referred By: [email or web address]   SELF           Confirmed By:                                   Imaging Results              X-Ray Chest AP Portable (Final result)  Result time 09/19/23 05:54:57      Final result by Foster Landis MD (09/19/23 05:54:57)                   Narrative:    CLINICAL HISTORY:  83 years (1940) Female sob SOB    TECHNIQUE:  Portable AP radiograph the chest. One view.    COMPARISON:  Radiographs from May 24, 2023    FINDINGS:  There is a focal opacity at the left lung base, characteristic of a combination of a small left pleural effusion and associated atelectasis, noting superimposed infection/pneumonia and malignancy are not excluded. There is blunting of the right costophrenic angle consistent with trace pleural effusion. No pneumothorax is identified. The cardiac silhouette is moderately enlarged. Left-sided AICD pacemaker is unchanged in configuration. Atheromatous calcifications are seen at the aortic arch. Osseous structures appear unchanged. The visualized upper abdomen is  unremarkable.    IMPRESSION:  1. Moderate left pleural effusion and adjacent atelectasis/consolidation.  2. Small interstitial opacity at the right lung base.                  .            Electronically signed by:  Foster Landis MD  9/19/2023 5:54 AM CDT Workstation: [id number]                                     Medications   atorvastatin tablet 20 mg (20 mg Oral Given 9/19/23 2023)   fluticasone propionate 50 mcg/actuation nasal spray 100 mcg (100 mcg Each Nostril Not Given 9/19/23 0900)   gabapentin capsule 200 mg (200 mg Oral Given 9/19/23 2023)   metoprolol succinate (TOPROL-XL) 24 hr tablet 25 mg (25 mg Oral Given 9/19/23 0823)   midodrine tablet 5 mg ( Oral Canceled Entry 9/19/23 1615)   sodium chloride 0.9% flush 10 mL (has no administration in time range)   melatonin tablet 6 mg (6 mg Oral Given 9/19/23 2023)   bumetanide injection 1 mg (1 mg Intravenous Given 9/19/23 0823)   albuterol nebulizer solution 2.5 mg (2.5 mg Nebulization Given 9/19/23 0752)   sodium chloride 0.9% flush 10 mL (has no administration in time range)   amiodarone 360 mg/200 mL (1.8 mg/mL) infusion (1 mg/min Intravenous New Bag 9/19/23 1213)   amiodarone 360 mg/200 mL (1.8 mg/mL) infusion (0.5 mg/min Intravenous New Bag 9/19/23 1805)   cefTRIAXone (ROCEPHIN) 1 g in dextrose 5 % 100 mL IVPB (ready to mix) (0 g Intravenous Stopped 9/19/23 1530)   sodium chloride 0.9% flush 10 mL (has no administration in time range)   ondansetron injection 4 mg (has no administration in time range)   prochlorperazine injection Soln 5 mg (has no administration in time range)   senna-docusate 8.6-50 mg per tablet 1 tablet (has no administration in time range)   simethicone chewable tablet 80 mg (has no administration in time range)   aluminum-magnesium hydroxide-simethicone 200-200-20 mg/5 mL suspension 30 mL (has no administration in time range)   naloxone 0.4 mg/mL injection 0.02 mg (has no administration in time range)   potassium bicarbonate  disintegrating tablet 50 mEq (has no administration in time range)   potassium bicarbonate disintegrating tablet 35 mEq (has no administration in time range)   potassium bicarbonate disintegrating tablet 60 mEq (has no administration in time range)   magnesium oxide tablet 800 mg (has no administration in time range)   magnesium oxide tablet 800 mg (has no administration in time range)   potassium, sodium phosphates 280-160-250 mg packet 2 packet (has no administration in time range)   potassium, sodium phosphates 280-160-250 mg packet 2 packet (has no administration in time range)   potassium, sodium phosphates 280-160-250 mg packet 2 packet (has no administration in time range)   glucose chewable tablet 16 g (has no administration in time range)   glucose chewable tablet 24 g (has no administration in time range)   dextrose 50% injection 12.5 g (has no administration in time range)   dextrose 50% injection 25 g (has no administration in time range)   glucagon (human recombinant) injection 1 mg (has no administration in time range)   acetaminophen tablet 650 mg (650 mg Oral Given 9/19/23 1348)   NORepinephrine bitartrate-D5W 4 mg/250 mL (16 mcg/mL) infusion Soln (  Not Given 9/19/23 1315)   NORepinephrine 4 mg in dextrose 5% 250 mL infusion (premix) (0 mcg/kg/min × 96.3 kg Intravenous Paused 9/19/23 1715)   mupirocin 2 % ointment (1 g Nasal Given 9/19/23 2023)   torsemide tablet 20 mg (20 mg Oral Given 9/19/23 0121)     Medical Decision Making  Chief complaint is shortness of breath.  Differential diagnosis includes pneumonia CHF pleural effusion cancer infection pneumothorax pulmonary embolus among others.  In this patient she is here with AFib with COPD CHF exacerbation.    Amount and/or Complexity of Data Reviewed  Labs: ordered.  Radiology: ordered.    Risk  Prescription drug management.                               Clinical Impression:   Final diagnoses:  [R06.02] SOB (shortness of breath)  [J96.01] Acute  respiratory failure with hypoxia        ED Disposition Condition    Observation                 Preston Claros MD  09/20/23 4777

## 2023-09-19 NOTE — CONSULTS
[facility]   Cardiology Note    Consult Requested By: SARITA  Reason for Consult: CHF    SUBJECTIVE:     History of Present Illness: The pt is 84 y/o F pt of  with PMHx of CAD HTN, HDL,chronic Systolic/Diastolic HF, reduced EF,SSS, Orthostatic hypotension, MR, AICD--MDT, single ICD, Afib- SVT, COPD, JENNIFER , intolerant of CPAP. presented with one day hx of sob started yesterday - increasing swelling to BLE. Denies CP, palpitations, syncope.     Labs H/H 10.5/34.3 platelets 452 cr 1.8  dig level 0.3 CXR L sided pleural effusion, some pulmonary congestion bilaterally--left sided effusion worsening this am. EKG today Afib , no ST seg changes. Echo pending  hypotensive this am. No urinary output documented   Review of patient's allergies indicates:   Allergen Reactions    Codeine Other (See Comments)     nausea    Hydrocodone Nausea And Vomiting    Lasix [furosemide]      rash       Past Medical History:   Diagnosis Date    Allergy     Codeine, Lasix    Atrial fibrillation     Atrial fibrillation     Cataract     CHF (congestive heart failure)     Diabetes mellitus, type 2     Ejection fraction < 50% 10/18/2017    Approximately 35%  Based on prior  Echocardiogram.    Encounter for blood transfusion     Hyperlipidemia     Osteoporosis     PONV (postoperative nausea and vomiting)     Thyroid disorder screening 10/17/2017    TSH of 1.12 ordered by Dr. dee Jones     Past Surgical History:   Procedure Laterality Date    ANTEGRADE NEPHROSTOGRAPHY Left 8/25/2022    Procedure: NEPHROSTOGRAM, ANTEGRADE;  Surgeon: Shelby Parra MD;  Location: [facility] OR;  Service: Urology;  Laterality: Left;    CHOLECYSTECTOMY  1997    [place]     COLONOSCOPY      CYSTOSCOPY W/ URETERAL STENT PLACEMENT Left 7/17/2022    Procedure: CYSTOSCOPY, WITH URETERAL STENT INSERTION;  Surgeon: Shelby Parra MD;  Location: [facility] OR;  Service: Urology;  Laterality: Left;    CYSTOSCOPY W/ URETERAL STENT REMOVAL Left  9/21/2022    Procedure: CYSTOSCOPY, WITH URETERAL STENT REMOVAL;  Surgeon: Shelby Parra MD;  Location: [facility] OR;  Service: Urology;  Laterality: Left;    CYSTOSCOPY WITH URETEROSCOPY, RETROGRADE PYELOGRAPHY, AND INSERTION OF STENT Left 8/25/2022    Procedure: CYSTOSCOPY, WITH RETROGRADE PYELOGRAM AND URETERAL STENT INSERTION;  Surgeon: Shelby Parra MD;  Location: [facility] OR;  Service: Urology;  Laterality: Left;    EYE SURGERY      bilateral cataracts    FINGER SURGERY Right 2021    right pinky finger    FLEXIBLE CYSTOSCOPY Left 8/25/2022    Procedure: CYSTOSCOPY, FLEXIBLE WITH STENT REMOVAL;  Surgeon: Shelby Parra MD;  Location: [facility] OR;  Service: Urology;  Laterality: Left;    FRACTURE SURGERY  2014    right femur with neville    HEMORRHOID SURGERY      48 yrs ago    HYSTERECTOMY      NEPHROSTOMY Left 8/25/2022    Procedure: CREATION, NEPHROSTOMY;  Surgeon: Shelby Parra MD;  Location: [facility] OR;  Service: Urology;  Laterality: Left;    PERCUTANEOUS NEPHROLITHOTOMY N/A 8/25/2022    Procedure: NEPHROLITHOTOMY, PERCUTANEOUS;  Surgeon: Shelby Parra MD;  Location: [facility] OR;  Service: Urology;  Laterality: N/A;    REPLACEMENT OF IMPLANTABLE CARDIOVERTER-DEFIBRILLATOR (ICD) GENERATOR N/A 12/13/2019    Procedure: REPLACEMENT, PULSE GENERATOR, ICD-MEDTRONIC;  Surgeon: Sebastian Nowak III, MD;  Location: [facility];  Service: Cardiology;  Laterality: N/A;    TONSILLECTOMY       Family History   Problem Relation Age of Onset    Heart disease Mother     Cancer Father         Lung Cancer ??? Asbestos    Breast cancer Paternal Aunt      Social History     Tobacco Use    Smoking status: Former     Passive exposure: Past    Smokeless tobacco: Never    Tobacco comments:     quit 2013   Substance Use Topics    Alcohol use: No    Drug use: No       Review of Systems:  Review of Systems   Constitutional:  Negative for chills, diaphoresis, fever, malaise/fatigue and weight loss.   Respiratory:  Positive for  shortness of breath. Negative for cough, hemoptysis and sputum production.    Cardiovascular:  Positive for leg swelling and PND. Negative for chest pain, palpitations, orthopnea and claudication.   Gastrointestinal:  Negative for nausea and vomiting.       OBJECTIVE:     Vital Signs (Most Recent)  Temp: 99.4 °F (37.4 °C) (09/19/23 0701)  Pulse: (!) 126 (09/19/23 0823)  Resp: 20 (09/19/23 0752)  BP: (!) 80/57 (09/19/23 0823)  SpO2: (!) 94 % (09/19/23 0836)    Vital Signs Range (Last 24H):  Temp:  [98.6 °F (37 °C)-99.4 °F (37.4 °C)]   Pulse:  []   Resp:  [16-26]   BP: ()/(53-76)   SpO2:  [94 %-98 %]     I & O (Last 24H):    Intake/Output Summary (Last 24 hours) at 9/19/2023 0858  Last data filed at 9/19/2023 0753  Gross per 24 hour   Intake 120 ml   Output --   Net 120 ml       Current Diet:     Current Diet Order   Procedures    Diet Cardiac        Allergies:  Review of patient's allergies indicates:   Allergen Reactions    Codeine Other (See Comments)     nausea    Hydrocodone Nausea And Vomiting    Lasix [furosemide]      rash       Meds:  Scheduled Meds:   amiodarone  200 mg Oral Daily    atorvastatin  20 mg Oral QHS    bumetanide  1 mg Intravenous Daily    digoxin  125 mcg Oral Every Mon, Wed, Fri    enoxparin  1 mg/kg Subcutaneous Q24H (prophylaxis, 1700)    fluticasone propionate  2 spray Each Nostril Daily    gabapentin  200 mg Oral TID    metoprolol succinate  25 mg Oral Daily    midodrine  5 mg Oral TID AC    vericiguat   Oral Daily     Continuous Infusions:  PRN Meds:albuterol sulfate, melatonin, sodium chloride 0.9%    Oxygen/Ventilator Data (Last 24H):  (if applicable)            Hemodynamic Parameters (Last 24H):   (if applicable)        Laboratory and Radiology Data:  Recent Results (from the past 24 hour(s))   CBC auto differential    Collection Time: 09/18/23 10:01 PM   Result Value Ref Range    WBC 10.05 3.90 - 12.70 K/uL    RBC 4.01 4.00 - 5.40 M/uL    Hemoglobin 10.5 (L) 12.0 - 16.0  g/dL    Hematocrit 34.3 (L) 37.0 - 48.5 %    MCV 86 82 - 98 fL    MCH 26.2 (L) 27.0 - 31.0 pg    MCHC 30.6 (L) 32.0 - 36.0 g/dL    RDW 16.0 (H) 11.5 - 14.5 %    Platelets 452 (H) 150 - 450 K/uL    MPV 9.1 (L) 9.2 - 12.9 fL    Immature Granulocytes 0.3 0.0 - 0.5 %    Gran # (ANC) 8.0 (H) 1.8 - 7.7 K/uL    Immature Grans (Abs) 0.03 0.00 - 0.04 K/uL    Lymph # 1.0 1.0 - 4.8 K/uL    Mono # 0.9 0.3 - 1.0 K/uL    Eos # 0.1 0.0 - 0.5 K/uL    Baso # 0.04 0.00 - 0.20 K/uL    nRBC 0 0 /100 WBC    Gran % 79.8 (H) 38.0 - 73.0 %    Lymph % 9.7 (L) 18.0 - 48.0 %    Mono % 9.1 4.0 - 15.0 %    Eosinophil % 0.7 0.0 - 8.0 %    Basophil % 0.4 0.0 - 1.9 %    Differential Method Automated    Comprehensive metabolic panel    Collection Time: 09/18/23 10:01 PM   Result Value Ref Range    Sodium 135 (L) 136 - 145 mmol/L    Potassium 4.2 3.5 - 5.1 mmol/L    Chloride 98 95 - 110 mmol/L    CO2 29 23 - 29 mmol/L    Glucose 176 (H) 70 - 110 mg/dL    BUN 26 (H) 8 - 23 mg/dL    Creatinine 1.8 (H) 0.5 - 1.4 mg/dL    Calcium 9.3 8.7 - 10.5 mg/dL    Total Protein 6.7 6.0 - 8.4 g/dL    Albumin 2.6 (L) 3.5 - 5.2 g/dL    Total Bilirubin 0.4 0.1 - 1.0 mg/dL    Alkaline Phosphatase 69 55 - 135 U/L    AST 14 10 - 40 U/L    ALT 10 10 - 44 U/L    eGFR 27.8 (A) >60 mL/min/1.73 m^2    Anion Gap 8 8 - 16 mmol/L   Brain natriuretic peptide    Collection Time: 09/18/23 10:01 PM   Result Value Ref Range     (H) 0 - 99 pg/mL   Protime-INR    Collection Time: 09/18/23 10:01 PM   Result Value Ref Range    Prothrombin Time 11.1 9.0 - 12.5 sec    INR 1.0 0.8 - 1.2   Digoxin level    Collection Time: 09/19/23  5:30 AM   Result Value Ref Range    Digoxin Lvl 0.3 (L) 0.8 - 2.0 ng/mL     Imaging Results              X-Ray Chest AP Portable (Final result)  Result time 09/19/23 05:54:57      Final result by Foster Landis MD (09/19/23 05:54:57)                   Narrative:    CLINICAL HISTORY:  83 years (1940) Female sob SOB    TECHNIQUE:  Portable AP  radiograph the chest. One view.    COMPARISON:  Radiographs from May 24, 2023    FINDINGS:  There is a focal opacity at the left lung base, characteristic of a combination of a small left pleural effusion and associated atelectasis, noting superimposed infection/pneumonia and malignancy are not excluded. There is blunting of the right costophrenic angle consistent with trace pleural effusion. No pneumothorax is identified. The cardiac silhouette is moderately enlarged. Left-sided AICD pacemaker is unchanged in configuration. Atheromatous calcifications are seen at the aortic arch. Osseous structures appear unchanged. The visualized upper abdomen is unremarkable.    IMPRESSION:  1. Moderate left pleural effusion and adjacent atelectasis/consolidation.  2. Small interstitial opacity at the right lung base.                  .            Electronically signed by:  Foster Landis MD  9/19/2023 5:54 AM CDT Workstation: [id number]                                    12-lead EKG interpretation:  (if applicable)      Current Cardiac Rhythm:   (if applicable)    Physical Exam:   Physical Exam  Constitutional:       Appearance: Normal appearance.   Cardiovascular:      Rate and Rhythm: Normal rate and regular rhythm.      Heart sounds: Murmur heard.   Pulmonary:      Effort: Pulmonary effort is normal. No respiratory distress.      Breath sounds: Rhonchi present.   Skin:     General: Skin is warm and dry.   Neurological:      General: No focal deficit present.      Mental Status: She is alert and oriented to person, place, and time.         ASSESSMENT/PLAN:   Assessment:   Acute on Chronic combined HF reduced EF  MARCELINO/CKD cr 1.8  MR  HTN  COPD  JENNIFER  SSS  MDT ICD insitu    Echo 5/2023--EF 40 % left ventricular global hypokinesis, LA enlargement, Mod -Severe MR , PA pressure 45 mmhg     Recent device interrogation--9/2023  11% afib burden· Possible OptiVol fluid accumulation--9/10/2023     Plan  Continue IV Bumex  Pt scheduled  for thoracentesis of left sided effusion  Cr 1.8--will reassess need for increased diuretic after thoracentesis, BNP slightly elevated 523  Need accurate I/O   Paroxysmal Afib RVR, continue amio--only able to tolerate 200 mg amio daily--consider adding amio drip if rates > 120 rate on tele   100-110 at this time.   D/c digoxin  Xarelto on hold due to procedure  On toprol cannot increase due to hypotension  Long history of low bp, continue midodrine  Repeat echo pending  Discussed pt with   May 2023--EF 40 % with mod-Severe MR   Hold Verqvo for now   Further instructions to follow  Thank you for your consult.

## 2023-09-19 NOTE — HOSPITAL COURSE
Patient monitor closely during hospitalization.  She was noted to have acute hypoxemic respiratory failure and AFib RVR.  Cardiology consulted.  Patient noted to have left-sided large pleural effusion on chest x-ray.  Pulmonary Medicine perform left thoracentesis with 2000 cc yellow turbid fluid removal.  Fluid analysis suggestive of transudate.  Cultures are negative.  Patient is requiring aggressive IV diuretic therapy.  Symptoms have improved.  Subsequent chest x-rays showing reaccumulating left pleural effusion.  Patient is on fluid restriction.  Patient also required intravenous amiodarone infusion for atrial fibrillation with rapid ventricular response and now transitioned back to oral amiodarone.  Patient is closely being followed by Pulmonary Medicine and Cardiology team.  Patient made herself DNR code status.  Patient was followed by palliative medicine as well.  Patient is medically status continued to improve, patient was transitioned to oral diuresis and oral digoxin.  Patient instructed to follow up with Cardiology and pulmonology after discharge from skilled nursing facility.  Patient to complete 2 weeks of oral antibiotics given parapneumonic effusion.  Patient to follow up with pulmonology with repeat chest imaging.  Patient medically stable for discharge to skilled nursing facility.

## 2023-09-19 NOTE — H&P
Formerly [facility], [facility] - Emergency Dept  Hospital Medicine  History & Physical    Patient Name: Jo Alegre  MRN: [id number]  Patient Class: OP- Observation  Admission Date: 9/18/2023  Attending Physician: lorie  Primary Care Provider: Kraig Alberto MD         Patient information was obtained from patient and ER records.     Subjective:     Principal Problem:CHF (congestive heart failure)    Chief Complaint:   Chief Complaint   Patient presents with    Shortness of Breath     Started this morning. Has hx COPD and CHF. Pt states that she has at home oxygen. Pt denies nausea and vomiting.         HPI: Ms. Alegre is an 83 year old woman with PMHx of HTN, HDL, HFrEF s/p AICD, Afib- presented with one day hx of sob started yesterday - pt says she was fine day before yesterday but around morning time she started having sob not associated with any chest pain, palpitations, or fever, chills, cough or abdominal pain. She did not have any dietary discretion, has been adherent to her home meds. She also noted leg swelling bilateral yesterday. She was at cardio office for her device interrogation which as per patient is fine.     In ER she was found to have left pleural effusion and elevated BNP. Pt is being admitted for diuresis.        Past Medical History:   Diagnosis Date    Allergy     Codeine, Lasix    Atrial fibrillation     Atrial fibrillation     Cataract     CHF (congestive heart failure)     Diabetes mellitus, type 2     Ejection fraction < 50% 10/18/2017    Approximately 35%  Based on prior  Echocardiogram.    Encounter for blood transfusion     Hyperlipidemia     Osteoporosis     PONV (postoperative nausea and vomiting)     Thyroid disorder screening 10/17/2017    TSH of 1.12 ordered by Dr. dee Jones       Past Surgical History:   Procedure Laterality Date    ANTEGRADE NEPHROSTOGRAPHY Left 8/25/2022    Procedure: NEPHROSTOGRAM, ANTEGRADE;  Surgeon: Shelby Parra MD;  Location: [facility]  OR;  Service: Urology;  Laterality: Left;    CHOLE[place]STECTOMY  1997    [place]     COLONOSCOPY      CYSTOSCOPY W/ URETERAL STENT PLACEMENT Left 7/17/2022    Procedure: CYSTOSCOPY, WITH URETERAL STENT INSERTION;  Surgeon: Shelby Parra MD;  Location: [facility] OR;  Service: Urology;  Laterality: Left;    CYSTOSCOPY W/ URETERAL STENT REMOVAL Left 9/21/2022    Procedure: CYSTOSCOPY, WITH URETERAL STENT REMOVAL;  Surgeon: Shelby Parra MD;  Location: [facility] OR;  Service: Urology;  Laterality: Left;    CYSTOSCOPY WITH URETEROSCOPY, RETROGRADE PYELOGRAPHY, AND INSERTION OF STENT Left 8/25/2022    Procedure: CYSTOSCOPY, WITH RETROGRADE PYELOGRAM AND URETERAL STENT INSERTION;  Surgeon: Shelby Parra MD;  Location: [facility] OR;  Service: Urology;  Laterality: Left;    EYE SURGERY      bilateral cataracts    FINGER SURGERY Right 2021    right pinky finger    FLEXIBLE CYSTOSCOPY Left 8/25/2022    Procedure: CYSTOSCOPY, FLEXIBLE WITH STENT REMOVAL;  Surgeon: Shelby Parra MD;  Location: [facility] OR;  Service: Urology;  Laterality: Left;    FRACTURE SURGERY  2014    right femur with neville    HEMORRHOID SURGERY      48 yrs ago    HYSTERECTOMY      NEPHROSTOMY Left 8/25/2022    Procedure: CREATION, NEPHROSTOMY;  Surgeon: Shelby Parra MD;  Location: [facility] OR;  Service: Urology;  Laterality: Left;    PERCUTANEOUS NEPHROLITHOTOMY N/A 8/25/2022    Procedure: NEPHROLITHOTOMY, PERCUTANEOUS;  Surgeon: Shelby Parra MD;  Location: [facility] OR;  Service: Urology;  Laterality: N/A;    REPLACEMENT OF IMPLANTABLE CARDIOVERTER-DEFIBRILLATOR (ICD) GENERATOR N/A 12/13/2019    Procedure: REPLACEMENT, PULSE GENERATOR, ICD-MEDTRONIC;  Surgeon: Sebastian Nowak III, MD;  [facility]: [facility];  Service: Cardiology;  Laterality: N/A;    TONSILLECTOMY         Review of patient's allergies indicates:   Allergen Reactions    Codeine Other (See Comments)     nausea    Hydrocodone Nausea And Vomiting    Lasix [furosemide]       rash       Current Facility-Administered Medications on File Prior to Encounter   Medication    0.9%  NaCl infusion    diphenhydrAMINE injection 25 mg    lorazepam injection 1 mg     Current Outpatient Medications on File Prior to Encounter   Medication Sig    albuterol (PROVENTIL/VENTOLIN HFA) 90 mcg/actuation inhaler INHALE 2 PUFFS BY MOUTH EVERY 6 HOURS AS NEEDED FOR WHEEZING (Patient taking differently: Inhale 2 puffs into the lungs every 6 (six) hours as needed.)    amiodarone (PACERONE) 200 MG Tab Take 200 mg by mouth once daily.    amLODIPine (NORVASC) 2.5 MG tablet Take 2.5 mg by mouth.    atorvastatin (LIPITOR) 20 MG tablet Take 20 mg by mouth every evening.    Ca-D3-mag ox-zinc--thom-bor 600 mg calcium- 20 mcg-50 mg Tab Take 1 tablet by mouth 2 (two) times daily.    cholecalciferol, vitamin D3, 125 mcg (5,000 unit) Tab Take 5,000 Units by mouth 2 (two) times daily.    digoxin (LANOXIN) 125 mcg tablet Take 1 tablet (125 mcg total) by mouth every Mon, Wed, Fri.    dorzolamide-timolol 2-0.5% (COSOPT) 22.3-6.8 mg/mL ophthalmic solution Place into both eyes.    fluticasone propionate (FLONASE) 50 mcg/actuation nasal spray 2 sprays (100 mcg total) by Each Nostril route once daily.    gabapentin (NEURONTIN) 100 MG capsule TAKE 2 CAPSULES(200 MG) BY MOUTH THREE TIMES DAILY (Patient taking differently: Take 200 mg by mouth 3 (three) times daily. TAKE 2 CAPSULES(200 MG) BY MOUTH THREE TIMES DAILY)    glimepiride (AMARYL) 1 MG tablet Take 1 tablet (1 mg total) by mouth before breakfast.    latanoprost 0.005 % ophthalmic solution Place 1 drop into both eyes nightly.    metoprolol succinate (TOPROL-XL) 25 MG 24 hr tablet Take 1 tablet (25 mg total) by mouth once daily.    midodrine (PROAMATINE) 5 MG Tab Take 2.5 mg by mouth as needed. Take 1 tablet by mouth when feeling dizzy from low BP, avoid 3 hours prior to bedtime    propranoloL (INDERAL) 10 MG tablet Take 10 mg by mouth 2 (two) times daily.     rivaroxaban (XARELTO) 15 mg Tab Take 1 tablet (15 mg total) by mouth daily with dinner or evening meal. (Patient taking differently: Take 10 mg by mouth daily with dinner or evening meal.)    sacubitriL-valsartan (ENTRESTO)  mg per tablet Take 1 tablet by mouth 2 (two) times daily.    torsemide (DEMADEX) 20 MG Tab Take 1 tablet (20 mg total) by mouth once daily. Take an extra dose in the afternoon for leg swelling, shortness of breath or weight gain (3 lbs overnight or 5 lbs in a week)    VERQUVO 5 mg Tab Take by mouth.     Family History       Problem Relation (Age of Onset)    Breast cancer Paternal Aunt    Cancer Father    Heart disease Mother          Tobacco Use    Smoking status: Former     Passive exposure: Past    Smokeless tobacco: Never    Tobacco comments:     quit 2013   Substance and Sexual Activity    Alcohol use: No    Drug use: No    Sexual activity: Not Currently     Review of Systems   Constitutional:  Negative for activity change, appetite change, chills and fever.   HENT:  Negative for congestion, ear pain and nosebleeds.    Eyes:  Negative for itching and visual disturbance.   Respiratory:  Positive for shortness of breath. Negative for apnea, cough, chest tightness and wheezing.    Cardiovascular:  Positive for leg swelling. Negative for chest pain and palpitations.   Gastrointestinal:  Negative for abdominal distention, abdominal pain, constipation, diarrhea, nausea and vomiting.   Genitourinary:  Negative for dysuria and flank pain.   Musculoskeletal:  Negative for back pain.   Neurological:  Negative for dizziness and headaches.     Objective:     Vital Signs (Most Recent):  Temp: 98.6 °F (37 °C) (09/18/23 2119)  Pulse: 96 (09/19/23 0000)  Resp: 18 (09/19/23 0000)  BP: 113/71 (09/19/23 0121)  SpO2: 95 % (09/19/23 0002) Vital Signs (24h Range):  Temp:  [98.6 °F (37 °C)] 98.6 °F (37 °C)  Pulse:  [] 96  Resp:  [16-20] 18  SpO2:  [95 %-98 %] 95 %  BP: (106-142)/(58-76)  "113/71     Weight: 91.2 kg (201 lb)  Body mass index is 29.68 kg/m².     Physical Exam  Vitals reviewed.   Constitutional:       General: She is not in acute distress.     Appearance: Normal appearance. She is not ill-appearing, toxic-appearing or diaphoretic.   HENT:      Head: Normocephalic and atraumatic.      Mouth/Throat:      Mouth: Mucous membranes are moist.      Pharynx: Oropharynx is clear.   Eyes:      General: No scleral icterus.     Conjunctiva/sclera: Conjunctivae normal.   Neck:      Vascular: No carotid bruit.   Cardiovascular:      Rate and Rhythm: Normal rate and regular rhythm.      Pulses: Normal pulses.      Heart sounds: Normal heart sounds.   Pulmonary:      Effort: Pulmonary effort is normal.      Breath sounds: Rales present.   Abdominal:      General: Abdomen is flat. Bowel sounds are normal.      Palpations: Abdomen is soft.   Musculoskeletal:         General: Normal range of motion.      Right lower leg: Edema present.      Left lower leg: Edema present.   Skin:     General: Skin is warm.   Neurological:      General: No focal deficit present.      Mental Status: She is alert and oriented to person, place, and time. Mental status is at baseline.   Psychiatric:         Mood and Affect: Mood normal.         Behavior: Behavior normal.                Significant Labs: All pertinent labs within the past 24 hours have been reviewed.  BMP:   Recent Labs   Lab 09/18/23 2201   *   *   K 4.2   CL 98   CO2 29   BUN 26*   CREATININE 1.8*   CALCIUM 9.3     CBC:   Recent Labs   Lab 09/18/23 2201   WBC 10.05   HGB 10.5*   HCT 34.3*   *     CMP:   Recent Labs   Lab 09/18/23 2201   *   K 4.2   CL 98   CO2 29   *   BUN 26*   CREATININE 1.8*   CALCIUM 9.3   PROT 6.7   ALBUMIN 2.6*   BILITOT 0.4   ALKPHOS 69   AST 14   ALT 10   ANIONGAP 8     Cardiac Markers:   Recent Labs   Lab 09/18/23 2201   *     Magnesium: No results for input(s): "MG" in the last 48 " hours.    Significant Imaging: I have reviewed all pertinent imaging results/findings within the past 24 hours.    Assessment/Plan:     * CHF (congestive heart failure)  Elevated BNP  C/w Bumex- allergic to lasix  I/Os, daily weight   Consult cardio  C/w GDMT    Obstructive sleep apnea syndrome  C/w CPAP      COPD (chronic obstructive pulmonary disease) per patient  C/w breathing rx      Paroxysmal atrial fibrillation  Currently in Afib-   C/w Amio and BB  C/w Xarelto    Stage 3 chronic kidney disease  At baseline      Cardiomyopathy with implantable cardioverter-defibrillator  Not sure if ischemic or non-ischemic  Device interrogated yesterday  C/w Bumex 1 mg IV daily   C/w GDMT  I/Os, daily weight      Mixed dyslipidemia  C/w statin      Essential hypertension  C/w home meds and including her Midodrine       VTE Risk Mitigation (From admission, onward)    None             On 09/19/2023, patient should be placed in hospital observation services under my care.        Roberto Kaplan MD  Department of Hospital Medicine  [facility] - Emergency Dept

## 2023-09-19 NOTE — CARE UPDATE
09/19/23 0752   Patient Assessment/Suction   Level of Consciousness (AVPU) alert   Respiratory Effort Normal;Unlabored   Expansion/Accessory Muscles/Retractions no use of accessory muscles   All Lung Fields Breath Sounds diminished   PRE-TX-O2   Device (Oxygen Therapy) nasal cannula   $ Is the patient on Low Flow Oxygen? Yes   Flow (L/min) 4   SpO2 98 %   Pulse 88   Resp 20   Aerosol Therapy   $ Aerosol Therapy Charges Aerosol Treatment   Daily Review of Necessity (SVN) completed   Respiratory Treatment Status (SVN) given   Treatment Route (SVN) mask;oxygen   Patient Position (SVN) HOB elevated   Post Treatment Assessment (SVN) increased aeration   Signs of Intolerance (SVN) none   Respiratory Evaluation   $ Care Plan Tech Time 15 min

## 2023-09-19 NOTE — ASSESSMENT & PLAN NOTE
Patient is identified as having Combined Systolic and Diastolic heart failure that is Acute on chronic. CHF is currently uncontrolled due to Continued edema of extremities, >3 pillow orthopnea, Rales/crackles on pulmonary exam and Pulmonary edema/pleural effusion on CXR. Latest ECHO performed and demonstrates- Results for orders placed during the hospital encounter of 05/15/23    Echo    Interpretation Summary  · The left ventricle is mildly enlarged with mild concentric hypertrophy and moderately decreased systolic function.  · The estimated ejection fraction is 40%.  · Grade I left ventricular diastolic dysfunction.  · There is left ventricular global hypokinesis.  · Normal right ventricular size with normal right ventricular systolic function.  · Severe left atrial enlargement.  · Moderate-to-severe mitral regurgitation.  · Mild tricuspid regurgitation.  · Elevated central venous pressure (15 mmHg).  · The estimated PA systolic pressure is 45 mmHg.  · There is pulmonary hypertension.  · Trivial anterior pericardial effusion.  . Continue bumex and digoxin and monitor clinical status closely. Monitor on telemetry. Patient is on CHF pathway.  Monitor strict Is&Os and daily weights.  Place on fluid restriction of 1.5 L. Cardiology has been consulted. Continue to stress to patient importance of self efficacy and  on diet for CHF. Last BNP reviewed- and noted below   Recent Labs   Lab 09/18/23  2201   *   .

## 2023-09-19 NOTE — SIGNIFICANT EVENT
"Progress Note  Hospital Medicine    Admit Date: 9/18/2023    SUBJECTIVE:     Follow-up For:  CHF (congestive heart failure)    HPI/Interval history (See H&P for complete P,F,SHx) :  Patient with persistent AFib RVR and hypotension.  Cardiology evaluated patient and wishes to initiate amiodarone drip.    Patient with systolic blood pressure 80s to 90s.  Lowest blood pressure 79/50.  She is also noted have a low-grade temp of a 100.9.  Patient remains tachypneic    Review of Systems: List if applicable  Pain scale: 0/10  Constitutional- Positive for Fever, Weakness    CV- negative for chest pain positive for palpitations.    Resp- Positive for Cough, SOB      OBJECTIVE:     Vital Signs Range (Last 24H):  Temp:  [98.6 °F (37 °C)-100.9 °F (38.3 °C)]   Pulse:  []   Resp:  [16-32]   BP: ()/(48-76)   SpO2:  [92 %-98 %]     I & O (Last 24H):    Intake/Output Summary (Last 24 hours) at 9/19/2023 1339  Last data filed at 9/19/2023 0948  Gross per 24 hour   Intake 120 ml   Output 200 ml   Net -80 ml       Estimated body mass index is 33.25 kg/m² as calculated from the following:    Height as of this encounter: 5' 7" (1.702 m).    Weight as of this encounter: 96.3 kg (212 lb 4.9 oz).    Vent Settings-          Physical Exam:  Patient with significant decreased breath sounds on the left bibasilar crackles.        Laboratory/Diagnostic Data:  Reviewed and noted in plan where applicable- Please see chart for full lab data.    Medications:  Medication list was reviewed and changes noted under Assessment/Plan.    ASSESSMENT/PLAN:     Active Problems:    Active Hospital Problems    Diagnosis  POA    Recurrent left pleural effusion [J90]  Yes    Acute hypoxemic respiratory failure [J96.01]  Yes    Obstructive sleep apnea syndrome [G47.33]  Yes     Previously diagnosed but was not able to wear CPAP      COPD (chronic obstructive pulmonary disease) per patient [J44.9]  Yes    Acute on chronic combined systolic and diastolic " congestive heart failure [I50.43]  Yes    Paroxysmal atrial fibrillation [I48.0]  Yes     Patient follows up with [facility].  Currently on a combination of diltiazem, digoxin, Xarelto and amiodarone.  Dr. Jones/Dr. Lopez      Stage 3 chronic kidney disease [N18.30]  Yes    Essential hypertension [I10]  Yes    Mixed dyslipidemia [E78.2]  Yes    Cardiomyopathy with implantable cardioverter-defibrillator [I42.9, Z95.810]  Yes     Summary    Mild eccentric left ventricular hypertrophy.  Mild left ventricular enlargement.  Normal left ventricular systolic function. The estimated ejection fraction is 60%.  Grade I (mild) left ventricular diastolic dysfunction consistent with impaired relaxation.  No wall motion abnormalities.  Normal right ventricular systolic function.  Severe left atrial enlargement.  Mild mitral sclerosis.  There is mild leaflet calcification of the Mitral Valve.  Mild mitral regurgitation.  Intermediate central venous pressure (8 mmHg).  The estimated PA systolic pressure is 37 mmHg.     Echocardiogram done on 04/02/2020.        Resolved Hospital Problems    Diagnosis Date Resolved POA    *CHF (congestive heart failure) [I50.9] 09/19/2023 Yes         Disposition-    AFib RVR will initiate amiodarone drip per Cardiology recommendations.    Acute respiratory failure with hypoxia repeat ABGs as patient with worsening tachypnea presently on 4 L nasal cannula.  Consult pulmonology.  Plan for thoracentesis tomorrow    Hypotension-initiated on Levophed.     Fever-suspect pneumonia.  Will initiate Rocephin 1 mg IV daily    Advanced care planning- Advance Care Planning     Date: 09/19/2023    [facility]  I engaged the patient and family in a voluntary conversation about advance care planning and we specifically addressed what the goals of care would be moving forward, in light of the patient's change in clinical status, specifically patient with multiple comorbidities with acute respiratory failure and  congestive heart failure..  We did specifically address the patient's likely prognosis, which is poor.  We explored the patient's values and preferences for future care.  The patient and family endorses that what is most important right now is to focus on extending life as long as possible, even it it means sacrificing quality and curative/life-prolongation (regardless of treatment burdens)    Accordingly, we have decided that the best plan to meet the patient's goals includes continuing with treatment    I did not explain the role for hospice care at this stage of the patient's illness, including its ability to help the patient live with the best quality of life possible.  We will not be making a hospice referral.    I spent a total of 30 minutes engaging the patient in this advance care planning discussion.                DVT prophylaxis addressed with:  On hold for thoracentesis    Time spent in care of patient (Greater than 1/2 spent in direct face to face contact): 60    Time spent in critical care (in addition to E/M time mentioned above) Time spent to titrate drips, adjust ventilator settings, and provide counseling and coordination to critical care team- 60 min

## 2023-09-19 NOTE — ASSESSMENT & PLAN NOTE
Patient with Hypoxic Respiratory failure which is Acute.  she is not on home oxygen. Supplemental oxygen was provided and noted-      .   Signs/symptoms of respiratory failure include- tachypnea, increased work of breathing, respiratory distress and use of accessory muscles. Contributing diagnoses includes - CHF Labs and images were reviewed. Patient Has recent ABG, which has been reviewed. Will treat underlying causes and adjust management of respiratory failure as follows- bronchodilators ordered.  Patient with worsening of hypoxia presently on 5 L nasal cannula high-flow oxygen, Bumex 1 mg IV x1 dose now.

## 2023-09-19 NOTE — ASSESSMENT & PLAN NOTE
Currently in Afib-   C/w Amio and BB  C/w Xarelto  Patient on digoxin Monday Wednesday Friday.  Will give dose of digoxin today.  Cardiology consulted.

## 2023-09-19 NOTE — NURSING
Patient requiring a little more pressure support with being on amiodarone. Patient transferred to room 3026. Report called to Terence MALAVE.

## 2023-09-20 DIAGNOSIS — Z78.0 MENOPAUSE: ICD-10-CM

## 2023-09-20 PROBLEM — Z71.89 GOALS OF CARE, COUNSELING/DISCUSSION: Status: ACTIVE | Noted: 2023-09-20

## 2023-09-20 PROBLEM — Z71.89 ACP (ADVANCE CARE PLANNING): Status: ACTIVE | Noted: 2023-09-20

## 2023-09-20 LAB
ALBUMIN FLD-MCNC: 2 G/DL
ALBUMIN SERPL BCP-MCNC: 2.1 G/DL (ref 3.5–5.2)
ALP SERPL-CCNC: 66 U/L (ref 55–135)
ALT SERPL W/O P-5'-P-CCNC: 10 U/L (ref 10–44)
ANION GAP SERPL CALC-SCNC: 8 MMOL/L (ref 8–16)
AORTIC ROOT ANNULUS: 3.5 CM
AORTIC VALVE CUSP SEPERATION: 1.3 CM
AST SERPL-CCNC: 12 U/L (ref 10–40)
AV INDEX (PROSTH): 0.76
AV MEAN GRADIENT: 5 MMHG
AV PEAK GRADIENT: 8 MMHG
AV VALVE AREA BY VELOCITY RATIO: 3.09 CM²
AV VALVE AREA: 3.14 CM²
AV VELOCITY RATIO: 0.74
BASOPHILS # BLD AUTO: 0.03 K/UL (ref 0–0.2)
BASOPHILS NFR BLD: 0.2 % (ref 0–1.9)
BILIRUB SERPL-MCNC: 0.5 MG/DL (ref 0.1–1)
BODY FLUID SOURCE, LDH: NORMAL
BSA FOR ECHO PROCEDURE: 2.13 M2
BUN SERPL-MCNC: 29 MG/DL (ref 8–23)
CALCIUM SERPL-MCNC: 8.7 MG/DL (ref 8.7–10.5)
CHLORIDE SERPL-SCNC: 97 MMOL/L (ref 95–110)
CO2 SERPL-SCNC: 29 MMOL/L (ref 23–29)
CREAT SERPL-MCNC: 1.7 MG/DL (ref 0.5–1.4)
DIFFERENTIAL METHOD: ABNORMAL
DOP CALC AO PEAK VEL: 1.44 M/S
DOP CALC AO VTI: 19.6 CM
DOP CALC LVOT AREA: 4.2 CM2
DOP CALC LVOT DIAMETER: 2.3 CM
DOP CALC LVOT PEAK VEL: 1.07 M/S
DOP CALC LVOT STROKE VOLUME: 61.46 CM3
DOP CALC MV VTI: 28.8 CM
DOP CALCLVOT PEAK VEL VTI: 14.8 CM
E WAVE DECELERATION TIME: 197 MSEC
E/E' RATIO: 6.78 M/S
ECHO LV POSTERIOR WALL: 1.51 CM (ref 0.6–1.1)
EOSINOPHIL # BLD AUTO: 0.1 K/UL (ref 0–0.5)
EOSINOPHIL NFR BLD: 0.8 % (ref 0–8)
ERYTHROCYTE [DISTWIDTH] IN BLOOD BY AUTOMATED COUNT: 16.3 % (ref 11.5–14.5)
EST. GFR  (NO RACE VARIABLE): 29.8 ML/MIN/1.73 M^2
GLUCOSE FLD-MCNC: 76 MG/DL
GLUCOSE SERPL-MCNC: 107 MG/DL (ref 70–110)
GLUCOSE SERPL-MCNC: 148 MG/DL (ref 70–110)
HCT VFR BLD AUTO: 34.5 % (ref 37–48.5)
HGB BLD-MCNC: 10.3 G/DL (ref 12–16)
IMM GRANULOCYTES # BLD AUTO: 0.09 K/UL (ref 0–0.04)
IMM GRANULOCYTES NFR BLD AUTO: 0.6 % (ref 0–0.5)
IVC DIAMETER: 1.2 CM
LDH FLD L TO P-CCNC: 280 U/L
LDH SERPL L TO P-CCNC: 141 U/L (ref 110–260)
LV LATERAL E/E' RATIO: 6.5 M/S
LV SEPTAL E/E' RATIO: 7.09 M/S
LVOT MG: 2 MMHG
LVOT MV: 0.67 CM/S
LYMPHOCYTES # BLD AUTO: 1 K/UL (ref 1–4.8)
LYMPHOCYTES NFR BLD: 6.7 % (ref 18–48)
MAGNESIUM SERPL-MCNC: 1.9 MG/DL (ref 1.6–2.6)
MCH RBC QN AUTO: 25.6 PG (ref 27–31)
MCHC RBC AUTO-ENTMCNC: 29.9 G/DL (ref 32–36)
MCV RBC AUTO: 86 FL (ref 82–98)
MONOCYTES # BLD AUTO: 1.5 K/UL (ref 0.3–1)
MONOCYTES NFR BLD: 9.8 % (ref 4–15)
MV MEAN GRADIENT: 2 MMHG
MV PEAK E VEL: 0.78 M/S
MV PEAK GRADIENT: 4 MMHG
MV VALVE AREA BY CONTINUITY EQUATION: 2.13 CM2
NEUTROPHILS # BLD AUTO: 12.7 K/UL (ref 1.8–7.7)
NEUTROPHILS NFR BLD: 81.9 % (ref 38–73)
NRBC BLD-RTO: 0 /100 WBC
PHOSPHATE SERPL-MCNC: 4.4 MG/DL (ref 2.7–4.5)
PISA MRMAX VEL: 3.4 M/S
PISA TR MAX VEL: 2.6 M/S
PLATELET # BLD AUTO: 456 K/UL (ref 150–450)
PMV BLD AUTO: 9.3 FL (ref 9.2–12.9)
POTASSIUM SERPL-SCNC: 4.4 MMOL/L (ref 3.5–5.1)
PROT FLD-MCNC: 3.8 G/DL
PROT SERPL-MCNC: 5.9 G/DL (ref 6–8.4)
PROT SERPL-MCNC: 6.3 G/DL (ref 6–8.4)
PV MV: 0.88 M/S
PV PEAK GRADIENT: 7 MMHG
PV PEAK VELOCITY: 1.3 M/S
RA PRESSURE ESTIMATED: 3 MMHG
RBC # BLD AUTO: 4.03 M/UL (ref 4–5.4)
RV TB RVSP: 6 MMHG
SODIUM SERPL-SCNC: 134 MMOL/L (ref 136–145)
SPECIMEN SOURCE: NORMAL
TDI LATERAL: 0.12 M/S
TDI SEPTAL: 0.11 M/S
TDI: 0.12 M/S
TR MAX PG: 27 MMHG
TV REST PULMONARY ARTERY PRESSURE: 30 MMHG
WBC # BLD AUTO: 15.52 K/UL (ref 3.9–12.7)

## 2023-09-20 PROCEDURE — 88305 TISSUE EXAM BY PATHOLOGIST: CPT | Mod: TC | Performed by: PATHOLOGY

## 2023-09-20 PROCEDURE — 20000000 HC ICU ROOM

## 2023-09-20 PROCEDURE — 97167 OT EVAL HIGH COMPLEX 60 MIN: CPT

## 2023-09-20 PROCEDURE — 36415 COLL VENOUS BLD VENIPUNCTURE: CPT | Performed by: STUDENT IN AN ORGANIZED HEALTH CARE EDUCATION/TRAINING PROGRAM

## 2023-09-20 PROCEDURE — 63600175 PHARM REV CODE 636 W HCPCS: Performed by: INTERNAL MEDICINE

## 2023-09-20 PROCEDURE — 82947 ASSAY GLUCOSE BLOOD QUANT: CPT | Performed by: STUDENT IN AN ORGANIZED HEALTH CARE EDUCATION/TRAINING PROGRAM

## 2023-09-20 PROCEDURE — 80053 COMPREHEN METABOLIC PANEL: CPT | Performed by: STUDENT IN AN ORGANIZED HEALTH CARE EDUCATION/TRAINING PROGRAM

## 2023-09-20 PROCEDURE — 99233 PR SUBSEQUENT HOSPITAL CARE,LEVL III: ICD-10-PCS | Mod: 25,,, | Performed by: STUDENT IN AN ORGANIZED HEALTH CARE EDUCATION/TRAINING PROGRAM

## 2023-09-20 PROCEDURE — 99233 SBSQ HOSP IP/OBS HIGH 50: CPT | Mod: 25,,, | Performed by: STUDENT IN AN ORGANIZED HEALTH CARE EDUCATION/TRAINING PROGRAM

## 2023-09-20 PROCEDURE — 27000221 HC OXYGEN, UP TO 24 HOURS

## 2023-09-20 PROCEDURE — 94761 N-INVAS EAR/PLS OXIMETRY MLT: CPT

## 2023-09-20 PROCEDURE — 25000003 PHARM REV CODE 250: Performed by: STUDENT IN AN ORGANIZED HEALTH CARE EDUCATION/TRAINING PROGRAM

## 2023-09-20 PROCEDURE — 87070 CULTURE OTHR SPECIMN AEROBIC: CPT | Performed by: STUDENT IN AN ORGANIZED HEALTH CARE EDUCATION/TRAINING PROGRAM

## 2023-09-20 PROCEDURE — 87116 MYCOBACTERIA CULTURE: CPT | Performed by: STUDENT IN AN ORGANIZED HEALTH CARE EDUCATION/TRAINING PROGRAM

## 2023-09-20 PROCEDURE — 85025 COMPLETE CBC W/AUTO DIFF WBC: CPT | Performed by: STUDENT IN AN ORGANIZED HEALTH CARE EDUCATION/TRAINING PROGRAM

## 2023-09-20 PROCEDURE — 99223 1ST HOSP IP/OBS HIGH 75: CPT | Mod: ,,, | Performed by: INTERNAL MEDICINE

## 2023-09-20 PROCEDURE — 99223 PR INITIAL HOSPITAL CARE,LEVL III: ICD-10-PCS | Mod: ,,, | Performed by: INTERNAL MEDICINE

## 2023-09-20 PROCEDURE — 84155 ASSAY OF PROTEIN SERUM: CPT | Performed by: STUDENT IN AN ORGANIZED HEALTH CARE EDUCATION/TRAINING PROGRAM

## 2023-09-20 PROCEDURE — 25000242 PHARM REV CODE 250 ALT 637 W/ HCPCS: Performed by: STUDENT IN AN ORGANIZED HEALTH CARE EDUCATION/TRAINING PROGRAM

## 2023-09-20 PROCEDURE — 97530 THERAPEUTIC ACTIVITIES: CPT

## 2023-09-20 PROCEDURE — 83735 ASSAY OF MAGNESIUM: CPT | Performed by: INTERNAL MEDICINE

## 2023-09-20 PROCEDURE — 84157 ASSAY OF PROTEIN OTHER: CPT | Performed by: STUDENT IN AN ORGANIZED HEALTH CARE EDUCATION/TRAINING PROGRAM

## 2023-09-20 PROCEDURE — 82042 OTHER SOURCE ALBUMIN QUAN EA: CPT | Performed by: STUDENT IN AN ORGANIZED HEALTH CARE EDUCATION/TRAINING PROGRAM

## 2023-09-20 PROCEDURE — 83615 LACTATE (LD) (LDH) ENZYME: CPT | Performed by: STUDENT IN AN ORGANIZED HEALTH CARE EDUCATION/TRAINING PROGRAM

## 2023-09-20 PROCEDURE — 83615 LACTATE (LD) (LDH) ENZYME: CPT | Mod: 91 | Performed by: STUDENT IN AN ORGANIZED HEALTH CARE EDUCATION/TRAINING PROGRAM

## 2023-09-20 PROCEDURE — 25000003 PHARM REV CODE 250: Performed by: INTERNAL MEDICINE

## 2023-09-20 PROCEDURE — 82945 GLUCOSE OTHER FLUID: CPT | Performed by: STUDENT IN AN ORGANIZED HEALTH CARE EDUCATION/TRAINING PROGRAM

## 2023-09-20 PROCEDURE — 99900035 HC TECH TIME PER 15 MIN (STAT)

## 2023-09-20 PROCEDURE — 94660 CPAP INITIATION&MGMT: CPT

## 2023-09-20 PROCEDURE — 84100 ASSAY OF PHOSPHORUS: CPT | Performed by: INTERNAL MEDICINE

## 2023-09-20 PROCEDURE — 87205 SMEAR GRAM STAIN: CPT | Performed by: STUDENT IN AN ORGANIZED HEALTH CARE EDUCATION/TRAINING PROGRAM

## 2023-09-20 PROCEDURE — 87206 SMEAR FLUORESCENT/ACID STAI: CPT | Performed by: STUDENT IN AN ORGANIZED HEALTH CARE EDUCATION/TRAINING PROGRAM

## 2023-09-20 RX ADMIN — MUPIROCIN 1 G: 20 OINTMENT TOPICAL at 08:09

## 2023-09-20 RX ADMIN — FLUTICASONE PROPIONATE 100 MCG: 50 SPRAY, METERED NASAL at 10:09

## 2023-09-20 RX ADMIN — GABAPENTIN 200 MG: 100 CAPSULE ORAL at 04:09

## 2023-09-20 RX ADMIN — AMIODARONE HYDROCHLORIDE 0.5 MG/MIN: 1.8 INJECTION, SOLUTION INTRAVENOUS at 03:09

## 2023-09-20 RX ADMIN — BUMETANIDE 1 MG: 0.25 INJECTION INTRAMUSCULAR; INTRAVENOUS at 11:09

## 2023-09-20 RX ADMIN — ATORVASTATIN CALCIUM 20 MG: 20 TABLET, FILM COATED ORAL at 08:09

## 2023-09-20 RX ADMIN — MIDODRINE HYDROCHLORIDE 5 MG: 2.5 TABLET ORAL at 03:09

## 2023-09-20 RX ADMIN — GABAPENTIN 200 MG: 100 CAPSULE ORAL at 11:09

## 2023-09-20 RX ADMIN — MIDODRINE HYDROCHLORIDE 5 MG: 2.5 TABLET ORAL at 05:09

## 2023-09-20 RX ADMIN — AMIODARONE HYDROCHLORIDE 0.5 MG/MIN: 1.8 INJECTION, SOLUTION INTRAVENOUS at 04:09

## 2023-09-20 RX ADMIN — RIVAROXABAN 15 MG: 15 TABLET, FILM COATED ORAL at 04:09

## 2023-09-20 RX ADMIN — CEFTRIAXONE SODIUM 1 G: 1 INJECTION, POWDER, FOR SOLUTION INTRAMUSCULAR; INTRAVENOUS at 03:09

## 2023-09-20 RX ADMIN — GABAPENTIN 200 MG: 100 CAPSULE ORAL at 08:09

## 2023-09-20 RX ADMIN — MIDODRINE HYDROCHLORIDE 5 MG: 2.5 TABLET ORAL at 11:09

## 2023-09-20 RX ADMIN — MUPIROCIN 1 G: 20 OINTMENT TOPICAL at 11:09

## 2023-09-20 NOTE — ASSESSMENT & PLAN NOTE
Currently in Afib-   C/w Amio and BB  Xarelto to be resumed this evening.  Patient on digoxin Monday Wednesday Friday. Follow cardiology recommendations.

## 2023-09-20 NOTE — SUBJECTIVE & OBJECTIVE
Interval History:  Patient is feeling much better after patient underwent left thoracentesis with removal of 2000 cc yellow turbid pleural fluid.  Patient tolerated procedure well.  Shortness of breath has improved.  Currently patient is requiring 4 liter/minute supplemental oxygen via nasal cannula.  Patient confirms DNI code status.  Patient's 2 daughters are present at bedside.  Patient's blood pressure continues to be on lower side.  Patient receiving midodrine.  Pulmonary Medicine cleared patient for resumption of Xarelto this evening.    Review of Systems   Constitutional:  Positive for activity change and fatigue.   Respiratory:  Positive for shortness of breath.    Cardiovascular:  Positive for leg swelling.   Neurological:  Positive for weakness (generalized).     Objective:     Vital Signs (Most Recent):  Temp: 98.5 °F (36.9 °C) (09/20/23 0400)  Pulse: 82 (09/20/23 0600)  Resp: (!) 24 (09/20/23 0600)  BP: (!) 118/58 (09/20/23 0600)  SpO2: 97 % (09/20/23 0600) Vital Signs (24h Range):  Temp:  [98.5 °F (36.9 °C)-100.9 °F (38.3 °C)] 98.5 °F (36.9 °C)  Pulse:  [] 82  Resp:  [13-57] 24  SpO2:  [92 %-98 %] 97 %  BP: ()/(46-71) 118/58     Weight: 96.3 kg (212 lb 4.9 oz)  Body mass index is 33.25 kg/m².    Intake/Output Summary (Last 24 hours) at 9/20/2023 0853  Last data filed at 9/20/2023 0550  Gross per 24 hour   Intake 300.4 ml   Output 850 ml   Net -549.6 ml           Physical Exam  Constitutional:       General: She is not in acute distress.     Appearance: She is ill-appearing.   Cardiovascular:      Rate and Rhythm: Rhythm irregular.      Heart sounds: Murmur heard.      Comments: Permanent pacemaker noted, +1-2 pitting edema bilaterally  Pulmonary:      Effort: Respiratory distress present.      Breath sounds: Wheezing and rales present.   Neurological:      Mental Status: She is alert.             Significant Labs: All pertinent labs within the past 24 hours have been reviewed.  BMP:   Recent  Labs   Lab 09/20/23  0344      *   K 4.4   CL 97   CO2 29   BUN 29*   CREATININE 1.7*   CALCIUM 8.7   MG 1.9       CBC:   Recent Labs   Lab 09/18/23  2201 09/20/23  0344   WBC 10.05 15.52*   HGB 10.5* 10.3*   HCT 34.3* 34.5*   * 456*       Microbiology Results (last 7 days)       Procedure Component Value Units Date/Time    Blood culture [[id number]] Collected: 09/19/23 1338    Order Status: Completed Specimen: Blood Updated: 09/19/23 1958     Blood Culture, Routine No Growth to date          Significant Imaging:   ECHO:    Left Ventricle: Moderately increased wall thickness. There is concentric remodeling. There is mildly reduced systolic function with a visually estimated ejection fraction of 40 - 50%.    Left Atrium: Left atrium is dilated.    Aortic Valve: There is mild aortic valve sclerosis.    Mitral Valve: There is moderate regurgitation.    Tricuspid Valve: There is mild regurgitation.    Pulmonary Artery: The estimated pulmonary artery systolic pressure is 30 mmHg.    IVC/SVC: Normal venous pressure at 3 mmHg.    Pericardium: There is a small effusion. No indication of cardiac tamponade.    CXR:  1. Moderate left pleural effusion and adjacent atelectasis/consolidation.  2. Small interstitial opacity at the right lung base.    CXR:  1. Interval worsening, moderate to large left pleural effusion and adjacent atelectasis/consolidation.  2. Unchanged mild scattered central hilar interstitial opacity.    US Thoracentesis:  2000 cc yellow turbid pleural fluid removed    CXR: Negative for pneumothorax, post left-sided thoracentesis.

## 2023-09-20 NOTE — ASSESSMENT & PLAN NOTE
Not sure if ischemic or non-ischemic  Device interrogated yesterday  C/w Bumex 1 mg IV daily   C/w GDMT  I/Os, daily weight

## 2023-09-20 NOTE — HPI
83-year-old female with multiple medical problems significant for combined diastolic and systolic heart failure with AICD, atrial fibrillation, chronic kidney disease, hypertension, and hyperlipidemia.  She initially presented with worsening dyspnea.  She is currently admitted and being treated for acute respiratory failure with hypoxia thought secondary to underlying heart failure.  She has been evaluated by Cardiology whom is recommending diuresis, rate control, and other supportive measures.  Course has been complicated by pleural effusion for which pulmonology has evaluated and perform thoracentesis.  At this point, given her age and comorbidities, she carries a guarded prognosis.  I have been asked to assist with goals of care.

## 2023-09-20 NOTE — PT/OT/SLP EVAL
Occupational Therapy   Evaluation    Name: Jo Alegre  MRN: [id number]  Admitting Diagnosis: Congestive heart failure  Recent Surgery: * No surgery found *      Recommendations:     Discharge Recommendations: nursing facility, skilled  Discharge Equipment Recommendations:  none  Barriers to discharge:  Decreased caregiver support    Assessment:     Jo Alegre is a 83 y.o. female with a medical diagnosis of Congestive heart failure.  Performance deficits affecting function: weakness, impaired endurance, impaired self care skills, impaired functional mobility, gait instability, impaired balance, impaired cardiopulmonary response to activity.      Pt participated in EOB activity and completed side steps at EOB on 4L 02 with sp02 in the 90s; she was fatigued following activity requesting return to supine.      Rehab Prognosis: Good; patient would benefit from acute skilled OT services to address these deficits and reach maximum level of function.       Plan:     Patient to be seen 5 x/week to address the above listed problems via self-care/home management, therapeutic activities, therapeutic exercises  Plan of Care Expires: 10/20/23  Plan of Care Reviewed with: patient    Subjective     Chief Complaint: fatigue  Patient/Family Comments/goals: return home    Occupational Profile:  Living Environment: Lives in a [facility] with her grandson; 3 steps in the home; walk-in shower with seat  Previous level of function: Mod (I) with household mobility and ADLs; grandson/daughters assist with IADLs; wears 02 at home  Roles and Routines: homemaker  Equipment Used at Home: rollator, shower chair, bedside commode, oxygen 4L  Assistance upon Discharge: family    Pain/Comfort:  Pain Rating 1: 0/10  Pain Rating Post-Intervention 1: 0/10    Objective:     Communicated with: nurse prior to session.  Patient found supine with telemetry, pulse ox (continuous), peripheral IV, blood pressure cuff, oxygen, PureWick upon OT entry to  room.    General Precautions: Standard, fall    Occupational Performance:    Bed Mobility:    Patient completed Supine to Sit with moderate assistance  Patient completed Sit to Supine with minimum assistance    Functional Mobility/Transfers:  Patient completed Sit <> Stand Transfer with minimum assistance and of 2 persons  with  rolling walker   Functional Mobility: 3 side steps Min A x 2 persons with RW    Activities of Daily Living:  Lower Body Dressing: maximal assistance    Toileting: pure wick       Cognitive/Visual Perceptual:  Cognitive/Psychosocial Skills:     -       Oriented to: Person, Place, Time, and Situation   -       Follows Commands/attention:Follows multistep  commands    Physical Exam:  Upper Extremity Range of Motion:     -       Right Upper Extremity: WFL except at shoulder due to chronic injury  -       Left Upper Extremity: WFL  Upper Extremity Strength:    -       Right Upper Extremity: WFL  -       Left Upper Extremity: WFL   Strength:    -       Right Upper Extremity: WFL  -       Left Upper Extremity: WFL  Fine Motor Coordination:    -       Intact  Gross motor coordination:   WFL    AMPAC 6 Click ADL:  AMPAC Total Score: 18    Treatment & Education:  Pt educated on OT role/POC    Patient left HOB elevated with all lines intact, call button in reach, and bed alarm on    GOALS:   Multidisciplinary Problems       Occupational Therapy Goals          Problem: Occupational Therapy    Goal Priority Disciplines Outcome Interventions   Occupational Therapy Goal     OT, PT/OT     Description: Goals to be met by: 10/20/23     Patient will increase functional independence with ADLs by performing:    UE Dressing with Modified Staples.  LE Dressing with Modified Staples.  Grooming while standing at sink with Modified Staples.  Toileting from toilet with Modified Staples for hygiene and clothing management.   Toilet transfer to toilet with Modified Staples.                          History:     Past Medical History:   Diagnosis Date    Allergy     Codeine, Lasix    Atrial fibrillation     Atrial fibrillation     Cataract     CHF (congestive heart failure)     Diabetes mellitus, type 2     Ejection fraction < 50% 10/18/2017    Approximately 35%  Based on prior  Echocardiogram.    Encounter for blood transfusion     Hyperlipidemia     Osteoporosis     PONV (postoperative nausea and vomiting)     Thyroid disorder screening 10/17/2017    TSH of 1.12 ordered by Dr. dee Jones         Past Surgical History:   Procedure Laterality Date    ANTEGRADE NEPHROSTOGRAPHY Left 8/25/2022    Procedure: NEPHROSTOGRAM, ANTEGRADE;  Surgeon: Shelby Parra MD;  Location: [facility];  Service: Urology;  Laterality: Left;    CHOLECYSTECTOMY  1997    [place]     COLONOSCOPY      CYSTOSCOPY W/ URETERAL STENT PLACEMENT Left 7/17/2022    Procedure: CYSTOSCOPY, WITH URETERAL STENT INSERTION;  Surgeon: Shelby Parra MD;  Location: [facility] OR;  Service: Urology;  Laterality: Left;    CYSTOSCOPY W/ URETERAL STENT REMOVAL Left 9/21/2022    Procedure: CYSTOSCOPY, WITH URETERAL STENT REMOVAL;  Surgeon: Shelby Parra MD;  Location: [facility] OR;  Service: Urology;  Laterality: Left;    CYSTOSCOPY WITH URETEROSCOPY, RETROGRADE PYELOGRAPHY, AND INSERTION OF STENT Left 8/25/2022    Procedure: CYSTOSCOPY, WITH RETROGRADE PYELOGRAM AND URETERAL STENT INSERTION;  Surgeon: Shelby Parra MD;  Location: [facility];  Service: Urology;  Laterality: Left;    EYE SURGERY      bilateral cataracts    FINGER SURGERY Right 2021    right pinky finger    FLEXIBLE CYSTOSCOPY Left 8/25/2022    Procedure: CYSTOSCOPY, FLEXIBLE WITH STENT REMOVAL;  Surgeon: Shelby Parra MD;  Location: [facility];  Service: Urology;  Laterality: Left;    FRACTURE SURGERY  2014    right femur with neville    HEMORRHOID SURGERY      48 yrs ago    HYSTERECTOMY      NEPHROSTOMY Left 8/25/2022    Procedure: CREATION, NEPHROSTOMY;  Surgeon: Shelyb BOWERS  MD Aida;  Location: [facility];  Service: Urology;  Laterality: Left;    PERCUTANEOUS NEPHROLITHOTOMY N/A 8/25/2022    Procedure: NEPHROLITHOTOMY, PERCUTANEOUS;  Surgeon: Shelby Parra MD;  Location: [facility];  Service: Urology;  Laterality: N/A;    REPLACEMENT OF IMPLANTABLE CARDIOVERTER-DEFIBRILLATOR (ICD) GENERATOR N/A 12/13/2019    Procedure: REPLACEMENT, PULSE GENERATOR, ICD-MEDTRONIC;  Surgeon: Sebastian Nowak III, MD;  Location: [facility];  Service: Cardiology;  Laterality: N/A;    TONSILLECTOMY         Time Tracking:     OT Date of Treatment: 09/20/23  OT Start Time: 1417  OT Stop Time: 1445  OT Total Time (min): 28 min    Billable Minutes:Evaluation 15  Therapeutic Activity 13    9/20/2023

## 2023-09-20 NOTE — NURSING
Pt assisted to sit on side of bed. Assisted to lean over bedside table using a pillow to lay her head on. Assisted pt to position back where a thoracentesis could be done. Explained procedure to pt and what to expect. MD also explained the same thing to pt.  Educated pt on the need to cough as lung expands. Prior to thoracentesis pt had expiratory wheezes all over the left lung. After thoracentesis pt's lung was clear and pt felt she better stating she could breathe.

## 2023-09-20 NOTE — PT/OT/SLP PROGRESS
Physical Therapy      Patient Name:  Jo Alegre   MRN:  [id number]    Patient not seen today secondary to  (procedure). Will follow-up  9/21/2023.

## 2023-09-20 NOTE — CARE UPDATE
09/20/23 0731   Patient Assessment/Suction   Level of Consciousness (AVPU) alert   Respiratory Effort Normal;Unlabored   Expansion/Accessory Muscles/Retractions no use of accessory muscles   All Lung Fields Breath Sounds diminished   PRE-TX-O2   Device (Oxygen Therapy) nasal cannula;nasal cannula with humidification   $ Is the patient on Low Flow Oxygen? Yes   Flow (L/min) 4   SpO2 96 %   Pulse Oximetry Type Continuous   $ Pulse Oximetry - Multiple Charge Pulse Oximetry - Multiple   Pulse 95   Resp (!) 26   /61   Aerosol Therapy   $ Aerosol Therapy Charges PRN treatment not required   Respiratory Evaluation   $ Care Plan Tech Time 15 min

## 2023-09-20 NOTE — PROGRESS NOTES
[facility] Medicine  Progress Note    Patient Name: Jo Alegre  MRN: [id number]  Patient Class: IP- Inpatient   Admission Date: 9/18/2023  Length of Stay: 1 days  Attending Physician: Mia Allen MD  Primary Care Provider: Kraig Alberto MD        Subjective:     Principal Problem:Congestive heart failure        HPI:  Ms. Alegre is an 83 year old woman with PMHx of HTN, HDL, HFrEF s/p AICD, Afib- presented with one day hx of sob started yesterday - pt says she was fine day before yesterday but around morning time she started having sob not associated with any chest pain, palpitations, or fever, chills, cough or abdominal pain. She did not have any dietary discretion, has been adherent to her home meds. She also noted leg swelling bilateral yesterday. She was at cardio office for her device interrogation which as per patient is fine.     In ER she was found to have left pleural effusion and elevated BNP. Pt is being admitted for diuresis.        Overview/Hospital Course:  Patient monitor closely during hospitalization.  She was noted to have acute hypoxemic respiratory failure and AFib RVR.  Cardiology consulted.  Patient noted to have left-sided large pleural effusion on chest x-ray therefore Interventional Radiology was consulted for thoracentesis.  Oral anticoagulants held.      Interval History:  Patient is feeling much better after patient underwent left thoracentesis with removal of 2000 cc yellow turbid pleural fluid.  Patient tolerated procedure well.  Shortness of breath has improved.  Currently patient is requiring 4 liter/minute supplemental oxygen via nasal cannula.  Patient confirms DNI code status.  Patient's 2 daughters are present at bedside.  Patient's blood pressure continues to be on lower side.  Patient receiving midodrine.  Pulmonary Medicine cleared patient for resumption of Xarelto this evening.    Review of Systems   Constitutional:  Positive for activity  change and fatigue.   Respiratory:  Positive for shortness of breath.    Cardiovascular:  Positive for leg swelling.   Neurological:  Positive for weakness (generalized).     Objective:     Vital Signs (Most Recent):  Temp: 98.5 °F (36.9 °C) (09/20/23 0400)  Pulse: 82 (09/20/23 0600)  Resp: (!) 24 (09/20/23 0600)  BP: (!) 118/58 (09/20/23 0600)  SpO2: 97 % (09/20/23 0600) Vital Signs (24h Range):  Temp:  [98.5 °F (36.9 °C)-100.9 °F (38.3 °C)] 98.5 °F (36.9 °C)  Pulse:  [] 82  Resp:  [13-57] 24  SpO2:  [92 %-98 %] 97 %  BP: ()/(46-71) 118/58     Weight: 96.3 kg (212 lb 4.9 oz)  Body mass index is 33.25 kg/m².    Intake/Output Summary (Last 24 hours) at 9/20/2023 0853  Last data filed at 9/20/2023 0550  Gross per 24 hour   Intake 300.4 ml   Output 850 ml   Net -549.6 ml           Physical Exam  Constitutional:       General: She is not in acute distress.     Appearance: She is ill-appearing.   Cardiovascular:      Rate and Rhythm: Rhythm irregular.      Heart sounds: Murmur heard.      Comments: Permanent pacemaker noted, +1-2 pitting edema bilaterally  Pulmonary:      Effort: Respiratory distress present.      Breath sounds: Wheezing and rales present.   Neurological:      Mental Status: She is alert.             Significant Labs: All pertinent labs within the past 24 hours have been reviewed.  BMP:   Recent Labs   Lab 09/20/23  0344      *   K 4.4   CL 97   CO2 29   BUN 29*   CREATININE 1.7*   CALCIUM 8.7   MG 1.9       CBC:   Recent Labs   Lab 09/18/23  2201 09/20/23  0344   WBC 10.05 15.52*   HGB 10.5* 10.3*   HCT 34.3* 34.5*   * 456*       Microbiology Results (last 7 days)       Procedure Component Value Units Date/Time    Blood culture [[id number]] Collected: 09/19/23 1338    Order Status: Completed Specimen: Blood Updated: 09/19/23 1958     Blood Culture, Routine No Growth to date          Significant Imaging:   ECHO:    Left Ventricle: Moderately increased wall thickness.  There is concentric remodeling. There is mildly reduced systolic function with a visually estimated ejection fraction of 40 - 50%.    Left Atrium: Left atrium is dilated.    Aortic Valve: There is mild aortic valve sclerosis.    Mitral Valve: There is moderate regurgitation.    Tricuspid Valve: There is mild regurgitation.    Pulmonary Artery: The estimated pulmonary artery systolic pressure is 30 mmHg.    IVC/SVC: Normal venous pressure at 3 mmHg.    Pericardium: There is a small effusion. No indication of cardiac tamponade.    CXR:  1. Moderate left pleural effusion and adjacent atelectasis/consolidation.  2. Small interstitial opacity at the right lung base.    CXR:  1. Interval worsening, moderate to large left pleural effusion and adjacent atelectasis/consolidation.  2. Unchanged mild scattered central hilar interstitial opacity.    US Thoracentesis:  2000 cc yellow turbid pleural fluid removed    CXR: Negative for pneumothorax, post left-sided thoracentesis.      Assessment/Plan:      * Congestive heart failure  Elevated BNP  C/w Bumex- allergic to lasix  I/Os, daily weight   Consult cardio  C/w GDMT    ACP (advance care planning)  Advance Care Planning     Date: 09/20/2023    Living Will  During this visit, I engaged the patient  in the voluntary advance care planning process.  The patient and I reviewed the role for advance directives and their purpose in directing future healthcare if the patient's unable to speak for herself.  At this point in time, the patient does have full decision-making capacity.  We discussed different extreme health states that she could experience, and reviewed what kind of medical care she would want in those situations.  The patient communicated that if she were comatose and had little chance of a meaningful recovery, she would not want machines/life-sustaining treatments used but she is okay to receive CPR if needed.  Patient's 2 daughters were present during interview.   I informed patient I will be placing DNI code status in patient's medical chart.  I spent a total of 16 minutes engaging the patient in this advance care planning discussion.                Acute respiratory failure with hypoxia  Patient with Hypoxic Respiratory failure which is Acute.  she is not on home oxygen. Supplemental oxygen was provided and noted- Oxygen Concentration (%):  [36-40] 36    .   Signs/symptoms of respiratory failure include- tachypnea, increased work of breathing, respiratory distress and use of accessory muscles. Contributing diagnoses includes - CHF Labs and images were reviewed. Patient Has recent ABG, which has been reviewed. Will treat underlying causes and adjust management of respiratory failure as follows- bronchodilators ordered.  Patient with worsening of hypoxia presently on 5 L nasal cannula high-flow oxygen, Bumex 1 mg IV x1 dose now.    Recurrent left pleural effusion  Status post left thoracentesis when 2000 cc yellow turbid fluid removed on September 20, 2023 by Pulmonary Medicine.  Postprocedure chest x-ray without pneumothorax.      Obstructive sleep apnea syndrome  C/w CPAP      COPD (chronic obstructive pulmonary disease) per patient  C/w breathing rx      Acute on chronic combined systolic and diastolic congestive heart failure  Patient is identified as having Combined Systolic and Diastolic heart failure that is Acute on chronic. CHF is currently uncontrolled due to Continued edema of extremities, >3 pillow orthopnea, Rales/crackles on pulmonary exam and Pulmonary edema/pleural effusion on CXR. Latest ECHO performed and demonstrates- Results for orders placed during the hospital encounter of 05/15/23    Echo    Interpretation Summary  · The left ventricle is mildly enlarged with mild concentric hypertrophy and moderately decreased systolic function.  · The estimated ejection fraction is 40%.  · Grade I left ventricular diastolic dysfunction.  · There is left ventricular  global hypokinesis.  · Normal right ventricular size with normal right ventricular systolic function.  · Severe left atrial enlargement.  · Moderate-to-severe mitral regurgitation.  · Mild tricuspid regurgitation.  · Elevated central venous pressure (15 mmHg).  · The estimated PA systolic pressure is 45 mmHg.  · There is pulmonary hypertension.  · Trivial anterior pericardial effusion.  . Continue bumex and digoxin and monitor clinical status closely. Monitor on telemetry. Patient is on CHF pathway.  Monitor strict Is&Os and daily weights.  Place on fluid restriction of 1.5 L. Cardiology has been consulted. Continue to stress to patient importance of self efficacy and  on diet for CHF. Last BNP reviewed- and noted below   Recent Labs   Lab 09/18/23  2201   *   .    SOB (shortness of breath)  As above      Paroxysmal atrial fibrillation  Currently in Afib-   C/w Amio and BB  Xarelto to be resumed this evening.  Patient on digoxin Monday Wednesday Friday. Follow cardiology recommendations.     Stage 3 chronic kidney disease  At baseline      Cardiomyopathy with implantable cardioverter-defibrillator  Not sure if ischemic or non-ischemic  Device interrogated yesterday  C/w Bumex 1 mg IV daily   C/w GDMT  I/Os, daily weight      Mixed dyslipidemia  C/w statin      Essential hypertension  C/w home meds and including her Midodrine   Patient with chronic hypotension.  Will hold ACE-inhibitor and ARB at this time.    Discussed with  regarding discharge planning and also with Pulmonary Medicine.  VTE Risk Mitigation (From admission, onward)         Ordered     rivaroxaban tablet 15 mg  With dinner         09/20/23 1212     IP VTE HIGH RISK PATIENT  Once         09/19/23 1301     Place sequential compression device  Until discontinued         09/19/23 1301     Reason for No Pharmacological VTE Prophylaxis  Once        Question:  Reasons:  Answer:  Already adequately anticoagulated on oral  Anticoagulants    09/19/23 1301     Place sequential compression device  Until discontinued         09/19/23 1043                Discharge Planning   JOANA: 9/22/2023     Code Status: Partial Code   Is the patient medically ready for discharge?:     Reason for patient still in hospital (select all that apply): Patient trending condition and Consult recommendations  Discharge Plan A: Home with family            Mia Allen MD  Department of Hospital Medicine   [facility]

## 2023-09-20 NOTE — NURSING
Report received from FRIEDA Infante. Pt vss, AAOx4. Amio gtt infusing.  0915 report given to FRIEDA Isabel

## 2023-09-20 NOTE — PLAN OF CARE
Problem: Adult Inpatient Plan of Care  Goal: Optimal Comfort and Wellbeing  Outcome: Ongoing, Progressing     Problem: Fall Injury Risk  Goal: Absence of Fall and Fall-Related Injury  Outcome: Ongoing, Progressing     Problem: Dysrhythmia (Heart Failure)  Goal: Stable Heart Rate and Rhythm  Outcome: Ongoing, Progressing     Problem: Functional Ability Impaired (Heart Failure)  Goal: Optimal Functional Ability  Outcome: Ongoing, Progressing     Problem: Oral Intake Inadequate (Heart Failure)  Goal: Optimal Nutrition Intake  Outcome: Ongoing, Progressing     Problem: Respiratory Compromise (Heart Failure)  Goal: Effective Oxygenation and Ventilation  Outcome: Ongoing, Progressing     Problem: Fluid and Electrolyte Imbalance (Acute Kidney Injury/Impairment)  Goal: Fluid and Electrolyte Balance  Outcome: Ongoing, Progressing     Problem: Oral Intake Inadequate (Acute Kidney Injury/Impairment)  Goal: Optimal Nutrition Intake  Outcome: Ongoing, Progressing   Pt denies SOB. Three daughters and a son have been in and out throughout the day. Family talked to Dr Jones today at length. Questions answered. Family wants to talk to pulmonary. He will talk to them in am.

## 2023-09-20 NOTE — ASSESSMENT & PLAN NOTE
Status post left thoracentesis when 2000 cc yellow turbid fluid removed on September 20, 2023 by Pulmonary Medicine.  Postprocedure chest x-ray without pneumothorax.

## 2023-09-20 NOTE — ASSESSMENT & PLAN NOTE
Advance Care Planning     Date: 09/20/2023    Living Will  During this visit, I engaged the patient  in the voluntary advance care planning process.  The patient and I reviewed the role for advance directives and their purpose in directing future healthcare if the patient's unable to speak for herself.  At this point in time, the patient does have full decision-making capacity.  We discussed different extreme health states that she could experience, and reviewed what kind of medical care she would want in those situations.  The patient communicated that if she were comatose and had little chance of a meaningful recovery, she would not want machines/life-sustaining treatments used but she is okay to receive CPR if needed.  Patient's 2 daughters were present during interview.  I informed patient I will be placing DNI code status in patient's medical chart.  I spent a total of 16 minutes engaging the patient in this advance care planning discussion.

## 2023-09-20 NOTE — PROGRESS NOTES
[facility]   Cardiology Note    Consult Requested By: SARITA  Reason for Consult: CHF    SUBJECTIVE:     History of Present Illness: The pt is 82 y/o F pt of  with PMHx of CAD HTN, HDL,chronic Systolic/Diastolic HF, reduced EF,SSS, Orthostatic hypotension, MR, AICD--MDT, single ICD, Afib- SVT, COPD, JENNIFER , intolerant of CPAP. presented with one day hx of sob started yesterday - increasing swelling to BLE. Denies CP, palpitations, syncope.     Labs H/H 10.5/34.3 platelets 452 cr 1.8  dig level 0.3 CXR L sided pleural effusion, some pulmonary congestion bilaterally--left sided effusion worsening this am. EKG today Afib , no ST seg changes. Echo pending  hypotensive this am. No urinary output documented     9/20--The pt is  now in ICU on amio drip and pressors. She is aao, afib in  range , VSS. She denies any CP and states she is not feeling as much SOB , however she is using accessory muscles to breathe. Swelling in LE has improved. Pulmonology on unit for thoracentesis of Left sided pleural effusion this am. H/H 10.3/34.5 plts 456 cr 1.7 trop slightly elevated but stable, no change in delta. Blood cultures neg u/o -429 cc  Review of patient's allergies indicates:   Allergen Reactions    Codeine Other (See Comments)     nausea    Hydrocodone Nausea And Vomiting    Lasix [furosemide]      rash       Past Medical History:   Diagnosis Date    Allergy     Codeine, Lasix    Atrial fibrillation     Atrial fibrillation     Cataract     CHF (congestive heart failure)     Diabetes mellitus, type 2     Ejection fraction < 50% 10/18/2017    Approximately 35%  Based on prior  Echocardiogram.    Encounter for blood transfusion     Hyperlipidemia     Osteoporosis     PONV (postoperative nausea and vomiting)     Thyroid disorder screening 10/17/2017    TSH of 1.12 ordered by Dr. dee Jones     Past Surgical History:   Procedure Laterality Date    ANTEGRADE NEPHROSTOGRAPHY Left 8/25/2022     Procedure: NEPHROSTOGRAM, ANTEGRADE;  Surgeon: Shelby Parra MD;  Location: [facility] OR;  Service: Urology;  Laterality: Left;    CHOLECYSTECTOMY  1997    [place]     COLONOSCOPY      CYSTOSCOPY W/ URETERAL STENT PLACEMENT Left 7/17/2022    Procedure: CYSTOSCOPY, WITH URETERAL STENT INSERTION;  Surgeon: Shelby Parra MD;  Location: [facility] OR;  Service: Urology;  Laterality: Left;    CYSTOSCOPY W/ URETERAL STENT REMOVAL Left 9/21/2022    Procedure: CYSTOSCOPY, WITH URETERAL STENT REMOVAL;  Surgeon: Shelby Parra MD;  Location: [facility] OR;  Service: Urology;  Laterality: Left;    CYSTOSCOPY WITH URETEROSCOPY, RETROGRADE PYELOGRAPHY, AND INSERTION OF STENT Left 8/25/2022    Procedure: CYSTOSCOPY, WITH RETROGRADE PYELOGRAM AND URETERAL STENT INSERTION;  Surgeon: Shelby Parra MD;  Location: [facility];  Service: Urology;  Laterality: Left;    EYE SURGERY      bilateral cataracts    FINGER SURGERY Right 2021    right pinky finger    FLEXIBLE CYSTOSCOPY Left 8/25/2022    Procedure: CYSTOSCOPY, FLEXIBLE WITH STENT REMOVAL;  Surgeon: Shelby Parra MD;  Location: [facility];  Service: Urology;  Laterality: Left;    FRACTURE SURGERY  2014    right femur with neville    HEMORRHOID SURGERY      48 yrs ago    HYSTERECTOMY      NEPHROSTOMY Left 8/25/2022    Procedure: CREATION, NEPHROSTOMY;  Surgeon: Shelby Parra MD;  Location: [facility];  Service: Urology;  Laterality: Left;    PERCUTANEOUS NEPHROLITHOTOMY N/A 8/25/2022    Procedure: NEPHROLITHOTOMY, PERCUTANEOUS;  Surgeon: Shelby Parra MD;  Location: [facility];  Service: Urology;  Laterality: N/A;    REPLACEMENT OF IMPLANTABLE CARDIOVERTER-DEFIBRILLATOR (ICD) GENERATOR N/A 12/13/2019    Procedure: REPLACEMENT, PULSE GENERATOR, ICD-MEDTRONIC;  Surgeon: Sebastian Nowak III, MD;  Location: [facility];  Service: Cardiology;  Laterality: N/A;    TONSILLECTOMY       Family History   Problem Relation Age of Onset    Heart disease Mother     Cancer Father          Lung Cancer ??? Asbestos    Breast cancer Paternal Aunt      Social History     Tobacco Use    Smoking status: Former     Passive exposure: Past    Smokeless tobacco: Never    Tobacco comments:     quit 2013   Substance Use Topics    Alcohol use: No    Drug use: No       Review of Systems:  Review of Systems   Constitutional:  Negative for chills, diaphoresis, fever, malaise/fatigue and weight loss.   Respiratory:  Positive for shortness of breath. Negative for cough, hemoptysis and sputum production.    Cardiovascular:  Positive for leg swelling and PND. Negative for chest pain, palpitations, orthopnea and claudication.   Gastrointestinal:  Negative for nausea and vomiting.       OBJECTIVE:     Vital Signs (Most Recent)  Temp: 98.5 °F (36.9 °C) (09/20/23 0400)  Pulse: 82 (09/20/23 0600)  Resp: (!) 24 (09/20/23 0600)  BP: (!) 118/58 (09/20/23 0600)  SpO2: 97 % (09/20/23 0600)    Vital Signs Range (Last 24H):  Temp:  [98.5 °F (36.9 °C)-100.9 °F (38.3 °C)]   Pulse:  []   Resp:  [13-57]   BP: ()/(46-71)   SpO2:  [92 %-98 %]     I & O (Last 24H):    Intake/Output Summary (Last 24 hours) at 9/20/2023 0954  Last data filed at 9/20/2023 0550  Gross per 24 hour   Intake 300.4 ml   Output 650 ml   Net -349.6 ml         Current Diet:     Current Diet Order   Procedures    Diet Low Sodium, 2gm [facility]; Fluid - 1500mL     Order Specific Question:   Indicate patient location for additional diet options:     Answer:   [facility]     Order Specific Question:   Fluid restriction:     Answer:   Fluid - 1500mL        Allergies:  Review of patient's allergies indicates:   Allergen Reactions    Codeine Other (See Comments)     nausea    Hydrocodone Nausea And Vomiting    Lasix [furosemide]      rash       Meds:  Scheduled Meds:   atorvastatin  20 mg Oral QHS    bumetanide  1 mg Intravenous Daily    cefTRIAXone (ROCEPHIN) IVPB  1 g Intravenous Q24H    fluticasone propionate  2 spray Each Nostril Daily     gabapentin  200 mg Oral TID    metoprolol succinate  25 mg Oral Daily    midodrine  5 mg Oral TID AC    mupirocin   Nasal BID     Continuous Infusions:   amiodarone in dextrose 5% 0.5 mg/min (09/20/23 0347)    NORepinephrine bitartrate-D5W Stopped (09/19/23 1715)     PRN Meds:acetaminophen, albuterol sulfate, aluminum-magnesium hydroxide-simethicone, dextrose 50%, dextrose 50%, glucagon (human recombinant), glucose, glucose, magnesium oxide, magnesium oxide, melatonin, naloxone, ondansetron, potassium bicarbonate, potassium bicarbonate, potassium bicarbonate, potassium, sodium phosphates, potassium, sodium phosphates, potassium, sodium phosphates, prochlorperazine, senna-docusate 8.6-50 mg, simethicone, sodium chloride 0.9%, sodium chloride 0.9%, sodium chloride 0.9%    Oxygen/Ventilator Data (Last 24H):  (if applicable)   Oxygen Concentration (%):  [36-40] 36        Hemodynamic Parameters (Last 24H):   (if applicable)        Laboratory and Radiology Data:  Recent Results (from the past 24 hour(s))   Troponin I High Sensitivity    Collection Time: 09/19/23 11:24 AM   Result Value Ref Range    Troponin I High Sensitivity 18.6 (H) 0.0 - 14.9 pg/mL   Magnesium    Collection Time: 09/19/23 11:24 AM   Result Value Ref Range    Magnesium 1.8 1.6 - 2.6 mg/dL   Echo Saline Bubble? No    Collection Time: 09/19/23  1:09 PM   Result Value Ref Range    BSA 2.13 m2    LVOT stroke volume 61.46 cm3    LVOT diameter 2.30 cm    LVOT area 4.2 cm2    Posterior Wall 1.51 (A) 0.6 - 1.1 cm    MV Peak E Nathan 0.78 m/s    TDI LATERAL 0.12 m/s    TDI SEPTAL 0.11 m/s    E/E' ratio 6.78 m/s    E wave deceleration time 197.00 msec    LV SEPTAL E/E' RATIO 7.09 m/s    LV LATERAL E/E' RATIO 6.50 m/s    LVOT peak nathan 1.07 m/s    Left Ventricular Outflow Tract Mean Velocity 0.67 cm/s    Left Ventricular Outflow Tract Mean Gradient 2.00 mmHg    AV mean gradient 5 mmHg    AV peak gradient 8 mmHg    Ao peak nathan 1.44 m/s    Ao VTI 19.60 cm    LVOT peak  VTI 14.80 cm    AV valve area 3.14 cm²    AV Velocity Ratio 0.74     AV index (prosthetic) 0.76     ENRIQUE by Velocity Ratio 3.09 cm²    Mr max nathan 3.40 m/s    MV mean gradient 2 mmHg    MV peak gradient 4 mmHg    MV valve area by continuity eq 2.13 cm2    MV VTI 28.8 cm    PV PEAK VELOCITY 1.30 m/s    PV peak gradient 7 mmHg    Pulmonary Valve Mean Velocity 0.88 m/s    Ao root annulus 3.50 cm    IVC diameter 1.20 cm    Mean e' 0.12 m/s    AORTIC VALVE CUSP SEPERATION 1.30 cm    TR Max Nathan 2.60 m/s    Triscuspid Valve Regurgitation Peak Gradient 27 mmHg    TV resting pulmonary artery pressure 30 mmHg    RV TB RVSP 6 mmHg    Est. RA pres 3 mmHg   Blood culture    Collection Time: 09/19/23  1:38 PM    Specimen: Blood   Result Value Ref Range    Blood Culture, Routine No Growth to date    CBC auto differential    Collection Time: 09/20/23  3:44 AM   Result Value Ref Range    WBC 15.52 (H) 3.90 - 12.70 K/uL    RBC 4.03 4.00 - 5.40 M/uL    Hemoglobin 10.3 (L) 12.0 - 16.0 g/dL    Hematocrit 34.5 (L) 37.0 - 48.5 %    MCV 86 82 - 98 fL    MCH 25.6 (L) 27.0 - 31.0 pg    MCHC 29.9 (L) 32.0 - 36.0 g/dL    RDW 16.3 (H) 11.5 - 14.5 %    Platelets 456 (H) 150 - 450 K/uL    MPV 9.3 9.2 - 12.9 fL    Immature Granulocytes 0.6 (H) 0.0 - 0.5 %    Gran # (ANC) 12.7 (H) 1.8 - 7.7 K/uL    Immature Grans (Abs) 0.09 (H) 0.00 - 0.04 K/uL    Lymph # 1.0 1.0 - 4.8 K/uL    Mono # 1.5 (H) 0.3 - 1.0 K/uL    Eos # 0.1 0.0 - 0.5 K/uL    Baso # 0.03 0.00 - 0.20 K/uL    nRBC 0 0 /100 WBC    Gran % 81.9 (H) 38.0 - 73.0 %    Lymph % 6.7 (L) 18.0 - 48.0 %    Mono % 9.8 4.0 - 15.0 %    Eosinophil % 0.8 0.0 - 8.0 %    Basophil % 0.2 0.0 - 1.9 %    Differential Method Automated    Comprehensive metabolic panel    Collection Time: 09/20/23  3:44 AM   Result Value Ref Range    Sodium 134 (L) 136 - 145 mmol/L    Potassium 4.4 3.5 - 5.1 mmol/L    Chloride 97 95 - 110 mmol/L    CO2 29 23 - 29 mmol/L    Glucose 107 70 - 110 mg/dL    BUN 29 (H) 8 - 23 mg/dL     Creatinine 1.7 (H) 0.5 - 1.4 mg/dL    Calcium 8.7 8.7 - 10.5 mg/dL    Total Protein 5.9 (L) 6.0 - 8.4 g/dL    Albumin 2.1 (L) 3.5 - 5.2 g/dL    Total Bilirubin 0.5 0.1 - 1.0 mg/dL    Alkaline Phosphatase 66 55 - 135 U/L    AST 12 10 - 40 U/L    ALT 10 10 - 44 U/L    eGFR 29.8 (A) >60 mL/min/1.73 m^2    Anion Gap 8 8 - 16 mmol/L   Magnesium    Collection Time: 09/20/23  3:44 AM   Result Value Ref Range    Magnesium 1.9 1.6 - 2.6 mg/dL   Phosphorus    Collection Time: 09/20/23  3:44 AM   Result Value Ref Range    Phosphorus 4.4 2.7 - 4.5 mg/dL     Imaging Results              X-Ray Chest AP Portable (Final result)  Result time 09/19/23 05:54:57      Final result by Foster Landis MD (09/19/23 05:54:57)                   Narrative:    CLINICAL HISTORY:  83 years (1940) Female sob SOB    TECHNIQUE:  Portable AP radiograph the chest. One view.    COMPARISON:  Radiographs from May 24, 2023    FINDINGS:  There is a focal opacity at the left lung base, characteristic of a combination of a small left pleural effusion and associated atelectasis, noting superimposed infection/pneumonia and malignancy are not excluded. There is blunting of the right costophrenic angle consistent with trace pleural effusion. No pneumothorax is identified. The cardiac silhouette is moderately enlarged. Left-sided AICD pacemaker is unchanged in configuration. Atheromatous calcifications are seen at the aortic arch. Osseous structures appear unchanged. The visualized upper abdomen is unremarkable.    IMPRESSION:  1. Moderate left pleural effusion and adjacent atelectasis/consolidation.  2. Small interstitial opacity at the right lung base.                  .            Electronically signed by:  Foster Landis MD  9/19/2023 5:54 AM CDT Workstation: [id number]                                    12-lead EKG interpretation:  (if applicable)      Current Cardiac Rhythm:   (if applicable)    Physical Exam:   Physical Exam  Constitutional:        Appearance: Normal appearance.   Cardiovascular:      Rate and Rhythm: Normal rate and regular rhythm.      Heart sounds: Murmur heard.      Comments: + JVD   Pulmonary:      Effort: Pulmonary effort is normal. No respiratory distress.      Breath sounds: Wheezing present. No rhonchi.      Comments: Left lung wheezing, crackles  Right side slightly diminished in bases   Abdominal:      General: Abdomen is flat. Bowel sounds are normal.      Palpations: Abdomen is soft.   Musculoskeletal:         General: Swelling present.      Comments: +1 edema BLE    Skin:     General: Skin is warm and dry.   Neurological:      General: No focal deficit present.      Mental Status: She is alert and oriented to person, place, and time.         ASSESSMENT/PLAN:   Assessment:   Acute on Chronic combined HF reduced EF  MARCELINO/CKD cr 1.8  MR  HTN  COPD  JENNIFER  SSS  MDT ICD insitu    Echo 5/2023--EF 40 % left ventricular global hypokinesis, LA enlargement, Mod -Severe MR , PA pressure 45 mmhg     Recent device interrogation--9/2023  11% afib burden· Possible OptiVol fluid accumulation--9/10/2023     Echo 9/19--EF 40-50 % LA dilation, mild AS, mod MR, PA pressure 30 mmhg     Plan  Continue IV Bumex.   Thoracentesis this am  Cr 1.7--will reassess need for increased diuretic after thoracentesis  Paroxysmal Afib RVR, rate improved on amio infusion, continue for now with levo, maintain MAP > 60  Will consider converting to oral amio later today or in am if rate remains stable.   Xarelto on hold due to procedure

## 2023-09-20 NOTE — ASSESSMENT & PLAN NOTE
Patient with Hypoxic Respiratory failure which is Acute.  she is not on home oxygen. Supplemental oxygen was provided and noted- Oxygen Concentration (%):  [36-40] 36    .   Signs/symptoms of respiratory failure include- tachypnea, increased work of breathing, respiratory distress and use of accessory muscles. Contributing diagnoses includes - CHF Labs and images were reviewed. Patient Has recent ABG, which has been reviewed. Will treat underlying causes and adjust management of respiratory failure as follows- bronchodilators ordered.  Patient with worsening of hypoxia presently on 5 L nasal cannula high-flow oxygen, Bumex 1 mg IV x1 dose now.

## 2023-09-20 NOTE — CONSULTS
Pt was educated by Mayte Rushing RD on 10/28/2022 and the note below is evidence of this:    Met w/ pt and family at bedside. Educated on carb choices and serving sizes of 1 choice. Pairing carbohydrates with a fat, fiber, or protein to prevent increase in blood glucose. Educated pt on MyPlate and what a plate should consist of. Educated pt on consistent carbohydrate intake throughout the day. Educated pt on heart healthy diet. Education focused on including healthy fats- gave examples and lowering LDL levels by excluding saturated/trans fat in the diet. Went over types of foods that have saturated/trans fat. Encouraged cooking with olive oil instead of butter. Choosing whole grains over refined grains, choosing canned foods with no salt added and plan frozen veggies instead of veggies cooked in butter and cream sauces. Encouraged patient to choose leaner cuts of meat and decreasing high fat meats such as chu, sausage, hot dogs, etc. Educated pt on decreasing sodium in diet. To try not to cook with salt and use herbs and spices to make food more flavorful. To limit eating out-if patient chooses to eat out, informed patient to discuss with  to cook meats and veggies with no salt and olive oil instead of butter. Provided handouts on all of these topics for pt to use in the future. Pt receptive + handout provided.      Mayte Rushing RD

## 2023-09-20 NOTE — PROGRESS NOTES
[facility]  Progress Note  Pulmonary/Critical Care      PATIENT NAME: Jo Alegre  MRN: [id number]  TODAY'S DATE: 2023  2:23 PM  ADMIT DATE: 2023  AGE: 83 y.o. : 1940    HPI/INTERVAL HISTORY (See H&P for complete P,F,SHx) :   Ms Alegre is an 83 year old woman former smoker with HFrEF 30%, AICD, hx of AFib who presented from home with low blood pressure and shortness of breath. CXR was suggestive of predominantly left sided effusion. Patient was also evaluated by cardiology.     Review of Systems   Constitutional:  Positive for appetite change. Negative for chills, fever and unexpected weight change.   HENT:  Negative for nosebleeds, postnasal drip, trouble swallowing and voice change.    Eyes:  Negative for visual disturbance.   Respiratory:  Positive for shortness of breath. Negative for cough and wheezing.    Cardiovascular:  Positive for leg swelling. Negative for chest pain.   Gastrointestinal:  Negative for diarrhea, nausea and vomiting.   Endocrine: Negative for cold intolerance and heat intolerance.   Genitourinary:  Negative for dysuria, flank pain and frequency.   Musculoskeletal:  Negative for neck pain and neck stiffness.   Allergic/Immunologic: Negative for environmental allergies and immunocompromised state.   Neurological:  Negative for syncope, speech difficulty and weakness.   Hematological:  Negative for adenopathy.   Psychiatric/Behavioral:  Negative for confusion.            VITAL SIGNS (MOST RECENT)  Temp: 98.3 °F (36.8 °C) (23 1115)  Pulse: 84 (23 1245)  Resp: (!) 26 (23 1245)  BP: 125/65 (23 1200)  SpO2: 99 % (23 1245)    INTAKE AND OUTPUT (LAST 24 HOURS):  Intake/Output Summary (Last 24 hours) at 2023 1423  Last data filed at 2023 0550  Gross per 24 hour   Intake 300.4 ml   Output 650 ml   Net -349.6 ml       WEIGHT  Wt Readings from Last 1 Encounters:   23 96.3 kg (212 lb 4.9 oz)       Physical Exam  Vitals  reviewed.   Constitutional:       General: She is not in acute distress.     Appearance: She is well-developed. She is obese. She is ill-appearing. She is not diaphoretic.   HENT:      Head: Normocephalic and atraumatic.      Mouth/Throat:      Pharynx: No oropharyngeal exudate or posterior oropharyngeal erythema.   Eyes:      General: No scleral icterus.     Pupils: Pupils are equal, round, and reactive to light.   Neck:      Vascular: No JVD.   Cardiovascular:      Rate and Rhythm: Tachycardia present. Rhythm irregular.      Heart sounds: Normal heart sounds. No murmur heard.  Pulmonary:      Effort: Respiratory distress present.      Breath sounds: Rales present. No wheezing.   Abdominal:      General: Bowel sounds are normal. There is no distension.      Palpations: Abdomen is soft.      Tenderness: There is no abdominal tenderness.   Musculoskeletal:         General: No swelling.      Cervical back: Normal range of motion and neck supple. No rigidity.   Skin:     General: Skin is warm and dry.      Capillary Refill: Capillary refill takes less than 2 seconds.      Coloration: Skin is not pale.      Findings: No rash.   Neurological:      General: No focal deficit present.      Mental Status: She is alert and oriented to person, place, and time.      Cranial Nerves: No cranial nerve deficit.      Motor: No weakness or abnormal muscle tone.           VENTILATOR SETTINGS  Oxygen Concentration (%):  [36-40] 36           LAST ARTERIAL BLOOD GAS  ABG  Recent Labs   Lab 09/19/23  0912   PH 7.519*   PO2 84   PCO2 35.9   HCO3 29.2*   BE 6       ACUTE PHASE REACTANT (LAST 24 HOURS)  Recent Labs   Lab 09/20/23  1143          CBC LAST (LAST 24 HOURS)  Recent Labs   Lab 09/20/23  0344   WBC 15.52*   RBC 4.03   HGB 10.3*   HCT 34.5*   MCV 86   MCH 25.6*   MCHC 29.9*   RDW 16.3*   *   MPV 9.3   GRAN 81.9*  12.7*   LYMPH 6.7*  1.0   MONO 9.8  1.5*   BASO 0.03   NRBC 0       CHEMISTRY LAST (LAST 24  "HOURS)  Recent Labs   Lab 09/20/23  0344 09/20/23  1143   *  --    K 4.4  --    CL 97  --    CO2 29  --    ANIONGAP 8  --    BUN 29*  --    CREATININE 1.7*  --     148*   CALCIUM 8.7  --    MG 1.9  --    ALBUMIN 2.1*  --    PROT 5.9* 6.3   ALKPHOS 66  --    ALT 10  --    AST 12  --    BILITOT 0.5  --        COAGULATION LAST (LAST 24 HOURS)  No results for input(s): "LABPT", "INR", "APTT" in the last 24 hours.    CARDIAC PROFILE (LAST 24 HOURS)  Recent Labs   Lab 09/18/23  2201 09/19/23  0530 09/19/23  1124 09/20/23  1143   *  --   --   --    LDH  --   --   --  141   TROPONINIHS  --    < > 18.6*  --     < > = values in this interval not displayed.       LAST 7 DAYS MICROBIOLOGY   Microbiology Results (last 7 days)       Procedure Component Value Units Date/Time    Culture, Body Fluid (Aerobic) w/ GS [[id number]] Collected: 09/20/23 1035    Order Status: No result Specimen: Pleural Fluid from Lung, Left Updated: 09/20/23 1107    AFB culture (includes stain) [[id number]] Collected: 09/20/23 1035    Order Status: Sent Specimen: Pleural Fluid from Lung, Left Updated: 09/20/23 1052    Gram stain [[id number]] Collected: 09/20/23 1035    Order Status: Sent Specimen: Pleural Fluid from Lung, Left Updated: 09/20/23 1052    Culture, Respiratory with Gram Stain [[id number]]     Order Status: No result Specimen: Respiratory     Blood culture [[id number]] Collected: 09/19/23 1338    Order Status: Completed Specimen: Blood Updated: 09/19/23 1958     Blood Culture, Routine No Growth to date            MOST RECENT IMAGING  X-Ray Chest 1 View  HISTORY: s/p thoracentesis removed 2 L    FINDINGS: Portable chest radiograph at 1039 hours compared to 09/19/2023 shows interval decreased size of left-sided pleural effusion, post thoracentesis. There is no pneumothorax.    IMPRESSION: Negative for pneumothorax, post left-sided thoracentesis.    Electronically signed by:  Pancho Rausch MD  9/20/2023 11:05 AM CDT Workstation: " [id number]  Echo Saline Bubble? No    Left Ventricle: Moderately increased wall thickness. There is   concentric remodeling. There is mildly reduced systolic function with a   visually estimated ejection fraction of 40 - 50%.    Left Atrium: Left atrium is dilated.    Aortic Valve: There is mild aortic valve sclerosis.    Mitral Valve: There is moderate regurgitation.    Tricuspid Valve: There is mild regurgitation.    Pulmonary Artery: The estimated pulmonary artery systolic pressure is   30 mmHg.    IVC/SVC: Normal venous pressure at 3 mmHg.    Pericardium: There is a small effusion. No indication of cardiac   tamponade.      CURRENT VISIT EKG  Results for orders placed or performed during the hospital encounter of 09/18/23   EKG 12-lead    Narrative    Test Reason : I49.9,    Vent. Rate : 121 BPM     Atrial Rate : 326 BPM     P-R Int : 000 ms          QRS Dur : 088 ms      QT Int : 286 ms       P-R-T Axes : 000 -29 150 degrees     QTc Int : 406 ms    Atrial fibrillation with rapid ventricular response  Low voltage QRS  Nonspecific T wave abnormality  Abnormal ECG  When compared with ECG of 18-SEP-2023 21:33,  No significant change was found  Confirmed by Kamran LESLIE, Gary GONZALEZ ([id number]) on 9/19/2023 7:27:06 PM    Referred By: [email or web address]   SELF           Confirmed By:Gary Andrew MD       ECHOCARDIOGRAM RESULTS  Results for orders placed during the hospital encounter of 09/18/23    Echo Saline Bubble? No    Interpretation Summary    Left Ventricle: Moderately increased wall thickness. There is concentric remodeling. There is mildly reduced systolic function with a visually estimated ejection fraction of 40 - 50%.    Left Atrium: Left atrium is dilated.    Aortic Valve: There is mild aortic valve sclerosis.    Mitral Valve: There is moderate regurgitation.    Tricuspid Valve: There is mild regurgitation.    Pulmonary Artery: The estimated pulmonary artery systolic pressure is 30 mmHg.    IVC/SVC: Normal venous  pressure at 3 mmHg.    Pericardium: There is a small effusion. No indication of cardiac tamponade.        ASSESSMENT:   Hypoxemic respiratory failure   Left pleural effusion  Hx of biventricular heart failure, PASP 45 , ICD  Atrial fibrillation     Ms Alegre is an 83 year old woman with biventricular heart failure who presents with acute shortness of breath, and a predominantly one sided pleural effusion. Bedside ultrasound suggests minimal to no fluid on the right. Left with mostly free flowing appearing fluid, no loculations were seen. She has been having fevers yesterday.     Thoracentesis performed at bedside, drained about 2 L of turbid, yellow and cloudy fluid tinged with red. Patient had immediate relief of dyspnea. Patient and family had expressed interest in further discussing goals of care, not wanting a prolonged period of time on a ventilator. Because they are not sure about how aggressive she wants to be in the future, I asked palliative care to see her.     PLAN:   - Follow up pleural studies   - If patient remains stable today can consider stepdown from ICU   - Palliative discussions for long term goals of care. But I think the patient will improve and get out of the hospital.   - Continue treatment for atrial fibrillation, cardiology recommendations appreciated. Will likely transition to oral amiodarone later today. Can restart xarelto. Continue weaning levophed.   - I think reasonable to continue broad spectrum antibiotics until we rule out a possible pneumonia.     I will continue to follow. Please call if any questions or concerns.     Bud López MD  Date of Service: 09/20/2023  2:23 PM   [phone]

## 2023-09-21 LAB
ALBUMIN SERPL BCP-MCNC: 2.4 G/DL (ref 3.5–5.2)
ALP SERPL-CCNC: 71 U/L (ref 55–135)
ALT SERPL W/O P-5'-P-CCNC: 7 U/L (ref 10–44)
ANION GAP SERPL CALC-SCNC: 5 MMOL/L (ref 8–16)
AST SERPL-CCNC: 8 U/L (ref 10–40)
BASOPHILS # BLD AUTO: 0.03 K/UL (ref 0–0.2)
BASOPHILS NFR BLD: 0.3 % (ref 0–1.9)
BILIRUB SERPL-MCNC: 0.2 MG/DL (ref 0.1–1)
BUN SERPL-MCNC: 31 MG/DL (ref 8–23)
CALCIUM SERPL-MCNC: 8.6 MG/DL (ref 8.7–10.5)
CHLORIDE SERPL-SCNC: 96 MMOL/L (ref 95–110)
CO2 SERPL-SCNC: 32 MMOL/L (ref 23–29)
CREAT SERPL-MCNC: 1.6 MG/DL (ref 0.5–1.4)
DIFFERENTIAL METHOD: ABNORMAL
EOSINOPHIL # BLD AUTO: 0.2 K/UL (ref 0–0.5)
EOSINOPHIL NFR BLD: 1.9 % (ref 0–8)
ERYTHROCYTE [DISTWIDTH] IN BLOOD BY AUTOMATED COUNT: 16 % (ref 11.5–14.5)
EST. GFR  (NO RACE VARIABLE): 32 ML/MIN/1.73 M^2
GLUCOSE SERPL-MCNC: 102 MG/DL (ref 70–110)
GRAM STN SPEC: NORMAL
GRAM STN SPEC: NORMAL
HCT VFR BLD AUTO: 31.5 % (ref 37–48.5)
HGB BLD-MCNC: 9.6 G/DL (ref 12–16)
IMM GRANULOCYTES # BLD AUTO: 0.05 K/UL (ref 0–0.04)
IMM GRANULOCYTES NFR BLD AUTO: 0.5 % (ref 0–0.5)
LYMPHOCYTES # BLD AUTO: 0.8 K/UL (ref 1–4.8)
LYMPHOCYTES NFR BLD: 8 % (ref 18–48)
MAGNESIUM SERPL-MCNC: 2 MG/DL (ref 1.6–2.6)
MCH RBC QN AUTO: 25.7 PG (ref 27–31)
MCHC RBC AUTO-ENTMCNC: 30.5 G/DL (ref 32–36)
MCV RBC AUTO: 85 FL (ref 82–98)
MONOCYTES # BLD AUTO: 1.1 K/UL (ref 0.3–1)
MONOCYTES NFR BLD: 10.6 % (ref 4–15)
NEUTROPHILS # BLD AUTO: 7.9 K/UL (ref 1.8–7.7)
NEUTROPHILS NFR BLD: 78.7 % (ref 38–73)
NRBC BLD-RTO: 0 /100 WBC
PHOSPHATE SERPL-MCNC: 3.7 MG/DL (ref 2.7–4.5)
PLATELET # BLD AUTO: 373 K/UL (ref 150–450)
PMV BLD AUTO: 9.2 FL (ref 9.2–12.9)
POTASSIUM SERPL-SCNC: 4.3 MMOL/L (ref 3.5–5.1)
PROT SERPL-MCNC: 5.6 G/DL (ref 6–8.4)
RBC # BLD AUTO: 3.73 M/UL (ref 4–5.4)
SODIUM SERPL-SCNC: 133 MMOL/L (ref 136–145)
WBC # BLD AUTO: 10.06 K/UL (ref 3.9–12.7)

## 2023-09-21 PROCEDURE — 99900031 HC PATIENT EDUCATION (STAT)

## 2023-09-21 PROCEDURE — 63600175 PHARM REV CODE 636 W HCPCS: Performed by: INTERNAL MEDICINE

## 2023-09-21 PROCEDURE — 94640 AIRWAY INHALATION TREATMENT: CPT

## 2023-09-21 PROCEDURE — 97530 THERAPEUTIC ACTIVITIES: CPT

## 2023-09-21 PROCEDURE — 80053 COMPREHEN METABOLIC PANEL: CPT | Performed by: STUDENT IN AN ORGANIZED HEALTH CARE EDUCATION/TRAINING PROGRAM

## 2023-09-21 PROCEDURE — 94799 UNLISTED PULMONARY SVC/PX: CPT

## 2023-09-21 PROCEDURE — 99233 PR SUBSEQUENT HOSPITAL CARE,LEVL III: ICD-10-PCS | Mod: ,,, | Performed by: STUDENT IN AN ORGANIZED HEALTH CARE EDUCATION/TRAINING PROGRAM

## 2023-09-21 PROCEDURE — 20000000 HC ICU ROOM

## 2023-09-21 PROCEDURE — 27000221 HC OXYGEN, UP TO 24 HOURS

## 2023-09-21 PROCEDURE — 94760 N-INVAS EAR/PLS OXIMETRY 1: CPT

## 2023-09-21 PROCEDURE — 83735 ASSAY OF MAGNESIUM: CPT | Performed by: INTERNAL MEDICINE

## 2023-09-21 PROCEDURE — 99900035 HC TECH TIME PER 15 MIN (STAT)

## 2023-09-21 PROCEDURE — 36415 COLL VENOUS BLD VENIPUNCTURE: CPT | Performed by: STUDENT IN AN ORGANIZED HEALTH CARE EDUCATION/TRAINING PROGRAM

## 2023-09-21 PROCEDURE — 84100 ASSAY OF PHOSPHORUS: CPT | Performed by: INTERNAL MEDICINE

## 2023-09-21 PROCEDURE — 25000242 PHARM REV CODE 250 ALT 637 W/ HCPCS: Performed by: STUDENT IN AN ORGANIZED HEALTH CARE EDUCATION/TRAINING PROGRAM

## 2023-09-21 PROCEDURE — 25000003 PHARM REV CODE 250: Performed by: STUDENT IN AN ORGANIZED HEALTH CARE EDUCATION/TRAINING PROGRAM

## 2023-09-21 PROCEDURE — 25000003 PHARM REV CODE 250: Performed by: INTERNAL MEDICINE

## 2023-09-21 PROCEDURE — 97116 GAIT TRAINING THERAPY: CPT

## 2023-09-21 PROCEDURE — 94761 N-INVAS EAR/PLS OXIMETRY MLT: CPT

## 2023-09-21 PROCEDURE — 99232 PR SUBSEQUENT HOSPITAL CARE,LEVL II: ICD-10-PCS | Mod: ,,, | Performed by: INTERNAL MEDICINE

## 2023-09-21 PROCEDURE — 99232 SBSQ HOSP IP/OBS MODERATE 35: CPT | Mod: ,,, | Performed by: INTERNAL MEDICINE

## 2023-09-21 PROCEDURE — 97535 SELF CARE MNGMENT TRAINING: CPT

## 2023-09-21 PROCEDURE — 99233 SBSQ HOSP IP/OBS HIGH 50: CPT | Mod: ,,, | Performed by: STUDENT IN AN ORGANIZED HEALTH CARE EDUCATION/TRAINING PROGRAM

## 2023-09-21 PROCEDURE — 94660 CPAP INITIATION&MGMT: CPT

## 2023-09-21 PROCEDURE — 85025 COMPLETE CBC W/AUTO DIFF WBC: CPT | Performed by: STUDENT IN AN ORGANIZED HEALTH CARE EDUCATION/TRAINING PROGRAM

## 2023-09-21 PROCEDURE — 97162 PT EVAL MOD COMPLEX 30 MIN: CPT

## 2023-09-21 RX ORDER — AMIODARONE HYDROCHLORIDE 200 MG/1
200 TABLET ORAL DAILY
Status: DISCONTINUED | OUTPATIENT
Start: 2023-09-21 | End: 2023-09-26 | Stop reason: HOSPADM

## 2023-09-21 RX ADMIN — GABAPENTIN 200 MG: 100 CAPSULE ORAL at 03:09

## 2023-09-21 RX ADMIN — ATORVASTATIN CALCIUM 20 MG: 20 TABLET, FILM COATED ORAL at 08:09

## 2023-09-21 RX ADMIN — RIVAROXABAN 15 MG: 15 TABLET, FILM COATED ORAL at 03:09

## 2023-09-21 RX ADMIN — GABAPENTIN 200 MG: 100 CAPSULE ORAL at 08:09

## 2023-09-21 RX ADMIN — MIDODRINE HYDROCHLORIDE 5 MG: 2.5 TABLET ORAL at 12:09

## 2023-09-21 RX ADMIN — AMIODARONE HYDROCHLORIDE 200 MG: 200 TABLET ORAL at 03:09

## 2023-09-21 RX ADMIN — BUMETANIDE 1 MG: 0.25 INJECTION INTRAMUSCULAR; INTRAVENOUS at 08:09

## 2023-09-21 RX ADMIN — MUPIROCIN 1 G: 20 OINTMENT TOPICAL at 08:09

## 2023-09-21 RX ADMIN — FLUTICASONE PROPIONATE 100 MCG: 50 SPRAY, METERED NASAL at 08:09

## 2023-09-21 RX ADMIN — CEFTRIAXONE SODIUM 1 G: 1 INJECTION, POWDER, FOR SOLUTION INTRAMUSCULAR; INTRAVENOUS at 04:09

## 2023-09-21 RX ADMIN — ALBUTEROL SULFATE 2.5 MG: 2.5 SOLUTION RESPIRATORY (INHALATION) at 04:09

## 2023-09-21 RX ADMIN — MIDODRINE HYDROCHLORIDE 5 MG: 2.5 TABLET ORAL at 06:09

## 2023-09-21 RX ADMIN — AMIODARONE HYDROCHLORIDE 0.5 MG/MIN: 1.8 INJECTION, SOLUTION INTRAVENOUS at 02:09

## 2023-09-21 RX ADMIN — MIDODRINE HYDROCHLORIDE 5 MG: 2.5 TABLET ORAL at 03:09

## 2023-09-21 RX ADMIN — METOPROLOL SUCCINATE 25 MG: 25 TABLET, FILM COATED, EXTENDED RELEASE ORAL at 08:09

## 2023-09-21 NOTE — SUBJECTIVE & OBJECTIVE
Interval History: No major changes. Sitting in a chair. No complaints.     Past Medical History:   Diagnosis Date    Allergy     Codeine, Lasix    Atrial fibrillation     Atrial fibrillation     Cataract     CHF (congestive heart failure)     Diabetes mellitus, type 2     Ejection fraction < 50% 10/18/2017    Approximately 35%  Based on prior  Echocardiogram.    Encounter for blood transfusion     Hyperlipidemia     Osteoporosis     PONV (postoperative nausea and vomiting)     Thyroid disorder screening 10/17/2017    TSH of 1.12 ordered by Dr. dee Jones       Past Surgical History:   Procedure Laterality Date    ANTEGRADE NEPHROSTOGRAPHY Left 8/25/2022    Procedure: NEPHROSTOGRAM, ANTEGRADE;  Surgeon: Shelby Parra MD;  Location: [facility];  Service: Urology;  Laterality: Left;    CHOLECYSTECTOMY  1997    [place]     COLONOSCOPY      CYSTOSCOPY W/ URETERAL STENT PLACEMENT Left 7/17/2022    Procedure: CYSTOSCOPY, WITH URETERAL STENT INSERTION;  Surgeon: Shelby Parra MD;  Location: [facility] OR;  Service: Urology;  Laterality: Left;    CYSTOSCOPY W/ URETERAL STENT REMOVAL Left 9/21/2022    Procedure: CYSTOSCOPY, WITH URETERAL STENT REMOVAL;  Surgeon: Shelby Parra MD;  Location: [facility] OR;  Service: Urology;  Laterality: Left;    CYSTOSCOPY WITH URETEROSCOPY, RETROGRADE PYELOGRAPHY, AND INSERTION OF STENT Left 8/25/2022    Procedure: CYSTOSCOPY, WITH RETROGRADE PYELOGRAM AND URETERAL STENT INSERTION;  Surgeon: Shelby Parra MD;  Location: [facility] OR;  Service: Urology;  Laterality: Left;    EYE SURGERY      bilateral cataracts    FINGER SURGERY Right 2021    right pinky finger    FLEXIBLE CYSTOSCOPY Left 8/25/2022    Procedure: CYSTOSCOPY, FLEXIBLE WITH STENT REMOVAL;  Surgeon: Shelby Parra MD;  Location: [facility];  Service: Urology;  Laterality: Left;    FRACTURE SURGERY  2014    right femur with neville    HEMORRHOID SURGERY      48 yrs ago    HYSTERECTOMY      NEPHROSTOMY Left 8/25/2022     Procedure: CREATION, NEPHROSTOMY;  Surgeon: Shelby Parra MD;  Location: [facility] OR;  Service: Urology;  Laterality: Left;    PERCUTANEOUS NEPHROLITHOTOMY N/A 8/25/2022    Procedure: NEPHROLITHOTOMY, PERCUTANEOUS;  Surgeon: Shelby Parra MD;  Location: [facility] OR;  Service: Urology;  Laterality: N/A;    REPLACEMENT OF IMPLANTABLE CARDIOVERTER-DEFIBRILLATOR (ICD) GENERATOR N/A 12/13/2019    Procedure: REPLACEMENT, PULSE GENERATOR, ICD-MEDTRONIC;  Surgeon: Sebastian Nowak III, MD;  Location: [facility] CATH;  Service: Cardiology;  Laterality: N/A;    TONSILLECTOMY         Review of patient's allergies indicates:   Allergen Reactions    Codeine Other (See Comments)     nausea    Hydrocodone Nausea And Vomiting    Lasix [furosemide]      rash       Medications:  Continuous Infusions:   amiodarone in dextrose 5% 0.5 mg/min (09/21/23 0243)    NORepinephrine bitartrate-D5W Stopped (09/19/23 1715)     Scheduled Meds:   atorvastatin  20 mg Oral QHS    bumetanide  1 mg Intravenous Daily    cefTRIAXone (ROCEPHIN) IVPB  1 g Intravenous Q24H    fluticasone propionate  2 spray Each Nostril Daily    gabapentin  200 mg Oral TID    metoprolol succinate  25 mg Oral Daily    midodrine  5 mg Oral TID AC    mupirocin   Nasal BID    rivaroxaban  15 mg Oral Daily with dinner     PRN Meds:acetaminophen, albuterol sulfate, aluminum-magnesium hydroxide-simethicone, dextrose 50%, dextrose 50%, glucagon (human recombinant), glucose, glucose, magnesium oxide, magnesium oxide, melatonin, naloxone, ondansetron, potassium bicarbonate, potassium bicarbonate, potassium bicarbonate, potassium, sodium phosphates, potassium, sodium phosphates, potassium, sodium phosphates, prochlorperazine, senna-docusate 8.6-50 mg, simethicone, sodium chloride 0.9%, sodium chloride 0.9%, sodium chloride 0.9%    Family History       Problem Relation (Age of Onset)    Breast cancer Paternal Aunt    Cancer Father    Heart disease Mother          Tobacco Use    Smoking  status: Former     Passive exposure: Past    Smokeless tobacco: Never    Tobacco comments:     quit 2013   Substance and Sexual Activity    Alcohol use: No    Drug use: No    Sexual activity: Not Currently       Review of Systems  Objective:     Vital Signs (Most Recent):  Temp: 98.2 °F (36.8 °C) (09/21/23 0400)  Pulse: 76 (09/21/23 0717)  Resp: (!) 23 (09/21/23 0717)  BP: (!) 128/58 (09/21/23 0836)  SpO2: 97 % (09/21/23 0717) Vital Signs (24h Range):  Temp:  [98.2 °F (36.8 °C)-99.1 °F (37.3 °C)] 98.2 °F (36.8 °C)  Pulse:  [] 76  Resp:  [21-35] 23  SpO2:  [80 %-100 %] 97 %  BP: ()/(48-68) 128/58     Weight: 96.3 kg (212 lb 4.9 oz)  Body mass index is 33.25 kg/m².       Physical Exam  Vitals reviewed.   Constitutional:       General: She is not in acute distress.     Appearance: She is ill-appearing. She is not toxic-appearing.   HENT:      Head: Normocephalic and atraumatic.      Right Ear: External ear normal.      Left Ear: External ear normal.      Nose: Nose normal.   Eyes:      General:         Right eye: No discharge.         Left eye: No discharge.      Extraocular Movements: Extraocular movements intact.   Cardiovascular:      Rate and Rhythm: Regular rhythm. Tachycardia present.   Pulmonary:      Effort: Pulmonary effort is normal.   Abdominal:      General: There is no distension.   Neurological:      General: No focal deficit present.      Mental Status: She is alert and oriented to person, place, and time.   Psychiatric:         Mood and Affect: Mood normal.         Behavior: Behavior normal.         Thought Content: Thought content normal.         Judgment: Judgment normal.            Review of Symptoms      Symptom Assessment (ESAS 0-10 Scale)  Pain:  0  Dyspnea:  0  Anxiety:  0  Nausea:  0  Depression:  0  Anorexia:  0  Fatigue:  0  Insomnia:  0  Restlessness:  0  Agitation:  0         Performance Status:  50    Living Arrangements:  Lives with family and Lives in  home    Psychosocial/Cultural:   See Palliative Psychosocial Note: No  **Primary  to Follow**  Palliative Care  Consult: No        Advance Care Planning   Advance Directives:     Decision Making:  Patient answered questions and Family answered questions  Goals of Care: What is most important right now is to focus on spending time at home, remaining as independent as possible, symptom/pain control, improvement in condition but with limits to invasive therapies. Accordingly, we have decided that the best plan to meet the patient's goals includes continuing with treatment.         Significant Labs: BMP:   Recent Labs   Lab 09/21/23  0505      *   K 4.3   CL 96   CO2 32*   BUN 31*   CREATININE 1.6*   CALCIUM 8.6*   MG 2.0       CBC:   Recent Labs   Lab 09/20/23  0344 09/21/23  0505   WBC 15.52* 10.06   HGB 10.3* 9.6*   HCT 34.5* 31.5*   * 373       CBC:   Recent Labs   Lab 09/21/23  0505   WBC 10.06   HGB 9.6*   HCT 31.5*   MCV 85          BMP:  Recent Labs   Lab 09/21/23  0505      *   K 4.3   CL 96   CO2 32*   BUN 31*   CREATININE 1.6*   CALCIUM 8.6*   MG 2.0       LFT:  Lab Results   Component Value Date    AST 8 (L) 09/21/2023    ALKPHOS 71 09/21/2023    BILITOT 0.2 09/21/2023     Albumin:   Albumin   Date Value Ref Range Status   09/21/2023 2.4 (L) 3.5 - 5.2 g/dL Final     Protein:   Total Protein   Date Value Ref Range Status   09/21/2023 5.6 (L) 6.0 - 8.4 g/dL Final     Lactic acid:   Lab Results   Component Value Date    LACTATE 1.7 07/17/2022       Significant Imaging: I have reviewed all pertinent imaging results/findings within the past 24 hours.

## 2023-09-21 NOTE — PT/OT/SLP PROGRESS
Occupational Therapy   Treatment    Name: Jo Alegre  MRN: [id number]  Admitting Diagnosis:  Congestive heart failure       Recommendations:     Discharge Recommendations: nursing facility, skilled  Discharge Equipment Recommendations:  none  Barriers to discharge:  Decreased caregiver support    Assessment:     Jo Alegre is a 83 y.o. female with a medical diagnosis of Congestive heart failure.  Performance deficits affecting function are weakness, impaired endurance, impaired self care skills, impaired functional mobility, gait instability, impaired balance, impaired cardiopulmonary response to activity, edema.     Rehab Prognosis:  Good; patient would benefit from acute skilled OT services to address these deficits and reach maximum level of function.       Plan:     Patient to be seen 5 x/week to address the above listed problems via self-care/home management, therapeutic activities, therapeutic exercises  Plan of Care Expires: 10/20/23  Plan of Care Reviewed with: patient    Subjective     Chief Complaint: fatigue  Patient/Family Comments/goals: return home  Pain/Comfort:  Pain Rating 1: 0/10  Pain Rating Post-Intervention 1: 0/10    Objective:     Communicated with: nurse prior to session.  Patient found supine with telemetry, pulse ox (continuous), peripheral IV, blood pressure cuff, oxygen, PureWick upon OT entry to room.    General Precautions: Standard, fall, respiratory    Respiratory Status: Nasal cannula, flow 4 L/min     Occupational Performance:     Bed Mobility:    Patient completed Supine to Sit with moderate assistance     Functional Mobility/Transfers:  Patient completed Sit <> Stand Transfer with moderate assistance  with  rolling walker   Patient completed Bed <> Chair Transfer using Stand Pivot technique with minimum assistance with rolling walker    Activities of Daily Living:  Lower Body Dressing: maximal assistance    Toileting: pure wick      Patient left up in chair with all  lines intact and call button in reach    GOALS:   Multidisciplinary Problems       Occupational Therapy Goals          Problem: Occupational Therapy    Goal Priority Disciplines Outcome Interventions   Occupational Therapy Goal     OT, PT/OT Ongoing, Progressing    Description: Goals to be met by: 10/20/23     Patient will increase functional independence with ADLs by performing:    UE Dressing with Modified Page.  LE Dressing with Modified Page.  Grooming while standing at sink with Modified Page.  Toileting from toilet with Modified Page for hygiene and clothing management.   Toilet transfer to toilet with Modified Page.                         Time Tracking:     OT Date of Treatment: 09/21/23  OT Start Time: 0932  OT Stop Time: 0955  OT Total Time (min): 23 min    Billable Minutes:Self Care/Home Management 08  Therapeutic Activity 15    OT/SHAWN: OT          9/21/2023   No

## 2023-09-21 NOTE — PROGRESS NOTES
[facility]  Palliative Medicine  Progress Note    Patient Name: Jo Alegre  MRN: [id number]  Admission Date: 9/18/2023  Hospital Length of Stay: 2 days  Code Status: Partial Code   Attending Provider: Mia Allen MD  Consulting Provider: Darian Jones MD  Primary Care Physician: Kraig Alberto MD  Principal Problem:Congestive heart failure    Patient information was obtained from patient, relative(s), past medical records and primary team.      Assessment/Plan:     Palliative Care  Goals of care, counseling/discussion  Reviewed her chart discussed her case with her team.      I examined Ms. Alegre at bedside.  She maintains capacity for complex medical decision-making.  I also spoke with her 2 daughters and son at bedside.      They were able to accurately recall our previous conversation.  Her goals remain clear and unchanged.  They do not have any questions for me today.    We will remain hopeful for the best.    Again, she and her family were asking excellent questions.  I feel very confident that they will make wise, patient centered decisions as her condition changes in the future.    I appreciate being involved.  I hope I have been helpful.    I will check in periodically while she remains admitted.  Not daily.  If a more immediate visit can be helpful, please do not hesitate to call or message.      I will follow-up with patient. Please contact us if you have any additional questions.    Subjective:     Chief Complaint:   Chief Complaint   Patient presents with    Shortness of Breath     Started this morning. Has hx COPD and CHF. Pt states that she has at home oxygen. Pt denies nausea and vomiting.        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  She initially presented with worsening dyspnea.  She is currently  admitted and being treated for acute respiratory failure with hypoxia thought secondary to underlying heart failure.  She has been evaluated by Cardiology whom is recommending diuresis, rate control, and other supportive measures.  Course has been complicated by pleural effusion for which pulmonology has evaluated and perform thoracentesis.  At this point, given her age and comorbidities, she carries a guarded prognosis.  I have been asked to assist with goals of care.      Hospital Course:  No notes on file    Interval History: No major changes. Sitting in a chair. No complaints.     Past Medical History:   Diagnosis Date    Allergy     Codeine, Lasix    Atrial fibrillation     Atrial fibrillation     Cataract     CHF (congestive heart failure)     Diabetes mellitus, type 2     Ejection fraction < 50% 10/18/2017    Approximately 35%  Based on prior  Echocardiogram.    Encounter for blood transfusion     Hyperlipidemia     Osteoporosis     PONV (postoperative nausea and vomiting)     Thyroid disorder screening 10/17/2017    TSH of 1.12 ordered by Dr. dee Jones       Past Surgical History:   Procedure Laterality Date    ANTEGRADE NEPHROSTOGRAPHY Left 8/25/2022    Procedure: NEPHROSTOGRAM, ANTEGRADE;  Surgeon: Shelby Parra MD;  Location: [facility] OR;  Service: Urology;  Laterality: Left;    CHO[place]OMY  1997    [place]     COLONOSCOPY      CYSTOSCOPY W/ URETERAL STENT PLACEMENT Left 7/17/2022    Procedure: CYSTOSCOPY, WITH URETERAL STENT INSERTION;  Surgeon: Shelby Parra MD;  Location: [facility] OR;  Service: Urology;  Laterality: Left;    CYSTOSCOPY W/ URETERAL STENT REMOVAL Left 9/21/2022    Procedure: CYSTOSCOPY, WITH URETERAL STENT REMOVAL;  Surgeon: Shelby Parra MD;  Location: [facility] OR;  Service: Urology;  Laterality: Left;    CYSTOSCOPY WITH URETEROSCOPY, RETROGRADE PYELOGRAPHY, AND INSERTION OF STENT Left 8/25/2022    Procedure: CYSTOSCOPY, WITH RETROGRADE PYELOGRAM AND  URETERAL STENT INSERTION;  Surgeon: Shelby Parra MD;  Location: [facility] OR;  Service: Urology;  Laterality: Left;    EYE SURGERY      bilateral cataracts    FINGER SURGERY Right 2021    right pinky finger    FLEXIBLE CYSTOSCOPY Left 8/25/2022    Procedure: CYSTOSCOPY, FLEXIBLE WITH STENT REMOVAL;  Surgeon: Shelby Parra MD;  Location: [facility] OR;  Service: Urology;  Laterality: Left;    FRACTURE SURGERY  2014    right femur with neville    HEMORRHOID SURGERY      48 yrs ago    HYSTERECTOMY      NEPHROSTOMY Left 8/25/2022    Procedure: CREATION, NEPHROSTOMY;  Surgeon: Shelby Parra MD;  Location: [facility] OR;  Service: Urology;  Laterality: Left;    PERCUTANEOUS NEPHROLITHOTOMY N/A 8/25/2022    Procedure: NEPHROLITHOTOMY, PERCUTANEOUS;  Surgeon: Shelby Parra MD;  Location: [facility] OR;  Service: Urology;  Laterality: N/A;    REPLACEMENT OF IMPLANTABLE CARDIOVERTER-DEFIBRILLATOR (ICD) GENERATOR N/A 12/13/2019    Procedure: REPLACEMENT, PULSE GENERATOR, ICD-MEDTRONIC;  Surgeon: Sebastian Nowak III, MD;  Location: [facility] CATH;  Service: Cardiology;  Laterality: N/A;    TONSILLECTOMY         Review of patient's allergies indicates:   Allergen Reactions    Codeine Other (See Comments)     nausea    Hydrocodone Nausea And Vomiting    Lasix [furosemide]      rash       Medications:  Continuous Infusions:   amiodarone in dextrose 5% 0.5 mg/min (09/21/23 0243)    NORepinephrine bitartrate-D5W Stopped (09/19/23 1715)     Scheduled Meds:   atorvastatin  20 mg Oral QHS    bumetanide  1 mg Intravenous Daily    cefTRIAXone (ROCEPHIN) IVPB  1 g Intravenous Q24H    fluticasone propionate  2 spray Each Nostril Daily    gabapentin  200 mg Oral TID    metoprolol succinate  25 mg Oral Daily    midodrine  5 mg Oral TID AC    mupirocin   Nasal BID    rivaroxaban  15 mg Oral Daily with dinner     PRN Meds:acetaminophen, albuterol sulfate, aluminum-magnesium hydroxide-simethicone, dextrose 50%, dextrose 50%,  glucagon (human recombinant), glucose, glucose, magnesium oxide, magnesium oxide, melatonin, naloxone, ondansetron, potassium bicarbonate, potassium bicarbonate, potassium bicarbonate, potassium, sodium phosphates, potassium, sodium phosphates, potassium, sodium phosphates, prochlorperazine, senna-docusate 8.6-50 mg, simethicone, sodium chloride 0.9%, sodium chloride 0.9%, sodium chloride 0.9%    Family History       Problem Relation (Age of Onset)    Breast cancer Paternal Aunt    Cancer Father    Heart disease Mother          Tobacco Use    Smoking status: Former     Passive exposure: Past    Smokeless tobacco: Never    Tobacco comments:     quit 2013   Substance and Sexual Activity    Alcohol use: No    Drug use: No    Sexual activity: Not Currently       Review of Systems  Objective:     Vital Signs (Most Recent):  Temp: 98.2 °F (36.8 °C) (09/21/23 0400)  Pulse: 76 (09/21/23 0717)  Resp: (!) 23 (09/21/23 0717)  BP: (!) 128/58 (09/21/23 0836)  SpO2: 97 % (09/21/23 0717) Vital Signs (24h Range):  Temp:  [98.2 °F (36.8 °C)-99.1 °F (37.3 °C)] 98.2 °F (36.8 °C)  Pulse:  [] 76  Resp:  [21-35] 23  SpO2:  [80 %-100 %] 97 %  BP: ()/(48-68) 128/58     Weight: 96.3 kg (212 lb 4.9 oz)  Body mass index is 33.25 kg/m².       Physical Exam  Vitals reviewed.   Constitutional:       General: She is not in acute distress.     Appearance: She is ill-appearing. She is not toxic-appearing.   HENT:      Head: Normocephalic and atraumatic.      Right Ear: External ear normal.      Left Ear: External ear normal.      Nose: Nose normal.   Eyes:      General:         Right eye: No discharge.         Left eye: No discharge.      Extraocular Movements: Extraocular movements intact.   Cardiovascular:      Rate and Rhythm: Regular rhythm. Tachycardia present.   Pulmonary:      Effort: Pulmonary effort is normal.   Abdominal:      General: There is no distension.   Neurological:      General: No focal deficit present.       Mental Status: She is alert and oriented to person, place, and time.   Psychiatric:         Mood and Affect: Mood normal.         Behavior: Behavior normal.         Thought Content: Thought content normal.         Judgment: Judgment normal.            Review of Symptoms      Symptom Assessment (ESAS 0-10 Scale)  Pain:  0  Dyspnea:  0  Anxiety:  0  Nausea:  0  Depression:  0  Anorexia:  0  Fatigue:  0  Insomnia:  0  Restlessness:  0  Agitation:  0         Performance Status:  50    Living Arrangements:  Lives with family and Lives in home    Psychosocial/Cultural:   See Palliative Psychosocial Note: No  **Primary  to Follow**  Palliative Care  Consult: No        Advance Care Planning  Advance Directives:     Decision Making:  Patient answered questions and Family answered questions  Goals of Care: What is most important right now is to focus on spending time at home, remaining as independent as possible, symptom/pain control, improvement in condition but with limits to invasive therapies. Accordingly, we have decided that the best plan to meet the patient's goals includes continuing with treatment.         Significant Labs: BMP:   Recent Labs   Lab 09/21/23  0505      *   K 4.3   CL 96   CO2 32*   BUN 31*   CREATININE 1.6*   CALCIUM 8.6*   MG 2.0       CBC:   Recent Labs   Lab 09/20/23  0344 09/21/23  0505   WBC 15.52* 10.06   HGB 10.3* 9.6*   HCT 34.5* 31.5*   * 373       CBC:   Recent Labs   Lab 09/21/23  0505   WBC 10.06   HGB 9.6*   HCT 31.5*   MCV 85          BMP:  Recent Labs   Lab 09/21/23  0505      *   K 4.3   CL 96   CO2 32*   BUN 31*   CREATININE 1.6*   CALCIUM 8.6*   MG 2.0       LFT:  Lab Results   Component Value Date    AST 8 (L) 09/21/2023    ALKPHOS 71 09/21/2023    BILITOT 0.2 09/21/2023     Albumin:   Albumin   Date Value Ref Range Status   09/21/2023 2.4 (L) 3.5 - 5.2 g/dL Final     Protein:   Total Protein   Date Value Ref Range  Status   09/21/2023 5.6 (L) 6.0 - 8.4 g/dL Final     Lactic acid:   Lab Results   Component Value Date    LACTATE 1.7 07/17/2022       Significant Imaging: I have reviewed all pertinent imaging results/findings within the past 24 hours.    > 35 min visit spent in chart review, face to face discussion of goals of care,  symptom assessment, coordination of care and emotional support.      Darian Jones MD  Palliative Medicine  [facility]

## 2023-09-21 NOTE — PROGRESS NOTES
[facility]   Cardiology Note    Consult Requested By: SARITA  Reason for Consult: CHF    SUBJECTIVE:     History of Present Illness: The pt is 84 y/o F pt of  with PMHx of CAD HTN, HDL,chronic Systolic/Diastolic HF, reduced EF,SSS, Orthostatic hypotension, MR, AICD--MDT, single ICD, Afib- SVT, COPD, JENNIFER , intolerant of CPAP. presented with one day hx of sob started yesterday - increasing swelling to BLE. Denies CP, palpitations, syncope.     Labs H/H 10.5/34.3 platelets 452 cr 1.8  dig level 0.3 CXR L sided pleural effusion, some pulmonary congestion bilaterally--left sided effusion worsening this am. EKG today Afib , no ST seg changes. Echo pending  hypotensive this am. No urinary output documented     9/20--The pt is  now in ICU on amio drip and pressors. She is aao, afib in  range , VSS. She denies any CP and states she is not feeling as much SOB , however she is using accessory muscles to breathe. Swelling in LE has improved. Pulmonology on unit for thoracentesis of Left sided pleural effusion this am. H/H 10.3/34.5 plts 456 cr 1.7 trop slightly elevated but stable, no change in delta. Blood cultures neg u/o -429 cc    9/21: Pt seen and examined this morning. S/p thoracentesis yesterday with 2L of fluid drained. Started on antibiotics for possible pneumonia. Xarelto restarted yesterday. VSS. Labs reviewed. H&H 9.6/31. Cr 1.6. She states she is feeling much better today. Shortness of breath has improved. She denies chest pain.      Review of patient's allergies indicates:   Allergen Reactions    Codeine Other (See Comments)     nausea    Hydrocodone Nausea And Vomiting    Lasix [furosemide]      rash       Past Medical History:   Diagnosis Date    Allergy     Codeine, Lasix    Atrial fibrillation     Atrial fibrillation     Cataract     CHF (congestive heart failure)     Diabetes mellitus, type 2     Ejection fraction < 50% 10/18/2017    Approximately 35%  Based on prior   Echocardiogram.    Encounter for blood transfusion     Hyperlipidemia     Osteoporosis     PONV (postoperative nausea and vomiting)     Thyroid disorder screening 10/17/2017    TSH of 1.12 ordered by Dr. dee Jones     Past Surgical History:   Procedure Laterality Date    ANTEGRADE NEPHROSTOGRAPHY Left 8/25/2022    Procedure: NEPHROSTOGRAM, ANTEGRADE;  Surgeon: Shelby Parra MD;  Location: [facility];  Service: Urology;  Laterality: Left;    CHOLECYSTECTOMY  1997    [place]     COLONOSCOPY      CYSTOSCOPY W/ URETERAL STENT PLACEMENT Left 7/17/2022    Procedure: CYSTOSCOPY, WITH URETERAL STENT INSERTION;  Surgeon: Shelby Parra MD;  Location: [facility] OR;  Service: Urology;  Laterality: Left;    CYSTOSCOPY W/ URETERAL STENT REMOVAL Left 9/21/2022    Procedure: CYSTOSCOPY, WITH URETERAL STENT REMOVAL;  Surgeon: Shelby Parra MD;  Location: [facility];  Service: Urology;  Laterality: Left;    CYSTOSCOPY WITH URETEROSCOPY, RETROGRADE PYELOGRAPHY, AND INSERTION OF STENT Left 8/25/2022    Procedure: CYSTOSCOPY, WITH RETROGRADE PYELOGRAM AND URETERAL STENT INSERTION;  Surgeon: Shelby Parra MD;  Location: [facility];  Service: Urology;  Laterality: Left;    EYE SURGERY      bilateral cataracts    FINGER SURGERY Right 2021    right pinky finger    FLEXIBLE CYSTOSCOPY Left 8/25/2022    Procedure: CYSTOSCOPY, FLEXIBLE WITH STENT REMOVAL;  Surgeon: Shelby Parra MD;  Location: [facility];  Service: Urology;  Laterality: Left;    FRACTURE SURGERY  2014    right femur with neville    HEMORRHOID SURGERY      48 yrs ago    HYSTERECTOMY      NEPHROSTOMY Left 8/25/2022    Procedure: CREATION, NEPHROSTOMY;  Surgeon: Shelby Parra MD;  Location: [facility];  Service: Urology;  Laterality: Left;    PERCUTANEOUS NEPHROLITHOTOMY N/A 8/25/2022    Procedure: NEPHROLITHOTOMY, PERCUTANEOUS;  Surgeon: Shelby Parra MD;  Location: [facility];  Service: Urology;  Laterality: N/A;    REPLACEMENT OF IMPLANTABLE  CARDIOVERTER-DEFIBRILLATOR (ICD) GENERATOR N/A 12/13/2019    Procedure: REPLACEMENT, PULSE GENERATOR, ICD-MEDTRONIC;  Surgeon: Sebastian Nowak III, MD;  Location: [facility];  Service: Cardiology;  Laterality: N/A;    TONSILLECTOMY       Family History   Problem Relation Age of Onset    Heart disease Mother     Cancer Father         Lung Cancer ??? Asbestos    Breast cancer Paternal Aunt      Social History     Tobacco Use    Smoking status: Former     Passive exposure: Past    Smokeless tobacco: Never    Tobacco comments:     quit 2013   Substance Use Topics    Alcohol use: No    Drug use: No       Review of Systems:  Review of Systems   Constitutional:  Negative for chills, diaphoresis, fever, malaise/fatigue and weight loss.   Respiratory:  Positive for shortness of breath. Negative for cough, hemoptysis and sputum production.    Cardiovascular:  Positive for leg swelling and PND. Negative for chest pain, palpitations, orthopnea and claudication.   Gastrointestinal:  Negative for nausea and vomiting.       OBJECTIVE:     Vital Signs (Most Recent)  Temp: 98.2 °F (36.8 °C) (09/21/23 0400)  Pulse: 76 (09/21/23 0717)  Resp: (!) 23 (09/21/23 0717)  BP: (!) 106/55 (09/21/23 0430)  SpO2: 97 % (09/21/23 0717)    Vital Signs Range (Last 24H):  Temp:  [98.2 °F (36.8 °C)-99.1 °F (37.3 °C)]   Pulse:  []   Resp:  [21-42]   BP: ()/()   SpO2:  [71 %-100 %]     I & O (Last 24H):    Intake/Output Summary (Last 24 hours) at 9/21/2023 0828  Last data filed at 9/20/2023 1738  Gross per 24 hour   Intake 1096.5 ml   Output 700 ml   Net 396.5 ml         Current Diet:     Current Diet Order   Procedures    Diet Low Sodium, 2gm [facility]; Fluid - 1500mL     Order Specific Question:   Indicate patient location for additional diet options:     Answer:   [facility]     Order Specific Question:   Fluid restriction:     Answer:   Fluid - 1500mL        Allergies:  Review of patient's allergies indicates:   Allergen  Reactions    Codeine Other (See Comments)     nausea    Hydrocodone Nausea And Vomiting    Lasix [furosemide]      rash       Meds:  Scheduled Meds:   atorvastatin  20 mg Oral QHS    bumetanide  1 mg Intravenous Daily    cefTRIAXone (ROCEPHIN) IVPB  1 g Intravenous Q24H    fluticasone propionate  2 spray Each Nostril Daily    gabapentin  200 mg Oral TID    metoprolol succinate  25 mg Oral Daily    midodrine  5 mg Oral TID AC    mupirocin   Nasal BID    rivaroxaban  15 mg Oral Daily with dinner     Continuous Infusions:   amiodarone in dextrose 5% 0.5 mg/min (09/21/23 0243)    NORepinephrine bitartrate-D5W Stopped (09/19/23 1715)     PRN Meds:acetaminophen, albuterol sulfate, aluminum-magnesium hydroxide-simethicone, dextrose 50%, dextrose 50%, glucagon (human recombinant), glucose, glucose, magnesium oxide, magnesium oxide, melatonin, naloxone, ondansetron, potassium bicarbonate, potassium bicarbonate, potassium bicarbonate, potassium, sodium phosphates, potassium, sodium phosphates, potassium, sodium phosphates, prochlorperazine, senna-docusate 8.6-50 mg, simethicone, sodium chloride 0.9%, sodium chloride 0.9%, sodium chloride 0.9%    Oxygen/Ventilator Data (Last 24H):  (if applicable)   Oxygen Concentration (%):  [40] 40        Hemodynamic Parameters (Last 24H):   (if applicable)        Laboratory and Radiology Data:  Recent Results (from the past 24 hour(s))   Gram stain    Collection Time: 09/20/23 10:35 AM    Specimen: Lung, Left; Pleural Fluid   Result Value Ref Range    Gram Stain Result Moderate WBC's     Gram Stain Result No organisms seen    Protein, Body Fluid (Reference Lab or Non-[facility])    Collection Time: 09/20/23 10:35 AM   Result Value Ref Range    Body Fluid Source, Total Protein Thoracentesis     Body Fluid, Protein 3.8 Not established g/dL   LDH, Body Fluid (Reference Lab or Non-[facility])    Collection Time: 09/20/23 10:35 AM   Result Value Ref Range    Body Fluid Source, LDH Thoracentesis      LD, Fluid 280 Not established U/L   Glucose, Body Fluid (Reference Lab or Non-[facility])    Collection Time: 09/20/23 10:35 AM   Result Value Ref Range    Body Fluid Source, Glucose Thoracentesis     Glucose, Fluid 76 Not established mg/dL   Albumin, Body Fluid (Reference Lab or Non-[facility])    Collection Time: 09/20/23 10:35 AM   Result Value Ref Range    Body Fluid Source, Albumin Pleural     Body Fluid, Albumin 2.0 Not Established g/dL   Lactate dehydrogenase    Collection Time: 09/20/23 11:43 AM   Result Value Ref Range     110 - 260 U/L   Glucose, random    Collection Time: 09/20/23 11:43 AM   Result Value Ref Range    Glucose 148 (H) 70 - 110 mg/dL   Protein, total    Collection Time: 09/20/23 11:43 AM   Result Value Ref Range    Total Protein 6.3 6.0 - 8.4 g/dL   CBC auto differential    Collection Time: 09/21/23  5:05 AM   Result Value Ref Range    WBC 10.06 3.90 - 12.70 K/uL    RBC 3.73 (L) 4.00 - 5.40 M/uL    Hemoglobin 9.6 (L) 12.0 - 16.0 g/dL    Hematocrit 31.5 (L) 37.0 - 48.5 %    MCV 85 82 - 98 fL    MCH 25.7 (L) 27.0 - 31.0 pg    MCHC 30.5 (L) 32.0 - 36.0 g/dL    RDW 16.0 (H) 11.5 - 14.5 %    Platelets 373 150 - 450 K/uL    MPV 9.2 9.2 - 12.9 fL    Immature Granulocytes 0.5 0.0 - 0.5 %    Gran # (ANC) 7.9 (H) 1.8 - 7.7 K/uL    Immature Grans (Abs) 0.05 (H) 0.00 - 0.04 K/uL    Lymph # 0.8 (L) 1.0 - 4.8 K/uL    Mono # 1.1 (H) 0.3 - 1.0 K/uL    Eos # 0.2 0.0 - 0.5 K/uL    Baso # 0.03 0.00 - 0.20 K/uL    nRBC 0 0 /100 WBC    Gran % 78.7 (H) 38.0 - 73.0 %    Lymph % 8.0 (L) 18.0 - 48.0 %    Mono % 10.6 4.0 - 15.0 %    Eosinophil % 1.9 0.0 - 8.0 %    Basophil % 0.3 0.0 - 1.9 %    Differential Method Automated    Comprehensive metabolic panel    Collection Time: 09/21/23  5:05 AM   Result Value Ref Range    Sodium 133 (L) 136 - 145 mmol/L    Potassium 4.3 3.5 - 5.1 mmol/L    Chloride 96 95 - 110 mmol/L    CO2 32 (H) 23 - 29 mmol/L    Glucose 102 70 - 110 mg/dL    BUN 31 (H) 8 - 23 mg/dL    Creatinine 1.6  (H) 0.5 - 1.4 mg/dL    Calcium 8.6 (L) 8.7 - 10.5 mg/dL    Total Protein 5.6 (L) 6.0 - 8.4 g/dL    Albumin 2.4 (L) 3.5 - 5.2 g/dL    Total Bilirubin 0.2 0.1 - 1.0 mg/dL    Alkaline Phosphatase 71 55 - 135 U/L    AST 8 (L) 10 - 40 U/L    ALT 7 (L) 10 - 44 U/L    eGFR 32.0 (A) >60 mL/min/1.73 m^2    Anion Gap 5 (L) 8 - 16 mmol/L   Magnesium    Collection Time: 09/21/23  5:05 AM   Result Value Ref Range    Magnesium 2.0 1.6 - 2.6 mg/dL   Phosphorus    Collection Time: 09/21/23  5:05 AM   Result Value Ref Range    Phosphorus 3.7 2.7 - 4.5 mg/dL     Imaging Results              X-Ray Chest AP Portable (Final result)  Result time 09/19/23 05:54:57      Final result by Foster Landis MD (09/19/23 05:54:57)                   Narrative:    CLINICAL HISTORY:  83 years (1940) Female sob SOB    TECHNIQUE:  Portable AP radiograph the chest. One view.    COMPARISON:  Radiographs from May 24, 2023    FINDINGS:  There is a focal opacity at the left lung base, characteristic of a combination of a small left pleural effusion and associated atelectasis, noting superimposed infection/pneumonia and malignancy are not excluded. There is blunting of the right costophrenic angle consistent with trace pleural effusion. No pneumothorax is identified. The cardiac silhouette is moderately enlarged. Left-sided AICD pacemaker is unchanged in configuration. Atheromatous calcifications are seen at the aortic arch. Osseous structures appear unchanged. The visualized upper abdomen is unremarkable.    IMPRESSION:  1. Moderate left pleural effusion and adjacent atelectasis/consolidation.  2. Small interstitial opacity at the right lung base.                  .            Electronically signed by:  Foster Landis MD  9/19/2023 5:54 AM CDT Workstation: [id number]                                    12-lead EKG interpretation:  (if applicable)      Current Cardiac Rhythm:   (if applicable)    Physical Exam:   Physical Exam  Constitutional:        Appearance: Normal appearance.   Cardiovascular:      Rate and Rhythm: Normal rate. Rhythm irregular.      Heart sounds: Murmur heard.   Pulmonary:      Effort: Pulmonary effort is normal. No respiratory distress.      Breath sounds: Wheezing present. No rhonchi.   Abdominal:      General: Abdomen is flat. Bowel sounds are normal.      Palpations: Abdomen is soft.   Musculoskeletal:         General: Swelling present.      Comments: +1 edema BLE    Skin:     General: Skin is warm and dry.   Neurological:      General: No focal deficit present.      Mental Status: She is alert and oriented to person, place, and time.         ASSESSMENT/PLAN:   Assessment:   Acute on Chronic combined HF reduced EF  MARCELINO/CKD cr 1.8  MR  HTN  COPD  JENNIFER  SSS  MDT ICD insitu    Echo 5/2023--EF 40 % left ventricular global hypokinesis, LA enlargement, Mod -Severe MR , PA pressure 45 mmhg     Recent device interrogation--9/2023  11% afib burden· Possible OptiVol fluid accumulation--9/10/2023     Echo 9/19--EF 40-50 % LA dilation, mild AS, mod MR, PA pressure 30 mmhg     Plan    S/p Thoracentesis with 2L of drainage. Shortness of breath has resolved.  Continue IV bumex. Monitor renal function.   Patient remains in atrial fibrillation however her rates are controlled in the 80s.   On amiodarone gtt. Once protocol is compete, can discontinue amiodarone infusion and start amiodarone  mg daily.   Xarelto restarted yesterday.   Patient can stepdown from ICU.

## 2023-09-21 NOTE — PROGRESS NOTES
[facility] Medicine  Progress Note    Patient Name: Jo Alegre  MRN: [id number]  Patient Class: IP- Inpatient   Admission Date: 9/18/2023  Length of Stay: 2 days  Attending Physician: Mia Allen MD  Primary Care Provider: Kraig Alberto MD        Subjective:     Principal Problem:Congestive heart failure        HPI:  Ms. Alegre is an 83 year old woman with PMHx of HTN, HDL, HFrEF s/p AICD, Afib- presented with one day hx of sob started yesterday - pt says she was fine day before yesterday but around morning time she started having sob not associated with any chest pain, palpitations, or fever, chills, cough or abdominal pain. She did not have any dietary discretion, has been adherent to her home meds. She also noted leg swelling bilateral yesterday. She was at cardio office for her device interrogation which as per patient is fine.     In ER she was found to have left pleural effusion and elevated BNP. Pt is being admitted for diuresis.        Overview/Hospital Course:  Patient monitor closely during hospitalization.  She was noted to have acute hypoxemic respiratory failure and AFib RVR.  Cardiology consulted.  Patient noted to have left-sided large pleural effusion on chest x-ray therefore Interventional Radiology was consulted for thoracentesis.  Oral anticoagulants held.      Interval History:  Patient use BiPAP therapy with FiO2 40% overnight.  Currently on 4 liter/minute supplemental oxygen via nasal cannula.  Normal sinus rhythm controlled with oral amiodarone.  Patient reports breathing has improved after patient underwent left thoracentesis with removal of 2000 cc yellow turbid pleural fluid.    Patient receiving midodrine.      Review of Systems   Constitutional:  Positive for activity change and fatigue.   Respiratory:  Positive for shortness of breath.    Cardiovascular:  Positive for leg swelling.   Neurological:  Positive for weakness (generalized).     Objective:      Vital Signs (Most Recent):  Temp: 98.2 °F (36.8 °C) (09/21/23 0400)  Pulse: 76 (09/21/23 0717)  Resp: (!) 23 (09/21/23 0717)  BP: (!) 128/58 (09/21/23 0836)  SpO2: 97 % (09/21/23 0717) Vital Signs (24h Range):  Temp:  [98.2 °F (36.8 °C)-99.1 °F (37.3 °C)] 98.2 °F (36.8 °C)  Pulse:  [] 76  Resp:  [21-42] 23  SpO2:  [71 %-100 %] 97 %  BP: ()/() 128/58     Weight: 96.3 kg (212 lb 4.9 oz)  Body mass index is 33.25 kg/m².    Intake/Output Summary (Last 24 hours) at 9/21/2023 0900  Last data filed at 9/20/2023 1738  Gross per 24 hour   Intake 976.5 ml   Output 700 ml   Net 276.5 ml           Physical Exam  Constitutional:       General: She is not in acute distress.     Appearance: She is ill-appearing.   Cardiovascular:      Rate and Rhythm: Rhythm irregular.      Heart sounds: Murmur heard.      Comments: Permanent pacemaker noted, +1-2 pitting edema bilaterally  Pulmonary:      Effort: Respiratory distress present.      Breath sounds: Wheezing and rales present.   Neurological:      Mental Status: She is alert.             Significant Labs: All pertinent labs within the past 24 hours have been reviewed.  BMP:   Recent Labs   Lab 09/21/23  0505      *   K 4.3   CL 96   CO2 32*   BUN 31*   CREATININE 1.6*   CALCIUM 8.6*   MG 2.0       CBC:   Recent Labs   Lab 09/20/23  0344 09/21/23  0505   WBC 15.52* 10.06   HGB 10.3* 9.6*   HCT 34.5* 31.5*   * 373       Microbiology Results (last 7 days)       Procedure Component Value Units Date/Time    Gram stain [[id number]] Collected: 09/20/23 1035    Order Status: Completed Specimen: Pleural Fluid from Lung, Left Updated: 09/20/23 1456     Gram Stain Result Moderate WBC's      No organisms seen    Blood culture [[id number]] Collected: 09/19/23 1338    Order Status: Completed Specimen: Blood Updated: 09/20/23 1432     Blood Culture, Routine No Growth to date      No Growth to date    Culture, Body Fluid (Aerobic) w/ GS [[id number]]  Collected: 09/20/23 1035    Order Status: No result Specimen: Pleural Fluid from Lung, Left Updated: 09/20/23 1107    AFB culture (includes stain) [[id number]] Collected: 09/20/23 1035    Order Status: Sent Specimen: Pleural Fluid from Lung, Left Updated: 09/20/23 1052    Culture, Respiratory with Gram Stain [[id number]]     Order Status: No result Specimen: Respiratory           Significant Imaging:   ECHO:    Left Ventricle: Moderately increased wall thickness. There is concentric remodeling. There is mildly reduced systolic function with a visually estimated ejection fraction of 40 - 50%.    Left Atrium: Left atrium is dilated.    Aortic Valve: There is mild aortic valve sclerosis.    Mitral Valve: There is moderate regurgitation.    Tricuspid Valve: There is mild regurgitation.    Pulmonary Artery: The estimated pulmonary artery systolic pressure is 30 mmHg.    IVC/SVC: Normal venous pressure at 3 mmHg.    Pericardium: There is a small effusion. No indication of cardiac tamponade.    CXR:  1. Moderate left pleural effusion and adjacent atelectasis/consolidation.  2. Small interstitial opacity at the right lung base.    CXR:  1. Interval worsening, moderate to large left pleural effusion and adjacent atelectasis/consolidation.  2. Unchanged mild scattered central hilar interstitial opacity.    US Thoracentesis:  2000 cc yellow turbid pleural fluid removed    CXR: Negative for pneumothorax, post left-sided thoracentesis.      Assessment/Plan:      * Congestive heart failure  Elevated BNP  C/w Bumex- allergic to lasix  I/Os, daily weight   Consult cardio  C/w GDMT    ACP (advance care planning)  Advance Care Planning     Date: 09/20/2023    Living Will  During this visit, I engaged the patient  in the voluntary advance care planning process.  The patient and I reviewed the role for advance directives and their purpose in directing future healthcare if the patient's unable to speak for herself.  At this  point in time, the patient does have full decision-making capacity.  We discussed different extreme health states that she could experience, and reviewed what kind of medical care she would want in those situations.  The patient communicated that if she were comatose and had little chance of a meaningful recovery, she would not want machines/life-sustaining treatments used but she is okay to receive CPR if needed.  Patient's 2 daughters were present during interview.  I informed patient I will be placing DNI code status in patient's medical chart.  I spent a total of 16 minutes engaging the patient in this advance care planning discussion.                Acute respiratory failure with hypoxia  Patient with Hypoxic Respiratory failure which is Acute.  she is not on home oxygen. Supplemental oxygen was provided and noted- Oxygen Concentration (%):  [36-40] 36    .   Signs/symptoms of respiratory failure include- tachypnea, increased work of breathing, respiratory distress and use of accessory muscles. Contributing diagnoses includes - CHF Labs and images were reviewed. Patient Has recent ABG, which has been reviewed. Will treat underlying causes and adjust management of respiratory failure as follows- bronchodilators ordered.  Patient with worsening of hypoxia presently on 5 L nasal cannula high-flow oxygen, Bumex 1 mg IV x1 dose now.    Recurrent left pleural effusion  Status post left thoracentesis when 2000 cc yellow turbid fluid removed on September 20, 2023 by Pulmonary Medicine.  Postprocedure chest x-ray without pneumothorax.      Obstructive sleep apnea syndrome  C/w CPAP      COPD (chronic obstructive pulmonary disease) per patient  C/w breathing rx      Acute on chronic combined systolic and diastolic congestive heart failure  Patient is identified as having Combined Systolic and Diastolic heart failure that is Acute on chronic. CHF is currently uncontrolled due to Continued edema of extremities, >3 pillow  orthopnea, Rales/crackles on pulmonary exam and Pulmonary edema/pleural effusion on CXR. Latest ECHO performed and demonstrates- Results for orders placed during the hospital encounter of 05/15/23    Echo    Interpretation Summary  · The left ventricle is mildly enlarged with mild concentric hypertrophy and moderately decreased systolic function.  · The estimated ejection fraction is 40%.  · Grade I left ventricular diastolic dysfunction.  · There is left ventricular global hypokinesis.  · Normal right ventricular size with normal right ventricular systolic function.  · Severe left atrial enlargement.  · Moderate-to-severe mitral regurgitation.  · Mild tricuspid regurgitation.  · Elevated central venous pressure (15 mmHg).  · The estimated PA systolic pressure is 45 mmHg.  · There is pulmonary hypertension.  · Trivial anterior pericardial effusion.  . Continue bumex and digoxin and monitor clinical status closely. Monitor on telemetry. Patient is on CHF pathway.  Monitor strict Is&Os and daily weights.  Place on fluid restriction of 1.5 L. Cardiology has been consulted. Continue to stress to patient importance of self efficacy and  on diet for CHF. Last BNP reviewed- and noted below   Recent Labs   Lab 09/18/23  2201   *   .    SOB (shortness of breath)  As above      Paroxysmal atrial fibrillation  Currently in Afib-   C/w Amio and BB  On Xarelto.  Patient on digoxin Monday Wednesday Friday. Follow cardiology recommendations.     Stage 3 chronic kidney disease  At baseline      Cardiomyopathy with implantable cardioverter-defibrillator  Not sure if ischemic or non-ischemic  Device interrogated yesterday  C/w Bumex 1 mg IV daily   C/w GDMT  I/Os, daily weight      Mixed dyslipidemia  C/w statin      Essential hypertension  C/w home meds and including her Midodrine   Patient with chronic hypotension.  Will hold ACE-inhibitor and ARB at this time.      Continue PT and OT.  Transfer the patient to  medicine telemetry floor once cleared by Cardiology.  VTE Risk Mitigation (From admission, onward)         Ordered     rivaroxaban tablet 15 mg  With dinner         09/20/23 1212     IP VTE HIGH RISK PATIENT  Once         09/19/23 1301     Place sequential compression device  Until discontinued         09/19/23 1301     Reason for No Pharmacological VTE Prophylaxis  Once        Question:  Reasons:  Answer:  Already adequately anticoagulated on oral Anticoagulants    09/19/23 1301     Place sequential compression device  Until discontinued         09/19/23 1043                Discharge Planning   JOANA: 9/23/2023     Code Status: Partial Code   Is the patient medically ready for discharge?:     Reason for patient still in hospital (select all that apply): Patient trending condition and Consult recommendations  Discharge Plan A: Home with family        Mia Allen MD  Department of Hospital Medicine   [facility]

## 2023-09-21 NOTE — SUBJECTIVE & OBJECTIVE
Interval History:  Patient use BiPAP therapy with FiO2 40% overnight.  Currently on 4 liter/minute supplemental oxygen via nasal cannula.  Normal sinus rhythm controlled with oral amiodarone.  Patient reports breathing has improved after patient underwent left thoracentesis with removal of 2000 cc yellow turbid pleural fluid.    Patient receiving midodrine.      Review of Systems   Constitutional:  Positive for activity change and fatigue.   Respiratory:  Positive for shortness of breath.    Cardiovascular:  Positive for leg swelling.   Neurological:  Positive for weakness (generalized).     Objective:     Vital Signs (Most Recent):  Temp: 98.2 °F (36.8 °C) (09/21/23 0400)  Pulse: 76 (09/21/23 0717)  Resp: (!) 23 (09/21/23 0717)  BP: (!) 128/58 (09/21/23 0836)  SpO2: 97 % (09/21/23 0717) Vital Signs (24h Range):  Temp:  [98.2 °F (36.8 °C)-99.1 °F (37.3 °C)] 98.2 °F (36.8 °C)  Pulse:  [] 76  Resp:  [21-42] 23  SpO2:  [71 %-100 %] 97 %  BP: ()/() 128/58     Weight: 96.3 kg (212 lb 4.9 oz)  Body mass index is 33.25 kg/m².    Intake/Output Summary (Last 24 hours) at 9/21/2023 0900  Last data filed at 9/20/2023 1738  Gross per 24 hour   Intake 976.5 ml   Output 700 ml   Net 276.5 ml           Physical Exam  Constitutional:       General: She is not in acute distress.     Appearance: She is ill-appearing.   Cardiovascular:      Rate and Rhythm: Rhythm irregular.      Heart sounds: Murmur heard.      Comments: Permanent pacemaker noted, +1-2 pitting edema bilaterally  Pulmonary:      Effort: Respiratory distress present.      Breath sounds: Wheezing and rales present.   Neurological:      Mental Status: She is alert.             Significant Labs: All pertinent labs within the past 24 hours have been reviewed.  BMP:   Recent Labs   Lab 09/21/23  0505      *   K 4.3   CL 96   CO2 32*   BUN 31*   CREATININE 1.6*   CALCIUM 8.6*   MG 2.0       CBC:   Recent Labs   Lab 09/20/23  0344 09/21/23  0500    WBC 15.52* 10.06   HGB 10.3* 9.6*   HCT 34.5* 31.5*   * 373       Microbiology Results (last 7 days)       Procedure Component Value Units Date/Time    Gram stain [[id number]] Collected: 09/20/23 1035    Order Status: Completed Specimen: Pleural Fluid from Lung, Left Updated: 09/20/23 1456     Gram Stain Result Moderate WBC's      No organisms seen    Blood culture [[id number]] Collected: 09/19/23 1338    Order Status: Completed Specimen: Blood Updated: 09/20/23 1432     Blood Culture, Routine No Growth to date      No Growth to date    Culture, Body Fluid (Aerobic) w/ GS [[id number]] Collected: 09/20/23 1035    Order Status: No result Specimen: Pleural Fluid from Lung, Left Updated: 09/20/23 1107    AFB culture (includes stain) [[id number]] Collected: 09/20/23 1035    Order Status: Sent Specimen: Pleural Fluid from Lung, Left Updated: 09/20/23 1052    Culture, Respiratory with Gram Stain [[id number]]     Order Status: No result Specimen: Respiratory           Significant Imaging:   ECHO:    Left Ventricle: Moderately increased wall thickness. There is concentric remodeling. There is mildly reduced systolic function with a visually estimated ejection fraction of 40 - 50%.    Left Atrium: Left atrium is dilated.    Aortic Valve: There is mild aortic valve sclerosis.    Mitral Valve: There is moderate regurgitation.    Tricuspid Valve: There is mild regurgitation.    Pulmonary Artery: The estimated pulmonary artery systolic pressure is 30 mmHg.    IVC/SVC: Normal venous pressure at 3 mmHg.    Pericardium: There is a small effusion. No indication of cardiac tamponade.    CXR:  1. Moderate left pleural effusion and adjacent atelectasis/consolidation.  2. Small interstitial opacity at the right lung base.    CXR:  1. Interval worsening, moderate to large left pleural effusion and adjacent atelectasis/consolidation.  2. Unchanged mild scattered central hilar interstitial opacity.    US Thoracentesis:  2000 cc  yellow turbid pleural fluid removed    CXR: Negative for pneumothorax, post left-sided thoracentesis.

## 2023-09-21 NOTE — CONSULTS
[facility]  Palliative Medicine  Consult Note    Patient Name: Jo Alegre  MRN: [id number]  Admission Date: 9/18/2023  Hospital Length of Stay: 1 days  Code Status: Partial Code   Attending Provider: Mia Allen MD  Consulting Provider: Darian Jones MD  Primary Care Physician: Kraig Alberto MD  Principal Problem:Congestive heart failure    Patient information was obtained from patient, relative(s), past medical records and primary team.      Inpatient consult to Palliative Care  Consult performed by: Darian Jones MD  Consult ordered by: Bud López MD        Assessment/Plan:     Palliative Care  Goals of care, counseling/discussion  Reviewed her chart discussed her case with her team.      I examined Ms. Alegre at bedside.  She maintains capacity for complex medical decision-making.  I also spoke with her 2 daughters and son at bedside.      I introduced myself and my role as palliative care physician.  They were agreeable to speaking.      First took a moment to get to know her.  She was not .  She has 4 grown children, 8 grandchildren, and many great-grandchildren.  She lives at home in [place], [place] with her grandson.  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  She currently enjoys spending time with her family.  At home she was able to care for herself and took care most of her ADLs. Neli is not a part of her life.     We discussed her medical illness.  She and her family have an excellent understanding of her current situation.  They were able to speak to her heart failure, lung failure, and pleural effusion.    We discussed her prognosis.  They understand on admission she was certainly in a life-threatening situation and had a high potential for death during this hospitalization.  Fortunately she was done rather well and I hope over the coming days to week she will be nearing discharge.  And I reiterated that she was an older lady with multiple  serious comorbidities that make her at risk further complications hospitalizations.  I let them know that her prognosis is primarily driven by her functional and nutritional status.  The better functional and nutritional status she can maintain in the better her prognosis becomes.  They understood.    We discussed her values.  She was hopeful to return home.  She was okay with further hospitalization and procedures in order to get better return home.  It is most important that she returns home.  She was not sure if she would be willing to go through a temporary stay at a physical rehabilitation facility if required after this hospitalization.  Her only worry is leaving her family behind should she die.  She otherwise does not mention any worries or fears and does not have any fear dying.  She does want to do everything she can to prolong her life.  I have minimum if she can return home, maintain her mentation, and enjoy her family she can find quality in life.    We spoke back and forth.  She and her family are asking good questions.  They seemed to have very good grasp with the realities of her current situation.      Goals are clearly aligned with current plan of care.  I suggested we continue down this path being hopeful for the best and prepared for the worst.    In preparing for the worst, I did take a moment to discuss healthcare power of , code status, and hospice.  - she would desire for the consensus of her grown children to act as her surrogate decisionmaker.  - with the exception of ventilatory support, she would desire all other attempts at resuscitation.  I did take a moment to educated on the realities of a code situation.  I did make a recommendation of a DNR code status as I worry if she requires attempts at resuscitation she would not regain a reasonable quality of life.  - I did take a moment to discuss hospice in the philosophy of hospice care.  They are open to philosophy of hospice.  Her  goals are not aligned with hospice today.    I appreciate being involved.  I hope I have been helpful.        Thank you for your consult. I will follow-up with patient. Please contact us if you have any additional questions.    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  She initially presented with worsening dyspnea.  She is currently admitted and being treated for acute respiratory failure with hypoxia thought secondary to underlying heart failure.  She has been evaluated by Cardiology whom is recommending diuresis, rate control, and other supportive measures.  Course has been complicated by pleural effusion for which pulmonology has evaluated and perform thoracentesis.  At this point, given her age and comorbidities, she carries a guarded prognosis.  I have been asked to assist with goals of care.      Hospital Course:  No notes on file    Interval History: See HPI    Past Medical History:   Diagnosis Date    Allergy     Codeine, Lasix    Atrial fibrillation     Atrial fibrillation     Cataract     CHF (congestive heart failure)     Diabetes mellitus, type 2     Ejection fraction < 50% 10/18/2017    Approximately 35%  Based on prior  Echocardiogram.    Encounter for blood transfusion     Hyperlipidemia     Osteoporosis     PONV (postoperative nausea and vomiting)     Thyroid disorder screening 10/17/2017    TSH of 1.12 ordered by Dr. dee Jones       Past Surgical History:   Procedure Laterality Date    ANTEGRADE NEPHROSTOGRAPHY Left 8/25/2022    Procedure: NEPHROSTOGRAM, ANTEGRADE;  Surgeon: Shelby Parra MD;  [facility];  Service: Urology;  Laterality: Left;    [place]  [place]     COLONOSCOPY      CYSTOSCOPY W/ URETERAL STENT PLACEMENT Left 7/17/2022    Procedure: CYSTOSCOPY, WITH  URETERAL STENT INSERTION;  Surgeon: Shelby Parra MD;  Location: [facility] OR;  Service: Urology;  Laterality: Left;    CYSTOSCOPY W/ URETERAL STENT REMOVAL Left 9/21/2022    Procedure: CYSTOSCOPY, WITH URETERAL STENT REMOVAL;  Surgeon: Shelby Parra MD;  Location: [facility] OR;  Service: Urology;  Laterality: Left;    CYSTOSCOPY WITH URETEROSCOPY, RETROGRADE PYELOGRAPHY, AND INSERTION OF STENT Left 8/25/2022    Procedure: CYSTOSCOPY, WITH RETROGRADE PYELOGRAM AND URETERAL STENT INSERTION;  Surgeon: Shelby Parra MD;  Location: [facility] OR;  Service: Urology;  Laterality: Left;    EYE SURGERY      bilateral cataracts    FINGER SURGERY Right 2021    right pinky finger    FLEXIBLE CYSTOSCOPY Left 8/25/2022    Procedure: CYSTOSCOPY, FLEXIBLE WITH STENT REMOVAL;  Surgeon: Shelby Parra MD;  Location: [facility] OR;  Service: Urology;  Laterality: Left;    FRACTURE SURGERY  2014    right femur with neville    HEMORRHOID SURGERY      48 yrs ago    HYSTERECTOMY      NEPHROSTOMY Left 8/25/2022    Procedure: CREATION, NEPHROSTOMY;  Surgeon: Shelby Parra MD;  Location: [facility] OR;  Service: Urology;  Laterality: Left;    PERCUTANEOUS NEPHROLITHOTOMY N/A 8/25/2022    Procedure: NEPHROLITHOTOMY, PERCUTANEOUS;  Surgeon: Shelby Parra MD;  Location: [facility] OR;  Service: Urology;  Laterality: N/A;    REPLACEMENT OF IMPLANTABLE CARDIOVERTER-DEFIBRILLATOR (ICD) GENERATOR N/A 12/13/2019    Procedure: REPLACEMENT, PULSE GENERATOR, ICD-MEDTRONIC;  Surgeon: Sebastian Nowak III, MD;  Location: [facility] CATH;  Service: Cardiology;  Laterality: N/A;    TONSILLECTOMY         Review of patient's allergies indicates:   Allergen Reactions    Codeine Other (See Comments)     nausea    Hydrocodone Nausea And Vomiting    Lasix [furosemide]      rash       Medications:  Continuous Infusions:   amiodarone in dextrose 5% 0.5 mg/min (09/20/23 1603)    NORepinephrine bitartrate-D5W Stopped (09/19/23 1715)     Scheduled Meds:    atorvastatin  20 mg Oral QHS    bumetanide  1 mg Intravenous Daily    cefTRIAXone (ROCEPHIN) IVPB  1 g Intravenous Q24H    fluticasone propionate  2 spray Each Nostril Daily    gabapentin  200 mg Oral TID    metoprolol succinate  25 mg Oral Daily    midodrine  5 mg Oral TID AC    mupirocin   Nasal BID    rivaroxaban  15 mg Oral Daily with dinner     PRN Meds:acetaminophen, albuterol sulfate, aluminum-magnesium hydroxide-simethicone, dextrose 50%, dextrose 50%, glucagon (human recombinant), glucose, glucose, magnesium oxide, magnesium oxide, melatonin, naloxone, ondansetron, potassium bicarbonate, potassium bicarbonate, potassium bicarbonate, potassium, sodium phosphates, potassium, sodium phosphates, potassium, sodium phosphates, prochlorperazine, senna-docusate 8.6-50 mg, simethicone, sodium chloride 0.9%, sodium chloride 0.9%, sodium chloride 0.9%    Family History       Problem Relation (Age of Onset)    Breast cancer Paternal Aunt    Cancer Father    Heart disease Mother          Tobacco Use    Smoking status: Former     Passive exposure: Past    Smokeless tobacco: Never    Tobacco comments:     quit 2013   Substance and Sexual Activity    Alcohol use: No    Drug use: No    Sexual activity: Not Currently       Review of Systems  Objective:     Vital Signs (Most Recent):  Temp: 98.9 °F (37.2 °C) (09/20/23 1900)  Pulse: 86 (09/20/23 2002)  Resp: (!) 25 (09/20/23 2002)  BP: (!) 101/59 (09/20/23 2002)  SpO2: 97 % (09/20/23 2002) Vital Signs (24h Range):  Temp:  [98.3 °F (36.8 °C)-99.1 °F (37.3 °C)] 98.9 °F (37.2 °C)  Pulse:  [] 86  Resp:  [20-42] 25  SpO2:  [71 %-100 %] 97 %  BP: ()/() 101/59     Weight: 96.3 kg (212 lb 4.9 oz)  Body mass index is 33.25 kg/m².       Physical Exam  Vitals reviewed.   Constitutional:       General: She is not in acute distress.     Appearance: She is ill-appearing. She is not toxic-appearing.   HENT:      Head: Normocephalic and atraumatic.      Right  Ear: External ear normal.      Left Ear: External ear normal.      Nose: Nose normal.   Eyes:      General:         Right eye: No discharge.         Left eye: No discharge.      Extraocular Movements: Extraocular movements intact.   Cardiovascular:      Rate and Rhythm: Regular rhythm. Tachycardia present.   Pulmonary:      Effort: Pulmonary effort is normal.   Abdominal:      General: There is no distension.   Neurological:      General: No focal deficit present.      Mental Status: She is alert and oriented to person, place, and time.   Psychiatric:         Mood and Affect: Mood normal.         Behavior: Behavior normal.         Thought Content: Thought content normal.         Judgment: Judgment normal.            Review of Symptoms      Symptom Assessment (ESAS 0-10 Scale)  Pain:  0  Dyspnea:  0  Anxiety:  0  Nausea:  0  Depression:  0  Anorexia:  0  Fatigue:  6  Insomnia:  0  Restlessness:  0  Agitation:  0         Performance Status:  50    Living Arrangements:  Lives with family and Lives in home    Psychosocial/Cultural:   See Palliative Psychosocial Note: No  **Primary  to Follow**  Palliative Care  Consult: No        Advance Care Planning  Advance Directives:     Decision Making:  Patient answered questions and Family answered questions  Goals of Care: The patient and family endorses that what is most important right now is to focus on spending time at home, remaining as independent as possible, symptom/pain control, and improvement in condition but with limits to invasive therapies    Accordingly, we have decided that the best plan to meet the patient's goals includes continuing with treatment           Significant Labs: BMP:   Recent Labs   Lab 09/20/23  0344 09/20/23  1143    148*   *  --    K 4.4  --    CL 97  --    CO2 29  --    BUN 29*  --    CREATININE 1.7*  --    CALCIUM 8.7  --    MG 1.9  --      CBC:   Recent Labs   Lab 09/18/23  2201 09/20/23  0344   WBC  10.05 15.52*   HGB 10.5* 10.3*   HCT 34.3* 34.5*   * 456*     CBC:   Recent Labs   Lab 09/20/23  0344   WBC 15.52*   HGB 10.3*   HCT 34.5*   MCV 86   *     BMP:  Recent Labs   Lab 09/20/23  0344 09/20/23  1143    148*   *  --    K 4.4  --    CL 97  --    CO2 29  --    BUN 29*  --    CREATININE 1.7*  --    CALCIUM 8.7  --    MG 1.9  --      LFT:  Lab Results   Component Value Date    AST 12 09/20/2023    ALKPHOS 66 09/20/2023    BILITOT 0.5 09/20/2023     Albumin:   Albumin   Date Value Ref Range Status   09/20/2023 2.1 (L) 3.5 - 5.2 g/dL Final     Protein:   Total Protein   Date Value Ref Range Status   09/20/2023 6.3 6.0 - 8.4 g/dL Final     Lactic acid:   Lab Results   Component Value Date    LACTATE 1.7 07/17/2022       Significant Imaging: I have reviewed all pertinent imaging results/findings within the past 24 hours.        I spent a total of 100 minutes on the day of the visit. This includes face to face time in discussion of goals of care, symptom assessment, coordination of care and emotional support.  This also includes non-face to face time preparing to see the patient (eg, review of tests/imaging), obtaining and/or reviewing separately obtained history, documenting clinical information in the electronic or other health record, independently interpreting results and communicating results to the patient/family/caregiver, or care coordinator.    Darian Jones MD  Palliative Medicine  [facility]

## 2023-09-21 NOTE — PROGRESS NOTES
[facility]  Progress Note  Pulmonary/Critical Care      PATIENT NAME: Jo Alegre  MRN: [id number]  TODAY'S DATE: 2023  2:23 PM  ADMIT DATE: 2023  AGE: 83 y.o. : 1940    HPI/INTERVAL HISTORY (See H&P for complete P,F,SHx) :   Ms Alegre is an 83 year old woman former smoker with HFrEF 30%, AICD, hx of AFib who presented from home with low blood pressure and shortness of breath. CXR was suggestive of predominantly left sided effusion. Patient was also evaluated by cardiology.     Today she reports she is feeling significantly better after thoracentesis. She is eating well. She is not requiring oxygen anymore. She is not requiring vasopressors and is going to be transition to oral amio.     Review of Systems   Constitutional:  Negative for appetite change, chills, fever and unexpected weight change.   HENT:  Negative for nosebleeds, postnasal drip, trouble swallowing and voice change.    Eyes:  Negative for visual disturbance.   Respiratory:  Negative for cough, shortness of breath and wheezing.    Cardiovascular:  Positive for leg swelling. Negative for chest pain.   Gastrointestinal:  Negative for diarrhea, nausea and vomiting.   Endocrine: Negative for cold intolerance and heat intolerance.   Genitourinary:  Negative for dysuria, flank pain and frequency.   Musculoskeletal:  Negative for neck pain and neck stiffness.   Allergic/Immunologic: Negative for environmental allergies and immunocompromised state.   Neurological:  Negative for syncope, speech difficulty and weakness.   Hematological:  Negative for adenopathy.   Psychiatric/Behavioral:  Negative for confusion.            VITAL SIGNS (MOST RECENT)  Temp: 98.2 °F (36.8 °C) (23 0400)  Pulse: 76 (23 0717)  Resp: (!) 23 (23 0717)  BP: (!) 128/58 (23 0836)  SpO2: 97 % (23 07)    INTAKE AND OUTPUT (LAST 24 HOURS):  Intake/Output Summary (Last 24 hours) at 2023 1147  Last data filed at  "9/20/2023 1738  Gross per 24 hour   Intake 976.5 ml   Output 700 ml   Net 276.5 ml         WEIGHT  Wt Readings from Last 1 Encounters:   09/19/23 96.3 kg (212 lb 4.9 oz)       Physical Exam  Vitals reviewed.   Constitutional:       General: She is not in acute distress.     Appearance: She is well-developed. She is obese. She is not ill-appearing or diaphoretic.   HENT:      Head: Normocephalic and atraumatic.      Mouth/Throat:      Pharynx: No oropharyngeal exudate or posterior oropharyngeal erythema.   Eyes:      General: No scleral icterus.     Pupils: Pupils are equal, round, and reactive to light.   Neck:      Vascular: No JVD.   Cardiovascular:      Rate and Rhythm: Tachycardia present. Rhythm irregular.      Heart sounds: Normal heart sounds. No murmur heard.  Pulmonary:      Effort: No respiratory distress.      Breath sounds: Rales present. No wheezing.   Abdominal:      General: Bowel sounds are normal. There is no distension.      Palpations: Abdomen is soft.      Tenderness: There is no abdominal tenderness.   Musculoskeletal:         General: No swelling.      Cervical back: Normal range of motion and neck supple. No rigidity.   Skin:     General: Skin is warm and dry.      Capillary Refill: Capillary refill takes less than 2 seconds.      Coloration: Skin is not pale.      Findings: No rash.   Neurological:      General: No focal deficit present.      Mental Status: She is alert and oriented to person, place, and time.      Cranial Nerves: No cranial nerve deficit.      Motor: No weakness or abnormal muscle tone.           VENTILATOR SETTINGS  Oxygen Concentration (%):  [40] 40           LAST ARTERIAL BLOOD GAS  ABG  Recent Labs   Lab 09/19/23  0912   PH 7.519*   PO2 84   PCO2 35.9   HCO3 29.2*   BE 6         ACUTE PHASE REACTANT (LAST 24 HOURS)  No results for input(s): "FERRITIN", "CRP", "LDH", "DDIMER" in the last 24 hours.      CBC LAST (LAST 24 HOURS)  Recent Labs   Lab 09/21/23  0505   WBC 10.06 " "  RBC 3.73*   HGB 9.6*   HCT 31.5*   MCV 85   MCH 25.7*   MCHC 30.5*   RDW 16.0*      MPV 9.2   GRAN 78.7*  7.9*   LYMPH 8.0*  0.8*   MONO 10.6  1.1*   BASO 0.03   NRBC 0         CHEMISTRY LAST (LAST 24 HOURS)  Recent Labs   Lab 09/21/23  0505   *   K 4.3   CL 96   CO2 32*   ANIONGAP 5*   BUN 31*   CREATININE 1.6*      CALCIUM 8.6*   MG 2.0   ALBUMIN 2.4*   PROT 5.6*   ALKPHOS 71   ALT 7*   AST 8*   BILITOT 0.2         COAGULATION LAST (LAST 24 HOURS)  No results for input(s): "LABPT", "INR", "APTT" in the last 24 hours.    CARDIAC PROFILE (LAST 24 HOURS)  Recent Labs   Lab 09/18/23  2201 09/19/23  0530 09/19/23  1124 09/20/23  1143   *  --   --   --    LDH  --   --   --  141   TROPONINIHS  --    < > 18.6*  --     < > = values in this interval not displayed.         LAST 7 DAYS MICROBIOLOGY   Microbiology Results (last 7 days)       Procedure Component Value Units Date/Time    Culture, Body Fluid (Aerobic) w/ GS [[id number]] Collected: 09/20/23 1035    Order Status: Completed Specimen: Pleural Fluid from Lung, Left Updated: 09/21/23 1010     AEROBIC CULTURE - FLUID No growth    Gram stain [[id number]] Collected: 09/20/23 1035    Order Status: Completed Specimen: Pleural Fluid from Lung, Left Updated: 09/20/23 1456     Gram Stain Result Moderate WBC's      No organisms seen    Blood culture [[id number]] Collected: 09/19/23 1338    Order Status: Completed Specimen: Blood Updated: 09/20/23 1432     Blood Culture, Routine No Growth to date      No Growth to date    AFB culture (includes stain) [[id number]] Collected: 09/20/23 1035    Order Status: Sent Specimen: Pleural Fluid from Lung, Left Updated: 09/20/23 1052    Culture, Respiratory with Gram Stain [[id number]]     Order Status: No result Specimen: Respiratory             MOST RECENT IMAGING  X-Ray Chest 1 View  HISTORY: s/p thoracentesis removed 2 L    FINDINGS: Portable chest radiograph at 1039 hours compared to 09/19/2023 shows " interval decreased size of left-sided pleural effusion, post thoracentesis. There is no pneumothorax.    IMPRESSION: Negative for pneumothorax, post left-sided thoracentesis.    Electronically signed by:  Pancho Rausch MD  9/20/2023 11:05 AM CDT Workstation: [id number]  Echo Saline Bubble? No    Left Ventricle: Moderately increased wall thickness. There is   concentric remodeling. There is mildly reduced systolic function with a   visually estimated ejection fraction of 40 - 50%.    Left Atrium: Left atrium is dilated.    Aortic Valve: There is mild aortic valve sclerosis.    Mitral Valve: There is moderate regurgitation.    Tricuspid Valve: There is mild regurgitation.    Pulmonary Artery: The estimated pulmonary artery systolic pressure is   30 mmHg.    IVC/SVC: Normal venous pressure at 3 mmHg.    Pericardium: There is a small effusion. No indication of cardiac   tamponade.      CURRENT VISIT EKG  Results for orders placed or performed during the hospital encounter of 09/18/23   EKG 12-lead    Narrative    Test Reason : I49.9,    Vent. Rate : 121 BPM     Atrial Rate : 326 BPM     P-R Int : 000 ms          QRS Dur : 088 ms      QT Int : 286 ms       P-R-T Axes : 000 -29 150 degrees     QTc Int : 406 ms    Atrial fibrillation with rapid ventricular response  Low voltage QRS  Nonspecific T wave abnormality  Abnormal ECG  When compared with ECG of 18-SEP-2023 21:33,  No significant change was found  Confirmed by Kamran LESLIE, Gary GONZALEZ ([id number]) on 9/19/2023 7:27:06 PM    Referred By: [email or web address]   SELF           Confirmed By:Gary Andrew MD       ECHOCARDIOGRAM RESULTS  Results for orders placed during the hospital encounter of 09/18/23    Echo Saline Bubble? No    Interpretation Summary    Left Ventricle: Moderately increased wall thickness. There is concentric remodeling. There is mildly reduced systolic function with a visually estimated ejection fraction of 40 - 50%.    Left Atrium: Left atrium is dilated.    Aortic  Valve: There is mild aortic valve sclerosis.    Mitral Valve: There is moderate regurgitation.    Tricuspid Valve: There is mild regurgitation.    Pulmonary Artery: The estimated pulmonary artery systolic pressure is 30 mmHg.    IVC/SVC: Normal venous pressure at 3 mmHg.    Pericardium: There is a small effusion. No indication of cardiac tamponade.        ASSESSMENT:   Acute on chronic Hypoxemic respiratory failure   Community acquired Pneumonia vs aspiration pneumonia  Para pneumonic pleural effusion on the left, exudative   - Underwent thoracentesis on 9/21/23 with turbed yellow and cloudy appearing fluid, -2 Liters fluid drained.   - On diuretics, albumin gradient is 0.1, which is consistent with exudative effusion.   - Based on other lights criteria, total protein ratio is 3.8/6.3= 0.60, and LDH ratio is 280/141, glucose 76.   - Based on all above criteria, this is consistent with exudative effusion  Hx of biventricular heart failure, PASP 45 , ICD  Atrial fibrillation - still with irregular rhythm, more rate controlled while on amio    Ms Alegre is an 83 year old woman with biventricular heart failure who presents with acute shortness of breath, and a predominantly one sided pleural effusion. Bedside ultrasound suggests minimal to no fluid on the right. Left with mostly free flowing appearing fluid, no loculations were seen. She has been having fevers which have since subsided. Thoracentesis performed at bedside, drained about 2 L of turbid, yellow and cloudy fluid tinged with red. Patient had immediate relief of dyspnea.    Based on albumin gradient and lights criteria, overall picture is that this is consistent with a parapneumonic effusion from pneumonia.     PLAN:   - Regarding etiology of pneumonia, not sure if this is a CAP or potentially aspiration related. She has a history of poor dental hygiene, takes gabapentin throughout the day and to help sleep at night. She reports coughing following eating and  at times she reports occasional choking on food. I think it would be beneficial for her to get a swallow evaluation while here. As outpatient she should follow up with her dentist for her cavities.   - Recommend at least 2 weeks of antibiotic therapy for parapneumonic effusion related to pneumonia. No growth on cultures, so can continue to treat empirically. I think at this point she can be deescalated to oral augmentin, and we can continue to reassess her response to antibiotics while she is in the hospital. She can continue the oral antibiotics as an outpatient until she is followed up. She is improving but I don't think ready for discharge home yet.   - Once able to get on oral amiodarone she is stable for ICU step down.   - Palliative discussions for long term goals of care. But I think the patient will improve and get out of the hospital eventually.   - Continue treatment for atrial fibrillation. Will transition to 200mg amiodarone daily, discussed with cardiology.   - Repeat CXR tomorrow morning    I will continue to follow. Please call if any questions or concerns.     Bud López MD  Date of Service: 09/21/2023  2:23 PM   [phone]

## 2023-09-21 NOTE — CARE UPDATE
09/21/23 0717   Patient Assessment/Suction   Level of Consciousness (AVPU) alert   Respiratory Effort Normal;Unlabored   Expansion/Accessory Muscles/Retractions no use of accessory muscles   All Lung Fields Breath Sounds clear   PRE-TX-O2   Device (Oxygen Therapy) nasal cannula   $ Is the patient on Low Flow Oxygen? Yes   Flow (L/min) 4   SpO2 97 %   Pulse Oximetry Type Continuous   $ Pulse Oximetry - Multiple Charge Pulse Oximetry - Multiple   Pulse 76   Resp (!) 23   Aerosol Therapy   $ Aerosol Therapy Charges PRN treatment not required   Preset CPAP/BiPAP Settings   Mode Of Delivery CPAP;Standby   Respiratory Evaluation   $ Care Plan Tech Time 15 min

## 2023-09-21 NOTE — SUBJECTIVE & OBJECTIVE
Interval History: See HPI    Past Medical History:   Diagnosis Date    Allergy     Codeine, Lasix    Atrial fibrillation     Atrial fibrillation     Cataract     CHF (congestive heart failure)     Diabetes mellitus, type 2     Ejection fraction < 50% 10/18/2017    Approximately 35%  Based on prior  Echocardiogram.    Encounter for blood transfusion     Hyperlipidemia     Osteoporosis     PONV (postoperative nausea and vomiting)     Thyroid disorder screening 10/17/2017    TSH of 1.12 ordered by Dr. dee Jones       Past Surgical History:   Procedure Laterality Date    ANTEGRADE NEPHROSTOGRAPHY Left 8/25/2022    Procedure: NEPHROSTOGRAM, ANTEGRADE;  Surgeon: Shelby Parra MD;  Location: [facility];  Service: Urology;  Laterality: Left;    CHOLECYSTECTOMY  1997    [place]     COLONOSCOPY      CYSTOSCOPY W/ URETERAL STENT PLACEMENT Left 7/17/2022    Procedure: CYSTOSCOPY, WITH URETERAL STENT INSERTION;  Surgeon: Shelby Parra MD;  Location: [facility] OR;  Service: Urology;  Laterality: Left;    CYSTOSCOPY W/ URETERAL STENT REMOVAL Left 9/21/2022    Procedure: CYSTOSCOPY, WITH URETERAL STENT REMOVAL;  Surgeon: Shelby Parra MD;  Location: [facility] OR;  Service: Urology;  Laterality: Left;    CYSTOSCOPY WITH URETEROSCOPY, RETROGRADE PYELOGRAPHY, AND INSERTION OF STENT Left 8/25/2022    Procedure: CYSTOSCOPY, WITH RETROGRADE PYELOGRAM AND URETERAL STENT INSERTION;  Surgeon: Shelby Parra MD;  Location: [facility];  Service: Urology;  Laterality: Left;    EYE SURGERY      bilateral cataracts    FINGER SURGERY Right 2021    right pinky finger    FLEXIBLE CYSTOSCOPY Left 8/25/2022    Procedure: CYSTOSCOPY, FLEXIBLE WITH STENT REMOVAL;  Surgeon: Shelby Parra MD;  Location: [facility];  Service: Urology;  Laterality: Left;    FRACTURE SURGERY  2014    right femur with neville    HEMORRHOID SURGERY      48 yrs ago    HYSTERECTOMY      NEPHROSTOMY Left 8/25/2022    Procedure: CREATION, NEPHROSTOMY;  Surgeon: Shelby  ROBINSON Parra MD;  Location: [facility] OR;  Service: Urology;  Laterality: Left;    PERCUTANEOUS NEPHROLITHOTOMY N/A 8/25/2022    Procedure: NEPHROLITHOTOMY, PERCUTANEOUS;  Surgeon: Shelby Parar MD;  Location: [facility] OR;  Service: Urology;  Laterality: N/A;    REPLACEMENT OF IMPLANTABLE CARDIOVERTER-DEFIBRILLATOR (ICD) GENERATOR N/A 12/13/2019    Procedure: REPLACEMENT, PULSE GENERATOR, ICD-MEDTRONIC;  Surgeon: Sebastian Nowak III, MD;  Location: [facility];  Service: Cardiology;  Laterality: N/A;    TONSILLECTOMY         Review of patient's allergies indicates:   Allergen Reactions    Codeine Other (See Comments)     nausea    Hydrocodone Nausea And Vomiting    Lasix [furosemide]      rash       Medications:  Continuous Infusions:   amiodarone in dextrose 5% 0.5 mg/min (09/20/23 1603)    NORepinephrine bitartrate-D5W Stopped (09/19/23 1715)     Scheduled Meds:   atorvastatin  20 mg Oral QHS    bumetanide  1 mg Intravenous Daily    cefTRIAXone (ROCEPHIN) IVPB  1 g Intravenous Q24H    fluticasone propionate  2 spray Each Nostril Daily    gabapentin  200 mg Oral TID    metoprolol succinate  25 mg Oral Daily    midodrine  5 mg Oral TID AC    mupirocin   Nasal BID    rivaroxaban  15 mg Oral Daily with dinner     PRN Meds:acetaminophen, albuterol sulfate, aluminum-magnesium hydroxide-simethicone, dextrose 50%, dextrose 50%, glucagon (human recombinant), glucose, glucose, magnesium oxide, magnesium oxide, melatonin, naloxone, ondansetron, potassium bicarbonate, potassium bicarbonate, potassium bicarbonate, potassium, sodium phosphates, potassium, sodium phosphates, potassium, sodium phosphates, prochlorperazine, senna-docusate 8.6-50 mg, simethicone, sodium chloride 0.9%, sodium chloride 0.9%, sodium chloride 0.9%    Family History       Problem Relation (Age of Onset)    Breast cancer Paternal Aunt    Cancer Father    Heart disease Mother          Tobacco Use    Smoking status: Former     Passive exposure: Past    Smokeless  tobacco: Never    Tobacco comments:     quit 2013   Substance and Sexual Activity    Alcohol use: No    Drug use: No    Sexual activity: Not Currently       Review of Systems  Objective:     Vital Signs (Most Recent):  Temp: 98.9 °F (37.2 °C) (09/20/23 1900)  Pulse: 86 (09/20/23 2002)  Resp: (!) 25 (09/20/23 2002)  BP: (!) 101/59 (09/20/23 2002)  SpO2: 97 % (09/20/23 2002) Vital Signs (24h Range):  Temp:  [98.3 °F (36.8 °C)-99.1 °F (37.3 °C)] 98.9 °F (37.2 °C)  Pulse:  [] 86  Resp:  [20-42] 25  SpO2:  [71 %-100 %] 97 %  BP: ()/() 101/59     Weight: 96.3 kg (212 lb 4.9 oz)  Body mass index is 33.25 kg/m².       Physical Exam  Vitals reviewed.   Constitutional:       General: She is not in acute distress.     Appearance: She is ill-appearing. She is not toxic-appearing.   HENT:      Head: Normocephalic and atraumatic.      Right Ear: External ear normal.      Left Ear: External ear normal.      Nose: Nose normal.   Eyes:      General:         Right eye: No discharge.         Left eye: No discharge.      Extraocular Movements: Extraocular movements intact.   Cardiovascular:      Rate and Rhythm: Regular rhythm. Tachycardia present.   Pulmonary:      Effort: Pulmonary effort is normal.   Abdominal:      General: There is no distension.   Neurological:      General: No focal deficit present.      Mental Status: She is alert and oriented to person, place, and time.   Psychiatric:         Mood and Affect: Mood normal.         Behavior: Behavior normal.         Thought Content: Thought content normal.         Judgment: Judgment normal.            Review of Symptoms      Symptom Assessment (ESAS 0-10 Scale)  Pain:  0  Dyspnea:  0  Anxiety:  0  Nausea:  0  Depression:  0  Anorexia:  0  Fatigue:  6  Insomnia:  0  Restlessness:  0  Agitation:  0         Performance Status:  50    Living Arrangements:  Lives with family and Lives in home    Psychosocial/Cultural:   See Palliative Psychosocial Note:  No  **Primary  to Follow**  Palliative Care  Consult: No        Advance Care Planning   Advance Directives:     Decision Making:  Patient answered questions and Family answered questions  Goals of Care: The patient and family endorses that what is most important right now is to focus on spending time at home, remaining as independent as possible, symptom/pain control, and improvement in condition but with limits to invasive therapies    Accordingly, we have decided that the best plan to meet the patient's goals includes continuing with treatment           Significant Labs: BMP:   Recent Labs   Lab 09/20/23  0344 09/20/23  1143    148*   *  --    K 4.4  --    CL 97  --    CO2 29  --    BUN 29*  --    CREATININE 1.7*  --    CALCIUM 8.7  --    MG 1.9  --      CBC:   Recent Labs   Lab 09/18/23  2201 09/20/23  0344   WBC 10.05 15.52*   HGB 10.5* 10.3*   HCT 34.3* 34.5*   * 456*     CBC:   Recent Labs   Lab 09/20/23  0344   WBC 15.52*   HGB 10.3*   HCT 34.5*   MCV 86   *     BMP:  Recent Labs   Lab 09/20/23  0344 09/20/23  1143    148*   *  --    K 4.4  --    CL 97  --    CO2 29  --    BUN 29*  --    CREATININE 1.7*  --    CALCIUM 8.7  --    MG 1.9  --      LFT:  Lab Results   Component Value Date    AST 12 09/20/2023    ALKPHOS 66 09/20/2023    BILITOT 0.5 09/20/2023     Albumin:   Albumin   Date Value Ref Range Status   09/20/2023 2.1 (L) 3.5 - 5.2 g/dL Final     Protein:   Total Protein   Date Value Ref Range Status   09/20/2023 6.3 6.0 - 8.4 g/dL Final     Lactic acid:   Lab Results   Component Value Date    LACTATE 1.7 07/17/2022       Significant Imaging: I have reviewed all pertinent imaging results/findings within the past 24 hours.

## 2023-09-21 NOTE — ASSESSMENT & PLAN NOTE
Reviewed her chart discussed her case with her team.      I examined Ms. Alegre at bedside.  She maintains capacity for complex medical decision-making.  I also spoke with her 2 daughters and son at bedside.      I introduced myself and my role as palliative care physician.  They were agreeable to speaking.      First took a moment to get to know her.  She was not .  She has 4 grown children, 8 grandchildren, and many great-grandchildren.  She lives at home in [place], [place] with her grandson.  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  She currently enjoys spending time with her family.  At home she was able to care for herself and took care most of her ADLs. Neli is not a part of her life.     We discussed her medical illness.  She and her family have an excellent understanding of her current situation.  They were able to speak to her heart failure, lung failure, and pleural effusion.    We discussed her prognosis.  They understand on admission she was certainly in a life-threatening situation and had a high potential for death during this hospitalization.  Fortunately she was done rather well and I hope over the coming days to week she will be nearing discharge.  And I reiterated that she was an older lady with multiple serious comorbidities that make her at risk further complications hospitalizations.  I let them know that her prognosis is primarily driven by her functional and nutritional status.  The better functional and nutritional status she can maintain in the better her prognosis becomes.  They understood.    We discussed her values.  She was hopeful to return home.  She was okay with further hospitalization and procedures in order to get better return home.  It is most important that she returns home.  She was not sure if she would be willing to go through a temporary stay at a physical rehabilitation facility if required after this hospitalization.  Her only worry is leaving her  family behind should she die.  She otherwise does not mention any worries or fears and does not have any fear dying.  She does want to do everything she can to prolong her life.  I have minimum if she can return home, maintain her mentation, and enjoy her family she can find quality in life.    We spoke back and forth.  She and her family are asking good questions.  They seemed to have very good grasp with the realities of her current situation.      Goals are clearly aligned with current plan of care.  I suggested we continue down this path being hopeful for the best and prepared for the worst.    In preparing for the worst, I did take a moment to discuss healthcare power of , code status, and hospice.  - she would desire for the consensus of her grown children to act as her surrogate decisionmaker.  - with the exception of ventilatory support, she would desire all other attempts at resuscitation.  I did take a moment to educated on the realities of a code situation.  I did make a recommendation of a DNR code status as I worry if she requires attempts at resuscitation she would not regain a reasonable quality of life.  - I did take a moment to discuss hospice in the philosophy of hospice care.  They are open to philosophy of hospice.  Her goals are not aligned with hospice today.    I appreciate being involved.  I hope I have been helpful.

## 2023-09-21 NOTE — PLAN OF CARE
Problem: Occupational Therapy  Goal: Occupational Therapy Goal  Description: Goals to be met by: 10/20/23     Patient will increase functional independence with ADLs by performing:    UE Dressing with Modified [place].  LE Dressing with Modified [place].  Grooming while standing at sink with Modified [place].  Toileting from toilet with Modified [place] for hygiene and clothing management.   Toilet transfer to toilet with Modified [place].    Outcome: Ongoing, Progressing

## 2023-09-21 NOTE — PT/OT/SLP EVAL
"Physical Therapy Evaluation    Patient Name:  Jo Alegre   MRN:  [id number]    Recommendations:     Discharge Recommendations: nursing facility, skilled   Discharge Equipment Recommendations: none   Barriers to discharge: Increased caregiver burden of care    Assessment:     Jo Alegre is a 83 y.o. female admitted with a medical diagnosis of Congestive heart failure.  She presents with the following impairments/functional limitations: weakness, impaired endurance, impaired self care skills, impaired balance, edema, impaired cardiopulmonary response to activity .    Pt presented sitting in chair with 3 L's  02 in place with sa02 of 94%. Pt was reluctant to t/f to stand because she had recently  finished OT eval.  Pt described  chronic R hip pain . "They put  a neville in my femur," several years ago. Pt did have limited R hip flexion.  She t/f'ed to stand with Min  A x2 and extra time  to assume complete upright posture. Pt ambulated  in the room 10 ft RW and Min A x2.    Rehab Prognosis: Fair; patient would benefit from acute skilled PT services to address these deficits and reach maximum level of function.    Recent Surgery: * No surgery found *      Plan:     During this hospitalization, patient to be seen 6 x/week to address the identified rehab impairments via gait training, therapeutic activities, therapeutic exercises and progress toward the following goals:    Plan of Care Expires:  10/21/23    Subjective     Chief Complaint: weakness, fatigue  Patient/Family Comments/goals: SNF before return home with  grandson  Pain/Comfort:  Pain Rating 1: 0/10    Patients cultural, spiritual, [employer]al conflicts given the current situation:      Living Environment:  Pt lives at home with her grandson in a [facility] w/o entry steps.  Prior to admission, patients level of function was Mod I with RW for short distance ambulation. She ws Mod I   with bathing and dressing and performed light household chores , minimal " cooking , with frequently eating sandwiches.  Equipment used at home: rollator, raised toilet, shower chair.  DME owned (not currently used): none.  Upon discharge, patient will have assistance from facility.    Objective:     Communicated with nurse prior to session.  Patient found up in chair with telemetry, pulse ox (continuous), peripheral IV, oxygen, PureWick  upon PT entry to room.    General Precautions: Standard, fall, respiratory  Orthopedic Precautions:    Braces:    Respiratory Status: Nasal cannula, flow 3 L/min    Exams:  Cognitive Exam:  Patient is oriented to Person, Place, Time, and Situation  RLE ROM: WFL except decreased hip flexion  RLE Strength: WFL  LLE ROM: WFL  LLE Strength: WFL    Functional Mobility:  Transfers:     Sit to Stand:  moderate assistance and of 2 persons with rolling walker  Gait: 10 ft RW and Min A x2   Balance: Min A for standing with RW      AM-PAC 6 CLICK MOBILITY  Total Score:        Treatment & Education:  Pt was educated on safety,use of call light, PT POC/DC recommendation    Patient left up in chair with all lines intact, call button in reach, and family  present.    GOALS:   Multidisciplinary Problems       Physical Therapy Goals          Problem: Physical Therapy    Goal Priority Disciplines Outcome Goal Variances Interventions   Physical Therapy Goal     PT, PT/OT      Description: Goals to be met by: 10/21/2023     Patient will increase functional independence with mobility by performin. Supine to sit with Contact Guard Assistance  2. Sit to stand transfer with Minimal Assistance  3. Gait  x 50  feet with Contact Guard Assistance using Rolling Walker.                          History:     Past Medical History:   Diagnosis Date    Allergy     Codeine, Lasix    Atrial fibrillation     Atrial fibrillation     Cataract     CHF (congestive heart failure)     Diabetes mellitus, type 2     Ejection fraction < 50% 10/18/2017    Approximately 35%  Based on prior   Echocardiogram.    Encounter for blood transfusion     Hyperlipidemia     Osteoporosis     PONV (postoperative nausea and vomiting)     Thyroid disorder screening 10/17/2017    TSH of 1.12 ordered by Dr. dee Jones       Past Surgical History:   Procedure Laterality Date    ANTEGRADE NEPHROSTOGRAPHY Left 8/25/2022    Procedure: NEPHROSTOGRAM, ANTEGRADE;  Surgeon: Shelby Parra MD;  Location: [facility];  Service: Urology;  Laterality: Left;    CHOLECYSTECTOMY  1997    [place]     COLONOSCOPY      CYSTOSCOPY W/ URETERAL STENT PLACEMENT Left 7/17/2022    Procedure: CYSTOSCOPY, WITH URETERAL STENT INSERTION;  Surgeon: Shelby Parra MD;  Location: [facility] OR;  Service: Urology;  Laterality: Left;    CYSTOSCOPY W/ URETERAL STENT REMOVAL Left 9/21/2022    Procedure: CYSTOSCOPY, WITH URETERAL STENT REMOVAL;  Surgeon: Shelby Parra MD;  Location: [facility];  Service: Urology;  Laterality: Left;    CYSTOSCOPY WITH URETEROSCOPY, RETROGRADE PYELOGRAPHY, AND INSERTION OF STENT Left 8/25/2022    Procedure: CYSTOSCOPY, WITH RETROGRADE PYELOGRAM AND URETERAL STENT INSERTION;  Surgeon: Shelby Parra MD;  Location: [facility];  Service: Urology;  Laterality: Left;    EYE SURGERY      bilateral cataracts    FINGER SURGERY Right 2021    right pinky finger    FLEXIBLE CYSTOSCOPY Left 8/25/2022    Procedure: CYSTOSCOPY, FLEXIBLE WITH STENT REMOVAL;  Surgeon: Shelby Parra MD;  Location: [facility];  Service: Urology;  Laterality: Left;    FRACTURE SURGERY  2014    right femur with neville    HEMORRHOID SURGERY      48 yrs ago    HYSTERECTOMY      NEPHROSTOMY Left 8/25/2022    Procedure: CREATION, NEPHROSTOMY;  Surgeon: Shelby Parra MD;  Location: [facility];  Service: Urology;  Laterality: Left;    PERCUTANEOUS NEPHROLITHOTOMY N/A 8/25/2022    Procedure: NEPHROLITHOTOMY, PERCUTANEOUS;  Surgeon: Shelby Parra MD;  Location: [facility];  Service: Urology;  Laterality: N/A;    REPLACEMENT OF IMPLANTABLE  CARDIOVERTER-DEFIBRILLATOR (ICD) GENERATOR N/A 12/13/2019    Procedure: REPLACEMENT, PULSE GENERATOR, ICD-MEDTRONIC;  Surgeon: Sebastian Nowak III, MD;  Location: [facility];  Service: Cardiology;  Laterality: N/A;    TONSILLECTOMY         Time Tracking:     PT Received On: 09/21/23  PT Start Time: 1112     PT Stop Time: 1131  PT Total Time (min): 19 min     Billable Minutes: Evaluation 11 minutes  and Gait Training 8 minutes      09/21/2023

## 2023-09-21 NOTE — ASSESSMENT & PLAN NOTE
Currently in Afib-   C/w Amio and BB  On Xarelto.  Patient on digoxin Monday Wednesday Friday. Follow cardiology recommendations.

## 2023-09-21 NOTE — ASSESSMENT & PLAN NOTE
Reviewed her chart discussed her case with her team.      I examined Ms. Alegre at bedside.  She maintains capacity for complex medical decision-making.  I also spoke with her 2 daughters and son at bedside.      They were able to accurately recall our previous conversation.  Her goals remain clear and unchanged.  They do not have any questions for me today.    We will remain hopeful for the best.    Again, she and her family were asking excellent questions.  I feel very confident that they will make wise, patient centered decisions as her condition changes in the future.    I appreciate being involved.  I hope I have been helpful.    I will check in periodically while she remains admitted.  Not daily.  If a more immediate visit can be helpful, please do not hesitate to call or message.

## 2023-09-21 NOTE — PLAN OF CARE
Plan of care and education reviewed with patient and family. Verbalization of understanding expressed.   Problem: Adult Inpatient Plan of Care  Goal: Plan of Care Review  Outcome: Ongoing, Progressing  Goal: Patient-Specific Goal (Individualized)  Outcome: Ongoing, Progressing  Goal: Absence of Hospital-Acquired Illness or Injury  Outcome: Ongoing, Progressing  Goal: Optimal Comfort and Wellbeing  Outcome: Ongoing, Progressing  Goal: Readiness for Transition of Care  Outcome: Ongoing, Progressing     Problem: Fall Injury Risk  Goal: Absence of Fall and Fall-Related Injury  Outcome: Ongoing, Progressing     Problem: Infection  Goal: Absence of Infection Signs and Symptoms  Outcome: Ongoing, Progressing     Problem: Coping Ineffective  Goal: Effective Coping  Outcome: Ongoing, Progressing     Problem: Adjustment to Illness (Heart Failure)  Goal: Optimal Coping  Outcome: Ongoing, Progressing     Problem: Cardiac Output Decreased (Heart Failure)  Goal: Optimal Cardiac Output  Outcome: Ongoing, Progressing     Problem: Dysrhythmia (Heart Failure)  Goal: Stable Heart Rate and Rhythm  Outcome: Ongoing, Progressing     Problem: Fluid Imbalance (Heart Failure)  Goal: Fluid Balance  Outcome: Ongoing, Progressing     Problem: Functional Ability Impaired (Heart Failure)  Goal: Optimal Functional Ability  Outcome: Ongoing, Progressing     Problem: Oral Intake Inadequate (Heart Failure)  Goal: Optimal Nutrition Intake  Outcome: Ongoing, Progressing     Problem: Respiratory Compromise (Heart Failure)  Goal: Effective Oxygenation and Ventilation  Outcome: Ongoing, Progressing     Problem: Sleep Disordered Breathing (Heart Failure)  Goal: Effective Breathing Pattern During Sleep  Outcome: Ongoing, Progressing     Problem: Fluid and Electrolyte Imbalance (Acute Kidney Injury/Impairment)  Goal: Fluid and Electrolyte Balance  Outcome: Ongoing, Progressing     Problem: Oral Intake Inadequate (Acute Kidney Injury/Impairment)  Goal:  Optimal Nutrition Intake  Outcome: Ongoing, Progressing     Problem: Renal Function Impairment (Acute Kidney Injury/Impairment)  Goal: Effective Renal Function  Outcome: Ongoing, Progressing

## 2023-09-21 NOTE — CARE UPDATE
09/20/23 2002   Patient Assessment/Suction   Level of Consciousness (AVPU) alert   Respiratory Effort Unlabored   Expansion/Accessory Muscles/Retractions no use of accessory muscles   All Lung Fields Breath Sounds coarse   Rhythm/Pattern, Respiratory unlabored   Cough Frequency infrequent   Cough Type congested   PRE-TX-O2   Device (Oxygen Therapy) CPAP   Oxygen Concentration (%) 40   SpO2 97 %   Pulse Oximetry Type Continuous   $ Pulse Oximetry - Multiple Charge Pulse Oximetry - Multiple   Pulse 86   Resp (!) 25   BP (!) 101/59   Aerosol Therapy   $ Aerosol Therapy Charges PRN treatment not required   Ready to Wean/Extubation Screen   FIO2<=50 (chart decimal) 0.4   Preset CPAP/BiPAP Settings   Mode Of Delivery CPAP   $ Is patient using? Yes   Size of Mask Small/Medium   Sized Appropriately? Yes   Equipment Type V60   Airway Device Type small full face mask   CPAP (cm H2O) 5   Patient CPAP/BiPAP Settings   CPAP/BIPAP ID 6   RR Total (Breaths/Min) 24   Tidal Volume (mL) 325   VE Minute Ventilation (L/min) 9.4 L/min   Peak Inspiratory Pressure (cm H2O) 7   TiTOT (%) 31   Total Leak (L/Min) 0   Patient Trigger - ST Mode Only (%) 100   CPAP/BiPAP Alarms   High Pressure (cm H2O) 40   Low Pressure (cm H2O) 5   Minute Ventilation (L/Min) 3   High RR (breaths/min) 45   Low RR (breaths/min) 8   Apnea (Sec) 20   Respiratory Evaluation   $ Care Plan Tech Time 15 min

## 2023-09-21 NOTE — ASSESSMENT & PLAN NOTE
Elevated BNP  C/w Bumex- allergic to lasix.  Receiving IV Bumex 1 mg daily.  I/Os, daily weight   Consult cardio  C/w GDMT

## 2023-09-22 LAB
ALBUMIN SERPL BCP-MCNC: 2.6 G/DL (ref 3.5–5.2)
ALP SERPL-CCNC: 71 U/L (ref 55–135)
ALT SERPL W/O P-5'-P-CCNC: 8 U/L (ref 10–44)
ANION GAP SERPL CALC-SCNC: 10 MMOL/L (ref 8–16)
AST SERPL-CCNC: 9 U/L (ref 10–40)
BASOPHILS # BLD AUTO: 0.04 K/UL (ref 0–0.2)
BASOPHILS NFR BLD: 0.4 % (ref 0–1.9)
BILIRUB SERPL-MCNC: 0.3 MG/DL (ref 0.1–1)
BUN SERPL-MCNC: 29 MG/DL (ref 8–23)
CALCIUM SERPL-MCNC: 9 MG/DL (ref 8.7–10.5)
CHLORIDE SERPL-SCNC: 97 MMOL/L (ref 95–110)
CO2 SERPL-SCNC: 28 MMOL/L (ref 23–29)
CREAT SERPL-MCNC: 1.5 MG/DL (ref 0.5–1.4)
DIFFERENTIAL METHOD: ABNORMAL
EOSINOPHIL # BLD AUTO: 0.2 K/UL (ref 0–0.5)
EOSINOPHIL NFR BLD: 1.8 % (ref 0–8)
ERYTHROCYTE [DISTWIDTH] IN BLOOD BY AUTOMATED COUNT: 16 % (ref 11.5–14.5)
EST. GFR  (NO RACE VARIABLE): 34.6 ML/MIN/1.73 M^2
GLUCOSE SERPL-MCNC: 101 MG/DL (ref 70–110)
HCT VFR BLD AUTO: 31.7 % (ref 37–48.5)
HGB BLD-MCNC: 9.6 G/DL (ref 12–16)
IMM GRANULOCYTES # BLD AUTO: 0.05 K/UL (ref 0–0.04)
IMM GRANULOCYTES NFR BLD AUTO: 0.5 % (ref 0–0.5)
LYMPHOCYTES # BLD AUTO: 1.1 K/UL (ref 1–4.8)
LYMPHOCYTES NFR BLD: 11.8 % (ref 18–48)
MAGNESIUM SERPL-MCNC: 2.1 MG/DL (ref 1.6–2.6)
MCH RBC QN AUTO: 26.1 PG (ref 27–31)
MCHC RBC AUTO-ENTMCNC: 30.3 G/DL (ref 32–36)
MCV RBC AUTO: 86 FL (ref 82–98)
MONOCYTES # BLD AUTO: 1.2 K/UL (ref 0.3–1)
MONOCYTES NFR BLD: 12.8 % (ref 4–15)
NEUTROPHILS # BLD AUTO: 7 K/UL (ref 1.8–7.7)
NEUTROPHILS NFR BLD: 72.7 % (ref 38–73)
NRBC BLD-RTO: 0 /100 WBC
PHOSPHATE SERPL-MCNC: 3.4 MG/DL (ref 2.7–4.5)
PLATELET # BLD AUTO: 400 K/UL (ref 150–450)
PMV BLD AUTO: 9.2 FL (ref 9.2–12.9)
POTASSIUM SERPL-SCNC: 5.2 MMOL/L (ref 3.5–5.1)
PROT SERPL-MCNC: 5.9 G/DL (ref 6–8.4)
RBC # BLD AUTO: 3.68 M/UL (ref 4–5.4)
SODIUM SERPL-SCNC: 135 MMOL/L (ref 136–145)
WBC # BLD AUTO: 9.6 K/UL (ref 3.9–12.7)

## 2023-09-22 PROCEDURE — 94761 N-INVAS EAR/PLS OXIMETRY MLT: CPT

## 2023-09-22 PROCEDURE — 94760 N-INVAS EAR/PLS OXIMETRY 1: CPT

## 2023-09-22 PROCEDURE — 25000003 PHARM REV CODE 250: Performed by: INTERNAL MEDICINE

## 2023-09-22 PROCEDURE — 20000000 HC ICU ROOM

## 2023-09-22 PROCEDURE — 92610 EVALUATE SWALLOWING FUNCTION: CPT

## 2023-09-22 PROCEDURE — 99900031 HC PATIENT EDUCATION (STAT)

## 2023-09-22 PROCEDURE — 80053 COMPREHEN METABOLIC PANEL: CPT | Performed by: STUDENT IN AN ORGANIZED HEALTH CARE EDUCATION/TRAINING PROGRAM

## 2023-09-22 PROCEDURE — 84100 ASSAY OF PHOSPHORUS: CPT | Performed by: INTERNAL MEDICINE

## 2023-09-22 PROCEDURE — 27000221 HC OXYGEN, UP TO 24 HOURS

## 2023-09-22 PROCEDURE — 83735 ASSAY OF MAGNESIUM: CPT | Performed by: INTERNAL MEDICINE

## 2023-09-22 PROCEDURE — 25000242 PHARM REV CODE 250 ALT 637 W/ HCPCS: Performed by: STUDENT IN AN ORGANIZED HEALTH CARE EDUCATION/TRAINING PROGRAM

## 2023-09-22 PROCEDURE — 99233 SBSQ HOSP IP/OBS HIGH 50: CPT | Mod: ,,, | Performed by: STUDENT IN AN ORGANIZED HEALTH CARE EDUCATION/TRAINING PROGRAM

## 2023-09-22 PROCEDURE — 63600175 PHARM REV CODE 636 W HCPCS: Performed by: INTERNAL MEDICINE

## 2023-09-22 PROCEDURE — 94799 UNLISTED PULMONARY SVC/PX: CPT

## 2023-09-22 PROCEDURE — 85025 COMPLETE CBC W/AUTO DIFF WBC: CPT | Performed by: STUDENT IN AN ORGANIZED HEALTH CARE EDUCATION/TRAINING PROGRAM

## 2023-09-22 PROCEDURE — 25000003 PHARM REV CODE 250: Performed by: STUDENT IN AN ORGANIZED HEALTH CARE EDUCATION/TRAINING PROGRAM

## 2023-09-22 PROCEDURE — 94660 CPAP INITIATION&MGMT: CPT

## 2023-09-22 PROCEDURE — 97530 THERAPEUTIC ACTIVITIES: CPT

## 2023-09-22 PROCEDURE — 94640 AIRWAY INHALATION TREATMENT: CPT

## 2023-09-22 PROCEDURE — 12000002 HC ACUTE/MED SURGE SEMI-PRIVATE ROOM

## 2023-09-22 PROCEDURE — 36415 COLL VENOUS BLD VENIPUNCTURE: CPT | Performed by: STUDENT IN AN ORGANIZED HEALTH CARE EDUCATION/TRAINING PROGRAM

## 2023-09-22 PROCEDURE — 99900035 HC TECH TIME PER 15 MIN (STAT)

## 2023-09-22 PROCEDURE — 99233 PR SUBSEQUENT HOSPITAL CARE,LEVL III: ICD-10-PCS | Mod: ,,, | Performed by: STUDENT IN AN ORGANIZED HEALTH CARE EDUCATION/TRAINING PROGRAM

## 2023-09-22 RX ORDER — BUMETANIDE 0.25 MG/ML
1 INJECTION INTRAMUSCULAR; INTRAVENOUS ONCE
Status: COMPLETED | OUTPATIENT
Start: 2023-09-22 | End: 2023-09-22

## 2023-09-22 RX ADMIN — SENNOSIDES AND DOCUSATE SODIUM 1 TABLET: 50; 8.6 TABLET ORAL at 08:09

## 2023-09-22 RX ADMIN — BUMETANIDE 1 MG: 0.25 INJECTION INTRAMUSCULAR; INTRAVENOUS at 06:09

## 2023-09-22 RX ADMIN — CEFTRIAXONE SODIUM 1 G: 1 INJECTION, POWDER, FOR SOLUTION INTRAMUSCULAR; INTRAVENOUS at 12:09

## 2023-09-22 RX ADMIN — GABAPENTIN 200 MG: 100 CAPSULE ORAL at 08:09

## 2023-09-22 RX ADMIN — MUPIROCIN 1 G: 20 OINTMENT TOPICAL at 08:09

## 2023-09-22 RX ADMIN — BUMETANIDE 1 MG: 0.25 INJECTION INTRAMUSCULAR; INTRAVENOUS at 08:09

## 2023-09-22 RX ADMIN — MIDODRINE HYDROCHLORIDE 5 MG: 2.5 TABLET ORAL at 10:09

## 2023-09-22 RX ADMIN — SODIUM POLYSTYRENE SULFONATE 10 G: 15 SUSPENSION ORAL; RECTAL at 06:09

## 2023-09-22 RX ADMIN — METOPROLOL SUCCINATE 25 MG: 25 TABLET, FILM COATED, EXTENDED RELEASE ORAL at 08:09

## 2023-09-22 RX ADMIN — ACETAMINOPHEN 650 MG: 325 TABLET ORAL at 03:09

## 2023-09-22 RX ADMIN — ALBUTEROL SULFATE 2.5 MG: 2.5 SOLUTION RESPIRATORY (INHALATION) at 08:09

## 2023-09-22 RX ADMIN — GABAPENTIN 200 MG: 100 CAPSULE ORAL at 03:09

## 2023-09-22 RX ADMIN — RIVAROXABAN 15 MG: 15 TABLET, FILM COATED ORAL at 06:09

## 2023-09-22 RX ADMIN — AMIODARONE HYDROCHLORIDE 200 MG: 200 TABLET ORAL at 08:09

## 2023-09-22 RX ADMIN — ACETAMINOPHEN 650 MG: 325 TABLET ORAL at 08:09

## 2023-09-22 RX ADMIN — MIDODRINE HYDROCHLORIDE 5 MG: 2.5 TABLET ORAL at 03:09

## 2023-09-22 RX ADMIN — MIDODRINE HYDROCHLORIDE 5 MG: 2.5 TABLET ORAL at 06:09

## 2023-09-22 NOTE — PROGRESS NOTES
[facility]   Cardiology Note    Consult Requested By: SARITA  Reason for Consult: CHF    SUBJECTIVE:     History of Present Illness: The pt is 82 y/o F pt of  with PMHx of CAD HTN, HDL,chronic Systolic/Diastolic HF, reduced EF,SSS, Orthostatic hypotension, MR, AICD--MDT, single ICD, Afib- SVT, COPD, JENNIFER , intolerant of CPAP. presented with one day hx of sob started yesterday - increasing swelling to BLE. Denies CP, palpitations, syncope.     Labs H/H 10.5/34.3 platelets 452 cr 1.8  dig level 0.3 CXR L sided pleural effusion, some pulmonary congestion bilaterally--left sided effusion worsening this am. EKG today Afib , no ST seg changes. Echo pending  hypotensive this am. No urinary output documented     9/20--The pt is now in ICU on amio drip and pressors. She is aao, afib in  range , VSS. She denies any CP and states she is not feeling as much SOB , however she is using accessory muscles to breathe. Swelling in LE has improved. Pulmonology on unit for thoracentesis of Left sided pleural effusion this am. H/H 10.3/34.5 plts 456 cr 1.7 trop slightly elevated but stable, no change in delta. Blood cultures neg u/o -429 cc    9/21: Pt seen and examined this morning. S/p thoracentesis yesterday with 2L of fluid drained. Started on antibiotics for possible pneumonia. Xarelto restarted yesterday. VSS. Labs reviewed. H&H 9.6/31. Cr 1.6. She states she is feeling much better today. Shortness of breath has improved. She denies chest pain.     9/22: Patient is feeling well today. Her main complaint is fatigue, she did not sleep well last night. Reports her breathing is much improved. No LE edema noted. She denies chest pain or palpitations. Amiodarone gtt discontinued yesterday. She is currently on amiodarone 200 mg PO. Tele reviewed, remains terrie trial firbillation. HR is in the 80s. Hr blood pressure is improving. She is on midodrine 5mg TID.      Review of patient's allergies indicates:    Allergen Reactions    Codeine Other (See Comments)     nausea    Hydrocodone Nausea And Vomiting    Lasix [furosemide]      rash       Past Medical History:   Diagnosis Date    Allergy     Codeine, Lasix    Atrial fibrillation     Atrial fibrillation     Cataract     CHF (congestive heart failure)     Diabetes mellitus, type 2     Ejection fraction < 50% 10/18/2017    Approximately 35%  Based on prior  Echocardiogram.    Encounter for blood transfusion     Hyperlipidemia     Osteoporosis     PONV (postoperative nausea and vomiting)     Thyroid disorder screening 10/17/2017    TSH of 1.12 ordered by Dr. dee Jones     Past Surgical History:   Procedure Laterality Date    ANTEGRADE NEPHROSTOGRAPHY Left 8/25/2022    Procedure: NEPHROSTOGRAM, ANTEGRADE;  Surgeon: Shelby Parra MD;  Location: [facility];  Service: Urology;  Laterality: Left;    CHOLECYSTECTOMY  1997    [place]     COLONOSCOPY      CYSTOSCOPY W/ URETERAL STENT PLACEMENT Left 7/17/2022    Procedure: CYSTOSCOPY, WITH URETERAL STENT INSERTION;  Surgeon: Shelby Parra MD;  Location: [facility];  Service: Urology;  Laterality: Left;    CYSTOSCOPY W/ URETERAL STENT REMOVAL Left 9/21/2022    Procedure: CYSTOSCOPY, WITH URETERAL STENT REMOVAL;  Surgeon: Shelby Parra MD;  Location: [facility] OR;  Service: Urology;  Laterality: Left;    CYSTOSCOPY WITH URETEROSCOPY, RETROGRADE PYELOGRAPHY, AND INSERTION OF STENT Left 8/25/2022    Procedure: CYSTOSCOPY, WITH RETROGRADE PYELOGRAM AND URETERAL STENT INSERTION;  Surgeon: Shelby Parra MD;  Location: [facility];  Service: Urology;  Laterality: Left;    EYE SURGERY      bilateral cataracts    FINGER SURGERY Right 2021    right pinky finger    FLEXIBLE CYSTOSCOPY Left 8/25/2022    Procedure: CYSTOSCOPY, FLEXIBLE WITH STENT REMOVAL;  Surgeon: Shelby Parra MD;  Location: [facility];  Service: Urology;  Laterality: Left;    FRACTURE SURGERY  2014    right femur with neville    HEMORRHOID SURGERY      48 yrs  ago    HYSTERECTOMY      NEPHROSTOMY Left 8/25/2022    Procedure: CREATION, NEPHROSTOMY;  Surgeon: Shelby Parra MD;  Location: [facility] OR;  Service: Urology;  Laterality: Left;    PERCUTANEOUS NEPHROLITHOTOMY N/A 8/25/2022    Procedure: NEPHROLITHOTOMY, PERCUTANEOUS;  Surgeon: Shelby Parra MD;  Location: [facility] OR;  Service: Urology;  Laterality: N/A;    REPLACEMENT OF IMPLANTABLE CARDIOVERTER-DEFIBRILLATOR (ICD) GENERATOR N/A 12/13/2019    Procedure: REPLACEMENT, PULSE GENERATOR, ICD-MEDTRONIC;  Surgeon: Sebastian Nowak III, MD;  Location: [facility] CATH;  Service: Cardiology;  Laterality: N/A;    TONSILLECTOMY       Family History   Problem Relation Age of Onset    Heart disease Mother     Cancer Father         Lung Cancer ??? Asbestos    Breast cancer Paternal Aunt      Social History     Tobacco Use    Smoking status: Former     Passive exposure: Past    Smokeless tobacco: Never    Tobacco comments:     quit 2013   Substance Use Topics    Alcohol use: No    Drug use: No       Review of Systems:  Review of Systems   Constitutional:  Negative for chills, diaphoresis, fever, malaise/fatigue and weight loss.   Respiratory:  Negative for cough, hemoptysis, sputum production and shortness of breath.    Cardiovascular:  Positive for PND. Negative for chest pain, palpitations, orthopnea, claudication and leg swelling.   Gastrointestinal:  Negative for nausea and vomiting.       OBJECTIVE:     Vital Signs (Most Recent)  Temp: 98.1 °F (36.7 °C) (09/22/23 0301)  Pulse: 80 (09/22/23 0600)  Resp: (!) 23 (09/22/23 0600)  BP: (!) 87/61 (09/22/23 0600)  SpO2: 97 % (09/22/23 0600)    Vital Signs Range (Last 24H):  Temp:  [98 °F (36.7 °C)-98.2 °F (36.8 °C)]   Pulse:  []   Resp:  [20-37]   BP: ()/(44-95)   SpO2:  [82 %-100 %]     I & O (Last 24H):    Intake/Output Summary (Last 24 hours) at 9/22/2023 0826  Last data filed at 9/22/2023 0506  Gross per 24 hour   Intake 1774.04 ml   Output 1600 ml   Net 174.04 ml          Current Diet:     Current Diet Order   Procedures    Diet Low Sodium, 2gm [facility]; Fluid - 1500mL     Order Specific Question:   Indicate patient location for additional diet options:     Answer:   [facility]     Order Specific Question:   Fluid restriction:     Answer:   Fluid - 1500mL        Allergies:  Review of patient's allergies indicates:   Allergen Reactions    Codeine Other (See Comments)     nausea    Hydrocodone Nausea And Vomiting    Lasix [furosemide]      rash       Meds:  Scheduled Meds:   amiodarone  200 mg Oral Daily    atorvastatin  20 mg Oral QHS    bumetanide  1 mg Intravenous Daily    cefTRIAXone (ROCEPHIN) IVPB  1 g Intravenous Q24H    fluticasone propionate  2 spray Each Nostril Daily    gabapentin  200 mg Oral TID    metoprolol succinate  25 mg Oral Daily    midodrine  5 mg Oral TID AC    mupirocin   Nasal BID    rivaroxaban  15 mg Oral Daily with dinner     Continuous Infusions:    PRN Meds:acetaminophen, albuterol sulfate, aluminum-magnesium hydroxide-simethicone, dextrose 50%, dextrose 50%, glucagon (human recombinant), glucose, glucose, magnesium oxide, magnesium oxide, melatonin, naloxone, ondansetron, potassium bicarbonate, potassium bicarbonate, potassium bicarbonate, potassium, sodium phosphates, potassium, sodium phosphates, potassium, sodium phosphates, prochlorperazine, senna-docusate 8.6-50 mg, simethicone, sodium chloride 0.9%, sodium chloride 0.9%, sodium chloride 0.9%    Oxygen/Ventilator Data (Last 24H):  (if applicable)   Oxygen Concentration (%):  [40] 40        Hemodynamic Parameters (Last 24H):   (if applicable)        Laboratory and Radiology Data:  Recent Results (from the past 24 hour(s))   CBC auto differential    Collection Time: 09/22/23  3:14 AM   Result Value Ref Range    WBC 9.60 3.90 - 12.70 K/uL    RBC 3.68 (L) 4.00 - 5.40 M/uL    Hemoglobin 9.6 (L) 12.0 - 16.0 g/dL    Hematocrit 31.7 (L) 37.0 - 48.5 %    MCV 86 82 - 98 fL    MCH 26.1 (L)  27.0 - 31.0 pg    MCHC 30.3 (L) 32.0 - 36.0 g/dL    RDW 16.0 (H) 11.5 - 14.5 %    Platelets 400 150 - 450 K/uL    MPV 9.2 9.2 - 12.9 fL    Immature Granulocytes 0.5 0.0 - 0.5 %    Gran # (ANC) 7.0 1.8 - 7.7 K/uL    Immature Grans (Abs) 0.05 (H) 0.00 - 0.04 K/uL    Lymph # 1.1 1.0 - 4.8 K/uL    Mono # 1.2 (H) 0.3 - 1.0 K/uL    Eos # 0.2 0.0 - 0.5 K/uL    Baso # 0.04 0.00 - 0.20 K/uL    nRBC 0 0 /100 WBC    Gran % 72.7 38.0 - 73.0 %    Lymph % 11.8 (L) 18.0 - 48.0 %    Mono % 12.8 4.0 - 15.0 %    Eosinophil % 1.8 0.0 - 8.0 %    Basophil % 0.4 0.0 - 1.9 %    Differential Method Automated    Comprehensive metabolic panel    Collection Time: 09/22/23  3:14 AM   Result Value Ref Range    Sodium 135 (L) 136 - 145 mmol/L    Potassium 5.2 (H) 3.5 - 5.1 mmol/L    Chloride 97 95 - 110 mmol/L    CO2 28 23 - 29 mmol/L    Glucose 101 70 - 110 mg/dL    BUN 29 (H) 8 - 23 mg/dL    Creatinine 1.5 (H) 0.5 - 1.4 mg/dL    Calcium 9.0 8.7 - 10.5 mg/dL    Total Protein 5.9 (L) 6.0 - 8.4 g/dL    Albumin 2.6 (L) 3.5 - 5.2 g/dL    Total Bilirubin 0.3 0.1 - 1.0 mg/dL    Alkaline Phosphatase 71 55 - 135 U/L    AST 9 (L) 10 - 40 U/L    ALT 8 (L) 10 - 44 U/L    eGFR 34.6 (A) >60 mL/min/1.73 m^2    Anion Gap 10 8 - 16 mmol/L   Magnesium    Collection Time: 09/22/23  3:14 AM   Result Value Ref Range    Magnesium 2.1 1.6 - 2.6 mg/dL   Phosphorus    Collection Time: 09/22/23  3:14 AM   Result Value Ref Range    Phosphorus 3.4 2.7 - 4.5 mg/dL     Imaging Results              X-Ray Chest AP Portable (Final result)  Result time 09/19/23 05:54:57      Final result by Foster Landis MD (09/19/23 05:54:57)                   Narrative:    CLINICAL HISTORY:  83 years (1940) Female sob SOB    TECHNIQUE:  Portable AP radiograph the chest. One view.    COMPARISON:  Radiographs from May 24, 2023    FINDINGS:  There is a focal opacity at the left lung base, characteristic of a combination of a small left pleural effusion and associated atelectasis,  noting superimposed infection/pneumonia and malignancy are not excluded. There is blunting of the right costophrenic angle consistent with trace pleural effusion. No pneumothorax is identified. The cardiac silhouette is moderately enlarged. Left-sided AICD pacemaker is unchanged in configuration. Atheromatous calcifications are seen at the aortic arch. Osseous structures appear unchanged. The visualized upper abdomen is unremarkable.    IMPRESSION:  1. Moderate left pleural effusion and adjacent atelectasis/consolidation.  2. Small interstitial opacity at the right lung base.                  .            Electronically signed by:  Foster Landis MD  9/19/2023 5:54 AM CDT Workstation: [id number]                                    12-lead EKG interpretation:  (if applicable)      Current Cardiac Rhythm:   (if applicable)    Physical Exam:   Physical Exam  Constitutional:       Appearance: Normal appearance.   Cardiovascular:      Rate and Rhythm: Normal rate. Rhythm irregular.      Heart sounds: Murmur heard.   Pulmonary:      Effort: Pulmonary effort is normal. No respiratory distress.      Breath sounds: Wheezing present. No rhonchi.   Abdominal:      General: Abdomen is flat. Bowel sounds are normal.      Palpations: Abdomen is soft.   Musculoskeletal:         General: No swelling.      Comments: +1 edema BLE    Skin:     General: Skin is warm and dry.   Neurological:      General: No focal deficit present.      Mental Status: She is alert and oriented to person, place, and time.         ASSESSMENT/PLAN:   Assessment:   Acute on Chronic combined HF reduced EF  MARCELINO/CKD cr 1.8  MR  HTN  COPD  JENNIFER  SSS  MDT ICD insitu    Echo 5/2023--EF 40 % left ventricular global hypokinesis, LA enlargement, Mod -Severe MR , PA pressure 45 mmhg     Recent device interrogation--9/2023  11% afib burden· Possible OptiVol fluid accumulation--9/10/2023     Echo 9/19--EF 40-50 % LA dilation, mild AS, mod MR, PA pressure 30 mmhg      Plan    S/p Thoracentesis with 2L of drainage on 9/20.   Shortness of breath has resolved.  Continue IV bumex. Monitor renal function.   Patient remains in atrial fibrillation however her rates are controlled in the 80s.   Continue amiodarone 200 mg daily. Continue xarelto.   Blood pressure is improving. She is on midodrine 5 mg TID. Can increase to 10 mg TID if needed.  Patient can be moved out of ICU.

## 2023-09-22 NOTE — CARE UPDATE
09/21/23 1950   Patient Assessment/Suction   Level of Consciousness (AVPU) alert   Respiratory Effort Normal;Unlabored   Expansion/Accessory Muscles/Retractions no retractions;no use of accessory muscles   All Lung Fields Breath Sounds diminished;coarse   Rhythm/Pattern, Respiratory unlabored;pattern regular   Cough Frequency no cough   PRE-TX-O2   Device (Oxygen Therapy) nasal cannula   $ Is the patient on Low Flow Oxygen? Yes   Flow (L/min) 4   SpO2 97 %   Pulse Oximetry Type Continuous   $ Pulse Oximetry - Single Charge Pulse Oximetry - Single   $ Pulse Oximetry - Multiple Charge Pulse Oximetry - Multiple   Pulse 94   Resp (!) 26   Positioning Supine;HOB elevated 30 degrees   Positioning   Body Position position changed independently   Aerosol Therapy   $ Aerosol Therapy Charges PRN treatment not required   Preset CPAP/BiPAP Settings   Mode Of Delivery CPAP;Standby   $ CPAP/BiPAP Daily Charge BiPAP/CPAP Daily   Equipment Type V60

## 2023-09-22 NOTE — CARE UPDATE
09/22/23 0840   Patient Assessment/Suction   Level of Consciousness (AVPU) alert   Respiratory Effort Unlabored   Expansion/Accessory Muscles/Retractions no use of accessory muscles   All Lung Fields Breath Sounds wheezes, expiratory   Rhythm/Pattern, Respiratory unlabored   Cough Frequency no cough   Skin Integrity   $ Wound Care Tech Time 15 min   Area Observed Bridge of nose   Skin Appearance without discoloration   PRE-TX-O2   Device (Oxygen Therapy) high flow nasal cannula   $ Is the patient on Low Flow Oxygen? Yes   Flow (L/min) 4   SpO2 96 %   Pulse Oximetry Type Continuous   $ Pulse Oximetry - Single Charge Pulse Oximetry - Single   Pulse 82   Resp 20   Aerosol Therapy   $ Aerosol Therapy Charges Aerosol Treatment   Daily Review of Necessity (SVN) completed   Respiratory Treatment Status (SVN) given   Treatment Route (SVN) mask;oxygen   Patient Position (SVN) HOB elevated   Post Treatment Assessment (SVN) increased aeration;wheezing decreased   Signs of Intolerance (SVN) none   Breath Sounds Post-Respiratory Treatment   Throughout All Fields Post-Treatment All Fields   Throughout All Fields Post-Treatment aeration increased   Post-treatment Heart Rate (beats/min) 83   Post-treatment Resp Rate (breaths/min) 19   Preset CPAP/BiPAP Settings   Mode Of Delivery CPAP;Standby   $ CPAP/BiPAP Daily Charge BiPAP/CPAP Daily   $ Initial CPAP/BiPAP Setup? No   Equipment Type V60   Education   $ Education Bronchodilator;15 min

## 2023-09-22 NOTE — CARE UPDATE
09/22/23 0101   Patient Assessment/Suction   Level of Consciousness (AVPU) responds to voice   Respiratory Effort Normal;Unlabored   Expansion/Accessory Muscles/Retractions no use of accessory muscles;no retractions   PRE-TX-O2   Device (Oxygen Therapy) CPAP   Oxygen Concentration (%) 40   SpO2 97 %   Pulse Oximetry Type Continuous   $ Pulse Oximetry - Single Charge Pulse Oximetry - Single   $ Pulse Oximetry - Multiple Charge Pulse Oximetry - Multiple   Pulse 79   Resp (!) 22   Temp 98 °F (36.7 °C)   BP (!) 91/51   Positioning   Body Position position changed independently;weight shifting   Head of Bed (HOB) Positioning HOB at 30 degrees   Positioning/Transfer Devices pillows;in use   Ready to Wean/Extubation Screen   FIO2<=50 (chart decimal) 0.4   Preset CPAP/BiPAP Settings   Mode Of Delivery CPAP   $ Is patient using? Yes   Size of Mask Small/Medium   Sized Appropriately? Yes   Equipment Type V60   Airway Device Type small full face mask   Humidifier not applicable   CPAP (cm H2O) 6   Patient CPAP/BiPAP Settings   CPAP/BIPAP ID 6   RR Total (Breaths/Min) 20   Tidal Volume (mL) 385   VE Minute Ventilation (L/min) 8.4 L/min   Peak Inspiratory Pressure (cm H2O) 6   TiTOT (%) 22   Total Leak (L/Min) 0   Patient Trigger - ST Mode Only (%) 100

## 2023-09-22 NOTE — PT/OT/SLP PROGRESS
Occupational Therapy      Patient Name:  Jo Alegre   MRN:  [id number]    Attempted OT tx. Patient requested to defer OT as patient wanted to eat her lunch. Will follow up later today if time permits.    9/22/2023

## 2023-09-22 NOTE — PROGRESS NOTES
Care team aware of difficulty in acquiring accurate output measurement due to inconsistency with Purewick.

## 2023-09-22 NOTE — PLAN OF CARE
Problem: SLP  Goal: SLP Goal  Description: 1. Pt will tolerate LRD (IDDSI 7,0) w/o overt s/s aspiration during >95% of PO trials  2. Pt will utilize swallowing precautions during >95% of PO intake given min cues  Outcome: Ongoing, Progressing     CSE completed. Swallowing WFL; no overt s/s aspiration noted. Rec regular diet w/ thin liquids. ST to follow.

## 2023-09-22 NOTE — PT/OT/SLP PROGRESS
Physical Therapy Treatment    Patient Name:  Jo Alegre   MRN:  [id number]    Recommendations:     Discharge Recommendations: nursing facility, skilled  Discharge Equipment Recommendations: none  Barriers to discharge:  increased weakness, impaired mobility, decreased endurance    Assessment:     Jo Alegre is a 83 y.o. female admitted with a medical diagnosis of Congestive heart failure.  She presents with the following impairments/functional limitations: weakness, impaired endurance, impaired self care skills, impaired balance, edema, impaired cardiopulmonary response to activity ..    Rehab Prognosis: Fair; patient would benefit from acute skilled PT services to address these deficits and reach maximum level of function.    Recent Surgery: * No surgery found *      Plan:     During this hospitalization, patient to be seen 6 x/week to address the identified rehab impairments via gait training, therapeutic activities, therapeutic exercises and progress toward the following goals:    Plan of Care Expires:  10/21/23    Subjective     Chief Complaint: weakness  Patient/Family Comments/goals: Get stronger  Pain/Comfort:         Objective:     Communicated with nurse prior to session.  Patient found supine with telemetry, pulse ox (continuous), peripheral IV, oxygen, PureWick upon PT entry to room.     General Precautions: Standard, fall, respiratory  Orthopedic Precautions:    Braces:    Respiratory Status: Nasal cannula, flow 4 L/min     Functional Mobility:  Bed Mobility:     Supine to Sit: moderate assistance  Transfers:     Sit to Stand:  moderate assistance and of 2 persons with rolling walker  Bed to Chair: moderate/maximum  assistance and of 2 persons with  rolling walker  using  Stand Pivot      AM-PAC 6 CLICK MOBILITY          Treatment & Education:  Pt was educated on benefits of increased time OOB    Patient left up in chair with all lines intact, call button in reach, and family   present..    GOALS:   Multidisciplinary Problems       Physical Therapy Goals          Problem: Physical Therapy    Goal Priority Disciplines Outcome Goal Variances Interventions   Physical Therapy Goal     PT, PT/OT      Description: Goals to be met by: 10/21/2023     Patient will increase functional independence with mobility by performin. Supine to sit with Contact Guard Assistance  2. Sit to stand transfer with Minimal Assistance  3. Gait  x 50  feet with Contact Guard Assistance using Rolling Walker.                          Time Tracking:     PT Received On: 23  PT Start Time: 1345     PT Stop Time: 1402  PT Total Time (min): 17 min     Billable Minutes: Therapeutic Activity 17 minutes    Treatment Type: Treatment  PT/PTA: PT           2023

## 2023-09-22 NOTE — PLAN OF CARE
Patient found in bed, awakes spontaneously to voice. She is pleasant, awake alert and orientated x4. She is saturating well on 3-4 lpm nasal cannula.  IV to left forearm, the skin swells with flushing and is painful and so was removed and patient tolerated, gauze applied. OK for just midline only (to right upper arm).  She has minimal edema to bilat legs.  Blood pressure has been on the low side today, MAP around 65-75. She complains of a small headache today and gave tylenol to good effect.    She has some redness to right heel which blanches, she c/o pain there, applied bilat mepilexes and offloaded with pillows, patient and family educated. She was up in chair for about 5 hours today, tolerated, requires moderate+ assistance to get up. Chair alarm on while in chair.    Careteam aware, re: difficult to accurately measure output entirely d/t inconsistent purewick result (had 2 unmeasured occurrences today). Did have large formed BM per commode.    The bed is low and alarm on. Clinical alarms reviewed at start of shift and armed. Monitor strip printed and reviewed, afib rate 80s.    Family is at bedside and was updated on plan of care. We are awaiting downgrade bed availability. Her spirits were somewhat low today, upset about not being able to go home.

## 2023-09-22 NOTE — PT/OT/SLP EVAL
"Speech Language Pathology Evaluation  Bedside Swallow    Patient Name:  Jo Alegre   MRN:  [id number]  Admitting Diagnosis: Congestive heart failure    Recommendations:                 General Recommendations:  Dysphagia therapy: follow up for diet tolerance  Diet recommendations:  Regular Diet - IDDSI Level 7, Thin liquids - IDDSI Level 0   Aspiration Precautions: 1 bite/sip at a time, HOB to 90 degrees, Remain upright 30 minutes post meal, Small bites/sips, and Wear oxygen during intake   General Precautions: Standard,    Communication strategies:  none    Assessment:     Jo Alegre is a 83 y.o. female with an SLP diagnosis of  increased risk of aspiration due to respiratory status .  She presents without overt s/s aspiration across trialed consistencies during today's evaluation. She consumed trials w/ appropriate pace and adequate bolus size. No hx of swallowing disorders or neurological events impacting swallow function. She reports hx of reflux w/o use of PPI, but she does report use of reflux precautions. No hx of recurrent PNA or increased amount of risk factors for developing aspiration PNA, other than COPD and GERD (Pt reported, not in chart). Rec IDDSI 7- regular diet and IDDSI 0- thin liquids; ST to f/u 1-2x re diet tolerance. MBSS not warranted at this time.    History:   Per H&P:  "HPI: Ms. Alegre is an 83 year old woman with PMHx of HTN, HDL, HFrEF s/p AICD, Afib- presented with one day hx of sob started yesterday - pt says she was fine day before yesterday but around morning time she started having sob not associated with any chest pain, palpitations, or fever, chills, cough or abdominal pain. She did not have any dietary discretion, has been adherent to her home meds. She also noted leg swelling bilateral yesterday. She was at cardio office for her device interrogation which as per patient is fine."    Past Medical History:   Diagnosis Date    Allergy     Codeine, Lasix    Atrial " fibrillation     Atrial fibrillation     Cataract     CHF (congestive heart failure)     Diabetes mellitus, type 2     Ejection fraction < 50% 10/18/2017    Approximately 35%  Based on prior  Echocardiogram.    Encounter for blood transfusion     Hyperlipidemia     Osteoporosis     PONV (postoperative nausea and vomiting)     Thyroid disorder screening 10/17/2017    TSH of 1.12 ordered by Dr. dee Jones       Past Surgical History:   Procedure Laterality Date    ANTEGRADE NEPHROSTOGRAPHY Left 8/25/2022    Procedure: NEPHROSTOGRAM, ANTEGRADE;  Surgeon: Shelby Parra MD;  Location: [facility] OR;  Service: Urology;  Laterality: Left;    CHOLECYSTECTOMY  1997    [place]     COLONOSCOPY      CYSTOSCOPY W/ URETERAL STENT PLACEMENT Left 7/17/2022    Procedure: CYSTOSCOPY, WITH URETERAL STENT INSERTION;  Surgeon: Shelby Parra MD;  Location: [facility] OR;  Service: Urology;  Laterality: Left;    CYSTOSCOPY W/ URETERAL STENT REMOVAL Left 9/21/2022    Procedure: CYSTOSCOPY, WITH URETERAL STENT REMOVAL;  Surgeon: Shelby Parra MD;  Location: [facility] OR;  Service: Urology;  Laterality: Left;    CYSTOSCOPY WITH URETEROSCOPY, RETROGRADE PYELOGRAPHY, AND INSERTION OF STENT Left 8/25/2022    Procedure: CYSTOSCOPY, WITH RETROGRADE PYELOGRAM AND URETERAL STENT INSERTION;  Surgeon: Shelby Parra MD;  Location: [facility];  Service: Urology;  Laterality: Left;    EYE SURGERY      bilateral cataracts    FINGER SURGERY Right 2021    right pinky finger    FLEXIBLE CYSTOSCOPY Left 8/25/2022    Procedure: CYSTOSCOPY, FLEXIBLE WITH STENT REMOVAL;  Surgeon: Shelby Parra MD;  Location: [facility];  Service: Urology;  Laterality: Left;    FRACTURE SURGERY  2014    right femur with ne[place]    HEMORRHOID SURGERY      48 yrs ago    HYSTERECTOMY      NEPHROSTOMY Left 8/25/2022    Procedure: CREATION, NEPHROSTOMY;  Surgeon: Shelby Parra MD;  Location: [facility] OR;  Service: Urology;  Laterality: Left;    PERCUTANEOUS NEPHROLITHOTOMY  N/A 8/25/2022    Procedure: NEPHROLITHOTOMY, PERCUTANEOUS;  Surgeon: Shelby Parra MD;  Location: [facility];  Service: Urology;  Laterality: N/A;    REPLACEMENT OF IMPLANTABLE CARDIOVERTER-DEFIBRILLATOR (ICD) GENERATOR N/A 12/13/2019    Procedure: REPLACEMENT, PULSE GENERATOR, ICD-MEDTRONIC;  Surgeon: Sebastian Nowak III, MD;  Location: [facility];  Service: Cardiology;  Laterality: N/A;    TONSILLECTOMY         Social History: Patient lives with her grandson.    Prior Intubation HX:  none this admit    Modified Barium Swallow: none found in epic or reported by pt    Chest X-Rays:   Imaging Results              X-Ray Chest AP Portable (Final result)  Result time 09/19/23 05:54:57      Final result by Foster Landis MD (09/19/23 05:54:57)                   Narrative:    CLINICAL HISTORY:  83 years (1940) Female sob SOB    TECHNIQUE:  Portable AP radiograph the chest. One view.    COMPARISON:  Radiographs from May 24, 2023    FINDINGS:  There is a focal opacity at the left lung base, characteristic of a combination of a small left pleural effusion and associated atelectasis, noting superimposed infection/pneumonia and malignancy are not excluded. There is blunting of the right costophrenic angle consistent with trace pleural effusion. No pneumothorax is identified. The cardiac silhouette is moderately enlarged. Left-sided AICD pacemaker is unchanged in configuration. Atheromatous calcifications are seen at the aortic arch. Osseous structures appear unchanged. The visualized upper abdomen is unremarkable.    IMPRESSION:  1. Moderate left pleural effusion and adjacent atelectasis/consolidation.  2. Small interstitial opacity at the right lung base.                  .            Electronically signed by:  Foster Landis MD  9/19/2023 5:54 AM CDT Workstation: [id number]                                    Prior diet: regular, thin.    Occupation/hobbies/homemaking: pt independent in self-care, cleaning, and  "laundry; does report lack of endurance for a full day of household tasks.    Subjective     Pt awake reclined in bed w/ family (daughters x2, son x1) at bedside. Pt agreeable to CSE. Pt's RN cleared her for CSE; pulmonologist placed order for MBSS.  Patient goals: "I was starting to cough more in the past couple weeks when I eat food, but not when I'm drinking"     Pain/Comfort:       Respiratory Status: High flow, flow 4 L/min, concentration 96%    Objective:   Pt oriented x4, independent in reporting hx and self-feeding. Awake, alert, participated well throughout CSE.    Oral Musculature Evaluation  Oral Musculature: WFL  Dentition: present and adequate, other (see comments) (missing posterior maxillary dentition; partials in place anterior mandibular)  Secretion Management: adequate  Mucosal Quality: adequate  Mandibular Strength and Mobility: WFL  Oral Labial Strength and Mobility: WFL  Lingual Strength and Mobility: WFL  Velar Elevation: WFL  Buccal Strength and Mobility: WFL  Volitional Cough: elicited; adequate  Volitional Swallow: palpated; adequate laryngeal movement  Voice Prior to PO Intake: clear    Bedside Swallow Eval:   Consistencies Assessed:  Thin liquids water via cup edge and straw  Puree tsp bites applesauce  Mixed consistencies tsp bites diced peaches in thin juice  Solids cracker      Oral Phase:   WFL    Pharyngeal Phase:   no overt clinical signs/symptoms of aspiration  no overt clinical signs/symptoms of pharyngeal dysphagia    Compensatory Strategies  None    Treatment: Pt and family educated re purpose of evaluation, role of SLP, results of evaluation, and recs. Pt and family expressed understanding of recs. Pt demonstrating use of precautions and recall of precautions IND. Recs shared w/ nursing, pulmonologist, and MD.      Goals:   Multidisciplinary Problems       SLP Goals          Problem: SLP    Goal Priority Disciplines Outcome   SLP Goal     SLP Ongoing, Progressing   Description: " 1. Pt will tolerate LRD (IDDSI 7,0) w/o overt s/s aspiration during >95% of PO trials  2. Pt will utilize swallowing precautions during >95% of PO intake given min cues                       Plan:     Patient to be seen:  2 x/week   Plan of Care expires:     Plan of Care reviewed with:  patient, family, other (see comments) (nursing, MD, pulmonologist)   SLP Follow-Up:  Yes       Discharge recommendations:      Barriers to Discharge:  None    Time Tracking:     SLP Treatment Date:   09/22/23  Speech Start Time:  1024  Speech Stop Time:  1045     Speech Total Time (min):  21 min    Billable Minutes: Eval Swallow and Oral Function 21 min    09/22/2023

## 2023-09-22 NOTE — SUBJECTIVE & OBJECTIVE
Interval History:  Patient is lying down without subjective complaints.  Multiple family members are at bedside.  Currently on 4 liter/minute supplemental oxygen via nasal cannula.  Normal sinus rhythm controlled with oral amiodarone.      Review of Systems   Constitutional:  Positive for activity change and fatigue.   Respiratory:  Positive for shortness of breath.    Cardiovascular:  Positive for leg swelling.   Neurological:  Positive for weakness (generalized).     Objective:     Vital Signs (Most Recent):  Temp: 98.1 °F (36.7 °C) (09/22/23 0301)  Pulse: 82 (09/22/23 0840)  Resp: 20 (09/22/23 0840)  BP: (!) 87/61 (09/22/23 0600)  SpO2: 96 % (09/22/23 0840) Vital Signs (24h Range):  Temp:  [98 °F (36.7 °C)-98.2 °F (36.8 °C)] 98.1 °F (36.7 °C)  Pulse:  [] 82  Resp:  [20-37] 20  SpO2:  [82 %-100 %] 96 %  BP: ()/(44-95) 87/61     Weight: 96.3 kg (212 lb 4.9 oz)  Body mass index is 33.25 kg/m².    Intake/Output Summary (Last 24 hours) at 9/22/2023 1019  Last data filed at 9/22/2023 0506  Gross per 24 hour   Intake 1774.04 ml   Output 1600 ml   Net 174.04 ml           Physical Exam  Constitutional:       General: She is not in acute distress.     Appearance: She is ill-appearing.   Cardiovascular:      Rate and Rhythm: Rhythm irregular.      Heart sounds: Murmur heard.      Comments: Permanent pacemaker noted, +1-2 pitting edema bilaterally  Pulmonary:      Effort: Respiratory distress present.      Breath sounds: Wheezing and rales present.   Neurological:      Mental Status: She is alert.             Significant Labs: All pertinent labs within the past 24 hours have been reviewed.  BMP:   Recent Labs   Lab 09/22/23  0314      *   K 5.2*   CL 97   CO2 28   BUN 29*   CREATININE 1.5*   CALCIUM 9.0   MG 2.1       CBC:   Recent Labs   Lab 09/21/23  0505 09/22/23  0314   WBC 10.06 9.60   HGB 9.6* 9.6*   HCT 31.5* 31.7*    400       Microbiology Results (last 7 days)       Procedure  Component Value Units Date/Time    Blood culture [[id number]] Collected: 09/19/23 1338    Order Status: Completed Specimen: Blood Updated: 09/21/23 1432     Blood Culture, Routine No Growth to date      No Growth to date      No Growth to date    Gram stain [[id number]] Collected: 09/20/23 1035    Order Status: Completed Specimen: Pleural Fluid from Lung, Left Updated: 09/21/23 1420     Gram Stain Result Moderate WBC's      No organisms seen    Culture, Body Fluid (Aerobic) w/ GS [[id number]] Collected: 09/20/23 1035    Order Status: Completed Specimen: Pleural Fluid from Lung, Left Updated: 09/21/23 1010     AEROBIC CULTURE - FLUID No growth    AFB culture (includes stain) [[id number]] Collected: 09/20/23 1035    Order Status: Sent Specimen: Pleural Fluid from Lung, Left Updated: 09/20/23 1052    Culture, Respiratory with Gram Stain [[id number]]     Order Status: No result Specimen: Respiratory           Significant Imaging:   ECHO:    Left Ventricle: Moderately increased wall thickness. There is concentric remodeling. There is mildly reduced systolic function with a visually estimated ejection fraction of 40 - 50%.    Left Atrium: Left atrium is dilated.    Aortic Valve: There is mild aortic valve sclerosis.    Mitral Valve: There is moderate regurgitation.    Tricuspid Valve: There is mild regurgitation.    Pulmonary Artery: The estimated pulmonary artery systolic pressure is 30 mmHg.    IVC/SVC: Normal venous pressure at 3 mmHg.    Pericardium: There is a small effusion. No indication of cardiac tamponade.    CXR:  1. Moderate left pleural effusion and adjacent atelectasis/consolidation.  2. Small interstitial opacity at the right lung base.    CXR:  1. Interval worsening, moderate to large left pleural effusion and adjacent atelectasis/consolidation.  2. Unchanged mild scattered central hilar interstitial opacity.    US Thoracentesis:  2000 cc yellow turbid pleural fluid removed    CXR: Negative for  pneumothorax, post left-sided thoracentesis.    CXR:  Interval progression of pulmonary opacities in the left mid and lower lung zone. There is similar blunting of left costophrenic angle.    Minor platelike atelectasis within the right midlung. There is mild right-sided interstitial prominence.

## 2023-09-22 NOTE — ASSESSMENT & PLAN NOTE
Patient is identified as having Combined Systolic and Diastolic heart failure that is Acute on chronic. CHF is currently uncontrolled due to Continued edema of extremities, >3 pillow orthopnea, Rales/crackles on pulmonary exam and Pulmonary edema/pleural effusion on CXR. Latest ECHO performed and demonstrates- Results for orders placed during the hospital encounter of 05/15/23    Echo    Interpretation Summary  · The left ventricle is mildly enlarged with mild concentric hypertrophy and moderately decreased systolic function.  · The estimated ejection fraction is 40%.  · Grade I left ventricular diastolic dysfunction.  · There is left ventricular global hypokinesis.  · Normal right ventricular size with normal right ventricular systolic function.  · Severe left atrial enlargement.  · Moderate-to-severe mitral regurgitation.  · Mild tricuspid regurgitation.  · Elevated central venous pressure (15 mmHg).  · The estimated PA systolic pressure is 45 mmHg.  · There is pulmonary hypertension.  · Trivial anterior pericardial effusion.  . Continue bumex and digoxin and monitor clinical status closely. Monitor on telemetry. Patient is on CHF pathway.  Monitor strict Is&Os and daily weights.  Place on fluid restriction of 1.5 L. Cardiology has been consulted. Continue to stress to patient importance of self efficacy and  on diet for CHF. Last BNP reviewed- and noted below   Recent Labs   Lab 09/18/23  2201   *   .  Review of chest x-ray suggestive of reaccumulating left pleural effusion.  Patient instructed to adhere with fluid restriction.  Case discussed with Dr. Mendoza.  Will give additional dose of intravenous Bumex 1 mg this evening.

## 2023-09-22 NOTE — CARE UPDATE
09/21/23 2200   Patient Assessment/Suction   Level of Consciousness (AVPU) alert   Respiratory Effort Normal;Unlabored   Expansion/Accessory Muscles/Retractions no use of accessory muscles;no retractions   All Lung Fields Breath Sounds diminished   Cough Frequency no cough   Skin Integrity   $ Wound Care Tech Time 15 min   Area Observed Bridge of nose;Forehead;Neck;Right;Left   Skin Appearance without discoloration   PRE-TX-O2   Device (Oxygen Therapy) nasal cannula   $ Is the patient on Low Flow Oxygen? Yes   Flow (L/min) 4   SpO2 96 %   Pulse Oximetry Type Continuous   $ Pulse Oximetry - Single Charge Pulse Oximetry - Single   $ Pulse Oximetry - Multiple Charge Pulse Oximetry - Multiple   Pulse 89   Resp (!) 24   BP (!) 113/57   Positioning Supine;HOB elevated 30 degrees   Positioning   Head of Bed (HOB) Positioning HOB at 30 degrees   Positioning/Transfer Devices pillows;in use   Aerosol Therapy   $ Aerosol Therapy Charges PRN treatment not required   Preset CPAP/BiPAP Settings   Mode Of Delivery CPAP   $ Is patient using? Yes   Size of Mask Small/Medium   Sized Appropriately? Yes   Equipment Type V60   Airway Device Type small full face mask   Humidifier not applicable   CPAP (cm H2O) 6   Patient CPAP/BiPAP Settings   CPAP/BIPAP ID 6   RR Total (Breaths/Min) 21   Tidal Volume (mL) 375   VE Minute Ventilation (L/min) 8.1 L/min   Peak Inspiratory Pressure (cm H2O) 7   TiTOT (%) 24   Total Leak (L/Min) 0   Patient Trigger - ST Mode Only (%) 100   CPAP/BiPAP Alarms   High Pressure (cm H2O) 30   Low Pressure (cm H2O) 5   Minute Ventilation (L/Min) 3   High RR (breaths/min) 40   Low RR (breaths/min) 4   Apnea (Sec) 20   Education   $ Education 15 min;BiPAP   Respiratory Evaluation   $ Care Plan Tech Time 15 min   $ Eval/Re-eval Charges Evaluation

## 2023-09-22 NOTE — PROGRESS NOTES
[facility]  Progress Note  Pulmonary/Critical Care      PATIENT NAME: Jo Alegre  MRN: [id number]  TODAY'S DATE: 2023  2:23 PM  ADMIT DATE: 2023  AGE: 83 y.o. : 1940    HPI/INTERVAL HISTORY (See H&P for complete P,F,SHx) :   Ms Alegre is an 83 year old woman former smoker with HFrEF 30%, AICD, hx of AFib who presented from home with low blood pressure and shortness of breath. CXR was suggestive of predominantly left sided effusion. Patient was also evaluated by cardiology.     Continues to feel well. Conitnues to require about 4 LPM. She is on home oxygen intermittently.     Review of Systems   Constitutional:  Negative for appetite change, chills, fever and unexpected weight change.   HENT:  Negative for nosebleeds, postnasal drip, trouble swallowing and voice change.    Eyes:  Negative for visual disturbance.   Respiratory:  Negative for cough, shortness of breath and wheezing.    Cardiovascular:  Positive for leg swelling. Negative for chest pain.   Gastrointestinal:  Negative for diarrhea, nausea and vomiting.   Endocrine: Negative for cold intolerance and heat intolerance.   Genitourinary:  Negative for dysuria, flank pain and frequency.   Musculoskeletal:  Negative for neck pain and neck stiffness.   Allergic/Immunologic: Negative for environmental allergies and immunocompromised state.   Neurological:  Negative for syncope, speech difficulty and weakness.   Hematological:  Negative for adenopathy.   Psychiatric/Behavioral:  Negative for confusion.            VITAL SIGNS (MOST RECENT)  Temp: 98.1 °F (36.7 °C) (23 0301)  Pulse: 82 (23 0840)  Resp: 20 (23 0840)  BP: (!) 87/61 (23 0600)  SpO2: 96 % (23 0840)    INTAKE AND OUTPUT (LAST 24 HOURS):  Intake/Output Summary (Last 24 hours) at 2023 1219  Last data filed at 2023 0506  Gross per 24 hour   Intake 1774.04 ml   Output 1600 ml   Net 174.04 ml         WEIGHT  Wt Readings from Last  "1 Encounters:   09/19/23 96.3 kg (212 lb 4.9 oz)       Physical Exam  Vitals reviewed.   Constitutional:       General: She is not in acute distress.     Appearance: She is well-developed. She is obese. She is not ill-appearing or diaphoretic.   HENT:      Head: Normocephalic and atraumatic.      Mouth/Throat:      Pharynx: No oropharyngeal exudate or posterior oropharyngeal erythema.   Eyes:      General: No scleral icterus.     Pupils: Pupils are equal, round, and reactive to light.   Neck:      Vascular: No JVD.   Cardiovascular:      Rate and Rhythm: Tachycardia present. Rhythm irregular.      Heart sounds: Normal heart sounds. No murmur heard.  Pulmonary:      Effort: No respiratory distress.      Breath sounds: Rales present. No wheezing.   Abdominal:      General: Bowel sounds are normal. There is no distension.      Palpations: Abdomen is soft.      Tenderness: There is no abdominal tenderness.   Musculoskeletal:         General: No swelling.      Cervical back: Normal range of motion and neck supple. No rigidity.   Skin:     General: Skin is warm and dry.      Capillary Refill: Capillary refill takes less than 2 seconds.      Coloration: Skin is not pale.      Findings: No rash.   Neurological:      General: No focal deficit present.      Mental Status: She is alert and oriented to person, place, and time.      Cranial Nerves: No cranial nerve deficit.      Motor: No weakness or abnormal muscle tone.           VENTILATOR SETTINGS  Oxygen Concentration (%):  [40] 40           LAST ARTERIAL BLOOD GAS  ABG  Recent Labs   Lab 09/19/23  0912   PH 7.519*   PO2 84   PCO2 35.9   HCO3 29.2*   BE 6         ACUTE PHASE REACTANT (LAST 24 HOURS)  No results for input(s): "FERRITIN", "CRP", "LDH", "DDIMER" in the last 24 hours.      CBC LAST (LAST 24 HOURS)  Recent Labs   Lab 09/22/23  0314   WBC 9.60   RBC 3.68*   HGB 9.6*   HCT 31.7*   MCV 86   MCH 26.1*   MCHC 30.3*   RDW 16.0*      MPV 9.2   GRAN 72.7  7.0 " "  LYMPH 11.8*  1.1   MONO 12.8  1.2*   BASO 0.04   NRBC 0         CHEMISTRY LAST (LAST 24 HOURS)  Recent Labs   Lab 09/22/23  0314   *   K 5.2*   CL 97   CO2 28   ANIONGAP 10   BUN 29*   CREATININE 1.5*      CALCIUM 9.0   MG 2.1   ALBUMIN 2.6*   PROT 5.9*   ALKPHOS 71   ALT 8*   AST 9*   BILITOT 0.3         COAGULATION LAST (LAST 24 HOURS)  No results for input(s): "LABPT", "INR", "APTT" in the last 24 hours.    CARDIAC PROFILE (LAST 24 HOURS)  Recent Labs   Lab 09/18/23  2201 09/19/23  0530 09/19/23  1124 09/20/23  1143   *  --   --   --    LDH  --   --   --  141   TROPONINIHS  --    < > 18.6*  --     < > = values in this interval not displayed.         LAST 7 DAYS MICROBIOLOGY   Microbiology Results (last 7 days)       Procedure Component Value Units Date/Time    Culture, Body Fluid (Aerobic) w/ GS [[id number]] Collected: 09/20/23 1035    Order Status: Completed Specimen: Pleural Fluid from Lung, Left Updated: 09/22/23 1116     AEROBIC CULTURE - FLUID No growth    Blood culture [[id number]] Collected: 09/19/23 1338    Order Status: Completed Specimen: Blood Updated: 09/21/23 1432     Blood Culture, Routine No Growth to date      No Growth to date      No Growth to date    Gram stain [[id number]] Collected: 09/20/23 1035    Order Status: Completed Specimen: Pleural Fluid from Lung, Left Updated: 09/21/23 1420     Gram Stain Result Moderate WBC's      No organisms seen    AFB culture (includes stain) [[id number]] Collected: 09/20/23 1035    Order Status: Sent Specimen: Pleural Fluid from Lung, Left Updated: 09/20/23 1052    Culture, Respiratory with Gram Stain [[id number]]     Order Status: No result Specimen: Respiratory             MOST RECENT IMAGING  X-Ray Chest AP Portable  Chest, single view    HISTORY: Pneumonia.    Comparison is made to 9/20/2023.    The cardiac silhouette and central pulmonary vasculature are stable.    There has been interval progression of pulmonary opacities in the " left mid/lower lung zone. There is similar blunting of the left costophrenic angle.    There is been development of linear opacity compatible with atelectasis in the right midlung. There is interstitial prominence near the right pulmonary apex. No significant right-sided effusion demonstrated.    IMPRESSION:    Interval progression of pulmonary opacities in the left mid and lower lung zone. There is similar blunting of left costophrenic angle.    Minor platelike atelectasis within the right midlung. There is mild right-sided interstitial prominence.    Electronically signed by:  Ines Schneider MD  9/22/2023 7:32 AM CDT Workstation: [id number]      CURRENT VISIT EKG  Results for orders placed or performed during the hospital encounter of 09/18/23   EKG 12-lead    Narrative    Test Reason : I49.9,    Vent. Rate : 121 BPM     Atrial Rate : 326 BPM     P-R Int : 000 ms          QRS Dur : 088 ms      QT Int : 286 ms       P-R-T Axes : 000 -29 150 degrees     QTc Int : 406 ms    Atrial fibrillation with rapid ventricular response  Low voltage QRS  Nonspecific T wave abnormality  Abnormal ECG  When compared with ECG of 18-SEP-2023 21:33,  No significant change was found  Confirmed by Kamran LESLIE, Gary GONZALEZ ([id number]) on 9/19/2023 7:27:06 PM    Referred By: [email or web address]   SELF           Confirmed By:Gary Andrew MD       ECHOCARDIOGRAM RESULTS  Results for orders placed during the hospital encounter of 09/18/23    Echo Saline Bubble? No    Interpretation Summary    Left Ventricle: Moderately increased wall thickness. There is concentric remodeling. There is mildly reduced systolic function with a visually estimated ejection fraction of 40 - 50%.    Left Atrium: Left atrium is dilated.    Aortic Valve: There is mild aortic valve sclerosis.    Mitral Valve: There is moderate regurgitation.    Tricuspid Valve: There is mild regurgitation.    Pulmonary Artery: The estimated pulmonary artery systolic pressure is 30 mmHg.    IVC/SVC:  Normal venous pressure at 3 mmHg.    Pericardium: There is a small effusion. No indication of cardiac tamponade.        ASSESSMENT:   Acute on chronic Hypoxemic respiratory failure   Community acquired Pneumonia, no evidence of aspiration  Para pneumonic pleural effusion on the left, exudative   - Underwent thoracentesis on 9/21/23 with turbed yellow and cloudy appearing fluid, -2 Liters fluid drained.   - On diuretics, albumin gradient is 0.1, which is consistent with exudative effusion.   - Based on other lights criteria, total protein ratio is 3.8/6.3= 0.60, and LDH ratio is 280/141, glucose 76.   - Based on all above criteria, this is consistent with exudative effusion  Hx of biventricular heart failure, PASP 45 , ICD  Atrial fibrillation - still with irregular rhythm, more rate controlled while on amio  Intermittent hypotension - likely related to active diuresis while on beta blocker.     Ms Alegre is an 83 year old woman with biventricular heart failure who presents with acute shortness of breath, and a predominantly one sided pleural effusion. Bedside ultrasound suggests minimal to no fluid on the right. Left with mostly free flowing appearing fluid, no loculations were seen. She has been having fevers which have since subsided. Thoracentesis performed at bedside, drained about 2 L of turbid, yellow and cloudy fluid tinged with red. Patient had immediate relief of dyspnea.    Based on albumin gradient and lights criteria, overall picture is that this is consistent with a parapneumonic effusion from pneumonia.     PLAN:   - Recommend at least 2 weeks of antibiotic therapy for parapneumonic effusion related to pneumonia. No growth on cultures, so can continue to treat empirically. I think at this point she can be deescalated to oral augmentin, and we can continue to reassess her response to antibiotics while she is in the hospital. She can continue the oral antibiotics as an outpatient until she is followed  up. She is improving but I don't think ready for discharge home yet.  - No evidence of overt aspiration with speech therapy, although I think microaspiration could still be possible. Considering this to be a CAP, but in future if she has recurrent pneumonias, I think outpatient MBSS would still be warranted. She has other risk factors for developing aspiration pneumonia -  She has a history of poor dental hygiene, takes gabapentin throughout the day and to help sleep at night. She reports coughing following eating and at times she reports occasional choking on food.   - Continue oral amiodarone and treatment for afib, cardiology is following.   - She will need outpatient CT chest non contrast in 6-8 weeks, and then follow up with Dr Baldwin in clinic.   - Her renal function is improving with diuresis, I would continue diuresis with a goal of -1 to 2 liters for the next day or so, but only achieving net negative -500 yesterday. I would recommend adding additional dose of bumex today and continue monitor renal function.   - No critical care needs. Ready for step down from the ICU. Intermittent hypotension can be explained by active diuresis while on beta blocker.     I will continue to follow. Please call if any questions or concerns.     Bud López MD  Date of Service: 09/22/2023  2:23 PM   [phone]

## 2023-09-23 LAB
ALBUMIN SERPL BCP-MCNC: 2.5 G/DL (ref 3.5–5.2)
ALP SERPL-CCNC: 71 U/L (ref 55–135)
ALT SERPL W/O P-5'-P-CCNC: 10 U/L (ref 10–44)
ANION GAP SERPL CALC-SCNC: 3 MMOL/L (ref 8–16)
AST SERPL-CCNC: 10 U/L (ref 10–40)
BASOPHILS # BLD AUTO: 0.06 K/UL (ref 0–0.2)
BASOPHILS NFR BLD: 0.9 % (ref 0–1.9)
BILIRUB SERPL-MCNC: 0.3 MG/DL (ref 0.1–1)
BUN SERPL-MCNC: 32 MG/DL (ref 8–23)
CALCIUM SERPL-MCNC: 8.7 MG/DL (ref 8.7–10.5)
CHLORIDE SERPL-SCNC: 96 MMOL/L (ref 95–110)
CO2 SERPL-SCNC: 37 MMOL/L (ref 23–29)
CREAT SERPL-MCNC: 1.5 MG/DL (ref 0.5–1.4)
DIFFERENTIAL METHOD: ABNORMAL
EOSINOPHIL # BLD AUTO: 0.2 K/UL (ref 0–0.5)
EOSINOPHIL NFR BLD: 3.8 % (ref 0–8)
ERYTHROCYTE [DISTWIDTH] IN BLOOD BY AUTOMATED COUNT: 15.9 % (ref 11.5–14.5)
EST. GFR  (NO RACE VARIABLE): 34.6 ML/MIN/1.73 M^2
GLUCOSE SERPL-MCNC: 105 MG/DL (ref 70–110)
HCT VFR BLD AUTO: 32.2 % (ref 37–48.5)
HGB BLD-MCNC: 9.8 G/DL (ref 12–16)
IMM GRANULOCYTES # BLD AUTO: 0.02 K/UL (ref 0–0.04)
IMM GRANULOCYTES NFR BLD AUTO: 0.3 % (ref 0–0.5)
LYMPHOCYTES # BLD AUTO: 0.9 K/UL (ref 1–4.8)
LYMPHOCYTES NFR BLD: 13.8 % (ref 18–48)
MAGNESIUM SERPL-MCNC: 2 MG/DL (ref 1.6–2.6)
MCH RBC QN AUTO: 26.2 PG (ref 27–31)
MCHC RBC AUTO-ENTMCNC: 30.4 G/DL (ref 32–36)
MCV RBC AUTO: 86 FL (ref 82–98)
MONOCYTES # BLD AUTO: 0.8 K/UL (ref 0.3–1)
MONOCYTES NFR BLD: 12.7 % (ref 4–15)
NEUTROPHILS # BLD AUTO: 4.4 K/UL (ref 1.8–7.7)
NEUTROPHILS NFR BLD: 68.5 % (ref 38–73)
NRBC BLD-RTO: 0 /100 WBC
PHOSPHATE SERPL-MCNC: 3.7 MG/DL (ref 2.7–4.5)
PLATELET # BLD AUTO: 420 K/UL (ref 150–450)
PMV BLD AUTO: 9.4 FL (ref 9.2–12.9)
POTASSIUM SERPL-SCNC: 4.4 MMOL/L (ref 3.5–5.1)
PROT SERPL-MCNC: 5.9 G/DL (ref 6–8.4)
RBC # BLD AUTO: 3.74 M/UL (ref 4–5.4)
SODIUM SERPL-SCNC: 136 MMOL/L (ref 136–145)
WBC # BLD AUTO: 6.39 K/UL (ref 3.9–12.7)

## 2023-09-23 PROCEDURE — 25000003 PHARM REV CODE 250: Performed by: STUDENT IN AN ORGANIZED HEALTH CARE EDUCATION/TRAINING PROGRAM

## 2023-09-23 PROCEDURE — 80053 COMPREHEN METABOLIC PANEL: CPT | Performed by: STUDENT IN AN ORGANIZED HEALTH CARE EDUCATION/TRAINING PROGRAM

## 2023-09-23 PROCEDURE — 99900031 HC PATIENT EDUCATION (STAT)

## 2023-09-23 PROCEDURE — 94761 N-INVAS EAR/PLS OXIMETRY MLT: CPT

## 2023-09-23 PROCEDURE — 99900035 HC TECH TIME PER 15 MIN (STAT)

## 2023-09-23 PROCEDURE — 27000221 HC OXYGEN, UP TO 24 HOURS

## 2023-09-23 PROCEDURE — 92526 ORAL FUNCTION THERAPY: CPT

## 2023-09-23 PROCEDURE — 97110 THERAPEUTIC EXERCISES: CPT

## 2023-09-23 PROCEDURE — 94799 UNLISTED PULMONARY SVC/PX: CPT

## 2023-09-23 PROCEDURE — 99233 PR SUBSEQUENT HOSPITAL CARE,LEVL III: ICD-10-PCS | Mod: ,,, | Performed by: STUDENT IN AN ORGANIZED HEALTH CARE EDUCATION/TRAINING PROGRAM

## 2023-09-23 PROCEDURE — 94660 CPAP INITIATION&MGMT: CPT

## 2023-09-23 PROCEDURE — 20000000 HC ICU ROOM

## 2023-09-23 PROCEDURE — 97530 THERAPEUTIC ACTIVITIES: CPT

## 2023-09-23 PROCEDURE — 83735 ASSAY OF MAGNESIUM: CPT | Performed by: INTERNAL MEDICINE

## 2023-09-23 PROCEDURE — 63600175 PHARM REV CODE 636 W HCPCS: Performed by: INTERNAL MEDICINE

## 2023-09-23 PROCEDURE — 25000003 PHARM REV CODE 250: Performed by: INTERNAL MEDICINE

## 2023-09-23 PROCEDURE — 84100 ASSAY OF PHOSPHORUS: CPT | Performed by: INTERNAL MEDICINE

## 2023-09-23 PROCEDURE — 12000002 HC ACUTE/MED SURGE SEMI-PRIVATE ROOM

## 2023-09-23 PROCEDURE — 99233 SBSQ HOSP IP/OBS HIGH 50: CPT | Mod: ,,, | Performed by: STUDENT IN AN ORGANIZED HEALTH CARE EDUCATION/TRAINING PROGRAM

## 2023-09-23 PROCEDURE — 85025 COMPLETE CBC W/AUTO DIFF WBC: CPT | Performed by: STUDENT IN AN ORGANIZED HEALTH CARE EDUCATION/TRAINING PROGRAM

## 2023-09-23 PROCEDURE — 36415 COLL VENOUS BLD VENIPUNCTURE: CPT | Performed by: STUDENT IN AN ORGANIZED HEALTH CARE EDUCATION/TRAINING PROGRAM

## 2023-09-23 RX ADMIN — MIDODRINE HYDROCHLORIDE 5 MG: 2.5 TABLET ORAL at 05:09

## 2023-09-23 RX ADMIN — ATORVASTATIN CALCIUM 20 MG: 20 TABLET, FILM COATED ORAL at 12:09

## 2023-09-23 RX ADMIN — ACETAMINOPHEN 650 MG: 325 TABLET ORAL at 08:09

## 2023-09-23 RX ADMIN — GABAPENTIN 200 MG: 100 CAPSULE ORAL at 04:09

## 2023-09-23 RX ADMIN — GABAPENTIN 200 MG: 100 CAPSULE ORAL at 08:09

## 2023-09-23 RX ADMIN — CEFTRIAXONE SODIUM 1 G: 1 INJECTION, POWDER, FOR SOLUTION INTRAMUSCULAR; INTRAVENOUS at 01:09

## 2023-09-23 RX ADMIN — MUPIROCIN 1 G: 20 OINTMENT TOPICAL at 08:09

## 2023-09-23 RX ADMIN — RIVAROXABAN 15 MG: 15 TABLET, FILM COATED ORAL at 04:09

## 2023-09-23 RX ADMIN — BUMETANIDE 1 MG: 0.25 INJECTION INTRAMUSCULAR; INTRAVENOUS at 08:09

## 2023-09-23 RX ADMIN — FLUTICASONE PROPIONATE 100 MCG: 50 SPRAY, METERED NASAL at 10:09

## 2023-09-23 RX ADMIN — ATORVASTATIN CALCIUM 20 MG: 20 TABLET, FILM COATED ORAL at 08:09

## 2023-09-23 RX ADMIN — MIDODRINE HYDROCHLORIDE 5 MG: 2.5 TABLET ORAL at 10:09

## 2023-09-23 RX ADMIN — METOPROLOL SUCCINATE 25 MG: 25 TABLET, FILM COATED, EXTENDED RELEASE ORAL at 08:09

## 2023-09-23 RX ADMIN — SENNOSIDES AND DOCUSATE SODIUM 1 TABLET: 50; 8.6 TABLET ORAL at 10:09

## 2023-09-23 RX ADMIN — MIDODRINE HYDROCHLORIDE 5 MG: 2.5 TABLET ORAL at 04:09

## 2023-09-23 RX ADMIN — AMIODARONE HYDROCHLORIDE 200 MG: 200 TABLET ORAL at 08:09

## 2023-09-23 RX ADMIN — GABAPENTIN 200 MG: 100 CAPSULE ORAL at 12:09

## 2023-09-23 RX ADMIN — MUPIROCIN 1 G: 20 OINTMENT TOPICAL at 12:09

## 2023-09-23 NOTE — RESPIRATORY THERAPY
09/22/23 1930   Patient Assessment/Suction   Level of Consciousness (AVPU) alert   Respiratory Effort Unlabored   Expansion/Accessory Muscles/Retractions no use of accessory muscles   All Lung Fields Breath Sounds coarse;diminished   PRE-TX-O2   Device (Oxygen Therapy) high flow nasal cannula   $ Is the patient on Low Flow Oxygen? Yes   Flow (L/min) 4   SpO2 97 %   Pulse Oximetry Type Continuous   $ Pulse Oximetry - Multiple Charge Pulse Oximetry - Multiple   Pulse 90   Resp (!) 27   BP (!) 103/55   Aerosol Therapy   $ Aerosol Therapy Charges PRN treatment not required   Respiratory Treatment Status (SVN) PRN treatment not required   Education   $ Education Bronchodilator;15 min   Respiratory Evaluation   $ Care Plan Tech Time 15 min   $ Eval/Re-eval Charges Re-evaluation

## 2023-09-23 NOTE — PROGRESS NOTES
[facility]    Critical Care Cardiology Progress Note    Subjective:  Patient is relatively comfortable lying flat.  No significant shortness of breath.  Blood pressure is adequate.  She still remains in atrial fibrillation rate controlled    Objective:  Vital Signs (Most Recent)  Temp: 97.8 °F (36.6 °C) (09/23/23 0301)  Pulse: 86 (09/23/23 0501)  Resp: (!) 23 (09/23/23 0501)  BP: (!) 128/58 (09/23/23 0501)  SpO2: 97 % (09/23/23 0501)    Vital Signs Range (Last 24H):  Temp:  [97.8 °F (36.6 °C)-98.1 °F (36.7 °C)]   Pulse:  []   Resp:  [20-44]   BP: ()/(50-83)   SpO2:  [78 %-100 %]     I & O (Last 24H):    Intake/Output Summary (Last 24 hours) at 9/23/2023 0821  Last data filed at 9/23/2023 0503  Gross per 24 hour   Intake 980.58 ml   Output 1560 ml   Net -579.42 ml       Current Diet:     Current Diet Order   Procedures    Diet Low Sodium, 2gm [facility]; Fluid - 1500mL     Order Specific Question:   Indicate patient location for additional diet options:     [facility]     Order Specific Question:   Fluid restriction:     Answer:   Fluid - 1500mL        Allergies:  Review of patient's allergies indicates:   Allergen Reactions    Codeine Other (See Comments)     nausea    Hydrocodone Nausea And Vomiting    Lasix [furosemide]      rash       Meds:  Scheduled Meds:   amiodarone  200 mg Oral Daily    atorvastatin  20 mg Oral QHS    bumetanide  1 mg Intravenous Daily    cefTRIAXone (ROCEPHIN) IVPB  1 g Intravenous Q24H    fluticasone propionate  2 spray Each Nostril Daily    gabapentin  200 mg Oral TID    metoprolol succinate  25 mg Oral Daily    midodrine  5 mg Oral TID AC    mupirocin   Nasal BID    rivaroxaban  15 mg Oral Daily with dinner     Continuous Infusions:  PRN Meds:acetaminophen, albuterol sulfate, aluminum-magnesium hydroxide-simethicone, dextrose 50%, dextrose 50%, glucagon (human recombinant), glucose, glucose, magnesium oxide, magnesium oxide, melatonin,  naloxone, ondansetron, potassium bicarbonate, potassium bicarbonate, potassium bicarbonate, potassium, sodium phosphates, potassium, sodium phosphates, potassium, sodium phosphates, prochlorperazine, senna-docusate 8.6-50 mg, simethicone, sodium chloride 0.9%, sodium chloride 0.9%, sodium chloride 0.9%    Oxygen/Ventilator Data (Last 24H):  (if applicable)   Oxygen Concentration (%):  [0.4-40] 40        Hemodynamic Parameters (Last 24H):   (if applicable)        Lines/Drains:  (if applicable)       Midline Catheter Insertion/Assessment  - Single Lumen 09/19/23 1355 Right basilic vein (medial side of arm) 18g x 10cm (Active)   Site Assessment Clean;Dry;Intact;No redness;No swelling 09/23/23 0501   IV Device Securement catheter securement device 09/23/23 0501   Line Status Flushed;Blood return noted 09/23/23 0501   Dressing Type CHG impregnated dressing/sponge 09/23/23 0501   Dressing Status Clean;Dry;Intact 09/23/23 0501   Dressing Intervention Integrity maintained 09/23/23 0501   Dressing Change Due 09/27/23 09/23/23 0501   Site Change Due 09/26/23 09/22/23 1901   Reason Not Rotated Not due 09/23/23 0501   Number of days: 3       Female External Urinary Catheter 09/19/23 (Active)   Skin no redness;no breakdown;perineum cleansed w/ soap and water 09/23/23 0501   Tolerance no signs/symptoms of discomfort 09/23/23 0501   Suction Continuous suction at 50 mmHg 09/22/23 1901   Output (mL) 360 mL 09/22/23 1801   Number of days: 4       Lab Results :  Recent Results (from the past 24 hour(s))   CBC auto differential    Collection Time: 09/23/23  2:57 AM   Result Value Ref Range    WBC 6.39 3.90 - 12.70 K/uL    RBC 3.74 (L) 4.00 - 5.40 M/uL    Hemoglobin 9.8 (L) 12.0 - 16.0 g/dL    Hematocrit 32.2 (L) 37.0 - 48.5 %    MCV 86 82 - 98 fL    MCH 26.2 (L) 27.0 - 31.0 pg    MCHC 30.4 (L) 32.0 - 36.0 g/dL    RDW 15.9 (H) 11.5 - 14.5 %    Platelets 420 150 - 450 K/uL    MPV 9.4 9.2 - 12.9 fL    Immature Granulocytes 0.3 0.0 - 0.5 %     Gran # (ANC) 4.4 1.8 - 7.7 K/uL    Immature Grans (Abs) 0.02 0.00 - 0.04 K/uL    Lymph # 0.9 (L) 1.0 - 4.8 K/uL    Mono # 0.8 0.3 - 1.0 K/uL    Eos # 0.2 0.0 - 0.5 K/uL    Baso # 0.06 0.00 - 0.20 K/uL    nRBC 0 0 /100 WBC    Gran % 68.5 38.0 - 73.0 %    Lymph % 13.8 (L) 18.0 - 48.0 %    Mono % 12.7 4.0 - 15.0 %    Eosinophil % 3.8 0.0 - 8.0 %    Basophil % 0.9 0.0 - 1.9 %    Differential Method Automated    Comprehensive metabolic panel    Collection Time: 09/23/23  2:57 AM   Result Value Ref Range    Sodium 136 136 - 145 mmol/L    Potassium 4.4 3.5 - 5.1 mmol/L    Chloride 96 95 - 110 mmol/L    CO2 37 (H) 23 - 29 mmol/L    Glucose 105 70 - 110 mg/dL    BUN 32 (H) 8 - 23 mg/dL    Creatinine 1.5 (H) 0.5 - 1.4 mg/dL    Calcium 8.7 8.7 - 10.5 mg/dL    Total Protein 5.9 (L) 6.0 - 8.4 g/dL    Albumin 2.5 (L) 3.5 - 5.2 g/dL    Total Bilirubin 0.3 0.1 - 1.0 mg/dL    Alkaline Phosphatase 71 55 - 135 U/L    AST 10 10 - 40 U/L    ALT 10 10 - 44 U/L    eGFR 34.6 (A) >60 mL/min/1.73 m^2    Anion Gap 3 (L) 8 - 16 mmol/L   Magnesium    Collection Time: 09/23/23  2:57 AM   Result Value Ref Range    Magnesium 2.0 1.6 - 2.6 mg/dL   Phosphorus    Collection Time: 09/23/23  2:57 AM   Result Value Ref Range    Phosphorus 3.7 2.7 - 4.5 mg/dL       Diagnostic Results:  Imaging Results              X-Ray Chest AP Portable (Final result)  Result time 09/19/23 05:54:57      Final result by Foster Landis MD (09/19/23 05:54:57)                   Narrative:    CLINICAL HISTORY:  83 years (1940) Female sob SOB    TECHNIQUE:  Portable AP radiograph the chest. One view.    COMPARISON:  Radiographs from May 24, 2023    FINDINGS:  There is a focal opacity at the left lung base, characteristic of a combination of a small left pleural effusion and associated atelectasis, noting superimposed infection/pneumonia and malignancy are not excluded. There is blunting of the right costophrenic angle consistent with trace pleural effusion. No  "pneumothorax is identified. The cardiac silhouette is moderately enlarged. Left-sided AICD pacemaker is unchanged in configuration. Atheromatous calcifications are seen at the aortic arch. Osseous structures appear unchanged. The visualized upper abdomen is unremarkable.    IMPRESSION:  1. Moderate left pleural effusion and adjacent atelectasis/consolidation.  2. Small interstitial opacity at the right lung base.                  .            Electronically signed by:  Foster Landis MD  9/19/2023 5:54 AM CDT Workstation: [id number]                                    12-lead EKG interpretation:   (if applicable)    Recent Cardiac Rhythm:  (if applicable)      Physical Exam:  Objective:  General Appearance:  In no acute distress.    Vital signs: (most recent): Blood pressure (!) 128/58, pulse 86, temperature 97.8 °F (36.6 °C), resp. rate (!) 23, height 5' 7" (1.702 m), weight 98.5 kg (217 lb 2.5 oz), SpO2 97 %, not currently breastfeeding.    Lungs:  (Decreased breath sounds and crackles on left base)  Heart: Irregular rhythm.  S1 normal and S2 normal.  Positive for murmur.    Abdomen: Abdomen is soft.  Bowel sounds are normal.   There is no abdominal tenderness.         Current Consults:  IP CONSULT TO CARDIOLOGY  IP CONSULT TO SOCIAL WORK/CASE MANAGEMENT  IP CONSULT TO REGISTERED DIETITIAN/NUTRITIONIST  IP CONSULT TO PULMONOLOGY  IP CONSULT TO PALLIATIVE CARE    Assessment/Plan:  Assessment:   Acute on Chronic combined HF reduced EF  MARCELINO/CKD cr 1.8  MR  HTN  COPD  JENNIFER  SSS  MDT ICD insitu     Echo 5/2023--EF 40 % left ventricular global hypokinesis, LA enlargement, Mod -Severe MR , PA pressure 45 mmhg      Recent device interrogation--9/2023  11% afib burden· Possible OptiVol fluid accumulation--9/10/2023      Echo 9/19  Plan:    Continue present medical therapy will repeat chest x-ray and make further recommendations.  Once stabilized pulmonary wise will consider cardioversion  "

## 2023-09-23 NOTE — PT/OT/SLP PROGRESS
Speech Language Pathology Treatment    Patient Name:  Jo Alegre   MRN:  [id number]  Admitting Diagnosis: Congestive heart failure    Recommendations:                 General Recommendations:   monitor tolerance of regular textures   Diet recommendations:  Regular Diet - IDDSI Level 7, Liquid Diet Level: Thin liquids - IDDSI Level 0   Aspiration Precautions: Wear oxygen during intake   General Precautions: Standard,    Communication strategies:  none    Assessment:     Jo Alegre is a 83 y.o. female with an SLP diagnosis of Dysphagia.  She had diet upgraded to regular yesterday, but observation of lunch today was only of tuna salad.  Need another meal to determine ability to consume regular textures safely and effectively.  Cont POC.    Subjective     The salad was nice  Patient goals: more of that fish from last night.     Objective:     Has the patient been evaluated by SLP for swallowing?   Yes  Keep patient NPO? No   Current Respiratory Status:        Pt seen for tolerance of upgradedd textures.  See Care Plan for tx details.  Education to pt & family re textues & swallowing guidelines.    Goals:   Multidisciplinary Problems       SLP Goals          Problem: SLP    Goal Priority Disciplines Outcome   SLP Goal     SLP Ongoing, Progressing   Description: 1. Pt will tolerate LRD (IDDSI 7,0) w/o overt s/s aspiration during >95% of PO trials  2. Pt will utilize swallowing precautions during >95% of PO intake given min cues                       Plan:     Patient to be seen:  2 x/week   Plan of Care expires:     Plan of Care reviewed with:  patient, family, other (see comments) (nursing, MD, pulmonologist)   SLP Follow-Up:  Yes       Discharge recommendations:      Barriers to Discharge:  None    Time Tracking:     SLP Treatment Date:   09/23/23  Speech Start Time:  1213  Speech Stop Time:  1227     Speech Total Time (min):  14 min    Billable Minutes: Treatment Swallowing Dysfunction 14    09/23/2023

## 2023-09-23 NOTE — PLAN OF CARE
Problem: SLP  Goal: SLP Goal  Description: 1. Pt will tolerate LRD (IDDSI 7,0) w/o overt s/s aspiration during >95% of PO trials  2. Pt will utilize swallowing precautions during >95% of PO intake given min cues  Outcome: Ongoing, Progressing   1. Found with regular textures & liquids unch, tuna salad actually more like minced moist foods so not appropriate to  ability to tolerate upgrade.     Education provided to pt  & family re dry mouth due to meds, upgraded textures, need for moistening on dry foods (gravy, butter, sauce) especially meats, meds whole in puree may be easier than liquid, etc.  Well received.

## 2023-09-23 NOTE — CARE UPDATE
09/23/23 0915   Patient Assessment/Suction   Level of Consciousness (AVPU) alert   Respiratory Effort Normal;Unlabored   Expansion/Accessory Muscles/Retractions no use of accessory muscles   All Lung Fields Breath Sounds diminished   LLL Breath Sounds crackles   RLL Breath Sounds crackles   Rhythm/Pattern, Respiratory unlabored;pattern regular;depth regular   Cough Frequency infrequent   PRE-TX-O2   Device (Oxygen Therapy) nasal cannula with humidification   $ Is the patient on Low Flow Oxygen? Yes   Flow (L/min) 4   Pulse Oximetry Type Continuous   $ Pulse Oximetry - Multiple Charge Pulse Oximetry - Multiple   Positioning Sitting in chair   Aerosol Therapy   $ Aerosol Therapy Charges PRN treatment not required   Daily Review of Necessity (SVN) completed   Respiratory Treatment Status (SVN) PRN treatment not required   Preset CPAP/BiPAP Settings   Mode Of Delivery Standby;CPAP   $ CPAP/BiPAP Daily Charge BiPAP/CPAP Daily   $ Is patient using? Yes   Equipment Type V60   Respiratory Evaluation   $ Care Plan Tech Time 15 min   Home Oxygen   Has Home Oxygen? Yes   Liter Flow 4   Duration continuous

## 2023-09-23 NOTE — PROGRESS NOTES
Replaced by [facility]  Progress Note  Pulmonary/Critical Care      PATIENT NAME: Jo Alegre  MRN: [id number]  TODAY'S DATE: 2023  2:23 PM  ADMIT DATE: 2023  AGE: 83 y.o. : 1940    HPI/INTERVAL HISTORY (See H&P for complete P,F,SHx) :   Ms Alegre is an 83 year old woman former smoker with HFrEF 30%, AICD, hx of AFib who presented from home with low blood pressure and shortness of breath. CXR was suggestive of predominantly left sided effusion. Patient was also evaluated by cardiology.     Continues to feel well. Conitnues to require about 4 LPM. She is on home oxygen intermittently.     Review of Systems   Constitutional:  Negative for appetite change, chills, fever and unexpected weight change.   HENT:  Negative for nosebleeds, postnasal drip, trouble swallowing and voice change.    Eyes:  Negative for visual disturbance.   Respiratory:  Negative for cough, shortness of breath and wheezing.    Cardiovascular:  Positive for leg swelling. Negative for chest pain.   Gastrointestinal:  Negative for diarrhea, nausea and vomiting.   Endocrine: Negative for cold intolerance and heat intolerance.   Genitourinary:  Negative for dysuria, flank pain and frequency.   Musculoskeletal:  Negative for neck pain and neck stiffness.   Allergic/Immunologic: Negative for environmental allergies and immunocompromised state.   Neurological:  Negative for syncope, speech difficulty and weakness.   Hematological:  Negative for adenopathy.   Psychiatric/Behavioral:  Negative for confusion.            VITAL SIGNS (MOST RECENT)  Temp: 97.8 °F (36.6 °C) (23 0301)  Pulse: 86 (23 0501)  Resp: (!) 23 (23 0501)  BP: (!) 128/58 (23 0501)  SpO2: 97 % (23 0501)    INTAKE AND OUTPUT (LAST 24 HOURS):  Intake/Output Summary (Last 24 hours) at 2023  Last data filed at 2023 0503  Gross per 24 hour   Intake 980.58 ml   Output 1560 ml   Net -579.42 ml         WEIGHT  Wt Readings from  "Last 1 Encounters:   09/22/23 98.5 kg (217 lb 2.5 oz)       Physical Exam  Vitals reviewed.   Constitutional:       General: She is not in acute distress.     Appearance: She is well-developed. She is obese. She is not ill-appearing or diaphoretic.   HENT:      Head: Normocephalic and atraumatic.      Mouth/Throat:      Pharynx: No oropharyngeal exudate or posterior oropharyngeal erythema.   Eyes:      General: No scleral icterus.     Pupils: Pupils are equal, round, and reactive to light.   Neck:      Vascular: No JVD.   Cardiovascular:      Rate and Rhythm: Tachycardia present. Rhythm irregular.      Heart sounds: Normal heart sounds. No murmur heard.  Pulmonary:      Effort: No respiratory distress.      Breath sounds: Rales present. No wheezing.   Abdominal:      General: Bowel sounds are normal. There is no distension.      Palpations: Abdomen is soft.      Tenderness: There is no abdominal tenderness.   Musculoskeletal:         General: No swelling.      Cervical back: Normal range of motion and neck supple. No rigidity.   Skin:     General: Skin is warm and dry.      Capillary Refill: Capillary refill takes less than 2 seconds.      Coloration: Skin is not pale.      Findings: No rash.   Neurological:      General: No focal deficit present.      Mental Status: She is alert and oriented to person, place, and time.      Cranial Nerves: No cranial nerve deficit.      Motor: No weakness or abnormal muscle tone.           VENTILATOR SETTINGS  Oxygen Concentration (%):  [0.4-40] 40           LAST ARTERIAL BLOOD GAS  ABG  Recent Labs   Lab 09/19/23  0912   PH 7.519*   PO2 84   PCO2 35.9   HCO3 29.2*   BE 6         ACUTE PHASE REACTANT (LAST 24 HOURS)  No results for input(s): "FERRITIN", "CRP", "LDH", "DDIMER" in the last 24 hours.      CBC LAST (LAST 24 HOURS)  Recent Labs   Lab 09/23/23  0257   WBC 6.39   RBC 3.74*   HGB 9.8*   HCT 32.2*   MCV 86   MCH 26.2*   MCHC 30.4*   RDW 15.9*      MPV 9.4   GRAN 68.5 " " 4.4   LYMPH 13.8*  0.9*   MONO 12.7  0.8   BASO 0.06   NRBC 0         CHEMISTRY LAST (LAST 24 HOURS)  Recent Labs   Lab 09/23/23  0257      K 4.4   CL 96   CO2 37*   ANIONGAP 3*   BUN 32*   CREATININE 1.5*      CALCIUM 8.7   MG 2.0   ALBUMIN 2.5*   PROT 5.9*   ALKPHOS 71   ALT 10   AST 10   BILITOT 0.3         COAGULATION LAST (LAST 24 HOURS)  No results for input(s): "LABPT", "INR", "APTT" in the last 24 hours.    CARDIAC PROFILE (LAST 24 HOURS)  Recent Labs   Lab 09/18/23  2201 09/19/23  0530 09/19/23  1124 09/20/23  1143   *  --   --   --    LDH  --   --   --  141   TROPONINIHS  --    < > 18.6*  --     < > = values in this interval not displayed.         LAST 7 DAYS MICROBIOLOGY   Microbiology Results (last 7 days)       Procedure Component Value Units Date/Time    Culture, Body Fluid (Aerobic) w/ GS [[id number]] Collected: 09/20/23 1035    Order Status: Completed Specimen: Pleural Fluid from Lung, Left Updated: 09/23/23 0756     AEROBIC CULTURE - FLUID No growth    Blood culture [[id number]] Collected: 09/19/23 1338    Order Status: Completed Specimen: Blood Updated: 09/22/23 1432     Blood Culture, Routine No Growth to date      No Growth to date      No Growth to date      No Growth to date    Gram stain [[id number]] Collected: 09/20/23 1035    Order Status: Completed Specimen: Pleural Fluid from Lung, Left Updated: 09/21/23 1420     Gram Stain Result Moderate WBC's      No organisms seen    AFB culture (includes stain) [[id number]] Collected: 09/20/23 1035    Order Status: Sent Specimen: Pleural Fluid from Lung, Left Updated: 09/20/23 1052    Culture, Respiratory with Gram Stain [[id number]]     Order Status: No result Specimen: Respiratory             MOST RECENT IMAGING  X-Ray Chest AP Portable  Chest, single view    HISTORY: Pneumonia.    Comparison is made to 9/20/2023.    The cardiac silhouette and central pulmonary vasculature are stable.    There has been interval progression of " pulmonary opacities in the left mid/lower lung zone. There is similar blunting of the left costophrenic angle.    There is been development of linear opacity compatible with atelectasis in the right midlung. There is interstitial prominence near the right pulmonary apex. No significant right-sided effusion demonstrated.    IMPRESSION:    Interval progression of pulmonary opacities in the left mid and lower lung zone. There is similar blunting of left costophrenic angle.    Minor platelike atelectasis within the right midlung. There is mild right-sided interstitial prominence.    Electronically signed by:  Ines Schneider MD  9/22/2023 7:32 AM CDT Workstation: [id number]      CURRENT VISIT EKG  Results for orders placed or performed during the hospital encounter of 09/18/23   EKG 12-lead    Narrative    Test Reason : I49.9,    Vent. Rate : 121 BPM     Atrial Rate : 326 BPM     P-R Int : 000 ms          QRS Dur : 088 ms      QT Int : 286 ms       P-R-T Axes : 000 -29 150 degrees     QTc Int : 406 ms    Atrial fibrillation with rapid ventricular response  Low voltage QRS  Nonspecific T wave abnormality  Abnormal ECG  When compared with ECG of 18-SEP-2023 21:33,  No significant change was found  Confirmed by Kamran LESLIE, Gary GONZALEZ ([id number]) on 9/19/2023 7:27:06 PM    Referred By: [email or web address]   SELF           Confirmed By:Gary Andrew MD       ECHOCARDIOGRAM RESULTS  Results for orders placed during the hospital encounter of 09/18/23    Echo Saline Bubble? No    Interpretation Summary    Left Ventricle: Moderately increased wall thickness. There is concentric remodeling. There is mildly reduced systolic function with a visually estimated ejection fraction of 40 - 50%.    Left Atrium: Left atrium is dilated.    Aortic Valve: There is mild aortic valve sclerosis.    Mitral Valve: There is moderate regurgitation.    Tricuspid Valve: There is mild regurgitation.    Pulmonary Artery: The estimated pulmonary artery systolic pressure  is 30 mmHg.    IVC/SVC: Normal venous pressure at 3 mmHg.    Pericardium: There is a small effusion. No indication of cardiac tamponade.        ASSESSMENT:   Acute on chronic Hypoxemic respiratory failure   Community acquired Pneumonia, no evidence of aspiration  Para pneumonic pleural effusion on the left, exudative   - Underwent thoracentesis on 9/21/23 with turbed yellow and cloudy appearing fluid, -2 Liters fluid drained.   - On diuretics, albumin gradient is 0.1, which is consistent with exudative effusion.   - Based on other lights criteria, total protein ratio is 3.8/6.3= 0.60, and LDH ratio is 280/141, glucose 76.   - Based on all above criteria, this is consistent with exudative effusion  Hx of biventricular heart failure, PASP 45 , ICD  Atrial fibrillation - still with irregular rhythm, more rate controlled while on amio  Intermittent hypotension - likely related to active diuresis while on beta blocker.     Ms Alegre is an 83 year old woman with biventricular heart failure who presents with acute shortness of breath, and a predominantly one sided pleural effusion. Bedside ultrasound suggests minimal to no fluid on the right. Left with mostly free flowing appearing fluid, no loculations were seen. She has been having fevers which have since subsided. Thoracentesis performed at bedside, drained about 2 L of turbid, yellow and cloudy fluid tinged with red. Patient had immediate relief of dyspnea.    Based on albumin gradient and lights criteria, overall picture is that this is consistent with a parapneumonic effusion from pneumonia.     Appears to be euvolemic at this point, continue oral diuretics.     PLAN:   - Recommend at least 2 weeks of antibiotic therapy for parapneumonic effusion related to pneumonia. No growth on cultures, so can continue to treat empirically. I think at this point she can be deescalated to oral augmentin, and we can continue to reassess her response to antibiotics while she is in  the hospital. She can continue the oral antibiotics as an outpatient until she is followed up. She is improving but I don't think ready for discharge home yet.  - No evidence of overt aspiration with speech therapy, although I think microaspiration could still be possible. Considering this to be a CAP, but in future if she has recurrent pneumonias, I think outpatient MBSS would still be warranted. She has other risk factors for developing aspiration pneumonia -  She has a history of poor dental hygiene, takes gabapentin throughout the day and to help sleep at night. She reports coughing following eating and at times she reports occasional choking on food.   - Continue oral amiodarone and treatment for afib, cardiology is following.   - She will need outpatient CT chest non contrast in 6-8 weeks, and then follow up with Dr Baldwin in clinic.   - Appears euvolemic today, continue diuresis to maintain euvolemia, can transition to oral diuretics.   - No critical care needs. Ready for step down from the ICU. If remains in ICU and is continuing to improve over weekend I think also reasonable to discharge out of ICU.  Intermittent hypotension can be explained by active diuresis while on beta blocker.     I will continue to follow. Please call if any questions or concerns.     Bud López MD  Date of Service: 09/23/2023  2:23 PM   [phone]

## 2023-09-23 NOTE — PT/OT/SLP PROGRESS
"Physical Therapy Treatment    Patient Name:  Jo Alegre   MRN:  [id number]    Recommendations:     Discharge Recommendations: rehabilitation facility  Discharge Equipment Recommendations: other (see comments) (TBD at next level of care)  Barriers to discharge: None    Assessment:     Jo Alegre is a 83 y.o. female admitted with a medical diagnosis of Congestive heart failure.  She presents with the following impairments/functional limitations: weakness, impaired endurance, impaired self care skills, impaired functional mobility, gait instability, impaired balance, decreased lower extremity function, decreased upper extremity function, decreased safety awareness, impaired cardiopulmonary response to activity.    Rehab Prognosis: Good; patient would benefit from acute skilled PT services to address these deficits and reach maximum level of function.    Recent Surgery: * No surgery found *      Plan:     During this hospitalization, patient to be seen 6 x/week to address the identified rehab impairments via gait training, therapeutic activities, therapeutic exercises, neuromuscular re-education and progress toward the following goals:    Plan of Care Expires:  10/21/23    Subjective     Chief Complaint: Pt has no new complaints  Patient/Family Comments/goals: Pt's Dtr states, "I hope she can qualify for a higher level of rehab."  Pain/Comfort:  Pain Rating 1: 0/10      Objective:     Communicated with nurse prior to session.  Patient found up in chair with blood pressure cuff, oxygen, PureWick, peripheral IV, pulse ox (continuous), telemetry upon PT entry to room.     General Precautions: Standard, fall  Orthopedic Precautions: N/A  Braces: N/A  Respiratory Status: Nasal cannula, flow 3 L/min     Functional Mobility:  Transfers:     Sit to Stand:  moderate assistance with rolling walker  Balance: standing supported dynamic: Poor+      AM-PAC 6 CLICK MOBILITY  Turning over in bed (including adjusting " bedclothes, sheets and blankets)?: 3  Sitting down on and standing up from a chair with arms (e.g., wheelchair, bedside commode, etc.): 2  Moving from lying on back to sitting on the side of the bed?: 3  Moving to and from a bed to a chair (including a wheelchair)?: 2  Need to walk in hospital room?: 1  Climbing 3-5 steps with a railing?: 1  Basic Mobility Total Score: 12       Treatment & Education:  Pt completed 3 STS /c ModA from PT, then worked on standing therex x10ea; heel raises, side steps, marches.    Patient left up in chair with all lines intact, call button in reach, chair alarm on, and Dtr present..    GOALS:   Multidisciplinary Problems       Physical Therapy Goals          Problem: Physical Therapy    Goal Priority Disciplines Outcome Goal Variances Interventions   Physical Therapy Goal     PT, PT/OT      Description: Goals to be met by: 10/21/2023     Patient will increase functional independence with mobility by performin. Supine to sit with Contact Guard Assistance  2. Sit to stand transfer with Minimal Assistance  3. Gait  x 50  feet with Contact Guard Assistance using Rolling Walker.                          Time Tracking:     PT Received On: 23  PT Start Time: 1103     PT Stop Time: 1120  PT Total Time (min): 17 min     Billable Minutes: Therapeutic Exercise 17    Treatment Type: Treatment  PT/PTA: PT           2023

## 2023-09-24 LAB
ALBUMIN SERPL BCP-MCNC: 2.6 G/DL (ref 3.5–5.2)
ALP SERPL-CCNC: 68 U/L (ref 55–135)
ALT SERPL W/O P-5'-P-CCNC: 10 U/L (ref 10–44)
ANION GAP SERPL CALC-SCNC: 3 MMOL/L (ref 8–16)
ANION GAP SERPL CALC-SCNC: 4 MMOL/L (ref 8–16)
AST SERPL-CCNC: 10 U/L (ref 10–40)
BACTERIA BLD CULT: NORMAL
BACTERIA FLD AEROBE CULT: NO GROWTH
BASOPHILS # BLD AUTO: 0.05 K/UL (ref 0–0.2)
BASOPHILS NFR BLD: 0.8 % (ref 0–1.9)
BILIRUB SERPL-MCNC: 0.3 MG/DL (ref 0.1–1)
BUN SERPL-MCNC: 29 MG/DL (ref 8–23)
BUN SERPL-MCNC: 33 MG/DL (ref 8–23)
CALCIUM SERPL-MCNC: 8.9 MG/DL (ref 8.7–10.5)
CALCIUM SERPL-MCNC: 9.3 MG/DL (ref 8.7–10.5)
CHLORIDE SERPL-SCNC: 94 MMOL/L (ref 95–110)
CHLORIDE SERPL-SCNC: 96 MMOL/L (ref 95–110)
CO2 SERPL-SCNC: 37 MMOL/L (ref 23–29)
CO2 SERPL-SCNC: 37 MMOL/L (ref 23–29)
CREAT SERPL-MCNC: 1.4 MG/DL (ref 0.5–1.4)
CREAT SERPL-MCNC: 1.5 MG/DL (ref 0.5–1.4)
DIFFERENTIAL METHOD: ABNORMAL
EOSINOPHIL # BLD AUTO: 0.3 K/UL (ref 0–0.5)
EOSINOPHIL NFR BLD: 3.8 % (ref 0–8)
ERYTHROCYTE [DISTWIDTH] IN BLOOD BY AUTOMATED COUNT: 15.7 % (ref 11.5–14.5)
EST. GFR  (NO RACE VARIABLE): 34.6 ML/MIN/1.73 M^2
EST. GFR  (NO RACE VARIABLE): 37.6 ML/MIN/1.73 M^2
GLUCOSE SERPL-MCNC: 113 MG/DL (ref 70–110)
GLUCOSE SERPL-MCNC: 161 MG/DL (ref 70–110)
HCT VFR BLD AUTO: 30.8 % (ref 37–48.5)
HGB BLD-MCNC: 9.1 G/DL (ref 12–16)
IMM GRANULOCYTES # BLD AUTO: 0.02 K/UL (ref 0–0.04)
IMM GRANULOCYTES NFR BLD AUTO: 0.3 % (ref 0–0.5)
LYMPHOCYTES # BLD AUTO: 1 K/UL (ref 1–4.8)
LYMPHOCYTES NFR BLD: 14.5 % (ref 18–48)
MAGNESIUM SERPL-MCNC: 2 MG/DL (ref 1.6–2.6)
MAGNESIUM SERPL-MCNC: 2.1 MG/DL (ref 1.6–2.6)
MCH RBC QN AUTO: 25.6 PG (ref 27–31)
MCHC RBC AUTO-ENTMCNC: 29.5 G/DL (ref 32–36)
MCV RBC AUTO: 87 FL (ref 82–98)
MONOCYTES # BLD AUTO: 0.8 K/UL (ref 0.3–1)
MONOCYTES NFR BLD: 12.6 % (ref 4–15)
NEUTROPHILS # BLD AUTO: 4.5 K/UL (ref 1.8–7.7)
NEUTROPHILS NFR BLD: 68 % (ref 38–73)
NRBC BLD-RTO: 0 /100 WBC
PHOSPHATE SERPL-MCNC: 2.7 MG/DL (ref 2.7–4.5)
PLATELET # BLD AUTO: 424 K/UL (ref 150–450)
PMV BLD AUTO: 9.5 FL (ref 9.2–12.9)
POTASSIUM SERPL-SCNC: 4.6 MMOL/L (ref 3.5–5.1)
POTASSIUM SERPL-SCNC: 4.6 MMOL/L (ref 3.5–5.1)
PROT SERPL-MCNC: 6 G/DL (ref 6–8.4)
RBC # BLD AUTO: 3.56 M/UL (ref 4–5.4)
SODIUM SERPL-SCNC: 135 MMOL/L (ref 136–145)
SODIUM SERPL-SCNC: 136 MMOL/L (ref 136–145)
WBC # BLD AUTO: 6.57 K/UL (ref 3.9–12.7)

## 2023-09-24 PROCEDURE — 12000002 HC ACUTE/MED SURGE SEMI-PRIVATE ROOM

## 2023-09-24 PROCEDURE — 99291 PR CRITICAL CARE, E/M 30-74 MINUTES: ICD-10-PCS | Mod: ,,, | Performed by: STUDENT IN AN ORGANIZED HEALTH CARE EDUCATION/TRAINING PROGRAM

## 2023-09-24 PROCEDURE — 94799 UNLISTED PULMONARY SVC/PX: CPT

## 2023-09-24 PROCEDURE — 25000003 PHARM REV CODE 250: Performed by: STUDENT IN AN ORGANIZED HEALTH CARE EDUCATION/TRAINING PROGRAM

## 2023-09-24 PROCEDURE — 83735 ASSAY OF MAGNESIUM: CPT | Mod: 91 | Performed by: STUDENT IN AN ORGANIZED HEALTH CARE EDUCATION/TRAINING PROGRAM

## 2023-09-24 PROCEDURE — 94761 N-INVAS EAR/PLS OXIMETRY MLT: CPT

## 2023-09-24 PROCEDURE — 99900031 HC PATIENT EDUCATION (STAT)

## 2023-09-24 PROCEDURE — 25000003 PHARM REV CODE 250: Performed by: INTERNAL MEDICINE

## 2023-09-24 PROCEDURE — 27000221 HC OXYGEN, UP TO 24 HOURS

## 2023-09-24 PROCEDURE — 36415 COLL VENOUS BLD VENIPUNCTURE: CPT | Performed by: STUDENT IN AN ORGANIZED HEALTH CARE EDUCATION/TRAINING PROGRAM

## 2023-09-24 PROCEDURE — 80048 BASIC METABOLIC PNL TOTAL CA: CPT | Performed by: STUDENT IN AN ORGANIZED HEALTH CARE EDUCATION/TRAINING PROGRAM

## 2023-09-24 PROCEDURE — 63600175 PHARM REV CODE 636 W HCPCS: Performed by: STUDENT IN AN ORGANIZED HEALTH CARE EDUCATION/TRAINING PROGRAM

## 2023-09-24 PROCEDURE — 83735 ASSAY OF MAGNESIUM: CPT | Performed by: INTERNAL MEDICINE

## 2023-09-24 PROCEDURE — 99291 CRITICAL CARE FIRST HOUR: CPT | Mod: ,,, | Performed by: STUDENT IN AN ORGANIZED HEALTH CARE EDUCATION/TRAINING PROGRAM

## 2023-09-24 PROCEDURE — 20000000 HC ICU ROOM

## 2023-09-24 PROCEDURE — 84100 ASSAY OF PHOSPHORUS: CPT | Performed by: INTERNAL MEDICINE

## 2023-09-24 PROCEDURE — 63600175 PHARM REV CODE 636 W HCPCS: Performed by: INTERNAL MEDICINE

## 2023-09-24 PROCEDURE — 99900035 HC TECH TIME PER 15 MIN (STAT)

## 2023-09-24 PROCEDURE — 85025 COMPLETE CBC W/AUTO DIFF WBC: CPT | Performed by: STUDENT IN AN ORGANIZED HEALTH CARE EDUCATION/TRAINING PROGRAM

## 2023-09-24 PROCEDURE — 80053 COMPREHEN METABOLIC PANEL: CPT | Performed by: STUDENT IN AN ORGANIZED HEALTH CARE EDUCATION/TRAINING PROGRAM

## 2023-09-24 RX ADMIN — Medication 6 MG: at 08:09

## 2023-09-24 RX ADMIN — MIDODRINE HYDROCHLORIDE 5 MG: 2.5 TABLET ORAL at 06:09

## 2023-09-24 RX ADMIN — MIDODRINE HYDROCHLORIDE 5 MG: 2.5 TABLET ORAL at 04:09

## 2023-09-24 RX ADMIN — MUPIROCIN 1 G: 20 OINTMENT TOPICAL at 09:09

## 2023-09-24 RX ADMIN — RIVAROXABAN 15 MG: 15 TABLET, FILM COATED ORAL at 04:09

## 2023-09-24 RX ADMIN — MIDODRINE HYDROCHLORIDE 5 MG: 2.5 TABLET ORAL at 11:09

## 2023-09-24 RX ADMIN — ATORVASTATIN CALCIUM 20 MG: 20 TABLET, FILM COATED ORAL at 08:09

## 2023-09-24 RX ADMIN — DEXTROSE 250 MG: 50 INJECTION, SOLUTION INTRAVENOUS at 10:09

## 2023-09-24 RX ADMIN — AMIODARONE HYDROCHLORIDE 200 MG: 200 TABLET ORAL at 09:09

## 2023-09-24 RX ADMIN — CEFTRIAXONE SODIUM 1 G: 1 INJECTION, POWDER, FOR SOLUTION INTRAMUSCULAR; INTRAVENOUS at 01:09

## 2023-09-24 RX ADMIN — GABAPENTIN 200 MG: 100 CAPSULE ORAL at 09:09

## 2023-09-24 RX ADMIN — METOPROLOL SUCCINATE 25 MG: 25 TABLET, FILM COATED, EXTENDED RELEASE ORAL at 09:09

## 2023-09-24 RX ADMIN — GABAPENTIN 200 MG: 100 CAPSULE ORAL at 08:09

## 2023-09-24 RX ADMIN — BUMETANIDE 1 MG: 0.25 INJECTION INTRAMUSCULAR; INTRAVENOUS at 09:09

## 2023-09-24 RX ADMIN — GABAPENTIN 200 MG: 100 CAPSULE ORAL at 03:09

## 2023-09-24 NOTE — ASSESSMENT & PLAN NOTE
Patient is identified as having Combined Systolic and Diastolic heart failure that is Acute on chronic. CHF is currently uncontrolled. Latest ECHO performed and demonstrates- Results for orders placed during the hospital encounter of 05/15/23    Echo    Interpretation Summary  · The left ventricle is mildly enlarged with mild concentric hypertrophy and moderately decreased systolic function.  · The estimated ejection fraction is 40%.  · Grade I left ventricular diastolic dysfunction.  · There is left ventricular global hypokinesis.  · Normal right ventricular size with normal right ventricular systolic function.  · Severe left atrial enlargement.  · Moderate-to-severe mitral regurgitation.  · Mild tricuspid regurgitation.  · Elevated central venous pressure (15 mmHg).  · The estimated PA systolic pressure is 45 mmHg.  · There is pulmonary hypertension.  · Trivial anterior pericardial effusion.  . Continue bumex and beta blocker and monitor clinical status closely. Monitor on telemetry. Patient is on CHF pathway.  Monitor strict Is&Os and daily weights.  Placed on fluid restriction of 1.5 L. Cardiology has been consulted. Continue to stress to patient importance of self efficacy and  on diet for CHF. Last BNP reviewed- and noted below   Recent Labs   Lab 09/18/23  2201   *     Patient instructed to adhere with fluid restriction.  Continue IV bumetanide.

## 2023-09-24 NOTE — ASSESSMENT & PLAN NOTE
Not sure if ischemic or non-ischemic  Device interrogated during this admission.  Continue Bumex 1 mg IV daily.  Continue goal-directed medical therapy.  I/Os, daily weight

## 2023-09-24 NOTE — ASSESSMENT & PLAN NOTE
Patient with Hypoxic Respiratory failure which is Acute.  She is not on home oxygen. Supplemental oxygen was provided and noted- Oxygen Concentration (%):  [0.4-40] 40    .   Signs/symptoms of respiratory failure include- tachypnea, increased work of breathing, respiratory distress and use of accessory muscles. Contributing diagnoses includes - CHF.  Labs and images were reviewed. Will treat underlying causes and adjust management of respiratory failure as follows- Continue 4 LPM oxygen NC, Bumex intravenous for treatment of pulmonary edema.

## 2023-09-24 NOTE — ASSESSMENT & PLAN NOTE
Continue Toprol 25 mg daily.  Monitor blood pressure.  It's controlled.  She's also on midodrine.

## 2023-09-24 NOTE — PROGRESS NOTES
[facility]  Progress Note  Pulmonary/Critical Care      PATIENT NAME: Jo Alegre  MRN: [id number]  TODAY'S DATE: 2023  2:23 PM  ADMIT DATE: 2023  AGE: 83 y.o. : 1940    HPI/INTERVAL HISTORY (See H&P for complete P,F,SHx) :   Ms Alegre is an 83 year old woman former smoker with HFrEF 30%, AICD, hx of AFib who presented from home with low blood pressure and shortness of breath. CXR was suggestive of predominantly left sided effusion. Patient was also evaluated by cardiology.     Continues to feel well. Conitnues to require about 4 LPM. She is on home oxygen intermittently.     Review of Systems   Constitutional:  Negative for appetite change, chills, fever and unexpected weight change.   HENT:  Negative for nosebleeds, postnasal drip, trouble swallowing and voice change.    Eyes:  Negative for visual disturbance.   Respiratory:  Negative for cough, shortness of breath and wheezing.    Cardiovascular:  Positive for leg swelling. Negative for chest pain.   Gastrointestinal:  Negative for diarrhea, nausea and vomiting.   Endocrine: Negative for cold intolerance and heat intolerance.   Genitourinary:  Negative for dysuria, flank pain and frequency.   Musculoskeletal:  Negative for neck pain and neck stiffness.   Allergic/Immunologic: Negative for environmental allergies and immunocompromised state.   Neurological:  Negative for syncope, speech difficulty and weakness.   Hematological:  Negative for adenopathy.   Psychiatric/Behavioral:  Negative for confusion.            VITAL SIGNS (MOST RECENT)  Temp: 98.2 °F (36.8 °C) (23 0301)  Pulse: 95 (23 0830)  Resp: 20 (23 0830)  BP: 127/63 (23 0800)  SpO2: 98 % (23 0830)    INTAKE AND OUTPUT (LAST 24 HOURS):  Intake/Output Summary (Last 24 hours) at 2023 0857  Last data filed at 2023 0515  Gross per 24 hour   Intake 820 ml   Output 1130 ml   Net -310 ml         WEIGHT  Wt Readings from Last 1  "Encounters:   09/23/23 97.2 kg (214 lb 4.6 oz)       Physical Exam  Vitals reviewed.   Constitutional:       General: She is not in acute distress.     Appearance: She is well-developed. She is obese. She is not ill-appearing or diaphoretic.   HENT:      Head: Normocephalic and atraumatic.      Mouth/Throat:      Pharynx: No oropharyngeal exudate or posterior oropharyngeal erythema.   Eyes:      General: No scleral icterus.     Pupils: Pupils are equal, round, and reactive to light.   Neck:      Vascular: No JVD.   Cardiovascular:      Rate and Rhythm: Tachycardia present. Rhythm irregular.      Heart sounds: Normal heart sounds. No murmur heard.  Pulmonary:      Effort: No respiratory distress.      Breath sounds: Rales present. No wheezing.   Abdominal:      General: Bowel sounds are normal. There is no distension.      Palpations: Abdomen is soft.      Tenderness: There is no abdominal tenderness.   Musculoskeletal:         General: No swelling.      Cervical back: Normal range of motion and neck supple. No rigidity.   Skin:     General: Skin is warm and dry.      Capillary Refill: Capillary refill takes less than 2 seconds.      Coloration: Skin is not pale.      Findings: No rash.   Neurological:      General: No focal deficit present.      Mental Status: She is alert and oriented to person, place, and time.      Cranial Nerves: No cranial nerve deficit.      Motor: No weakness or abnormal muscle tone.           VENTILATOR SETTINGS  Oxygen Concentration (%):  [40] 40           LAST ARTERIAL BLOOD GAS  ABG  Recent Labs   Lab 09/19/23  0912   PH 7.519*   PO2 84   PCO2 35.9   HCO3 29.2*   BE 6         ACUTE PHASE REACTANT (LAST 24 HOURS)  No results for input(s): "FERRITIN", "CRP", "LDH", "DDIMER" in the last 24 hours.      CBC LAST (LAST 24 HOURS)  Recent Labs   Lab 09/24/23  0304   WBC 6.57   RBC 3.56*   HGB 9.1*   HCT 30.8*   MCV 87   MCH 25.6*   MCHC 29.5*   RDW 15.7*      MPV 9.5   GRAN 68.0  4.5 " "  LYMPH 14.5*  1.0   MONO 12.6  0.8   BASO 0.05   NRBC 0         CHEMISTRY LAST (LAST 24 HOURS)  Recent Labs   Lab 09/24/23  0304      K 4.6   CL 96   CO2 37*   ANIONGAP 3*   BUN 33*   CREATININE 1.4   *   CALCIUM 8.9   MG 2.1   ALBUMIN 2.6*   PROT 6.0   ALKPHOS 68   ALT 10   AST 10   BILITOT 0.3         COAGULATION LAST (LAST 24 HOURS)  No results for input(s): "LABPT", "INR", "APTT" in the last 24 hours.    CARDIAC PROFILE (LAST 24 HOURS)  Recent Labs   Lab 09/18/23  2201 09/19/23  0530 09/19/23  1124 09/20/23  1143   *  --   --   --    LDH  --   --   --  141   TROPONINIHS  --    < > 18.6*  --     < > = values in this interval not displayed.         LAST 7 DAYS MICROBIOLOGY   Microbiology Results (last 7 days)       Procedure Component Value Units Date/Time    Culture, Body Fluid (Aerobic) w/ GS [[id number]] Collected: 09/20/23 1035    Order Status: Completed Specimen: Pleural Fluid from Lung, Left Updated: 09/24/23 0717     AEROBIC CULTURE - FLUID No growth    AFB culture (includes stain) [[id number]] Collected: 09/20/23 1035    Order Status: Completed Specimen: Pleural Fluid from Lung, Left Updated: 09/23/23 1719     AFB CULTURE STAIN No acid fast bacilli seen.     AFB CULTURE STAIN Testing performed by:     AFB CULTURE STAIN Lab [facility]     [facility] [address] [facility]     [place] [address]     AFB CULTURE STAIN Dr.Brian Gil MD    Blood culture [[id number]] Collected: 09/19/23 1338    Order Status: Completed Specimen: Blood Updated: 09/23/23 1432     Blood Culture, Routine No Growth to date      No Growth to date      No Growth to date      No Growth to date      No Growth to date    Gram stain [[id number]] Collected: 09/20/23 1035    Order Status: Completed Specimen: Pleural Fluid from Lung, Left Updated: 09/21/23 1420     Gram Stain Result Moderate WBC's      No organisms seen    Culture, Respiratory with Gram Stain [[id number]]     Order " Status: No result Specimen: Respiratory             MOST RECENT IMAGING  X-Ray Chest AP Portable  Chest, single view    HISTORY: Pneumonia.    Comparison is made to 9/20/2023.    The cardiac silhouette and central pulmonary vasculature are stable.    There has been interval progression of pulmonary opacities in the left mid/lower lung zone. There is similar blunting of the left costophrenic angle.    There is been development of linear opacity compatible with atelectasis in the right midlung. There is interstitial prominence near the right pulmonary apex. No significant right-sided effusion demonstrated.    IMPRESSION:    Interval progression of pulmonary opacities in the left mid and lower lung zone. There is similar blunting of left costophrenic angle.    Minor platelike atelectasis within the right midlung. There is mild right-sided interstitial prominence.    Electronically signed by:  Ines Schneider MD  9/22/2023 7:32 AM CDT Workstation: [id number]      CURRENT VISIT EKG  Results for orders placed or performed during the hospital encounter of 09/18/23   EKG 12-lead    Narrative    Test Reason : I49.9,    Vent. Rate : 121 BPM     Atrial Rate : 326 BPM     P-R Int : 000 ms          QRS Dur : 088 ms      QT Int : 286 ms       P-R-T Axes : 000 -29 150 degrees     QTc Int : 406 ms    Atrial fibrillation with rapid ventricular response  Low voltage QRS  Nonspecific T wave abnormality  Abnormal ECG  When compared with ECG of 18-SEP-2023 21:33,  No significant change was found  Confirmed by Kamran LESLIE, Gary GONZALEZ ([id number]) on 9/19/2023 7:27:06 PM    Referred By: [email or web address]   SELF           Confirmed By:Gary Andrew MD       ECHOCARDIOGRAM RESULTS  Results for orders placed during the hospital encounter of 09/18/23    Echo Saline Bubble? No    Interpretation Summary    Left Ventricle: Moderately increased wall thickness. There is concentric remodeling. There is mildly reduced systolic function with a visually estimated  ejection fraction of 40 - 50%.    Left Atrium: Left atrium is dilated.    Aortic Valve: There is mild aortic valve sclerosis.    Mitral Valve: There is moderate regurgitation.    Tricuspid Valve: There is mild regurgitation.    Pulmonary Artery: The estimated pulmonary artery systolic pressure is 30 mmHg.    IVC/SVC: Normal venous pressure at 3 mmHg.    Pericardium: There is a small effusion. No indication of cardiac tamponade.        ASSESSMENT:   Acute on chronic Hypoxemic respiratory failure   Community acquired Pneumonia, no evidence of aspiration  Para pneumonic pleural effusion on the left, exudative   - Underwent thoracentesis on 9/21/23 with turbed yellow and cloudy appearing fluid, -2 Liters fluid drained.   - On diuretics, albumin gradient is 0.1, which is consistent with exudative effusion.   - Based on other lights criteria, total protein ratio is 3.8/6.3= 0.60, and LDH ratio is 280/141, glucose 76.   - Based on all above criteria, this is consistent with exudative effusion  Hx of biventricular heart failure, PASP 45 , ICD  Atrial fibrillation - still with irregular rhythm, more rate controlled while on amio  Intermittent hypotension - likely related to active diuresis while on beta blocker.     Ms Alegre is an 83 year old woman with biventricular heart failure who presents with acute shortness of breath, and a predominantly one sided pleural effusion. Bedside ultrasound suggests minimal to no fluid on the right. Left with mostly free flowing appearing fluid, no loculations were seen. She has been having fevers which have since subsided. Thoracentesis performed at bedside, drained about 2 L of turbid, yellow and cloudy fluid tinged with red. Patient had immediate relief of dyspnea.    Based on albumin gradient and lights criteria, overall picture is that this is consistent with a parapneumonic effusion from pneumonia.     Appears to be euvolemic at this point, continue oral diuretics.     PLAN:   -  Recommend at least 2 weeks of antibiotic therapy for parapneumonic effusion related to pneumonia. No growth on cultures, so can continue to treat empirically. I think at this point she can be deescalated to oral augmentin, and we can continue to reassess her response to antibiotics while she is in the hospital. She can continue the oral antibiotics as an outpatient until she is followed up. She is improving but I don't think ready for discharge home yet.  - No evidence of overt aspiration with speech therapy, although I think microaspiration could still be possible. Considering this to be a CAP, but in future if she has recurrent pneumonias, I think outpatient MBSS would still be warranted. She has other risk factors for developing aspiration pneumonia -  She has a history of poor dental hygiene, takes gabapentin throughout the day and to help sleep at night. She reports coughing following eating and at times she reports occasional choking on food.   - Continue oral amiodarone and treatment for afib, cardiology is following. Still in afib but rate controlled.  - She will need outpatient CT chest non contrast in 6-8 weeks, and then follow up with Dr Baldwin in clinic.   - Kidney function continues to improve with diuresis, continue IV bumex today.  Lets continue IV bumex today and Will add IV acetazolmide to help with contraction alkalosis and additional diuresis. After today, then we can start oral diuretic regimen.       I will continue to follow. Please call if any questions or concerns.     Upon my evaluation, this patient had a high probability of imminent or life-threatening deterioration, which required my direct attention, intervention, and personal management.    I have personally provided at least 35 minutes of critical care time exclusive of time spent on separately billable procedures. Over 50% of the time of this encounter was spent in direct care at the bedside. Time includes review of laboratory data,  radiology results, discussion with consultants, and monitoring for potential decompensation. Interventions were performed as documented above.    Bud López MD  Date of Service: 09/24/2023  2:23 PM   [phone]

## 2023-09-24 NOTE — ASSESSMENT & PLAN NOTE
Creatine stable for now. BMP reviewed- noted Estimated Creatinine Clearance: 34.6 mL/min (A) (based on SCr of 1.5 mg/dL (H)). according to latest data. Based on current GFR, CKD stage is stage 3 - GFR 30-59.  Monitor UOP and serial BMP and adjust therapy as needed. Renally dose meds. Avoid nephrotoxic medications and procedures.

## 2023-09-24 NOTE — PROGRESS NOTES
[facility] Medicine  Progress Note    Patient Name: Jo Alegre  MRN: [id number]  Patient Class: IP- Inpatient   Admission Date: 9/18/2023  Length of Stay: 4 days  Attending Physician: Israel Awan MD  Primary Care Provider: Kraig Alberto MD        Subjective:     Principal Problem:Acute on chronic combined systolic and diastolic congestive heart failure        HPI:  Ms. Alegre is an 83 year old woman with PMHx of HTN, HDL, HFrEF s/p AICD, Afib- presented with one day hx of sob started yesterday - pt says she was fine day before yesterday but around morning time she started having sob not associated with any chest pain, palpitations, or fever, chills, cough or abdominal pain. She did not have any dietary discretion, has been adherent to her home meds. She also noted leg swelling bilateral yesterday. She was at cardio office for her device interrogation which as per patient is fine.     In ER she was found to have left pleural effusion and elevated BNP. Pt is being admitted for diuresis.        Overview/Hospital Course:  Patient monitor closely during hospitalization.  She was noted to have acute hypoxemic respiratory failure and AFib RVR.  Cardiology consulted.  Patient noted to have left-sided large pleural effusion on chest x-ray.  Pulmonary Medicine perform left thoracentesis with 2000 cc yellow turbid fluid removal.  Fluid analysis suggestive of transudate.  Cultures are negative.  Patient is requiring aggressive IV diuretic therapy.  Symptoms have improved.  Subsequent chest x-rays showing reaccumulating left pleural effusion.  Patient is on fluid restriction.  Patient also required intravenous amiodarone infusion for atrial fibrillation with rapid ventricular response and now transitioned back to oral amiodarone.  Patient is closely being followed by Pulmonary Medicine and Cardiology team.  Patient made herself DNR code status.  Patient was followed by palliative medicine  as well.      Interval History:  continues on supplemental oxygen.  Feels less short of breath.  Diuresing well.  The afib is rate controlled for the most part.  We're waiting [facility] bed for her so she can leave the ICU.    Review of Systems   Constitutional:  Negative for fever.   Respiratory:  Positive for shortness of breath.    Cardiovascular:  Negative for chest pain.   Gastrointestinal:  Negative for abdominal pain.     Objective:     Vital Signs (Most Recent):  Temp: 98.3 °F (36.8 °C) (09/23/23 1901)  Pulse: 89 (09/23/23 2000)  Resp: (!) 22 (09/23/23 2000)  BP: (!) 128/58 (09/23/23 2000)  SpO2: 98 % (09/23/23 2000) Vital Signs (24h Range):  Temp:  [97.8 °F (36.6 °C)-98.5 °F (36.9 °C)] 98.3 °F (36.8 °C)  Pulse:  [] 89  Resp:  [22-43] 22  SpO2:  [83 %-100 %] 98 %  BP: ()/(50-76) 128/58     Weight: 97.2 kg (214 lb 4.6 oz)  Body mass index is 33.55 kg/m².    Intake/Output Summary (Last 24 hours) at 9/23/2023 2105  Last data filed at 9/23/2023 1859  Gross per 24 hour   Intake 960 ml   Output 1180 ml   Net -220 ml         Physical Exam  Constitutional:       General: She is not in acute distress.     Appearance: She is not ill-appearing.      Interventions: Nasal cannula in place.   Eyes:      General:         Right eye: No discharge.         Left eye: No discharge.   Neck:      Vascular: No JVD.   Cardiovascular:      Rate and Rhythm: Normal rate. Rhythm irregular.   Pulmonary:      Effort: Pulmonary effort is normal.      Breath sounds: Rales present.   Abdominal:      General: Abdomen is flat. Bowel sounds are normal. There is no distension.      Palpations: Abdomen is soft.      Tenderness: There is no abdominal tenderness.   Musculoskeletal:      Right lower leg: No edema.      Left lower leg: No edema.   Skin:     General: Skin is warm and moist.      Findings: No rash.   Neurological:      Mental Status: She is alert and oriented to person, place, and time.   Psychiatric:          Attention and Perception: Attention normal.         Mood and Affect: Mood and affect normal.         Speech: Speech normal.             Significant Labs: All pertinent labs within the past 24 hours have been reviewed.    Significant Imaging:  No new imaging      Assessment/Plan:      * Acute on chronic combined systolic and diastolic congestive heart failure  Patient is identified as having Combined Systolic and Diastolic heart failure that is Acute on chronic. CHF is currently uncontrolled. Latest ECHO performed and demonstrates- Results for orders placed during the hospital encounter of 05/15/23    Echo    Interpretation Summary  · The left ventricle is mildly enlarged with mild concentric hypertrophy and moderately decreased systolic function.  · The estimated ejection fraction is 40%.  · Grade I left ventricular diastolic dysfunction.  · There is left ventricular global hypokinesis.  · Normal right ventricular size with normal right ventricular systolic function.  · Severe left atrial enlargement.  · Moderate-to-severe mitral regurgitation.  · Mild tricuspid regurgitation.  · Elevated central venous pressure (15 mmHg).  · The estimated PA systolic pressure is 45 mmHg.  · There is pulmonary hypertension.  · Trivial anterior pericardial effusion.  . Continue bumex and beta blocker and monitor clinical status closely. Monitor on telemetry. Patient is on CHF pathway.  Monitor strict Is&Os and daily weights.  Placed on fluid restriction of 1.5 L. Cardiology has been consulted. Continue to stress to patient importance of self efficacy and  on diet for CHF. Last BNP reviewed- and noted below   Recent Labs   Lab 09/18/23  2201   *     Patient instructed to adhere with fluid restriction.  Continue IV bumetanide.    ACP (advance care planning)        Acute respiratory failure with hypoxia  Patient with Hypoxic Respiratory failure which is Acute.  She is not on home oxygen. Supplemental oxygen was provided  and noted- Oxygen Concentration (%):  [0.4-40] 40    .   Signs/symptoms of respiratory failure include- tachypnea, increased work of breathing, respiratory distress and use of accessory muscles. Contributing diagnoses includes - CHF.  Labs and images were reviewed. Will treat underlying causes and adjust management of respiratory failure as follows- Continue 4 LPM oxygen NC, Bumex intravenous for treatment of pulmonary edema.    Recurrent left pleural effusion  Status post left thoracentesis when 2000 cc yellow turbid fluid removed on September 20, 2023 by Pulmonary Medicine.  Postprocedure chest x-ray without pneumothorax.      Obstructive sleep apnea syndrome  C/w CPAP      COPD (chronic obstructive pulmonary disease) per patient  Continue ELIZABETH q6 PRN.  Disease is stable.    Paroxysmal atrial fibrillation  Currently in Afib-but rate controlled.   C/w Amio and BB  On Xarelto.  Cardiologist consulting.    Stage 3 chronic kidney disease  Creatine stable for now. BMP reviewed- noted Estimated Creatinine Clearance: 34.6 mL/min (A) (based on SCr of 1.5 mg/dL (H)). according to latest data. Based on current GFR, CKD stage is stage 3 - GFR 30-59.  Monitor UOP and serial BMP and adjust therapy as needed. Renally dose meds. Avoid nephrotoxic medications and procedures.          Cardiomyopathy with implantable cardioverter-defibrillator  Not sure if ischemic or non-ischemic  Device interrogated during this admission.  Continue Bumex 1 mg IV daily.  Continue goal-directed medical therapy.  I/Os, daily weight      Mixed dyslipidemia  Continue statin      Essential hypertension  Continue Toprol 25 mg daily.  Monitor blood pressure.  It's controlled.  She's also on midodrine.        VTE Risk Mitigation (From admission, onward)         Ordered     rivaroxaban tablet 15 mg  With dinner         09/20/23 1212     IP VTE HIGH RISK PATIENT  Once         09/19/23 1301     Place sequential compression device  Until discontinued          09/19/23 1301     Reason for No Pharmacological VTE Prophylaxis  Once        Question:  Reasons:  Answer:  Already adequately anticoagulated on oral Anticoagulants    09/19/23 1301     Place sequential compression device  Until discontinued         09/19/23 1043                Discharge Planning   JOANA: 9/25/2023     Code Status: Partial Code   Is the patient medically ready for discharge?:     Reason for patient still in hospital (select all that apply): Patient trending condition, Treatment and Consult recommendations  Discharge Plan A: Home with family            Israel Awan MD  Department of Hospital Medicine   [facility]

## 2023-09-24 NOTE — RESPIRATORY THERAPY
09/23/23 1940   Patient Assessment/Suction   Level of Consciousness (AVPU) alert   Respiratory Effort Unlabored   Expansion/Accessory Muscles/Retractions no use of accessory muscles   All Lung Fields Breath Sounds coarse;diminished   Skin Integrity   $ Wound Care Tech Time 15 min   Area Observed Bridge of nose   Skin Appearance without discoloration   PRE-TX-O2   Device (Oxygen Therapy) nasal cannula   $ Is the patient on Low Flow Oxygen? Yes   Flow (L/min) 4   SpO2 98 %   Pulse Oximetry Type Continuous   $ Pulse Oximetry - Multiple Charge Pulse Oximetry - Multiple   Pulse 97   Resp (!) 25   Aerosol Therapy   $ Aerosol Therapy Charges PRN treatment not required   Preset CPAP/BiPAP Settings   Mode Of Delivery Standby;CPAP   Education   $ Education BiPAP;Bronchodilator;15 min   Respiratory Evaluation   $ Care Plan Tech Time 15 min   $ Eval/Re-eval Charges Re-evaluation

## 2023-09-24 NOTE — PLAN OF CARE
Patient found in bed, awakes spontaneously to voice. She is pleasant, awake alert and orientated x4. She is saturating well on 5 lpm nasal cannula.  yesterday , IV to left forearm, the skin swells with flushing and is painful and so was removed and patient tolerated, gauze applied. OK for just midline only (to right upper arm).  She has minimal edema to bilat legs.  Blood pressure has been on the low side today, MAP around 65-75. She complains of a small headache today and gave tylenol to good effect.     She has some redness to right heel which blanches, she c/o pain there, applied bilat mepilexes and offloaded with pillows, patient and family educated. She was up in chair for about 5 hours today, tolerated, requires moderate+ assistance to get up. Chair alarm on while in chair.     Careteam aware, re: difficult to accurately measure output entirely d/t inconsistent purewick result (had 2 unmeasured occurrences today).Yesterday, s/p kayexylate did have large formed BM per commode.     The bed is low and alarm on. Clinical alarms reviewed at start of shift and armed. Monitor strip printed and reviewed, afib rate 80s.     Family is at bedside and was updated on plan of care. We are awaiting downgrade bed availability.

## 2023-09-24 NOTE — SUBJECTIVE & OBJECTIVE
Interval History:  continues on supplemental oxygen.  Feels less short of breath.  Diuresing well.  The afib is rate controlled for the most part.  We're waiting for a [facility] bed for her so she can leave the ICU.    Review of Systems   Constitutional:  Negative for fever.   Respiratory:  Positive for shortness of breath.    Cardiovascular:  Negative for chest pain.   Gastrointestinal:  Negative for abdominal pain.     Objective:     Vital Signs (Most Recent):  Temp: 98.3 °F (36.8 °C) (09/23/23 1901)  Pulse: 89 (09/23/23 2000)  Resp: (!) 22 (09/23/23 2000)  BP: (!) 128/58 (09/23/23 2000)  SpO2: 98 % (09/23/23 2000) Vital Signs (24h Range):  Temp:  [97.8 °F (36.6 °C)-98.5 °F (36.9 °C)] 98.3 °F (36.8 °C)  Pulse:  [] 89  Resp:  [22-43] 22  SpO2:  [83 %-100 %] 98 %  BP: ()/(50-76) 128/58     Weight: 97.2 kg (214 lb 4.6 oz)  Body mass index is 33.55 kg/m².    Intake/Output Summary (Last 24 hours) at 9/23/2023 2105  Last data filed at 9/23/2023 1859  Gross per 24 hour   Intake 960 ml   Output 1180 ml   Net -220 ml         Physical Exam  Constitutional:       General: She is not in acute distress.     Appearance: She is not ill-appearing.      Interventions: Nasal cannula in place.   Eyes:      General:         Right eye: No discharge.         Left eye: No discharge.   Neck:      Vascular: No JVD.   Cardiovascular:      Rate and Rhythm: Normal rate. Rhythm irregular.   Pulmonary:      Effort: Pulmonary effort is normal.      Breath sounds: Rales present.   Abdominal:      General: Abdomen is flat. Bowel sounds are normal. There is no distension.      Palpations: Abdomen is soft.      Tenderness: There is no abdominal tenderness.   Musculoskeletal:      Right lower leg: No edema.      Left lower leg: No edema.   Skin:     General: Skin is warm and moist.      Findings: No rash.   Neurological:      Mental Status: She is alert and oriented to person, place, and time.   Psychiatric:         Attention and  Perception: Attention normal.         Mood and Affect: Mood and affect normal.         Speech: Speech normal.             Significant Labs: All pertinent labs within the past 24 hours have been reviewed.    Significant Imaging:  No new imaging

## 2023-09-25 PROBLEM — J15.9 COMMUNITY ACQUIRED BACTERIAL PNEUMONIA: Status: ACTIVE | Noted: 2023-09-25

## 2023-09-25 LAB
ALBUMIN SERPL BCP-MCNC: 2.6 G/DL (ref 3.5–5.2)
ALP SERPL-CCNC: 64 U/L (ref 55–135)
ALT SERPL W/O P-5'-P-CCNC: 10 U/L (ref 10–44)
ANION GAP SERPL CALC-SCNC: 3 MMOL/L (ref 8–16)
AST SERPL-CCNC: 8 U/L (ref 10–40)
BASOPHILS # BLD AUTO: 0.06 K/UL (ref 0–0.2)
BASOPHILS NFR BLD: 0.8 % (ref 0–1.9)
BILIRUB SERPL-MCNC: 0.2 MG/DL (ref 0.1–1)
BUN SERPL-MCNC: 32 MG/DL (ref 8–23)
CALCIUM SERPL-MCNC: 9.3 MG/DL (ref 8.7–10.5)
CHLORIDE SERPL-SCNC: 97 MMOL/L (ref 95–110)
CO2 SERPL-SCNC: 38 MMOL/L (ref 23–29)
CREAT SERPL-MCNC: 1.4 MG/DL (ref 0.5–1.4)
DIFFERENTIAL METHOD: ABNORMAL
EOSINOPHIL # BLD AUTO: 0.2 K/UL (ref 0–0.5)
EOSINOPHIL NFR BLD: 2.4 % (ref 0–8)
ERYTHROCYTE [DISTWIDTH] IN BLOOD BY AUTOMATED COUNT: 15.7 % (ref 11.5–14.5)
EST. GFR  (NO RACE VARIABLE): 37.6 ML/MIN/1.73 M^2
GLUCOSE SERPL-MCNC: 134 MG/DL (ref 70–110)
HCT VFR BLD AUTO: 30.5 % (ref 37–48.5)
HGB BLD-MCNC: 9.1 G/DL (ref 12–16)
IMM GRANULOCYTES # BLD AUTO: 0.03 K/UL (ref 0–0.04)
IMM GRANULOCYTES NFR BLD AUTO: 0.4 % (ref 0–0.5)
LYMPHOCYTES # BLD AUTO: 1.2 K/UL (ref 1–4.8)
LYMPHOCYTES NFR BLD: 15.2 % (ref 18–48)
MAGNESIUM SERPL-MCNC: 2.1 MG/DL (ref 1.6–2.6)
MCH RBC QN AUTO: 26.1 PG (ref 27–31)
MCHC RBC AUTO-ENTMCNC: 29.8 G/DL (ref 32–36)
MCV RBC AUTO: 87 FL (ref 82–98)
MONOCYTES # BLD AUTO: 0.9 K/UL (ref 0.3–1)
MONOCYTES NFR BLD: 10.7 % (ref 4–15)
NEUTROPHILS # BLD AUTO: 5.6 K/UL (ref 1.8–7.7)
NEUTROPHILS NFR BLD: 70.5 % (ref 38–73)
NRBC BLD-RTO: 0 /100 WBC
PHOSPHATE SERPL-MCNC: 3.9 MG/DL (ref 2.7–4.5)
PLATELET # BLD AUTO: 438 K/UL (ref 150–450)
PMV BLD AUTO: 9.3 FL (ref 9.2–12.9)
POTASSIUM SERPL-SCNC: 4.7 MMOL/L (ref 3.5–5.1)
PROT SERPL-MCNC: 6 G/DL (ref 6–8.4)
RBC # BLD AUTO: 3.49 M/UL (ref 4–5.4)
SODIUM SERPL-SCNC: 138 MMOL/L (ref 136–145)
WBC # BLD AUTO: 7.97 K/UL (ref 3.9–12.7)

## 2023-09-25 PROCEDURE — 21400001 HC TELEMETRY ROOM

## 2023-09-25 PROCEDURE — 83735 ASSAY OF MAGNESIUM: CPT | Performed by: INTERNAL MEDICINE

## 2023-09-25 PROCEDURE — 85025 COMPLETE CBC W/AUTO DIFF WBC: CPT | Performed by: STUDENT IN AN ORGANIZED HEALTH CARE EDUCATION/TRAINING PROGRAM

## 2023-09-25 PROCEDURE — 25000003 PHARM REV CODE 250: Performed by: INTERNAL MEDICINE

## 2023-09-25 PROCEDURE — 94660 CPAP INITIATION&MGMT: CPT

## 2023-09-25 PROCEDURE — 99291 PR CRITICAL CARE, E/M 30-74 MINUTES: ICD-10-PCS | Mod: ,,, | Performed by: STUDENT IN AN ORGANIZED HEALTH CARE EDUCATION/TRAINING PROGRAM

## 2023-09-25 PROCEDURE — 25000003 PHARM REV CODE 250: Performed by: HOSPITALIST

## 2023-09-25 PROCEDURE — 84100 ASSAY OF PHOSPHORUS: CPT | Performed by: INTERNAL MEDICINE

## 2023-09-25 PROCEDURE — 25000003 PHARM REV CODE 250: Performed by: STUDENT IN AN ORGANIZED HEALTH CARE EDUCATION/TRAINING PROGRAM

## 2023-09-25 PROCEDURE — 36415 COLL VENOUS BLD VENIPUNCTURE: CPT | Performed by: STUDENT IN AN ORGANIZED HEALTH CARE EDUCATION/TRAINING PROGRAM

## 2023-09-25 PROCEDURE — 92526 ORAL FUNCTION THERAPY: CPT

## 2023-09-25 PROCEDURE — 99900035 HC TECH TIME PER 15 MIN (STAT)

## 2023-09-25 PROCEDURE — 97530 THERAPEUTIC ACTIVITIES: CPT

## 2023-09-25 PROCEDURE — 94799 UNLISTED PULMONARY SVC/PX: CPT

## 2023-09-25 PROCEDURE — 80053 COMPREHEN METABOLIC PANEL: CPT | Performed by: STUDENT IN AN ORGANIZED HEALTH CARE EDUCATION/TRAINING PROGRAM

## 2023-09-25 PROCEDURE — 27000221 HC OXYGEN, UP TO 24 HOURS

## 2023-09-25 PROCEDURE — 99900031 HC PATIENT EDUCATION (STAT)

## 2023-09-25 PROCEDURE — 99291 CRITICAL CARE FIRST HOUR: CPT | Mod: ,,, | Performed by: STUDENT IN AN ORGANIZED HEALTH CARE EDUCATION/TRAINING PROGRAM

## 2023-09-25 PROCEDURE — 94761 N-INVAS EAR/PLS OXIMETRY MLT: CPT

## 2023-09-25 RX ORDER — AMOXICILLIN AND CLAVULANATE POTASSIUM 875; 125 MG/1; MG/1
1 TABLET, FILM COATED ORAL EVERY 12 HOURS
Status: DISCONTINUED | OUTPATIENT
Start: 2023-09-25 | End: 2023-09-26 | Stop reason: HOSPADM

## 2023-09-25 RX ORDER — BUMETANIDE 1 MG/1
1 TABLET ORAL DAILY
Status: DISCONTINUED | OUTPATIENT
Start: 2023-09-25 | End: 2023-09-26 | Stop reason: HOSPADM

## 2023-09-25 RX ADMIN — MIDODRINE HYDROCHLORIDE 5 MG: 2.5 TABLET ORAL at 11:09

## 2023-09-25 RX ADMIN — AMIODARONE HYDROCHLORIDE 200 MG: 200 TABLET ORAL at 08:09

## 2023-09-25 RX ADMIN — GABAPENTIN 200 MG: 100 CAPSULE ORAL at 08:09

## 2023-09-25 RX ADMIN — AMOXICILLIN AND CLAVULANATE POTASSIUM 1 TABLET: 875; 125 TABLET, FILM COATED ORAL at 11:09

## 2023-09-25 RX ADMIN — GABAPENTIN 200 MG: 100 CAPSULE ORAL at 04:09

## 2023-09-25 RX ADMIN — METOPROLOL SUCCINATE 25 MG: 25 TABLET, FILM COATED, EXTENDED RELEASE ORAL at 08:09

## 2023-09-25 RX ADMIN — FLUTICASONE PROPIONATE 100 MCG: 50 SPRAY, METERED NASAL at 08:09

## 2023-09-25 RX ADMIN — BUMETANIDE 1 MG: 0.25 INJECTION INTRAMUSCULAR; INTRAVENOUS at 08:09

## 2023-09-25 RX ADMIN — MIDODRINE HYDROCHLORIDE 5 MG: 2.5 TABLET ORAL at 07:09

## 2023-09-25 RX ADMIN — MIDODRINE HYDROCHLORIDE 5 MG: 2.5 TABLET ORAL at 04:09

## 2023-09-25 RX ADMIN — AMOXICILLIN AND CLAVULANATE POTASSIUM 1 TABLET: 875; 125 TABLET, FILM COATED ORAL at 08:09

## 2023-09-25 RX ADMIN — RIVAROXABAN 15 MG: 15 TABLET, FILM COATED ORAL at 04:09

## 2023-09-25 RX ADMIN — ATORVASTATIN CALCIUM 20 MG: 20 TABLET, FILM COATED ORAL at 08:09

## 2023-09-25 NOTE — PLAN OF CARE
Case management met with patient and Dr. Awan at bedside. Patient requesting IP rehab on hospital discharge - rehab referrals sent. Awaiting accepting facility at this time.        09/25/23 1145   Discharge Reassessment   Assessment Type Discharge Planning Reassessment   Did the patient's condition or plan change since previous assessment? No   Discharge Plan discussed with: Patient;Adult children   Communicated JOANA with patient/caregiver Yes   Discharge Plan A Rehab   Discharge Plan B Skilled Nursing Facility   Why the patient remains in the hospital Requires continued medical care   Post-Acute Status   Post-Acute Authorization Placement   Post-Acute Placement Status Referrals Sent

## 2023-09-25 NOTE — CARE UPDATE
Patient has prn tx available   09/25/23 9858   Patient Assessment/Suction   Level of Consciousness (AVPU) alert   Respiratory Effort Normal;Unlabored   Expansion/Accessory Muscles/Retractions no use of accessory muscles;expansion symmetric   All Lung Fields Breath Sounds clear;diminished   LEYDI Breath Sounds clear   LLL Breath Sounds diminished   RUL Breath Sounds clear   RML Breath Sounds diminished   RLL Breath Sounds diminished   Rhythm/Pattern, Respiratory unlabored;shallow   Cough Frequency no cough   Skin Integrity   $ Wound Care Tech Time 15 min   Area Observed Bridge of nose   Skin Appearance without discoloration   Barrier used? Gel Cushion   PRE-TX-O2   Device (Oxygen Therapy) nasal cannula   $ Is the patient on Low Flow Oxygen? Yes   Flow (L/min) 3   Pulse Oximetry Type Continuous   $ Pulse Oximetry - Multiple Charge Pulse Oximetry - Multiple   Aerosol Therapy   $ Aerosol Therapy Charges PRN treatment not required

## 2023-09-25 NOTE — PT/OT/SLP PROGRESS
Physical Therapy Treatment    Patient Name:  Jo Alegre   MRN:  [id number]    Recommendations:     Discharge Recommendations: nursing facility, skilled . Doubtful if pt could tolerate 3 hours of therapy for IPR. Pt required an immediate seated rest after t/f  sit to stand with Mod A  due to shortness of breath and fatigue. Was unable to ambulate for same reason.  Discharge Equipment Recommendations: to be determined by next level of care  Barriers to discharge:  Increased caregiver burden of care    Assessment:     Jo Alegre is a 83 y.o. female admitted with a medical diagnosis of Acute on chronic combined systolic and diastolic congestive heart failure.  She presents with the following impairments/functional limitations: weakness, impaired endurance, impaired self care skills, impaired functional mobility, gait instability, impaired balance, decreased upper extremity function, decreased lower extremity function, impaired cardiopulmonary response to activity, edema .    Rehab Prognosis: Fair; patient would benefit from acute skilled PT services to address these deficits and reach maximum level of function.    Recent Surgery: * No surgery found *      Plan:     During this hospitalization, patient to be seen 6 x/week to address the identified rehab impairments via gait training, therapeutic activities, therapeutic exercises and progress toward the following goals:    Plan of Care Expires:  10/21/23    Subjective     Chief Complaint: AGUILAR  Patient/Family Comments/goals: Return home with grandson after SNF/Rehab  Pain/Comfort:  Pain Rating Post-Intervention 1: 0/10      Objective:     Communicated with nurse prior to session.  Patient found HOB elevated with blood pressure cuff, oxygen, PureWick, pulse ox (continuous), telemetry upon PT entry to room.     General Precautions: Standard, fall  Orthopedic Precautions: N/A  Braces: N/A  Respiratory Status: Nasal cannula, flow 4 L/min     Functional  Mobility:  Bed Mobility:     Rolling Left:  minimum assistance  Rolling Right: minimum assistance  Supine to Sit: moderate assistance  Transfers:     Bed to Chair: moderate assistance with  rolling walker  using  Step Transfer      AM-PAC 6 CLICK MOBILITY          Treatment & Education:  Pt educated on benefits of increased time OOB, difference between SNF and IPR, bed mobility and t/f training as indicated above    Patient left up in chair with all lines intact and call button in reach..    GOALS:   Multidisciplinary Problems       Physical Therapy Goals          Problem: Physical Therapy    Goal Priority Disciplines Outcome Goal Variances Interventions   Physical Therapy Goal     PT, PT/OT      Description: Goals to be met by: 10/21/2023     Patient will increase functional independence with mobility by performin. Supine to sit with Contact Guard Assistance  2. Sit to stand transfer with Minimal Assistance  3. Gait  x 50  feet with Contact Guard Assistance using Rolling Walker.                          Time Tracking:     PT Received On: 23  PT Start Time: 955     PT Stop Time: 1018  PT Total Time (min): 23 min     Billable Minutes: Therapeutic Activity 23 minutes    Treatment Type: Treatment  PT/PTA: PT           2023

## 2023-09-25 NOTE — PROGRESS NOTES
[facility]   Cardiology Note    Consult Requested By: SARITA  Reason for Consult: CHF    SUBJECTIVE:     History of Present Illness: The pt is 82 y/o F pt of  with PMHx of CAD HTN, HDL,chronic Systolic/Diastolic HF, reduced EF,SSS, Orthostatic hypotension, MR, AICD--MDT, single ICD, Afib- SVT, COPD, JENNIFER , intolerant of CPAP. presented with one day hx of sob started yesterday - increasing swelling to BLE. Denies /CP, palpitations, syncope.     Labs H/H 10.5/34.3 platelets 452 cr 1.8  dig level 0.3 CXR L sided pleural effusion, some pulmonary congestion bilaterally--left sided effusion worsening this am. EKG today Afib , no ST seg changes. Echo pending  hypotensive this am. No urinary output documented     9/20--The pt is now in ICU on amio drip and pressors. She is aao, afib in  range , VSS. She denies any CP and states she is not feeling as much SOB , however she is using accessory muscles to breathe. Swelling in LE has improved. Pulmonology on unit for thoracentesis of Left sided pleural effusion this am. H/H 10.3/34.5 plts 456 cr 1.7 trop slightly elevated but stable, no change in delta. Blood cultures neg u/o -429 cc    9/21: Pt seen and examined this morning. S/p thoracentesis yesterday with 2L of fluid drained. Started on antibiotics for possible pneumonia. Xarelto restarted yesterday. VSS. Labs reviewed. H&H 9.6/31. Cr 1.6. She states she is feeling much better today. Shortness of breath has improved. She denies chest pain.     9/22: Patient is feeling well today. Her main complaint is fatigue, she did not sleep well last night. Reports her breathing is much improved. No LE edema noted. She denies chest pain or palpitations. Amiodarone gtt discontinued yesterday. She is currently on amiodarone 200 mg PO. Tele reviewed, remains terrie trial firbillation. HR is in the 80s. Hr blood pressure is improving. She is on midodrine 5mg TID.     9/25 Afib rate controlled, no overnight  events. BP stable -260 u/o   H/H 9.1/30.5 cr 1.4 albumin 2.6 4L Oxygen NC, The pt is sitting in bed with no CP or SOB. No complaints.          Review of patient's allergies indicates:   Allergen Reactions    Codeine Other (See Comments)     nausea    Hydrocodone Nausea And Vomiting    Lasix [furosemide]      rash       Past Medical History:   Diagnosis Date    Allergy     Codeine, Lasix    Atrial fibrillation     Atrial fibrillation     Cataract     CHF (congestive heart failure)     Diabetes mellitus, type 2     Ejection fraction < 50% 10/18/2017    Approximately 35%  Based on prior  Echocardiogram.    Encounter for blood transfusion     Hyperlipidemia     Osteoporosis     PONV (postoperative nausea and vomiting)     Thyroid disorder screening 10/17/2017    TSH of 1.12 ordered by Dr. dee Jones     Past Surgical History:   Procedure Laterality Date    ANTEGRADE NEPHROSTOGRAPHY Left 8/25/2022    Procedure: NEPHROSTOGRAM, ANTEGRADE;  Surgeon: Shelby Parra MD;  Location: [facility] OR;  Service: Urology;  Laterality: Left;    CHOLECYSTECTOMY  1997    [place]     COLONOSCOPY      CYSTOSCOPY W/ URETERAL STENT PLACEMENT Left 7/17/2022    Procedure: CYSTOSCOPY, WITH URETERAL STENT INSERTION;  Surgeon: Shelby Parra MD;  Location: [facility] OR;  Service: Urology;  Laterality: Left;    CYSTOSCOPY W/ URETERAL STENT REMOVAL Left 9/21/2022    Procedure: CYSTOSCOPY, WITH URETERAL STENT REMOVAL;  Surgeon: Shelby Parra MD;  Location[facility] OR;  Service: Urology;  Laterality: Left;    CYSTOSCOPY WITH URETEROSCOPY, RETROGRADE PYELOGRAPHY, AND INSERTION OF STENT Left 8/25/2022    Procedure: CYSTOSCOPY, WITH RETROGRADE PYELOGRAM AND URETERAL STENT INSERTION;  Surgeon: Shelby Parra MD;  Location: [facility] OR;  Service: Urology;  Laterality: Left;    EYE SURGERY      bilateral cataracts    FINGER SURGERY Right 2021    right pinky finger    FLEXIBLE CYSTOSCOPY Left 8/25/2022    Procedure: CYSTOSCOPY, FLEXIBLE WITH STENT  REMOVAL;  Surgeon: Shelby Parra MD;  Location: [facility] OR;  Service: Urology;  Laterality: Left;    FRACTURE SURGERY  2014    right femur with neville    HEMORRHOID SURGERY      48 yrs ago    HYSTERECTOMY      NEPHROSTOMY Left 8/25/2022    Procedure: CREATION, NEPHROSTOMY;  Surgeon: Shelby Parra MD;  Location: [facility] OR;  Service: Urology;  Laterality: Left;    PERCUTANEOUS NEPHROLITHOTOMY N/A 8/25/2022    Procedure: NEPHROLITHOTOMY, PERCUTANEOUS;  Surgeon: Shelby Parra MD;  Location: [facility] OR;  Service: Urology;  Laterality: N/A;    REPLACEMENT OF IMPLANTABLE CARDIOVERTER-DEFIBRILLATOR (ICD) GENERATOR N/A 12/13/2019    Procedure: REPLACEMENT, PULSE GENERATOR, ICD-MEDTRONIC;  Surgeon: Sebastian Nowak III, MD;  Location: [facility];  Service: Cardiology;  Laterality: N/A;    TONSILLECTOMY       Family History   Problem Relation Age of Onset    Heart disease Mother     Cancer Father         Lung Cancer ??? Asbestos    Breast cancer Paternal Aunt      Social History     Tobacco Use    Smoking status: Former     Passive exposure: Past    Smokeless tobacco: Never    Tobacco comments:     quit 2013   Substance Use Topics    Alcohol use: No    Drug use: No       Review of Systems:  Review of Systems   Constitutional:  Negative for chills, diaphoresis, fever, malaise/fatigue and weight loss.   Respiratory:  Negative for cough, hemoptysis, sputum production and shortness of breath.    Cardiovascular:  Negative for chest pain, palpitations, orthopnea, claudication, leg swelling and PND.   Gastrointestinal:  Negative for nausea and vomiting.       OBJECTIVE:     Vital Signs (Most Recent)  Temp: 98.6 °F (37 °C) (09/25/23 0301)  Pulse: (!) 122 (09/25/23 0736)  Resp: (!) 29 (09/25/23 0736)  BP: (!) 114/53 (09/25/23 0600)  SpO2: (!) 93 % (09/25/23 0736)    Vital Signs Range (Last 24H):  Temp:  [98.1 °F (36.7 °C)-98.6 °F (37 °C)]   Pulse:  []   Resp:  [20-38]   BP: ()/(50-90)   SpO2:  [90 %-100 %]     I & O  (Last 24H):    Intake/Output Summary (Last 24 hours) at 9/25/2023 0825  Last data filed at 9/24/2023 2001  Gross per 24 hour   Intake 740 ml   Output 1000 ml   Net -260 ml         Current Diet:     Current Diet Order   Procedures    Diet Low Sodium, 2gm [facility]; Fluid - 1500mL     Order Specific Question:   Indicate patient location for additional diet options:     Answer:   [facility]     Order Specific Question:   Fluid restriction:     Answer:   Fluid - 1500mL        Allergies:  Review of patient's allergies indicates:   Allergen Reactions    Codeine Other (See Comments)     nausea    Hydrocodone Nausea And Vomiting    Lasix [furosemide]      rash       Meds:  Scheduled Meds:   amiodarone  200 mg Oral Daily    atorvastatin  20 mg Oral QHS    bumetanide  1 mg Intravenous Daily    cefTRIAXone (ROCEPHIN) IVPB  1 g Intravenous Q24H    fluticasone propionate  2 spray Each Nostril Daily    gabapentin  200 mg Oral TID    metoprolol succinate  25 mg Oral Daily    midodrine  5 mg Oral TID AC    rivaroxaban  15 mg Oral Daily with dinner     Continuous Infusions:    PRN Meds:acetaminophen, albuterol sulfate, aluminum-magnesium hydroxide-simethicone, dextrose 50%, dextrose 50%, glucagon (human recombinant), glucose, glucose, magnesium oxide, magnesium oxide, melatonin, naloxone, ondansetron, potassium bicarbonate, potassium bicarbonate, potassium bicarbonate, potassium, sodium phosphates, potassium, sodium phosphates, potassium, sodium phosphates, prochlorperazine, senna-docusate 8.6-50 mg, simethicone, sodium chloride 0.9%, sodium chloride 0.9%, sodium chloride 0.9%    Oxygen/Ventilator Data (Last 24H):  (if applicable)   Oxygen Concentration (%):  [40] 40        Hemodynamic Parameters (Last 24H):   (if applicable)        Laboratory and Radiology Data:  Recent Results (from the past 24 hour(s))   Basic metabolic panel    Collection Time: 09/24/23 12:38 PM   Result Value Ref Range    Sodium 135 (L) 136 - 145  mmol/L    Potassium 4.6 3.5 - 5.1 mmol/L    Chloride 94 (L) 95 - 110 mmol/L    CO2 37 (H) 23 - 29 mmol/L    Glucose 161 (H) 70 - 110 mg/dL    BUN 29 (H) 8 - 23 mg/dL    Creatinine 1.5 (H) 0.5 - 1.4 mg/dL    Calcium 9.3 8.7 - 10.5 mg/dL    Anion Gap 4 (L) 8 - 16 mmol/L    eGFR 34.6 (A) >60 mL/min/1.73 m^2   Magnesium    Collection Time: 09/24/23 12:38 PM   Result Value Ref Range    Magnesium 2.0 1.6 - 2.6 mg/dL   CBC auto differential    Collection Time: 09/25/23  4:18 AM   Result Value Ref Range    WBC 7.97 3.90 - 12.70 K/uL    RBC 3.49 (L) 4.00 - 5.40 M/uL    Hemoglobin 9.1 (L) 12.0 - 16.0 g/dL    Hematocrit 30.5 (L) 37.0 - 48.5 %    MCV 87 82 - 98 fL    MCH 26.1 (L) 27.0 - 31.0 pg    MCHC 29.8 (L) 32.0 - 36.0 g/dL    RDW 15.7 (H) 11.5 - 14.5 %    Platelets 438 150 - 450 K/uL    MPV 9.3 9.2 - 12.9 fL    Immature Granulocytes 0.4 0.0 - 0.5 %    Gran # (ANC) 5.6 1.8 - 7.7 K/uL    Immature Grans (Abs) 0.03 0.00 - 0.04 K/uL    Lymph # 1.2 1.0 - 4.8 K/uL    Mono # 0.9 0.3 - 1.0 K/uL    Eos # 0.2 0.0 - 0.5 K/uL    Baso # 0.06 0.00 - 0.20 K/uL    nRBC 0 0 /100 WBC    Gran % 70.5 38.0 - 73.0 %    Lymph % 15.2 (L) 18.0 - 48.0 %    Mono % 10.7 4.0 - 15.0 %    Eosinophil % 2.4 0.0 - 8.0 %    Basophil % 0.8 0.0 - 1.9 %    Differential Method Automated    Comprehensive metabolic panel    Collection Time: 09/25/23  4:18 AM   Result Value Ref Range    Sodium 138 136 - 145 mmol/L    Potassium 4.7 3.5 - 5.1 mmol/L    Chloride 97 95 - 110 mmol/L    CO2 38 (H) 23 - 29 mmol/L    Glucose 134 (H) 70 - 110 mg/dL    BUN 32 (H) 8 - 23 mg/dL    Creatinine 1.4 0.5 - 1.4 mg/dL    Calcium 9.3 8.7 - 10.5 mg/dL    Total Protein 6.0 6.0 - 8.4 g/dL    Albumin 2.6 (L) 3.5 - 5.2 g/dL    Total Bilirubin 0.2 0.1 - 1.0 mg/dL    Alkaline Phosphatase 64 55 - 135 U/L    AST 8 (L) 10 - 40 U/L    ALT 10 10 - 44 U/L    eGFR 37.6 (A) >60 mL/min/1.73 m^2    Anion Gap 3 (L) 8 - 16 mmol/L   Magnesium    Collection Time: 09/25/23  4:18 AM   Result Value Ref Range     Magnesium 2.1 1.6 - 2.6 mg/dL   Phosphorus    Collection Time: 09/25/23  4:18 AM   Result Value Ref Range    Phosphorus 3.9 2.7 - 4.5 mg/dL     Imaging Results              X-Ray Chest AP Portable (Final result)  Result time 09/19/23 05:54:57      Final result by Foster Landis MD (09/19/23 05:54:57)                   Narrative:    CLINICAL HISTORY:  83 years (1940) Female sob SOB    TECHNIQUE:  Portable AP radiograph the chest. One view.    COMPARISON:  Radiographs from May 24, 2023    FINDINGS:  There is a focal opacity at the left lung base, characteristic of a combination of a small left pleural effusion and associated atelectasis, noting superimposed infection/pneumonia and malignancy are not excluded. There is blunting of the right costophrenic angle consistent with trace pleural effusion. No pneumothorax is identified. The cardiac silhouette is moderately enlarged. Left-sided AICD pacemaker is unchanged in configuration. Atheromatous calcifications are seen at the aortic arch. Osseous structures appear unchanged. The visualized upper abdomen is unremarkable.    IMPRESSION:  1. Moderate left pleural effusion and adjacent atelectasis/consolidation.  2. Small interstitial opacity at the right lung base.                  .            Electronically signed by:  Foster Landis MD  9/19/2023 5:54 AM CDT Workstation: [id number]                                    12-lead EKG interpretation:  (if applicable)      Current Cardiac Rhythm:   (if applicable)    Physical Exam:   Physical Exam  Constitutional:       Appearance: Normal appearance.   Cardiovascular:      Rate and Rhythm: Normal rate. Rhythm irregular.      Heart sounds: Murmur heard.   Pulmonary:      Effort: Pulmonary effort is normal. No respiratory distress.      Breath sounds: No wheezing or rhonchi.   Abdominal:      General: Abdomen is flat. Bowel sounds are normal.      Palpations: Abdomen is soft.   Musculoskeletal:         General:  No swelling.      Comments: +1 edema BLE    Skin:     General: Skin is warm and dry.   Neurological:      General: No focal deficit present.      Mental Status: She is alert and oriented to person, place, and time.         ASSESSMENT/PLAN:   Assessment:   Acute on Chronic combined HF reduced EF  MARCELINO/CKD cr 1.8  MR  HTN  COPD  JENNIFER  SSS  MDT ICD insitu  DNR     Echo 5/2023--EF 40 % left ventricular global hypokinesis, LA enlargement, Mod -Severe MR , PA pressure 45 mmhg     Recent device interrogation--9/2023  11% afib burden· Possible OptiVol fluid accumulation--9/10/2023     Echo 9/19--EF 40-50 % LA dilation, mild AS, mod MR, PA pressure 30 mmhg     Plan    S/p Thoracentesis with 2L of drainage on 9/20. parapneumonic effusion related to pneumonia. No growth on cultures--followed by pulm   Shortness of breath improved, on 4 L NC  IV  bumex, renal function improved, ok to switch to oral bumex today if ok with pulm.   Afib rate controlled, on Xarelto , metoprolol --continue   BP stable, on midodrine 5 mg TID   Continue amiodarone 200 mg daily.   Ok to stepdown  D/C plan is to rehab facility  Will discuss recommendations regarding cardioversion with

## 2023-09-25 NOTE — RESPIRATORY THERAPY
09/24/23 1940   Patient Assessment/Suction   Level of Consciousness (AVPU) alert   Respiratory Effort Unlabored   Expansion/Accessory Muscles/Retractions no use of accessory muscles   All Lung Fields Breath Sounds diminished   Skin Integrity   $ Wound Care Tech Time 15 min   Area Observed Bridge of nose   Skin Appearance without discoloration   PRE-TX-O2   Device (Oxygen Therapy) nasal cannula with humidification   $ Is the patient on Low Flow Oxygen? Yes   Flow (L/min) 4   SpO2 95 %   Pulse Oximetry Type Continuous   $ Pulse Oximetry - Multiple Charge Pulse Oximetry - Multiple   Pulse 99   Resp (!) 25   Aerosol Therapy   $ Aerosol Therapy Charges PRN treatment not required   Respiratory Treatment Status (SVN) PRN treatment not required   Preset CPAP/BiPAP Settings   Mode Of Delivery Standby   Education   $ Education BiPAP;Bronchodilator;15 min   Respiratory Evaluation   $ Care Plan Tech Time 15 min   $ Eval/Re-eval Charges Re-evaluation

## 2023-09-25 NOTE — ASSESSMENT & PLAN NOTE
Patient with Hypoxic Respiratory failure which is Acute.  She is not on home oxygen. Supplemental oxygen was provided and noted- Oxygen Concentration (%):  [40] 40    .   Signs/symptoms of respiratory failure include- tachypnea, increased work of breathing, respiratory distress and use of accessory muscles. Contributing diagnoses includes - CHF.  Labs and images were reviewed. Will treat underlying causes and adjust management of respiratory failure as follows- Continue 4 LPM oxygen NC, Bumex intravenous for treatment of pulmonary edema.

## 2023-09-25 NOTE — PLAN OF CARE
Per Huong with [facility] - patient clinically accepted and to submit for payor auth on today. Bed available on tomorrow, pending medical clearance and [employer] auth.        09/25/23 1504   Post-Acute Status   Post-Acute Authorization Placement   Post-Acute Placement Status Pending payor review/awaiting authorization (if required)

## 2023-09-25 NOTE — PROGRESS NOTES
[facility]  Progress Note  Pulmonary/Critical Care      PATIENT NAME: Jo Alegre  MRN: [id number]  TODAY'S DATE: 2023  2:23 PM  ADMIT DATE: 2023  AGE: 83 y.o. : 1940    HPI/INTERVAL HISTORY (See H&P for complete P,F,SHx) :   Ms Alegre is an 83 year old woman former smoker with HFrEF 30%, AICD, hx of AFib who presented from home with low blood pressure and shortness of breath. CXR was suggestive of predominantly left sided effusion. Patient was also evaluated by cardiology.     Continues to feel well. Continues to require oxygen but now on 3 LPM. Renal fucntion returned to [place] with diuresis.     Review of Systems   Constitutional:  Negative for appetite change, chills, fever and unexpected weight change.   HENT:  Negative for nosebleeds, postnasal drip, trouble swallowing and voice change.    Eyes:  Negative for visual disturbance.   Respiratory:  Negative for cough, shortness of breath and wheezing.    Cardiovascular:  Positive for leg swelling. Negative for chest pain.   Gastrointestinal:  Negative for diarrhea, nausea and vomiting.   Endocrine: Negative for cold intolerance and heat intolerance.   Genitourinary:  Negative for dysuria, flank pain and frequency.   Musculoskeletal:  Negative for neck pain and neck stiffness.   Allergic/Immunologic: Negative for environmental allergies and immunocompromised state.   Neurological:  Negative for syncope, speech difficulty and weakness.   Hematological:  Negative for adenopathy.   Psychiatric/Behavioral:  Negative for confusion.            VITAL SIGNS (MOST RECENT)  Temp: 98.6 °F (37 °C) (23 0301)  Pulse: 100 (23 0828)  Resp: (!) 29 (23 0736)  BP: 126/65 (23 0828)  SpO2: (!) 93 % (23 0736)    INTAKE AND OUTPUT (LAST 24 HOURS):  Intake/Output Summary (Last 24 hours) at 2023 0930  Last data filed at 2023 0701  Gross per 24 hour   Intake 740 ml   Output 1300 ml   Net -560 ml  "        WEIGHT  Wt Readings from Last 1 Encounters:   09/23/23 97.2 kg (214 lb 4.6 oz)       Physical Exam  Vitals reviewed.   Constitutional:       General: She is not in acute distress.     Appearance: She is well-developed. She is obese. She is not ill-appearing or diaphoretic.   HENT:      Head: Normocephalic and atraumatic.      Mouth/Throat:      Pharynx: No oropharyngeal exudate or posterior oropharyngeal erythema.   Eyes:      General: No scleral icterus.     Pupils: Pupils are equal, round, and reactive to light.   Neck:      Vascular: No JVD.   Cardiovascular:      Rate and Rhythm: Tachycardia present. Rhythm irregular.      Heart sounds: Normal heart sounds. No murmur heard.  Pulmonary:      Effort: No respiratory distress.      Breath sounds: Rales present. No wheezing.   Abdominal:      General: Bowel sounds are normal. There is no distension.      Palpations: Abdomen is soft.      Tenderness: There is no abdominal tenderness.   Musculoskeletal:         General: No swelling.      Cervical back: Normal range of motion and neck supple. No rigidity.   Skin:     General: Skin is warm and dry.      Capillary Refill: Capillary refill takes less than 2 seconds.      Coloration: Skin is not pale.      Findings: No rash.   Neurological:      General: No focal deficit present.      Mental Status: She is alert and oriented to person, place, and time.      Cranial Nerves: No cranial nerve deficit.      Motor: No weakness or abnormal muscle tone.           VENTILATOR SETTINGS  Oxygen Concentration (%):  [40] 40           LAST ARTERIAL BLOOD GAS  ABG  Recent Labs   Lab 09/19/23  0912   PH 7.519*   PO2 84   PCO2 35.9   HCO3 29.2*   BE 6         ACUTE PHASE REACTANT (LAST 24 HOURS)  No results for input(s): "FERRITIN", "CRP", "LDH", "DDIMER" in the last 24 hours.      CBC LAST (LAST 24 HOURS)  Recent Labs   Lab 09/25/23  0418   WBC 7.97   RBC 3.49*   HGB 9.1*   HCT 30.5*   MCV 87   MCH 26.1*   MCHC 29.8*   RDW 15.7* " "     MPV 9.3   GRAN 70.5  5.6   LYMPH 15.2*  1.2   MONO 10.7  0.9   BASO 0.06   NRBC 0         CHEMISTRY LAST (LAST 24 HOURS)  Recent Labs   Lab 09/25/23  0418      K 4.7   CL 97   CO2 38*   ANIONGAP 3*   BUN 32*   CREATININE 1.4   *   CALCIUM 9.3   MG 2.1   ALBUMIN 2.6*   PROT 6.0   ALKPHOS 64   ALT 10   AST 8*   BILITOT 0.2         COAGULATION LAST (LAST 24 HOURS)  No results for input(s): "LABPT", "INR", "APTT" in the last 24 hours.    CARDIAC PROFILE (LAST 24 HOURS)  Recent Labs   Lab 09/18/23  2201 09/19/23  0530 09/19/23  1124 09/20/23  1143   *  --   --   --    LDH  --   --   --  141   TROPONINIHS  --    < > 18.6*  --     < > = values in this interval not displayed.         LAST 7 DAYS MICROBIOLOGY   Microbiology Results (last 7 days)       Procedure Component Value Units Date/Time    Blood culture [[id number]] Collected: 09/19/23 1338    Order Status: Completed Specimen: Blood Updated: 09/24/23 1432     Blood Culture, Routine No growth after 5 days.    Culture, Body Fluid (Aerobic) w/ GS [[id number]] Collected: 09/20/23 1035    Order Status: Completed Specimen: Pleural Fluid from Lung, Left Updated: 09/24/23 0717     AEROBIC CULTURE - FLUID No growth    AFB culture (includes stain) [[id number]] Collected: 09/20/23 1035    Order Status: Completed Specimen: Pleural Fluid from Lung, Left Updated: 09/23/23 1719     AFB CULTURE STAIN No acid fast bacilli seen.     AFB CULTURE STAIN Testing performed by:     AFB CULTURE STAIN [facility] [place]     [facility] [address] [facility]     [place] [address]     AFB CULTURE STAIN Dr.Brian Gil MD    Gram stain [[id number]] Collected: 09/20/23 1035    Order Status: Completed Specimen: Pleural Fluid from Lung, Left Updated: 09/21/23 1420     Gram Stain Result Moderate WBC's      No organisms seen    Culture, Respiratory with Gram Stain [[id number]]     Order Status: No result Specimen: Respiratory       "       MOST RECENT IMAGING  X-Ray Chest AP Portable  Chest, single view    HISTORY: Pneumonia.    Comparison is made to 9/20/2023.    The cardiac silhouette and central pulmonary vasculature are stable.    There has been interval progression of pulmonary opacities in the left mid/lower lung zone. There is similar blunting of the left costophrenic angle.    There is been development of linear opacity compatible with atelectasis in the right midlung. There is interstitial prominence near the right pulmonary apex. No significant right-sided effusion demonstrated.    IMPRESSION:    Interval progression of pulmonary opacities in the left mid and lower lung zone. There is similar blunting of left costophrenic angle.    Minor platelike atelectasis within the right midlung. There is mild right-sided interstitial prominence.    Electronically signed by:  Ines Schneider MD  9/22/2023 7:32 AM CDT Workstation: [id number]      CURRENT VISIT EKG  Results for orders placed or performed during the hospital encounter of 09/18/23   EKG 12-lead    Narrative    Test Reason : I49.9,    Vent. Rate : 121 BPM     Atrial Rate : 326 BPM     P-R Int : 000 ms          QRS Dur : 088 ms      QT Int : 286 ms       P-R-T Axes : 000 -29 150 degrees     QTc Int : 406 ms    Atrial fibrillation with rapid ventricular response  Low voltage QRS  Nonspecific T wave abnormality  Abnormal ECG  When compared with ECG of 18-SEP-2023 21:33,  No significant change was found  Confirmed by Kamran LESLIE, Gary GONZALEZ ([id number]) on 9/19/2023 7:27:06 PM    Referred By: [email or web address]   SELF           Confirmed By:Gary Andrew MD       ECHOCARDIOGRAM RESULTS  Results for orders placed during the hospital encounter of 09/18/23    Echo Saline Bubble? No    Interpretation Summary    Left Ventricle: Moderately increased wall thickness. There is concentric remodeling. There is mildly reduced systolic function with a visually estimated ejection fraction of 40 - 50%.    Left Atrium:  Left atrium is dilated.    Aortic Valve: There is mild aortic valve sclerosis.    Mitral Valve: There is moderate regurgitation.    Tricuspid Valve: There is mild regurgitation.    Pulmonary Artery: The estimated pulmonary artery systolic pressure is 30 mmHg.    IVC/SVC: Normal venous pressure at 3 mmHg.    Pericardium: There is a small effusion. No indication of cardiac tamponade.        ASSESSMENT:   Acute on chronic Hypoxemic respiratory failure   Community acquired Pneumonia, no evidence of aspiration  Para pneumonic pleural effusion on the left, exudative   - Underwent thoracentesis on 9/21/23 with turbed yellow and cloudy appearing fluid, -2 Liters fluid drained.   - On diuretics, albumin gradient is 0.1, which is consistent with exudative effusion.   - Based on other lights criteria, total protein ratio is 3.8/6.3= 0.60, and LDH ratio is 280/141, glucose 76.   - Based on all above criteria, this is consistent with exudative effusion  Hx of biventricular heart failure, PASP 45 , ICD  Atrial fibrillation - still with irregular rhythm, more rate controlled while on amio  Intermittent hypotension - likely related to active diuresis while on beta blocker.     Ms Alegre is an 83 year old woman with biventricular heart failure who presents with acute shortness of breath, and a predominantly one sided pleural effusion. Bedside ultrasound suggests minimal to no fluid on the right. Left with mostly free flowing appearing fluid, no loculations were seen. She has been having fevers which have since subsided. Thoracentesis performed at bedside, drained about 2 L of turbid, yellow and cloudy fluid tinged with red. Patient had immediate relief of dyspnea.    Based on albumin gradient and lights criteria, overall picture is that this is consistent with a parapneumonic effusion from pneumonia.     Appears to be euvolemic at this point, continue oral diuretics.     PLAN:   - Recommend at least 2 weeks of antibiotic therapy  for parapneumonic effusion related to pneumonia. No growth on cultures, so can continue to treat empirically. I think at this point she can be deescalated to oral augmentin, and we can continue to reassess her response to antibiotics while she is in the hospital. She can continue the oral antibiotics as an outpatient until she is followed up. She is improving but I don't think ready for discharge home yet.  - Descalated to 1mg oral bumex, renal function is at baseline now.   - No evidence of overt aspiration with speech therapy, although I think microaspiration could still be possible. Considering this to be a CAP, but in future if she has recurrent pneumonias, I think outpatient MBSS would still be warranted. She has other risk factors for developing aspiration pneumonia -  She has a history of poor dental hygiene, takes gabapentin throughout the day and to help sleep at night. She reports coughing following eating and at times she reports occasional choking on food.   - Continue oral amiodarone and treatment for afib, cardiology is following. Still in afib but rate controlled.  - She will need outpatient CT chest non contrast in 6-8 weeks, and then follow up with Dr Baldwin in clinic.     I will continue to follow. Please call if any questions or concerns.     Upon my evaluation, this patient had a high probability of imminent or life-threatening deterioration, which required my direct attention, intervention, and personal management.    I have personally provided at least 35 minutes of critical care time exclusive of time spent on separately billable procedures. Over 50% of the time of this encounter was spent in direct care at the bedside. Time includes review of laboratory data, radiology results, discussion with consultants, and monitoring for potential decompensation. Interventions were performed as documented above.    Bud López MD  Date of Service: 09/25/2023  2:23 PM   [phone]

## 2023-09-25 NOTE — PT/OT/SLP PROGRESS
Occupational Therapy   Treatment    Name: Jo Alegre  MRN: [id number]  Admitting Diagnosis:  Acute on chronic combined systolic and diastolic congestive heart failure       Recommendations:     Discharge Recommendations: nursing facility, skilled  Discharge Equipment Recommendations:  none  Barriers to discharge:  Decreased caregiver support    Assessment:     Jo Alegre is a 83 y.o. female with a medical diagnosis of Acute on chronic combined systolic and diastolic congestive heart failure.  Performance deficits affecting function are weakness, impaired endurance, impaired self care skills, impaired functional mobility, gait instability, impaired balance, impaired cardiopulmonary response to activity.     Rehab Prognosis:  Good; patient would benefit from acute skilled OT services to address these deficits and reach maximum level of function.       Plan:     Patient to be seen 5 x/week to address the above listed problems via self-care/home management, therapeutic activities, therapeutic exercises  Plan of Care Expires: 10/20/23  Plan of Care Reviewed with: patient, family    Subjective     Pain/Comfort:  Pain Rating 1: 0/10  Pain Rating Post-Intervention 1: 0/10    Objective:     Communicated with: nurse prior to session.  Patient found supine with telemetry, pulse ox (continuous), peripheral IV, blood pressure cuff upon OT entry to room.    General Precautions: Standard, fall    Respiratory Status: Nasal cannula, flow 3 L/min     Occupational Performance:     Bed Mobility:    Patient completed Supine to Sit with moderate assistance  Patient completed Sit to Supine with moderate assistance     Functional Mobility/Transfers:  Patient completed Sit <> Stand Transfer with minimum assistance  with  rolling walker x 2 trials at EOB  Functional Mobility: pt completed side steps and 4 steps forward/backward at EOB x 2 trials with CGA with RW    Activities of Daily Living:  Pt declined; wanted to focus on  improving functional mobility/endurance     Patient left HOB elevated with all lines intact, call button in reach, and bed alarm on    GOALS:   Multidisciplinary Problems       Occupational Therapy Goals          Problem: Occupational Therapy    Goal Priority Disciplines Outcome Interventions   Occupational Therapy Goal     OT, PT/OT Ongoing, Progressing    Description: Goals to be met by: 10/20/23     Patient will increase functional independence with ADLs by performing:    UE Dressing with Modified [place].  LE Dressing with Modified [place].  Grooming while standing at sink with Modified [place].  Toileting from toilet with Modified [place] for hygiene and clothing management.   Toilet transfer to toilet with Modified [place].                         Time Tracking:     OT Date of Treatment: 09/25/23  OT Start Time: 1502  OT Stop Time: 1525  OT Total Time (min): 23 min    Billable Minutes:Therapeutic Activity 23    OT/SHAWN: OT          9/25/2023

## 2023-09-25 NOTE — SUBJECTIVE & OBJECTIVE
Interval History:  continues on supplemental oxygen.  Feels less short of breath.  Diuresing well.  Eating well.  Sat in chair at side of bed today.    Review of Systems   Constitutional:  Negative for fever.   Respiratory:  Positive for shortness of breath.    Cardiovascular:  Negative for chest pain.   Gastrointestinal:  Negative for abdominal pain.     Objective:     Vital Signs (Most Recent):  Temp: 98.3 °F (36.8 °C) (09/24/23 1630)  Pulse: 99 (09/24/23 1940)  Resp: (!) 25 (09/24/23 1940)  BP: (!) 122/59 (09/24/23 1630)  SpO2: 95 % (09/24/23 1940) Vital Signs (24h Range):  Temp:  [98 °F (36.7 °C)-98.3 °F (36.8 °C)] 98.3 °F (36.8 °C)  Pulse:  [] 99  Resp:  [20-38] 25  SpO2:  [90 %-100 %] 95 %  BP: ()/(50-77) 122/59     Weight: 97.2 kg (214 lb 4.6 oz)  Body mass index is 33.55 kg/m².    Intake/Output Summary (Last 24 hours) at 9/24/2023 2002  Last data filed at 9/24/2023 1501  Gross per 24 hour   Intake 810 ml   Output 1950 ml   Net -1140 ml           Physical Exam  Constitutional:       General: She is not in acute distress.     Appearance: She is not ill-appearing.      Interventions: Nasal cannula in place.   Eyes:      General:         Right eye: No discharge.         Left eye: No discharge.   Neck:      Vascular: No JVD.   Cardiovascular:      Rate and Rhythm: Normal rate. Rhythm irregular.   Pulmonary:      Effort: Pulmonary effort is normal.      Breath sounds: Rales present.   Abdominal:      General: Abdomen is flat. Bowel sounds are normal. There is no distension.      Palpations: Abdomen is soft.      Tenderness: There is no abdominal tenderness.   Musculoskeletal:      Right lower leg: No edema.      Left lower leg: No edema.   Skin:     General: Skin is warm and moist.      Findings: No rash.   Neurological:      Mental Status: She is alert and oriented to person, place, and time.   Psychiatric:         Attention and Perception: Attention normal.         Mood and Affect: Mood and affect  normal.         Speech: Speech normal.             Significant Labs: All pertinent labs within the past 24 hours have been reviewed.    Significant Imaging:  No new imaging

## 2023-09-25 NOTE — PROGRESS NOTES
[facility] Medicine  Progress Note    Patient Name: Jo Alegre  MRN: [id number]  Patient Class: IP- Inpatient   Admission Date: 9/18/2023  Length of Stay: 5 days  Attending Physician: Israel Awan MD  Primary Care Provider: Kraig Alberto MD        Subjective:     Principal Problem:Acute on chronic combined systolic and diastolic congestive heart failure        HPI:  Ms. Alegre is an 83 year old woman with PMHx of HTN, HDL, HFrEF s/p AICD, Afib- presented with one day hx of sob started yesterday - pt says she was fine day before yesterday but around morning time she started having sob not associated with any chest pain, palpitations, or fever, chills, cough or abdominal pain. She did not have any dietary discretion, has been adherent to her home meds. She also noted leg swelling bilateral yesterday. She was at cardio office for her device interrogation which as per patient is fine.     In ER she was found to have left pleural effusion and elevated BNP. Pt is being admitted for diuresis.        Overview/Hospital Course:  Patient monitor closely during hospitalization.  She was noted to have acute hypoxemic respiratory failure and AFib RVR.  Cardiology consulted.  Patient noted to have left-sided large pleural effusion on chest x-ray.  Pulmonary Medicine perform left thoracentesis with 2000 cc yellow turbid fluid removal.  Fluid analysis suggestive of transudate.  Cultures are negative.  Patient is requiring aggressive IV diuretic therapy.  Symptoms have improved.  Subsequent chest x-rays showing reaccumulating left pleural effusion.  Patient is on fluid restriction.  Patient also required intravenous amiodarone infusion for atrial fibrillation with rapid ventricular response and now transitioned back to oral amiodarone.  Patient is closely being followed by Pulmonary Medicine and Cardiology team.  Patient made herself DNR code status.  Patient was followed by palliative medicine  as well.      Interval History:  continues on supplemental oxygen.  Feels less short of breath.  Diuresing well.  Eating well.  Sat in chair at side of bed today.    Review of Systems   Constitutional:  Negative for fever.   Respiratory:  Positive for shortness of breath.    Cardiovascular:  Negative for chest pain.   Gastrointestinal:  Negative for abdominal pain.     Objective:     Vital Signs (Most Recent):  Temp: 98.3 °F (36.8 °C) (09/24/23 1630)  Pulse: 99 (09/24/23 1940)  Resp: (!) 25 (09/24/23 1940)  BP: (!) 122/59 (09/24/23 1630)  SpO2: 95 % (09/24/23 1940) Vital Signs (24h Range):  Temp:  [98 °F (36.7 °C)-98.3 °F (36.8 °C)] 98.3 °F (36.8 °C)  Pulse:  [] 99  Resp:  [20-38] 25  SpO2:  [90 %-100 %] 95 %  BP: ()/(50-77) 122/59     Weight: 97.2 kg (214 lb 4.6 oz)  Body mass index is 33.55 kg/m².    Intake/Output Summary (Last 24 hours) at 9/24/2023 2002  Last data filed at 9/24/2023 1501  Gross per 24 hour   Intake 810 ml   Output 1950 ml   Net -1140 ml           Physical Exam  Constitutional:       General: She is not in acute distress.     Appearance: She is not ill-appearing.      Interventions: Nasal cannula in place.   Eyes:      General:         Right eye: No discharge.         Left eye: No discharge.   Neck:      Vascular: No JVD.   Cardiovascular:      Rate and Rhythm: Normal rate. Rhythm irregular.   Pulmonary:      Effort: Pulmonary effort is normal.      Breath sounds: Rales present.   Abdominal:      General: Abdomen is flat. Bowel sounds are normal. There is no distension.      Palpations: Abdomen is soft.      Tenderness: There is no abdominal tenderness.   Musculoskeletal:      Right lower leg: No edema.      Left lower leg: No edema.   Skin:     General: Skin is warm and moist.      Findings: No rash.   Neurological:      Mental Status: She is alert and oriented to person, place, and time.   Psychiatric:         Attention and Perception: Attention normal.         Mood and Affect: Mood  and affect normal.         Speech: Speech normal.             Significant Labs: All pertinent labs within the past 24 hours have been reviewed.    Significant Imaging:  No new imaging      Assessment/Plan:      * Acute on chronic combined systolic and diastolic congestive heart failure  Patient is identified as having Combined Systolic and Diastolic heart failure that is Acute on chronic. CHF is currently uncontrolled. Latest ECHO performed and demonstrates- Results for orders placed during the hospital encounter of 05/15/23    Echo    Interpretation Summary  · The left ventricle is mildly enlarged with mild concentric hypertrophy and moderately decreased systolic function.  · The estimated ejection fraction is 40%.  · Grade I left ventricular diastolic dysfunction.  · There is left ventricular global hypokinesis.  · Normal right ventricular size with normal right ventricular systolic function.  · Severe left atrial enlargement.  · Moderate-to-severe mitral regurgitation.  · Mild tricuspid regurgitation.  · Elevated central venous pressure (15 mmHg).  · The estimated PA systolic pressure is 45 mmHg.  · There is pulmonary hypertension.  · Trivial anterior pericardial effusion.  . Continue bumex and beta blocker and monitor clinical status closely. Monitor on telemetry. Patient is on CHF pathway.  Monitor strict Is&Os and daily weights.  Placed on fluid restriction of 1.5 L. Cardiology has been consulted. Continue to stress to patient importance of self efficacy and  on diet for CHF. Last BNP reviewed- and noted below   Recent Labs   Lab 09/18/23  2201   *     Patient instructed to adhere with fluid restriction.  Continue IV bumetanide.    ACP (advance care planning)        Acute respiratory failure with hypoxia  Patient with Hypoxic Respiratory failure which is Acute.  She is not on home oxygen. Supplemental oxygen was provided and noted- Oxygen Concentration (%):  [40] 40    .   Signs/symptoms of  respiratory failure include- tachypnea, increased work of breathing, respiratory distress and use of accessory muscles. Contributing diagnoses includes - CHF.  Labs and images were reviewed. Will treat underlying causes and adjust management of respiratory failure as follows- Continue 4 LPM oxygen NC, Bumex intravenous for treatment of pulmonary edema.    Recurrent left pleural effusion  Status post left thoracentesis when 2000 cc yellow turbid fluid removed on September 20, 2023 by Pulmonary Medicine.  Postprocedure chest x-ray without pneumothorax.      Obstructive sleep apnea syndrome  C/w CPAP      COPD (chronic obstructive pulmonary disease) per patient  Continue ELIZABETH q6 PRN.  Disease is stable.    Paroxysmal atrial fibrillation  Currently in Afib-but rate controlled.   C/w Amio and BB  On Xarelto.  Cardiologist consulting.    Stage 3 chronic kidney disease  Creatine stable for now. BMP reviewed- noted Estimated Creatinine Clearance: 34.6 mL/min (A) (based on SCr of 1.5 mg/dL (H)). according to latest data. Based on current GFR, CKD stage is stage 3 - GFR 30-59.  Monitor UOP and serial BMP and adjust therapy as needed. Renally dose meds. Avoid nephrotoxic medications and procedures.          Cardiomyopathy with implantable cardioverter-defibrillator  Not sure if ischemic or non-ischemic  Device interrogated during this admission.  Continue Bumex 1 mg IV daily.  Continue goal-directed medical therapy.  I/Os, daily weight      Mixed dyslipidemia  Continue statin      Essential hypertension  Continue Toprol 25 mg daily.  Monitor blood pressure.  It's controlled.  She's also on midodrine.        VTE Risk Mitigation (From admission, onward)         Ordered     rivaroxaban tablet 15 mg  With dinner         09/20/23 1212     IP VTE HIGH RISK PATIENT  Once         09/19/23 1301     Place sequential compression device  Until discontinued         09/19/23 1301     Reason for No Pharmacological VTE Prophylaxis  Once         Question:  Reasons:  Answer:  Already adequately anticoagulated on oral Anticoagulants    09/19/23 1301     Place sequential compression device  Until discontinued         09/19/23 1043                Discharge Planning   JOANA: 9/25/2023     Code Status: Partial Code   Is the patient medically ready for discharge?:     Reason for patient still in hospital (select all that apply): Patient trending condition and Treatment  Discharge Plan A: Home with family        \      Israel Awan MD  Department of Hospital Medicine   [facility]

## 2023-09-25 NOTE — NURSING
Nurses Note -- 4 Eyes      9/25/2023   3:27 PM      Skin assessed during: Transfer      [] No Altered Skin Integrity Present    []Prevention Measures Documented      [x] Yes- Altered Skin Integrity Present or Discovered   [] LDA Added if Not in Epic (Describe Wound)   [] New Altered Skin Integrity was Present on Admit and Documented in LDA   [x] Wound Image Taken    Wound Care Consulted? No    Attending Nurse:  Shanta Mccoy RN/Staff Member:  hanna

## 2023-09-25 NOTE — PLAN OF CARE
Problem: Adult Inpatient Plan of Care  Goal: Plan of Care Review  Outcome: Ongoing, Progressing  Flowsheets (Taken 9/25/2023 1814)  Plan of Care Reviewed With:   patient   daughter  Goal: Optimal Comfort and Wellbeing  Outcome: Ongoing, Progressing  Intervention: Provide Person-Centered Care  Flowsheets (Taken 9/25/2023 1814)  Trust Relationship/Rapport:   care explained   choices provided   thoughts/feelings acknowledged   reassurance provided     Problem: Fall Injury Risk  Goal: Absence of Fall and Fall-Related Injury  Outcome: Ongoing, Progressing     Problem: Infection  Goal: Absence of Infection Signs and Symptoms  Outcome: Ongoing, Progressing

## 2023-09-25 NOTE — PT/OT/SLP PROGRESS
"Speech Language Pathology Treatment    Patient Name:  Jo Alegre   MRN:  [id number]  Admitting Diagnosis: Acute on chronic combined systolic and diastolic congestive heart failure    Recommendations:                 General Recommendations:  Dysphagia therapy  Diet recommendations:  Regular Diet - IDDSI Level 7, Liquid Diet Level: Thin liquids - IDDSI Level 0   Aspiration Precautions:  1 bite/sip at a time, HOB to 90 degrees, Remain upright 30 minutes post meal, Small bites/sips, and Wear oxygen during intake     General Precautions: Standard, aspiration  Communication strategies:  none    Assessment:     Jo Alegre is a 83 y.o. female with an SLP diagnosis of  increased risk of aspiration due to respiratory status .  She presents with adequate tolerance of regular textures and thin liquids. No overt s/s aspiration noted. She requires cues for recall of swallowing precautions, but is using them IND. Continue f/u x1 during meal.    Subjective     Pt awake sitting in bedside chair talking on phone. Family member at bedside. Pt agreeable to ST.  Patient goals: "I've been eating fine, no issues"     Pain/Comfort:       Respiratory Status: High flow, flow 3 L/min, concentration  %    Objective:     Has the patient been evaluated by SLP for swallowing?   Yes  Keep patient NPO? No   Current Respiratory Status:        SLP provided ed re importance of oral care. Pt completed oral care IND; she reports she brushes her teeth 2x daily. She reports that she brushes her tongue as well. SLP encouraged adding brushing of palate; importance of decreasing bacteria and association w/ aspiration PNA explained w/ pt expression of understanding. Pt observed during sips of thin liquids and bites of cracker; no overt s/s aspiration noted across trials. Pt recalled swallowing precautions given min-mod cuing. Pt IND using precautions during trials prior to reviewing. Pt and nursing reporting pt tolerating regular meal trays and " thin liquids adequately w/o overt s/s aspiration.    Goals:   Multidisciplinary Problems       SLP Goals          Problem: SLP    Goal Priority Disciplines Outcome   SLP Goal     SLP Ongoing, Progressing   Description: 1. Pt will tolerate LRD (IDDSI 7,0) w/o overt s/s aspiration during >95% of PO trials  2. Pt will utilize swallowing precautions during >95% of PO intake given min cues                       Plan:     Patient to be seen:  2 x/week, 3 x/week   Plan of Care expires:     Plan of Care reviewed with:  patient, other (see comments) (nursing)   SLP Follow-Up:  Yes       Discharge recommendations:      Barriers to Discharge:  None    Time Tracking:     SLP Treatment Date:   09/25/23  Speech Start Time:  1035  Speech Stop Time:  1049     Speech Total Time (min):  14 min    Billable Minutes: Treatment Swallowing Dysfunction 14 min    09/25/2023

## 2023-09-25 NOTE — CARE UPDATE
Patient has prn tx available , wearing cpap at night   09/25/23 0736   Patient Assessment/Suction   Level of Consciousness (AVPU) alert   Respiratory Effort Normal;Unlabored   Expansion/Accessory Muscles/Retractions no use of accessory muscles;expansion symmetric   All Lung Fields Breath Sounds clear;diminished   LEYDI Breath Sounds clear   LLL Breath Sounds diminished   RUL Breath Sounds clear   RML Breath Sounds diminished   RLL Breath Sounds diminished   Rhythm/Pattern, Respiratory unlabored;shallow   Cough Frequency no cough   Skin Integrity   $ Wound Care Tech Time 15 min   Area Observed Bridge of nose   Skin Appearance without discoloration   Barrier used? Gel Cushion   PRE-TX-O2   Device (Oxygen Therapy) nasal cannula   $ Is the patient on Low Flow Oxygen? Yes   Flow (L/min) 3   Pulse Oximetry Type Continuous   $ Pulse Oximetry - Multiple Charge Pulse Oximetry - Multiple   Aerosol Therapy   $ Aerosol Therapy Charges PRN treatment not required   Education   $ Education DME Oxygen;15 min   Respiratory Evaluation   $ Care Plan Tech Time 15 min   $ Eval/Re-eval Charges Re-evaluation

## 2023-09-26 VITALS
SYSTOLIC BLOOD PRESSURE: 96 MMHG | OXYGEN SATURATION: 98 % | BODY MASS INDEX: 32.94 KG/M2 | TEMPERATURE: 98 F | HEIGHT: 67 IN | RESPIRATION RATE: 17 BRPM | DIASTOLIC BLOOD PRESSURE: 67 MMHG | HEART RATE: 68 BPM | WEIGHT: 209.88 LBS

## 2023-09-26 PROBLEM — J96.01 ACUTE RESPIRATORY FAILURE WITH HYPOXIA: Status: RESOLVED | Noted: 2023-09-19 | Resolved: 2023-09-26

## 2023-09-26 PROBLEM — J15.9 COMMUNITY ACQUIRED BACTERIAL PNEUMONIA: Status: RESOLVED | Noted: 2023-09-25 | Resolved: 2023-09-26

## 2023-09-26 PROBLEM — I50.43 ACUTE ON CHRONIC COMBINED SYSTOLIC AND DIASTOLIC CONGESTIVE HEART FAILURE: Status: RESOLVED | Noted: 2020-04-01 | Resolved: 2023-09-26

## 2023-09-26 PROBLEM — J90 RECURRENT LEFT PLEURAL EFFUSION: Status: RESOLVED | Noted: 2023-09-19 | Resolved: 2023-09-26

## 2023-09-26 LAB
ALBUMIN SERPL BCP-MCNC: 2.6 G/DL (ref 3.5–5.2)
ALP SERPL-CCNC: 61 U/L (ref 55–135)
ALT SERPL W/O P-5'-P-CCNC: 9 U/L (ref 10–44)
ANION GAP SERPL CALC-SCNC: 4 MMOL/L (ref 8–16)
AST SERPL-CCNC: 8 U/L (ref 10–40)
BASOPHILS # BLD AUTO: 0.05 K/UL (ref 0–0.2)
BASOPHILS NFR BLD: 0.7 % (ref 0–1.9)
BILIRUB SERPL-MCNC: 0.3 MG/DL (ref 0.1–1)
BUN SERPL-MCNC: 27 MG/DL (ref 8–23)
CALCIUM SERPL-MCNC: 9 MG/DL (ref 8.7–10.5)
CHLORIDE SERPL-SCNC: 96 MMOL/L (ref 95–110)
CO2 SERPL-SCNC: 37 MMOL/L (ref 23–29)
CREAT SERPL-MCNC: 1.2 MG/DL (ref 0.5–1.4)
DIFFERENTIAL METHOD: ABNORMAL
EOSINOPHIL # BLD AUTO: 0.2 K/UL (ref 0–0.5)
EOSINOPHIL NFR BLD: 2.7 % (ref 0–8)
ERYTHROCYTE [DISTWIDTH] IN BLOOD BY AUTOMATED COUNT: 15.8 % (ref 11.5–14.5)
EST. GFR  (NO RACE VARIABLE): 45.2 ML/MIN/1.73 M^2
GLUCOSE SERPL-MCNC: 109 MG/DL (ref 70–110)
HCT VFR BLD AUTO: 31.8 % (ref 37–48.5)
HGB BLD-MCNC: 9.4 G/DL (ref 12–16)
IMM GRANULOCYTES # BLD AUTO: 0.04 K/UL (ref 0–0.04)
IMM GRANULOCYTES NFR BLD AUTO: 0.5 % (ref 0–0.5)
LYMPHOCYTES # BLD AUTO: 1 K/UL (ref 1–4.8)
LYMPHOCYTES NFR BLD: 13.5 % (ref 18–48)
MAGNESIUM SERPL-MCNC: 2 MG/DL (ref 1.6–2.6)
MCH RBC QN AUTO: 25.9 PG (ref 27–31)
MCHC RBC AUTO-ENTMCNC: 29.6 G/DL (ref 32–36)
MCV RBC AUTO: 88 FL (ref 82–98)
MONOCYTES # BLD AUTO: 0.9 K/UL (ref 0.3–1)
MONOCYTES NFR BLD: 12.3 % (ref 4–15)
NEUTROPHILS # BLD AUTO: 5.1 K/UL (ref 1.8–7.7)
NEUTROPHILS NFR BLD: 70.3 % (ref 38–73)
NRBC BLD-RTO: 0 /100 WBC
PHOSPHATE SERPL-MCNC: 3.4 MG/DL (ref 2.7–4.5)
PLATELET # BLD AUTO: 443 K/UL (ref 150–450)
PMV BLD AUTO: 9.4 FL (ref 9.2–12.9)
POTASSIUM SERPL-SCNC: 4.2 MMOL/L (ref 3.5–5.1)
PROT SERPL-MCNC: 6.1 G/DL (ref 6–8.4)
RBC # BLD AUTO: 3.63 M/UL (ref 4–5.4)
SODIUM SERPL-SCNC: 137 MMOL/L (ref 136–145)
WBC # BLD AUTO: 7.32 K/UL (ref 3.9–12.7)

## 2023-09-26 PROCEDURE — 25000003 PHARM REV CODE 250: Performed by: INTERNAL MEDICINE

## 2023-09-26 PROCEDURE — 25000003 PHARM REV CODE 250: Performed by: HOSPITALIST

## 2023-09-26 PROCEDURE — 85025 COMPLETE CBC W/AUTO DIFF WBC: CPT | Performed by: INTERNAL MEDICINE

## 2023-09-26 PROCEDURE — 97116 GAIT TRAINING THERAPY: CPT

## 2023-09-26 PROCEDURE — 36415 COLL VENOUS BLD VENIPUNCTURE: CPT | Performed by: INTERNAL MEDICINE

## 2023-09-26 PROCEDURE — 84100 ASSAY OF PHOSPHORUS: CPT | Performed by: INTERNAL MEDICINE

## 2023-09-26 PROCEDURE — 80053 COMPREHEN METABOLIC PANEL: CPT | Performed by: INTERNAL MEDICINE

## 2023-09-26 PROCEDURE — 83735 ASSAY OF MAGNESIUM: CPT | Performed by: INTERNAL MEDICINE

## 2023-09-26 PROCEDURE — 86580 TB INTRADERMAL TEST: CPT | Performed by: STUDENT IN AN ORGANIZED HEALTH CARE EDUCATION/TRAINING PROGRAM

## 2023-09-26 PROCEDURE — 99900035 HC TECH TIME PER 15 MIN (STAT)

## 2023-09-26 PROCEDURE — 63600175 PHARM REV CODE 636 W HCPCS

## 2023-09-26 PROCEDURE — 30200315 PPD INTRADERMAL TEST REV CODE 302: Performed by: STUDENT IN AN ORGANIZED HEALTH CARE EDUCATION/TRAINING PROGRAM

## 2023-09-26 PROCEDURE — 99232 PR SUBSEQUENT HOSPITAL CARE,LEVL II: ICD-10-PCS | Mod: ,,, | Performed by: INTERNAL MEDICINE

## 2023-09-26 PROCEDURE — 99232 SBSQ HOSP IP/OBS MODERATE 35: CPT | Mod: ,,, | Performed by: INTERNAL MEDICINE

## 2023-09-26 RX ORDER — MIDODRINE HYDROCHLORIDE 5 MG/1
5 TABLET ORAL
Qty: 90 TABLET | Refills: 11 | Status: ON HOLD | OUTPATIENT
Start: 2023-09-26 | End: 2023-10-13 | Stop reason: SDUPTHER

## 2023-09-26 RX ORDER — AMOXICILLIN AND CLAVULANATE POTASSIUM 875; 125 MG/1; MG/1
1 TABLET, FILM COATED ORAL EVERY 12 HOURS
Qty: 28 TABLET | Refills: 0 | Status: CANCELLED | OUTPATIENT
Start: 2023-09-26 | End: 2023-10-10

## 2023-09-26 RX ORDER — AMIODARONE HYDROCHLORIDE 200 MG/1
200 TABLET ORAL DAILY
Qty: 30 TABLET | Refills: 11 | Status: CANCELLED | OUTPATIENT
Start: 2023-09-27 | End: 2024-09-26

## 2023-09-26 RX ORDER — AMOXICILLIN AND CLAVULANATE POTASSIUM 875; 125 MG/1; MG/1
1 TABLET, FILM COATED ORAL EVERY 12 HOURS
Qty: 28 TABLET | Refills: 0 | Status: ON HOLD | OUTPATIENT
Start: 2023-09-26 | End: 2023-10-13 | Stop reason: HOSPADM

## 2023-09-26 RX ORDER — DIGOXIN 125 MCG
0.12 TABLET ORAL DAILY
Status: DISCONTINUED | OUTPATIENT
Start: 2023-09-27 | End: 2023-09-26 | Stop reason: HOSPADM

## 2023-09-26 RX ORDER — DIGOXIN 0.25 MG/ML
250 INJECTION INTRAMUSCULAR; INTRAVENOUS ONCE
Status: COMPLETED | OUTPATIENT
Start: 2023-09-26 | End: 2023-09-26

## 2023-09-26 RX ORDER — BUMETANIDE 1 MG/1
1 TABLET ORAL DAILY
Qty: 30 TABLET | Refills: 11 | Status: CANCELLED | OUTPATIENT
Start: 2023-09-27 | End: 2024-09-26

## 2023-09-26 RX ORDER — DIGOXIN 125 MCG
0.12 TABLET ORAL DAILY
Qty: 30 TABLET | Refills: 11 | Status: CANCELLED | OUTPATIENT
Start: 2023-09-27 | End: 2024-09-26

## 2023-09-26 RX ORDER — MIDODRINE HYDROCHLORIDE 5 MG/1
5 TABLET ORAL
Qty: 90 TABLET | Refills: 11 | Status: CANCELLED | OUTPATIENT
Start: 2023-09-26 | End: 2024-09-25

## 2023-09-26 RX ORDER — AMIODARONE HYDROCHLORIDE 200 MG/1
200 TABLET ORAL DAILY
Qty: 30 TABLET | Refills: 11 | Status: ON HOLD | OUTPATIENT
Start: 2023-09-27 | End: 2023-10-13 | Stop reason: SDUPTHER

## 2023-09-26 RX ORDER — BUMETANIDE 1 MG/1
1 TABLET ORAL DAILY
Qty: 30 TABLET | Refills: 11 | Status: ON HOLD | OUTPATIENT
Start: 2023-09-27 | End: 2023-10-13 | Stop reason: SDUPTHER

## 2023-09-26 RX ORDER — DIGOXIN 125 MCG
0.12 TABLET ORAL DAILY
Qty: 30 TABLET | Refills: 11 | Status: ON HOLD | OUTPATIENT
Start: 2023-09-27 | End: 2023-10-13 | Stop reason: SDUPTHER

## 2023-09-26 RX ADMIN — AMIODARONE HYDROCHLORIDE 200 MG: 200 TABLET ORAL at 09:09

## 2023-09-26 RX ADMIN — MIDODRINE HYDROCHLORIDE 5 MG: 2.5 TABLET ORAL at 11:09

## 2023-09-26 RX ADMIN — GABAPENTIN 200 MG: 100 CAPSULE ORAL at 09:09

## 2023-09-26 RX ADMIN — DIGOXIN 250 MCG: 0.25 INJECTION INTRAMUSCULAR; INTRAVENOUS at 12:09

## 2023-09-26 RX ADMIN — TUBERCULIN PURIFIED PROTEIN DERIVATIVE 5 UNITS: 5 INJECTION, SOLUTION INTRADERMAL at 03:09

## 2023-09-26 RX ADMIN — AMOXICILLIN AND CLAVULANATE POTASSIUM 1 TABLET: 875; 125 TABLET, FILM COATED ORAL at 09:09

## 2023-09-26 RX ADMIN — FLUTICASONE PROPIONATE 100 MCG: 50 SPRAY, METERED NASAL at 09:09

## 2023-09-26 RX ADMIN — MIDODRINE HYDROCHLORIDE 5 MG: 2.5 TABLET ORAL at 07:09

## 2023-09-26 RX ADMIN — ACETAMINOPHEN 650 MG: 325 TABLET ORAL at 02:09

## 2023-09-26 RX ADMIN — ACETAMINOPHEN 650 MG: 325 TABLET ORAL at 07:09

## 2023-09-26 NOTE — PT/OT/SLP PROGRESS
Physical Therapy Treatment    Patient Name:  Jo Alegre   MRN:  [id number]    Recommendations:     Discharge Recommendations: nursing facility, skilled  Discharge Equipment Recommendations: to be determined by next level of care  Barriers to discharge:  Increased caregiver burden of care    Assessment:     Jo Alegre is a 83 y.o. female admitted with a medical diagnosis of Acute on chronic combined systolic and diastolic congestive heart failure.  She presents with the following impairments/functional limitations: impaired cognition, impaired cardiopulmonary response to activity, weakness, impaired endurance, gait instability, impaired balance .    Pt was more cooperative with therapy and motivated to  reach today's goal of ambulation of 10 ft with RW and Min A .  Movements remain really slow and cautious.    Rehab Prognosis: Good; patient would benefit from acute skilled PT services to address these deficits and reach maximum level of function.    Recent Surgery: * No surgery found *      Plan:     During this hospitalization, patient to be seen 6 x/week to address the identified rehab impairments via gait training, therapeutic activities, therapeutic exercises and progress toward the following goals:    Plan of Care Expires:  10/21/23    Subjective     Chief Complaint: weakness  Patient/Family Comments/goals: To get stronger  Pain/Comfort:  Pain Rating 1: 0/10      Objective:     Communicated with nurse prior to session.  Patient found supine with telemetry, pulse ox (continuous), blood pressure cuff, peripheral IV upon PT entry to room.     General Precautions: Standard, fall  Orthopedic Precautions: N/A  Braces: N/A  Respiratory Status: Nasal cannula, flow 2 L/min     Functional Mobility:  Bed Mobility:     Supine to Sit: minimum assistance and moderate assistance  Transfers:     Sit to Stand:  minimum assistance with rolling walker  Gait: 10 ft RW and Min A .        AM-PAC 6 CLICK MOBILITY           Treatment & Education:  Pt was educated on the benefits of increased time OOB, safety with t/f's and gait,  PT POC , DC recommendations    Patient left up in chair with all lines intact, call button in reach, and daughter present..    GOALS:   Multidisciplinary Problems       Physical Therapy Goals          Problem: Physical Therapy    Goal Priority Disciplines Outcome Goal Variances Interventions   Physical Therapy Goal     PT, PT/OT Ongoing, Progressing     Description: Goals to be met by: 10/21/2023     Patient will increase functional independence with mobility by performin. Supine to sit with Contact Guard Assistance  2. Sit to stand transfer with Minimal Assistance  3. Gait  x 50  feet with Contact Guard Assistance using Rolling Walker.                          Time Tracking:     PT Received On: 23  PT Start Time: 1128     PT Stop Time: 1144  PT Total Time (min): 16 min     Billable Minutes: Gait Training 16 minutes    Treatment Type: Treatment  PT/PTA: PT           2023

## 2023-09-26 NOTE — ASSESSMENT & PLAN NOTE
Patient is identified as having Combined Systolic and Diastolic heart failure that is Acute on chronic. CHF is currently uncontrolled. Latest ECHO performed and demonstrates- Results for orders placed during the hospital encounter of 05/15/23    Echo    Interpretation Summary  · The left ventricle is mildly enlarged with mild concentric hypertrophy and moderately decreased systolic function.  · The estimated ejection fraction is 40%.  · Grade I left ventricular diastolic dysfunction.  · There is left ventricular global hypokinesis.  · Normal right ventricular size with normal right ventricular systolic function.  · Severe left atrial enlargement.  · Moderate-to-severe mitral regurgitation.  · Mild tricuspid regurgitation.  · Elevated central venous pressure (15 mmHg).  · The estimated PA systolic pressure is 45 mmHg.  · There is pulmonary hypertension.  · Trivial anterior pericardial effusion.  . Continue bumex and beta blocker and monitor clinical status closely.  IV Bumex switched to oral tabs.  Monitor on telemetry. Patient is on CHF pathway.  Monitor strict Is&Os and daily weights.  Placed on fluid restriction of 1.5 L. Cardiology has been consulted. Continue to stress to patient importance of self efficacy and  on diet for CHF. Last BNP reviewed- and noted below   Recent Labs   Lab 09/18/23  2201   *     Patient instructed to adhere with fluid restriction.  Continue IV bumetanide.

## 2023-09-26 NOTE — SUBJECTIVE & OBJECTIVE
Interval History:  continues on supplemental oxygen.  Feels less short of breath.  Diuresing well.  Eating well.  No new complaints.  She agrees to be sent to SNF after discharge from acute.    Review of Systems   Constitutional:  Negative for fever.   Respiratory:  Positive for shortness of breath.    Cardiovascular:  Negative for chest pain.   Gastrointestinal:  Negative for abdominal pain.     Objective:     Vital Signs (Most Recent):  Temp: 98.3 °F (36.8 °C) (09/25/23 1926)  Pulse: 93 (09/25/23 1926)  Resp: 18 (09/25/23 1926)  BP: 124/61 (09/25/23 1926)  SpO2: 96 % (09/25/23 1926) Vital Signs (24h Range):  Temp:  [98.2 °F (36.8 °C)-98.6 °F (37 °C)] 98.3 °F (36.8 °C)  Pulse:  [] 93  Resp:  [18-35] 18  SpO2:  [90 %-100 %] 96 %  BP: (105-135)/(53-73) 124/61     Weight: 97.2 kg (214 lb 4.6 oz)  Body mass index is 33.55 kg/m².    Intake/Output Summary (Last 24 hours) at 9/25/2023 2117  Last data filed at 9/25/2023 1931  Gross per 24 hour   Intake 240 ml   Output 925 ml   Net -685 ml           Physical Exam  Constitutional:       General: She is not in acute distress.     Appearance: She is not ill-appearing.      Interventions: Nasal cannula in place.   Eyes:      General:         Right eye: No discharge.         Left eye: No discharge.   Neck:      Vascular: No JVD.   Cardiovascular:      Rate and Rhythm: Normal rate. Rhythm irregular.   Pulmonary:      Effort: Pulmonary effort is normal.      Breath sounds: Rales present.   Abdominal:      General: Abdomen is flat. Bowel sounds are normal. There is no distension.      Palpations: Abdomen is soft.      Tenderness: There is no abdominal tenderness.   Musculoskeletal:      Right lower leg: No edema.      Left lower leg: No edema.   Skin:     General: Skin is warm and moist.      Findings: No rash.   Neurological:      Mental Status: She is alert and oriented to person, place, and time.   Psychiatric:         Attention and Perception: Attention normal.          Mood and Affect: Mood and affect normal.         Speech: Speech normal.             Significant Labs: All pertinent labs within the past 24 hours have been reviewed.    Significant Imaging:  No new imaging

## 2023-09-26 NOTE — PLAN OF CARE
Problem: Physical Therapy  Goal: Physical Therapy Goal  Description: Goals to be met by: 10/21/2023     Patient will increase functional independence with mobility by performin. Supine to sit with Contact Guard Assistance  2. Sit to stand transfer with Minimal Assistance  3. Gait  x 50  feet with Contact Guard Assistance using Rolling Walker.     Outcome: Ongoing, Progressing

## 2023-09-26 NOTE — DISCHARGE SUMMARY
[facility] Medicine  Discharge Summary      Patient Name: Jo Alegre  MRN: [id number]  VIDHI: [id number]  Patient Class: IP- Inpatient  Admission Date: 9/18/2023  Hospital Length of Stay: 7 days  Discharge Date and Time:  09/26/2023 5:06 PM  Attending Physician: No att. providers found   Discharging Provider: Beka Sandhu MD  Primary Care Provider: Kraig Alberto MD    Primary Care Team: Networked reference to record PCT     HPI:   Ms. Alegre is an 83 year old woman with PMHx of HTN, HDL, HFrEF s/p AICD, Afib- presented with one day hx of sob started yesterday - pt says she was fine day before yesterday but around morning time she started having sob not associated with any chest pain, palpitations, or fever, chills, cough or abdominal pain. She did not have any dietary discretion, has been adherent to her home meds. She also noted leg swelling bilateral yesterday. She was at cardio office for her device interrogation which as per patient is fine.     In ER she was found to have left pleural effusion and elevated BNP. Pt is being admitted for diuresis.        * No surgery found *      Hospital Course:   Patient monitor closely during hospitalization.  She was noted to have acute hypoxemic respiratory failure and AFib RVR.  Cardiology consulted.  Patient noted to have left-sided large pleural effusion on chest x-ray.  Pulmonary Medicine perform left thoracentesis with 2000 cc yellow turbid fluid removal.  Fluid analysis suggestive of transudate.  Cultures are negative.  Patient is requiring aggressive IV diuretic therapy.  Symptoms have improved.  Subsequent chest x-rays showing reaccumulating left pleural effusion.  Patient is on fluid restriction.  Patient also required intravenous amiodarone infusion for atrial fibrillation with rapid ventricular response and now transitioned back to oral amiodarone.  Patient is closely being followed by Pulmonary Medicine and Cardiology team.  Patient  made herself DNR code status.  Patient was followed by palliative medicine as well.  Patient is medically status continued to improve, patient was transitioned to oral diuresis and oral digoxin.  Patient instructed to follow up with Cardiology and pulmonology after discharge from skilled nursing facility.  Patient to complete 2 weeks of oral antibiotics given parapneumonic effusion.  Patient to follow up with pulmonology with repeat chest imaging.  Patient medically stable for discharge to skilled nursing facility.     Physical Exam  Constitutional:       General: She is not in acute distress.     Appearance: She is not ill-appearing.      Interventions: Nasal cannula in place.   Eyes:      General:         Right eye: No discharge.         Left eye: No discharge.   Neck:      Vascular: No JVD.   Cardiovascular:      Rate and Rhythm: Normal rate. Rhythm irregular.   Pulmonary:      Effort: Pulmonary effort is normal.   Abdominal:      General: Abdomen is flat. Bowel sounds are normal. There is no distension.      Palpations: Abdomen is soft.      Tenderness: There is no abdominal tenderness.   Musculoskeletal:      Right lower leg: No edema.      Left lower leg: No edema.   Skin:     General: Skin is warm and moist.      Findings: No rash.   Neurological:      Mental Status: She is alert and oriented to person, place, and time.   Psychiatric:         Attention and Perception: Attention normal.         Mood and Affect: Mood and affect normal.         Speech: Speech normal.      Goals of Care Treatment Preferences:  Code Status: Partial Code          What is most important right now is to focus on spending time at home, remaining as independent as possible, symptom/pain control, improvement in condition but with limits to invasive therapies.  Accordingly, we have decided that the best plan to meet the patient's goals includes continuing with treatment.      Consults:   Consults (From admission, onward)        Status  Ordering Provider     Inpatient consult to   Once        Provider:  (Not yet assigned)    Completed ESTEBAN GARCIA     Inpatient consult to Palliative Care  Once        Provider:  Darian Jones MD    Completed BUD LÓPEZ     Inpatient consult to Pulmonology  Once        Provider:  Bud López MD    Completed SULTAN, AQIB     Inpatient consult to Social Work/Case Management  Once        Provider:  (Not yet assigned)    Completed SULTAN, AQIB     Inpatient consult to Registered Dietitian/Nutritionist  Once        Provider:  (Not yet assigned)    Completed SULTAN, AQIB     Inpatient consult to Cardiology  Once        Provider:  Junior Moore MD    Completed RAFAQAT, FNU          No new Assessment & Plan notes have been filed under this hospital service since the last note was generated.  Service: Hospital Medicine    Final Active Diagnoses:    Diagnosis Date Noted POA    Obstructive sleep apnea syndrome [G47.33] 06/24/2020 Yes    COPD (chronic obstructive pulmonary disease) per patient [J44.9] 04/02/2020 Yes    Paroxysmal atrial fibrillation [I48.0] 05/06/2019 Yes    Stage 3 chronic kidney disease [N18.30] 11/05/2018 Yes    Essential hypertension [I10] 05/23/2018 Yes    Mixed dyslipidemia [E78.2] 05/23/2018 Yes    Cardiomyopathy with implantable cardioverter-defibrillator [I42.9, Z95.810] 05/23/2018 Yes      Problems Resolved During this Admission:    Diagnosis Date Noted Date Resolved POA    PRINCIPAL PROBLEM:  Acute on chronic combined systolic and diastolic congestive heart failure [I50.43] 04/01/2020 09/26/2023 Yes    Community acquired bacterial pneumonia [J15.9] 09/25/2023 09/26/2023 Yes    Recurrent left pleural effusion [J90] 09/19/2023 09/26/2023 Yes    Acute respiratory failure with hypoxia [J96.01] 09/19/2023 09/26/2023 Yes       Discharged Condition: good    Disposition: Skilled Nursing Facility    Follow Up:   Follow-up Information     Kraig Alberto MD.  Schedule an appointment as soon as possible for a visit in 2 week(s).    Specialty: Internal Medicine  Contact information:  [address]  [address]  [place] [facility] [address]  [phone]             Alverto Pascal MD Follow up in 1 month(s).    Specialty: Pulmonary Disease  Why: She will need outpatient CT chest non contrast in 6-8 weeks  Contact information:  [address]  [address]  [facility] [place] [address]  [phone]             Junior Moore MD. Schedule an appointment as soon as possible for a visit in 2 week(s).    Specialties: Cardiology, Interventional Cardiology  Contact information:  [address]  [address]  [facility] [address]  [phone]                       Patient Instructions:      US Thoracentesis with Imaging, Aspiration Only   Standing Status: Future Standing Exp. Date: 09/19/24   Order Comments: Pt had Xarelto 9/18 evening     Order Specific Question Answer Comments   May the Radiologist modify the order per protocol to meet the clinical needs of the patient? Yes    Release to patient Immediate      CT Chest Without Contrast   Standing Status: Future Standing Exp. Date: 09/25/24     Order Specific Question Answer Comments   May the Radiologist modify the order per protocol to meet the clinical needs of the patient? Yes      Diet Cardiac   Order Comments: Low Sodium     Call MD for:  temperature >100.4     Call MD for:  persistent nausea and vomiting or diarrhea     Call MD for:  severe uncontrolled pain     Call MD for:  redness, tenderness, or signs of infection (pain, swelling, redness, odor or green/yellow discharge around incision site)     Call MD for:  difficulty breathing or increased cough     Call MD for:  severe persistent headache     Call MD for:  worsening rash     Call MD for:  persistent dizziness, light-headedness, or visual disturbances     Call MD for:  increased confusion or weakness     Activity as tolerated       Significant Diagnostic Studies:  Labs:   CMP   Recent Labs   Lab 09/25/23 0418 09/26/23 0421    137   K 4.7 4.2   CL 97 96   CO2 38* 37*   * 109   BUN 32* 27*   CREATININE 1.4 1.2   CALCIUM 9.3 9.0   PROT 6.0 6.1   ALBUMIN 2.6* 2.6*   BILITOT 0.2 0.3   ALKPHOS 64 61   AST 8* 8*   ALT 10 9*   ANIONGAP 3* 4*    and CBC   Recent Labs   Lab 09/25/23 0418 09/26/23 0421   WBC 7.97 7.32   HGB 9.1* 9.4*   HCT 30.5* 31.8*    443       Pending Diagnostic Studies:     None         Medications:  Reconciled Home Medications:      Medication List      START taking these medications    amoxicillin-clavulanate 875-125mg 875-125 mg per tablet  Commonly known as: AUGMENTIN  Take 1 tablet by mouth every 12 (twelve) hours. for 14 days     bumetanide 1 MG tablet  Commonly known as: BUMEX  Take 1 tablet (1 mg total) by mouth once daily.  Start taking on: September 27, 2023        CHANGE how you take these medications    digoxin 125 mcg tablet  Commonly known as: LANOXIN  Take 1 tablet (0.125 mg total) by mouth once daily.  Start taking on: September 27, 2023  What changed: when to take this     gabapentin 100 MG capsule  Commonly known as: NEURONTIN  TAKE 2 CAPSULES(200 MG) BY MOUTH THREE TIMES DAILY  What changed:   · how much to take  · how to take this  · when to take this     metoprolol succinate 25 MG 24 hr tablet  Commonly known as: TOPROL-XL  Take 1 tablet (25 mg total) by mouth once daily.  What changed:   · when to take this  · reasons to take this     midodrine 5 MG Tab  Commonly known as: PROAMATINE  Take 1 tablet (5 mg total) by mouth 3 (three) times daily before meals.  What changed:   · how much to take  · when to take this  · reasons to take this  · additional instructions     rivaroxaban 15 mg Tab  Commonly known as: XARELTO  Take 1 tablet (15 mg total) by mouth daily with dinner or evening meal.  What changed: when to take this        CONTINUE taking these medications    albuterol 90 mcg/actuation inhaler  Commonly known as:  PROVENTIL/VENTOLIN HFA  INHALE 2 PUFFS BY MOUTH EVERY 6 HOURS AS NEEDED FOR WHEEZING     amiodarone 200 MG Tab  Commonly known as: PACERONE  Take 1 tablet (200 mg total) by mouth once daily.  Start taking on: September 27, 2023     Ca-D3-mag ox-zinc--thom-bor 600 mg calcium- 20 mcg-50 mg Tab  Take 1 tablet by mouth 2 (two) times daily.     cholecalciferol (vitamin D3) 125 mcg (5,000 unit) Tab  Take 5,000 Units by mouth 2 (two) times daily.     dorzolamide-timolol 2-0.5% 22.3-6.8 mg/mL ophthalmic solution  Commonly known as: COSOPT  Place 1 drop into both eyes 2 (two) times daily.     fluticasone propionate 50 mcg/actuation nasal spray  Commonly known as: FLONASE  2 sprays (100 mcg total) by Each Nostril route once daily.     glimepiride 1 MG tablet  Commonly known as: AMARYL  Take 1 tablet (1 mg total) by mouth before breakfast.     latanoprost 0.005 % ophthalmic solution  Place 1 drop into both eyes nightly.     VERQUVO 5 mg Tab  Generic drug: vericiguat  Take 1 tablet by mouth Daily.        STOP taking these medications    amLODIPine 2.5 MG tablet  Commonly known as: NORVASC     propranoloL 10 MG tablet  Commonly known as: INDERAL     sacubitriL-valsartan  mg per tablet  Commonly known as: ENTRESTO     torsemide 20 MG Tab  Commonly known as: DEMADEX        ASK your doctor about these medications    atorvastatin 20 MG tablet  Commonly known as: LIPITOR  Take 20 mg by mouth every evening.            Indwelling Lines/Drains at time of discharge:   Lines/Drains/Airways     Drain  Duration           Female External Urinary Catheter 09/19/23 7 days                Time spent on the discharge of patient: 35 minutes         Beka Sandhu MD  Department of Hospital Medicine  [facility]

## 2023-09-26 NOTE — NURSING
1605  Report called to [facility]. Pickup scheduled for 4:30. Family at bedside and aware of transfer.    1638  Patient transported to [facility] via wheelchair van. Belongings gathered and sent with family. Patient transported on 3[facility].

## 2023-09-26 NOTE — PROGRESS NOTES
[facility]   Cardiology Note    Consult Requested By: SARITA  Reason for Consult: CHF    SUBJECTIVE:     History of Present Illness: The pt is 82 y/o F pt of  with PMHx of CAD HTN, HDL,chronic Systolic/Diastolic HF, reduced EF,SSS, Orthostatic hypotension, MR, AICD--MDT, single ICD, Afib- SVT, COPD, JENNIFER , intolerant of CPAP. presented with one day hx of sob started yesterday - increasing swelling to BLE. Denies /CP, palpitations, syncope.     Labs H/H 10.5/34.3 platelets 452 cr 1.8  dig level 0.3 CXR L sided pleural effusion, some pulmonary congestion bilaterally--left /sided effusion worsening this am. EKG today Afib , no ST seg changes. Echo pending  hypotensive this am. No urinary output documented     9/20--The pt is now in ICU on amio drip and pressors. She is aao, afib in  range , VSS. She denies any CP and states she is not feeling as much SOB , however she is using accessory muscles to breathe. Swelling in LE has improved. Pulmonology on unit for thoracentesis of Left sided pleural effusion this am. H/H 10.3/34.5 plts 456 cr 1.7 trop slightly elevated but stable, no change in delta. Blood cultures neg u/o -429 cc    9/21: Pt seen and examined this morning. S/p thoracentesis yesterday with 2L of fluid drained. Started on antibiotics for possible pneumonia. Xarelto restarted yesterday. VSS. Labs reviewed. H&H 9.6/31. Cr 1.6. She states she is feeling much better today. Shortness of breath has improved. She denies chest pain.     9/22: Patient is feeling well today. Her main complaint is fatigue, she did not sleep well last night. Reports her breathing is much improved. No LE edema noted. She denies chest pain or palpitations. Amiodarone gtt discontinued yesterday. She is currently on amiodarone 200 mg PO. Tele reviewed, remains terrie trial firbillation. HR is in the 80s. Hr blood pressure is improving. She is on midodrine 5mg TID.     9/25 Afib rate controlled, no overnight  events. BP stable -260 u/o   H/H 9.1/30.5 cr 1.4 albumin 2.6 4L Oxygen NC, The pt is sitting in bed with no CP or SOB. No complaints.       9/26--The pt has been moved to tele unit. AAO no c/o CP, SOB improved. The pt BP is stable on toprol and midodrine. Remains afib rate 100-110 on tele. H/jH 9.4/31.6 cr 1.2       Review of patient's allergies indicates:   Allergen Reactions    Codeine Other (See Comments)     nausea    Hydrocodone Nausea And Vomiting    Lasix [furosemide]      rash       Past Medical History:   Diagnosis Date    Allergy     Codeine, Lasix    Atrial fibrillation     Atrial fibrillation     Cataract     CHF (congestive heart failure)     Diabetes mellitus, type 2     Ejection fraction < 50% 10/18/2017    Approximately 35%  Based on prior  Echocardiogram.    Encounter for blood transfusion     Hyperlipidemia     Osteoporosis     PONV (postoperative nausea and vomiting)     Thyroid disorder screening 10/17/2017    TSH of 1.12 ordered by Dr. dee Jonse     Past Surgical History:   Procedure Laterality Date    ANTEGRADE NEPHROSTOGRAPHY Left 8/25/2022    Procedure: NEPHROSTOGRAM, ANTEGRADE;  Surgeon: Shelby Parra MD;  Location: [facility] OR;  Service: Urology;  Laterality: Left;    CHOLECYSTECTOMY  1997    [place]     COLONOSCOPY      CYSTOSCOPY W/ URETERAL STENT PLACEMENT Left 7/17/2022    Procedure: CYSTOSCOPY, WITH URETERAL STENT INSERTION;  Surgeon: Shelby Parra MD;  Location: [facility] OR;  Service: Urology;  Laterality: Left;    CYSTOSCOPY W/ URETERAL STENT REMOVAL Left 9/21/2022    Procedure: CYSTOSCOPY, WITH URETERAL STENT REMOVAL;  Surgeon: Shelby Parra MD;  Location: [facility] OR;  Service: Urology;  Laterality: Left;    CYSTOSCOPY WITH URETEROSCOPY, RETROGRADE PYELOGRAPHY, AND INSERTION OF STENT Left 8/25/2022    Procedure: CYSTOSCOPY, WITH RETROGRADE PYELOGRAM AND URETERAL STENT INSERTION;  Surgeon: Shelby Parra MD;  Location: [facility] OR;  Service: Urology;  Laterality: Left;     EYE SURGERY      bilateral cataracts    FINGER SURGERY Right 2021    right pinky finger    FLEXIBLE CYSTOSCOPY Left 8/25/2022    Procedure: CYSTOSCOPY, FLEXIBLE WITH STENT REMOVAL;  Surgeon: Shelby Parra MD;  Location: [facility] OR;  Service: Urology;  Laterality: Left;    FRACTURE SURGERY  2014    right femur with neville    HEMORRHOID SURGERY      48 yrs ago    HYSTERECTOMY      NEPHROSTOMY Left 8/25/2022    Procedure: CREATION, NEPHROSTOMY;  Surgeon: Shelby Parra MD;  Location: [facility] OR;  Service: Urology;  Laterality: Left;    PERCUTANEOUS NEPHROLITHOTOMY N/A 8/25/2022    Procedure: NEPHROLITHOTOMY, PERCUTANEOUS;  Surgeon: Shelby Parra MD;  Location: [facility] OR;  Service: Urology;  Laterality: N/A;    REPLACEMENT OF IMPLANTABLE CARDIOVERTER-DEFIBRILLATOR (ICD) GENERATOR N/A 12/13/2019    Procedure: REPLACEMENT, PULSE GENERATOR, ICD-MEDTRONIC;  Surgeon: Sebastian Nowak III, MD;  Location: [facility];  Service: Cardiology;  Laterality: N/A;    TONSILLECTOMY       Family History   Problem Relation Age of Onset    Heart disease Mother     Cancer Father         Lung Cancer ??? Asbestos    Breast cancer Paternal Aunt      Social History     Tobacco Use    Smoking status: Former     Passive exposure: Past    Smokeless tobacco: Never    Tobacco comments:     quit 2013   Substance Use Topics    Alcohol use: No    Drug use: No       Review of Systems:  Review of Systems   Constitutional:  Negative for chills, diaphoresis, fever, malaise/fatigue and weight loss.   Respiratory:  Negative for cough, hemoptysis, sputum production and shortness of breath.    Cardiovascular:  Negative for chest pain, palpitations, orthopnea, claudication, leg swelling and PND.   Gastrointestinal:  Negative for nausea and vomiting.       OBJECTIVE:     Vital Signs (Most Recent)  Temp: 98.1 °F (36.7 °C) (09/26/23 0730)  Pulse: 100 (09/26/23 0730)  Resp: 18 (09/26/23 0730)  BP: 125/66 (09/26/23 0730)  SpO2: 96 % (09/26/23 0730)    Vital Signs  Range (Last 24H):  Temp:  [97.7 °F (36.5 °C)-98.4 °F (36.9 °C)]   Pulse:  []   Resp:  [18-33]   BP: (117-138)/(56-73)   SpO2:  [90 %-99 %]     I & O (Last 24H):    Intake/Output Summary (Last 24 hours) at 9/26/2023 0812  Last data filed at 9/26/2023 0356  Gross per 24 hour   Intake 240 ml   Output 1025 ml   Net -785 ml         Current Diet:     Current Diet Order   Procedures    Diet Low Sodium, 2gm [facility]; Fluid - 1500mL     Order Specific Question:   Indicate patient location for additional diet options:     Answer:   [facility]     Order Specific Question:   Fluid restriction:     Answer:   Fluid - 1500mL        Allergies:  Review of patient's allergies indicates:   Allergen Reactions    Codeine Other (See Comments)     nausea    Hydrocodone Nausea And Vomiting    Lasix [furosemide]      rash       Meds:  Scheduled Meds:   amiodarone  200 mg Oral Daily    amoxicillin-clavulanate 875-125mg  1 tablet Oral Q12H    atorvastatin  20 mg Oral QHS    bumetanide  1 mg Oral Daily    fluticasone propionate  2 spray Each Nostril Daily    gabapentin  200 mg Oral TID    metoprolol succinate  25 mg Oral Daily    midodrine  5 mg Oral TID AC    rivaroxaban  15 mg Oral Daily with dinner     Continuous Infusions:    PRN Meds:acetaminophen, albuterol sulfate, aluminum-magnesium hydroxide-simethicone, dextrose 50%, dextrose 50%, glucagon (human recombinant), glucose, glucose, magnesium oxide, magnesium oxide, melatonin, naloxone, ondansetron, potassium bicarbonate, potassium bicarbonate, potassium bicarbonate, potassium, sodium phosphates, potassium, sodium phosphates, potassium, sodium phosphates, prochlorperazine, senna-docusate 8.6-50 mg, simethicone, sodium chloride 0.9%, sodium chloride 0.9%, sodium chloride 0.9%    Oxygen/Ventilator Data (Last 24H):  (if applicable)   Oxygen Concentration (%):  [40] 40        Hemodynamic Parameters (Last 24H):   (if applicable)        Laboratory and Radiology Data:  Recent  Results (from the past 24 hour(s))   CBC auto differential    Collection Time: 09/26/23  4:21 AM   Result Value Ref Range    WBC 7.32 3.90 - 12.70 K/uL    RBC 3.63 (L) 4.00 - 5.40 M/uL    Hemoglobin 9.4 (L) 12.0 - 16.0 g/dL    Hematocrit 31.8 (L) 37.0 - 48.5 %    MCV 88 82 - 98 fL    MCH 25.9 (L) 27.0 - 31.0 pg    MCHC 29.6 (L) 32.0 - 36.0 g/dL    RDW 15.8 (H) 11.5 - 14.5 %    Platelets 443 150 - 450 K/uL    MPV 9.4 9.2 - 12.9 fL    Immature Granulocytes 0.5 0.0 - 0.5 %    Gran # (ANC) 5.1 1.8 - 7.7 K/uL    Immature Grans (Abs) 0.04 0.00 - 0.04 K/uL    Lymph # 1.0 1.0 - 4.8 K/uL    Mono # 0.9 0.3 - 1.0 K/uL    Eos # 0.2 0.0 - 0.5 K/uL    Baso # 0.05 0.00 - 0.20 K/uL    nRBC 0 0 /100 WBC    Gran % 70.3 38.0 - 73.0 %    Lymph % 13.5 (L) 18.0 - 48.0 %    Mono % 12.3 4.0 - 15.0 %    Eosinophil % 2.7 0.0 - 8.0 %    Basophil % 0.7 0.0 - 1.9 %    Differential Method Automated    Comprehensive metabolic panel    Collection Time: 09/26/23  4:21 AM   Result Value Ref Range    Sodium 137 136 - 145 mmol/L    Potassium 4.2 3.5 - 5.1 mmol/L    Chloride 96 95 - 110 mmol/L    CO2 37 (H) 23 - 29 mmol/L    Glucose 109 70 - 110 mg/dL    BUN 27 (H) 8 - 23 mg/dL    Creatinine 1.2 0.5 - 1.4 mg/dL    Calcium 9.0 8.7 - 10.5 mg/dL    Total Protein 6.1 6.0 - 8.4 g/dL    Albumin 2.6 (L) 3.5 - 5.2 g/dL    Total Bilirubin 0.3 0.1 - 1.0 mg/dL    Alkaline Phosphatase 61 55 - 135 U/L    AST 8 (L) 10 - 40 U/L    ALT 9 (L) 10 - 44 U/L    eGFR 45.2 (A) >60 mL/min/1.73 m^2    Anion Gap 4 (L) 8 - 16 mmol/L   Magnesium    Collection Time: 09/26/23  4:21 AM   Result Value Ref Range    Magnesium 2.0 1.6 - 2.6 mg/dL   Phosphorus    Collection Time: 09/26/23  4:21 AM   Result Value Ref Range    Phosphorus 3.4 2.7 - 4.5 mg/dL     Imaging Results              X-Ray Chest AP Portable (Final result)  Result time 09/19/23 05:54:57      Final result by Foster Landis MD (09/19/23 05:54:57)                   Narrative:    CLINICAL HISTORY:  83 years  (1940) Female sob SOB    TECHNIQUE:  Portable AP radiograph the chest. One view.    COMPARISON:  Radiographs from May 24, 2023    FINDINGS:  There is a focal opacity at the left lung base, characteristic of a combination of a small left pleural effusion and associated atelectasis, noting superimposed infection/pneumonia and malignancy are not excluded. There is blunting of the right costophrenic angle consistent with trace pleural effusion. No pneumothorax is identified. The cardiac silhouette is moderately enlarged. Left-sided AICD pacemaker is unchanged in configuration. Atheromatous calcifications are seen at the aortic arch. Osseous structures appear unchanged. The visualized upper abdomen is unremarkable.    IMPRESSION:  1. Moderate left pleural effusion and adjacent atelectasis/consolidation.  2. Small interstitial opacity at the right lung base.                  .            Electronically signed by:  Foster Landis MD  9/19/2023 5:54 AM CDT Workstation: [id number]                                    12-lead EKG interpretation:  (if applicable)      Current Cardiac Rhythm:   (if applicable)    Physical Exam:   Physical Exam  Constitutional:       Appearance: Normal appearance.   Cardiovascular:      Rate and Rhythm: Normal rate. Rhythm irregular.      Heart sounds: Murmur heard.   Pulmonary:      Effort: Pulmonary effort is normal. No respiratory distress.      Breath sounds: No wheezing or rhonchi.   Abdominal:      General: Abdomen is flat. Bowel sounds are normal.      Palpations: Abdomen is soft.   Musculoskeletal:         General: No swelling.      Comments: +1 edema BLE    Skin:     General: Skin is warm and dry.   Neurological:      General: No focal deficit present.      Mental Status: She is alert and oriented to person, place, and time.         ASSESSMENT/PLAN:   Assessment:   Acute on Chronic combined HF reduced EF  MARCELINO/CKD cr 1.8  MR  HTN  COPD  JENNIFER  SSS  MDT ICD insitu  DNR     Echo  5/2023--EF 40 % left ventricular global hypokinesis, LA enlargement, Mod -Severe MR , PA pressure 45 mmhg     Recent device interrogation--9/2023  11% afib burden· Possible OptiVol fluid accumulation--9/10/2023     Echo 9/19--EF 40-50 % LA dilation, mild AS, mod MR, PA pressure 30 mmhg     Plan  S/p Thoracentesis with 2L of drainage on 9/20. parapneumonic effusion related to pneumonia. No growth on cultures--followed by pulm   Shortness of breath improved. On 4 L NC   Oral bumex  Afib--plan rate control until pneumonia resolved--currently rate is 100-110 range BP stable on midodrine. Given tendency toward hypotension will do dig 0.25 mg IV --one time dose then 0.125 mg daily.   Continue amiodarone 200 mg daily.   D/C plan is to rehab facility

## 2023-09-26 NOTE — PLAN OF CARE
Per [facility] facility obtained insurance authorization; bed is available today 9/26. Pt not medically clear  for discharge awaiting clearance from specialities.       09/26/23 1002   Discharge Reassessment   Assessment Type Discharge Planning Reassessment   Did the patient's condition or plan change since previous assessment? No   Discharge Plan A Skilled Nursing Facility   Discharge Plan B Skilled Nursing Facility   DME Needed Upon Discharge  none   Transition of Care Barriers None   Why the patient remains in the hospital Requires continued medical care   Post-Acute Status   Post-Acute Authorization Placement   Post-Acute Placement Status Pending medical clearance/testing   Coverage [employer] [facility] PPO   Patient choice form signed by patient/caregiver List from CMS Compare   Discharge Delays None known at this time

## 2023-09-26 NOTE — PROGRESS NOTES
[facility]  Progress Note  Pulmonary/Critical Care      PATIENT NAME: Jo Alegre  MRN: [id number]  TODAY'S DATE: 2023  2:23 PM  ADMIT DATE: 2023  AGE: 83 y.o. : 1940    HPI/INTERVAL HISTORY (See H&P for complete P,F,SHx) :   Ms Alegre is an 83 year old woman former smoker with HFrEF 30%, AICD, hx of AFib who presented from home with low blood pressure and shortness of breath. CXR was suggestive of predominantly left sided effusion. Patient was also evaluated by cardiology.     Continues to feel well. Continues to require oxygen but now on 3 LPM. Renal fucntion returned to [place] with diuresis.     2023 - Stable overnight and feels better, she is going to go to SNF and we discussed this.  She is good to go from my standpoint    Review of Systems   Constitutional:  Negative for appetite change, chills, fever and unexpected weight change.   HENT:  Negative for nosebleeds, postnasal drip, trouble swallowing and voice change.    Eyes:  Negative for visual disturbance.   Respiratory:  Negative for cough, shortness of breath and wheezing.    Cardiovascular:  Positive for leg swelling. Negative for chest pain.   Gastrointestinal:  Negative for diarrhea, nausea and vomiting.   Endocrine: Negative for cold intolerance and heat intolerance.   Genitourinary:  Negative for dysuria, flank pain and frequency.   Musculoskeletal:  Negative for neck pain and neck stiffness.   Allergic/Immunologic: Negative for environmental allergies and immunocompromised state.   Neurological:  Negative for syncope, speech difficulty and weakness.   Hematological:  Negative for adenopathy.   Psychiatric/Behavioral:  Negative for confusion.            VITAL SIGNS (MOST RECENT)  Temp: 98.1 °F (36.7 °C) (23 0730)  Pulse: 93 (23 1317)  Resp: 16 (23 1114)  BP: 91/61 (23 1317)  SpO2: 96 % (23 0730)    INTAKE AND OUTPUT (LAST 24 HOURS):  Intake/Output Summary (Last 24 hours) at  "9/26/2023 1355  Last data filed at 9/26/2023 0948  Gross per 24 hour   Intake 600 ml   Output 1025 ml   Net -425 ml         WEIGHT  Wt Readings from Last 1 Encounters:   09/26/23 95.2 kg (209 lb 14.1 oz)       Physical Exam  Vitals reviewed.   Constitutional:       General: She is not in acute distress.     Appearance: She is well-developed. She is obese. She is not ill-appearing or diaphoretic.   HENT:      Head: Normocephalic and atraumatic.      Mouth/Throat:      Pharynx: No oropharyngeal exudate or posterior oropharyngeal erythema.   Eyes:      General: No scleral icterus.     Pupils: Pupils are equal, round, and reactive to light.   Neck:      Vascular: No JVD.   Cardiovascular:      Rate and Rhythm: Tachycardia present. Rhythm irregular.      Heart sounds: Normal heart sounds. No murmur heard.  Pulmonary:      Effort: No respiratory distress.      Breath sounds: Rales present. No wheezing.   Abdominal:      General: Bowel sounds are normal. There is no distension.      Palpations: Abdomen is soft.      Tenderness: There is no abdominal tenderness.   Musculoskeletal:         General: No swelling.      Cervical back: Normal range of motion and neck supple. No rigidity.   Skin:     General: Skin is warm and dry.      Capillary Refill: Capillary refill takes less than 2 seconds.      Coloration: Skin is not pale.      Findings: No rash.   Neurological:      General: No focal deficit present.      Mental Status: She is alert and oriented to person, place, and time.      Cranial Nerves: No cranial nerve deficit.      Motor: No weakness or abnormal muscle tone.           VENTILATOR SETTINGS  Oxygen Concentration (%):  [40] 40           LAST ARTERIAL BLOOD GAS  ABG  No results for input(s): "PH", "PO2", "PCO2", "HCO3", "BE" in the last 168 hours.      ACUTE PHASE REACTANT (LAST 24 HOURS)  No results for input(s): "FERRITIN", "CRP", "LDH", "DDIMER" in the last 24 hours.      CBC LAST (LAST 24 HOURS)  Recent Labs   Lab " "09/26/23  0421   WBC 7.32   RBC 3.63*   HGB 9.4*   HCT 31.8*   MCV 88   MCH 25.9*   MCHC 29.6*   RDW 15.8*      MPV 9.4   GRAN 70.3  5.1   LYMPH 13.5*  1.0   MONO 12.3  0.9   BASO 0.05   NRBC 0         CHEMISTRY LAST (LAST 24 HOURS)  Recent Labs   Lab 09/26/23  0421      K 4.2   CL 96   CO2 37*   ANIONGAP 4*   BUN 27*   CREATININE 1.2      CALCIUM 9.0   MG 2.0   ALBUMIN 2.6*   PROT 6.1   ALKPHOS 61   ALT 9*   AST 8*   BILITOT 0.3         COAGULATION LAST (LAST 24 HOURS)  No results for input(s): "LABPT", "INR", "APTT" in the last 24 hours.    CARDIAC PROFILE (LAST 24 HOURS)  Recent Labs   Lab 09/20/23  1143            LAST 7 DAYS MICROBIOLOGY   Microbiology Results (last 7 days)       Procedure Component Value Units Date/Time    Blood culture [[id number]] Collected: 09/19/23 1338    Order Status: Completed Specimen: Blood Updated: 09/24/23 1432     Blood Culture, Routine No growth after 5 days.    Culture, Body Fluid (Aerobic) w/ GS [[id number]] Collected: 09/20/23 1035    Order Status: Completed Specimen: Pleural Fluid from Lung, Left Updated: 09/24/23 0717     AEROBIC CULTURE - FLUID No growth    AFB culture (includes stain) [[id number]] Collected: 09/20/23 1035    Order Status: Completed Specimen: Pleural Fluid from Lung, Left Updated: 09/23/23 1719     AFB CULTURE STAIN No acid fast bacilli seen.     AFB CULTURE STAIN Testing performed by:     AFB CULTURE STAIN Lab [facility]     [facility]     [place] [address]     AFB CULTURE STAIN Dr.Brian Gil MD    Gram stain [[id number]] Collected: 09/20/23 1035    Order Status: Completed Specimen: Pleural Fluid from Lung, Left Updated: 09/21/23 1420     Gram Stain Result Moderate WBC's      No organisms seen    Culture, Respiratory with Gram Stain [[id number]]     Order Status: No result Specimen: Respiratory             MOST RECENT IMAGING  X-Ray Chest AP Portable  Chest, single " view    HISTORY: Pneumonia.    Comparison is made to 9/20/2023.    The cardiac silhouette and central pulmonary vasculature are stable.    There has been interval progression of pulmonary opacities in the left mid/lower lung zone. There is similar blunting of the left costophrenic angle.    There is been development of linear opacity compatible with atelectasis in the right midlung. There is interstitial prominence near the right pulmonary apex. No significant right-sided effusion demonstrated.    IMPRESSION:    Interval progression of pulmonary opacities in the left mid and lower lung zone. There is similar blunting of left costophrenic angle.    Minor platelike atelectasis within the right midlung. There is mild right-sided interstitial prominence.    Electronically signed by:  Ines Schneider MD  9/22/2023 7:32 AM CDT Workstation: [id number]      CURRENT VISIT EKG  Results for orders placed or performed during the hospital encounter of 09/18/23   EKG 12-lead    Narrative    Test Reason : I49.9,    Vent. Rate : 121 BPM     Atrial Rate : 326 BPM     P-R Int : 000 ms          QRS Dur : 088 ms      QT Int : 286 ms       P-R-T Axes : 000 -29 150 degrees     QTc Int : 406 ms    Atrial fibrillation with rapid ventricular response  Low voltage QRS  Nonspecific T wave abnormality  Abnormal ECG  When compared with ECG of 18-SEP-2023 21:33,  No significant change was found  Confirmed by Kamran LESLIE, Gary GONZALEZ ([id number]) on 9/19/2023 7:27:06 PM    Referred By: [email or web address]   SELF           Confirmed By:Gary Andrew MD       ECHOCARDIOGRAM RESULTS  Results for orders placed during the hospital encounter of 09/18/23    Echo Saline Bubble? No    Interpretation Summary    Left Ventricle: Moderately increased wall thickness. There is concentric remodeling. There is mildly reduced systolic function with a visually estimated ejection fraction of 40 - 50%.    Left Atrium: Left atrium is dilated.    Aortic Valve: There is mild aortic valve  sclerosis.    Mitral Valve: There is moderate regurgitation.    Tricuspid Valve: There is mild regurgitation.    Pulmonary Artery: The estimated pulmonary artery systolic pressure is 30 mmHg.    IVC/SVC: Normal venous pressure at 3 mmHg.    Pericardium: There is a small effusion. No indication of cardiac tamponade.        ASSESSMENT:   Acute on chronic Hypoxemic respiratory failure   Community acquired Pneumonia, no evidence of aspiration  Para pneumonic pleural effusion on the left, exudative   - Underwent thoracentesis on 9/21/23 with turbed yellow and cloudy appearing fluid, -2 Liters fluid drained.   - On diuretics, albumin gradient is 0.1, which is consistent with exudative effusion.   - Based on other lights criteria, total protein ratio is 3.8/6.3= 0.60, and LDH ratio is 280/141, glucose 76.   - Based on all above criteria, this is consistent with exudative effusion  Hx of biventricular heart failure, PASP 45 , ICD  Atrial fibrillation - still with irregular rhythm, more rate controlled while on amio  Intermittent hypotension - likely related to active diuresis while on beta blocker.     Ms Alegre is an 83 year old woman with biventricular heart failure who presents with acute shortness of breath, and a predominantly one sided pleural effusion. Bedside ultrasound suggests minimal to no fluid on the right. Left with mostly free flowing appearing fluid, no loculations were seen. She has been having fevers which have since subsided. Thoracentesis performed at bedside, drained about 2 L of turbid, yellow and cloudy fluid tinged with red. Patient had immediate relief of dyspnea.    Based on albumin gradient and lights criteria, overall picture is that this is consistent with a parapneumonic effusion from pneumonia.     Appears to be euvolemic at this point, continue oral diuretics.     PLAN:     - OK for SNF needs to complete 2 weeks antibiotics (oral augmentin is OK)  - continue diuretics  - AF treatment per  cardiology  - follow up with me after DC from SNF and will plan followup CXR and/or CT      Alverto Pascal MD  [facility] Pulmonary/Critical Care  09/26/2023

## 2023-09-26 NOTE — PLAN OF CARE
09/25/23 2017   Patient Assessment/Suction   Level of Consciousness (AVPU) alert   Respiratory Effort Normal;Unlabored   Expansion/Accessory Muscles/Retractions expansion symmetric;no retractions;no use of accessory muscles   Rhythm/Pattern, Respiratory depth regular;pattern regular;unlabored   PRE-TX-O2   Device (Oxygen Therapy) nasal cannula with humidification   Flow (L/min) 3   SpO2 99 %   Pulse Oximetry Type Continuous   $ Pulse Oximetry - Multiple Charge Pulse Oximetry - Multiple   Pulse 107   Resp 18   Aerosol Therapy   $ Aerosol Therapy Charges PRN treatment not required   Preset CPAP/BiPAP Settings   Mode Of Delivery Standby   Respiratory Evaluation   $ Care Plan Tech Time 15 min

## 2023-09-26 NOTE — PT/OT/SLP PROGRESS
Occupational Therapy      Patient Name:  Jo Alegre   MRN:  [id number]    Patient not seen today secondary to nursing care. Will follow-up next scheduled service date.    9/26/2023

## 2023-09-26 NOTE — PLAN OF CARE
Chart and orders reviewed. Pt discharging to [facility] for Skilled nursing. DC orders, AVS and PPD sent to [facility] via careport. ShantaRN given information to call report, bed assignment: room [address] [place] the nurse [phone] pickup for 430pm. Transportation arranged for 0430pm. Pt has no other needs to be addressed at this time. Pt cleared to discharge by case management.    09/26/23 1605   Final Note   Assessment Type Final Discharge Note   Anticipated Discharge Disposition SNF   What phone number can be called within the next 1-3 days to see how you are doing after discharge? [phone]   Hospital Resources/Appts/Education Provided Provided patient/caregiver with written discharge plan information   Post-Acute Status   Post-Acute Authorization Placement   Post-Acute Placement Status Set-up Complete/Auth obtained   Coverage Payor:  [employer] MANAGED MEDICARE - [employer] MEDICARE PPO   Discharge Delays None known at this time

## 2023-09-26 NOTE — ASSESSMENT & PLAN NOTE
Was present on day 1 of admission.  Had six days of ceftriaxone.  Today was switched to Augmentin to finish treatment.

## 2023-09-26 NOTE — PRE ADMISSION SCREENING
[facility]   Pre-Admission Patient Screening                    Pre-Screen type:  SNF:  Reason for Admission:    Acute on chronic Hypoxemic respiratory failure   Community acquired Pneumonia, no evidence of aspiration    SNF Admission Criteria:    Primary: Rehab Services     Actively treated hospital diagnosis/diagnoses: Chronic Lung Disease, Renal Insufficiency , Morbid Obesity, Diabetes Mellitus, Pneumonia, and Respiratory Failure    Facility Status: Accept     Referring Physician:  Alverto Pascal MD     Admitting Physician:  Roberto Kaplan MD    Primary Care Physician:  Kraig Alberto MD    History         Patient Active Problem List    Diagnosis Date Noted    Acute on chronic combined systolic and diastolic heart failure 10/27/2022    Acute pyelonephritis 07/17/2022    Staghorn calculus 07/17/2022    Left-sided chest pain 09/08/2020    Cough 09/08/2020    Hypoxia 09/08/2020    Urge incontinence 09/08/2020    Pulmonary hypertension 07/27/2020    Obesity 06/24/2020    Obstructive sleep apnea syndrome 06/24/2020    COPD (chronic obstructive pulmonary disease) per patient 04/02/2020    Presence of automatic (implantable) cardiac defibrillator 01/16/2020    Complications, mechanical, pacemaker, cardiac 12/13/2019    Gait instability 10/14/2019    Osteoporosis, postmenopausal 08/15/2019    Acute midline low back pain without sciatica 08/15/2019    Elevated blood sugar 08/15/2019    Screening for breast cancer 08/15/2019    Paroxysmal atrial fibrillation 05/06/2019    Osteoporosis with current pathological fracture 05/06/2019    Low back pain, non-specific 01/09/2019    Difficulty walking 01/09/2019    Chronic anticoagulation 11/05/2018    Stage 3 chronic kidney disease 11/05/2018    Neuropathy of both feet 07/05/2018    Asymptomatic menopause 07/05/2018    Essential hypertension 05/23/2018    Type 2 diabetes mellitus with diabetic nephropathy, without long-term current use of insulin  05/23/2018    Mixed dyslipidemia 05/23/2018    Cardiomyopathy with implantable cardioverter-defibrillator 05/23/2018    Ejection fraction < 50% 10/18/2017    Encounter for screening mammogram for breast cancer 10/17/2017         Previous Specialties/Consulted physicians:      Cardiology , Pulmonology , Wound Care , and Other: Palliative Med       Past and Current Medical History    Past Medical History:   Diagnosis Date    Allergy     Codeine, Lasix    Atrial fibrillation     Atrial fibrillation     Cataract     CHF (congestive heart failure)     Diabetes mellitus, type 2     Ejection fraction < 50% 10/18/2017    Approximately 35%  Based on prior  Echocardiogram.    Encounter for blood transfusion     Hyperlipidemia     Osteoporosis     PONV (postoperative nausea and vomiting)     Thyroid disorder screening 10/17/2017    TSH of 1.12 ordered by Dr. dee Jones           History of Present Illness     Principal Problem:Acute on chronic combined systolic and diastolic congestive heart failure           HPI:  Ms. Alegre is an 83 year old woman with PMHx of HTN, HDL, HFrEF s/p AICD, Afib- presented with one day hx of sob started yesterday - pt says she was fine day before yesterday but around morning time she started having sob not associated with any chest pain, palpitations, or fever, chills, cough or abdominal pain. She did not have any dietary discretion, has been adherent to her home meds. She also noted leg swelling bilateral yesterday. She was at cardio office for her device interrogation which as per patient is fine.      In ER she was found to have left pleural effusion and elevated BNP. Pt is being admitted for diuresis.          Overview/Hospital Course:  Patient monitor closely during hospitalization.  She was noted to have acute hypoxemic respiratory failure and AFib RVR.  Cardiology consulted.  Patient noted to have left-sided large pleural effusion on chest x-ray.  Pulmonary Medicine perform left  thoracentesis with 2000 cc yellow turbid fluid removal.  Fluid analysis suggestive of transudate.  Cultures are negative.  Patient is requiring aggressive IV diuretic therapy.  Symptoms have improved.  Subsequent chest x-rays showing reaccumulating left pleural effusion.  Patient is on fluid restriction.  Patient also required intravenous amiodarone infusion for atrial fibrillation with rapid ventricular response and now transitioned back to oral amiodarone.  Patient is closely being followed by Pulmonary Medicine and Cardiology team.  Patient made herself DNR code status.  Patient was followed by palliative medicine as well.        Interval History:  continues on supplemental oxygen.  Feels less short of breath.  Diuresing well.  Eating well.  No new complaints.  She agrees to be sent to SNF after discharge from acute.      Acute on chronic combined systolic and diastolic congestive heart failure  Patient is identified as having Combined Systolic and Diastolic heart failure that is Acute on chronic. CHF is currently uncontrolled. Latest ECHO performed and demonstrates- Results for orders placed during the hospital encounter of 05/15/23     Echo     Interpretation Summary  · The left ventricle is mildly enlarged with mild concentric hypertrophy and moderately decreased systolic function.  · The estimated ejection fraction is 40%.  · Grade I left ventricular diastolic dysfunction.  · There is left ventricular global hypokinesis.  · Normal right ventricular size with normal right ventricular systolic function.  · Severe left atrial enlargement.  · Moderate-to-severe mitral regurgitation.  · Mild tricuspid regurgitation.  · Elevated central venous pressure (15 mmHg).  · The estimated PA systolic pressure is 45 mmHg.  · There is pulmonary hypertension.  · Trivial anterior pericardial effusion.  . Continue bumex and beta blocker and monitor clinical status closely.  IV Bumex switched to oral tabs.  Monitor on telemetry.  Patient is on CHF pathway.  Monitor strict Is&Os and daily weights.  Placed on fluid restriction of 1.5 L. Cardiology has been consulted. Continue to stress to patient importance of self efficacy and  on diet for CHF. Last BNP reviewed- and noted below       Recent Labs   Lab 09/18/23  2201   *      Patient instructed to adhere with fluid restriction.  Continue IV bumetanide.     Community acquired bacterial pneumonia  Was present on day 1 of admission.  Had six days of ceftriaxone.  Today was switched to Augmentin to finish treatment.        Acute respiratory failure with hypoxia  Patient with Hypoxic Respiratory failure which is Acute.  She is not on home oxygen. Supplemental oxygen was provided and noted- Oxygen Concentration (%):  [40] 40     .   Signs/symptoms of respiratory failure include- tachypnea, increased work of breathing, respiratory distress and use of accessory muscles. Contributing diagnoses includes - CHF.  Labs and images were reviewed. Will treat underlying causes and adjust management of respiratory failure as follows- Continue 4 LPM oxygen NC, Bumex intravenous for treatment of pulmonary edema.     Recurrent left pleural effusion  Status post left thoracentesis when 2000 cc yellow turbid fluid removed on September 20, 2023 by Pulmonary Medicine.  Postprocedure chest x-ray without pneumothorax.        Obstructive sleep apnea syndrome  C/w CPAP        COPD (chronic obstructive pulmonary disease) per patient  Continue ELIZABETH q6 PRN.  Disease is stable.     Paroxysmal atrial fibrillation  Currently in Afib-but rate controlled.   C/w Amio and BB  On Xarelto.  Cardiologist consulting.     Stage 3 chronic kidney disease  Creatine stable for now. BMP reviewed- noted Estimated Creatinine Clearance: 37.1 mL/min (based on SCr of 1.4 mg/dL). according to latest data. Based on current GFR, CKD stage is stage 3 - GFR 30-59.  Monitor UOP and serial BMP and adjust therapy as needed. Renally dose  meds. Avoid nephrotoxic medications and procedures.              Cardiomyopathy with implantable cardioverter-defibrillator  Not sure if ischemic or non-ischemic  Device interrogated during this admission.  Continue Bumex 1 mg IV daily.  Continue goal-directed medical therapy.  I/Os, daily weight        Mixed dyslipidemia  Continue statin        Essential hypertension  Continue Toprol 25 mg daily.  Monitor blood pressure.  It's controlled.  She's also on midodrine.           Patient Traveled outside of the [place] in the last 3 months? no     Patient discharged from this LTAC to SNF within the last 45 days? no    Patient discharged from this LTAC to Rehab within the last 27 days? no    Prior residence: home    Prior Post-Acute Services: N/A     Allergies:   Review of patient's allergies indicates:   Allergen Reactions    Codeine Other (See Comments)     nausea    Hydrocodone Nausea And Vomiting    Lasix [furosemide]      rash       Has patient received the current influenza vaccine (Oct 1 - March 31)? N/A (April 1st - September 30th)    Has patient received PPD skin test prior to admit? Yes, Date Received: 9/26/23    Code Status: Partial Code    Orientation: Time, Place, Person, and Events    Speech: normal     Vital Signs:     Date 9/26/23    Blood Pressure 91/61    Pulse 92    Respiratory Rate 16    O2 Saturation 96% 3 liters     Temperature 98.1        Bowel/Bladder: continent of bladder and continent of bowel  Bowel/Bladder Appliance: N/A    Dialysis: N/A         Midline Catheter Insertion/Assessment  - Single Lumen 09/19/23 1355 Right basilic vein (medial side of arm) 18g x 10cm (Active)   Site Assessment Clean;Dry;Intact;No redness;No swelling 09/26/23 0948   IV Device Securement catheter securement device 09/26/23 0948   Line Status Saline locked 09/26/23 0948   Dressing Type CHG impregnated dressing/sponge 09/26/23 0948   Dressing Status Clean;Dry;Intact 09/26/23 0948   Dressing Intervention Integrity  maintained 23 0948   Dressing Change Due 23 0948   Site Change Due 10/03/23 09/24/23 2301   Reason Not Rotated Not due 23 1101   Number of days: 7       Female External Urinary Catheter 23 (Active)   Skin no redness;no breakdown 23 0948   Tolerance no signs/symptoms of discomfort 23   Suction Continuous suction at 70 mmHg 23   Date of last wick change 23 07   Time of last wick change 0723 0701   Output (mL) 400 mL 23 0356   Number of days: 7       CBGs/Accuchecks: Yes     Precautions: Fall, Cardiac, Blood Pressure/Syncope, Aspiration, Anti-Coagulation Meds, and Seizures    Restraints: No     Isolation Precautions: N/A       Facility-Administered Medications as of 2023   Medication Dose Route Frequency Provider Last Rate Last Admin    0.9%  NaCl infusion   Intravenous Continuous Sebastian Nowak III, MD 75 mL/hr at 19 0728 New Bag at 19 0728    acetaminophen tablet 650 mg  650 mg Oral Q4H PRN Mia Allen MD   650 mg at 23 1456    [COMPLETED] acetaZOLAMIDE (DIAMOX) 250 mg in dextrose 5 % (D5W) 50 mL  250 mg Intravenous Once Bud López MD   Stopped at 23 1041    albuterol nebulizer solution 2.5 mg  2.5 mg Nebulization Q6H PRN Mia Allen MD   2.5 mg at 23 0840    aluminum-magnesium hydroxide-simethicone 200-200-20 mg/5 mL suspension 30 mL  30 mL Oral QID PRN Mia Allen MD        [] amiodarone 360 mg/200 mL (1.8 mg/mL) infusion  1 mg/min Intravenous Continuous Junior Moore MD 33.3 mL/hr at 23 1213 1 mg/min at 23 1213    amiodarone tablet 200 mg  200 mg Oral Daily Mia Allen MD   200 mg at 23 0933    amoxicillin-clavulanate 875-125mg per tablet 1 tablet  1 tablet Oral Q12H Israel Awan MD   1 tablet at 23 09    atorvastatin tablet 20 mg  20 mg Oral QHS Mia Allen MD   20 mg at 23 2019    [COMPLETED] bumetanide injection 1 mg  1  mg Intravenous Once Mia Allen MD   1 mg at 23    bumetanide tablet 1 mg  1 mg Oral Daily Israel Awan MD        dextrose 50% injection 12.5 g  12.5 g Intravenous PRN Mia Allen MD        dextrose 50% injection 25 g  25 g Intravenous PRN Mia Allen MD        [COMPLETED] digoxin injection 250 mcg  250 mcg Intravenous Once Tessa Raza NP   250 mcg at 23 1237    [START ON 2023] digoxin tablet 0.125 mg  0.125 mg Oral Daily Tessa Raza NP        diphenhydrAMINE injection 25 mg  25 mg Intravenous Once Sebastian Nowak III, MD        fluticasone propionate 50 mcg/actuation nasal spray 100 mcg  2 spray Each Nostril Daily Mia Allen MD   100 mcg at 23 0932    gabapentin capsule 200 mg  200 mg Oral TID Mia Allen MD   200 mg at 2333    glucagon (human recombinant) injection 1 mg  1 mg Intramuscular PRN Mia Allen MD        glucose chewable tablet 16 g  16 g Oral PRN Mia Allen MD        glucose chewable tablet 24 g  24 g Oral PRN Mia Allen MD        lorazepam injection 1 mg  1 mg Intravenous Once Sebastian Nowak III, MD        magnesium oxide tablet 800 mg  800 mg Oral PRN Mia Allen MD        magnesium oxide tablet 800 mg  800 mg Oral PRN Mia Allen MD        melatonin tablet 6 mg  6 mg Oral Nightly PRN Mia Allen MD   6 mg at 23    metoprolol succinate (TOPROL-XL) 24 hr tablet 25 mg  25 mg Oral Daily Mia Allen MD   25 mg at 23 0828    midodrine tablet 5 mg  5 mg Oral TID AC Mia Allen MD   5 mg at 23 1115    [COMPLETED] mupirocin 2 % ointment   Nasal BID Mia Allen MD   1 g at 23 0918    naloxone 0.4 mg/mL injection 0.02 mg  0.02 mg Intravenous PRN Mia Allen MD        [] NORepinephrine bitartrate-D5W 4 mg/250 mL (16 mcg/mL) infusion Soln             ondansetron injection 4 mg  4 mg Intravenous Q8H PRN Mia Allen MD        potassium bicarbonate disintegrating tablet 35 mEq  35 mEq Oral  PRN Mia Allen MD        potassium bicarbonate disintegrating tablet 50 mEq  50 mEq Oral PRN Mia Allen MD        potassium bicarbonate disintegrating tablet 60 mEq  60 mEq Oral PRN Mia Allen MD        potassium, sodium phosphates 280-160-250 mg packet 2 packet  2 packet Oral PRN Mia Allen MD        potassium, sodium phosphates 280-160-250 mg packet 2 packet  2 packet Oral PRN Mia Allen MD        potassium, sodium phosphates 280-160-250 mg packet 2 packet  2 packet Oral PRN Mia Allen MD        prochlorperazine injection Soln 5 mg  5 mg Intravenous Q6H PRN Mia Allen MD        rivaroxaban tablet 15 mg  15 mg Oral Daily with dinner Mia Allen MD   15 mg at 09/25/23 1618    senna-docusate 8.6-50 mg per tablet 1 tablet  1 tablet Oral Daily PRMia Hardy MD   1 tablet at 09/23/23 1027    simethicone chewable tablet 80 mg  1 tablet Oral QID PRN Mia Allen MD        sodium chloride 0.9% flush 10 mL  10 mL Intravenous PRN Mia Allen MD        sodium chloride 0.9% flush 10 mL  10 mL Intravenous PRN Mia Allen MD        sodium chloride 0.9% flush 10 mL  10 mL Intravenous Q8H PRN Mia Allen MD        [COMPLETED] sodium polystyrene sulfonate 15 gram/60 mL 0.25 g/mL oral liquid 10 g  10 g Oral Once Efren Wilson MD   10 g at 09/22/23 0609    [COMPLETED] torsemide tablet 20 mg  20 mg Oral ED 1 Time Preston Claros MD   20 mg at 09/19/23 0121    tuberculin injection 5 Units  5 Units Intradermal Once Beka Sandhu MD         Outpatient Medications as of 9/26/2023   Medication Sig Dispense Refill    albuterol (PROVENTIL/VENTOLIN HFA) 90 mcg/actuation inhaler INHALE 2 PUFFS BY MOUTH EVERY 6 HOURS AS NEEDED FOR WHEEZING (Patient taking differently: Inhale 2 puffs into the lungs every 6 (six) hours as needed.) 18 g 5    atorvastatin (LIPITOR) 20 MG tablet Take 20 mg by mouth every evening.      Ca-D3-mag ox-zinc--thom-bor 600 mg calcium- 20 mcg-50 mg Tab Take 1 tablet by mouth 2  (two) times daily.      cholecalciferol, vitamin D3, 125 mcg (5,000 unit) Tab Take 5,000 Units by mouth 2 (two) times daily.      dorzolamide-timolol 2-0.5% (COSOPT) 22.3-6.8 mg/mL ophthalmic solution Place 1 drop into both eyes 2 (two) times daily.      fluticasone propionate (FLONASE) 50 mcg/actuation nasal spray 2 sprays (100 mcg total) by Each Nostril route once daily. 16 g 5    gabapentin (NEURONTIN) 100 MG capsule TAKE 2 CAPSULES(200 MG) BY MOUTH THREE TIMES DAILY (Patient taking differently: Take 200 mg by mouth 3 (three) times daily. TAKE 2 CAPSULES(200 MG) BY MOUTH THREE TIMES DAILY) 180 capsule 5    glimepiride (AMARYL) 1 MG tablet Take 1 tablet (1 mg total) by mouth before breakfast. (Patient taking differently: Take 1 mg by mouth 2 (two) times a day.) 90 tablet 1    latanoprost 0.005 % ophthalmic solution Place 1 drop into both eyes nightly.      metoprolol succinate (TOPROL-XL) 25 MG 24 hr tablet Take 1 tablet (25 mg total) by mouth once daily. 90 tablet 3    rivaroxaban (XARELTO) 15 mg Tab Take 1 tablet (15 mg total) by mouth daily with dinner or evening meal. (Patient taking differently: Take 15 mg by mouth once daily.) 90 tablet 0    VERQUVO 5 mg Tab Take 1 tablet by mouth Daily.      [START ON 9/27/2023] amiodarone (PACERONE) 200 MG Tab Take 1 tablet (200 mg total) by mouth once daily. 30 tablet 11    amoxicillin-clavulanate 875-125mg (AUGMENTIN) 875-125 mg per tablet Take 1 tablet by mouth every 12 (twelve) hours. for 14 days 28 tablet 0    [START ON 9/27/2023] bumetanide (BUMEX) 1 MG tablet Take 1 tablet (1 mg total) by mouth once daily. 30 tablet 11    [START ON 9/27/2023] digoxin (LANOXIN) 125 mcg tablet Take 1 tablet (0.125 mg total) by mouth once daily. 30 tablet 11    midodrine (PROAMATINE) 5 MG Tab Take 1 tablet (5 mg total) by mouth 3 (three) times daily before meals. 90 tablet 11        Cardiovascular:    Cardiovascular Review: Within definable limits (WDL)    Telemetry: Yes    Rhythm:   "NSR     AICD: No      Respiratory:    Oxygen:  3 liters NC     CPT/Frequency: N/A    Incentive Spirometer/Frequency: N/A    CPAP/Settings:  CPAP Q HS     BiPAP/Settings: N/A    Oxygen Saturation:  96% 3 liters NC     Suction Frequency: N/A        Nutrition:      Ht Readings from Last 3 Encounters:   09/19/23 5' 7" (1.702 m)   09/19/23 5' 7.01" (1.702 m)   05/24/23 5' 9" (1.753 m)     Wt Readings from Last 1 Encounters:   09/26/23 0352 95.2 kg (209 lb 14.1 oz)   09/23/23 1346 97.2 kg (214 lb 4.6 oz)   09/22/23 1825 98.5 kg (217 lb 2.5 oz)   09/19/23 0440 96.3 kg (212 lb 4.9 oz)   09/18/23 2119 91.2 kg (201 lb)       Feeding Status:   Current Diet: Na Low 2 gram    Supplements: Ensure all meals    Tube Feeding: N/A   Flushes: N/A      Integumentary:    Integumentary: Within definable limits (WDL)              Musculoskeletal:    Transfer assist: Minimal Assistance    Weight Bearing Status: Full    Comments:     Communicated with nurse prior to session.  Patient found supine with telemetry, pulse ox (continuous), blood pressure cuff, peripheral IV upon PT entry to room.      General Precautions: Standard, fall  Orthopedic Precautions: N/A  Braces: N/A  Respiratory Status: Nasal cannula, flow 2 L/min     Functional Mobility:  Bed Mobility:     Supine to Sit: minimum assistance and moderate assistance  Transfers:     Sit to Stand:  minimum assistance with rolling walker  Gait: 10 ft RW and Min A .         ADL Assist: Minimal Assistance    Special Equipment: cane, walker, wheelchair, and bedside commode    Radiology:    Radiology (Last 168 hours)               09/22 0501 X-Ray Chest AP Portable       09/20 1056 X-Ray Chest 1 View       09/19 1309 Echo Saline Bubble? No       09/18 0000 CARDIAC MONITORING STRIPS              X-Ray Chest AP Portable  Chest, single view    HISTORY: Pneumonia.    Comparison is made to 9/20/2023.    The cardiac silhouette and central pulmonary vasculature are stable.    There has been interval " "progression of pulmonary opacities in the left mid/lower lung zone. There is similar blunting of the left costophrenic angle.    There is been development of linear opacity compatible with atelectasis in the right midlung. There is interstitial prominence near the right pulmonary apex. No significant right-sided effusion demonstrated.    IMPRESSION:    Interval progression of pulmonary opacities in the left mid and lower lung zone. There is similar blunting of left costophrenic angle.    Minor platelike atelectasis within the right midlung. There is mild right-sided interstitial prominence.    Electronically signed by:  Ines Schneider MD  9/22/2023 7:32 AM CDT Workstation: [id number]      Lab/Cultures:    BUN   Date Value Ref Range Status   09/26/2023 27 (H) 8 - 23 mg/dL Final   09/25/2023 32 (H) 8 - 23 mg/dL Final     Creatinine   Date Value Ref Range Status   09/26/2023 1.2 0.5 - 1.4 mg/dL Final   09/25/2023 1.4 0.5 - 1.4 mg/dL Final     WBC   Date Value Ref Range Status   09/26/2023 7.32 3.90 - 12.70 K/uL Final   09/25/2023 7.97 3.90 - 12.70 K/uL Final      Blood Culture, Routine   Date Value Ref Range Status   09/19/2023 No growth after 5 days.  Final     Urine Culture, Routine   Date Value Ref Range Status   08/18/2022 PROTEUS MIRABILIS  > 100,000 cfu/ml   (A)  Final     No results for input(s): "PH", "PCO2", "PO2", "HCO3", "POCSATURATED", "BE" in the last 72 hours.        "

## 2023-09-26 NOTE — ASSESSMENT & PLAN NOTE
Creatine stable for now. BMP reviewed- noted Estimated Creatinine Clearance: 37.1 mL/min (based on SCr of 1.4 mg/dL). according to latest data. Based on current GFR, CKD stage is stage 3 - GFR 30-59.  Monitor UOP and serial BMP and adjust therapy as needed. Renally dose meds. Avoid nephrotoxic medications and procedures.

## 2023-09-26 NOTE — PROGRESS NOTES
Critical access hospital Medicine  Progress Note    Patient Name: Jo Alegre  MRN: [id number]  Patient Class: IP- Inpatient   Admission Date: 9/18/2023  Length of Stay: 6 days  Attending Physician: Israel Awan MD  Primary Care Provider: Kraig Alberto MD        Subjective:     Principal Problem:Acute on chronic combined systolic and diastolic congestive heart failure        HPI:  Ms. Alegre is an 83 year old woman with PMHx of HTN, HDL, HFrEF s/p AICD, Afib- presented with one day hx of sob started yesterday - pt says she was fine day before yesterday but around morning time she started having sob not associated with any chest pain, palpitations, or fever, chills, cough or abdominal pain. She did not have any dietary discretion, has been adherent to her home meds. She also noted leg swelling bilateral yesterday. She was at cardio office for her device interrogation which as per patient is fine.     In ER she was found to have left pleural effusion and elevated BNP. Pt is being admitted for diuresis.        Overview/Hospital Course:  Patient monitor closely during hospitalization.  She was noted to have acute hypoxemic respiratory failure and AFib RVR.  Cardiology consulted.  Patient noted to have left-sided large pleural effusion on chest x-ray.  Pulmonary Medicine perform left thoracentesis with 2000 cc yellow turbid fluid removal.  Fluid analysis suggestive of transudate.  Cultures are negative.  Patient is requiring aggressive IV diuretic therapy.  Symptoms have improved.  Subsequent chest x-rays showing reaccumulating left pleural effusion.  Patient is on fluid restriction.  Patient also required intravenous amiodarone infusion for atrial fibrillation with rapid ventricular response and now transitioned back to oral amiodarone.  Patient is closely being followed by Pulmonary Medicine and Cardiology team.  Patient made herself DNR code status.  Patient was followed by palliative medicine  as well.      Interval History:  continues on supplemental oxygen.  Feels less short of breath.  Diuresing well.  Eating well.  No new complaints.  She agrees to be sent to SNF after discharge from acute.    Review of Systems   Constitutional:  Negative for fever.   Respiratory:  Positive for shortness of breath.    Cardiovascular:  Negative for chest pain.   Gastrointestinal:  Negative for abdominal pain.     Objective:     Vital Signs (Most Recent):  Temp: 98.3 °F (36.8 °C) (09/25/23 1926)  Pulse: 93 (09/25/23 1926)  Resp: 18 (09/25/23 1926)  BP: 124/61 (09/25/23 1926)  SpO2: 96 % (09/25/23 1926) Vital Signs (24h Range):  Temp:  [98.2 °F (36.8 °C)-98.6 °F (37 °C)] 98.3 °F (36.8 °C)  Pulse:  [] 93  Resp:  [18-35] 18  SpO2:  [90 %-100 %] 96 %  BP: (105-135)/(53-73) 124/61     Weight: 97.2 kg (214 lb 4.6 oz)  Body mass index is 33.55 kg/m².    Intake/Output Summary (Last 24 hours) at 9/25/2023 2117  Last data filed at 9/25/2023 1931  Gross per 24 hour   Intake 240 ml   Output 925 ml   Net -685 ml           Physical Exam  Constitutional:       General: She is not in acute distress.     Appearance: She is not ill-appearing.      Interventions: Nasal cannula in place.   Eyes:      General:         Right eye: No discharge.         Left eye: No discharge.   Neck:      Vascular: No JVD.   Cardiovascular:      Rate and Rhythm: Normal rate. Rhythm irregular.   Pulmonary:      Effort: Pulmonary effort is normal.      Breath sounds: Rales present.   Abdominal:      General: Abdomen is flat. Bowel sounds are normal. There is no distension.      Palpations: Abdomen is soft.      Tenderness: There is no abdominal tenderness.   Musculoskeletal:      Right lower leg: No edema.      Left lower leg: No edema.   Skin:     General: Skin is warm and moist.      Findings: No rash.   Neurological:      Mental Status: She is alert and oriented to person, place, and time.   Psychiatric:         Attention and Perception: Attention  normal.         Mood and Affect: Mood and affect normal.         Speech: Speech normal.             Significant Labs: All pertinent labs within the past 24 hours have been reviewed.    Significant Imaging:  No new imaging      Assessment/Plan:      * Acute on chronic combined systolic and diastolic congestive heart failure  Patient is identified as having Combined Systolic and Diastolic heart failure that is Acute on chronic. CHF is currently uncontrolled. Latest ECHO performed and demonstrates- Results for orders placed during the hospital encounter of 05/15/23    Echo    Interpretation Summary  · The left ventricle is mildly enlarged with mild concentric hypertrophy and moderately decreased systolic function.  · The estimated ejection fraction is 40%.  · Grade I left ventricular diastolic dysfunction.  · There is left ventricular global hypokinesis.  · Normal right ventricular size with normal right ventricular systolic function.  · Severe left atrial enlargement.  · Moderate-to-severe mitral regurgitation.  · Mild tricuspid regurgitation.  · Elevated central venous pressure (15 mmHg).  · The estimated PA systolic pressure is 45 mmHg.  · There is pulmonary hypertension.  · Trivial anterior pericardial effusion.  . Continue bumex and beta blocker and monitor clinical status closely.  IV Bumex switched to oral tabs.  Monitor on telemetry. Patient is on CHF pathway.  Monitor strict Is&Os and daily weights.  Placed on fluid restriction of 1.5 L. Cardiology has been consulted. Continue to stress to patient importance of self efficacy and  on diet for CHF. Last BNP reviewed- and noted below   Recent Labs   Lab 09/18/23  2201   *     Patient instructed to adhere with fluid restriction.  Continue IV bumetanide.    Community acquired bacterial pneumonia  Was present on day 1 of admission.  Had six days of ceftriaxone.  Today was switched to Augmentin to finish treatment.      Acute respiratory failure with  hypoxia  Patient with Hypoxic Respiratory failure which is Acute.  She is not on home oxygen. Supplemental oxygen was provided and noted- Oxygen Concentration (%):  [40] 40    .   Signs/symptoms of respiratory failure include- tachypnea, increased work of breathing, respiratory distress and use of accessory muscles. Contributing diagnoses includes - CHF.  Labs and images were reviewed. Will treat underlying causes and adjust management of respiratory failure as follows- Continue 4 LPM oxygen NC, Bumex intravenous for treatment of pulmonary edema.    Recurrent left pleural effusion  Status post left thoracentesis when 2000 cc yellow turbid fluid removed on September 20, 2023 by Pulmonary Medicine.  Postprocedure chest x-ray without pneumothorax.      Obstructive sleep apnea syndrome  C/w CPAP      COPD (chronic obstructive pulmonary disease) per patient  Continue ELIZABETH q6 PRN.  Disease is stable.    Paroxysmal atrial fibrillation  Currently in Afib-but rate controlled.   C/w Amio and BB  On Xarelto.  Cardiologist consulting.    Stage 3 chronic kidney disease  Creatine stable for now. BMP reviewed- noted Estimated Creatinine Clearance: 37.1 mL/min (based on SCr of 1.4 mg/dL). according to latest data. Based on current GFR, CKD stage is stage 3 - GFR 30-59.  Monitor UOP and serial BMP and adjust therapy as needed. Renally dose meds. Avoid nephrotoxic medications and procedures.          Cardiomyopathy with implantable cardioverter-defibrillator  Not sure if ischemic or non-ischemic  Device interrogated during this admission.  Continue Bumex 1 mg IV daily.  Continue goal-directed medical therapy.  I/Os, daily weight      Mixed dyslipidemia  Continue statin      Essential hypertension  Continue Toprol 25 mg daily.  Monitor blood pressure.  It's controlled.  She's also on midodrine.      VTE Risk Mitigation (From admission, onward)         Ordered     rivaroxaban tablet 15 mg  With dinner         09/20/23 1212     IP VTE  HIGH RISK PATIENT  Once         09/19/23 1301     Place sequential compression device  Until discontinued         09/19/23 1301     Reason for No Pharmacological VTE Prophylaxis  Once        Question:  Reasons:  Answer:  Already adequately anticoagulated on oral Anticoagulants    09/19/23 1301                Discharge Planning   JOANA: 9/26/2023     Code Status: Partial Code   Is the patient medically ready for discharge?:     Reason for patient still in hospital (select all that apply): Patient trending condition, Treatment and Pending disposition  Discharge Plan A: Rehab                  Israel Awan MD  Department of Hospital Medicine   [facility]

## 2023-10-08 ENCOUNTER — HOSPITAL ENCOUNTER (EMERGENCY)
Facility: HOSPITAL | Age: 83
Discharge: HOME | End: 2023-10-08
Attending: EMERGENCY MEDICINE
Payer: MEDICARE

## 2023-10-08 VITALS
HEART RATE: 60 BPM | BODY MASS INDEX: 31.55 KG/M2 | RESPIRATION RATE: 20 BRPM | HEIGHT: 67 IN | DIASTOLIC BLOOD PRESSURE: 67 MMHG | OXYGEN SATURATION: 95 % | WEIGHT: 201 LBS | SYSTOLIC BLOOD PRESSURE: 156 MMHG | TEMPERATURE: 99 F

## 2023-10-08 DIAGNOSIS — E86.0 DEHYDRATION: ICD-10-CM

## 2023-10-08 DIAGNOSIS — R25.1 TREMOR: Primary | ICD-10-CM

## 2023-10-08 DIAGNOSIS — R53.1 WEAKNESS: ICD-10-CM

## 2023-10-08 LAB
ALBUMIN SERPL BCP-MCNC: 3.3 G/DL (ref 3.5–5.2)
ALP SERPL-CCNC: 58 U/L (ref 55–135)
ALT SERPL W/O P-5'-P-CCNC: 12 U/L (ref 10–44)
ANION GAP SERPL CALC-SCNC: 15 MMOL/L (ref 8–16)
AST SERPL-CCNC: 16 U/L (ref 10–40)
BASOPHILS # BLD AUTO: 0.04 K/UL (ref 0–0.2)
BASOPHILS NFR BLD: 0.3 % (ref 0–1.9)
BILIRUB SERPL-MCNC: 0.5 MG/DL (ref 0.1–1)
BNP SERPL-MCNC: 599 PG/ML (ref 0–99)
BUN SERPL-MCNC: 25 MG/DL (ref 8–23)
CALCIUM SERPL-MCNC: 9.7 MG/DL (ref 8.7–10.5)
CHLORIDE SERPL-SCNC: 91 MMOL/L (ref 95–110)
CO2 SERPL-SCNC: 28 MMOL/L (ref 23–29)
CREAT SERPL-MCNC: 1.8 MG/DL (ref 0.5–1.4)
CREAT SERPL-MCNC: 2.1 MG/DL (ref 0.5–1.4)
DIFFERENTIAL METHOD: ABNORMAL
EOSINOPHIL # BLD AUTO: 0.1 K/UL (ref 0–0.5)
EOSINOPHIL NFR BLD: 0.5 % (ref 0–8)
ERYTHROCYTE [DISTWIDTH] IN BLOOD BY AUTOMATED COUNT: 17.2 % (ref 11.5–14.5)
EST. GFR  (NO RACE VARIABLE): 27.8 ML/MIN/1.73 M^2
GLUCOSE SERPL-MCNC: 107 MG/DL (ref 70–110)
GLUCOSE SERPL-MCNC: 118 MG/DL (ref 70–110)
HCT VFR BLD AUTO: 31 % (ref 37–48.5)
HGB BLD-MCNC: 9.4 G/DL (ref 12–16)
IMM GRANULOCYTES # BLD AUTO: 0.06 K/UL (ref 0–0.04)
IMM GRANULOCYTES NFR BLD AUTO: 0.5 % (ref 0–0.5)
INFLUENZA A, MOLECULAR: NEGATIVE
INFLUENZA B, MOLECULAR: NEGATIVE
LYMPHOCYTES # BLD AUTO: 1.3 K/UL (ref 1–4.8)
LYMPHOCYTES NFR BLD: 11.6 % (ref 18–48)
MAGNESIUM SERPL-MCNC: 2.1 MG/DL (ref 1.6–2.6)
MCH RBC QN AUTO: 26.3 PG (ref 27–31)
MCHC RBC AUTO-ENTMCNC: 30.3 G/DL (ref 32–36)
MCV RBC AUTO: 87 FL (ref 82–98)
MONOCYTES # BLD AUTO: 1.3 K/UL (ref 0.3–1)
MONOCYTES NFR BLD: 11.3 % (ref 4–15)
NEUTROPHILS # BLD AUTO: 8.7 K/UL (ref 1.8–7.7)
NEUTROPHILS NFR BLD: 75.8 % (ref 38–73)
NRBC BLD-RTO: 0 /100 WBC
PLATELET # BLD AUTO: 400 K/UL (ref 150–450)
PMV BLD AUTO: 9.6 FL (ref 9.2–12.9)
POTASSIUM SERPL-SCNC: 4.9 MMOL/L (ref 3.5–5.1)
PROT SERPL-MCNC: 7 G/DL (ref 6–8.4)
RBC # BLD AUTO: 3.57 M/UL (ref 4–5.4)
SAMPLE: ABNORMAL
SODIUM SERPL-SCNC: 134 MMOL/L (ref 136–145)
SPECIMEN SOURCE: NORMAL
TROPONIN I SERPL HS-MCNC: 19 PG/ML (ref 0–14.9)
TSH SERPL DL<=0.005 MIU/L-ACNC: 1.44 UIU/ML (ref 0.34–5.6)
WBC # BLD AUTO: 11.45 K/UL (ref 3.9–12.7)

## 2023-10-08 PROCEDURE — 63600175 PHARM REV CODE 636 W HCPCS: Performed by: EMERGENCY MEDICINE

## 2023-10-08 PROCEDURE — 87502 INFLUENZA DNA AMP PROBE: CPT | Performed by: EMERGENCY MEDICINE

## 2023-10-08 PROCEDURE — 84484 ASSAY OF TROPONIN QUANT: CPT | Performed by: STUDENT IN AN ORGANIZED HEALTH CARE EDUCATION/TRAINING PROGRAM

## 2023-10-08 PROCEDURE — 82962 GLUCOSE BLOOD TEST: CPT

## 2023-10-08 PROCEDURE — 83880 ASSAY OF NATRIURETIC PEPTIDE: CPT | Performed by: STUDENT IN AN ORGANIZED HEALTH CARE EDUCATION/TRAINING PROGRAM

## 2023-10-08 PROCEDURE — 93010 EKG 12-LEAD: ICD-10-PCS | Mod: ,,, | Performed by: INTERNAL MEDICINE

## 2023-10-08 PROCEDURE — 96360 HYDRATION IV INFUSION INIT: CPT

## 2023-10-08 PROCEDURE — 80053 COMPREHEN METABOLIC PANEL: CPT | Performed by: STUDENT IN AN ORGANIZED HEALTH CARE EDUCATION/TRAINING PROGRAM

## 2023-10-08 PROCEDURE — 83735 ASSAY OF MAGNESIUM: CPT | Performed by: STUDENT IN AN ORGANIZED HEALTH CARE EDUCATION/TRAINING PROGRAM

## 2023-10-08 PROCEDURE — 99285 EMERGENCY DEPT VISIT HI MDM: CPT | Mod: 25

## 2023-10-08 PROCEDURE — 93010 ELECTROCARDIOGRAM REPORT: CPT | Mod: ,,, | Performed by: INTERNAL MEDICINE

## 2023-10-08 PROCEDURE — 82565 ASSAY OF CREATININE: CPT

## 2023-10-08 PROCEDURE — 85025 COMPLETE CBC W/AUTO DIFF WBC: CPT | Performed by: STUDENT IN AN ORGANIZED HEALTH CARE EDUCATION/TRAINING PROGRAM

## 2023-10-08 PROCEDURE — 84443 ASSAY THYROID STIM HORMONE: CPT | Performed by: STUDENT IN AN ORGANIZED HEALTH CARE EDUCATION/TRAINING PROGRAM

## 2023-10-08 PROCEDURE — 93005 ELECTROCARDIOGRAM TRACING: CPT | Performed by: INTERNAL MEDICINE

## 2023-10-08 RX ORDER — SODIUM CHLORIDE, SODIUM LACTATE, POTASSIUM CHLORIDE, CALCIUM CHLORIDE 600; 310; 30; 20 MG/100ML; MG/100ML; MG/100ML; MG/100ML
250 INJECTION, SOLUTION INTRAVENOUS
Status: COMPLETED | OUTPATIENT
Start: 2023-10-08 | End: 2023-10-08

## 2023-10-08 RX ADMIN — SODIUM CHLORIDE, SODIUM LACTATE, POTASSIUM CHLORIDE, AND CALCIUM CHLORIDE 250 ML: .6; .31; .03; .02 INJECTION, SOLUTION INTRAVENOUS at 10:10

## 2023-10-09 NOTE — ED PROVIDER NOTES
Encounter Date: 10/8/2023       History     Chief Complaint   Patient presents with    Tremors     Starting about a week ago worsening tonight. Pt brought via EMS from [facility]     83-year-old female presented emergency department with tremor.  Patient said she was having generalized weakness which was getting worse over the past 2-3 days.  Patient recently was in the hospital for treatment of fluid overload and pneumonia and pleural effusion was drained at that time.  Patient also was treated for pneumonia.  Patient is getting inpatient rehab at this time and over the past few days noticed that she was having tremor and every time she tries to get up and walk having some tremor which is diffuse bilateral.  Denies any focal weakness.  Patient however has generalized weakness.  Denies fever or chills or nausea vomiting.  Denies any cough or chest pain or shortness of breath.  Patient does have some residual edema in the legs.  Patient under fluid restriction at this time.      Review of patient's allergies indicates:   Allergen Reactions    Codeine Other (See Comments)     nausea    Hydrocodone Nausea And Vomiting    Lasix [furosemide]      rash     Past Medical History:   Diagnosis Date    Allergy     Codeine, Lasix    Atrial fibrillation     Atrial fibrillation     Cataract     CHF (congestive heart failure)     Diabetes mellitus, type 2     Ejection fraction < 50% 10/18/2017    Approximately 35%  Based on prior  Echocardiogram.    Encounter for blood transfusion     Hyperlipidemia     Osteoporosis     PONV (postoperative nausea and vomiting)     Thyroid disorder screening 10/17/2017    TSH of 1.12 ordered by Dr. dee Jones     Past Surgical History:   Procedure Laterality Date    ANTEGRADE NEPHROSTOGRAPHY Left 8/25/2022    Procedure: NEPHROSTOGRAM, ANTEGRADE;  Surgeon: Shelby Parra MD;  Location: [facility];  Service: Urology;  Laterality: Left;    CHOLECYSTECTOMY  1997    Lelia      COLONOSCOPY      CYSTOSCOPY W/ URETERAL STENT PLACEMENT Left 7/17/2022    Procedure: CYSTOSCOPY, WITH URETERAL STENT INSERTION;  Surgeon: Shelby Parra MD;  Location: [facility] OR;  Service: Urology;  Laterality: Left;    CYSTOSCOPY W/ URETERAL STENT REMOVAL Left 9/21/2022    Procedure: CYSTOSCOPY, WITH URETERAL STENT REMOVAL;  Surgeon: Shelby Parra MD;  Location: [facility] OR;  Service: Urology;  Laterality: Left;    CYSTOSCOPY WITH URETEROSCOPY, RETROGRADE PYELOGRAPHY, AND INSERTION OF STENT Left 8/25/2022    Procedure: CYSTOSCOPY, WITH RETROGRADE PYELOGRAM AND URETERAL STENT INSERTION;  Surgeon: Shelby Parra MD;  Location: [facility] OR;  Service: Urology;  Laterality: Left;    EYE SURGERY      bilateral cataracts    FINGER SURGERY Right 2021    right pinky finger    FLEXIBLE CYSTOSCOPY Left 8/25/2022    Procedure: CYSTOSCOPY, FLEXIBLE WITH STENT REMOVAL;  Surgeon: Shelby Parra MD;  Location: [facility] OR;  Service: Urology;  Laterality: Left;    FRACTURE SURGERY  2014    right femur with neville    HEMORRHOID SURGERY      48 yrs ago    HYSTERECTOMY      NEPHROSTOMY Left 8/25/2022    Procedure: CREATION, NEPHROSTOMY;  Surgeon: Shelby Prara MD;  Location: [facility] OR;  Service: Urology;  Laterality: Left;    PERCUTANEOUS NEPHROLITHOTOMY N/A 8/25/2022    Procedure: NEPHROLITHOTOMY, PERCUTANEOUS;  Surgeon: Shelby Parra MD;  Location: [facility] OR;  Service: Urology;  Laterality: N/A;    REPLACEMENT OF IMPLANTABLE CARDIOVERTER-DEFIBRILLATOR (ICD) GENERATOR N/A 12/13/2019    Procedure: REPLACEMENT, PULSE GENERATOR, ICD-MEDTRONIC;  Surgeon: Sebastian Nowak III, MD;  Location: [facility] CATH;  Service: Cardiology;  Laterality: N/A;    TONSILLECTOMY       Family History   Problem Relation Age of Onset    Heart disease Mother     Cancer Father         Lung Cancer ??? Asbestos    Breast cancer Paternal Aunt      Social History     Tobacco Use    Smoking status: Former     Passive exposure: Past    Smokeless tobacco: Never     Tobacco comments:     quit 2013   Substance Use Topics    Alcohol use: No    Drug use: No     Review of Systems   Constitutional:  Positive for fatigue.   HENT: Negative.     Eyes: Negative.    Respiratory: Negative.     Cardiovascular:  Positive for leg swelling. Negative for chest pain.   Gastrointestinal: Negative.    Endocrine: Negative.    Genitourinary: Negative.    Musculoskeletal: Negative.    Skin: Negative.    Allergic/Immunologic: Negative.    Neurological:  Positive for tremors.   Hematological: Negative.    Psychiatric/Behavioral: Negative.     All other systems reviewed and are negative.      Physical Exam     Initial Vitals [10/08/23 1940]   BP Pulse Resp Temp SpO2   119/86 82 16 98.8 °F (37.1 °C) 97 %      MAP       --         Physical Exam    Nursing note and vitals reviewed.  Constitutional: She appears well-developed and well-nourished.   HENT:   Head: Normocephalic and atraumatic.   Nose: Nose normal.   Mouth/Throat: Oropharynx is clear and moist.   Eyes: Conjunctivae and EOM are normal. Pupils are equal, round, and reactive to light.   Neck: Neck supple. No thyromegaly present. No tracheal deviation present. No JVD present.   Normal range of motion.  Cardiovascular:  Normal rate, regular rhythm, normal heart sounds and intact distal pulses.           No murmur heard.  Pulmonary/Chest: Breath sounds normal. No stridor. No respiratory distress. She has no wheezes. She has no rales.   Abdominal: Abdomen is soft. Bowel sounds are normal. She exhibits no distension. There is no abdominal tenderness.   Musculoskeletal:         General: Edema present. No tenderness. Normal range of motion.      Cervical back: Normal range of motion and neck supple.     Neurological: She is alert and oriented to person, place, and time. GCS score is 15. GCS eye subscore is 4. GCS verbal subscore is 5. GCS motor subscore is 6.   No focal weakness however has generalized weakness and mild tremor when she tries to do  something in the extremities.  Patient has tremor with exertion.  Patient has weakness and tremor likely is from deconditioning.  Patient also has some evidence of dehydration.  Patient gently hydrated.   Skin: Skin is warm. Capillary refill takes less than 2 seconds.   Psychiatric: She has a normal mood and affect. Thought content normal.         ED Course   Procedures  Labs Reviewed   CBC W/ AUTO DIFFERENTIAL - Abnormal; Notable for the following components:       Result Value    RBC 3.57 (*)     Hemoglobin 9.4 (*)     Hematocrit 31.0 (*)     MCH 26.3 (*)     MCHC 30.3 (*)     RDW 17.2 (*)     Gran # (ANC) 8.7 (*)     Immature Grans (Abs) 0.06 (*)     Mono # 1.3 (*)     Gran % 75.8 (*)     Lymph % 11.6 (*)     All other components within normal limits   COMPREHENSIVE METABOLIC PANEL - Abnormal; Notable for the following components:    Sodium 134 (*)     Chloride 91 (*)     BUN 25 (*)     Creatinine 1.8 (*)     Albumin 3.3 (*)     eGFR 27.8 (*)     All other components within normal limits   B-TYPE NATRIURETIC PEPTIDE - Abnormal; Notable for the following components:     (*)     All other components within normal limits   TROPONIN I HIGH SENSITIVITY - Abnormal; Notable for the following components:    Troponin I High Sensitivity 19.0 (*)     All other components within normal limits   POCT GLUCOSE - Abnormal; Notable for the following components:    POC Glucose 118 (*)     All other components within normal limits   ISTAT CREATININE - Abnormal; Notable for the following components:    POC Creatinine 2.1 (*)     All other components within normal limits   MAGNESIUM   INFLUENZA A AND B ANTIGEN    Narrative:     Specimen Source->Nasopharyngeal Swab   TROPONIN I HIGH SENSITIVITY   TSH   TSH   TROPONIN I HIGH SENSITIVITY   URINALYSIS, REFLEX TO URINE CULTURE     EKG Readings: (Independently Interpreted)   Initial Reading: No STEMI. Rhythm: Atrial Fibrillation. Ectopy: PVCs.       Imaging Results              CT  Head Without Contrast (Final result)  Result time 10/08/23 21:44:02      Final result by Reno Goldberg MD (10/08/23 21:44:02)                   Narrative:    EXAM:  CT Head Without Intravenous Contrast    CLINICAL HISTORY:  The patient is 83 years old and is Female; Mental status change, unknown cause    TECHNIQUE:  Axial computed tomography images of the head/brain without intravenous contrast.  Sagittal and coronal reformatted images were created and reviewed.  This CT exam was performed using one or more of the following dose reduction techniques:  automated exposure control, adjustment of the mA and/or kV according to patient size, and/or use of iterative reconstruction technique.    COMPARISON:  CT of the head 4/11/2021    FINDINGS:  BRAIN:  No acute intracranial hemorrhage. Moderate cerebral atrophy. Brain parenchyma shows no mass lesion or mass effect. Moderate chronic microangiopathy. Basal ganglia and brainstem appear symmetric.  VENTRICLES:  No acute findings.  BONES/JOINTS:  No acute findings.  SOFT TISSUES:  No acute findings.  VASCULATURE:  Atherosclerosis of the distal carotid arteries.  SINUSES:  Mild ethmoid sinus thickening.  Complete opacification of the right sphenoid sinus.  MASTOID AIR CELLS:  No acute findings.    IMPRESSION:    1.  No acute intracranial disease.  2.  Moderate cerebral atrophy and chronic microangiopathy.    Electronically signed by:  Reno Goldberg MD  10/08/2023 09:44 PM CDT Workstation: [id number]                                     X-Ray Chest AP Portable (Final result)  Result time 10/08/23 21:44:45      Final result by Reno Goldberg MD (10/08/23 21:44:45)                   Narrative:    EXAM:  XR Chest, 1 View    CLINICAL HISTORY:  The patient is 83 years old and is Female; weakness    TECHNIQUE:  Frontal view of the chest.    COMPARISON:  9/22/2023    FINDINGS:  LUNGS:  Moderate bilateral interstitial opacities may represent edema or pneumonia.  PLEURAL SPACE:  No  acute findings.  HEART:  Mild cardiac enlargement.  MEDIASTINUM:  No acute findings.  BONES/JOINTS:  No acute findings.  VASCULATURE:  Atherosclerosis of the aorta.  TUBES, LINES AND DEVICES:  Left single lead defibrillator with appropriate position.    IMPRESSION:    Moderate bilateral interstitial opacities from edema or pneumonia.    Electronically signed by:  Reno Goldberg MD  10/08/2023 09:44 PM CDT Workstation: [id number]                                  X-Rays:   Independently Interpreted Readings:   Other Readings:  Chest x-ray shows mild infiltrates in the lung but could be scarring versus atelectasis as patient clinically has no symptoms of pneumonia    Medications   lactated ringers infusion (250 mLs Intravenous New Bag 10/8/23 2229)     Medical Decision Making  Patient with generalized weakness and tremor.  Patient does have some edema however also has evidence of dehydration.  Patient under fluid restriction and likely making her feels weak in general causing some of the symptoms.  Patient was given a small fluid bolus.  Admission offered to patient however patient adamantly wants to leave and go back to the nursing home and said will follow-up with primary care.  Patient advised to increase fluid intake.  No clinical evidence of infection at this time and as wants to go home will discharged with instructions and follow-up.  Patient has no focal neurologic deficits.  Head CT reviewed.  Screening labs and workup reviewed.  As patient adamantly wants to go back to the nursing home will discharge and patient to follow-up with her doctors this week.  Return precautions given.  Patient does have evidence of dehydration and mild MARCELINO which should be improved once patient increase his fluid intake.    Amount and/or Complexity of Data Reviewed  Labs: ordered. Decision-making details documented in ED Course.  Radiology: ordered and independent interpretation performed. Decision-making details documented in ED  Course.  ECG/medicine tests: ordered and independent interpretation performed. Decision-making details documented in ED Course.    Risk  Prescription drug management.                           Clinical Impression:   Final diagnoses:  [R25.1] Tremor (Primary)  [R53.1] Weakness  [E86.0] Dehydration        ED Disposition Condition    Discharge Stable          ED Prescriptions    None       Follow-up Information       Follow up With Specialties Details Why Contact Info    Kraig Alberto MD Internal Medicine In 2 days  [address]  [address]  [facility] [address]  [phone]               Gerry Erickson MD  10/08/23 6220

## 2023-10-09 NOTE — DISCHARGE INSTRUCTIONS
Slightly increased your oral fluid intake.  Your currently under fluid restriction which is causing dehydration and renal insufficiency so must slightly increased fluid intake.  Keep feet elevated.  Rest.  Return to emergency department for worsening symptoms or any problems.  Admission offered to you however as you do not want to be admitted you can not go back to rehab but must follow-up with your rehab doctor and have your blood work rechecked in 48 hours.  You need repeat BNP and basic metabolic panel rechecked for evaluation of your renal function.  Return to emergency department for worsening symptoms or any problems.

## 2023-10-10 NOTE — PHYSICIAN QUERY
PT Name: Jo Alegre  MR #: [id number]     DOCUMENTATION CLARIFICATION     CDS/: Laina Mcconnell               Contact information:  This form is a permanent document in the medical record.    Query Date: October 10, 2023    By submitting this query, we are merely seeking further clarification of documentation.  Please utilize your independent clinical judgment when addressing the question(s) below.  The Medical Record contains the following:   Indicators   Supporting Clinical Findings Location in Medical Record    Pneumonia documented Started on antibiotics for possible pneumonia.      Fever-suspect pneumonia.    Community acquired bacterial pneumonia Cardiology progress note beginning on 9/21    Event 9/19    Hosp progress note 9/25, DC summary    Treatment  OK for SNF needs to complete 2 weeks antibiotics (oral augmentin is OK)     cefTRIAXone (ROCEPHIN) IVPB  1 g Intravenous Q24H    Pulmonology progress note 9/26    Other Differential diagnosis includes pneumonia CHF pleural effusion cancer infection pneumothorax pulmonary embolus among others.  ER Prov Note     Provider, please provide the diagnosis related to the above clinical indicators:    [    ] Bacterial, gram negative organism Pneumonia (please further specify organism being treated):       [    ] Bacterial, gram positive organism Pneumonia (please further specify organism being treated):       [  X  ]      [    ] Unspecified Bacterial Pneumonia      Other Pneumonia (please specify)

## 2023-10-10 NOTE — PHYSICIAN QUERY
PT Name: Jo Alegre  MR #: [id number]    DOCUMENTATION CLARIFICATION     CDS/: Laina Mcconnell               Contact information:[phone] or through epic messenger  This form is a permanent document in the medical record.    Query Date: October 10, 2023      By submitting this query, we are merely seeking further clarification of documentation.  Please utilize your independent clinical judgment when addressing the question(s) below.    The Medical Record reflects the following:    Clinical Information Location in Medical Record   REASON FOR CONSULT:    Pleural effusion, acute respiratory distress, shock   presented from home with low blood pressure and shortness of breath.     Hypotension-initiated on Levophed.  Vital Signs Range (Last 24H):   BP: ()/(48-76)     9/20--The pt is  now in ICU on amio drip and pressors.      Continue weaning levophed.         Pulmonology consult 9/19        Event 9/19        Cardiology progress note 9/20-9/26    Pulmonology progress note 9/20       Please clarify/confirm the Consultants diagnosis of Shock:     [ X ] Hypotensive Shock   [  ] Other Shock (please specify)     [  ] Shock, unspecified   [  ] Disagree with Shock  Other etiology for the use of Levophed (please specify)

## 2023-10-14 PROCEDURE — G0180 PR HOME HEALTH MD CERTIFICATION: ICD-10-PCS | Mod: ,,, | Performed by: INTERNAL MEDICINE

## 2023-10-14 PROCEDURE — G0180 MD CERTIFICATION HHA PATIENT: HCPCS | Mod: ,,, | Performed by: INTERNAL MEDICINE

## 2023-10-16 ENCOUNTER — HOSPITAL ENCOUNTER (OUTPATIENT)
Dept: RADIOLOGY | Facility: HOSPITAL | Age: 83
Discharge: HOME | End: 2023-10-16
Attending: STUDENT IN AN ORGANIZED HEALTH CARE EDUCATION/TRAINING PROGRAM
Payer: MEDICARE

## 2023-10-16 DIAGNOSIS — J91.8 PARAPNEUMONIC EFFUSION: ICD-10-CM

## 2023-10-16 DIAGNOSIS — J18.9 PARAPNEUMONIC EFFUSION: ICD-10-CM

## 2023-10-16 DIAGNOSIS — N22 CALCULUS OF URINARY TRACT IN DISEASES CLASSIFIED ELSEWHERE: ICD-10-CM

## 2023-10-16 PROCEDURE — 71250 CT THORAX DX C-: CPT | Mod: TC

## 2023-10-16 PROCEDURE — 74176 CT RENAL STONE STUDY ABD PELVIS WO: ICD-10-PCS | Mod: 26,,, | Performed by: RADIOLOGY

## 2023-10-16 PROCEDURE — 71250 CT CHEST WITHOUT CONTRAST: ICD-10-PCS | Mod: 26,,, | Performed by: RADIOLOGY

## 2023-10-16 PROCEDURE — 71250 CT THORAX DX C-: CPT | Mod: 26,,, | Performed by: RADIOLOGY

## 2023-10-16 PROCEDURE — 74176 CT ABD & PELVIS W/O CONTRAST: CPT | Mod: TC

## 2023-10-16 PROCEDURE — 74176 CT ABD & PELVIS W/O CONTRAST: CPT | Mod: 26,,, | Performed by: RADIOLOGY

## 2023-10-20 RX ORDER — PROPRANOLOL HYDROCHLORIDE 10 MG/1
10 TABLET ORAL 2 TIMES DAILY
COMMUNITY
Start: 2023-09-26 | End: 2023-11-01 | Stop reason: ALTCHOICE

## 2023-10-20 RX ORDER — FLUDROCORTISONE ACETATE 0.1 MG/1
TABLET ORAL
COMMUNITY
Start: 2023-10-08 | End: 2023-11-01

## 2023-10-20 NOTE — PROGRESS NOTES
[facility] Note    PCP: Kraig Alberto MD    Chief Complaint: kidney stones    SUBJECTIVE:       History of Present Illness:  Jo Alegre is a 83 y.o. female who presents to clinic for kidney stones. She is Established  to our clinic.     Patient has been having issues with recurrent left pleural effusion.  On her most recent CT there is a cystic fluid collection posterior to the left kidney with concern for involvement/fistulization of the left pleural space. This does appear to be away from the previous PCNL tract.     She is otherwise feeling well.  No fevers.   No hematuria.     4/24/23  Renal US 3/2/23: There are bilateral renal cysts some of which show internal debris. There is no hydronephrosis.    KUB 3/2/23: There are left renal calculi the largest of which measures 7 mm.    No flank pain. No hematuria. No dysuria.   Drinking 3-4 bottles of water a day.       11/16/22  Unable to get contrast with CT secondary to renal function.   She has a 10 mm mid pole stone and a 10 mm and 6 mm lower pole stone. No hydro.   Suggestive of hemorrhagic cysts.     She is without complaints today.   No pain.   No hematuria.   No UTI symptoms.       9/12/22  S/P left PCNL on 8/25/22.  Stone analysis: 80% Magnesium ammonium phosphate (struvite), 20% Calcium phosphate (apatite)   KUB: possible small residual stone in lower pole    Post op course uncomplicated.   No further hematuria. No flank pain.     8/1/22  Patient underwent stent placement on 7/17 for left staghorn stone. She had a fever to 100.3.  CT shows a large lower pole staghorn. Also has some complex cysts present.     This is her first stone episode.   No family hx of stones.   No hematuria prior to stent placement   Has a hx of recurrent UTIs.     Last urine culture: MO (7/2/22)    Lab Results   Component Value Date    CREATININE 1.6 (H) 10/12/2023     Hx of COPD, pulm HTN, a fib, CHF, HLD, CKD, DM, JENNIFER    Past medical, family, and  social history reviewed as documented in chart with pertinent positive medical, family, and social history detailed in HPI.    Review of patient's allergies indicates:   Allergen Reactions    Codeine Other (See Comments)     nausea    Hydrocodone Nausea And Vomiting    Lasix [furosemide]      rash       Past Medical History:   Diagnosis Date    Allergy     Codeine, Lasix    Atrial fibrillation     Atrial fibrillation     Cataract     CHF (congestive heart failure)     Diabetes mellitus, type 2     Ejection fraction < 50% 10/18/2017    Approximately 35%  Based on prior  Echocardiogram.    Encounter for blood transfusion     Hyperlipidemia     Osteoporosis     PONV (postoperative nausea and vomiting)     Thyroid disorder screening 10/17/2017    TSH of 1.12 ordered by Dr. dee Jones     Past Surgical History:   Procedure Laterality Date    ANTEGRADE NEPHROSTOGRAPHY Left 8/25/2022    Procedure: NEPHROSTOGRAM, ANTEGRADE;  Surgeon: Shelby Parra MD;  Location: [facility];  Service: Urology;  Laterality: Left;    CHOLECYSTECTOMY  1997    [place]     COLONOSCOPY      CYSTOSCOPY W/ URETERAL STENT PLACEMENT Left 7/17/2022    Procedure: CYSTOSCOPY, WITH URETERAL STENT INSERTION;  Surgeon: Shelby Parra MD;  Location: [facility] OR;  Service: Urology;  Laterality: Left;    CYSTOSCOPY W/ URETERAL STENT REMOVAL Left 9/21/2022    Procedure: CYSTOSCOPY, WITH URETERAL STENT REMOVAL;  Surgeon: Shelby Parra MD;  Location: [facility] OR;  Service: Urology;  Laterality: Left;    CYSTOSCOPY WITH URETEROSCOPY, RETROGRADE PYELOGRAPHY, AND INSERTION OF STENT Left 8/25/2022    Procedure: CYSTOSCOPY, WITH RETROGRADE PYELOGRAM AND URETERAL STENT INSERTION;  Surgeon: Shelby Parra MD;  Location: [facility] OR;  Service: Urology;  Laterality: Left;    EYE SURGERY      bilateral cataracts    FINGER SURGERY Right 2021    right pinky finger    FLEXIBLE CYSTOSCOPY Left 8/25/2022    Procedure: CYSTOSCOPY, FLEXIBLE WITH STENT REMOVAL;  Surgeon:  "Shelby Parra MD;  Location: [facility] OR;  Service: Urology;  Laterality: Left;    FRACTURE SURGERY  2014    right femur with neville    HEMORRHOID SURGERY      48 yrs ago    HYSTERECTOMY      NEPHROSTOMY Left 8/25/2022    Procedure: CREATION, NEPHROSTOMY;  Surgeon: Shelby Parra MD;  Location: [facility] OR;  Service: Urology;  Laterality: Left;    PERCUTANEOUS NEPHROLITHOTOMY N/A 8/25/2022    Procedure: NEPHROLITHOTOMY, PERCUTANEOUS;  Surgeon: Shelby Parra MD;  Location: [facility] OR;  Service: Urology;  Laterality: N/A;    REPLACEMENT OF IMPLANTABLE CARDIOVERTER-DEFIBRILLATOR (ICD) GENERATOR N/A 12/13/2019    Procedure: REPLACEMENT, PULSE GENERATOR, ICD-MEDTRONIC;  Surgeon: Sebastian Nowak III, MD;  Location: [facility];  Service: Cardiology;  Laterality: N/A;    TONSILLECTOMY       Family History   Problem Relation Age of Onset    Heart disease Mother     Cancer Father         Lung Cancer ??? Asbestos    Breast cancer Paternal Aunt      Social History     Tobacco Use    Smoking status: Former     Passive exposure: Past    Smokeless tobacco: Never    Tobacco comments:     quit 2013   Substance Use Topics    Alcohol use: No    Drug use: No        Review of Systems   Genitourinary:  Negative for difficulty urinating, dysuria, flank pain, frequency, hematuria, nocturia and urgency.       OBJECTIVE:     Anticoagulation:  xarelto 20 mg     Estimated body mass index is 31.62 kg/m² as calculated from the following:    Height as of 10/8/23: 5' 7" (1.702 m).    Weight as of an earlier encounter on 10/23/23: 91.6 kg (201 lb 14.4 oz).    Vital Signs (Most Recent)       Physical Exam  Vitals reviewed.   Constitutional:       General: She is not in acute distress.     Appearance: Normal appearance. She is not ill-appearing.   HENT:      Head: Normocephalic and atraumatic.   Eyes:      General: No scleral icterus.  Cardiovascular:      Rate and Rhythm: Normal rate and regular rhythm.   Pulmonary:      Effort: Pulmonary effort is " normal. No respiratory distress.   Abdominal:      General: There is no distension.      Palpations: Abdomen is soft.   Skin:     Coloration: Skin is not jaundiced.   Neurological:      General: No focal deficit present.      Mental Status: She is alert and oriented to person, place, and time.   Psychiatric:         Mood and Affect: Mood normal.         Behavior: Behavior normal.         BMP  Lab Results   Component Value Date     10/12/2023    K 4.3 10/12/2023    CL 94 (L) 10/12/2023    CO2 34 (H) 10/12/2023    BUN 23 10/12/2023    CREATININE 1.6 (H) 10/12/2023    CALCIUM 9.2 10/12/2023    ANIONGAP 10 10/12/2023    ESTGFRAFRICA 30.2 (A) 07/18/2022    EGFRNONAA 26.2 (A) 07/18/2022       Lab Results   Component Value Date    WBC 6.09 10/12/2023    HGB 8.5 (L) 10/12/2023    HCT 28.1 (L) 10/12/2023    MCV 87 10/12/2023     10/12/2023     Imaging:  Per HPI    ASSESSMENT     1. Staghorn calculus    2. Calculus of urinary tract in diseases classified elsewhere    3. Recurrent left pleural effusion      PLAN:     - Concerned for possible leak from the left kidney however it would be unusual to develop this far out from her procedure   - Will discuss with IR  - Ultimately I believe she will need drainage of fluid collection with drain placement   - Currently scheduled for MRI for further characterization     Shelby Parra MD

## 2023-10-23 ENCOUNTER — OFFICE VISIT (OUTPATIENT)
Dept: UROLOGY | Facility: CLINIC | Age: 83
End: 2023-10-23
Payer: MEDICARE

## 2023-10-23 ENCOUNTER — OFFICE VISIT (OUTPATIENT)
Dept: PULMONOLOGY | Facility: CLINIC | Age: 83
End: 2023-10-23
Payer: MEDICARE

## 2023-10-23 VITALS
SYSTOLIC BLOOD PRESSURE: 100 MMHG | WEIGHT: 201.88 LBS | HEART RATE: 60 BPM | DIASTOLIC BLOOD PRESSURE: 58 MMHG | OXYGEN SATURATION: 95 % | BODY MASS INDEX: 31.62 KG/M2

## 2023-10-23 DIAGNOSIS — R91.8 ABNORMAL CT LUNG SCREENING: ICD-10-CM

## 2023-10-23 DIAGNOSIS — N20.0 STAGHORN CALCULUS: Primary | ICD-10-CM

## 2023-10-23 DIAGNOSIS — J90 RECURRENT LEFT PLEURAL EFFUSION: ICD-10-CM

## 2023-10-23 DIAGNOSIS — G47.33 OBSTRUCTIVE SLEEP APNEA SYNDROME: ICD-10-CM

## 2023-10-23 DIAGNOSIS — N22 CALCULUS OF URINARY TRACT IN DISEASES CLASSIFIED ELSEWHERE: ICD-10-CM

## 2023-10-23 PROCEDURE — 1160F RVW MEDS BY RX/DR IN RCRD: CPT | Mod: CPTII,S$GLB,, | Performed by: INTERNAL MEDICINE

## 2023-10-23 PROCEDURE — 1159F MED LIST DOCD IN RCRD: CPT | Mod: CPTII,S$GLB,, | Performed by: STUDENT IN AN ORGANIZED HEALTH CARE EDUCATION/TRAINING PROGRAM

## 2023-10-23 PROCEDURE — 3288F FALL RISK ASSESSMENT DOCD: CPT | Mod: CPTII,S$GLB,, | Performed by: STUDENT IN AN ORGANIZED HEALTH CARE EDUCATION/TRAINING PROGRAM

## 2023-10-23 PROCEDURE — 1126F AMNT PAIN NOTED NONE PRSNT: CPT | Mod: CPTII,S$GLB,, | Performed by: STUDENT IN AN ORGANIZED HEALTH CARE EDUCATION/TRAINING PROGRAM

## 2023-10-23 PROCEDURE — 3288F PR FALLS RISK ASSESSMENT DOCUMENTED: ICD-10-PCS | Mod: CPTII,S$GLB,, | Performed by: STUDENT IN AN ORGANIZED HEALTH CARE EDUCATION/TRAINING PROGRAM

## 2023-10-23 PROCEDURE — 1111F PR DISCHARGE MEDS RECONCILED W/ CURRENT OUTPATIENT MED LIST: ICD-10-PCS | Mod: CPTII,S$GLB,, | Performed by: INTERNAL MEDICINE

## 2023-10-23 PROCEDURE — 1126F PR PAIN SEVERITY QUANTIFIED, NO PAIN PRESENT: ICD-10-PCS | Mod: CPTII,S$GLB,, | Performed by: STUDENT IN AN ORGANIZED HEALTH CARE EDUCATION/TRAINING PROGRAM

## 2023-10-23 PROCEDURE — 1101F PT FALLS ASSESS-DOCD LE1/YR: CPT | Mod: CPTII,S$GLB,, | Performed by: STUDENT IN AN ORGANIZED HEALTH CARE EDUCATION/TRAINING PROGRAM

## 2023-10-23 PROCEDURE — 3078F DIAST BP <80 MM HG: CPT | Mod: CPTII,S$GLB,, | Performed by: INTERNAL MEDICINE

## 2023-10-23 PROCEDURE — 99214 OFFICE O/P EST MOD 30 MIN: CPT | Mod: S$GLB,,, | Performed by: STUDENT IN AN ORGANIZED HEALTH CARE EDUCATION/TRAINING PROGRAM

## 2023-10-23 PROCEDURE — 1111F PR DISCHARGE MEDS RECONCILED W/ CURRENT OUTPATIENT MED LIST: ICD-10-PCS | Mod: CPTII,S$GLB,, | Performed by: STUDENT IN AN ORGANIZED HEALTH CARE EDUCATION/TRAINING PROGRAM

## 2023-10-23 PROCEDURE — 1159F PR MEDICATION LIST DOCUMENTED IN MEDICAL RECORD: ICD-10-PCS | Mod: CPTII,S$GLB,, | Performed by: STUDENT IN AN ORGANIZED HEALTH CARE EDUCATION/TRAINING PROGRAM

## 2023-10-23 PROCEDURE — 99999 PR PBB SHADOW E&M-EST. PATIENT-LVL II: CPT | Mod: PBBFAC,,, | Performed by: STUDENT IN AN ORGANIZED HEALTH CARE EDUCATION/TRAINING PROGRAM

## 2023-10-23 PROCEDURE — 1159F MED LIST DOCD IN RCRD: CPT | Mod: CPTII,S$GLB,, | Performed by: INTERNAL MEDICINE

## 2023-10-23 PROCEDURE — 3074F SYST BP LT 130 MM HG: CPT | Mod: CPTII,S$GLB,, | Performed by: INTERNAL MEDICINE

## 2023-10-23 PROCEDURE — 1160F PR REVIEW ALL MEDS BY PRESCRIBER/CLIN PHARMACIST DOCUMENTED: ICD-10-PCS | Mod: CPTII,S$GLB,, | Performed by: INTERNAL MEDICINE

## 2023-10-23 PROCEDURE — 99999 PR PBB SHADOW E&M-EST. PATIENT-LVL II: ICD-10-PCS | Mod: PBBFAC,,, | Performed by: STUDENT IN AN ORGANIZED HEALTH CARE EDUCATION/TRAINING PROGRAM

## 2023-10-23 PROCEDURE — 99214 OFFICE O/P EST MOD 30 MIN: CPT | Mod: S$GLB,,, | Performed by: INTERNAL MEDICINE

## 2023-10-23 PROCEDURE — 1101F PR PT FALLS ASSESS DOC 0-1 FALLS W/OUT INJ PAST YR: ICD-10-PCS | Mod: CPTII,S$GLB,, | Performed by: STUDENT IN AN ORGANIZED HEALTH CARE EDUCATION/TRAINING PROGRAM

## 2023-10-23 PROCEDURE — 1126F PR PAIN SEVERITY QUANTIFIED, NO PAIN PRESENT: ICD-10-PCS | Mod: CPTII,S$GLB,, | Performed by: INTERNAL MEDICINE

## 2023-10-23 PROCEDURE — 1111F DSCHRG MED/CURRENT MED MERGE: CPT | Mod: CPTII,S$GLB,, | Performed by: STUDENT IN AN ORGANIZED HEALTH CARE EDUCATION/TRAINING PROGRAM

## 2023-10-23 PROCEDURE — 3078F PR MOST RECENT DIASTOLIC BLOOD PRESSURE < 80 MM HG: ICD-10-PCS | Mod: CPTII,S$GLB,, | Performed by: INTERNAL MEDICINE

## 2023-10-23 PROCEDURE — 3074F PR MOST RECENT SYSTOLIC BLOOD PRESSURE < 130 MM HG: ICD-10-PCS | Mod: CPTII,S$GLB,, | Performed by: INTERNAL MEDICINE

## 2023-10-23 PROCEDURE — 99214 PR OFFICE/OUTPT VISIT, EST, LEVL IV, 30-39 MIN: ICD-10-PCS | Mod: S$GLB,,, | Performed by: INTERNAL MEDICINE

## 2023-10-23 PROCEDURE — 1159F PR MEDICATION LIST DOCUMENTED IN MEDICAL RECORD: ICD-10-PCS | Mod: CPTII,S$GLB,, | Performed by: INTERNAL MEDICINE

## 2023-10-23 PROCEDURE — 1111F DSCHRG MED/CURRENT MED MERGE: CPT | Mod: CPTII,S$GLB,, | Performed by: INTERNAL MEDICINE

## 2023-10-23 PROCEDURE — 1126F AMNT PAIN NOTED NONE PRSNT: CPT | Mod: CPTII,S$GLB,, | Performed by: INTERNAL MEDICINE

## 2023-10-23 PROCEDURE — 99214 PR OFFICE/OUTPT VISIT, EST, LEVL IV, 30-39 MIN: ICD-10-PCS | Mod: S$GLB,,, | Performed by: STUDENT IN AN ORGANIZED HEALTH CARE EDUCATION/TRAINING PROGRAM

## 2023-10-23 NOTE — ASSESSMENT & PLAN NOTE
 Continue present PAP therapy   Pt to call if they have any trouble with their machines   Discussed with pt about signs and symptoms to watch for   Will refill supplies as needed   Have encouraged pt to continue to work on diet, weight loss and exercise   At pt request will try and change to [place]

## 2023-10-23 NOTE — Clinical Note
The order for CPAP needs to be sent to [facility] at family's request (they wish to change DME)

## 2023-10-23 NOTE — PROGRESS NOTES
"Office Visit *    Patient Name: Jo Alegre  MRN: [id number]  : 1940      Reason for visit: COPD, hypoxemia    HPI:     2020 - Referred here for evaluation for possible COPD.  She was hospitalized in 2020 and found to need O2  (sat 87% with exertion) - has home O2 (2 LPM) and she wears all day.  She has a h/o smoking about 1/2 PPD for about 30 years quit about .  She has never been tested for COPD.  She has sleep study in the past and was diagnosed with sleep apnea but couldn't tolerate CPAP at that time.  Has h/o CHF (though most recent ECHO looks OK), AICD, atrial fibrillation.    2020 - Here for follow up, doing well with no new complaints.  Had PFT - no obstruction, TLC at lower level of normal and mild/mod reduction in DLCO.  ^ minute walk test was good she met her predicted distance and had no desaturation noted.  She remains at risk for sleep apnea - d/w her and family - will do overnight oximetry to check on nightly O2 needs and as a screen for possible JENNIFER ( necessary will do HST).  No corona virus exposures and has been practicing social distancing.  Discussed the need to work on weight loss and regular exercise.    2/3/2021 - Here for follow up, overall about the same.  Has CPAP at home and is doing "OK" with it but having some tolerance issues.  She is trying to be more active.  She feels that her SOB is getting worse and does have episodes of SOB happening at rest which last 3-4 minutes and spontaneously resolve .  She does have exertional dyspnea but is not exercising at all.  Patient has no known corona virus exposures and has been practicing social distancing.  We have discussed the virus and precautions and all questions have been answered.    2021 - Here for follow up, has had adjustments with her CPAP and she is doing much better (8-10 hours per night), feels better overall and reports good oxygen levels during the day and energy levels.  No other new issues.  " Patient has no known corona virus exposures and has been practicing social distancing.  We have discussed the virus and precautions and all questions have been answered.    10/5/2021 - Here for follow up and hasd been doing well.  Patient is here for follow up visit for sleep apnea and therapy.  They report no issues with their machine.  They report good compliance with the therapy and feel that they are benefiting from it.  Discussed alternative therapies/options as appropriate.  Discussed diet, weight and exercise as appropriate.  All questions answered.  Has not been able to wear her CPAP for the last week or so due to recent dental work (we discussed this).  Patient has no known corona virus exposures and has been practicing social distancing.  We have discussed the virus and precautions and all questions have been answered.  She does NOT have COPD by PFT done 7/2020.    8/18/2022 - Here for preop clearance for renal surgery for staghorn calculus.  Breathing is about the same though she may have some more dyspnea since her recent admission.  No fever, chills, cough.  She does NOT have COPD.  She is doing well with her CPAP.  She is planned for surgery next week.    Preoperative Pulmonary Clearance    Pt was seen for preoperative clearance from a pulmonary standpoint for planned renal surgery.  The risks of the procedure have been discussed with the patient including the risk of prolonged ventilatory support/difficulty weaning from ventilator, post-procedure pneumonia, post-procedure respiratory failure and DVT/pulmonary embolism.  The pt is currently stable from their respiratory status and they are cleared for the planned procedure at increased risk.  The risk should not be considered prohibitive.  If you have any questions please contact me.  She should have CPAP available in postoperative period and be moonitored because of her h/o JENNIFER>    8/17/2023 - Here for follow up, had her surgery and did well.   Breathing has been OK.  Was hospitalized in May for heart issues.  She is having some issues with her O2 concentrator and she will contact [facility] ( we will get involved if needed).     10/23/2023 - Here for follow up, she is not happy with [facility] and would like to look into changing to [facility] (we will look into that).  Breathing has been about the same.  She has not been using her CPAP because of issues with her mask and they are going to see about that.  Ad CT chest (see below) and she is to see urology today and we will follow small nodules.    CPAP Therapy    CPAP therapy - 12    Date of sleep study - 9/20 RDI - 23    Pt reports good compliance and benefit with the therapy.    Face to face visit with pt concerning their CPAP therapy.  I have stressed continued compliance with the treatment and all questions were answered.  Supply refills will be taken care of as needed.      Flu and Pneumonia Vaccination    I have recommended that the patient get this years influenza vaccine.  We discussed the risks and benefits of this treatment.  Got 20/21    I reviewed patient's current pneumonia vaccine status.  Patient is current.  We have discussed the current guidelines and recommendations for pneumonia vaccination.    Got Covid vaccine (Moderna - 4/21)          Past Medical History    Past Medical History:   Diagnosis Date    Allergy     Codeine, Lasix    Atrial fibrillation     Atrial fibrillation     Cataract     CHF (congestive heart failure)     Diabetes mellitus, type 2     Ejection fraction < 50% 10/18/2017    Approximately 35%  Based on prior  Echocardiogram.    Encounter for blood transfusion     Hyperlipidemia     Osteoporosis     PONV (postoperative nausea and vomiting)     Thyroid disorder screening 10/17/2017    TSH of 1.12 ordered by Dr. dee Jones       Past Surgical History    Past Surgical History:   Procedure Laterality Date    ANTEGRADE NEPHROSTOGRAPHY Left 8/25/2022    Procedure: NEPHROSTOGRAM,  ANTEGRADE;  Surgeon: Shelby Parra MD;  Location: [facility];  Service: Urology;  Laterality: Left;    CHOLECYSTECTOMY  1997    [place]     COLONOSCOPY      CYSTOSCOPY W/ URETERAL STENT PLACEMENT Left 7/17/2022    Procedure: CYSTOSCOPY, WITH URETERAL STENT INSERTION;  Surgeon: Shelby Parra MD;  Location[facility] OR;  Service: Urology;  Laterality: Left;    CYSTOSCOPY W/ URETERAL STENT REMOVAL Left 9/21/2022    Procedure: CYSTOSCOPY, WITH URETERAL STENT REMOVAL;  Surgeon: Shelby Parra MD;  Location: [facility] OR;  Service: Urology;  Laterality: Left;    CYSTOSCOPY WITH URETEROSCOPY, RETROGRADE PYELOGRAPHY, AND INSERTION OF STENT Left 8/25/2022    Procedure: CYSTOSCOPY, WITH RETROGRADE PYELOGRAM AND URETERAL STENT INSERTION;  Surgeon: Shelby Parra MD;  Location: [facility];  Service: Urology;  Laterality: Left;    EYE SURGERY      bilateral cataracts    FINGER SURGERY Right 2021    right pinky finger    FLEXIBLE CYSTOSCOPY Left 8/25/2022    Procedure: CYSTOSCOPY, FLEXIBLE WITH STENT REMOVAL;  Surgeon: Shelby Parra MD;  Location: [facility];  Service: Urology;  Laterality: Left;    FRACTURE SURGERY  2014    right femur with neville    HEMORRHOID SURGERY      48 yrs ago    HYSTERECTOMY      NEPHROSTOMY Left 8/25/2022    Procedure: CREATION, NEPHROSTOMY;  Surgeon: Shelby Parra MD;  Location: [facility];  Service: Urology;  Laterality: Left;    PERCUTANEOUS NEPHROLITHOTOMY N/A 8/25/2022    Procedure: NEPHROLITHOTOMY, PERCUTANEOUS;  Surgeon: Shelby Parra MD;  Location: [facility];  Service: Urology;  Laterality: N/A;    REPLACEMENT OF IMPLANTABLE CARDIOVERTER-DEFIBRILLATOR (ICD) GENERATOR N/A 12/13/2019    Procedure: REPLACEMENT, PULSE GENERATOR, ICD-MEDTRONIC;  Surgeon: Sebastian Nowak III, MD;  Location: [facility] CATH;  Service: Cardiology;  Laterality: N/A;    TONSILLECTOMY         Medications      Current Outpatient Medications:     amiodarone (PACERONE) 200 MG Tab, Take 1 tablet (200 mg total) by  mouth once daily., Disp: 30 tablet, Rfl: 0    atorvastatin (LIPITOR) 20 MG tablet, Take 20 mg by mouth every evening., Disp: , Rfl:     bumetanide (BUMEX) 1 MG tablet, Take 1 tablet (1 mg total) by mouth once daily., Disp: 30 tablet, Rfl: 0    Ca-D3-mag ox-zinc--thom-bor 600 mg calcium- 20 mcg-50 mg Tab, Take 1 tablet by mouth 2 (two) times daily., Disp: , Rfl:     cholecalciferol, vitamin D3, 125 mcg (5,000 unit) Tab, Take 5,000 Units by mouth 2 (two) times daily., Disp: , Rfl:     digoxin (LANOXIN) 125 mcg tablet, Take 1 tablet (0.125 mg total) by mouth once daily., Disp: 30 tablet, Rfl: 0    dorzolamide-timolol 2-0.5% (COSOPT) 22.3-6.8 mg/mL ophthalmic solution, Place 1 drop into both eyes 2 (two) times daily., Disp: , Rfl:     gabapentin (NEURONTIN) 100 MG capsule, TAKE 2 CAPSULES(200 MG) BY MOUTH THREE TIMES DAILY (Patient taking differently: Take 200 mg by mouth 3 (three) times daily. TAKE 2 CAPSULES(200 MG) BY MOUTH THREE TIMES DAILY), Disp: 180 capsule, Rfl: 5    glimepiride (AMARYL) 1 MG tablet, Take 1 tablet (1 mg total) by mouth before breakfast., Disp: 90 tablet, Rfl: 1    latanoprost 0.005 % ophthalmic solution, Place 1 drop into both eyes nightly., Disp: , Rfl:     metoprolol succinate (TOPROL-XL) 25 MG 24 hr tablet, Take 1 tablet (25 mg total) by mouth once daily., Disp: 90 tablet, Rfl: 3    midodrine (PROAMATINE) 5 MG Tab, Take 1 tablet (5 mg total) by mouth 3 (three) times daily before meals., Disp: 90 tablet, Rfl: 0    pantoprazole (PROTONIX) 40 MG tablet, Take 1 tablet (40 mg total) by mouth once daily., Disp: 30 tablet, Rfl: 0    rivaroxaban (XARELTO) 15 mg Tab, Take 1 tablet (15 mg total) by mouth daily with dinner or evening meal. (Patient taking differently: Take 15 mg by mouth once daily.), Disp: 90 tablet, Rfl: 0    sacubitriL-valsartan (ENTRESTO) 24-26 mg per tablet, Take 1 tablet by mouth 2 (two) times daily. 1/2 tab PO BID, Disp: 30 tablet, Rfl: 0    vericiguat (VERQUVO) 5 mg Tab, Take  2.5 mg by mouth 2 (two) times a day., Disp: 30 tablet, Rfl: 0    albuterol (PROVENTIL/VENTOLIN HFA) 90 mcg/actuation inhaler, INHALE 2 PUFFS BY MOUTH EVERY 6 HOURS AS NEEDED FOR WHEEZING (Patient not taking: Reported on 10/23/2023), Disp: 18 g, Rfl: 5    fludrocortisone (FLORINEF) 0.1 mg Tab, Take by mouth., Disp: , Rfl:     fluticasone propionate (FLONASE) 50 mcg/actuation nasal spray, 2 sprays (100 mcg total) by Each Nostril route once daily. (Patient not taking: Reported on 10/23/2023), Disp: 16 g, Rfl: 5    propranoloL (INDERAL) 10 MG tablet, Take 10 mg by mouth 2 (two) times daily., Disp: , Rfl:   No current facility-administered medications for this visit.    Facility-Administered Medications Ordered in Other Visits:     0.9%  NaCl infusion, , Intravenous, Continuous, Sebastian Nowak III, MD, Last Rate: 75 mL/hr at 12/13/19 0728, New Bag at 12/13/19 0728    diphenhydrAMINE injection 25 mg, 25 mg, Intravenous, Once, Sebastian Nowak III, MD    lorazepam injection 1 mg, 1 mg, Intravenous, Once, Sebastian Nowak III, MD    Allergies    Review of patient's allergies indicates:   Allergen Reactions    Codeine Other (See Comments)     nausea    Hydrocodone Nausea And Vomiting    Lasix [furosemide]      rash       SocHx    Social History     Tobacco Use   Smoking Status Former    Passive exposure: Past   Smokeless Tobacco Never   Tobacco Comments    quit 2013       Social History     Substance and Sexual Activity   Alcohol Use No       Drug Use - no  Occupation - retired, housewife  Asbestos exposure - no  Pets - no    FMHx    Family History   Problem Relation Age of Onset    Heart disease Mother     Cancer Father         Lung Cancer ??? Asbestos    Breast cancer Paternal Aunt          Review of Systems  Review of Systems   Constitutional:  Negative for chills, diaphoresis, fever, malaise/fatigue and weight loss.   HENT:  Negative for congestion.    Eyes:  Negative for pain.   Respiratory:  Positive for shortness of  breath. Negative for cough, hemoptysis, sputum production, wheezing and stridor.    Cardiovascular:  Positive for leg swelling. Negative for chest pain, palpitations, orthopnea, claudication and PND.        Decreased circulation   Gastrointestinal:  Negative for abdominal pain, blood in stool, constipation, diarrhea, heartburn, nausea and vomiting.   Genitourinary:  Negative for dysuria, frequency, hematuria and urgency.   Musculoskeletal:  Negative for back pain, falls, myalgias and neck pain.        Some aches and pains   Skin:  Negative for itching and rash.   Neurological:  Negative for dizziness, tingling, tremors, sensory change, speech change, focal weakness, seizures, loss of consciousness, weakness and headaches.   Psychiatric/Behavioral:  Negative for depression, substance abuse and suicidal ideas. The patient is not nervous/anxious.        Physical Exam    Vitals:    10/23/23 1014   BP: (!) 100/58   BP Location: Right arm   Patient Position: Sitting   BP Method: Medium (Manual)   Pulse: 60   SpO2: 95%   Weight: 91.6 kg (201 lb 14.4 oz)       Physical Exam  Vitals and nursing note reviewed.   Constitutional:       General: She is not in acute distress.     Appearance: She is well-developed. She is obese. She is not ill-appearing, toxic-appearing or diaphoretic.      Comments: No distress   HENT:      Head: Normocephalic and atraumatic.      Right Ear: External ear normal.      Left Ear: External ear normal.      Nose: Nose normal.   Eyes:      General: No scleral icterus.        Right eye: No discharge.         Left eye: No discharge.      Extraocular Movements: Extraocular movements intact.      Conjunctiva/sclera: Conjunctivae normal.      Pupils: Pupils are equal, round, and reactive to light.   Neck:      Thyroid: No thyromegaly.      Vascular: No JVD.      Trachea: No tracheal deviation.   Cardiovascular:      Rate and Rhythm: Normal rate and regular rhythm.      Pulses: Normal pulses.      Heart  sounds: Normal heart sounds. No murmur heard.     No friction rub. No gallop.      Comments: Defibrillator in place  Pulmonary:      Effort: Pulmonary effort is normal. No respiratory distress.      Breath sounds: Normal breath sounds. No stridor. No wheezing or rales.   Chest:      Chest wall: No tenderness.   Abdominal:      General: Bowel sounds are normal. There is no distension.      Palpations: Abdomen is soft.      Tenderness: There is no abdominal tenderness. There is no guarding.      Comments: obese   Musculoskeletal:         General: Swelling present. No tenderness or deformity. Normal range of motion.      Cervical back: Normal range of motion and neck supple. No rigidity. No muscular tenderness.      Right lower leg: Edema present.      Left lower leg: Edema present.   Lymphadenopathy:      Cervical: No cervical adenopathy.   Skin:     General: Skin is warm and dry.      Coloration: Skin is not jaundiced.   Neurological:      General: No focal deficit present.      Mental Status: She is alert and oriented to person, place, and time.      Cranial Nerves: No cranial nerve deficit.   Psychiatric:         Mood and Affect: Mood normal.         Behavior: Behavior normal.         Thought Content: Thought content normal.         Judgment: Judgment normal.         Labs    Lab Results   Component Value Date    WBC 6.09 10/12/2023    HGB 8.5 (L) 10/12/2023    HCT 28.1 (L) 10/12/2023     10/12/2023       Sodium   Date Value Ref Range Status   10/12/2023 138 136 - 145 mmol/L Final     Potassium   Date Value Ref Range Status   10/12/2023 4.3 3.5 - 5.1 mmol/L Final     Chloride   Date Value Ref Range Status   10/12/2023 94 (L) 95 - 110 mmol/L Final     CO2   Date Value Ref Range Status   10/12/2023 34 (H) 23 - 29 mmol/L Final     Glucose   Date Value Ref Range Status   10/12/2023 103 70 - 110 mg/dL Final     BUN   Date Value Ref Range Status   10/12/2023 23 8 - 23 mg/dL Final     Creatinine   Date Value Ref  Range Status   10/12/2023 1.6 (H) 0.5 - 1.4 mg/dL Final     Calcium   Date Value Ref Range Status   10/12/2023 9.2 8.7 - 10.5 mg/dL Final     Total Protein   Date Value Ref Range Status   10/12/2023 5.8 (L) 6.0 - 8.4 g/dL Final     Albumin   Date Value Ref Range Status   10/12/2023 2.1 (L) 3.5 - 5.2 g/dL Final     Total Bilirubin   Date Value Ref Range Status   10/12/2023 0.4 0.1 - 1.0 mg/dL Final     Comment:     For infants and newborns, interpretation of results should be based  on gestational age, weight and in agreement with clinical  observations.    Premature Infant recommended reference ranges:  Up to 24 hours.............<8.0 mg/dL  Up to 48 hours............<12.0 mg/dL  3-5 days..................<15.0 mg/dL  6-29 days.................<15.0 mg/dL       Alkaline Phosphatase   Date Value Ref Range Status   10/12/2023 61 55 - 135 U/L Final     AST   Date Value Ref Range Status   10/12/2023 11 10 - 40 U/L Final     ALT   Date Value Ref Range Status   10/12/2023 10 10 - 44 U/L Final     Anion Gap   Date Value Ref Range Status   10/12/2023 10 8 - 16 mmol/L Final       Xrays    EXAMINATION:  XR CHEST PA AND LATERAL     CLINICAL HISTORY:  shortness of breath     FINDINGS:  PA and lateral chest is compared to 01/17/2020 shows normal cardiomediastinal silhouette.     Lungs are clear. Pulmonary vasculature is normal. No acute osseous abnormality.     Impression:     No acute pulmonary process        Electronically signed by: Monica Aquino MD  Date:                                            04/01/2020  Time:                                           16:57    CT chest (10/16/23)  Complex left upper quadrant mass or collection, likely rising from the left kidney.  This may represent a complex cystic neoplasm, or abscess.  The finding extends through the left hemidiaphragm into the left pleural space, where a small left pleural effusion is present.  The appearance is concerning for fistulization with the left pleural  space.  Mild left basilar infiltrate may represent pneumonia.  Multiple renal cysts including hemorrhagic cysts as described above, without detrimental change.  Left nephrolithiasis.  No hydronephrosis.  Evidence for prior left nephrostomy.  Emphysema.  Few small pulmonary nodules.  For a part solid nodule 6 mm or greater, Fleischner Society 2017 guidelines recommend follow up with non-contrast chest CT at 3-6 months to confirm persistence. If this nodule is unchanged and the solid component remains <6 mm, annual follow up CT should be performed for 5 years; however, persistence of a part-solid nodule with solid component ?6 mm should be considered highly suspicious and may warrant further workup with PET/CT, biopsy, or resection.  Osseous demineralization.  Multiple moderate thoracolumbar compression fractures.  Left adrenal adenoma, unchanged.  This report was flagged in Epic as abnormal.      ECHO (4/1)  Mild eccentric left ventricular hypertrophy.  Mild left ventricular enlargement.  Normal left ventricular systolic function. The estimated ejection fraction is 60%.  Grade I (mild) left ventricular diastolic dysfunction consistent with impaired relaxation.  No wall motion abnormalities.  Normal right ventricular systolic function.  Severe left atrial enlargement.  Mild mitral sclerosis.  There is mild leaflet calcification of the Mitral Valve.  Mild mitral regurgitation.  Intermediate central venous pressure (8 mmHg).  The estimated PA systolic pressure is 37 mmHg    Impression/Plan    Problem List Items Addressed This Visit          Pulmonary    Abnormal CT lung screening     To see urology later today  Will follow small nodules            Other    Obstructive sleep apnea syndrome     Continue present PAP therapy  Pt to call if they have any trouble with their machines  Discussed with pt about signs and symptoms to watch for  Will refill supplies as needed  Have encouraged pt to continue to work on diet, weight  loss and exercise  At pt request will try and change to Raymond Pascal MD

## 2023-10-24 ENCOUNTER — TELEPHONE (OUTPATIENT)
Dept: FAMILY MEDICINE | Facility: CLINIC | Age: 83
End: 2023-10-24

## 2023-10-24 ENCOUNTER — TELEPHONE (OUTPATIENT)
Dept: UROLOGY | Facility: CLINIC | Age: 83
End: 2023-10-24
Payer: MEDICARE

## 2023-10-24 DIAGNOSIS — N20.0 STAGHORN CALCULUS: Primary | ICD-10-CM

## 2023-10-24 NOTE — TELEPHONE ENCOUNTER
Please let patient know I spoke with IR. Lets hold off on the MRI for right now and get an ultrasound of the area. Pending this we may get MRI or go straight to drainage. Order placed.

## 2023-10-24 NOTE — TELEPHONE ENCOUNTER
Marta nurse care manager with [facility] called with some recompilations from the pharmacist.  Pt is on Bumex and amiodarone with could cause some heart issues.  Pt was put on these meds in the hospital. Pt is complaining of some light headiness and dizziness.  Pt also has appt. With you on Wed. Nov. 1st for a hosp. F/u

## 2023-10-25 ENCOUNTER — TELEPHONE (OUTPATIENT)
Dept: FAMILY MEDICINE | Facility: CLINIC | Age: 83
End: 2023-10-25

## 2023-10-25 NOTE — TELEPHONE ENCOUNTER
Pt notified to stop bumex and to check with cardiology about interactions  between her 2 medications.  She will let us know if her blood pressures continue to run low.

## 2023-10-25 NOTE — TELEPHONE ENCOUNTER
Pt's daughter Mary called states her mom received a phone call from us but was a little confused.  She would like a phone call.    Attempted to reach pt's daughter Mary, no ans LVM to call office.

## 2023-10-30 ENCOUNTER — HOSPITAL ENCOUNTER (OUTPATIENT)
Dept: RADIOLOGY | Facility: HOSPITAL | Age: 83
Discharge: HOME | End: 2023-10-30
Attending: STUDENT IN AN ORGANIZED HEALTH CARE EDUCATION/TRAINING PROGRAM
Payer: MEDICARE

## 2023-10-30 DIAGNOSIS — N20.0 STAGHORN CALCULUS: ICD-10-CM

## 2023-10-30 PROCEDURE — 76770 US EXAM ABDO BACK WALL COMP: CPT | Mod: TC

## 2023-10-30 PROCEDURE — 76770 US RETROPERITONEAL COMPLETE: ICD-10-PCS | Mod: 26,,, | Performed by: RADIOLOGY

## 2023-10-30 PROCEDURE — 76770 US EXAM ABDO BACK WALL COMP: CPT | Mod: 26,,, | Performed by: RADIOLOGY

## 2023-10-31 ENCOUNTER — TELEPHONE (OUTPATIENT)
Dept: INTERVENTIONAL RADIOLOGY/VASCULAR | Facility: CLINIC | Age: 83
End: 2023-10-31
Payer: MEDICARE

## 2023-10-31 DIAGNOSIS — N18.30 STAGE 3 CHRONIC KIDNEY DISEASE, UNSPECIFIED WHETHER STAGE 3A OR 3B CKD: Primary | ICD-10-CM

## 2023-10-31 DIAGNOSIS — J90 RECURRENT LEFT PLEURAL EFFUSION: Primary | ICD-10-CM

## 2023-10-31 DIAGNOSIS — N20.0 STAGHORN CALCULUS: ICD-10-CM

## 2023-10-31 NOTE — TELEPHONE ENCOUNTER
Spoke to pt on phone,  Pt is scheduled on 11/2/2023 with arrival time of 10am 2nd floor labs for IR procedure at  location.  Preop instructions given (NPO after midnight, MUST have a ride home, Nurse will call 1-2  days before to go over instructions and medications), instructed pt to stay off Xarelto for 2 days, pt verbally understood. Pt aware and confirmed, Thanks

## 2023-11-01 ENCOUNTER — OFFICE VISIT (OUTPATIENT)
Dept: FAMILY MEDICINE | Facility: CLINIC | Age: 83
End: 2023-11-01
Payer: MEDICARE

## 2023-11-01 ENCOUNTER — TELEPHONE (OUTPATIENT)
Dept: INTERVENTIONAL RADIOLOGY/VASCULAR | Facility: HOSPITAL | Age: 83
End: 2023-11-01
Payer: MEDICARE

## 2023-11-01 ENCOUNTER — PATIENT MESSAGE (OUTPATIENT)
Dept: FAMILY MEDICINE | Facility: CLINIC | Age: 83
End: 2023-11-01

## 2023-11-01 VITALS
OXYGEN SATURATION: 90 % | SYSTOLIC BLOOD PRESSURE: 126 MMHG | BODY MASS INDEX: 32.16 KG/M2 | HEART RATE: 59 BPM | WEIGHT: 204.88 LBS | HEIGHT: 67 IN | DIASTOLIC BLOOD PRESSURE: 93 MMHG

## 2023-11-01 DIAGNOSIS — Z79.01 CHRONIC ANTICOAGULATION: ICD-10-CM

## 2023-11-01 DIAGNOSIS — R68.89 DECREASED STRENGTH, ENDURANCE, AND MOBILITY: ICD-10-CM

## 2023-11-01 DIAGNOSIS — N18.32 STAGE 3B CHRONIC KIDNEY DISEASE: ICD-10-CM

## 2023-11-01 DIAGNOSIS — Z23 NEED FOR INFLUENZA VACCINATION: ICD-10-CM

## 2023-11-01 DIAGNOSIS — J90 PLEURAL EFFUSION ON LEFT: ICD-10-CM

## 2023-11-01 DIAGNOSIS — R18.8 OTHER ASCITES: Primary | ICD-10-CM

## 2023-11-01 DIAGNOSIS — I48.0 PAROXYSMAL ATRIAL FIBRILLATION: ICD-10-CM

## 2023-11-01 DIAGNOSIS — Z74.09 DECREASED STRENGTH, ENDURANCE, AND MOBILITY: ICD-10-CM

## 2023-11-01 DIAGNOSIS — R53.1 DECREASED STRENGTH, ENDURANCE, AND MOBILITY: ICD-10-CM

## 2023-11-01 DIAGNOSIS — I50.42 CHRONIC COMBINED SYSTOLIC AND DIASTOLIC CONGESTIVE HEART FAILURE: Chronic | ICD-10-CM

## 2023-11-01 DIAGNOSIS — I10 ESSENTIAL HYPERTENSION: ICD-10-CM

## 2023-11-01 PROCEDURE — 1101F PR PT FALLS ASSESS DOC 0-1 FALLS W/OUT INJ PAST YR: ICD-10-PCS | Mod: CPTII,S$GLB,, | Performed by: INTERNAL MEDICINE

## 2023-11-01 PROCEDURE — 90662 IIV NO PRSV INCREASED AG IM: CPT | Mod: S$GLB,,, | Performed by: INTERNAL MEDICINE

## 2023-11-01 PROCEDURE — G0008 ADMIN INFLUENZA VIRUS VAC: HCPCS | Mod: S$GLB,,, | Performed by: INTERNAL MEDICINE

## 2023-11-01 PROCEDURE — 3080F DIAST BP >= 90 MM HG: CPT | Mod: CPTII,S$GLB,, | Performed by: INTERNAL MEDICINE

## 2023-11-01 PROCEDURE — 3080F PR MOST RECENT DIASTOLIC BLOOD PRESSURE >= 90 MM HG: ICD-10-PCS | Mod: CPTII,S$GLB,, | Performed by: INTERNAL MEDICINE

## 2023-11-01 PROCEDURE — 1111F DSCHRG MED/CURRENT MED MERGE: CPT | Mod: CPTII,S$GLB,, | Performed by: INTERNAL MEDICINE

## 2023-11-01 PROCEDURE — 99214 PR OFFICE/OUTPT VISIT, EST, LEVL IV, 30-39 MIN: ICD-10-PCS | Mod: S$GLB,,, | Performed by: INTERNAL MEDICINE

## 2023-11-01 PROCEDURE — 1126F PR PAIN SEVERITY QUANTIFIED, NO PAIN PRESENT: ICD-10-PCS | Mod: CPTII,S$GLB,, | Performed by: INTERNAL MEDICINE

## 2023-11-01 PROCEDURE — 1111F PR DISCHARGE MEDS RECONCILED W/ CURRENT OUTPATIENT MED LIST: ICD-10-PCS | Mod: CPTII,S$GLB,, | Performed by: INTERNAL MEDICINE

## 2023-11-01 PROCEDURE — 99214 OFFICE O/P EST MOD 30 MIN: CPT | Mod: S$GLB,,, | Performed by: INTERNAL MEDICINE

## 2023-11-01 PROCEDURE — 1159F MED LIST DOCD IN RCRD: CPT | Mod: CPTII,S$GLB,, | Performed by: INTERNAL MEDICINE

## 2023-11-01 PROCEDURE — G0008 FLU VACCINE - QUADRIVALENT - HIGH DOSE (65+) PRESERVATIVE FREE IM: ICD-10-PCS | Mod: S$GLB,,, | Performed by: INTERNAL MEDICINE

## 2023-11-01 PROCEDURE — 1160F RVW MEDS BY RX/DR IN RCRD: CPT | Mod: CPTII,S$GLB,, | Performed by: INTERNAL MEDICINE

## 2023-11-01 PROCEDURE — 1126F AMNT PAIN NOTED NONE PRSNT: CPT | Mod: CPTII,S$GLB,, | Performed by: INTERNAL MEDICINE

## 2023-11-01 PROCEDURE — 3074F PR MOST RECENT SYSTOLIC BLOOD PRESSURE < 130 MM HG: ICD-10-PCS | Mod: CPTII,S$GLB,, | Performed by: INTERNAL MEDICINE

## 2023-11-01 PROCEDURE — 1101F PT FALLS ASSESS-DOCD LE1/YR: CPT | Mod: CPTII,S$GLB,, | Performed by: INTERNAL MEDICINE

## 2023-11-01 PROCEDURE — 3074F SYST BP LT 130 MM HG: CPT | Mod: CPTII,S$GLB,, | Performed by: INTERNAL MEDICINE

## 2023-11-01 PROCEDURE — 1160F PR REVIEW ALL MEDS BY PRESCRIBER/CLIN PHARMACIST DOCUMENTED: ICD-10-PCS | Mod: CPTII,S$GLB,, | Performed by: INTERNAL MEDICINE

## 2023-11-01 PROCEDURE — 90662 FLU VACCINE - QUADRIVALENT - HIGH DOSE (65+) PRESERVATIVE FREE IM: ICD-10-PCS | Mod: S$GLB,,, | Performed by: INTERNAL MEDICINE

## 2023-11-01 PROCEDURE — 3288F PR FALLS RISK ASSESSMENT DOCUMENTED: ICD-10-PCS | Mod: CPTII,S$GLB,, | Performed by: INTERNAL MEDICINE

## 2023-11-01 PROCEDURE — 1159F PR MEDICATION LIST DOCUMENTED IN MEDICAL RECORD: ICD-10-PCS | Mod: CPTII,S$GLB,, | Performed by: INTERNAL MEDICINE

## 2023-11-01 PROCEDURE — 3288F FALL RISK ASSESSMENT DOCD: CPT | Mod: CPTII,S$GLB,, | Performed by: INTERNAL MEDICINE

## 2023-11-01 NOTE — NURSING
Pre-procedure call complete.  Spoke to patients daughter Mary.  Instructed to have pt not to eat or drink anything after midnight the night before procedure.  Aware will need someone to provide transport home and monitor pt 8 hours post procedure.  No driving for at least 24 hours after procedure.   Medications reviewed.  Arrival time and location given.  Expected length of stay reviewed.  Covid screening completed.  MsBruno Mary verbalized understanding.

## 2023-11-01 NOTE — PROGRESS NOTES
Subjective:       Patient ID: Jo Alegre is a 83 y.o. female.    Chief Complaint: Follow-up, Congestive Heart Failure, Atrial Fibrillation, Hypertension, Diabetes, Chronic Kidney Disease, and Retro renal collection of fluid it abdomen    Miss Jo Palm is 83-year-old somewhat chronically ill  female who comes for follow-up ////after recent hospital discharge on 05/15 [id number]. .  She is accompanied with her daughter and DIL Ms Lamb.     She was recently admitted to the hospital with complains of shortness of breath and finding of pleural effusion which was drained.  Ultimately the results of pleural effusion were negative for malignancy.    Summary of hospital stay has been cut and pasted here for brief quick reference.    HPI:   This is an 82 y/o woman with DM, COPD CKD3, afib chronic combined systolic and diastolic heart failure who presents for continued treatment of deconditioning in setting of acute on chronic combined systolic and diastolic heart failure and recurrent left pleural effusion. Patient presented to an outlying facility with SOB. Patient was found to be in afib and to have large left pleural effusion. Patient underwent thoracentesis with temporary improvement. Patient found to have re accumulation of fluid. Patient treated with diuretics. Patient with noted deconditioning. PT and OT recommended SNF placement. Patient eventually transitioned to TCU for continue PT and OT, close physician monitoring close volume monitoring as well as management of chronic issues including COPD DM CKD3 DM.         * No surgery found *       Hospital Course:   Patient admitted for continued treatment of acute on chronic combined systolic and diastolic heart failure. Diuretics continued. Volume status closely monitored. PT and OT consulted. Strength, endurance and mobility closely monitored. Patient did have slight increase in weight and lower extremity swelling, additional dose of bumex administered  10/3. Patient volume status improved but patient did have some hypotension. Blood pressure improved. Patient able to tolerate the addition of entresto and vericiguat. Patient eventually discharged home with close follow up. Hold parameters were given for entresto and vericiguat at discharge and discussed with patient.         Goals of Care Treatment Preferences:  Code Status: Partial Code     What is most important right now is to focus on spending time at home, remaining as independent as possible, symptom/pain control, improvement in condition but with limits to invasive therapies.  Accordingly, we have decided that the best plan to meet the patient's goals includes continuing with treatment.     Medical issues are as below:-    1. Chronic combined systolic and diastolic congestive heart failure   2. Paroxysmal atrial fibrillation   3. Essential hypertension   4. Chronic anticoagulation -currently on Xarelto  5. Type 2 diabetes mellitus with diabetic nephropathy, without long-term current use of insulin   6. Midline low back pain without sciatica, unspecified chronicity   7. Neurogenic bladder   8.         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    ///////    Final Active Diagnoses:    Diagnosis Date Noted POA  · PRINCIPAL PROBLEM:  Acute on chronic combined systolic and diastolic heart failure [I50.43] 10/27/2022 Yes  · Recurrent left pleural effusion [J90] 09/19/2023 Yes  · Pulmonary hypertension [I27.20] 07/27/2020 Yes  · Obstructive sleep apnea syndrome [G47.33] 06/24/2020 Yes  · COPD (chronic obstructive pulmonary disease) per patient [J44.9] 04/02/2020 Yes  · Paroxysmal atrial fibrillation [I48.0] 05/06/2019 Yes  · Stage 3 chronic kidney disease [N18.30] 11/05/2018 Yes  · Essential hypertension [I10] 05/23/2018 Yes  · Mixed dyslipidemia [E78.2] 05/23/2018 Yes  · Cardiomyopathy with implantable cardioverter-defibrillator [I42.9, Z95.810] 05/23/2018 Yes  · Type 2 diabetes mellitus with  diabetic nephropathy, without long-term current use of insulin [E11.21] 05/23/2018 Yes           Hypertension  This is a chronic problem. The current episode started more than 1 year ago. The problem has been rapidly improving since onset. The problem is controlled. Associated symptoms include malaise/fatigue, peripheral edema and shortness of breath. Pertinent negatives include no chest pain, headaches or palpitations. Past treatments include calcium channel blockers, diuretics, angiotensin blockers and beta blockers (On amlodipine and diltiazem.). The current treatment provides significant improvement. Compliance problems include psychosocial issues.    Hyperlipidemia  The current episode started more than 1 year ago. The problem is controlled. Exacerbating diseases include obesity. Associated symptoms include shortness of breath. Pertinent negatives include no chest pain. The current treatment provides moderate improvement of lipids. Risk factors for coronary artery disease include post-menopausal, a sedentary lifestyle, dyslipidemia and hypertension.   Atrial Fibrillation  Presents for follow-up visit. Symptoms include hypotension, shortness of breath and weakness. Symptoms are negative for chest pain, hemodynamic instability and palpitations. The symptoms have been stable. Past medical history includes atrial fibrillation, CHF and hyperlipidemia. Medication compliance problems include psychosocial issues.   Congestive Heart Failure  Presents for follow-up visit. Associated symptoms include edema, fatigue and shortness of breath. Pertinent negatives include no abdominal pain, chest pain, chest pressure, claudication, near-syncope, palpitations or unexpected weight change (gained wt 5 lbs ?). The symptoms have been stable. Compliance with diet is 51-75%. Compliance with exercise is 26-50%. Compliance with medications is %.       Past Medical History:   Diagnosis Date    Allergy     Codeine, Lasix    Atrial  fibrillation     Atrial fibrillation     Cataract     CHF (congestive heart failure)     Diabetes mellitus, type 2     Ejection fraction < 50% 10/18/2017    Approximately 35%  Based on prior  Echocardiogram.    Encounter for blood transfusion     Hyperlipidemia     Osteoporosis     PONV (postoperative nausea and vomiting)     Thyroid disorder screening 10/17/2017    TSH of 1.12 ordered by Dr. dee Jones     Social History     Socioeconomic History    Marital status:     Number of children: 4   Occupational History    Occupation:    Tobacco Use    Smoking status: Former     Passive exposure: Past    Smokeless tobacco: Never    Tobacco comments:     quit 2013   Substance and Sexual Activity    Alcohol use: No    Drug use: No    Sexual activity: Not Currently   Social History Narrative    - Drives and lives alone.     Social Determinants of Health     Financial Resource Strain: Low Risk  (9/27/2023)    Overall Financial Resource Strain (CARDIA)     Difficulty of Paying Living Expenses: Not hard at all   Food Insecurity: No Food Insecurity (9/27/2023)    Hunger Vital Sign     Worried About Running Out of Food in the Last Year: Never true     Ran Out of Food in the Last Year: Never true   Transportation Needs: No Transportation Needs (9/27/2023)    PRAPARE - Transportation     Lack of Transportation (Medical): No     Lack of Transportation (Non-Medical): No   Physical Activity: Inactive (9/27/2023)    Exercise Vital Sign     Days of Exercise per Week: 0 days     Minutes of Exercise per Session: 0 min   Stress: No Stress Concern Present (9/27/2023)    [employer] [place] [employer] - Occupational Stress Questionnaire     Feeling of Stress : Only a little   Social Connections: Socially Isolated (9/27/2023)    Social Connection and Isolation Panel [NHANES]     Frequency of Communication with Friends and Family: More than three times a week     Frequency of Social Gatherings with  Friends and Family: Once a week     Attends Nondenominational Services: Never     Active Member of Clubs or Organizations: No     Attends Club or Organization Meetings: Never     Marital Status:    Housing Stability: Low Risk  (9/27/2023)    Housing Stability Vital Sign     Unable to Pay for Housing in the Last Year: No     Number of Places Lived in the Last Year: 1     Unstable Housing in the Last Year: No     Past Surgical History:   Procedure Laterality Date    ANTEGRADE NEPHROSTOGRAPHY Left 8/25/2022    Procedure: NEPHROSTOGRAM, ANTEGRADE;  Surgeon: Shelby Parra MD;  Location: [facility];  Service: Urology;  Laterality: Left;    CHOLECYSTECTOMY  1997    [place]     COLONOSCOPY      CYSTOSCOPY W/ URETERAL STENT PLACEMENT Left 7/17/2022    Procedure: CYSTOSCOPY, WITH URETERAL STENT INSERTION;  Surgeon: Shelby Parra MD;  Location: [facility];  Service: Urology;  Laterality: Left;    CYSTOSCOPY W/ URETERAL STENT REMOVAL Left 9/21/2022    Procedure: CYSTOSCOPY, WITH URETERAL STENT REMOVAL;  Surgeon: Shelby Parra MD;  Location: [facility] OR;  Service: Urology;  Laterality: Left;    CYSTOSCOPY WITH URETEROSCOPY, RETROGRADE PYELOGRAPHY, AND INSERTION OF STENT Left 8/25/2022    Procedure: CYSTOSCOPY, WITH RETROGRADE PYELOGRAM AND URETERAL STENT INSERTION;  Surgeon: Shelby Parra MD;  Location: [facility];  Service: Urology;  Laterality: Left;    EYE SURGERY      bilateral cataracts    FINGER SURGERY Right 2021    right pinky finger    FLEXIBLE CYSTOSCOPY Left 8/25/2022    Procedure: CYSTOSCOPY, FLEXIBLE WITH STENT REMOVAL;  Surgeon: Shelby Parra MD;  Location: [facility];  Service: Urology;  Laterality: Left;    FRACTURE SURGERY  2014    right femur with neville    HEMORRHOID SURGERY      48 yrs ago    HYSTERECTOMY      NEPHROSTOMY Left 8/25/2022    Procedure: CREATION, NEPHROSTOMY;  Surgeon: Shelby Parra MD;  Location: [facility];  Service: Urology;  Laterality: Left;    PERCUTANEOUS NEPHROLITHOTOMY  N/A 8/25/2022    Procedure: NEPHROLITHOTOMY, PERCUTANEOUS;  Surgeon: Shelby Parra MD;  Location: [facility];  Service: Urology;  Laterality: N/A;    REPLACEMENT OF IMPLANTABLE CARDIOVERTER-DEFIBRILLATOR (ICD) GENERATOR N/A 12/13/2019    Procedure: REPLACEMENT, PULSE GENERATOR, ICD-MEDTRONIC;  Surgeon: Sebastian Nowak III, MD;  Location: [facility] CATH;  Service: Cardiology;  Laterality: N/A;    TONSILLECTOMY       Family History   Problem Relation Age of Onset    Heart disease Mother     Cancer Father         Lung Cancer ??? Asbestos    Breast cancer Paternal Aunt        Review of Systems   Constitutional:  Positive for activity change (Somewhat more cautious while walking), fatigue and malaise/fatigue. Negative for appetite change, chills, fever and unexpected weight change (gained wt 5 lbs ?).   HENT:  Negative for congestion, ear discharge and postnasal drip.    Eyes:  Negative for discharge, redness and visual disturbance.   Respiratory:  Positive for shortness of breath. Negative for cough, chest tightness and wheezing.         Left-sided pleural effusion which was drained.   Cardiovascular:  Positive for leg swelling. Negative for chest pain, palpitations, claudication and near-syncope.        History of defibrillator, congestive cardiomyopathy hypertension and dyslipidemia   Gastrointestinal:  Negative for abdominal distention, abdominal pain and constipation.        Pellet-like stools and lot of gas.   Endocrine: Negative for polydipsia and polyuria.        Diabetes mellitus on glimepiride.  Osteoporosis on injection Prolia   Genitourinary:  Negative for difficulty urinating, flank pain and hematuria.   Musculoskeletal:  Positive for arthralgias and gait problem.        Recent fall and hit the left side of chest.   Skin:  Negative for color change, pallor and wound.   Neurological:  Positive for weakness. Negative for facial asymmetry and headaches.        Previous presentations of headache has settled down.   "Tremors.   Hematological:  Negative for adenopathy. Bruises/bleeds easily.        Patient is on chronic anticoagulation with warfarin.     Psychiatric/Behavioral:            Beginningof memory issues.         Objective:      Blood pressure (!) 126/93, pulse (!) 59, height 5' 7" (1.702 m), weight 92.9 kg (204 lb 14.4 oz), SpO2 (!) 90 %. Body mass index is 32.09 kg/m².  Physical Exam  Constitutional:       General: She is not in acute distress.     Appearance: She is well-developed. She is obese. She is ill-appearing. She is not toxic-appearing or diaphoretic.      Comments: Patient is obese with a BMI of 32.09.  Chronically unwell appearing.   HENT:      Head: Normocephalic and atraumatic.      Comments: .     Mouth/Throat:      Pharynx: Oropharynx is clear. No posterior oropharyngeal erythema.   Eyes:      General: No scleral icterus.        Right eye: No discharge.         Left eye: No discharge.   Neck:      Thyroid: No thyromegaly.      Vascular: No JVD.      Trachea: No tracheal deviation.   Cardiovascular:      Rate and Rhythm: Normal rate and regular rhythm.      Heart sounds:      No friction rub. No gallop.   Pulmonary:      Effort: Pulmonary effort is normal.      Breath sounds: Decreased air movement present.   Abdominal:      General: There is no distension.      Palpations: Abdomen is soft.      Tenderness: There is no abdominal tenderness.   Musculoskeletal:      Cervical back: Neck supple.      Right lower leg: Edema present.      Left lower leg: Edema present.   Lymphadenopathy:      Cervical: No cervical adenopathy.   Skin:     General: Skin is warm and dry.      Coloration: Skin is not pale.      Findings: No lesion or rash. Rash is not scaling.   Neurological:      Mental Status: She is alert. Mental status is at baseline. She is not disoriented.      Motor: Tremor present.   Psychiatric:         Behavior: Behavior normal.           Assessment:             Other ascites  Comments:  Loculated " peritoneal fluid collection behind the left kidney either arising from the kidney or other source and sleeping up to the left lung.    Chronic combined systolic and diastolic congestive heart failure  -     Comprehensive Metabolic Panel; Future; Expected date: 12/01/2023  -     BNP; Future; Expected date: 11/01/2023    Paroxysmal atrial fibrillation  -     Microalbumin/Creatinine Ratio, Urine; Future; Expected date: 12/01/2023  -     Comprehensive Metabolic Panel; Future; Expected date: 12/01/2023  -     CBC Auto Differential; Future; Expected date: 12/01/2023    Need for influenza vaccination  -     Influenza - Quadrivalent - High Dose (65+) (PF) (IM)    Pleural effusion on left    Essential hypertension  -     Lipid Panel; Future; Expected date: 12/01/2023  -     Microalbumin/Creatinine Ratio, Urine; Future; Expected date: 12/01/2023  -     Comprehensive Metabolic Panel; Future; Expected date: 12/01/2023    Chronic anticoagulation    Stage 3b chronic kidney disease    Decreased strength, endurance, and mobility          COMPARISON:  11/14/2022     FINDINGS:  A left chest AICD is present.  The heart is enlarged there is no pericardial effusion.  There is heavy calcification of the coronary arteries and thoracic aorta.  No hilar, mediastinal, or axial lymphadenopathy.     There are moderate emphysematous changes.  A few small pulmonary nodules are present.  The largest is a right upper lobe solid nodule measuring 6 mm (series 4, image 141).     There is mild patchy airspace disease and atelectasis at the left lung base.     Small left pleural effusion is present.     A complex cystic mass or collection is present centered in the left upper quadrant, potentially arising from the left kidney when correlating with previous examinations.  This measures 6.2 x 7.2 by 8.4 cm.  Previously the finding measures 4.8 cm greatest dimension.  The finding extends through the left hemidiaphragm, representing either direct invasion  or fistula formation.     There is a 2.6 cm hypodense mass of the posterior aspect of the midpole of the left kidney, previously 2.4 cm, and compatible with a hemorrhagic cyst demonstrated by MRI 08/19/2022. A 3.9 cm finding of the posterolateral midpole of the left kidney exhibits indeterminate density on this examination, noting finding is compatible with a hemorrhagic cyst on previous MRI, and is mildly decreased in size from 4.2 cm 11/14/2022 a 15.5 cm simple cyst of the lower pole of the left kidney is present.  A 1.5 cm increased density finding of the anteromedial margin lower pole left kidney was compatible with hemorrhagic cyst on prior MRI and is unchanged in size.  A few additional smaller hyperdense findings left kidney are present suggestive for hemorrhagic cysts.     There are multiple left renal stones, measuring up to 7 mm.  Scarring from prior nephrostomy tract is present.  There is no hydronephrosis.  No results displayed because visit has over 200 results.      Admission on 10/08/2023, Discharged on 10/08/2023   Component Date Value Ref Range Status    WBC 10/08/2023 11.45  3.90 - 12.70 K/uL Final    RBC 10/08/2023 3.57 (L)  4.00 - 5.40 M/uL Final    Hemoglobin 10/08/2023 9.4 (L)  12.0 - 16.0 g/dL Final    Hematocrit 10/08/2023 31.0 (L)  37.0 - 48.5 % Final    MCV 10/08/2023 87  82 - 98 fL Final    MCH 10/08/2023 26.3 (L)  27.0 - 31.0 pg Final    MCHC 10/08/2023 30.3 (L)  32.0 - 36.0 g/dL Final    RDW 10/08/2023 17.2 (H)  11.5 - 14.5 % Final    Platelets 10/08/2023 400  150 - 450 K/uL Final    MPV 10/08/2023 9.6  9.2 - 12.9 fL Final    Immature Granulocytes 10/08/2023 0.5  0.0 - 0.5 % Final    Gran # (ANC) 10/08/2023 8.7 (H)  1.8 - 7.7 K/uL Final    Immature Grans (Abs) 10/08/2023 0.06 (H)  0.00 - 0.04 K/uL Final    Lymph # 10/08/2023 1.3  1.0 - 4.8 K/uL Final    Mono # 10/08/2023 1.3 (H)  0.3 - 1.0 K/uL Final    Eos # 10/08/2023 0.1  0.0 - 0.5 K/uL Final    Baso # 10/08/2023 0.04  0.00 - 0.20  K/uL Final    nRBC 10/08/2023 0  0 /100 WBC Final    Gran % 10/08/2023 75.8 (H)  38.0 - 73.0 % Final    Lymph % 10/08/2023 11.6 (L)  18.0 - 48.0 % Final    Mono % 10/08/2023 11.3  4.0 - 15.0 % Final    Eosinophil % 10/08/2023 0.5  0.0 - 8.0 % Final    Basophil % 10/08/2023 0.3  0.0 - 1.9 % Final    Differential Method 10/08/2023 Automated   Final    Sodium 10/08/2023 134 (L)  136 - 145 mmol/L Final    Potassium 10/08/2023 4.9  3.5 - 5.1 mmol/L Final    Chloride 10/08/2023 91 (L)  95 - 110 mmol/L Final    CO2 10/08/2023 28  23 - 29 mmol/L Final    Glucose 10/08/2023 107  70 - 110 mg/dL Final    BUN 10/08/2023 25 (H)  8 - 23 mg/dL Final    Creatinine 10/08/2023 1.8 (H)  0.5 - 1.4 mg/dL Final    Calcium 10/08/2023 9.7  8.7 - 10.5 mg/dL Final    Total Protein 10/08/2023 7.0  6.0 - 8.4 g/dL Final    Albumin 10/08/2023 3.3 (L)  3.5 - 5.2 g/dL Final    Total Bilirubin 10/08/2023 0.5  0.1 - 1.0 mg/dL Final    Alkaline Phosphatase 10/08/2023 58  55 - 135 U/L Final    AST 10/08/2023 16  10 - 40 U/L Final    ALT 10/08/2023 12  10 - 44 U/L Final    eGFR 10/08/2023 27.8 (A)  >60 mL/min/1.73 m^2 Final    Anion Gap 10/08/2023 15  8 - 16 mmol/L Final    BNP 10/08/2023 599 (H)  0 - 99 pg/mL Final    Troponin I High Sensitivity 10/08/2023 19.0 (H)  0.0 - 14.9 pg/mL Final    Magnesium 10/08/2023 2.1  1.6 - 2.6 mg/dL Final    POC Glucose 10/08/2023 118 (H)  70 - 110 Final    POC Creatinine 10/08/2023 2.1 (H)  0.5 - 1.4 mg/dL Final    Sample 10/08/2023 VENOUS   Final    Influenza A, Molecular 10/08/2023 Negative  Negative Final    Influenza B, Molecular 10/08/2023 Negative  Negative Final    Flu A & B Source 10/08/2023 Nasal swab   Final    TSH 10/08/2023 1.441  0.340 - 5.600 uIU/mL Final   No results displayed because visit has over 200 results.          DIAGNOSIS: -cytology of pleural fluid  09/22/2023 RDC:tml     PLEURAL FLUID, CLINICALLY LEFT SIDE:   - NEGATIVE FOR MALIGNANCY.     Note: Many neutrophils present, suggestive of  infectious process.   Plan:           Other ascites  Comments:  Loculated peritoneal fluid collection behind the left kidney either arising from the kidney or other source and sleeping up to the left lung.    Chronic combined systolic and diastolic congestive heart failure  -     Comprehensive Metabolic Panel; Future; Expected date: 12/01/2023  -     BNP; Future; Expected date: 11/01/2023    Paroxysmal atrial fibrillation  -     Microalbumin/Creatinine Ratio, Urine; Future; Expected date: 12/01/2023  -     Comprehensive Metabolic Panel; Future; Expected date: 12/01/2023  -     CBC Auto Differential; Future; Expected date: 12/01/2023    Need for influenza vaccination  -     Influenza - Quadrivalent - High Dose (65+) (PF) (IM)    Pleural effusion on left    Essential hypertension  -     Lipid Panel; Future; Expected date: 12/01/2023  -     Microalbumin/Creatinine Ratio, Urine; Future; Expected date: 12/01/2023  -     Comprehensive Metabolic Panel; Future; Expected date: 12/01/2023    Chronic anticoagulation    Stage 3b chronic kidney disease    Decreased strength, endurance, and mobility      Patient's recent hospitalization has been noted with shortness of breath, pleural effusion and drainage of approximately 2 L of pleural fluid.      She has been found to have a retro renal collection of fluid and the source and etiology of which is not clear.  She will probably have a procedure to drain this fluid and check for potential infection, malignant cells or other etiologies.    Patient's chronic medical issues have been noted.      Her general poor performance and endurance has been again reviewed.      Fall and injury precautions discussed.    Adequate family support has been noted.      Down the road she needs a booster dose of pneumonia vaccine and consideration for shingles vaccine.    Did take time to review her recent hospitalization, findings on CT scan.  Discussed about flu vaccine, RSV vaccine and pneumonia  vaccine.  Follow-up-4 months  Spent aleks 35 minutes with patient which involved review of pts medical conditions, labs, medications and with 50% of time face-to-face discussion about medical problems, management and any applicable changes.    Current Outpatient Medications:     albuterol (PROVENTIL/VENTOLIN HFA) 90 mcg/actuation inhaler, INHALE 2 PUFFS BY MOUTH EVERY 6 HOURS AS NEEDED FOR WHEEZING, Disp: 18 g, Rfl: 5    amiodarone (PACERONE) 200 MG Tab, Take 1 tablet (200 mg total) by mouth once daily., Disp: 30 tablet, Rfl: 0    atorvastatin (LIPITOR) 20 MG tablet, Take 20 mg by mouth every evening., Disp: , Rfl:     bumetanide (BUMEX) 1 MG tablet, Take 1 tablet (1 mg total) by mouth once daily., Disp: 30 tablet, Rfl: 0    Ca-D3-mag ox-zinc--thom-bor 600 mg calcium- 20 mcg-50 mg Tab, Take 1 tablet by mouth 2 (two) times daily., Disp: , Rfl:     cholecalciferol, vitamin D3, 125 mcg (5,000 unit) Tab, Take 5,000 Units by mouth 2 (two) times daily., Disp: , Rfl:     digoxin (LANOXIN) 125 mcg tablet, Take 1 tablet (0.125 mg total) by mouth once daily., Disp: 30 tablet, Rfl: 0    dorzolamide-timolol 2-0.5% (COSOPT) 22.3-6.8 mg/mL ophthalmic solution, Place 1 drop into both eyes 2 (two) times daily., Disp: , Rfl:     fludrocortisone (FLORINEF) 0.1 mg Tab, Take by mouth., Disp: , Rfl:     fluticasone propionate (FLONASE) 50 mcg/actuation nasal spray, 2 sprays (100 mcg total) by Each Nostril route once daily., Disp: 16 g, Rfl: 5    gabapentin (NEURONTIN) 100 MG capsule, TAKE 2 CAPSULES(200 MG) BY MOUTH THREE TIMES DAILY (Patient taking differently: Take 200 mg by mouth 3 (three) times daily. TAKE 2 CAPSULES(200 MG) BY MOUTH THREE TIMES DAILY), Disp: 180 capsule, Rfl: 5    glimepiride (AMARYL) 1 MG tablet, Take 1 tablet (1 mg total) by mouth before breakfast., Disp: 90 tablet, Rfl: 1    latanoprost 0.005 % ophthalmic solution, Place 1 drop into both eyes nightly., Disp: , Rfl:     metoprolol succinate (TOPROL-XL) 25 MG 24  hr tablet, Take 1 tablet (25 mg total) by mouth once daily., Disp: 90 tablet, Rfl: 3    midodrine (PROAMATINE) 5 MG Tab, Take 1 tablet (5 mg total) by mouth 3 (three) times daily before meals., Disp: 90 tablet, Rfl: 0    pantoprazole (PROTONIX) 40 MG tablet, Take 1 tablet (40 mg total) by mouth once daily., Disp: 30 tablet, Rfl: 0    propranoloL (INDERAL) 10 MG tablet, Take 10 mg by mouth 2 (two) times daily., Disp: , Rfl:     rivaroxaban (XARELTO) 15 mg Tab, Take 1 tablet (15 mg total) by mouth daily with dinner or evening meal. (Patient taking differently: Take 15 mg by mouth once daily.), Disp: 90 tablet, Rfl: 0    sacubitriL-valsartan (ENTRESTO) 24-26 mg per tablet, Take 1 tablet by mouth 2 (two) times daily. 1/2 tab PO BID, Disp: 30 tablet, Rfl: 0    vericiguat (VERQUVO) 5 mg Tab, Take 2.5 mg by mouth 2 (two) times a day., Disp: 30 tablet, Rfl: 0  No current facility-administered medications for this visit.    Facility-Administered Medications Ordered in Other Visits:     0.9%  NaCl infusion, , Intravenous, Continuous, Sebastian Nowak III, MD, Last Rate: 75 mL/hr at 12/13/19 0728, New Bag at 12/13/19 0728    diphenhydrAMINE injection 25 mg, 25 mg, Intravenous, Once, Sebastian Nowak III, MD    lorazepam injection 1 mg, 1 mg, Intravenous, Once, Sebastian Nowak III, MD

## 2023-11-02 ENCOUNTER — HOSPITAL ENCOUNTER (OUTPATIENT)
Dept: INTERVENTIONAL RADIOLOGY/VASCULAR | Facility: HOSPITAL | Age: 83
Discharge: HOME | End: 2023-11-02
Attending: STUDENT IN AN ORGANIZED HEALTH CARE EDUCATION/TRAINING PROGRAM | Admitting: STUDENT IN AN ORGANIZED HEALTH CARE EDUCATION/TRAINING PROGRAM
Payer: MEDICARE

## 2023-11-02 VITALS
TEMPERATURE: 97 F | DIASTOLIC BLOOD PRESSURE: 68 MMHG | OXYGEN SATURATION: 100 % | HEART RATE: 74 BPM | SYSTOLIC BLOOD PRESSURE: 140 MMHG | RESPIRATION RATE: 20 BRPM

## 2023-11-02 DIAGNOSIS — Z79.01 LONG TERM CURRENT USE OF ANTICOAGULANT: ICD-10-CM

## 2023-11-02 DIAGNOSIS — N18.30 STAGE 3 CHRONIC KIDNEY DISEASE, UNSPECIFIED WHETHER STAGE 3A OR 3B CKD: Primary | ICD-10-CM

## 2023-11-02 LAB
APPEARANCE FLD: NORMAL
BODY FLD TYPE: NORMAL
BODY FLUID COMMENTS: NORMAL
COLOR FLD: YELLOW
LYMPHOCYTES NFR FLD MANUAL: 7 %
MONOS+MACROS NFR FLD MANUAL: 7 %
NEUTROPHILS NFR FLD MANUAL: 86 %
POCT GLUCOSE: 83 MG/DL (ref 70–110)
WBC # FLD: NORMAL /CU MM

## 2023-11-02 PROCEDURE — 87186 SC STD MICRODIL/AGAR DIL: CPT | Performed by: STUDENT IN AN ORGANIZED HEALTH CARE EDUCATION/TRAINING PROGRAM

## 2023-11-02 PROCEDURE — 87070 CULTURE OTHR SPECIMN AEROBIC: CPT | Performed by: STUDENT IN AN ORGANIZED HEALTH CARE EDUCATION/TRAINING PROGRAM

## 2023-11-02 PROCEDURE — 99152 MOD SED SAME PHYS/QHP 5/>YRS: CPT | Performed by: STUDENT IN AN ORGANIZED HEALTH CARE EDUCATION/TRAINING PROGRAM

## 2023-11-02 PROCEDURE — 77012 IR ABSCESS ASPIRATION: ICD-10-PCS | Mod: 26,,, | Performed by: STUDENT IN AN ORGANIZED HEALTH CARE EDUCATION/TRAINING PROGRAM

## 2023-11-02 PROCEDURE — 77012 CT SCAN FOR NEEDLE BIOPSY: CPT | Mod: TC | Performed by: STUDENT IN AN ORGANIZED HEALTH CARE EDUCATION/TRAINING PROGRAM

## 2023-11-02 PROCEDURE — 77012 CT SCAN FOR NEEDLE BIOPSY: CPT | Mod: 26,,, | Performed by: STUDENT IN AN ORGANIZED HEALTH CARE EDUCATION/TRAINING PROGRAM

## 2023-11-02 PROCEDURE — 10160 IR ABSCESS ASPIRATION: ICD-10-PCS | Mod: LT,,, | Performed by: STUDENT IN AN ORGANIZED HEALTH CARE EDUCATION/TRAINING PROGRAM

## 2023-11-02 PROCEDURE — 88365 INSITU HYBRIDIZATION (FISH): CPT | Performed by: PATHOLOGY

## 2023-11-02 PROCEDURE — 88312 SPECIAL STAINS GROUP 1: CPT | Mod: 59 | Performed by: PATHOLOGY

## 2023-11-02 PROCEDURE — 88341 PR IHC OR ICC EACH ADD'L SINGLE ANTIBODY  STAINPR: ICD-10-PCS | Mod: 26,,, | Performed by: PATHOLOGY

## 2023-11-02 PROCEDURE — 88305 TISSUE EXAM BY PATHOLOGIST: CPT | Mod: 26,,, | Performed by: PATHOLOGY

## 2023-11-02 PROCEDURE — 88305 TISSUE EXAM BY PATHOLOGIST: ICD-10-PCS | Mod: 26,,, | Performed by: PATHOLOGY

## 2023-11-02 PROCEDURE — 88364 INSITU HYBRIDIZATION (FISH): CPT | Mod: 26,,, | Performed by: PATHOLOGY

## 2023-11-02 PROCEDURE — 87102 FUNGUS ISOLATION CULTURE: CPT | Performed by: STUDENT IN AN ORGANIZED HEALTH CARE EDUCATION/TRAINING PROGRAM

## 2023-11-02 PROCEDURE — 87077 CULTURE AEROBIC IDENTIFY: CPT | Performed by: STUDENT IN AN ORGANIZED HEALTH CARE EDUCATION/TRAINING PROGRAM

## 2023-11-02 PROCEDURE — 88341 IMHCHEM/IMCYTCHM EA ADD ANTB: CPT | Performed by: PATHOLOGY

## 2023-11-02 PROCEDURE — 88342 IMHCHEM/IMCYTCHM 1ST ANTB: CPT | Performed by: PATHOLOGY

## 2023-11-02 PROCEDURE — 88364 CHG INSITU HYBRIDIZATION (FISH: ICD-10-PCS | Mod: 26,,, | Performed by: PATHOLOGY

## 2023-11-02 PROCEDURE — 99152 PR MOD CONSCIOUS SEDATION, SAME PHYS, 5+ YRS, FIRST 15 MIN: ICD-10-PCS | Mod: ,,, | Performed by: STUDENT IN AN ORGANIZED HEALTH CARE EDUCATION/TRAINING PROGRAM

## 2023-11-02 PROCEDURE — 88341 IMHCHEM/IMCYTCHM EA ADD ANTB: CPT | Mod: 26,,, | Performed by: PATHOLOGY

## 2023-11-02 PROCEDURE — 10160 PNXR ASPIR ABSC HMTMA BULLA: CPT | Mod: LT | Performed by: STUDENT IN AN ORGANIZED HEALTH CARE EDUCATION/TRAINING PROGRAM

## 2023-11-02 PROCEDURE — 88312 PR  SPECIAL STAINS,GROUP I: ICD-10-PCS | Mod: 26,,, | Performed by: PATHOLOGY

## 2023-11-02 PROCEDURE — 88305 TISSUE EXAM BY PATHOLOGIST: CPT | Performed by: PATHOLOGY

## 2023-11-02 PROCEDURE — 88342 IMHCHEM/IMCYTCHM 1ST ANTB: CPT | Mod: 26,,, | Performed by: PATHOLOGY

## 2023-11-02 PROCEDURE — 99152 MOD SED SAME PHYS/QHP 5/>YRS: CPT | Mod: ,,, | Performed by: STUDENT IN AN ORGANIZED HEALTH CARE EDUCATION/TRAINING PROGRAM

## 2023-11-02 PROCEDURE — 88333 PATH CONSLTJ SURG CYTO XM 1: CPT | Mod: 26,,, | Performed by: PATHOLOGY

## 2023-11-02 PROCEDURE — 88365 PR  TISSUE HYBRIDIZATION: ICD-10-PCS | Mod: 26,,, | Performed by: PATHOLOGY

## 2023-11-02 PROCEDURE — 27201068 IR ABSCESS ASPIRATION

## 2023-11-02 PROCEDURE — 87075 CULTR BACTERIA EXCEPT BLOOD: CPT | Performed by: STUDENT IN AN ORGANIZED HEALTH CARE EDUCATION/TRAINING PROGRAM

## 2023-11-02 PROCEDURE — 89051 BODY FLUID CELL COUNT: CPT | Performed by: STUDENT IN AN ORGANIZED HEALTH CARE EDUCATION/TRAINING PROGRAM

## 2023-11-02 PROCEDURE — 88312 SPECIAL STAINS GROUP 1: CPT | Mod: 26,,, | Performed by: PATHOLOGY

## 2023-11-02 PROCEDURE — 88365 INSITU HYBRIDIZATION (FISH): CPT | Mod: 26,,, | Performed by: PATHOLOGY

## 2023-11-02 PROCEDURE — 88333 PR  INTRAOPERATIVE CYTO PATH CONSULT, INITIAL SITE: ICD-10-PCS | Mod: 26,,, | Performed by: PATHOLOGY

## 2023-11-02 PROCEDURE — 88333 PATH CONSLTJ SURG CYTO XM 1: CPT | Performed by: PATHOLOGY

## 2023-11-02 PROCEDURE — 88364 INSITU HYBRIDIZATION (FISH): CPT | Performed by: PATHOLOGY

## 2023-11-02 PROCEDURE — 88342 CHG IMMUNOCYTOCHEMISTRY: ICD-10-PCS | Mod: 26,,, | Performed by: PATHOLOGY

## 2023-11-02 RX ORDER — SODIUM CHLORIDE 9 MG/ML
INJECTION, SOLUTION INTRAVENOUS CONTINUOUS
Status: DISCONTINUED | OUTPATIENT
Start: 2023-11-02 | End: 2023-11-03 | Stop reason: HOSPADM

## 2023-11-02 RX ORDER — LIDOCAINE HYDROCHLORIDE 10 MG/ML
1 INJECTION, SOLUTION EPIDURAL; INFILTRATION; INTRACAUDAL; PERINEURAL ONCE
Status: DISCONTINUED | OUTPATIENT
Start: 2023-11-02 | End: 2023-11-03 | Stop reason: HOSPADM

## 2023-11-02 NOTE — SEDATION DOCUMENTATION
Procedure complete. Pt tolerated well with no signs of distress noted. Dressing to site is clean, dry, and intact. Will be transported to MPU for 1 hour recovery.

## 2023-11-02 NOTE — PLAN OF CARE
Pt on unit with daughter at her side. Pt on unit via wheelchair.  Denies pain or SOB.  Verbalized an understanding of procedure.  Oriented to unit and call bell provided.  CBG = 83 on admit.  Pt on 2LNC at home.  Pt place on 2LNC.  Will continue to monitor.

## 2023-11-02 NOTE — PROCEDURES
IR Post-Procedure Note      Pre Op Diagnosis:  L perirenal complex fluid collection    Post Op Diagnosis:  same    Procedure:  Image guided aspiration and biopsy    Procedure performed by:  Roscoe Haley MD  /  Libia Castillo MD    Written Informed Consent Obtained: Yes    Specimen Removed:  Yes; aspirate from fluid collection    Estimated Blood Loss: minimal    Findings:    CT and US were used for localization of abnormal fluid collection.     50 cc was aspirated from the collection. The specimen was sent to the lab for further analysis and culture.    A 17/18-gauge [employer] [place] biopsy device was used to remove 6 specimens from the complex L perirenal area. This specimen was sent to pathology for further evaluation.      The patient tolerated procedure well and there were no complications. Please see procedure report under Imaging for further details.    Plan:    Send specimens to lab and pathology for analysis.    Roscoe Haley MD MSCR  PGY-5 Radiology Resident

## 2023-11-02 NOTE — H&P
Radiology History & Physical      SUBJECTIVE:     Chief Complaint: patrick-nephric fluid collection    History of Present Illness:  Jo Alegre is a 83 y.o. female who presents for aspiration of fluid collection adjacent to kidney.  Past Medical History:   Diagnosis Date    Allergy     Codeine, Lasix    Atrial fibrillation     Atrial fibrillation     Cataract     CHF (congestive heart failure)     Diabetes mellitus, type 2     Ejection fraction < 50% 10/18/2017    Approximately 35%  Based on prior  Echocardiogram.    Encounter for blood transfusion     Hyperlipidemia     Osteoporosis     PONV (postoperative nausea and vomiting)     Thyroid disorder screening 10/17/2017    TSH of 1.12 ordered by Dr. dee Jones     Past Surgical History:   Procedure Laterality Date    ANTEGRADE NEPHROSTOGRAPHY Left 8/25/2022    Procedure: NEPHROSTOGRAM, ANTEGRADE;  Surgeon: Shelby Parra MD;  Location: [facility];  Service: Urology;  Laterality: Left;    CHOLECYSTECTOMY  1997    [place]     COLONOSCOPY      CYSTOSCOPY W/ URETERAL STENT PLACEMENT Left 7/17/2022    Procedure: CYSTOSCOPY, WITH URETERAL STENT INSERTION;  Surgeon: hSelby Parra MD;  Location: [facility] OR;  Service: Urology;  Laterality: Left;    CYSTOSCOPY W/ URETERAL STENT REMOVAL Left 9/21/2022    Procedure: CYSTOSCOPY, WITH URETERAL STENT REMOVAL;  Surgeon: Shelby Parra MD;  [facility];  Service: Urology;  Laterality: Left;    CYSTOSCOPY WITH URETEROSCOPY, RETROGRADE PYELOGRAPHY, AND INSERTION OF STENT Left 8/25/2022    Procedure: CYSTOSCOPY, WITH RETROGRADE PYELOGRAM AND URETERAL STENT INSERTION;  Surgeon: Shelby Parra MD;  Location: [facility] OR;  Service: Urology;  Laterality: Left;    EYE SURGERY      bilateral cataracts    FINGER SURGERY Right 2021    right pinky finger    FLEXIBLE CYSTOSCOPY Left 8/25/2022    Procedure: CYSTOSCOPY, FLEXIBLE WITH STENT REMOVAL;  Surgeon: Shelby Parra MD;  Location: [facility] OR;  Service: Urology;   Laterality: Left;    FRACTURE SURGERY  2014    right femur with neville    HEMORRHOID SURGERY      48 yrs ago    HYSTERECTOMY      NEPHROSTOMY Left 8/25/2022    Procedure: CREATION, NEPHROSTOMY;  Surgeon: Shelby Parra MD;  Location: [facility] OR;  Service: Urology;  Laterality: Left;    PERCUTANEOUS NEPHROLITHOTOMY N/A 8/25/2022    Procedure: NEPHROLITHOTOMY, PERCUTANEOUS;  Surgeon: Shelby Parra MD;  Location: [facility] OR;  Service: Urology;  Laterality: N/A;    REPLACEMENT OF IMPLANTABLE CARDIOVERTER-DEFIBRILLATOR (ICD) GENERATOR N/A 12/13/2019    Procedure: REPLACEMENT, PULSE GENERATOR, ICD-MEDTRONIC;  Surgeon: Sebastian Nowak III, MD;  Location: [facility] CATH;  Service: Cardiology;  Laterality: N/A;    TONSILLECTOMY         Home Meds:   Prior to Admission medications    Medication Sig Start Date End Date Taking? Authorizing Provider   albuterol (PROVENTIL/VENTOLIN HFA) 90 mcg/actuation inhaler INHALE 2 PUFFS BY MOUTH EVERY 6 HOURS AS NEEDED FOR WHEEZING 4/17/23   Kraig Alberto MD   amiodarone (PACERONE) 200 MG Tab Take 1 tablet (200 mg total) by mouth once daily. 10/13/23 10/12/24  Terence Medrano MD   atorvastatin (LIPITOR) 20 MG tablet Take 20 mg by mouth every evening.    Provider, Historical   Ca-D3-mag ox-zinc--thom-bor 600 mg calcium- 20 mcg-50 mg Tab Take 1 tablet by mouth 2 (two) times daily.    Provider, Historical   cholecalciferol, vitamin D3, 125 mcg (5,000 unit) Tab Take 5,000 Units by mouth 2 (two) times daily.    Provider, Historical   digoxin (LANOXIN) 125 mcg tablet Take 1 tablet (0.125 mg total) by mouth once daily. 10/13/23 10/12/24  Terence Medrano MD   dorzolamide-timolol 2-0.5% (COSOPT) 22.3-6.8 mg/mL ophthalmic solution Place 1 drop into both eyes 2 (two) times daily. 7/1/23   Provider, Historical   fluticasone propionate (FLONASE) 50 mcg/actuation nasal spray 2 sprays (100 mcg total) by Each Nostril route once daily. 3/20/23   Kraig Alberto MD   gabapentin (NEURONTIN) 100 MG capsule TAKE  2 CAPSULES(200 MG) BY MOUTH THREE TIMES DAILY  Patient taking differently: Take 200 mg by mouth 3 (three) times daily. TAKE 2 CAPSULES(200 MG) BY MOUTH THREE TIMES DAILY 3/22/23   Kraig Alberto MD   glimepiride (AMARYL) 1 MG tablet Take 1 tablet (1 mg total) by mouth before breakfast. 7/4/19   Kraig Alberto MD   latanoprost 0.005 % ophthalmic solution Place 1 drop into both eyes nightly. 1/26/22   Provider, [place]   midodrine (PROAMATINE) 5 MG Tab Take 1 tablet (5 mg total) by mouth 3 (three) times daily before meals. 10/13/23 10/12/24  Terence Medrano MD   pantoprazole (PROTONIX) 40 MG tablet Take 1 tablet (40 mg total) by mouth once daily. 10/13/23 10/12/24  Terence Medrano MD   rivaroxaban (XARELTO) 15 mg Tab Take 1 tablet (15 mg total) by mouth daily with dinner or evening meal.  Patient taking differently: Take 15 mg by mouth once daily. 5/18/23 11/1/23  Jourdan Brown MD   sacubitriL-valsartan (ENTRESTO) 24-26 mg per tablet Take 1 tablet by mouth 2 (two) times daily. 1/2 tab PO BID 10/13/23   Terence Medrano MD   vericiguat (VERQUVO) 5 mg Tab Take 2.5 mg by mouth 2 (two) times a day. 10/13/23   Terence Medrano MD     Anticoagulants/Antiplatelets: no anticoagulation    Allergies:   Review of patient's allergies indicates:   Allergen Reactions    Codeine Other (See Comments)     nausea    Hydrocodone Nausea And Vomiting    Lasix [furosemide]      rash     Sedation History:  no adverse reactions    Review of Systems:   Hematological: no known coagulopathies  Respiratory: positive for - shortness of breath with exertion  Cardiovascular: no chest pain  Gastrointestinal: no abdominal pain  Genito-Urinary: no dysuria  Musculoskeletal: negative  Neurological: no TIA or stroke symptoms         OBJECTIVE:     Vital Signs (Most Recent)       Physical Exam:  ASA: 2  Mallampati: 3    General: no acute distress  Mental Status: alert and oriented to person, place and time  HEENT: normocephalic,  atraumatic  Chest: unlabored breathing while at rest, increased with exertion  Heart: regular heart rate  Abdomen: nondistended  Extremity: moves all extremities    Laboratory  Lab Results   Component Value Date    INR 0.9 11/02/2023       Lab Results   Component Value Date    WBC 6.09 10/12/2023    HGB 8.5 (L) 10/12/2023    HCT 28.1 (L) 10/12/2023    MCV 87 10/12/2023     10/12/2023      Lab Results   Component Value Date     10/12/2023     10/12/2023    K 4.3 10/12/2023    CL 94 (L) 10/12/2023    CO2 34 (H) 10/12/2023    BUN 23 10/12/2023    CREATININE 1.6 (H) 10/12/2023    CALCIUM 9.2 10/12/2023    MG 2.1 10/08/2023    ALT 10 10/12/2023    AST 11 10/12/2023    ALBUMIN 2.1 (L) 10/12/2023    BILITOT 0.4 10/12/2023       ASSESSMENT/PLAN:     Sedation Plan: up to moderate   Patient will undergo drainage of perinephric fluid collection.    Denny Soliman MD   Radiology PGY-2

## 2023-11-02 NOTE — DISCHARGE INSTRUCTIONS
For scheduling: Call Madeline at [phone]    For questions or concerns call: [facility] 8 AM- 5PM [phone]. Radiology resident on call [phone].    For immediate concerns that are not emergent, you may call our radiology clinic at: [phone]

## 2023-11-02 NOTE — NURSING
Pt and family members given discharge instructions and all questions addressed.  Pt and family also given phone numbers to clinic and resident on call for any concerns.

## 2023-11-03 LAB
ACID FAST MOD KINY STN SPEC: NORMAL
MYCOBACTERIUM SPEC QL CULT: NORMAL

## 2023-11-03 RX ORDER — CIPROFLOXACIN 250 MG/1
250 TABLET, FILM COATED ORAL 2 TIMES DAILY
Qty: 20 TABLET | Refills: 0 | Status: SHIPPED | OUTPATIENT
Start: 2023-11-03 | End: 2023-11-13

## 2023-11-04 LAB — BACTERIA SPEC AEROBE CULT: ABNORMAL

## 2023-11-06 ENCOUNTER — PATIENT MESSAGE (OUTPATIENT)
Dept: FAMILY MEDICINE | Facility: CLINIC | Age: 83
End: 2023-11-06

## 2023-11-06 DIAGNOSIS — N20.0 STAGHORN CALCULUS: Primary | ICD-10-CM

## 2023-11-06 LAB — BACTERIA SPEC ANAEROBE CULT: NORMAL

## 2023-11-06 RX ORDER — PANTOPRAZOLE SODIUM 40 MG/1
40 TABLET, DELAYED RELEASE ORAL DAILY
Qty: 30 TABLET | Refills: 5 | Status: SHIPPED | OUTPATIENT
Start: 2023-11-06 | End: 2024-02-14

## 2023-11-08 LAB
ADEQUACY: NORMAL
FINAL PATHOLOGIC DIAGNOSIS: NORMAL
GROSS: NORMAL
Lab: NORMAL
MICROSCOPIC EXAM: NORMAL

## 2023-11-16 ENCOUNTER — EXTERNAL HOME HEALTH (OUTPATIENT)
Dept: HOME HEALTH SERVICES | Facility: HOSPITAL | Age: 83
End: 2023-11-16
Payer: MEDICARE

## 2023-11-24 ENCOUNTER — HOSPITAL ENCOUNTER (OUTPATIENT)
Dept: RADIOLOGY | Facility: HOSPITAL | Age: 83
Discharge: HOME | End: 2023-11-24
Attending: STUDENT IN AN ORGANIZED HEALTH CARE EDUCATION/TRAINING PROGRAM
Payer: MEDICARE

## 2023-11-24 DIAGNOSIS — N20.0 STAGHORN CALCULUS: ICD-10-CM

## 2023-11-24 PROCEDURE — 74176 CT ABD & PELVIS W/O CONTRAST: CPT | Mod: 26,,, | Performed by: RADIOLOGY

## 2023-11-24 PROCEDURE — 74176 CT ABD & PELVIS W/O CONTRAST: CPT | Mod: TC

## 2023-11-24 PROCEDURE — 74176 CT ABDOMEN PELVIS WITHOUT CONTRAST: ICD-10-PCS | Mod: 26,,, | Performed by: RADIOLOGY

## 2023-11-27 ENCOUNTER — TELEPHONE (OUTPATIENT)
Dept: INTERVENTIONAL RADIOLOGY/VASCULAR | Facility: HOSPITAL | Age: 83
End: 2023-11-27

## 2023-11-27 DIAGNOSIS — N22 CALCULUS OF URINARY TRACT IN DISEASES CLASSIFIED ELSEWHERE: Primary | ICD-10-CM

## 2023-11-27 NOTE — NURSING
Received request for CT Abscess Drainage with Catheter placement. Spoke with radiologist who does not feel comfortable leaving drain through diaphragm. Suggested possibly sending to [facility]. Informed Dr. Parra she said okay to send to [facility].

## 2023-11-30 ENCOUNTER — TELEPHONE (OUTPATIENT)
Dept: INTERVENTIONAL RADIOLOGY/VASCULAR | Facility: CLINIC | Age: 83
End: 2023-11-30
Payer: MEDICARE

## 2023-11-30 NOTE — TELEPHONE ENCOUNTER
Spoke to pts daughter Mary on phone, Pt is rescheduled on 12/22/2023 with arrival time of 11am 2nd floor labs for IR procedure at  location.  Preop instructions given (NPO after midnight, MUST have a ride home, Nurse will call 1-2  days before to go over instructions and medications), pt verbally understood. Pt aware and confirmed, Thanks

## 2023-12-04 LAB — FUNGUS SPEC CULT: NORMAL

## 2023-12-13 PROCEDURE — G0179 MD RECERTIFICATION HHA PT: HCPCS | Mod: ,,, | Performed by: INTERNAL MEDICINE

## 2023-12-20 ENCOUNTER — TELEPHONE (OUTPATIENT)
Dept: INTERVENTIONAL RADIOLOGY/VASCULAR | Facility: HOSPITAL | Age: 83
End: 2023-12-20
Payer: MEDICARE

## 2023-12-21 ENCOUNTER — TELEPHONE (OUTPATIENT)
Dept: INTERVENTIONAL RADIOLOGY/VASCULAR | Facility: CLINIC | Age: 83
End: 2023-12-21
Payer: MEDICARE

## 2023-12-21 NOTE — TELEPHONE ENCOUNTER
Spoke to pts daughter on phone,  Pt is rescheduled on 1/12/2024 with arrival time of 10am 2nd floor labs for IR procedure at  location.  Preop instructions given (NPO after midnight, MUST have a ride home, Nurse will call 1-2  days before to go over instructions and medications), instructed pt to stay off Xarelto for 2 days, pt verbally understood. Pt aware and confirmed, Thanks

## 2023-12-31 DIAGNOSIS — G57.93 NEUROPATHY OF BOTH FEET: ICD-10-CM

## 2023-12-31 DIAGNOSIS — M54.6 ACUTE RIGHT-SIDED THORACIC BACK PAIN: ICD-10-CM

## 2024-01-02 RX ORDER — GABAPENTIN 100 MG/1
CAPSULE ORAL
Qty: 180 CAPSULE | Refills: 5 | Status: SHIPPED | OUTPATIENT
Start: 2024-01-02

## 2024-01-10 ENCOUNTER — PATIENT MESSAGE (OUTPATIENT)
Dept: INTERVENTIONAL RADIOLOGY/VASCULAR | Facility: HOSPITAL | Age: 84
End: 2024-01-10
Payer: MEDICARE

## 2024-01-10 NOTE — NURSING
Pre-procedure call complete.  Spoke to Pts daughterMary.  Instructed to have patient not to eat or drink anything after midnight the night before procedure.  Aware will need someone to provide transport home and monitor pt 8 hours post procedure.  No driving for at least 24 hours after procedure.   Advised to have pt take blood pressure and heart medications medications with a sip of water morning of procedure.  Patients daughter verbalized aware of which medications to take.  Do not take sleep medication (including OTC) the night before procedure.  Arrival time and location given.  Expected length of stay reviewed.  Covid screening completed.  Pt verbalized understanding of all pre-procedure instructions.  Written instructions and directions sent to patient in [employer].  Ms. Hernandez verbalized understanding.

## 2024-01-12 ENCOUNTER — HOSPITAL ENCOUNTER (OUTPATIENT)
Dept: INTERVENTIONAL RADIOLOGY/VASCULAR | Facility: HOSPITAL | Age: 84
Discharge: HOME | End: 2024-01-12
Attending: STUDENT IN AN ORGANIZED HEALTH CARE EDUCATION/TRAINING PROGRAM | Admitting: RADIOLOGY
Payer: MEDICARE

## 2024-01-12 VITALS
RESPIRATION RATE: 16 BRPM | TEMPERATURE: 98 F | HEART RATE: 68 BPM | SYSTOLIC BLOOD PRESSURE: 132 MMHG | WEIGHT: 197 LBS | OXYGEN SATURATION: 98 % | DIASTOLIC BLOOD PRESSURE: 60 MMHG | HEIGHT: 67 IN | BODY MASS INDEX: 30.92 KG/M2

## 2024-01-12 DIAGNOSIS — N22 CALCULUS OF URINARY TRACT IN DISEASES CLASSIFIED ELSEWHERE: ICD-10-CM

## 2024-01-12 LAB
APPEARANCE FLD: NORMAL
BODY FLD TYPE: NORMAL
COLOR FLD: NORMAL
GRAM STN SPEC: NORMAL
GRAM STN SPEC: NORMAL
LYMPHOCYTES NFR FLD MANUAL: 93 %
NEUTROPHILS NFR FLD MANUAL: 7 %
POCT GLUCOSE: 92 MG/DL (ref 70–110)
WBC # FLD: NORMAL /CU MM

## 2024-01-12 PROCEDURE — 87205 SMEAR GRAM STAIN: CPT | Performed by: STUDENT IN AN ORGANIZED HEALTH CARE EDUCATION/TRAINING PROGRAM

## 2024-01-12 PROCEDURE — 87077 CULTURE AEROBIC IDENTIFY: CPT | Performed by: STUDENT IN AN ORGANIZED HEALTH CARE EDUCATION/TRAINING PROGRAM

## 2024-01-12 PROCEDURE — 49406 IMAGE CATH FLUID PERI/RETRO: CPT | Performed by: RADIOLOGY

## 2024-01-12 PROCEDURE — 89051 BODY FLUID CELL COUNT: CPT | Performed by: STUDENT IN AN ORGANIZED HEALTH CARE EDUCATION/TRAINING PROGRAM

## 2024-01-12 PROCEDURE — 63600175 PHARM REV CODE 636 W HCPCS: Performed by: STUDENT IN AN ORGANIZED HEALTH CARE EDUCATION/TRAINING PROGRAM

## 2024-01-12 PROCEDURE — A4357 BEDSIDE DRAINAGE BAG: HCPCS

## 2024-01-12 PROCEDURE — C1769 GUIDE WIRE: HCPCS

## 2024-01-12 PROCEDURE — 87186 SC STD MICRODIL/AGAR DIL: CPT | Performed by: STUDENT IN AN ORGANIZED HEALTH CARE EDUCATION/TRAINING PROGRAM

## 2024-01-12 PROCEDURE — 49406 IMAGE CATH FLUID PERI/RETRO: CPT | Mod: ,,, | Performed by: RADIOLOGY

## 2024-01-12 PROCEDURE — 87102 FUNGUS ISOLATION CULTURE: CPT | Performed by: STUDENT IN AN ORGANIZED HEALTH CARE EDUCATION/TRAINING PROGRAM

## 2024-01-12 PROCEDURE — 87070 CULTURE OTHR SPECIMN AEROBIC: CPT | Performed by: STUDENT IN AN ORGANIZED HEALTH CARE EDUCATION/TRAINING PROGRAM

## 2024-01-12 PROCEDURE — 87075 CULTR BACTERIA EXCEPT BLOOD: CPT | Performed by: STUDENT IN AN ORGANIZED HEALTH CARE EDUCATION/TRAINING PROGRAM

## 2024-01-12 PROCEDURE — 82962 GLUCOSE BLOOD TEST: CPT

## 2024-01-12 RX ORDER — ONDANSETRON 2 MG/ML
INJECTION INTRAMUSCULAR; INTRAVENOUS
Status: COMPLETED | OUTPATIENT
Start: 2024-01-12 | End: 2024-01-12

## 2024-01-12 RX ORDER — DIPHENHYDRAMINE HYDROCHLORIDE 50 MG/ML
INJECTION, SOLUTION INTRAMUSCULAR; INTRAVENOUS
Status: COMPLETED | OUTPATIENT
Start: 2024-01-12 | End: 2024-01-12

## 2024-01-12 RX ORDER — SODIUM CHLORIDE 9 MG/ML
INJECTION, SOLUTION INTRAVENOUS CONTINUOUS
Status: DISCONTINUED | OUTPATIENT
Start: 2024-01-12 | End: 2024-01-13 | Stop reason: HOSPADM

## 2024-01-12 RX ORDER — FENTANYL CITRATE 50 UG/ML
INJECTION, SOLUTION INTRAMUSCULAR; INTRAVENOUS
Status: COMPLETED | OUTPATIENT
Start: 2024-01-12 | End: 2024-01-12

## 2024-01-12 RX ORDER — LIDOCAINE HYDROCHLORIDE 10 MG/ML
1 INJECTION, SOLUTION EPIDURAL; INFILTRATION; INTRACAUDAL; PERINEURAL ONCE
Status: DISCONTINUED | OUTPATIENT
Start: 2024-01-12 | End: 2024-01-13 | Stop reason: HOSPADM

## 2024-01-12 RX ADMIN — ONDANSETRON 8 MG: 2 INJECTION INTRAMUSCULAR; INTRAVENOUS at 01:01

## 2024-01-12 RX ADMIN — DIPHENHYDRAMINE HYDROCHLORIDE 25 MG: 50 INJECTION, SOLUTION INTRAMUSCULAR; INTRAVENOUS at 01:01

## 2024-01-12 RX ADMIN — FENTANYL CITRATE 50 MCG: 50 INJECTION, SOLUTION INTRAMUSCULAR; INTRAVENOUS at 01:01

## 2024-01-12 NOTE — H&P
Vascular and Interventional Radiology History & Physical    Date:  1/12/2024    Chief Complaint:   Perinephric fluid collection    History of Present Illness:  Jo Alegre is a 83 y.o. female who presents for perinephric fluid collection drainage w possible drain placement.    Past Medical History:  Past Medical History:   Diagnosis Date    Allergy     Codeine, Lasix    Atrial fibrillation     Atrial fibrillation     Cataract     CHF (congestive heart failure)     Diabetes mellitus, type 2     Ejection fraction < 50% 10/18/2017    Approximately 35%  Based on prior  Echocardiogram.    Encounter for blood transfusion     Hyperlipidemia     Osteoporosis     PONV (postoperative nausea and vomiting)     Thyroid disorder screening 10/17/2017    TSH of 1.12 ordered by Dr. dee Jones       Past Surgical History:  Past Surgical History:   Procedure Laterality Date    ANTEGRADE NEPHROSTOGRAPHY Left 8/25/2022    Procedure: NEPHROSTOGRAM, ANTEGRADE;  Surgeon: Shelby Parra MD;  Location: Replaced by [facility];  Service: Urology;  Laterality: Left;    CHOLECYSTECTOMY  1997    [place]     COLONOSCOPY      CYSTOSCOPY W/ URETERAL STENT PLACEMENT Left 7/17/2022    Procedure: CYSTOSCOPY, WITH URETERAL STENT INSERTION;  Surgeon: Shelby Parra MD;  Location: [facility] OR;  Service: Urology;  Laterality: Left;    CYSTOSCOPY W/ URETERAL STENT REMOVAL Left 9/21/2022    Procedure: CYSTOSCOPY, WITH URETERAL STENT REMOVAL;  Surgeon: Shelby Parra MD;  Location: Replaced by [facility] OR;  Service: Urology;  Laterality: Left;    CYSTOSCOPY WITH URETEROSCOPY, RETROGRADE PYELOGRAPHY, AND INSERTION OF STENT Left 8/25/2022    Procedure: CYSTOSCOPY, WITH RETROGRADE PYELOGRAM AND URETERAL STENT INSERTION;  Surgeon: Shelby Parra MD;  Location: [facility] OR;  Service: Urology;  Laterality: Left;    EYE SURGERY      bilateral cataracts    FINGER SURGERY Right 2021    right pinky finger    FLEXIBLE CYSTOSCOPY Left 8/25/2022    Procedure: CYSTOSCOPY, FLEXIBLE  WITH STENT REMOVAL;  Surgeon: Shelby Parra MD;  Location: [facility] OR;  Service: Urology;  Laterality: Left;    FRACTURE SURGERY  2014    right femur with neville    HEMORRHOID SURGERY      48 yrs ago    HYSTERECTOMY      NEPHROSTOMY Left 8/25/2022    Procedure: CREATION, NEPHROSTOMY;  Surgeon: Shelby Parra MD;  Location: [facility] OR;  Service: Urology;  Laterality: Left;    PERCUTANEOUS NEPHROLITHOTOMY N/A 8/25/2022    Procedure: NEPHROLITHOTOMY, PERCUTANEOUS;  Surgeon: Shelby Parra MD;  Location: [facility] OR;  Service: Urology;  Laterality: N/A;    REPLACEMENT OF IMPLANTABLE CARDIOVERTER-DEFIBRILLATOR (ICD) GENERATOR N/A 12/13/2019    Procedure: REPLACEMENT, PULSE GENERATOR, ICD-MEDTRONIC;  Surgeon: Sebastian Nowak III, MD;  Location: [facility] CATH;  Service: Cardiology;  Laterality: N/A;    TONSILLECTOMY          Sedation History:    Denies any adverse reactions.  Denies problems laying flat.    Social History:  Social History     Tobacco Use    Smoking status: Former     Passive exposure: Past    Smokeless tobacco: Never    Tobacco comments:     quit 2013   Substance Use Topics    Alcohol use: No    Drug use: No        Home Medications:   Prior to Admission medications    Medication Sig Start Date End Date Taking? Authorizing Provider   albuterol (PROVENTIL/VENTOLIN HFA) 90 mcg/actuation inhaler INHALE 2 PUFFS BY MOUTH EVERY 6 HOURS AS NEEDED FOR WHEEZING 4/17/23   Kraig Alberto MD   amiodarone (PACERONE) 200 MG Tab Take 1 tablet (200 mg total) by mouth once daily. 10/13/23 10/12/24  Terence Medrano MD   atorvastatin (LIPITOR) 20 MG tablet Take 20 mg by mouth every evening.    Provider, Historical   Ca-D3-mag ox-zinc--thom-bor 600 mg calcium- 20 mcg-50 mg Tab Take 1 tablet by mouth 2 (two) times daily.    Provider, Historical   cholecalciferol, vitamin D3, 125 mcg (5,000 unit) Tab Take 5,000 Units by mouth 2 (two) times daily.    Provider, Historical   digoxin (LANOXIN) 125 mcg tablet Take 1 tablet (0.125 mg  total) by mouth once daily. 10/13/23 10/12/24  Terence Medrano MD   dorzolamide-timolol 2-0.5% (COSOPT) 22.3-6.8 mg/mL ophthalmic solution Place 1 drop into both eyes 2 (two) times daily. 7/1/23   Provider, Historical   fluticasone propionate (FLONASE) 50 mcg/actuation nasal spray 2 sprays (100 mcg total) by Each Nostril route once daily. 3/20/23   Kraig Alberto MD   gabapentin (NEURONTIN) 100 MG capsule TAKE 2 CAPSULES(200 MG) BY MOUTH THREE TIMES DAILY 1/2/24   Kraig Alberto MD   glimepiride (AMARYL) 1 MG tablet Take 1 tablet (1 mg total) by mouth before breakfast. 7/4/19   Kraig Alberto MD   latanoprost 0.005 % ophthalmic solution Place 1 drop into both eyes nightly. 1/26/22   Provider, Historical   midodrine (PROAMATINE) 5 MG Tab Take 1 tablet (5 mg total) by mouth 3 (three) times daily before meals. 10/13/23 10/12/24  Terence Medrano MD   pantoprazole (PROTONIX) 40 MG tablet Take 1 tablet (40 mg total) by mouth once daily. 11/6/23 11/5/24  Kraig Alberto MD   rivaroxaban (XARELTO) 15 mg Tab Take 1 tablet (15 mg total) by mouth daily with dinner or evening meal.  Patient taking differently: Take 15 mg by mouth once daily. 5/18/23 11/1/23  Jourdan Brown MD   sacubitriL-valsartan (ENTRESTO) 24-26 mg per tablet Take 1 tablet by mouth 2 (two) times daily. 1/2 tab PO BID 10/13/23   Terence Medrano MD   vericiguat (VERQUVO) 5 mg Tab Take 2.5 mg by mouth 2 (two) times a day. 10/13/23   Terence Medrano MD       Inpatient Medications:    Current Outpatient Medications:     albuterol (PROVENTIL/VENTOLIN HFA) 90 mcg/actuation inhaler, INHALE 2 PUFFS BY MOUTH EVERY 6 HOURS AS NEEDED FOR WHEEZING, Disp: 18 g, Rfl: 5    amiodarone (PACERONE) 200 MG Tab, Take 1 tablet (200 mg total) by mouth once daily., Disp: 30 tablet, Rfl: 0    atorvastatin (LIPITOR) 20 MG tablet, Take 20 mg by mouth every evening., Disp: , Rfl:     Ca-D3-mag ox-zinc--thom-bor 600 mg calcium- 20 mcg-50 mg Tab, Take 1 tablet by mouth 2 (two)  times daily., Disp: , Rfl:     cholecalciferol, vitamin D3, 125 mcg (5,000 unit) Tab, Take 5,000 Units by mouth 2 (two) times daily., Disp: , Rfl:     digoxin (LANOXIN) 125 mcg tablet, Take 1 tablet (0.125 mg total) by mouth once daily., Disp: 30 tablet, Rfl: 0    dorzolamide-timolol 2-0.5% (COSOPT) 22.3-6.8 mg/mL ophthalmic solution, Place 1 drop into both eyes 2 (two) times daily., Disp: , Rfl:     fluticasone propionate (FLONASE) 50 mcg/actuation nasal spray, 2 sprays (100 mcg total) by Each Nostril route once daily., Disp: 16 g, Rfl: 5    gabapentin (NEURONTIN) 100 MG capsule, TAKE 2 CAPSULES(200 MG) BY MOUTH THREE TIMES DAILY, Disp: 180 capsule, Rfl: 5    glimepiride (AMARYL) 1 MG tablet, Take 1 tablet (1 mg total) by mouth before breakfast., Disp: 90 tablet, Rfl: 1    latanoprost 0.005 % ophthalmic solution, Place 1 drop into both eyes nightly., Disp: , Rfl:     midodrine (PROAMATINE) 5 MG Tab, Take 1 tablet (5 mg total) by mouth 3 (three) times daily before meals., Disp: 90 tablet, Rfl: 0    pantoprazole (PROTONIX) 40 MG tablet, Take 1 tablet (40 mg total) by mouth once daily., Disp: 30 tablet, Rfl: 5    rivaroxaban (XARELTO) 15 mg Tab, Take 1 tablet (15 mg total) by mouth daily with dinner or evening meal. (Patient taking differently: Take 15 mg by mouth once daily.), Disp: 90 tablet, Rfl: 0    sacubitriL-valsartan (ENTRESTO) 24-26 mg per tablet, Take 1 tablet by mouth 2 (two) times daily. 1/2 tab PO BID, Disp: 30 tablet, Rfl: 0    vericiguat (VERQUVO) 5 mg Tab, Take 2.5 mg by mouth 2 (two) times a day., Disp: 30 tablet, Rfl: 0  No current facility-administered medications for this encounter.    Facility-Administered Medications Ordered in Other Encounters:     0.9%  NaCl infusion, , Intravenous, Continuous, Eliu, Sebastian III, MD, Last Rate: 75 mL/hr at 12/13/19 0728, New Bag at 12/13/19 0728    diphenhydrAMINE injection 25 mg, 25 mg, Intravenous, Once, Sebastian Nowak III, MD    lorazepam injection 1 mg, 1  mg, Intravenous, Once, Sebastian Nowak III, MD     Anticoagulants/Antiplatelets:   no anticoagulation    Allergies:   Review of patient's allergies indicates:   Allergen Reactions    Codeine Other (See Comments)     nausea    Hydrocodone Nausea And Vomiting    Lasix [furosemide]      rash       Review of Systems:   As documented in primary provider H&P.    Vital Signs (Most Recent):       Physical Exam:  No acute distress, laying comfortably in bed, pleasant and cooperative  Regular rate and rhythm  Breathing unlabored  Abdomen benign  Extremities warm and well perfused    Sedation Exam:  ASA: II - Patient appears to have mild systemic disease, adequately controlled   Mallampati: II (hard and soft palate, upper portion of tonsils anduvula visible)     Laboratory:  Lab Results   Component Value Date    INR 1.0 01/12/2024       Lab Results   Component Value Date    WBC 7.54 01/12/2024    HGB 11.1 (L) 01/12/2024    HCT 36.0 (L) 01/12/2024    MCV 90 01/12/2024     01/12/2024      Lab Results   Component Value Date     01/12/2024     01/12/2024    K 4.4 01/12/2024    CL 99 01/12/2024    CO2 31 (H) 01/12/2024    BUN 17 01/12/2024    CREATININE 1.7 (H) 01/12/2024    CALCIUM 9.9 01/12/2024    MG 2.1 10/08/2023    ALT 9 (L) 01/12/2024    AST 9 (L) 01/12/2024    ALBUMIN 2.6 (L) 01/12/2024    BILITOT 0.7 01/12/2024       Imaging:  Reviewed.      ASSESSMENT/PLAN:   Jo Alegre is an 82 yo woman who presents for perinephric fluid collection drainage w possible drain placement.                      Sedation Plan: Moderate sedation  Patient will undergo: perinephric fluid collection drainage w possible drain placement      Herber Choi MD  R1 Interventional/Diagnostic Radiology

## 2024-01-12 NOTE — PROCEDURES
IR Post-Procedure Note      Pre Op Diagnosis:  L perirenal fluid collection    Post Op Diagnosis:  same    Procedure:  Image Guided Drain Placement    Procedure performed by:  Roscoe Haley MD  /  Marcos Melchor    Written Informed Consent Obtained: Yes    Specimen Removed:  Yes; aspirate from fluid collection    Estimated Blood Loss: minimal    Findings:    US was used for localization of abnormal fluid collection.     Successful placement of 12 [place] all-purpose drainage catheter in the L perirenal collection.  Approximately  20 mL of bloody purulent fluid was removed. A specimen was sent to the lab for further analysis and culture.    The patient tolerated procedure well and there were no complications. Please see procedure report under Imaging for further details.    Plan:    Connect tube to suction bulb.    Flush drain with 10 cc normal saline daily.     Consider drain removal once drain output < 10 cc daily for 2 consecutive days with clinical improvement and imaging evidence of fluid collection resolution.       Roscoe Haley MD MSCR  PGY-5 Radiology Resident

## 2024-01-12 NOTE — PLAN OF CARE
Patient arrived to room. PIV placed. Admit assessment completed. Plan of care discussed with patient. Daughter at bedside. Nurse call bell within reach. Will monitor

## 2024-01-12 NOTE — PLAN OF CARE
Pt arrived to IR rm 190 for Abscess drainage with tube placement. Pt oriented to unit and staff, Pt safely transferred from stretcher to procedural table. Fall risk reviewed and comfort measures utilized with interventions. Safety strap applied, position pillows to minimize pressure points. Blankets applied. Pt prepped and draped utilizing standard sterile technique. Patient placed on continuous monitoring, as required by sedation policy. Timeouts implemented utilizing standard universal time-out per department and facility policy. CRNA to remain at bedside with continuous monitoring. Pt resting comfortably. Denies pain/discomfort. Will continue to monitor. See flow sheets for monitoring, medication administration, and updates. patient verbalizes understanding.

## 2024-01-12 NOTE — PROGRESS NOTES
"Daughters to room and then return to WR / " we will let her sleep" reports will be in 2nd floor WR   "

## 2024-01-12 NOTE — PLAN OF CARE
Procedure complete. Pt tolerated well. Recovery for 2 hr. 12 fr tube placed, Site CDI. Pt transferred to MPU bay 4 and report to be given bedside.

## 2024-01-12 NOTE — PROGRESS NOTES
Pt ready for D/C home / daughters in room and assisting with dressing / drain noted and open to KELLY drain / review D/C inst with family and when to call or go to ED as well as S&S of infection / follow up as directed / inst review by Denia MALAVE

## 2024-01-12 NOTE — DISCHARGE INSTRUCTIONS
For scheduling: Call Madeline at [phone]    For questions or concerns call: PIEDAD MON-FRI 8 AM- 5PM [phone]. Radiology resident on call [phone].    For immediate concerns that are not emergent, you may call our radiology clinic at: [phone]

## 2024-01-15 LAB — BACTERIA SPEC AEROBE CULT: ABNORMAL

## 2024-01-16 ENCOUNTER — TELEPHONE (OUTPATIENT)
Dept: UROLOGY | Facility: CLINIC | Age: 84
End: 2024-01-16
Payer: MEDICARE

## 2024-01-16 DIAGNOSIS — N20.0 STAGHORN CALCULUS: Primary | ICD-10-CM

## 2024-01-16 LAB — BACTERIA SPEC ANAEROBE CULT: NORMAL

## 2024-01-16 RX ORDER — CIPROFLOXACIN 500 MG/1
500 TABLET ORAL 2 TIMES DAILY
Qty: 20 TABLET | Refills: 0 | Status: SHIPPED | OUTPATIENT
Start: 2024-01-16 | End: 2024-01-26

## 2024-01-16 NOTE — TELEPHONE ENCOUNTER
----- Message from Jessie Dorsey sent at 1/16/2024 10:13 AM CST -----  Contact: daughter  Type: Needs Medical Advice  Who Called:  Thad Wadsworth Call Back Number: [phone]  Additional Information: States she would like to speak with office regarding pt drain has some questions.Please call back

## 2024-01-19 ENCOUNTER — EXTERNAL HOME HEALTH (OUTPATIENT)
Dept: HOME HEALTH SERVICES | Facility: HOSPITAL | Age: 84
End: 2024-01-19
Payer: MEDICARE

## 2024-01-22 RX ORDER — METOPROLOL SUCCINATE 25 MG/1
TABLET, EXTENDED RELEASE ORAL DAILY
COMMUNITY
End: 2024-03-07

## 2024-01-22 RX ORDER — CETIRIZINE HYDROCHLORIDE 10 MG/1
10 TABLET ORAL NIGHTLY
COMMUNITY
Start: 2023-12-21

## 2024-01-22 RX ORDER — BUMETANIDE 1 MG/1
TABLET ORAL DAILY
COMMUNITY

## 2024-01-22 RX ORDER — CHLOPHEDIANOL HCL AND PYRILAMINE MALEATE 12.5; 12.5 MG/5ML; MG/5ML
10 SOLUTION ORAL EVERY 6 HOURS PRN
COMMUNITY
Start: 2023-12-29 | End: 2024-03-07

## 2024-01-22 RX ORDER — METHYLPREDNISOLONE 4 MG/1
TABLET ORAL
COMMUNITY
Start: 2023-12-21 | End: 2024-03-07

## 2024-01-23 ENCOUNTER — OFFICE VISIT (OUTPATIENT)
Dept: PULMONOLOGY | Facility: CLINIC | Age: 84
End: 2024-01-23
Payer: MEDICARE

## 2024-01-23 ENCOUNTER — TELEPHONE (OUTPATIENT)
Dept: UROLOGY | Facility: CLINIC | Age: 84
End: 2024-01-23
Payer: MEDICARE

## 2024-01-23 VITALS
DIASTOLIC BLOOD PRESSURE: 80 MMHG | BODY MASS INDEX: 32.26 KG/M2 | WEIGHT: 206 LBS | SYSTOLIC BLOOD PRESSURE: 122 MMHG | HEART RATE: 61 BPM

## 2024-01-23 DIAGNOSIS — G47.33 OBSTRUCTIVE SLEEP APNEA SYNDROME: Primary | ICD-10-CM

## 2024-01-23 PROCEDURE — 3079F DIAST BP 80-89 MM HG: CPT | Mod: CPTII,S$GLB,, | Performed by: INTERNAL MEDICINE

## 2024-01-23 PROCEDURE — 1160F RVW MEDS BY RX/DR IN RCRD: CPT | Mod: CPTII,S$GLB,, | Performed by: INTERNAL MEDICINE

## 2024-01-23 PROCEDURE — 3074F SYST BP LT 130 MM HG: CPT | Mod: CPTII,S$GLB,, | Performed by: INTERNAL MEDICINE

## 2024-01-23 PROCEDURE — 1159F MED LIST DOCD IN RCRD: CPT | Mod: CPTII,S$GLB,, | Performed by: INTERNAL MEDICINE

## 2024-01-23 PROCEDURE — 1101F PT FALLS ASSESS-DOCD LE1/YR: CPT | Mod: CPTII,S$GLB,, | Performed by: INTERNAL MEDICINE

## 2024-01-23 PROCEDURE — 3288F FALL RISK ASSESSMENT DOCD: CPT | Mod: CPTII,S$GLB,, | Performed by: INTERNAL MEDICINE

## 2024-01-23 PROCEDURE — 99214 OFFICE O/P EST MOD 30 MIN: CPT | Mod: S$GLB,,, | Performed by: INTERNAL MEDICINE

## 2024-01-23 PROCEDURE — 1126F AMNT PAIN NOTED NONE PRSNT: CPT | Mod: CPTII,S$GLB,, | Performed by: INTERNAL MEDICINE

## 2024-01-23 NOTE — TELEPHONE ENCOUNTER
Spoke with patient's daughter, she stated that the patient had some drainage coming from site, she saw the pulm today. They looked at it and told her its normal and to just remove that dressing and put some fresh dressing. It is draining properly. The patient has a appt on next week, the daughter was just wanting to let the provider know and is there any  advisement, or should they also do other things. Informed will give message to provider and contact her back with advisement, she verbally understood.

## 2024-01-23 NOTE — PROGRESS NOTES
"Office Visit *    Patient Name: Jo Alegre  MRN: [id number]  : 1940      Reason for visit: COPD, hypoxemia    HPI:     2020 - Referred here for evaluation for possible COPD.  She was hospitalized in 2020 and found to need O2  (sat 87% with exertion) - has home O2 (2 LPM) and she wears all day.  She has a h/o smoking about 1/2 PPD for about 30 years quit about .  She has never been tested for COPD.  She has sleep study in the past and was diagnosed with sleep apnea but couldn't tolerate CPAP at that time.  Has h/o CHF (though most recent ECHO looks OK), AICD, atrial fibrillation.    2020 - Here for follow up, doing well with no new complaints.  Had PFT - no obstruction, TLC at lower level of normal and mild/mod reduction in DLCO.  ^ minute walk test was good she met her predicted distance and had no desaturation noted.  She remains at risk for sleep apnea - d/w her and family - will do overnight oximetry to check on nightly O2 needs and as a screen for possible JENNIFER ( necessary will do HST).  No corona virus exposures and has been practicing social distancing.  Discussed the need to work on weight loss and regular exercise.    2/3/2021 - Here for follow up, overall about the same.  Has CPAP at home and is doing "OK" with it but having some tolerance issues.  She is trying to be more active.  She feels that her SOB is getting worse and does have episodes of SOB happening at rest which last 3-4 minutes and spontaneously resolve .  She does have exertional dyspnea but is not exercising at all.  Patient has no known corona virus exposures and has been practicing social distancing.  We have discussed the virus and precautions and all questions have been answered.    2021 - Here for follow up, has had adjustments with her CPAP and she is doing much better (8-10 hours per night), feels better overall and reports good oxygen levels during the day and energy levels.  No other new issues.  " Patient has no known corona virus exposures and has been practicing social distancing.  We have discussed the virus and precautions and all questions have been answered.    10/5/2021 - Here for follow up and hasd been doing well.  Patient is here for follow up visit for sleep apnea and therapy.  They report no issues with their machine.  They report good compliance with the therapy and feel that they are benefiting from it.  Discussed alternative therapies/options as appropriate.  Discussed diet, weight and exercise as appropriate.  All questions answered.  Has not been able to wear her CPAP for the last week or so due to recent dental work (we discussed this).  Patient has no known corona virus exposures and has been practicing social distancing.  We have discussed the virus and precautions and all questions have been answered.  She does NOT have COPD by PFT done 7/2020.    8/18/2022 - Here for preop clearance for renal surgery for staghorn calculus.  Breathing is about the same though she may have some more dyspnea since her recent admission.  No fever, chills, cough.  She does NOT have COPD.  She is doing well with her CPAP.  She is planned for surgery next week.    Preoperative Pulmonary Clearance    Pt was seen for preoperative clearance from a pulmonary standpoint for planned renal surgery.  The risks of the procedure have been discussed with the patient including the risk of prolonged ventilatory support/difficulty weaning from ventilator, post-procedure pneumonia, post-procedure respiratory failure and DVT/pulmonary embolism.  The pt is currently stable from their respiratory status and they are cleared for the planned procedure at increased risk.  The risk should not be considered prohibitive.  If you have any questions please contact me.  She should have CPAP available in postoperative period and be moonitored because of her h/o JENNIFER>    8/17/2023 - Here for follow up, had her surgery and did well.   "Breathing has been OK.  Was hospitalized in May for heart issues.  She is having some issues with her O2 concentrator and she will contact ME[facility] ( we will get involved if needed).     10/23/2023 - Here for follow up, she is not happy with [facility] and would like to look into changing to [facility] (we will look into that).  Breathing has been about the same.  She has not been using her CPAP because of issues with her mask and they are going to see about that.  Ad CT chest (see below) and she is to see urology today and we will follow small nodules.    1/23/2024 - Here for follow up, Patient is here for follow up visit for sleep apnea and therapy.  They report no issues with their machine.  They report good compliance with the therapy and feel that they are benefiting from it.  Discussed alternative therapies/options as appropriate.  Discussed diet, weight and exercise as appropriate.  All questions answered.   Has some concerns about the noise of her machine.  She has had a drain placed into a "cyst" on her left kidney and she has some drainage around the tube and we looked at that.      CPAP Therapy    CPAP therapy - 12    Date of sleep study - 9/20 RDI - 23    Pt reports good compliance and benefit with the therapy.    Face to face visit with pt concerning their CPAP therapy.  I have stressed continued compliance with the treatment and all questions were answered.  Supply refills will be taken care of as needed.            Past Medical History    Past Medical History:   Diagnosis Date    Allergy     Codeine, Lasix    Atrial fibrillation     Atrial fibrillation     Cataract     CHF (congestive heart failure)     Diabetes mellitus, type 2     Ejection fraction < 50% 10/18/2017    Approximately 35%  Based on prior  Echocardiogram.    Encounter for blood transfusion     Hyperlipidemia     Osteoporosis     PONV (postoperative nausea and vomiting)     Thyroid disorder screening 10/17/2017    TSH of 1.12 ordered by Dr. price " Robert       Past Surgical History    Past Surgical History:   Procedure Laterality Date    ANTEGRADE NEPHROSTOGRAPHY Left 8/25/2022    Procedure: NEPHROSTOGRAM, ANTEGRADE;  Surgeon: Shelby Parra MD;  Location: [facility];  Service: Urology;  Laterality: Left;    CHOLECYSTECTOMY  1997    [place]     COLONOSCOPY      CYSTOSCOPY W/ URETERAL STENT PLACEMENT Left 7/17/2022    Procedure: CYSTOSCOPY, WITH URETERAL STENT INSERTION;  Surgeon: Shelby Parra MD;  Location: [facility] OR;  Service: Urology;  Laterality: Left;    CYSTOSCOPY W/ URETERAL STENT REMOVAL Left 9/21/2022    Procedure: CYSTOSCOPY, WITH URETERAL STENT REMOVAL;  Surgeon: Shelby Parra MD;  Location: [facility] OR;  Service: Urology;  Laterality: Left;    CYSTOSCOPY WITH URETEROSCOPY, RETROGRADE PYELOGRAPHY, AND INSERTION OF STENT Left 8/25/2022    Procedure: CYSTOSCOPY, WITH RETROGRADE PYELOGRAM AND URETERAL STENT INSERTION;  Surgeon: Shelby Parra MD;  Location: [facility];  Service: Urology;  Laterality: Left;    EYE SURGERY      bilateral cataracts    FINGER SURGERY Right 2021    right pinky finger    FLEXIBLE CYSTOSCOPY Left 8/25/2022    Procedure: CYSTOSCOPY, FLEXIBLE WITH STENT REMOVAL;  Surgeon: Shelby Parra MD;  Location: [facility];  Service: Urology;  Laterality: Left;    FRACTURE SURGERY  2014    right femur with neville    HEMORRHOID SURGERY      48 yrs ago    HYSTERECTOMY      NEPHROSTOMY Left 8/25/2022    Procedure: CREATION, NEPHROSTOMY;  Surgeon: Shelby Parra MD;  Location: [facility] OR;  Service: Urology;  Laterality: Left;    PERCUTANEOUS NEPHROLITHOTOMY N/A 8/25/2022    Procedure: NEPHROLITHOTOMY, PERCUTANEOUS;  Surgeon: Shelby Parra MD;  Location: [facility] OR;  Service: Urology;  Laterality: N/A;    REPLACEMENT OF IMPLANTABLE CARDIOVERTER-DEFIBRILLATOR (ICD) GENERATOR N/A 12/13/2019    Procedure: REPLACEMENT, PULSE GENERATOR, ICD-MEDTRONIC;  Surgeon: Sebastian Nowak III, MD;  Location: [facility] CATH;  Service: Cardiology;   Laterality: N/A;    TONSILLECTOMY         Medications      Current Outpatient Medications:     albuterol (PROVENTIL/VENTOLIN HFA) 90 mcg/actuation inhaler, INHALE 2 PUFFS BY MOUTH EVERY 6 HOURS AS NEEDED FOR WHEEZING, Disp: 18 g, Rfl: 5    amiodarone (PACERONE) 200 MG Tab, Take 1 tablet (200 mg total) by mouth once daily., Disp: 30 tablet, Rfl: 0    atorvastatin (LIPITOR) 20 MG tablet, Take 20 mg by mouth every evening., Disp: , Rfl:     bumetanide (BUMEX) 1 MG tablet, once daily., Disp: , Rfl:     Ca-D3-mag ox-zinc--thom-bor 600 mg calcium- 20 mcg-50 mg Tab, Take 1 tablet by mouth 2 (two) times daily., Disp: , Rfl:     cetirizine (ZYRTEC) 10 MG tablet, Take 10 mg by mouth every evening., Disp: , Rfl:     cholecalciferol, vitamin D3, 125 mcg (5,000 unit) Tab, Take 5,000 Units by mouth 2 (two) times daily., Disp: , Rfl:     ciprofloxacin HCl (CIPRO) 500 MG tablet, Take 1 tablet (500 mg total) by mouth 2 (two) times daily. for 10 days, Disp: 20 tablet, Rfl: 0    digoxin (LANOXIN) 125 mcg tablet, Take 1 tablet (0.125 mg total) by mouth once daily., Disp: 30 tablet, Rfl: 0    dorzolamide-timolol 2-0.5% (COSOPT) 22.3-6.8 mg/mL ophthalmic solution, Place 1 drop into both eyes 2 (two) times daily., Disp: , Rfl:     fluticasone propionate (FLONASE) 50 mcg/actuation nasal spray, 2 sprays (100 mcg total) by Each Nostril route once daily., Disp: 16 g, Rfl: 5    gabapentin (NEURONTIN) 100 MG capsule, TAKE 2 CAPSULES(200 MG) BY MOUTH THREE TIMES DAILY, Disp: 180 capsule, Rfl: 5    glimepiride (AMARYL) 1 MG tablet, Take 1 tablet (1 mg total) by mouth before breakfast., Disp: 90 tablet, Rfl: 1    latanoprost 0.005 % ophthalmic solution, Place 1 drop into both eyes nightly., Disp: , Rfl:     methylPREDNISolone (MEDROL DOSEPACK) 4 mg tablet, Take by mouth., Disp: , Rfl:     metoprolol succinate (TOPROL-XL) 25 MG 24 hr tablet, once daily., Disp: , Rfl:     midodrine (PROAMATINE) 5 MG Tab, Take 1 tablet (5 mg total) by mouth 3  (three) times daily before meals. (Patient taking differently: Take 5 mg by mouth 3 (three) times daily before meals. States only takes as needed), Disp: 90 tablet, Rfl: 0    NINJACOF 12.5-12.5 mg/5 mL Liqd, Take 10 mLs by mouth every 6 (six) hours as needed., Disp: , Rfl:     pantoprazole (PROTONIX) 40 MG tablet, Take 1 tablet (40 mg total) by mouth once daily., Disp: 30 tablet, Rfl: 5    sacubitriL-valsartan (ENTRESTO) 24-26 mg per tablet, Take 1 tablet by mouth 2 (two) times daily. 1/2 tab PO BID, Disp: 30 tablet, Rfl: 0    vericiguat (VERQUVO) 5 mg Tab, Take 2.5 mg by mouth 2 (two) times a day., Disp: 30 tablet, Rfl: 0    rivaroxaban (XARELTO) 15 mg Tab, Take 1 tablet (15 mg total) by mouth daily with dinner or evening meal. (Patient taking differently: Take 15 mg by mouth once daily.), Disp: 90 tablet, Rfl: 0  No current facility-administered medications for this visit.    Facility-Administered Medications Ordered in Other Visits:     0.9%  NaCl infusion, , Intravenous, Continuous, Sebastian Nowak III, MD, Last Rate: 75 mL/hr at 12/13/19 0728, New Bag at 12/13/19 0728    diphenhydrAMINE injection 25 mg, 25 mg, Intravenous, Once, Sebastian Nowak III, MD    lorazepam injection 1 mg, 1 mg, Intravenous, Once, Sebastian Nowak III, MD    Allergies    Review of patient's allergies indicates:   Allergen Reactions    Codeine Other (See Comments)     nausea    Hydrocodone Nausea And Vomiting    Lasix [furosemide]      rash       SocHx    Social History     Tobacco Use   Smoking Status Former    Passive exposure: Past   Smokeless Tobacco Never   Tobacco Comments    quit 2013       Social History     Substance and Sexual Activity   Alcohol Use No       Drug Use - no  Occupation - retired, housewife  Asbestos exposure - no  Pets - no    FMHx    Family History   Problem Relation Age of Onset    Heart disease Mother     Cancer Father         Lung Cancer ??? Asbestos    Breast cancer Paternal Aunt          Review of  Systems  Review of Systems   Constitutional:  Negative for chills, diaphoresis, fever, malaise/fatigue and weight loss.   HENT:  Negative for congestion.    Eyes:  Negative for pain.   Respiratory:  Positive for shortness of breath. Negative for cough, hemoptysis, sputum production, wheezing and stridor.    Cardiovascular:  Positive for leg swelling. Negative for chest pain, palpitations, orthopnea, claudication and PND.        Decreased circulation   Gastrointestinal:  Negative for abdominal pain, blood in stool, constipation, diarrhea, heartburn, nausea and vomiting.   Genitourinary:  Negative for dysuria, frequency, hematuria and urgency.   Musculoskeletal:  Negative for back pain, falls, myalgias and neck pain.        Some aches and pains   Skin:  Negative for itching and rash.   Neurological:  Negative for dizziness, tingling, tremors, sensory change, speech change, focal weakness, seizures, loss of consciousness, weakness and headaches.   Psychiatric/Behavioral:  Negative for depression, substance abuse and suicidal ideas. The patient is not nervous/anxious.        Physical Exam    Vitals:    01/23/24 1118   BP: 122/80   BP Location: Left arm   Patient Position: Sitting   BP Method: Medium (Manual)   Pulse: 61   SpO2: (!) (P) 91%   Weight: 93.4 kg (206 lb)       Physical Exam  Vitals and nursing note reviewed.   Constitutional:       General: She is not in acute distress.     Appearance: She is well-developed. She is obese. She is not ill-appearing, toxic-appearing or diaphoretic.      Comments: No distress   HENT:      Head: Normocephalic and atraumatic.      Right Ear: External ear normal.      Left Ear: External ear normal.      Nose: Nose normal.   Eyes:      General: No scleral icterus.        Right eye: No discharge.         Left eye: No discharge.      Extraocular Movements: Extraocular movements intact.      Conjunctiva/sclera: Conjunctivae normal.      Pupils: Pupils are equal, round, and reactive to  light.   Neck:      Thyroid: No thyromegaly.      Vascular: No JVD.      Trachea: No tracheal deviation.   Cardiovascular:      Rate and Rhythm: Normal rate and regular rhythm.      Pulses: Normal pulses.      Heart sounds: Normal heart sounds. No murmur heard.     No friction rub. No gallop.      Comments: Defibrillator in place  Pulmonary:      Effort: Pulmonary effort is normal. No respiratory distress.      Breath sounds: Normal breath sounds. No stridor. No wheezing or rales.   Chest:      Chest wall: No tenderness.   Abdominal:      General: Bowel sounds are normal. There is no distension.      Palpations: Abdomen is soft.      Tenderness: There is no abdominal tenderness. There is no guarding.      Comments: obese   Musculoskeletal:         General: Swelling present. No tenderness or deformity. Normal range of motion.      Cervical back: Normal range of motion and neck supple. No rigidity. No muscular tenderness.      Right lower leg: Edema present.      Left lower leg: Edema present.   Lymphadenopathy:      Cervical: No cervical adenopathy.   Skin:     General: Skin is warm and dry.      Coloration: Skin is not jaundiced.   Neurological:      General: No focal deficit present.      Mental Status: She is alert and oriented to person, place, and time.      Cranial Nerves: No cranial nerve deficit.   Psychiatric:         Mood and Affect: Mood normal.         Behavior: Behavior normal.         Thought Content: Thought content normal.         Judgment: Judgment normal.         Labs    Lab Results   Component Value Date    WBC 7.54 01/12/2024    HGB 11.1 (L) 01/12/2024    HCT 36.0 (L) 01/12/2024     01/12/2024       Sodium   Date Value Ref Range Status   01/12/2024 142 136 - 145 mmol/L Final     Potassium   Date Value Ref Range Status   01/12/2024 4.4 3.5 - 5.1 mmol/L Final     Chloride   Date Value Ref Range Status   01/12/2024 99 95 - 110 mmol/L Final     CO2   Date Value Ref Range Status   01/12/2024 31  (H) 23 - 29 mmol/L Final     Glucose   Date Value Ref Range Status   01/12/2024 100 70 - 110 mg/dL Final     BUN   Date Value Ref Range Status   01/12/2024 17 8 - 23 mg/dL Final     Creatinine   Date Value Ref Range Status   01/12/2024 1.7 (H) 0.5 - 1.4 mg/dL Final     Calcium   Date Value Ref Range Status   01/12/2024 9.9 8.7 - 10.5 mg/dL Final     Total Protein   Date Value Ref Range Status   01/12/2024 6.8 6.0 - 8.4 g/dL Final     Albumin   Date Value Ref Range Status   01/12/2024 2.6 (L) 3.5 - 5.2 g/dL Final     Total Bilirubin   Date Value Ref Range Status   01/12/2024 0.7 0.1 - 1.0 mg/dL Final     Comment:     For infants and newborns, interpretation of results should be based  on gestational age, weight and in agreement with clinical  observations.    Premature Infant recommended reference ranges:  Up to 24 hours.............<8.0 mg/dL  Up to 48 hours............<12.0 mg/dL  3-5 days..................<15.0 mg/dL  6-29 days.................<15.0 mg/dL       Alkaline Phosphatase   Date Value Ref Range Status   01/12/2024 103 55 - 135 U/L Final     AST   Date Value Ref Range Status   01/12/2024 9 (L) 10 - 40 U/L Final     ALT   Date Value Ref Range Status   01/12/2024 9 (L) 10 - 44 U/L Final     Anion Gap   Date Value Ref Range Status   01/12/2024 12 8 - 16 mmol/L Final       Xrays    EXAMINATION:  XR CHEST PA AND LATERAL     CLINICAL HISTORY:  shortness of breath     FINDINGS:  PA and lateral chest is compared to 01/17/2020 shows normal cardiomediastinal silhouette.     Lungs are clear. Pulmonary vasculature is normal. No acute osseous abnormality.     Impression:     No acute pulmonary process        Electronically signed by: Monica Aquino MD  Date:                                            04/01/2020  Time:                                           16:57    CT chest (10/16/23)  Complex left upper quadrant mass or collection, likely rising from the left kidney.  This may represent a complex cystic neoplasm,  or abscess.  The finding extends through the left hemidiaphragm into the left pleural space, where a small left pleural effusion is present.  The appearance is concerning for fistulization with the left pleural space.  Mild left basilar infiltrate may represent pneumonia.  Multiple renal cysts including hemorrhagic cysts as described above, without detrimental change.  Left nephrolithiasis.  No hydronephrosis.  Evidence for prior left nephrostomy.  Emphysema.  Few small pulmonary nodules.  For a part solid nodule 6 mm or greater, Fleischner Society 2017 guidelines recommend follow up with non-contrast chest CT at 3-6 months to confirm persistence. If this nodule is unchanged and the solid component remains <6 mm, annual follow up CT should be performed for 5 years; however, persistence of a part-solid nodule with solid component ?6 mm should be considered highly suspicious and may warrant further workup with PET/CT, biopsy, or resection.  Osseous demineralization.  Multiple moderate thoracolumbar compression fractures.  Left adrenal adenoma, unchanged.  This report was flagged in Epic as abnormal.      ECHO (4/1)  Mild eccentric left ventricular hypertrophy.  Mild left ventricular enlargement.  Normal left ventricular systolic function. The estimated ejection fraction is 60%.  Grade I (mild) left ventricular diastolic dysfunction consistent with impaired relaxation.  No wall motion abnormalities.  Normal right ventricular systolic function.  Severe left atrial enlargement.  Mild mitral sclerosis.  There is mild leaflet calcification of the Mitral Valve.  Mild mitral regurgitation.  Intermediate central venous pressure (8 mmHg).  The estimated PA systolic pressure is 37 mmHg    Impression/Plan    Problem List Items Addressed This Visit          Other    Obstructive sleep apnea syndrome - Primary     Continue present PAP therapy  Pt to call if they have any trouble with their machines  Discussed with pt about signs  and symptoms to watch for  Will refill supplies as needed  Have encouraged pt to continue to work on diet, weight loss and exercise                        Alverto Pascal MD

## 2024-01-23 NOTE — TELEPHONE ENCOUNTER
----- Message from Jessie Dorsey sent at 1/23/2024  3:44 PM CST -----  Contact: Daughter  Type: Needs Medical Advice  Who Called:  Leidy/Daughter    Best Call Back Number:  [phone]    Additional Information: States pt drain is leaking and she would like to see what she need to do can  come in tomorrow if needed.Please call back

## 2024-01-23 NOTE — ASSESSMENT & PLAN NOTE
Continue present PAP therapy  Pt to call if they have any trouble with their machines  Discussed with pt about signs and symptoms to watch for  Will refill supplies as needed  Have encouraged pt to continue to work on diet, weight loss and exercise

## 2024-01-24 ENCOUNTER — HOSPITAL ENCOUNTER (OUTPATIENT)
Dept: RADIOLOGY | Facility: HOSPITAL | Age: 84
Discharge: HOME | End: 2024-01-24
Attending: STUDENT IN AN ORGANIZED HEALTH CARE EDUCATION/TRAINING PROGRAM
Payer: MEDICARE

## 2024-01-24 DIAGNOSIS — N20.0 STAGHORN CALCULUS: ICD-10-CM

## 2024-01-24 PROCEDURE — 74176 CT ABD & PELVIS W/O CONTRAST: CPT | Mod: 26,,, | Performed by: RADIOLOGY

## 2024-01-24 PROCEDURE — 74176 CT ABD & PELVIS W/O CONTRAST: CPT | Mod: TC

## 2024-01-29 ENCOUNTER — OFFICE VISIT (OUTPATIENT)
Dept: UROLOGY | Facility: CLINIC | Age: 84
End: 2024-01-29
Payer: MEDICARE

## 2024-01-29 VITALS — HEIGHT: 67 IN | RESPIRATION RATE: 18 BRPM | WEIGHT: 206 LBS | BODY MASS INDEX: 32.33 KG/M2

## 2024-01-29 DIAGNOSIS — R18.8 RETROPERITONEAL FLUID COLLECTION: Primary | ICD-10-CM

## 2024-01-29 DIAGNOSIS — N20.0 STAGHORN CALCULUS: ICD-10-CM

## 2024-01-29 PROCEDURE — 1126F AMNT PAIN NOTED NONE PRSNT: CPT | Mod: CPTII,S$GLB,, | Performed by: STUDENT IN AN ORGANIZED HEALTH CARE EDUCATION/TRAINING PROGRAM

## 2024-01-29 PROCEDURE — 82570 ASSAY OF URINE CREATININE: CPT | Performed by: STUDENT IN AN ORGANIZED HEALTH CARE EDUCATION/TRAINING PROGRAM

## 2024-01-29 PROCEDURE — 99999 PR PBB SHADOW E&M-EST. PATIENT-LVL IV: CPT | Mod: PBBFAC,,, | Performed by: STUDENT IN AN ORGANIZED HEALTH CARE EDUCATION/TRAINING PROGRAM

## 2024-01-29 PROCEDURE — 99214 OFFICE O/P EST MOD 30 MIN: CPT | Mod: S$GLB,,, | Performed by: STUDENT IN AN ORGANIZED HEALTH CARE EDUCATION/TRAINING PROGRAM

## 2024-01-29 PROCEDURE — 3288F FALL RISK ASSESSMENT DOCD: CPT | Mod: CPTII,S$GLB,, | Performed by: STUDENT IN AN ORGANIZED HEALTH CARE EDUCATION/TRAINING PROGRAM

## 2024-01-29 PROCEDURE — 1159F MED LIST DOCD IN RCRD: CPT | Mod: CPTII,S$GLB,, | Performed by: STUDENT IN AN ORGANIZED HEALTH CARE EDUCATION/TRAINING PROGRAM

## 2024-01-29 PROCEDURE — 1101F PT FALLS ASSESS-DOCD LE1/YR: CPT | Mod: CPTII,S$GLB,, | Performed by: STUDENT IN AN ORGANIZED HEALTH CARE EDUCATION/TRAINING PROGRAM

## 2024-01-29 PROCEDURE — G2211 COMPLEX E/M VISIT ADD ON: HCPCS | Mod: S$GLB,,, | Performed by: STUDENT IN AN ORGANIZED HEALTH CARE EDUCATION/TRAINING PROGRAM

## 2024-01-29 RX ORDER — CIPROFLOXACIN 500 MG/1
500 TABLET ORAL EVERY 12 HOURS
Qty: 14 TABLET | Refills: 0 | Status: SHIPPED | OUTPATIENT
Start: 2024-01-29 | End: 2024-02-05

## 2024-01-29 NOTE — PROGRESS NOTES
[facility] Note    PCP: Kraig Alberto MD    Chief Complaint: kidney stones    SUBJECTIVE:       History of Present Illness:  Jo Alegre is a 83 y.o. female who presents to clinic for kidney stones. She is Established  to our clinic.     Has undergone IR drainage of fluid collection x 2 and now with drain placement.   CT shows continued presence of the collection with drain appearing to be within the left flank.     Drain did get pulled at some point. Hasn't drained anything in 5 days.   No fevers. No pain.     10/23/23  Patient has been having issues with recurrent left pleural effusion.  On her most recent CT there is a cystic fluid collection posterior to the left kidney with concern for involvement/fistulization of the left pleural space. This does appear to be away from the previous PCNL tract.     She is otherwise feeling well.  No fevers.   No hematuria.     4/24/23  Renal US 3/2/23: There are bilateral renal cysts some of which show internal debris. There is no hydronephrosis.    KUB 3/2/23: There are left renal calculi the largest of which measures 7 mm.    No flank pain. No hematuria. No dysuria.   Drinking 3-4 bottles of water a day.       11/16/22  Unable to get contrast with CT secondary to renal function.   She has a 10 mm mid pole stone and a 10 mm and 6 mm lower pole stone. No hydro.   Suggestive of hemorrhagic cysts.     She is without complaints today.   No pain.   No hematuria.   No UTI symptoms.       9/12/22  S/P left PCNL on 8/25/22.  Stone analysis: 80% Magnesium ammonium phosphate (struvite), 20% Calcium phosphate (apatite)   KUB: possible small residual stone in lower pole    Post op course uncomplicated.   No further hematuria. No flank pain.     8/1/22  Patient underwent stent placement on 7/17 for left staghorn stone. She had a fever to 100.3.  CT shows a large lower pole staghorn. Also has some complex cysts present.     This is her first stone episode.    No family hx of stones.   No hematuria prior to stent placement   Has a hx of recurrent UTIs.     Last urine culture: MO (7/2/22)    Lab Results   Component Value Date    CREATININE 1.7 (H) 01/12/2024     Hx of COPD, pulm HTN, a fib, CHF, HLD, CKD, DM, JENNIFER    Past medical, family, and social history reviewed as documented in chart with pertinent positive medical, family, and social history detailed in HPI.    Review of patient's allergies indicates:   Allergen Reactions    Codeine Other (See Comments)     nausea    Hydrocodone Nausea And Vomiting    Lasix [furosemide]      rash       Past Medical History:   Diagnosis Date    Allergy     Codeine, Lasix    Atrial fibrillation     Atrial fibrillation     Cataract     CHF (congestive heart failure)     Diabetes mellitus, type 2     Ejection fraction < 50% 10/18/2017    Approximately 35%  Based on prior  Echocardiogram.    Encounter for blood transfusion     Hyperlipidemia     Osteoporosis     PONV (postoperative nausea and vomiting)     Thyroid disorder screening 10/17/2017    TSH of 1.12 ordered by Dr. dee Jones     Past Surgical History:   Procedure Laterality Date    ANTEGRADE NEPHROSTOGRAPHY Left 8/25/2022    Procedure: NEPHROSTOGRAM, ANTEGRADE;  Surgeon: Shelby Parra MD;  Location: [facility] OR;  Service: Urology;  Laterality: Left;    CHOLECYSTECTOMY  1997    [place]     COLONOSCOPY      CYSTOSCOPY W/ URETERAL STENT PLACEMENT Left 7/17/2022    Procedure: CYSTOSCOPY, WITH URETERAL STENT INSERTION;  Surgeon: Shelby Parra MD;  Location: [facility] OR;  Service: Urology;  Laterality: Left;    CYSTOSCOPY W/ URETERAL STENT REMOVAL Left 9/21/2022    Procedure: CYSTOSCOPY, WITH URETERAL STENT REMOVAL;  Surgeon: Shelby Parra MD;  Location: [facility] OR;  Service: Urology;  Laterality: Left;    CYSTOSCOPY WITH URETEROSCOPY, RETROGRADE PYELOGRAPHY, AND INSERTION OF STENT Left 8/25/2022    Procedure: CYSTOSCOPY, WITH RETROGRADE PYELOGRAM AND URETERAL STENT  "INSERTION;  Surgeon: Shelby Parra MD;  Location: [facility] OR;  Service: Urology;  Laterality: Left;    EYE SURGERY      bilateral cataracts    FINGER SURGERY Right 2021    right pinky finger    FLEXIBLE CYSTOSCOPY Left 8/25/2022    Procedure: CYSTOSCOPY, FLEXIBLE WITH STENT REMOVAL;  Surgeon: Shelby Parra MD;  Location: [facility] OR;  Service: Urology;  Laterality: Left;    FRACTURE SURGERY  2014    right femur with neville    HEMORRHOID SURGERY      48 yrs ago    HYSTERECTOMY      NEPHROSTOMY Left 8/25/2022    Procedure: CREATION, NEPHROSTOMY;  Surgeon: Shelby Parra MD;  Location: [facility] OR;  Service: Urology;  Laterality: Left;    PERCUTANEOUS NEPHROLITHOTOMY N/A 8/25/2022    Procedure: NEPHROLITHOTOMY, PERCUTANEOUS;  Surgeon: Shelby Parra MD;  Location: [facility] OR;  Service: Urology;  Laterality: N/A;    REPLACEMENT OF IMPLANTABLE CARDIOVERTER-DEFIBRILLATOR (ICD) GENERATOR N/A 12/13/2019    Procedure: REPLACEMENT, PULSE GENERATOR, ICD-MEDTRONIC;  Surgeon: Sebastian Nowak III, MD;  Location: [facility] CATH;  Service: Cardiology;  Laterality: N/A;    TONSILLECTOMY       Family History   Problem Relation Age of Onset    Heart disease Mother     Cancer Father         Lung Cancer ??? Asbestos    Breast cancer Paternal Aunt      Social History     Tobacco Use    Smoking status: Former     Passive exposure: Past    Smokeless tobacco: Never    Tobacco comments:     quit 2013   Substance Use Topics    Alcohol use: No    Drug use: No        Review of Systems   Genitourinary:  Negative for difficulty urinating, dysuria, flank pain, frequency, hematuria, nocturia and urgency.       OBJECTIVE:     Anticoagulation:  xarelto 20 mg     Estimated body mass index is 32.26 kg/m² as calculated from the following:    Height as of 1/12/24: 5' 7" (1.702 m).    Weight as of 1/23/24: 93.4 kg (206 lb).    Vital Signs (Most Recent)       Physical Exam  Vitals reviewed.   Constitutional:       General: She is not in acute distress.     " Appearance: Normal appearance. She is not ill-appearing.   HENT:      Head: Normocephalic and atraumatic.   Eyes:      General: No scleral icterus.  Cardiovascular:      Rate and Rhythm: Normal rate and regular rhythm.   Pulmonary:      Effort: Pulmonary effort is normal. No respiratory distress.   Abdominal:      General: There is no distension.      Palpations: Abdomen is soft.   Skin:     Coloration: Skin is not jaundiced.   Neurological:      General: No focal deficit present.      Mental Status: She is alert and oriented to person, place, and time.   Psychiatric:         Mood and Affect: Mood normal.         Behavior: Behavior normal.         BMP  Lab Results   Component Value Date     01/12/2024    K 4.4 01/12/2024    CL 99 01/12/2024    CO2 31 (H) 01/12/2024    BUN 17 01/12/2024    CREATININE 1.7 (H) 01/12/2024    CALCIUM 9.9 01/12/2024    ANIONGAP 12 01/12/2024    ESTGFRAFRICA 30.2 (A) 07/18/2022    EGFRNONAA 26.2 (A) 07/18/2022       Lab Results   Component Value Date    WBC 7.54 01/12/2024    HGB 11.1 (L) 01/12/2024    HCT 36.0 (L) 01/12/2024    MCV 90 01/12/2024     01/12/2024     Imaging:  Per HPI    ASSESSMENT     1. Retroperitoneal fluid collection    2. Staghorn calculus      PLAN:     - Drain removed   - Minimal fluid sent for creatinine check   - At this point has undergone x 2 attempted drainage without resolution   - Discussed options including continued trial of conservative management with abx as she is asymptomatic vs surgical washout and drainage   - I prefer to manage conservatively if possible   - Will extend course of cipro 500 mg another 7 days  - Will send to ID for further recommendations     Shelby Parra MD

## 2024-01-30 LAB
BODY FLUID SOURCE, CREATININE: NORMAL
CREAT FLD-MCNC: 5.7 MG/DL

## 2024-02-12 LAB — FUNGUS SPEC CULT: NORMAL

## 2024-02-14 ENCOUNTER — DOCUMENT SCAN (OUTPATIENT)
Dept: HOME HEALTH SERVICES | Facility: HOSPITAL | Age: 84
End: 2024-02-14
Payer: MEDICARE

## 2024-02-14 RX ORDER — PANTOPRAZOLE SODIUM 40 MG/1
40 TABLET, DELAYED RELEASE ORAL
Qty: 30 TABLET | Refills: 5 | Status: SHIPPED | OUTPATIENT
Start: 2024-02-14

## 2024-02-15 ENCOUNTER — APPOINTMENT (OUTPATIENT)
Dept: LAB | Facility: HOSPITAL | Age: 84
End: 2024-02-15
Attending: STUDENT IN AN ORGANIZED HEALTH CARE EDUCATION/TRAINING PROGRAM
Payer: MEDICARE

## 2024-02-15 ENCOUNTER — OFFICE VISIT (OUTPATIENT)
Dept: INFECTIOUS DISEASES | Facility: CLINIC | Age: 84
End: 2024-02-15
Payer: MEDICARE

## 2024-02-15 VITALS
TEMPERATURE: 97 F | HEIGHT: 67 IN | DIASTOLIC BLOOD PRESSURE: 66 MMHG | SYSTOLIC BLOOD PRESSURE: 124 MMHG | BODY MASS INDEX: 32.44 KG/M2 | WEIGHT: 206.69 LBS

## 2024-02-15 DIAGNOSIS — J44.9 CHRONIC OBSTRUCTIVE PULMONARY DISEASE, UNSPECIFIED COPD TYPE: ICD-10-CM

## 2024-02-15 DIAGNOSIS — M54.50 LOW BACK PAIN, NON-SPECIFIC: ICD-10-CM

## 2024-02-15 DIAGNOSIS — I42.9 CARDIOMYOPATHY WITH IMPLANTABLE CARDIOVERTER-DEFIBRILLATOR: ICD-10-CM

## 2024-02-15 DIAGNOSIS — N20.0 STAGHORN CALCULUS: Primary | ICD-10-CM

## 2024-02-15 DIAGNOSIS — Z95.810 CARDIOMYOPATHY WITH IMPLANTABLE CARDIOVERTER-DEFIBRILLATOR: ICD-10-CM

## 2024-02-15 DIAGNOSIS — R18.8 INTRA-ABDOMINAL FLUID COLLECTION: ICD-10-CM

## 2024-02-15 DIAGNOSIS — E11.21 TYPE 2 DIABETES MELLITUS WITH DIABETIC NEPHROPATHY, WITHOUT LONG-TERM CURRENT USE OF INSULIN: ICD-10-CM

## 2024-02-15 DIAGNOSIS — A49.8 BACTERIAL INFECTION DUE TO PROTEUS MIRABILIS: ICD-10-CM

## 2024-02-15 DIAGNOSIS — R94.30 EJECTION FRACTION < 50%: ICD-10-CM

## 2024-02-15 PROCEDURE — 1101F PT FALLS ASSESS-DOCD LE1/YR: CPT | Mod: CPTII,S$GLB,, | Performed by: STUDENT IN AN ORGANIZED HEALTH CARE EDUCATION/TRAINING PROGRAM

## 2024-02-15 PROCEDURE — 3288F FALL RISK ASSESSMENT DOCD: CPT | Mod: CPTII,S$GLB,, | Performed by: STUDENT IN AN ORGANIZED HEALTH CARE EDUCATION/TRAINING PROGRAM

## 2024-02-15 PROCEDURE — 1126F AMNT PAIN NOTED NONE PRSNT: CPT | Mod: CPTII,S$GLB,, | Performed by: STUDENT IN AN ORGANIZED HEALTH CARE EDUCATION/TRAINING PROGRAM

## 2024-02-15 PROCEDURE — 3074F SYST BP LT 130 MM HG: CPT | Mod: CPTII,S$GLB,, | Performed by: STUDENT IN AN ORGANIZED HEALTH CARE EDUCATION/TRAINING PROGRAM

## 2024-02-15 PROCEDURE — 99204 OFFICE O/P NEW MOD 45 MIN: CPT | Mod: S$GLB,,, | Performed by: STUDENT IN AN ORGANIZED HEALTH CARE EDUCATION/TRAINING PROGRAM

## 2024-02-15 PROCEDURE — 1159F MED LIST DOCD IN RCRD: CPT | Mod: CPTII,S$GLB,, | Performed by: STUDENT IN AN ORGANIZED HEALTH CARE EDUCATION/TRAINING PROGRAM

## 2024-02-15 PROCEDURE — 3078F DIAST BP <80 MM HG: CPT | Mod: CPTII,S$GLB,, | Performed by: STUDENT IN AN ORGANIZED HEALTH CARE EDUCATION/TRAINING PROGRAM

## 2024-02-15 NOTE — PROGRESS NOTES
Referred by urology for intra-abdominal collection        HPI: Jo Alegre is a 83 y.o. female , very pleasant, former smoker, with past medical history of diabetes, COPD, CHF who has been followed by Urology for recurrent UTIs and bilateral kidney cysts and a left large staghorn calculus since imaging on 2020.  Patient reports back in October she was admitted to the hospital for acute on chronic CHF exacerbation, noted to have MARCELINO, kidney ultrasound then revealed bilateral large cysts which prompted CT abdomen and pelvis which revealed a serial of fluid collections in the right and left kidneys.  A giant 14 cm left renal cyst observed.  No hydronephrosis or stones appreciated.  She had an IR drainage placed for the large giant cyst which ended up being an abscess, 50 cc of purulent fluid was removed on 11/2/23, cultures grew pansensitive Proteus mirabilis.  She then had to have her drain repositioned because of malposition, drained again and replaced 1/12/24, repeat cultures with Proteus mirabilis.  She has completed on and off 17 days of ciprofloxacin twice a day.      Patient is here with her daughters, she is in the wheelchair, she is awake, alert, very pleasant.  She denies any fever or chills, no nausea or vomiting, no chest pain or cough, baseline shortness of breath, she does complain of increased urinary frequency, foul-smelling urine, and some dysuria at the beginning of micturition, she denies any changes in bowel movements.  Patient and family are very concerned regarding next step of care, explained plan of care including repeating CT scan to address status of prior fluid collection.  It is possible that we either consider further IR guided drainage with IV antibiotics versus surgery but to be determined after discussion with consultants.     Will obtain labs today.    Review of patient's allergies indicates:   Allergen Reactions    Codeine Other (See Comments)     nausea    Hydrocodone  Nausea And Vomiting    Lasix [furosemide]      rash     Past Medical History:   Diagnosis Date    Allergy     Codeine, Lasix    Atrial fibrillation     Atrial fibrillation     Cataract     CHF (congestive heart failure)     Diabetes mellitus, type 2     Ejection fraction < 50% 10/18/2017    Approximately 35%  Based on prior  Echocardiogram.    Encounter for blood transfusion     Hyperlipidemia     Osteoporosis     PONV (postoperative nausea and vomiting)     Thyroid disorder screening 10/17/2017    TSH of 1.12 ordered by Dr. dee Jones     Past Surgical History:   Procedure Laterality Date    ANTEGRADE NEPHROSTOGRAPHY Left 8/25/2022    Procedure: NEPHROSTOGRAM, ANTEGRADE;  Surgeon: Shelby Parra MD;  Location: [facility];  Service: Urology;  Laterality: Left;    CHOLECYSTECTOMY  1997    Icard     COLONOSCOPY      CYSTOSCOPY W/ URETERAL STENT PLACEMENT Left 7/17/2022    Procedure: CYSTOSCOPY, WITH URETERAL STENT INSERTION;  Surgeon: Shelby Parra MD;  Location: [facility] OR;  Service: Urology;  Laterality: Left;    CYSTOSCOPY W/ URETERAL STENT REMOVAL Left 9/21/2022    Procedure: CYSTOSCOPY, WITH URETERAL STENT REMOVAL;  Surgeon: Shelby Parra MD;  Location: [facility] OR;  Service: Urology;  Laterality: Left;    CYSTOSCOPY WITH URETEROSCOPY, RETROGRADE PYELOGRAPHY, AND INSERTION OF STENT Left 8/25/2022    Procedure: CYSTOSCOPY, WITH RETROGRADE PYELOGRAM AND URETERAL STENT INSERTION;  Surgeon: Shelby Parra MD;  Location: [facility];  Service: Urology;  Laterality: Left;    EYE SURGERY      bilateral cataracts    FINGER SURGERY Right 2021    right pinky finger    FLEXIBLE CYSTOSCOPY Left 8/25/2022    Procedure: CYSTOSCOPY, FLEXIBLE WITH STENT REMOVAL;  Surgeon: Shelby Parra MD;  Location: [facility];  Service: Urology;  Laterality: Left;    FRACTURE SURGERY  2014    right femur with neville    HEMORRHOID SURGERY      48 yrs ago    HYSTERECTOMY      NEPHROSTOMY Left 8/25/2022    Procedure: CREATION,  "NEPHROSTOMY;  Surgeon: Shelby Parra MD;  Location: [facility] OR;  Service: Urology;  Laterality: Left;    PERCUTANEOUS NEPHROLITHOTOMY N/A 8/25/2022    Procedure: NEPHROLITHOTOMY, PERCUTANEOUS;  Surgeon: Shelby Parra MD;  Location: [facility] OR;  Service: Urology;  Laterality: N/A;    REPLACEMENT OF IMPLANTABLE CARDIOVERTER-DEFIBRILLATOR (ICD) GENERATOR N/A 12/13/2019    Procedure: REPLACEMENT, PULSE GENERATOR, ICD-MEDTRONIC;  Surgeon: Sebastian Nowak III, MD;  Location: [facility] CATH;  Service: Cardiology;  Laterality: N/A;    TONSILLECTOMY        Social History     Tobacco Use    Smoking status: Former     Passive exposure: Past    Smokeless tobacco: Never    Tobacco comments:     quit 2013   Substance Use Topics    Alcohol use: No     Family History   Problem Relation Age of Onset    Heart disease Mother     Cancer Father         Lung Cancer ??? Asbestos    Breast cancer Paternal Aunt          Review of Systems   Constitutional:  Negative for chills and fever.   HENT:  Negative for sinus pain.    Respiratory:  Positive for shortness of breath. Negative for cough.    Cardiovascular:  Negative for chest pain.   Gastrointestinal:  Negative for abdominal pain, diarrhea and nausea.   Genitourinary:  Positive for dysuria, frequency and urgency. Negative for hematuria.   Musculoskeletal:  Negative for back pain and myalgias.   Skin:  Negative for rash.   Neurological:  Negative for headaches.   Psychiatric/Behavioral:  Negative for confusion.          No TB exposure  Outdoor activities:  Never smoker, very occasional alcohol.  Housewife.  Travel:  None  Implants:  None  Antibiotic History:  Cipro 17 days, end date 02/05      Objective:      Blood pressure 124/66, temperature 97.2 °F (36.2 °C), height 5' 7" (1.702 m), weight 93.8 kg (206 lb 11.2 oz). Body mass index is 32.37 kg/m².  Physical Exam  Constitutional:       Appearance: Normal appearance. She is obese.   HENT:      Mouth/Throat:      Mouth: Mucous membranes are " moist.      Pharynx: Oropharynx is clear.   Eyes:      Extraocular Movements: Extraocular movements intact.      Pupils: Pupils are equal, round, and reactive to light.   Cardiovascular:      Rate and Rhythm: Normal rate and regular rhythm.      Pulses: Normal pulses.      Heart sounds: Murmur heard.   Pulmonary:      Effort: Pulmonary effort is normal.      Breath sounds: Normal breath sounds.   Abdominal:      General: Bowel sounds are normal.      Palpations: Abdomen is soft.      Tenderness: There is no abdominal tenderness. There is no rebound.   Musculoskeletal:      Cervical back: Normal range of motion and neck supple.      Right lower leg: Edema present.      Left lower leg: No edema.   Skin:     General: Skin is warm.      Capillary Refill: Capillary refill takes less than 2 seconds.      Findings: No rash.   Neurological:      Mental Status: She is alert and oriented to person, place, and time. Mental status is at baseline.      Sensory: No sensory deficit.      Motor: No weakness.   Psychiatric:         Thought Content: Thought content normal.               General Labs reviewed:  Lab Results   Component Value Date    WBC 5.81 02/15/2024    RBC 4.07 02/15/2024    HGB 11.6 (L) 02/15/2024    HCT 38.3 02/15/2024    MCV 94 02/15/2024    MCH 28.5 02/15/2024    MCHC 30.3 (L) 02/15/2024    RDW 17.3 (H) 02/15/2024     02/15/2024    MPV 10.0 02/15/2024    GRAN 3.7 02/15/2024    GRAN 63.9 02/15/2024    LYMPH 1.3 02/15/2024    LYMPH 21.7 02/15/2024    MONO 0.6 02/15/2024    MONO 9.6 02/15/2024    EOS 0.2 02/15/2024    BASO 0.04 02/15/2024    EOSINOPHIL 3.6 02/15/2024    BASOPHIL 0.7 02/15/2024     CMP  Sodium   Date Value Ref Range Status   02/15/2024 141 136 - 145 mmol/L Final     Potassium   Date Value Ref Range Status   02/15/2024 4.9 3.5 - 5.1 mmol/L Final     Chloride   Date Value Ref Range Status   02/15/2024 100 95 - 110 mmol/L Final     CO2   Date Value Ref Range Status   02/15/2024 33 (H) 23 - 29  mmol/L Final     Glucose   Date Value Ref Range Status   02/15/2024 67 (L) 70 - 110 mg/dL Final     BUN   Date Value Ref Range Status   02/15/2024 30 (H) 8 - 23 mg/dL Final     Creatinine   Date Value Ref Range Status   02/15/2024 2.6 (H) 0.5 - 1.4 mg/dL Final     Calcium   Date Value Ref Range Status   02/15/2024 10.5 8.7 - 10.5 mg/dL Final     Total Protein   Date Value Ref Range Status   02/15/2024 7.0 6.0 - 8.4 g/dL Final     Albumin   Date Value Ref Range Status   02/15/2024 3.6 3.5 - 5.2 g/dL Final     Total Bilirubin   Date Value Ref Range Status   02/15/2024 0.4 0.1 - 1.0 mg/dL Final     Comment:     For infants and newborns, interpretation of results should be based  on gestational age, weight and in agreement with clinical  observations.    Premature Infant recommended reference ranges:  Up to 24 hours.............<8.0 mg/dL  Up to 48 hours............<12.0 mg/dL  3-5 days..................<15.0 mg/dL  6-29 days.................<15.0 mg/dL       Alkaline Phosphatase   Date Value Ref Range Status   02/15/2024 93 55 - 135 U/L Final     AST   Date Value Ref Range Status   02/15/2024 11 10 - 40 U/L Final     ALT   Date Value Ref Range Status   02/15/2024 8 (L) 10 - 44 U/L Final     Anion Gap   Date Value Ref Range Status   02/15/2024 8 8 - 16 mmol/L Final     eGFR   Date Value Ref Range Status   02/15/2024 17.9 (A) >60 mL/min/1.73 m^2 Final        Latest Reference Range & Units 11/02/23 14:42 01/12/24 13:57   Fluid Color  Yellow Red   Fluid Appearance  Turbid Turbid   WBC, Body Fluid /cu mm [id number]   Body Fluid Type  Cyst Fluid Other (Specify)   Segs, Fluid % 86 7   Lymphs, Fluid % 7 93   Monocytes/Macrophages, Fluid % 7        Micro:  Fluid culture L kidney 1/12/24:  Aerobic Bacterial Culture  Abnormal   PROTEUS MIRABILIS  Few    Resulting Agency [facility]        Susceptibility       Proteus mirabilis     CULTURE, AEROBIC  (SPECIFY SOURCE)     Amox/K Clav'ate <=8/4 mcg/mL Sensitive     Amp/Sulbactam <=8/4  mcg/mL Sensitive     Ampicillin <=8 mcg/mL Sensitive     Cefazolin <=2 mcg/mL Sensitive     Cefepime <=2 mcg/mL Sensitive     Ceftriaxone <=1 mcg/mL Sensitive     Ciprofloxacin <=1 mcg/mL Sensitive     Ertapenem <=0.5 mcg/mL Sensitive     Gentamicin <=4 mcg/mL Sensitive     Levofloxacin <=2 mcg/mL Sensitive     Meropenem <=1 mcg/mL Sensitive     Piperacillin/Tazo <=16 mcg/mL Sensitive     Tobramycin <=4 mcg/mL Sensitive     Trimeth/Sulfa <=2/38 mcg/mL Sensitive               Fluid culture L kidney 11/2/23:  Aerobic Bacterial Culture  Abnormal   PROTEUS MIRABILIS  Many    Resulting Agency [facility]        Susceptibility     Proteus mirabilis     CULTURE, AEROBIC  (SPECIFY SOURCE)     Amox/K Clav'ate <=8/4 mcg/mL Sensitive     Amp/Sulbactam <=8/4 mcg/mL Sensitive     Ampicillin <=8 mcg/mL Sensitive     Cefazolin <=2 mcg/mL Sensitive     Cefepime <=2 mcg/mL Sensitive     Ceftriaxone <=1 mcg/mL Sensitive     Ciprofloxacin <=1 mcg/mL Sensitive     Ertapenem <=0.5 mcg/mL Sensitive     Gentamicin <=4 mcg/mL Sensitive     Levofloxacin <=2 mcg/mL Sensitive     Meropenem <=1 mcg/mL Sensitive     Piperacillin/Tazo <=16 mcg/mL Sensitive     Tobramycin <=4 mcg/mL Sensitive     Trimeth/Sulfa <=2/38 mcg/mL Sensitive                         Imaging Reviewed:  CT abdomen/pelvis w/o contrast 112/24:  FINDINGS:  There is elevation of the left hemidiaphragm and moderate atelectasis in the left lower lobe.  A pleural effusion or pneumothorax is not seen.  The liver is of normal size and CT density.  The gallbladder is absent with surgical clips noted.  There is a 2.6 cm cyst of segment 1 of the liver parenchyma and dilation of the common bile duct up to 2 cm in diameter.  This is unchanged from prior study and a stone or mass in the head of the pancreas is not seen.  The pancreas appears of normal contour and CT density without edema or mass.  The spleen is of normal size and CT density.  On the right there is a 4.3 cm and a 5.6 cm water  density cyst and 2 higher density cyst at the lateral surface representing probable bloody cyst.  These are stable however.  The largest measures 1.3 cm.  Stone or hydronephrosis on the right is not seen.  On the left a nephrostomy tube is not presently seen.  In the Janina nephric space superiorly is a fluid collection measuring 4.4 cm and a partial fluid collection measuring 2.8 cm as well as a probable bloody cyst measuring 3.2 cm.  There is also a 3.8 cm cyst and a giant cyst at the lower pole which measures 14 cm.  There is significantly less stone burden than seen on the prior studies primarily in the lower pole.  The largest stone fragment measures 1.1 cm.  Hydronephrosis is not presently seen.  The abdominal aorta is heavily calcified.  An aneurysm is not identified.    The stomach is of normal configuration.  Small bowel dilatation or air-fluid levels are not seen.  The colon appears of normal configuration without distention or mass.  A normal appendix is seen.  Free fluid or free air is not noted.  The bladder is of normal contour without asymmetry.  A uterus is not seen.     Impression:  Elevation of the left hemidiaphragm and moderate left lung atelectasis noted.  Under the left hemidiaphragm are 2 fluid collections either post nephrostomy tube abscesses or seromas.  Significantly less stone burden in the left kidney with 2 prominent fragments.  Hydronephrosis is not presently seen.  There are multiple bilateral renal cysts, some high density suggesting bloody cysts.  A giant cyst of the lower pole of the left kidney measures 14 cm.  Prior cholecystectomy.  2.6 cm cyst of segment 1 of the liver parenchyma.  Dilation of the common bile duct reaching 2 cm without a stone or mass seen.  Prior hysterectomy.    CT abdomen/pelvis w/o contrast 11/24/2023.  FINDINGS:  The collection seen posterior to and cephalad to the upper pole of the left kidney, extending subphrenic in through the diaphragm does not appear  significantly changed compared the prior study of 10/16/2023.  Craniocaudal measurement on the coronal image is 9.8 cm today versus 9.4 cm on the prior exam.  On axial images the diameter is 6.2 x 4.7 cm today versus 6.4 x 4.3 cm previously.  Appears complex with irregular fluid component irregular wall.  Findings consistent with abscess.   Large, 14 cm lower pole left renal cyst.  Extrinsic compression on the lateral margin the cyst by colon.  The appearance is not significantly changed.   Oval 2.5 cm and 1.5 cm high density left renal masses density not as high as can be attributed to a high density cyst but not significantly changed compared to prior exam and corresponding as was described as hemorrhagic cysts on MRI.  Multiple left renal stones measuring up to approximately 7 mm.  No hydronephrosis opaque ureteral stone or ureteral obstruction evident   Right renal masses are not significantly changed with a 5.5 cm and a 4.6 cm and 11 mm simple cyst and high density masses which although not fully characterized on the single study correspond as described as hemorrhagic cyst on MRI and not significantly changed compared to the prior CT.  Largest measures 1.5 cm.  No hydronephrosis opaque renal or ureteral stone or ureteral obstruction.   Urinary bladder mildly distended at time of the exam and as visualized unremarkable appearance   Prior hysterectomy.  Adnexal region unremarkable appearance   Diverticulosis without CT findings of acute diverticulitis.  Normal appearance of the appendix.  No free intraperitoneal air or fluid.   Liver unremarkable appearance.  Cholecystectomy clips.  Common duct dilatation not significantly changed compared to the prior study likely on a post cholecystectomy basis.   Spleen not enlarged.  Posterior margin the spleen is indistinguishable from the perirenal/subphrenic collection.   Pancreas mildly atrophied   Adrenal glands; slight thickening of the adrenal glands and small left  adrenal nodule suggesting adenoma not significantly changed  Abdominal aorta; no aneurysm  Osseous structures;Internal fixation right hip. Osseous degenerative changes.  Moderate compression of the superior endplate of L1 not significantly changed.  Postoperative changes lumbar spine.  Small left pleural effusion and left posterior basilar airspace opacification do not appear significantly changed.     Impression:   The irregular, complex appearing collection suggesting abscess posterior to and cephalad to the left kidney, extending subphrenic and through the diaphragm into the pleural space does not appear significantly changed compared to the prior exam.  Additional findings as detailed above including renal stones, renal cysts, high density renal masses which although indeterminate on the current study alone correspond as described as hemorrhagic cyst on prior studies and not significantly changed compared to the prior exam.  left adrenal adenoma.      Kidney ultrasound 10/30/2023:  FINDINGS:  Right kidney: The right kidney measures 10.8 cm. No cortical thinning. No loss of corticomedullary distinction. Resistive index measures 0.76.  5.3 and 3.7 and 1 cm simple cyst.  1.2 cm cyst which may be minimally complex without increased through transmission but appearing anechoic and most likely simple cysts.  No renal stone. No hydronephrosis.     Left kidney: The left kidney measures 9.3 cm. No cortical thinning. No loss of corticomedullary distinction. Resistive index measures 0.71.  Numerous cysts including 14 cm and 3.7 cm cyst.  A few echogenic foci measuring 5-15 mm suggesting stones.  Complex structure appearing cephalad or projecting cephalad from the left kidney measuring 7.3 x 4.9 x 4.8 cm.  It was measured at 5.1 x 4.5 x 4.5 cm on the prior exam.  No hydronephrosis.     The bladder is partially distended at the time of scanning and has an unremarkable appearance.     Impression:  Multiple simple bilateral  renal cysts.  Left renal stones.  7.3 cm complex structure cephalad to the left kidney was measured at 5.1 cm on prior study images 03/02/2023 and corresponds to findings seen on more recent CT abdomen of 10/16/2023. Better demonstrated on the CT and please refer to that report.       Assessment & Plan:       Complicated left renal abscess in the setting of large staghorn stone status post 2 IR guided drainage is 11/2/2023 & 01/12/2024  Labs today, CBC with diff, CMP, CRP and procalcitonin   UA with reflex to culture   Discussed with Urology, will repeat CT abdomen/pelvis to assess status of prior fluid collections   Discussed with patient and family at length possible 3rd IR guided drainage with IV antibiotics vs Surgery  Currently OFF antibiotics  Alarm symptoms given to patient and family to go to the ER  RTC 2/26    CKD not on HD Estimated Creatinine Clearance: 19.6 mL/min (A) (based on SCr of 2.6 mg/dL (H)).    PMHx: diabetes, COPD, CHF   Follow pulmonary and PCP outpatient        Staghorn calculus  -     Urinalysis, Reflex to Urine Culture Urine, Clean Catch  -     CT Abdomen Pelvis  Without Contrast; Future; Expected date: 02/15/2024    Bacterial infection due to Proteus mirabilis  -     Urinalysis, Reflex to Urine Culture Urine, Clean Catch  -     CT Abdomen Pelvis  Without Contrast; Future; Expected date: 02/15/2024  -     Comprehensive Metabolic Panel; Future; Expected date: 02/15/2024  -     CBC auto differential; Future; Expected date: 02/15/2024  -     Procalcitonin; Future; Expected date: 02/15/2024  -     C-reactive protein; Future; Expected date: 02/15/2024    Ejection fraction < 50%    Type 2 diabetes mellitus with diabetic nephropathy, without long-term current use of insulin    Chronic obstructive pulmonary disease, unspecified COPD type    Low back pain, non-specific    Cardiomyopathy with implantable cardioverter-defibrillator    Intra-abdominal fluid collection  -     Urinalysis, Reflex to  Urine Culture Urine, Clean Catch  -     CT Abdomen Pelvis  Without Contrast; Future; Expected date: 02/15/2024        This note was created using Dragon voice recognition software that occasionally misinterpreted phrases or words.

## 2024-02-19 ENCOUNTER — HOSPITAL ENCOUNTER (OUTPATIENT)
Dept: RADIOLOGY | Facility: HOSPITAL | Age: 84
Discharge: HOME | End: 2024-02-19
Attending: STUDENT IN AN ORGANIZED HEALTH CARE EDUCATION/TRAINING PROGRAM
Payer: MEDICARE

## 2024-02-19 DIAGNOSIS — A49.8 BACTERIAL INFECTION DUE TO PROTEUS MIRABILIS: ICD-10-CM

## 2024-02-19 DIAGNOSIS — N20.0 STAGHORN CALCULUS: ICD-10-CM

## 2024-02-19 DIAGNOSIS — R18.8 INTRA-ABDOMINAL FLUID COLLECTION: ICD-10-CM

## 2024-02-19 PROCEDURE — 74176 CT ABD & PELVIS W/O CONTRAST: CPT | Mod: 26,,, | Performed by: RADIOLOGY

## 2024-02-19 PROCEDURE — 74176 CT ABD & PELVIS W/O CONTRAST: CPT | Mod: TC

## 2024-02-20 ENCOUNTER — TELEPHONE (OUTPATIENT)
Dept: UROLOGY | Facility: CLINIC | Age: 84
End: 2024-02-20
Payer: MEDICARE

## 2024-02-20 DIAGNOSIS — R18.8 RETROPERITONEAL FLUID COLLECTION: Primary | ICD-10-CM

## 2024-02-20 NOTE — TELEPHONE ENCOUNTER
----- Message from Shelby Parra MD sent at 2/20/2024  1:02 PM CST -----  I think the next step is taking her for a cysto and retrograde to make sure nothing is continuing to leak from the kidney. And then could probably do a washout of some sort. I'm worried that taking out her kidney is going to be very detrimental to her renal function.     Kavya - can you see if she's available 3/8 to do the cysto and retrograde?    ----- Message -----  From: Nikole Keller MD  Sent: 2/19/2024   3:56 PM CST  To: Shelby Parra MD    Hi, please see CT scan, should we try surgery? It has not changed.  ----- Message -----  From: Arsen Rad Results In  Sent: 2/19/2024   3:32 PM CST  To: Nikole Reynaga MD

## 2024-02-20 NOTE — TELEPHONE ENCOUNTER
Spoke with patient, advisement given, patient good with date of 3/8/24 for cysto w/retro. Informed will give message to provider and she will be contacted by surgery dept with what to do the night before and what time to be there, patient verbally understood.

## 2024-02-21 DIAGNOSIS — N20.0 STAGHORN CALCULUS: Primary | ICD-10-CM

## 2024-02-21 RX ORDER — CEFAZOLIN SODIUM 2 G/50ML
2 SOLUTION INTRAVENOUS
Status: CANCELLED | OUTPATIENT
Start: 2024-02-21

## 2024-02-21 NOTE — PROGRESS NOTES
Procedure Order to Urology [[id number]]    Electronically signed by: Shelby Parra MD on 02/20/24 1936 Status: Active   Ordering user: Shelby Parra MD 02/20/24 1936 Authorized by: Shelby Parra MD   Ordering mode: Standard   Frequency:  02/20/24 -     Diagnoses  Retroperitoneal fluid collection [R18.8]   Questionnaire    Question Answer   Procedure Cystoscopy with Stent Placement Comment - left, 3/8   Facility Name: [facility] ,Please order BMP,CBC , UA, Urine culture, EKG if above 40 years old , Ancef 2 Gram( cipro 400 mg IV for PCN allergy)   ,Iv start, NPO, General anesthesia. Cpt-[id number]

## 2024-02-22 ENCOUNTER — TELEPHONE (OUTPATIENT)
Dept: UROLOGY | Facility: CLINIC | Age: 84
End: 2024-02-22
Payer: MEDICARE

## 2024-02-22 ENCOUNTER — PATIENT MESSAGE (OUTPATIENT)
Dept: UROLOGY | Facility: CLINIC | Age: 84
End: 2024-02-22
Payer: MEDICARE

## 2024-02-22 NOTE — TELEPHONE ENCOUNTER
Returned call and spoke to patient's daughter she has many questions concerning patient's procedure and she has sent questions to the patient portal, which was sent to provider for response. Informed awaiting response, she verbally understood.

## 2024-02-22 NOTE — TELEPHONE ENCOUNTER
----- Message from Néstor Marlow sent at 2/22/2024  8:09 AM CST -----  Contact: Daughter/Mary  Type: Needs Medical Advice  Who Called:  Shahida  Best Call Back Number: [phone]  Additional Information: States pt told her she was called by office to discuss test. Wanted more information. Please call.

## 2024-02-22 NOTE — TELEPHONE ENCOUNTER
----- Message from Dunia Evangelista sent at 2/22/2024 11:15 AM CST -----  Contact: Mary (daughter)  Type:  Patient Returning Call    Who Called: Mary    Who Left Message for Patient: Kavya    Does the patient know what this is regarding?: appt    Would the patient rather a call back or a response via [employer]? Call back    Best Call Back Number:     Additional Information: please call to advise  Thanks

## 2024-02-26 ENCOUNTER — PATIENT MESSAGE (OUTPATIENT)
Dept: FAMILY MEDICINE | Facility: CLINIC | Age: 84
End: 2024-02-26
Payer: MEDICARE

## 2024-02-26 ENCOUNTER — LAB VISIT (OUTPATIENT)
Dept: LAB | Facility: HOSPITAL | Age: 84
End: 2024-02-26
Payer: MEDICARE

## 2024-02-26 ENCOUNTER — OFFICE VISIT (OUTPATIENT)
Dept: INFECTIOUS DISEASES | Facility: CLINIC | Age: 84
End: 2024-02-26
Payer: MEDICARE

## 2024-02-26 VITALS
WEIGHT: 205.69 LBS | SYSTOLIC BLOOD PRESSURE: 128 MMHG | BODY MASS INDEX: 32.28 KG/M2 | DIASTOLIC BLOOD PRESSURE: 64 MMHG | TEMPERATURE: 97 F | HEIGHT: 67 IN

## 2024-02-26 DIAGNOSIS — I48.0 PAROXYSMAL ATRIAL FIBRILLATION: ICD-10-CM

## 2024-02-26 DIAGNOSIS — I10 ESSENTIAL HYPERTENSION: ICD-10-CM

## 2024-02-26 DIAGNOSIS — I50.42 CHRONIC COMBINED SYSTOLIC AND DIASTOLIC CONGESTIVE HEART FAILURE: Chronic | ICD-10-CM

## 2024-02-26 DIAGNOSIS — I42.9 CARDIOMYOPATHY WITH IMPLANTABLE CARDIOVERTER-DEFIBRILLATOR: ICD-10-CM

## 2024-02-26 DIAGNOSIS — E78.2 MIXED DYSLIPIDEMIA: ICD-10-CM

## 2024-02-26 DIAGNOSIS — Z95.810 CARDIOMYOPATHY WITH IMPLANTABLE CARDIOVERTER-DEFIBRILLATOR: ICD-10-CM

## 2024-02-26 DIAGNOSIS — A49.8 PROTEUS MIRABILIS INFECTION: Primary | ICD-10-CM

## 2024-02-26 DIAGNOSIS — N20.0 STAGHORN CALCULUS: ICD-10-CM

## 2024-02-26 DIAGNOSIS — N10 ACUTE PYELONEPHRITIS: ICD-10-CM

## 2024-02-26 DIAGNOSIS — N15.1 KIDNEY ABSCESS: ICD-10-CM

## 2024-02-26 DIAGNOSIS — A41.9 SEPSIS, DUE TO UNSPECIFIED ORGANISM, UNSPECIFIED WHETHER ACUTE ORGAN DYSFUNCTION PRESENT: ICD-10-CM

## 2024-02-26 LAB
ALBUMIN SERPL BCP-MCNC: 3.7 G/DL (ref 3.5–5.2)
ALP SERPL-CCNC: 88 U/L (ref 55–135)
ALT SERPL W/O P-5'-P-CCNC: 8 U/L (ref 10–44)
ANION GAP SERPL CALC-SCNC: 8 MMOL/L (ref 8–16)
AST SERPL-CCNC: 12 U/L (ref 10–40)
BASOPHILS # BLD AUTO: 0.06 K/UL (ref 0–0.2)
BASOPHILS NFR BLD: 1 % (ref 0–1.9)
BILIRUB SERPL-MCNC: 0.7 MG/DL (ref 0.1–1)
BNP SERPL-MCNC: 557 PG/ML (ref 0–99)
BUN SERPL-MCNC: 28 MG/DL (ref 8–23)
CALCIUM SERPL-MCNC: 10.8 MG/DL (ref 8.7–10.5)
CHLORIDE SERPL-SCNC: 101 MMOL/L (ref 95–110)
CHOLEST SERPL-MCNC: 164 MG/DL (ref 120–199)
CHOLEST/HDLC SERPL: 2.4 {RATIO} (ref 2–5)
CO2 SERPL-SCNC: 33 MMOL/L (ref 23–29)
CREAT SERPL-MCNC: 2.7 MG/DL (ref 0.5–1.4)
DIFFERENTIAL METHOD BLD: ABNORMAL
EOSINOPHIL # BLD AUTO: 0.1 K/UL (ref 0–0.5)
EOSINOPHIL NFR BLD: 1.5 % (ref 0–8)
ERYTHROCYTE [DISTWIDTH] IN BLOOD BY AUTOMATED COUNT: 17.1 % (ref 11.5–14.5)
EST. GFR  (NO RACE VARIABLE): 17.1 ML/MIN/1.73 M^2
GLUCOSE SERPL-MCNC: 77 MG/DL (ref 70–110)
HCT VFR BLD AUTO: 34.7 % (ref 37–48.5)
HDLC SERPL-MCNC: 69 MG/DL (ref 40–75)
HDLC SERPL: 42.1 % (ref 20–50)
HGB BLD-MCNC: 10.8 G/DL (ref 12–16)
IMM GRANULOCYTES # BLD AUTO: 0.02 K/UL (ref 0–0.04)
IMM GRANULOCYTES NFR BLD AUTO: 0.3 % (ref 0–0.5)
LDLC SERPL CALC-MCNC: 73.2 MG/DL (ref 63–159)
LYMPHOCYTES # BLD AUTO: 1.2 K/UL (ref 1–4.8)
LYMPHOCYTES NFR BLD: 20.1 % (ref 18–48)
MCH RBC QN AUTO: 29.5 PG (ref 27–31)
MCHC RBC AUTO-ENTMCNC: 31.1 G/DL (ref 32–36)
MCV RBC AUTO: 95 FL (ref 82–98)
MONOCYTES # BLD AUTO: 0.5 K/UL (ref 0.3–1)
MONOCYTES NFR BLD: 8.9 % (ref 4–15)
NEUTROPHILS # BLD AUTO: 4 K/UL (ref 1.8–7.7)
NEUTROPHILS NFR BLD: 68.2 % (ref 38–73)
NONHDLC SERPL-MCNC: 95 MG/DL
NRBC BLD-RTO: 0 /100 WBC
PLATELET # BLD AUTO: 294 K/UL (ref 150–450)
PMV BLD AUTO: 10.3 FL (ref 9.2–12.9)
POTASSIUM SERPL-SCNC: 4.4 MMOL/L (ref 3.5–5.1)
PROT SERPL-MCNC: 6.9 G/DL (ref 6–8.4)
RBC # BLD AUTO: 3.66 M/UL (ref 4–5.4)
SODIUM SERPL-SCNC: 142 MMOL/L (ref 136–145)
TRIGL SERPL-MCNC: 109 MG/DL (ref 30–150)
WBC # BLD AUTO: 5.87 K/UL (ref 3.9–12.7)

## 2024-02-26 PROCEDURE — 85025 COMPLETE CBC W/AUTO DIFF WBC: CPT | Performed by: INTERNAL MEDICINE

## 2024-02-26 PROCEDURE — 3074F SYST BP LT 130 MM HG: CPT | Mod: CPTII,S$GLB,, | Performed by: STUDENT IN AN ORGANIZED HEALTH CARE EDUCATION/TRAINING PROGRAM

## 2024-02-26 PROCEDURE — 3288F FALL RISK ASSESSMENT DOCD: CPT | Mod: CPTII,S$GLB,, | Performed by: STUDENT IN AN ORGANIZED HEALTH CARE EDUCATION/TRAINING PROGRAM

## 2024-02-26 PROCEDURE — 36415 COLL VENOUS BLD VENIPUNCTURE: CPT | Performed by: INTERNAL MEDICINE

## 2024-02-26 PROCEDURE — 3078F DIAST BP <80 MM HG: CPT | Mod: CPTII,S$GLB,, | Performed by: STUDENT IN AN ORGANIZED HEALTH CARE EDUCATION/TRAINING PROGRAM

## 2024-02-26 PROCEDURE — 99214 OFFICE O/P EST MOD 30 MIN: CPT | Mod: S$GLB,,, | Performed by: STUDENT IN AN ORGANIZED HEALTH CARE EDUCATION/TRAINING PROGRAM

## 2024-02-26 PROCEDURE — 1126F AMNT PAIN NOTED NONE PRSNT: CPT | Mod: CPTII,S$GLB,, | Performed by: STUDENT IN AN ORGANIZED HEALTH CARE EDUCATION/TRAINING PROGRAM

## 2024-02-26 PROCEDURE — 83880 ASSAY OF NATRIURETIC PEPTIDE: CPT | Performed by: INTERNAL MEDICINE

## 2024-02-26 PROCEDURE — 1159F MED LIST DOCD IN RCRD: CPT | Mod: CPTII,S$GLB,, | Performed by: STUDENT IN AN ORGANIZED HEALTH CARE EDUCATION/TRAINING PROGRAM

## 2024-02-26 PROCEDURE — 80061 LIPID PANEL: CPT | Performed by: INTERNAL MEDICINE

## 2024-02-26 PROCEDURE — 80053 COMPREHEN METABOLIC PANEL: CPT | Performed by: INTERNAL MEDICINE

## 2024-02-26 PROCEDURE — 1100F PTFALLS ASSESS-DOCD GE2>/YR: CPT | Mod: CPTII,S$GLB,, | Performed by: STUDENT IN AN ORGANIZED HEALTH CARE EDUCATION/TRAINING PROGRAM

## 2024-02-26 RX ORDER — SODIUM CHLORIDE 0.9 % (FLUSH) 0.9 %
10 SYRINGE (ML) INJECTION
Status: CANCELLED | OUTPATIENT
Start: 2024-02-27

## 2024-02-26 RX ORDER — HEPARIN 100 UNIT/ML
500 SYRINGE INTRAVENOUS
Status: CANCELLED | OUTPATIENT
Start: 2024-02-27

## 2024-02-26 NOTE — PROGRESS NOTES
Referred by urology for intra-abdominal collection        HPI: Jo Alegre is a 83 y.o. female , very pleasant, former smoker, with past medical history of diabetes, COPD, CHF who has been followed by Urology for recurrent UTIs and bilateral kidney cysts and a left large staghorn calculus since imaging on 2020.  Patient reports back in October she was admitted to the hospital for acute on chronic CHF exacerbation, noted to have MARCELINO, kidney ultrasound then revealed bilateral large cysts which prompted CT abdomen and pelvis which revealed a serial of fluid collections in the right and left kidneys.  A giant 14 cm left renal cyst observed.  No hydronephrosis or stones appreciated.  She had an IR drainage placed for the large giant cyst which ended up being an abscess, 50 cc of purulent fluid was removed on 11/2/23, cultures grew pansensitive Proteus mirabilis.  She then had to have her drain repositioned because of malposition, drained again and replaced 1/12/24, repeat cultures with Proteus mirabilis.  She has completed on and off 17 days of ciprofloxacin twice a day.      Patient is here with her daughters, she is in the wheelchair, she is awake, alert, very pleasant.  She denies any fever or chills, no nausea or vomiting, no chest pain or cough, baseline shortness of breath, she does complain of increased urinary frequency, foul-smelling urine, and some dysuria at the beginning of micturition, she denies any changes in bowel movements.  Patient and family are very concerned regarding next step of care, explained plan of care including repeating CT scan to address status of prior fluid collection.  It is possible that we either consider further IR guided drainage with IV antibiotics versus surgery but to be determined after discussion with consultants.     Will obtain labs today.    2/26/24: Interim reviewed, patient is here for follow-up, daughters present. She reports she is feeling fine, has no  acute complaints. She saw Urology and plan for cystoscopy 3/8. Discussed with patient and daughters plan of care and will order PICC line, start rocephin 2g IV daily for 2 weeks for kidney abscess and assess duration of therapy based on repeat CT scan before the end of therapy. She understands plan of care, will also check urine next Monday in preparation to procedure. All questions answered.     Review of patient's allergies indicates:   Allergen Reactions    Codeine Other (See Comments)     nausea    Hydrocodone Nausea And Vomiting    Lasix [furosemide]      rash     Past Medical History:   Diagnosis Date    Allergy     Codeine, Lasix    Atrial fibrillation     Atrial fibrillation     Cataract     CHF (congestive heart failure)     Diabetes mellitus, type 2     Ejection fraction < 50% 10/18/2017    Approximately 35%  Based on prior  Echocardiogram.    Encounter for blood transfusion     Hyperlipidemia     Osteoporosis     PONV (postoperative nausea and vomiting)     Thyroid disorder screening 10/17/2017    TSH of 1.12 ordered by Dr. dee Jones     Past Surgical History:   Procedure Laterality Date    ANTEGRADE NEPHROSTOGRAPHY Left 8/25/2022    Procedure: NEPHROSTOGRAM, ANTEGRADE;  Surgeon: Shelby Parra MD;  Location: [facility] OR;  Service: Urology;  Laterality: Left;    CHOLECYSTECTOMY  1997    [place]     COLONOSCOPY      CYSTOSCOPY W/ URETERAL STENT PLACEMENT Left 7/17/2022    Procedure: CYSTOSCOPY, WITH URETERAL STENT INSERTION;  Surgeon: Shelby Parra MD;  Location: [facility] OR;  Service: Urology;  Laterality: Left;    CYSTOSCOPY W/ URETERAL STENT REMOVAL Left 9/21/2022    Procedure: CYSTOSCOPY, WITH URETERAL STENT REMOVAL;  Surgeon: Shelby Parra MD;  Location: [facility] OR;  Service: Urology;  Laterality: Left;    CYSTOSCOPY WITH URETEROSCOPY, RETROGRADE PYELOGRAPHY, AND INSERTION OF STENT Left 8/25/2022    Procedure: CYSTOSCOPY, WITH RETROGRADE PYELOGRAM AND URETERAL STENT INSERTION;  Surgeon:  Shelby Parra MD;  Location: [facility] OR;  Service: Urology;  Laterality: Left;    EYE SURGERY      bilateral cataracts    FINGER SURGERY Right 2021    right pinky finger    FLEXIBLE CYSTOSCOPY Left 8/25/2022    Procedure: CYSTOSCOPY, FLEXIBLE WITH STENT REMOVAL;  Surgeon: Shelby Parra MD;  Location: [facility] OR;  Service: Urology;  Laterality: Left;    FRACTURE SURGERY  2014    right femur with neville    HEMORRHOID SURGERY      48 yrs ago    HYSTERECTOMY      NEPHROSTOMY Left 8/25/2022    Procedure: CREATION, NEPHROSTOMY;  Surgeon: Shelby Parra MD;  Location: [facility] OR;  Service: Urology;  Laterality: Left;    PERCUTANEOUS NEPHROLITHOTOMY N/A 8/25/2022    Procedure: NEPHROLITHOTOMY, PERCUTANEOUS;  Surgeon: Shelby Parra MD;  Location: [facility] OR;  Service: Urology;  Laterality: N/A;    REPLACEMENT OF IMPLANTABLE CARDIOVERTER-DEFIBRILLATOR (ICD) GENERATOR N/A 12/13/2019    Procedure: REPLACEMENT, PULSE GENERATOR, ICD-MEDTRONIC;  Surgeon: Sebastian Nowak III, MD;  [facility] CATH;  Service: Cardiology;  Laterality: N/A;    TONSILLECTOMY        Social History     Tobacco Use    Smoking status: Former     Passive exposure: Past    Smokeless tobacco: Never    Tobacco comments:     quit 2013   Substance Use Topics    Alcohol use: No     Family History   Problem Relation Age of Onset    Heart disease Mother     Cancer Father         Lung Cancer ??? Asbestos    Breast cancer Paternal Aunt          Review of Systems   Constitutional:  Negative for chills and fever.   HENT:  Negative for sinus pain.    Respiratory:  Negative for cough and shortness of breath.    Cardiovascular:  Negative for chest pain.   Gastrointestinal:  Negative for abdominal pain, diarrhea and nausea.   Genitourinary:  Positive for dysuria, frequency and urgency. Negative for hematuria.   Musculoskeletal:  Negative for back pain and myalgias.   Skin:  Negative for rash.   Neurological:  Negative for headaches.   Psychiatric/Behavioral:   "Negative for confusion.          No TB exposure  Outdoor activities:  Never smoker, very occasional alcohol.  Housewife.  Travel:  None  Implants:  None  Antibiotic History:  Cipro 17 days, end date 02/05      Objective:      Blood pressure 128/64, temperature 97.2 °F (36.2 °C), height 5' 7" (1.702 m), weight 93.3 kg (205 lb 11.2 oz). Body mass index is 32.22 kg/m².  Physical Exam  Constitutional:       Appearance: Normal appearance. She is obese.   HENT:      Mouth/Throat:      Mouth: Mucous membranes are moist.      Pharynx: Oropharynx is clear.   Eyes:      Extraocular Movements: Extraocular movements intact.      Pupils: Pupils are equal, round, and reactive to light.   Cardiovascular:      Rate and Rhythm: Normal rate and regular rhythm.      Pulses: Normal pulses.      Heart sounds: Murmur heard.   Pulmonary:      Effort: Pulmonary effort is normal.      Breath sounds: Rales present.      Comments: Trace crackles L base  Abdominal:      General: Bowel sounds are normal.      Palpations: Abdomen is soft.      Tenderness: There is no abdominal tenderness. There is no rebound.   Musculoskeletal:      Cervical back: Normal range of motion and neck supple.      Right lower leg: Edema present.      Left lower leg: No edema.      Comments: She is wearing compression stockings b/l   Skin:     General: Skin is warm.      Capillary Refill: Capillary refill takes less than 2 seconds.      Findings: No rash.   Neurological:      Mental Status: She is alert and oriented to person, place, and time. Mental status is at baseline.      Sensory: No sensory deficit.      Motor: No weakness.   Psychiatric:         Thought Content: Thought content normal.               General Labs reviewed:  Lab Results   Component Value Date    WBC 5.87 02/26/2024    RBC 3.66 (L) 02/26/2024    HGB 10.8 (L) 02/26/2024    HCT 34.7 (L) 02/26/2024    MCV 95 02/26/2024    MCH 29.5 02/26/2024    MCHC 31.1 (L) 02/26/2024    RDW 17.1 (H) 02/26/2024    "  02/26/2024    MPV 10.3 02/26/2024    GRAN 4.0 02/26/2024    GRAN 68.2 02/26/2024    LYMPH 1.2 02/26/2024    LYMPH 20.1 02/26/2024    MONO 0.5 02/26/2024    MONO 8.9 02/26/2024    EOS 0.1 02/26/2024    BASO 0.06 02/26/2024    EOSINOPHIL 1.5 02/26/2024    BASOPHIL 1.0 02/26/2024     CMP  Sodium   Date Value Ref Range Status   02/26/2024 142 136 - 145 mmol/L Final     Potassium   Date Value Ref Range Status   02/26/2024 4.4 3.5 - 5.1 mmol/L Final     Chloride   Date Value Ref Range Status   02/26/2024 101 95 - 110 mmol/L Final     CO2   Date Value Ref Range Status   02/26/2024 33 (H) 23 - 29 mmol/L Final     Glucose   Date Value Ref Range Status   02/26/2024 77 70 - 110 mg/dL Final     BUN   Date Value Ref Range Status   02/26/2024 28 (H) 8 - 23 mg/dL Final     Creatinine   Date Value Ref Range Status   02/26/2024 2.7 (H) 0.5 - 1.4 mg/dL Final     Calcium   Date Value Ref Range Status   02/26/2024 10.8 (H) 8.7 - 10.5 mg/dL Final     Total Protein   Date Value Ref Range Status   02/26/2024 6.9 6.0 - 8.4 g/dL Final     Albumin   Date Value Ref Range Status   02/26/2024 3.7 3.5 - 5.2 g/dL Final     Total Bilirubin   Date Value Ref Range Status   02/26/2024 0.7 0.1 - 1.0 mg/dL Final     Comment:     For infants and newborns, interpretation of results should be based  on gestational age, weight and in agreement with clinical  observations.    Premature Infant recommended reference ranges:  Up to 24 hours.............<8.0 mg/dL  Up to 48 hours............<12.0 mg/dL  3-5 days..................<15.0 mg/dL  6-29 days.................<15.0 mg/dL       Alkaline Phosphatase   Date Value Ref Range Status   02/26/2024 88 55 - 135 U/L Final     AST   Date Value Ref Range Status   02/26/2024 12 10 - 40 U/L Final     ALT   Date Value Ref Range Status   02/26/2024 8 (L) 10 - 44 U/L Final     Anion Gap   Date Value Ref Range Status   02/26/2024 8 8 - 16 mmol/L Final     eGFR   Date Value Ref Range Status   02/26/2024 17.1 (A)  >60 mL/min/1.73 m^2 Final        Latest Reference Range & Units 11/02/23 14:42 01/12/24 13:57   Fluid Color  Yellow Red   Fluid Appearance  Turbid Turbid   WBC, Body Fluid /cu mm [id number]   Body Fluid Type  Cyst Fluid Other (Specify)   Segs, Fluid % 86 7   Lymphs, Fluid % 7 93   Monocytes/Macrophages, Fluid % 7        Micro:  Fluid culture L kidney 1/12/24:  Aerobic Bacterial Culture  Abnormal   PROTEUS MIRABILIS  Few    Resulting Agency [facility]        Susceptibility       Proteus mirabilis     CULTURE, AEROBIC  (SPECIFY SOURCE)     Amox/K Clav'ate <=8/4 mcg/mL Sensitive     Amp/Sulbactam <=8/4 mcg/mL Sensitive     Ampicillin <=8 mcg/mL Sensitive     Cefazolin <=2 mcg/mL Sensitive     Cefepime <=2 mcg/mL Sensitive     Ceftriaxone <=1 mcg/mL Sensitive     Ciprofloxacin <=1 mcg/mL Sensitive     Ertapenem <=0.5 mcg/mL Sensitive     Gentamicin <=4 mcg/mL Sensitive     Levofloxacin <=2 mcg/mL Sensitive     Meropenem <=1 mcg/mL Sensitive     Piperacillin/Tazo <=16 mcg/mL Sensitive     Tobramycin <=4 mcg/mL Sensitive     Trimeth/Sulfa <=2/38 mcg/mL Sensitive               Fluid culture L kidney 11/2/23:  Aerobic Bacterial Culture  Abnormal   PROTEUS MIRABILIS  Many    Resulting Agency [facility]        Susceptibility     Proteus mirabilis     CULTURE, AEROBIC  (SPECIFY SOURCE)     Amox/K Clav'ate <=8/4 mcg/mL Sensitive     Amp/Sulbactam <=8/4 mcg/mL Sensitive     Ampicillin <=8 mcg/mL Sensitive     Cefazolin <=2 mcg/mL Sensitive     Cefepime <=2 mcg/mL Sensitive     Ceftriaxone <=1 mcg/mL Sensitive     Ciprofloxacin <=1 mcg/mL Sensitive     Ertapenem <=0.5 mcg/mL Sensitive     Gentamicin <=4 mcg/mL Sensitive     Levofloxacin <=2 mcg/mL Sensitive     Meropenem <=1 mcg/mL Sensitive     Piperacillin/Tazo <=16 mcg/mL Sensitive     Tobramycin <=4 mcg/mL Sensitive     Trimeth/Sulfa <=2/38 mcg/mL Sensitive                         Imaging Reviewed:  Repeat CT scan 2/20/24:  FINDINGS: There is a multiloculated 5.9 by 3.9 cm fluid  collection seen in the left upper quadrant just below the diaphragm that previously measured 6.2 by 4.4 cm in similar plane.  There is a small left pleural effusion similar to prior exam.  There is associated compressive atelectasis similar to prior exam.  There are bilateral renal cysts with the largest seen inferiorly in association with the left kidney measuring 13 cm.  There is a 2.7 cm hyperdense lesion seen in association with the inferior pole of the right kidney unchanged from prior exam.  There are bilateral renal calculi the largest of which is seen at the inferior pole on the left and measures 1.8 cm.   The patient is status post cholecystectomy with prominence of the intrahepatic biliary ducts and common bile duct similar in appearance to prior exam.   There is no evidence bowel obstruction.  Stool is seen throughout the colon.  The patient has a right femoral neville and proximal screw.     Impression:  Again seen is the multi septated fluid collection in the left upper quadrant just inferior to the diaphragm, left pleural effusion with associated compressive atelectasis.  Overall the appearance of the fluid collection in the left pleural effusion is similar to what was seen on prior exam from 01/24/2024.     The patient has bilateral renal calculi similar in appearance to prior exam.     CT abdomen/pelvis w/o contrast 1/12/24:  FINDINGS:  There is elevation of the left hemidiaphragm and moderate atelectasis in the left lower lobe.  A pleural effusion or pneumothorax is not seen.  The liver is of normal size and CT density.  The gallbladder is absent with surgical clips noted.  There is a 2.6 cm cyst of segment 1 of the liver parenchyma and dilation of the common bile duct up to 2 cm in diameter.  This is unchanged from prior study and a stone or mass in the head of the pancreas is not seen.  The pancreas appears of normal contour and CT density without edema or mass.  The spleen is of normal size and CT  density.  On the right there is a 4.3 cm and a 5.6 cm water density cyst and 2 higher density cyst at the lateral surface representing probable bloody cyst.  These are stable however.  The largest measures 1.3 cm.  Stone or hydronephrosis on the right is not seen.  On the left a nephrostomy tube is not presently seen.  In the Janina nephric space superiorly is a fluid collection measuring 4.4 cm and a partial fluid collection measuring 2.8 cm as well as a probable bloody cyst measuring 3.2 cm.  There is also a 3.8 cm cyst and a giant cyst at the lower pole which measures 14 cm.  There is significantly less stone burden than seen on the prior studies primarily in the lower pole.  The largest stone fragment measures 1.1 cm.  Hydronephrosis is not presently seen.  The abdominal aorta is heavily calcified.  An aneurysm is not identified.    The stomach is of normal configuration.  Small bowel dilatation or air-fluid levels are not seen.  The colon appears of normal configuration without distention or mass.  A normal appendix is seen.  Free fluid or free air is not noted.  The bladder is of normal contour without asymmetry.  A uterus is not seen.     Impression:  Elevation of the left hemidiaphragm and moderate left lung atelectasis noted.  Under the left hemidiaphragm are 2 fluid collections either post nephrostomy tube abscesses or seromas.  Significantly less stone burden in the left kidney with 2 prominent fragments.  Hydronephrosis is not presently seen.  There are multiple bilateral renal cysts, some high density suggesting bloody cysts.  A giant cyst of the lower pole of the left kidney measures 14 cm.  Prior cholecystectomy.  2.6 cm cyst of segment 1 of the liver parenchyma.  Dilation of the common bile duct reaching 2 cm without a stone or mass seen.  Prior hysterectomy.    CT abdomen/pelvis w/o contrast 11/24/2023.  FINDINGS:  The collection seen posterior to and cephalad to the upper pole of the left kidney,  extending subphrenic in through the diaphragm does not appear significantly changed compared the prior study of 10/16/2023.  Craniocaudal measurement on the coronal image is 9.8 cm today versus 9.4 cm on the prior exam.  On axial images the diameter is 6.2 x 4.7 cm today versus 6.4 x 4.3 cm previously.  Appears complex with irregular fluid component irregular wall.  Findings consistent with abscess.   Large, 14 cm lower pole left renal cyst.  Extrinsic compression on the lateral margin the cyst by colon.  The appearance is not significantly changed.   Oval 2.5 cm and 1.5 cm high density left renal masses density not as high as can be attributed to a high density cyst but not significantly changed compared to prior exam and corresponding as was described as hemorrhagic cysts on MRI.  Multiple left renal stones measuring up to approximately 7 mm.  No hydronephrosis opaque ureteral stone or ureteral obstruction evident   Right renal masses are not significantly changed with a 5.5 cm and a 4.6 cm and 11 mm simple cyst and high density masses which although not fully characterized on the single study correspond as described as hemorrhagic cyst on MRI and not significantly changed compared to the prior CT.  Largest measures 1.5 cm.  No hydronephrosis opaque renal or ureteral stone or ureteral obstruction.   Urinary bladder mildly distended at time of the exam and as visualized unremarkable appearance   Prior hysterectomy.  Adnexal region unremarkable appearance   Diverticulosis without CT findings of acute diverticulitis.  Normal appearance of the appendix.  No free intraperitoneal air or fluid.   Liver unremarkable appearance.  Cholecystectomy clips.  Common duct dilatation not significantly changed compared to the prior study likely on a post cholecystectomy basis.   Spleen not enlarged.  Posterior margin the spleen is indistinguishable from the perirenal/subphrenic collection.   Pancreas mildly atrophied   Adrenal  glands; slight thickening of the adrenal glands and small left adrenal nodule suggesting adenoma not significantly changed  Abdominal aorta; no aneurysm  Osseous structures;Internal fixation right hip. Osseous degenerative changes.  Moderate compression of the superior endplate of L1 not significantly changed.  Postoperative changes lumbar spine.  Small left pleural effusion and left posterior basilar airspace opacification do not appear significantly changed.     Impression:   The irregular, complex appearing collection suggesting abscess posterior to and cephalad to the left kidney, extending subphrenic and through the diaphragm into the pleural space does not appear significantly changed compared to the prior exam.  Additional findings as detailed above including renal stones, renal cysts, high density renal masses which although indeterminate on the current study alone correspond as described as hemorrhagic cyst on prior studies and not significantly changed compared to the prior exam.  left adrenal adenoma.      Kidney ultrasound 10/30/2023:  FINDINGS:  Right kidney: The right kidney measures 10.8 cm. No cortical thinning. No loss of corticomedullary distinction. Resistive index measures 0.76.  5.3 and 3.7 and 1 cm simple cyst.  1.2 cm cyst which may be minimally complex without increased through transmission but appearing anechoic and most likely simple cysts.  No renal stone. No hydronephrosis.     Left kidney: The left kidney measures 9.3 cm. No cortical thinning. No loss of corticomedullary distinction. Resistive index measures 0.71.  Numerous cysts including 14 cm and 3.7 cm cyst.  A few echogenic foci measuring 5-15 mm suggesting stones.  Complex structure appearing cephalad or projecting cephalad from the left kidney measuring 7.3 x 4.9 x 4.8 cm.  It was measured at 5.1 x 4.5 x 4.5 cm on the prior exam.  No hydronephrosis.     The bladder is partially distended at the time of scanning and has an  unremarkable appearance.     Impression:  Multiple simple bilateral renal cysts.  Left renal stones.  7.3 cm complex structure cephalad to the left kidney was measured at 5.1 cm on prior study images 03/02/2023 and corresponds to findings seen on more recent CT abdomen of 10/16/2023. Better demonstrated on the CT and please refer to that report.       Assessment & Plan:       Complicated left renal abscess in the setting of large staghorn stone status post 2 IR guided drainage is 11/2/2023 & 01/12/2024  Prior cell counts c/w abscess, cultures 11/2/23 & 1/12/24 Proteus mirabilis pan-sensitive   Last urine 2/15 negative for UTI  Plan for cystoscopy 3/8, urine sample ordered for 3/4  PICC line   Weekly labs and dressing changes  Rocephin 2 g IV daily for Proteus mirabilis   Repeat CT scan in 4 weeks    CKD not on HD Estimated Creatinine Clearance: 18.8 mL/min (A) (based on SCr of 2.7 mg/dL (H)).    PMHx: diabetes, COPD, CHF   Follow pulmonary and PCP outpatient        There are no diagnoses linked to this encounter.      This note was created using Dragon voice recognition software that occasionally misinterpreted phrases or words.

## 2024-02-26 NOTE — H&P (VIEW-ONLY)
Referred by urology for intra-abdominal collection        HPI: Jo Alegre is a 83 y.o. female , very pleasant, former smoker, with past medical history of diabetes, COPD, CHF who has been followed by Urology for recurrent UTIs and bilateral kidney cysts and a left large staghorn calculus since imaging on 2020.  Patient reports back in October she was admitted to the hospital for acute on chronic CHF exacerbation, noted to have MARCELINO, kidney ultrasound then revealed bilateral large cysts which prompted CT abdomen and pelvis which revealed a serial of fluid collections in the right and left kidneys.  A giant 14 cm left renal cyst observed.  No hydronephrosis or stones appreciated.  She had an IR drainage placed for the large giant cyst which ended up being an abscess, 50 cc of purulent fluid was removed on 11/2/23, cultures grew pansensitive Proteus mirabilis.  She then had to have her drain repositioned because of malposition, drained again and replaced 1/12/24, repeat cultures with Proteus mirabilis.  She has completed on and off 17 days of ciprofloxacin twice a day.      Patient is here with her daughters, she is in the wheelchair, she is awake, alert, very pleasant.  She denies any fever or chills, no nausea or vomiting, no chest pain or cough, baseline shortness of breath, she does complain of increased urinary frequency, foul-smelling urine, and some dysuria at the beginning of micturition, she denies any changes in bowel movements.  Patient and family are very concerned regarding next step of care, explained plan of care including repeating CT scan to address status of prior fluid collection.  It is possible that we either consider further IR guided drainage with IV antibiotics versus surgery but to be determined after discussion with consultants.     Will obtain labs today.    2/26/24: Interim reviewed, patient is here for follow-up, daughters present. She reports she is feeling fine, has no  acute complaints. She saw Urology and plan for cystoscopy 3/8. Discussed with patient and daughters plan of care and will order PICC line, start rocephin 2g IV daily for 2 weeks for kidney abscess and assess duration of therapy based on repeat CT scan before the end of therapy. She understands plan of care, will also check urine next Monday in preparation to procedure. All questions answered.     Review of patient's allergies indicates:   Allergen Reactions    Codeine Other (See Comments)     nausea    Hydrocodone Nausea And Vomiting    Lasix [furosemide]      rash     Past Medical History:   Diagnosis Date    Allergy     Codeine, Lasix    Atrial fibrillation     Atrial fibrillation     Cataract     CHF (congestive heart failure)     Diabetes mellitus, type 2     Ejection fraction < 50% 10/18/2017    Approximately 35%  Based on prior  Echocardiogram.    Encounter for blood transfusion     Hyperlipidemia     Osteoporosis     PONV (postoperative nausea and vomiting)     Thyroid disorder screening 10/17/2017    TSH of 1.12 ordered by Dr. dee Jones     Past Surgical History:   Procedure Laterality Date    ANTEGRADE NEPHROSTOGRAPHY Left 8/25/2022    Procedure: NEPHROSTOGRAM, ANTEGRADE;  Surgeon: Shelby Parra MD;  Location: [facility] OR;  Service: Urology;  Laterality: Left;    CHOLECYSTECTOMY  1997    [place]     COLONOSCOPY      CYSTOSCOPY W/ URETERAL STENT PLACEMENT Left 7/17/2022    Procedure: CYSTOSCOPY, WITH URETERAL STENT INSERTION;  Surgeon: Shelby Parra MD;  Location: [facility] OR;  Service: Urology;  Laterality: Left;    CYSTOSCOPY W/ URETERAL STENT REMOVAL Left 9/21/2022    Procedure: CYSTOSCOPY, WITH URETERAL STENT REMOVAL;  Surgeon: Shelby Parra MD;  Location: [facility] OR;  Service: Urology;  Laterality: Left;    CYSTOSCOPY WITH URETEROSCOPY, RETROGRADE PYELOGRAPHY, AND INSERTION OF STENT Left 8/25/2022    Procedure: CYSTOSCOPY, WITH RETROGRADE PYELOGRAM AND URETERAL STENT INSERTION;  Surgeon:  Shelby Parra MD;  Location: [facility] OR;  Service: Urology;  Laterality: Left;    EYE SURGERY      bilateral cataracts    FINGER SURGERY Right 2021    right pinky finger    FLEXIBLE CYSTOSCOPY Left 8/25/2022    Procedure: CYSTOSCOPY, FLEXIBLE WITH STENT REMOVAL;  Surgeon: Shelby Parra MD;  Location: [facility] OR;  Service: Urology;  Laterality: Left;    FRACTURE SURGERY  2014    right femur with neville    HEMORRHOID SURGERY      48 yrs ago    HYSTERECTOMY      NEPHROSTOMY Left 8/25/2022    Procedure: CREATION, NEPHROSTOMY;  Surgeon: Shelby Parra MD;  Location: [facility] OR;  Service: Urology;  Laterality: Left;    PERCUTANEOUS NEPHROLITHOTOMY N/A 8/25/2022    Procedure: NEPHROLITHOTOMY, PERCUTANEOUS;  Surgeon: Shelby Parra MD;  Location: [facility] OR;  Service: Urology;  Laterality: N/A;    REPLACEMENT OF IMPLANTABLE CARDIOVERTER-DEFIBRILLATOR (ICD) GENERATOR N/A 12/13/2019    Procedure: REPLACEMENT, PULSE GENERATOR, ICD-MEDTRONIC;  Surgeon: Sebastian Nowak III, MD;  Location: [facility] CATH;  Service: Cardiology;  Laterality: N/A;    TONSILLECTOMY        Social History     Tobacco Use    Smoking status: Former     Passive exposure: Past    Smokeless tobacco: Never    Tobacco comments:     quit 2013   Substance Use Topics    Alcohol use: No     Family History   Problem Relation Age of Onset    Heart disease Mother     Cancer Father         Lung Cancer ??? Asbestos    Breast cancer Paternal Aunt          Review of Systems   Constitutional:  Negative for chills and fever.   HENT:  Negative for sinus pain.    Respiratory:  Negative for cough and shortness of breath.    Cardiovascular:  Negative for chest pain.   Gastrointestinal:  Negative for abdominal pain, diarrhea and nausea.   Genitourinary:  Positive for dysuria, frequency and urgency. Negative for hematuria.   Musculoskeletal:  Negative for back pain and myalgias.   Skin:  Negative for rash.   Neurological:  Negative for headaches.   Psychiatric/Behavioral:   "Negative for confusion.          No TB exposure  Outdoor activities:  Never smoker, very occasional alcohol.  Housewife.  Travel:  None  Implants:  None  Antibiotic History:  Cipro 17 days, end date 02/05      Objective:      Blood pressure 128/64, temperature 97.2 °F (36.2 °C), height 5' 7" (1.702 m), weight 93.3 kg (205 lb 11.2 oz). Body mass index is 32.22 kg/m².  Physical Exam  Constitutional:       Appearance: Normal appearance. She is obese.   HENT:      Mouth/Throat:      Mouth: Mucous membranes are moist.      Pharynx: Oropharynx is clear.   Eyes:      Extraocular Movements: Extraocular movements intact.      Pupils: Pupils are equal, round, and reactive to light.   Cardiovascular:      Rate and Rhythm: Normal rate and regular rhythm.      Pulses: Normal pulses.      Heart sounds: Murmur heard.   Pulmonary:      Effort: Pulmonary effort is normal.      Breath sounds: Rales present.      Comments: Trace crackles L base  Abdominal:      General: Bowel sounds are normal.      Palpations: Abdomen is soft.      Tenderness: There is no abdominal tenderness. There is no rebound.   Musculoskeletal:      Cervical back: Normal range of motion and neck supple.      Right lower leg: Edema present.      Left lower leg: No edema.      Comments: She is wearing compression stockings b/l   Skin:     General: Skin is warm.      Capillary Refill: Capillary refill takes less than 2 seconds.      Findings: No rash.   Neurological:      Mental Status: She is alert and oriented to person, place, and time. Mental status is at baseline.      Sensory: No sensory deficit.      Motor: No weakness.   Psychiatric:         Thought Content: Thought content normal.               General Labs reviewed:  Lab Results   Component Value Date    WBC 5.87 02/26/2024    RBC 3.66 (L) 02/26/2024    HGB 10.8 (L) 02/26/2024    HCT 34.7 (L) 02/26/2024    MCV 95 02/26/2024    MCH 29.5 02/26/2024    MCHC 31.1 (L) 02/26/2024    RDW 17.1 (H) 02/26/2024    "  02/26/2024    MPV 10.3 02/26/2024    GRAN 4.0 02/26/2024    GRAN 68.2 02/26/2024    LYMPH 1.2 02/26/2024    LYMPH 20.1 02/26/2024    MONO 0.5 02/26/2024    MONO 8.9 02/26/2024    EOS 0.1 02/26/2024    BASO 0.06 02/26/2024    EOSINOPHIL 1.5 02/26/2024    BASOPHIL 1.0 02/26/2024     CMP  Sodium   Date Value Ref Range Status   02/26/2024 142 136 - 145 mmol/L Final     Potassium   Date Value Ref Range Status   02/26/2024 4.4 3.5 - 5.1 mmol/L Final     Chloride   Date Value Ref Range Status   02/26/2024 101 95 - 110 mmol/L Final     CO2   Date Value Ref Range Status   02/26/2024 33 (H) 23 - 29 mmol/L Final     Glucose   Date Value Ref Range Status   02/26/2024 77 70 - 110 mg/dL Final     BUN   Date Value Ref Range Status   02/26/2024 28 (H) 8 - 23 mg/dL Final     Creatinine   Date Value Ref Range Status   02/26/2024 2.7 (H) 0.5 - 1.4 mg/dL Final     Calcium   Date Value Ref Range Status   02/26/2024 10.8 (H) 8.7 - 10.5 mg/dL Final     Total Protein   Date Value Ref Range Status   02/26/2024 6.9 6.0 - 8.4 g/dL Final     Albumin   Date Value Ref Range Status   02/26/2024 3.7 3.5 - 5.2 g/dL Final     Total Bilirubin   Date Value Ref Range Status   02/26/2024 0.7 0.1 - 1.0 mg/dL Final     Comment:     For infants and newborns, interpretation of results should be based  on gestational age, weight and in agreement with clinical  observations.    Premature Infant recommended reference ranges:  Up to 24 hours.............<8.0 mg/dL  Up to 48 hours............<12.0 mg/dL  3-5 days..................<15.0 mg/dL  6-29 days.................<15.0 mg/dL       Alkaline Phosphatase   Date Value Ref Range Status   02/26/2024 88 55 - 135 U/L Final     AST   Date Value Ref Range Status   02/26/2024 12 10 - 40 U/L Final     ALT   Date Value Ref Range Status   02/26/2024 8 (L) 10 - 44 U/L Final     Anion Gap   Date Value Ref Range Status   02/26/2024 8 8 - 16 mmol/L Final     eGFR   Date Value Ref Range Status   02/26/2024 17.1 (A)  >60 mL/min/1.73 m^2 Final        Latest Reference Range & Units 11/02/23 14:42 01/12/24 13:57   Fluid Color  Yellow Red   Fluid Appearance  Turbid Turbid   WBC, Body Fluid /cu mm [id number]   Body Fluid Type  Cyst Fluid Other (Specify)   Segs, Fluid % 86 7   Lymphs, Fluid % 7 93   Monocytes/Macrophages, Fluid % 7        Micro:  Fluid culture L kidney 1/12/24:  Aerobic Bacterial Culture  Abnormal   PROTEUS MIRABILIS  Few    Resulting Agency [facility]        Susceptibility       Proteus mirabilis     CULTURE, AEROBIC  (SPECIFY SOURCE)     Amox/K Clav'ate <=8/4 mcg/mL Sensitive     Amp/Sulbactam <=8/4 mcg/mL Sensitive     Ampicillin <=8 mcg/mL Sensitive     Cefazolin <=2 mcg/mL Sensitive     Cefepime <=2 mcg/mL Sensitive     Ceftriaxone <=1 mcg/mL Sensitive     Ciprofloxacin <=1 mcg/mL Sensitive     Ertapenem <=0.5 mcg/mL Sensitive     Gentamicin <=4 mcg/mL Sensitive     Levofloxacin <=2 mcg/mL Sensitive     Meropenem <=1 mcg/mL Sensitive     Piperacillin/Tazo <=16 mcg/mL Sensitive     Tobramycin <=4 mcg/mL Sensitive     Trimeth/Sulfa <=2/38 mcg/mL Sensitive               Fluid culture L kidney 11/2/23:  Aerobic Bacterial Culture  Abnormal   PROTEUS MIRABILIS  Many    Resulting Agency [facility]        Susceptibility     Proteus mirabilis     CULTURE, AEROBIC  (SPECIFY SOURCE)     Amox/K Clav'ate <=8/4 mcg/mL Sensitive     Amp/Sulbactam <=8/4 mcg/mL Sensitive     Ampicillin <=8 mcg/mL Sensitive     Cefazolin <=2 mcg/mL Sensitive     Cefepime <=2 mcg/mL Sensitive     Ceftriaxone <=1 mcg/mL Sensitive     Ciprofloxacin <=1 mcg/mL Sensitive     Ertapenem <=0.5 mcg/mL Sensitive     Gentamicin <=4 mcg/mL Sensitive     Levofloxacin <=2 mcg/mL Sensitive     Meropenem <=1 mcg/mL Sensitive     Piperacillin/Tazo <=16 mcg/mL Sensitive     Tobramycin <=4 mcg/mL Sensitive     Trimeth/Sulfa <=2/38 mcg/mL Sensitive                         Imaging Reviewed:  Repeat CT scan 2/20/24:  FINDINGS: There is a multiloculated 5.9 by 3.9 cm fluid  collection seen in the left upper quadrant just below the diaphragm that previously measured 6.2 by 4.4 cm in similar plane.  There is a small left pleural effusion similar to prior exam.  There is associated compressive atelectasis similar to prior exam.  There are bilateral renal cysts with the largest seen inferiorly in association with the left kidney measuring 13 cm.  There is a 2.7 cm hyperdense lesion seen in association with the inferior pole of the right kidney unchanged from prior exam.  There are bilateral renal calculi the largest of which is seen at the inferior pole on the left and measures 1.8 cm.   The patient is status post cholecystectomy with prominence of the intrahepatic biliary ducts and common bile duct similar in appearance to prior exam.   There is no evidence bowel obstruction.  Stool is seen throughout the colon.  The patient has a right femoral neville and proximal screw.     Impression:  Again seen is the multi septated fluid collection in the left upper quadrant just inferior to the diaphragm, left pleural effusion with associated compressive atelectasis.  Overall the appearance of the fluid collection in the left pleural effusion is similar to what was seen on prior exam from 01/24/2024.     The patient has bilateral renal calculi similar in appearance to prior exam.     CT abdomen/pelvis w/o contrast 1/12/24:  FINDINGS:  There is elevation of the left hemidiaphragm and moderate atelectasis in the left lower lobe.  A pleural effusion or pneumothorax is not seen.  The liver is of normal size and CT density.  The gallbladder is absent with surgical clips noted.  There is a 2.6 cm cyst of segment 1 of the liver parenchyma and dilation of the common bile duct up to 2 cm in diameter.  This is unchanged from prior study and a stone or mass in the head of the pancreas is not seen.  The pancreas appears of normal contour and CT density without edema or mass.  The spleen is of normal size and CT  density.  On the right there is a 4.3 cm and a 5.6 cm water density cyst and 2 higher density cyst at the lateral surface representing probable bloody cyst.  These are stable however.  The largest measures 1.3 cm.  Stone or hydronephrosis on the right is not seen.  On the left a nephrostomy tube is not presently seen.  In the Janina nephric space superiorly is a fluid collection measuring 4.4 cm and a partial fluid collection measuring 2.8 cm as well as a probable bloody cyst measuring 3.2 cm.  There is also a 3.8 cm cyst and a giant cyst at the lower pole which measures 14 cm.  There is significantly less stone burden than seen on the prior studies primarily in the lower pole.  The largest stone fragment measures 1.1 cm.  Hydronephrosis is not presently seen.  The abdominal aorta is heavily calcified.  An aneurysm is not identified.    The stomach is of normal configuration.  Small bowel dilatation or air-fluid levels are not seen.  The colon appears of normal configuration without distention or mass.  A normal appendix is seen.  Free fluid or free air is not noted.  The bladder is of normal contour without asymmetry.  A uterus is not seen.     Impression:  Elevation of the left hemidiaphragm and moderate left lung atelectasis noted.  Under the left hemidiaphragm are 2 fluid collections either post nephrostomy tube abscesses or seromas.  Significantly less stone burden in the left kidney with 2 prominent fragments.  Hydronephrosis is not presently seen.  There are multiple bilateral renal cysts, some high density suggesting bloody cysts.  A giant cyst of the lower pole of the left kidney measures 14 cm.  Prior cholecystectomy.  2.6 cm cyst of segment 1 of the liver parenchyma.  Dilation of the common bile duct reaching 2 cm without a stone or mass seen.  Prior hysterectomy.    CT abdomen/pelvis w/o contrast 11/24/2023.  FINDINGS:  The collection seen posterior to and cephalad to the upper pole of the left kidney,  extending subphrenic in through the diaphragm does not appear significantly changed compared the prior study of 10/16/2023.  Craniocaudal measurement on the coronal image is 9.8 cm today versus 9.4 cm on the prior exam.  On axial images the diameter is 6.2 x 4.7 cm today versus 6.4 x 4.3 cm previously.  Appears complex with irregular fluid component irregular wall.  Findings consistent with abscess.   Large, 14 cm lower pole left renal cyst.  Extrinsic compression on the lateral margin the cyst by colon.  The appearance is not significantly changed.   Oval 2.5 cm and 1.5 cm high density left renal masses density not as high as can be attributed to a high density cyst but not significantly changed compared to prior exam and corresponding as was described as hemorrhagic cysts on MRI.  Multiple left renal stones measuring up to approximately 7 mm.  No hydronephrosis opaque ureteral stone or ureteral obstruction evident   Right renal masses are not significantly changed with a 5.5 cm and a 4.6 cm and 11 mm simple cyst and high density masses which although not fully characterized on the single study correspond as described as hemorrhagic cyst on MRI and not significantly changed compared to the prior CT.  Largest measures 1.5 cm.  No hydronephrosis opaque renal or ureteral stone or ureteral obstruction.   Urinary bladder mildly distended at time of the exam and as visualized unremarkable appearance   Prior hysterectomy.  Adnexal region unremarkable appearance   Diverticulosis without CT findings of acute diverticulitis.  Normal appearance of the appendix.  No free intraperitoneal air or fluid.   Liver unremarkable appearance.  Cholecystectomy clips.  Common duct dilatation not significantly changed compared to the prior study likely on a post cholecystectomy basis.   Spleen not enlarged.  Posterior margin the spleen is indistinguishable from the perirenal/subphrenic collection.   Pancreas mildly atrophied   Adrenal  glands; slight thickening of the adrenal glands and small left adrenal nodule suggesting adenoma not significantly changed  Abdominal aorta; no aneurysm  Osseous structures;Internal fixation right hip. Osseous degenerative changes.  Moderate compression of the superior endplate of L1 not significantly changed.  Postoperative changes lumbar spine.  Small left pleural effusion and left posterior basilar airspace opacification do not appear significantly changed.     Impression:   The irregular, complex appearing collection suggesting abscess posterior to and cephalad to the left kidney, extending subphrenic and through the diaphragm into the pleural space does not appear significantly changed compared to the prior exam.  Additional findings as detailed above including renal stones, renal cysts, high density renal masses which although indeterminate on the current study alone correspond as described as hemorrhagic cyst on prior studies and not significantly changed compared to the prior exam.  left adrenal adenoma.      Kidney ultrasound 10/30/2023:  FINDINGS:  Right kidney: The right kidney measures 10.8 cm. No cortical thinning. No loss of corticomedullary distinction. Resistive index measures 0.76.  5.3 and 3.7 and 1 cm simple cyst.  1.2 cm cyst which may be minimally complex without increased through transmission but appearing anechoic and most likely simple cysts.  No renal stone. No hydronephrosis.     Left kidney: The left kidney measures 9.3 cm. No cortical thinning. No loss of corticomedullary distinction. Resistive index measures 0.71.  Numerous cysts including 14 cm and 3.7 cm cyst.  A few echogenic foci measuring 5-15 mm suggesting stones.  Complex structure appearing cephalad or projecting cephalad from the left kidney measuring 7.3 x 4.9 x 4.8 cm.  It was measured at 5.1 x 4.5 x 4.5 cm on the prior exam.  No hydronephrosis.     The bladder is partially distended at the time of scanning and has an  unremarkable appearance.     Impression:  Multiple simple bilateral renal cysts.  Left renal stones.  7.3 cm complex structure cephalad to the left kidney was measured at 5.1 cm on prior study images 03/02/2023 and corresponds to findings seen on more recent CT abdomen of 10/16/2023. Better demonstrated on the CT and please refer to that report.       Assessment & Plan:       Complicated left renal abscess in the setting of large staghorn stone status post 2 IR guided drainage is 11/2/2023 & 01/12/2024  Prior cell counts c/w abscess, cultures 11/2/23 & 1/12/24 Proteus mirabilis pan-sensitive   Last urine 2/15 negative for UTI  Plan for cystoscopy 3/8, urine sample ordered for 3/4  PICC line   Weekly labs and dressing changes  Rocephin 2 g IV daily for Proteus mirabilis   Repeat CT scan in 4 weeks    CKD not on HD Estimated Creatinine Clearance: 18.8 mL/min (A) (based on SCr of 2.7 mg/dL (H)).    PMHx: diabetes, COPD, CHF   Follow pulmonary and PCP outpatient        There are no diagnoses linked to this encounter.      This note was created using Dragon voice recognition software that occasionally misinterpreted phrases or words.

## 2024-02-26 NOTE — PATIENT INSTRUCTIONS
PICC line   Weekly labs and dressing changes  Rocephin 2 g IV daily   Repeat CT scan in 4 weeks  RTC 4 weeks

## 2024-02-27 ENCOUNTER — INFUSION (OUTPATIENT)
Dept: INFUSION THERAPY | Facility: HOSPITAL | Age: 84
End: 2024-02-27
Attending: STUDENT IN AN ORGANIZED HEALTH CARE EDUCATION/TRAINING PROGRAM
Payer: MEDICARE

## 2024-02-27 ENCOUNTER — HOSPITAL ENCOUNTER (OUTPATIENT)
Dept: RADIOLOGY | Facility: HOSPITAL | Age: 84
Discharge: HOME | End: 2024-02-27
Attending: STUDENT IN AN ORGANIZED HEALTH CARE EDUCATION/TRAINING PROGRAM
Payer: MEDICARE

## 2024-02-27 ENCOUNTER — CLINICAL SUPPORT (OUTPATIENT)
Dept: NURSING | Facility: HOSPITAL | Age: 84
End: 2024-02-27
Attending: STUDENT IN AN ORGANIZED HEALTH CARE EDUCATION/TRAINING PROGRAM
Payer: MEDICARE

## 2024-02-27 VITALS
TEMPERATURE: 98 F | DIASTOLIC BLOOD PRESSURE: 59 MMHG | HEART RATE: 68 BPM | SYSTOLIC BLOOD PRESSURE: 125 MMHG | RESPIRATION RATE: 18 BRPM

## 2024-02-27 VITALS — HEIGHT: 69 IN | WEIGHT: 199 LBS | BODY MASS INDEX: 29.47 KG/M2

## 2024-02-27 DIAGNOSIS — A49.8 PROTEUS MIRABILIS INFECTION: ICD-10-CM

## 2024-02-27 DIAGNOSIS — N15.1 KIDNEY ABSCESS: ICD-10-CM

## 2024-02-27 DIAGNOSIS — A49.8 PROTEUS MIRABILIS INFECTION: Primary | ICD-10-CM

## 2024-02-27 PROCEDURE — 25000003 PHARM REV CODE 250: Performed by: STUDENT IN AN ORGANIZED HEALTH CARE EDUCATION/TRAINING PROGRAM

## 2024-02-27 PROCEDURE — C1751 CATH, INF, PER/CENT/MIDLINE: HCPCS

## 2024-02-27 PROCEDURE — A4216 STERILE WATER/SALINE, 10 ML: HCPCS | Performed by: STUDENT IN AN ORGANIZED HEALTH CARE EDUCATION/TRAINING PROGRAM

## 2024-02-27 PROCEDURE — 63600175 PHARM REV CODE 636 W HCPCS: Performed by: STUDENT IN AN ORGANIZED HEALTH CARE EDUCATION/TRAINING PROGRAM

## 2024-02-27 PROCEDURE — 36573 INSJ PICC RS&I 5 YR+: CPT

## 2024-02-27 PROCEDURE — 71045 X-RAY EXAM CHEST 1 VIEW: CPT | Mod: 26,,, | Performed by: RADIOLOGY

## 2024-02-27 PROCEDURE — 96365 THER/PROPH/DIAG IV INF INIT: CPT | Mod: 59

## 2024-02-27 PROCEDURE — 76937 US GUIDE VASCULAR ACCESS: CPT

## 2024-02-27 RX ORDER — SODIUM CHLORIDE 0.9 % (FLUSH) 0.9 %
10 SYRINGE (ML) INJECTION
Status: DISCONTINUED | OUTPATIENT
Start: 2024-02-27 | End: 2024-02-27 | Stop reason: HOSPADM

## 2024-02-27 RX ORDER — SODIUM CHLORIDE 0.9 % (FLUSH) 0.9 %
10 SYRINGE (ML) INJECTION
OUTPATIENT
Start: 2024-02-27

## 2024-02-27 RX ORDER — HEPARIN 100 UNIT/ML
500 SYRINGE INTRAVENOUS
OUTPATIENT
Start: 2024-02-27

## 2024-02-27 RX ADMIN — Medication 10 ML: at 04:02

## 2024-02-27 RX ADMIN — CEFTRIAXONE SODIUM 2 G: 2 INJECTION, POWDER, FOR SOLUTION INTRAMUSCULAR; INTRAVENOUS at 03:02

## 2024-02-27 RX ADMIN — SODIUM CHLORIDE: 9 INJECTION, SOLUTION INTRAVENOUS at 03:02

## 2024-02-27 NOTE — PROCEDURES
Jo Alegre is a 83 y.o. female patient.         PICC  Date/Time: 2/27/2024 3:25 PM  Performed by: Camryn Phan RN  Consent Done: Yes  Time out: Immediately prior to procedure a time out was called to verify the correct patient, procedure, equipment, support staff and site/side marked as required  Indications: med administration  Anesthesia: local infiltration  Local anesthetic: lidocaine 1% without epinephrine  Anesthetic Total (mL): 5  Preparation: skin prepped with ChloraPrep  Skin prep agent dried: skin prep agent completely dried prior to procedure  Sterile barriers: all five maximum sterile barriers used - cap, mask, sterile gown, sterile gloves, and large sterile sheet  Hand hygiene: hand hygiene performed prior to central venous catheter insertion  Location details: right brachial  Catheter type: double lumen  Catheter size: 5 Fr  Catheter Length: 41cm    Ultrasound guidance: yes  Vessel Caliber: large and patent, compressibility normal  Needle advanced into vessel with real time Ultrasound guidance.  Guidewire confirmed in vessel.  Image recorded and saved.  Sterile sheath used.  no esophageal manometryNumber of attempts: 1  Post-procedure: blood return through all ports, chlorhexidine patch and sterile dressing applied  Technical procedures used: ELIECER Mcdaniel 3CG    Assessment: placement verified by x-ray, tip termination and successful placement          Name Camryn Phan RN   2/27/2024

## 2024-02-27 NOTE — PLAN OF CARE
Problem: Adult Inpatient Plan of Care  Goal: Optimal Comfort and Wellbeing  Outcome: Ongoing, Progressing  Intervention: Provide Person-Centered Care  Flowsheets (Taken 2/27/2024 1530)  Trust Relationship/Rapport:   care explained   choices provided   emotional support provided   empathic listening provided   questions answered   questions encouraged   reassurance provided   thoughts/feelings acknowledged

## 2024-03-04 ENCOUNTER — LAB VISIT (OUTPATIENT)
Dept: LAB | Facility: HOSPITAL | Age: 84
End: 2024-03-04
Attending: STUDENT IN AN ORGANIZED HEALTH CARE EDUCATION/TRAINING PROGRAM
Payer: MEDICARE

## 2024-03-04 DIAGNOSIS — A49.8 PROTEUS MIRABILIS INFECTION: ICD-10-CM

## 2024-03-04 LAB
BACTERIA #/AREA URNS HPF: ABNORMAL /HPF
BILIRUB UR QL STRIP: NEGATIVE
CLARITY UR: CLEAR
COLOR UR: YELLOW
GLUCOSE UR QL STRIP: NEGATIVE
HGB UR QL STRIP: ABNORMAL
HYALINE CASTS #/AREA URNS LPF: 3 /LPF
KETONES UR QL STRIP: NEGATIVE
LEUKOCYTE ESTERASE UR QL STRIP: ABNORMAL
MICROSCOPIC COMMENT: ABNORMAL
NITRITE UR QL STRIP: NEGATIVE
NON-SQ EPI CELLS #/AREA URNS HPF: 2 /HPF
PH UR STRIP: 6 [PH] (ref 5–8)
PROT UR QL STRIP: ABNORMAL
RBC #/AREA URNS HPF: 13 /HPF (ref 0–4)
SP GR UR STRIP: 1.02 (ref 1–1.03)
SQUAMOUS #/AREA URNS HPF: 3 /HPF
URN SPEC COLLECT METH UR: ABNORMAL
UROBILINOGEN UR STRIP-ACNC: NEGATIVE EU/DL
WBC #/AREA URNS HPF: 6 /HPF (ref 0–5)
YEAST URNS QL MICRO: ABNORMAL

## 2024-03-04 PROCEDURE — 81001 URINALYSIS AUTO W/SCOPE: CPT | Performed by: STUDENT IN AN ORGANIZED HEALTH CARE EDUCATION/TRAINING PROGRAM

## 2024-03-07 ENCOUNTER — ANESTHESIA EVENT (OUTPATIENT)
Dept: SURGERY | Facility: HOSPITAL | Age: 84
End: 2024-03-07
Payer: MEDICARE

## 2024-03-07 ENCOUNTER — EXTERNAL HOME HEALTH (OUTPATIENT)
Dept: HOME HEALTH SERVICES | Facility: HOSPITAL | Age: 84
End: 2024-03-07
Payer: MEDICARE

## 2024-03-07 ENCOUNTER — OFFICE VISIT (OUTPATIENT)
Dept: FAMILY MEDICINE | Facility: CLINIC | Age: 84
End: 2024-03-07
Payer: MEDICARE

## 2024-03-07 ENCOUNTER — TELEPHONE (OUTPATIENT)
Dept: UROLOGY | Facility: CLINIC | Age: 84
End: 2024-03-07
Payer: MEDICARE

## 2024-03-07 VITALS
HEART RATE: 69 BPM | HEIGHT: 69 IN | DIASTOLIC BLOOD PRESSURE: 76 MMHG | WEIGHT: 208 LBS | RESPIRATION RATE: 18 BRPM | SYSTOLIC BLOOD PRESSURE: 153 MMHG | BODY MASS INDEX: 30.81 KG/M2

## 2024-03-07 DIAGNOSIS — A49.8 PROTEUS MIRABILIS INFECTION: Primary | ICD-10-CM

## 2024-03-07 DIAGNOSIS — N18.32 STAGE 3B CHRONIC KIDNEY DISEASE: ICD-10-CM

## 2024-03-07 DIAGNOSIS — M25.511 ACUTE PAIN OF RIGHT SHOULDER: ICD-10-CM

## 2024-03-07 DIAGNOSIS — N28.1 INFECTED KIDNEY CYST: ICD-10-CM

## 2024-03-07 DIAGNOSIS — I50.42 CHRONIC COMBINED SYSTOLIC AND DIASTOLIC CONGESTIVE HEART FAILURE: ICD-10-CM

## 2024-03-07 DIAGNOSIS — L89.152 PRESSURE ULCER OF SACRAL REGION, STAGE 2: ICD-10-CM

## 2024-03-07 DIAGNOSIS — I10 ESSENTIAL HYPERTENSION: ICD-10-CM

## 2024-03-07 DIAGNOSIS — I48.0 PAROXYSMAL ATRIAL FIBRILLATION: Chronic | ICD-10-CM

## 2024-03-07 DIAGNOSIS — N18.4 CHRONIC KIDNEY DISEASE (CKD), STAGE IV (SEVERE): ICD-10-CM

## 2024-03-07 DIAGNOSIS — J96.11 CHRONIC RESPIRATORY FAILURE WITH HYPOXIA: ICD-10-CM

## 2024-03-07 PROCEDURE — 3078F DIAST BP <80 MM HG: CPT | Mod: CPTII,S$GLB,, | Performed by: INTERNAL MEDICINE

## 2024-03-07 PROCEDURE — 1100F PTFALLS ASSESS-DOCD GE2>/YR: CPT | Mod: CPTII,S$GLB,, | Performed by: INTERNAL MEDICINE

## 2024-03-07 PROCEDURE — 3077F SYST BP >= 140 MM HG: CPT | Mod: CPTII,S$GLB,, | Performed by: INTERNAL MEDICINE

## 2024-03-07 PROCEDURE — 1125F AMNT PAIN NOTED PAIN PRSNT: CPT | Mod: CPTII,S$GLB,, | Performed by: INTERNAL MEDICINE

## 2024-03-07 PROCEDURE — 99999 PR PBB SHADOW E&M-EST. PATIENT-LVL III: CPT | Mod: PBBFAC,,, | Performed by: INTERNAL MEDICINE

## 2024-03-07 PROCEDURE — 99215 OFFICE O/P EST HI 40 MIN: CPT | Mod: S$GLB,,, | Performed by: INTERNAL MEDICINE

## 2024-03-07 PROCEDURE — 3288F FALL RISK ASSESSMENT DOCD: CPT | Mod: CPTII,S$GLB,, | Performed by: INTERNAL MEDICINE

## 2024-03-07 NOTE — OR NURSING
DR. Wang with anesthesia reviewed patients chart and stated patient will need chest xray prior to surgery.

## 2024-03-07 NOTE — OR NURSING
Phone pre-op was done.  Patient and daughter verbalized understanding of instructions and education.

## 2024-03-07 NOTE — TELEPHONE ENCOUNTER
----- Message from Nydia Magaña sent at 3/7/2024  9:07 AM CST -----  Contact: Pt Daughter  Type: Needs Medical Advice         Who Called: Pt Daughter Geovanna Hernandez  Ananth Call Back Number:[phone]    Additional Information: Requesting a call back regarding Pt has procedure tomorrow and said no one has called them  with instructions   Please Advise- Thank you

## 2024-03-07 NOTE — PROGRESS NOTES
Subjective:       Patient ID: Jo Alegre is a 83 y.o. female.    Chief Complaint: Gastroesophageal Reflux, Shoulder Pain, Proteus mirabilis infection, Large kidney cyst, Cardiomyopathy, Diabetes, and Atrial Fibrillation    Chief Complaint   Patient presents with    Gastroesophageal Reflux    Shoulder Pain    Proteus mirabilis infection    Large kidney cyst    Cardiomyopathy    Diabetes    Atrial Fibrillation       Miss Jo Palm is 83-year-old somewhat chronically ill  female who comes for follow-up  She is accompanied with couple of her daughters who tend to take care of her medical as well as executive affairs.    The biggest change at this point seems to be a recurrent renal cyst/retroperitoneal? ?  infection and she currently has a PICC line for which he is receiving intravenous Rocephin daily for 30 days.  This is home-based IV therapy.    The exact train of events are not clear to me at this point.    It seems her troubles started sometimes last year in when she was admitted for congestive heart failure and somewhere down the line she had an ultrasound of abdominal kidneys which showed large cystic fluid collection followed by CT scan which confirmed the same.  14 cm cyst in the kidney.  (Please see a cut and paste of that cyst in the assessment section.)    Previously she had a large staghorn calculus.    Interventional radiology (IR) drainage procedure was performed which had grown Proteus mirabilis and she was initiated initially on Cipro.  Probably there was displacement of the drainage which was replaced again by IR.    She was followed by Urology-Dr. Shelby Qureshi and subsequently by ID Dr. Nikole Jorge.    The plan at this point is to finish the treatment with IV antibiotics and subsequently decide as to whether the cyst should be drained or patient has enough strength to go through a surgery for removal of the fluid collection or cyst.    ID Note  HPI: Jo Alegre is a 83 y.o.  female , very pleasant, former smoker, with past medical history of diabetes, COPD, CHF who has been followed by Urology for recurrent UTIs and bilateral kidney cysts and a left large staghorn calculus since imaging on 2020.  Patient reports back in October she was admitted to the hospital for acute on chronic CHF exacerbation, noted to have MARCELINO, kidney ultrasound then revealed bilateral large cysts which prompted CT abdomen and pelvis which revealed a serial of fluid collections in the right and left kidneys.  A giant 14 cm left renal cyst observed.  No hydronephrosis or stones appreciated.  She had an IR drainage placed for the large giant cyst which ended up being an abscess, 50 cc of purulent fluid was removed on 11/2/23, cultures grew pansensitive Proteus mirabilis.  She then had to have her drain repositioned because of malposition, drained again and replaced 1/12/24, repeat cultures with Proteus mirabilis.  She has completed on and off 17 days of ciprofloxacin twice a day.       Patient is here with her daughters, she is in the wheelchair, she is awake, alert, very pleasant.  She denies any fever or chills, no nausea or vomiting, no chest pain or cough, baseline shortness of breath, she does complain of increased urinary frequency, foul-smelling urine, and some dysuria at the beginning of micturition, she denies any changes in bowel movements.  Patient and family are very concerned regarding next step of care, explained plan of care including repeating CT scan to address status of prior fluid collection.  It is possible that we either consider further IR guided drainage with IV antibiotics versus surgery but to be determined after discussion with consultants.     Chronic medical issues are as below:-    Medical issues are as below:-    1. Chronic combined systolic and diastolic congestive heart failure   2. Paroxysmal atrial fibrillation   3. Essential hypertension   4. Chronic anticoagulation  -currently on Xarelto  5. Type 2 diabetes mellitus with diabetic nephropathy, without long-term current use of insulin   6. Midline low back pain without sciatica, unspecified chronicity   7. Neurogenic bladder   8.         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      Family also requests transmission of records to her cardiologist Dr. Jones for review in case any cardiac clearance or fine tuning is required prior to potential surgical intervention.           ////after recent hospital discharge on 05/15 [id number]. .  She is accompanied with her daughter and DIL Ms Lamb.     She was recently admitted to the hospital with complains of shortness of breath and finding of pleural effusion which was drained.  Ultimately the results of pleural effusion were negative for malignancy.    Summary of hospital stay has been cut and pasted here for brief quick reference.    HPI:   This is an 84 y/o woman with DM, COPD CKD3, afib chronic combined systolic and diastolic heart failure who presents for continued treatment of deconditioning in setting of acute on chronic combined systolic and diastolic heart failure and recurrent left pleural effusion. Patient presented to an outlying facility with SOB. Patient was found to be in afib and to have large left pleural effusion. Patient underwent thoracentesis with temporary improvement. Patient found to have re accumulation of fluid. Patient treated with diuretics. Patient with noted deconditioning. PT and OT recommended SNF placement. Patient eventually transitioned to TCU for continue PT and OT, close physician monitoring close volume monitoring as well as management of chronic issues including COPD DM CKD3 DM.         * No surgery found *       Hospital Course:   Patient admitted for continued treatment of acute on chronic combined systolic and diastolic heart failure. Diuretics continued. Volume status closely monitored. PT and OT consulted. Strength, endurance  and mobility closely monitored. Patient did have slight increase in weight and lower extremity swelling, additional dose of bumex administered 10/3. Patient volume status improved but patient did have some hypotension. Blood pressure improved. Patient able to tolerate the addition of entresto and vericiguat. Patient eventually discharged home with close follow up. Hold parameters were given for entresto and vericiguat at discharge and discussed with patient.         Goals of Care Treatment Preferences:  Code Status: Partial Code     What is most important right now is to focus on spending time at home, remaining as independent as possible, symptom/pain control, improvement in condition but with limits to invasive therapies.  Accordingly, we have decided that the best plan to meet the patient's goals includes continuing with treatment.     Medical issues are as below:-    1. Chronic combined systolic and diastolic congestive heart failure   2. Paroxysmal atrial fibrillation   3. Essential hypertension   4. Chronic anticoagulation -currently on Xarelto  5. Type 2 diabetes mellitus with diabetic nephropathy, without long-term current use of insulin   6. Midline low back pain without sciatica, unspecified chronicity   7. Neurogenic bladder   8.         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    ///////    Final Active Diagnoses:    Diagnosis Date Noted POA  · PRINCIPAL PROBLEM:  Acute on chronic combined systolic and diastolic heart failure [I50.43] 10/27/2022 Yes  · Recurrent left pleural effusion [J90] 09/19/2023 Yes  · Pulmonary hypertension [I27.20] 07/27/2020 Yes  · Obstructive sleep apnea syndrome [G47.33] 06/24/2020 Yes  · COPD (chronic obstructive pulmonary disease) per patient [J44.9] 04/02/2020 Yes  · Paroxysmal atrial fibrillation [I48.0] 05/06/2019 Yes  · Stage 3 chronic kidney disease [N18.30] 11/05/2018 Yes  · Essential hypertension [I10] 05/23/2018 Yes  · Mixed dyslipidemia  [E78.2] 05/23/2018 Yes  · Cardiomyopathy with implantable cardioverter-defibrillator [I42.9, Z95.810] 05/23/2018 Yes  · Type 2 diabetes mellitus with diabetic nephropathy, without long-term current use of insulin [E11.21] 05/23/2018 Yes           Hypertension  This is a chronic problem. The current episode started more than 1 year ago. The problem has been rapidly improving since onset. The problem is controlled. Associated symptoms include malaise/fatigue, peripheral edema and shortness of breath. Pertinent negatives include no chest pain, headaches or palpitations. Past treatments include calcium channel blockers, diuretics, angiotensin blockers and beta blockers (On amlodipine and diltiazem.). The current treatment provides significant improvement. Compliance problems include psychosocial issues.    Hyperlipidemia  The current episode started more than 1 year ago. The problem is controlled. Exacerbating diseases include obesity. Associated symptoms include shortness of breath. Pertinent negatives include no chest pain. The current treatment provides moderate improvement of lipids. Risk factors for coronary artery disease include post-menopausal, a sedentary lifestyle, dyslipidemia and hypertension.   Atrial Fibrillation  Presents for follow-up visit. Symptoms include hypotension, shortness of breath and weakness. Symptoms are negative for chest pain, hemodynamic instability and palpitations. The symptoms have been stable. Past medical history includes atrial fibrillation, CHF and hyperlipidemia. Medication compliance problems include psychosocial issues.   Congestive Heart Failure  Presents for follow-up visit. Associated symptoms include edema, fatigue and shortness of breath. Pertinent negatives include no abdominal pain, chest pain, chest pressure, claudication, near-syncope, palpitations or unexpected weight change (gained wt 5 lbs ?). The symptoms have been stable. Compliance with diet is 51-75%. Compliance  with exercise is 26-50%. Compliance with medications is %.       Past Medical History:   Diagnosis Date    Allergy     Codeine, Lasix    Atrial fibrillation     Atrial fibrillation     Cataract     CHF (congestive heart failure)     Diabetes mellitus, type 2     Ejection fraction < 50% 10/18/2017    Approximately 35%  Based on prior  Echocardiogram.    Encounter for blood transfusion     Hyperlipidemia     Osteoporosis     PONV (postoperative nausea and vomiting)     Thyroid disorder screening 10/17/2017    TSH of 1.12 ordered by Dr. dee Jones     Social History     Socioeconomic History    Marital status:     Number of children: 4   Occupational History    Occupation:    Tobacco Use    Smoking status: Former     Passive exposure: Past    Smokeless tobacco: Never    Tobacco comments:     quit 2013   Substance and Sexual Activity    Alcohol use: No    Drug use: No    Sexual activity: Not Currently   Social History Narrative    - Drives and lives alone.     Social Determinants of Health     Financial Resource Strain: Low Risk  (9/27/2023)    Overall Financial Resource Strain (CARDIA)     Difficulty of Paying Living Expenses: Not hard at all   Food Insecurity: No Food Insecurity (9/27/2023)    Hunger Vital Sign     Worried About Running Out of Food in the Last Year: Never true     Ran Out of Food in the Last Year: Never true   Transportation Needs: No Transportation Needs (9/27/2023)    PRAPARE - Transportation     Lack of Transportation (Medical): No     Lack of Transportation (Non-Medical): No   Physical Activity: Inactive (9/27/2023)    Exercise Vital Sign     Days of Exercise per Week: 0 days     Minutes of Exercise per Session: 0 min   Stress: No Stress Concern Present (9/27/2023)    [employer] - Occupational Stress Questionnaire     Feeling of Stress : Only a little   Social Connections: Socially Isolated (9/27/2023)    Social Connection and  Isolation Panel [NHANES]     Frequency of Communication with Friends and Family: More than three times a week     Frequency of Social Gatherings with Friends and Family: Once a week     Attends Restorationism Services: Never     Active Member of Clubs or Organizations: No     Attends Club or Organization Meetings: Never     Marital Status:    Housing Stability: Low Risk  (9/27/2023)    Housing Stability Vital Sign     Unable to Pay for Housing in the Last Year: No     Number of Places Lived in the Last Year: 1     Unstable Housing in the Last Year: No     Past Surgical History:   Procedure Laterality Date    ANTEGRADE NEPHROSTOGRAPHY Left 8/25/2022    Procedure: NEPHROSTOGRAM, ANTEGRADE;  Surgeon: Shelby Parra MD;  Location: [facility];  Service: Urology;  Laterality: Left;    CHOLECYSTECTOMY  1997    [place]     COLONOSCOPY      CYSTOSCOPY W/ URETERAL STENT PLACEMENT Left 7/17/2022    Procedure: CYSTOSCOPY, WITH URETERAL STENT INSERTION;  Surgeon: Shelby Parra MD;  Location: [facility];  Service: Urology;  Laterality: Left;    CYSTOSCOPY W/ URETERAL STENT REMOVAL Left 9/21/2022    Procedure: CYSTOSCOPY, WITH URETERAL STENT REMOVAL;  Surgeon: Shelby Parra MD;  Location: [facility] OR;  Service: Urology;  Laterality: Left;    CYSTOSCOPY WITH URETEROSCOPY, RETROGRADE PYELOGRAPHY, AND INSERTION OF STENT Left 8/25/2022    Procedure: CYSTOSCOPY, WITH RETROGRADE PYELOGRAM AND URETERAL STENT INSERTION;  Surgeon: Shelby Parra MD;  Location: [facility];  Service: Urology;  Laterality: Left;    EYE SURGERY      bilateral cataracts    FINGER SURGERY Right 2021    right pinky finger    FLEXIBLE CYSTOSCOPY Left 8/25/2022    Procedure: CYSTOSCOPY, FLEXIBLE WITH STENT REMOVAL;  Surgeon: Shelby Parra MD;  Location: [facility];  Service: Urology;  Laterality: Left;    FRACTURE SURGERY  2014    right femur with neville    HEMORRHOID SURGERY      48 yrs ago    HYSTERECTOMY      NEPHROSTOMY Left 8/25/2022     Procedure: CREATION, NEPHROSTOMY;  Surgeon: Shelby Parra MD;  Location: [facility] OR;  Service: Urology;  Laterality: Left;    PERCUTANEOUS NEPHROLITHOTOMY N/A 8/25/2022    Procedure: NEPHROLITHOTOMY, PERCUTANEOUS;  Surgeon: Shelby Parra MD;  Location: [facility] OR;  Service: Urology;  Laterality: N/A;    REPLACEMENT OF IMPLANTABLE CARDIOVERTER-DEFIBRILLATOR (ICD) GENERATOR N/A 12/13/2019    Procedure: REPLACEMENT, PULSE GENERATOR, ICD-MEDTRONIC;  Surgeon: Sebastian Nowak III, MD;  Location: [facility] CATH;  Service: Cardiology;  Laterality: N/A;    TONSILLECTOMY       Family History   Problem Relation Age of Onset    Heart disease Mother     Cancer Father         Lung Cancer ??? Asbestos    Breast cancer Paternal Aunt        Review of Systems   Constitutional:  Positive for activity change (Somewhat more cautious while walking), fatigue and malaise/fatigue. Negative for appetite change, chills, fever and unexpected weight change (gained wt 5 lbs ?).   HENT:  Negative for congestion, ear discharge and postnasal drip.    Eyes:  Negative for discharge, redness and visual disturbance.   Respiratory:  Positive for shortness of breath. Negative for cough, chest tightness and wheezing.         Left-sided pleural effusion which was drained.   Cardiovascular:  Positive for leg swelling. Negative for chest pain, palpitations, claudication and near-syncope.        History of defibrillator, congestive cardiomyopathy hypertension and dyslipidemia   Gastrointestinal:  Negative for abdominal distention, abdominal pain and constipation.        Pellet-like stools and lot of gas.   Endocrine: Negative for polydipsia and polyuria.        Diabetes mellitus on glimepiride.  Osteoporosis on injection Prolia   Genitourinary:  Negative for difficulty urinating, flank pain and hematuria.   Musculoskeletal:  Positive for arthralgias and gait problem.        Recent fall and hit the left side of chest.   Skin:  Negative for color change, pallor  "and wound.   Neurological:  Positive for weakness. Negative for facial asymmetry and headaches.        Previous presentations of headache has settled down.  Tremors.   Hematological:  Negative for adenopathy. Bruises/bleeds easily.        Patient is on chronic anticoagulation with warfarin.     Psychiatric/Behavioral:            Beginningof memory issues.         Objective:      Blood pressure (!) 153/76, pulse 69, resp. rate 18, height 5' 9" (1.753 m), weight 94.3 kg (208 lb). Body mass index is 30.72 kg/m².  Physical Exam  Constitutional:       General: She is not in acute distress.     Appearance: She is well-developed. She is obese. She is ill-appearing. She is not toxic-appearing or diaphoretic.      Comments: Patient is obese with a BMI of 32.09.  Chronically unwell appearing.   HENT:      Head: Normocephalic and atraumatic.      Comments: .     Mouth/Throat:      Pharynx: Oropharynx is clear. No posterior oropharyngeal erythema.   Eyes:      General: No scleral icterus.        Right eye: No discharge.         Left eye: No discharge.   Neck:      Thyroid: No thyromegaly.      Vascular: No JVD.      Trachea: No tracheal deviation.   Cardiovascular:      Rate and Rhythm: Normal rate and regular rhythm.      Heart sounds:      No friction rub. No gallop.   Pulmonary:      Effort: Pulmonary effort is normal.      Breath sounds: Decreased air movement present.   Abdominal:      General: There is no distension.      Palpations: Abdomen is soft.      Tenderness: There is no abdominal tenderness.   Musculoskeletal:      Cervical back: Neck supple.      Right lower leg: Edema present.      Left lower leg: Edema present.   Lymphadenopathy:      Cervical: No cervical adenopathy.   Skin:     General: Skin is warm and dry.      Coloration: Skin is not pale.      Findings: No lesion or rash. Rash is not scaling.   Neurological:      Mental Status: She is alert. Mental status is at baseline. She is not disoriented.      " Motor: Tremor present.   Psychiatric:         Behavior: Behavior normal.           Assessment:       Lab Visit on 03/04/2024   Component Date Value Ref Range Status    Specimen UA 03/04/2024 Urine, Clean Catch   Final    Color, UA 03/04/2024 Yellow  Yellow, Straw, Steffanie Final    Appearance, UA 03/04/2024 Clear  Clear Final    pH, UA 03/04/2024 6.0  5.0 - 8.0 Final    Specific Gravity, UA 03/04/2024 1.020  1.005 - 1.030 Final    Protein, UA 03/04/2024 1+ (A)  Negative Final    Glucose, UA 03/04/2024 Negative  Negative Final    Ketones, UA 03/04/2024 Negative  Negative Final    Bilirubin (UA) 03/04/2024 Negative  Negative Final    Occult Blood UA 03/04/2024 1+ (A)  Negative Final    Nitrite, UA 03/04/2024 Negative  Negative Final    Urobilinogen, UA 03/04/2024 Negative  Negative EU/dL Final    Leukocytes, UA 03/04/2024 Trace (A)  Negative Final    RBC, UA 03/04/2024 13 (H)  0 - 4 /hpf Final    WBC, UA 03/04/2024 6 (H)  0 - 5 /hpf Final    Bacteria 03/04/2024 Rare  None-Occ /hpf Final    Yeast, UA 03/04/2024 None  None Final    Squam Epithel, UA 03/04/2024 3  /hpf Final    Non-Squam Epith 03/04/2024 2 (A)  <1/hpf /hpf Final    Hyaline Casts, UA 03/04/2024 3 (A)  0-1/lpf /lpf Final    Microscopic Comment 03/04/2024 SEE COMMENT   Final   Lab Visit on 02/26/2024   Component Date Value Ref Range Status    Microalbumin, Urine 02/26/2024 37.2 (H)  <19.9 ug/mL Final    Creatinine, Urine 02/26/2024 62.4  15.0 - 325.0 mg/dL Final    Microalb/Creat Ratio 02/26/2024 59.6 (H)  0.0 - 30.0 ug/mg Final   Lab Visit on 02/26/2024   Component Date Value Ref Range Status    Cholesterol 02/26/2024 164  120 - 199 mg/dL Final    Triglycerides 02/26/2024 109  30 - 150 mg/dL Final    HDL 02/26/2024 69  40 - 75 mg/dL Final    LDL Cholesterol 02/26/2024 73.2  63.0 - 159.0 mg/dL Final    HDL/Cholesterol Ratio 02/26/2024 42.1  20.0 - 50.0 % Final    Total Cholesterol/HDL Ratio 02/26/2024 2.4  2.0 - 5.0 Final    Non-HDL Cholesterol 02/26/2024 95   mg/dL Final    Sodium 02/26/2024 142  136 - 145 mmol/L Final    Potassium 02/26/2024 4.4  3.5 - 5.1 mmol/L Final    Chloride 02/26/2024 101  95 - 110 mmol/L Final    CO2 02/26/2024 33 (H)  23 - 29 mmol/L Final    Glucose 02/26/2024 77  70 - 110 mg/dL Final    BUN 02/26/2024 28 (H)  8 - 23 mg/dL Final    Creatinine 02/26/2024 2.7 (H)  0.5 - 1.4 mg/dL Final    Calcium 02/26/2024 10.8 (H)  8.7 - 10.5 mg/dL Final    Total Protein 02/26/2024 6.9  6.0 - 8.4 g/dL Final    Albumin 02/26/2024 3.7  3.5 - 5.2 g/dL Final    Total Bilirubin 02/26/2024 0.7  0.1 - 1.0 mg/dL Final    Alkaline Phosphatase 02/26/2024 88  55 - 135 U/L Final    AST 02/26/2024 12  10 - 40 U/L Final    ALT 02/26/2024 8 (L)  10 - 44 U/L Final    eGFR 02/26/2024 17.1 (A)  >60 mL/min/1.73 m^2 Final    Anion Gap 02/26/2024 8  8 - 16 mmol/L Final    WBC 02/26/2024 5.87  3.90 - 12.70 K/uL Final    RBC 02/26/2024 3.66 (L)  4.00 - 5.40 M/uL Final    Hemoglobin 02/26/2024 10.8 (L)  12.0 - 16.0 g/dL Final    Hematocrit 02/26/2024 34.7 (L)  37.0 - 48.5 % Final    MCV 02/26/2024 95  82 - 98 fL Final    MCH 02/26/2024 29.5  27.0 - 31.0 pg Final    MCHC 02/26/2024 31.1 (L)  32.0 - 36.0 g/dL Final    RDW 02/26/2024 17.1 (H)  11.5 - 14.5 % Final    Platelets 02/26/2024 294  150 - 450 K/uL Final    MPV 02/26/2024 10.3  9.2 - 12.9 fL Final    Immature Granulocytes 02/26/2024 0.3  0.0 - 0.5 % Final    Gran # (ANC) 02/26/2024 4.0  1.8 - 7.7 K/uL Final    Immature Grans (Abs) 02/26/2024 0.02  0.00 - 0.04 K/uL Final    Lymph # 02/26/2024 1.2  1.0 - 4.8 K/uL Final    Mono # 02/26/2024 0.5  0.3 - 1.0 K/uL Final    Eos # 02/26/2024 0.1  0.0 - 0.5 K/uL Final    Baso # 02/26/2024 0.06  0.00 - 0.20 K/uL Final    nRBC 02/26/2024 0  0 /100 WBC Final    Gran % 02/26/2024 68.2  38.0 - 73.0 % Final    Lymph % 02/26/2024 20.1  18.0 - 48.0 % Final    Mono % 02/26/2024 8.9  4.0 - 15.0 % Final    Eosinophil % 02/26/2024 1.5  0.0 - 8.0 % Final    Basophil % 02/26/2024 1.0  0.0 - 1.9 % Final     Differential Method 02/26/2024 Automated   Final    BNP 02/26/2024 557 (H)  0 - 99 pg/mL Final   Lab Visit on 02/15/2024   Component Date Value Ref Range Status    Sodium 02/15/2024 141  136 - 145 mmol/L Final    Potassium 02/15/2024 4.9  3.5 - 5.1 mmol/L Final    Chloride 02/15/2024 100  95 - 110 mmol/L Final    CO2 02/15/2024 33 (H)  23 - 29 mmol/L Final    Glucose 02/15/2024 67 (L)  70 - 110 mg/dL Final    BUN 02/15/2024 30 (H)  8 - 23 mg/dL Final    Creatinine 02/15/2024 2.6 (H)  0.5 - 1.4 mg/dL Final    Calcium 02/15/2024 10.5  8.7 - 10.5 mg/dL Final    Total Protein 02/15/2024 7.0  6.0 - 8.4 g/dL Final    Albumin 02/15/2024 3.6  3.5 - 5.2 g/dL Final    Total Bilirubin 02/15/2024 0.4  0.1 - 1.0 mg/dL Final    Alkaline Phosphatase 02/15/2024 93  55 - 135 U/L Final    AST 02/15/2024 11  10 - 40 U/L Final    ALT 02/15/2024 8 (L)  10 - 44 U/L Final    eGFR 02/15/2024 17.9 (A)  >60 mL/min/1.73 m^2 Final    Anion Gap 02/15/2024 8  8 - 16 mmol/L Final    WBC 02/15/2024 5.81  3.90 - 12.70 K/uL Final    RBC 02/15/2024 4.07  4.00 - 5.40 M/uL Final    Hemoglobin 02/15/2024 11.6 (L)  12.0 - 16.0 g/dL Final    Hematocrit 02/15/2024 38.3  37.0 - 48.5 % Final    MCV 02/15/2024 94  82 - 98 fL Final    MCH 02/15/2024 28.5  27.0 - 31.0 pg Final    MCHC 02/15/2024 30.3 (L)  32.0 - 36.0 g/dL Final    RDW 02/15/2024 17.3 (H)  11.5 - 14.5 % Final    Platelets 02/15/2024 320  150 - 450 K/uL Final    MPV 02/15/2024 10.0  9.2 - 12.9 fL Final    Immature Granulocytes 02/15/2024 0.5  0.0 - 0.5 % Final    Gran # (ANC) 02/15/2024 3.7  1.8 - 7.7 K/uL Final    Immature Grans (Abs) 02/15/2024 0.03  0.00 - 0.04 K/uL Final    Lymph # 02/15/2024 1.3  1.0 - 4.8 K/uL Final    Mono # 02/15/2024 0.6  0.3 - 1.0 K/uL Final    Eos # 02/15/2024 0.2  0.0 - 0.5 K/uL Final    Baso # 02/15/2024 0.04  0.00 - 0.20 K/uL Final    nRBC 02/15/2024 0  0 /100 WBC Final    Gran % 02/15/2024 63.9  38.0 - 73.0 % Final    Lymph % 02/15/2024 21.7  18.0 - 48.0 % Final     Mono % 02/15/2024 9.6  4.0 - 15.0 % Final    Eosinophil % 02/15/2024 3.6  0.0 - 8.0 % Final    Basophil % 02/15/2024 0.7  0.0 - 1.9 % Final    Differential Method 02/15/2024 Automated   Final    Procalcitonin 02/15/2024 1.593 (H)  0.000 - 0.500 ng/mL Final    CRP 02/15/2024 2.6  0.0 - 8.2 mg/L Final    Specimen UA 02/15/2024 Urine, Clean Catch   Final    Color, UA 02/15/2024 Yellow  Yellow, Straw, Steffanie Final    Appearance, UA 02/15/2024 Clear  Clear Final    pH, UA 02/15/2024 8.0  5.0 - 8.0 Final    Specific Gravity, UA 02/15/2024 1.010  1.005 - 1.030 Final    Protein, UA 02/15/2024 Negative  Negative Final    Glucose, UA 02/15/2024 Negative  Negative Final    Ketones, UA 02/15/2024 Negative  Negative Final    Bilirubin (UA) 02/15/2024 Negative  Negative Final    Occult Blood UA 02/15/2024 Trace (A)  Negative Final    Nitrite, UA 02/15/2024 Negative  Negative Final    Urobilinogen, UA 02/15/2024 Negative  Negative EU/dL Final    Leukocytes, UA 02/15/2024 Negative  Negative Final   Office Visit on 01/29/2024   Component Date Value Ref Range Status    Body Fluid Source, Creatinine 01/29/2024 KELLY Drainage   Final    Creatinine, Body Fluid 01/29/2024 5.7  Not established mg/dL Final   Hospital Outpatient Visit on 01/12/2024   Component Date Value Ref Range Status    POCT Glucose 01/12/2024 92  70 - 110 mg/dL Final    Aerobic Bacterial Culture 01/12/2024  (A)   Final                    Value:PROTEUS MIRABILIS  Few      Anaerobic Culture 01/12/2024 No anaerobes isolated   Final    Fungus (Mycology) Culture 01/12/2024 No fungus isolated after 4 weeks   Final    Gram Stain Result 01/12/2024 Many WBC's   Final    Gram Stain Result 01/12/2024 Rare Gram negative rods   Final    Body Fluid Type 01/12/2024 Other (Specify)   Final    Fluid Appearance 01/12/2024 Turbid   Final    Fluid Color 01/12/2024 Red   Final    WBC, Body Fluid 01/12/2024 053310  /cu mm Final    Segs, Fluid 01/12/2024 7  % Final    Lymphs, Fluid 01/12/2024  93  % Final   Lab Visit on 01/12/2024   Component Date Value Ref Range Status    WBC 01/12/2024 7.54  3.90 - 12.70 K/uL Final    RBC 01/12/2024 4.02  4.00 - 5.40 M/uL Final    Hemoglobin 01/12/2024 11.1 (L)  12.0 - 16.0 g/dL Final    Hematocrit 01/12/2024 36.0 (L)  37.0 - 48.5 % Final    MCV 01/12/2024 90  82 - 98 fL Final    MCH 01/12/2024 27.6  27.0 - 31.0 pg Final    MCHC 01/12/2024 30.8 (L)  32.0 - 36.0 g/dL Final    RDW 01/12/2024 15.3 (H)  11.5 - 14.5 % Final    Platelets 01/12/2024 311  150 - 450 K/uL Final    MPV 01/12/2024 9.8  9.2 - 12.9 fL Final    Immature Granulocytes 01/12/2024 0.4  0.0 - 0.5 % Final    Gran # (ANC) 01/12/2024 5.7  1.8 - 7.7 K/uL Final    Immature Grans (Abs) 01/12/2024 0.03  0.00 - 0.04 K/uL Final    Lymph # 01/12/2024 1.0  1.0 - 4.8 K/uL Final    Mono # 01/12/2024 0.7  0.3 - 1.0 K/uL Final    Eos # 01/12/2024 0.2  0.0 - 0.5 K/uL Final    Baso # 01/12/2024 0.03  0.00 - 0.20 K/uL Final    nRBC 01/12/2024 0  0 /100 WBC Final    Gran % 01/12/2024 75.1 (H)  38.0 - 73.0 % Final    Lymph % 01/12/2024 13.1 (L)  18.0 - 48.0 % Final    Mono % 01/12/2024 8.9  4.0 - 15.0 % Final    Eosinophil % 01/12/2024 2.1  0.0 - 8.0 % Final    Basophil % 01/12/2024 0.4  0.0 - 1.9 % Final    Differential Method 01/12/2024 Automated   Final    Sodium 01/12/2024 142  136 - 145 mmol/L Final    Potassium 01/12/2024 4.4  3.5 - 5.1 mmol/L Final    Chloride 01/12/2024 99  95 - 110 mmol/L Final    CO2 01/12/2024 31 (H)  23 - 29 mmol/L Final    Glucose 01/12/2024 100  70 - 110 mg/dL Final    BUN 01/12/2024 17  8 - 23 mg/dL Final    Creatinine 01/12/2024 1.7 (H)  0.5 - 1.4 mg/dL Final    Calcium 01/12/2024 9.9  8.7 - 10.5 mg/dL Final    Total Protein 01/12/2024 6.8  6.0 - 8.4 g/dL Final    Albumin 01/12/2024 2.6 (L)  3.5 - 5.2 g/dL Final    Total Bilirubin 01/12/2024 0.7  0.1 - 1.0 mg/dL Final    Alkaline Phosphatase 01/12/2024 103  55 - 135 U/L Final    AST 01/12/2024 9 (L)  10 - 40 U/L Final    ALT 01/12/2024 9 (L)   10 - 44 U/L Final    eGFR 01/12/2024 29.8 (A)  >60 mL/min/1.73 m^2 Final    Anion Gap 01/12/2024 12  8 - 16 mmol/L Final    Prothrombin Time 01/12/2024 10.4  9.0 - 12.5 sec Final    INR 01/12/2024 1.0  0.8 - 1.2 Final       1. Proteus mirabilis infection    2. Infected kidney cyst    3. Chronic combined systolic and diastolic congestive heart failure    4. Paroxysmal atrial fibrillation  Comments:  Continues on anticoagulation.  Stable rhythm.  Overview:  [place]  Currently on a combination of diltiazem, digoxin, Xarelto and amiodarone.  Dr. Jones/Dr. Lopez      5. Essential hypertension    6. Stage 3b chronic kidney disease    7. Acute pain of right shoulder  Comments:  Most likely advanced arthritis and frozen shoulder.  Refer orthopedics.  Need some relief at least temporary.  Orders:  -     Ambulatory referral/consult to Orthopedics; Future; Expected date: 03/14/2024    8. Chronic respiratory failure with hypoxia    9. Chronic kidney disease (CKD), stage IV (severe)    10. Pressure ulcer of sacral region, stage 2      14 cm cyst in the left kidney which was subsequently drained.  As above           Plan:   Proteus mirabilis infection    Infected kidney cyst    Chronic combined systolic and diastolic congestive heart failure    Paroxysmal atrial fibrillation  Comments:  Continues on anticoagulation.  Stable rhythm.    Essential hypertension    Stage 3b chronic kidney disease    Acute pain of right shoulder  Comments:  Most likely advanced arthritis and frozen shoulder.  Refer orthopedics.  Need some relief at least temporary.  Orders:  -     Ambulatory referral/consult to Orthopedics; Future; Expected date: 03/14/2024    Chronic respiratory failure with hypoxia    Chronic kidney disease (CKD), stage IV (severe)    Pressure ulcer of sacral region, stage 2        No follow-ups on file.    No current facility-administered medications for this visit.  No current outpatient  medications on file.    Facility-Administered Medications Ordered in Other Visits:     0.9%  NaCl infusion, , Intravenous, Continuous, Sebastian Nowak III, MD, Last Rate: 75 mL/hr at 12/13/19 0728, New Bag at 12/13/19 0728    ceFAZolin 2 g in dextrose 5 % in water (D5W) 50 mL IVPB (MB+), 2 g, Intravenous, On Call Procedure, Shelby Parra MD    diphenhydrAMINE injection 25 mg, 25 mg, Intravenous, Once, Sebastian Nowak III, MD    electrolyte-S (ISOLYTE), , Intravenous, Continuous, Manuel Bishop MD, Last Rate: 10 mL/hr at 03/08/24 0831, New Bag at 03/08/24 0831    LIDOcaine (PF) 10 mg/ml (1%) injection 10 mg, 1 mL, Intradermal, Once, Manuel Bishop MD    lorazepam injection 1 mg, 1 mg, Intravenous, Once, Sebastian Nowak III, MD Sanjay Raina

## 2024-03-07 NOTE — TELEPHONE ENCOUNTER
Returned call and spoke with patient, informed she will get that info from the surgery dept nurse, number given to contact them, patient verbally understood.

## 2024-03-08 ENCOUNTER — HOSPITAL ENCOUNTER (OUTPATIENT)
Dept: RADIOLOGY | Facility: HOSPITAL | Age: 84
Discharge: HOME | End: 2024-03-08
Attending: ANESTHESIOLOGY
Payer: MEDICARE

## 2024-03-08 ENCOUNTER — ANESTHESIA (OUTPATIENT)
Dept: SURGERY | Facility: HOSPITAL | Age: 84
End: 2024-03-08
Payer: MEDICARE

## 2024-03-08 ENCOUNTER — HOSPITAL ENCOUNTER (OUTPATIENT)
Facility: HOSPITAL | Age: 84
Discharge: HOME | End: 2024-03-08
Attending: STUDENT IN AN ORGANIZED HEALTH CARE EDUCATION/TRAINING PROGRAM | Admitting: STUDENT IN AN ORGANIZED HEALTH CARE EDUCATION/TRAINING PROGRAM
Payer: MEDICARE

## 2024-03-08 ENCOUNTER — TELEPHONE (OUTPATIENT)
Dept: ORTHOPEDICS | Facility: CLINIC | Age: 84
End: 2024-03-08
Payer: MEDICARE

## 2024-03-08 DIAGNOSIS — Z01.818 PRE-OP TESTING: ICD-10-CM

## 2024-03-08 DIAGNOSIS — N20.0 STAGHORN CALCULUS: ICD-10-CM

## 2024-03-08 DIAGNOSIS — R18.8 RETROPERITONEAL FLUID COLLECTION: Primary | ICD-10-CM

## 2024-03-08 DIAGNOSIS — Z01.818 PREOP TESTING: ICD-10-CM

## 2024-03-08 PROBLEM — J96.11 CHRONIC RESPIRATORY FAILURE WITH HYPOXIA: Status: ACTIVE | Noted: 2024-03-08

## 2024-03-08 PROBLEM — N18.4 CHRONIC KIDNEY DISEASE (CKD), STAGE IV (SEVERE): Status: ACTIVE | Noted: 2024-03-08

## 2024-03-08 PROBLEM — L89.152 PRESSURE ULCER OF SACRAL REGION, STAGE 2: Status: ACTIVE | Noted: 2024-03-08

## 2024-03-08 PROBLEM — I50.42 CHRONIC COMBINED SYSTOLIC AND DIASTOLIC HEART FAILURE: Status: ACTIVE | Noted: 2022-10-27

## 2024-03-08 LAB
APTT PPP: 33.8 SEC (ref 21–32)
INR PPP: 1.2 (ref 0.8–1.2)
PROTHROMBIN TIME: 12.3 SEC (ref 9–12.5)

## 2024-03-08 PROCEDURE — 63600175 PHARM REV CODE 636 W HCPCS: Performed by: NURSE ANESTHETIST, CERTIFIED REGISTERED

## 2024-03-08 PROCEDURE — 37000008 HC ANESTHESIA 1ST 15 MINUTES: Performed by: STUDENT IN AN ORGANIZED HEALTH CARE EDUCATION/TRAINING PROGRAM

## 2024-03-08 PROCEDURE — 71046 X-RAY EXAM CHEST 2 VIEWS: CPT | Mod: TC

## 2024-03-08 PROCEDURE — 71000015 HC POSTOP RECOV 1ST HR: Performed by: STUDENT IN AN ORGANIZED HEALTH CARE EDUCATION/TRAINING PROGRAM

## 2024-03-08 PROCEDURE — 52005 CYSTO W/URTRL CATHJ: CPT | Mod: ,,, | Performed by: STUDENT IN AN ORGANIZED HEALTH CARE EDUCATION/TRAINING PROGRAM

## 2024-03-08 PROCEDURE — 94799 UNLISTED PULMONARY SVC/PX: CPT | Mod: XB

## 2024-03-08 PROCEDURE — 25000003 PHARM REV CODE 250: Performed by: NURSE ANESTHETIST, CERTIFIED REGISTERED

## 2024-03-08 PROCEDURE — 37000009 HC ANESTHESIA EA ADD 15 MINS: Performed by: STUDENT IN AN ORGANIZED HEALTH CARE EDUCATION/TRAINING PROGRAM

## 2024-03-08 PROCEDURE — 36000706: Performed by: STUDENT IN AN ORGANIZED HEALTH CARE EDUCATION/TRAINING PROGRAM

## 2024-03-08 PROCEDURE — D9220A PRA ANESTHESIA: Mod: ANES,,, | Performed by: ANESTHESIOLOGY

## 2024-03-08 PROCEDURE — 74420 UROGRAPHY RTRGR +-KUB: CPT | Mod: 26,,, | Performed by: STUDENT IN AN ORGANIZED HEALTH CARE EDUCATION/TRAINING PROGRAM

## 2024-03-08 PROCEDURE — 36000707: Performed by: STUDENT IN AN ORGANIZED HEALTH CARE EDUCATION/TRAINING PROGRAM

## 2024-03-08 PROCEDURE — D9220A PRA ANESTHESIA: Mod: CRNA,,, | Performed by: NURSE ANESTHETIST, CERTIFIED REGISTERED

## 2024-03-08 PROCEDURE — 25500020 PHARM REV CODE 255: Performed by: STUDENT IN AN ORGANIZED HEALTH CARE EDUCATION/TRAINING PROGRAM

## 2024-03-08 PROCEDURE — 71000033 HC RECOVERY, INTIAL HOUR: Performed by: STUDENT IN AN ORGANIZED HEALTH CARE EDUCATION/TRAINING PROGRAM

## 2024-03-08 PROCEDURE — C1758 CATHETER, URETERAL: HCPCS | Performed by: STUDENT IN AN ORGANIZED HEALTH CARE EDUCATION/TRAINING PROGRAM

## 2024-03-08 PROCEDURE — 63600175 PHARM REV CODE 636 W HCPCS: Performed by: STUDENT IN AN ORGANIZED HEALTH CARE EDUCATION/TRAINING PROGRAM

## 2024-03-08 PROCEDURE — 85610 PROTHROMBIN TIME: CPT | Performed by: ANESTHESIOLOGY

## 2024-03-08 PROCEDURE — 85730 THROMBOPLASTIN TIME PARTIAL: CPT | Performed by: ANESTHESIOLOGY

## 2024-03-08 PROCEDURE — 71046 X-RAY EXAM CHEST 2 VIEWS: CPT | Mod: 26,,, | Performed by: RADIOLOGY

## 2024-03-08 PROCEDURE — C1769 GUIDE WIRE: HCPCS | Performed by: STUDENT IN AN ORGANIZED HEALTH CARE EDUCATION/TRAINING PROGRAM

## 2024-03-08 PROCEDURE — 36415 COLL VENOUS BLD VENIPUNCTURE: CPT | Performed by: ANESTHESIOLOGY

## 2024-03-08 PROCEDURE — 25000003 PHARM REV CODE 250: Performed by: STUDENT IN AN ORGANIZED HEALTH CARE EDUCATION/TRAINING PROGRAM

## 2024-03-08 PROCEDURE — 71000039 HC RECOVERY, EACH ADD'L HOUR: Performed by: STUDENT IN AN ORGANIZED HEALTH CARE EDUCATION/TRAINING PROGRAM

## 2024-03-08 PROCEDURE — 25000003 PHARM REV CODE 250: Performed by: ANESTHESIOLOGY

## 2024-03-08 RX ORDER — FENTANYL CITRATE 50 UG/ML
INJECTION, SOLUTION INTRAMUSCULAR; INTRAVENOUS
Status: DISCONTINUED | OUTPATIENT
Start: 2024-03-08 | End: 2024-03-08

## 2024-03-08 RX ORDER — HYDROCODONE BITARTRATE AND ACETAMINOPHEN 5; 325 MG/1; MG/1
1 TABLET ORAL EVERY 4 HOURS PRN
Status: DISCONTINUED | OUTPATIENT
Start: 2024-03-08 | End: 2024-03-08 | Stop reason: HOSPADM

## 2024-03-08 RX ORDER — LIDOCAINE HYDROCHLORIDE 20 MG/ML
INJECTION INTRAVENOUS
Status: DISCONTINUED | OUTPATIENT
Start: 2024-03-08 | End: 2024-03-08

## 2024-03-08 RX ORDER — DEXAMETHASONE SODIUM PHOSPHATE 4 MG/ML
INJECTION, SOLUTION INTRA-ARTICULAR; INTRALESIONAL; INTRAMUSCULAR; INTRAVENOUS; SOFT TISSUE
Status: DISCONTINUED | OUTPATIENT
Start: 2024-03-08 | End: 2024-03-08

## 2024-03-08 RX ORDER — FENTANYL CITRATE 50 UG/ML
25 INJECTION, SOLUTION INTRAMUSCULAR; INTRAVENOUS EVERY 5 MIN PRN
Status: DISCONTINUED | OUTPATIENT
Start: 2024-03-08 | End: 2024-03-08 | Stop reason: HOSPADM

## 2024-03-08 RX ORDER — SODIUM CHLORIDE 0.9 % (FLUSH) 0.9 %
3 SYRINGE (ML) INJECTION
Status: DISCONTINUED | OUTPATIENT
Start: 2024-03-08 | End: 2024-03-08 | Stop reason: HOSPADM

## 2024-03-08 RX ORDER — ONDANSETRON 4 MG/1
8 TABLET, ORALLY DISINTEGRATING ORAL EVERY 8 HOURS PRN
Status: DISCONTINUED | OUTPATIENT
Start: 2024-03-08 | End: 2024-03-08 | Stop reason: HOSPADM

## 2024-03-08 RX ORDER — ACETAMINOPHEN 10 MG/ML
INJECTION, SOLUTION INTRAVENOUS
Status: DISCONTINUED | OUTPATIENT
Start: 2024-03-08 | End: 2024-03-08

## 2024-03-08 RX ORDER — LIDOCAINE HYDROCHLORIDE 10 MG/ML
1 INJECTION, SOLUTION EPIDURAL; INFILTRATION; INTRACAUDAL; PERINEURAL ONCE
Status: DISCONTINUED | OUTPATIENT
Start: 2024-03-08 | End: 2024-03-08 | Stop reason: HOSPADM

## 2024-03-08 RX ORDER — PROPOFOL 10 MG/ML
VIAL (ML) INTRAVENOUS
Status: DISCONTINUED | OUTPATIENT
Start: 2024-03-08 | End: 2024-03-08

## 2024-03-08 RX ORDER — ONDANSETRON HYDROCHLORIDE 2 MG/ML
INJECTION, SOLUTION INTRAVENOUS
Status: DISCONTINUED | OUTPATIENT
Start: 2024-03-08 | End: 2024-03-08

## 2024-03-08 RX ORDER — ONDANSETRON HYDROCHLORIDE 2 MG/ML
4 INJECTION, SOLUTION INTRAVENOUS DAILY PRN
Status: DISCONTINUED | OUTPATIENT
Start: 2024-03-08 | End: 2024-03-08 | Stop reason: HOSPADM

## 2024-03-08 RX ORDER — EPHEDRINE SULFATE 50 MG/ML
INJECTION, SOLUTION INTRAVENOUS
Status: DISCONTINUED | OUTPATIENT
Start: 2024-03-08 | End: 2024-03-08

## 2024-03-08 RX ORDER — PHENAZOPYRIDINE HYDROCHLORIDE 100 MG/1
100 TABLET, FILM COATED ORAL 3 TIMES DAILY PRN
Qty: 30 TABLET | Refills: 0 | Status: SHIPPED | OUTPATIENT
Start: 2024-03-08 | End: 2024-03-18

## 2024-03-08 RX ORDER — OXYCODONE HYDROCHLORIDE 5 MG/1
5 TABLET ORAL
Status: DISCONTINUED | OUTPATIENT
Start: 2024-03-08 | End: 2024-03-08 | Stop reason: HOSPADM

## 2024-03-08 RX ADMIN — SODIUM CHLORIDE, SODIUM GLUCONATE, SODIUM ACETATE, POTASSIUM CHLORIDE AND MAGNESIUM CHLORIDE: 526; 502; 368; 37; 30 INJECTION, SOLUTION INTRAVENOUS at 08:03

## 2024-03-08 RX ADMIN — ONDANSETRON 4 MG: 2 INJECTION INTRAMUSCULAR; INTRAVENOUS at 12:03

## 2024-03-08 RX ADMIN — PROPOFOL 120 MG: 10 INJECTION, EMULSION INTRAVENOUS at 12:03

## 2024-03-08 RX ADMIN — EPHEDRINE SULFATE 30 MG: 50 INJECTION, SOLUTION INTRAMUSCULAR; INTRAVENOUS; SUBCUTANEOUS at 12:03

## 2024-03-08 RX ADMIN — FENTANYL CITRATE 25 MCG: 50 INJECTION, SOLUTION INTRAMUSCULAR; INTRAVENOUS at 12:03

## 2024-03-08 RX ADMIN — DEXAMETHASONE SODIUM PHOSPHATE 4 MG: 4 INJECTION, SOLUTION INTRA-ARTICULAR; INTRALESIONAL; INTRAMUSCULAR; INTRAVENOUS; SOFT TISSUE at 12:03

## 2024-03-08 RX ADMIN — ACETAMINOPHEN 1000 MG: 10 INJECTION, SOLUTION INTRAVENOUS at 12:03

## 2024-03-08 RX ADMIN — ONDANSETRON 8 MG: 4 TABLET, ORALLY DISINTEGRATING ORAL at 12:03

## 2024-03-08 RX ADMIN — LIDOCAINE HYDROCHLORIDE 100 MG: 20 INJECTION, SOLUTION INTRAVENOUS at 12:03

## 2024-03-08 RX ADMIN — EPHEDRINE SULFATE 20 MG: 50 INJECTION, SOLUTION INTRAMUSCULAR; INTRAVENOUS; SUBCUTANEOUS at 12:03

## 2024-03-08 RX ADMIN — CEFAZOLIN 2 G: 2 INJECTION, POWDER, FOR SOLUTION INTRAMUSCULAR; INTRAVENOUS at 12:03

## 2024-03-08 NOTE — CARE UPDATE
03/08/24 0911   Patient Assessment/Suction   Level of Consciousness (AVPU) alert   PRE-TX-O2   Device (Oxygen Therapy) room air   Incentive Spirometer   $ Incentive Spirometer Charges preop instruction   Incentive Spirometer Predicted Level (mL) 760   Administration (IS) mouthpiece utilized   Number of Repetitions (IS) 8   Level Incentive Spirometer (mL) 1250   Patient Tolerance (IS) good;no adverse signs/symptoms present

## 2024-03-08 NOTE — OP NOTE
[facility]  Operative Note    Date: 03/08/2024    Pre-Op Diagnosis: Retroperitoneal fluid collection     Post-Op Diagnosis: same    Procedure(s) Performed:   1.  Cystoscopy with left retrograde pyelogram  2.  Fluoroscopy < 1 hour    Specimen(s): none    Staff Surgeon: Shelby Parra MD    Anesthesia: General LMA anesthesia    Indications: Jo Alegre is a 83 y.o. female with a large left retroperitoneal fluid collection that has been refractory to drainage. The fluid was +for creatinine. She presents today for RGP to evaluate for extravasation.     Findings:   - concerning bladder findings  - no obvious evidence of extravasation from the left renal collecting system  - there was good drainage of the left kidney    Estimated Blood Loss: min    Drains: none    Procedure in Detail:  After risks, benefits and possible complications of cystoscopy were explained, the patient elected to undergo the procedure and informed consent was obtained. All questions were answered in the patrick-operative area. The patient was transferred to the cystoscopy suite and placed in the supine position.  SCDs were applied and working.  LMA administered.  Once the patient was adequately sedated, she was placed in the dorsal lithotomy position and prepped and draped in the usual sterile fashion.  Time out was performed, patrick-procedural antibiotics were confirmed.     A rigid cystoscope in a 22 Fr sheath was introduced into the bladder per urethra. This passed easily.  The entire urethra was visualized which showed no masses or strictures.  The right and left ureteral orifices were identified in the normal anatomic position and were seen effluxing clear urine.  Formal cystoscopy was performed which revealed no masses or lesions suspicious for malignancy, no trabeculations, no bladder stones and no bladder diverticuli.      The left UO was identified and cannulated with an open tip catheter.  A RGP was performed which  showed no hydro, no signs of extra from the left collecting system and good drainage of the left kidney.      The bladder was drained, and the patient was removed from lithotomy.     The patient tolerated the procedure well and was transferred to recovery in stable condition.    Disposition:  No evidence of continued leakage from the left kidney.  She has failed IR drainage of her retroperitoneal fluid collection x2.  We will discuss with patient and her daughters attempted repeat drainage versus open surgical washout.    Shelby Parra MD

## 2024-03-08 NOTE — ANESTHESIA PROCEDURE NOTES
Intubation    Date/Time: 3/8/2024 12:11 PM    Performed by: Hussein Dubose CRNA  Authorized by: Manuel Bishop MD    Intubation:     Induction:  Intravenous    Intubated:  Postinduction    Mask Ventilation:  Not attempted    Attempts:  1    Attempted By:  CRNA    Difficult Airway Encountered?: No      Complications:  None    Airway Device:  Supraglottic airway/LMA    Airway Device Size:  3.0    Secured at:  The lips    Placement Verified By:  Capnometry    Complicating Factors:  None    Findings Post-Intubation:  BS equal bilateral and atraumatic/condition of teeth unchanged

## 2024-03-08 NOTE — PLAN OF CARE
Reviewed discharge instructions, patient states understanding, Educated patient when to notify MD, and post anesthesia precautions, reviewed medications administration with patient, RX to pharmacy, patient voiding without difficulty, post op appointment scheduled, no signs of distress at this time, patient states I'm ready for discharge.

## 2024-03-08 NOTE — ANESTHESIA PREPROCEDURE EVALUATION
03/08/2024  Jo Aelgre is a 83 y.o., female.      Pre-op Assessment    I have reviewed the Patient Summary Reports.     I have reviewed the Nursing Notes. I have reviewed the NPO Status.   I have reviewed the Medications.     Review of Systems  Anesthesia Hx:  No problems with previous Anesthesia                Social:  Former Smoker       Cardiovascular:    Pacemaker Hypertension       CHF    hyperlipidemia    Cardiomyopathy  EF - 40-50%  Mild Pulm HTN      Congestive Heart Failure (CHF)                Hypertension     Atrial Fibrillation     Pulmonary:   COPD     Sleep Apnea    Chronic Obstructive Pulmonary Disease (COPD):           Obstructive Sleep Apnea (JENNIFER).           Renal/:  Chronic Renal Disease, CKD        Kidney Function/Disease             Neurological:    Neuromuscular Disease,                                 Neuromuscular Disease   Endocrine:  Diabetes, type 2    Diabetes                          Physical Exam  General: Well nourished    Airway:  Mallampati: II   Mouth Opening: Normal  TM Distance: Normal  Neck ROM: Normal ROM        Anesthesia Plan  Type of Anesthesia, risks & benefits discussed:    Anesthesia Type: Gen Supraglottic Airway  Intra-op Monitoring Plan: Standard ASA Monitors  Induction:  IV  Informed Consent: Informed consent signed with the Patient and all parties understand the risks and agree with anesthesia plan.  All questions answered.   ASA Score: 3    Ready For Surgery From Anesthesia Perspective.     .

## 2024-03-08 NOTE — ANESTHESIA POSTPROCEDURE EVALUATION
Anesthesia Post Evaluation    Patient: Jo Alegre    Procedure(s) Performed: Procedure(s) (LRB):  CYSTOSCOPY (Left)  PYELOGRAM, RETROGRADE (N/A)    Final Anesthesia Type: general      Patient location during evaluation: PACU  Patient participation: Yes- Able to Participate  Level of consciousness: awake  Post-procedure vital signs: reviewed and stable  Pain management: adequate  Airway patency: patent    PONV status at discharge: No PONV  Anesthetic complications: no      Cardiovascular status: blood pressure returned to baseline  Respiratory status: unassisted  Hydration status: euvolemic  Follow-up not needed.              Vitals Value Taken Time   /65 03/08/24 1338   Temp 36.3 °C (97.4 °F) 03/08/24 1338   Pulse 67 03/08/24 1338   Resp 16 03/08/24 1338   SpO2 96 % 03/08/24 1338         Event Time   Out of Recovery 13:38:00         Pain/Rosalind Score: Rosalind Score: 10 (3/8/2024  2:15 PM)  Modified Rosalind Score: 20 (3/8/2024  2:15 PM)

## 2024-03-08 NOTE — PLAN OF CARE
Pt prepped for surgery. Consents at bedside. Incentive spirometry taught by respiratory. Pt belongings placed in postop cabinet. Glasses given to pt's daughter. Daughter set up with text alerts.

## 2024-03-08 NOTE — TRANSFER OF CARE
"Anesthesia Transfer of Care Note    Patient: Jo Alegre    Procedure(s) Performed: Procedure(s) (LRB):  CYSTOSCOPY (Left)  PYELOGRAM, RETROGRADE (N/A)    Patient location: PACU    Anesthesia Type: general    Transport from OR: Transported from OR on 6-10 L/min O2 by face mask with adequate spontaneous ventilation    Post pain: adequate analgesia    Post assessment: no apparent anesthetic complications    Post vital signs: stable    Level of consciousness: sedated    Nausea/Vomiting: no nausea/vomiting    Complications: none    Transfer of care protocol was followed      Last vitals: Visit Vitals  BP (!) 140/62 (BP Location: Left arm, Patient Position: Lying)   Pulse 75   Temp 36.4 °C (97.5 °F) (Temporal)   Resp 18   Ht 5' 9" (1.753 m)   Wt 94.3 kg (208 lb)   SpO2 (!) 94%   Breastfeeding No   BMI 30.72 kg/m²     "

## 2024-03-08 NOTE — INTERVAL H&P NOTE
The patient has been examined and the H&P has been reviewed:    I concur with the findings and no changes have occurred since H&P was written.    Surgery risks, benefits and alternative options discussed and understood by patient/family.    To cysto today for left RGP and possible stent placement if there is evidence of extravasation from the kidney.      There are no hospital problems to display for this patient.

## 2024-03-08 NOTE — DISCHARGE SUMMARY
[facility]  Discharge Note  Short Stay    Admit Date: 3/8/2024    Discharge Date and Time: 03/08/2024 12:40 PM      Attending Physician: Shelby Parra MD     Discharge Provider: Shelby Parra    Diagnoses:  Active Hospital Problems    Diagnosis  POA    *Staghorn calculus [N20.0]  Yes      Resolved Hospital Problems   No resolved problems to display.       Discharged Condition: good    Hospital Course: Patient was admitted for outpatient procedure and tolerated the procedure well with no complications. The patient was discharged home in good condition on the same day.       Final Diagnoses: Same as principal problem.    Disposition: Home or Self Care    Follow up/Patient Instructions:    Medications:  Reconciled Home Medications:   Current Discharge Medication List        START taking these medications    Details   phenazopyridine (PYRIDIUM) 100 MG tablet Take 1 tablet (100 mg total) by mouth 3 (three) times daily as needed.  Qty: 30 tablet, Refills: 0           CONTINUE these medications which have NOT CHANGED    Details   amiodarone (PACERONE) 200 MG Tab Take 1 tablet (200 mg total) by mouth once daily.  Qty: 30 tablet, Refills: 0      atorvastatin (LIPITOR) 20 MG tablet Take 20 mg by mouth every evening.      bumetanide (BUMEX) 1 MG tablet once daily.      Ca-D3-mag ox-zinc--thom-bor 600 mg calcium- 20 mcg-50 mg Tab Take 1 tablet by mouth 2 (two) times daily.      cetirizine (ZYRTEC) 10 MG tablet Take 10 mg by mouth every evening.      cholecalciferol, vitamin D3, 125 mcg (5,000 unit) Tab Take 5,000 Units by mouth 2 (two) times daily.      digoxin (LANOXIN) 125 mcg tablet Take 1 tablet (0.125 mg total) by mouth once daily.  Qty: 30 tablet, Refills: 0      gabapentin (NEURONTIN) 100 MG capsule TAKE 2 CAPSULES(200 MG) BY MOUTH THREE TIMES DAILY  Qty: 180 capsule, Refills: 5    Associated Diagnoses: Neuropathy of both feet; Acute right-sided thoracic back pain      glimepiride (AMARYL) 1 MG  tablet Take 1 tablet (1 mg total) by mouth before breakfast.  Qty: 90 tablet, Refills: 1    Associated Diagnoses: Elevated blood sugar      midodrine (PROAMATINE) 5 MG Tab Take 1 tablet (5 mg total) by mouth 3 (three) times daily before meals.  Qty: 90 tablet, Refills: 0      pantoprazole (PROTONIX) 40 MG tablet TAKE 1 TABLET(40 MG) BY MOUTH EVERY DAY  Qty: 30 tablet, Refills: 5      rivaroxaban (XARELTO) 15 mg Tab Take 15 mg by mouth daily with dinner or evening meal.      sacubitriL-valsartan (ENTRESTO) 24-26 mg per tablet Take 1 tablet by mouth 2 (two) times daily. 1/2 tab PO BID  Qty: 30 tablet, Refills: 0      vericiguat (VERQUVO) 5 mg Tab Take 2.5 mg by mouth 2 (two) times a day.  Qty: 30 tablet, Refills: 0      dorzolamide-timolol 2-0.5% (COSOPT) 22.3-6.8 mg/mL ophthalmic solution Place 1 drop into both eyes 2 (two) times daily.      fluticasone propionate (FLONASE) 50 mcg/actuation nasal spray 2 sprays (100 mcg total) by Each Nostril route once daily.  Qty: 16 g, Refills: 5    Associated Diagnoses: Chronic rhinitis      latanoprost 0.005 % ophthalmic solution Place 1 drop into both eyes nightly.           Discharge Procedure Orders   X-Ray Chest PA And Lateral   Standing Status: Future Number of Occurrences: 1 Standing Exp. Date: 03/07/25     Order Specific Question Answer Comments   May the Radiologist modify the order per protocol to meet the clinical needs of the patient? Yes    Release to patient Immediate      Diet general     Call MD for:  temperature >100.4     Call MD for:  persistent nausea and vomiting     Call MD for:  severe uncontrolled pain     No dressing needed     Activity as tolerated         Shelby Parra MD  Urology Department

## 2024-03-11 VITALS
OXYGEN SATURATION: 96 % | SYSTOLIC BLOOD PRESSURE: 148 MMHG | DIASTOLIC BLOOD PRESSURE: 65 MMHG | HEART RATE: 67 BPM | BODY MASS INDEX: 30.81 KG/M2 | RESPIRATION RATE: 16 BRPM | WEIGHT: 208 LBS | TEMPERATURE: 97 F | HEIGHT: 69 IN

## 2024-03-11 DIAGNOSIS — M25.511 RIGHT SHOULDER PAIN, UNSPECIFIED CHRONICITY: Primary | ICD-10-CM

## 2024-03-11 NOTE — PROGRESS NOTES
3/11/24 Very pleased with care given.  Daughter States all nurses were kind and helpful. Denies pain. States they understand all discharge instructions.

## 2024-03-12 ENCOUNTER — HOSPITAL ENCOUNTER (OUTPATIENT)
Dept: RADIOLOGY | Facility: HOSPITAL | Age: 84
Discharge: HOME | End: 2024-03-12
Attending: ORTHOPAEDIC SURGERY
Payer: MEDICARE

## 2024-03-12 ENCOUNTER — OFFICE VISIT (OUTPATIENT)
Dept: ORTHOPEDICS | Facility: CLINIC | Age: 84
End: 2024-03-12
Payer: MEDICARE

## 2024-03-12 VITALS — HEIGHT: 69 IN | BODY MASS INDEX: 30.81 KG/M2 | WEIGHT: 208 LBS | RESPIRATION RATE: 16 BRPM

## 2024-03-12 DIAGNOSIS — M25.511 RIGHT SHOULDER PAIN, UNSPECIFIED CHRONICITY: ICD-10-CM

## 2024-03-12 DIAGNOSIS — M25.511 ACUTE PAIN OF RIGHT SHOULDER: ICD-10-CM

## 2024-03-12 DIAGNOSIS — M19.011 ARTHRITIS OF RIGHT SHOULDER REGION: Primary | ICD-10-CM

## 2024-03-12 PROCEDURE — 1101F PT FALLS ASSESS-DOCD LE1/YR: CPT | Mod: CPTII,S$GLB,, | Performed by: ORTHOPAEDIC SURGERY

## 2024-03-12 PROCEDURE — 3288F FALL RISK ASSESSMENT DOCD: CPT | Mod: CPTII,S$GLB,, | Performed by: ORTHOPAEDIC SURGERY

## 2024-03-12 PROCEDURE — 99999 PR PBB SHADOW E&M-EST. PATIENT-LVL III: CPT | Mod: PBBFAC,,, | Performed by: ORTHOPAEDIC SURGERY

## 2024-03-12 PROCEDURE — 1159F MED LIST DOCD IN RCRD: CPT | Mod: CPTII,S$GLB,, | Performed by: ORTHOPAEDIC SURGERY

## 2024-03-12 PROCEDURE — 1126F AMNT PAIN NOTED NONE PRSNT: CPT | Mod: CPTII,S$GLB,, | Performed by: ORTHOPAEDIC SURGERY

## 2024-03-12 PROCEDURE — 73030 X-RAY EXAM OF SHOULDER: CPT | Mod: 26,RT,, | Performed by: RADIOLOGY

## 2024-03-12 PROCEDURE — 73030 X-RAY EXAM OF SHOULDER: CPT | Mod: TC,PO,RT

## 2024-03-12 PROCEDURE — 99204 OFFICE O/P NEW MOD 45 MIN: CPT | Mod: S$GLB,,, | Performed by: ORTHOPAEDIC SURGERY

## 2024-03-12 NOTE — PROGRESS NOTES
Patient ID: Jo Alegre is a 83 y.o. female    Chief Complaint:   Chief Complaint   Patient presents with    Right Shoulder - Pain     X 4 months, having issues raising arm        History of Present Illness:    Pleasant 83-year-old female with history atrial fibrillation, CHF, diabetes and actively being treated for a kidney infection with IV antibiotics -- who is here for evaluation of right shoulder pain.  She reports worsening pain over the past 4 months or so.  Reports crepitus of the right shoulder and difficulty with overhead function.  No pain at rest.  Pain with nighttime symptoms and overhead function.    PAST MEDICAL HISTORY:   Past Medical History:   Diagnosis Date    Allergy     Codeine, Lasix    Atrial fibrillation     Atrial fibrillation     Cataract     CHF (congestive heart failure)     Diabetes mellitus, type 2     Ejection fraction < 50% 10/18/2017    Approximately 35%  Based on prior  Echocardiogram.    Encounter for blood transfusion     Hyperlipidemia     Osteoporosis     PONV (postoperative nausea and vomiting)     Thyroid disorder screening 10/17/2017    TSH of 1.12 ordered by Dr. dee Jones     PAST SURGICAL HISTORY:   Past Surgical History:   Procedure Laterality Date    ANTEGRADE NEPHROSTOGRAPHY Left 8/25/2022    Procedure: NEPHROSTOGRAM, ANTEGRADE;  Surgeon: Shelby Parra MD;  Location: [facility] OR;  Service: Urology;  Laterality: Left;    CHOLECYSTECTOMY  1997    [place]     COLONOSCOPY      CYSTOSCOPY Left 3/8/2024    Procedure: CYSTOSCOPY;  Surgeon: Shelby Parra MD;  Location: [facility] OR;  Service: Urology;  Laterality: Left;    CYSTOSCOPY W/ URETERAL STENT PLACEMENT Left 7/17/2022    Procedure: CYSTOSCOPY, WITH URETERAL STENT INSERTION;  Surgeon: Shelby Parra MD;  Location: [facility] OR;  Service: Urology;  Laterality: Left;    CYSTOSCOPY W/ URETERAL STENT REMOVAL Left 9/21/2022    Procedure: CYSTOSCOPY, WITH URETERAL STENT REMOVAL;  Surgeon: Shelby Parra MD;   Location: [facility] OR;  Service: Urology;  Laterality: Left;    CYSTOSCOPY WITH URETEROSCOPY, RETROGRADE PYELOGRAPHY, AND INSERTION OF STENT Left 8/25/2022    Procedure: CYSTOSCOPY, WITH RETROGRADE PYELOGRAM AND URETERAL STENT INSERTION;  Surgeon: Shelby Parra MD;  Location: [facility] OR;  Service: Urology;  Laterality: Left;    EYE SURGERY      bilateral cataracts    FINGER SURGERY Right 2021    right pinky finger    FLEXIBLE CYSTOSCOPY Left 8/25/2022    Procedure: CYSTOSCOPY, FLEXIBLE WITH STENT REMOVAL;  Surgeon: Shelby Parra MD;  Location: [facility] OR;  Service: Urology;  Laterality: Left;    FRACTURE SURGERY  2014    right femur with neville    HEMORRHOID SURGERY      48 yrs ago    HYSTERECTOMY      NEPHROSTOMY Left 8/25/2022    Procedure: CREATION, NEPHROSTOMY;  Surgeon: Shelby Parra MD;  Location: [facility] OR;  Service: Urology;  Laterality: Left;    PERCUTANEOUS NEPHROLITHOTOMY N/A 8/25/2022    Procedure: NEPHROLITHOTOMY, PERCUTANEOUS;  Surgeon: Shelby Parra MD;  Location: [facility] OR;  Service: Urology;  Laterality: N/A;    REPLACEMENT OF IMPLANTABLE CARDIOVERTER-DEFIBRILLATOR (ICD) GENERATOR N/A 12/13/2019    Procedure: REPLACEMENT, PULSE GENERATOR, ICD-MEDTRONIC;  Surgeon: Sebastian Nowak III, MD;  [facility];  Service: Cardiology;  Laterality: N/A;    RETROGRADE PYELOGRAPHY N/A 3/8/2024    Procedure: PYELOGRAM, RETROGRADE;  Surgeon: Shelby Parra MD;  Location: [facility];  Service: Urology;  Laterality: N/A;    TONSILLECTOMY       FAMILY HISTORY:   Family History   Problem Relation Age of Onset    Heart disease Mother     Cancer Father         Lung Cancer ??? Asbestos    Breast cancer Paternal Aunt      SOCIAL HISTORY:   Social History     Occupational History    Occupation:    Tobacco Use    Smoking status: Former     Passive exposure: Past    Smokeless tobacco: Never    Tobacco comments:     quit 2013   Substance and Sexual Activity    Alcohol use: No    Drug use: No    Sexual  activity: Not Currently        MEDICATIONS:   Current Outpatient Medications:     amiodarone (PACERONE) 200 MG Tab, Take 1 tablet (200 mg total) by mouth once daily., Disp: 30 tablet, Rfl: 0    atorvastatin (LIPITOR) 20 MG tablet, Take 20 mg by mouth every evening., Disp: , Rfl:     bumetanide (BUMEX) 1 MG tablet, once daily., Disp: , Rfl:     Ca-D3-mag ox-zinc--thom-bor 600 mg calcium- 20 mcg-50 mg Tab, Take 1 tablet by mouth 2 (two) times daily., Disp: , Rfl:     cetirizine (ZYRTEC) 10 MG tablet, Take 10 mg by mouth every evening., Disp: , Rfl:     cholecalciferol, vitamin D3, 125 mcg (5,000 unit) Tab, Take 5,000 Units by mouth 2 (two) times daily., Disp: , Rfl:     digoxin (LANOXIN) 125 mcg tablet, Take 1 tablet (0.125 mg total) by mouth once daily., Disp: 30 tablet, Rfl: 0    dorzolamide-timolol 2-0.5% (COSOPT) 22.3-6.8 mg/mL ophthalmic solution, Place 1 drop into both eyes 2 (two) times daily., Disp: , Rfl:     fluticasone propionate (FLONASE) 50 mcg/actuation nasal spray, 2 sprays (100 mcg total) by Each Nostril route once daily., Disp: 16 g, Rfl: 5    gabapentin (NEURONTIN) 100 MG capsule, TAKE 2 CAPSULES(200 MG) BY MOUTH THREE TIMES DAILY, Disp: 180 capsule, Rfl: 5    glimepiride (AMARYL) 1 MG tablet, Take 1 tablet (1 mg total) by mouth before breakfast., Disp: 90 tablet, Rfl: 1    latanoprost 0.005 % ophthalmic solution, Place 1 drop into both eyes nightly., Disp: , Rfl:     midodrine (PROAMATINE) 5 MG Tab, Take 1 tablet (5 mg total) by mouth 3 (three) times daily before meals. (Patient taking differently: Take 5 mg by mouth 3 (three) times daily before meals. States only takes as needed), Disp: 90 tablet, Rfl: 0    pantoprazole (PROTONIX) 40 MG tablet, TAKE 1 TABLET(40 MG) BY MOUTH EVERY DAY, Disp: 30 tablet, Rfl: 5    phenazopyridine (PYRIDIUM) 100 MG tablet, Take 1 tablet (100 mg total) by mouth 3 (three) times daily as needed., Disp: 30 tablet, Rfl: 0    rivaroxaban (XARELTO) 15 mg Tab, Take 15 mg by  mouth daily with dinner or evening meal., Disp: , Rfl:     sacubitriL-valsartan (ENTRESTO) 24-26 mg per tablet, Take 1 tablet by mouth 2 (two) times daily. 1/2 tab PO BID, Disp: 30 tablet, Rfl: 0    vericiguat (VERQUVO) 5 mg Tab, Take 2.5 mg by mouth 2 (two) times a day., Disp: 30 tablet, Rfl: 0  No current facility-administered medications for this visit.    Facility-Administered Medications Ordered in Other Visits:     0.9%  NaCl infusion, , Intravenous, Continuous, Sebastian Nowak III, MD, Last Rate: 75 mL/hr at 12/13/19 0728, New Bag at 12/13/19 0728    diphenhydrAMINE injection 25 mg, 25 mg, Intravenous, Once, Sebastian Nowak III, MD    lorazepam injection 1 mg, 1 mg, Intravenous, Once, Sebastian Nowak III, MD  ALLERGIES:   Review of patient's allergies indicates:   Allergen Reactions    Codeine Other (See Comments)     nausea    Hydrocodone Nausea And Vomiting    Lasix [furosemide]      rash         Physical Exam     Vitals:    03/12/24 1318   Resp: 16     Alert and oriented to person, place and time. No acute distress. Well-groomed, not ill appearing. Pupils round and reactive, normal respiratory effort, no audible wheezing.     On exam she has crepitus of the right shoulder.  External rotation at the side 30° with positive drop-arm test.  Pain with passive forward flexion and abduction.  Positive pseudo paralysis.  Neurovascularly intact.  Negative Spurling's        Imaging:       X-Ray: I have reviewed all pertinent results/findings and my personal findings are:  Moderate to severe DJD of the right glenohumeral joint with high-riding humeral head      Assessment & Plan    Arthritis of right shoulder region    Acute pain of right shoulder  Comments:  Most likely advanced arthritis and frozen shoulder.  Refer orthopedics.  Need some relief at least temporary.  Orders:  -     Ambulatory referral/consult to Orthopedics         Treatment options were discussed with the patient and her family at bedside.  She has  fairly advanced DJD of the right shoulder with rotator cuff arthropathy.  We discussed multiple treatment options.  Unfortunately not a great candidate for surgery at this time due to active infection.  She has also not a great candidate for therapeutic steroid injections while she is trying to fight this infection.  We did discuss that after she clears her infection then she would be a candidate for either.  She has not interested in surgical intervention at this point.  I do not think therapy will help much in regards to motion and function but it is certainly an option.  She will let us know if she would like for us to send a referral.  We also discussed topical anti-inflammatories and extended release Tylenol to see if this helps with her pain temporarily.  She is in agreement with that plan.  She will follow up with us when she returns from her vacation

## 2024-03-20 ENCOUNTER — DOCUMENT SCAN (OUTPATIENT)
Dept: HOME HEALTH SERVICES | Facility: HOSPITAL | Age: 84
End: 2024-03-20
Payer: MEDICARE

## 2024-03-25 ENCOUNTER — HOSPITAL ENCOUNTER (OUTPATIENT)
Dept: RADIOLOGY | Facility: HOSPITAL | Age: 84
Discharge: HOME | End: 2024-03-25
Attending: STUDENT IN AN ORGANIZED HEALTH CARE EDUCATION/TRAINING PROGRAM
Payer: MEDICARE

## 2024-03-25 DIAGNOSIS — A41.9 SEPSIS, DUE TO UNSPECIFIED ORGANISM, UNSPECIFIED WHETHER ACUTE ORGAN DYSFUNCTION PRESENT: ICD-10-CM

## 2024-03-25 PROCEDURE — 74176 CT ABD & PELVIS W/O CONTRAST: CPT | Mod: TC,PO

## 2024-03-25 PROCEDURE — 71250 CT THORAX DX C-: CPT | Mod: TC,PO

## 2024-03-26 ENCOUNTER — DOCUMENTATION ONLY (OUTPATIENT)
Dept: INFECTIOUS DISEASES | Facility: CLINIC | Age: 84
End: 2024-03-26

## 2024-03-26 ENCOUNTER — OFFICE VISIT (OUTPATIENT)
Dept: INFECTIOUS DISEASES | Facility: CLINIC | Age: 84
End: 2024-03-26
Payer: MEDICARE

## 2024-03-26 VITALS
SYSTOLIC BLOOD PRESSURE: 142 MMHG | HEIGHT: 69 IN | WEIGHT: 210.63 LBS | TEMPERATURE: 97 F | DIASTOLIC BLOOD PRESSURE: 78 MMHG | BODY MASS INDEX: 31.2 KG/M2

## 2024-03-26 DIAGNOSIS — E11.21 TYPE 2 DIABETES MELLITUS WITH DIABETIC NEPHROPATHY, WITHOUT LONG-TERM CURRENT USE OF INSULIN: ICD-10-CM

## 2024-03-26 DIAGNOSIS — A49.8 PROTEUS MIRABILIS INFECTION: ICD-10-CM

## 2024-03-26 DIAGNOSIS — N15.1 KIDNEY ABSCESS: ICD-10-CM

## 2024-03-26 DIAGNOSIS — I10 ESSENTIAL HYPERTENSION: Primary | ICD-10-CM

## 2024-03-26 DIAGNOSIS — N20.0 STAGHORN CALCULUS: ICD-10-CM

## 2024-03-26 PROCEDURE — 1101F PT FALLS ASSESS-DOCD LE1/YR: CPT | Mod: CPTII,S$GLB,, | Performed by: STUDENT IN AN ORGANIZED HEALTH CARE EDUCATION/TRAINING PROGRAM

## 2024-03-26 PROCEDURE — 99214 OFFICE O/P EST MOD 30 MIN: CPT | Mod: S$GLB,,, | Performed by: STUDENT IN AN ORGANIZED HEALTH CARE EDUCATION/TRAINING PROGRAM

## 2024-03-26 PROCEDURE — 1126F AMNT PAIN NOTED NONE PRSNT: CPT | Mod: CPTII,S$GLB,, | Performed by: STUDENT IN AN ORGANIZED HEALTH CARE EDUCATION/TRAINING PROGRAM

## 2024-03-26 PROCEDURE — 3078F DIAST BP <80 MM HG: CPT | Mod: CPTII,S$GLB,, | Performed by: STUDENT IN AN ORGANIZED HEALTH CARE EDUCATION/TRAINING PROGRAM

## 2024-03-26 PROCEDURE — 3077F SYST BP >= 140 MM HG: CPT | Mod: CPTII,S$GLB,, | Performed by: STUDENT IN AN ORGANIZED HEALTH CARE EDUCATION/TRAINING PROGRAM

## 2024-03-26 PROCEDURE — 1159F MED LIST DOCD IN RCRD: CPT | Mod: CPTII,S$GLB,, | Performed by: STUDENT IN AN ORGANIZED HEALTH CARE EDUCATION/TRAINING PROGRAM

## 2024-03-26 PROCEDURE — 3288F FALL RISK ASSESSMENT DOCD: CPT | Mod: CPTII,S$GLB,, | Performed by: STUDENT IN AN ORGANIZED HEALTH CARE EDUCATION/TRAINING PROGRAM

## 2024-03-26 NOTE — PROGRESS NOTES
Referred by urology for intra-abdominal collection        HPI: Jo Alegre is a 83 y.o. female , very pleasant, former smoker, with past medical history of diabetes, COPD, CHF who has been followed by Urology for recurrent UTIs and bilateral kidney cysts and a left large staghorn calculus since imaging on 2020.  Patient reports back in October she was admitted to the hospital for acute on chronic CHF exacerbation, noted to have MARCELINO, kidney ultrasound then revealed bilateral large cysts which prompted CT abdomen and pelvis which revealed a serial of fluid collections in the right and left kidneys.  A giant 14 cm left renal cyst observed.  No hydronephrosis or stones appreciated.  She had an IR drainage placed for the large giant cyst which ended up being an abscess, 50 cc of purulent fluid was removed on 11/2/23, cultures grew pansensitive Proteus mirabilis.  She then had to have her drain repositioned because of malposition, drained again and replaced 1/12/24, repeat cultures with Proteus mirabilis.  She has completed on and off 17 days of ciprofloxacin twice a day.      Patient is here with her daughters, she is in the wheelchair, she is awake, alert, very pleasant.  She denies any fever or chills, no nausea or vomiting, no chest pain or cough, baseline shortness of breath, she does complain of increased urinary frequency, foul-smelling urine, and some dysuria at the beginning of micturition, she denies any changes in bowel movements.  Patient and family are very concerned regarding next step of care, explained plan of care including repeating CT scan to address status of prior fluid collection.  It is possible that we either consider further IR guided drainage with IV antibiotics versus surgery but to be determined after discussion with consultants.     Will obtain labs today.    2/26/24: Interim reviewed, patient is here for follow-up, daughters present. She reports she is feeling fine, has no  acute complaints. She saw Urology and plan for cystoscopy 3/8. Discussed with patient and daughters plan of care and will order PICC line, start rocephin 2g IV daily for 2 weeks for kidney abscess and assess duration of therapy based on repeat CT scan before the end of therapy. She understands plan of care, will also check urine next Monday in preparation to procedure. All questions answered.     3/26:  Patient seen examined, family present.  She is here for follow-up, completing 4 weeks of IV antibiotics for complicated bilateral renal fluid collections.  She had cystoscopy on 03/08 by Urology, no obvious evidence of extravasation from the left renal collecting system.  There was good drainage of the left kidney.  She has failed IR drainage for her retroperitoneal fluid collections twice.  Plan to either attempt a 3rd IR drainage versus surgical washout.  Repeat CT scan with no interval change on size of fluid collections.  Discussed with daughter and family that we might need surgical intervention.  We will remove PICC line today.  The patient denies any fever chills, no nausea or vomiting, she denies any constitutional symptoms.  She is looking forward to go on vacation and come back and wait for next step in therapy.  Alarm symptoms given to patient and family in case she needs to go to the hospital.  Weekly labs reviewed, stable, CRP has remained normal throughout.    Review of patient's allergies indicates:   Allergen Reactions    Codeine Other (See Comments)     nausea    Hydrocodone Nausea And Vomiting    Lasix [furosemide]      rash     Past Medical History:   Diagnosis Date    Allergy     Codeine, Lasix    Atrial fibrillation     Atrial fibrillation     Cataract     CHF (congestive heart failure)     Diabetes mellitus, type 2     Ejection fraction < 50% 10/18/2017    Approximately 35%  Based on prior  Echocardiogram.    Encounter for blood transfusion     Hyperlipidemia     Osteoporosis     PONV  (postoperative nausea and vomiting)     Thyroid disorder screening 10/17/2017    TSH of 1.12 ordered by Dr. dee Jones     Past Surgical History:   Procedure Laterality Date    ANTEGRADE NEPHROSTOGRAPHY Left 8/25/2022    Procedure: NEPHROSTOGRAM, ANTEGRADE;  Surgeon: Shelby Parra MD;  Location: [facility] OR;  Service: Urology;  Laterality: Left;    CHOLECYSTECTOMY  1997    [place]     COLONOSCOPY      CYSTOSCOPY Left 3/8/2024    Procedure: CYSTOSCOPY;  Surgeon: Shelby Parra MD;  Location: [facility] OR;  Service: Urology;  Laterality: Left;    CYSTOSCOPY W/ URETERAL STENT PLACEMENT Left 7/17/2022    Procedure: CYSTOSCOPY, WITH URETERAL STENT INSERTION;  Surgeon: Shelby Parra MD;  Location: [facility] OR;  Service: Urology;  Laterality: Left;    CYSTOSCOPY W/ URETERAL STENT REMOVAL Left 9/21/2022    Procedure: CYSTOSCOPY, WITH URETERAL STENT REMOVAL;  Surgeon: Shelby Parra MD;  Location: [facility] OR;  Service: Urology;  Laterality: Left;    CYSTOSCOPY WITH URETEROSCOPY, RETROGRADE PYELOGRAPHY, AND INSERTION OF STENT Left 8/25/2022    Procedure: CYSTOSCOPY, WITH RETROGRADE PYELOGRAM AND URETERAL STENT INSERTION;  Surgeon: Shelby Parra MD;  Location: [facility] OR;  Service: Urology;  Laterality: Left;    EYE SURGERY      bilateral cataracts    FINGER SURGERY Right 2021    right pinky finger    FLEXIBLE CYSTOSCOPY Left 8/25/2022    Procedure: CYSTOSCOPY, FLEXIBLE WITH STENT REMOVAL;  Surgeon: Shelby Parra MD;  Location: [facility];  Service: Urology;  Laterality: Left;    FRACTURE SURGERY  2014    right femur with neville    HEMORRHOID SURGERY      48 yrs ago    HYSTERECTOMY      NEPHROSTOMY Left 8/25/2022    Procedure: CREATION, NEPHROSTOMY;  Surgeon: Shelby Parra MD;  Location: [facility] OR;  Service: Urology;  Laterality: Left;    PERCUTANEOUS NEPHROLITHOTOMY N/A 8/25/2022    Procedure: NEPHROLITHOTOMY, PERCUTANEOUS;  Surgeon: Shelby Parra MD;  Location: [facility] OR;  Service: Urology;  Laterality:  "N/A;    REPLACEMENT OF IMPLANTABLE CARDIOVERTER-DEFIBRILLATOR (ICD) GENERATOR N/A 12/13/2019    Procedure: REPLACEMENT, PULSE GENERATOR, ICD-MEDTRONIC;  Surgeon: Sebastian Nowak III, MD;  Location: [facility] CATH;  Service: Cardiology;  Laterality: N/A;    RETROGRADE PYELOGRAPHY N/A 3/8/2024    Procedure: PYELOGRAM, RETROGRADE;  Surgeon: Shelby Parra MD;  Location: [facility] OR;  Service: Urology;  Laterality: N/A;    TONSILLECTOMY        Social History     Tobacco Use    Smoking status: Former     Passive exposure: Past    Smokeless tobacco: Never    Tobacco comments:     quit 2013   Substance Use Topics    Alcohol use: No     Family History   Problem Relation Age of Onset    Heart disease Mother     Cancer Father         Lung Cancer ??? Asbestos    Breast cancer Paternal Aunt          Review of Systems   Constitutional:  Negative for chills and fever.   HENT:  Negative for sinus pain.    Respiratory:  Negative for cough and shortness of breath.    Cardiovascular:  Negative for chest pain.   Gastrointestinal:  Negative for abdominal pain, diarrhea and nausea.   Genitourinary:  Positive for dysuria, frequency and urgency. Negative for hematuria.   Musculoskeletal:  Negative for back pain and myalgias.   Skin:  Negative for rash.   Neurological:  Negative for headaches.   Psychiatric/Behavioral:  Negative for confusion.          No TB exposure  Outdoor activities:  Never smoker, very occasional alcohol.  Housewife.  Travel:  None  Implants:  None  Antibiotic History:  Cipro 17 days, end date 02/05 - Ceftriaxone 4 weeks, completed 3/25, PICC line to be removed today       Objective:      Blood pressure (!) 142/78, temperature 97.2 °F (36.2 °C), height 5' 9" (1.753 m), weight 95.5 kg (210 lb 9.6 oz). Body mass index is 31.1 kg/m².  Physical Exam  Constitutional:       Appearance: Normal appearance. She is obese.   HENT:      Mouth/Throat:      Mouth: Mucous membranes are moist.      Pharynx: Oropharynx is clear.   Eyes:    "   Extraocular Movements: Extraocular movements intact.      Pupils: Pupils are equal, round, and reactive to light.   Cardiovascular:      Rate and Rhythm: Normal rate and regular rhythm.      Pulses: Normal pulses.      Heart sounds: Murmur heard.   Pulmonary:      Effort: Pulmonary effort is normal.      Breath sounds: Rales present.      Comments: Trace crackles L base  Abdominal:      General: Bowel sounds are normal.      Palpations: Abdomen is soft.      Tenderness: There is no abdominal tenderness. There is no rebound.   Musculoskeletal:      Cervical back: Normal range of motion and neck supple.      Right lower leg: Edema present.      Left lower leg: No edema.      Comments: She is wearing compression stockings b/l   Skin:     General: Skin is warm.      Capillary Refill: Capillary refill takes less than 2 seconds.      Findings: No rash.   Neurological:      Mental Status: She is alert and oriented to person, place, and time. Mental status is at baseline.      Sensory: No sensory deficit.      Motor: No weakness.   Psychiatric:         Thought Content: Thought content normal.       JACKLYN: R PICC line removed      General Labs reviewed:  Lab Results   Component Value Date    WBC 5.87 02/26/2024    RBC 3.66 (L) 02/26/2024    HGB 10.8 (L) 02/26/2024    HCT 34.7 (L) 02/26/2024    MCV 95 02/26/2024    MCH 29.5 02/26/2024    MCHC 31.1 (L) 02/26/2024    RDW 17.1 (H) 02/26/2024     02/26/2024    MPV 10.3 02/26/2024    GRAN 4.0 02/26/2024    GRAN 68.2 02/26/2024    LYMPH 1.2 02/26/2024    LYMPH 20.1 02/26/2024    MONO 0.5 02/26/2024    MONO 8.9 02/26/2024    EOS 0.1 02/26/2024    BASO 0.06 02/26/2024    EOSINOPHIL 1.5 02/26/2024    BASOPHIL 1.0 02/26/2024     CMP  Sodium   Date Value Ref Range Status   02/26/2024 142 136 - 145 mmol/L Final     Potassium   Date Value Ref Range Status   02/26/2024 4.4 3.5 - 5.1 mmol/L Final     Chloride   Date Value Ref Range Status   02/26/2024 101 95 - 110 mmol/L Final      CO2   Date Value Ref Range Status   02/26/2024 33 (H) 23 - 29 mmol/L Final     Glucose   Date Value Ref Range Status   02/26/2024 77 70 - 110 mg/dL Final     BUN   Date Value Ref Range Status   02/26/2024 28 (H) 8 - 23 mg/dL Final     Creatinine   Date Value Ref Range Status   02/26/2024 2.7 (H) 0.5 - 1.4 mg/dL Final     Calcium   Date Value Ref Range Status   02/26/2024 10.8 (H) 8.7 - 10.5 mg/dL Final     Total Protein   Date Value Ref Range Status   02/26/2024 6.9 6.0 - 8.4 g/dL Final     Albumin   Date Value Ref Range Status   02/26/2024 3.7 3.5 - 5.2 g/dL Final     Total Bilirubin   Date Value Ref Range Status   02/26/2024 0.7 0.1 - 1.0 mg/dL Final     Comment:     For infants and newborns, interpretation of results should be based  on gestational age, weight and in agreement with clinical  observations.    Premature Infant recommended reference ranges:  Up to 24 hours.............<8.0 mg/dL  Up to 48 hours............<12.0 mg/dL  3-5 days..................<15.0 mg/dL  6-29 days.................<15.0 mg/dL       Alkaline Phosphatase   Date Value Ref Range Status   02/26/2024 88 55 - 135 U/L Final     AST   Date Value Ref Range Status   02/26/2024 12 10 - 40 U/L Final     ALT   Date Value Ref Range Status   02/26/2024 8 (L) 10 - 44 U/L Final     Anion Gap   Date Value Ref Range Status   02/26/2024 8 8 - 16 mmol/L Final     eGFR   Date Value Ref Range Status   02/26/2024 17.1 (A) >60 mL/min/1.73 m^2 Final        Latest Reference Range & Units 11/02/23 14:42 01/12/24 13:57   Fluid Color  Yellow Red   Fluid Appearance  Turbid Turbid   WBC, Body Fluid /cu mm [id number]   Body Fluid Type  Cyst Fluid Other (Specify)   Segs, Fluid % 86 7   Lymphs, Fluid % 7 93   Monocytes/Macrophages, Fluid % 7        Micro:  Fluid culture L kidney 1/12/24:  Aerobic Bacterial Culture  Abnormal   PROTEUS MIRABILIS  Few    Resulting Agency [facility]        Susceptibility       Proteus mirabilis     CULTURE, AEROBIC  (SPECIFY SOURCE)      Amox/K Clav'ate <=8/4 mcg/mL Sensitive     Amp/Sulbactam <=8/4 mcg/mL Sensitive     Ampicillin <=8 mcg/mL Sensitive     Cefazolin <=2 mcg/mL Sensitive     Cefepime <=2 mcg/mL Sensitive     Ceftriaxone <=1 mcg/mL Sensitive     Ciprofloxacin <=1 mcg/mL Sensitive     Ertapenem <=0.5 mcg/mL Sensitive     Gentamicin <=4 mcg/mL Sensitive     Levofloxacin <=2 mcg/mL Sensitive     Meropenem <=1 mcg/mL Sensitive     Piperacillin/Tazo <=16 mcg/mL Sensitive     Tobramycin <=4 mcg/mL Sensitive     Trimeth/Sulfa <=2/38 mcg/mL Sensitive               Fluid culture L kidney 11/2/23:  Aerobic Bacterial Culture  Abnormal   PROTEUS MIRABILIS  Many    Resulting Agency [facility]        Susceptibility     Proteus mirabilis     CULTURE, AEROBIC  (SPECIFY SOURCE)     Amox/K Clav'ate <=8/4 mcg/mL Sensitive     Amp/Sulbactam <=8/4 mcg/mL Sensitive     Ampicillin <=8 mcg/mL Sensitive     Cefazolin <=2 mcg/mL Sensitive     Cefepime <=2 mcg/mL Sensitive     Ceftriaxone <=1 mcg/mL Sensitive     Ciprofloxacin <=1 mcg/mL Sensitive     Ertapenem <=0.5 mcg/mL Sensitive     Gentamicin <=4 mcg/mL Sensitive     Levofloxacin <=2 mcg/mL Sensitive     Meropenem <=1 mcg/mL Sensitive     Piperacillin/Tazo <=16 mcg/mL Sensitive     Tobramycin <=4 mcg/mL Sensitive     Trimeth/Sulfa <=2/38 mcg/mL Sensitive                         Imaging Reviewed:  Repeat CT scan 2/26/04:  Irregular multi septated fluid collection in the left upper quadrant is not significantly changed in size compared to prior exam from of February 19, 2024.  Multiple bilateral renal lesions, incompletely characterized on this noncontrast exam, grossly stable compared to prior.  Nonobstructing left nephrolithiasis, also stable.  Intrahepatic and extrahepatic bile duct dilation status post cholecystectomy.  Please correlate with bilirubin levels to determine significance.  Small left pleural effusion.  Multiple thoracolumbar compression fractures.  Atherosclerosis, diverticulosis, and other  incidental findings as above.      Repeat CT scan 2/20/24:  FINDINGS: There is a multiloculated 5.9 by 3.9 cm fluid collection seen in the left upper quadrant just below the diaphragm that previously measured 6.2 by 4.4 cm in similar plane.  There is a small left pleural effusion similar to prior exam.  There is associated compressive atelectasis similar to prior exam.  There are bilateral renal cysts with the largest seen inferiorly in association with the left kidney measuring 13 cm.  There is a 2.7 cm hyperdense lesion seen in association with the inferior pole of the right kidney unchanged from prior exam.  There are bilateral renal calculi the largest of which is seen at the inferior pole on the left and measures 1.8 cm.   The patient is status post cholecystectomy with prominence of the intrahepatic biliary ducts and common bile duct similar in appearance to prior exam.   There is no evidence bowel obstruction.  Stool is seen throughout the colon.  The patient has a right femoral neville and proximal screw.     Impression:  Again seen is the multi septated fluid collection in the left upper quadrant just inferior to the diaphragm, left pleural effusion with associated compressive atelectasis.  Overall the appearance of the fluid collection in the left pleural effusion is similar to what was seen on prior exam from 01/24/2024.     The patient has bilateral renal calculi similar in appearance to prior exam.     CT abdomen/pelvis w/o contrast 1/12/24:  FINDINGS:  There is elevation of the left hemidiaphragm and moderate atelectasis in the left lower lobe.  A pleural effusion or pneumothorax is not seen.  The liver is of normal size and CT density.  The gallbladder is absent with surgical clips noted.  There is a 2.6 cm cyst of segment 1 of the liver parenchyma and dilation of the common bile duct up to 2 cm in diameter.  This is unchanged from prior study and a stone or mass in the head of the pancreas is not seen.   The pancreas appears of normal contour and CT density without edema or mass.  The spleen is of normal size and CT density.  On the right there is a 4.3 cm and a 5.6 cm water density cyst and 2 higher density cyst at the lateral surface representing probable bloody cyst.  These are stable however.  The largest measures 1.3 cm.  Stone or hydronephrosis on the right is not seen.  On the left a nephrostomy tube is not presently seen.  In the Janina nephric space superiorly is a fluid collection measuring 4.4 cm and a partial fluid collection measuring 2.8 cm as well as a probable bloody cyst measuring 3.2 cm.  There is also a 3.8 cm cyst and a giant cyst at the lower pole which measures 14 cm.  There is significantly less stone burden than seen on the prior studies primarily in the lower pole.  The largest stone fragment measures 1.1 cm.  Hydronephrosis is not presently seen.  The abdominal aorta is heavily calcified.  An aneurysm is not identified.    The stomach is of normal configuration.  Small bowel dilatation or air-fluid levels are not seen.  The colon appears of normal configuration without distention or mass.  A normal appendix is seen.  Free fluid or free air is not noted.  The bladder is of normal contour without asymmetry.  A uterus is not seen.     Impression:  Elevation of the left hemidiaphragm and moderate left lung atelectasis noted.  Under the left hemidiaphragm are 2 fluid collections either post nephrostomy tube abscesses or seromas.  Significantly less stone burden in the left kidney with 2 prominent fragments.  Hydronephrosis is not presently seen.  There are multiple bilateral renal cysts, some high density suggesting bloody cysts.  A giant cyst of the lower pole of the left kidney measures 14 cm.  Prior cholecystectomy.  2.6 cm cyst of segment 1 of the liver parenchyma.  Dilation of the common bile duct reaching 2 cm without a stone or mass seen.  Prior hysterectomy.    CT abdomen/pelvis w/o  contrast 11/24/2023.  FINDINGS:  The collection seen posterior to and cephalad to the upper pole of the left kidney, extending subphrenic in through the diaphragm does not appear significantly changed compared the prior study of 10/16/2023.  Craniocaudal measurement on the coronal image is 9.8 cm today versus 9.4 cm on the prior exam.  On axial images the diameter is 6.2 x 4.7 cm today versus 6.4 x 4.3 cm previously.  Appears complex with irregular fluid component irregular wall.  Findings consistent with abscess.   Large, 14 cm lower pole left renal cyst.  Extrinsic compression on the lateral margin the cyst by colon.  The appearance is not significantly changed.   Oval 2.5 cm and 1.5 cm high density left renal masses density not as high as can be attributed to a high density cyst but not significantly changed compared to prior exam and corresponding as was described as hemorrhagic cysts on MRI.  Multiple left renal stones measuring up to approximately 7 mm.  No hydronephrosis opaque ureteral stone or ureteral obstruction evident   Right renal masses are not significantly changed with a 5.5 cm and a 4.6 cm and 11 mm simple cyst and high density masses which although not fully characterized on the single study correspond as described as hemorrhagic cyst on MRI and not significantly changed compared to the prior CT.  Largest measures 1.5 cm.  No hydronephrosis opaque renal or ureteral stone or ureteral obstruction.   Urinary bladder mildly distended at time of the exam and as visualized unremarkable appearance   Prior hysterectomy.  Adnexal region unremarkable appearance   Diverticulosis without CT findings of acute diverticulitis.  Normal appearance of the appendix.  No free intraperitoneal air or fluid.   Liver unremarkable appearance.  Cholecystectomy clips.  Common duct dilatation not significantly changed compared to the prior study likely on a post cholecystectomy basis.   Spleen not enlarged.  Posterior margin  the spleen is indistinguishable from the perirenal/subphrenic collection.   Pancreas mildly atrophied   Adrenal glands; slight thickening of the adrenal glands and small left adrenal nodule suggesting adenoma not significantly changed  Abdominal aorta; no aneurysm  Osseous structures;Internal fixation right hip. Osseous degenerative changes.  Moderate compression of the superior endplate of L1 not significantly changed.  Postoperative changes lumbar spine.  Small left pleural effusion and left posterior basilar airspace opacification do not appear significantly changed.     Impression:   The irregular, complex appearing collection suggesting abscess posterior to and cephalad to the left kidney, extending subphrenic and through the diaphragm into the pleural space does not appear significantly changed compared to the prior exam.  Additional findings as detailed above including renal stones, renal cysts, high density renal masses which although indeterminate on the current study alone correspond as described as hemorrhagic cyst on prior studies and not significantly changed compared to the prior exam.  left adrenal adenoma.      Kidney ultrasound 10/30/2023:  FINDINGS:  Right kidney: The right kidney measures 10.8 cm. No cortical thinning. No loss of corticomedullary distinction. Resistive index measures 0.76.  5.3 and 3.7 and 1 cm simple cyst.  1.2 cm cyst which may be minimally complex without increased through transmission but appearing anechoic and most likely simple cysts.  No renal stone. No hydronephrosis.     Left kidney: The left kidney measures 9.3 cm. No cortical thinning. No loss of corticomedullary distinction. Resistive index measures 0.71.  Numerous cysts including 14 cm and 3.7 cm cyst.  A few echogenic foci measuring 5-15 mm suggesting stones.  Complex structure appearing cephalad or projecting cephalad from the left kidney measuring 7.3 x 4.9 x 4.8 cm.  It was measured at 5.1 x 4.5 x 4.5 cm on the  prior exam.  No hydronephrosis.     The bladder is partially distended at the time of scanning and has an unremarkable appearance.     Impression:  Multiple simple bilateral renal cysts.  Left renal stones.  7.3 cm complex structure cephalad to the left kidney was measured at 5.1 cm on prior study images 03/02/2023 and corresponds to findings seen on more recent CT abdomen of 10/16/2023. Better demonstrated on the CT and please refer to that report.       Assessment & Plan:       Complicated left renal abscess in the setting of large staghorn stone status post 2 IR guided drainage is 11/2/2023 & 01/12/2024, s/p 4 weeks of ceftriaxone   Prior cell counts c/w abscess, cultures 11/2/23 & 1/12/24 Proteus mirabilis pan-sensitive   We will discuss with Urology next step in management since little to no improvement of size of retroperitoneal fluid collection seen after 4 weeks of IV antibiotics   Either 3rd drainage by IR versus surgery for washout  RTC in 1 month    CKD not on HD CrCl cannot be calculated (Patient's most recent lab result is older than the maximum 7 days allowed.).    PMHx: diabetes, COPD, CHF   Follow pulmonary and PCP outpatient        Essential hypertension    Proteus mirabilis infection    Staghorn calculus    Kidney abscess    Type 2 diabetes mellitus with diabetic nephropathy, without long-term current use of insulin          This note was created using Dragon voice recognition software that occasionally misinterpreted phrases or words.

## 2024-03-26 NOTE — PROGRESS NOTES
I spoke with Claudette ( [facility]) to advise line was removed at visit today     MARIA L Spivey CCMA  3/26/24

## 2024-03-27 ENCOUNTER — TELEPHONE (OUTPATIENT)
Dept: UROLOGY | Facility: CLINIC | Age: 84
End: 2024-03-27
Payer: MEDICARE

## 2024-03-27 NOTE — TELEPHONE ENCOUNTER
Returned call and spoke with patient, informed the clearance form had been faxed on 3/11/24, but will fax it to them again. Patient state since date hasn't been determined yet, she is going on vacation with her kids the month of April, would like to possibly schedule after she returns sometime in May. Informed message will be given to provider, patient verbally understood.

## 2024-03-27 NOTE — TELEPHONE ENCOUNTER
----- Message from Camila Laguna sent at 3/27/2024  8:54 AM CDT -----  Type: Needs Medical Advice  Who Called:  pt   Symptoms (please be specific):  Surg clearance   Best Call Back Number: [phone]  Additional Information: pt stated she was advised by Cardio office Dr. Jones to have Dr. Parra reach out via phone or fax to request the clearance for pt please advise asap thanks! Fax number: [phone] Phone number: [phone]  Pt stated as soon as provider has a moment to please call pt to go over a few questions pt has thanks!

## 2024-03-27 NOTE — TELEPHONE ENCOUNTER
She is scheduled to see me 4/17, I would like to discuss with her at that apt and we can make an plan. Her repeat CT scan is stable.

## 2024-03-27 NOTE — TELEPHONE ENCOUNTER
Spoke with patient, advisement given, will discuss procedure at time of appt, unable to come in the morning, rescheduled to the afternoon, patient verbally understood.

## 2024-04-11 PROCEDURE — G0179 MD RECERTIFICATION HHA PT: HCPCS | Mod: ,,, | Performed by: INTERNAL MEDICINE

## 2024-04-16 NOTE — PROGRESS NOTES
[facility] Note    PCP: Kraig Alberto MD    Chief Complaint: kidney stones    SUBJECTIVE:       History of Present Illness:  Jo Alegre is a 83 y.o. female who presents to clinic for kidney stones. She is Established  to our clinic.     Cysto with RGP showed no extravasation.     CT 3/25/24: Irregular multi septated fluid collection in the left upper quadrant is not significantly changed in size compared to prior exam from of February 19, 2024.     She is feeling great with no complaints.     She does have some OAB symptoms which she is interested in addressing.     1/19/24  Has undergone IR drainage of fluid collection x 2 and now with drain placement.   CT shows continued presence of the collection with drain appearing to be within the left flank.     Drain did get pulled at some point. Hasn't drained anything in 5 days.   No fevers. No pain.     10/23/23  Patient has been having issues with recurrent left pleural effusion.  On her most recent CT there is a cystic fluid collection posterior to the left kidney with concern for involvement/fistulization of the left pleural space. This does appear to be away from the previous PCNL tract.     She is otherwise feeling well.  No fevers.   No hematuria.     4/24/23  Renal US 3/2/23: There are bilateral renal cysts some of which show internal debris. There is no hydronephrosis.    KUB 3/2/23: There are left renal calculi the largest of which measures 7 mm.    No flank pain. No hematuria. No dysuria.   Drinking 3-4 bottles of water a day.       11/16/22  Unable to get contrast with CT secondary to renal function.   She has a 10 mm mid pole stone and a 10 mm and 6 mm lower pole stone. No hydro.   Suggestive of hemorrhagic cysts.     She is without complaints today.   No pain.   No hematuria.   No UTI symptoms.       9/12/22  S/P left PCNL on 8/25/22.  Stone analysis: 80% Magnesium ammonium phosphate (struvite), 20% Calcium phosphate (apatite)    KUB: possible small residual stone in lower pole    Post op course uncomplicated.   No further hematuria. No flank pain.     8/1/22  Patient underwent stent placement on 7/17 for left staghorn stone. She had a fever to 100.3.  CT shows a large lower pole staghorn. Also has some complex cysts present.     This is her first stone episode.   No family hx of stones.   No hematuria prior to stent placement   Has a hx of recurrent UTIs.     Last urine culture: MO (7/2/22)    Lab Results   Component Value Date    CREATININE 2.7 (H) 02/26/2024     Hx of COPD, pulm HTN, a fib, CHF, HLD, CKD, DM, JENNIFER    Past medical, family, and social history reviewed as documented in chart with pertinent positive medical, family, and social history detailed in HPI.    Review of patient's allergies indicates:   Allergen Reactions    Codeine Other (See Comments)     nausea    Hydrocodone Nausea And Vomiting    Lasix [furosemide]      rash       Past Medical History:   Diagnosis Date    Allergy     Codeine, Lasix    Atrial fibrillation     Atrial fibrillation     Cataract     CHF (congestive heart failure)     Diabetes mellitus, type 2     Ejection fraction < 50% 10/18/2017    Approximately 35%  Based on prior  Echocardiogram.    Encounter for blood transfusion     Hyperlipidemia     Osteoporosis     PONV (postoperative nausea and vomiting)     Thyroid disorder screening 10/17/2017    TSH of 1.12 ordered by Dr. dee Jones     Past Surgical History:   Procedure Laterality Date    ANTEGRADE NEPHROSTOGRAPHY Left 8/25/2022    Procedure: NEPHROSTOGRAM, ANTEGRADE;  Surgeon: Shelby Parra MD;  Location: [facility] OR;  Service: Urology;  Laterality: Left;    CHOLECYSTECTOMY  1997    [place]     COLONOSCOPY      CYSTOSCOPY Left 3/8/2024    Procedure: CYSTOSCOPY;  Surgeon: Shelby Parra MD;  Location: [facility] OR;  Service: Urology;  Laterality: Left;    CYSTOSCOPY W/ URETERAL STENT PLACEMENT Left 7/17/2022    Procedure: CYSTOSCOPY, WITH  URETERAL STENT INSERTION;  Surgeon: Shelby Parra MD;  Location: [facility] OR;  Service: Urology;  Laterality: Left;    CYSTOSCOPY W/ URETERAL STENT REMOVAL Left 9/21/2022    Procedure: CYSTOSCOPY, WITH URETERAL STENT REMOVAL;  Surgeon: Shelby Parra MD;  Location: [facility] OR;  Service: Urology;  Laterality: Left;    CYSTOSCOPY WITH URETEROSCOPY, RETROGRADE PYELOGRAPHY, AND INSERTION OF STENT Left 8/25/2022    Procedure: CYSTOSCOPY, WITH RETROGRADE PYELOGRAM AND URETERAL STENT INSERTION;  Surgeon: Shelby Parra MD;  Location: [facility] OR;  Service: Urology;  Laterality: Left;    EYE SURGERY      bilateral cataracts    FINGER SURGERY Right 2021    right pinky finger    FLEXIBLE CYSTOSCOPY Left 8/25/2022    Procedure: CYSTOSCOPY, FLEXIBLE WITH STENT REMOVAL;  Surgeon: Shelby Parra MD;  Location: [facility];  Service: Urology;  Laterality: Left;    FRACTURE SURGERY  2014    right femur with neville    HEMORRHOID SURGERY      48 yrs ago    HYSTERECTOMY      NEPHROSTOMY Left 8/25/2022    Procedure: CREATION, NEPHROSTOMY;  Surgeon: Shelby Parra MD;  Location: [facility] OR;  Service: Urology;  Laterality: Left;    PERCUTANEOUS NEPHROLITHOTOMY N/A 8/25/2022    Procedure: NEPHROLITHOTOMY, PERCUTANEOUS;  Surgeon: Shelby Parra MD;  Location: [facility] OR;  Service: Urology;  Laterality: N/A;    REPLACEMENT OF IMPLANTABLE CARDIOVERTER-DEFIBRILLATOR (ICD) GENERATOR N/A 12/13/2019    Procedure: REPLACEMENT, PULSE GENERATOR, ICD-MEDTRONIC;  Surgeon: Sebastian Nowak III, MD;  [facility];  Service: Cardiology;  Laterality: N/A;    RETROGRADE PYELOGRAPHY N/A 3/8/2024    Procedure: PYELOGRAM, RETROGRADE;  Surgeon: Shelby Parra MD;  Location: [facility] OR;  Service: Urology;  Laterality: N/A;    TONSILLECTOMY       Family History   Problem Relation Name Age of Onset    Heart disease Mother Jannette     Cancer Father Branden         Lung Cancer ??? Asbestos    Breast cancer Paternal Aunt       Social History     Tobacco  "Use    Smoking status: Former     Passive exposure: Past    Smokeless tobacco: Never    Tobacco comments:     quit 2013   Substance Use Topics    Alcohol use: No    Drug use: No        Review of Systems   Genitourinary:  Negative for difficulty urinating, dysuria, flank pain, frequency, hematuria, nocturia and urgency.       OBJECTIVE:     Anticoagulation:  xarelto 20 mg     Estimated body mass index is 31.09 kg/m² as calculated from the following:    Height as of this encounter: 5' 9" (1.753 m).    Weight as of this encounter: 95.5 kg (210 lb 8.6 oz).    Vital Signs (Most Recent)       Physical Exam  Vitals reviewed.   Constitutional:       General: She is not in acute distress.     Appearance: Normal appearance. She is not ill-appearing.   HENT:      Head: Normocephalic and atraumatic.   Eyes:      General: No scleral icterus.  Cardiovascular:      Rate and Rhythm: Normal rate and regular rhythm.   Pulmonary:      Effort: Pulmonary effort is normal. No respiratory distress.   Abdominal:      General: There is no distension.      Palpations: Abdomen is soft.   Skin:     Coloration: Skin is not jaundiced.   Neurological:      General: No focal deficit present.      Mental Status: She is alert and oriented to person, place, and time.   Psychiatric:         Mood and Affect: Mood normal.         Behavior: Behavior normal.         BMP  Lab Results   Component Value Date     02/26/2024    K 4.4 02/26/2024     02/26/2024    CO2 33 (H) 02/26/2024    BUN 28 (H) 02/26/2024    CREATININE 2.7 (H) 02/26/2024    CALCIUM 10.8 (H) 02/26/2024    ANIONGAP 8 02/26/2024    ESTGFRAFRICA 30.2 (A) 07/18/2022    EGFRNONAA 26.2 (A) 07/18/2022       Lab Results   Component Value Date    WBC 5.87 02/26/2024    HGB 10.8 (L) 02/26/2024    HCT 34.7 (L) 02/26/2024    MCV 95 02/26/2024     02/26/2024     Imaging:  Per HPI    ASSESSMENT     1. Retroperitoneal fluid collection    2. Overactive bladder    3. Staghorn calculus  "     PLAN:     - CT shows stable fluid collection. Given that she is asymptomatic and there is no evidence of extrav from the kidney I recommended observation of this given her age and multiple medical issues. I do not feel that major surgery is in her best interest  - Will see her back in 3 months with repeat CT scan   - Discussed warning signs such as fevers or general ill feeling which she should let me know about asap  - Will start Myrbetriq 25 mg for her OAB symptoms.     Shelby Parra MD

## 2024-04-17 ENCOUNTER — OFFICE VISIT (OUTPATIENT)
Dept: UROLOGY | Facility: CLINIC | Age: 84
End: 2024-04-17
Payer: MEDICARE

## 2024-04-17 VITALS — BODY MASS INDEX: 31.19 KG/M2 | HEIGHT: 69 IN | WEIGHT: 210.56 LBS

## 2024-04-17 DIAGNOSIS — R18.8 RETROPERITONEAL FLUID COLLECTION: Primary | ICD-10-CM

## 2024-04-17 DIAGNOSIS — N32.81 OVERACTIVE BLADDER: ICD-10-CM

## 2024-04-17 DIAGNOSIS — N20.0 STAGHORN CALCULUS: ICD-10-CM

## 2024-04-17 PROCEDURE — 99999 PR PBB SHADOW E&M-EST. PATIENT-LVL III: CPT | Mod: PBBFAC,,, | Performed by: STUDENT IN AN ORGANIZED HEALTH CARE EDUCATION/TRAINING PROGRAM

## 2024-04-17 PROCEDURE — 1101F PT FALLS ASSESS-DOCD LE1/YR: CPT | Mod: CPTII,S$GLB,, | Performed by: STUDENT IN AN ORGANIZED HEALTH CARE EDUCATION/TRAINING PROGRAM

## 2024-04-17 PROCEDURE — 1159F MED LIST DOCD IN RCRD: CPT | Mod: CPTII,S$GLB,, | Performed by: STUDENT IN AN ORGANIZED HEALTH CARE EDUCATION/TRAINING PROGRAM

## 2024-04-17 PROCEDURE — 3288F FALL RISK ASSESSMENT DOCD: CPT | Mod: CPTII,S$GLB,, | Performed by: STUDENT IN AN ORGANIZED HEALTH CARE EDUCATION/TRAINING PROGRAM

## 2024-04-17 PROCEDURE — 1126F AMNT PAIN NOTED NONE PRSNT: CPT | Mod: CPTII,S$GLB,, | Performed by: STUDENT IN AN ORGANIZED HEALTH CARE EDUCATION/TRAINING PROGRAM

## 2024-04-17 PROCEDURE — 99214 OFFICE O/P EST MOD 30 MIN: CPT | Mod: S$GLB,,, | Performed by: STUDENT IN AN ORGANIZED HEALTH CARE EDUCATION/TRAINING PROGRAM

## 2024-04-17 RX ORDER — MIRABEGRON 25 MG/1
25 TABLET, FILM COATED, EXTENDED RELEASE ORAL DAILY
Qty: 30 TABLET | Refills: 11 | Status: SHIPPED | OUTPATIENT
Start: 2024-04-17 | End: 2025-04-17

## 2024-04-17 RX ORDER — VIBEGRON 75 MG/1
75 TABLET, FILM COATED ORAL DAILY
Qty: 30 TABLET | Refills: 11 | Status: SHIPPED | OUTPATIENT
Start: 2024-04-17 | End: 2024-04-17

## 2024-04-17 NOTE — Clinical Note
Hey - she looks great. I really dont want to do anything to her. I'm gonna see her back in 3 months with repeat scan.

## 2024-04-19 ENCOUNTER — TELEPHONE (OUTPATIENT)
Dept: UROLOGY | Facility: CLINIC | Age: 84
End: 2024-04-19
Payer: MEDICARE

## 2024-04-19 NOTE — TELEPHONE ENCOUNTER
----- Message from Danie Baker sent at 4/19/2024  1:13 PM CDT -----  Regarding: conflicting medications  Contact: Daughter  Type:  Needs Medical Advice    Who Called: Pts Daughter Mary      Pharmacy name and phone #:    [id number] - [place], [place] - [address] [facility] [address]  [address]  [place] [address]  Phone: [phone] Fax: [phone]      Would the patient rather a call back or a response via [facility]? Call back    Best Call Back Number: [phone] for Mary / [place]aving out of [place] at 9 am tomorrow needs to know today .      Additional Information: Sts that the pharmacy received 2 identical medications and needs to know which the pt is supposed to be on as they pharm has both .... The GEMTESA and the MYRBETRIQ.     Please advise -- Thank you

## 2024-04-19 NOTE — TELEPHONE ENCOUNTER
Per 's note informed patient's daughter patient to start mybetriq 25mg. Daughter verbally voiced understanding.

## 2024-04-29 ENCOUNTER — OFFICE VISIT (OUTPATIENT)
Dept: INFECTIOUS DISEASES | Facility: CLINIC | Age: 84
End: 2024-04-29
Payer: MEDICARE

## 2024-04-29 VITALS
DIASTOLIC BLOOD PRESSURE: 74 MMHG | SYSTOLIC BLOOD PRESSURE: 138 MMHG | BODY MASS INDEX: 31.1 KG/M2 | HEART RATE: 55 BPM | TEMPERATURE: 97 F | WEIGHT: 210 LBS | HEIGHT: 69 IN

## 2024-04-29 DIAGNOSIS — E11.21 TYPE 2 DIABETES MELLITUS WITH DIABETIC NEPHROPATHY, WITHOUT LONG-TERM CURRENT USE OF INSULIN: ICD-10-CM

## 2024-04-29 DIAGNOSIS — M54.50 LOW BACK PAIN, NON-SPECIFIC: ICD-10-CM

## 2024-04-29 DIAGNOSIS — N20.0 STAGHORN CALCULUS: ICD-10-CM

## 2024-04-29 DIAGNOSIS — A49.8 BACTERIAL INFECTION DUE TO PROTEUS MIRABILIS: ICD-10-CM

## 2024-04-29 DIAGNOSIS — R73.9 ELEVATED BLOOD SUGAR: Primary | ICD-10-CM

## 2024-04-29 DIAGNOSIS — M81.0 OSTEOPOROSIS, POSTMENOPAUSAL: ICD-10-CM

## 2024-04-29 PROCEDURE — 3075F SYST BP GE 130 - 139MM HG: CPT | Mod: CPTII,S$GLB,, | Performed by: STUDENT IN AN ORGANIZED HEALTH CARE EDUCATION/TRAINING PROGRAM

## 2024-04-29 PROCEDURE — 3288F FALL RISK ASSESSMENT DOCD: CPT | Mod: CPTII,S$GLB,, | Performed by: STUDENT IN AN ORGANIZED HEALTH CARE EDUCATION/TRAINING PROGRAM

## 2024-04-29 PROCEDURE — 3078F DIAST BP <80 MM HG: CPT | Mod: CPTII,S$GLB,, | Performed by: STUDENT IN AN ORGANIZED HEALTH CARE EDUCATION/TRAINING PROGRAM

## 2024-04-29 PROCEDURE — 99214 OFFICE O/P EST MOD 30 MIN: CPT | Mod: S$GLB,,, | Performed by: STUDENT IN AN ORGANIZED HEALTH CARE EDUCATION/TRAINING PROGRAM

## 2024-04-29 PROCEDURE — 1125F AMNT PAIN NOTED PAIN PRSNT: CPT | Mod: CPTII,S$GLB,, | Performed by: STUDENT IN AN ORGANIZED HEALTH CARE EDUCATION/TRAINING PROGRAM

## 2024-04-29 PROCEDURE — 1159F MED LIST DOCD IN RCRD: CPT | Mod: CPTII,S$GLB,, | Performed by: STUDENT IN AN ORGANIZED HEALTH CARE EDUCATION/TRAINING PROGRAM

## 2024-04-29 PROCEDURE — 1100F PTFALLS ASSESS-DOCD GE2>/YR: CPT | Mod: CPTII,S$GLB,, | Performed by: STUDENT IN AN ORGANIZED HEALTH CARE EDUCATION/TRAINING PROGRAM

## 2024-04-29 NOTE — PATIENT INSTRUCTIONS
Continue to monitor OFF abx  Alarm symptoms given to the patient  CT scan in 3 months   RTC in 3 months

## 2024-04-30 NOTE — PROGRESS NOTES
Referred by urology for intra-abdominal collection      HPI: Jo Alegre is a 83 y.o. female , very pleasant, former smoker, with past medical history of diabetes, COPD, CHF who has been followed by Urology for recurrent UTIs and bilateral kidney cysts and a left large staghorn calculus since imaging on 2020.  Patient reports back in October she was admitted to the hospital for acute on chronic CHF exacerbation, noted to have MARCELINO, kidney ultrasound then revealed bilateral large cysts which prompted CT abdomen and pelvis which revealed a serial of fluid collections in the right and left kidneys.  A giant 14 cm left renal cyst observed.  No hydronephrosis or stones appreciated.  She had an IR drainage placed for the large giant cyst which ended up being an abscess, 50 cc of purulent fluid was removed on 11/2/23, cultures grew pansensitive Proteus mirabilis.  She then had to have her drain repositioned because of malposition, drained again and replaced 1/12/24, repeat cultures with Proteus mirabilis.  She has completed on and off 17 days of ciprofloxacin twice a day.      Patient is here with her daughters, she is in the wheelchair, she is awake, alert, very pleasant.  She denies any fever or chills, no nausea or vomiting, no chest pain or cough, baseline shortness of breath, she does complain of increased urinary frequency, foul-smelling urine, and some dysuria at the beginning of micturition, she denies any changes in bowel movements.  Patient and family are very concerned regarding next step of care, explained plan of care including repeating CT scan to address status of prior fluid collection.  It is possible that we either consider further IR guided drainage with IV antibiotics versus surgery but to be determined after discussion with consultants.     Will obtain labs today.    2/26/24: Interim reviewed, patient is here for follow-up, daughters present. She reports she is feeling fine, has no acute  complaints. She saw Urology and plan for cystoscopy 3/8. Discussed with patient and daughters plan of care and will order PICC line, start rocephin 2g IV daily for 2 weeks for kidney abscess and assess duration of therapy based on repeat CT scan before the end of therapy. She understands plan of care, will also check urine next Monday in preparation to procedure. All questions answered.     3/26:  Patient seen examined, family present.  She is here for follow-up, completing 4 weeks of IV antibiotics for complicated bilateral renal fluid collections.  She had cystoscopy on 03/08 by Urology, no obvious evidence of extravasation from the left renal collecting system.  There was good drainage of the left kidney.  She has failed IR drainage for her retroperitoneal fluid collections twice.  Plan to either attempt a 3rd IR drainage versus surgical washout.  Repeat CT scan with no interval change on size of fluid collections.  Discussed with daughter and family that we might need surgical intervention.  We will remove PICC line today.  The patient denies any fever chills, no nausea or vomiting, she denies any constitutional symptoms.  She is looking forward to go on vacation and come back and wait for next step in therapy.  Alarm symptoms given to patient and family in case she needs to go to the hospital.  Weekly labs reviewed, stable, CRP has remained normal throughout.    4/29: Patient is here for follow up, daughters present. They just came back from vacation, they went to [place].  Unfortunately she had a fall in the bathroom, missed the toilet.  She did have any fracture on her hip.  Just large bruising on the right shoulder.  Patient completed 4 weeks of Rocephin without improvement or change on CT scan.  Seen by Urology on 04/17, decided and agreed with Dr. Parra to monitor off antibiotics, alarm symptoms given to the patient and family, we will watch closely given the fact that she has been afebrile, normal  white count, and no signs of ongoing infectious process at the moment.  All questions answered from daughters and the patient and will have her follow-up in 3 months CT scan and assess the progress/evolution of prior fluid collection.    Review of patient's allergies indicates:   Allergen Reactions    Codeine Other (See Comments)     nausea    Hydrocodone Nausea And Vomiting    Lasix [furosemide]      rash     Past Medical History:   Diagnosis Date    Allergy     Codeine, Lasix    Atrial fibrillation     Atrial fibrillation     Cataract     CHF (congestive heart failure)     Diabetes mellitus, type 2     Ejection fraction < 50% 10/18/2017    Approximately 35%  Based on prior  Echocardiogram.    Encounter for blood transfusion     Hyperlipidemia     Osteoporosis     PONV (postoperative nausea and vomiting)     Thyroid disorder screening 10/17/2017    TSH of 1.12 ordered by Dr. dee Jones     Past Surgical History:   Procedure Laterality Date    ANTEGRADE NEPHROSTOGRAPHY Left 8/25/2022    Procedure: NEPHROSTOGRAM, ANTEGRADE;  Surgeon: Shelby Parra MD;  Location: [facility] OR;  Service: Urology;  Laterality: Left;    CHOLECYSTECTOMY  1997    [place]     COLONOSCOPY      CYSTOSCOPY Left 3/8/2024    Procedure: CYSTOSCOPY;  Surgeon: Shelby Parra MD;  Location: [facility] OR;  Service: Urology;  Laterality: Left;    CYSTOSCOPY W/ URETERAL STENT PLACEMENT Left 7/17/2022    Procedure: CYSTOSCOPY, WITH URETERAL STENT INSERTION;  Surgeon: Shelby Parra MD;  Location: [facility] OR;  Service: Urology;  Laterality: Left;    CYSTOSCOPY W/ URETERAL STENT REMOVAL Left 9/21/2022    Procedure: CYSTOSCOPY, WITH URETERAL STENT REMOVAL;  Surgeon: Shelby Parra MD;  Location: [facility] OR;  Service: Urology;  Laterality: Left;    CYSTOSCOPY WITH URETEROSCOPY, RETROGRADE PYELOGRAPHY, AND INSERTION OF STENT Left 8/25/2022    Procedure: CYSTOSCOPY, WITH RETROGRADE PYELOGRAM AND URETERAL STENT INSERTION;  Surgeon: Shelby Parra,  MD;  Location: [facility] OR;  Service: Urology;  Laterality: Left;    EYE SURGERY      bilateral cataracts    FINGER SURGERY Right 2021    right pinky finger    FLEXIBLE CYSTOSCOPY Left 8/25/2022    Procedure: CYSTOSCOPY, FLEXIBLE WITH STENT REMOVAL;  Surgeon: Shelby Parra MD;  Location: [facility] OR;  Service: Urology;  Laterality: Left;    FRACTURE SURGERY  2014    right femur with neville    HEMORRHOID SURGERY      48 yrs ago    HYSTERECTOMY      NEPHROSTOMY Left 8/25/2022    Procedure: CREATION, NEPHROSTOMY;  Surgeon: Shelby Parra MD;  Location: [facility] OR;  Service: Urology;  Laterality: Left;    PERCUTANEOUS NEPHROLITHOTOMY N/A 8/25/2022    Procedure: NEPHROLITHOTOMY, PERCUTANEOUS;  Surgeon: Shelby Parra MD;  Location: [facility] OR;  Service: Urology;  Laterality: N/A;    REPLACEMENT OF IMPLANTABLE CARDIOVERTER-DEFIBRILLATOR (ICD) GENERATOR N/A 12/13/2019    Procedure: REPLACEMENT, PULSE GENERATOR, ICD-MEDTRONIC;  Surgeon: Sebastian Nowak III, MD;  Location: [facility] CATH;  Service: Cardiology;  Laterality: N/A;    RETROGRADE PYELOGRAPHY N/A 3/8/2024    Procedure: PYELOGRAM, RETROGRADE;  Surgeon: Shelby Parra MD;  Location: [facility] OR;  Service: Urology;  Laterality: N/A;    TONSILLECTOMY        Social History     Tobacco Use    Smoking status: Former     Passive exposure: Past    Smokeless tobacco: Never    Tobacco comments:     quit 2013   Substance Use Topics    Alcohol use: No     Family History   Problem Relation Name Age of Onset    Heart disease Mother Jannette     Cancer Father Branden         Lung Cancer ??? Asbestos    Breast cancer Paternal Aunt           Review of Systems   Constitutional:  Negative for chills and fever.   HENT:  Negative for sinus pain.    Respiratory:  Negative for cough and shortness of breath.    Cardiovascular:  Negative for chest pain.   Gastrointestinal:  Negative for abdominal pain, diarrhea and nausea.   Genitourinary:  Negative for dysuria, frequency, hematuria and urgency.        " Incontinence   Musculoskeletal:  Negative for back pain and myalgias.   Skin:  Negative for rash.   Neurological:  Negative for headaches.   Psychiatric/Behavioral:  Negative for confusion.          No TB exposure  Outdoor activities:  Never smoker, very occasional alcohol.  Housewife.  Travel:  None  Implants:  None  Antibiotic History:  Cipro 17 days, end date 02/05 - Ceftriaxone 4 weeks, completed 3/25      Objective:      Blood pressure 138/74, pulse (!) 55, temperature 96.8 °F (36 °C), height 5' 9" (1.753 m), weight 95.3 kg (210 lb), SpO2 (!) (P) 93%. Body mass index is 31.01 kg/m².  Physical Exam  Constitutional:       Appearance: Normal appearance. She is obese.   HENT:      Mouth/Throat:      Mouth: Mucous membranes are moist.      Pharynx: Oropharynx is clear.   Eyes:      Extraocular Movements: Extraocular movements intact.      Pupils: Pupils are equal, round, and reactive to light.   Cardiovascular:      Rate and Rhythm: Normal rate and regular rhythm.      Pulses: Normal pulses.      Heart sounds: Murmur heard.   Pulmonary:      Effort: Pulmonary effort is normal.      Breath sounds: No rales.      Comments: Mostly clear to auscultation bilaterally  Abdominal:      General: Bowel sounds are normal.      Palpations: Abdomen is soft.      Tenderness: There is no abdominal tenderness. There is no rebound.   Musculoskeletal:      Cervical back: Normal range of motion and neck supple.      Right lower leg: Edema present.      Left lower leg: No edema.      Comments: She is wearing compression stockings b/l   Skin:     General: Skin is warm.      Capillary Refill: Capillary refill takes less than 2 seconds.      Findings: No rash.      Comments: Right shoulder with resolving ecchymosis, slightly tender to palpation, range of motion preserved   Neurological:      Mental Status: She is alert and oriented to person, place, and time. Mental status is at baseline.      Sensory: No sensory deficit.      Motor: No " weakness.   Psychiatric:         Thought Content: Thought content normal.             General Labs reviewed:  Lab Results   Component Value Date    WBC 5.87 02/26/2024    RBC 3.66 (L) 02/26/2024    HGB 10.8 (L) 02/26/2024    HCT 34.7 (L) 02/26/2024    MCV 95 02/26/2024    MCH 29.5 02/26/2024    MCHC 31.1 (L) 02/26/2024    RDW 17.1 (H) 02/26/2024     02/26/2024    MPV 10.3 02/26/2024    GRAN 4.0 02/26/2024    GRAN 68.2 02/26/2024    LYMPH 1.2 02/26/2024    LYMPH 20.1 02/26/2024    MONO 0.5 02/26/2024    MONO 8.9 02/26/2024    EOS 0.1 02/26/2024    BASO 0.06 02/26/2024    EOSINOPHIL 1.5 02/26/2024    BASOPHIL 1.0 02/26/2024     CMP  Sodium   Date Value Ref Range Status   02/26/2024 142 136 - 145 mmol/L Final     Potassium   Date Value Ref Range Status   02/26/2024 4.4 3.5 - 5.1 mmol/L Final     Chloride   Date Value Ref Range Status   02/26/2024 101 95 - 110 mmol/L Final     CO2   Date Value Ref Range Status   02/26/2024 33 (H) 23 - 29 mmol/L Final     Glucose   Date Value Ref Range Status   02/26/2024 77 70 - 110 mg/dL Final     BUN   Date Value Ref Range Status   02/26/2024 28 (H) 8 - 23 mg/dL Final     Creatinine   Date Value Ref Range Status   02/26/2024 2.7 (H) 0.5 - 1.4 mg/dL Final     Calcium   Date Value Ref Range Status   02/26/2024 10.8 (H) 8.7 - 10.5 mg/dL Final     Total Protein   Date Value Ref Range Status   02/26/2024 6.9 6.0 - 8.4 g/dL Final     Albumin   Date Value Ref Range Status   02/26/2024 3.7 3.5 - 5.2 g/dL Final     Total Bilirubin   Date Value Ref Range Status   02/26/2024 0.7 0.1 - 1.0 mg/dL Final     Comment:     For infants and newborns, interpretation of results should be based  on gestational age, weight and in agreement with clinical  observations.    Premature Infant recommended reference ranges:  Up to 24 hours.............<8.0 mg/dL  Up to 48 hours............<12.0 mg/dL  3-5 days..................<15.0 mg/dL  6-29 days.................<15.0 mg/dL       Alkaline Phosphatase    Date Value Ref Range Status   02/26/2024 88 55 - 135 U/L Final     AST   Date Value Ref Range Status   02/26/2024 12 10 - 40 U/L Final     ALT   Date Value Ref Range Status   02/26/2024 8 (L) 10 - 44 U/L Final     Anion Gap   Date Value Ref Range Status   02/26/2024 8 8 - 16 mmol/L Final     eGFR   Date Value Ref Range Status   02/26/2024 17.1 (A) >60 mL/min/1.73 m^2 Final        Latest Reference Range & Units 11/02/23 14:42 01/12/24 13:57   Fluid Color  Yellow Red   Fluid Appearance  Turbid Turbid   WBC, Body Fluid /cu mm [id number]   Body Fluid Type  Cyst Fluid Other (Specify)   Segs, Fluid % 86 7   Lymphs, Fluid % 7 93   Monocytes/Macrophages, Fluid % 7        Micro:  Fluid culture L kidney 1/12/24:  Aerobic Bacterial Culture  Abnormal   PROTEUS MIRABILIS  Few    Resulting Agency [facility]        Susceptibility       Proteus mirabilis     CULTURE, AEROBIC  (SPECIFY SOURCE)     Amox/K Clav'ate <=8/4 mcg/mL Sensitive     Amp/Sulbactam <=8/4 mcg/mL Sensitive     Ampicillin <=8 mcg/mL Sensitive     Cefazolin <=2 mcg/mL Sensitive     Cefepime <=2 mcg/mL Sensitive     Ceftriaxone <=1 mcg/mL Sensitive     Ciprofloxacin <=1 mcg/mL Sensitive     Ertapenem <=0.5 mcg/mL Sensitive     Gentamicin <=4 mcg/mL Sensitive     Levofloxacin <=2 mcg/mL Sensitive     Meropenem <=1 mcg/mL Sensitive     Piperacillin/Tazo <=16 mcg/mL Sensitive     Tobramycin <=4 mcg/mL Sensitive     Trimeth/Sulfa <=2/38 mcg/mL Sensitive               Fluid culture L kidney 11/2/23:  Aerobic Bacterial Culture  Abnormal   PROTEUS MIRABILIS  Many    Resulting Agency [facility]        Susceptibility     Proteus mirabilis     CULTURE, AEROBIC  (SPECIFY SOURCE)     Amox/K Clav'ate <=8/4 mcg/mL Sensitive     Amp/Sulbactam <=8/4 mcg/mL Sensitive     Ampicillin <=8 mcg/mL Sensitive     Cefazolin <=2 mcg/mL Sensitive     Cefepime <=2 mcg/mL Sensitive     Ceftriaxone <=1 mcg/mL Sensitive     Ciprofloxacin <=1 mcg/mL Sensitive     Ertapenem <=0.5 mcg/mL Sensitive      Gentamicin <=4 mcg/mL Sensitive     Levofloxacin <=2 mcg/mL Sensitive     Meropenem <=1 mcg/mL Sensitive     Piperacillin/Tazo <=16 mcg/mL Sensitive     Tobramycin <=4 mcg/mL Sensitive     Trimeth/Sulfa <=2/38 mcg/mL Sensitive                         Imaging Reviewed:  Repeat CT scan 2/26/04:  Irregular multi septated fluid collection in the left upper quadrant is not significantly changed in size compared to prior exam from of February 19, 2024.  Multiple bilateral renal lesions, incompletely characterized on this noncontrast exam, grossly stable compared to prior.  Nonobstructing left nephrolithiasis, also stable.  Intrahepatic and extrahepatic bile duct dilation status post cholecystectomy.  Please correlate with bilirubin levels to determine significance.  Small left pleural effusion.  Multiple thoracolumbar compression fractures.  Atherosclerosis, diverticulosis, and other incidental findings as above.      Repeat CT scan 2/20/24:  FINDINGS: There is a multiloculated 5.9 by 3.9 cm fluid collection seen in the left upper quadrant just below the diaphragm that previously measured 6.2 by 4.4 cm in similar plane.  There is a small left pleural effusion similar to prior exam.  There is associated compressive atelectasis similar to prior exam.  There are bilateral renal cysts with the largest seen inferiorly in association with the left kidney measuring 13 cm.  There is a 2.7 cm hyperdense lesion seen in association with the inferior pole of the right kidney unchanged from prior exam.  There are bilateral renal calculi the largest of which is seen at the inferior pole on the left and measures 1.8 cm.   The patient is status post cholecystectomy with prominence of the intrahepatic biliary ducts and common bile duct similar in appearance to prior exam.   There is no evidence bowel obstruction.  Stool is seen throughout the colon.  The patient has a right femoral neville and proximal screw.     Impression:  Again seen is  the multi septated fluid collection in the left upper quadrant just inferior to the diaphragm, left pleural effusion with associated compressive atelectasis.  Overall the appearance of the fluid collection in the left pleural effusion is similar to what was seen on prior exam from 01/24/2024.     The patient has bilateral renal calculi similar in appearance to prior exam.     CT abdomen/pelvis w/o contrast 1/12/24:  FINDINGS:  There is elevation of the left hemidiaphragm and moderate atelectasis in the left lower lobe.  A pleural effusion or pneumothorax is not seen.  The liver is of normal size and CT density.  The gallbladder is absent with surgical clips noted.  There is a 2.6 cm cyst of segment 1 of the liver parenchyma and dilation of the common bile duct up to 2 cm in diameter.  This is unchanged from prior study and a stone or mass in the head of the pancreas is not seen.  The pancreas appears of normal contour and CT density without edema or mass.  The spleen is of normal size and CT density.  On the right there is a 4.3 cm and a 5.6 cm water density cyst and 2 higher density cyst at the lateral surface representing probable bloody cyst.  These are stable however.  The largest measures 1.3 cm.  Stone or hydronephrosis on the right is not seen.  On the left a nephrostomy tube is not presently seen.  In the Janina nephric space superiorly is a fluid collection measuring 4.4 cm and a partial fluid collection measuring 2.8 cm as well as a probable bloody cyst measuring 3.2 cm.  There is also a 3.8 cm cyst and a giant cyst at the lower pole which measures 14 cm.  There is significantly less stone burden than seen on the prior studies primarily in the lower pole.  The largest stone fragment measures 1.1 cm.  Hydronephrosis is not presently seen.  The abdominal aorta is heavily calcified.  An aneurysm is not identified.    The stomach is of normal configuration.  Small bowel dilatation or air-fluid levels are not  seen.  The colon appears of normal configuration without distention or mass.  A normal appendix is seen.  Free fluid or free air is not noted.  The bladder is of normal contour without asymmetry.  A uterus is not seen.     Impression:  Elevation of the left hemidiaphragm and moderate left lung atelectasis noted.  Under the left hemidiaphragm are 2 fluid collections either post nephrostomy tube abscesses or seromas.  Significantly less stone burden in the left kidney with 2 prominent fragments.  Hydronephrosis is not presently seen.  There are multiple bilateral renal cysts, some high density suggesting bloody cysts.  A giant cyst of the lower pole of the left kidney measures 14 cm.  Prior cholecystectomy.  2.6 cm cyst of segment 1 of the liver parenchyma.  Dilation of the common bile duct reaching 2 cm without a stone or mass seen.  Prior hysterectomy.    CT abdomen/pelvis w/o contrast 11/24/2023.  FINDINGS:  The collection seen posterior to and cephalad to the upper pole of the left kidney, extending subphrenic in through the diaphragm does not appear significantly changed compared the prior study of 10/16/2023.  Craniocaudal measurement on the coronal image is 9.8 cm today versus 9.4 cm on the prior exam.  On axial images the diameter is 6.2 x 4.7 cm today versus 6.4 x 4.3 cm previously.  Appears complex with irregular fluid component irregular wall.  Findings consistent with abscess.   Large, 14 cm lower pole left renal cyst.  Extrinsic compression on the lateral margin the cyst by colon.  The appearance is not significantly changed.   Oval 2.5 cm and 1.5 cm high density left renal masses density not as high as can be attributed to a high density cyst but not significantly changed compared to prior exam and corresponding as was described as hemorrhagic cysts on MRI.  Multiple left renal stones measuring up to approximately 7 mm.  No hydronephrosis opaque ureteral stone or ureteral obstruction evident   Right  renal masses are not significantly changed with a 5.5 cm and a 4.6 cm and 11 mm simple cyst and high density masses which although not fully characterized on the single study correspond as described as hemorrhagic cyst on MRI and not significantly changed compared to the prior CT.  Largest measures 1.5 cm.  No hydronephrosis opaque renal or ureteral stone or ureteral obstruction.   Urinary bladder mildly distended at time of the exam and as visualized unremarkable appearance   Prior hysterectomy.  Adnexal region unremarkable appearance   Diverticulosis without CT findings of acute diverticulitis.  Normal appearance of the appendix.  No free intraperitoneal air or fluid.   Liver unremarkable appearance.  Cholecystectomy clips.  Common duct dilatation not significantly changed compared to the prior study likely on a post cholecystectomy basis.   Spleen not enlarged.  Posterior margin the spleen is indistinguishable from the perirenal/subphrenic collection.   Pancreas mildly atrophied   Adrenal glands; slight thickening of the adrenal glands and small left adrenal nodule suggesting adenoma not significantly changed  Abdominal aorta; no aneurysm  Osseous structures;Internal fixation right hip. Osseous degenerative changes.  Moderate compression of the superior endplate of L1 not significantly changed.  Postoperative changes lumbar spine.  Small left pleural effusion and left posterior basilar airspace opacification do not appear significantly changed.     Impression:   The irregular, complex appearing collection suggesting abscess posterior to and cephalad to the left kidney, extending subphrenic and through the diaphragm into the pleural space does not appear significantly changed compared to the prior exam.  Additional findings as detailed above including renal stones, renal cysts, high density renal masses which although indeterminate on the current study alone correspond as described as hemorrhagic cyst on prior  studies and not significantly changed compared to the prior exam.  left adrenal adenoma.      Kidney ultrasound 10/30/2023:  FINDINGS:  Right kidney: The right kidney measures 10.8 cm. No cortical thinning. No loss of corticomedullary distinction. Resistive index measures 0.76.  5.3 and 3.7 and 1 cm simple cyst.  1.2 cm cyst which may be minimally complex without increased through transmission but appearing anechoic and most likely simple cysts.  No renal stone. No hydronephrosis.     Left kidney: The left kidney measures 9.3 cm. No cortical thinning. No loss of corticomedullary distinction. Resistive index measures 0.71.  Numerous cysts including 14 cm and 3.7 cm cyst.  A few echogenic foci measuring 5-15 mm suggesting stones.  Complex structure appearing cephalad or projecting cephalad from the left kidney measuring 7.3 x 4.9 x 4.8 cm.  It was measured at 5.1 x 4.5 x 4.5 cm on the prior exam.  No hydronephrosis.     The bladder is partially distended at the time of scanning and has an unremarkable appearance.     Impression:  Multiple simple bilateral renal cysts.  Left renal stones.  7.3 cm complex structure cephalad to the left kidney was measured at 5.1 cm on prior study images 03/02/2023 and corresponds to findings seen on more recent CT abdomen of 10/16/2023. Better demonstrated on the CT and please refer to that report.       Assessment & Plan:       Complicated left renal abscess in the setting of large staghorn stone status post 2 IR guided drainage is 11/2/2023 & 01/12/2024, s/p 4 weeks of ceftriaxone - stable  Prior cell counts c/w abscess, cultures 11/2/23 & 1/12/24 Proteus mirabilis pan-sensitive   Discussed with Urology, we will continue to monitor off antibiotics for now   Alarm symptoms given to the patient and family   Plan to repeat CT scan in 3 months   RTC in 3 months    CKD not on HD     PMHx: diabetes, COPD, CHF   Follow pulmonary and PCP outpatient        Elevated blood sugar    Stalucreciaorn  calculus    Osteoporosis, postmenopausal    Type 2 diabetes mellitus with diabetic nephropathy, without long-term current use of insulin    Low back pain, non-specific    Bacterial infection due to Proteus mirabilis          This note was created using Dragon voice recognition software that occasionally misinterpreted phrases or words.

## 2024-05-03 ENCOUNTER — HOSPITAL ENCOUNTER (OUTPATIENT)
Dept: RADIOLOGY | Facility: HOSPITAL | Age: 84
Discharge: HOME | End: 2024-05-03
Attending: ORTHOPAEDIC SURGERY
Payer: MEDICARE

## 2024-05-03 ENCOUNTER — OFFICE VISIT (OUTPATIENT)
Dept: ORTHOPEDICS | Facility: CLINIC | Age: 84
End: 2024-05-03
Payer: MEDICARE

## 2024-05-03 VITALS — RESPIRATION RATE: 16 BRPM | BODY MASS INDEX: 31.12 KG/M2 | HEIGHT: 69 IN | WEIGHT: 210.13 LBS
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
## 2024-05-03 DIAGNOSIS — M19.011 ARTHRITIS OF RIGHT SHOULDER REGION: ICD-10-CM

## 2024-05-03 PROCEDURE — 73502 X-RAY EXAM HIP UNI 2-3 VIEWS: CPT | Mod: TC,PO,RT

## 2024-05-03 PROCEDURE — 99999 PR PBB SHADOW E&M-EST. PATIENT-LVL III: CPT | Mod: PBBFAC,,, | Performed by: ORTHOPAEDIC SURGERY

## 2024-05-03 PROCEDURE — 99214 OFFICE O/P EST MOD 30 MIN: CPT | Mod: 25,S$GLB,, | Performed by: ORTHOPAEDIC SURGERY

## 2024-05-03 PROCEDURE — 1159F MED LIST DOCD IN RCRD: CPT | Mod: CPTII,S$GLB,, | Performed by: ORTHOPAEDIC SURGERY

## 2024-05-03 PROCEDURE — 1100F PTFALLS ASSESS-DOCD GE2>/YR: CPT | Mod: CPTII,S$GLB,, | Performed by: ORTHOPAEDIC SURGERY

## 2024-05-03 PROCEDURE — 20610 DRAIN/INJ JOINT/BURSA W/O US: CPT | Mod: RT,S$GLB,, | Performed by: ORTHOPAEDIC SURGERY

## 2024-05-03 PROCEDURE — 73030 X-RAY EXAM OF SHOULDER: CPT | Mod: 26,RT,, | Performed by: RADIOLOGY

## 2024-05-03 PROCEDURE — 73502 X-RAY EXAM HIP UNI 2-3 VIEWS: CPT | Mod: 26,RT,, | Performed by: RADIOLOGY

## 2024-05-03 PROCEDURE — 73030 X-RAY EXAM OF SHOULDER: CPT | Mod: TC,PO,RT

## 2024-05-03 PROCEDURE — 3288F FALL RISK ASSESSMENT DOCD: CPT | Mod: CPTII,S$GLB,, | Performed by: ORTHOPAEDIC SURGERY

## 2024-05-03 RX ORDER — VIBEGRON 75 MG/1
TABLET, FILM COATED ORAL
COMMUNITY
Start: 2024-04-17

## 2024-05-03 RX ORDER — TRIAMCINOLONE ACETONIDE 40 MG/ML
40 INJECTION, SUSPENSION INTRA-ARTICULAR; INTRAMUSCULAR
Status: DISCONTINUED | OUTPATIENT
Start: 2024-05-03 | End: 2024-05-03 | Stop reason: HOSPADM

## 2024-05-03 RX ADMIN — TRIAMCINOLONE ACETONIDE 40 MG: 40 INJECTION, SUSPENSION INTRA-ARTICULAR; INTRAMUSCULAR at 10:05

## 2024-05-03 NOTE — PROCEDURES
Large Joint Aspiration/Injection: R subacromial bursa    Date/Time: 5/3/2024 10:45 AM    Performed by: Dustin Solorzano MD  Authorized by: Dustin Solorzano MD    Consent Done?:  Yes (Verbal)  Indications:  Pain  Site marked: the procedure site was marked    Timeout: prior to procedure the correct patient, procedure, and site was verified    Local anesthetic: Ropivicaine.  Anesthetic total (ml):  3      Details:  Needle Size:  22 G  Ultrasonic Guidance for needle placement?: No    Approach:  Posterior  Location:  Shoulder  Site:  R subacromial bursa  Medications:  40 mg triamcinolone acetonide 40 mg/mL  Patient tolerance:  Patient tolerated the procedure well with no immediate complications

## 2024-05-03 NOTE — PROGRESS NOTES
Patient ID: Jo Alegre is a 83 y.o. female    Chief Complaint:   Chief Complaint   Patient presents with    Right Shoulder - Pain, Injury    Right Hip - Injury, Pain    Fall     Recent fall right side       History of Present Illness:    Pleasant 83-year-old female with history atrial fibrillation, CHF, diabetes and actively being treated for a kidney infection with IV antibiotics -- who is here for evaluation of right shoulder pain.  She reports worsening pain over the past 4 months or so.  Reports crepitus of the right shoulder and difficulty with overhead function.  No pain at rest.  Pain with nighttime symptoms and overhead function.    ________________________________________________________________    Interval history 05/03/2024 : patient returns today for follow up of her right shoulder.  Has known DJD of the right shoulder.  Here inquiring about right shoulder injection now that she has completed her IV antibiotics.  Reports 8/10 shoulder pain.  Also reports a fall while in [place] onto her right side.  She would bruising and ecchymosis of the right shoulder as well as right hip pain.  She also is status post right hip intramedullary nailing several years ago.  Wanted this evaluated as well    PAST MEDICAL HISTORY:   Past Medical History:   Diagnosis Date    Allergy     Codeine, Lasix    Atrial fibrillation     Atrial fibrillation     Cataract     CHF (congestive heart failure)     Diabetes mellitus, type 2     Ejection fraction < 50% 10/18/2017    Approximately 35%  Based on prior  Echocardiogram.    Encounter for blood transfusion     Hyperlipidemia     Osteoporosis     PONV (postoperative nausea and vomiting)     Thyroid disorder screening 10/17/2017    TSH of 1.12 ordered by Dr. dee Jones     PAST SURGICAL HISTORY:   Past Surgical History:   Procedure Laterality Date    ANTEGRADE NEPHROSTOGRAPHY Left 8/25/2022    Procedure: NEPHROSTOGRAM, ANTEGRADE;  Surgeon: Shelby Parra MD;  Location:  [facility] OR;  Service: Urology;  Laterality: Left;    CHOLECYSTECTOMY  1997    [place]     COLONOSCOPY      CYSTOSCOPY Left 3/8/2024    Procedure: CYSTOSCOPY;  Surgeon: Shelby Parra MD;  Location: [facility] OR;  Service: Urology;  Laterality: Left;    CYSTOSCOPY W/ URETERAL STENT PLACEMENT Left 7/17/2022    Procedure: CYSTOSCOPY, WITH URETERAL STENT INSERTION;  Surgeon: Shelby Parra MD;  Location: [facility] OR;  Service: Urology;  Laterality: Left;    CYSTOSCOPY W/ URETERAL STENT REMOVAL Left 9/21/2022    Procedure: CYSTOSCOPY, WITH URETERAL STENT REMOVAL;  Surgeon: Shelby Parra MD;  Location: [facility] OR;  Service: Urology;  Laterality: Left;    CYSTOSCOPY WITH URETEROSCOPY, RETROGRADE PYELOGRAPHY, AND INSERTION OF STENT Left 8/25/2022    Procedure: CYSTOSCOPY, WITH RETROGRADE PYELOGRAM AND URETERAL STENT INSERTION;  Surgeon: Shelby Parra MD;  Location: [facility] OR;  Service: Urology;  Laterality: Left;    EYE SURGERY      bilateral cataracts    FINGER SURGERY Right 2021    right pinky finger    FLEXIBLE CYSTOSCOPY Left 8/25/2022    Procedure: CYSTOSCOPY, FLEXIBLE WITH STENT REMOVAL;  Surgeon: Shelby Parra MD;  Location: [facility] OR;  Service: Urology;  Laterality: Left;    FRACTURE SURGERY  2014    right femur with neville    HEMORRHOID SURGERY      48 yrs ago    HYSTERECTOMY      NEPHROSTOMY Left 8/25/2022    Procedure: CREATION, NEPHROSTOMY;  Surgeon: Shelby Parra MD;  Location: [facility] OR;  Service: Urology;  Laterality: Left;    PERCUTANEOUS NEPHROLITHOTOMY N/A 8/25/2022    Procedure: NEPHROLITHOTOMY, PERCUTANEOUS;  Surgeon: Shelby Parra MD;  Location: [facility] OR;  Service: Urology;  Laterality: N/A;    REPLACEMENT OF IMPLANTABLE CARDIOVERTER-DEFIBRILLATOR (ICD) GENERATOR N/A 12/13/2019    Procedure: REPLACEMENT, PULSE GENERATOR, ICD-MEDTRONIC;  Surgeon: Sebastian Nowak III, MD;  Location: [facility] CATH;  Service: Cardiology;  Laterality: N/A;    RETROGRADE PYELOGRAPHY N/A 3/8/2024    Procedure:  PYELOGRAM, RETROGRADE;  Surgeon: Shelby Parra MD;  Location: [facility];  Service: Urology;  Laterality: N/A;    TONSILLECTOMY       FAMILY HISTORY:   Family History   Problem Relation Name Age of Onset    Heart disease Mother Jannette     Cancer Father Branden         Lung Cancer ??? Asbestos    Breast cancer Paternal Aunt       SOCIAL HISTORY:   Social History     Occupational History    Occupation:    Tobacco Use    Smoking status: Former     Passive exposure: Past    Smokeless tobacco: Never    Tobacco comments:     quit 2013   Substance and Sexual Activity    Alcohol use: No    Drug use: No    Sexual activity: Not Currently        MEDICATIONS:   Current Outpatient Medications:     amiodarone (PACERONE) 200 MG Tab, Take 1 tablet (200 mg total) by mouth once daily., Disp: 30 tablet, Rfl: 0    atorvastatin (LIPITOR) 20 MG tablet, Take 20 mg by mouth every evening., Disp: , Rfl:     bumetanide (BUMEX) 1 MG tablet, once daily., Disp: , Rfl:     Ca-D3-mag ox-zinc--thom-bor 600 mg calcium- 20 mcg-50 mg Tab, Take 1 tablet by mouth 2 (two) times daily., Disp: , Rfl:     cetirizine (ZYRTEC) 10 MG tablet, Take 10 mg by mouth every evening., Disp: , Rfl:     cholecalciferol, vitamin D3, 125 mcg (5,000 unit) Tab, Take 5,000 Units by mouth 2 (two) times daily., Disp: , Rfl:     digoxin (LANOXIN) 125 mcg tablet, Take 1 tablet (0.125 mg total) by mouth once daily., Disp: 30 tablet, Rfl: 0    dorzolamide-timolol 2-0.5% (COSOPT) 22.3-6.8 mg/mL ophthalmic solution, Place 1 drop into both eyes 2 (two) times daily., Disp: , Rfl:     fluticasone propionate (FLONASE) 50 mcg/actuation nasal spray, 2 sprays (100 mcg total) by Each Nostril route once daily., Disp: 16 g, Rfl: 5    gabapentin (NEURONTIN) 100 MG capsule, TAKE 2 CAPSULES(200 MG) BY MOUTH THREE TIMES DAILY, Disp: 180 capsule, Rfl: 5    GEMTESA 75 mg Tab, Take by mouth., Disp: , Rfl:     glimepiride (AMARYL) 1 MG tablet, Take 1 tablet (1 mg total) by mouth before  breakfast., Disp: 90 tablet, Rfl: 1    latanoprost 0.005 % ophthalmic solution, Place 1 drop into both eyes nightly., Disp: , Rfl:     midodrine (PROAMATINE) 5 MG Tab, Take 1 tablet (5 mg total) by mouth 3 (three) times daily before meals. (Patient taking differently: Take 5 mg by mouth 3 (three) times daily before meals. States only takes as needed), Disp: 90 tablet, Rfl: 0    mirabegron (MYRBETRIQ) 25 mg Tb24 ER tablet, Take 1 tablet (25 mg total) by mouth once daily., Disp: 30 tablet, Rfl: 11    pantoprazole (PROTONIX) 40 MG tablet, TAKE 1 TABLET(40 MG) BY MOUTH EVERY DAY, Disp: 30 tablet, Rfl: 5    rivaroxaban (XARELTO) 15 mg Tab, Take 15 mg by mouth daily with dinner or evening meal., Disp: , Rfl:     sacubitriL-valsartan (ENTRESTO) 24-26 mg per tablet, Take 1 tablet by mouth 2 (two) times daily. 1/2 tab PO BID, Disp: 30 tablet, Rfl: 0    vericiguat (VERQUVO) 5 mg Tab, Take 2.5 mg by mouth 2 (two) times a day., Disp: 30 tablet, Rfl: 0  No current facility-administered medications for this visit.    Facility-Administered Medications Ordered in Other Visits:     0.9%  NaCl infusion, , Intravenous, Continuous, Sebastian Nowak III, MD, Last Rate: 75 mL/hr at 12/13/19 0728, New Bag at 12/13/19 0728    diphenhydrAMINE injection 25 mg, 25 mg, Intravenous, Once, Sebastian Nowak III, MD    lorazepam injection 1 mg, 1 mg, Intravenous, Once, Sebastian Nowak III, MD  ALLERGIES:   Review of patient's allergies indicates:   Allergen Reactions    Codeine Other (See Comments)     nausea    Hydrocodone Nausea And Vomiting    Lasix [furosemide]      rash         Physical Exam     Vitals:    05/03/24 1040   Resp: 16     Alert and oriented to person, place and time. No acute distress. Well-groomed, not ill appearing. Pupils round and reactive, normal respiratory effort, no audible wheezing.     On exam she has crepitus of the right shoulder.  Moderate bruising.  External rotation at the side 30° with positive drop-arm test.  Pain  with passive forward flexion and abduction.  Positive pseudo paralysis.  Neurovascularly intact.  Negative Spurling's.  Range of motion of the right hip intact.  No crepitus.  Tenderness to palpation of the trochanteric bursa      Imaging:       X-Ray: I have reviewed all pertinent results/findings and my personal findings are:  Moderate to severe DJD of the right glenohumeral joint with high-riding humeral head.  Status post right hip intramedullary nailing without complication      Assessment & Plan    Fall, initial encounter  -     X-Ray Hip 2 or 3 views Right (with Pelvis when performed); Future; Expected date: 05/03/2024  -     X-Ray Shoulder Trauma 3 view Right; Future; Expected date: 05/03/2024    Arthritis of right shoulder region         Treatment options were discussed with the patient and her family at bedside.  She has fairly advanced DJD of the right shoulder with rotator cuff arthropathy.  We discussed multiple treatment options.  She has not interested in surgical intervention at this time.  She is inquiring about right shoulder injection.  This was well tolerated in the office today.  In regards to her right hip I do not appreciate any periprosthetic fracture.  Likely bone contusion or trochanteric bursitis status post fall.  Previous intramedullary nail to the right hip.

## 2024-05-07 ENCOUNTER — EXTERNAL HOME HEALTH (OUTPATIENT)
Dept: HOME HEALTH SERVICES | Facility: HOSPITAL | Age: 84
End: 2024-05-07
Payer: MEDICARE

## 2024-05-09 ENCOUNTER — PATIENT MESSAGE (OUTPATIENT)
Dept: FAMILY MEDICINE | Facility: CLINIC | Age: 84
End: 2024-05-09
Payer: MEDICARE

## 2024-05-24 ENCOUNTER — DOCUMENT SCAN (OUTPATIENT)
Dept: HOME HEALTH SERVICES | Facility: HOSPITAL | Age: 84
End: 2024-05-24
Payer: MEDICARE

## 2024-05-25 ENCOUNTER — HOSPITAL ENCOUNTER (EMERGENCY)
Facility: HOSPITAL | Age: 84
Discharge: HOME | End: 2024-05-26
Attending: EMERGENCY MEDICINE
Payer: MEDICARE

## 2024-05-25 DIAGNOSIS — R31.9 URINARY TRACT INFECTION WITH HEMATURIA, SITE UNSPECIFIED: Primary | ICD-10-CM

## 2024-05-25 DIAGNOSIS — N39.0 URINARY TRACT INFECTION WITH HEMATURIA, SITE UNSPECIFIED: Primary | ICD-10-CM

## 2024-05-25 LAB
ALBUMIN SERPL BCP-MCNC: 4.1 G/DL (ref 3.5–5.2)
ALP SERPL-CCNC: 161 U/L (ref 55–135)
ALT SERPL W/O P-5'-P-CCNC: 9 U/L (ref 10–44)
ANION GAP SERPL CALC-SCNC: 9 MMOL/L (ref 8–16)
AST SERPL-CCNC: 12 U/L (ref 10–40)
BACTERIA #/AREA URNS HPF: ABNORMAL /HPF
BASOPHILS # BLD AUTO: 0.02 K/UL (ref 0–0.2)
BASOPHILS NFR BLD: 0.3 % (ref 0–1.9)
BILIRUB SERPL-MCNC: 0.6 MG/DL (ref 0.1–1)
BILIRUB UR QL STRIP: NEGATIVE
BUN SERPL-MCNC: 32 MG/DL (ref 8–23)
CALCIUM SERPL-MCNC: 9.7 MG/DL (ref 8.7–10.5)
CHLORIDE SERPL-SCNC: 100 MMOL/L (ref 95–110)
CLARITY UR: ABNORMAL
CO2 SERPL-SCNC: 29 MMOL/L (ref 23–29)
COLOR UR: YELLOW
CREAT SERPL-MCNC: 2.1 MG/DL (ref 0.5–1.4)
DIFFERENTIAL METHOD BLD: ABNORMAL
EOSINOPHIL # BLD AUTO: 0 K/UL (ref 0–0.5)
EOSINOPHIL NFR BLD: 0.4 % (ref 0–8)
ERYTHROCYTE [DISTWIDTH] IN BLOOD BY AUTOMATED COUNT: 13.7 % (ref 11.5–14.5)
EST. GFR  (NO RACE VARIABLE): 23.1 ML/MIN/1.73 M^2
GLUCOSE SERPL-MCNC: 72 MG/DL (ref 70–110)
GLUCOSE UR QL STRIP: NEGATIVE
HCT VFR BLD AUTO: 40.5 % (ref 37–48.5)
HGB BLD-MCNC: 12.9 G/DL (ref 12–16)
HGB UR QL STRIP: ABNORMAL
HYALINE CASTS #/AREA URNS LPF: 1 /LPF
IMM GRANULOCYTES # BLD AUTO: 0.02 K/UL (ref 0–0.04)
IMM GRANULOCYTES NFR BLD AUTO: 0.3 % (ref 0–0.5)
KETONES UR QL STRIP: NEGATIVE
LEUKOCYTE ESTERASE UR QL STRIP: ABNORMAL
LYMPHOCYTES # BLD AUTO: 1.3 K/UL (ref 1–4.8)
LYMPHOCYTES NFR BLD: 17 % (ref 18–48)
MCH RBC QN AUTO: 30.9 PG (ref 27–31)
MCHC RBC AUTO-ENTMCNC: 31.9 G/DL (ref 32–36)
MCV RBC AUTO: 97 FL (ref 82–98)
MICROSCOPIC COMMENT: ABNORMAL
MONOCYTES # BLD AUTO: 1 K/UL (ref 0.3–1)
MONOCYTES NFR BLD: 12.2 % (ref 4–15)
NEUTROPHILS # BLD AUTO: 5.4 K/UL (ref 1.8–7.7)
NEUTROPHILS NFR BLD: 69.8 % (ref 38–73)
NITRITE UR QL STRIP: NEGATIVE
NRBC BLD-RTO: 0 /100 WBC
PH UR STRIP: 5 [PH] (ref 5–8)
PLATELET # BLD AUTO: 214 K/UL (ref 150–450)
PMV BLD AUTO: 9.9 FL (ref 9.2–12.9)
POTASSIUM SERPL-SCNC: 4.1 MMOL/L (ref 3.5–5.1)
PROT SERPL-MCNC: 7.2 G/DL (ref 6–8.4)
PROT UR QL STRIP: ABNORMAL
RBC # BLD AUTO: 4.17 M/UL (ref 4–5.4)
RBC #/AREA URNS HPF: 4 /HPF (ref 0–4)
SODIUM SERPL-SCNC: 138 MMOL/L (ref 136–145)
SP GR UR STRIP: 1.02 (ref 1–1.03)
SQUAMOUS #/AREA URNS HPF: 3 /HPF
UNIDENT CRYS URNS QL MICRO: 2
URN SPEC COLLECT METH UR: ABNORMAL
UROBILINOGEN UR STRIP-ACNC: NEGATIVE EU/DL
WBC # BLD AUTO: 7.76 K/UL (ref 3.9–12.7)
WBC #/AREA URNS HPF: 66 /HPF (ref 0–5)

## 2024-05-25 PROCEDURE — 25000003 PHARM REV CODE 250: Performed by: EMERGENCY MEDICINE

## 2024-05-25 PROCEDURE — 63600175 PHARM REV CODE 636 W HCPCS: Performed by: EMERGENCY MEDICINE

## 2024-05-25 PROCEDURE — 85025 COMPLETE CBC W/AUTO DIFF WBC: CPT | Performed by: EMERGENCY MEDICINE

## 2024-05-25 PROCEDURE — 81001 URINALYSIS AUTO W/SCOPE: CPT | Performed by: PHYSICIAN ASSISTANT

## 2024-05-25 PROCEDURE — 96365 THER/PROPH/DIAG IV INF INIT: CPT

## 2024-05-25 PROCEDURE — 87186 SC STD MICRODIL/AGAR DIL: CPT | Performed by: PHYSICIAN ASSISTANT

## 2024-05-25 PROCEDURE — 99284 EMERGENCY DEPT VISIT MOD MDM: CPT | Mod: 25

## 2024-05-25 PROCEDURE — 87086 URINE CULTURE/COLONY COUNT: CPT | Performed by: PHYSICIAN ASSISTANT

## 2024-05-25 PROCEDURE — 80053 COMPREHEN METABOLIC PANEL: CPT | Performed by: EMERGENCY MEDICINE

## 2024-05-25 RX ADMIN — CEFTRIAXONE SODIUM 1 G: 1 INJECTION, POWDER, FOR SOLUTION INTRAMUSCULAR; INTRAVENOUS at 09:05

## 2024-05-26 VITALS
RESPIRATION RATE: 16 BRPM | OXYGEN SATURATION: 96 % | SYSTOLIC BLOOD PRESSURE: 110 MMHG | DIASTOLIC BLOOD PRESSURE: 64 MMHG | WEIGHT: 202 LBS | TEMPERATURE: 99 F | BODY MASS INDEX: 29.83 KG/M2 | HEART RATE: 80 BPM

## 2024-05-26 PROBLEM — Z29.9 ENCOUNTER FOR DEEP VEIN THROMBOSIS (DVT) PROPHYLAXIS: Status: ACTIVE | Noted: 2019-08-15

## 2024-05-26 LAB — GLUCOSE SERPL-MCNC: 109 MG/DL (ref 70–110)

## 2024-05-26 PROCEDURE — 82962 GLUCOSE BLOOD TEST: CPT

## 2024-05-26 RX ORDER — ATORVASTATIN CALCIUM 20 MG/1
20 TABLET, FILM COATED ORAL NIGHTLY
Status: DISCONTINUED | OUTPATIENT
Start: 2024-05-26 | End: 2024-05-26 | Stop reason: HOSPADM

## 2024-05-26 RX ORDER — FLUTICASONE PROPIONATE 50 MCG
2 SPRAY, SUSPENSION (ML) NASAL DAILY
Status: DISCONTINUED | OUTPATIENT
Start: 2024-05-26 | End: 2024-05-26 | Stop reason: HOSPADM

## 2024-05-26 RX ORDER — GLUCAGON 1 MG
1 KIT INJECTION
Status: DISCONTINUED | OUTPATIENT
Start: 2024-05-26 | End: 2024-05-26 | Stop reason: HOSPADM

## 2024-05-26 RX ORDER — AMIODARONE HYDROCHLORIDE 200 MG/1
200 TABLET ORAL DAILY
Status: DISCONTINUED | OUTPATIENT
Start: 2024-05-26 | End: 2024-05-26 | Stop reason: HOSPADM

## 2024-05-26 RX ORDER — LEVOFLOXACIN 750 MG/1
750 TABLET ORAL DAILY
Qty: 5 TABLET | Refills: 0 | Status: SHIPPED | OUTPATIENT
Start: 2024-05-26 | End: 2024-05-31

## 2024-05-26 RX ORDER — IBUPROFEN 200 MG
16 TABLET ORAL
Status: DISCONTINUED | OUTPATIENT
Start: 2024-05-26 | End: 2024-05-26 | Stop reason: HOSPADM

## 2024-05-26 RX ORDER — DORZOLAMIDE HYDROCHLORIDE AND TIMOLOL MALEATE 20; 5 MG/ML; MG/ML
1 SOLUTION/ DROPS OPHTHALMIC 2 TIMES DAILY
Status: DISCONTINUED | OUTPATIENT
Start: 2024-05-26 | End: 2024-05-26 | Stop reason: HOSPADM

## 2024-05-26 RX ORDER — CEPHALEXIN 500 MG/1
500 CAPSULE ORAL 4 TIMES DAILY
Qty: 40 CAPSULE | Refills: 0 | Status: SHIPPED | OUTPATIENT
Start: 2024-05-26 | End: 2024-05-26 | Stop reason: CLARIF

## 2024-05-26 RX ORDER — INSULIN ASPART 100 [IU]/ML
0-5 INJECTION, SOLUTION INTRAVENOUS; SUBCUTANEOUS
Status: DISCONTINUED | OUTPATIENT
Start: 2024-05-26 | End: 2024-05-26 | Stop reason: HOSPADM

## 2024-05-26 RX ORDER — SODIUM CHLORIDE, SODIUM LACTATE, POTASSIUM CHLORIDE, CALCIUM CHLORIDE 600; 310; 30; 20 MG/100ML; MG/100ML; MG/100ML; MG/100ML
INJECTION, SOLUTION INTRAVENOUS CONTINUOUS
Status: DISCONTINUED | OUTPATIENT
Start: 2024-05-26 | End: 2024-05-26 | Stop reason: HOSPADM

## 2024-05-26 RX ORDER — IBUPROFEN 200 MG
24 TABLET ORAL
Status: DISCONTINUED | OUTPATIENT
Start: 2024-05-26 | End: 2024-05-26 | Stop reason: HOSPADM

## 2024-05-26 RX ORDER — LATANOPROST 50 UG/ML
1 SOLUTION/ DROPS OPHTHALMIC NIGHTLY
Status: DISCONTINUED | OUTPATIENT
Start: 2024-05-26 | End: 2024-05-26 | Stop reason: HOSPADM

## 2024-05-26 RX ORDER — DIGOXIN 125 MCG
0.12 TABLET ORAL DAILY
Status: DISCONTINUED | OUTPATIENT
Start: 2024-05-26 | End: 2024-05-26 | Stop reason: HOSPADM

## 2024-05-26 RX ORDER — OXYBUTYNIN CHLORIDE 5 MG/1
5 TABLET, EXTENDED RELEASE ORAL DAILY
Status: DISCONTINUED | OUTPATIENT
Start: 2024-05-26 | End: 2024-05-26 | Stop reason: HOSPADM

## 2024-05-26 RX ORDER — PANTOPRAZOLE SODIUM 40 MG/1
40 TABLET, DELAYED RELEASE ORAL
Status: DISCONTINUED | OUTPATIENT
Start: 2024-05-26 | End: 2024-05-26 | Stop reason: HOSPADM

## 2024-05-26 NOTE — SUBJECTIVE & OBJECTIVE
Past Medical History:   Diagnosis Date    Allergy     Codeine, Lasix    Atrial fibrillation     Atrial fibrillation     Cataract     CHF (congestive heart failure)     Diabetes mellitus, type 2     Ejection fraction < 50% 10/18/2017    Approximately 35%  Based on prior  Echocardiogram.    Encounter for blood transfusion     Hyperlipidemia     Osteoporosis     PONV (postoperative nausea and vomiting)     Thyroid disorder screening 10/17/2017    TSH of 1.12 ordered by Dr. dee Jones       Past Surgical History:   Procedure Laterality Date    ANTEGRADE NEPHROSTOGRAPHY Left 8/25/2022    Procedure: NEPHROSTOGRAM, ANTEGRADE;  Surgeon: Shelby Parra MD;  Location: [facility] OR;  Service: Urology;  Laterality: Left;    CHOLECYSTECTOMY  1997    [place]     COLONOSCOPY      CYSTOSCOPY Left 3/8/2024    Procedure: CYSTOSCOPY;  Surgeon: Shelby Parra MD;  Location: [facility] OR;  Service: Urology;  Laterality: Left;    CYSTOSCOPY W/ URETERAL STENT PLACEMENT Left 7/17/2022    Procedure: CYSTOSCOPY, WITH URETERAL STENT INSERTION;  Surgeon: Shelby Parra MD;  Location: [facility] OR;  Service: Urology;  Laterality: Left;    CYSTOSCOPY W/ URETERAL STENT REMOVAL Left 9/21/2022    Procedure: CYSTOSCOPY, WITH URETERAL STENT REMOVAL;  Surgeon: Shelby Parra MD;  Location: [facility] OR;  Service: Urology;  Laterality: Left;    CYSTOSCOPY WITH URETEROSCOPY, RETROGRADE PYELOGRAPHY, AND INSERTION OF STENT Left 8/25/2022    Procedure: CYSTOSCOPY, WITH RETROGRADE PYELOGRAM AND URETERAL STENT INSERTION;  Surgeon: Shelby Parra MD;  Location: [facility] OR;  Service: Urology;  Laterality: Left;    EYE SURGERY      bilateral cataracts    FINGER SURGERY Right 2021    right pinky finger    FLEXIBLE CYSTOSCOPY Left 8/25/2022    Procedure: CYSTOSCOPY, FLEXIBLE WITH STENT REMOVAL;  Surgeon: Shelby Parra MD;  Location: [facility] OR;  Service: Urology;  Laterality: Left;    FRACTURE SURGERY  2014    right femur with neville    HEMORRHOID SURGERY       48 yrs ago    HYSTERECTOMY      NEPHROSTOMY Left 8/25/2022    Procedure: CREATION, NEPHROSTOMY;  Surgeon: Shelby Parra MD;  Location: [facility] OR;  Service: Urology;  Laterality: Left;    PERCUTANEOUS NEPHROLITHOTOMY N/A 8/25/2022    Procedure: NEPHROLITHOTOMY, PERCUTANEOUS;  Surgeon: Shelby Parra MD;  Location: [facility] OR;  Service: Urology;  Laterality: N/A;    REPLACEMENT OF IMPLANTABLE CARDIOVERTER-DEFIBRILLATOR (ICD) GENERATOR N/A 12/13/2019    Procedure: REPLACEMENT, PULSE GENERATOR, ICD-MEDTRONIC;  Surgeon: Sebastian Nowak III, MD;  [facility];  Service: Cardiology;  Laterality: N/A;    RETROGRADE PYELOGRAPHY N/A 3/8/2024    Procedure: PYELOGRAM, RETROGRADE;  Surgeon: Shelby Parra MD;  Location: [facility] OR;  Service: Urology;  Laterality: N/A;    TONSILLECTOMY         Review of patient's allergies indicates:   Allergen Reactions    Codeine Other (See Comments)     nausea    Hydrocodone Nausea And Vomiting    Lasix [furosemide]      rash       Current Facility-Administered Medications on File Prior to Encounter   Medication    0.9%  NaCl infusion    diphenhydrAMINE injection 25 mg    lorazepam injection 1 mg     Current Outpatient Medications on File Prior to Encounter   Medication Sig    amiodarone (PACERONE) 200 MG Tab Take 1 tablet (200 mg total) by mouth once daily.    atorvastatin (LIPITOR) 20 MG tablet Take 20 mg by mouth every evening.    bumetanide (BUMEX) 1 MG tablet once daily.    Ca-D3-mag ox-zinc--thom-bor 600 mg calcium- 20 mcg-50 mg Tab Take 1 tablet by mouth 2 (two) times daily.    cetirizine (ZYRTEC) 10 MG tablet Take 10 mg by mouth every evening.    cholecalciferol, vitamin D3, 125 mcg (5,000 unit) Tab Take 5,000 Units by mouth 2 (two) times daily.    digoxin (LANOXIN) 125 mcg tablet Take 1 tablet (0.125 mg total) by mouth once daily.    dorzolamide-timolol 2-0.5% (COSOPT) 22.3-6.8 mg/mL ophthalmic solution Place 1 drop into both eyes 2 (two) times daily.     fluticasone propionate (FLONASE) 50 mcg/actuation nasal spray 2 sprays (100 mcg total) by Each Nostril route once daily.    gabapentin (NEURONTIN) 100 MG capsule TAKE 2 CAPSULES(200 MG) BY MOUTH THREE TIMES DAILY    GEMTESA 75 mg Tab Take by mouth.    glimepiride (AMARYL) 1 MG tablet Take 1 tablet (1 mg total) by mouth before breakfast.    latanoprost 0.005 % ophthalmic solution Place 1 drop into both eyes nightly.    midodrine (PROAMATINE) 5 MG Tab Take 1 tablet (5 mg total) by mouth 3 (three) times daily before meals. (Patient taking differently: Take 5 mg by mouth 3 (three) times daily before meals. States only takes as needed)    mirabegron (MYRBETRIQ) 25 mg Tb24 ER tablet Take 1 tablet (25 mg total) by mouth once daily.    pantoprazole (PROTONIX) 40 MG tablet TAKE 1 TABLET(40 MG) BY MOUTH EVERY DAY    rivaroxaban (XARELTO) 15 mg Tab Take 15 mg by mouth daily with dinner or evening meal.    sacubitriL-valsartan (ENTRESTO) 24-26 mg per tablet Take 1 tablet by mouth 2 (two) times daily. 1/2 tab PO BID    vericiguat (VERQUVO) 5 mg Tab Take 2.5 mg by mouth 2 (two) times a day.     Family History       Problem Relation (Age of Onset)    Breast cancer Paternal Aunt    Cancer Father    Heart disease Mother          Tobacco Use    Smoking status: Former     Passive exposure: Past    Smokeless tobacco: Never    Tobacco comments:     quit 2013   Substance and Sexual Activity    Alcohol use: No    Drug use: No    Sexual activity: Not Currently     Review of Systems   Constitutional:  Positive for fatigue and fever.   HENT:  Negative for congestion.    Respiratory:  Negative for shortness of breath and wheezing.    Cardiovascular:  Negative for chest pain and leg swelling.   Gastrointestinal:  Negative for abdominal distention, abdominal pain and constipation.   Genitourinary:  Positive for dysuria, flank pain and frequency.   Musculoskeletal:  Negative for arthralgias.   Neurological:  Negative for dizziness.    Psychiatric/Behavioral:  Negative for agitation.    All other systems reviewed and are negative.    Objective:     Vital Signs (Most Recent):  Temp: 98.2 °F (36.8 °C) (05/25/24 1931)  Pulse: 79 (05/25/24 1931)  Resp: 16 (05/25/24 1931)  BP: 106/63 (05/25/24 1931)  SpO2: 95 % (05/25/24 1931) Vital Signs (24h Range):  Temp:  [98.2 °F (36.8 °C)] 98.2 °F (36.8 °C)  Pulse:  [79] 79  Resp:  [16] 16  SpO2:  [95 %] 95 %  BP: (106)/(63) 106/63     Weight: 91.6 kg (202 lb)  Body mass index is 29.83 kg/m².     Physical Exam  Vitals and nursing note reviewed.   Constitutional:       General: She is not in acute distress.  HENT:      Head: Normocephalic and atraumatic.      Nose: Nose normal. No congestion.      Mouth/Throat:      Mouth: Mucous membranes are moist.   Eyes:      General: No scleral icterus.     Extraocular Movements: Extraocular movements intact.      Conjunctiva/sclera: Conjunctivae normal.   Cardiovascular:      Rate and Rhythm: Normal rate.      Heart sounds:      No gallop.   Pulmonary:      Effort: Pulmonary effort is normal.      Breath sounds: Normal breath sounds.   Abdominal:      General: Bowel sounds are normal.      Tenderness: There is abdominal tenderness (mild TTP over both flanks). There is no guarding or rebound.   Musculoskeletal:         General: No swelling.      Cervical back: Neck supple. No rigidity.      Right lower leg: No edema.      Left lower leg: No edema.   Neurological:      General: No focal deficit present.      Mental Status: She is alert and oriented to person, place, and time.   Psychiatric:         Mood and Affect: Mood normal.         Behavior: Behavior normal.          Significant Labs: All pertinent labs within the past 24 hours have been reviewed.  CBC:   Recent Labs   Lab 05/25/24 2153   WBC 7.76   HGB 12.9   HCT 40.5        CMP:   Recent Labs   Lab 05/25/24 2153      K 4.1      CO2 29   GLU 72   BUN 32*   CREATININE 2.1*   CALCIUM 9.7   PROT 7.2    ALBUMIN 4.1   BILITOT 0.6   ALKPHOS 161*   AST 12   ALT 9*   ANIONGAP 9       Significant Imaging: I have reviewed all pertinent imaging results/findings within the past 24 hours.      Current Facility-Administered Medications:     amiodarone tablet 200 mg, 200 mg, Oral, Daily    atorvastatin tablet 20 mg, 20 mg, Oral, QHS    cefTRIAXone (ROCEPHIN) 1 g in dextrose 5 % 100 mL IVPB (ready to mix), 1 g, Intravenous, Q24H    digoxin tablet 0.125 mg, 0.125 mg, Oral, Daily    dorzolamide-timolol 2-0.5% ophthalmic solution 1 drop, 1 drop, Both Eyes, BID    fluticasone propionate 50 mcg/actuation nasal spray 100 mcg, 2 spray, Each Nostril, Daily    insulin aspart U-100 pen 0-5 Units, 0-5 Units, Subcutaneous, QID (AC + HS) PRN    lactated ringers infusion, , Intravenous, Continuous    latanoprost 0.005 % ophthalmic solution 1 drop, 1 drop, Both Eyes, Nightly    oxybutynin 24 hr tablet 5 mg, 5 mg, Oral, Daily    pantoprazole EC tablet 40 mg, 40 mg, Oral, Before breakfast    rivaroxaban tablet 15 mg, 15 mg, Oral, Daily with dinner

## 2024-05-26 NOTE — HPI
Ms. Jo Alegre is an advance aged 82 yo F with past medical history of DM 2, HTN, HLD, CHF. The patient presents to [facility] ED on 5/25/24 with complaint of fever of 101 F at home. The patient reports taking Tylenol for relief of fever. She endorsed history of a cyst on her kidney that is being watched. She does have right flank pain as well. No complaints otherwise. She is awake alert cooperative with her relative at the bedside.     Diagnostic testing obtained in the ER includes labs.  CBC essentially unremarkable, close to patient's recent baseline. BMP is with serum creatinine 2.1, GFR 23.1.  Urinalysis obtained and is a hazy appearance, moderate bacteria and 3+ leukocyte esterase. Following work up the patient is diagnosed with Acute pyelonephritis, Acute kidney injury.  Treatment in the emergency room included administration of IV Rocephin 1 g x 1.  Hospital medicine has been consulted by ED physician Dr. Claros for observation/ admission.

## 2024-05-26 NOTE — PROGRESS NOTES
Care update/ Hospitalist note-    Patient refused admission.  States feeling well after given IV Rocephin in the ER.  Asking for discharge.  Notify the ED physician on the same. He is discharging patient and canceled the admit.    Tiarra Zelaya MD  5/26/24

## 2024-05-26 NOTE — ASSESSMENT & PLAN NOTE
As above, avoid volume overload. Continue to monitor closely.   Holding home diuretic an interest to temporarily due to MARCELINO.

## 2024-05-26 NOTE — ASSESSMENT & PLAN NOTE
Chronic, controlled. Latest blood pressure and vitals reviewed-     Temp:  [98.2 °F (36.8 °C)]   Pulse:  [79]   Resp:  [16]   BP: (106)/(63)   SpO2:  [95 %] .   Home meds for hypertension were reviewed and noted below.   Hypertension Medications               bumetanide (BUMEX) 1 MG tablet once daily.    sacubitriL-valsartan (ENTRESTO) 24-26 mg per tablet Take 1 tablet by mouth 2 (two) times daily. 1/2 tab PO BID            While in the hospital, will manage blood pressure as follows-  Continue home BP med regimen except holding Entresto due to MARCELINO.     Monitor vitals.  Adjust antihypertensives as needed.

## 2024-05-26 NOTE — ASSESSMENT & PLAN NOTE
Creatine stable for now. BMP reviewed- noted Estimated Creatinine Clearance: 24.9 mL/min (A) (based on SCr of 2.1 mg/dL (H)). according to latest data. Based on current GFR, CKD stage is stage 4 - GFR 15-29.  Monitor UOP and serial BMP and adjust therapy as needed. Renally dose meds. Avoid nephrotoxic medications and procedures. Gentle IV hydration per tolerance. Hold diuretics.

## 2024-05-26 NOTE — ED PROVIDER NOTES
Encounter Date: 5/25/2024       History     Chief Complaint   Patient presents with    Back Pain     Lower back pain and fever x one hour. Took tylenol pta.      Chief complaint is fever to 101 at home.  The patient had a temperature of 99° was given 2 Tylenol and 1 hour later the temperature was 101°.  She does have a lingering history of a cyst on her kidney that is being watched.  She is here and has a definite UTI on urinalysis.  She does have right flank pain as well.  No complaints otherwise.  She is awake alert cooperative with her relative at the bedside.        Review of patient's allergies indicates:   Allergen Reactions    Codeine Other (See Comments)     nausea    Hydrocodone Nausea And Vomiting    Lasix [furosemide]      rash     Past Medical History:   Diagnosis Date    Allergy     Codeine, Lasix    Atrial fibrillation     Atrial fibrillation     Cataract     CHF (congestive heart failure)     Diabetes mellitus, type 2     Ejection fraction < 50% 10/18/2017    Approximately 35%  Based on prior  Echocardiogram.    Encounter for blood transfusion     Hyperlipidemia     Osteoporosis     PONV (postoperative nausea and vomiting)     Thyroid disorder screening 10/17/2017    TSH of 1.12 ordered by Dr. dee Jones     Past Surgical History:   Procedure Laterality Date    ANTEGRADE NEPHROSTOGRAPHY Left 8/25/2022    Procedure: NEPHROSTOGRAM, ANTEGRADE;  Surgeon: Shelby Parra MD;  Location: [facility] OR;  Service: Urology;  Laterality: Left;    CHOLECYSTECTOMY  1997    [place]     COLONOSCOPY      CYSTOSCOPY Left 3/8/2024    Procedure: CYSTOSCOPY;  Surgeon: Shelby Parra MD;  Location: [facility] OR;  Service: Urology;  Laterality: Left;    CYSTOSCOPY W/ URETERAL STENT PLACEMENT Left 7/17/2022    Procedure: CYSTOSCOPY, WITH URETERAL STENT INSERTION;  Surgeon: Shelby Parra MD;  Location: [facility] OR;  Service: Urology;  Laterality: Left;    CYSTOSCOPY W/ URETERAL STENT REMOVAL Left 9/21/2022    Procedure:  CYSTOSCOPY, WITH URETERAL STENT REMOVAL;  Surgeon: Shelby Parra MD;  Location: [facility] OR;  Service: Urology;  Laterality: Left;    CYSTOSCOPY WITH URETEROSCOPY, RETROGRADE PYELOGRAPHY, AND INSERTION OF STENT Left 8/25/2022    Procedure: CYSTOSCOPY, WITH RETROGRADE PYELOGRAM AND URETERAL STENT INSERTION;  Surgeon: Shelby Parra MD;  Location: [facility] OR;  Service: Urology;  Laterality: Left;    EYE SURGERY      bilateral cataracts    FINGER SURGERY Right 2021    right pinky finger    FLEXIBLE CYSTOSCOPY Left 8/25/2022    Procedure: CYSTOSCOPY, FLEXIBLE WITH STENT REMOVAL;  Surgeon: Shelby Parra MD;  Location: [facility] OR;  Service: Urology;  Laterality: Left;    FRACTURE SURGERY  2014    right femur with neville    HEMORRHOID SURGERY      48 yrs ago    HYSTERECTOMY      NEPHROSTOMY Left 8/25/2022    Procedure: CREATION, NEPHROSTOMY;  Surgeon: Shelby Parra MD;  Location: [facility] OR;  Service: Urology;  Laterality: Left;    PERCUTANEOUS NEPHROLITHOTOMY N/A 8/25/2022    Procedure: NEPHROLITHOTOMY, PERCUTANEOUS;  Surgeon: Shelby Parra MD;  Location: [facility] OR;  Service: Urology;  Laterality: N/A;    REPLACEMENT OF IMPLANTABLE CARDIOVERTER-DEFIBRILLATOR (ICD) GENERATOR N/A 12/13/2019    Procedure: REPLACEMENT, PULSE GENERATOR, ICD-MEDTRONIC;  Surgeon: Sebastian Nowak III, MD;  Location: [facility] CATH;  Service: Cardiology;  Laterality: N/A;    RETROGRADE PYELOGRAPHY N/A 3/8/2024    Procedure: PYELOGRAM, RETROGRADE;  Surgeon: Shelby Parra MD;  Location: [facility] OR;  Service: Urology;  Laterality: N/A;    TONSILLECTOMY       Family History   Problem Relation Name Age of Onset    Heart disease Mother Jannette     Cancer Father Branden         Lung Cancer ??? Asbestos    Breast cancer Paternal Aunt       Social History     Tobacco Use    Smoking status: Former     Passive exposure: Past    Smokeless tobacco: Never    Tobacco comments:     quit 2013   Substance Use Topics    Alcohol use: No    Drug use: No     Review  of Systems   Constitutional:  Negative for chills and fever.   HENT:  Negative for ear pain, rhinorrhea and sore throat.    Eyes:  Negative for pain and visual disturbance.   Respiratory:  Negative for cough and shortness of breath.    Cardiovascular:  Negative for chest pain and palpitations.   Gastrointestinal:  Negative for abdominal pain, constipation, diarrhea, nausea and vomiting.   Genitourinary:  Negative for dysuria, frequency, hematuria and urgency.   Musculoskeletal:  Positive for back pain. Negative for joint swelling and myalgias.   Skin:  Negative for rash.   Neurological:  Negative for dizziness, seizures, weakness and headaches.   Psychiatric/Behavioral:  Negative for dysphoric mood. The patient is not nervous/anxious.        Physical Exam     Initial Vitals [05/25/24 1931]   BP Pulse Resp Temp SpO2   106/63 79 16 98.2 °F (36.8 °C) 95 %      MAP       --         Physical Exam    Nursing note and vitals reviewed.  Constitutional: She appears well-developed and well-nourished.   HENT:   Head: Normocephalic and atraumatic.   Eyes: Conjunctivae, EOM and lids are normal. Pupils are equal, round, and reactive to light.   Neck: Trachea normal. Neck supple. No thyroid mass and no thyromegaly present.   Normal range of motion.  Cardiovascular:  Normal rate, regular rhythm and normal heart sounds.           Pulmonary/Chest: Effort normal and breath sounds normal.   Abdominal: Abdomen is soft. There is no abdominal tenderness.   Musculoskeletal:         General: Normal range of motion.      Cervical back: Normal range of motion and neck supple.      Comments: Slight right flank pain to palpation     Neurological: She is alert and oriented to person, place, and time. She has normal strength and normal reflexes. No cranial nerve deficit or sensory deficit.   Skin: Skin is warm and dry.   Psychiatric: She has a normal mood and affect. Her speech is normal and behavior is normal. Judgment and thought content  normal.         ED Course   Procedures  Labs Reviewed   URINALYSIS, REFLEX TO URINE CULTURE - Abnormal; Notable for the following components:       Result Value    Appearance, UA Hazy (*)     Protein, UA Trace (*)     Occult Blood UA 1+ (*)     Leukocytes, UA 3+ (*)     All other components within normal limits    Narrative:     Specimen Source->Urine   URINALYSIS MICROSCOPIC - Abnormal; Notable for the following components:    WBC, UA 66 (*)     Bacteria Moderate (*)     All other components within normal limits    Narrative:     Specimen Source->Urine   CBC W/ AUTO DIFFERENTIAL - Abnormal; Notable for the following components:    MCHC 31.9 (*)     Lymph % 17.0 (*)     All other components within normal limits   COMPREHENSIVE METABOLIC PANEL - Abnormal; Notable for the following components:    BUN 32 (*)     Creatinine 2.1 (*)     Alkaline Phosphatase 161 (*)     ALT 9 (*)     eGFR 23.1 (*)     All other components within normal limits   CULTURE, URINE   POCT GLUCOSE          Imaging Results    None          Medications   cefTRIAXone (ROCEPHIN) 1 g in dextrose 5 % 100 mL IVPB (ready to mix) (0 g Intravenous Stopped 5/25/24 2232)     Medical Decision Making  The patient is here for slight weakness and has a UTI.  She is stable white count not elevated she wants to go home.  She will be given Rocephin here discharged on Levaquin for UTI.  She does have mild right flank pain but is stable and looks great she will be instructed to return if any worsening symptoms    Amount and/or Complexity of Data Reviewed  Labs: ordered.    Risk  Prescription drug management.                                      Clinical Impression:  Final diagnoses:  [N39.0, R31.9] Urinary tract infection with hematuria, site unspecified (Primary)          ED Disposition Condition    Discharge Stable          ED Prescriptions       Medication Sig Dispense Start Date End Date Auth. Provider    cephALEXin (KEFLEX) 500 MG capsule  (Status:  Discontinued) Take 1 capsule (500 mg total) by mouth 4 (four) times daily. for 10 days 40 capsule 5/26/2024 5/26/2024 Preston Claros MD    levoFLOXacin (LEVAQUIN) 750 MG tablet Take 1 tablet (750 mg total) by mouth once daily. for 5 days 5 tablet 5/26/2024 5/31/2024 Preston Claros MD          Follow-up Information    None          Preston Claros MD  05/26/24 0417

## 2024-05-26 NOTE — ASSESSMENT & PLAN NOTE
Initiated on treatment with IV Rocephin.  Cultures ordered prior.  Follow through cultures, just antimicrobials accordingly.  Continue supportive care with as needed analgesics.

## 2024-05-26 NOTE — DISCHARGE INSTRUCTIONS
Return for fever chills nausea vomiting weakness.  Levaquin as directed.  Please follow-up with your doctor in the next few days.  Drink plenty fluids rest take your medicines as directed

## 2024-05-26 NOTE — FIRST PROVIDER EVALUATION
Emergency Department TeleTriage Encounter Note      CHIEF COMPLAINT    Chief Complaint   Patient presents with    Back Pain     Lower back pain and fever x one hour. Took tylenol pta.        VITAL SIGNS   Initial Vitals [05/25/24 1931]   BP Pulse Resp Temp SpO2   106/63 79 16 98.2 °F (36.8 °C) 95 %      MAP       --            ALLERGIES    Review of patient's allergies indicates:   Allergen Reactions    Codeine Other (See Comments)     nausea    Hydrocodone Nausea And Vomiting    Lasix [furosemide]      rash       PROVIDER TRIAGE NOTE  Patient presents with fever and low back pain. Frequent urination, but no dysuria. No URI symptoms.       ORDERS  Labs Reviewed - No data to display    ED Orders (720h ago, onward)      None              Virtual Visit Note: The provider triage portion of this emergency department evaluation and documentation was performed via [employer] [place], a HIPAA-compliant telemedicine application, in concert with a tele-presenter in the room. A face to face patient evaluation with one of my colleagues will occur once the patient is placed in an emergency department room.      DISCLAIMER: This note was prepared with [employer] voice recognition transcription software. Garbled syntax, mangled pronouns, and other bizarre constructions may be attributed to that software system.

## 2024-05-26 NOTE — ASSESSMENT & PLAN NOTE
Patient's COPD is controlled currently.    Patient is currently on COPD Pathway.   Monitor respiratory status closely.

## 2024-05-26 NOTE — ASSESSMENT & PLAN NOTE
Hold home medication, place on sliding scale coverage.  Monitor fingersticks, adjust insulin regimen as needed.

## 2024-05-27 ENCOUNTER — TELEPHONE (OUTPATIENT)
Dept: INFECTIOUS DISEASES | Facility: CLINIC | Age: 84
End: 2024-05-27

## 2024-05-27 ENCOUNTER — TELEPHONE (OUTPATIENT)
Dept: UROLOGY | Facility: CLINIC | Age: 84
End: 2024-05-27
Payer: MEDICARE

## 2024-05-27 LAB — BACTERIA UR CULT: ABNORMAL

## 2024-05-27 NOTE — TELEPHONE ENCOUNTER
----- Message from Nino Cortes sent at 5/27/2024  1:02 PM CDT -----  Type: Needs Medical Advice    Who Called:  Pt's daughter    Best Call Back Number: [phone]    Additional Information: Pt is having discomfort in back and er didn't help but wanted to discuss with someone. Please call back to advise, Thanks!

## 2024-05-27 NOTE — TELEPHONE ENCOUNTER
Returned call and spoke with patient, she states she did as was told if she begins to run fever or have any pain because of her kidney to go to the ER. She stated the only thing they did was run urine test and found she had Uti and order her antibiotic but they ordered  no imaging. Wanted to  let the provider know and is there any other advisement. Informed will give message to provider and contact her back with advisement, patient verbally understood.

## 2024-05-31 ENCOUNTER — PATIENT MESSAGE (OUTPATIENT)
Dept: SPINE | Facility: CLINIC | Age: 84
End: 2024-05-31

## 2024-05-31 ENCOUNTER — HOSPITAL ENCOUNTER (OUTPATIENT)
Dept: RADIOLOGY | Facility: HOSPITAL | Age: 84
Discharge: HOME | End: 2024-05-31
Attending: PHYSICAL MEDICINE & REHABILITATION
Payer: MEDICARE

## 2024-05-31 ENCOUNTER — OFFICE VISIT (OUTPATIENT)
Dept: SPINE | Facility: CLINIC | Age: 84
End: 2024-05-31
Payer: MEDICARE

## 2024-05-31 VITALS — BODY MASS INDEX: 29.91 KG/M2 | WEIGHT: 201.94 LBS | HEIGHT: 69 IN

## 2024-05-31 DIAGNOSIS — M54.50 ACUTE RIGHT-SIDED LOW BACK PAIN WITHOUT SCIATICA: ICD-10-CM

## 2024-05-31 DIAGNOSIS — M54.50 ACUTE RIGHT-SIDED LOW BACK PAIN WITHOUT SCIATICA: Primary | ICD-10-CM

## 2024-05-31 PROCEDURE — 1101F PT FALLS ASSESS-DOCD LE1/YR: CPT | Mod: CPTII,S$GLB,, | Performed by: PHYSICAL MEDICINE & REHABILITATION

## 2024-05-31 PROCEDURE — 72100 X-RAY EXAM L-S SPINE 2/3 VWS: CPT | Mod: 26,,, | Performed by: RADIOLOGY

## 2024-05-31 PROCEDURE — 1160F RVW MEDS BY RX/DR IN RCRD: CPT | Mod: CPTII,S$GLB,, | Performed by: PHYSICAL MEDICINE & REHABILITATION

## 2024-05-31 PROCEDURE — 1125F AMNT PAIN NOTED PAIN PRSNT: CPT | Mod: CPTII,S$GLB,, | Performed by: PHYSICAL MEDICINE & REHABILITATION

## 2024-05-31 PROCEDURE — 99204 OFFICE O/P NEW MOD 45 MIN: CPT | Mod: 25,S$GLB,, | Performed by: PHYSICAL MEDICINE & REHABILITATION

## 2024-05-31 PROCEDURE — 3288F FALL RISK ASSESSMENT DOCD: CPT | Mod: CPTII,S$GLB,, | Performed by: PHYSICAL MEDICINE & REHABILITATION

## 2024-05-31 PROCEDURE — 1159F MED LIST DOCD IN RCRD: CPT | Mod: CPTII,S$GLB,, | Performed by: PHYSICAL MEDICINE & REHABILITATION

## 2024-05-31 PROCEDURE — 96372 THER/PROPH/DIAG INJ SC/IM: CPT | Mod: S$GLB,,, | Performed by: PHYSICAL MEDICINE & REHABILITATION

## 2024-05-31 PROCEDURE — 72100 X-RAY EXAM L-S SPINE 2/3 VWS: CPT | Mod: TC

## 2024-05-31 RX ORDER — METHOCARBAMOL 500 MG/1
TABLET, FILM COATED ORAL
Qty: 30 TABLET | Refills: 0 | Status: SHIPPED | OUTPATIENT
Start: 2024-05-31 | End: 2024-06-13 | Stop reason: SDUPTHER

## 2024-05-31 RX ORDER — METHYLPREDNISOLONE ACETATE 40 MG/ML
40 INJECTION, SUSPENSION INTRA-ARTICULAR; INTRALESIONAL; INTRAMUSCULAR; SOFT TISSUE
Status: COMPLETED | OUTPATIENT
Start: 2024-05-31 | End: 2024-05-31

## 2024-05-31 RX ORDER — METHYLPREDNISOLONE 4 MG/1
TABLET ORAL
Qty: 1 EACH | Refills: 0 | Status: SHIPPED | OUTPATIENT
Start: 2024-05-31 | End: 2024-06-13

## 2024-05-31 RX ADMIN — METHYLPREDNISOLONE ACETATE 40 MG: 40 INJECTION, SUSPENSION INTRA-ARTICULAR; INTRALESIONAL; INTRAMUSCULAR; SOFT TISSUE at 03:05

## 2024-05-31 NOTE — PROGRESS NOTES
SUBJECTIVE:    Patient ID: Jo Alegre is a 83 y.o. female.    Chief Complaint: Back Pain and Low-back Pain    This is a 83-year-old woman who sees Dr. Alberto for her primary care.  History of diabetes COPD chronic kidney disease and left renal abscess.  Presents to me with acute complaints of right-sided low back pain at the lumbosacral junction for about 1 week.  Started after coughing.  No radicular symptoms.  No changes to her bowel or bladder habits.  No fever chills sweats or unexpected weight loss.  I reviewed a CT of the abdomen and pelvis done in February which reveals compression fractures of T9,T11 and L1.  The patient is aware of the fractures but does not know how they occurred.  Her current pain level is 10/10 and interferes with her quality of life in terms of activities of daily living recreation and social activities.  She presents today in a wheelchair.  She says ordinarily she is ambulatory with a walker.  She went to the emergency room on 05/25/2024 with complaints of fever.  She was found to have a urinary tract infection.  She was given antibiotics.  The fever has resolved.        Past Medical History:   Diagnosis Date    Allergy     Codeine, Lasix    Atrial fibrillation     Atrial fibrillation     Cataract     CHF (congestive heart failure)     Diabetes mellitus, type 2     Ejection fraction < 50% 10/18/2017    Approximately 35%  Based on prior  Echocardiogram.    Encounter for blood transfusion     Hyperlipidemia     Osteoporosis     PONV (postoperative nausea and vomiting)     Thyroid disorder screening 10/17/2017    TSH of 1.12 ordered by Dr. dee Jones     Social History     Socioeconomic History    Marital status:     Number of children: 4   Occupational History    Occupation:    Tobacco Use    Smoking status: Former     Passive exposure: Past    Smokeless tobacco: Never    Tobacco comments:     quit 2013   Substance and Sexual Activity     Alcohol use: No    Drug use: No    Sexual activity: Not Currently   Social History Narrative    - Drives and lives alone.     Social Determinants of Health     Financial Resource Strain: Low Risk  (9/27/2023)    Overall Financial Resource Strain (CARDIA)     Difficulty of Paying Living Expenses: Not hard at all   Food Insecurity: No Food Insecurity (9/27/2023)    Hunger Vital Sign     Worried About Running Out of Food in the Last Year: Never true     Ran Out of Food in the Last Year: Never true   Transportation Needs: No Transportation Needs (9/27/2023)    PRAPARE - Transportation     Lack of Transportation (Medical): No     Lack of Transportation (Non-Medical): No   Physical Activity: Inactive (9/27/2023)    Exercise Vital Sign     Days of Exercise per Week: 0 days     Minutes of Exercise per Session: 0 min   Stress: No Stress Concern Present (9/27/2023)    [employer] [place] [employer] - Occupational Stress Questionnaire     Feeling of Stress : Only a little   Housing Stability: Low Risk  (9/27/2023)    Housing Stability Vital Sign     Unable to Pay for Housing in the Last Year: No     Number of Places Lived in the Last Year: 1     Unstable Housing in the Last Year: No     Past Surgical History:   Procedure Laterality Date    ANTEGRADE NEPHROSTOGRAPHY Left 8/25/2022    Procedure: NEPHROSTOGRAM, ANTEGRADE;  Surgeon: Shelby Parra MD;  Location: [facility] OR;  Service: Urology;  Laterality: Left;    CHO[place]ECTOMY  1997    [place]     COLONOSCOPY      CYSTOSCOPY Left 3/8/2024    Procedure: CYSTOSCOPY;  Surgeon: Shelby Parra MD;  Location: [facility] OR;  Service: Urology;  Laterality: Left;    CYSTOSCOPY W/ URETERAL STENT PLACEMENT Left 7/17/2022    Procedure: CYSTOSCOPY, WITH URETERAL STENT INSERTION;  Surgeon: Shelby Parra MD;  Location: [facility] OR;  Service: Urology;  Laterality: Left;    CYSTOSCOPY W/ URETERAL STENT REMOVAL Left 9/21/2022    Procedure:  "CYSTOSCOPY, WITH URETERAL STENT REMOVAL;  Surgeon: Shelby Parra MD;  Location: [facility] OR;  Service: Urology;  Laterality: Left;    CYSTOSCOPY WITH URETEROSCOPY, RETROGRADE PYELOGRAPHY, AND INSERTION OF STENT Left 8/25/2022    Procedure: CYSTOSCOPY, WITH RETROGRADE PYELOGRAM AND URETERAL STENT INSERTION;  Surgeon: Shelby Parra MD;  Location: [facility] OR;  Service: Urology;  Laterality: Left;    EYE SURGERY      bilateral cataracts    FINGER SURGERY Right 2021    right pinky finger    FLEXIBLE CYSTOSCOPY Left 8/25/2022    Procedure: CYSTOSCOPY, FLEXIBLE WITH STENT REMOVAL;  Surgeon: Shelby Parra MD;  Location: [facility] OR;  Service: Urology;  Laterality: Left;    FRACTURE SURGERY  2014    right femur with neville    HEMORRHOID SURGERY      48 yrs ago    HYSTERECTOMY      NEPHROSTOMY Left 8/25/2022    Procedure: CREATION, NEPHROSTOMY;  Surgeon: Shelby Parra MD;  Location: [facility] OR;  Service: Urology;  Laterality: Left;    PERCUTANEOUS NEPHROLITHOTOMY N/A 8/25/2022    Procedure: NEPHROLITHOTOMY, PERCUTANEOUS;  Surgeon: Shelby Parra MD;  Location: [facility] OR;  Service: Urology;  Laterality: N/A;    REPLACEMENT OF IMPLANTABLE CARDIOVERTER-DEFIBRILLATOR (ICD) GENERATOR N/A 12/13/2019    Procedure: REPLACEMENT, PULSE GENERATOR, ICD-MEDTRONIC;  Surgeon: Sebastian Nowak III, MD;  Location: [facility] CATH;  Service: Cardiology;  Laterality: N/A;    RETROGRADE PYELOGRAPHY N/A 3/8/2024    Procedure: PYELOGRAM, RETROGRADE;  Surgeon: Shelby Parra MD;  Location: [facility] OR;  Service: Urology;  Laterality: N/A;    TONSILLECTOMY       Family History   Problem Relation Name Age of Onset    Heart disease Mother Jannette     Cancer Father Branden         Lung Cancer ??? Asbestos    Breast cancer Paternal Aunt       Vitals:    05/31/24 1448   Weight: 91.6 kg (201 lb 15.1 oz)   Height: 5' 9" (1.753 m)       Review of Systems   Constitutional:  Negative for chills, diaphoresis, fatigue, fever and unexpected weight " change.   HENT:  Negative for trouble swallowing.    Eyes:  Negative for visual disturbance.   Respiratory:  Negative for shortness of breath.    Cardiovascular:  Negative for chest pain.   Gastrointestinal:  Negative for abdominal pain, constipation, nausea and vomiting.   Genitourinary:  Negative for difficulty urinating.   Musculoskeletal:  Negative for arthralgias, back pain, gait problem, joint swelling, myalgias, neck pain and neck stiffness.   Neurological:  Negative for dizziness, speech difficulty, weakness, light-headedness, numbness and headaches.          Objective:      Physical Exam  Neurological:      Mental Status: She is alert and oriented to person, place, and time.      Comments: She is awake and in no acute distress but clearly uncomfortable  Mild-to-moderate tenderness to palpation right lumbar paraspinous musculature at the lumbosacral junction with no palpable masses.  No pain on percussion over the thoracic or lumbar spine  Limited neurological examination performed with the patient seated in the wheelchair  Reflexes- +1-+2 reflexes at the following:   C5-Biceps   C6-Brachioradialis   C7-Triceps   L3/4-Patellar   S1-Achilles   Strength testing- 5/5 strength in the following muscle groups:  C5-Elbow flexion  C6-Wrist extension  C7-Elbow extension  C8-Finger flexion  T1-Finger abduction  L2-Hip flexion  L3-Knee extension  L4-Ankle dorsiflexion  L5-Great toe extension  S1-Ankle plantar flexion                  Assessment:       1. Acute right-sided low back pain without sciatica           Plan:     She has a nonfocal neurological examination and no historical red flags.  Etiology of her low back discomfort is unclear.  She does have significant underlying degenerative disc disease of the lumbar spine.  She also has a history of compression fractures and that is my primary concern.  I am going to send her for x-rays looking for an acute compression fracture.  In the interim we will give her a  shot of Depo-Medrol followed by a Medrol Dosepak and add Robaxin as needed for pain.  Close follow up by phone in a few days      Acute right-sided low back pain without sciatica  -     X-Ray Lumbar Spine AP And Lateral; Future; Expected date: 05/31/2024    Other orders  -     methylPREDNISolone acetate injection 40 mg  -     methylPREDNISolone (MEDROL DOSEPACK) 4 mg tablet; use as directed  Dispense: 1 each; Refill: 0  -     methocarbamoL (ROBAXIN) 500 MG Tab; Take 1-2 tablets 3 times a day as needed for pain  Dispense: 30 tablet; Refill: 0

## 2024-06-03 ENCOUNTER — DOCUMENT SCAN (OUTPATIENT)
Dept: HOME HEALTH SERVICES | Facility: HOSPITAL | Age: 84
End: 2024-06-03
Payer: MEDICARE

## 2024-06-10 ENCOUNTER — HOSPITAL ENCOUNTER (OUTPATIENT)
Dept: RADIOLOGY | Facility: HOSPITAL | Age: 84
Discharge: HOME | End: 2024-06-10
Attending: STUDENT IN AN ORGANIZED HEALTH CARE EDUCATION/TRAINING PROGRAM
Payer: MEDICARE

## 2024-06-10 ENCOUNTER — TELEPHONE (OUTPATIENT)
Dept: UROLOGY | Facility: CLINIC | Age: 84
End: 2024-06-10
Payer: MEDICARE

## 2024-06-10 DIAGNOSIS — R10.9 ABDOMINAL PAIN, UNSPECIFIED ABDOMINAL LOCATION: ICD-10-CM

## 2024-06-10 DIAGNOSIS — R10.9 ABDOMINAL PAIN, UNSPECIFIED ABDOMINAL LOCATION: Primary | ICD-10-CM

## 2024-06-10 PROBLEM — J96.11 CHRONIC RESPIRATORY FAILURE WITH HYPOXIA: Status: RESOLVED | Noted: 2024-03-08 | Resolved: 2024-06-10

## 2024-06-10 PROCEDURE — 74176 CT ABD & PELVIS W/O CONTRAST: CPT | Mod: 26,,, | Performed by: RADIOLOGY

## 2024-06-10 PROCEDURE — 74176 CT ABD & PELVIS W/O CONTRAST: CPT | Mod: TC

## 2024-06-10 NOTE — TELEPHONE ENCOUNTER
----- Message from Melly Anne sent at 6/10/2024  9:25 AM CDT -----  Contact: self  Type: Sooner Appointment Request        Caller is requesting a sooner appointment. Caller declined first available appointment listed below. Caller will not accept being placed on the waitlist and is requesting a message be sent to doctor.        Name of Caller: Patient   Best Call Back Number: [phone]  Additional Information: Pt needs to bump her CT and appt up with Dr. Parra having a lot middle lower back pain pt needs to be seen very soon. Thanks

## 2024-06-10 NOTE — TELEPHONE ENCOUNTER
Returned call and spoke with patient's daughter, she stated the patient is having some bad back pain, nothing discovered by back doctor. And went to ER and only diagnosed with UTI. Wants to know can they do scheduled CT sooner. Message given to provider, okay to do CT, scheduled for this evening, she verbally understood.

## 2024-06-13 ENCOUNTER — OFFICE VISIT (OUTPATIENT)
Dept: FAMILY MEDICINE | Facility: CLINIC | Age: 84
End: 2024-06-13
Payer: MEDICARE

## 2024-06-13 VITALS
SYSTOLIC BLOOD PRESSURE: 106 MMHG | HEART RATE: 52 BPM | DIASTOLIC BLOOD PRESSURE: 59 MMHG | WEIGHT: 208 LBS | HEIGHT: 69 IN | BODY MASS INDEX: 30.81 KG/M2

## 2024-06-13 DIAGNOSIS — M51.9 LUMBOSACRAL DISC DISEASE: Chronic | ICD-10-CM

## 2024-06-13 DIAGNOSIS — A49.8 KLEBSIELLA PNEUMONIAE INFECTION: Primary | ICD-10-CM

## 2024-06-13 DIAGNOSIS — R73.9 ELEVATED BLOOD SUGAR: ICD-10-CM

## 2024-06-13 DIAGNOSIS — I48.0 PAROXYSMAL ATRIAL FIBRILLATION: Chronic | ICD-10-CM

## 2024-06-13 DIAGNOSIS — I50.42 CHRONIC COMBINED SYSTOLIC AND DIASTOLIC CONGESTIVE HEART FAILURE: Chronic | ICD-10-CM

## 2024-06-13 DIAGNOSIS — N18.4 STAGE 4 CHRONIC KIDNEY DISEASE: ICD-10-CM

## 2024-06-13 DIAGNOSIS — I10 ESSENTIAL HYPERTENSION: ICD-10-CM

## 2024-06-13 PROCEDURE — 3288F FALL RISK ASSESSMENT DOCD: CPT | Mod: CPTII,S$GLB,, | Performed by: INTERNAL MEDICINE

## 2024-06-13 PROCEDURE — 99214 OFFICE O/P EST MOD 30 MIN: CPT | Mod: S$GLB,,, | Performed by: INTERNAL MEDICINE

## 2024-06-13 PROCEDURE — 3078F DIAST BP <80 MM HG: CPT | Mod: CPTII,S$GLB,, | Performed by: INTERNAL MEDICINE

## 2024-06-13 PROCEDURE — 1101F PT FALLS ASSESS-DOCD LE1/YR: CPT | Mod: CPTII,S$GLB,, | Performed by: INTERNAL MEDICINE

## 2024-06-13 PROCEDURE — 3074F SYST BP LT 130 MM HG: CPT | Mod: CPTII,S$GLB,, | Performed by: INTERNAL MEDICINE

## 2024-06-13 PROCEDURE — 1125F AMNT PAIN NOTED PAIN PRSNT: CPT | Mod: CPTII,S$GLB,, | Performed by: INTERNAL MEDICINE

## 2024-06-13 PROCEDURE — 1159F MED LIST DOCD IN RCRD: CPT | Mod: CPTII,S$GLB,, | Performed by: INTERNAL MEDICINE

## 2024-06-13 PROCEDURE — 1160F RVW MEDS BY RX/DR IN RCRD: CPT | Mod: CPTII,S$GLB,, | Performed by: INTERNAL MEDICINE

## 2024-06-13 PROCEDURE — 99999 PR PBB SHADOW E&M-EST. PATIENT-LVL III: CPT | Mod: PBBFAC,,, | Performed by: INTERNAL MEDICINE

## 2024-06-13 RX ORDER — METHOCARBAMOL 500 MG/1
TABLET, FILM COATED ORAL
Qty: 30 TABLET | Refills: 3 | Status: SHIPPED | OUTPATIENT
Start: 2024-06-13

## 2024-06-13 NOTE — PROGRESS NOTES
Subjective:       Patient ID: Jo Alegre is a 83 y.o. female.    Chief Complaint: Hyperlipidemia, Hypertension, Chronic back pain, Atrial Fibrillation, Diabetes, and Chronic Kidney Disease    Miss Jo Paml is 83-year-old somewhat chronically ill  female who comes for follow-up  She is accompanied with her daughter Ms Leidy Haas today who partly tends to take care of her medical as well as executive affairs.    Recent incident includes a visit to the ER on 05/25/2024 with chills and fevers up to 101° F. urinalysis in the emergency room looked abnormal and consistent with infection she was to advised inpatient admission but she chose to be discharged.  She was given perhaps Rocephin and discharged with Levaquin.      Today I did review the urine culture reports and they grew Klebsiella pneumonia greater than 100,000 colony counts which is surprisingly pansensitive.    Today at follow-up patient does not have any fever.    She is on wheelchair.  She feels chronic lead unwell with low back pain.  She does have lumbosacral who degenerative arthritis.  She feels her back pain is not suggestive of kidney infection or cyst but simply musculoskeletal back pain.  She has seen rehab doctor John who did not find any significant neurological deficits and did try with muscle relaxer.  She does state that muscle relaxers put her to sleep at which point she has oblivious of the pain.    She agrees that she might need further input for her back pain and Effient other form of treatment including potential interventions might help her.  We did discuss about her lumbosacral issues and the natural progression which might include physical therapy with subsequent escalation to chronic pain management and which might include localized injections or epidural injections to targeted facet joints or areas of interest which might be causing pain.  Patient's daughter has seen a pain management specialist whom she felt  comfortable with I have made a referral for the same.    Medical issues are as below:-    1. Chronic combined systolic and diastolic congestive heart failure   2. Paroxysmal atrial fibrillation   3. Essential hypertension   4. Chronic anticoagulation -currently on Xarelto  5. Type 2 diabetes mellitus with diabetic nephropathy, without long-term current use of insulin   6. Midline low back pain without sciatica, unspecified chronicity   7. Neurogenic bladder   8.         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  9.-       large cyst in the kidney and probably might be getting frequently infected.            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  This is home-based IV therapy.    The exact train of events are not clear to me at this point.    It seems her troubles started sometimes last year in when she was admitted for congestive heart failure and somewhere down the line she had an ultrasound of abdominal kidneys which showed large cystic fluid collection followed by CT scan which confirmed the same.  14 cm cyst in the kidney.  (Please see a cut and paste of that cyst in the assessment section.)    Previously she had a large staghorn calculus.    Interventional radiology (IR) drainage procedure was performed which had grown Proteus mirabilis and she was initiated initially on Cipro.  Probably there was displacement of the drainage which was replaced again by IR.    She is currently followed by Urology-Dr. Shelby Parra and  ID Dr. Nikole Jorge.              Hypertension  This is a chronic problem. The current episode started more than 1 year ago. The problem has been rapidly improving since onset. The problem is controlled. Associated symptoms include malaise/fatigue, peripheral edema and shortness of breath. Pertinent negatives include  no chest pain, headaches or palpitations. Past treatments include calcium channel blockers, diuretics, angiotensin blockers and beta blockers (On amlodipine and diltiazem.). The current treatment provides significant improvement. Compliance problems include psychosocial issues.    Hyperlipidemia  The current episode started more than 1 year ago. The problem is controlled. Exacerbating diseases include obesity. Associated symptoms include shortness of breath. Pertinent negatives include no chest pain. The current treatment provides moderate improvement of lipids. Risk factors for coronary artery disease include post-menopausal, a sedentary lifestyle, dyslipidemia and hypertension.   Atrial Fibrillation  Presents for follow-up visit. Symptoms include hypotension, shortness of breath and weakness. Symptoms are negative for chest pain, hemodynamic instability and palpitations. The symptoms have been stable. Past medical history includes atrial fibrillation, CHF and hyperlipidemia. Medication compliance problems include psychosocial issues.   Congestive Heart Failure  Presents for follow-up visit. Associated symptoms include edema, fatigue and shortness of breath. Pertinent negatives include no abdominal pain, chest pain, chest pressure, claudication, near-syncope, palpitations or unexpected weight change (gained wt 5 lbs ?). The symptoms have been stable. Compliance with diet is 51-75%. Compliance with exercise is 26-50%. Compliance with medications is %.       Past Medical History:   Diagnosis Date    Allergy     Codeine, Lasix    Atrial fibrillation     Atrial fibrillation     Cataract     CHF (congestive heart failure)     Diabetes mellitus, type 2     Ejection fraction < 50% 10/18/2017    Approximately 35%  Based on prior  Echocardiogram.    Encounter for blood transfusion     Hyperlipidemia     Osteoporosis     PONV (postoperative nausea and vomiting)     Thyroid disorder screening 10/17/2017    TSH of 1.12  ordered by Dr. dee Jones     Social History     Socioeconomic History    Marital status:     Number of children: 4   Occupational History    Occupation:    Tobacco Use    Smoking status: Former     Passive exposure: Past    Smokeless tobacco: Never    Tobacco comments:     quit 2013   Substance and Sexual Activity    Alcohol use: No    Drug use: No    Sexual activity: Not Currently   Social History Narrative    - Drives and lives alone.     Social Determinants of Health     Financial Resource Strain: Low Risk  (9/27/2023)    Overall Financial Resource Strain (CARDIA)     Difficulty of Paying Living Expenses: Not hard at all   Food Insecurity: No Food Insecurity (9/27/2023)    Hunger Vital Sign     Worried About Running Out of Food in the Last Year: Never true     Ran Out of Food in the Last Year: Never true   Transportation Needs: No Transportation Needs (9/27/2023)    PRAPARE - Transportation     Lack of Transportation (Medical): No     Lack of Transportation (Non-Medical): No   Physical Activity: Inactive (9/27/2023)    Exercise Vital Sign     Days of Exercise per Week: 0 days     Minutes of Exercise per Session: 0 min   Stress: No Stress Concern Present (9/27/2023)    [employer] [place] [employer] - Occupational Stress Questionnaire     Feeling of Stress : Only a little   Housing Stability: Low Risk  (9/27/2023)    Housing Stability Vital Sign     Unable to Pay for Housing in the Last Year: No     Number of Places Lived in the Last Year: 1     Unstable Housing in the Last Year: No     Past Surgical History:   Procedure Laterality Date    ANTEGRADE NEPHROSTOGRAPHY Left 8/25/2022    Procedure: NEPHROSTOGRAM, ANTEGRADE;  Surgeon: Shelby Parra MD;  Location: [facility] OR;  Service: Urology;  Laterality: Left;    CHOLECYSTECTOMY  1997    [place]     COLONOSCOPY      CYSTOSCOPY Left 3/8/2024    Procedure: CYSTOSCOPY;  Surgeon: Shelby Parra MD;  Location: [facility] OR;   Service: Urology;  Laterality: Left;    CYSTOSCOPY W/ URETERAL STENT PLACEMENT Left 7/17/2022    Procedure: CYSTOSCOPY, WITH URETERAL STENT INSERTION;  Surgeon: Shelby Parra MD;  Location: [facility] OR;  Service: Urology;  Laterality: Left;    CYSTOSCOPY W/ URETERAL STENT REMOVAL Left 9/21/2022    Procedure: CYSTOSCOPY, WITH URETERAL STENT REMOVAL;  Surgeon: Shelby Parra MD;  Location: [facility] OR;  Service: Urology;  Laterality: Left;    CYSTOSCOPY WITH URETEROSCOPY, RETROGRADE PYELOGRAPHY, AND INSERTION OF STENT Left 8/25/2022    Procedure: CYSTOSCOPY, WITH RETROGRADE PYELOGRAM AND URETERAL STENT INSERTION;  Surgeon: Shelby Parra MD;  Location: [facility] OR;  Service: Urology;  Laterality: Left;    EYE SURGERY      bilateral cataracts    FINGER SURGERY Right 2021    right pinky finger    FLEXIBLE CYSTOSCOPY Left 8/25/2022    Procedure: CYSTOSCOPY, FLEXIBLE WITH STENT REMOVAL;  Surgeon: Shelby Parra MD;  Location: [facility];  Service: Urology;  Laterality: Left;    FRACTURE SURGERY  2014    right femur with [place]    HEMORRHOID SURGERY      48 yrs ago    HYSTERECTOMY      NEPHROSTOMY Left 8/25/2022    Procedure: CREATION, NEPHROSTOMY;  Surgeon: Shelby Parra MD;  Location: [facility];  Service: Urology;  Laterality: Left;    PERCUTANEOUS NEPHROLITHOTOMY N/A 8/25/2022    Procedure: NEPHROLITHOTOMY, PERCUTANEOUS;  Surgeon: Shelby Parra MD;  Location: [facility] OR;  Service: Urology;  Laterality: N/A;    REPLACEMENT OF IMPLANTABLE CARDIOVERTER-DEFIBRILLATOR (ICD) GENERATOR N/A 12/13/2019    Procedure: REPLACEMENT, PULSE GENERATOR, ICD-MEDTRONIC;  Surgeon: Sebastian Nowak III, MD;  Location: [facility];  Service: Cardiology;  Laterality: N/A;    RETROGRADE PYELOGRAPHY N/A 3/8/2024    Procedure: PYELOGRAM, RETROGRADE;  Surgeon: Shelby Parra MD;  Location: [facility] OR;  Service: Urology;  Laterality: N/A;    TONSILLECTOMY       Family History   Problem Relation Name Age of Onset    Heart disease Mother  "Jannette     Cancer Father Branden         Lung Cancer ??? Asbestos    Breast cancer Paternal Aunt         Review of Systems   Constitutional:  Positive for fatigue and malaise/fatigue. Negative for unexpected weight change (gained wt 5 lbs ?).   Respiratory:  Positive for shortness of breath.    Cardiovascular:  Negative for chest pain, palpitations, claudication and near-syncope.   Gastrointestinal:  Negative for abdominal pain.   Neurological:  Positive for weakness. Negative for headaches.         Objective:      Blood pressure (!) 106/59, pulse (!) 52, height 5' 9" (1.753 m), weight 94.3 kg (208 lb). Body mass index is 30.72 kg/m².  Physical Exam  Constitutional:       General: She is not in acute distress.     Appearance: She is well-developed. She is obese. She is ill-appearing. She is not toxic-appearing or diaphoretic.      Comments: Patient is obese with a BMI of 32.09.  Chronically unwell appearing.   HENT:      Head: Normocephalic and atraumatic.      Comments: .     Mouth/Throat:      Pharynx: Oropharynx is clear. No posterior oropharyngeal erythema.   Eyes:      General: No scleral icterus.        Right eye: No discharge.         Left eye: No discharge.   Neck:      Thyroid: No thyromegaly.      Vascular: No JVD.      Trachea: No tracheal deviation.   Cardiovascular:      Rate and Rhythm: Normal rate and regular rhythm.      Heart sounds:      No friction rub. No gallop.   Pulmonary:      Effort: Pulmonary effort is normal.      Breath sounds: Decreased air movement present.   Abdominal:      General: There is no distension.      Palpations: Abdomen is soft.      Tenderness: There is no abdominal tenderness.   Musculoskeletal:      Cervical back: Neck supple.      Right lower leg: Edema present.      Left lower leg: Edema present.   Lymphadenopathy:      Cervical: No cervical adenopathy.   Skin:     General: Skin is warm and dry.      Coloration: Skin is not pale.      Findings: No lesion or rash. Rash is " not scaling.   Neurological:      Mental Status: She is alert. Mental status is at baseline. She is not disoriented.      Motor: Tremor present.   Psychiatric:         Behavior: Behavior normal.           Assessment:       Admission on 05/25/2024, Discharged on 05/26/2024   Component Date Value Ref Range Status    Specimen UA 05/25/2024 Urine, Clean Catch   Final    Color, UA 05/25/2024 Yellow  Yellow, Straw, Steffanie Final    Appearance, UA 05/25/2024 Hazy (A)  Clear Final    pH, UA 05/25/2024 5.0  5.0 - 8.0 Final    Specific Gravity, UA 05/25/2024 1.020  1.005 - 1.030 Final    Protein, UA 05/25/2024 Trace (A)  Negative Final    Glucose, UA 05/25/2024 Negative  Negative Final    Ketones, UA 05/25/2024 Negative  Negative Final    Bilirubin (UA) 05/25/2024 Negative  Negative Final    Occult Blood UA 05/25/2024 1+ (A)  Negative Final    Nitrite, UA 05/25/2024 Negative  Negative Final    Urobilinogen, UA 05/25/2024 Negative  Negative EU/dL Final    Leukocytes, UA 05/25/2024 3+ (A)  Negative Final    RBC, UA 05/25/2024 4  0 - 4 /hpf Final    WBC, UA 05/25/2024 66 (H)  0 - 5 /hpf Final    Bacteria 05/25/2024 Moderate (A)  None-Occ /hpf Final    Squam Epithel, UA 05/25/2024 3  /hpf Final    Hyaline Casts, UA 05/25/2024 1  0-1/lpf /lpf Final    Unclass Myra UA 05/25/2024 2  None-Moderate Final    Microscopic Comment 05/25/2024 SEE COMMENT   Final    Urine Culture, Routine 05/25/2024  (A)   Final                    Value:KLEBSIELLA PNEUMONIAE  >100,000 cfu/ml      WBC 05/25/2024 7.76  3.90 - 12.70 K/uL Final    RBC 05/25/2024 4.17  4.00 - 5.40 M/uL Final    Hemoglobin 05/25/2024 12.9  12.0 - 16.0 g/dL Final    Hematocrit 05/25/2024 40.5  37.0 - 48.5 % Final    MCV 05/25/2024 97  82 - 98 fL Final    MCH 05/25/2024 30.9  27.0 - 31.0 pg Final    MCHC 05/25/2024 31.9 (L)  32.0 - 36.0 g/dL Final    RDW 05/25/2024 13.7  11.5 - 14.5 % Final    Platelets 05/25/2024 214  150 - 450 K/uL Final    MPV 05/25/2024 9.9  9.2 - 12.9 fL Final     Immature Granulocytes 05/25/2024 0.3  0.0 - 0.5 % Final    Gran # (ANC) 05/25/2024 5.4  1.8 - 7.7 K/uL Final    Immature Grans (Abs) 05/25/2024 0.02  0.00 - 0.04 K/uL Final    Lymph # 05/25/2024 1.3  1.0 - 4.8 K/uL Final    Mono # 05/25/2024 1.0  0.3 - 1.0 K/uL Final    Eos # 05/25/2024 0.0  0.0 - 0.5 K/uL Final    Baso # 05/25/2024 0.02  0.00 - 0.20 K/uL Final    nRBC 05/25/2024 0  0 /100 WBC Final    Gran % 05/25/2024 69.8  38.0 - 73.0 % Final    Lymph % 05/25/2024 17.0 (L)  18.0 - 48.0 % Final    Mono % 05/25/2024 12.2  4.0 - 15.0 % Final    Eosinophil % 05/25/2024 0.4  0.0 - 8.0 % Final    Basophil % 05/25/2024 0.3  0.0 - 1.9 % Final    Differential Method 05/25/2024 Automated   Final    Sodium 05/25/2024 138  136 - 145 mmol/L Final    Potassium 05/25/2024 4.1  3.5 - 5.1 mmol/L Final    Chloride 05/25/2024 100  95 - 110 mmol/L Final    CO2 05/25/2024 29  23 - 29 mmol/L Final    Glucose 05/25/2024 72  70 - 110 mg/dL Final    BUN 05/25/2024 32 (H)  8 - 23 mg/dL Final    Creatinine 05/25/2024 2.1 (H)  0.5 - 1.4 mg/dL Final    Calcium 05/25/2024 9.7  8.7 - 10.5 mg/dL Final    Total Protein 05/25/2024 7.2  6.0 - 8.4 g/dL Final    Albumin 05/25/2024 4.1  3.5 - 5.2 g/dL Final    Total Bilirubin 05/25/2024 0.6  0.1 - 1.0 mg/dL Final    Alkaline Phosphatase 05/25/2024 161 (H)  55 - 135 U/L Final    AST 05/25/2024 12  10 - 40 U/L Final    ALT 05/25/2024 9 (L)  10 - 44 U/L Final    eGFR 05/25/2024 23.1 (A)  >60 mL/min/1.73 m^2 Final    Anion Gap 05/25/2024 9  8 - 16 mmol/L Final    POC Glucose 05/26/2024 109  70 - 110 Final       1. Klebsiella pneumoniae infection  Comments:  Found from urine cultures and finished 5 days' course of Levaquin.  Nidus of infection could be cyst in kidney.    2. Lumbosacral disc disease  Comments:  Referral for pain management given her lumbar sacral degenerative disc disease and quality of life.  Orders:  -     methocarbamoL (ROBAXIN) 500 MG Tab; Take 1-2 tablets 3 times a day as needed  for pain  Dispense: 30 tablet; Refill: 3  -     Ambulatory referral/consult to Pain Clinic; Future; Expected date: 06/20/2024    3. Chronic combined systolic and diastolic congestive heart failure  Comments:  Currently on Entresto and Verquvo    4. Paroxysmal atrial fibrillation  Comments:  Continues on amiodarone and Xarelto.  Also digoxin  Overview:  Patient follows up with [facility].  Currently on a combination of diltiazem, digoxin, Xarelto and amiodarone.  Dr. Jones/Dr. Lopez      5. Essential hypertension  Comments:  Blood pressures are on the low side.  Sometimes may need midodrine for raising blood pressures.    6. Stage 4 chronic kidney disease  Comments:  Recent labs have shown a GFR less than 30.  Continue precautions including avoiding NSAIDs.  Overall prognosis guarded.    7. Elevated blood sugar  Comments:  Patient has been on glimepiride.  Blood sugars done recently have been completely normal.  Diagnosis of diabetes will be deleted.  May even stop glimepiride.             Plan:   Klebsiella pneumoniae infection  Comments:  Found from urine cultures and finished 5 days' course of Levaquin.  Nidus of infection could be cyst in kidney.    Lumbosacral disc disease  Comments:  Referral for pain management given her lumbar sacral degenerative disc disease and quality of life.  Orders:  -     methocarbamoL (ROBAXIN) 500 MG Tab; Take 1-2 tablets 3 times a day as needed for pain  Dispense: 30 tablet; Refill: 3  -     Ambulatory referral/consult to Pain Clinic; Future; Expected date: 06/20/2024    Chronic combined systolic and diastolic congestive heart failure  Comments:  Currently on Entresto and Verquvo    Paroxysmal atrial fibrillation  Comments:  Continues on amiodarone and Xarelto.  Also digoxin    Essential hypertension  Comments:  Blood pressures are on the low side.  Sometimes may need midodrine for raising blood pressures.    Stage 4 chronic kidney disease  Comments:  Recent labs have  shown a GFR less than 30.  Continue precautions including avoiding NSAIDs.  Overall prognosis guarded.    Elevated blood sugar  Comments:  Patient has been on glimepiride.  Blood sugars done recently have been completely normal.  Diagnosis of diabetes will be deleted.  May even stop glimepiride.    Patient's current clinical situation and recurrent infections have been noted.  She will continue to follow up with Urology and ID.      Her pain from lumbosacral degenerative disc disease has been reviewed again and it is time to make a management to pain referral will follow form which you were relief.      Her chronic kidney disease has progressed to stage IV at this point.  Given her plethora offer the medical conditions her prognosis continues to be guarded and I suspect with few more rounds of antibiotics or episodes of hypotension her kidney functions were deteriorate eventually leading to the need for dialysis.      Preventive measures again have been discussed for tetanus vaccination, COVID precautions.      Given her anticoagulation discussed fall and injury precautions.      Comfort measures will preferred by the patient though long-term issues till will dictate aggressive care and at least if not by the patient but by the family members.  Let us see how things turn out.    Please keep follow-up with all the providers who are seeing her regularly including Cardiology, Urology, and ID.      Follow up in about 4 months (around 10/13/2024), or if symptoms worsen or fail to improve, for Hypertension/lipids.      Spent aleks 38 minutes with patient which involved review of pts medical conditions, labs, medications and with 50% of time face-to-face discussion about medical problems, management and any applicable changes.    Current Outpatient Medications:     amiodarone (PACERONE) 200 MG Tab, Take 1 tablet (200 mg total) by mouth once daily., Disp: 30 tablet, Rfl: 0    atorvastatin (LIPITOR) 20 MG tablet, Take 20 mg by  mouth every evening., Disp: , Rfl:     bumetanide (BUMEX) 1 MG tablet, once daily., Disp: , Rfl:     Ca-D3-mag ox-zinc--thom-bor 600 mg calcium- 20 mcg-50 mg Tab, Take 1 tablet by mouth 2 (two) times daily., Disp: , Rfl:     cetirizine (ZYRTEC) 10 MG tablet, Take 10 mg by mouth every evening., Disp: , Rfl:     cholecalciferol, vitamin D3, 125 mcg (5,000 unit) Tab, Take 5,000 Units by mouth 2 (two) times daily., Disp: , Rfl:     digoxin (LANOXIN) 125 mcg tablet, Take 1 tablet (0.125 mg total) by mouth once daily., Disp: 30 tablet, Rfl: 0    dorzolamide-timolol 2-0.5% (COSOPT) 22.3-6.8 mg/mL ophthalmic solution, Place 1 drop into both eyes 2 (two) times daily., Disp: , Rfl:     fluticasone propionate (FLONASE) 50 mcg/actuation nasal spray, 2 sprays (100 mcg total) by Each Nostril route once daily., Disp: 16 g, Rfl: 5    gabapentin (NEURONTIN) 100 MG capsule, TAKE 2 CAPSULES(200 MG) BY MOUTH THREE TIMES DAILY, Disp: 180 capsule, Rfl: 5    GEMTESA 75 mg Tab, Take by mouth., Disp: , Rfl:     glimepiride (AMARYL) 1 MG tablet, Take 1 tablet (1 mg total) by mouth before breakfast., Disp: 90 tablet, Rfl: 1    latanoprost 0.005 % ophthalmic solution, Place 1 drop into both eyes nightly., Disp: , Rfl:     midodrine (PROAMATINE) 5 MG Tab, Take 1 tablet (5 mg total) by mouth 3 (three) times daily before meals. (Patient taking differently: Take 5 mg by mouth 3 (three) times daily before meals. States only takes as needed), Disp: 90 tablet, Rfl: 0    mirabegron (MYRBETRIQ) 25 mg Tb24 ER tablet, Take 1 tablet (25 mg total) by mouth once daily., Disp: 30 tablet, Rfl: 11    pantoprazole (PROTONIX) 40 MG tablet, TAKE 1 TABLET(40 MG) BY MOUTH EVERY DAY, Disp: 30 tablet, Rfl: 5    rivaroxaban (XARELTO) 15 mg Tab, Take 15 mg by mouth daily with dinner or evening meal., Disp: , Rfl:     sacubitriL-valsartan (ENTRESTO) 24-26 mg per tablet, Take 1 tablet by mouth 2 (two) times daily. 1/2 tab PO BID, Disp: 30 tablet, Rfl: 0    vericiguat  (VERQUVO) 5 mg Tab, Take 2.5 mg by mouth 2 (two) times a day., Disp: 30 tablet, Rfl: 0    methocarbamoL (ROBAXIN) 500 MG Tab, Take 1-2 tablets 3 times a day as needed for pain, Disp: 30 tablet, Rfl: 3  No current facility-administered medications for this visit.    Facility-Administered Medications Ordered in Other Visits:     0.9%  NaCl infusion, , Intravenous, Continuous, Sebastian Nowak III, MD, Last Rate: 75 mL/hr at 12/13/19 0728, New Bag at 12/13/19 0728    diphenhydrAMINE injection 25 mg, 25 mg, Intravenous, Once, Sebastian Nowak III, MD    lorazepam injection 1 mg, 1 mg, Intravenous, Once, Sebastian Nowak III, MD Sanjay Raina

## 2024-06-18 NOTE — ANESTHESIA PREPROCEDURE EVALUATION
08/25/2022  Jo Alegre is a 82 y.o., female.      Pre-op Assessment    I have reviewed the Patient Summary Reports.     I have reviewed the Nursing Notes. I have reviewed the NPO Status.   I have reviewed the Medications.     Review of Systems  Anesthesia Hx:  Hx of Anesthetic complications PONV   Cardiovascular:   Pacemaker Hypertension Dysrhythmias atrial fibrillation CHF    Pulmonary:   COPD Sleep Apnea    Renal/:   Chronic Renal Disease, CRI    Neurological:   Neuromuscular Disease,    Endocrine:   Diabetes, type 2        Physical Exam  General: Well nourished, Cooperative, Alert and Oriented    Airway:  Mallampati: II   Mouth Opening: Normal  TM Distance: Normal  Neck ROM: Normal ROM    Dental:  Intact        Anesthesia Plan  Type of Anesthesia, risks & benefits discussed:    Anesthesia Type: Gen ETT  Intra-op Monitoring Plan: Standard ASA Monitors  Post Op Pain Control Plan: multimodal analgesia and IV/PO Opioids PRN  Induction:  IV  Airway Plan: Direct, Post-Induction  Informed Consent: Informed consent signed with the Patient and all parties understand the risks and agree with anesthesia plan.  All questions answered.   ASA Score: 3  Day of Surgery Review of History & Physical: H&P Update referred to the surgeon/provider.    Ready For Surgery From Anesthesia Perspective.     .       The right coronary artery was selectively engaged, injected and visualized.

## 2024-07-01 ENCOUNTER — TELEPHONE (OUTPATIENT)
Dept: FAMILY MEDICINE | Facility: CLINIC | Age: 84
End: 2024-07-01
Payer: MEDICARE

## 2024-07-01 DIAGNOSIS — M54.50 LOW BACK PAIN, UNSPECIFIED: Primary | ICD-10-CM

## 2024-07-01 NOTE — TELEPHONE ENCOUNTER
Daughter called  pt is in extreme pain and they can't  get in with pain specialist   til end of month   they needs something now  please advise

## 2024-07-05 ENCOUNTER — HOSPITAL ENCOUNTER (OUTPATIENT)
Dept: RADIOLOGY | Facility: HOSPITAL | Age: 84
Discharge: HOME | End: 2024-07-05
Attending: PSYCHIATRY & NEUROLOGY
Payer: MEDICARE

## 2024-07-05 ENCOUNTER — TELEPHONE (OUTPATIENT)
Dept: UROLOGY | Facility: CLINIC | Age: 84
End: 2024-07-05
Payer: MEDICARE

## 2024-07-05 ENCOUNTER — NURSE TRIAGE (OUTPATIENT)
Dept: ADMINISTRATIVE | Facility: CLINIC | Age: 84
End: 2024-07-05
Payer: MEDICARE

## 2024-07-05 DIAGNOSIS — M54.50 LOW BACK PAIN, UNSPECIFIED: ICD-10-CM

## 2024-07-05 DIAGNOSIS — R35.0 URINARY FREQUENCY: Primary | ICD-10-CM

## 2024-07-05 PROCEDURE — 72131 CT LUMBAR SPINE W/O DYE: CPT | Mod: 26,,, | Performed by: RADIOLOGY

## 2024-07-05 PROCEDURE — 72131 CT LUMBAR SPINE W/O DYE: CPT | Mod: TC,PO

## 2024-07-05 NOTE — TELEPHONE ENCOUNTER
Daughter states she is having a procedure on Monday and they went to the hospital today for a scan. She was complaining about back pain and they had her give a urine sample and they see now that it is positive for a uti. Daughter wanted to know if she could get started on an antibiotic. Advised her that results are not final yet. She stated she is not sure if they would still do her procedure on Monday if she isn't started on an antibiotic. Spoke to on call provider, Dr. Garcia. Dr states due to her age he would not want to start her on an antibiotic unless it's definitely necessary. So she should wait and follow up with Dr. Parra on Monday. Daughter advised and voices understanding. Also advised her to call back with any new symptoms. Please contact caller directly to discuss any further care advice.    Reason for Disposition   [1] POSITIVE urine test AND [2] side (flank) or lower back pain present    Additional Information   Negative: Patient sounds very sick or weak to the triager   Negative: [1] POSITIVE urine test (i.e., NI + or LE+ or WBC > 10) AND [2] fever > 100.4 F (38.0 C)    Protocols used: Urinalysis Results Follow-up Call-A-

## 2024-07-05 NOTE — TELEPHONE ENCOUNTER
Called and informed orders were placed for the urinalysis and urine culture, they will go to the hospital once they leave the imaging place.

## 2024-07-05 NOTE — TELEPHONE ENCOUNTER
----- Message from Huongar Montano sent at 7/5/2024  3:50 PM CDT -----  Regarding: Order for Kidney Scan  Contact: Pt Daughter  Type:  Needs Medical Advice    Who Called: Pt's Daughter Leidy       Would the patient rather a call back or a response via [facility]? Call Back    Best Call Back Number: [phone]      Additional Information: Sts that her mom is complaining of her kidneys hurting and was wondering if Dr. Parra could send over and order now so that they can do it at the hospital where they are getting another scan right now.         Have a great day. Thank you!

## 2024-07-08 RX ORDER — CIPROFLOXACIN 250 MG/1
250 TABLET, FILM COATED ORAL 2 TIMES DAILY
Qty: 14 TABLET | Refills: 0 | Status: SHIPPED | OUTPATIENT
Start: 2024-07-08 | End: 2024-07-15

## 2024-07-12 ENCOUNTER — PATIENT MESSAGE (OUTPATIENT)
Dept: UROLOGY | Facility: CLINIC | Age: 84
End: 2024-07-12
Payer: MEDICARE

## 2024-07-19 NOTE — PROGRESS NOTES
DATE OF OPERATION:  10/06/2017      SURGEON:  Roberto Jonas MD      FIRST ASSISTANT:  Whitney Bloom PA-C      PREOPERATIVE DIAGNOSIS:  Right cervical and mediastinal lymphadenopathy.      POSTOPERATIVE DIAGNOSIS:  Right cervical and mediastinal lymphadenopathy.      PROCEDURE:  Excision of right cervical lymph nodes.      ANESTHESIA:  General.      INDICATIONS:  Vicente Whitney is a 35-year-old woman was found to have a neck mass.  The CT scan shows extensive adenopathy extending into the mediastinum.  She underwent an ultrasound-guided biopsy.  The findings are suspicious for Hodgkin disease, but a definitive diagnosis could not be made.  Based on the findings, an excision is indicated for definitive diagnosis.      DESCRIPTION OF PROCEDURE:  The patient was brought to the OR and placed in supine position.  Under general anesthesia with endotracheal intubation, the patient's head was turned to the left.  Neck and upper chest were prepared and draped in the usual fashion using ChloraPrep.  A transverse incision was made at the base of the right neck.  Dissection was carried down through the platysma.  On examination, there was a firm, enlarged, 1.5 cm lymph node.  This was completely excised and submitted for frozen section.  Posterior to the sternocleidomastoid muscle there was a very large, firm lymph node.  Most of this lymph node was excised and submitted as a second specimen.  This was discussed with the pathologist who confirmed that there was diagnostic material and more than a sufficient amount of material to proceed with all the necessary studies.  Hemostasis was excellent.  Incision was closed in the usual fashion.        ESTIMATED BLOOD LOSS:  Minimal.        SPONGE AND NEEDLE COUNTS:  Correct.         ROBERTO JONAS MD             D: 10/07/2017 07:41   T: 10/07/2017 08:55   MT:       Name:     VICENTE WHITNEY   MRN:      [id number]        Account:        [id number]   :       [facility] Note    PCP: Kraig Alberto MD    Chief Complaint: kidney stones    SUBJECTIVE:       History of Present Illness:  Jo Alegre is a 83 y.o. female who presents to clinic for kidney stones. She is Established  to our clinic.     Started on Myrbetriq, but she did not take this.   Has continued frequency and urgency.   No gross hematuria.     Has been seeing pain management for her lower back pain and this has greatly improved with injections.   Had CT done in June which showed improvement of the left retroperitoneal fluid collection measuring 2.8 cm    Had a UTI at beginning of the month. No symptoms today.       4/17/24  Cysto with RGP showed no extravasation.     CT 3/25/24: Irregular multi septated fluid collection in the left upper quadrant is not significantly changed in size compared to prior exam from of February 19, 2024.     She is feeling great with no complaints.     She does have some OAB symptoms which she is interested in addressing.     1/19/24  Has undergone IR drainage of fluid collection x 2 and now with drain placement.   CT shows continued presence of the collection with drain appearing to be within the left flank.     Drain did get pulled at some point. Hasn't drained anything in 5 days.   No fevers. No pain.     10/23/23  Patient has been having issues with recurrent left pleural effusion.  On her most recent CT there is a cystic fluid collection posterior to the left kidney with concern for involvement/fistulization of the left pleural space. This does appear to be away from the previous PCNL tract.     She is otherwise feeling well.  No fevers.   No hematuria.     4/24/23  Renal US 3/2/23: There are bilateral renal cysts some of which show internal debris. There is no hydronephrosis.    KUB 3/2/23: There are left renal calculi the largest of which measures 7 mm.    No flank pain. No hematuria. No dysuria.   Drinking 3-4 bottles of water a day.        11/16/22  Unable to get contrast with CT secondary to renal function.   She has a 10 mm mid pole stone and a 10 mm and 6 mm lower pole stone. No hydro.   Suggestive of hemorrhagic cysts.     She is without complaints today.   No pain.   No hematuria.   No UTI symptoms.       9/12/22  S/P left PCNL on 8/25/22.  Stone analysis: 80% Magnesium ammonium phosphate (struvite), 20% Calcium phosphate (apatite)   KUB: possible small residual stone in lower pole    Post op course uncomplicated.   No further hematuria. No flank pain.     8/1/22  Patient underwent stent placement on 7/17 for left staghorn stone. She had a fever to 100.3.  CT shows a large lower pole staghorn. Also has some complex cysts present.     This is her first stone episode.   No family hx of stones.   No hematuria prior to stent placement   Has a hx of recurrent UTIs.     Last urine culture: MO (7/2/22)    Lab Results   Component Value Date    CREATININE 2.1 (H) 05/25/2024     Hx of COPD, pulm HTN, a fib, CHF, HLD, CKD, DM, JENNIFER    Past medical, family, and social history reviewed as documented in chart with pertinent positive medical, family, and social history detailed in HPI.    Review of patient's allergies indicates:   Allergen Reactions    Codeine Other (See Comments)     nausea    Hydrocodone Nausea And Vomiting    Lasix [furosemide]      rash       Past Medical History:   Diagnosis Date    Allergy     Codeine, Lasix    Atrial fibrillation     Atrial fibrillation     Cataract     CHF (congestive heart failure)     Diabetes mellitus, type 2     Ejection fraction < 50% 10/18/2017    Approximately 35%  Based on prior  Echocardiogram.    Encounter for blood transfusion     Hyperlipidemia     Osteoporosis     PONV (postoperative nausea and vomiting)     Thyroid disorder screening 10/17/2017    TSH of 1.12 ordered by Dr. dee Jones     Past Surgical History:   Procedure Laterality Date    ANTEGRADE NEPHROSTOGRAPHY Left 8/25/2022    Procedure:  1983           Procedure Date: 10/06/2017      Document: [id number]     NEPHROSTOGRAM, ANTEGRADE;  Surgeon: Shelby Parra MD;  Location: [facility] OR;  Service: Urology;  Laterality: Left;    CHOLECYSTECTOMY  1997    [place]     COLONOSCOPY      CYSTOSCOPY Left 3/8/2024    Procedure: CYSTOSCOPY;  Surgeon: Shelby Parra MD;  Location: [facility] OR;  Service: Urology;  Laterality: Left;    CYSTOSCOPY W/ URETERAL STENT PLACEMENT Left 7/17/2022    Procedure: CYSTOSCOPY, WITH URETERAL STENT INSERTION;  Surgeon: Shelby Parra MD;  Location: [facility] OR;  Service: Urology;  Laterality: Left;    CYSTOSCOPY W/ URETERAL STENT REMOVAL Left 9/21/2022    Procedure: CYSTOSCOPY, WITH URETERAL STENT REMOVAL;  Surgeon: Shelby Parra MD;  Location: [facility] OR;  Service: Urology;  Laterality: Left;    CYSTOSCOPY WITH URETEROSCOPY, RETROGRADE PYELOGRAPHY, AND INSERTION OF STENT Left 8/25/2022    Procedure: CYSTOSCOPY, WITH RETROGRADE PYELOGRAM AND URETERAL STENT INSERTION;  Surgeon: Shelby Parra MD;  Location: [facility] OR;  Service: Urology;  Laterality: Left;    EYE SURGERY      bilateral cataracts    FINGER SURGERY Right 2021    right pinky finger    FLEXIBLE CYSTOSCOPY Left 8/25/2022    Procedure: CYSTOSCOPY, FLEXIBLE WITH STENT REMOVAL;  Surgeon: Shelby Parra MD;  Location: [facility] OR;  Service: Urology;  Laterality: Left;    FRACTURE SURGERY  2014    right femur with neville    HEMORRHOID SURGERY      48 yrs ago    HYSTERECTOMY      NEPHROSTOMY Left 8/25/2022    Procedure: CREATION, NEPHROSTOMY;  Surgeon: Shelby Parra MD;  Location: [facility] OR;  Service: Urology;  Laterality: Left;    PERCUTANEOUS NEPHROLITHOTOMY N/A 8/25/2022    Procedure: NEPHROLITHOTOMY, PERCUTANEOUS;  Surgeon: Shelby Parra MD;  Location: [facility] OR;  Service: Urology;  Laterality: N/A;    REPLACEMENT OF IMPLANTABLE CARDIOVERTER-DEFIBRILLATOR (ICD) GENERATOR N/A 12/13/2019    Procedure: REPLACEMENT, PULSE GENERATOR, ICD-MEDTRONIC;  Surgeon: Sebastian Nowak III, MD;  Location: [facility] CATH;  Service: Cardiology;   "Laterality: N/A;    RETROGRADE PYELOGRAPHY N/A 3/8/2024    Procedure: PYELOGRAM, RETROGRADE;  Surgeon: Shelby Parra MD;  Location: [facility];  Service: Urology;  Laterality: N/A;    TONSILLECTOMY       Family History   Problem Relation Name Age of Onset    Heart disease Mother Jannette     Cancer Father Branden         Lung Cancer ??? Asbestos    Breast cancer Paternal Aunt       Social History     Tobacco Use    Smoking status: Former     Passive exposure: Past    Smokeless tobacco: Never    Tobacco comments:     quit 2013   Substance Use Topics    Alcohol use: No    Drug use: No        Review of Systems   Genitourinary:  Negative for difficulty urinating, dysuria, flank pain, frequency, hematuria, nocturia and urgency.       OBJECTIVE:     Anticoagulation:  xarelto 20 mg     Estimated body mass index is 30.7 kg/m² as calculated from the following:    Height as of this encounter: 5' 9" (1.753 m).    Weight as of this encounter: 94.3 kg (207 lb 14.3 oz).    Vital Signs (Most Recent)       Physical Exam  Vitals reviewed.   Constitutional:       General: She is not in acute distress.     Appearance: Normal appearance. She is not ill-appearing.   HENT:      Head: Normocephalic and atraumatic.   Eyes:      General: No scleral icterus.  Cardiovascular:      Rate and Rhythm: Normal rate and regular rhythm.   Pulmonary:      Effort: Pulmonary effort is normal. No respiratory distress.   Abdominal:      General: There is no distension.      Palpations: Abdomen is soft.   Skin:     Coloration: Skin is not jaundiced.   Neurological:      General: No focal deficit present.      Mental Status: She is alert and oriented to person, place, and time.   Psychiatric:         Mood and Affect: Mood normal.         Behavior: Behavior normal.         BMP  Lab Results   Component Value Date     05/25/2024    K 4.1 05/25/2024     05/25/2024    CO2 29 05/25/2024    BUN 32 (H) 05/25/2024    CREATININE 2.1 (H) 05/25/2024    " CALCIUM 9.7 05/25/2024    ANIONGAP 9 05/25/2024    ESTGFRAFRICA 30.2 (A) 07/18/2022    EGFRNONAA 26.2 (A) 07/18/2022       Lab Results   Component Value Date    WBC 7.76 05/25/2024    HGB 12.9 05/25/2024    HCT 40.5 05/25/2024    MCV 97 05/25/2024     05/25/2024     Imaging:  Per HPI    ASSESSMENT     1. Retroperitoneal fluid collection    2. Overactive bladder    3. Staghorn calculus      PLAN:     - Encouraged her to use the Myrbetriq. If this is not effective then can switch medications.   - CT scan shows improvement of her fluid collection, will continue to observe this.   - Repeat CT scan in 3 months   - Recheck BMP today, if renal function worsening will place referral to nephrology    Shelby Parra MD     Visit today included increased complexity associated with the care of the episodic problem kidney stones/OAB addressed and managing the longitudinal care of the patient due to the serious and/or complex managed problem(s).

## 2024-07-22 ENCOUNTER — LAB VISIT (OUTPATIENT)
Dept: LAB | Facility: HOSPITAL | Age: 84
End: 2024-07-22
Attending: STUDENT IN AN ORGANIZED HEALTH CARE EDUCATION/TRAINING PROGRAM
Payer: MEDICARE

## 2024-07-22 ENCOUNTER — OFFICE VISIT (OUTPATIENT)
Dept: UROLOGY | Facility: CLINIC | Age: 84
End: 2024-07-22
Payer: MEDICARE

## 2024-07-22 VITALS — HEIGHT: 69 IN | BODY MASS INDEX: 30.79 KG/M2 | WEIGHT: 207.88 LBS

## 2024-07-22 DIAGNOSIS — N32.81 OVERACTIVE BLADDER: ICD-10-CM

## 2024-07-22 DIAGNOSIS — N20.0 STAGHORN CALCULUS: ICD-10-CM

## 2024-07-22 DIAGNOSIS — R18.8 RETROPERITONEAL FLUID COLLECTION: ICD-10-CM

## 2024-07-22 DIAGNOSIS — R18.8 RETROPERITONEAL FLUID COLLECTION: Primary | ICD-10-CM

## 2024-07-22 LAB
ANION GAP SERPL CALC-SCNC: 12 MMOL/L (ref 8–16)
BUN SERPL-MCNC: 26 MG/DL (ref 8–23)
CALCIUM SERPL-MCNC: 10 MG/DL (ref 8.7–10.5)
CHLORIDE SERPL-SCNC: 103 MMOL/L (ref 95–110)
CO2 SERPL-SCNC: 25 MMOL/L (ref 23–29)
CREAT SERPL-MCNC: 1.8 MG/DL (ref 0.5–1.4)
EST. GFR  (NO RACE VARIABLE): 28 ML/MIN/1.73 M^2
GLUCOSE SERPL-MCNC: 70 MG/DL (ref 70–110)
POTASSIUM SERPL-SCNC: 4.7 MMOL/L (ref 3.5–5.1)
SODIUM SERPL-SCNC: 140 MMOL/L (ref 136–145)

## 2024-07-22 PROCEDURE — 1101F PT FALLS ASSESS-DOCD LE1/YR: CPT | Mod: CPTII,S$GLB,, | Performed by: STUDENT IN AN ORGANIZED HEALTH CARE EDUCATION/TRAINING PROGRAM

## 2024-07-22 PROCEDURE — 36415 COLL VENOUS BLD VENIPUNCTURE: CPT | Performed by: STUDENT IN AN ORGANIZED HEALTH CARE EDUCATION/TRAINING PROGRAM

## 2024-07-22 PROCEDURE — 1159F MED LIST DOCD IN RCRD: CPT | Mod: CPTII,S$GLB,, | Performed by: STUDENT IN AN ORGANIZED HEALTH CARE EDUCATION/TRAINING PROGRAM

## 2024-07-22 PROCEDURE — 80048 BASIC METABOLIC PNL TOTAL CA: CPT | Performed by: STUDENT IN AN ORGANIZED HEALTH CARE EDUCATION/TRAINING PROGRAM

## 2024-07-22 PROCEDURE — 1126F AMNT PAIN NOTED NONE PRSNT: CPT | Mod: CPTII,S$GLB,, | Performed by: STUDENT IN AN ORGANIZED HEALTH CARE EDUCATION/TRAINING PROGRAM

## 2024-07-22 PROCEDURE — G2211 COMPLEX E/M VISIT ADD ON: HCPCS | Mod: S$GLB,,, | Performed by: STUDENT IN AN ORGANIZED HEALTH CARE EDUCATION/TRAINING PROGRAM

## 2024-07-22 PROCEDURE — 99999 PR PBB SHADOW E&M-EST. PATIENT-LVL III: CPT | Mod: PBBFAC,,, | Performed by: STUDENT IN AN ORGANIZED HEALTH CARE EDUCATION/TRAINING PROGRAM

## 2024-07-22 PROCEDURE — 3288F FALL RISK ASSESSMENT DOCD: CPT | Mod: CPTII,S$GLB,, | Performed by: STUDENT IN AN ORGANIZED HEALTH CARE EDUCATION/TRAINING PROGRAM

## 2024-07-22 PROCEDURE — 99214 OFFICE O/P EST MOD 30 MIN: CPT | Mod: S$GLB,,, | Performed by: STUDENT IN AN ORGANIZED HEALTH CARE EDUCATION/TRAINING PROGRAM

## 2024-07-22 RX ORDER — HYDROCODONE BITARTRATE AND ACETAMINOPHEN 5; 325 MG/1; MG/1
TABLET ORAL
COMMUNITY

## 2024-07-23 ENCOUNTER — OFFICE VISIT (OUTPATIENT)
Dept: INFECTIOUS DISEASES | Facility: CLINIC | Age: 84
End: 2024-07-23
Payer: MEDICARE

## 2024-07-23 ENCOUNTER — OFFICE VISIT (OUTPATIENT)
Dept: PULMONOLOGY | Facility: CLINIC | Age: 84
End: 2024-07-23
Payer: MEDICARE

## 2024-07-23 ENCOUNTER — LAB VISIT (OUTPATIENT)
Dept: LAB | Facility: HOSPITAL | Age: 84
End: 2024-07-23
Attending: STUDENT IN AN ORGANIZED HEALTH CARE EDUCATION/TRAINING PROGRAM
Payer: MEDICARE

## 2024-07-23 VITALS
HEART RATE: 49 BPM | TEMPERATURE: 97 F | BODY MASS INDEX: 31.25 KG/M2 | HEIGHT: 69 IN | WEIGHT: 211 LBS | DIASTOLIC BLOOD PRESSURE: 70 MMHG | SYSTOLIC BLOOD PRESSURE: 118 MMHG

## 2024-07-23 VITALS
OXYGEN SATURATION: 95 % | DIASTOLIC BLOOD PRESSURE: 70 MMHG | HEIGHT: 69 IN | WEIGHT: 211 LBS | HEART RATE: 46 BPM | SYSTOLIC BLOOD PRESSURE: 118 MMHG | BODY MASS INDEX: 31.25 KG/M2

## 2024-07-23 DIAGNOSIS — N39.0 URINARY TRACT INFECTION, SITE NOT SPECIFIED: Primary | ICD-10-CM

## 2024-07-23 DIAGNOSIS — N20.0 STAGHORN CALCULUS: ICD-10-CM

## 2024-07-23 DIAGNOSIS — B37.0 ORAL THRUSH: Primary | ICD-10-CM

## 2024-07-23 DIAGNOSIS — N39.0 RECURRENT UTI: ICD-10-CM

## 2024-07-23 DIAGNOSIS — I10 ESSENTIAL HYPERTENSION: ICD-10-CM

## 2024-07-23 DIAGNOSIS — A49.8 KLEBSIELLA PNEUMONIAE INFECTION: ICD-10-CM

## 2024-07-23 DIAGNOSIS — G47.33 OBSTRUCTIVE SLEEP APNEA SYNDROME: Primary | ICD-10-CM

## 2024-07-23 DIAGNOSIS — N32.81 OVERACTIVE BLADDER: ICD-10-CM

## 2024-07-23 DIAGNOSIS — N39.41 URGE INCONTINENCE: ICD-10-CM

## 2024-07-23 DIAGNOSIS — M54.50 LOW BACK PAIN, NON-SPECIFIC: ICD-10-CM

## 2024-07-23 DIAGNOSIS — A49.8 NECROBACILLOSIS: ICD-10-CM

## 2024-07-23 DIAGNOSIS — E66.9 OBESITY, UNSPECIFIED CLASSIFICATION, UNSPECIFIED OBESITY TYPE, UNSPECIFIED WHETHER SERIOUS COMORBIDITY PRESENT: ICD-10-CM

## 2024-07-23 DIAGNOSIS — R73.9 ELEVATED BLOOD SUGAR: ICD-10-CM

## 2024-07-23 LAB
BILIRUB UR QL STRIP: NEGATIVE
CLARITY UR: CLEAR
COLOR UR: COLORLESS
GLUCOSE UR QL STRIP: NEGATIVE
HGB UR QL STRIP: ABNORMAL
HYALINE CASTS #/AREA URNS LPF: 7 /LPF
KETONES UR QL STRIP: NEGATIVE
LEUKOCYTE ESTERASE UR QL STRIP: ABNORMAL
MICROSCOPIC COMMENT: ABNORMAL
NITRITE UR QL STRIP: NEGATIVE
PH UR STRIP: 7 [PH] (ref 5–8)
PROT UR QL STRIP: NEGATIVE
RBC #/AREA URNS HPF: 7 /HPF (ref 0–4)
SP GR UR STRIP: 1.01 (ref 1–1.03)
SQUAMOUS #/AREA URNS HPF: 3 /HPF
URN SPEC COLLECT METH UR: ABNORMAL
UROBILINOGEN UR STRIP-ACNC: NEGATIVE EU/DL
WBC #/AREA URNS HPF: 9 /HPF (ref 0–5)

## 2024-07-23 PROCEDURE — 3288F FALL RISK ASSESSMENT DOCD: CPT | Mod: CPTII,S$GLB,, | Performed by: INTERNAL MEDICINE

## 2024-07-23 PROCEDURE — 3074F SYST BP LT 130 MM HG: CPT | Mod: CPTII,S$GLB,, | Performed by: INTERNAL MEDICINE

## 2024-07-23 PROCEDURE — 1125F AMNT PAIN NOTED PAIN PRSNT: CPT | Mod: CPTII,S$GLB,, | Performed by: INTERNAL MEDICINE

## 2024-07-23 PROCEDURE — 1101F PT FALLS ASSESS-DOCD LE1/YR: CPT | Mod: CPTII,S$GLB,, | Performed by: INTERNAL MEDICINE

## 2024-07-23 PROCEDURE — 3288F FALL RISK ASSESSMENT DOCD: CPT | Mod: CPTII,S$GLB,, | Performed by: STUDENT IN AN ORGANIZED HEALTH CARE EDUCATION/TRAINING PROGRAM

## 2024-07-23 PROCEDURE — 1101F PT FALLS ASSESS-DOCD LE1/YR: CPT | Mod: CPTII,S$GLB,, | Performed by: STUDENT IN AN ORGANIZED HEALTH CARE EDUCATION/TRAINING PROGRAM

## 2024-07-23 PROCEDURE — 1125F AMNT PAIN NOTED PAIN PRSNT: CPT | Mod: CPTII,S$GLB,, | Performed by: STUDENT IN AN ORGANIZED HEALTH CARE EDUCATION/TRAINING PROGRAM

## 2024-07-23 PROCEDURE — 1159F MED LIST DOCD IN RCRD: CPT | Mod: CPTII,S$GLB,, | Performed by: STUDENT IN AN ORGANIZED HEALTH CARE EDUCATION/TRAINING PROGRAM

## 2024-07-23 PROCEDURE — 3078F DIAST BP <80 MM HG: CPT | Mod: CPTII,S$GLB,, | Performed by: INTERNAL MEDICINE

## 2024-07-23 PROCEDURE — 1160F RVW MEDS BY RX/DR IN RCRD: CPT | Mod: CPTII,S$GLB,, | Performed by: INTERNAL MEDICINE

## 2024-07-23 PROCEDURE — 81001 URINALYSIS AUTO W/SCOPE: CPT | Performed by: STUDENT IN AN ORGANIZED HEALTH CARE EDUCATION/TRAINING PROGRAM

## 2024-07-23 PROCEDURE — 1159F MED LIST DOCD IN RCRD: CPT | Mod: CPTII,S$GLB,, | Performed by: INTERNAL MEDICINE

## 2024-07-23 PROCEDURE — 99213 OFFICE O/P EST LOW 20 MIN: CPT | Mod: S$GLB,,, | Performed by: INTERNAL MEDICINE

## 2024-07-23 PROCEDURE — 3074F SYST BP LT 130 MM HG: CPT | Mod: CPTII,S$GLB,, | Performed by: STUDENT IN AN ORGANIZED HEALTH CARE EDUCATION/TRAINING PROGRAM

## 2024-07-23 PROCEDURE — 3078F DIAST BP <80 MM HG: CPT | Mod: CPTII,S$GLB,, | Performed by: STUDENT IN AN ORGANIZED HEALTH CARE EDUCATION/TRAINING PROGRAM

## 2024-07-23 PROCEDURE — 99214 OFFICE O/P EST MOD 30 MIN: CPT | Mod: S$GLB,,, | Performed by: STUDENT IN AN ORGANIZED HEALTH CARE EDUCATION/TRAINING PROGRAM

## 2024-07-23 RX ORDER — NYSTATIN 100000 [USP'U]/ML
5 SUSPENSION ORAL 4 TIMES DAILY
Qty: 200 ML | Refills: 0 | Status: SHIPPED | OUTPATIENT
Start: 2024-07-23 | End: 2024-08-02

## 2024-07-23 NOTE — PROGRESS NOTES
Follow up     HPI: Jo Alegre is a 83 y.o. female , very pleasant, former smoker, with past medical history of diabetes, COPD, CHF who has been followed by Urology for recurrent UTIs and bilateral kidney cysts and a left large staghorn calculus since imaging on 2020.  Patient reports back in October she was admitted to the hospital for acute on chronic CHF exacerbation, noted to have MARCELINO, kidney ultrasound then revealed bilateral large cysts which prompted CT abdomen and pelvis which revealed a serial of fluid collections in the right and left kidneys.  A giant 14 cm left renal cyst observed.  No hydronephrosis or stones appreciated.  She had an IR drainage placed for the large giant cyst which ended up being an abscess, 50 cc of purulent fluid was removed on 11/2/23, cultures grew pansensitive Proteus mirabilis.  She then had to have her drain repositioned because of malposition, drained again and replaced 1/12/24, repeat cultures with Proteus mirabilis.  She has completed on and off 17 days of ciprofloxacin twice a day.      Patient is here with her daughters, she is in the wheelchair, she is awake, alert, very pleasant.  She denies any fever or chills, no nausea or vomiting, no chest pain or cough, baseline shortness of breath, she does complain of increased urinary frequency, foul-smelling urine, and some dysuria at the beginning of micturition, she denies any changes in bowel movements.  Patient and family are very concerned regarding next step of care, explained plan of care including repeating CT scan to address status of prior fluid collection.  It is possible that we either consider further IR guided drainage with IV antibiotics versus surgery but to be determined after discussion with consultants.     Will obtain labs today.    2/26/24: Interim reviewed, patient is here for follow-up, daughters present. She reports she is feeling fine, has no acute complaints. She saw Urology and plan for  cystoscopy 3/8. Discussed with patient and daughters plan of care and will order PICC line, start rocephin 2g IV daily for 2 weeks for kidney abscess and assess duration of therapy based on repeat CT scan before the end of therapy. She understands plan of care, will also check urine next Monday in preparation to procedure. All questions answered.     3/26:  Patient seen examined, family present.  She is here for follow-up, completing 4 weeks of IV antibiotics for complicated bilateral renal fluid collections.  She had cystoscopy on 03/08 by Urology, no obvious evidence of extravasation from the left renal collecting system.  There was good drainage of the left kidney.  She has failed IR drainage for her retroperitoneal fluid collections twice.  Plan to either attempt a 3rd IR drainage versus surgical washout.  Repeat CT scan with no interval change on size of fluid collections.  Discussed with daughter and family that we might need surgical intervention.  We will remove PICC line today.  The patient denies any fever chills, no nausea or vomiting, she denies any constitutional symptoms.  She is looking forward to go on vacation and come back and wait for next step in therapy.  Alarm symptoms given to patient and family in case she needs to go to the hospital.  Weekly labs reviewed, stable, CRP has remained normal throughout.    4/29: Patient is here for follow up, daughters present. They just came back from vacation, they went to [place].  Unfortunately she had a fall in the bathroom, missed the toilet.  She did have any fracture on her hip.  Just large bruising on the right shoulder.  Patient completed 4 weeks of Rocephin without improvement or change on CT scan.  Seen by Urology on 04/17, decided and agreed with Dr. Parra to monitor off antibiotics, alarm symptoms given to the patient and family, we will watch closely given the fact that she has been afebrile, normal white count, and no signs of ongoing  infectious process at the moment.  All questions answered from daughters and the patient and will have her follow-up in 3 months CT scan and assess the progress/evolution of prior fluid collection.    7/22: Patient is here for follow up, daughter present.  Since last visit, patient has been seen by multiple specialists, she is getting ablations to her nerve phone her hips, left hip is improving, she is going to have procedures done to her right.  Patient seen by Urology yesterday, plan to repeat CT scan in 3 months and continue to monitor medically.  She was recently seen in the emergency room for UTI, sent home on levofloxacin for 5 days.  Repeat urine culture by Urology earlier this month with the same organism now resistant to ampicillin sulbactam, ciprofloxacin 250 mg twice a day was prescribed for 7 days.  Patient mentioned she no longer has dysuria but she has noticed increased urinary frequency, denies foul-smelling urine, no changes in color.  She denies fever or chills, no night sweats.  Discussed plan of care with patient and daughter, we will repeat UA today given increased urinary frequency and ensure there is no underlying UTI.    Review of patient's allergies indicates:   Allergen Reactions    Codeine Other (See Comments)     nausea    Hydrocodone Nausea And Vomiting    Lasix [furosemide]      rash     Past Medical History:   Diagnosis Date    Allergy     Codeine, Lasix    Atrial fibrillation     Atrial fibrillation     Cataract     CHF (congestive heart failure)     Diabetes mellitus, type 2     Ejection fraction < 50% 10/18/2017    Approximately 35%  Based on prior  Echocardiogram.    Encounter for blood transfusion     Hyperlipidemia     Osteoporosis     PONV (postoperative nausea and vomiting)     Thyroid disorder screening 10/17/2017    TSH of 1.12 ordered by Dr. dee Jones     Past Surgical History:   Procedure Laterality Date    ANTEGRADE NEPHROSTOGRAPHY Left 8/25/2022    Procedure:  NEPHROSTOGRAM, ANTEGRADE;  Surgeon: Shelby Parra MD;  Location: [facility] OR;  Service: Urology;  Laterality: Left;    CHOLECYSTECTOMY  1997    [place]     COLONOSCOPY      CYSTOSCOPY Left 3/8/2024    Procedure: CYSTOSCOPY;  Surgeon: Shelby Parra MD;  Location: [facility] OR;  Service: Urology;  Laterality: Left;    CYSTOSCOPY W/ URETERAL STENT PLACEMENT Left 7/17/2022    Procedure: CYSTOSCOPY, WITH URETERAL STENT INSERTION;  Surgeon: Shelby Parra MD;  Location: [facility] OR;  Service: Urology;  Laterality: Left;    CYSTOSCOPY W/ URETERAL STENT REMOVAL Left 9/21/2022    Procedure: CYSTOSCOPY, WITH URETERAL STENT REMOVAL;  Surgeon: Shelby Parra MD;  Location: [facility] OR;  Service: Urology;  Laterality: Left;    CYSTOSCOPY WITH URETEROSCOPY, RETROGRADE PYELOGRAPHY, AND INSERTION OF STENT Left 8/25/2022    Procedure: CYSTOSCOPY, WITH RETROGRADE PYELOGRAM AND URETERAL STENT INSERTION;  Surgeon: Shelby Parra MD;  Location: [facility] OR;  Service: Urology;  Laterality: Left;    EYE SURGERY      bilateral cataracts    FINGER SURGERY Right 2021    right pinky finger    FLEXIBLE CYSTOSCOPY Left 8/25/2022    Procedure: CYSTOSCOPY, FLEXIBLE WITH STENT REMOVAL;  Surgeon: Shelby Parra MD;  Location: [facility] OR;  Service: Urology;  Laterality: Left;    FRACTURE SURGERY  2014    right femur with neville    HEMORRHOID SURGERY      48 yrs ago    HYSTERECTOMY      NEPHROSTOMY Left 8/25/2022    Procedure: CREATION, NEPHROSTOMY;  Surgeon: Shelby Parra MD;  Location: [facility] OR;  Service: Urology;  Laterality: Left;    PERCUTANEOUS NEPHROLITHOTOMY N/A 8/25/2022    Procedure: NEPHROLITHOTOMY, PERCUTANEOUS;  Surgeon: Shelby Parra MD;  Location: [facility] OR;  Service: Urology;  Laterality: N/A;    REPLACEMENT OF IMPLANTABLE CARDIOVERTER-DEFIBRILLATOR (ICD) GENERATOR N/A 12/13/2019    Procedure: REPLACEMENT, PULSE GENERATOR, ICD-MEDTRONIC;  Surgeon: Sebastian Nowak III, MD;  Location: [facility] CATH;  Service: Cardiology;   "Laterality: N/A;    RETROGRADE PYELOGRAPHY N/A 3/8/2024    Procedure: PYELOGRAM, RETROGRADE;  Surgeon: Shelby Parra MD;  Location: [facility];  Service: Urology;  Laterality: N/A;    TONSILLECTOMY        Social History     Tobacco Use    Smoking status: Former     Passive exposure: Past    Smokeless tobacco: Never    Tobacco comments:     quit 2013   Substance Use Topics    Alcohol use: No     Family History   Problem Relation Name Age of Onset    Heart disease Mother Jannette     Cancer Father Branedn         Lung Cancer ??? Asbestos    Breast cancer Paternal Aunt           Review of Systems   Constitutional:  Negative for chills and fever.   HENT:  Negative for sinus pain.    Respiratory:  Negative for cough and shortness of breath.    Cardiovascular:  Negative for chest pain.   Gastrointestinal:  Negative for abdominal pain, diarrhea and nausea.   Genitourinary:  Negative for dysuria, frequency, hematuria and urgency.        Incontinence   Musculoskeletal:  Negative for back pain and myalgias.   Skin:  Negative for rash.   Neurological:  Negative for headaches.   Psychiatric/Behavioral:  Negative for confusion.          No TB exposure  Outdoor activities:  Never smoker, very occasional alcohol.  Housewife.  Travel:  None  Implants:  None  Antibiotic History:  Cipro 17 days, end date 02/05 - Ceftriaxone 4 weeks, completed 3/25 - Levaquin 5 days in May, Cipro 250 b.i.d. 7/8-7/15      Objective:      Blood pressure 118/70, pulse (!) 49, temperature 96.8 °F (36 °C), height 5' 9" (1.753 m), weight 95.7 kg (211 lb), SpO2 (P) 95%. Body mass index is 31.16 kg/m².  Physical Exam  Constitutional:       Appearance: Normal appearance. She is obese.   HENT:      Mouth/Throat:      Mouth: Mucous membranes are moist.      Pharynx: Oropharynx is clear.      Comments: Mild oral thrush noted on tongue  Eyes:      Extraocular Movements: Extraocular movements intact.      Pupils: Pupils are equal, round, and reactive to light. "   Cardiovascular:      Rate and Rhythm: Normal rate and regular rhythm.      Pulses: Normal pulses.      Heart sounds: Murmur heard.   Pulmonary:      Effort: Pulmonary effort is normal.      Breath sounds: No rales.      Comments: Mostly clear to auscultation bilaterally  Abdominal:      General: Bowel sounds are normal.      Palpations: Abdomen is soft.      Tenderness: There is no abdominal tenderness. There is no rebound.   Musculoskeletal:      Cervical back: Normal range of motion and neck supple.      Right lower leg: Edema present.      Left lower leg: No edema.      Comments: Legs less swollen compared to prior visits, still right more than left   Skin:     General: Skin is warm.      Capillary Refill: Capillary refill takes less than 2 seconds.      Findings: No rash.   Neurological:      Mental Status: She is alert and oriented to person, place, and time. Mental status is at baseline.      Sensory: No sensory deficit.      Motor: No weakness.   Psychiatric:         Thought Content: Thought content normal.             General Labs reviewed:  Lab Results   Component Value Date    WBC 7.76 05/25/2024    RBC 4.17 05/25/2024    HGB 12.9 05/25/2024    HCT 40.5 05/25/2024    MCV 97 05/25/2024    MCH 30.9 05/25/2024    MCHC 31.9 (L) 05/25/2024    RDW 13.7 05/25/2024     05/25/2024    MPV 9.9 05/25/2024    GRAN 5.4 05/25/2024    GRAN 69.8 05/25/2024    LYMPH 1.3 05/25/2024    LYMPH 17.0 (L) 05/25/2024    MONO 1.0 05/25/2024    MONO 12.2 05/25/2024    EOS 0.0 05/25/2024    BASO 0.02 05/25/2024    EOSINOPHIL 0.4 05/25/2024    BASOPHIL 0.3 05/25/2024     CMP  Sodium   Date Value Ref Range Status   07/22/2024 140 136 - 145 mmol/L Final     Potassium   Date Value Ref Range Status   07/22/2024 4.7 3.5 - 5.1 mmol/L Final     Chloride   Date Value Ref Range Status   07/22/2024 103 95 - 110 mmol/L Final     CO2   Date Value Ref Range Status   07/22/2024 25 23 - 29 mmol/L Final     Glucose   Date Value Ref Range  Status   07/22/2024 70 70 - 110 mg/dL Final     BUN   Date Value Ref Range Status   07/22/2024 26 (H) 8 - 23 mg/dL Final     Creatinine   Date Value Ref Range Status   07/22/2024 1.8 (H) 0.5 - 1.4 mg/dL Final     Calcium   Date Value Ref Range Status   07/22/2024 10.0 8.7 - 10.5 mg/dL Final     Total Protein   Date Value Ref Range Status   05/25/2024 7.2 6.0 - 8.4 g/dL Final     Albumin   Date Value Ref Range Status   05/25/2024 4.1 3.5 - 5.2 g/dL Final     Total Bilirubin   Date Value Ref Range Status   05/25/2024 0.6 0.1 - 1.0 mg/dL Final     Comment:     For infants and newborns, interpretation of results should be based  on gestational age, weight and in agreement with clinical  observations.    Premature Infant recommended reference ranges:  Up to 24 hours.............<8.0 mg/dL  Up to 48 hours............<12.0 mg/dL  3-5 days..................<15.0 mg/dL  6-29 days.................<15.0 mg/dL       Alkaline Phosphatase   Date Value Ref Range Status   05/25/2024 161 (H) 55 - 135 U/L Final     AST   Date Value Ref Range Status   05/25/2024 12 10 - 40 U/L Final     ALT   Date Value Ref Range Status   05/25/2024 9 (L) 10 - 44 U/L Final     Anion Gap   Date Value Ref Range Status   07/22/2024 12 8 - 16 mmol/L Final     eGFR   Date Value Ref Range Status   07/22/2024 28 (A) >60 mL/min/1.73 m^2 Final        Latest Reference Range & Units 11/02/23 14:42 01/12/24 13:57   Fluid Color  Yellow Red   Fluid Appearance  Turbid Turbid   WBC, Body Fluid /cu mm [id number]   Body Fluid Type  Cyst Fluid Other (Specify)   Segs, Fluid % 86 7   Lymphs, Fluid % 7 93   Monocytes/Macrophages, Fluid % 7        Micro:  Urine culture 7/5/24:      Component 2 wk ago   Urine Culture, Routine  Abnormal   KLEBSIELLA PNEUMONIAE  >100,000 cfu/ml    Resulting Agency [facility]        Susceptibility     Klebsiella pneumoniae     CULTURE, URINE     Amp/Sulbactam >16/8 mcg/mL Resistant     Cefazolin 4 mcg/mL Sensitive     Cefepime <=2 mcg/mL Sensitive      Ceftriaxone <=1 mcg/mL Sensitive     Ciprofloxacin <=0.25 mcg/mL Sensitive     Ertapenem <=0.5 mcg/mL Sensitive     Gentamicin <=2 mcg/mL Sensitive     Levofloxacin <=0.5 mcg/mL Sensitive     Meropenem <=1 mcg/mL Sensitive     Nitrofurantoin 64 mcg/mL Intermediate     Piperacillin/Tazo 16 mcg/mL Intermediate     Tetracycline >8 mcg/mL Resistant     Tobramycin <=2 mcg/mL Sensitive     Trimeth/Sulfa <=2/38 mcg/mL Sensitive               Urine culture 5/25/24:  Urine Culture, Routine  Abnormal   KLEBSIELLA PNEUMONIAE  >100,000 cfu/ml    Resulting Agency [facility]        Susceptibility     Klebsiella pneumoniae     CULTURE, URINE     Amp/Sulbactam <=8/4 mcg/mL Sensitive     Cefazolin <=2 mcg/mL Sensitive     Cefepime <=2 mcg/mL Sensitive     Ceftriaxone <=1 mcg/mL Sensitive     Ciprofloxacin <=0.25 mcg/mL Sensitive     Ertapenem <=0.5 mcg/mL Sensitive     Gentamicin <=2 mcg/mL Sensitive     Levofloxacin <=0.5 mcg/mL Sensitive     Meropenem <=1 mcg/mL Sensitive     Nitrofurantoin <=32 mcg/mL Sensitive     Piperacillin/Tazo <=8 mcg/mL Sensitive     Tetracycline <=4 mcg/mL Sensitive     Tobramycin <=2 mcg/mL Sensitive     Trimeth/Sulfa <=2/38 mcg/mL Sensitive                   Fluid culture L kidney 1/12/24:  Aerobic Bacterial Culture  Abnormal   PROTEUS MIRABILIS  Few    Resulting Agency [facility]        Susceptibility       Proteus mirabilis     CULTURE, AEROBIC  (SPECIFY SOURCE)     Amox/K Clav'ate <=8/4 mcg/mL Sensitive     Amp/Sulbactam <=8/4 mcg/mL Sensitive     Ampicillin <=8 mcg/mL Sensitive     Cefazolin <=2 mcg/mL Sensitive     Cefepime <=2 mcg/mL Sensitive     Ceftriaxone <=1 mcg/mL Sensitive     Ciprofloxacin <=1 mcg/mL Sensitive     Ertapenem <=0.5 mcg/mL Sensitive     Gentamicin <=4 mcg/mL Sensitive     Levofloxacin <=2 mcg/mL Sensitive     Meropenem <=1 mcg/mL Sensitive     Piperacillin/Tazo <=16 mcg/mL Sensitive     Tobramycin <=4 mcg/mL Sensitive     Trimeth/Sulfa <=2/38 mcg/mL Sensitive               Fluid  culture L kidney 11/2/23:  Aerobic Bacterial Culture  Abnormal   PROTEUS MIRABILIS  Many    Resulting Agency [facility]        Susceptibility     Proteus mirabilis     CULTURE, AEROBIC  (SPECIFY SOURCE)     Amox/K Clav'ate <=8/4 mcg/mL Sensitive     Amp/Sulbactam <=8/4 mcg/mL Sensitive     Ampicillin <=8 mcg/mL Sensitive     Cefazolin <=2 mcg/mL Sensitive     Cefepime <=2 mcg/mL Sensitive     Ceftriaxone <=1 mcg/mL Sensitive     Ciprofloxacin <=1 mcg/mL Sensitive     Ertapenem <=0.5 mcg/mL Sensitive     Gentamicin <=4 mcg/mL Sensitive     Levofloxacin <=2 mcg/mL Sensitive     Meropenem <=1 mcg/mL Sensitive     Piperacillin/Tazo <=16 mcg/mL Sensitive     Tobramycin <=4 mcg/mL Sensitive     Trimeth/Sulfa <=2/38 mcg/mL Sensitive                         Imaging Reviewed:  Repeat CT scan 2/26/04:  Irregular multi septated fluid collection in the left upper quadrant is not significantly changed in size compared to prior exam from of February 19, 2024.  Multiple bilateral renal lesions, incompletely characterized on this noncontrast exam, grossly stable compared to prior.  Nonobstructing left nephrolithiasis, also stable.  Intrahepatic and extrahepatic bile duct dilation status post cholecystectomy.  Please correlate with bilirubin levels to determine significance.  Small left pleural effusion.  Multiple thoracolumbar compression fractures.  Atherosclerosis, diverticulosis, and other incidental findings as above.      Repeat CT scan 2/20/24:  FINDINGS: There is a multiloculated 5.9 by 3.9 cm fluid collection seen in the left upper quadrant just below the diaphragm that previously measured 6.2 by 4.4 cm in similar plane.  There is a small left pleural effusion similar to prior exam.  There is associated compressive atelectasis similar to prior exam.  There are bilateral renal cysts with the largest seen inferiorly in association with the left kidney measuring 13 cm.  There is a 2.7 cm hyperdense lesion seen in association with  the inferior pole of the right kidney unchanged from prior exam.  There are bilateral renal calculi the largest of which is seen at the inferior pole on the left and measures 1.8 cm.   The patient is status post cholecystectomy with prominence of the intrahepatic biliary ducts and common bile duct similar in appearance to prior exam.   There is no evidence bowel obstruction.  Stool is seen throughout the colon.  The patient has a right femoral neville and proximal screw.     Impression:  Again seen is the multi septated fluid collection in the left upper quadrant just inferior to the diaphragm, left pleural effusion with associated compressive atelectasis.  Overall the appearance of the fluid collection in the left pleural effusion is similar to what was seen on prior exam from 01/24/2024.     The patient has bilateral renal calculi similar in appearance to prior exam.     CT abdomen/pelvis w/o contrast 1/12/24:  FINDINGS:  There is elevation of the left hemidiaphragm and moderate atelectasis in the left lower lobe.  A pleural effusion or pneumothorax is not seen.  The liver is of normal size and CT density.  The gallbladder is absent with surgical clips noted.  There is a 2.6 cm cyst of segment 1 of the liver parenchyma and dilation of the common bile duct up to 2 cm in diameter.  This is unchanged from prior study and a stone or mass in the head of the pancreas is not seen.  The pancreas appears of normal contour and CT density without edema or mass.  The spleen is of normal size and CT density.  On the right there is a 4.3 cm and a 5.6 cm water density cyst and 2 higher density cyst at the lateral surface representing probable bloody cyst.  These are stable however.  The largest measures 1.3 cm.  Stone or hydronephrosis on the right is not seen.  On the left a nephrostomy tube is not presently seen.  In the Janina nephric space superiorly is a fluid collection measuring 4.4 cm and a partial fluid collection measuring  2.8 cm as well as a probable bloody cyst measuring 3.2 cm.  There is also a 3.8 cm cyst and a giant cyst at the lower pole which measures 14 cm.  There is significantly less stone burden than seen on the prior studies primarily in the lower pole.  The largest stone fragment measures 1.1 cm.  Hydronephrosis is not presently seen.  The abdominal aorta is heavily calcified.  An aneurysm is not identified.    The stomach is of normal configuration.  Small bowel dilatation or air-fluid levels are not seen.  The colon appears of normal configuration without distention or mass.  A normal appendix is seen.  Free fluid or free air is not noted.  The bladder is of normal contour without asymmetry.  A uterus is not seen.     Impression:  Elevation of the left hemidiaphragm and moderate left lung atelectasis noted.  Under the left hemidiaphragm are 2 fluid collections either post nephrostomy tube abscesses or seromas.  Significantly less stone burden in the left kidney with 2 prominent fragments.  Hydronephrosis is not presently seen.  There are multiple bilateral renal cysts, some high density suggesting bloody cysts.  A giant cyst of the lower pole of the left kidney measures 14 cm.  Prior cholecystectomy.  2.6 cm cyst of segment 1 of the liver parenchyma.  Dilation of the common bile duct reaching 2 cm without a stone or mass seen.  Prior hysterectomy.    CT abdomen/pelvis w/o contrast 11/24/2023.  FINDINGS:  The collection seen posterior to and cephalad to the upper pole of the left kidney, extending subphrenic in through the diaphragm does not appear significantly changed compared the prior study of 10/16/2023.  Craniocaudal measurement on the coronal image is 9.8 cm today versus 9.4 cm on the prior exam.  On axial images the diameter is 6.2 x 4.7 cm today versus 6.4 x 4.3 cm previously.  Appears complex with irregular fluid component irregular wall.  Findings consistent with abscess.   Large, 14 cm lower pole left renal  cyst.  Extrinsic compression on the lateral margin the cyst by colon.  The appearance is not significantly changed.   Oval 2.5 cm and 1.5 cm high density left renal masses density not as high as can be attributed to a high density cyst but not significantly changed compared to prior exam and corresponding as was described as hemorrhagic cysts on MRI.  Multiple left renal stones measuring up to approximately 7 mm.  No hydronephrosis opaque ureteral stone or ureteral obstruction evident   Right renal masses are not significantly changed with a 5.5 cm and a 4.6 cm and 11 mm simple cyst and high density masses which although not fully characterized on the single study correspond as described as hemorrhagic cyst on MRI and not significantly changed compared to the prior CT.  Largest measures 1.5 cm.  No hydronephrosis opaque renal or ureteral stone or ureteral obstruction.   Urinary bladder mildly distended at time of the exam and as visualized unremarkable appearance   Prior hysterectomy.  Adnexal region unremarkable appearance   Diverticulosis without CT findings of acute diverticulitis.  Normal appearance of the appendix.  No free intraperitoneal air or fluid.   Liver unremarkable appearance.  Cholecystectomy clips.  Common duct dilatation not significantly changed compared to the prior study likely on a post cholecystectomy basis.   Spleen not enlarged.  Posterior margin the spleen is indistinguishable from the perirenal/subphrenic collection.   Pancreas mildly atrophied   Adrenal glands; slight thickening of the adrenal glands and small left adrenal nodule suggesting adenoma not significantly changed  Abdominal aorta; no aneurysm  Osseous structures;Internal fixation right hip. Osseous degenerative changes.  Moderate compression of the superior endplate of L1 not significantly changed.  Postoperative changes lumbar spine.  Small left pleural effusion and left posterior basilar airspace opacification do not appear  significantly changed.     Impression:   The irregular, complex appearing collection suggesting abscess posterior to and cephalad to the left kidney, extending subphrenic and through the diaphragm into the pleural space does not appear significantly changed compared to the prior exam.  Additional findings as detailed above including renal stones, renal cysts, high density renal masses which although indeterminate on the current study alone correspond as described as hemorrhagic cyst on prior studies and not significantly changed compared to the prior exam.  left adrenal adenoma.      Kidney ultrasound 10/30/2023:  FINDINGS:  Right kidney: The right kidney measures 10.8 cm. No cortical thinning. No loss of corticomedullary distinction. Resistive index measures 0.76.  5.3 and 3.7 and 1 cm simple cyst.  1.2 cm cyst which may be minimally complex without increased through transmission but appearing anechoic and most likely simple cysts.  No renal stone. No hydronephrosis.     Left kidney: The left kidney measures 9.3 cm. No cortical thinning. No loss of corticomedullary distinction. Resistive index measures 0.71.  Numerous cysts including 14 cm and 3.7 cm cyst.  A few echogenic foci measuring 5-15 mm suggesting stones.  Complex structure appearing cephalad or projecting cephalad from the left kidney measuring 7.3 x 4.9 x 4.8 cm.  It was measured at 5.1 x 4.5 x 4.5 cm on the prior exam.  No hydronephrosis.     The bladder is partially distended at the time of scanning and has an unremarkable appearance.     Impression:  Multiple simple bilateral renal cysts.  Left renal stones.  7.3 cm complex structure cephalad to the left kidney was measured at 5.1 cm on prior study images 03/02/2023 and corresponds to findings seen on more recent CT abdomen of 10/16/2023. Better demonstrated on the CT and please refer to that report.       Assessment & Plan:       Recurrent UTIs in the setting of urinary incontinence, rule out  recurrent   Recent urine culture 5/25 & 7/5 Klebsiella pneumoniae, 1st 1 pansensitive 2nd 1 intermediate to Macrobid, Zosyn, resistant to tetracycline and Unasyn, sensitive to everything else   Will repeat UA with reflex today  Will call her with results    Complicated left renal abscess in the setting of large staghorn stone status post 2 IR guided drainage is 11/2/2023 & 01/12/2024, s/p 4 weeks of ceftriaxone - stable  Prior cell counts c/w abscess, cultures 11/2/23 & 1/12/24 Proteus mirabilis pan-sensitive   Seen by Urology, plan to repeat CT scan in 3 months  Alarm symptoms given to the patient and family   Plan to repeat CT scan in 3 months   RTC in 3 months she will see the NP and I will see her in 6 months     CKD not on HD     PMHx: diabetes, COPD, CHF   Follow pulmonary and PCP outpatient        Elevated blood sugar    Low back pain, non-specific    Essential hypertension    Urge incontinence    Overactive bladder    Obesity, unspecified classification, unspecified obesity type, unspecified whether serious comorbidity present          This note was created using Dragon voice recognition software that occasionally misinterpreted phrases or words.

## 2024-07-23 NOTE — PROGRESS NOTES
"Office Visit *    Patient Name: Jo Alegre  MRN: [id number]  : 1940      Reason for visit: COPD, hypoxemia    HPI:     2020 - Referred here for evaluation for possible COPD.  She was hospitalized in 2020 and found to need O2  (sat 87% with exertion) - has home O2 (2 LPM) and she wears all day.  She has a h/o smoking about 1/2 PPD for about 30 years quit about .  She has never been tested for COPD.  She has sleep study in the past and was diagnosed with sleep apnea but couldn't tolerate CPAP at that time.  Has h/o CHF (though most recent ECHO looks OK), AICD, atrial fibrillation.    2020 - Here for follow up, doing well with no new complaints.  Had PFT - no obstruction, TLC at lower level of normal and mild/mod reduction in DLCO.  ^ minute walk test was good she met her predicted distance and had no desaturation noted.  She remains at risk for sleep apnea - d/w her and family - will do overnight oximetry to check on nightly O2 needs and as a screen for possible JENNIFER ( necessary will do HST).  No corona virus exposures and has been practicing social distancing.  Discussed the need to work on weight loss and regular exercise.    2/3/2021 - Here for follow up, overall about the same.  Has CPAP at home and is doing "OK" with it but having some tolerance issues.  She is trying to be more active.  She feels that her SOB is getting worse and does have episodes of SOB happening at rest which last 3-4 minutes and spontaneously resolve .  She does have exertional dyspnea but is not exercising at all.  Patient has no known corona virus exposures and has been practicing social distancing.  We have discussed the virus and precautions and all questions have been answered.    2021 - Here for follow up, has had adjustments with her CPAP and she is doing much better (8-10 hours per night), feels better overall and reports good oxygen levels during the day and energy levels.  No other new issues.  " Patient has no known corona virus exposures and has been practicing social distancing.  We have discussed the virus and precautions and all questions have been answered.    10/5/2021 - Here for follow up and hasd been doing well.  Patient is here for follow up visit for sleep apnea and therapy.  They report no issues with their machine.  They report good compliance with the therapy and feel that they are benefiting from it.  Discussed alternative therapies/options as appropriate.  Discussed diet, weight and exercise as appropriate.  All questions answered.  Has not been able to wear her CPAP for the last week or so due to recent dental work (we discussed this).  Patient has no known corona virus exposures and has been practicing social distancing.  We have discussed the virus and precautions and all questions have been answered.  She does NOT have COPD by PFT done 7/2020.    8/18/2022 - Here for preop clearance for renal surgery for staghorn calculus.  Breathing is about the same though she may have some more dyspnea since her recent admission.  No fever, chills, cough.  She does NOT have COPD.  She is doing well with her CPAP.  She is planned for surgery next week.    Preoperative Pulmonary Clearance    Pt was seen for preoperative clearance from a pulmonary standpoint for planned renal surgery.  The risks of the procedure have been discussed with the patient including the risk of prolonged ventilatory support/difficulty weaning from ventilator, post-procedure pneumonia, post-procedure respiratory failure and DVT/pulmonary embolism.  The pt is currently stable from their respiratory status and they are cleared for the planned procedure at increased risk.  The risk should not be considered prohibitive.  If you have any questions please contact me.  She should have CPAP available in postoperative period and be moonitored because of her h/o JENNIFER>    8/17/2023 - Here for follow up, had her surgery and did well.   "Breathing has been OK.  Was hospitalized in May for heart issues.  She is having some issues with her O2 concentrator and she will contact [facility] ( we will get involved if needed).     10/23/2023 - Here for follow up, she is not happy with [facility] and would like to look into changing to [facility] (we will look into that).  Breathing has been about the same.  She has not been using her CPAP because of issues with her mask and they are going to see about that.  Ad CT chest (see below) and she is to see urology today and we will follow small nodules.    1/23/2024 - Here for follow up, Patient is here for follow up visit for sleep apnea and therapy.  They report no issues with their machine.  They report good compliance with the therapy and feel that they are benefiting from it.  Discussed alternative therapies/options as appropriate.  Discussed diet, weight and exercise as appropriate.  All questions answered.   Has some concerns about the noise of her machine.  She has had a drain placed into a "cyst" on her left kidney and she has some drainage around the tube and we looked at that.      7/23/2024 - Here for follow up, Patient is here for follow up visit for sleep apnea and therapy.  They report no issues with their machine.  They report good compliance with the therapy and feel that they are benefiting from it.  Discussed alternative therapies/options as appropriate.  Discussed diet, weight and exercise as appropriate.  All questions answered.  She is not thrilled with the machine but does use it.  She has some concern  about noise from her O2 machine (we will notify [facility])      CPAP Therapy    CPAP therapy - 12    Date of sleep study - 9/20 RDI - 23    Pt reports good compliance and benefit with the therapy.    Face to face visit with pt concerning their CPAP therapy.  I have stressed continued compliance with the treatment and all questions were answered.  Supply refills will be taken care of as needed.            Past " Medical History    Past Medical History:   Diagnosis Date    Allergy     Codeine, Lasix    Atrial fibrillation     Atrial fibrillation     Cataract     CHF (congestive heart failure)     Diabetes mellitus, type 2     Ejection fraction < 50% 10/18/2017    Approximately 35%  Based on prior  Echocardiogram.    Encounter for blood transfusion     Hyperlipidemia     Osteoporosis     PONV (postoperative nausea and vomiting)     Thyroid disorder screening 10/17/2017    TSH of 1.12 ordered by Dr. dee Jones       Past Surgical History    Past Surgical History:   Procedure Laterality Date    ANTEGRADE NEPHROSTOGRAPHY Left 8/25/2022    Procedure: NEPHROSTOGRAM, ANTEGRADE;  Surgeon: Shelby Parra MD;  Location: [facility] OR;  Service: Urology;  Laterality: Left;    CHOLECYSTECTOMY  1997    [place]     COLONOSCOPY      CYSTOSCOPY Left 3/8/2024    Procedure: CYSTOSCOPY;  Surgeon: Shelby Parra MD;  Location: [facility] OR;  Service: Urology;  Laterality: Left;    CYSTOSCOPY W/ URETERAL STENT PLACEMENT Left 7/17/2022    Procedure: CYSTOSCOPY, WITH URETERAL STENT INSERTION;  Surgeon: Shelby Parra MD;  Location: [facility] OR;  Service: Urology;  Laterality: Left;    CYSTOSCOPY W/ URETERAL STENT REMOVAL Left 9/21/2022    Procedure: CYSTOSCOPY, WITH URETERAL STENT REMOVAL;  Surgeon: Shelby Parra MD;  Location: [facility] OR;  Service: Urology;  Laterality: Left;    CYSTOSCOPY WITH URETEROSCOPY, RETROGRADE PYELOGRAPHY, AND INSERTION OF STENT Left 8/25/2022    Procedure: CYSTOSCOPY, WITH RETROGRADE PYELOGRAM AND URETERAL STENT INSERTION;  Surgeon: Shelby Parra MD;  Location: [facility];  Service: Urology;  Laterality: Left;    EYE SURGERY      bilateral cataracts    FINGER SURGERY Right 2021    right pinky finger    FLEXIBLE CYSTOSCOPY Left 8/25/2022    Procedure: CYSTOSCOPY, FLEXIBLE WITH STENT REMOVAL;  Surgeon: Shelby Parra MD;  Location: [facility] OR;  Service: Urology;  Laterality: Left;    FRACTURE SURGERY  2014     right femur with neville    HEMORRHOID SURGERY      48 yrs ago    HYSTERECTOMY      NEPHROSTOMY Left 8/25/2022    Procedure: CREATION, NEPHROSTOMY;  Surgeon: Shelby Parra MD;  Location: [facility] OR;  Service: Urology;  Laterality: Left;    PERCUTANEOUS NEPHROLITHOTOMY N/A 8/25/2022    Procedure: NEPHROLITHOTOMY, PERCUTANEOUS;  Surgeon: Shelby Parra MD;  Location: [facility] OR;  Service: Urology;  Laterality: N/A;    REPLACEMENT OF IMPLANTABLE CARDIOVERTER-DEFIBRILLATOR (ICD) GENERATOR N/A 12/13/2019    Procedure: REPLACEMENT, PULSE GENERATOR, ICD-MEDTRONIC;  Surgeon: Sebastian Nowak III, MD;  Location: [facility] CATH;  Service: Cardiology;  Laterality: N/A;    RETROGRADE PYELOGRAPHY N/A 3/8/2024    Procedure: PYELOGRAM, RETROGRADE;  Surgeon: Shelby Parra MD;  Location: [facility] OR;  Service: Urology;  Laterality: N/A;    TONSILLECTOMY         Medications      Current Outpatient Medications:     amiodarone (PACERONE) 200 MG Tab, Take 1 tablet (200 mg total) by mouth once daily., Disp: 30 tablet, Rfl: 0    atorvastatin (LIPITOR) 20 MG tablet, Take 20 mg by mouth every evening., Disp: , Rfl:     bumetanide (BUMEX) 1 MG tablet, once daily., Disp: , Rfl:     cetirizine (ZYRTEC) 10 MG tablet, Take 10 mg by mouth every evening., Disp: , Rfl:     cholecalciferol, vitamin D3, 125 mcg (5,000 unit) Tab, Take 5,000 Units by mouth 2 (two) times daily., Disp: , Rfl:     digoxin (LANOXIN) 125 mcg tablet, Take 1 tablet (0.125 mg total) by mouth once daily., Disp: 30 tablet, Rfl: 0    dorzolamide-timolol 2-0.5% (COSOPT) 22.3-6.8 mg/mL ophthalmic solution, Place 1 drop into both eyes 2 (two) times daily., Disp: , Rfl:     fluticasone propionate (FLONASE) 50 mcg/actuation nasal spray, 2 sprays (100 mcg total) by Each Nostril route once daily., Disp: 16 g, Rfl: 5    gabapentin (NEURONTIN) 100 MG capsule, TAKE 2 CAPSULES(200 MG) BY MOUTH THREE TIMES DAILY, Disp: 180 capsule, Rfl: 5    glimepiride (AMARYL) 1 MG tablet, Take 1 tablet (1 mg  total) by mouth before breakfast., Disp: 90 tablet, Rfl: 1    latanoprost 0.005 % ophthalmic solution, Place 1 drop into both eyes nightly., Disp: , Rfl:     midodrine (PROAMATINE) 5 MG Tab, Take 1 tablet (5 mg total) by mouth 3 (three) times daily before meals. (Patient taking differently: Take 5 mg by mouth 3 (three) times daily before meals. States only takes as needed), Disp: 90 tablet, Rfl: 0    mirabegron (MYRBETRIQ) 25 mg Tb24 ER tablet, Take 1 tablet (25 mg total) by mouth once daily., Disp: 30 tablet, Rfl: 11    pantoprazole (PROTONIX) 40 MG tablet, TAKE 1 TABLET(40 MG) BY MOUTH EVERY DAY, Disp: 30 tablet, Rfl: 5    rivaroxaban (XARELTO) 15 mg Tab, Take 15 mg by mouth daily with dinner or evening meal., Disp: , Rfl:     sacubitriL-valsartan (ENTRESTO) 24-26 mg per tablet, Take 1 tablet by mouth 2 (two) times daily. 1/2 tab PO BID, Disp: 30 tablet, Rfl: 0    vericiguat (VERQUVO) 5 mg Tab, Take 2.5 mg by mouth 2 (two) times a day., Disp: 30 tablet, Rfl: 0    Ca-D3-mag ox-zinc--thom-bor 600 mg calcium- 20 mcg-50 mg Tab, Take 1 tablet by mouth 2 (two) times daily. (Patient not taking: Reported on 7/23/2024), Disp: , Rfl:     GEMTESA 75 mg Tab, Take by mouth. (Patient not taking: Reported on 7/23/2024), Disp: , Rfl:     HYDROcodone-acetaminophen (NORCO) 5-325 mg per tablet, Take by oral route. (Patient not taking: Reported on 7/23/2024), Disp: , Rfl:     methocarbamoL (ROBAXIN) 500 MG Tab, Take 1-2 tablets 3 times a day as needed for pain (Patient not taking: Reported on 7/23/2024), Disp: 30 tablet, Rfl: 3  No current facility-administered medications for this visit.    Facility-Administered Medications Ordered in Other Visits:     0.9%  NaCl infusion, , Intravenous, Continuous, Sebastian Nowak III, MD, Last Rate: 75 mL/hr at 12/13/19 0728, New Bag at 12/13/19 0728    diphenhydrAMINE injection 25 mg, 25 mg, Intravenous, Once, Sebastian Nowak III, MD    lorazepam injection 1 mg, 1 mg, Intravenous, Once, Eliu  Sebastian MONTGOMERY MD    Allergies    Review of patient's allergies indicates:   Allergen Reactions    Codeine Other (See Comments)     nausea    Hydrocodone Nausea And Vomiting    Lasix [furosemide]      rash       SocHx    Social History     Tobacco Use   Smoking Status Former    Passive exposure: Past   Smokeless Tobacco Never   Tobacco Comments    quit 2013       Social History     Substance and Sexual Activity   Alcohol Use No       Drug Use - no  Occupation - retired, housewife  Asbestos exposure - no  Pets - no    FMHx    Family History   Problem Relation Name Age of Onset    Heart disease Mother Jannette     Cancer Father Branden         Lung Cancer ??? Asbestos    Breast cancer Paternal Aunt           Review of Systems  Review of Systems   Constitutional:  Negative for chills, diaphoresis, fever, malaise/fatigue and weight loss.   HENT:  Negative for congestion.    Eyes:  Negative for pain.   Respiratory:  Positive for shortness of breath. Negative for cough, hemoptysis, sputum production, wheezing and stridor.    Cardiovascular:  Positive for leg swelling. Negative for chest pain, palpitations, orthopnea, claudication and PND.        Decreased circulation   Gastrointestinal:  Negative for abdominal pain, blood in stool, constipation, diarrhea, heartburn, nausea and vomiting.   Genitourinary:  Negative for dysuria, frequency, hematuria and urgency.   Musculoskeletal:  Negative for back pain, falls, myalgias and neck pain.        Some aches and pains   Skin:  Negative for itching and rash.   Neurological:  Negative for dizziness, tingling, tremors, sensory change, speech change, focal weakness, seizures, loss of consciousness, weakness and headaches.   Psychiatric/Behavioral:  Negative for depression, substance abuse and suicidal ideas. The patient is not nervous/anxious.        Physical Exam    Vitals:    07/23/24 1318   BP: 118/70   BP Location: Left arm   Patient Position: Sitting   BP Method: Medium (Manual)   Pulse:  "(!) 46   SpO2: 95%   Weight: 95.7 kg (211 lb)   Height: 5' 9" (1.753 m)       Physical Exam  Vitals and nursing note reviewed.   Constitutional:       General: She is not in acute distress.     Appearance: She is well-developed. She is obese. She is not ill-appearing, toxic-appearing or diaphoretic.      Comments: No distress   HENT:      Head: Normocephalic and atraumatic.      Right Ear: External ear normal.      Left Ear: External ear normal.      Nose: Nose normal.   Eyes:      General: No scleral icterus.        Right eye: No discharge.         Left eye: No discharge.      Extraocular Movements: Extraocular movements intact.      Conjunctiva/sclera: Conjunctivae normal.      Pupils: Pupils are equal, round, and reactive to light.   Neck:      Thyroid: No thyromegaly.      Vascular: No JVD.      Trachea: No tracheal deviation.   Cardiovascular:      Rate and Rhythm: Normal rate and regular rhythm.      Pulses: Normal pulses.      Heart sounds: Normal heart sounds. No murmur heard.     No friction rub. No gallop.      Comments: Defibrillator in place  Pulmonary:      Effort: Pulmonary effort is normal. No respiratory distress.      Breath sounds: Normal breath sounds. No stridor. No wheezing or rales.   Chest:      Chest wall: No tenderness.   Abdominal:      General: Bowel sounds are normal. There is no distension.      Palpations: Abdomen is soft.      Tenderness: There is no abdominal tenderness. There is no guarding.      Comments: obese   Musculoskeletal:         General: Swelling present. No tenderness or deformity. Normal range of motion.      Cervical back: Normal range of motion and neck supple. No rigidity. No muscular tenderness.      Right lower leg: Edema present.      Left lower leg: Edema present.   Lymphadenopathy:      Cervical: No cervical adenopathy.   Skin:     General: Skin is warm and dry.      Coloration: Skin is not jaundiced.   Neurological:      General: No focal deficit present.      " Mental Status: She is alert and oriented to person, place, and time.      Cranial Nerves: No cranial nerve deficit.   Psychiatric:         Mood and Affect: Mood normal.         Behavior: Behavior normal.         Thought Content: Thought content normal.         Judgment: Judgment normal.         Labs    Lab Results   Component Value Date    WBC 7.76 05/25/2024    HGB 12.9 05/25/2024    HCT 40.5 05/25/2024     05/25/2024       Sodium   Date Value Ref Range Status   07/22/2024 140 136 - 145 mmol/L Final     Potassium   Date Value Ref Range Status   07/22/2024 4.7 3.5 - 5.1 mmol/L Final     Chloride   Date Value Ref Range Status   07/22/2024 103 95 - 110 mmol/L Final     CO2   Date Value Ref Range Status   07/22/2024 25 23 - 29 mmol/L Final     Glucose   Date Value Ref Range Status   07/22/2024 70 70 - 110 mg/dL Final     BUN   Date Value Ref Range Status   07/22/2024 26 (H) 8 - 23 mg/dL Final     Creatinine   Date Value Ref Range Status   07/22/2024 1.8 (H) 0.5 - 1.4 mg/dL Final     Calcium   Date Value Ref Range Status   07/22/2024 10.0 8.7 - 10.5 mg/dL Final     Total Protein   Date Value Ref Range Status   05/25/2024 7.2 6.0 - 8.4 g/dL Final     Albumin   Date Value Ref Range Status   05/25/2024 4.1 3.5 - 5.2 g/dL Final     Total Bilirubin   Date Value Ref Range Status   05/25/2024 0.6 0.1 - 1.0 mg/dL Final     Comment:     For infants and newborns, interpretation of results should be based  on gestational age, weight and in agreement with clinical  observations.    Premature Infant recommended reference ranges:  Up to 24 hours.............<8.0 mg/dL  Up to 48 hours............<12.0 mg/dL  3-5 days..................<15.0 mg/dL  6-29 days.................<15.0 mg/dL       Alkaline Phosphatase   Date Value Ref Range Status   05/25/2024 161 (H) 55 - 135 U/L Final     AST   Date Value Ref Range Status   05/25/2024 12 10 - 40 U/L Final     ALT   Date Value Ref Range Status   05/25/2024 9 (L) 10 - 44 U/L Final      Anion Gap   Date Value Ref Range Status   07/22/2024 12 8 - 16 mmol/L Final       Xrays    EXAMINATION:  XR CHEST PA AND LATERAL     CLINICAL HISTORY:  shortness of breath     FINDINGS:  PA and lateral chest is compared to 01/17/2020 shows normal cardiomediastinal silhouette.     Lungs are clear. Pulmonary vasculature is normal. No acute osseous abnormality.     Impression:     No acute pulmonary process        Electronically signed by: Monica Aquino MD  Date:                                            04/01/2020  Time:                                           16:57    CT chest (10/16/23)  Complex left upper quadrant mass or collection, likely rising from the left kidney.  This may represent a complex cystic neoplasm, or abscess.  The finding extends through the left hemidiaphragm into the left pleural space, where a small left pleural effusion is present.  The appearance is concerning for fistulization with the left pleural space.  Mild left basilar infiltrate may represent pneumonia.  Multiple renal cysts including hemorrhagic cysts as described above, without detrimental change.  Left nephrolithiasis.  No hydronephrosis.  Evidence for prior left nephrostomy.  Emphysema.  Few small pulmonary nodules.  For a part solid nodule 6 mm or greater, Fleischner Society 2017 guidelines recommend follow up with non-contrast chest CT at 3-6 months to confirm persistence. If this nodule is unchanged and the solid component remains <6 mm, annual follow up CT should be performed for 5 years; however, persistence of a part-solid nodule with solid component ?6 mm should be considered highly suspicious and may warrant further workup with PET/CT, biopsy, or resection.  Osseous demineralization.  Multiple moderate thoracolumbar compression fractures.  Left adrenal adenoma, unchanged.  This report was flagged in Epic as abnormal.      ECHO (4/1)  Mild eccentric left ventricular hypertrophy.  Mild left ventricular  enlargement.  Normal left ventricular systolic function. The estimated ejection fraction is 60%.  Grade I (mild) left ventricular diastolic dysfunction consistent with impaired relaxation.  No wall motion abnormalities.  Normal right ventricular systolic function.  Severe left atrial enlargement.  Mild mitral sclerosis.  There is mild leaflet calcification of the Mitral Valve.  Mild mitral regurgitation.  Intermediate central venous pressure (8 mmHg).  The estimated PA systolic pressure is 37 mmHg    Impression/Plan    Problem List Items Addressed This Visit          Other    Obstructive sleep apnea syndrome - Primary     Continue present PAP therapy  Pt to call if they have any trouble with their machines  Discussed with pt about signs and symptoms to watch for  Will refill supplies as needed  Have encouraged pt to continue to work on diet, weight loss and exercise  RTC 6 months                          Alverto Pascal MD

## 2024-07-23 NOTE — ASSESSMENT & PLAN NOTE
Continue present PAP therapy  Pt to call if they have any trouble with their machines  Discussed with pt about signs and symptoms to watch for  Will refill supplies as needed  Have encouraged pt to continue to work on diet, weight loss and exercise  RTC 6 months

## 2024-07-30 ENCOUNTER — TELEPHONE (OUTPATIENT)
Dept: FAMILY MEDICINE | Facility: CLINIC | Age: 84
End: 2024-07-30
Payer: MEDICARE

## 2024-07-30 DIAGNOSIS — U07.1 COVID-19: Primary | ICD-10-CM

## 2024-07-30 NOTE — TELEPHONE ENCOUNTER
----- Message from Dunia Evangelista sent at 7/30/2024  2:52 PM CDT -----  Contact: Mary (daughter)  Type:  Needs Medical Advice    Who Called: Mary    Symptoms (please be specific):  covid positive, productive cough, no fever, low BP, bit of a sore throat     How long has patient had these symptoms:   tested today     Pharmacy name and phone #:     [id number] - [place], [place] - [address] AT [facility] [address]  [address]  [place] [address]  Phone: [phone] Fax: [phone]    Would the patient rather a call back or a response via [facility]? Call back    Best Call Back Number: [phone]    Additional Information: tested positive for covid, would like the antivirals meds    Please call to advise  Thanks    ///////////////////////////     I did review her situation with the patient and daughter.  She has perhaps might symptoms at this point in managing with Tylenol and hydration.  Paxlovid will interact with her amiodarone and anticoagulation.  She also has chronic kidney disease with a GFR less than 30.  And has Paxlovid might not be indicated.  We will continue to watch.  Should she get more sick, she might need to go to the hospital to get monoclonal antibodies.  Patient agrees at this point.  Spoke to her daughter miss Forbes also.

## 2024-08-26 PROBLEM — N10 ACUTE PYELONEPHRITIS: Status: RESOLVED | Noted: 2022-07-17 | Resolved: 2024-08-26

## 2024-09-14 NOTE — PATIENT INSTRUCTIONS

## 2024-09-23 DIAGNOSIS — G57.93 NEUROPATHY OF BOTH FEET: ICD-10-CM

## 2024-09-23 DIAGNOSIS — M54.6 ACUTE RIGHT-SIDED THORACIC BACK PAIN: ICD-10-CM

## 2024-09-23 RX ORDER — GABAPENTIN 100 MG/1
CAPSULE ORAL
Qty: 180 CAPSULE | Refills: 5 | Status: SHIPPED | OUTPATIENT
Start: 2024-09-23

## 2024-10-21 ENCOUNTER — OFFICE VISIT (OUTPATIENT)
Dept: FAMILY MEDICINE | Facility: CLINIC | Age: 84
End: 2024-10-21
Payer: MEDICARE

## 2024-10-21 ENCOUNTER — PATIENT MESSAGE (OUTPATIENT)
Dept: FAMILY MEDICINE | Facility: CLINIC | Age: 84
End: 2024-10-21

## 2024-10-21 VITALS
WEIGHT: 214 LBS | HEIGHT: 69 IN | BODY MASS INDEX: 31.7 KG/M2 | HEART RATE: 71 BPM | SYSTOLIC BLOOD PRESSURE: 117 MMHG | DIASTOLIC BLOOD PRESSURE: 59 MMHG

## 2024-10-21 DIAGNOSIS — Z74.09 DECREASED STRENGTH, ENDURANCE, AND MOBILITY: ICD-10-CM

## 2024-10-21 DIAGNOSIS — R53.1 DECREASED STRENGTH, ENDURANCE, AND MOBILITY: Chronic | ICD-10-CM

## 2024-10-21 DIAGNOSIS — Z23 NEED FOR INFLUENZA VACCINATION: ICD-10-CM

## 2024-10-21 DIAGNOSIS — R53.1 DECREASED STRENGTH, ENDURANCE, AND MOBILITY: ICD-10-CM

## 2024-10-21 DIAGNOSIS — I50.42 CHRONIC COMBINED SYSTOLIC AND DIASTOLIC CONGESTIVE HEART FAILURE: Chronic | ICD-10-CM

## 2024-10-21 DIAGNOSIS — R68.89 DECREASED STRENGTH, ENDURANCE, AND MOBILITY: Chronic | ICD-10-CM

## 2024-10-21 DIAGNOSIS — I48.0 PAROXYSMAL ATRIAL FIBRILLATION: Primary | Chronic | ICD-10-CM

## 2024-10-21 DIAGNOSIS — R54 FRAIL ELDERLY: ICD-10-CM

## 2024-10-21 DIAGNOSIS — M51.9 LUMBOSACRAL DISC DISEASE: ICD-10-CM

## 2024-10-21 DIAGNOSIS — N18.4 STAGE 4 CHRONIC KIDNEY DISEASE: Chronic | ICD-10-CM

## 2024-10-21 DIAGNOSIS — R68.89 DECREASED STRENGTH, ENDURANCE, AND MOBILITY: ICD-10-CM

## 2024-10-21 DIAGNOSIS — Z74.09 DECREASED STRENGTH, ENDURANCE, AND MOBILITY: Chronic | ICD-10-CM

## 2024-10-21 DIAGNOSIS — Z71.89 ADVANCED CARE PLANNING/COUNSELING DISCUSSION: ICD-10-CM

## 2024-10-21 DIAGNOSIS — I10 ESSENTIAL HYPERTENSION: ICD-10-CM

## 2024-10-21 DIAGNOSIS — I50.42 CHRONIC COMBINED SYSTOLIC AND DIASTOLIC CONGESTIVE HEART FAILURE: Primary | ICD-10-CM

## 2024-10-21 DIAGNOSIS — Z74.09 NEED FOR ASSISTANCE DUE TO REDUCED MOBILITY: ICD-10-CM

## 2024-10-21 PROCEDURE — G0008 ADMIN INFLUENZA VIRUS VAC: HCPCS | Mod: S$GLB,,, | Performed by: INTERNAL MEDICINE

## 2024-10-21 PROCEDURE — 1126F AMNT PAIN NOTED NONE PRSNT: CPT | Mod: CPTII,S$GLB,, | Performed by: INTERNAL MEDICINE

## 2024-10-21 PROCEDURE — 90653 IIV ADJUVANT VACCINE IM: CPT | Mod: S$GLB,,, | Performed by: INTERNAL MEDICINE

## 2024-10-21 PROCEDURE — 3288F FALL RISK ASSESSMENT DOCD: CPT | Mod: CPTII,S$GLB,, | Performed by: INTERNAL MEDICINE

## 2024-10-21 PROCEDURE — 1159F MED LIST DOCD IN RCRD: CPT | Mod: CPTII,S$GLB,, | Performed by: INTERNAL MEDICINE

## 2024-10-21 PROCEDURE — 1101F PT FALLS ASSESS-DOCD LE1/YR: CPT | Mod: CPTII,S$GLB,, | Performed by: INTERNAL MEDICINE

## 2024-10-21 PROCEDURE — 3074F SYST BP LT 130 MM HG: CPT | Mod: CPTII,S$GLB,, | Performed by: INTERNAL MEDICINE

## 2024-10-21 PROCEDURE — 99999 PR PBB SHADOW E&M-EST. PATIENT-LVL III: CPT | Mod: PBBFAC,,, | Performed by: INTERNAL MEDICINE

## 2024-10-21 PROCEDURE — 99214 OFFICE O/P EST MOD 30 MIN: CPT | Mod: S$GLB,,, | Performed by: INTERNAL MEDICINE

## 2024-10-21 PROCEDURE — 3078F DIAST BP <80 MM HG: CPT | Mod: CPTII,S$GLB,, | Performed by: INTERNAL MEDICINE

## 2024-10-21 PROCEDURE — 1160F RVW MEDS BY RX/DR IN RCRD: CPT | Mod: CPTII,S$GLB,, | Performed by: INTERNAL MEDICINE

## 2024-10-21 RX ORDER — SACUBITRIL AND VALSARTAN 49; 51 MG/1; MG/1
1 TABLET, FILM COATED ORAL 2 TIMES DAILY
COMMUNITY
Start: 2024-09-20

## 2024-10-21 NOTE — PROGRESS NOTES
Subjective:       Patient ID: Jo Alegre is a 84 y.o. female.    Chief Complaint: Hypertension, Hyperlipidemia, Congestive Heart Failure, Back Pain, Atrial Fibrillation,  neurogenic bladder, and  elderly frail ( Difficulty walking)    Miss Jo Alegre is a 84-year-old  female who comes for follow-up.  She is accompanied with the daughter miss Forbes who is a patient herself and   Ms Mary Myers  who is not a patient but tends to keep more control of patient's condition.  Both the daughters help the mother out with the medical as well as executive conditions.  Medical issues are as below:-    1. Chronic combined systolic and diastolic congestive heart failure   2. Paroxysmal atrial fibrillation   3. Essential hypertension   4. Chronic anticoagulation -currently on Xarelto  5. Type 2 DM with diabetic nephropathy, without long-term current use of insulin   6. Midline low back pain without sciatica, unspecified chronicity   7. Neurogenic bladder   8.         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  9.-       large cyst in the kidney and probably might be getting frequently infected.      Recently she had a test nerve block in the lumbar region followed by ablation which has given her some long-lasting relief in the back.      Heart failure seems to be stable with optimization further of her medications including increasing off Entresto due 49-51 b.I.d. and also addition of Verquvo  to 5 mg b.I.d.      Hypertension  This is a chronic problem. The current episode started more than 1 year ago. The problem has been rapidly improving since onset. The problem is controlled. Associated symptoms include malaise/fatigue, peripheral edema and shortness of breath. Pertinent negatives include no chest pain, headaches or palpitations. Past treatments include calcium channel blockers, diuretics, angiotensin blockers and beta blockers (On amlodipine and diltiazem.). The current treatment  provides significant improvement. Compliance problems include psychosocial issues.  Hypertensive end-organ damage includes kidney disease and heart failure.   Hyperlipidemia  This is a chronic problem. The current episode started more than 1 year ago. The problem is controlled. Exacerbating diseases include obesity. Associated symptoms include shortness of breath. Pertinent negatives include no chest pain. Current antihyperlipidemic treatment includes statins. The current treatment provides moderate improvement of lipids. Risk factors for coronary artery disease include post-menopausal, a sedentary lifestyle, dyslipidemia and hypertension.   Congestive Heart Failure  Presents for follow-up visit. Associated symptoms include edema, fatigue and shortness of breath. Pertinent negatives include no abdominal pain, chest pain, chest pressure, claudication, near-syncope, palpitations or unexpected weight change (gained wt 5 lbs ?). The symptoms have been stable. Compliance with diet is 51-75%. Compliance with exercise is 0-25%. Compliance with medications is %.   Atrial Fibrillation  Presents for follow-up visit. Symptoms include hypotension, shortness of breath and weakness. Symptoms are negative for chest pain, hemodynamic instability and palpitations. The symptoms have been stable. Past medical history includes atrial fibrillation, CHF and hyperlipidemia. Medication compliance problems include psychosocial issues.       Past Medical History:   Diagnosis Date    Allergy     Codeine, Lasix    Atrial fibrillation     Atrial fibrillation     Cataract     CHF (congestive heart failure)     Diabetes mellitus, type 2     Ejection fraction < 50% 10/18/2017    Approximately 35%  Based on prior  Echocardiogram.    Encounter for blood transfusion     Hyperlipidemia     Osteoporosis     PONV (postoperative nausea and vomiting)     Thyroid disorder screening 10/17/2017    TSH of 1.12 ordered by Dr. dee Jones     Social  History     Socioeconomic History    Marital status:     Number of children: 4   Occupational History    Occupation:    Tobacco Use    Smoking status: Former     Passive exposure: Past    Smokeless tobacco: Never    Tobacco comments:     quit 2013   Substance and Sexual Activity    Alcohol use: No    Drug use: No    Sexual activity: Not Currently   Social History Narrative    - Drives and lives alone.     Social Drivers of Health     Financial Resource Strain: Low Risk  (9/27/2023)    Overall Financial Resource Strain (CARDIA)     Difficulty of Paying Living Expenses: Not hard at all   Food Insecurity: No Food Insecurity (9/27/2023)    Hunger Vital Sign     Worried About Running Out of Food in the Last Year: Never true     Ran Out of Food in the Last Year: Never true   Transportation Needs: No Transportation Needs (9/27/2023)    PRAPARE - Transportation     Lack of Transportation (Medical): No     Lack of Transportation (Non-Medical): No   Physical Activity: Inactive (9/27/2023)    Exercise Vital Sign     Days of Exercise per Week: 0 days     Minutes of Exercise per Session: 0 min   Stress: No Stress Concern Present (9/27/2023)    [employer] [place] [employer] - Occupational Stress Questionnaire     Feeling of Stress : Only a little   Housing Stability: Low Risk  (9/27/2023)    Housing Stability Vital Sign     Unable to Pay for Housing in the Last Year: No     Number of Places Lived in the Last Year: 1     Unstable Housing in the Last Year: No     Past Surgical History:   Procedure Laterality Date    ANTEGRADE NEPHROSTOGRAPHY Left 8/25/2022    Procedure: NEPHROSTOGRAM, ANTEGRADE;  Surgeon: Shelby Parra MD;  Location: [facility] OR;  Service: Urology;  Laterality: Left;    CHOLECYSTECTOMY  1997    [place]     COLONOSCOPY      CYSTOSCOPY Left 3/8/2024    Procedure: CYSTOSCOPY;  Surgeon: Shelby Parra MD;  Location: [facility] OR;  Service: Urology;  Laterality: Left;     CYSTOSCOPY W/ URETERAL STENT PLACEMENT Left 7/17/2022    Procedure: CYSTOSCOPY, WITH URETERAL STENT INSERTION;  Surgeon: Shelby Parra MD;  Location: [facility] OR;  Service: Urology;  Laterality: Left;    CYSTOSCOPY W/ URETERAL STENT REMOVAL Left 9/21/2022    Procedure: CYSTOSCOPY, WITH URETERAL STENT REMOVAL;  Surgeon: Shelby Parra MD;  Location: [facility] OR;  Service: Urology;  Laterality: Left;    CYSTOSCOPY WITH URETEROSCOPY, RETROGRADE PYELOGRAPHY, AND INSERTION OF STENT Left 8/25/2022    Procedure: CYSTOSCOPY, WITH RETROGRADE PYELOGRAM AND URETERAL STENT INSERTION;  Surgeon: Shelby Parra MD;  Location: [facility] OR;  Service: Urology;  Laterality: Left;    EYE SURGERY      bilateral cataracts    FINGER SURGERY Right 2021    right pinky finger    FLEXIBLE CYSTOSCOPY Left 8/25/2022    Procedure: CYSTOSCOPY, FLEXIBLE WITH STENT REMOVAL;  Surgeon: Shelby Parra MD;  Location: [facility] OR;  Service: Urology;  Laterality: Left;    FRACTURE SURGERY  2014    right femur with neville    HEMORRHOID SURGERY      48 yrs ago    HYSTERECTOMY      NEPHROSTOMY Left 8/25/2022    Procedure: CREATION, NEPHROSTOMY;  Surgeon: Shelby Parra MD;  Location: [facility] OR;  Service: Urology;  Laterality: Left;    PERCUTANEOUS NEPHROLITHOTOMY N/A 8/25/2022    Procedure: NEPHROLITHOTOMY, PERCUTANEOUS;  Surgeon: Shelby Parra MD;  Location: [facility] OR;  Service: Urology;  Laterality: N/A;    REPLACEMENT OF IMPLANTABLE CARDIOVERTER-DEFIBRILLATOR (ICD) GENERATOR N/A 12/13/2019    Procedure: REPLACEMENT, PULSE GENERATOR, ICD-MEDTRONIC;  Surgeon: Sebastian Nowak III, MD;  Location: [facility] CATH;  Service: Cardiology;  Laterality: N/A;    RETROGRADE PYELOGRAPHY N/A 3/8/2024    Procedure: PYELOGRAM, RETROGRADE;  Surgeon: Shelby Parra MD;  Location: [facility] OR;  Service: Urology;  Laterality: N/A;    TONSILLECTOMY       Family History   Problem Relation Name Age of Onset    Heart disease Mother Jannette     Cancer Father Branden         Lung  "Cancer ??? Asbestos    Breast cancer Paternal Aunt         Review of Systems   Constitutional:  Positive for activity change (Somewhat more cautious while walking), fatigue and malaise/fatigue. Negative for appetite change, chills, fever and unexpected weight change (gained wt 5 lbs ?).   HENT:  Negative for congestion, ear discharge and postnasal drip.    Eyes:  Negative for discharge, redness and visual disturbance.   Respiratory:  Positive for shortness of breath. Negative for cough, chest tightness and wheezing.         Left-sided pleural effusion which was drained.   Cardiovascular:  Positive for leg swelling. Negative for chest pain, palpitations, claudication and near-syncope.        History of defibrillator, congestive cardiomyopathy hypertension and dyslipidemia.  Recently Dr. Jones has increased her Entresto to 49-51 twice a day and Verquvo to 5 mg twice a day.   Gastrointestinal:  Negative for abdominal distention, abdominal pain and constipation.        Pellet-like stools and lot of gas.   Endocrine: Negative for polydipsia and polyuria.        Diabetes mellitus on glimepiride.  Osteoporosis on injection Prolia   Genitourinary:  Negative for difficulty urinating, flank pain and hematuria.   Musculoskeletal:  Positive for arthralgias and gait problem.        Her back is doing much better after getting a epidural injection by Dr. Handley from pain management.   Skin:  Negative for color change, pallor and wound.   Neurological:  Positive for weakness. Negative for facial asymmetry and headaches.        Previous presentations of headache has settled down.  Tremors.   Hematological:  Negative for adenopathy. Bruises/bleeds easily.        Patient is on chronic anticoagulation with warfarin.     Psychiatric/Behavioral:            Beginningof memory issues.         Objective:      Blood pressure (!) 117/59, pulse 71, height 5' 9" (1.753 m), weight 97.1 kg (214 lb). Body mass index is 31.6 kg/m².  Physical " Exam  Constitutional:       General: She is not in acute distress.     Appearance: She is well-developed. She is obese. She is ill-appearing. She is not toxic-appearing or diaphoretic.      Comments: Patient is obese with a BMI of 31.60  Chronically unwell appearing.  More happier and brighter today   HENT:      Head: Normocephalic and atraumatic.      Comments: .     Mouth/Throat:      Pharynx: Oropharynx is clear. No posterior oropharyngeal erythema.   Eyes:      General: No scleral icterus.        Right eye: No discharge.         Left eye: No discharge.   Neck:      Thyroid: No thyromegaly.      Vascular: No JVD.      Trachea: No tracheal deviation.   Cardiovascular:      Rate and Rhythm: Normal rate and regular rhythm.      Heart sounds:      No friction rub. No gallop.   Pulmonary:      Effort: Pulmonary effort is normal.      Breath sounds: Decreased air movement present.   Abdominal:      General: There is no distension.      Palpations: Abdomen is soft.      Tenderness: There is no abdominal tenderness.   Musculoskeletal:      Cervical back: Neck supple.      Right lower leg: Edema present.      Left lower leg: Edema present.   Lymphadenopathy:      Cervical: No cervical adenopathy.   Skin:     General: Skin is warm and dry.      Coloration: Skin is not pale.      Findings: No lesion or rash. Rash is not scaling.   Neurological:      Mental Status: She is alert. Mental status is at baseline. She is not disoriented.      Motor: Tremor present.   Psychiatric:         Behavior: Behavior normal.           Assessment:       Lab Visit on 07/23/2024   Component Date Value Ref Range Status    Specimen UA 07/23/2024 Urine, Clean Catch   Final    Color, UA 07/23/2024 Colorless (A)  Yellow, Straw, Steffanie Final    Appearance, UA 07/23/2024 Clear  Clear Final    pH, UA 07/23/2024 7.0  5.0 - 8.0 Final    Specific Gravity, UA 07/23/2024 1.010  1.005 - 1.030 Final    Protein, UA 07/23/2024 Negative  Negative Final     Glucose, UA 07/23/2024 Negative  Negative Final    Ketones, UA 07/23/2024 Negative  Negative Final    Bilirubin (UA) 07/23/2024 Negative  Negative Final    Occult Blood UA 07/23/2024 TRACE  Negative Final    Nitrite, UA 07/23/2024 Negative  Negative Final    Urobilinogen, UA 07/23/2024 Negative  Negative EU/dL Final    Leukocytes, UA 07/23/2024 2+ (A)  Negative Final    RBC, UA 07/23/2024 7 (H)  0 - 4 /hpf Final    WBC, UA 07/23/2024 9 (H)  0 - 5 /hpf Final    Squam Epithel, UA 07/23/2024 3  /hpf Final    Hyaline Casts, UA 07/23/2024 7 (A)  0-1/lpf /lpf Final    Microscopic Comment 07/23/2024 SEE COMMENT   Final   Lab Visit on 07/22/2024   Component Date Value Ref Range Status    Sodium 07/22/2024 140  136 - 145 mmol/L Final    Potassium 07/22/2024 4.7  3.5 - 5.1 mmol/L Final    Chloride 07/22/2024 103  95 - 110 mmol/L Final    CO2 07/22/2024 25  23 - 29 mmol/L Final    Glucose 07/22/2024 70  70 - 110 mg/dL Final    BUN 07/22/2024 26 (H)  8 - 23 mg/dL Final    Creatinine 07/22/2024 1.8 (H)  0.5 - 1.4 mg/dL Final    Calcium 07/22/2024 10.0  8.7 - 10.5 mg/dL Final    Anion Gap 07/22/2024 12  8 - 16 mmol/L Final    eGFR 07/22/2024 28 (A)  >60 mL/min/1.73 m^2 Final       1. Paroxysmal atrial fibrillation  Comments:  currently patient is on digoxin, amiodarone and anticoagulation with Xarelto.  Continue anticoagulation precautions.  Overview:  Patient follows up with [facility].  Currently on a combination of diltiazem, digoxin, Xarelto and amiodarone.  Dr. Jones/Dr. Lopez    Orders:  -     Comprehensive Metabolic Panel; Future; Expected date: 01/20/2025  -     BNP; Future; Expected date: 01/20/2025  -     CBC Auto Differential; Future; Expected date: 01/20/2025    2. Chronic combined systolic and diastolic congestive heart failure  Comments:  Currently on watching diet, Entresto and Verquvo.  Follows up with Dr. Jones.  Recent echocardiogram not available at this point.  Orders:  -     Comprehensive  Metabolic Panel; Future; Expected date: 01/20/2025  -     BNP; Future; Expected date: 01/20/2025  -     CBC Auto Differential; Future; Expected date: 01/20/2025    3. Essential hypertension  Comments:  Blood pressures are stable.  In fact it goes low and she takes midodrine also.   probably some degree of dysautonomia.    4. Stage 4 chronic kidney disease  Comments:  Continue to monitor levels.  Avoid NSAIDs.  Avoid dehydration and over excessive diuresis.    5. Lumbosacral disc disease  Comments:  Better with recent pain management and ablation.    6. Need for influenza vaccination  -     influenza (adjuvanted) (Fluad) 45 mcg/0.5 mL IM vaccine (> or = 64 yo) 0.5 mL    7. Frail elderly    8. Decreased strength, endurance, and mobility  Comments:  aging, general medical conditions reduce her endurance and she uses a wheelchair.    9. Need for assistance due to reduced mobility  Comments:  Wheelchair and family members help her mobilize.  Continue fall precautions.             Plan:   Paroxysmal atrial fibrillation  Comments:  currently patient is on digoxin, amiodarone and anticoagulation with Xarelto.  Continue anticoagulation precautions.  Orders:  -     Comprehensive Metabolic Panel; Future; Expected date: 01/20/2025  -     BNP; Future; Expected date: 01/20/2025  -     CBC Auto Differential; Future; Expected date: 01/20/2025    Chronic combined systolic and diastolic congestive heart failure  Comments:  Currently on watching diet, Entresto and Verquvo.  Follows up with Dr. Jones.  Recent echocardiogram not available at this point.  Orders:  -     Comprehensive Metabolic Panel; Future; Expected date: 01/20/2025  -     BNP; Future; Expected date: 01/20/2025  -     CBC Auto Differential; Future; Expected date: 01/20/2025    Essential hypertension  Comments:  Blood pressures are stable.  In fact it goes low and she takes midodrine also.   probably some degree of dysautonomia.    Stage 4 chronic kidney  disease  Comments:  Continue to monitor levels.  Avoid NSAIDs.  Avoid dehydration and over excessive diuresis.    Lumbosacral disc disease  Comments:  Better with recent pain management and ablation.    Need for influenza vaccination  -     influenza (adjuvanted) (Fluad) 45 mcg/0.5 mL IM vaccine (> or = 64 yo) 0.5 mL    Frail elderly    Decreased strength, endurance, and mobility  Comments:  aging, general medical conditions reduce her endurance and she uses a wheelchair.    Need for assistance due to reduced mobility  Comments:  Wheelchair and family members help her mobilize.  Continue fall precautions.      Overall miss Tobias is doing okay.    Her blood pressures are doing okay.  Her heart failure is being optimized by Dr. Jones with increase of Entresto to 49/51 twice a day and also Verquvo to 5 mg twice a day.      Thankfully her back is also doing okay with injection of epidural and probably ablation also.    Family would like her to get some physical therapy and they will confirm as to the name of the home health agency which we need to referral to.  [facility] record [facility] and they are not sure about this and they will confirm it.    She was updated on the flu shot today.  She has had 2 COVID vaccines thus far in past and please continue with the precautions including family members.    My computer says that she needs a colonoscopy but I feel probably she was timed out.  Follow up in about 4 months (around 2/21/2025), or if symptoms worsen or fail to improve, for Hypertension/lipids.      Current Outpatient Medications:     amiodarone (PACERONE) 200 MG Tab, Take 1 tablet (200 mg total) by mouth once daily., Disp: 30 tablet, Rfl: 0    atorvastatin (LIPITOR) 20 MG tablet, Take 20 mg by mouth every evening., Disp: , Rfl:     bumetanide (BUMEX) 1 MG tablet, once daily., Disp: , Rfl:     cholecalciferol, vitamin D3, 125 mcg (5,000 unit) Tab, Take 5,000 Units by mouth 2 (two) times daily., Disp: , Rfl:      digoxin (LANOXIN) 125 mcg tablet, Take 1 tablet (0.125 mg total) by mouth once daily., Disp: 30 tablet, Rfl: 0    ENTRESTO 49-51 mg per tablet, Take 1 tablet by mouth 2 (two) times daily., Disp: , Rfl:     gabapentin (NEURONTIN) 100 MG capsule, TAKE 2 CAPSULES(200 MG) BY MOUTH THREE TIMES DAILY, Disp: 180 capsule, Rfl: 5    HYDROcodone-acetaminophen (NORCO) 5-325 mg per tablet, , Disp: , Rfl:     latanoprost 0.005 % ophthalmic solution, Place 1 drop into both eyes nightly., Disp: , Rfl:     midodrine (PROAMATINE) 5 MG Tab, Take 1 tablet (5 mg total) by mouth 3 (three) times daily before meals., Disp: 90 tablet, Rfl: 0    mirabegron (MYRBETRIQ) 25 mg Tb24 ER tablet, Take 1 tablet (25 mg total) by mouth once daily., Disp: 30 tablet, Rfl: 11    pantoprazole (PROTONIX) 40 MG tablet, TAKE 1 TABLET(40 MG) BY MOUTH EVERY DAY, Disp: 30 tablet, Rfl: 5    rivaroxaban (XARELTO) 15 mg Tab, Take 15 mg by mouth daily with dinner or evening meal., Disp: , Rfl:     vericiguat (VERQUVO) 5 mg Tab, Take 2.5 mg by mouth 2 (two) times a day., Disp: 30 tablet, Rfl: 0  No current facility-administered medications for this visit.    Kraig Alberto

## 2024-10-23 ENCOUNTER — TELEPHONE (OUTPATIENT)
Dept: RADIOLOGY | Facility: HOSPITAL | Age: 84
End: 2024-10-23

## 2024-10-24 ENCOUNTER — HOSPITAL ENCOUNTER (OUTPATIENT)
Dept: RADIOLOGY | Facility: HOSPITAL | Age: 84
Discharge: HOME | End: 2024-10-24
Attending: STUDENT IN AN ORGANIZED HEALTH CARE EDUCATION/TRAINING PROGRAM
Payer: MEDICARE

## 2024-10-24 DIAGNOSIS — R18.8 RETROPERITONEAL FLUID COLLECTION: ICD-10-CM

## 2024-10-24 PROCEDURE — 74176 CT ABD & PELVIS W/O CONTRAST: CPT | Mod: 26,,, | Performed by: RADIOLOGY

## 2024-10-24 PROCEDURE — 74176 CT ABD & PELVIS W/O CONTRAST: CPT | Mod: TC

## 2024-10-25 ENCOUNTER — PATIENT MESSAGE (OUTPATIENT)
Dept: FAMILY MEDICINE | Facility: CLINIC | Age: 84
End: 2024-10-25
Payer: MEDICARE

## 2024-10-25 ENCOUNTER — TELEPHONE (OUTPATIENT)
Dept: FAMILY MEDICINE | Facility: CLINIC | Age: 84
End: 2024-10-25
Payer: MEDICARE

## 2024-10-25 NOTE — TELEPHONE ENCOUNTER
----- Message from Ishmael Lama sent at 10/25/2024 10:16 AM CDT -----  [facility] is calling requesting office visit note to go with the order that was sent over     # [phone]  Fax# [phone]

## 2024-10-29 PROCEDURE — G0180 MD CERTIFICATION HHA PATIENT: HCPCS | Mod: ,,, | Performed by: INTERNAL MEDICINE

## 2024-10-31 ENCOUNTER — OFFICE VISIT (OUTPATIENT)
Dept: UROLOGY | Facility: CLINIC | Age: 84
End: 2024-10-31
Payer: MEDICARE

## 2024-10-31 DIAGNOSIS — N32.81 OVERACTIVE BLADDER: ICD-10-CM

## 2024-10-31 DIAGNOSIS — R18.8 RETROPERITONEAL FLUID COLLECTION: Primary | ICD-10-CM

## 2024-10-31 DIAGNOSIS — N20.0 STAGHORN CALCULUS: ICD-10-CM

## 2024-10-31 PROCEDURE — 3288F FALL RISK ASSESSMENT DOCD: CPT | Mod: CPTII,S$GLB,, | Performed by: STUDENT IN AN ORGANIZED HEALTH CARE EDUCATION/TRAINING PROGRAM

## 2024-10-31 PROCEDURE — 1159F MED LIST DOCD IN RCRD: CPT | Mod: CPTII,S$GLB,, | Performed by: STUDENT IN AN ORGANIZED HEALTH CARE EDUCATION/TRAINING PROGRAM

## 2024-10-31 PROCEDURE — 99999 PR PBB SHADOW E&M-EST. PATIENT-LVL III: CPT | Mod: PBBFAC,,, | Performed by: STUDENT IN AN ORGANIZED HEALTH CARE EDUCATION/TRAINING PROGRAM

## 2024-10-31 PROCEDURE — G2211 COMPLEX E/M VISIT ADD ON: HCPCS | Mod: S$GLB,,, | Performed by: STUDENT IN AN ORGANIZED HEALTH CARE EDUCATION/TRAINING PROGRAM

## 2024-10-31 PROCEDURE — 99214 OFFICE O/P EST MOD 30 MIN: CPT | Mod: S$GLB,,, | Performed by: STUDENT IN AN ORGANIZED HEALTH CARE EDUCATION/TRAINING PROGRAM

## 2024-10-31 PROCEDURE — 1101F PT FALLS ASSESS-DOCD LE1/YR: CPT | Mod: CPTII,S$GLB,, | Performed by: STUDENT IN AN ORGANIZED HEALTH CARE EDUCATION/TRAINING PROGRAM

## 2024-10-31 RX ORDER — MIRABEGRON 50 MG/1
50 TABLET, FILM COATED, EXTENDED RELEASE ORAL DAILY
Qty: 30 TABLET | Refills: 11 | Status: SHIPPED | OUTPATIENT
Start: 2024-10-31 | End: 2024-10-31

## 2024-10-31 RX ORDER — VIBEGRON 75 MG/1
75 TABLET, FILM COATED ORAL DAILY
Qty: 30 TABLET | Refills: 11 | Status: SHIPPED | OUTPATIENT
Start: 2024-10-31 | End: 2025-10-31

## 2024-11-07 ENCOUNTER — OFFICE VISIT (OUTPATIENT)
Dept: INFECTIOUS DISEASES | Facility: CLINIC | Age: 84
End: 2024-11-07
Payer: MEDICARE

## 2024-11-07 VITALS — HEART RATE: 70 BPM | WEIGHT: 214 LBS | BODY MASS INDEX: 31.6 KG/M2 | OXYGEN SATURATION: 95 %

## 2024-11-07 DIAGNOSIS — N32.81 OVERACTIVE BLADDER: Primary | ICD-10-CM

## 2024-11-07 DIAGNOSIS — N39.0 RECURRENT UTI: ICD-10-CM

## 2024-11-07 DIAGNOSIS — R18.8 RETROPERITONEAL FLUID COLLECTION: ICD-10-CM

## 2024-11-07 RX ORDER — MIRABEGRON 50 MG/1
1 TABLET, FILM COATED, EXTENDED RELEASE ORAL
COMMUNITY
Start: 2024-11-01 | End: 2024-11-07

## 2024-11-07 RX ORDER — CARVEDILOL 3.12 MG/1
TABLET ORAL
COMMUNITY
Start: 2024-10-22

## 2024-11-07 NOTE — PROGRESS NOTES
Follow up     HPI: Jo Alegre is a 84 y.o. female , very pleasant, former smoker, with past medical history of diabetes, COPD, CHF who has been followed by Urology for recurrent UTIs and bilateral kidney cysts and a left large staghorn calculus since imaging on 2020.  Patient reports back in October she was admitted to the hospital for acute on chronic CHF exacerbation, noted to have MARCELINO, kidney ultrasound then revealed bilateral large cysts which prompted CT abdomen and pelvis which revealed a serial of fluid collections in the right and left kidneys.  A giant 14 cm left renal cyst observed.  No hydronephrosis or stones appreciated.  She had an IR drainage placed for the large giant cyst which ended up being an abscess, 50 cc of purulent fluid was removed on 11/2/23, cultures grew pansensitive Proteus mirabilis.  She then had to have her drain repositioned because of malposition, drained again and replaced 1/12/24, repeat cultures with Proteus mirabilis.  She has completed on and off 17 days of ciprofloxacin twice a day.      Patient is here with her daughters, she is in the wheelchair, she is awake, alert, very pleasant.  She denies any fever or chills, no nausea or vomiting, no chest pain or cough, baseline shortness of breath, she does complain of increased urinary frequency, foul-smelling urine, and some dysuria at the beginning of micturition, she denies any changes in bowel movements.  Patient and family are very concerned regarding next step of care, explained plan of care including repeating CT scan to address status of prior fluid collection.  It is possible that we either consider further IR guided drainage with IV antibiotics versus surgery but to be determined after discussion with consultants.     Will obtain labs today.    2/26/24: Interim reviewed, patient is here for follow-up, daughters present. She reports she is feeling fine, has no acute complaints. She saw Urology and plan for  cystoscopy 3/8. Discussed with patient and daughters plan of care and will order PICC line, start rocephin 2g IV daily for 2 weeks for kidney abscess and assess duration of therapy based on repeat CT scan before the end of therapy. She understands plan of care, will also check urine next Monday in preparation to procedure. All questions answered.     3/26:  Patient seen examined, family present.  She is here for follow-up, completing 4 weeks of IV antibiotics for complicated bilateral renal fluid collections.  She had cystoscopy on 03/08 by Urology, no obvious evidence of extravasation from the left renal collecting system.  There was good drainage of the left kidney.  She has failed IR drainage for her retroperitoneal fluid collections twice.  Plan to either attempt a 3rd IR drainage versus surgical washout.  Repeat CT scan with no interval change on size of fluid collections.  Discussed with daughter and family that we might need surgical intervention.  We will remove PICC line today.  The patient denies any fever chills, no nausea or vomiting, she denies any constitutional symptoms.  She is looking forward to go on vacation and come back and wait for next step in therapy.  Alarm symptoms given to patient and family in case she needs to go to the hospital.  Weekly labs reviewed, stable, CRP has remained normal throughout.    4/29: Patient is here for follow up, daughters present. They just came back from vacation, they went to [place].  Unfortunately she had a fall in the bathroom, missed the toilet.  She did have any fracture on her hip.  Just large bruising on the right shoulder.  Patient completed 4 weeks of Rocephin without improvement or change on CT scan.  Seen by Urology on 04/17, decided and agreed with Dr. Parra to monitor off antibiotics, alarm symptoms given to the patient and family, we will watch closely given the fact that she has been afebrile, normal white count, and no signs of ongoing  infectious process at the moment.  All questions answered from daughters and the patient and will have her follow-up in 3 months CT scan and assess the progress/evolution of prior fluid collection.    7/22: Patient is here for follow up, daughter present.  Since last visit, patient has been seen by multiple specialists, she is getting ablations to her nerve phone her hips, left hip is improving, she is going to have procedures done to her right.  Patient seen by Urology yesterday, plan to repeat CT scan in 3 months and continue to monitor medically.  She was recently seen in the emergency room for UTI, sent home on levofloxacin for 5 days.  Repeat urine culture by Urology earlier this month with the same organism now resistant to ampicillin sulbactam, ciprofloxacin 250 mg twice a day was prescribed for 7 days.  Patient mentioned she no longer has dysuria but she has noticed increased urinary frequency, denies foul-smelling urine, no changes in color.  She denies fever or chills, no night sweats.  Discussed plan of care with patient and daughter, we will repeat UA today given increased urinary frequency and ensure there is no underlying UTI.    11/7/24: Had a repeat CTAP-nonCon on 10/24   For follow-up on left retroperitoneal abscess.  It showed resolution and also multiple bilateral renal cysts and several left intrarenal stones the largest 1.5 cm without hydronephrosis.  She had a follow-up with Urology on 10/30  and will have a repeat CT scan in 6 months. Patient states she has been doing very well at home.  She has no symptoms of urinary tract infection such as dysuria, odor, dark colored urine.  She was recently changed from Myrbetriq to Gemtessa and  feels like she was emptying her bladder.  She still has some urgency and when she gets the urge to void she has to hurry to the bathroom.  She was had no fevers or chills, she was eating and drinking well.   She had some back injections and it is working well on  the left but the right side she was still having some pain.  She follows with cardiology and denies chest pain, shortness of breath, coughing or palpitations.  She has mild swelling to lower extremities R>L.  She was gained about 4 lb since last visit.  She said it is because she was very inactive.  Her only real complaint is fatigue.  She just does not have a great desire to move around and she will do small things at home and just have to sit around.  She has physical therapy  coming to the house twice a week and uses a Rollator to get around.  She has not had any recent falls.  Overall she feels like she was doing very well with no urinary tract infection since July.  Vital signs with blood pressure 122/80, heart rate 70, SpO2 95% on room air, respirations   About 16.    Review of patient's allergies indicates:   Allergen Reactions    Codeine Other (See Comments)     nausea    Hydrocodone Nausea And Vomiting    Lasix [furosemide]      rash     Past Medical History:   Diagnosis Date    Allergy     Codeine, Lasix    Atrial fibrillation     Atrial fibrillation     Cataract     CHF (congestive heart failure)     Diabetes mellitus, type 2     Ejection fraction < 50% 10/18/2017    Approximately 35%  Based on prior  Echocardiogram.    Encounter for blood transfusion     Hyperlipidemia     Osteoporosis     PONV (postoperative nausea and vomiting)     Thyroid disorder screening 10/17/2017    TSH of 1.12 ordered by Dr. dee Jones     Past Surgical History:   Procedure Laterality Date    ANTEGRADE NEPHROSTOGRAPHY Left 8/25/2022    Procedure: NEPHROSTOGRAM, ANTEGRADE;  Surgeon: Shelby Parra MD;  Location: [facility] OR;  Service: Urology;  Laterality: Left;    CHOLECYSTECTOMY  1997    [place]     COLONOSCOPY      CYSTOSCOPY Left 3/8/2024    Procedure: CYSTOSCOPY;  Surgeon: Shelby Parra MD;  Location: [facility] OR;  Service: Urology;  Laterality: Left;    CYSTOSCOPY W/ URETERAL STENT PLACEMENT Left 7/17/2022     Procedure: CYSTOSCOPY, WITH URETERAL STENT INSERTION;  Surgeon: Shelby Parra MD;  Location: [facility] OR;  Service: Urology;  Laterality: Left;    CYSTOSCOPY W/ URETERAL STENT REMOVAL Left 9/21/2022    Procedure: CYSTOSCOPY, WITH URETERAL STENT REMOVAL;  Surgeon: Shelby Parra MD;  Location: [facility] OR;  Service: Urology;  Laterality: Left;    CYSTOSCOPY WITH URETEROSCOPY, RETROGRADE PYELOGRAPHY, AND INSERTION OF STENT Left 8/25/2022    Procedure: CYSTOSCOPY, WITH RETROGRADE PYELOGRAM AND URETERAL STENT INSERTION;  Surgeon: Shelby Parra MD;  Location: [facility] OR;  Service: Urology;  Laterality: Left;    EYE SURGERY      bilateral cataracts    FINGER SURGERY Right 2021    right pinky finger    FLEXIBLE CYSTOSCOPY Left 8/25/2022    Procedure: CYSTOSCOPY, FLEXIBLE WITH STENT REMOVAL;  Surgeon: Shelby Parra MD;  [facility];  Service: Urology;  Laterality: Left;    FRACTURE SURGERY  2014    right femur with neville    HEMORRHOID SURGERY      48 yrs ago    HYSTERECTOMY      NEPHROSTOMY Left 8/25/2022    Procedure: CREATION, NEPHROSTOMY;  Surgeon: Shelby Parra MD;  Location: [facility] OR;  Service: Urology;  Laterality: Left;    PERCUTANEOUS NEPHROLITHOTOMY N/A 8/25/2022    Procedure: NEPHROLITHOTOMY, PERCUTANEOUS;  Surgeon: Shelby Parra MD;  Location: [facility] OR;  Service: Urology;  Laterality: N/A;    REPLACEMENT OF IMPLANTABLE CARDIOVERTER-DEFIBRILLATOR (ICD) GENERATOR N/A 12/13/2019    Procedure: REPLACEMENT, PULSE GENERATOR, ICD-MEDTRONIC;  Surgeon: Sebastian Nowak III, MD;  Location: [facility] CATH;  Service: Cardiology;  Laterality: N/A;    RETROGRADE PYELOGRAPHY N/A 3/8/2024    Procedure: PYELOGRAM, RETROGRADE;  Surgeon: Shelby Parra MD;  Location: [facility] OR;  Service: Urology;  Laterality: N/A;    TONSILLECTOMY        Social History     Tobacco Use    Smoking status: Former     Passive exposure: Past    Smokeless tobacco: Never    Tobacco comments:     quit 2013   Substance Use Topics     Alcohol use: No     Family History   Problem Relation Name Age of Onset    Heart disease Mother Jannette     Cancer Father Branden         Lung Cancer ??? Asbestos    Breast cancer Paternal Aunt           Review of Systems   Constitutional:  Negative for chills and fever.   HENT:  Negative for congestion and sinus pain.    Respiratory:  Negative for cough, shortness of breath and wheezing.    Cardiovascular:  Positive for leg swelling. Negative for chest pain and palpitations.   Gastrointestinal:  Negative for abdominal pain, diarrhea and nausea.   Genitourinary:  Negative for difficulty urinating, dysuria, flank pain, frequency, hematuria and urgency.   Musculoskeletal:  Positive for back pain. Negative for myalgias.        Chronic, left > right   Skin:  Negative for rash.   Neurological:  Positive for dizziness. Negative for weakness and headaches.   Psychiatric/Behavioral:  Negative for confusion.          No TB exposure  Outdoor activities:  Never smoker, very occasional alcohol.  Housewife.  Travel:  None  Implants:  None  Antibiotic History:  Cipro 17 days, end date 02/05 - Ceftriaxone 4 weeks, completed 3/25 - Levaquin 5 days in May, Cipro 250 b.i.d. 7/8-7/15      Objective:      Blood pressure (P) 122/80, pulse 70, weight 97.1 kg (214 lb), SpO2 95%. Body mass index is 31.6 kg/m².  Physical Exam  Constitutional:       Appearance: Normal appearance. She is obese. She is not ill-appearing.   HENT:      Head: Normocephalic.      Right Ear: External ear normal.      Left Ear: External ear normal.      Nose: Nose normal.      Mouth/Throat:      Mouth: Mucous membranes are moist.      Pharynx: Oropharynx is clear.   Eyes:      Extraocular Movements: Extraocular movements intact.      Pupils: Pupils are equal, round, and reactive to light.   Cardiovascular:      Rate and Rhythm: Regular rhythm.      Heart sounds: Murmur heard.      Comments:   Mild ankle edema bilaterally right-greater-than-left  Pulmonary:      Effort:  Pulmonary effort is normal.      Breath sounds: No rales.      Comments: Mostly clear to auscultation bilaterally  Abdominal:      General: Bowel sounds are normal.      Palpations: Abdomen is soft.      Tenderness: There is no abdominal tenderness. There is no rebound.   Musculoskeletal:      Cervical back: Normal range of motion and neck supple.      Right lower leg: Edema present.      Left lower leg: Edema present.      Comments: Legs less swollen compared to prior visits, still right more than left   Lymphadenopathy:      Cervical: No cervical adenopathy.   Skin:     General: Skin is warm and dry.      Capillary Refill: Capillary refill takes less than 2 seconds.      Findings: No rash.   Neurological:      General: No focal deficit present.      Mental Status: She is alert and oriented to person, place, and time. Mental status is at baseline.      Sensory: No sensory deficit.      Motor: No weakness.      Comments:  Uses a Rollator to ambulate,  has a little difficulty getting up on her room   Psychiatric:         Thought Content: Thought content normal.             General Labs reviewed:  Lab Results   Component Value Date    WBC 7.76 05/25/2024    RBC 4.17 05/25/2024    HGB 12.9 05/25/2024    HCT 40.5 05/25/2024    MCV 97 05/25/2024    MCH 30.9 05/25/2024    MCHC 31.9 (L) 05/25/2024    RDW 13.7 05/25/2024     05/25/2024    MPV 9.9 05/25/2024    GRAN 5.4 05/25/2024    GRAN 69.8 05/25/2024    LYMPH 1.3 05/25/2024    LYMPH 17.0 (L) 05/25/2024    MONO 1.0 05/25/2024    MONO 12.2 05/25/2024    EOS 0.0 05/25/2024    BASO 0.02 05/25/2024    EOSINOPHIL 0.4 05/25/2024    BASOPHIL 0.3 05/25/2024     CMP  Sodium   Date Value Ref Range Status   07/22/2024 140 136 - 145 mmol/L Final     Potassium   Date Value Ref Range Status   07/22/2024 4.7 3.5 - 5.1 mmol/L Final     Chloride   Date Value Ref Range Status   07/22/2024 103 95 - 110 mmol/L Final     CO2   Date Value Ref Range Status   07/22/2024 25 23 - 29 mmol/L  Final     Glucose   Date Value Ref Range Status   07/22/2024 70 70 - 110 mg/dL Final     BUN   Date Value Ref Range Status   07/22/2024 26 (H) 8 - 23 mg/dL Final     Creatinine   Date Value Ref Range Status   07/22/2024 1.8 (H) 0.5 - 1.4 mg/dL Final     Calcium   Date Value Ref Range Status   07/22/2024 10.0 8.7 - 10.5 mg/dL Final     Total Protein   Date Value Ref Range Status   05/25/2024 7.2 6.0 - 8.4 g/dL Final     Albumin   Date Value Ref Range Status   05/25/2024 4.1 3.5 - 5.2 g/dL Final     Total Bilirubin   Date Value Ref Range Status   05/25/2024 0.6 0.1 - 1.0 mg/dL Final     Comment:     For infants and newborns, interpretation of results should be based  on gestational age, weight and in agreement with clinical  observations.    Premature Infant recommended reference ranges:  Up to 24 hours.............<8.0 mg/dL  Up to 48 hours............<12.0 mg/dL  3-5 days..................<15.0 mg/dL  6-29 days.................<15.0 mg/dL       Alkaline Phosphatase   Date Value Ref Range Status   05/25/2024 161 (H) 55 - 135 U/L Final     AST   Date Value Ref Range Status   05/25/2024 12 10 - 40 U/L Final     ALT   Date Value Ref Range Status   05/25/2024 9 (L) 10 - 44 U/L Final     Anion Gap   Date Value Ref Range Status   07/22/2024 12 8 - 16 mmol/L Final     eGFR   Date Value Ref Range Status   07/22/2024 28 (A) >60 mL/min/1.73 m^2 Final        Latest Reference Range & Units 11/02/23 14:42 01/12/24 13:57   Fluid Color  Yellow Red   Fluid Appearance  Turbid Turbid   WBC, Body Fluid /cu mm [id number]   Body Fluid Type  Cyst Fluid Other (Specify)   Segs, Fluid % 86 7   Lymphs, Fluid % 7 93   Monocytes/Macrophages, Fluid % 7        Micro:  Urine culture 7/5/24:      Component 2 wk ago   Urine Culture, Routine  Abnormal   KLEBSIELLA PNEUMONIAE  >100,000 cfu/ml    Resulting Agency [facility]        Susceptibility     Klebsiella pneumoniae     CULTURE, URINE     Amp/Sulbactam >16/8 mcg/mL Resistant     Cefazolin 4 mcg/mL  Sensitive     Cefepime <=2 mcg/mL Sensitive     Ceftriaxone <=1 mcg/mL Sensitive     Ciprofloxacin <=0.25 mcg/mL Sensitive     Ertapenem <=0.5 mcg/mL Sensitive     Gentamicin <=2 mcg/mL Sensitive     Levofloxacin <=0.5 mcg/mL Sensitive     Meropenem <=1 mcg/mL Sensitive     Nitrofurantoin 64 mcg/mL Intermediate     Piperacillin/Tazo 16 mcg/mL Intermediate     Tetracycline >8 mcg/mL Resistant     Tobramycin <=2 mcg/mL Sensitive     Trimeth/Sulfa <=2/38 mcg/mL Sensitive               Urine culture 5/25/24:  Urine Culture, Routine  Abnormal   KLEBSIELLA PNEUMONIAE  >100,000 cfu/ml    Resulting Agency [facility]        Susceptibility     Klebsiella pneumoniae     CULTURE, URINE     Amp/Sulbactam <=8/4 mcg/mL Sensitive     Cefazolin <=2 mcg/mL Sensitive     Cefepime <=2 mcg/mL Sensitive     Ceftriaxone <=1 mcg/mL Sensitive     Ciprofloxacin <=0.25 mcg/mL Sensitive     Ertapenem <=0.5 mcg/mL Sensitive     Gentamicin <=2 mcg/mL Sensitive     Levofloxacin <=0.5 mcg/mL Sensitive     Meropenem <=1 mcg/mL Sensitive     Nitrofurantoin <=32 mcg/mL Sensitive     Piperacillin/Tazo <=8 mcg/mL Sensitive     Tetracycline <=4 mcg/mL Sensitive     Tobramycin <=2 mcg/mL Sensitive     Trimeth/Sulfa <=2/38 mcg/mL Sensitive                   Fluid culture L kidney 1/12/24:  Aerobic Bacterial Culture  Abnormal   PROTEUS MIRABILIS  Few    Resulting Agency [facility]        Susceptibility       Proteus mirabilis     CULTURE, AEROBIC  (SPECIFY SOURCE)     Amox/K Clav'ate <=8/4 mcg/mL Sensitive     Amp/Sulbactam <=8/4 mcg/mL Sensitive     Ampicillin <=8 mcg/mL Sensitive     Cefazolin <=2 mcg/mL Sensitive     Cefepime <=2 mcg/mL Sensitive     Ceftriaxone <=1 mcg/mL Sensitive     Ciprofloxacin <=1 mcg/mL Sensitive     Ertapenem <=0.5 mcg/mL Sensitive     Gentamicin <=4 mcg/mL Sensitive     Levofloxacin <=2 mcg/mL Sensitive     Meropenem <=1 mcg/mL Sensitive     Piperacillin/Tazo <=16 mcg/mL Sensitive     Tobramycin <=4 mcg/mL Sensitive     Trimeth/Sulfa  <=2/38 mcg/mL Sensitive               Fluid culture L kidney 11/2/23:  Aerobic Bacterial Culture  Abnormal   PROTEUS MIRABILIS  Many    Resulting Agency [facility]        Susceptibility     Proteus mirabilis     CULTURE, AEROBIC  (SPECIFY SOURCE)     Amox/K Clav'ate <=8/4 mcg/mL Sensitive     Amp/Sulbactam <=8/4 mcg/mL Sensitive     Ampicillin <=8 mcg/mL Sensitive     Cefazolin <=2 mcg/mL Sensitive     Cefepime <=2 mcg/mL Sensitive     Ceftriaxone <=1 mcg/mL Sensitive     Ciprofloxacin <=1 mcg/mL Sensitive     Ertapenem <=0.5 mcg/mL Sensitive     Gentamicin <=4 mcg/mL Sensitive     Levofloxacin <=2 mcg/mL Sensitive     Meropenem <=1 mcg/mL Sensitive     Piperacillin/Tazo <=16 mcg/mL Sensitive     Tobramycin <=4 mcg/mL Sensitive     Trimeth/Sulfa <=2/38 mcg/mL Sensitive                         Imaging Reviewed:  Repeat CT scan 2/26/04:  Irregular multi septated fluid collection in the left upper quadrant is not significantly changed in size compared to prior exam from of February 19, 2024.  Multiple bilateral renal lesions, incompletely characterized on this noncontrast exam, grossly stable compared to prior.  Nonobstructing left nephrolithiasis, also stable.  Intrahepatic and extrahepatic bile duct dilation status post cholecystectomy.  Please correlate with bilirubin levels to determine significance.  Small left pleural effusion.  Multiple thoracolumbar compression fractures.  Atherosclerosis, diverticulosis, and other incidental findings as above.      Repeat CT scan 2/20/24:  FINDINGS: There is a multiloculated 5.9 by 3.9 cm fluid collection seen in the left upper quadrant just below the diaphragm that previously measured 6.2 by 4.4 cm in similar plane.  There is a small left pleural effusion similar to prior exam.  There is associated compressive atelectasis similar to prior exam.  There are bilateral renal cysts with the largest seen inferiorly in association with the left kidney measuring 13 cm.  There is a 2.7 cm  hyperdense lesion seen in association with the inferior pole of the right kidney unchanged from prior exam.  There are bilateral renal calculi the largest of which is seen at the inferior pole on the left and measures 1.8 cm.   The patient is status post cholecystectomy with prominence of the intrahepatic biliary ducts and common bile duct similar in appearance to prior exam.   There is no evidence bowel obstruction.  Stool is seen throughout the colon.  The patient has a right femoral neville and proximal screw.     Impression:  Again seen is the multi septated fluid collection in the left upper quadrant just inferior to the diaphragm, left pleural effusion with associated compressive atelectasis.  Overall the appearance of the fluid collection in the left pleural effusion is similar to what was seen on prior exam from 01/24/2024.     The patient has bilateral renal calculi similar in appearance to prior exam.     CT abdomen/pelvis w/o contrast 1/12/24:  FINDINGS:  There is elevation of the left hemidiaphragm and moderate atelectasis in the left lower lobe.  A pleural effusion or pneumothorax is not seen.  The liver is of normal size and CT density.  The gallbladder is absent with surgical clips noted.  There is a 2.6 cm cyst of segment 1 of the liver parenchyma and dilation of the common bile duct up to 2 cm in diameter.  This is unchanged from prior study and a stone or mass in the head of the pancreas is not seen.  The pancreas appears of normal contour and CT density without edema or mass.  The spleen is of normal size and CT density.  On the right there is a 4.3 cm and a 5.6 cm water density cyst and 2 higher density cyst at the lateral surface representing probable bloody cyst.  These are stable however.  The largest measures 1.3 cm.  Stone or hydronephrosis on the right is not seen.  On the left a nephrostomy tube is not presently seen.  In the Janina nephric space superiorly is a fluid collection measuring 4.4 cm  and a partial fluid collection measuring 2.8 cm as well as a probable bloody cyst measuring 3.2 cm.  There is also a 3.8 cm cyst and a giant cyst at the lower pole which measures 14 cm.  There is significantly less stone burden than seen on the prior studies primarily in the lower pole.  The largest stone fragment measures 1.1 cm.  Hydronephrosis is not presently seen.  The abdominal aorta is heavily calcified.  An aneurysm is not identified.    The stomach is of normal configuration.  Small bowel dilatation or air-fluid levels are not seen.  The colon appears of normal configuration without distention or mass.  A normal appendix is seen.  Free fluid or free air is not noted.  The bladder is of normal contour without asymmetry.  A uterus is not seen.     Impression:  Elevation of the left hemidiaphragm and moderate left lung atelectasis noted.  Under the left hemidiaphragm are 2 fluid collections either post nephrostomy tube abscesses or seromas.  Significantly less stone burden in the left kidney with 2 prominent fragments.  Hydronephrosis is not presently seen.  There are multiple bilateral renal cysts, some high density suggesting bloody cysts.  A giant cyst of the lower pole of the left kidney measures 14 cm.  Prior cholecystectomy.  2.6 cm cyst of segment 1 of the liver parenchyma.  Dilation of the common bile duct reaching 2 cm without a stone or mass seen.  Prior hysterectomy.    CT abdomen/pelvis w/o contrast 11/24/2023.  FINDINGS:  The collection seen posterior to and cephalad to the upper pole of the left kidney, extending subphrenic in through the diaphragm does not appear significantly changed compared the prior study of 10/16/2023.  Craniocaudal measurement on the coronal image is 9.8 cm today versus 9.4 cm on the prior exam.  On axial images the diameter is 6.2 x 4.7 cm today versus 6.4 x 4.3 cm previously.  Appears complex with irregular fluid component irregular wall.  Findings consistent with  abscess.   Large, 14 cm lower pole left renal cyst.  Extrinsic compression on the lateral margin the cyst by colon.  The appearance is not significantly changed.   Oval 2.5 cm and 1.5 cm high density left renal masses density not as high as can be attributed to a high density cyst but not significantly changed compared to prior exam and corresponding as was described as hemorrhagic cysts on MRI.  Multiple left renal stones measuring up to approximately 7 mm.  No hydronephrosis opaque ureteral stone or ureteral obstruction evident   Right renal masses are not significantly changed with a 5.5 cm and a 4.6 cm and 11 mm simple cyst and high density masses which although not fully characterized on the single study correspond as described as hemorrhagic cyst on MRI and not significantly changed compared to the prior CT.  Largest measures 1.5 cm.  No hydronephrosis opaque renal or ureteral stone or ureteral obstruction.   Urinary bladder mildly distended at time of the exam and as visualized unremarkable appearance   Prior hysterectomy.  Adnexal region unremarkable appearance   Diverticulosis without CT findings of acute diverticulitis.  Normal appearance of the appendix.  No free intraperitoneal air or fluid.   Liver unremarkable appearance.  Cholecystectomy clips.  Common duct dilatation not significantly changed compared to the prior study likely on a post cholecystectomy basis.   Spleen not enlarged.  Posterior margin the spleen is indistinguishable from the perirenal/subphrenic collection.   Pancreas mildly atrophied   Adrenal glands; slight thickening of the adrenal glands and small left adrenal nodule suggesting adenoma not significantly changed  Abdominal aorta; no aneurysm  Osseous structures;Internal fixation right hip. Osseous degenerative changes.  Moderate compression of the superior endplate of L1 not significantly changed.  Postoperative changes lumbar spine.  Small left pleural effusion and left posterior  basilar airspace opacification do not appear significantly changed.     Impression:   The irregular, complex appearing collection suggesting abscess posterior to and cephalad to the left kidney, extending subphrenic and through the diaphragm into the pleural space does not appear significantly changed compared to the prior exam.  Additional findings as detailed above including renal stones, renal cysts, high density renal masses which although indeterminate on the current study alone correspond as described as hemorrhagic cyst on prior studies and not significantly changed compared to the prior exam.  left adrenal adenoma.      Kidney ultrasound 10/30/2023:  FINDINGS:  Right kidney: The right kidney measures 10.8 cm. No cortical thinning. No loss of corticomedullary distinction. Resistive index measures 0.76.  5.3 and 3.7 and 1 cm simple cyst.  1.2 cm cyst which may be minimally complex without increased through transmission but appearing anechoic and most likely simple cysts.  No renal stone. No hydronephrosis.     Left kidney: The left kidney measures 9.3 cm. No cortical thinning. No loss of corticomedullary distinction. Resistive index measures 0.71.  Numerous cysts including 14 cm and 3.7 cm cyst.  A few echogenic foci measuring 5-15 mm suggesting stones.  Complex structure appearing cephalad or projecting cephalad from the left kidney measuring 7.3 x 4.9 x 4.8 cm.  It was measured at 5.1 x 4.5 x 4.5 cm on the prior exam.  No hydronephrosis.     The bladder is partially distended at the time of scanning and has an unremarkable appearance.     Impression:  Multiple simple bilateral renal cysts.  Left renal stones.  7.3 cm complex structure cephalad to the left kidney was measured at 5.1 cm on prior study images 03/02/2023 and corresponds to findings seen on more recent CT abdomen of 10/16/2023. Better demonstrated on the CT and please refer to that report.    CT ABDOMEN PELVIS WITHOUT CONTRAST 10/24/24   The  liver is of normal size contour and CT density without focal defect.  There is intrahepatic biliary dilatation and dilation of the common bile duct up the 2.4 cm in diameter.  This is dilated down to the head of the pancreas and ampulla without a mass or stone seen.  There are there is however small calcification seen in the head of the pancreas.  The gallbladder is absent with surgical clips noted.  The pancreas itself is of normal contour without edema.  The spleen is small and of normal CT density.   The adrenal glands are not enlarged.  The kidneys are of normal CT density for a noncontrast study.  There are 4 cyst of the right kidney the largest at the lower pole measuring 6.7 cm.  On the left there are 6 cyst 2 of which are hyperdense consistent with bloody cyst.  The largest measures 14 cm at the lower pole.  There is a group of stones in the lower pole calyx of the left kidney measuring 1.5 cm with several smaller calcifications in the 3-6 mm range.  Hydronephrosis on either side is not seen.   The left retroperitoneal abscess has resolved with only a small amount of residual scar tissue seen behind the kidney and spleen measuring 1.7 cm.  There is heavy atherosclerotic calcification of the abdominal aorta extending into the pelvic vessels.   The stomach is of normal configuration.  Small bowel dilatation or air-fluid levels are not seen.  The colon appears of normal configuration without distention or mass with few diverticuli noted in the sigmoid colon.  A normal appendix is noted.  No free fluid or free air is seen.   The bladder is not full.  There appears to be a few air bubbles in the bladder near the dome which may be due to infection with a gas-forming organism or recent catheterization.  A uterus is not seen.   Impression:   Resolution of a left retroperitoneal abscess.  Only a small quantity of scar tissue is seen behind the kidney and spleen.  Multiple bilateral renal cysts the largest on the left  measuring 14 cm.  Several left intrarenal stones the largest measuring 1.5 cm without hydronephrosis on either side.  Prior cholecystectomy.  Prior hysterectomy.  Dilation of the common bile duct and intrahepatic biliary ducts down to the ampulla without a stone or mass seen.  Possible air bubble noted in the bladder which may be due to recent catheterization or infection with a gas-forming organism.  Diverticulosis coli.       Assessment & Plan:       Recurrent UTIs in the setting of urinary incontinence, rule out recurrent   Recent urine culture 5/25 & 7/5 Klebsiella pneumoniae, 1st 1 pansensitive 2nd 1 intermediate to Macrobid, Zosyn, resistant to tetracycline and Unasyn, sensitive to everything else       Complicated left renal abscess in the setting of large staghorn stone status post 2 IR guided drainage is 11/2/2023 & 01/12/2024, s/p 4 weeks of ceftriaxone - stable  Prior cell counts c/w abscess, cultures 11/2/23 & 1/12/24 Proteus mirabilis pan-sensitive   Alarm symptoms given to the patient and family     Retroperitoneal fluid collection resolved on CT scan from 10/24/2024.  Plan to repeat CT scan in 6 months   RTC in 6 months to see Dr Jorge    CKD not on HD     PMHx: diabetes, COPD, CHF   Follow pulmonary and PCP outpatient        Overactive bladder    Retroperitoneal fluid collection    Recurrent UTI       Retroperitoneal fluid collection resolved on CT scan from 10/24/2024. Patient has plans to repeat CT scan around 6 months.  They would like to return after  Repeat CT scan unless she has some kind of issue  in the next 6 months.  She was getting ready to change insurance.  I checked with the  and they will take that insurance but she was worried because Dr. Qureshi will not and she will need to find a new urologist.     Return to clinic in 6 months - we will plan to see you after your CT scan    Call the office if you have any issues prior to next visit      Follow up in about 6 months  (around 5/7/2025).       I spent a total of 35 minutes on the day of the visit.   This includes face to face time and non-face to face time preparing to see the patient (eg, review of tests), obtaining and/or reviewing separately obtained history, documenting clinical information in the electronic or other health record, independently interpreting results and communicating results to the patient/family/caregiver, or care coordinator.     Eliane Marion   [facility] Infectious Disease  11/07/2024  2:32 PM     This note was created using Dragon voice recognition software that occasionally misinterpreted phrases or words.

## 2024-11-07 NOTE — PATIENT INSTRUCTIONS
Return to clinic in 6 months - we will plan to see you after your CT scan    Call the office if you have any issues prior to next visit

## 2024-11-12 ENCOUNTER — PATIENT MESSAGE (OUTPATIENT)
Dept: FAMILY MEDICINE | Facility: CLINIC | Age: 84
End: 2024-11-12
Payer: MEDICARE

## 2024-11-25 ENCOUNTER — EXTERNAL HOME HEALTH (OUTPATIENT)
Dept: HOME HEALTH SERVICES | Facility: HOSPITAL | Age: 84
End: 2024-11-25
Payer: MEDICARE

## 2024-12-05 ENCOUNTER — TELEPHONE (OUTPATIENT)
Dept: FAMILY MEDICINE | Facility: CLINIC | Age: 84
End: 2024-12-05
Payer: MEDICARE

## 2024-12-05 NOTE — TELEPHONE ENCOUNTER
----- Message from Joan sent at 12/5/2024 10:20 AM CST -----  Returning a phone call.   [phone]

## 2024-12-09 DIAGNOSIS — M54.6 ACUTE RIGHT-SIDED THORACIC BACK PAIN: ICD-10-CM

## 2024-12-09 DIAGNOSIS — G57.93 NEUROPATHY OF BOTH FEET: ICD-10-CM

## 2024-12-09 RX ORDER — GABAPENTIN 100 MG/1
CAPSULE ORAL
Qty: 180 CAPSULE | Refills: 5 | Status: SHIPPED | OUTPATIENT
Start: 2024-12-09

## 2024-12-09 RX ORDER — PANTOPRAZOLE SODIUM 40 MG/1
40 TABLET, DELAYED RELEASE ORAL DAILY
Qty: 30 TABLET | Refills: 5 | Status: SHIPPED | OUTPATIENT
Start: 2024-12-09

## 2024-12-09 NOTE — TELEPHONE ENCOUNTER
----- Message from Tessa sent at 12/9/2024  7:12 AM CST -----  - 12/6-1:27 pm- [facility] for refill on  pantoprazole and gabapentin

## 2024-12-13 ENCOUNTER — LAB VISIT (OUTPATIENT)
Dept: LAB | Facility: HOSPITAL | Age: 84
End: 2024-12-13
Attending: INTERNAL MEDICINE
Payer: MEDICARE

## 2024-12-13 DIAGNOSIS — I47.19 NODAL PAROXYSMAL TACHYCARDIA: ICD-10-CM

## 2024-12-13 DIAGNOSIS — I50.42 CHRONIC COMBINED SYSTOLIC AND DIASTOLIC HEART FAILURE: Primary | ICD-10-CM

## 2024-12-13 DIAGNOSIS — Z79.899 NEED FOR PROPHYLACTIC CHEMOTHERAPY: ICD-10-CM

## 2024-12-13 DIAGNOSIS — I25.10 CORONARY ATHEROSCLEROSIS OF NATIVE CORONARY ARTERY: ICD-10-CM

## 2024-12-13 DIAGNOSIS — I48.0 PAROXYSMAL ATRIAL FIBRILLATION: ICD-10-CM

## 2024-12-13 LAB
ALBUMIN SERPL BCP-MCNC: 3.9 G/DL (ref 3.5–5.2)
ALP SERPL-CCNC: 138 U/L (ref 55–135)
ALT SERPL W/O P-5'-P-CCNC: 9 U/L (ref 10–44)
ANION GAP SERPL CALC-SCNC: 8 MMOL/L (ref 8–16)
AST SERPL-CCNC: 10 U/L (ref 10–40)
BASOPHILS # BLD AUTO: 0.04 K/UL (ref 0–0.2)
BASOPHILS NFR BLD: 0.7 % (ref 0–1.9)
BILIRUB SERPL-MCNC: 0.5 MG/DL (ref 0.1–1)
BNP SERPL-MCNC: 1324 PG/ML (ref 0–99)
BUN SERPL-MCNC: 24 MG/DL (ref 8–23)
CALCIUM SERPL-MCNC: 9.1 MG/DL (ref 8.7–10.5)
CHLORIDE SERPL-SCNC: 104 MMOL/L (ref 95–110)
CHOLEST SERPL-MCNC: 156 MG/DL (ref 120–199)
CHOLEST/HDLC SERPL: 2.7 {RATIO} (ref 2–5)
CO2 SERPL-SCNC: 32 MMOL/L (ref 23–29)
CREAT SERPL-MCNC: 1.9 MG/DL (ref 0.5–1.4)
DIFFERENTIAL METHOD BLD: ABNORMAL
DIGOXIN SERPL-MCNC: 2 NG/ML (ref 0.8–2)
EOSINOPHIL # BLD AUTO: 0.2 K/UL (ref 0–0.5)
EOSINOPHIL NFR BLD: 2.6 % (ref 0–8)
ERYTHROCYTE [DISTWIDTH] IN BLOOD BY AUTOMATED COUNT: 13.9 % (ref 11.5–14.5)
EST. GFR  (NO RACE VARIABLE): 25.9 ML/MIN/1.73 M^2
FERRITIN SERPL-MCNC: 21.3 NG/ML (ref 20–300)
FOLATE SERPL-MCNC: 10.6 NG/ML (ref 4–24)
GLUCOSE SERPL-MCNC: 89 MG/DL (ref 70–110)
HCT VFR BLD AUTO: 39.8 % (ref 37–48.5)
HDLC SERPL-MCNC: 58 MG/DL (ref 40–75)
HDLC SERPL: 37.2 % (ref 20–50)
HGB BLD-MCNC: 12.4 G/DL (ref 12–16)
IMM GRANULOCYTES # BLD AUTO: 0.02 K/UL (ref 0–0.04)
IMM GRANULOCYTES NFR BLD AUTO: 0.4 % (ref 0–0.5)
IRON SERPL-MCNC: 58 UG/DL (ref 30–160)
LDLC SERPL CALC-MCNC: 58 MG/DL (ref 63–159)
LYMPHOCYTES # BLD AUTO: 1.4 K/UL (ref 1–4.8)
LYMPHOCYTES NFR BLD: 25.2 % (ref 18–48)
MAGNESIUM SERPL-MCNC: 2.1 MG/DL (ref 1.6–2.6)
MCH RBC QN AUTO: 31.2 PG (ref 27–31)
MCHC RBC AUTO-ENTMCNC: 31.2 G/DL (ref 32–36)
MCV RBC AUTO: 100 FL (ref 82–98)
MONOCYTES # BLD AUTO: 0.5 K/UL (ref 0.3–1)
MONOCYTES NFR BLD: 9.2 % (ref 4–15)
NEUTROPHILS # BLD AUTO: 3.5 K/UL (ref 1.8–7.7)
NEUTROPHILS NFR BLD: 61.9 % (ref 38–73)
NONHDLC SERPL-MCNC: 98 MG/DL
NRBC BLD-RTO: 0 /100 WBC
PLATELET # BLD AUTO: 238 K/UL (ref 150–450)
PMV BLD AUTO: 10.1 FL (ref 9.2–12.9)
POTASSIUM SERPL-SCNC: 4.1 MMOL/L (ref 3.5–5.1)
PROT SERPL-MCNC: 6.4 G/DL (ref 6–8.4)
RBC # BLD AUTO: 3.97 M/UL (ref 4–5.4)
SATURATED IRON: 17 % (ref 20–50)
SODIUM SERPL-SCNC: 144 MMOL/L (ref 136–145)
TOTAL IRON BINDING CAPACITY: 347 UG/DL (ref 250–450)
TRANSFERRIN SERPL-MCNC: 248 MG/DL (ref 200–375)
TRIGL SERPL-MCNC: 200 MG/DL (ref 30–150)
TSH SERPL DL<=0.005 MIU/L-ACNC: 1.45 UIU/ML (ref 0.34–5.6)
VIT B12 SERPL-MCNC: 229 PG/ML (ref 210–950)
WBC # BLD AUTO: 5.68 K/UL (ref 3.9–12.7)

## 2024-12-13 PROCEDURE — 80151 DRUG ASSAY AMIODARONE: CPT | Performed by: INTERNAL MEDICINE

## 2024-12-13 PROCEDURE — 85025 COMPLETE CBC W/AUTO DIFF WBC: CPT | Performed by: INTERNAL MEDICINE

## 2024-12-13 PROCEDURE — 80061 LIPID PANEL: CPT | Performed by: INTERNAL MEDICINE

## 2024-12-13 PROCEDURE — 82746 ASSAY OF FOLIC ACID SERUM: CPT | Performed by: INTERNAL MEDICINE

## 2024-12-13 PROCEDURE — 82728 ASSAY OF FERRITIN: CPT | Performed by: INTERNAL MEDICINE

## 2024-12-13 PROCEDURE — 84207 ASSAY OF VITAMIN B-6: CPT | Performed by: INTERNAL MEDICINE

## 2024-12-13 PROCEDURE — 83880 ASSAY OF NATRIURETIC PEPTIDE: CPT | Performed by: INTERNAL MEDICINE

## 2024-12-13 PROCEDURE — 84425 ASSAY OF VITAMIN B-1: CPT | Performed by: INTERNAL MEDICINE

## 2024-12-13 PROCEDURE — 84466 ASSAY OF TRANSFERRIN: CPT | Performed by: INTERNAL MEDICINE

## 2024-12-13 PROCEDURE — 36415 COLL VENOUS BLD VENIPUNCTURE: CPT | Performed by: INTERNAL MEDICINE

## 2024-12-13 PROCEDURE — 82607 VITAMIN B-12: CPT | Performed by: INTERNAL MEDICINE

## 2024-12-13 PROCEDURE — 83735 ASSAY OF MAGNESIUM: CPT | Performed by: INTERNAL MEDICINE

## 2024-12-13 PROCEDURE — 84443 ASSAY THYROID STIM HORMONE: CPT | Performed by: INTERNAL MEDICINE

## 2024-12-13 PROCEDURE — 80053 COMPREHEN METABOLIC PANEL: CPT | Performed by: INTERNAL MEDICINE

## 2024-12-13 PROCEDURE — 80162 ASSAY OF DIGOXIN TOTAL: CPT | Performed by: INTERNAL MEDICINE

## 2024-12-18 LAB — VIT B6 SERPL-MCNC: 3.2 UG/L (ref 3.4–65.2)

## 2024-12-19 LAB
LABCORP MISC TEST CODE: NORMAL
LABCORP MISC TEST NAME: NORMAL
LABCORP MISCELLANEOUS TEST: NORMAL
VIT B1 BLD-SCNC: 98.1 NMOL/L (ref 66.5–200)

## 2025-02-13 ENCOUNTER — LAB VISIT (OUTPATIENT)
Dept: LAB | Facility: HOSPITAL | Age: 85
End: 2025-02-13
Attending: INTERNAL MEDICINE
Payer: MEDICARE

## 2025-02-13 DIAGNOSIS — I50.42 CHRONIC COMBINED SYSTOLIC AND DIASTOLIC CONGESTIVE HEART FAILURE: Chronic | ICD-10-CM

## 2025-02-13 DIAGNOSIS — I48.0 PAROXYSMAL ATRIAL FIBRILLATION: Chronic | ICD-10-CM

## 2025-02-13 LAB
ALBUMIN SERPL BCP-MCNC: 3.4 G/DL (ref 3.5–5.2)
ALP SERPL-CCNC: 156 U/L (ref 40–150)
ALT SERPL W/O P-5'-P-CCNC: 12 U/L (ref 10–44)
ANION GAP SERPL CALC-SCNC: 13 MMOL/L (ref 8–16)
AST SERPL-CCNC: 12 U/L (ref 10–40)
BASOPHILS # BLD AUTO: 0.03 K/UL (ref 0–0.2)
BASOPHILS NFR BLD: 0.5 % (ref 0–1.9)
BILIRUB SERPL-MCNC: 0.6 MG/DL (ref 0.1–1)
BNP SERPL-MCNC: 355 PG/ML (ref 0–99)
BUN SERPL-MCNC: 22 MG/DL (ref 8–23)
CALCIUM SERPL-MCNC: 9.7 MG/DL (ref 8.7–10.5)
CHLORIDE SERPL-SCNC: 105 MMOL/L (ref 95–110)
CO2 SERPL-SCNC: 26 MMOL/L (ref 23–29)
CREAT SERPL-MCNC: 1.7 MG/DL (ref 0.5–1.4)
DIFFERENTIAL METHOD BLD: ABNORMAL
EOSINOPHIL # BLD AUTO: 0.2 K/UL (ref 0–0.5)
EOSINOPHIL NFR BLD: 2.8 % (ref 0–8)
ERYTHROCYTE [DISTWIDTH] IN BLOOD BY AUTOMATED COUNT: 13.5 % (ref 11.5–14.5)
EST. GFR  (NO RACE VARIABLE): 30 ML/MIN/1.73 M^2
GLUCOSE SERPL-MCNC: 73 MG/DL (ref 70–110)
HCT VFR BLD AUTO: 40.9 % (ref 37–48.5)
HGB BLD-MCNC: 12.5 G/DL (ref 12–16)
IMM GRANULOCYTES # BLD AUTO: 0.02 K/UL (ref 0–0.04)
IMM GRANULOCYTES NFR BLD AUTO: 0.3 % (ref 0–0.5)
LYMPHOCYTES # BLD AUTO: 1.3 K/UL (ref 1–4.8)
LYMPHOCYTES NFR BLD: 21.9 % (ref 18–48)
MCH RBC QN AUTO: 30.1 PG (ref 27–31)
MCHC RBC AUTO-ENTMCNC: 30.6 G/DL (ref 32–36)
MCV RBC AUTO: 99 FL (ref 82–98)
MONOCYTES # BLD AUTO: 0.6 K/UL (ref 0.3–1)
MONOCYTES NFR BLD: 9.7 % (ref 4–15)
NEUTROPHILS # BLD AUTO: 3.7 K/UL (ref 1.8–7.7)
NEUTROPHILS NFR BLD: 64.8 % (ref 38–73)
NRBC BLD-RTO: 0 /100 WBC
PLATELET # BLD AUTO: 255 K/UL (ref 150–450)
PMV BLD AUTO: 9.9 FL (ref 9.2–12.9)
POTASSIUM SERPL-SCNC: 5.1 MMOL/L (ref 3.5–5.1)
PROT SERPL-MCNC: 6.9 G/DL (ref 6–8.4)
RBC # BLD AUTO: 4.15 M/UL (ref 4–5.4)
SODIUM SERPL-SCNC: 144 MMOL/L (ref 136–145)
WBC # BLD AUTO: 5.76 K/UL (ref 3.9–12.7)

## 2025-02-13 PROCEDURE — 83880 ASSAY OF NATRIURETIC PEPTIDE: CPT | Performed by: INTERNAL MEDICINE

## 2025-02-13 PROCEDURE — 36415 COLL VENOUS BLD VENIPUNCTURE: CPT | Performed by: INTERNAL MEDICINE

## 2025-02-13 PROCEDURE — 80053 COMPREHEN METABOLIC PANEL: CPT | Performed by: INTERNAL MEDICINE

## 2025-02-13 PROCEDURE — 85025 COMPLETE CBC W/AUTO DIFF WBC: CPT | Performed by: INTERNAL MEDICINE

## 2025-02-20 ENCOUNTER — PATIENT MESSAGE (OUTPATIENT)
Dept: FAMILY MEDICINE | Facility: CLINIC | Age: 85
End: 2025-02-20

## 2025-02-20 ENCOUNTER — OFFICE VISIT (OUTPATIENT)
Dept: FAMILY MEDICINE | Facility: CLINIC | Age: 85
End: 2025-02-20
Payer: MEDICARE

## 2025-02-20 VITALS — SYSTOLIC BLOOD PRESSURE: 128 MMHG | BODY MASS INDEX: 32.19 KG/M2 | DIASTOLIC BLOOD PRESSURE: 68 MMHG | WEIGHT: 218 LBS

## 2025-02-20 DIAGNOSIS — I48.11 LONGSTANDING PERSISTENT ATRIAL FIBRILLATION: Primary | Chronic | ICD-10-CM

## 2025-02-20 DIAGNOSIS — Z99.89 USES ROLLER WALKER: ICD-10-CM

## 2025-02-20 DIAGNOSIS — I50.42 CHRONIC COMBINED SYSTOLIC AND DIASTOLIC CONGESTIVE HEART FAILURE: Chronic | ICD-10-CM

## 2025-02-20 DIAGNOSIS — I10 ESSENTIAL HYPERTENSION: ICD-10-CM

## 2025-02-20 DIAGNOSIS — E78.2 MIXED DYSLIPIDEMIA: ICD-10-CM

## 2025-02-20 DIAGNOSIS — R54 FRAIL ELDERLY: ICD-10-CM

## 2025-02-20 DIAGNOSIS — I75.023 PURPLE TOE SYNDROME OF BOTH FEET: ICD-10-CM

## 2025-02-20 NOTE — PROGRESS NOTES
Subjective:       Patient ID: Jo Alegre is a 84 y.o. female.    Chief Complaint: Foot Swelling (Right foot swollen and purple/Left foot purple ), Atrial Fibrillation, Hyperlipidemia, Hypertension, Cardiomyopathy, and Overactive bladder  Miss Jo Alegre is a 84-year-old  female who comes for follow-up.  She is accompanied with the daughter miss Forbes who is a patient herself and   Ms Mary Myers  who is not a patient but tends to keep more control of patient's condition.  Both the daughters help the mother out with the medical as well as executive conditions.  Medical issues are as below:-    1. Chronic combined systolic and diastolic congestive heart failure   2. Paroxysmal atrial fibrillation   3. Essential hypertension   4. Chronic anticoagulation -currently on Xarelto  5. Type 2 DM with diabetic nephropathy, without long-term current use of insulin   6. Midline low back pain without sciatica, unspecified chronicity   7. Neurogenic bladder   8.         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  9.-       large cyst in the kidney and probably might be getting frequently infected.  10.-      Pulmonary HTN- Verciguat       Recently she had a test nerve block in the lumbar region followed by ablation which has given her some long-lasting relief in the back.      Heart failure seems to be stable with optimization further of her medications including increasing off Entresto due 49-51 b.I.d. and also addition of Verquvo  to 5 mg b.I.d.  History of Present Illness    CHIEF COMPLAINT:  Jo presents for a follow-up visit to discuss her ongoing medical conditions, including heart failure and atrial fibrillation.    HPI:  Jo reports improved overall condition with good appetite. She has atrial fibrillation, which was confirmed during the visit using an Apple Watch, showing a heart rate of approximately 100 BPM. She has both systolic and diastolic heart failure, for which she  is taking Entresto 49/51 twice daily. She also has reflux, treated with pantoprazole 40 mg daily, and is on Xarelto 15 mg daily as a anticoagulant. She takes Verquvo for pulmonary hypertension, prescribed by Dr. Jones. She has dysautonomia and takes 5 mg of midodrine, usually in the morning, but sometimes takes an additional dose if her blood pressure drops.   She has been diagnosed with neuropathy in the past, but currently denies symptoms of burning or tingling in her feet. She has been taking gabapentin for pain, prescribed years ago for neuropathy. She mentions having broken her femur in the past, which led to the neuropathy diagnosis. Her right leg is always somewhat discolored. She has a follow-up visit with Dr. Jones, a cardiologist, scheduled in a few months.    MEDICATIONS:  Jo is on Entresto 49/51 mg twice daily for systolic and diastolic heart failure. She takes Pantoprazole 40 mg daily for reflux and Xarelto 15 mg daily as a anticoagulant.     She is also on Verquvo for heart failure.     For dysautonomia, she takes Midodrine 5 mg, normally in the morning with an additional dose if her blood pressure drops.     Jo is on Amiodarone for atrial fibrillation and   Gemtisa for bladder control.   She takes Gabapentin, 2 capsules 3 times daily, originally prescribed for neuropathy, but its effectiveness is questioned.   Hydrocodone is taken for pain management.  Last prescription from [facility] database noted on 07/01/2024.    The doctor has initiated a tapering protocol for Gabapentin over 4-5 weeks due to lack of neuropathy symptoms, gradually reducing from 2 capsules 3 times daily to eventual discontinuation.    MEDICAL HISTORY:  Jo has a history of systolic and diastolic heart failure, atrial fibrillation, reflux, pulmonary hypertension, dysautonomia, neuropathy, and chronic kidney disease (Stage 3B). She also has hyperlipidemia and hypertension. Her borderline diabetes has resolved. She received a  flu vaccine on 10/21/2024 and a PrevNord 20 (pneumonia) vaccine on 11/20/2023.    SURGICAL HISTORY:  Jo has a history of femur fracture, though the date is not specified.    TEST RESULTS:  Jo's recent creatinine level is 1.7 (previously 1.9), indicating stage 3B chronic kidney disease. Her BNP has improved to 355 from previous readings of 4,500 and 1,000, though still high. Recent Complete Blood Count shows Hemoglobin at 12, Hematocrit at 40, Platelets at 255, and White blood cell count at 5.76. Dr. Sampson Jones checked her Vitamin B levels, finding B6 low, B12 fine, and B1 checked. Her TSH is 1.447, which is normal and consistent with historical readings. During the current visit, an EKG via Apple Watch   showed atrial fibrillation with a heart rate of approximately 100 BPM.    IMAGING:  Jo underwent a mammogram in 2023.    ALLERGIES:  Jo is allergic to aspirin, which causes a rash.    ROS:  General: -change in weight  Cardiovascular: +palpitations  Musculoskeletal: +muscle pain  Skin: +rash  Neurological: -numbness         Past Medical History:   Diagnosis Date    Allergy     Codeine, Lasix    Atrial fibrillation     Atrial fibrillation     Cataract     CHF (congestive heart failure)     Diabetes mellitus, type 2     Ejection fraction < 50% 10/18/2017    Approximately 35%  Based on prior  Echocardiogram.    Encounter for blood transfusion     Hyperlipidemia     Osteoporosis     PONV (postoperative nausea and vomiting)     Thyroid disorder screening 10/17/2017    TSH of 1.12 ordered by Dr. sampson Jones     Social History[1]  Past Surgical History:   Procedure Laterality Date    ANTEGRADE NEPHROSTOGRAPHY Left 8/25/2022    Procedure: NEPHROSTOGRAM, ANTEGRADE;  Surgeon: Shelby Parra MD;  Location: [facility] OR;  Service: Urology;  Laterality: Left;    CHOLECYSTECTOMY  1997    [place]     COLONOSCOPY      CYSTOSCOPY Left 3/8/2024    Procedure: CYSTOSCOPY;  Surgeon: Shelby Parra MD;  Location: [facility] OR;   Service: Urology;  Laterality: Left;    CYSTOSCOPY W/ URETERAL STENT PLACEMENT Left 7/17/2022    Procedure: CYSTOSCOPY, WITH URETERAL STENT INSERTION;  Surgeon: Shelby Parra MD;  Location: [facility] OR;  Service: Urology;  Laterality: Left;    CYSTOSCOPY W/ URETERAL STENT REMOVAL Left 9/21/2022    Procedure: CYSTOSCOPY, WITH URETERAL STENT REMOVAL;  Surgeon: Shelby Parra MD;  Location: [facility] OR;  Service: Urology;  Laterality: Left;    CYSTOSCOPY WITH URETEROSCOPY, RETROGRADE PYELOGRAPHY, AND INSERTION OF STENT Left 8/25/2022    Procedure: CYSTOSCOPY, WITH RETROGRADE PYELOGRAM AND URETERAL STENT INSERTION;  Surgeon: Shelby Parra MD;  Location: [facility] OR;  Service: Urology;  Laterality: Left;    EYE SURGERY      bilateral cataracts    FINGER SURGERY Right 2021    right pinky finger    FLEXIBLE CYSTOSCOPY Left 8/25/2022    Procedure: CYSTOSCOPY, FLEXIBLE WITH STENT REMOVAL;  Surgeon: Shelby Parra MD;  [facility];  Service: Urology;  Laterality: Left;    FRACTURE SURGERY  2014    right femur with neville    HEMORRHOID SURGERY      48 yrs ago    HYSTERECTOMY      NEPHROSTOMY Left 8/25/2022    Procedure: CREATION, NEPHROSTOMY;  Surgeon: Shelby Parra MD;  [facility];  Service: Urology;  Laterality: Left;    PERCUTANEOUS NEPHROLITHOTOMY N/A 8/25/2022    Procedure: NEPHROLITHOTOMY, PERCUTANEOUS;  Surgeon: Shelby Parra MD;  Location: [facility] OR;  Service: Urology;  Laterality: N/A;    REPLACEMENT OF IMPLANTABLE CARDIOVERTER-DEFIBRILLATOR (ICD) GENERATOR N/A 12/13/2019    Procedure: REPLACEMENT, PULSE GENERATOR, ICD-MEDTRONIC;  Surgeon: Sebastian Nowak III, MD;  Location: [facility];  Service: Cardiology;  Laterality: N/A;    RETROGRADE PYELOGRAPHY N/A 3/8/2024    Procedure: PYELOGRAM, RETROGRADE;  Surgeon: Shelby Parra MD;  Location: [facility] OR;  Service: Urology;  Laterality: N/A;    TONSILLECTOMY       Family History   Problem Relation Name Age of Onset    Heart disease Mother  Jannette     Cancer Father Branden         Lung Cancer ??? Asbestos    Breast cancer Paternal Aunt       omponent  Ref Range & Units (hover) 2 mo ago 1 yr ago 4 yr ago   TSH 1.447 1.441 1.640   Resulting Agency [facility]     Objective:      Physical Exam  Blood pressure 128/68, weight 98.9 kg (218 lb). Body mass index is 32.19 kg/m².  Physical Exam    General:  Somewhat chronically ill-appearing. Well-developed. Well-nourished.  Eyes: EOMI. Sclerae anicteric.  HENT: Normocephalic. Atraumatic. Nares patent. Moist oral mucosa.    Cardiovascular: Tachycardia. Irregular heart rhythm with variable intervals. No murmurs. No rubs. No gallops. Normal S1, S2. Irregularly irregular rate and rhythm.  Respiratory:  Diminished bilateral air entry y. No rales. No rhonchi. No wheezing.  Abdomen: Soft. Non-tender. Non-distended. Normoactive bowel sounds.  Musculoskeletal: No  obvious deformity.  Extremities:  Trace to 1+ edema with some chronic changes and purplish discoloration of the toes.  Neurological: Alert & oriented x3. No slurred speech. Normal gait.  Psychiatric:  Affect is appropriate for her station and situation in life.  Skin:  Feet appeared to be cool.. Dry. No rash. Right foot appears purple.                Above Rhythm strip of patient was captured from the examiner's apple watch for 30 seconds and it shows an atrial fibrillation with a rate of about 100 beats per minute average.  Patient was relatively asymptomatic.  Assessment:       Component  Ref Range & Units (hover) 2 mo ago 12 mo ago 2 yr ago   Cholesterol 156 164  High  R   Comment: The National Cholesterol Education Program (NCEP) has set the  following guidelines (reference ranges) for Cholesterol:  Optimal.....................<200 mg/dL  Borderline High.............200-239 mg/dL  High........................> or = 240 mg/dL   Triglycerides 200 High  109  High  R, CM   Comment: The National Cholesterol Education Program (NCEP) has set  the  following guidelines (reference values) for triglycerides:  Normal......................<150 mg/dL  Borderline High.............150-199 mg/dL  High........................200-499 mg/dL   HDL 58 69 CM 61 R   Comment: The National Cholesterol Education Program (NCEP) has set the  following guidelines (reference values) for HDL Cholesterol:  Low...............<40 mg/dL  Optimal...........>60 mg/dL   LDL Cholesterol 58.0 Low  73.2  High  R, CM   Comment: The National Cholesterol Education Program (NCEP) has set the  following guidelines (reference values) for LDL Cholesterol:  Optimal.......................<130 mg/dL  Borderline High...............130-159 mg/dL  High..........................160-189 mg/dL  Very High.....................>190 mg/dL   HDL/Cholesterol Ratio 37.2 42.1 4.2 R   Total Cholesterol/HDL Ratio 2.7 2.4    Non-HDL Cholesterol 98 95 CM    Comment: Risk category and Non-HDL cholesterol goals:     Lab Visit on 02/13/2025   Component Date Value Ref Range Status    Sodium 02/13/2025 144  136 - 145 mmol/L Final    Potassium 02/13/2025 5.1  3.5 - 5.1 mmol/L Final    Chloride 02/13/2025 105  95 - 110 mmol/L Final    CO2 02/13/2025 26  23 - 29 mmol/L Final    Glucose 02/13/2025 73  70 - 110 mg/dL Final    BUN 02/13/2025 22  8 - 23 mg/dL Final    Creatinine 02/13/2025 1.7 (H)  0.5 - 1.4 mg/dL Final    Calcium 02/13/2025 9.7  8.7 - 10.5 mg/dL Final    Total Protein 02/13/2025 6.9  6.0 - 8.4 g/dL Final    Albumin 02/13/2025 3.4 (L)  3.5 - 5.2 g/dL Final    Total Bilirubin 02/13/2025 0.6  0.1 - 1.0 mg/dL Final    Alkaline Phosphatase 02/13/2025 156 (H)  40 - 150 U/L Final    AST 02/13/2025 12  10 - 40 U/L Final    ALT 02/13/2025 12  10 - 44 U/L Final    eGFR 02/13/2025 30 (A)  >60 mL/min/1.73 m^2 Final    Anion Gap 02/13/2025 13  8 - 16 mmol/L Final    BNP 02/13/2025 355 (H)  0 - 99 pg/mL Final    WBC 02/13/2025 5.76  3.90 - 12.70 K/uL Final    RBC 02/13/2025 4.15  4.00 - 5.40 M/uL Final    Hemoglobin  02/13/2025 12.5  12.0 - 16.0 g/dL Final    Hematocrit 02/13/2025 40.9  37.0 - 48.5 % Final    MCV 02/13/2025 99 (H)  82 - 98 fL Final    MCH 02/13/2025 30.1  27.0 - 31.0 pg Final    MCHC 02/13/2025 30.6 (L)  32.0 - 36.0 g/dL Final    RDW 02/13/2025 13.5  11.5 - 14.5 % Final    Platelets 02/13/2025 255  150 - 450 K/uL Final    MPV 02/13/2025 9.9  9.2 - 12.9 fL Final    Immature Granulocytes 02/13/2025 0.3  0.0 - 0.5 % Final    Gran # (ANC) 02/13/2025 3.7  1.8 - 7.7 K/uL Final    Immature Grans (Abs) 02/13/2025 0.02  0.00 - 0.04 K/uL Final    Lymph # 02/13/2025 1.3  1.0 - 4.8 K/uL Final    Mono # 02/13/2025 0.6  0.3 - 1.0 K/uL Final    Eos # 02/13/2025 0.2  0.0 - 0.5 K/uL Final    Baso # 02/13/2025 0.03  0.00 - 0.20 K/uL Final    nRBC 02/13/2025 0  0 /100 WBC Final    Gran % 02/13/2025 64.8  38.0 - 73.0 % Final    Lymph % 02/13/2025 21.9  18.0 - 48.0 % Final    Mono % 02/13/2025 9.7  4.0 - 15.0 % Final    Eosinophil % 02/13/2025 2.8  0.0 - 8.0 % Final    Basophil % 02/13/2025 0.5  0.0 - 1.9 % Final    Differential Method 02/13/2025 Automated   Final   Lab Visit on 12/13/2024   Component Date Value Ref Range Status    BNP 12/13/2024 1,324 (H)  0 - 99 pg/mL Final    WBC 12/13/2024 5.68  3.90 - 12.70 K/uL Final    RBC 12/13/2024 3.97 (L)  4.00 - 5.40 M/uL Final    Hemoglobin 12/13/2024 12.4  12.0 - 16.0 g/dL Final    Hematocrit 12/13/2024 39.8  37.0 - 48.5 % Final    MCV 12/13/2024 100 (H)  82 - 98 fL Final    MCH 12/13/2024 31.2 (H)  27.0 - 31.0 pg Final    MCHC 12/13/2024 31.2 (L)  32.0 - 36.0 g/dL Final    RDW 12/13/2024 13.9  11.5 - 14.5 % Final    Platelets 12/13/2024 238  150 - 450 K/uL Final    MPV 12/13/2024 10.1  9.2 - 12.9 fL Final    Immature Granulocytes 12/13/2024 0.4  0.0 - 0.5 % Final    Gran # (ANC) 12/13/2024 3.5  1.8 - 7.7 K/uL Final    Immature Grans (Abs) 12/13/2024 0.02  0.00 - 0.04 K/uL Final    Lymph # 12/13/2024 1.4  1.0 - 4.8 K/uL Final    Mono # 12/13/2024 0.5  0.3 - 1.0 K/uL Final    Eos #  12/13/2024 0.2  0.0 - 0.5 K/uL Final    Baso # 12/13/2024 0.04  0.00 - 0.20 K/uL Final    nRBC 12/13/2024 0  0 /100 WBC Final    Gran % 12/13/2024 61.9  38.0 - 73.0 % Final    Lymph % 12/13/2024 25.2  18.0 - 48.0 % Final    Mono % 12/13/2024 9.2  4.0 - 15.0 % Final    Eosinophil % 12/13/2024 2.6  0.0 - 8.0 % Final    Basophil % 12/13/2024 0.7  0.0 - 1.9 % Final    Differential Method 12/13/2024 Automated   Final    Sodium 12/13/2024 144  136 - 145 mmol/L Final    Potassium 12/13/2024 4.1  3.5 - 5.1 mmol/L Final    Chloride 12/13/2024 104  95 - 110 mmol/L Final    CO2 12/13/2024 32 (H)  23 - 29 mmol/L Final    Glucose 12/13/2024 89  70 - 110 mg/dL Final    BUN 12/13/2024 24 (H)  8 - 23 mg/dL Final    Creatinine 12/13/2024 1.9 (H)  0.5 - 1.4 mg/dL Final    Calcium 12/13/2024 9.1  8.7 - 10.5 mg/dL Final    Total Protein 12/13/2024 6.4  6.0 - 8.4 g/dL Final    Albumin 12/13/2024 3.9  3.5 - 5.2 g/dL Final    Total Bilirubin 12/13/2024 0.5  0.1 - 1.0 mg/dL Final    Alkaline Phosphatase 12/13/2024 138 (H)  55 - 135 U/L Final    AST 12/13/2024 10  10 - 40 U/L Final    ALT 12/13/2024 9 (L)  10 - 44 U/L Final    eGFR 12/13/2024 25.9 (A)  >60 mL/min/1.73 m^2 Final    Anion Gap 12/13/2024 8  8 - 16 mmol/L Final    Digoxin Lvl 12/13/2024 2.0  0.8 - 2.0 ng/mL Final    Iron 12/13/2024 58  30 - 160 ug/dL Final    Transferrin 12/13/2024 248  200 - 375 mg/dL Final    TIBC 12/13/2024 347  250 - 450 ug/dL Final    Saturated Iron 12/13/2024 17 (L)  20 - 50 % Final    Ferritin 12/13/2024 21.3  20.0 - 300.0 ng/mL Final    Folate 12/13/2024 10.6  4.0 - 24.0 ng/mL Final    Cholesterol 12/13/2024 156  120 - 199 mg/dL Final    Triglycerides 12/13/2024 200 (H)  30 - 150 mg/dL Final    HDL 12/13/2024 58  40 - 75 mg/dL Final    LDL Cholesterol 12/13/2024 58.0 (L)  63.0 - 159.0 mg/dL Final    HDL/Cholesterol Ratio 12/13/2024 37.2  20.0 - 50.0 % Final    Total Cholesterol/HDL Ratio 12/13/2024 2.7  2.0 - 5.0 Final    Non-HDL Cholesterol 12/13/2024  98  mg/dL Final    Magnesium 12/13/2024 2.1  1.6 - 2.6 mg/dL Final    TSH 12/13/2024 1.447  0.340 - 5.600 uIU/mL Final    Thiamine 12/13/2024 98.1  66.5 - 200.0 nmol/L Final    Vitamin B-12 12/13/2024 229  210 - 950 pg/mL Final    Vitamin B6 12/13/2024 3.2 (L)  3.4 - 65.2 ug/L Final    [facility] Miscellaneous Test 12/13/2024 COMMENT   Final    [facility] Test Code: 12/13/2024 [id number]   Final    [facility] Test Name: 12/13/2024 Amiodarone (Cordarone), Serum or Plasma   Final       Assessment & Plan    IMPRESSION:  - Patient remains in atrial fibrillation with heart rate ~100 bpm, confirmed via Apple Watch ECG  - Chronic kidney disease stage 3B based on elevated creatinine (1.7, improved from 1.9)  - , significantly improved from previous values (4500, 1000) indicating better heart failure control  - CBC normal with no anemia  - Vitamin B6 low per Dr. Jones's labs  - Thyroid function normal  - No longer considered diabetic or borderline diabetic based on recent lab values  - Possible peripheral vascular disease/claudication in lower extremity, requires further evaluation    I50.22 CHRONIC SYSTOLIC (CONGESTIVE) HEART FAILURE:  - Continued Entresto 49/51 twice daily for both systolic and diastolic heart failure.  - Continued Verquvo for pulmonary hypertension.  - Performed an ECG using an Apple Watch, which showed atrial fibrillation with a heart rate of approximately 100 BPM.  - Noted the patient's BNP level (heart failure test) was 355, which is high but significantly improved from previous levels of 4,500 and 1,000.  - Will forward the rhythm strip to Dr. Jones and to the patient's MyChart for further evaluation.  - Scheduled follow up in 4-6 months.  - Pt is also on Verquvo    K21.9 GASTRO-ESOPHAGEAL REFLUX DISEASE WITHOUT ESOPHAGITIS:  - Continued pantoprazole 40 mg daily for reflux.    Z79.01 LONG TERM (CURRENT) USE OF ANTICOAGULANTS:  - Continued Xarelto 15 mg daily as an anticoagulant.      G90.9 DISORDER OF THE  AUTONOMIC NERVOUS SYSTEM, UNSPECIFIED:  - Continued midodrine 5 mg as needed for low blood pressure, typically in the morning.  - Advised the patient to take an additional dose if blood pressure drops.  - Noted the patient has dysautonomia and takes medication for it.    I48.91 UNSPECIFIED ATRIAL FIBRILLATION:  - Performed ECG using Apple Watch, which showed atrial fibrillation with a heart rate of approximately 100 BPM.  - Explained atrial fibrillation pattern on ECG to the patient.  - Continued amiodarone for atrial fibrillation management.  - Will forward the rhythm strip to Dr. Jones and to the patient's MyChart for further evaluation.  - Suggested discussing with Dr. Jones about the possibility of cardioversion to reset the heart rhythm.    N32.81 OVERACTIVE BLADDER:  - Continued Vibrogene/gemtisa for overactive bladder management.    R52 PAIN, UNSPECIFIED:  - Educated the patient on gabapentin's role in treating neuropathy symptoms (burning, tingling, pain) rather than neuropathy itself.  - Initiated gabapentin tapering protocol:  - Week 1: 2 capsules morning, 2 capsules evening  - Week 2: 1 capsule morning, 2 capsules evening  - Week 3: 1 capsule morning, 1 capsule evening  - Week 4: 1 capsule evening only  - Week 5: Discontinue  - Continued hydrocodone for pain management.  - Instructed the patient to contact the office in 1 month to report status after gabapentin taper.    I73.9 PERIPHERAL VASCULAR DISEASE, UNSPECIFIED:  - Observed discoloration (blue or purple) in the patient's foot, possibly indicating a blockage in blood vessels.  - Discussed potential need for vascular studies to evaluate leg circulation.  - Recommend follow-up with Dr. Jones and possibly a vascular specialist for further evaluation.  - Suggested keeping the leg warm and elevated.  - Mentioned possible treatments like angiogram or stent placement if necessary.  - Consider referral to vascular specialist (Dr. Geovanna Ingram mentioned as  potential option).    N18.31 CHRONIC KIDNEY DISEASE, STAGE 3A:  - Noted the patient's creatinine le  ella is 1.7, which is high but improved from previous 1.9.  - Assessed that the patient is in stage 3B chronic kidney disease based on creatinine levels.  - Will continue monitoring kidney function as long as the patient remains reasonably well.    E53.1 PYRIDOXINE DEFICIENCY:  - Noted that Dr. Jones had checked the patient's vitamin B6 level, which was low.  - Inquired about B complex or B6 supplementation.         Plan:   Longstanding persistent atrial fibrillation  Comments:  Rhythm strip shows a rate of 100 & in atrial fib.. Currently on anticoagulation, carvedilol and status of amiodarone uncertain.  Follow up with Cardiology    Essential hypertension  Comments:  Carvedilol 3.125 b.i.d., Entresto 49/51 b.i.d.,    Mixed dyslipidemia  Comments:  Atorvastatin 20 mg.  Continue.    Chronic combined systolic and diastolic congestive heart failure  Comments:  Currently on Entresto as well as Verquvo.  Follows with Dr. Jones.  Keep appointment for follow-up.    Purple toe syndrome of both feet  Comments:  Differential diagnosis includes peripheral vascular disease, Raynaud's, vasculitis.  Patient is not on warfarin.  Check with Dr. Jones. Relatively asymptomatic.    Frail elderly  Comments:  Risk for fall given multiple comorbidities and of walker.    Uses roller walker  Comments:  Using rolling walker.  Fall precautions discussed.  Especially in view of anticoagulation      Chronic medical issues noted.  She continues on Xarelto for atrial fibrillation  Rhythm strip shows persistent atrial fibrillation with a rate of 100 and needs to follow up with Cardiology and if she may be a candidate for cardioversion or just rate control  Amiodarone shows as potentially discontinued or  medication but I do believe she is continuing with that.  To be confirmed  Status of midodrine for dysautonomia or orthostatic  Hypotension  needs to be confirmed status of Gemtesa needs to be confirmed  Currently she is on management for heart failure with Entresto as well as Verquvo.  Hopefully will get updated records from Dr. Jones's office.  Fall and injury precautions.  COVID and flu precautions.  Given her age of 84, the mammograms and colonoscopy should be phased out now.        TODAY WE HAD A DISCUSSION ABOUT GABAPENTIN AND PATIENT DENIES SHE EVER HAD PAIN OR NEUROPATHIC SYMPTOMS IN HER FEET.  WILL SET UP A TAPERING PROTOCOL FOR GABAPENTIN AND SEE HOW SHE DOES.  CURRENTLY SHE TAKES 2 PILLS 3 TIMES A DAY AND WILL REDUCE IT TO 2 PILLS TWICE A DAY FOR 1 WEEK AND THEN 1 PILL IN THE MORNING AND TUBAL IN THE EVENING FOR THE 2ND WEEK, 1 PILL IN THE MORNING AND 1 PILL IN THE EVENING FOR 3 WEEKS AND THEN FOR THE 4TH WEEK 1 PILL IN THE EVENING.  AFTER THAT SHE CAN STOP LET US SEE HOW SHE DOES.  Follow up in about 5 months (around 7/14/2025), or if symptoms worsen or fail to improve, for A Fib .    Current Medications[2]    This note was generated with the assistance of ambient listening technology. Verbal consent was obtained by the patient and accompanying visitor(s) for the recording of patient appointment to facilitate this note. I attest to having reviewed and edited the generated note for accuracy, though some syntax or spelling errors may persist. Please contact the author of this note for any clarification.      Kraig Alberto           [1]   Social History  Socioeconomic History    Marital status:     Number of children: 4   Occupational History    Occupation:    Tobacco Use    Smoking status: Former     Passive exposure: Past    Smokeless tobacco: Never    Tobacco comments:     quit 2013   Substance and Sexual Activity    Alcohol use: No    Drug use: No    Sexual activity: Not Currently   Social History Narrative    - Drives and lives alone.     Social Drivers of Health     Financial Resource Strain: Low Risk   (9/27/2023)    Overall Financial Resource Strain (CARDIA)     Difficulty of Paying Living Expenses: Not hard at all   Food Insecurity: No Food Insecurity (9/27/2023)    Hunger Vital Sign     Worried About Running Out of Food in the Last Year: Never true     Ran Out of Food in the Last Year: Never true   Transportation Needs: No Transportation Needs (9/27/2023)    PRAPARE - Transportation     Lack of Transportation (Medical): No     Lack of Transportation (Non-Medical): No   Physical Activity: Inactive (9/27/2023)    Exercise Vital Sign     Days of Exercise per Week: 0 days     Minutes of Exercise per Session: 0 min   Stress: No Stress Concern Present (9/27/2023)    [employer] - Occupational Stress Questionnaire     Feeling of Stress : Only a little   Housing Stability: Low Risk  (9/27/2023)    Housing Stability Vital Sign     Unable to Pay for Housing in the Last Year: No     Number of Places Lived in the Last Year: 1     Unstable Housing in the Last Year: No   [2]   Current Outpatient Medications:     atorvastatin (LIPITOR) 20 MG tablet, Take 20 mg by mouth every evening., Disp: , Rfl:     bumetanide (BUMEX) 1 MG tablet, once daily., Disp: , Rfl:     cholecalciferol, vitamin D3, 125 mcg (5,000 unit) Tab, Take 5,000 Units by mouth 2 (two) times daily., Disp: , Rfl:     ENTRESTO 49-51 mg per tablet, Take 1 tablet by mouth 2 (two) times daily., Disp: , Rfl:     gabapentin (NEURONTIN) 100 MG capsule, TAKE 2 CAPSULES(200 MG) BY MOUTH THREE TIMES DAILY, Disp: 180 capsule, Rfl: 5    latanoprost 0.005 % ophthalmic solution, Place 1 drop into both eyes nightly., Disp: , Rfl:     pantoprazole (PROTONIX) 40 MG tablet, Take 1 tablet (40 mg total) by mouth once daily., Disp: 30 tablet, Rfl: 5    rivaroxaban (XARELTO) 15 mg Tab, Take 15 mg by mouth daily with dinner or evening meal., Disp: , Rfl:     vericiguat (VERQUVO) 5 mg Tab, Take 2.5 mg by mouth 2 (two) times a day., Disp: 30 tablet, Rfl: 0     amiodarone (PACERONE) 200 MG Tab, Take 1 tablet (200 mg total) by mouth once daily., Disp: 30 tablet, Rfl: 0    carvediloL (COREG) 3.125 MG tablet, Take by mouth. (Patient not taking: Reported on 2/20/2025), Disp: , Rfl:     digoxin (LANOXIN) 125 mcg tablet, Take 1 tablet (0.125 mg total) by mouth once daily., Disp: 30 tablet, Rfl: 0    midodrine (PROAMATINE) 5 MG Tab, Take 1 tablet (5 mg total) by mouth 3 (three) times daily before meals., Disp: 90 tablet, Rfl: 0

## 2025-03-04 ENCOUNTER — TELEPHONE (OUTPATIENT)
Dept: FAMILY MEDICINE | Facility: CLINIC | Age: 85
End: 2025-03-04
Payer: MEDICARE

## 2025-03-04 DIAGNOSIS — R32 URINARY INCONTINENCE, UNSPECIFIED TYPE: Primary | ICD-10-CM

## 2025-03-04 RX ORDER — MIRABEGRON 25 MG/1
25 TABLET, FILM COATED, EXTENDED RELEASE ORAL DAILY
Qty: 1 TABLET | Refills: 0
Start: 2025-03-04 | End: 2025-03-05

## 2025-03-04 NOTE — TELEPHONE ENCOUNTER
Jo Alegre to Me (Selected Message)        2/22/25  9:16 PM  Twice a week  Me to Jo Alegre        2/22/25  9:13 PM    I am sorry I need 1 more clarification . Is the amiodarone half a pill twice a week or twice a day    Last read by Jo Alegre at 9:15PM on 2/22/2025.  Me to Jo Alegre        2/21/25  9:45 AM   Thank you for the update and I will make corrections in my records also.    Last read by Jo Alegre at 9:15PM on 2/22/2025.  Jo Alegre to Me        2/20/25  3:58 PM  Shes taking Mrybetric for bladder   Midodrine only for low blood pressure up to 3 times daily  Amioderone 1/2 tablet twice a week  Me to Jo Alegre        2/20/25  3:27 PM  Please let me the status of following medications      1. Gemtesa for bladder control  2.-midodrine for low blood pressure   3.-amiodarone for atrial fibrillation     Please let me know if she is continuing the above medications or they has been stopped.     Dr. Dolly LESLIE    Last read by Jo Alegre at 9:15PM on 2/22/2025.

## 2025-03-21 DIAGNOSIS — Z78.0 MENOPAUSE: ICD-10-CM

## 2025-04-14 ENCOUNTER — TELEPHONE (OUTPATIENT)
Dept: FAMILY MEDICINE | Facility: CLINIC | Age: 85
End: 2025-04-14
Payer: MEDICARE

## 2025-04-14 NOTE — TELEPHONE ENCOUNTER
----- Message from Tessa sent at 4/11/2025  8:36 AM CDT -----  Pt is going to do labs today and would like to know if she needs to fast, she is going to [facility] at 12 [phone]

## 2025-04-23 ENCOUNTER — HOSPITAL ENCOUNTER (OUTPATIENT)
Dept: RADIOLOGY | Facility: HOSPITAL | Age: 85
Discharge: HOME | End: 2025-04-23
Attending: STUDENT IN AN ORGANIZED HEALTH CARE EDUCATION/TRAINING PROGRAM
Payer: MEDICARE

## 2025-04-23 DIAGNOSIS — R18.8 RETROPERITONEAL FLUID COLLECTION: ICD-10-CM

## 2025-04-23 PROCEDURE — 74176 CT ABD & PELVIS W/O CONTRAST: CPT | Mod: 26,,, | Performed by: RADIOLOGY

## 2025-04-23 PROCEDURE — 74176 CT ABD & PELVIS W/O CONTRAST: CPT | Mod: TC

## 2025-04-29 ENCOUNTER — TELEPHONE (OUTPATIENT)
Dept: FAMILY MEDICINE | Facility: CLINIC | Age: 85
End: 2025-04-29
Payer: MEDICARE

## 2025-04-29 DIAGNOSIS — N18.32 STAGE 3B CHRONIC KIDNEY DISEASE: Primary | ICD-10-CM

## 2025-04-29 NOTE — TELEPHONE ENCOUNTER
----- Message from Tessa sent at 4/29/2025  2:56 PM CDT -----  Pt needs a referral to Dr. Ortiz for kidney issues. [phone]

## 2025-04-29 NOTE — TELEPHONE ENCOUNTER
Spoke with pt she needs a referral per DR MADISON   pended   please sign     Stage 3b chronic kidney disease  -     Ambulatory referral/consult to Nephrology; Future; Expected date: 05/06/2025

## 2025-05-06 ENCOUNTER — RESULTS FOLLOW-UP (OUTPATIENT)
Dept: UROLOGY | Facility: CLINIC | Age: 85
End: 2025-05-06

## 2025-05-07 ENCOUNTER — OFFICE VISIT (OUTPATIENT)
Dept: INFECTIOUS DISEASES | Facility: CLINIC | Age: 85
End: 2025-05-07
Payer: MEDICARE

## 2025-05-07 VITALS
SYSTOLIC BLOOD PRESSURE: 126 MMHG | WEIGHT: 214.38 LBS | HEIGHT: 69 IN | DIASTOLIC BLOOD PRESSURE: 66 MMHG | BODY MASS INDEX: 31.75 KG/M2 | TEMPERATURE: 97 F

## 2025-05-07 DIAGNOSIS — N20.0 STAGHORN CALCULUS: ICD-10-CM

## 2025-05-07 DIAGNOSIS — R94.30 ABNORMAL RESULT OF CARDIOVASCULAR FUNCTION STUDY, UNSPECIFIED: ICD-10-CM

## 2025-05-07 DIAGNOSIS — M54.50 LOW BACK PAIN, NON-SPECIFIC: ICD-10-CM

## 2025-05-07 DIAGNOSIS — R18.8 RETROPERITONEAL FLUID COLLECTION: ICD-10-CM

## 2025-05-07 DIAGNOSIS — I10 ESSENTIAL HYPERTENSION: ICD-10-CM

## 2025-05-07 DIAGNOSIS — E78.2 MIXED DYSLIPIDEMIA: ICD-10-CM

## 2025-05-07 DIAGNOSIS — N32.81 OVERACTIVE BLADDER: Primary | ICD-10-CM

## 2025-05-07 DIAGNOSIS — N39.41 URGE INCONTINENCE: ICD-10-CM

## 2025-05-07 DIAGNOSIS — R18.8 INTRA-ABDOMINAL FLUID COLLECTION: ICD-10-CM

## 2025-05-07 DIAGNOSIS — N39.0 RECURRENT UTI: ICD-10-CM

## 2025-05-07 PROCEDURE — 1159F MED LIST DOCD IN RCRD: CPT | Mod: CPTII,S$GLB,, | Performed by: STUDENT IN AN ORGANIZED HEALTH CARE EDUCATION/TRAINING PROGRAM

## 2025-05-07 PROCEDURE — 1101F PT FALLS ASSESS-DOCD LE1/YR: CPT | Mod: CPTII,S$GLB,, | Performed by: STUDENT IN AN ORGANIZED HEALTH CARE EDUCATION/TRAINING PROGRAM

## 2025-05-07 PROCEDURE — 99214 OFFICE O/P EST MOD 30 MIN: CPT | Mod: S$GLB,,, | Performed by: STUDENT IN AN ORGANIZED HEALTH CARE EDUCATION/TRAINING PROGRAM

## 2025-05-07 PROCEDURE — 1126F AMNT PAIN NOTED NONE PRSNT: CPT | Mod: CPTII,S$GLB,, | Performed by: STUDENT IN AN ORGANIZED HEALTH CARE EDUCATION/TRAINING PROGRAM

## 2025-05-07 PROCEDURE — 3074F SYST BP LT 130 MM HG: CPT | Mod: CPTII,S$GLB,, | Performed by: STUDENT IN AN ORGANIZED HEALTH CARE EDUCATION/TRAINING PROGRAM

## 2025-05-07 PROCEDURE — 3078F DIAST BP <80 MM HG: CPT | Mod: CPTII,S$GLB,, | Performed by: STUDENT IN AN ORGANIZED HEALTH CARE EDUCATION/TRAINING PROGRAM

## 2025-05-07 PROCEDURE — 3288F FALL RISK ASSESSMENT DOCD: CPT | Mod: CPTII,S$GLB,, | Performed by: STUDENT IN AN ORGANIZED HEALTH CARE EDUCATION/TRAINING PROGRAM

## 2025-05-07 RX ORDER — GLIMEPIRIDE 1 MG/1
1 TABLET ORAL 2 TIMES DAILY
COMMUNITY

## 2025-05-07 NOTE — PATIENT INSTRUCTIONS
Continue monitor OFF abx  Needs to establish care with Urology  Alarm symptoms given to the patient and family  RTC in 3 months

## 2025-05-07 NOTE — PROGRESS NOTES
Follow up     HPI: Jo Alegre is a 84 y.o. female , very pleasant, former smoker, with past medical history of diabetes, COPD, CHF who has been followed by Urology for recurrent UTIs and bilateral kidney cysts and a left large staghorn calculus since imaging on 2020.  Patient reports back in October she was admitted to the hospital for acute on chronic CHF exacerbation, noted to have MARCELINO, kidney ultrasound then revealed bilateral large cysts which prompted CT abdomen and pelvis which revealed a serial of fluid collections in the right and left kidneys.  A giant 14 cm left renal cyst observed.  No hydronephrosis or stones appreciated.  She had an IR drainage placed for the large giant cyst which ended up being an abscess, 50 cc of purulent fluid was removed on 11/2/23, cultures grew pansensitive Proteus mirabilis.  She then had to have her drain repositioned because of malposition, drained again and replaced 1/12/24, repeat cultures with Proteus mirabilis.  She has completed on and off 17 days of ciprofloxacin twice a day.      Patient is here with her daughters, she is in the wheelchair, she is awake, alert, very pleasant.  She denies any fever or chills, no nausea or vomiting, no chest pain or cough, baseline shortness of breath, she does complain of increased urinary frequency, foul-smelling urine, and some dysuria at the beginning of micturition, she denies any changes in bowel movements.  Patient and family are very concerned regarding next step of care, explained plan of care including repeating CT scan to address status of prior fluid collection.  It is possible that we either consider further IR guided drainage with IV antibiotics versus surgery but to be determined after discussion with consultants.     Will obtain labs today.    2/26/24: Interim reviewed, patient is here for follow-up, daughters present. She reports she is feeling fine, has no acute complaints. She saw Urology and plan for  cystoscopy 3/8. Discussed with patient and daughters plan of care and will order PICC line, start rocephin 2g IV daily for 2 weeks for kidney abscess and assess duration of therapy based on repeat CT scan before the end of therapy. She understands plan of care, will also check urine next Monday in preparation to procedure. All questions answered.     3/26:  Patient seen examined, family present.  She is here for follow-up, completing 4 weeks of IV antibiotics for complicated bilateral renal fluid collections.  She had cystoscopy on 03/08 by Urology, no obvious evidence of extravasation from the left renal collecting system.  There was good drainage of the left kidney.  She has failed IR drainage for her retroperitoneal fluid collections twice.  Plan to either attempt a 3rd IR drainage versus surgical washout.  Repeat CT scan with no interval change on size of fluid collections.  Discussed with daughter and family that we might need surgical intervention.  We will remove PICC line today.  The patient denies any fever chills, no nausea or vomiting, she denies any constitutional symptoms.  She is looking forward to go on vacation and come back and wait for next step in therapy.  Alarm symptoms given to patient and family in case she needs to go to the hospital.  Weekly labs reviewed, stable, CRP has remained normal throughout.    4/29: Patient is here for follow up, daughters present. They just came back from vacation, they went to [place].  Unfortunately she had a fall in the bathroom, missed the toilet.  She did have any fracture on her hip.  Just large bruising on the right shoulder.  Patient completed 4 weeks of Rocephin without improvement or change on CT scan.  Seen by Urology on 04/17, decided and agreed with Dr. Parra to monitor off antibiotics, alarm symptoms given to the patient and family, we will watch closely given the fact that she has been afebrile, normal white count, and no signs of ongoing  infectious process at the moment.  All questions answered from daughters and the patient and will have her follow-up in 3 months CT scan and assess the progress/evolution of prior fluid collection.    7/22: Patient is here for follow up, daughter present.  Since last visit, patient has been seen by multiple specialists, she is getting ablations to her nerve phone her hips, left hip is improving, she is going to have procedures done to her right.  Patient seen by Urology yesterday, plan to repeat CT scan in 3 months and continue to monitor medically.  She was recently seen in the emergency room for UTI, sent home on levofloxacin for 5 days.  Repeat urine culture by Urology earlier this month with the same organism now resistant to ampicillin sulbactam, ciprofloxacin 250 mg twice a day was prescribed for 7 days.  Patient mentioned she no longer has dysuria but she has noticed increased urinary frequency, denies foul-smelling urine, no changes in color.  She denies fever or chills, no night sweats.  Discussed plan of care with patient and daughter, we will repeat UA today given increased urinary frequency and ensure there is no underlying UTI.    11/7/24: Had a repeat CTAP-nonCon on 10/24   For follow-up on left retroperitoneal abscess.  It showed resolution and also multiple bilateral renal cysts and several left intrarenal stones the largest 1.5 cm without hydronephrosis.  She had a follow-up with Urology on 10/30  and will have a repeat CT scan in 6 months. Patient states she has been doing very well at home.  She has no symptoms of urinary tract infection such as dysuria, odor, dark colored urine.  She was recently changed from Myrbetriq to Gemtessa and  feels like she was emptying her bladder.  She still has some urgency and when she gets the urge to void she has to hurry to the bathroom.  She was had no fevers or chills, she was eating and drinking well.   She had some back injections and it is working well on  the left but the right side she was still having some pain.  She follows with cardiology and denies chest pain, shortness of breath, coughing or palpitations.  She has mild swelling to lower extremities R>L.  She was gained about 4 lb since last visit.  She said it is because she was very inactive.  Her only real complaint is fatigue.  She just does not have a great desire to move around and she will do small things at home and just have to sit around.  She has physical therapy  coming to the house twice a week and uses a Rollator to get around.  She has not had any recent falls.  Overall she feels like she was doing very well with no urinary tract infection since July.  Vital signs with blood pressure 122/80, heart rate 70, SpO2 95% on room air, respirations 16.    5/7/25:  Interim reviewed, patient is here for follow-up, children present, she came in a wheelchair.  She mentions overall she has been doing good, she recently changed insurances and unable to continue to follow with Urology/Dr. Qureshi outpatient.  We will have to find another urologist to continue close follow-up regarding bilateral renal cysts and nonobstructing left kidney stones.  She denies fever or chills, no night sweats, no nausea or vomit, no abdominal pain, no flank pain, no shortness of breath, no cough.  Overall, she is doing good.  She mentions right lower extremity edema is actually less today compared to prior.  Alarm symptoms given to patient and family.    Review of patient's allergies indicates:   Allergen Reactions    Codeine Other (See Comments)     nausea    Hydrocodone Nausea And Vomiting    Lasix [furosemide]      rash     Past Medical History:   Diagnosis Date    Allergy     Codeine, Lasix    Atrial fibrillation     Atrial fibrillation     Cataract     CHF (congestive heart failure)     Diabetes mellitus, type 2     Ejection fraction < 50% 10/18/2017    Approximately 35%  Based on prior  Echocardiogram.    Encounter for blood  transfusion     Hyperlipidemia     Osteoporosis     PONV (postoperative nausea and vomiting)     Thyroid disorder screening 10/17/2017    TSH of 1.12 ordered by Dr. dee Jones     Past Surgical History:   Procedure Laterality Date    ANTEGRADE NEPHROSTOGRAPHY Left 8/25/2022    Procedure: NEPHROSTOGRAM, ANTEGRADE;  Surgeon: Shelby Parra MD;  Location: [facility] OR;  Service: Urology;  Laterality: Left;    CHOLECYSTECTOMY  1997    [place]     COLONOSCOPY      CYSTOSCOPY Left 3/8/2024    Procedure: CYSTOSCOPY;  Surgeon: Shelby Parra MD;  Location: [facility] OR;  Service: Urology;  Laterality: Left;    CYSTOSCOPY W/ URETERAL STENT PLACEMENT Left 7/17/2022    Procedure: CYSTOSCOPY, WITH URETERAL STENT INSERTION;  Surgeon: Shelby aPrra MD;  Location: [facility] OR;  Service: Urology;  Laterality: Left;    CYSTOSCOPY W/ URETERAL STENT REMOVAL Left 9/21/2022    Procedure: CYSTOSCOPY, WITH URETERAL STENT REMOVAL;  Surgeon: Shelby Parra MD;  Location: [facility] OR;  Service: Urology;  Laterality: Left;    CYSTOSCOPY WITH URETEROSCOPY, RETROGRADE PYELOGRAPHY, AND INSERTION OF STENT Left 8/25/2022    Procedure: CYSTOSCOPY, WITH RETROGRADE PYELOGRAM AND URETERAL STENT INSERTION;  Surgeon: Shelby Parra MD;  Location: [facility] OR;  Service: Urology;  Laterality: Left;    EYE SURGERY      bilateral cataracts    FINGER SURGERY Right 2021    right pinky finger    FLEXIBLE CYSTOSCOPY Left 8/25/2022    Procedure: CYSTOSCOPY, FLEXIBLE WITH STENT REMOVAL;  Surgeon: Shelby Parra MD;  Location: [facility];  Service: Urology;  Laterality: Left;    FRACTURE SURGERY  2014    right femur with neville    HEMORRHOID SURGERY      48 yrs ago    HYSTERECTOMY      NEPHROSTOMY Left 8/25/2022    Procedure: CREATION, NEPHROSTOMY;  Surgeon: Shelby Parra MD;  Location: [facility] OR;  Service: Urology;  Laterality: Left;    PERCUTANEOUS NEPHROLITHOTOMY N/A 8/25/2022    Procedure: NEPHROLITHOTOMY, PERCUTANEOUS;  Surgeon: Shelby Parra  MD;  Location: [facility] OR;  Service: Urology;  Laterality: N/A;    REPLACEMENT OF IMPLANTABLE CARDIOVERTER-DEFIBRILLATOR (ICD) GENERATOR N/A 12/13/2019    Procedure: REPLACEMENT, PULSE GENERATOR, ICD-MEDTRONIC;  Surgeon: Sebastian Nowak III, MD;  Location: [facility] CATH;  Service: Cardiology;  Laterality: N/A;    RETROGRADE PYELOGRAPHY N/A 3/8/2024    Procedure: PYELOGRAM, RETROGRADE;  Surgeon: Shelby Parra MD;  Location: [facility] OR;  Service: Urology;  Laterality: N/A;    TONSILLECTOMY        Social History     Tobacco Use    Smoking status: Former     Passive exposure: Past    Smokeless tobacco: Never    Tobacco comments:     quit 2013   Substance Use Topics    Alcohol use: No     Family History   Problem Relation Name Age of Onset    Heart disease Mother Jannette     Cancer Father Branden         Lung Cancer ??? Asbestos    Breast cancer Paternal Aunt           Review of Systems   Constitutional:  Negative for chills and fever.   HENT:  Negative for congestion and sinus pain.    Respiratory:  Negative for cough, shortness of breath and wheezing.    Cardiovascular:  Positive for leg swelling. Negative for chest pain and palpitations.        Right leg more than left   Gastrointestinal:  Negative for abdominal pain, diarrhea and nausea.   Genitourinary:  Negative for difficulty urinating, dysuria, flank pain, frequency, hematuria and urgency.   Musculoskeletal:  Negative for back pain and myalgias.   Skin:  Negative for rash.   Neurological:  Negative for dizziness, weakness and headaches.   Psychiatric/Behavioral:  Negative for confusion.          No TB exposure  Outdoor activities:  Never smoker, very occasional alcohol.  Housewife.  Travel:  None  Implants:  None  Antibiotic History:  Cipro 17 days, end date 02/05 - Ceftriaxone 4 weeks, completed 3/25 - Levaquin 5 days in May, Cipro 250 b.i.d. 7/8-7/15 -- currently off antibiotics      Objective:      Blood pressure 126/66, temperature 97.1 °F (36.2 °C), temperature source  "Temporal, height 5' 9" (1.753 m), weight 97.3 kg (214 lb 6.4 oz). Body mass index is 31.66 kg/m².  Physical Exam  Constitutional:       Appearance: Normal appearance. She is obese. She is not ill-appearing.   HENT:      Head: Normocephalic.      Right Ear: External ear normal.      Left Ear: External ear normal.      Nose: Nose normal.      Mouth/Throat:      Mouth: Mucous membranes are moist.      Pharynx: Oropharynx is clear.   Eyes:      Extraocular Movements: Extraocular movements intact.      Pupils: Pupils are equal, round, and reactive to light.   Cardiovascular:      Rate and Rhythm: Regular rhythm.      Heart sounds: Murmur heard.      Comments:   Mild ankle edema bilaterally right-greater-than-left  Pulmonary:      Effort: Pulmonary effort is normal.      Breath sounds: No rales.      Comments: Mostly clear to auscultation bilaterally  Abdominal:      General: Bowel sounds are normal.      Palpations: Abdomen is soft.      Tenderness: There is no abdominal tenderness. There is no rebound.   Musculoskeletal:      Cervical back: Normal range of motion and neck supple.      Right lower leg: Edema present.      Left lower leg: Edema present.      Comments: Legs less swollen compared to prior visits, still right more than left   Lymphadenopathy:      Cervical: No cervical adenopathy.   Skin:     General: Skin is warm and dry.      Capillary Refill: Capillary refill takes less than 2 seconds.      Findings: No rash.   Neurological:      General: No focal deficit present.      Mental Status: She is alert and oriented to person, place, and time. Mental status is at baseline.      Sensory: No sensory deficit.      Motor: No weakness.      Comments:  Uses a Rollator to ambulate,  has a little difficulty getting up on her room   Psychiatric:         Thought Content: Thought content normal.             General Labs reviewed:  Lab Results   Component Value Date    WBC 5.76 02/13/2025    RBC 4.15 02/13/2025    HGB 12.5 " 02/13/2025    HCT 40.9 02/13/2025    MCV 99 (H) 02/13/2025    MCH 30.1 02/13/2025    MCHC 30.6 (L) 02/13/2025    RDW 13.5 02/13/2025     02/13/2025    MPV 9.9 02/13/2025    GRAN 3.7 02/13/2025    GRAN 64.8 02/13/2025    LYMPH 1.3 02/13/2025    LYMPH 21.9 02/13/2025    MONO 0.6 02/13/2025    MONO 9.7 02/13/2025    EOS 0.2 02/13/2025    BASO 0.03 02/13/2025    EOSINOPHIL 2.8 02/13/2025    BASOPHIL 0.5 02/13/2025     CMP  Sodium   Date Value Ref Range Status   02/13/2025 144 136 - 145 mmol/L Final     Potassium   Date Value Ref Range Status   02/13/2025 5.1 3.5 - 5.1 mmol/L Final     Chloride   Date Value Ref Range Status   02/13/2025 105 95 - 110 mmol/L Final     CO2   Date Value Ref Range Status   02/13/2025 26 23 - 29 mmol/L Final     Glucose   Date Value Ref Range Status   02/13/2025 73 70 - 110 mg/dL Final     BUN   Date Value Ref Range Status   02/13/2025 22 8 - 23 mg/dL Final     Creatinine   Date Value Ref Range Status   02/13/2025 1.7 (H) 0.5 - 1.4 mg/dL Final     Calcium   Date Value Ref Range Status   02/13/2025 9.7 8.7 - 10.5 mg/dL Final     Total Protein   Date Value Ref Range Status   02/13/2025 6.9 6.0 - 8.4 g/dL Final     Albumin   Date Value Ref Range Status   02/13/2025 3.4 (L) 3.5 - 5.2 g/dL Final     Total Bilirubin   Date Value Ref Range Status   02/13/2025 0.6 0.1 - 1.0 mg/dL Final     Comment:     For infants and newborns, interpretation of results should be based  on gestational age, weight and in agreement with clinical  observations.    Premature Infant recommended reference ranges:  Up to 24 hours.............<8.0 mg/dL  Up to 48 hours............<12.0 mg/dL  3-5 days..................<15.0 mg/dL  6-29 days.................<15.0 mg/dL       Alkaline Phosphatase   Date Value Ref Range Status   02/13/2025 156 (H) 40 - 150 U/L Final     AST   Date Value Ref Range Status   02/13/2025 12 10 - 40 U/L Final     ALT   Date Value Ref Range Status   02/13/2025 12 10 - 44 U/L Final     Anion Gap    Date Value Ref Range Status   02/13/2025 13 8 - 16 mmol/L Final     eGFR   Date Value Ref Range Status   02/13/2025 30 (A) >60 mL/min/1.73 m^2 Final        Latest Reference Range & Units 11/02/23 14:42 01/12/24 13:57   Fluid Color  Yellow Red   Fluid Appearance  Turbid Turbid   WBC, Body Fluid /cu mm [id number]   Body Fluid Type  Cyst Fluid Other (Specify)   Segs, Fluid % 86 7   Lymphs, Fluid % 7 93   Monocytes/Macrophages, Fluid % 7        Micro:  Urine culture 7/5/24:      Component 2 wk ago   Urine Culture, Routine  Abnormal   KLEBSIELLA PNEUMONIAE  >100,000 cfu/ml    Resulting Agency [facility]        Susceptibility     Klebsiella pneumoniae     CULTURE, URINE     Amp/Sulbactam >16/8 mcg/mL Resistant     Cefazolin 4 mcg/mL Sensitive     Cefepime <=2 mcg/mL Sensitive     Ceftriaxone <=1 mcg/mL Sensitive     Ciprofloxacin <=0.25 mcg/mL Sensitive     Ertapenem <=0.5 mcg/mL Sensitive     Gentamicin <=2 mcg/mL Sensitive     Levofloxacin <=0.5 mcg/mL Sensitive     Meropenem <=1 mcg/mL Sensitive     Nitrofurantoin 64 mcg/mL Intermediate     Piperacillin/Tazo 16 mcg/mL Intermediate     Tetracycline >8 mcg/mL Resistant     Tobramycin <=2 mcg/mL Sensitive     Trimeth/Sulfa <=2/38 mcg/mL Sensitive               Urine culture 5/25/24:  Urine Culture, Routine  Abnormal   KLEBSIELLA PNEUMONIAE  >100,000 cfu/ml    Resulting Agency [facility]        Susceptibility     Klebsiella pneumoniae     CULTURE, URINE     Amp/Sulbactam <=8/4 mcg/mL Sensitive     Cefazolin <=2 mcg/mL Sensitive     Cefepime <=2 mcg/mL Sensitive     Ceftriaxone <=1 mcg/mL Sensitive     Ciprofloxacin <=0.25 mcg/mL Sensitive     Ertapenem <=0.5 mcg/mL Sensitive     Gentamicin <=2 mcg/mL Sensitive     Levofloxacin <=0.5 mcg/mL Sensitive     Meropenem <=1 mcg/mL Sensitive     Nitrofurantoin <=32 mcg/mL Sensitive     Piperacillin/Tazo <=8 mcg/mL Sensitive     Tetracycline <=4 mcg/mL Sensitive     Tobramycin <=2 mcg/mL Sensitive     Trimeth/Sulfa <=2/38 mcg/mL Sensitive                    Fluid culture L kidney 1/12/24:  Aerobic Bacterial Culture  Abnormal   PROTEUS MIRABILIS  Few    Resulting Agency [facility]        Susceptibility       Proteus mirabilis     CULTURE, AEROBIC  (SPECIFY SOURCE)     Amox/K Clav'ate <=8/4 mcg/mL Sensitive     Amp/Sulbactam <=8/4 mcg/mL Sensitive     Ampicillin <=8 mcg/mL Sensitive     Cefazolin <=2 mcg/mL Sensitive     Cefepime <=2 mcg/mL Sensitive     Ceftriaxone <=1 mcg/mL Sensitive     Ciprofloxacin <=1 mcg/mL Sensitive     Ertapenem <=0.5 mcg/mL Sensitive     Gentamicin <=4 mcg/mL Sensitive     Levofloxacin <=2 mcg/mL Sensitive     Meropenem <=1 mcg/mL Sensitive     Piperacillin/Tazo <=16 mcg/mL Sensitive     Tobramycin <=4 mcg/mL Sensitive     Trimeth/Sulfa <=2/38 mcg/mL Sensitive               Fluid culture L kidney 11/2/23:  Aerobic Bacterial Culture  Abnormal   PROTEUS MIRABILIS  Many    Resulting Agency [facility]        Susceptibility     Proteus mirabilis     CULTURE, AEROBIC  (SPECIFY SOURCE)     Amox/K Clav'ate <=8/4 mcg/mL Sensitive     Amp/Sulbactam <=8/4 mcg/mL Sensitive     Ampicillin <=8 mcg/mL Sensitive     Cefazolin <=2 mcg/mL Sensitive     Cefepime <=2 mcg/mL Sensitive     Ceftriaxone <=1 mcg/mL Sensitive     Ciprofloxacin <=1 mcg/mL Sensitive     Ertapenem <=0.5 mcg/mL Sensitive     Gentamicin <=4 mcg/mL Sensitive     Levofloxacin <=2 mcg/mL Sensitive     Meropenem <=1 mcg/mL Sensitive     Piperacillin/Tazo <=16 mcg/mL Sensitive     Tobramycin <=4 mcg/mL Sensitive     Trimeth/Sulfa <=2/38 mcg/mL Sensitive                         Imaging Reviewed:  Repeat CT scan 2/26/04:  Irregular multi septated fluid collection in the left upper quadrant is not significantly changed in size compared to prior exam from of February 19, 2024.  Multiple bilateral renal lesions, incompletely characterized on this noncontrast exam, grossly stable compared to prior.  Nonobstructing left nephrolithiasis, also stable.  Intrahepatic and extrahepatic bile duct  dilation status post cholecystectomy.  Please correlate with bilirubin levels to determine significance.  Small left pleural effusion.  Multiple thoracolumbar compression fractures.  Atherosclerosis, diverticulosis, and other incidental findings as above.      Repeat CT scan 2/20/24:  FINDINGS: There is a multiloculated 5.9 by 3.9 cm fluid collection seen in the left upper quadrant just below the diaphragm that previously measured 6.2 by 4.4 cm in similar plane.  There is a small left pleural effusion similar to prior exam.  There is associated compressive atelectasis similar to prior exam.  There are bilateral renal cysts with the largest seen inferiorly in association with the left kidney measuring 13 cm.  There is a 2.7 cm hyperdense lesion seen in association with the inferior pole of the right kidney unchanged from prior exam.  There are bilateral renal calculi the largest of which is seen at the inferior pole on the left and measures 1.8 cm.   The patient is status post cholecystectomy with prominence of the intrahepatic biliary ducts and common bile duct similar in appearance to prior exam.   There is no evidence bowel obstruction.  Stool is seen throughout the colon.  The patient has a right femoral neville and proximal screw.     Impression:  Again seen is the multi septated fluid collection in the left upper quadrant just inferior to the diaphragm, left pleural effusion with associated compressive atelectasis.  Overall the appearance of the fluid collection in the left pleural effusion is similar to what was seen on prior exam from 01/24/2024.     The patient has bilateral renal calculi similar in appearance to prior exam.     CT abdomen/pelvis w/o contrast 1/12/24:  FINDINGS:  There is elevation of the left hemidiaphragm and moderate atelectasis in the left lower lobe.  A pleural effusion or pneumothorax is not seen.  The liver is of normal size and CT density.  The gallbladder is absent with surgical clips  noted.  There is a 2.6 cm cyst of segment 1 of the liver parenchyma and dilation of the common bile duct up to 2 cm in diameter.  This is unchanged from prior study and a stone or mass in the head of the pancreas is not seen.  The pancreas appears of normal contour and CT density without edema or mass.  The spleen is of normal size and CT density.  On the right there is a 4.3 cm and a 5.6 cm water density cyst and 2 higher density cyst at the lateral surface representing probable bloody cyst.  These are stable however.  The largest measures 1.3 cm.  Stone or hydronephrosis on the right is not seen.  On the left a nephrostomy tube is not presently seen.  In the Janina nephric space superiorly is a fluid collection measuring 4.4 cm and a partial fluid collection measuring 2.8 cm as well as a probable bloody cyst measuring 3.2 cm.  There is also a 3.8 cm cyst and a giant cyst at the lower pole which measures 14 cm.  There is significantly less stone burden than seen on the prior studies primarily in the lower pole.  The largest stone fragment measures 1.1 cm.  Hydronephrosis is not presently seen.  The abdominal aorta is heavily calcified.  An aneurysm is not identified.    The stomach is of normal configuration.  Small bowel dilatation or air-fluid levels are not seen.  The colon appears of normal configuration without distention or mass.  A normal appendix is seen.  Free fluid or free air is not noted.  The bladder is of normal contour without asymmetry.  A uterus is not seen.     Impression:  Elevation of the left hemidiaphragm and moderate left lung atelectasis noted.  Under the left hemidiaphragm are 2 fluid collections either post nephrostomy tube abscesses or seromas.  Significantly less stone burden in the left kidney with 2 prominent fragments.  Hydronephrosis is not presently seen.  There are multiple bilateral renal cysts, some high density suggesting bloody cysts.  A giant cyst of the lower pole of the left  kidney measures 14 cm.  Prior cholecystectomy.  2.6 cm cyst of segment 1 of the liver parenchyma.  Dilation of the common bile duct reaching 2 cm without a stone or mass seen.  Prior hysterectomy.    CT abdomen/pelvis w/o contrast 11/24/2023.  FINDINGS:  The collection seen posterior to and cephalad to the upper pole of the left kidney, extending subphrenic in through the diaphragm does not appear significantly changed compared the prior study of 10/16/2023.  Craniocaudal measurement on the coronal image is 9.8 cm today versus 9.4 cm on the prior exam.  On axial images the diameter is 6.2 x 4.7 cm today versus 6.4 x 4.3 cm previously.  Appears complex with irregular fluid component irregular wall.  Findings consistent with abscess.   Large, 14 cm lower pole left renal cyst.  Extrinsic compression on the lateral margin the cyst by colon.  The appearance is not significantly changed.   Oval 2.5 cm and 1.5 cm high density left renal masses density not as high as can be attributed to a high density cyst but not significantly changed compared to prior exam and corresponding as was described as hemorrhagic cysts on MRI.  Multiple left renal stones measuring up to approximately 7 mm.  No hydronephrosis opaque ureteral stone or ureteral obstruction evident   Right renal masses are not significantly changed with a 5.5 cm and a 4.6 cm and 11 mm simple cyst and high density masses which although not fully characterized on the single study correspond as described as hemorrhagic cyst on MRI and not significantly changed compared to the prior CT.  Largest measures 1.5 cm.  No hydronephrosis opaque renal or ureteral stone or ureteral obstruction.   Urinary bladder mildly distended at time of the exam and as visualized unremarkable appearance   Prior hysterectomy.  Adnexal region unremarkable appearance   Diverticulosis without CT findings of acute diverticulitis.  Normal appearance of the appendix.  No free intraperitoneal air or  fluid.   Liver unremarkable appearance.  Cholecystectomy clips.  Common duct dilatation not significantly changed compared to the prior study likely on a post cholecystectomy basis.   Spleen not enlarged.  Posterior margin the spleen is indistinguishable from the perirenal/subphrenic collection.   Pancreas mildly atrophied   Adrenal glands; slight thickening of the adrenal glands and small left adrenal nodule suggesting adenoma not significantly changed  Abdominal aorta; no aneurysm  Osseous structures;Internal fixation right hip. Osseous degenerative changes.  Moderate compression of the superior endplate of L1 not significantly changed.  Postoperative changes lumbar spine.  Small left pleural effusion and left posterior basilar airspace opacification do not appear significantly changed.     Impression:   The irregular, complex appearing collection suggesting abscess posterior to and cephalad to the left kidney, extending subphrenic and through the diaphragm into the pleural space does not appear significantly changed compared to the prior exam.  Additional findings as detailed above including renal stones, renal cysts, high density renal masses which although indeterminate on the current study alone correspond as described as hemorrhagic cyst on prior studies and not significantly changed compared to the prior exam.  left adrenal adenoma.      Kidney ultrasound 10/30/2023:  FINDINGS:  Right kidney: The right kidney measures 10.8 cm. No cortical thinning. No loss of corticomedullary distinction. Resistive index measures 0.76.  5.3 and 3.7 and 1 cm simple cyst.  1.2 cm cyst which may be minimally complex without increased through transmission but appearing anechoic and most likely simple cysts.  No renal stone. No hydronephrosis.     Left kidney: The left kidney measures 9.3 cm. No cortical thinning. No loss of corticomedullary distinction. Resistive index measures 0.71.  Numerous cysts including 14 cm and 3.7 cm  cyst.  A few echogenic foci measuring 5-15 mm suggesting stones.  Complex structure appearing cephalad or projecting cephalad from the left kidney measuring 7.3 x 4.9 x 4.8 cm.  It was measured at 5.1 x 4.5 x 4.5 cm on the prior exam.  No hydronephrosis.     The bladder is partially distended at the time of scanning and has an unremarkable appearance.     Impression:  Multiple simple bilateral renal cysts.  Left renal stones.  7.3 cm complex structure cephalad to the left kidney was measured at 5.1 cm on prior study images 03/02/2023 and corresponds to findings seen on more recent CT abdomen of 10/16/2023. Better demonstrated on the CT and please refer to that report.    CT ABDOMEN PELVIS WITHOUT CONTRAST 10/24/24   The liver is of normal size contour and CT density without focal defect.  There is intrahepatic biliary dilatation and dilation of the common bile duct up the 2.4 cm in diameter.  This is dilated down to the head of the pancreas and ampulla without a mass or stone seen.  There are there is however small calcification seen in the head of the pancreas.  The gallbladder is absent with surgical clips noted.  The pancreas itself is of normal contour without edema.  The spleen is small and of normal CT density.   The adrenal glands are not enlarged.  The kidneys are of normal CT density for a noncontrast study.  There are 4 cyst of the right kidney the largest at the lower pole measuring 6.7 cm.  On the left there are 6 cyst 2 of which are hyperdense consistent with bloody cyst.  The largest measures 14 cm at the lower pole.  There is a group of stones in the lower pole calyx of the left kidney measuring 1.5 cm with several smaller calcifications in the 3-6 mm range.  Hydronephrosis on either side is not seen.   The left retroperitoneal abscess has resolved with only a small amount of residual scar tissue seen behind the kidney and spleen measuring 1.7 cm.  There is heavy atherosclerotic calcification of the  abdominal aorta extending into the pelvic vessels.   The stomach is of normal configuration.  Small bowel dilatation or air-fluid levels are not seen.  The colon appears of normal configuration without distention or mass with few diverticuli noted in the sigmoid colon.  A normal appendix is noted.  No free fluid or free air is seen.   The bladder is not full.  There appears to be a few air bubbles in the bladder near the dome which may be due to infection with a gas-forming organism or recent catheterization.  A uterus is not seen.   Impression:   Resolution of a left retroperitoneal abscess.  Only a small quantity of scar tissue is seen behind the kidney and spleen.  Multiple bilateral renal cysts the largest on the left measuring 14 cm.  Several left intrarenal stones the largest measuring 1.5 cm without hydronephrosis on either side.  Prior cholecystectomy.  Prior hysterectomy.  Dilation of the common bile duct and intrahepatic biliary ducts down to the ampulla without a stone or mass seen.  Possible air bubble noted in the bladder which may be due to recent catheterization or infection with a gas-forming organism.  Diverticulosis coli.      CT scan 4/26/2025:  Impression:  Multiple bilateral renal cysts the largest in the lower pole of the left measuring 14 cm.  These cysts are of various consistencies consistent with blood and protein and water density.  Multiple left intrarenal stones the largest measuring 1 cm without hydronephrosis on either side.  Stable 2 cm left adrenal adenoma.   Prior cholecystectomy with dilation of the common bile duct reaching 1.7 cm without a mass or stone seen.  Probable duodenal diverticulum measuring 4.5 cm.  Prior hysterectomy.   Degenerative disc disease at L2-3, L3-4 and L4-5.  Compression fractures of T9, T11, T12 and L1.     Assessment & Plan:       Recurrent UTIs in the setting of urinary incontinence, stable   Recent urine culture 5/25 & 7/5 Klebsiella pneumoniae, 1st 1  pansensitive 2nd 1 intermediate to Macrobid, Zosyn, resistant to tetracycline and Unasyn, sensitive to everything else   Needs to establish care with Urology outpt  Alarm symptoms given to patient  RTC in 3 months    Complicated left renal abscess in the setting of large staghorn stone status post 2 IR guided drainage is 11/2/2023 & 01/12/2024, s/p 4 weeks of ceftriaxone - stable/asymptomatic  Prior cell counts c/w abscess, cultures 11/2/23 & 1/12/24 Proteus mirabilis pan-sensitive   Alarm symptoms given to the patient and family   Retroperitoneal fluid collection resolved on CT scan from 10/24/2024.  Repeat CT scan 4/23 with multiple bilateral renal cysts larger measuring 14 cm.  Multiple left infra renal stones largest measuring 1 cm without hydronephrosis    CKD not on HD     PMHx: diabetes, COPD, CHF   Follow pulmonary and PCP outpatient        Overactive bladder    Recurrent UTI    Retroperitoneal fluid collection    Low back pain, non-specific    Urge incontinence    Staghorn calculus    Mixed dyslipidemia    Essential hypertension    Ejection fraction < 50%    Intra-abdominal fluid collection    Continue to monitor OFF abx  Needs to establish care with Urology  Alarm symptoms given to the patient and family  RTC in 3 months      Follow up in about 3 months (around 8/7/2025).       I spent a total of 30 minutes on the day of the visit.   This includes face to face time and non-face to face time preparing to see the patient (eg, review of tests), obtaining and/or reviewing separately obtained history, documenting clinical information in the electronic or other health record, independently interpreting results and communicating results to the patient/family/caregiver, or care coordinator.     Nikole Reynaga   [facility] Infectious Disease  05/07/2025  2:32 PM     This note was created using Dragon voice recognition software that occasionally misinterpreted phrases or words.

## 2025-05-13 RX ORDER — PANTOPRAZOLE SODIUM 40 MG/1
TABLET, DELAYED RELEASE ORAL
Qty: 90 TABLET | Refills: 3 | Status: SHIPPED | OUTPATIENT
Start: 2025-05-13

## 2025-05-14 NOTE — TELEPHONE ENCOUNTER
No care due was identified.  [facility] Embedded Care Due Messages. Reference number: [id number].   5/13/2025 11:05:45 PM CDT

## 2025-05-14 NOTE — TELEPHONE ENCOUNTER
Refill Decision Note   Jo Alegre  is requesting a refill authorization.  Brief Assessment and Rationale for Refill:  Approve     Medication Therapy Plan:         Comments:     Note composed:11:10 PM 05/13/2025             Appointments     Last Visit   2/20/2025 Kraig Alberto MD   Next Visit   7/21/2025 Kraig Alberto MD

## 2025-08-01 ENCOUNTER — LAB VISIT (OUTPATIENT)
Dept: LAB | Facility: HOSPITAL | Age: 85
End: 2025-08-01
Attending: INTERNAL MEDICINE
Payer: MEDICARE

## 2025-08-01 DIAGNOSIS — I48.0 PAROXYSMAL ATRIAL FIBRILLATION: Primary | ICD-10-CM

## 2025-08-01 DIAGNOSIS — Z79.899 POLYPHARMACY: ICD-10-CM

## 2025-08-01 DIAGNOSIS — I25.10 CORONARY ATHEROSCLEROSIS OF NATIVE CORONARY ARTERY: ICD-10-CM

## 2025-08-01 DIAGNOSIS — R55 SYNCOPE AND COLLAPSE: ICD-10-CM

## 2025-08-01 DIAGNOSIS — I87.2 PERIPHERAL VENOUS INSUFFICIENCY: ICD-10-CM

## 2025-08-01 DIAGNOSIS — I47.19 NODAL PAROXYSMAL TACHYCARDIA: ICD-10-CM

## 2025-08-01 DIAGNOSIS — I50.42 CHRONIC COMBINED SYSTOLIC AND DIASTOLIC HEART FAILURE: ICD-10-CM

## 2025-08-01 DIAGNOSIS — I42.9 PRIMARY CARDIOMYOPATHY: ICD-10-CM

## 2025-08-01 DIAGNOSIS — N18.9 CHRONIC KIDNEY DISEASE, UNSPECIFIED: ICD-10-CM

## 2025-08-01 LAB
25(OH)D3+25(OH)D2 SERPL-MCNC: >120 NG/ML (ref 30–96)
ALBUMIN SERPL-MCNC: 3.8 G/DL (ref 3.5–5.2)
ALP SERPL-CCNC: 114 UNIT/L (ref 40–150)
ALT SERPL-CCNC: <8 UNIT/L (ref 10–44)
ANION GAP (SMH): 9 MMOL/L (ref 8–16)
AST SERPL-CCNC: 15 UNIT/L (ref 11–45)
BILIRUB SERPL-MCNC: 0.7 MG/DL (ref 0.1–1)
BUN SERPL-MCNC: 19 MG/DL (ref 8–23)
CALCIUM SERPL-MCNC: 9.8 MG/DL (ref 8.7–10.5)
CHLORIDE SERPL-SCNC: 104 MMOL/L (ref 95–110)
CHOLEST SERPL-MCNC: 156 MG/DL (ref 120–199)
CHOLEST/HDLC SERPL: 2.7 {RATIO} (ref 2–5)
CO2 SERPL-SCNC: 30 MMOL/L (ref 23–29)
CREAT SERPL-MCNC: 1.8 MG/DL (ref 0.5–1.4)
EAG (SMH): 114 MG/DL (ref 68–131)
ERYTHROCYTE [DISTWIDTH] IN BLOOD BY AUTOMATED COUNT: 14.5 % (ref 11.5–14.5)
FERRITIN SERPL-MCNC: 30.6 NG/ML (ref 20–300)
FOLATE SERPL-MCNC: 8.6 NG/ML (ref 4–24)
GFR SERPLBLD CREATININE-BSD FMLA CKD-EPI: 28 ML/MIN/1.73/M2
GLUCOSE SERPL-MCNC: 108 MG/DL (ref 70–110)
HBA1C MFR BLD: 5.6 % (ref 4–5.6)
HCT VFR BLD AUTO: 42.4 % (ref 37–48.5)
HDLC SERPL-MCNC: 58 MG/DL (ref 40–75)
HDLC SERPL: 37.2 % (ref 20–50)
HGB BLD-MCNC: 13.3 GM/DL (ref 12–16)
IRON SATN MFR SERPL: 12 % (ref 20–50)
IRON SERPL-MCNC: 52 UG/DL (ref 30–160)
LDLC SERPL CALC-MCNC: 75.6 MG/DL (ref 63–159)
MAGNESIUM SERPL-MCNC: 2.2 MG/DL (ref 1.6–2.6)
MCH RBC QN AUTO: 30.3 PG (ref 27–31)
MCHC RBC AUTO-ENTMCNC: 31.4 G/DL (ref 32–36)
MCV RBC AUTO: 97 FL (ref 82–98)
NONHDLC SERPL-MCNC: 98 MG/DL
NT-PROBNP SERPL-MCNC: 4725 PG/ML
PLATELET # BLD AUTO: 212 K/UL (ref 150–450)
PMV BLD AUTO: 10.9 FL (ref 9.2–12.9)
POTASSIUM SERPL-SCNC: 4.2 MMOL/L (ref 3.5–5.1)
PROT SERPL-MCNC: 6.9 GM/DL (ref 6–8.4)
RBC # BLD AUTO: 4.39 M/UL (ref 4–5.4)
SODIUM SERPL-SCNC: 143 MMOL/L (ref 136–145)
T4 FREE SERPL-MCNC: 1.26 NG/DL (ref 0.71–1.51)
TIBC SERPL-MCNC: 431 UG/DL (ref 250–450)
TRANSFERRIN SERPL-MCNC: 291 MG/DL (ref 200–375)
TRIGL SERPL-MCNC: 112 MG/DL (ref 30–150)
TSH SERPL-ACNC: 1.69 UIU/ML (ref 0.4–4)
VIT B12 SERPL-MCNC: 377 PG/ML (ref 210–950)
WBC # BLD AUTO: 5.3 K/UL (ref 3.9–12.7)

## 2025-08-01 PROCEDURE — 80151 DRUG ASSAY AMIODARONE: CPT

## 2025-08-01 PROCEDURE — 85027 COMPLETE CBC AUTOMATED: CPT

## 2025-08-01 PROCEDURE — 83036 HEMOGLOBIN GLYCOSYLATED A1C: CPT

## 2025-08-01 PROCEDURE — 82607 VITAMIN B-12: CPT

## 2025-08-01 PROCEDURE — 84425 ASSAY OF VITAMIN B-1: CPT

## 2025-08-01 PROCEDURE — 84466 ASSAY OF TRANSFERRIN: CPT

## 2025-08-01 PROCEDURE — 83880 ASSAY OF NATRIURETIC PEPTIDE: CPT

## 2025-08-01 PROCEDURE — 84439 ASSAY OF FREE THYROXINE: CPT

## 2025-08-01 PROCEDURE — 82306 VITAMIN D 25 HYDROXY: CPT

## 2025-08-01 PROCEDURE — 80061 LIPID PANEL: CPT

## 2025-08-01 PROCEDURE — 84207 ASSAY OF VITAMIN B-6: CPT

## 2025-08-01 PROCEDURE — 82728 ASSAY OF FERRITIN: CPT

## 2025-08-01 PROCEDURE — 82746 ASSAY OF FOLIC ACID SERUM: CPT

## 2025-08-01 PROCEDURE — 84481 FREE ASSAY (FT-3): CPT

## 2025-08-01 PROCEDURE — 82310 ASSAY OF CALCIUM: CPT

## 2025-08-01 PROCEDURE — 83735 ASSAY OF MAGNESIUM: CPT

## 2025-08-01 PROCEDURE — 36415 COLL VENOUS BLD VENIPUNCTURE: CPT

## 2025-08-02 LAB — T3FREE SERPL-MCNC: 2.7 PG/ML (ref 2–4.4)

## 2025-08-06 LAB
Lab: 0.1 UG/ML
Lab: 0.1 UG/ML

## 2025-08-07 LAB — W VITAMIN B1: 56 UG/L

## 2025-08-08 LAB — W VITAMIN B6: <2 UG/L

## 2025-08-25 ENCOUNTER — OFFICE VISIT (OUTPATIENT)
Dept: FAMILY MEDICINE | Facility: CLINIC | Age: 85
End: 2025-08-25
Payer: MEDICARE

## 2025-08-25 VITALS
SYSTOLIC BLOOD PRESSURE: 81 MMHG | DIASTOLIC BLOOD PRESSURE: 48 MMHG | WEIGHT: 211.63 LBS | HEIGHT: 69 IN | BODY MASS INDEX: 31.34 KG/M2 | HEART RATE: 90 BPM

## 2025-08-25 DIAGNOSIS — N39.0 RECURRENT UTI: ICD-10-CM

## 2025-08-25 DIAGNOSIS — N18.32 STAGE 3B CHRONIC KIDNEY DISEASE: ICD-10-CM

## 2025-08-25 DIAGNOSIS — I48.11 LONGSTANDING PERSISTENT ATRIAL FIBRILLATION: ICD-10-CM

## 2025-08-25 DIAGNOSIS — E78.2 MIXED DYSLIPIDEMIA: ICD-10-CM

## 2025-08-25 DIAGNOSIS — E86.1 HYPOTENSION DUE TO HYPOVOLEMIA: ICD-10-CM

## 2025-08-25 DIAGNOSIS — R54 FRAIL ELDERLY: ICD-10-CM

## 2025-08-25 DIAGNOSIS — I50.42 CHRONIC COMBINED SYSTOLIC AND DIASTOLIC CONGESTIVE HEART FAILURE: Primary | ICD-10-CM

## 2025-08-25 DIAGNOSIS — Z99.89 USES ROLLER WALKER: ICD-10-CM

## 2025-08-25 DIAGNOSIS — Z91.81 HISTORY OF FALL: ICD-10-CM

## 2025-08-25 PROCEDURE — 1159F MED LIST DOCD IN RCRD: CPT | Mod: CPTII,S$GLB,, | Performed by: INTERNAL MEDICINE

## 2025-08-25 PROCEDURE — 3078F DIAST BP <80 MM HG: CPT | Mod: CPTII,S$GLB,, | Performed by: INTERNAL MEDICINE

## 2025-08-25 PROCEDURE — 3288F FALL RISK ASSESSMENT DOCD: CPT | Mod: CPTII,S$GLB,, | Performed by: INTERNAL MEDICINE

## 2025-08-25 PROCEDURE — 1100F PTFALLS ASSESS-DOCD GE2>/YR: CPT | Mod: CPTII,S$GLB,, | Performed by: INTERNAL MEDICINE

## 2025-08-25 PROCEDURE — 1125F AMNT PAIN NOTED PAIN PRSNT: CPT | Mod: CPTII,S$GLB,, | Performed by: INTERNAL MEDICINE

## 2025-08-25 PROCEDURE — 99214 OFFICE O/P EST MOD 30 MIN: CPT | Mod: S$GLB,,, | Performed by: INTERNAL MEDICINE

## 2025-08-25 PROCEDURE — 1160F RVW MEDS BY RX/DR IN RCRD: CPT | Mod: CPTII,S$GLB,, | Performed by: INTERNAL MEDICINE

## 2025-08-25 PROCEDURE — 99999 PR PBB SHADOW E&M-EST. PATIENT-LVL III: CPT | Mod: PBBFAC,,, | Performed by: INTERNAL MEDICINE

## 2025-08-25 PROCEDURE — 3074F SYST BP LT 130 MM HG: CPT | Mod: CPTII,S$GLB,, | Performed by: INTERNAL MEDICINE

## (undated) DEVICE — SUT 3-0 VICRYL / SH (J416)

## (undated) DEVICE — SLEEVE SCD EXPRESS KNEE MEDIUM

## (undated) DEVICE — GLOVE SENSICARE PI ALOE 6.5

## (undated) DEVICE — COVER PROXIMA MAYO STAND

## (undated) DEVICE — GUIDE WIRE MOTION .035 X 150CM

## (undated) DEVICE — DRAPE CYSTOSCOPY LARGE

## (undated) DEVICE — APPLICATOR CHLORAPREP ORN 26ML

## (undated) DEVICE — EXTRACTOR TIPLESS 3FR 115 CM

## (undated) DEVICE — DRAPE C ARM 42 X 120 10/BX

## (undated) DEVICE — MAT QUICK 40X30 FLOOR FLUID LF

## (undated) DEVICE — LEGGING CLEAR POLY 2/PACK

## (undated) DEVICE — Device

## (undated) DEVICE — GLOVE SURGEONS ULTRA TOUCH 6.5

## (undated) DEVICE — LUBRICANT SURGILUBE 2 OZ

## (undated) DEVICE — UNDERGLOVE BIOGEL PI MICRO BLUE SZ 6.5

## (undated) DEVICE — SPONGE BULKEE II ABSRB 6X6.75

## (undated) DEVICE — JELLY SURGILUBE LUBE TUBE 2OZ

## (undated) DEVICE — BAG URINARY DRN 2000ML

## (undated) DEVICE — GOWN POLY REINF BRTH SLV XL

## (undated) DEVICE — TUBING CYSTO DOUBLE 654301

## (undated) DEVICE — BLADE SURG CARBON STEEL SZ11

## (undated) DEVICE — SET BLLN ULTRAXX NEPHROSTOMY

## (undated) DEVICE — SOL NACL IRR 3000ML

## (undated) DEVICE — STONE EXTRAC N-COMPASS NITINOL

## (undated) DEVICE — SEAL UROLOGY

## (undated) DEVICE — SCRUB DYNA-HEX LIQ 4% CHG 4OZ

## (undated) DEVICE — SYR ONLY LUER LOCK 20CC

## (undated) DEVICE — TUBING SUC UNIV W/CONN 12FT

## (undated) DEVICE — PROBE SHOCK PULSE 3.76MM URO

## (undated) DEVICE — GOWN POLY REINF BRTH SLV LG

## (undated) DEVICE — CONTAINER SPECIMEN OR STER 4OZ

## (undated) DEVICE — SWABSTICK PVP-1 SINGLE 10%

## (undated) DEVICE — GUIDEWIRE AMPLATZ .035X145 STR

## (undated) DEVICE — STRAP TABLE 5X72 M5173

## (undated) DEVICE — TUBING SUCTION 12' ST2003

## (undated) DEVICE — GOWN POLY REINF X-LONG XL

## (undated) DEVICE — SOL IRR NACL .9% 3000ML

## (undated) DEVICE — CATH POLLACK OPEN-END FLEXI-TI

## (undated) DEVICE — DRAPE NEPHRSCPY SURG 72X118IN

## (undated) DEVICE — CATHETER URETERAL OPEN TIP 5FX70

## (undated) DEVICE — SYR 50ML CATH TIP

## (undated) DEVICE — SOLUTION IRRI H2O BOTTLE 1000ML

## (undated) DEVICE — GLOVE #7.5 BIOGEL 30475

## (undated) DEVICE — TUBING ARTHRO IRR 4-LEAD

## (undated) DEVICE — DRESSING TRANS 6X8 TEGADERM

## (undated) DEVICE — PACK CUSTOM UNIV BASIN SLI

## (undated) DEVICE — SOL 9P NACL IRR PIC IL

## (undated) DEVICE — STRAP OR TABLE 5IN X 72IN

## (undated) DEVICE — STENT URETERAL FIRM 6FX24CM ASCERTA
Type: IMPLANTABLE DEVICE | Site: URETER | Status: NON-FUNCTIONAL
Removed: 2022-07-17

## (undated) DEVICE — SCRUB BACTOSHIELD 134439

## (undated) DEVICE — SUT 2-0 ETHILON 18 FS

## (undated) DEVICE — BLLN ULTRAXX NEPHSTMY 6MM 15CM

## (undated) DEVICE — PACK CYSTOSCOPY III

## (undated) DEVICE — SET IRR URLGY 2LINE UNIV SPIKE

## (undated) DEVICE — SYR 10CC LUER LOCK

## (undated) DEVICE — MANIFOLD 4 PORT

## (undated) DEVICE — RETRIEVAL STONE N-CIRCLE 4.5FR

## (undated) DEVICE — DRESSING TRANS 4X4 TEGADERM

## (undated) DEVICE — SET CYSTO IRRIGATION UNIV SPIK

## (undated) DEVICE — BOWL STERILE LARGE 32OZ

## (undated) DEVICE — GUIDEWIRE URO STRGHT 3CM TIP.035X150CM

## (undated) DEVICE — STENT URETERAL FIRM 6FX26CM ASCERTA
Type: IMPLANTABLE DEVICE | Site: URETER | Status: NON-FUNCTIONAL
Removed: 2022-07-17

## (undated) DEVICE — SOL NACL IRR 1000ML BTL

## (undated) DEVICE — COVER TABLE 44X90 STERILE

## (undated) DEVICE — CATH URETERAL DUAL LUMEN 10FR

## (undated) DEVICE — SOLUTION H2O IRRIGATION 3000ML R8006